# Patient Record
Sex: FEMALE | Race: WHITE | HISPANIC OR LATINO | Employment: UNEMPLOYED | URBAN - METROPOLITAN AREA
[De-identification: names, ages, dates, MRNs, and addresses within clinical notes are randomized per-mention and may not be internally consistent; named-entity substitution may affect disease eponyms.]

---

## 2022-07-05 ENCOUNTER — HOSPITAL ENCOUNTER (EMERGENCY)
Facility: HOSPITAL | Age: 52
Discharge: HOME | End: 2022-07-05
Attending: EMERGENCY MEDICINE

## 2022-07-05 VITALS
BODY MASS INDEX: 46.33 KG/M2 | HEART RATE: 80 BPM | TEMPERATURE: 99 F | HEIGHT: 60 IN | DIASTOLIC BLOOD PRESSURE: 85 MMHG | RESPIRATION RATE: 18 BRPM | SYSTOLIC BLOOD PRESSURE: 178 MMHG | OXYGEN SATURATION: 96 % | WEIGHT: 236 LBS

## 2022-07-05 DIAGNOSIS — N17.9 AKI (ACUTE KIDNEY INJURY): Primary | ICD-10-CM

## 2022-07-05 DIAGNOSIS — R10.9 ABDOMINAL PAIN: ICD-10-CM

## 2022-07-05 LAB
ALBUMIN SERPL BCP-MCNC: 3 G/DL (ref 3.5–5.2)
ALP SERPL-CCNC: 78 U/L (ref 55–135)
ALT SERPL W/O P-5'-P-CCNC: 17 U/L (ref 10–44)
ANION GAP SERPL CALC-SCNC: 10 MMOL/L (ref 8–16)
AST SERPL-CCNC: 18 U/L (ref 10–40)
BACTERIA #/AREA URNS HPF: ABNORMAL /HPF
BASOPHILS # BLD AUTO: 0.06 K/UL (ref 0–0.2)
BASOPHILS NFR BLD: 0.7 % (ref 0–1.9)
BILIRUB SERPL-MCNC: 0.2 MG/DL (ref 0.1–1)
BILIRUB UR QL STRIP: NEGATIVE
BUN SERPL-MCNC: 25 MG/DL (ref 6–20)
CALCIUM SERPL-MCNC: 8.4 MG/DL (ref 8.7–10.5)
CHLORIDE SERPL-SCNC: 109 MMOL/L (ref 95–110)
CLARITY UR: CLEAR
CO2 SERPL-SCNC: 23 MMOL/L (ref 23–29)
COLOR UR: YELLOW
CREAT SERPL-MCNC: 2.1 MG/DL (ref 0.5–1.4)
DIFFERENTIAL METHOD: ABNORMAL
EOSINOPHIL # BLD AUTO: 0.5 K/UL (ref 0–0.5)
EOSINOPHIL NFR BLD: 6 % (ref 0–8)
ERYTHROCYTE [DISTWIDTH] IN BLOOD BY AUTOMATED COUNT: 15.7 % (ref 11.5–14.5)
EST. GFR  (AFRICAN AMERICAN): 30 ML/MIN/1.73 M^2
EST. GFR  (NON AFRICAN AMERICAN): 26 ML/MIN/1.73 M^2
GLUCOSE SERPL-MCNC: 92 MG/DL (ref 70–110)
GLUCOSE UR QL STRIP: NEGATIVE
HCT VFR BLD AUTO: 44 % (ref 37–48.5)
HGB BLD-MCNC: 13.5 G/DL (ref 12–16)
HGB UR QL STRIP: ABNORMAL
HYALINE CASTS #/AREA URNS LPF: 0 /LPF
IMM GRANULOCYTES # BLD AUTO: 0.02 K/UL (ref 0–0.04)
IMM GRANULOCYTES NFR BLD AUTO: 0.2 % (ref 0–0.5)
KETONES UR QL STRIP: NEGATIVE
LEUKOCYTE ESTERASE UR QL STRIP: ABNORMAL
LIPASE SERPL-CCNC: 53 U/L (ref 4–60)
LYMPHOCYTES # BLD AUTO: 2.3 K/UL (ref 1–4.8)
LYMPHOCYTES NFR BLD: 28.4 % (ref 18–48)
MCH RBC QN AUTO: 26.6 PG (ref 27–31)
MCHC RBC AUTO-ENTMCNC: 30.7 G/DL (ref 32–36)
MCV RBC AUTO: 87 FL (ref 82–98)
MICROSCOPIC COMMENT: ABNORMAL
MONOCYTES # BLD AUTO: 0.6 K/UL (ref 0.3–1)
MONOCYTES NFR BLD: 7 % (ref 4–15)
NEUTROPHILS # BLD AUTO: 4.7 K/UL (ref 1.8–7.7)
NEUTROPHILS NFR BLD: 57.7 % (ref 38–73)
NITRITE UR QL STRIP: NEGATIVE
NRBC BLD-RTO: 0 /100 WBC
PH UR STRIP: 6 [PH] (ref 5–8)
PLATELET # BLD AUTO: 297 K/UL (ref 150–450)
PMV BLD AUTO: 11.7 FL (ref 9.2–12.9)
POTASSIUM SERPL-SCNC: 5 MMOL/L (ref 3.5–5.1)
PROT SERPL-MCNC: 7.5 G/DL (ref 6–8.4)
PROT UR QL STRIP: ABNORMAL
RBC # BLD AUTO: 5.08 M/UL (ref 4–5.4)
RBC #/AREA URNS HPF: 5 /HPF (ref 0–4)
SODIUM SERPL-SCNC: 142 MMOL/L (ref 136–145)
SP GR UR STRIP: 1.01 (ref 1–1.03)
TROPONIN I SERPL DL<=0.01 NG/ML-MCNC: <0.006 NG/ML (ref 0–0.03)
URN SPEC COLLECT METH UR: ABNORMAL
UROBILINOGEN UR STRIP-ACNC: NEGATIVE EU/DL
WBC # BLD AUTO: 8.12 K/UL (ref 3.9–12.7)
WBC #/AREA URNS HPF: 24 /HPF (ref 0–5)
WBC CLUMPS URNS QL MICRO: ABNORMAL

## 2022-07-05 PROCEDURE — 85025 COMPLETE CBC W/AUTO DIFF WBC: CPT

## 2022-07-05 PROCEDURE — 87086 URINE CULTURE/COLONY COUNT: CPT

## 2022-07-05 PROCEDURE — 87088 URINE BACTERIA CULTURE: CPT

## 2022-07-05 PROCEDURE — 25000003 PHARM REV CODE 250

## 2022-07-05 PROCEDURE — 96360 HYDRATION IV INFUSION INIT: CPT

## 2022-07-05 PROCEDURE — 93010 EKG 12-LEAD: ICD-10-PCS | Mod: ,,, | Performed by: INTERNAL MEDICINE

## 2022-07-05 PROCEDURE — 87077 CULTURE AEROBIC IDENTIFY: CPT

## 2022-07-05 PROCEDURE — 99285 EMERGENCY DEPT VISIT HI MDM: CPT | Mod: 25

## 2022-07-05 PROCEDURE — 93005 ELECTROCARDIOGRAM TRACING: CPT

## 2022-07-05 PROCEDURE — 83690 ASSAY OF LIPASE: CPT

## 2022-07-05 PROCEDURE — 87186 SC STD MICRODIL/AGAR DIL: CPT

## 2022-07-05 PROCEDURE — 84484 ASSAY OF TROPONIN QUANT: CPT

## 2022-07-05 PROCEDURE — 96361 HYDRATE IV INFUSION ADD-ON: CPT

## 2022-07-05 PROCEDURE — 93010 ELECTROCARDIOGRAM REPORT: CPT | Mod: ,,, | Performed by: INTERNAL MEDICINE

## 2022-07-05 PROCEDURE — 81000 URINALYSIS NONAUTO W/SCOPE: CPT

## 2022-07-05 PROCEDURE — 80053 COMPREHEN METABOLIC PANEL: CPT

## 2022-07-05 RX ORDER — CEPHALEXIN 500 MG/1
500 CAPSULE ORAL 4 TIMES DAILY
Qty: 20 CAPSULE | Refills: 0 | Status: SHIPPED | OUTPATIENT
Start: 2022-07-05 | End: 2022-07-12

## 2022-07-05 RX ADMIN — SODIUM CHLORIDE 1000 ML: 9 INJECTION, SOLUTION INTRAVENOUS at 05:07

## 2022-07-05 NOTE — ED PROVIDER NOTES
Encounter Date: 7/5/2022       History     Chief Complaint   Patient presents with    abnormal labs     Pt presents to ED after being told by her Dr that she has some abnormal lab results and needed further evaluation. Pt went to see Dr with right sided pain. Pt stated that her kidney numbers were abnormal. Pt denies pain currently.     52-year-old female with past medical history of hypertension, cholecystectomy, and x1 C section presents to the ED complaining of x2 month history of intermittent right upper quadrant abdominal pain and diaphoresis.  Patient reports a stabbing pain that is 6/10 in intensity.  She denies any pain currently.  She only reports pain intermittently whenever she bends or leans forward.  Patient has attempted to avoid fast foods with relief to her pain.  She presented to her primary care provider x4 days ago due to her symptoms where she was told she had some abnormal labs and was advised to present to the ER.  No attempted treatment reported.  Her last menstrual period was 1 and half years ago.  She denies any fever, chills, cough, chest pain, shortness of breath, dysuria, hematuria, nausea, vomiting, diarrhea, vaginal discharge, or vaginal bleeding.  No other symptoms reported.  Language line was used: [employer] #[id number].    The history is provided by the patient. The history is limited by a language barrier. A  was used.     Review of patient's allergies indicates:  No Known Allergies  History reviewed. No pertinent past medical history.  History reviewed. No pertinent surgical history.  History reviewed. No pertinent family history.  Social History     Tobacco Use    Smoking status: Never Smoker    Smokeless tobacco: Never Used   Substance Use Topics    Alcohol use: Never     Review of Systems   Constitutional: Positive for diaphoresis. Negative for chills and fever.   HENT: Negative for congestion, ear pain, rhinorrhea and sore throat.    Eyes: Negative for redness.    Respiratory: Negative for cough and shortness of breath.    Cardiovascular: Negative for chest pain.   Gastrointestinal: Positive for abdominal pain. Negative for diarrhea, nausea and vomiting.   Genitourinary: Negative for decreased urine volume, difficulty urinating, dysuria, frequency, hematuria and urgency.   Musculoskeletal: Negative for back pain and neck pain.   Skin: Negative for rash.   Neurological: Negative for headaches.   Psychiatric/Behavioral: Negative for confusion.       Physical Exam     Initial Vitals [07/05/22 1308]   BP Pulse Resp Temp SpO2   (!) 160/86 85 16 98.5 °F (36.9 °C) 95 %      MAP       --         Physical Exam    Nursing note and vitals reviewed.  Constitutional: She appears well-developed and well-nourished.  Non-toxic appearance. She does not appear ill.   HENT:   Head: Normocephalic and atraumatic.   Mouth/Throat: Mucous membranes are normal.   Eyes: EOM are normal.   Neck: Neck supple.   Cardiovascular: Normal rate and regular rhythm.   Pulses:       Radial pulses are 2+ on the right side and 2+ on the left side.   Pulmonary/Chest: Effort normal and breath sounds normal. No respiratory distress.   Abdominal: Abdomen is soft. Bowel sounds are normal. She exhibits no distension. A surgical scar is present. There is no abdominal tenderness.   No right CVA tenderness.  No left CVA tenderness. There is no rebound, no guarding, no tenderness at McBurney's point and negative Dobbs's sign. negative Rovsing's sign  Musculoskeletal:         General: Normal range of motion.      Cervical back: Neck supple.     Neurological: She is alert.   Skin: Skin is warm and dry.   Psychiatric: She has a normal mood and affect.         ED Course   Procedures  Labs Reviewed   CBC W/ AUTO DIFFERENTIAL - Abnormal; Notable for the following components:       Result Value    MCH 26.6 (*)     MCHC 30.7 (*)     RDW 15.7 (*)     All other components within normal limits   URINALYSIS, REFLEX TO URINE CULTURE -  Abnormal; Notable for the following components:    Protein, UA 3+ (*)     Occult Blood UA 1+ (*)     Leukocytes, UA 1+ (*)     All other components within normal limits    Narrative:     Specimen Source->Urine   URINALYSIS MICROSCOPIC - Abnormal; Notable for the following components:    RBC, UA 5 (*)     WBC, UA 24 (*)     All other components within normal limits    Narrative:     Specimen Source->Urine   COMPREHENSIVE METABOLIC PANEL - Abnormal; Notable for the following components:    BUN 25 (*)     Creatinine 2.1 (*)     Calcium 8.4 (*)     Albumin 3.0 (*)     eGFR if  30 (*)     eGFR if non  26 (*)     All other components within normal limits   CULTURE, URINE   TROPONIN I   LIPASE     EKG Readings: (Independently Interpreted)   Initial Reading: No STEMI. Rhythm: Normal Sinus Rhythm. Heart Rate: 84.       Imaging Results          US Abdomen Limited (Final result)  Result time 07/05/22 17:10:32    Final result by Carol Schultz MD (07/05/22 17:10:32)                 Impression:      Cholecystectomy.      Electronically signed by: Carol Schultz  Date:    07/05/2022  Time:    17:10             Narrative:    EXAMINATION:  ULTRASOUND ABDOMEN LIMITED (GALLBLADDER)    CLINICAL HISTORY:  RUQ abdominal pain;    TECHNIQUE:  Limited ultrasound of the right upper quadrant of the abdomen with attention to the gallbladder was performed.    COMPARISON:  None.    FINDINGS:  Gallbladder: Surgically absent.    Biliary system: The common duct is not dilated, measuring 6.3 mm.  No intrahepatic ductal dilatation.    Miscellaneous: No right hydronephrosis.                               CT Renal Stone Study ABD Pelvis WO (Final result)  Result time 07/05/22 16:44:15    Final result by Shaka Casas III, MD (07/05/22 16:44:15)                 Impression:      Normal appearance of the urinary tracts.    Large simple cyst right ovary.      Electronically signed by: Shaka Casas  MD  Date:    07/05/2022  Time:    16:44             Narrative:    EXAMINATION:  CT RENAL STONE STUDY ABD PELVIS WO    CLINICAL HISTORY:  Flank pain, kidney stone suspected;    FINDINGS:  Lung bases are clear.  Gallbladder removed.  Liver, spleen, stomach, pancreas, duodenum, and adrenal glands show nothing unusual.  No obstruction, ileus, or perforation seen.  There is a large right ovarian cyst measuring 7 cm.  There is diverticulosis.  Bowel loops show nothing unusual.  No adenopathy or mass is seen.  Kidneys in ureters show nothing unusual.  No bladder calculi are seen.  No ureter calculi are seen.  No ascites is seen.  No renal masses are seen.  Bones show nothing unusual.                               X-Ray Chest AP Portable (Final result)  Result time 07/05/22 14:17:39    Final result by Shaka Casas III, MD (07/05/22 14:17:39)                 Impression:      No acute process seen.      Electronically signed by: Shaka Casas MD  Date:    07/05/2022  Time:    14:17             Narrative:    EXAMINATION:  XR CHEST AP PORTABLE    CLINICAL HISTORY:  Unspecified abdominal pain    FINDINGS:  Chest one view: Heart size is upper normal.  Lungs are clear and the bones show nothing unusual.                                 Medications   sodium chloride 0.9% bolus 1,000 mL (has no administration in time range)     Medical Decision Making:   ED Management:  This is a 52-year-old female with past medical history of hypertension, cholecystectomy, and x1 C section presents to the ED complaining of x2 month history of intermittent right upper quadrant abdominal pain and diaphoresis.  Patient reports a stabbing pain that is 6/10 in intensity.  She denies any pain currently.  On physical exam, she is well appearing and in no acute distress.  Nontoxic appearing.  Abdomen is soft and nontender.  No rebound, guarding, or rigidity. There is a surgical scar present to right upper quadrant.  No CVA tenderness bilaterally.      Differentials including but not limited to:  Cholelithiasis, pancreatitis, gastritis, PUD, cystitis, nephrolithiasis, pyelonephritis, MI, pneumothorax, gastroenteritis    CBC unremarkable.  UA revealed 1+ leukocytes and 25 WBC.  CMP revealed BUN of 25, creatinine 2.1, and GFR of 26. Attempted to call daughter's of Lily to see previous lab work; however, I was unable to reach them.  Lipase and troponin within normal limits.  Chest x-ray revealed no evidence of acute cardiopulmonary processes.  CT renal revealed no evidence of nephrolithiasis. US RUQ revealed no evidence of biliary stone in the common bile duct.  Consulted with Dr. Rodriguez  who advised 1 L normal saline. He does not believe the patient warrants admission criteria at this time. Encouraged patient to drink a lot of fluids upon discharge. Will order keflex upon discharge for UTI. Urged prompt f/u with PCP for further evaluation.    Case discussed with and care coordinated in conjunction with my attending, Dr. Rodriguez.    Strict return precautions given. I discussed with the patient/family the diagnosis, treatment plan, indications for return to the emergency department, and for expected follow-up. The patient/family verbalized an understanding. The patient/family is asked if there are any questions or concerns. We discuss the case, until all issues are addressed to the patient/familys satisfaction. Patient/family understands and is agreeable to the plan. Patient is stable and ready for discharge.                       Clinical Impression:   Final diagnoses:  [R10.9] Abdominal pain                 Jose Louie PA-C  07/05/22 1810       Jose Louie PA-C  07/05/22 1820

## 2022-07-05 NOTE — DISCHARGE INSTRUCTIONS
Regrese al departamento de emergencias si presenta síntomas nuevos o que empeoran, incluidos: fiebre, dolor en el pecho, dificultad para respirar, pérdida del conocimiento, mareos, debilidad o cualquier otra inquietud.    Jacklyn un seguimiento con ely proveedor de atención primaria dentro de la semana. Si no tiene karely, puede comunicarse con el que figura en ely documentación de ed o también puede llamar al mostrador de citas de la [facility] al [phone] para programar cristal milady con karely.    [facility] todos los medicamentos según lo prescrito.

## 2022-07-07 LAB — BACTERIA UR CULT: ABNORMAL

## 2023-06-24 ENCOUNTER — HOSPITAL ENCOUNTER (INPATIENT)
Facility: HOSPITAL | Age: 53
LOS: 2 days | Discharge: HOME | DRG: 291 | End: 2023-06-26
Attending: EMERGENCY MEDICINE | Admitting: STUDENT IN AN ORGANIZED HEALTH CARE EDUCATION/TRAINING PROGRAM

## 2023-06-24 DIAGNOSIS — I34.0 MITRAL VALVE INSUFFICIENCY, UNSPECIFIED ETIOLOGY: ICD-10-CM

## 2023-06-24 DIAGNOSIS — I10 HYPERTENSION, UNSPECIFIED TYPE: ICD-10-CM

## 2023-06-24 DIAGNOSIS — I50.9 NEW ONSET OF CONGESTIVE HEART FAILURE: Primary | ICD-10-CM

## 2023-06-24 DIAGNOSIS — R06.02 SHORTNESS OF BREATH: ICD-10-CM

## 2023-06-24 DIAGNOSIS — R07.9 CHEST PAIN: ICD-10-CM

## 2023-06-24 DIAGNOSIS — I50.9 CHF (CONGESTIVE HEART FAILURE): ICD-10-CM

## 2023-06-24 DIAGNOSIS — N18.4 STAGE 4 CHRONIC KIDNEY DISEASE: ICD-10-CM

## 2023-06-24 PROBLEM — N83.209 OVARIAN CYST: Status: ACTIVE | Noted: 2023-06-24

## 2023-06-24 PROBLEM — D64.9 ANEMIA: Status: ACTIVE | Noted: 2023-06-24

## 2023-06-24 PROBLEM — E66.9 CLASS 2 OBESITY IN ADULT: Status: ACTIVE | Noted: 2023-06-24

## 2023-06-24 PROBLEM — E66.812 CLASS 2 OBESITY IN ADULT: Status: ACTIVE | Noted: 2023-06-24

## 2023-06-24 LAB
ALBUMIN SERPL BCP-MCNC: 3.2 G/DL (ref 3.5–5.2)
ALLENS TEST: ABNORMAL
ALP SERPL-CCNC: 87 U/L (ref 55–135)
ALT SERPL W/O P-5'-P-CCNC: 33 U/L (ref 10–44)
ANION GAP SERPL CALC-SCNC: 11 MMOL/L (ref 8–16)
AST SERPL-CCNC: 38 U/L (ref 10–40)
BASOPHILS # BLD AUTO: 0.05 K/UL (ref 0–0.2)
BASOPHILS NFR BLD: 0.6 % (ref 0–1.9)
BILIRUB SERPL-MCNC: 0.5 MG/DL (ref 0.1–1)
BNP SERPL-MCNC: 443 PG/ML (ref 0–99)
BUN SERPL-MCNC: 42 MG/DL (ref 6–20)
CALCIUM SERPL-MCNC: 8.6 MG/DL (ref 8.7–10.5)
CHLORIDE SERPL-SCNC: 108 MMOL/L (ref 95–110)
CO2 SERPL-SCNC: 19 MMOL/L (ref 23–29)
CREAT SERPL-MCNC: 3 MG/DL (ref 0.5–1.4)
DELSYS: ABNORMAL
DIFFERENTIAL METHOD: ABNORMAL
EOSINOPHIL # BLD AUTO: 0.4 K/UL (ref 0–0.5)
EOSINOPHIL NFR BLD: 5.3 % (ref 0–8)
ERYTHROCYTE [DISTWIDTH] IN BLOOD BY AUTOMATED COUNT: 15.9 % (ref 11.5–14.5)
EST. GFR  (NO RACE VARIABLE): 18 ML/MIN/1.73 M^2
FLOW: 2
GLUCOSE SERPL-MCNC: 89 MG/DL (ref 70–110)
HCO3 UR-SCNC: 23.1 MMOL/L (ref 24–28)
HCT VFR BLD AUTO: 36 % (ref 37–48.5)
HCV AB SERPL QL IA: NORMAL
HGB BLD-MCNC: 10.9 G/DL (ref 12–16)
HIV 1+2 AB+HIV1 P24 AG SERPL QL IA: NORMAL
IMM GRANULOCYTES # BLD AUTO: 0.03 K/UL (ref 0–0.04)
IMM GRANULOCYTES NFR BLD AUTO: 0.4 % (ref 0–0.5)
LIPASE SERPL-CCNC: 40 U/L (ref 4–60)
LYMPHOCYTES # BLD AUTO: 1.5 K/UL (ref 1–4.8)
LYMPHOCYTES NFR BLD: 18.4 % (ref 18–48)
MCH RBC QN AUTO: 25.8 PG (ref 27–31)
MCHC RBC AUTO-ENTMCNC: 30.3 G/DL (ref 32–36)
MCV RBC AUTO: 85 FL (ref 82–98)
MODE: ABNORMAL
MONOCYTES # BLD AUTO: 0.4 K/UL (ref 0.3–1)
MONOCYTES NFR BLD: 5.4 % (ref 4–15)
NEUTROPHILS # BLD AUTO: 5.6 K/UL (ref 1.8–7.7)
NEUTROPHILS NFR BLD: 69.9 % (ref 38–73)
NRBC BLD-RTO: 0 /100 WBC
PCO2 BLDA: 43.5 MMHG (ref 35–45)
PH SMN: 7.33 [PH] (ref 7.35–7.45)
PLATELET # BLD AUTO: 299 K/UL (ref 150–450)
PMV BLD AUTO: 11.1 FL (ref 9.2–12.9)
PO2 BLDA: 22 MMHG (ref 40–60)
POC BE: -3 MMOL/L
POC CARDIAC TROPONIN I: 0.04 NG/ML (ref 0–0.08)
POC SATURATED O2: 34 % (ref 95–100)
POC TCO2: 24 MMOL/L (ref 24–29)
POTASSIUM SERPL-SCNC: 5 MMOL/L (ref 3.5–5.1)
PROT SERPL-MCNC: 7.3 G/DL (ref 6–8.4)
RBC # BLD AUTO: 4.22 M/UL (ref 4–5.4)
SAMPLE: ABNORMAL
SAMPLE: NORMAL
SARS-COV-2 RDRP RESP QL NAA+PROBE: NEGATIVE
SITE: ABNORMAL
SODIUM SERPL-SCNC: 138 MMOL/L (ref 136–145)
TROPONIN I SERPL DL<=0.01 NG/ML-MCNC: 0.04 NG/ML (ref 0–0.03)
TROPONIN I SERPL DL<=0.01 NG/ML-MCNC: 0.09 NG/ML (ref 0–0.03)
TROPONIN I SERPL DL<=0.01 NG/ML-MCNC: 0.11 NG/ML (ref 0–0.03)
WBC # BLD AUTO: 7.94 K/UL (ref 3.9–12.7)

## 2023-06-24 PROCEDURE — 93010 EKG 12-LEAD: ICD-10-PCS | Mod: 76,,, | Performed by: INTERNAL MEDICINE

## 2023-06-24 PROCEDURE — U0002 COVID-19 LAB TEST NON-CDC: HCPCS

## 2023-06-24 PROCEDURE — 93010 ELECTROCARDIOGRAM REPORT: CPT | Mod: 76,,, | Performed by: INTERNAL MEDICINE

## 2023-06-24 PROCEDURE — 84484 ASSAY OF TROPONIN QUANT: CPT | Performed by: EMERGENCY MEDICINE

## 2023-06-24 PROCEDURE — 93010 ELECTROCARDIOGRAM REPORT: CPT | Mod: ,,, | Performed by: INTERNAL MEDICINE

## 2023-06-24 PROCEDURE — 94761 N-INVAS EAR/PLS OXIMETRY MLT: CPT

## 2023-06-24 PROCEDURE — 63600175 PHARM REV CODE 636 W HCPCS: Performed by: PHYSICIAN ASSISTANT

## 2023-06-24 PROCEDURE — 85025 COMPLETE CBC W/AUTO DIFF WBC: CPT | Performed by: PHYSICIAN ASSISTANT

## 2023-06-24 PROCEDURE — 99223 PR INITIAL HOSPITAL CARE,LEVL III: ICD-10-PCS | Mod: ,,, | Performed by: STUDENT IN AN ORGANIZED HEALTH CARE EDUCATION/TRAINING PROGRAM

## 2023-06-24 PROCEDURE — 27000221 HC OXYGEN, UP TO 24 HOURS

## 2023-06-24 PROCEDURE — 93005 ELECTROCARDIOGRAM TRACING: CPT

## 2023-06-24 PROCEDURE — 99285 EMERGENCY DEPT VISIT HI MDM: CPT | Mod: 25

## 2023-06-24 PROCEDURE — 99223 1ST HOSP IP/OBS HIGH 75: CPT | Mod: ,,, | Performed by: STUDENT IN AN ORGANIZED HEALTH CARE EDUCATION/TRAINING PROGRAM

## 2023-06-24 PROCEDURE — 96374 THER/PROPH/DIAG INJ IV PUSH: CPT

## 2023-06-24 PROCEDURE — 86803 HEPATITIS C AB TEST: CPT | Performed by: EMERGENCY MEDICINE

## 2023-06-24 PROCEDURE — 99900035 HC TECH TIME PER 15 MIN (STAT)

## 2023-06-24 PROCEDURE — 36415 COLL VENOUS BLD VENIPUNCTURE: CPT

## 2023-06-24 PROCEDURE — 84484 ASSAY OF TROPONIN QUANT: CPT | Mod: 91 | Performed by: PHYSICIAN ASSISTANT

## 2023-06-24 PROCEDURE — 63600175 PHARM REV CODE 636 W HCPCS: Performed by: STUDENT IN AN ORGANIZED HEALTH CARE EDUCATION/TRAINING PROGRAM

## 2023-06-24 PROCEDURE — 25000003 PHARM REV CODE 250: Performed by: STUDENT IN AN ORGANIZED HEALTH CARE EDUCATION/TRAINING PROGRAM

## 2023-06-24 PROCEDURE — 25000003 PHARM REV CODE 250: Performed by: PHYSICIAN ASSISTANT

## 2023-06-24 PROCEDURE — 83880 ASSAY OF NATRIURETIC PEPTIDE: CPT | Performed by: PHYSICIAN ASSISTANT

## 2023-06-24 PROCEDURE — 25000003 PHARM REV CODE 250

## 2023-06-24 PROCEDURE — 83690 ASSAY OF LIPASE: CPT | Performed by: PHYSICIAN ASSISTANT

## 2023-06-24 PROCEDURE — 82803 BLOOD GASES ANY COMBINATION: CPT

## 2023-06-24 PROCEDURE — 80053 COMPREHEN METABOLIC PANEL: CPT | Performed by: PHYSICIAN ASSISTANT

## 2023-06-24 PROCEDURE — 87389 HIV-1 AG W/HIV-1&-2 AB AG IA: CPT | Performed by: EMERGENCY MEDICINE

## 2023-06-24 PROCEDURE — 20600001 HC STEP DOWN PRIVATE ROOM

## 2023-06-24 PROCEDURE — 84484 ASSAY OF TROPONIN QUANT: CPT

## 2023-06-24 PROCEDURE — 84484 ASSAY OF TROPONIN QUANT: CPT | Mod: 91

## 2023-06-24 RX ORDER — FUROSEMIDE 10 MG/ML
40 INJECTION INTRAMUSCULAR; INTRAVENOUS
Status: DISCONTINUED | OUTPATIENT
Start: 2023-06-24 | End: 2023-06-25

## 2023-06-24 RX ORDER — LOSARTAN POTASSIUM 100 MG/1
100 TABLET ORAL DAILY
Status: ON HOLD | COMMUNITY
End: 2023-06-26 | Stop reason: SDUPTHER

## 2023-06-24 RX ORDER — SODIUM CHLORIDE 0.9 % (FLUSH) 0.9 %
10 SYRINGE (ML) INJECTION
Status: DISCONTINUED | OUTPATIENT
Start: 2023-06-24 | End: 2023-06-26 | Stop reason: HOSPADM

## 2023-06-24 RX ORDER — LISINOPRIL 40 MG/1
40 TABLET ORAL DAILY
COMMUNITY
End: 2023-06-24

## 2023-06-24 RX ORDER — ASPIRIN 325 MG
325 TABLET ORAL
Status: COMPLETED | OUTPATIENT
Start: 2023-06-24 | End: 2023-06-24

## 2023-06-24 RX ORDER — HEPARIN SODIUM 5000 [USP'U]/ML
5000 INJECTION, SOLUTION INTRAVENOUS; SUBCUTANEOUS EVERY 8 HOURS
Status: DISCONTINUED | OUTPATIENT
Start: 2023-06-24 | End: 2023-06-26 | Stop reason: HOSPADM

## 2023-06-24 RX ORDER — FUROSEMIDE 10 MG/ML
40 INJECTION INTRAMUSCULAR; INTRAVENOUS
Status: COMPLETED | OUTPATIENT
Start: 2023-06-24 | End: 2023-06-24

## 2023-06-24 RX ORDER — LORATADINE 10 MG/1
10 TABLET ORAL DAILY
Status: ON HOLD | COMMUNITY

## 2023-06-24 RX ORDER — SODIUM BICARBONATE 650 MG/1
650 TABLET ORAL 2 TIMES DAILY
Status: DISCONTINUED | OUTPATIENT
Start: 2023-06-24 | End: 2023-06-26 | Stop reason: HOSPADM

## 2023-06-24 RX ORDER — CETIRIZINE HYDROCHLORIDE 10 MG/1
10 TABLET ORAL DAILY
Status: DISCONTINUED | OUTPATIENT
Start: 2023-06-25 | End: 2023-06-25

## 2023-06-24 RX ORDER — FUROSEMIDE 10 MG/ML
40 INJECTION INTRAMUSCULAR; INTRAVENOUS DAILY
Status: DISCONTINUED | OUTPATIENT
Start: 2023-06-25 | End: 2023-06-24

## 2023-06-24 RX ORDER — HYDRALAZINE HYDROCHLORIDE 25 MG/1
25 TABLET, FILM COATED ORAL EVERY 8 HOURS PRN
Status: DISCONTINUED | OUTPATIENT
Start: 2023-06-24 | End: 2023-06-26 | Stop reason: HOSPADM

## 2023-06-24 RX ORDER — LIDOCAINE HYDROCHLORIDE 10 MG/ML
10 INJECTION INFILTRATION; PERINEURAL ONCE
Status: DISCONTINUED | OUTPATIENT
Start: 2023-06-24 | End: 2023-06-24

## 2023-06-24 RX ORDER — ACETAMINOPHEN 325 MG/1
650 TABLET ORAL EVERY 6 HOURS PRN
Status: DISCONTINUED | OUTPATIENT
Start: 2023-06-24 | End: 2023-06-26 | Stop reason: HOSPADM

## 2023-06-24 RX ORDER — ENOXAPARIN SODIUM 100 MG/ML
30 INJECTION SUBCUTANEOUS EVERY 24 HOURS
Status: DISCONTINUED | OUTPATIENT
Start: 2023-06-24 | End: 2023-06-24

## 2023-06-24 RX ORDER — POLYETHYLENE GLYCOL 3350 17 G/17G
17 POWDER, FOR SOLUTION ORAL 2 TIMES DAILY PRN
Status: DISCONTINUED | OUTPATIENT
Start: 2023-06-24 | End: 2023-06-26 | Stop reason: HOSPADM

## 2023-06-24 RX ADMIN — ASPIRIN 325 MG: 325 TABLET ORAL at 11:06

## 2023-06-24 RX ADMIN — FUROSEMIDE 40 MG: 10 INJECTION, SOLUTION INTRAMUSCULAR; INTRAVENOUS at 06:06

## 2023-06-24 RX ADMIN — HEPARIN SODIUM 5000 UNITS: 5000 INJECTION INTRAVENOUS; SUBCUTANEOUS at 09:06

## 2023-06-24 RX ADMIN — ACETAMINOPHEN 650 MG: 325 TABLET ORAL at 09:06

## 2023-06-24 RX ADMIN — SODIUM BICARBONATE 650 MG: 650 TABLET ORAL at 09:06

## 2023-06-24 RX ADMIN — FUROSEMIDE 40 MG: 10 INJECTION, SOLUTION INTRAMUSCULAR; INTRAVENOUS at 12:06

## 2023-06-24 NOTE — Clinical Note
Diagnosis: New onset of congestive heart failure [[id number]]   Admitting Provider:: DYLAN MUÑIZ [[id number]]   Future Attending Provider: DYLAN MUÑIZ [[id number]]   Reason for IP Medical Treatment  (Clinical interventions that can only be accomplished in the IP setting? ) :: new onset HF   I certify that Inpatient services for greater than or equal to 2 midnights are medically necessary:: Yes   Plans for Post-Acute care--if anticipated (pick the single best option):: A. No post acute care anticipated at this time   Special Needs:: No Special Needs [1]

## 2023-06-24 NOTE — ED NOTES
Patient identifiers verified and correct for Ms. Mccallum  Wallisian speaking,  utilized.     C/C: Shortness of breath, chest pain, abdominal pain on exertion worsening over 2 days. Daughters at bedside.       APPEARANCE: awake and alert in NAD.   PAIN  0/10  SKIN: warm, dry and intact. No breakdown or bruising.  MUSCULOSKELETAL: Patient moving all extremities spontaneously, no obvious swelling or deformities noted. Ambulates independently.  RESPIRATORY: Respirations unlabored. Shortness of breath worsened by exertion. Unable to lay flat.   CARDIAC: 2+ distal pulses; Intermittent chest pain, +2 edema to BLE  ABDOMEN: S/ND/NT, Denies nausea  : voids spontaneously, denies difficulty  Neurologic: AAO x 4; follows commands equal strength in all extremities; denies numbness/tingling. Denies dizziness

## 2023-06-24 NOTE — PHARMACY MED REC
"Admission Medication History     The home medication history was taken by Madhu Ordonez.    You may go to "Admission" then "Reconcile Home Medications" tabs to review and/or act upon these items.     The home medication list has been updated by the Pharmacy department.   Please read ALL comments highlighted in yellow.   Please address this information as you see fit.    Feel free to contact us if you have any questions or require assistance.      The medications listed below were removed from the home medication list. Please reorder if appropriate:  Patient reports no longer taking the following medication(s):  LISINOPRIL 40 MG TABLET      Current Outpatient Medications on File Prior to Encounter   Medication Sig    loratadine (CLARITIN) 10 mg tablet   Take 10 mg by mouth once daily.    losartan (COZAAR) 100 MG tablet   Take 100 mg by mouth once daily.       Madhu Ordonez  EXT [id number]                  .        "

## 2023-06-24 NOTE — PLAN OF CARE
Problem: Adult Inpatient Plan of Care  Goal: Patient-Specific Goal (Individualized)  Outcome: Ongoing, Progressing  Flowsheets (Taken 6/24/2023 1809)  Anxieties, Fears or Concerns: SOB  Individualized Care Needs: lasix     Problem: Fluid Imbalance (Heart Failure)  Goal: Fluid Balance  Outcome: Ongoing, Progressing  Intervention: Monitor and Manage Fluid Balance  Flowsheets (Taken 6/24/2023 1809)  Fluid/Electrolyte Management: fluids restricted       Plan of care discussed with patient. Patient is free of fall/trauma/injury. IVP lasix per order.  All questions addressed.

## 2023-06-24 NOTE — ASSESSMENT & PLAN NOTE
Body mass index is 36.13 kg/m². Morbid obesity complicates all aspects of disease management from diagnostic modalities to treatment. Weight loss encouraged and health benefits explained to patient.

## 2023-06-24 NOTE — ED TRIAGE NOTES
Gambian speaking,  used. Complaint of shortness of breath, mid upper abdominal pain, chest pain. States worse with walking. PA assessing at bedside.

## 2023-06-24 NOTE — ASSESSMENT & PLAN NOTE
Large simple 7cm right ovarian cyst noted on RP ultrasound in November 2022. Per radiology read at that time it appeared grossly stable from CT scan in July 2022. Recommended 3-6 month repeat imaging w/ transvaginal ultrasound. Will refer for outpatient imaging on discharge.

## 2023-06-24 NOTE — ASSESSMENT & PLAN NOTE
Takes losartan 100 mg daily at home. BP elevated to 184/104 on admission, improved with diuresis. Will hold home ARB in the setting of possible REGINALDO (though unclear Cr baseline). Will avoid beta-blockers given new-onset CHF with unknown EF. Will use PRN oral hydralazine in the interim for BP control.     -- Oral hydralazine 25 mg TID PRN for SBP > 170

## 2023-06-24 NOTE — ASSESSMENT & PLAN NOTE
Hgb 10.9 on admission, was normal in July 2022. Normocytic. Will check iron, folate, B-12. Possibly secondary to chronic kidney disease. No evidence of bleeding on exam. Iron, folate levels WNL, B-12 slightly elevated.    -- Trend CBC  -- Transfusion threshold < 7.0

## 2023-06-24 NOTE — ASSESSMENT & PLAN NOTE
# Elevated Troponin    52 yoF w/ history of HTN, CKD IV presenting w/ signs and symptoms consistent with new-onset heart failure exacerbation as evidenced by dyspnea on exertion, orthopnea, elevated BNP to 443, CXR with bilateral pulmonary edema, crackles in bilateral bases. Risk factors include hypertension. Possible triggers include URI w/ scratchy throat and recent sick contact vs recent dietary indiscretion w/ salty meal on day prior to admission.    Troponin mildly elevated to 0.04 in the setting of CKD. Patient reporting left-sided non-radiating chest pain that is not associated w/ activity and resolves spontaneously. ECG without evidence of ischemic change. Suspect troponin bump secondary to CHF exacerbation in the setting of CKD. Troponin peaked at 0.113, will stop trending.    Symptoms improving with diuresis, approaching euvolemia. TTE performed 6/25, read pending. O2 weaned off.    Plan:  -- Will check RFP this PM, if Cr stable or improving then will give 40 mg PO lasix, if Cr worsening will hold on further diuresis today  -- Follow-up TTE  -- Start metoprolol succinate 25 mg daily  -- Daily weights (standing if tolerated)  -- Strict I/Os  -- Fluid restriction at 2000mL  -- Cardiac diet  -- Outpatient cardiology follow-up for further up-titration of GDMT

## 2023-06-24 NOTE — H&P
[facility] - Emergency Dept  [facility] Medicine  History & Physical    Patient Name: Xena Vera  MRN: [id number]  Patient Class: IP- Inpatient  Admission Date: 6/24/2023  Attending Physician: Dafne Lindsay DO   Primary Care Provider: MINH Herrera       Patient information was obtained from patient, past medical records and ER records.     Subjective:     Principal Problem:New onset of congestive heart failure    Chief Complaint:   Chief Complaint   Patient presents with    Shortness of Breath        Medical  was used for encounter.    HPI: The patient is a 52 year-old female with a history of HTN, CKD who presents to the ED with shortness of breath x 1 day. She reports that she was awoken at 2:00 a.m. on the morning of admission w/ dyspnea while laying flat. The shortness of breath is worsened with exertion and laying flat. Additionally she reports chest pain that is left-sided and non-radiating. The pain lasts for a few seconds then resolves spontaneously. The chest pain is not associated with activity. She reports leg swelling for the past one day. In terms of triggers she states that she had close contact with a baby that had an upper respiratory tract infection earlier this week and now feels a scratchy/sore throat. Additionally she states that yesterday she at a particularly salty meal. She has never previously been diagnosed with heart failure.    ED evaluation significant for BP (184/104), satting 93% on room air, RR 28, T 98.5. WBC 7.9, Hgb 10.9, plts 299, Na 138, K 5.0, Bicarb 19, BUN 42, Cr 3.0 (unclear baseline, only available measurement was 2.1 in July 2022), , Troponin 0.04. CXR with bilateral pulmonary edema.      Past Medical History:   Diagnosis Date    CKD (chronic kidney disease)     HTN (hypertension)     Ovarian cyst        History reviewed. No pertinent surgical history.    Review of patient's allergies indicates:  No Known Allergies    No current  facility-administered medications on file prior to encounter.     Current Outpatient Medications on File Prior to Encounter   Medication Sig    loratadine (CLARITIN) 10 mg tablet Take 10 mg by mouth once daily.    losartan (COZAAR) 100 MG tablet Take 100 mg by mouth once daily.    [DISCONTINUED] lisinopriL (PRINIVIL,ZESTRIL) 40 MG tablet Take 40 mg by mouth once daily.    [DISCONTINUED] lisinopriL (PRINIVIL,ZESTRIL) 40 MG tablet Take 40 mg by mouth once daily.     Family History    None       Tobacco Use    Smoking status: Never    Smokeless tobacco: Never   Substance and Sexual Activity    Alcohol use: Never    Drug use: Never    Sexual activity: Not on file     Review of Systems   Constitutional:  Negative for chills, fatigue and fever.   HENT:  Positive for sore throat. Negative for congestion and rhinorrhea.    Eyes:  Negative for pain, redness and visual disturbance.   Respiratory:  Positive for shortness of breath. Negative for cough and wheezing.    Cardiovascular:  Positive for chest pain and leg swelling. Negative for palpitations.   Gastrointestinal:  Negative for abdominal distention, abdominal pain, constipation, diarrhea, nausea and vomiting.   Genitourinary:  Negative for dysuria and hematuria.   Musculoskeletal:  Negative for arthralgias and joint swelling.   Skin:  Negative for pallor and rash.   Neurological:  Negative for dizziness, weakness, numbness and headaches.   Psychiatric/Behavioral:  Negative for agitation. The patient is not nervous/anxious.    Objective:     Vital Signs (Most Recent):  Temp: 98.7 °F (37.1 °C) (06/24/23 1147)  Pulse: 81 (06/24/23 1417)  Resp: (!) 23 (06/24/23 1417)  BP: (!) 148/86 (06/24/23 1417)  SpO2: 99 % (06/24/23 1417) Vital Signs (24h Range):  Temp:  [98.5 °F (36.9 °C)-98.7 °F (37.1 °C)] 98.7 °F (37.1 °C)  Pulse:  [81-93] 81  Resp:  [18-28] 23  SpO2:  [93 %-99 %] 99 %  BP: (148-184)/() 148/86     Weight: 83.9 kg (185 lb)  Body mass index is 36.13 kg/m².      Physical Exam  Vitals and nursing note reviewed.   Constitutional:       General: She is not in acute distress.     Appearance: Normal appearance. She is obese.   HENT:      Head: Normocephalic and atraumatic.      Right Ear: External ear normal.      Left Ear: External ear normal.      Nose: No congestion or rhinorrhea.      Mouth/Throat:      Mouth: Mucous membranes are moist.      Pharynx: Oropharynx is clear.   Eyes:      General: No scleral icterus.     Extraocular Movements: Extraocular movements intact.      Conjunctiva/sclera: Conjunctivae normal.   Cardiovascular:      Rate and Rhythm: Normal rate and regular rhythm.      Pulses: Normal pulses.      Heart sounds: Murmur (systolic LUSB) heard.   Pulmonary:      Effort: Pulmonary effort is normal.      Breath sounds: Rales (bilateral bases) present. No wheezing.   Abdominal:      General: Abdomen is flat. Bowel sounds are normal. There is no distension.      Palpations: Abdomen is soft.      Tenderness: There is no abdominal tenderness.   Musculoskeletal:         General: No swelling.      Cervical back: Normal range of motion and neck supple.      Right lower leg: Edema present.      Left lower leg: Edema present.      Comments: 2+ bilateral lower extremity edema to mid-shin   Skin:     General: Skin is warm and dry.      Capillary Refill: Capillary refill takes less than 2 seconds.      Findings: No rash.   Neurological:      Mental Status: She is alert and oriented to person, place, and time. Mental status is at baseline.      Motor: No weakness.   Psychiatric:         Mood and Affect: Mood normal.         Behavior: Behavior normal.     Significant Labs: All pertinent labs within the past 24 hours have been reviewed.  CBC:   Recent Labs   Lab 06/24/23  1135   WBC 7.94   HGB 10.9*   HCT 36.0*        CMP:   Recent Labs   Lab 06/24/23  1136      K 5.0      CO2 19*   GLU 89   BUN 42*   CREATININE 3.0*   CALCIUM 8.6*   PROT 7.3   ALBUMIN  3.2*   BILITOT 0.5   ALKPHOS 87   AST 38   ALT 33   ANIONGAP 11       Significant Imaging: I have reviewed all pertinent imaging results/findings within the past 24 hours.    Assessment/Plan:     * New onset of congestive heart failure  # Elevated Troponin    52 yoF w/ history of HTN, CKD IV presenting w/ signs and symptoms consistent with new-onset heart failure exacerbation as evidenced by dyspnea on exertion, orthopnea, elevated BNP to 443, CXR with bilateral pulmonary edema, crackles in bilateral bases. Risk factors include hypertension. Possible triggers include URI w/ scratchy throat and recent sick contact vs recent dietary indiscretion w/ salty meal on day prior to admission.    Troponin mildly elevated to 0.04 in the setting of CKD. Patient reporting left-sided non-radiating chest pain that is not associated w/ activity and resolves spontaneously. ECG without evidence of ischemic change. Suspect troponin bump secondary to CHF exacerbation in the setting of CKD.    Plan:  -- IV lasix 40 mg BID; goal net negative 1-2L  -- Supplemental O2; wean as tolerated  -- Obtain STAT TTE  -- Trend troponin to peak  -- Daily weights (standing if tolerated)  -- Strict I/Os  -- Fluid restriction at 2000mL  -- Cardiac diet      Stage 4 chronic kidney disease  Creatinine 3.0 on admission. Unclear baseline, was 2.1 in July 2022. Patient reports history of proteinuria for which she was started on losartan. Possible REGINALDO vs baseline Cr of 3.0, will trend while inpatient. Will hold home ARB due to possible REGINALDO. Will obtain RP ultrasound to rule out obstruction. If creatinine is elevated from baseline would suspect CRS secondary to CHF exacerbation.    -- Trend Cr  -- Hold ARB for now  -- RP ultrasound  -- Urine studies to calculate FeUrea given diuretic use  -- Urine Pr:Cr  -- Renally dose medications  -- Avoid nephrotoxic medications    Anemia  Hgb 10.9 on admission, was normal in July 2022. Normocytic. Will check iron, folate,  B-12. Possibly secondary to chronic kidney disease. No evidence of bleeding on exam.    -- Follow-up Iron, folate, B-12 levels  -- Trend CBC  -- Transfusion threshold < 7.0    HTN (hypertension)  Takes losartan 100 mg daily at home. BP elevated to 184/104 on admission, improved with diuresis. Will hold home ARB in the setting of possible REGINALDO (though unclear Cr baseline). Will avoid beta-blockers given new-onset CHF with unknown EF. Will use PRN oral hydralazine in the interim for BP control.     -- Oral hydralazine 25 mg TID PRN for SBP > 170    Ovarian cyst  Large simple 7cm right ovarian cyst noted on RP ultrasound in November 2022. Per radiology read at that time it appeared grossly stable from CT scan in July 2022. Recommended 3-6 month repeat imaging w/ transvaginal ultrasound. Will refer for outpatient imaging on discharge.    Class 2 obesity in adult  Body mass index is 36.13 kg/m². Morbid obesity complicates all aspects of disease management from diagnostic modalities to treatment. Weight loss encouraged and health benefits explained to patient.       VTE Risk Mitigation (From admission, onward)           Ordered     enoxaparin injection 30 mg  Daily         06/24/23 1451     IP VTE HIGH RISK PATIENT  Once         06/24/23 1451     Place sequential compression device  Until discontinued         06/24/23 1451                    Javier Morris MD  Department of Hospital Medicine  [facility] - Emergency Dept

## 2023-06-24 NOTE — ASSESSMENT & PLAN NOTE
Creatinine 3.0 on admission. Unclear baseline, was 2.1 in July 2022. Patient reports history of proteinuria for which she was started on losartan. Possible REGINALDO vs baseline Cr of 3.0, will trend while inpatient. Will hold home ARB due to possible REGINALDO. If creatinine is elevated from baseline would suspect CRS secondary to CHF exacerbation.    -- Trend Cr  -- Hold ARB for now  -- Renally dose medications  -- Avoid nephrotoxic medications

## 2023-06-24 NOTE — ED NOTES
Ambulatory to bathroom independently. SPO2% 86%,  while walking on room air. 2L O2 via NC applied for remainder of ambulation.

## 2023-06-24 NOTE — ED NOTES
Patient states she was hospitalized with pneumonia 14 years ago and has had her gall bladder removed.

## 2023-06-24 NOTE — HPI
The patient is a 52 year-old female with a history of HTN, CKD who presents to the ED with shortness of breath x 1 day. She reports that she was awoken at 2:00 a.m. on the morning of admission w/ dyspnea while laying flat. The shortness of breath is worsened with exertion and laying flat. Additionally she reports chest pain that is left-sided and non-radiating. The pain lasts for a few seconds then resolves spontaneously. The chest pain is not associated with activity. She reports leg swelling for the past one day. In terms of triggers she states that she had close contact with a baby that had an upper respiratory tract infection earlier this week and now feels a scratchy/sore throat. Additionally she states that yesterday she at a particularly salty meal. She has never previously been diagnosed with heart failure.    ED evaluation significant for BP (184/104), satting 93% on room air, RR 28, T 98.5. WBC 7.9, Hgb 10.9, plts 299, Na 138, K 5.0, Bicarb 19, BUN 42, Cr 3.0 (unclear baseline, only available measurement was 2.1 in July 2022), , Troponin 0.04. CXR with bilateral pulmonary edema.

## 2023-06-24 NOTE — ASSESSMENT & PLAN NOTE
Hgb 10.9 on admission, was normal in July 2022. Normocytic. Will check iron, folate, B-12. Possibly secondary to chronic kidney disease. No evidence of bleeding on exam.    -- Follow-up Iron, folate, B-12 levels  -- Trend CBC  -- Transfusion threshold < 7.0

## 2023-06-24 NOTE — ASSESSMENT & PLAN NOTE
# Elevated Troponin    52 yoF w/ history of HTN, CKD IV presenting w/ signs and symptoms consistent with new-onset heart failure exacerbation as evidenced by dyspnea on exertion, orthopnea, elevated BNP to 443, CXR with bilateral pulmonary edema, crackles in bilateral bases. Risk factors include hypertension. Possible triggers include URI w/ scratchy throat and recent sick contact vs recent dietary indiscretion w/ salty meal on day prior to admission.    Troponin mildly elevated to 0.04 in the setting of CKD. Patient reporting left-sided non-radiating chest pain that is not associated w/ activity and resolves spontaneously. ECG without evidence of ischemic change. Suspect troponin bump secondary to CHF exacerbation in the setting of CKD.    Plan:  -- IV lasix 40 mg daily; goal net negative 1-2L  -- Supplemental O2; wean as tolerated  -- Obtain TTE  -- Trend troponin to peak  -- Daily weights (standing if tolerated)  -- Strict I/Os  -- Fluid restriction at 2000mL  -- Cardiac diet

## 2023-06-24 NOTE — ED PROVIDER NOTES
"Encounter Date: 6/24/2023       History     Chief Complaint   Patient presents with    Shortness of Breath     52-year-old female with past medical history of hypertension, CKD who presents to the ED with chief complaint of shortness of breath, chest pain, epigastric pain that began acutely at 2:00 a.m. this morning.  Her symptoms woke her up from sleep.  Dyspnea is worse with exertion.  She also endorses orthopnea.  Her chest pain is not exertional.  Located to the left side of her chest.  Does not radiate.  Lasts for a few seconds then resolves.  She has no chest pain currently.  She also complains of a "ball" in the epigastric region.  She endorses nausea but denies vomiting.  Denies diarrhea.  She also endorses a scratchy throat.  No cough or fever.  No sick contacts.  She denies symptoms of this nature in the past.  She denies other worsening or alleviating factors.    Review of patient's allergies indicates:  No Known Allergies  Past Medical History:   Diagnosis Date    CKD (chronic kidney disease)     HTN (hypertension)     Ovarian cyst      History reviewed. No pertinent surgical history.  History reviewed. No pertinent family history.  Social History     Tobacco Use    Smoking status: Never    Smokeless tobacco: Never   Substance Use Topics    Alcohol use: Never    Drug use: Never     Review of Systems   Respiratory:  Positive for shortness of breath.    Cardiovascular:  Positive for chest pain.   Gastrointestinal:  Positive for abdominal pain.     Physical Exam     Initial Vitals [06/24/23 1008]   BP Pulse Resp Temp SpO2   (!) 184/104 93 (S) (!) 28 98.5 °F (36.9 °C) (!) 93 %      MAP       --         Physical Exam    Vitals reviewed.  Constitutional: She appears well-developed and well-nourished. She is not diaphoretic. No distress.   HENT:   Head: Normocephalic and atraumatic.   Mouth/Throat: Oropharynx is clear and moist.   Eyes: Conjunctivae and EOM are normal. Pupils are equal, round, and reactive to " light.   Neck: Neck supple.   Normal range of motion.  Cardiovascular:  Normal rate, regular rhythm, normal heart sounds and intact distal pulses.     Exam reveals no gallop and no friction rub.       No murmur heard.  Pulmonary/Chest: She has no wheezes. She has no rhonchi. She has rales.   Exam limited by body habitus and patient effort   Decreased breath sounds bilaterally,   Crackles thomas bases    Abdominal: Abdomen is soft. Bowel sounds are normal. There is no abdominal tenderness.   Musculoskeletal:         General: Edema present. Normal range of motion.      Cervical back: Normal range of motion and neck supple.      Comments: Trace edema bilaterally      Neurological: She is alert and oriented to person, place, and time. She has normal strength. No cranial nerve deficit or sensory deficit. GCS score is 15. GCS eye subscore is 4. GCS verbal subscore is 5. GCS motor subscore is 6.   Skin: Skin is warm and dry. Capillary refill takes less than 2 seconds.   Psychiatric: She has a normal mood and affect. Her behavior is normal. Judgment and thought content normal.       ED Course   Procedures  Labs Reviewed   CBC W/ AUTO DIFFERENTIAL - Abnormal; Notable for the following components:       Result Value    Hemoglobin 10.9 (*)     Hematocrit 36.0 (*)     MCH 25.8 (*)     MCHC 30.3 (*)     RDW 15.9 (*)     All other components within normal limits   COMPREHENSIVE METABOLIC PANEL - Abnormal; Notable for the following components:    CO2 19 (*)     BUN 42 (*)     Creatinine 3.0 (*)     Calcium 8.6 (*)     Albumin 3.2 (*)     eGFR 18.0 (*)     All other components within normal limits   TROPONIN I - Abnormal; Notable for the following components:    Troponin I 0.040 (*)     All other components within normal limits   B-TYPE NATRIURETIC PEPTIDE - Abnormal; Notable for the following components:     (*)     All other components within normal limits   TROPONIN I - Abnormal; Notable for the following components:     Troponin I 0.088 (*)     All other components within normal limits    Narrative:     Draw at 1418  Scanned trop 2 for trop 1   ISTAT PROCEDURE - Abnormal; Notable for the following components:    POC PH 7.334 (*)     POC PO2 22 (*)     POC HCO3 23.1 (*)     POC SATURATED O2 34 (*)     All other components within normal limits   HIV 1 / 2 ANTIBODY    Narrative:     Release to patient->Immediate   HEPATITIS C ANTIBODY    Narrative:     Release to patient->Immediate   LIPASE   TROPONIN ISTAT   TROPONIN I   SARS-COV2 (COVID) WITH FLU/RSV BY PCR   POCT TROPONIN   POCT TROPONIN     EKG Readings: (Independently Interpreted)   Initial Reading: No STEMI. Rhythm: Normal Sinus Rhythm. Heart Rate: 92.   T-wave inversions in anterolateral leads, new from prior     Imaging Results              X-Ray Chest AP Portable (Final result)  Result time 06/24/23 12:21:17      Final result by Angelito Pepper MD (06/24/23 12:21:17)                   Impression:      1. Obscuration of the left costophrenic angle may reflect effusion noting overlying soft tissue limits evaluation.  Central hilar findings suggest superimposed edema.      Electronically signed by: Angelito Pepper MD  Date:    06/24/2023  Time:    12:21               Narrative:    EXAMINATION:  XR CHEST AP PORTABLE    CLINICAL HISTORY:  Chest Pain;    TECHNIQUE:  Single frontal view of the chest was performed.    COMPARISON:  07/05/2022    FINDINGS:  The cardiomediastinal silhouette is prominent, similar to the previous exam noting magnification by technique..  There is obscuration of the left costophrenic angle suggesting effusion noting obscuration by habitus..  The trachea is midline.  The lungs are symmetrically expanded bilaterally with coarse central hilar interstitial attenuation.  No large focal consolidation seen.  There is no pneumothorax.  The osseous structures are remarkable for degenerative change..                                    X-Rays:   Independently  Interpreted Readings:   Chest X-Ray: Cardiomegaly present.  Increased vascular markings consistent with CHF are present. No focal consolidation, pneumothorax.    Medications   cetirizine tablet 10 mg (has no administration in time range)   sodium chloride 0.9% flush 10 mL (has no administration in time range)   enoxaparin injection 30 mg (has no administration in time range)   polyethylene glycol packet 17 g (has no administration in time range)   furosemide injection 40 mg (has no administration in time range)   hydrALAZINE tablet 25 mg (has no administration in time range)   aspirin tablet 325 mg (325 mg Oral Given 6/24/23 1149)   furosemide injection 40 mg (40 mg Intravenous Given 6/24/23 1215)     Medical Decision Making:   History:   Old Medical Records: I decided to obtain old medical records.  Initial Assessment:   Emergent evaluation of a 52 y.o. female presenting to the emergency department complaining of left-sided chest pain, epigastric pain, shortness of breath that acutely began at 2:00 a.m. this morning. Patient is afebrile, hypoxic, and non toxic appearing.   Will order labs, imaging, EKG, and continue to monitor.  Differential Diagnosis:   Differential diagnosis includes but isn't limited to ACS, PE, pneumothorax, pneumonia.   Independently Interpreted Test(s):   I have ordered and independently interpreted X-rays - see prior notes.  I have ordered and independently interpreted EKG Reading(s) - see prior notes  Clinical Tests:   Lab Tests: Ordered and Reviewed  Radiological Study: Ordered and Reviewed  Medical Tests: Ordered and Reviewed  ED Management:  Patient presenting with acute onset shortness of breath, left-sided chest pain, epigastric pain that began at 2:00 a.m. this morning.  Dyspnea is worse with exertion.  She endorses orthopnea.  She is a nonsmoker.  No history of lung disease.  Does not use supplemental oxygen  H/H of 10.9/36.0.  She denies any source of bleeding.  Previous H&H from 1  year ago was normal.  BUN 42, creatinine 3.0.  This is higher than baseline, suggestive of mild REGINALDO.  Initial troponin 0.04.  Will trend.  .  Chest x-ray with cardiomegaly and pulmonary edema noted.  She was hypoxic to 90% on room air.  She is doing well on 2 L nasal cannula.  Suspect new onset heart failure as the source of her symptoms today.  Will diurese.  Will admit to Hospital Medicine for further workup and treatment.  The patient has never had echocardiogram, which I think she would benefit from in the inpatient setting.  I have discussed the plan with the patient who is agreeable.  All questions answered.  The patient's history, physical exam, and plan of care was discussed with and agreed upon with my supervising physician.            ED Course as of 06/24/23 1515   Sat Jun 24, 2023   1236 WBC: 7.94 [JM]   1236 Hemoglobin(!): 10.9 [JM]   1236 Hematocrit(!): 36.0 [JM]   1236 Platelets: 299 [JM]   1236 Lipase: 40 [JM]   1236 CO2(!): 19 [JM]   1236 Creatinine(!): 3.0 [JM]      ED Course User Index  [JM] Liz Daniels PA-C                 Clinical Impression:   Final diagnoses:  [R06.02] Shortness of breath  [R07.9] Chest pain  [I50.9] New onset of congestive heart failure (Primary)        ED Disposition Condition    Admit Stable                Liz Daniels PA-C  06/24/23 1518

## 2023-06-24 NOTE — ASSESSMENT & PLAN NOTE
Creatinine 3.0 on admission. Unclear baseline, was 2.1 in July 2022. Patient reports history of proteinuria for which she was started on losartan. Possible REGINALDO vs baseline Cr of 3.0, will trend while inpatient. Will hold home ARB due to possible REGINALDO. Will obtain RP ultrasound to rule out obstruction. If creatinine is elevated from baseline would suspect CRS secondary to CHF exacerbation. Cr 3.2 on 6/25, unclear if new baseline. Will check afternoon RFP. RP ultrasound negative for obstruction.    -- Check 3PM RFP  -- Hold ARB for now  -- Urine studies to calculate FeUrea given diuretic use  -- Urine Pr:Cr   -- Renally dose medications  -- Avoid nephrotoxic medications

## 2023-06-24 NOTE — SUBJECTIVE & OBJECTIVE
Past Medical History:   Diagnosis Date    CKD (chronic kidney disease)     HTN (hypertension)     Ovarian cyst        History reviewed. No pertinent surgical history.    Review of patient's allergies indicates:  No Known Allergies    No current facility-administered medications on file prior to encounter.     Current Outpatient Medications on File Prior to Encounter   Medication Sig    loratadine (CLARITIN) 10 mg tablet Take 10 mg by mouth once daily.    losartan (COZAAR) 100 MG tablet Take 100 mg by mouth once daily.    [DISCONTINUED] lisinopriL (PRINIVIL,ZESTRIL) 40 MG tablet Take 40 mg by mouth once daily.    [DISCONTINUED] lisinopriL (PRINIVIL,ZESTRIL) 40 MG tablet Take 40 mg by mouth once daily.     Family History    None       Tobacco Use    Smoking status: Never    Smokeless tobacco: Never   Substance and Sexual Activity    Alcohol use: Never    Drug use: Never    Sexual activity: Not on file     Review of Systems   Constitutional:  Negative for chills, fatigue and fever.   HENT:  Positive for sore throat. Negative for congestion and rhinorrhea.    Eyes:  Negative for pain, redness and visual disturbance.   Respiratory:  Positive for shortness of breath. Negative for cough and wheezing.    Cardiovascular:  Positive for chest pain and leg swelling. Negative for palpitations.   Gastrointestinal:  Negative for abdominal distention, abdominal pain, constipation, diarrhea, nausea and vomiting.   Genitourinary:  Negative for dysuria and hematuria.   Musculoskeletal:  Negative for arthralgias and joint swelling.   Skin:  Negative for pallor and rash.   Neurological:  Negative for dizziness, weakness, numbness and headaches.   Psychiatric/Behavioral:  Negative for agitation. The patient is not nervous/anxious.    Objective:     Vital Signs (Most Recent):  Temp: 98.7 °F (37.1 °C) (06/24/23 1147)  Pulse: 81 (06/24/23 1417)  Resp: (!) 23 (06/24/23 1417)  BP: (!) 148/86 (06/24/23 1417)  SpO2: 99 % (06/24/23 1417) Vital  Signs (24h Range):  Temp:  [98.5 °F (36.9 °C)-98.7 °F (37.1 °C)] 98.7 °F (37.1 °C)  Pulse:  [81-93] 81  Resp:  [18-28] 23  SpO2:  [93 %-99 %] 99 %  BP: (148-184)/() 148/86     Weight: 83.9 kg (185 lb)  Body mass index is 36.13 kg/m².     Physical Exam  Vitals and nursing note reviewed.   Constitutional:       General: She is not in acute distress.     Appearance: Normal appearance. She is obese.   HENT:      Head: Normocephalic and atraumatic.      Right Ear: External ear normal.      Left Ear: External ear normal.      Nose: No congestion or rhinorrhea.      Mouth/Throat:      Mouth: Mucous membranes are moist.      Pharynx: Oropharynx is clear.   Eyes:      General: No scleral icterus.     Extraocular Movements: Extraocular movements intact.      Conjunctiva/sclera: Conjunctivae normal.   Cardiovascular:      Rate and Rhythm: Normal rate and regular rhythm.      Pulses: Normal pulses.      Heart sounds: Murmur (systolic LUSB) heard.   Pulmonary:      Effort: Pulmonary effort is normal.      Breath sounds: Rales (bilateral bases) present. No wheezing.   Abdominal:      General: Abdomen is flat. Bowel sounds are normal. There is no distension.      Palpations: Abdomen is soft.      Tenderness: There is no abdominal tenderness.   Musculoskeletal:         General: No swelling.      Cervical back: Normal range of motion and neck supple.      Right lower leg: Edema present.      Left lower leg: Edema present.      Comments: 2+ bilateral lower extremity edema to mid-shin   Skin:     General: Skin is warm and dry.      Capillary Refill: Capillary refill takes less than 2 seconds.      Findings: No rash.   Neurological:      Mental Status: She is alert and oriented to person, place, and time. Mental status is at baseline.      Motor: No weakness.   Psychiatric:         Mood and Affect: Mood normal.         Behavior: Behavior normal.     Significant Labs: All pertinent labs within the past 24 hours have been  reviewed.  CBC:   Recent Labs   Lab 06/24/23  1135   WBC 7.94   HGB 10.9*   HCT 36.0*        CMP:   Recent Labs   Lab 06/24/23  1136      K 5.0      CO2 19*   GLU 89   BUN 42*   CREATININE 3.0*   CALCIUM 8.6*   PROT 7.3   ALBUMIN 3.2*   BILITOT 0.5   ALKPHOS 87   AST 38   ALT 33   ANIONGAP 11       Significant Imaging: I have reviewed all pertinent imaging results/findings within the past 24 hours.

## 2023-06-25 LAB
ALBUMIN SERPL BCP-MCNC: 3 G/DL (ref 3.5–5.2)
ALBUMIN SERPL BCP-MCNC: 3 G/DL (ref 3.5–5.2)
ALP SERPL-CCNC: 82 U/L (ref 55–135)
ALT SERPL W/O P-5'-P-CCNC: 29 U/L (ref 10–44)
ANION GAP SERPL CALC-SCNC: 11 MMOL/L (ref 8–16)
ANION GAP SERPL CALC-SCNC: 12 MMOL/L (ref 8–16)
ASCENDING AORTA: 3.13 CM
AST SERPL-CCNC: 32 U/L (ref 10–40)
AV INDEX (PROSTH): 0.85
AV MEAN GRADIENT: 3 MMHG
AV PEAK GRADIENT: 6 MMHG
AV VALVE AREA: 2.78 CM2
AV VELOCITY RATIO: 0.61
BASOPHILS # BLD AUTO: 0.07 K/UL (ref 0–0.2)
BASOPHILS NFR BLD: 0.8 % (ref 0–1.9)
BILIRUB SERPL-MCNC: 0.4 MG/DL (ref 0.1–1)
BSA FOR ECHO PROCEDURE: 1.98 M2
BUN SERPL-MCNC: 43 MG/DL (ref 6–20)
BUN SERPL-MCNC: 47 MG/DL (ref 6–20)
CALCIUM SERPL-MCNC: 8.5 MG/DL (ref 8.7–10.5)
CALCIUM SERPL-MCNC: 8.6 MG/DL (ref 8.7–10.5)
CHLORIDE SERPL-SCNC: 103 MMOL/L (ref 95–110)
CHLORIDE SERPL-SCNC: 108 MMOL/L (ref 95–110)
CHOLEST SERPL-MCNC: 224 MG/DL (ref 120–199)
CHOLEST/HDLC SERPL: 6.4 {RATIO} (ref 2–5)
CO2 SERPL-SCNC: 19 MMOL/L (ref 23–29)
CO2 SERPL-SCNC: 25 MMOL/L (ref 23–29)
CREAT SERPL-MCNC: 3.2 MG/DL (ref 0.5–1.4)
CREAT SERPL-MCNC: 3.4 MG/DL (ref 0.5–1.4)
CV ECHO LV RWT: 0.25 CM
DIFFERENTIAL METHOD: ABNORMAL
DOP CALC AO PEAK VEL: 1.19 M/S
DOP CALC AO VTI: 17.35 CM
DOP CALC LVOT AREA: 3.3 CM2
DOP CALC LVOT DIAMETER: 2.04 CM
DOP CALC LVOT PEAK VEL: 0.73 M/S
DOP CALC LVOT STROKE VOLUME: 48.19 CM3
DOP CALC MV VTI: 44.07 CM
DOP CALCLVOT PEAK VEL VTI: 14.75 CM
E WAVE DECELERATION TIME: 267.15 MSEC
E/A RATIO: 1.52
E/E' RATIO: 22.12 M/S
ECHO LV POSTERIOR WALL: 0.74 CM (ref 0.6–1.1)
EJECTION FRACTION: 63 %
EOSINOPHIL # BLD AUTO: 0.8 K/UL (ref 0–0.5)
EOSINOPHIL NFR BLD: 8.8 % (ref 0–8)
ERYTHROCYTE [DISTWIDTH] IN BLOOD BY AUTOMATED COUNT: 16 % (ref 11.5–14.5)
EST. GFR  (NO RACE VARIABLE): 15.5 ML/MIN/1.73 M^2
EST. GFR  (NO RACE VARIABLE): 16.7 ML/MIN/1.73 M^2
FERRITIN SERPL-MCNC: 60 NG/ML (ref 20–300)
FOLATE SERPL-MCNC: 13 NG/ML (ref 4–24)
FRACTIONAL SHORTENING: 34 % (ref 28–44)
GLUCOSE SERPL-MCNC: 79 MG/DL (ref 70–110)
GLUCOSE SERPL-MCNC: 91 MG/DL (ref 70–110)
HCT VFR BLD AUTO: 39.2 % (ref 37–48.5)
HDLC SERPL-MCNC: 35 MG/DL (ref 40–75)
HDLC SERPL: 15.6 % (ref 20–50)
HGB BLD-MCNC: 12.1 G/DL (ref 12–16)
IMM GRANULOCYTES # BLD AUTO: 0.03 K/UL (ref 0–0.04)
IMM GRANULOCYTES NFR BLD AUTO: 0.4 % (ref 0–0.5)
INTERVENTRICULAR SEPTUM: 0.73 CM (ref 0.6–1.1)
IRON SERPL-MCNC: 41 UG/DL (ref 30–160)
LA MAJOR: 7.2 CM
LA MINOR: 7.08 CM
LA WIDTH: 4.9 CM
LDLC SERPL CALC-MCNC: 145.8 MG/DL (ref 63–159)
LEFT ATRIUM SIZE: 4.62 CM
LEFT ATRIUM VOLUME INDEX: 72.7 ML/M2
LEFT ATRIUM VOLUME: 137.38 CM3
LEFT INTERNAL DIMENSION IN SYSTOLE: 3.95 CM (ref 2.1–4)
LEFT VENTRICLE DIASTOLIC VOLUME INDEX: 95.33 ML/M2
LEFT VENTRICLE DIASTOLIC VOLUME: 180.18 ML
LEFT VENTRICLE MASS INDEX: 89 G/M2
LEFT VENTRICLE SYSTOLIC VOLUME INDEX: 36 ML/M2
LEFT VENTRICLE SYSTOLIC VOLUME: 68.1 ML
LEFT VENTRICULAR INTERNAL DIMENSION IN DIASTOLE: 6 CM (ref 3.5–6)
LEFT VENTRICULAR MASS: 167.69 G
LV LATERAL E/E' RATIO: 23.5 M/S
LV SEPTAL E/E' RATIO: 20.89 M/S
LYMPHOCYTES # BLD AUTO: 2.6 K/UL (ref 1–4.8)
LYMPHOCYTES NFR BLD: 30.6 % (ref 18–48)
MAGNESIUM SERPL-MCNC: 2 MG/DL (ref 1.6–2.6)
MCH RBC QN AUTO: 26.4 PG (ref 27–31)
MCHC RBC AUTO-ENTMCNC: 30.9 G/DL (ref 32–36)
MCV RBC AUTO: 86 FL (ref 82–98)
MONOCYTES # BLD AUTO: 0.6 K/UL (ref 0.3–1)
MONOCYTES NFR BLD: 7.4 % (ref 4–15)
MV MEAN GRADIENT: 6 MMHG
MV PEAK A VEL: 1.24 M/S
MV PEAK E VEL: 1.88 M/S
MV PEAK GRADIENT: 14 MMHG
MV STENOSIS PRESSURE HALF TIME: 77.47 MS
MV VALVE AREA BY CONTINUITY EQUATION: 1.09 CM2
MV VALVE AREA P 1/2 METHOD: 2.84 CM2
NEUTROPHILS # BLD AUTO: 4.4 K/UL (ref 1.8–7.7)
NEUTROPHILS NFR BLD: 52 % (ref 38–73)
NONHDLC SERPL-MCNC: 189 MG/DL
NRBC BLD-RTO: 0 /100 WBC
PHOSPHATE SERPL-MCNC: 5.1 MG/DL (ref 2.7–4.5)
PHOSPHATE SERPL-MCNC: 6.2 MG/DL (ref 2.7–4.5)
PISA TR MAX VEL: 2.74 M/S
PLATELET # BLD AUTO: 245 K/UL (ref 150–450)
PMV BLD AUTO: 11.2 FL (ref 9.2–12.9)
POTASSIUM SERPL-SCNC: 4.5 MMOL/L (ref 3.5–5.1)
POTASSIUM SERPL-SCNC: 4.7 MMOL/L (ref 3.5–5.1)
PROT SERPL-MCNC: 7 G/DL (ref 6–8.4)
QEF: 57 %
RA MAJOR: 4.59 CM
RA PRESSURE: 8 MMHG
RA WIDTH: 2.91 CM
RBC # BLD AUTO: 4.58 M/UL (ref 4–5.4)
RIGHT VENTRICULAR END-DIASTOLIC DIMENSION: 2.89 CM
SATURATED IRON: 13 % (ref 20–50)
SINUS: 2.53 CM
SODIUM SERPL-SCNC: 139 MMOL/L (ref 136–145)
SODIUM SERPL-SCNC: 139 MMOL/L (ref 136–145)
STJ: 2.52 CM
TDI LATERAL: 0.08 M/S
TDI SEPTAL: 0.09 M/S
TDI: 0.09 M/S
TOTAL IRON BINDING CAPACITY: 324 UG/DL (ref 250–450)
TR MAX PG: 30 MMHG
TRANSFERRIN SERPL-MCNC: 219 MG/DL (ref 200–375)
TRICUSPID ANNULAR PLANE SYSTOLIC EXCURSION: 2.21 CM
TRIGL SERPL-MCNC: 216 MG/DL (ref 30–150)
TROPONIN I SERPL DL<=0.01 NG/ML-MCNC: 0.07 NG/ML (ref 0–0.03)
TROPONIN I SERPL DL<=0.01 NG/ML-MCNC: 0.09 NG/ML (ref 0–0.03)
TV REST PULMONARY ARTERY PRESSURE: 38 MMHG
VIT B12 SERPL-MCNC: 1900 PG/ML (ref 210–950)
WBC # BLD AUTO: 8.52 K/UL (ref 3.9–12.7)

## 2023-06-25 PROCEDURE — 94761 N-INVAS EAR/PLS OXIMETRY MLT: CPT

## 2023-06-25 PROCEDURE — 80061 LIPID PANEL: CPT

## 2023-06-25 PROCEDURE — 99233 SBSQ HOSP IP/OBS HIGH 50: CPT | Mod: GT,,, | Performed by: STUDENT IN AN ORGANIZED HEALTH CARE EDUCATION/TRAINING PROGRAM

## 2023-06-25 PROCEDURE — 25000003 PHARM REV CODE 250

## 2023-06-25 PROCEDURE — 84484 ASSAY OF TROPONIN QUANT: CPT

## 2023-06-25 PROCEDURE — 80053 COMPREHEN METABOLIC PANEL: CPT

## 2023-06-25 PROCEDURE — 82728 ASSAY OF FERRITIN: CPT

## 2023-06-25 PROCEDURE — 27000221 HC OXYGEN, UP TO 24 HOURS

## 2023-06-25 PROCEDURE — 83735 ASSAY OF MAGNESIUM: CPT

## 2023-06-25 PROCEDURE — 82607 VITAMIN B-12: CPT

## 2023-06-25 PROCEDURE — 82746 ASSAY OF FOLIC ACID SERUM: CPT

## 2023-06-25 PROCEDURE — 84100 ASSAY OF PHOSPHORUS: CPT

## 2023-06-25 PROCEDURE — 99233 PR SUBSEQUENT HOSPITAL CARE,LEVL III: ICD-10-PCS | Mod: GT,,, | Performed by: STUDENT IN AN ORGANIZED HEALTH CARE EDUCATION/TRAINING PROGRAM

## 2023-06-25 PROCEDURE — 80069 RENAL FUNCTION PANEL: CPT

## 2023-06-25 PROCEDURE — 84466 ASSAY OF TRANSFERRIN: CPT

## 2023-06-25 PROCEDURE — 36415 COLL VENOUS BLD VENIPUNCTURE: CPT

## 2023-06-25 PROCEDURE — 85025 COMPLETE CBC W/AUTO DIFF WBC: CPT

## 2023-06-25 PROCEDURE — 63600175 PHARM REV CODE 636 W HCPCS: Performed by: STUDENT IN AN ORGANIZED HEALTH CARE EDUCATION/TRAINING PROGRAM

## 2023-06-25 PROCEDURE — 20600001 HC STEP DOWN PRIVATE ROOM

## 2023-06-25 PROCEDURE — 25000003 PHARM REV CODE 250: Performed by: STUDENT IN AN ORGANIZED HEALTH CARE EDUCATION/TRAINING PROGRAM

## 2023-06-25 RX ORDER — CETIRIZINE HYDROCHLORIDE 5 MG/1
5 TABLET ORAL DAILY
Status: DISCONTINUED | OUTPATIENT
Start: 2023-06-26 | End: 2023-06-26 | Stop reason: HOSPADM

## 2023-06-25 RX ORDER — METOPROLOL SUCCINATE 25 MG/1
25 TABLET, EXTENDED RELEASE ORAL DAILY
Status: DISCONTINUED | OUTPATIENT
Start: 2023-06-25 | End: 2023-06-25

## 2023-06-25 RX ORDER — SEVELAMER CARBONATE 800 MG/1
800 TABLET, FILM COATED ORAL
Status: DISCONTINUED | OUTPATIENT
Start: 2023-06-25 | End: 2023-06-26 | Stop reason: HOSPADM

## 2023-06-25 RX ADMIN — ACETAMINOPHEN 650 MG: 325 TABLET ORAL at 04:06

## 2023-06-25 RX ADMIN — SEVELAMER CARBONATE 800 MG: 800 TABLET, FILM COATED ORAL at 01:06

## 2023-06-25 RX ADMIN — HYDRALAZINE HYDROCHLORIDE 25 MG: 25 TABLET, FILM COATED ORAL at 11:06

## 2023-06-25 RX ADMIN — SODIUM BICARBONATE 650 MG: 650 TABLET ORAL at 08:06

## 2023-06-25 RX ADMIN — HEPARIN SODIUM 5000 UNITS: 5000 INJECTION INTRAVENOUS; SUBCUTANEOUS at 05:06

## 2023-06-25 RX ADMIN — FUROSEMIDE 40 MG: 10 INJECTION, SOLUTION INTRAMUSCULAR; INTRAVENOUS at 05:06

## 2023-06-25 RX ADMIN — HEPARIN SODIUM 5000 UNITS: 5000 INJECTION INTRAVENOUS; SUBCUTANEOUS at 01:06

## 2023-06-25 RX ADMIN — METOPROLOL SUCCINATE 25 MG: 25 TABLET, EXTENDED RELEASE ORAL at 01:06

## 2023-06-25 RX ADMIN — SEVELAMER CARBONATE 800 MG: 800 TABLET, FILM COATED ORAL at 06:06

## 2023-06-25 RX ADMIN — CETIRIZINE HYDROCHLORIDE 10 MG: 10 TABLET, FILM COATED ORAL at 08:06

## 2023-06-25 NOTE — PLAN OF CARE
[facility] - Cardiology Stepdown  Initial Discharge Assessment       Primary Care Provider: MINH Herrera    Admission Diagnosis: Shortness of breath [R06.02]  CHF (congestive heart failure) [I50.9]  Chest pain [R07.9]  New onset of congestive heart failure [I50.9]    Admission Date: 6/24/2023  Expected Discharge Date: 6/26/2023    Transition of Care Barriers: (P) Unisured    Payor: /     Extended Emergency Contact Information  Primary Emergency Contact: Alexandrea Vera  Address: [address] [place], [place] [address] [address] [facility]  Home Phone: [phone]  Mobile Phone: [phone]  Relation: Daughter  Preferred language: English   needed? No    Discharge Plan A: (P) Home with family  Discharge Plan B: (P) Home    No Pharmacies Listed    Initial Assessment (most recent)       Adult Discharge Assessment - 06/25/23 1629          Discharge Assessment    Assessment Type Discharge Planning Assessment (P)      Confirmed/corrected address, phone number and insurance Yes (P)      Confirmed Demographics Correct on Facesheet (P)      Source of Information family (P)      If unable to respond/provide information was family/caregiver contacted? Yes (P)      Contact Name/Number Eun Macias (c) [phone] (Age 13). (P)      When was your last doctors appointment? 01/02/23 (P)      Communicated RILEY with patient/caregiver Date not available/Unable to determine (P)      Reason For Admission Shortness of Breath (P)      People in Home child(kingsley), dependent;child(kingsley), adult (P)      Do you expect to return to your current living situation? Yes (P)      Do you have help at home or someone to help you manage your care at home? Yes (P)      Who are your caregiver(s) and their phone number(s)? Alexandrea Rader & Eun (P)      Prior to hospitilization cognitive status: Alert/Oriented (P)      Current cognitive status: Alert/Oriented (P)      Home Layout Able to live on 1st floor (P)      Equipment  Currently Used at Home none (P)      Readmission within 30 days? No (P)      Patient currently being followed by outpatient case management? No (P)      Do you currently have service(s) that help you manage your care at home? No (P)      Do you take prescription medications? No (P)      Do you have prescription coverage? No (P)      Do you have any problems affording any of your prescribed medications? TBD (P)      Is the patient taking medications as prescribed? no (P)      If no, which medications is patient not taking? No meds. (P)      Who is going to help you get home at discharge? Daughters. (P)      How do you get to doctors appointments? family or friend will provide (P)      Are you on dialysis? No (P)      Do you take coumadin? No (P)      Discharge Plan A Home with family (P)      Discharge Plan B Home (P)      DME Needed Upon Discharge  none (P)      Discharge Plan discussed with: Adult children (P)      Transition of Care Barriers Unisured (P)         Physical Activity    On average, how many days per week do you engage in moderate to strenuous exercise (like a brisk walk)? 0 days (P)      On average, how many minutes do you engage in exercise at this level? 0 min (P)         Financial Resource Strain    How hard is it for you to pay for the very basics like food, housing, medical care, and heating? Not very hard (P)         Housing Stability    In the last 12 months, was there a time when you were not able to pay the mortgage or rent on time? No (P)      In the last 12 months, how many places have you lived? 1 (P)      In the last 12 months, was there a time when you did not have a steady place to sleep or slept in a shelter (including now)? No (P)         Transportation Needs    In the past 12 months, has lack of transportation kept you from medical appointments or from getting medications? No (P)      In the past 12 months, has lack of transportation kept you from meetings, work, or from getting  things needed for daily living? No (P)         Food Insecurity    Within the past 12 months, you worried that your food would run out before you got the money to buy more. Never true (P)      Within the past 12 months, the food you bought just didn't last and you didn't have money to get more. Never true (P)         Stress    Do you feel stress - tense, restless, nervous, or anxious, or unable to sleep at night because your mind is troubled all the time - these days? Only a little (P)         Social Connections    In a typical week, how many times do you talk on the phone with family, friends, or neighbors? More than three times a week (P)      How often do you get together with friends or relatives? Once a week (P)      How often do you attend Mormonism or Orthodox services? More than 4 times per year (P)      Do you belong to any clubs or organizations such as Mormonism groups, unions, fraternal or athletic groups, or school groups? No (P)      How often do you attend meetings of the clubs or organizations you belong to? Never (P)      Are you , , , , never , or living with a partner?  (P)         Alcohol Use    Q1: How often do you have a drink containing alcohol? Never (P)      Q2: How many drinks containing alcohol do you have on a typical day when you are drinking? Patient does not drink (P)      Q3: How often do you have six or more drinks on one occasion? Never (P)                       KAZ met with pt. and daughter, Eun Ohara (Age 13) (c) [phone] at bedside. Eun serving as  for patient's Initial Discharge Assessment. Pt. And her daughters, Eun and Alexandrea Vera live in their home in [place], [place]. The home is a single story home with 10-15 stairs inside and 2-3 steps to enter the home. No DME, Home Health, Dialysis or Coumadin. Pt. Drives sometimes, but daughter, Alexandrea will provide transportation home upon discharge. SW noting that pt. Is Uninsured.  SW to submit pt's information via email to the [facility] team as a resource.     Senthil Polanco LMSW

## 2023-06-25 NOTE — HOSPITAL COURSE
The patient was admitted to hospital medicine for further management of her new onset heart failure and REGINALDO. She was diuresed with IV lasix and a TTE was performed. Her ARB was held on admission given her REGINALDO. Her symptoms improved with diuresis. Echocardiogram revealed preserved ejection fraction, severe mitral regurgitation, and grade II diastolic dysfunction. She was diuresed to euvolemia on 6/26 and discharged with oral furosemide 40 mg daily. She was scheduled for cardiology and PCP follow-up. Ambulatory referral to nephrology was placed for further evaluation of her CKD IV. Her urine studies were pending at the time of discharge. Her home losartan was held on discharge until further discussing with nephrology. She was unable to be started on an SGLT-2 inhibitor on discharge due to her renal function. A BMP was ordered for 1 week post-discharge to check creatinine and potassium. She was given return precautions and discharged with the above follow-ups. Additionally the patient was noted to have a 7 cm ovarian cyst on RP ultrasound similar to prior. Repeat evaluation is recommended w/ transvaginal ultrasound, will defer further evaluation to PCP on discharge.

## 2023-06-25 NOTE — SUBJECTIVE & OBJECTIVE
Interval History: No acute overnight events. TTE performed but read pending. Symptoms improved w/ diuresis. Net negative 2L with 40 IV lasix BID. Cr 3.2 today, unclear if new baseline in the setting of CKD. Will recheck RFP this afternoon to guide additional diuretic therapy. Beta-blocker initiated today, will follow-up TTE. Troponin peaked, will stop trending. Oxygen weaned off.    Review of Systems   Constitutional:  Negative for chills, fatigue and fever.   HENT:  Positive for sore throat. Negative for congestion and rhinorrhea.    Eyes:  Negative for pain, redness and visual disturbance.   Respiratory:  Negative for cough, shortness of breath and wheezing.    Cardiovascular:  Positive for leg swelling. Negative for chest pain and palpitations.   Gastrointestinal:  Negative for abdominal distention, abdominal pain, constipation, diarrhea, nausea and vomiting.   Genitourinary:  Negative for dysuria and hematuria.   Musculoskeletal:  Negative for arthralgias and joint swelling.   Skin:  Negative for pallor and rash.   Neurological:  Negative for dizziness, weakness, numbness and headaches.   Psychiatric/Behavioral:  Negative for agitation. The patient is not nervous/anxious.    Objective:     Vital Signs (Most Recent):  Temp: 98.6 °F (37 °C) (06/25/23 1212)  Pulse: 84 (06/25/23 1212)  Resp: 17 (06/25/23 1212)  BP: 139/87 (06/25/23 1212)  SpO2: (!) 93 % (06/25/23 1212) Vital Signs (24h Range):  Temp:  [97.3 °F (36.3 °C)-98.6 °F (37 °C)] 98.6 °F (37 °C)  Pulse:  [73-91] 84  Resp:  [17-24] 17  SpO2:  [93 %-99 %] 93 %  BP: (120-162)/() 139/87     Weight: 91.2 kg (201 lb)  Body mass index is 37.98 kg/m².    Intake/Output Summary (Last 24 hours) at 6/25/2023 1233  Last data filed at 6/25/2023 0800  Gross per 24 hour   Intake 240 ml   Output 1950 ml   Net -1710 ml         Physical Exam  Vitals and nursing note reviewed.   Constitutional:       General: She is not in acute distress.     Appearance: Normal appearance.  She is obese.   HENT:      Head: Normocephalic and atraumatic.      Right Ear: External ear normal.      Left Ear: External ear normal.      Nose: No congestion or rhinorrhea.      Mouth/Throat:      Mouth: Mucous membranes are moist.      Pharynx: Oropharynx is clear.   Eyes:      General: No scleral icterus.     Extraocular Movements: Extraocular movements intact.      Conjunctiva/sclera: Conjunctivae normal.   Cardiovascular:      Rate and Rhythm: Normal rate and regular rhythm.      Pulses: Normal pulses.      Heart sounds: Murmur (systolic LUSB) heard.   Pulmonary:      Effort: Pulmonary effort is normal.      Breath sounds: Rales (faint bilateral bases, improving) present. No wheezing.   Abdominal:      General: Abdomen is flat. Bowel sounds are normal. There is no distension.      Palpations: Abdomen is soft.      Tenderness: There is no abdominal tenderness.   Musculoskeletal:         General: No swelling.      Cervical back: Normal range of motion and neck supple.      Right lower leg: Edema present.      Left lower leg: Edema present.      Comments: 1+ bilateral lower extremity edema to mid-shin   Skin:     General: Skin is warm and dry.      Capillary Refill: Capillary refill takes less than 2 seconds.      Findings: No rash.   Neurological:      Mental Status: She is alert and oriented to person, place, and time. Mental status is at baseline.      Motor: No weakness.   Psychiatric:         Mood and Affect: Mood normal.         Behavior: Behavior normal.       Significant Labs: All pertinent labs within the past 24 hours have been reviewed.  CBC:   Recent Labs   Lab 06/24/23  1135 06/25/23  0221   WBC 7.94 8.52   HGB 10.9* 12.1   HCT 36.0* 39.2    245     CMP:   Recent Labs   Lab 06/24/23  1136 06/25/23  0221    139   K 5.0 4.7    108   CO2 19* 19*   GLU 89 79   BUN 42* 43*   CREATININE 3.0* 3.2*   CALCIUM 8.6* 8.6*   PROT 7.3 7.0   ALBUMIN 3.2* 3.0*   BILITOT 0.5 0.4   ALKPHOS 87 82    AST 38 32   ALT 33 29   ANIONGAP 11 12       Significant Imaging: I have reviewed all pertinent imaging results/findings within the past 24 hours.

## 2023-06-25 NOTE — PLAN OF CARE
Problem: Adult Inpatient Plan of Care  Goal: Plan of Care Review  Outcome: Ongoing, Progressing     Problem: Cardiac Output Decreased (Heart Failure)  Goal: Optimal Cardiac Output  Outcome: Ongoing, Progressing  Intervention: Optimize Cardiac Output  Flowsheets (Taken 6/25/2023 1717)  Environmental Support:   calm environment promoted   rest periods encouraged   caregiver consistency promoted   distractions minimized     Problem: Respiratory Compromise (Heart Failure)  Goal: Effective Oxygenation and Ventilation  Outcome: Ongoing, Progressing  Intervention: Optimize Oxygenation and Ventilation  Flowsheets (Taken 6/25/2023 1717)  Airway/Ventilation Management: airway patency maintained    Plan of care discussed with patient.  Patient ambulating independently, fall precautions in place. Pt now on room air; O2 sat >92%. Patient has no complaints of pain. Discussed medications and care; BP meds adjusted. Patient has no questions at this time. Will continue to monitor.

## 2023-06-25 NOTE — PROGRESS NOTES
[facility] - Cardiology [facility] Medicine  Progress Note    Patient Name: Xena Vera  MRN: [id number]  Patient Class: IP- Inpatient   Admission Date: 6/24/2023  Length of Stay: 1 days  Attending Physician: Dafne Lindsay DO  Primary Care Provider: MINH Herrera      Subjective:     Principal Problem:New onset of congestive heart failure      HPI:  The patient is a 52 year-old female with a history of HTN, CKD who presents to the ED with shortness of breath x 1 day. She reports that she was awoken at 2:00 a.m. on the morning of admission w/ dyspnea while laying flat. The shortness of breath is worsened with exertion and laying flat. Additionally she reports chest pain that is left-sided and non-radiating. The pain lasts for a few seconds then resolves spontaneously. The chest pain is not associated with activity. She reports leg swelling for the past one day. In terms of triggers she states that she had close contact with a baby that had an upper respiratory tract infection earlier this week and now feels a scratchy/sore throat. Additionally she states that yesterday she at a particularly salty meal. She has never previously been diagnosed with heart failure.    ED evaluation significant for BP (184/104), satting 93% on room air, RR 28, T 98.5. WBC 7.9, Hgb 10.9, plts 299, Na 138, K 5.0, Bicarb 19, BUN 42, Cr 3.0 (unclear baseline, only available measurement was 2.1 in July 2022), , Troponin 0.04. CXR with bilateral pulmonary edema.      Overview/Hospital Course:  The patient was admitted to hospital medicine for further management of her new onset heart failure and REGINALDO. She was diuresed with IV lasix and a TTE was performed. Her ARB was held on admission given her REGINALDO. Her symptoms improved with diuresis and she was started on a low dose beta-blocker.      Interval History: No acute overnight events. TTE performed but read pending. Symptoms improved w/ diuresis. Net negative 2L with 40 IV  lasix BID. Cr 3.2 today, unclear if new baseline in the setting of CKD. Will recheck RFP this afternoon to guide additional diuretic therapy. Beta-blocker initiated today, will follow-up TTE. Troponin peaked, will stop trending. Oxygen weaned off.    Review of Systems   Constitutional:  Negative for chills, fatigue and fever.   HENT:  Positive for sore throat. Negative for congestion and rhinorrhea.    Eyes:  Negative for pain, redness and visual disturbance.   Respiratory:  Negative for cough, shortness of breath and wheezing.    Cardiovascular:  Positive for leg swelling. Negative for chest pain and palpitations.   Gastrointestinal:  Negative for abdominal distention, abdominal pain, constipation, diarrhea, nausea and vomiting.   Genitourinary:  Negative for dysuria and hematuria.   Musculoskeletal:  Negative for arthralgias and joint swelling.   Skin:  Negative for pallor and rash.   Neurological:  Negative for dizziness, weakness, numbness and headaches.   Psychiatric/Behavioral:  Negative for agitation. The patient is not nervous/anxious.    Objective:     Vital Signs (Most Recent):  Temp: 98.6 °F (37 °C) (06/25/23 1212)  Pulse: 84 (06/25/23 1212)  Resp: 17 (06/25/23 1212)  BP: 139/87 (06/25/23 1212)  SpO2: (!) 93 % (06/25/23 1212) Vital Signs (24h Range):  Temp:  [97.3 °F (36.3 °C)-98.6 °F (37 °C)] 98.6 °F (37 °C)  Pulse:  [73-91] 84  Resp:  [17-24] 17  SpO2:  [93 %-99 %] 93 %  BP: (120-162)/() 139/87     Weight: 91.2 kg (201 lb)  Body mass index is 37.98 kg/m².    Intake/Output Summary (Last 24 hours) at 6/25/2023 1233  Last data filed at 6/25/2023 0800  Gross per 24 hour   Intake 240 ml   Output 1950 ml   Net -1710 ml         Physical Exam  Vitals and nursing note reviewed.   Constitutional:       General: She is not in acute distress.     Appearance: Normal appearance. She is obese.   HENT:      Head: Normocephalic and atraumatic.      Right Ear: External ear normal.      Left Ear: External ear  normal.      Nose: No congestion or rhinorrhea.      Mouth/Throat:      Mouth: Mucous membranes are moist.      Pharynx: Oropharynx is clear.   Eyes:      General: No scleral icterus.     Extraocular Movements: Extraocular movements intact.      Conjunctiva/sclera: Conjunctivae normal.   Cardiovascular:      Rate and Rhythm: Normal rate and regular rhythm.      Pulses: Normal pulses.      Heart sounds: Murmur (systolic LUSB) heard.   Pulmonary:      Effort: Pulmonary effort is normal.      Breath sounds: Rales (faint bilateral bases, improving) present. No wheezing.   Abdominal:      General: Abdomen is flat. Bowel sounds are normal. There is no distension.      Palpations: Abdomen is soft.      Tenderness: There is no abdominal tenderness.   Musculoskeletal:         General: No swelling.      Cervical back: Normal range of motion and neck supple.      Right lower leg: Edema present.      Left lower leg: Edema present.      Comments: 1+ bilateral lower extremity edema to mid-shin   Skin:     General: Skin is warm and dry.      Capillary Refill: Capillary refill takes less than 2 seconds.      Findings: No rash.   Neurological:      Mental Status: She is alert and oriented to person, place, and time. Mental status is at baseline.      Motor: No weakness.   Psychiatric:         Mood and Affect: Mood normal.         Behavior: Behavior normal.       Significant Labs: All pertinent labs within the past 24 hours have been reviewed.  CBC:   Recent Labs   Lab 06/24/23  1135 06/25/23  0221   WBC 7.94 8.52   HGB 10.9* 12.1   HCT 36.0* 39.2    245     CMP:   Recent Labs   Lab 06/24/23  1136 06/25/23  0221    139   K 5.0 4.7    108   CO2 19* 19*   GLU 89 79   BUN 42* 43*   CREATININE 3.0* 3.2*   CALCIUM 8.6* 8.6*   PROT 7.3 7.0   ALBUMIN 3.2* 3.0*   BILITOT 0.5 0.4   ALKPHOS 87 82   AST 38 32   ALT 33 29   ANIONGAP 11 12       Significant Imaging: I have reviewed all pertinent imaging results/findings within  the past 24 hours.      Assessment/Plan:      * New onset of congestive heart failure  # Elevated Troponin    52 yoF w/ history of HTN, CKD IV presenting w/ signs and symptoms consistent with new-onset heart failure exacerbation as evidenced by dyspnea on exertion, orthopnea, elevated BNP to 443, CXR with bilateral pulmonary edema, crackles in bilateral bases. Risk factors include hypertension. Possible triggers include URI w/ scratchy throat and recent sick contact vs recent dietary indiscretion w/ salty meal on day prior to admission.    Troponin mildly elevated to 0.04 in the setting of CKD. Patient reporting left-sided non-radiating chest pain that is not associated w/ activity and resolves spontaneously. ECG without evidence of ischemic change. Suspect troponin bump secondary to CHF exacerbation in the setting of CKD. Troponin peaked at 0.113, will stop trending.    Symptoms improving with diuresis, approaching euvolemia. TTE performed 6/25, read pending. O2 weaned off.    Plan:  -- Will check RFP this PM, if Cr stable or improving then will give 40 mg PO lasix, if Cr worsening will hold on further diuresis today  -- Follow-up TTE  -- Start metoprolol succinate 25 mg daily  -- Daily weights (standing if tolerated)  -- Strict I/Os  -- Fluid restriction at 2000mL  -- Cardiac diet  -- Outpatient cardiology follow-up for further up-titration of GDMT    Stage 4 chronic kidney disease  Creatinine 3.0 on admission. Unclear baseline, was 2.1 in July 2022. Patient reports history of proteinuria for which she was started on losartan. Possible REGINALDO vs baseline Cr of 3.0, will trend while inpatient. Will hold home ARB due to possible REGINALDO. Will obtain RP ultrasound to rule out obstruction. If creatinine is elevated from baseline would suspect CRS secondary to CHF exacerbation. Cr 3.2 on 6/25, unclear if new baseline. Will check afternoon RFP. RP ultrasound negative for obstruction.    -- Check 3PM RFP  -- Hold ARB for  now  -- Urine studies to calculate FeUrea given diuretic use  -- Urine Pr:Cr   -- Renally dose medications  -- Avoid nephrotoxic medications    Anemia  Hgb 10.9 on admission, was normal in July 2022. Normocytic. Will check iron, folate, B-12. Possibly secondary to chronic kidney disease. No evidence of bleeding on exam. Iron, folate levels WNL, B-12 slightly elevated.    -- Trend CBC  -- Transfusion threshold < 7.0    HTN (hypertension)  Takes losartan 100 mg daily at home. BP elevated to 184/104 on admission, improved with diuresis. Will hold home ARB in the setting of possible REGINALDO (though unclear Cr baseline). Will start GDMT w/ beta-blocker. PRN hydralazine as needed.    -- Start metoprolol succinate 25 mg daily  -- PRN PO hydralazine for SBP > 170    Ovarian cyst  Large simple 7cm right ovarian cyst noted on RP ultrasound in November 2022. Per radiology read at that time it appeared grossly stable from CT scan in July 2022. Recommended 3-6 month repeat imaging w/ transvaginal ultrasound. Will refer for outpatient imaging on discharge.    Class 2 obesity in adult  Body mass index is 36.13 kg/m². Morbid obesity complicates all aspects of disease management from diagnostic modalities to treatment. Weight loss encouraged and health benefits explained to patient.       VTE Risk Mitigation (From admission, onward)         Ordered     heparin (porcine) injection 5,000 Units  Every 8 hours         06/24/23 1658     IP VTE HIGH RISK PATIENT  Once         06/24/23 1451     Place sequential compression device  Until discontinued         06/24/23 1451                Discharge Planning   RILEY: 6/27/2023     Code Status: Full Code   Is the patient medically ready for discharge?: No    Reason for patient still in hospital (select all that apply): Patient trending condition           Javier Morris MD  Department of Hospital Medicine   [facility] - Cardiology Stepdown

## 2023-06-26 VITALS
TEMPERATURE: 98 F | HEART RATE: 80 BPM | OXYGEN SATURATION: 96 % | WEIGHT: 177.69 LBS | DIASTOLIC BLOOD PRESSURE: 86 MMHG | HEIGHT: 61 IN | SYSTOLIC BLOOD PRESSURE: 161 MMHG | BODY MASS INDEX: 33.55 KG/M2 | RESPIRATION RATE: 16 BRPM

## 2023-06-26 PROBLEM — I34.0 MITRAL VALVE REGURGITATION: Status: ACTIVE | Noted: 2023-06-26

## 2023-06-26 LAB
ALBUMIN SERPL BCP-MCNC: 2.9 G/DL (ref 3.5–5.2)
ALP SERPL-CCNC: 76 U/L (ref 55–135)
ALT SERPL W/O P-5'-P-CCNC: 28 U/L (ref 10–44)
ANION GAP SERPL CALC-SCNC: 13 MMOL/L (ref 8–16)
AST SERPL-CCNC: 28 U/L (ref 10–40)
BASOPHILS # BLD AUTO: 0.06 K/UL (ref 0–0.2)
BASOPHILS NFR BLD: 0.8 % (ref 0–1.9)
BILIRUB SERPL-MCNC: 0.3 MG/DL (ref 0.1–1)
BUN SERPL-MCNC: 47 MG/DL (ref 6–20)
CALCIUM SERPL-MCNC: 8.3 MG/DL (ref 8.7–10.5)
CHLORIDE SERPL-SCNC: 105 MMOL/L (ref 95–110)
CO2 SERPL-SCNC: 21 MMOL/L (ref 23–29)
CREAT SERPL-MCNC: 3.3 MG/DL (ref 0.5–1.4)
DIFFERENTIAL METHOD: ABNORMAL
EOSINOPHIL # BLD AUTO: 0.8 K/UL (ref 0–0.5)
EOSINOPHIL NFR BLD: 9.7 % (ref 0–8)
ERYTHROCYTE [DISTWIDTH] IN BLOOD BY AUTOMATED COUNT: 15.9 % (ref 11.5–14.5)
EST. GFR  (NO RACE VARIABLE): 16.1 ML/MIN/1.73 M^2
GLUCOSE SERPL-MCNC: 79 MG/DL (ref 70–110)
HCT VFR BLD AUTO: 38.7 % (ref 37–48.5)
HGB BLD-MCNC: 11.5 G/DL (ref 12–16)
IMM GRANULOCYTES # BLD AUTO: 0.02 K/UL (ref 0–0.04)
IMM GRANULOCYTES NFR BLD AUTO: 0.3 % (ref 0–0.5)
LYMPHOCYTES # BLD AUTO: 2.4 K/UL (ref 1–4.8)
LYMPHOCYTES NFR BLD: 29.8 % (ref 18–48)
MAGNESIUM SERPL-MCNC: 1.9 MG/DL (ref 1.6–2.6)
MCH RBC QN AUTO: 26.2 PG (ref 27–31)
MCHC RBC AUTO-ENTMCNC: 29.7 G/DL (ref 32–36)
MCV RBC AUTO: 88 FL (ref 82–98)
MONOCYTES # BLD AUTO: 0.5 K/UL (ref 0.3–1)
MONOCYTES NFR BLD: 6.3 % (ref 4–15)
NEUTROPHILS # BLD AUTO: 4.2 K/UL (ref 1.8–7.7)
NEUTROPHILS NFR BLD: 53.1 % (ref 38–73)
NRBC BLD-RTO: 0 /100 WBC
PHOSPHATE SERPL-MCNC: 4.5 MG/DL (ref 2.7–4.5)
PLATELET # BLD AUTO: 290 K/UL (ref 150–450)
PMV BLD AUTO: 11.2 FL (ref 9.2–12.9)
POTASSIUM SERPL-SCNC: 4.4 MMOL/L (ref 3.5–5.1)
PROT SERPL-MCNC: 6.8 G/DL (ref 6–8.4)
RBC # BLD AUTO: 4.39 M/UL (ref 4–5.4)
SODIUM SERPL-SCNC: 139 MMOL/L (ref 136–145)
WBC # BLD AUTO: 7.91 K/UL (ref 3.9–12.7)

## 2023-06-26 PROCEDURE — 85025 COMPLETE CBC W/AUTO DIFF WBC: CPT

## 2023-06-26 PROCEDURE — 36415 COLL VENOUS BLD VENIPUNCTURE: CPT

## 2023-06-26 PROCEDURE — 63600175 PHARM REV CODE 636 W HCPCS: Performed by: STUDENT IN AN ORGANIZED HEALTH CARE EDUCATION/TRAINING PROGRAM

## 2023-06-26 PROCEDURE — 83735 ASSAY OF MAGNESIUM: CPT

## 2023-06-26 PROCEDURE — 25000003 PHARM REV CODE 250: Performed by: STUDENT IN AN ORGANIZED HEALTH CARE EDUCATION/TRAINING PROGRAM

## 2023-06-26 PROCEDURE — 99239 PR HOSPITAL DISCHARGE DAY,>30 MIN: ICD-10-PCS | Mod: ,,, | Performed by: STUDENT IN AN ORGANIZED HEALTH CARE EDUCATION/TRAINING PROGRAM

## 2023-06-26 PROCEDURE — 99239 HOSP IP/OBS DSCHRG MGMT >30: CPT | Mod: ,,, | Performed by: STUDENT IN AN ORGANIZED HEALTH CARE EDUCATION/TRAINING PROGRAM

## 2023-06-26 PROCEDURE — 25000003 PHARM REV CODE 250

## 2023-06-26 PROCEDURE — 94761 N-INVAS EAR/PLS OXIMETRY MLT: CPT

## 2023-06-26 PROCEDURE — 84100 ASSAY OF PHOSPHORUS: CPT

## 2023-06-26 PROCEDURE — 80053 COMPREHEN METABOLIC PANEL: CPT

## 2023-06-26 RX ORDER — LOSARTAN POTASSIUM 100 MG/1
100 TABLET ORAL DAILY
Status: ON HOLD
Start: 2023-06-26 | End: 2023-12-25 | Stop reason: HOSPADM

## 2023-06-26 RX ORDER — SODIUM BICARBONATE 650 MG/1
650 TABLET ORAL 2 TIMES DAILY
Qty: 60 TABLET | Refills: 11 | Status: ON HOLD | OUTPATIENT
Start: 2023-06-26 | End: 2024-06-25

## 2023-06-26 RX ORDER — FUROSEMIDE 20 MG/1
40 TABLET ORAL DAILY
Qty: 60 TABLET | Refills: 11 | Status: SHIPPED | OUTPATIENT
Start: 2023-06-26 | End: 2023-07-07

## 2023-06-26 RX ORDER — FUROSEMIDE 20 MG/1
20 TABLET ORAL DAILY
Status: DISCONTINUED | OUTPATIENT
Start: 2023-06-26 | End: 2023-06-26 | Stop reason: HOSPADM

## 2023-06-26 RX ORDER — SEVELAMER CARBONATE 800 MG/1
800 TABLET, FILM COATED ORAL
Qty: 90 TABLET | Refills: 11 | Status: ON HOLD | OUTPATIENT
Start: 2023-06-26 | End: 2024-06-25

## 2023-06-26 RX ADMIN — SEVELAMER CARBONATE 800 MG: 800 TABLET, FILM COATED ORAL at 11:06

## 2023-06-26 RX ADMIN — CETIRIZINE HYDROCHLORIDE 5 MG: 5 TABLET, FILM COATED ORAL at 08:06

## 2023-06-26 RX ADMIN — HEPARIN SODIUM 5000 UNITS: 5000 INJECTION INTRAVENOUS; SUBCUTANEOUS at 03:06

## 2023-06-26 RX ADMIN — SODIUM BICARBONATE 650 MG: 650 TABLET ORAL at 08:06

## 2023-06-26 RX ADMIN — FUROSEMIDE 20 MG: 20 TABLET ORAL at 11:06

## 2023-06-26 RX ADMIN — SEVELAMER CARBONATE 800 MG: 800 TABLET, FILM COATED ORAL at 08:06

## 2023-06-26 NOTE — PLAN OF CARE
Pt discharged home with no post-acute needs. Referred to [facility].  F/U appts referred by provider and to be scheduled by W.    Katie Gaffney [facility]  [place] Management Department  [email or web address]    [facility] - Cardiology Stepdown  Discharge Final Note    Primary Care Provider: MINH Herrera    Expected Discharge Date: 6/26/2023    Final Discharge Note (most recent)       Final Note - 06/26/23 1306          Final Note    Assessment Type Final Discharge Note     Anticipated Discharge Disposition Home or Self Care     What phone number can be called within the next 1-3 days to see how you are doing after discharge? [phone]     Hospital Resources/Appts/Education Provided Provided patient/caregiver with written discharge plan information;Community resources provided        Post-Acute Status    Post-Acute Authorization Other     Other Status Community Services     Discharge Delays None known at this time                     Important Message from Medicare

## 2023-06-26 NOTE — PLAN OF CARE
[facility] scheduled cards   Future Appointments   Date Time Provider Department Center   6/30/2023 10:30 AM Dori Roldan DNP [facility]

## 2023-06-26 NOTE — DISCHARGE SUMMARY
[facility] - Cardiology [facility] Medicine  Discharge Summary      Patient Name: Xena Vera  MRN: [id number]  CORNEL: [id number]  Patient Class: IP- Inpatient  Admission Date: 6/24/2023  Hospital Length of Stay: 2 days  Discharge Date and Time:  06/26/2023 2:42 PM  Attending Physician: Dafne Lindsay DO   Discharging Provider: Javier Morris MD  Primary Care Provider: MINH Herrera  [facility] Medicine Team: [facility] MED 1 Javier Morris MD  Primary Care Team: [facility] MED 1    HPI:   The patient is a 52 year-old female with a history of HTN, CKD who presents to the ED with shortness of breath x 1 day. She reports that she was awoken at 2:00 a.m. on the morning of admission w/ dyspnea while laying flat. The shortness of breath is worsened with exertion and laying flat. Additionally she reports chest pain that is left-sided and non-radiating. The pain lasts for a few seconds then resolves spontaneously. The chest pain is not associated with activity. She reports leg swelling for the past one day. In terms of triggers she states that she had close contact with a baby that had an upper respiratory tract infection earlier this week and now feels a scratchy/sore throat. Additionally she states that yesterday she at a particularly salty meal. She has never previously been diagnosed with heart failure.    ED evaluation significant for BP (184/104), satting 93% on room air, RR 28, T 98.5. WBC 7.9, Hgb 10.9, plts 299, Na 138, K 5.0, Bicarb 19, BUN 42, Cr 3.0 (unclear baseline, only available measurement was 2.1 in July 2022), , Troponin 0.04. CXR with bilateral pulmonary edema.      * No surgery found *      Hospital Course:   The patient was admitted to hospital medicine for further management of her new onset heart failure and REGINALDO. She was diuresed with IV lasix and a TTE was performed. Her ARB was held on admission given her REGINALDO. Her symptoms improved with diuresis. Echocardiogram revealed  preserved ejection fraction, severe mitral regurgitation, and grade II diastolic dysfunction. She was diuresed to euvolemia on 6/26 and discharged with oral furosemide 40 mg daily. She was scheduled for cardiology and PCP follow-up. Ambulatory referral to nephrology was placed for further evaluation of her CKD IV. Her urine studies were pending at the time of discharge. Her home losartan was held on discharge until further discussing with nephrology. She was unable to be started on an SGLT-2 inhibitor on discharge due to her renal function. A BMP was ordered for 1 week post-discharge to check creatinine and potassium. She was given return precautions and discharged with the above follow-ups. The patient required oxygen during ambulation on 6 minute walk test and was discharge with home O2. Additionally the patient was noted to have a 7 cm ovarian cyst on RP ultrasound similar to prior. Repeat evaluation is recommended w/ transvaginal ultrasound, will defer further evaluation to PCP on discharge.       Goals of Care Treatment Preferences:  Code Status: Full Code      Consults:     No new Assessment & Plan notes have been filed under this hospital service since the last note was generated.  Service: Hospital Medicine    Final Active Diagnoses:    Diagnosis Date Noted POA    PRINCIPAL PROBLEM:  New onset of congestive heart failure [I50.9] 06/24/2023 Yes    Stage 4 chronic kidney disease [N18.4] 06/24/2023 Yes    HTN (hypertension) [I10] 06/24/2023 Yes    Anemia [D64.9] 06/24/2023 Yes    Mitral valve regurgitation [I34.0] 06/26/2023 Yes    Class 2 obesity in adult [E66.9] 06/24/2023 Yes    Ovarian cyst [N83.209] 06/24/2023 Yes      Problems Resolved During this Admission:       Discharged Condition: fair    Disposition: Home or Self Care    Follow Up:   Follow-up Information       [facility] Follow up.    Why: [facility] es un servicio que brindará atención médica que no sea de emergencia en ely  juliet fowlero lo llame o de manera programada.  Contact information:  [address]  [place] [address]  [phone]             MINH Herrera. Go on 7/14/2023.    Specialty: Family Medicine  Why: PCP Hospital Follow Up appointment at 9:15am.  Contact information:  [address] [place] [address]  [phone]                           Patient Instructions:      BASIC METABOLIC PANEL   Standing Status: Future Standing Exp. Date: 08/24/24     Ambulatory referral/consult to Cardiology   Standing Status: Future   Referral Priority: Routine Referral Type: Consultation   Referral Reason: Specialty Services Required   Requested Specialty: Cardiology   Number of Visits Requested: 1     Ambulatory referral/consult to Nephrology   Standing Status: Future   Referral Priority: Routine Referral Type: Consultation   Referral Reason: Specialty Services Required   Requested Specialty: Nephrology   Number of Visits Requested: 1     Ambulatory referral/consult to Family Practice   Standing Status: Future   Referral Priority: Routine Referral Type: Consultation   Referral Reason: Specialty Services Required   Requested Specialty: Family Medicine   Number of Visits Requested: 1     Ambulatory Request for [facility]   Standing Status: Future Standing Exp. Date: 06/26/24     Order Specific Question Answer Comments   Program referred to: Other        Significant Diagnostic Studies: Labs:   CMP   Recent Labs   Lab 06/25/23  0221 06/25/23  1454 06/26/23  0259    139 139   K 4.7 4.5 4.4    103 105   CO2 19* 25 21*   GLU 79 91 79   BUN 43* 47* 47*   CREATININE 3.2* 3.4* 3.3*   CALCIUM 8.6* 8.5* 8.3*   PROT 7.0  --  6.8   ALBUMIN 3.0* 3.0* 2.9*   BILITOT 0.4  --  0.3   ALKPHOS 82  --  76   AST 32  --  28   ALT 29  --  28   ANIONGAP 12 11 13       Pending Diagnostic Studies:       Procedure Component Value Units Date/Time    Creatinine, urine, random [[id number]] Collected:  06/25/23 0646    Order Status: Sent Lab Status: In process Updated: 06/25/23 0646    Specimen: Urine, Clean Catch     Protein / creatinine ratio, urine [[id number]] Collected: 06/25/23 0646    Order Status: Sent Lab Status: In process Updated: 06/25/23 0646    Specimen: Urine, Clean Catch     Troponin I #2 [[id number]] Collected: 06/24/23 1135    Order Status: Sent Lab Status: In process Updated: 06/24/23 1136    Specimen: Blood     Urea nitrogen, urine [[id number]] Collected: 06/25/23 0646    Order Status: Sent Lab Status: In process Updated: 06/25/23 0646    Specimen: Urine, Clean Catch            Medications:  Reconciled Home Medications:      Medication List        START taking these medications      furosemide 20 MG tablet  Commonly known as: LASIX  Take 2 tablets (40 mg total) by mouth once daily.     sevelamer carbonate 800 mg Tab  Commonly known as: RENVELA  Take 1 tablet (800 mg total) by mouth 3 (three) times daily with meals.     sodium bicarbonate 650 MG tablet  Take 1 tablet (650 mg total) by mouth 2 (two) times daily.            CHANGE how you take these medications      losartan 100 MG tablet  Commonly known as: COZAAR  Take 1 tablet (100 mg total) by mouth once daily. HOLD UNTIL DISCUSSING WITH THE KIDNEY DOCTORS  What changed: additional instructions            CONTINUE taking these medications      loratadine 10 mg tablet  Commonly known as: CLARITIN  Take 10 mg by mouth once daily.              Indwelling Lines/Drains at time of discharge:   Lines/Drains/Airways       None                   Time spent on the discharge of patient: 30 minutes      Javier Morris MD  Department of Hospital Medicine  [facility] - Cardiology Stepdown

## 2023-06-26 NOTE — NURSING
Home Oxygen Evaluation    Date Performed: 2023    1) Patient's Home O2 Sat on room air, while at rest: 94%        If O2 sats on room air at rest are 88% or below, patient qualifies. No additional testing needed. Document N/A in steps 2 and 3. If 89% or above, complete steps 2.      2) Patient's O2 Sat on room air while exercisin%        If O2 sats on room air while exercising remain 89% or above patient does not qualify, no further testing needed Document N/A in step 3. If O2 sats on room air while exercising are 88% or below, continue to step 3.      3) Patient's O2 Sat while exercising on O2: N/A.      (Must show improvement from #2 for patients to qualify)    If O2 sats improve on oxygen, patient qualifies for portable oxygen. If not, the patient does not qualify.

## 2023-06-26 NOTE — NURSING
Patient is ready for discharge. Patient stable alert and oriented. Ivs and TELE removed. No complaints of pain. Discussed discharge plan. Reviewed medications and side effects, appointments, and answered questions with patient and family. AVS reviewed with patient and pt's daughter and given to pt's daughter at bedside.  also bedside to discuss Advanced Directives; pt provided with paperwork and education regarding advanced directives; pt wants to fill out paperwork with PCP; pt refusing  at this time.

## 2023-06-26 NOTE — NURSING
Pt removed off cardiac monitor by telemetry; writer spoke with resident; resident ok to d/c pt off tele.

## 2023-06-27 ENCOUNTER — TELEPHONE (OUTPATIENT)
Dept: CARDIOLOGY | Facility: CLINIC | Age: 53
End: 2023-06-27

## 2023-06-27 DIAGNOSIS — I50.9 NEW ONSET OF CONGESTIVE HEART FAILURE: Primary | ICD-10-CM

## 2023-06-27 DIAGNOSIS — I50.9 CONGESTIVE HEART FAILURE, UNSPECIFIED HF CHRONICITY, UNSPECIFIED HEART FAILURE TYPE: Primary | ICD-10-CM

## 2023-06-27 NOTE — TELEPHONE ENCOUNTER
[facility] Phone Enrollment Complete.    Phone enrollment completed due to :pt discharged from hospital ED    Called and spoke to pts daughter, Alexandrea via phone. Introduced self to pt as [facility] nurse navigator.      Patient education will be given: RN gave small overview of concepts below. Pt requested information be sen through the patient portal for review.      Reviewed the following key points of [facility] program with pt and family:              1.) Take your medications as directed.               2.) Weight yourself daily              3.) Follow low salt and limited fluid diet.               4.) Stop smoking and start exercising              5.) Go to your appointments and call your team.          Reviewed plan for follow up once discharged to include phone calls, in person and virtual visits to assist pt optimizing their heart failure medication regimen and encouraging healthy lifestyle modifications.  Reminded pt that program will assist them over the next 4-6 weeks and then patient will be transferred to long term care provider .  Reminded pt how to contact [facility] navigator via phone and or via "[employer]hart.      Pt given appointment today via phone. Pt daughter agreed to appt on July 7, 2023 at 1030 AM.       Pt also reminded RN will call 48-72 hours after discharge to check on them. RN insured that pt daugther had clinic contact information and HF exacerbation sxs were reviewed r/t her being a newly dx'ed CHF pt.      PT and family verbalize read back of information given.  Encouraged pt and family to read over information often and contact team with any questions or concerns.

## 2023-06-30 ENCOUNTER — OFFICE VISIT (OUTPATIENT)
Dept: CARDIOLOGY | Facility: CLINIC | Age: 53
End: 2023-06-30

## 2023-06-30 VITALS
WEIGHT: 176.13 LBS | BODY MASS INDEX: 33.25 KG/M2 | HEART RATE: 79 BPM | SYSTOLIC BLOOD PRESSURE: 141 MMHG | HEIGHT: 61 IN | DIASTOLIC BLOOD PRESSURE: 72 MMHG | OXYGEN SATURATION: 96 %

## 2023-06-30 DIAGNOSIS — I50.30 HEART FAILURE WITH PRESERVED EJECTION FRACTION, UNSPECIFIED HF CHRONICITY: ICD-10-CM

## 2023-06-30 DIAGNOSIS — I50.9 NEW ONSET OF CONGESTIVE HEART FAILURE: ICD-10-CM

## 2023-06-30 DIAGNOSIS — I34.0 MITRAL VALVE INSUFFICIENCY, UNSPECIFIED ETIOLOGY: Primary | ICD-10-CM

## 2023-06-30 PROCEDURE — 4010F ACE/ARB THERAPY RXD/TAKEN: CPT | Mod: ,,, | Performed by: NURSE PRACTITIONER

## 2023-06-30 PROCEDURE — 4010F PR ACE/ARB THEARPY RXD/TAKEN: ICD-10-PCS | Mod: ,,, | Performed by: NURSE PRACTITIONER

## 2023-06-30 PROCEDURE — 99215 OFFICE O/P EST HI 40 MIN: CPT | Mod: PBBFAC,PN | Performed by: NURSE PRACTITIONER

## 2023-06-30 PROCEDURE — 99203 PR OFFICE/OUTPT VISIT, NEW, LEVL III, 30-44 MIN: ICD-10-PCS | Mod: S$PBB,,, | Performed by: NURSE PRACTITIONER

## 2023-06-30 PROCEDURE — 99999 PR PBB SHADOW E&M-EST. PATIENT-LVL V: ICD-10-PCS | Mod: PBBFAC,,, | Performed by: NURSE PRACTITIONER

## 2023-06-30 PROCEDURE — 99999 PR PBB SHADOW E&M-EST. PATIENT-LVL V: CPT | Mod: PBBFAC,,, | Performed by: NURSE PRACTITIONER

## 2023-06-30 PROCEDURE — 99203 OFFICE O/P NEW LOW 30 MIN: CPT | Mod: S$PBB,,, | Performed by: NURSE PRACTITIONER

## 2023-06-30 NOTE — PROGRESS NOTES
Cardiology    7/13/2023  10:05 AM    Problem list  Patient Active Problem List   Diagnosis    Stage 4 chronic kidney disease    HTN (hypertension)    (HFpEF) heart failure with preserved ejection fraction    Class 2 obesity in adult    Ovarian cyst    Anemia    Mitral valve regurgitation       CC:  HFpEF    HPI:  New diagnosis of HFpEF during recent hospital stay. Will be following with AHF clinic.  Echo 6/2023 showed EF 63% and G2 DD with severe MR.  Dizzy when getting out of bed.  This has never happened.  Does not have a BP cuff to take her pressure at home.  Dizziness is only when she wakes up.  No chest pain.  No problems breathing.  No swelling in legs, Uses one large and one small pillow- this is normal for her. No cardiac disease personally that she was aware of.  No family cardiac history that she is aware of. No tobacco, no ETOH.  Had been drinking coffee but cut back. Has had kidney problems for about 20 years. Does not remember seeing a specialist in the past, will be seeing a kidney specialist soon.     Medications  Current Outpatient Medications   Medication Sig Dispense Refill    loratadine (CLARITIN) 10 mg tablet Take 10 mg by mouth once daily. PRN      sevelamer carbonate (RENVELA) 800 mg Tab Take 1 tablet (800 mg total) by mouth 3 (three) times daily with meals. 90 tablet 11    sodium bicarbonate 650 MG tablet Take 1 tablet (650 mg total) by mouth 2 (two) times daily. 60 tablet 11    carvediloL (COREG) 3.125 MG tablet Take 1 tablet (3.125 mg total) by mouth 2 (two) times daily with meals. 60 tablet 11    furosemide (LASIX) 20 MG tablet Take 2 tablets (40 mg total) by mouth once daily. Take an additional 40mg lasix in the afternoon as needed for worsening shortness of breath, swelling, or 3lb weight gain. 90 tablet 6    losartan (COZAAR) 100 MG tablet Take 1 tablet (100 mg total) by mouth once daily. HOLD UNTIL DISCUSSING WITH THE KIDNEY DOCTORS       No current facility-administered  medications for this visit.      Prior to Admission medications    Medication Sig Start Date End Date Taking? Authorizing Provider   furosemide (LASIX) 20 MG tablet Take 2 tablets (40 mg total) by mouth once daily. 23 Yes Javier Morris MD   loratadine (CLARITIN) 10 mg tablet Take 10 mg by mouth once daily. PRN   Yes Historical Provider   sevelamer carbonate (RENVELA) 800 mg Tab Take 1 tablet (800 mg total) by mouth 3 (three) times daily with meals. 23 Yes Javier Morris MD   sodium bicarbonate 650 MG tablet Take 1 tablet (650 mg total) by mouth 2 (two) times daily. 23 Yes Javier Morris MD   losartan (COZAAR) 100 MG tablet Take 1 tablet (100 mg total) by mouth once daily. HOLD UNTIL DISCUSSING WITH THE KIDNEY DOCTORS  Patient not taking: Reported on 2023   Javier Morris MD         History  Past Medical History:   Diagnosis Date    CKD (chronic kidney disease)     HTN (hypertension)     Ovarian cyst      History reviewed. No pertinent surgical history.  Social History     Socioeconomic History    Marital status: Single   Tobacco Use    Smoking status: Former     Types: Cigarettes     Start date:      Quit date:      Years since quittin.5    Smokeless tobacco: Never    Tobacco comments:     2-3 cigarettes a day   Substance and Sexual Activity    Alcohol use: Never    Drug use: Never     Social Determinants of Health     Financial Resource Strain: Low Risk     Difficulty of Paying Living Expenses: Not very hard   Food Insecurity: No Food Insecurity    Worried About Running Out of Food in the Last Year: Never true    Ran Out of Food in the Last Year: Never true   Transportation Needs: No Transportation Needs    Lack of Transportation (Medical): No    Lack of Transportation (Non-Medical): No   Physical Activity: Inactive    Days of Exercise per Week: 0 days    Minutes of Exercise per Session: 0 min   Stress: No Stress Concern Present    Feeling of  Stress : Only a little   Social Connections: Moderately Isolated    Frequency of Communication with Friends and Family: More than three times a week    Frequency of Social Gatherings with Friends and Family: Once a week    Attends Baptism Services: More than 4 times per year    Active Member of Clubs or Organizations: No    Attends Club or Organization Meetings: Never    Marital Status:    Housing Stability: Low Risk     Unable to Pay for Housing in the Last Year: No    Number of Places Lived in the Last Year: 1    Unstable Housing in the Last Year: No         Allergies  Review of patient's allergies indicates:  No Known Allergies      Review of Systems   Review of Systems   Constitutional: Negative for diaphoresis and malaise/fatigue.   HENT: Negative.     Cardiovascular:  Positive for leg swelling and orthopnea. Negative for chest pain, claudication, dyspnea on exertion, irregular heartbeat, near-syncope, palpitations, paroxysmal nocturnal dyspnea and syncope.   Respiratory:  Negative for shortness of breath.    Endocrine: Negative for polydipsia, polyphagia and polyuria.   Hematologic/Lymphatic: Does not bruise/bleed easily.   Gastrointestinal:  Negative for bloating, nausea and vomiting.   Genitourinary: Negative.    Neurological:  Negative for excessive daytime sleepiness, dizziness, light-headedness, loss of balance and weakness.   Psychiatric/Behavioral:  The patient is not nervous/anxious.    Allergic/Immunologic: Negative.        Physical Exam  Wt Readings from Last 1 Encounters:   07/07/23 80.6 kg (177 lb 12.8 oz)     BP Readings from Last 3 Encounters:   07/07/23 (!) 133/93   06/30/23 (!) 141/72   06/26/23 (!) 161/86     Pulse Readings from Last 1 Encounters:   07/07/23 83     Body mass index is 33.28 kg/m².    Physical Exam  Vitals and nursing note reviewed.   Constitutional:       Appearance: Normal appearance.   HENT:      Head: Normocephalic and atraumatic.      Mouth/Throat:      Mouth:  Mucous membranes are moist.   Eyes:      Pupils: Pupils are equal, round, and reactive to light.   Cardiovascular:      Rate and Rhythm: Normal rate and regular rhythm.      Pulses:           Radial pulses are 2+ on the right side and 2+ on the left side.        Dorsalis pedis pulses are 2+ on the right side and 2+ on the left side.        Posterior tibial pulses are 2+ on the right side and 2+ on the left side.      Heart sounds: No murmur heard.    Gallop present.   Pulmonary:      Effort: Pulmonary effort is normal. No respiratory distress.      Breath sounds: Normal breath sounds.   Abdominal:      General: There is no distension.      Tenderness: There is no abdominal tenderness.   Musculoskeletal:      Cervical back: Normal range of motion.      Right lower leg: No edema.      Left lower leg: No edema.   Skin:     General: Skin is warm and dry.      Findings: No erythema.   Neurological:      General: No focal deficit present.      Mental Status: She is alert.   Psychiatric:         Mood and Affect: Mood normal.         Behavior: Behavior normal.       Problem List Items Addressed This Visit          Cardiac/Vascular    (HFpEF) heart failure with preserved ejection fraction    Overview     EF 63% with G2 DD  Continue lasix, appears euvolemic today  Continue good BP management with losartan, add in Coreg 3.125 mg BID  Recommend Jardiance- patient had CKD 4 and this is a limiting factor- nephro eval pending         Current Assessment & Plan     As above         Mitral valve regurgitation - Primary    Overview     Refer to structural for evaluation  May benefit from mitral clip         Current Assessment & Plan     As above         Relevant Orders    Ambulatory referral/consult to Interventional Cardiology               Follow Up  2 weeks      @Dori Roldan DNP

## 2023-06-30 NOTE — PATIENT INSTRUCTIONS
Lo veremos de regreso en aproximadamente 2 semanas para controlar ely presión arterial.    Comenzamos con carvedilol 3,125 mg dos veces al día    Obtenga ely análisis de [place] el lunes; si ely función renal es mejor, comenzaremos con Jardience    Acude a tu milady con la [facility]    Recomiendo cristal dieta baja en sodio, ejercicio diario.    Lo estoy refiriendo a cardiología estructural para hablar sobre ely válvula mitral.

## 2023-07-03 NOTE — PROGRESS NOTES
[facility] Provider Note (Initial Clinic) Consult Note    Date of original referral: 6/27/23  Age: 52 y.o.  Gender: female  Ethnicity:  or /a   Number of admissions for CHF within the preceding year: 1   Duration of CHF: diagnosed recent admission  Type of Congestive Heart Failure: Diastolic   Etiology: Valvular   Social history: distant smoking history (1 pack/week for 1 year)    Enrolled in Infusion suite: no    Diagnostic Labs:   EKG - 06/25/2023  CXR - 06/24/2023  ECHO - 06/25/2023  Stress test -   Stress echo -   Pharmacologic stress -   Cardiac catheterization -    Cardiac MRI -     Lab Results   Component Value Date     06/26/2023     06/25/2023    K 4.4 06/26/2023    K 4.5 06/25/2023     06/26/2023     06/25/2023    CO2 21 (L) 06/26/2023    CO2 25 06/25/2023    GLU 79 06/26/2023    GLU 91 06/25/2023    BUN 47 (H) 06/26/2023    BUN 47 (H) 06/25/2023    CREATININE 3.3 (H) 06/26/2023    CREATININE 3.4 (H) 06/25/2023    CALCIUM 8.3 (L) 06/26/2023    CALCIUM 8.5 (L) 06/25/2023    PROT 6.8 06/26/2023    PROT 7.0 06/25/2023    ALBUMIN 2.9 (L) 06/26/2023    ALBUMIN 3.0 (L) 06/25/2023    BILITOT 0.3 06/26/2023    BILITOT 0.4 06/25/2023    ALKPHOS 76 06/26/2023    ALKPHOS 82 06/25/2023    AST 28 06/26/2023    AST 32 06/25/2023    ALT 28 06/26/2023    ALT 29 06/25/2023    ANIONGAP 13 06/26/2023    ANIONGAP 11 06/25/2023    ESTGFRAFRICA 30 (A) 07/05/2022    EGFRNONAA 26 (A) 07/05/2022       Lab Results   Component Value Date    WBC 7.91 06/26/2023    WBC 8.52 06/25/2023    RBC 4.39 06/26/2023    RBC 4.58 06/25/2023    HGB 11.5 (L) 06/26/2023    HGB 12.1 06/25/2023    HCT 38.7 06/26/2023    HCT 39.2 06/25/2023    MCV 88 06/26/2023    MCV 86 06/25/2023    MCH 26.2 (L) 06/26/2023    MCH 26.4 (L) 06/25/2023    MCHC 29.7 (L) 06/26/2023    MCHC 30.9 (L) 06/25/2023    RDW 15.9 (H) 06/26/2023    RDW 16.0 (H) 06/25/2023     06/26/2023     06/25/2023    MPV 11.2 06/26/2023    MPV  11.2 06/25/2023    IMMGR 0.3 06/26/2023    IMMGR 0.4 06/25/2023    IGABS 0.02 06/26/2023    IGABS 0.03 06/25/2023    LYMPH 2.4 06/26/2023    LYMPH 29.8 06/26/2023    MONO 0.5 06/26/2023    MONO 6.3 06/26/2023    EOS 0.8 (H) 06/26/2023    EOS 0.8 (H) 06/25/2023    BASO 0.06 06/26/2023    BASO 0.07 06/25/2023    NRBC 0 06/26/2023    NRBC 0 06/25/2023    GRAN 4.2 06/26/2023    GRAN 53.1 06/26/2023    EOSINOPHIL 9.7 (H) 06/26/2023    EOSINOPHIL 8.8 (H) 06/25/2023    BASOPHIL 0.8 06/26/2023    BASOPHIL 0.8 06/25/2023       Lab Results   Component Value Date     (H) 06/24/2023    MG 1.9 06/26/2023    MG 2.0 06/25/2023    PHOS 4.5 06/26/2023    PHOS 5.1 (H) 06/25/2023    TROPONINI 0.075 (H) 06/25/2023    TROPONINI 0.089 (H) 06/24/2023       Lab Results   Component Value Date    IRON 41 06/25/2023    TIBC 324 06/25/2023    FERRITIN 60 06/25/2023    CHOL 224 (H) 06/25/2023    TRIG 216 (H) 06/25/2023    HDL 35 (L) 06/25/2023    LDLCALC 145.8 06/25/2023    CHOLHDL 15.6 (L) 06/25/2023    TOTALCHOLEST 6.4 (H) 06/25/2023    NONHDLCHOL 189 06/25/2023    COLORU Yellow 07/05/2022    APPEARANCEUA Clear 07/05/2022    PHUR 6.0 07/05/2022    SPECGRAV 1.010 07/05/2022    PROTEINUA 3+ (A) 07/05/2022    GLUCUA Negative 07/05/2022    KETONESU Negative 07/05/2022    BILIRUBINUA Negative 07/05/2022    OCCULTUA 1+ (A) 07/05/2022    NITRITE Negative 07/05/2022    LEUKOCYTESUR 1+ (A) 07/05/2022       No implanted cardiac devices    Current Outpatient Medications on File Prior to Visit   Medication Sig Dispense Refill    furosemide (LASIX) 20 MG tablet Take 2 tablets (40 mg total) by mouth once daily. 60 tablet 11    loratadine (CLARITIN) 10 mg tablet Take 10 mg by mouth once daily. PRN      losartan (COZAAR) 100 MG tablet Take 1 tablet (100 mg total) by mouth once daily. HOLD UNTIL DISCUSSING WITH THE KIDNEY DOCTORS (Patient not taking: Reported on 6/30/2023)      sevelamer carbonate (RENVELA) 800 mg Tab Take 1 tablet (800 mg total) by  mouth 3 (three) times daily with meals. 90 tablet 11    sodium bicarbonate 650 MG tablet Take 1 tablet (650 mg total) by mouth 2 (two) times daily. 60 tablet 11     No current facility-administered medications on file prior to visit.         HPI:  Patient can walk aisles of [place] without appreciable SOB and pace normal. Endorses fatigue. Ambulating to clinic today    Patient sleeps on 2 number of pillows   Patient wakes up SOB, has to get out of bed, associated cough- intermittent PND symptoms   Palpitations - yes, described as heart racing. Denies current    Dizzy, light-headed, pre-syncope or syncope- denies recent   Since discharge frequency of performing weights, home weight and weight change- not performing daily weights. Endorses having scale   Other information felt pertinent to HPI: Ms. Xena Vera is a 53 yo female with a PMHx of HTN, obesity (BMI 35) and CKD IV who presents to first [facility] visit following recent admission for new HFrEF and severe MR. Elevated , CXR with cardiomegaly and bilateral pulmonary edema, exam c/w fluid overload, EKG with new notable possible anterior infarct from prior EKG in 2022 (age undetermined), no acute chest pain or history of known MI per the patient, formal echo with EF of 63% and grade II DD. She was adequately diuresed with IVP lasix 40mg daily. 6MWT qualifying for supplemental O2, which was arranged on discharge.     PHYSICAL:   Vitals:    07/07/23 1103   BP: (!) 133/93   Pulse: 83      Wt Readings from Last 3 Encounters:   07/07/23 80.6 kg (177 lb 12.8 oz)   06/30/23 79.9 kg (176 lb 2.4 oz)   06/26/23 80.6 kg (177 lb 11.1 oz)       JVD: no   Heart rhythm: regular  Cardiac murmur: Yes - 4/6 systolic murmur heard across precordium    S3: no  S4: no  Lungs: clear  Hepatojugular reflux: no  Edema: trace BLE edema     Echo 6/25/23:  The left ventricle is normal in size with normal systolic function.  The estimated ejection fraction is 63%.  The  quantitatively derived ejection fraction is 57%.  The left ventricular global longitudinal strain is -17.8%.  Grade II left ventricular diastolic dysfunction.  Normal right ventricular size with normal right ventricular systolic function.  Severe left atrial enlargement.  Severe mitral regurgitation.  Mild tricuspid regurgitation.  Intermediate central venous pressure (8 mmHg).  The estimated PA systolic pressure is 38 mmHg.  Trivial posterior pericardial effusion. Small outside the RA.    ASSESSMENT: Chronic diastolic HF    PLAN:      Patient Instructions:   Instruct the patient to notify this clinic if HH, a physician or an advanced care provider wants to change medication one of their HF medications   Activity and Diet restrictions:   Recommend 2-3 gram sodium restriction and 1500cc- 2000cc fluid restriction.  Encourage physical activity with graded exercise program.  Requested patient to weigh themselves daily, and to notify us if their weight increases by more than 3 lbs in 1 day or 5 lbs in 3 days.    Assigned dry weight on home scale: unsure  Medication changes (include current dose and changed dose): NYHA Class II symptoms. Well compensated on exam. BNP stable in setting of CKD IV. Continue to hold losartan 100mg daily with current renal function. In process of establishing care with Nephrology. Start coreg 3.125mg BID. Lasix 40mg daily with instructions for prn doses for worsening fluid symptoms.     Upcoming labs and date anticipated:  RTC in 2 weeks for repeat labs and follow up    Other diagnostic tests ordered: IC referral per [facility] for severe MR.    Gracia Hurt PA-C

## 2023-07-07 ENCOUNTER — TELEPHONE (OUTPATIENT)
Dept: NEPHROLOGY | Facility: CLINIC | Age: 53
End: 2023-07-07

## 2023-07-07 ENCOUNTER — OFFICE VISIT (OUTPATIENT)
Dept: CARDIOLOGY | Facility: CLINIC | Age: 53
End: 2023-07-07

## 2023-07-07 ENCOUNTER — LAB VISIT (OUTPATIENT)
Dept: LAB | Facility: HOSPITAL | Age: 53
End: 2023-07-07

## 2023-07-07 ENCOUNTER — CLINICAL SUPPORT (OUTPATIENT)
Dept: CARDIOLOGY | Facility: CLINIC | Age: 53
End: 2023-07-07

## 2023-07-07 VITALS
WEIGHT: 177.81 LBS | DIASTOLIC BLOOD PRESSURE: 93 MMHG | HEIGHT: 61 IN | BODY MASS INDEX: 33.57 KG/M2 | OXYGEN SATURATION: 94 % | HEART RATE: 83 BPM | SYSTOLIC BLOOD PRESSURE: 133 MMHG

## 2023-07-07 DIAGNOSIS — I10 HYPERTENSION, UNSPECIFIED TYPE: ICD-10-CM

## 2023-07-07 DIAGNOSIS — I34.0 MITRAL VALVE INSUFFICIENCY, UNSPECIFIED ETIOLOGY: ICD-10-CM

## 2023-07-07 DIAGNOSIS — N18.4 STAGE 4 CHRONIC KIDNEY DISEASE: ICD-10-CM

## 2023-07-07 DIAGNOSIS — I50.31 ACUTE DIASTOLIC HEART FAILURE: Primary | ICD-10-CM

## 2023-07-07 DIAGNOSIS — I50.30 HEART FAILURE WITH PRESERVED EJECTION FRACTION, UNSPECIFIED HF CHRONICITY: ICD-10-CM

## 2023-07-07 DIAGNOSIS — I50.9 NEW ONSET OF CONGESTIVE HEART FAILURE: Primary | ICD-10-CM

## 2023-07-07 DIAGNOSIS — I50.9 NEW ONSET OF CONGESTIVE HEART FAILURE: ICD-10-CM

## 2023-07-07 LAB
ALBUMIN SERPL BCP-MCNC: 3.2 G/DL (ref 3.5–5.2)
ALP SERPL-CCNC: 75 U/L (ref 55–135)
ALT SERPL W/O P-5'-P-CCNC: 20 U/L (ref 10–44)
ANION GAP SERPL CALC-SCNC: 11 MMOL/L (ref 8–16)
AST SERPL-CCNC: 17 U/L (ref 10–40)
BASOPHILS # BLD AUTO: 0.03 K/UL (ref 0–0.2)
BASOPHILS NFR BLD: 0.4 % (ref 0–1.9)
BILIRUB SERPL-MCNC: 0.4 MG/DL (ref 0.1–1)
BNP SERPL-MCNC: 460 PG/ML (ref 0–99)
BUN SERPL-MCNC: 38 MG/DL (ref 6–20)
CALCIUM SERPL-MCNC: 8.7 MG/DL (ref 8.7–10.5)
CHLORIDE SERPL-SCNC: 105 MMOL/L (ref 95–110)
CO2 SERPL-SCNC: 24 MMOL/L (ref 23–29)
CREAT SERPL-MCNC: 3.2 MG/DL (ref 0.5–1.4)
DIFFERENTIAL METHOD: ABNORMAL
EOSINOPHIL # BLD AUTO: 0.6 K/UL (ref 0–0.5)
EOSINOPHIL NFR BLD: 8.2 % (ref 0–8)
ERYTHROCYTE [DISTWIDTH] IN BLOOD BY AUTOMATED COUNT: 15.3 % (ref 11.5–14.5)
EST. GFR  (NO RACE VARIABLE): 16.6 ML/MIN/1.73 M^2
GLUCOSE SERPL-MCNC: 95 MG/DL (ref 70–110)
HCT VFR BLD AUTO: 37.7 % (ref 37–48.5)
HGB BLD-MCNC: 11.5 G/DL (ref 12–16)
IMM GRANULOCYTES # BLD AUTO: 0.01 K/UL (ref 0–0.04)
IMM GRANULOCYTES NFR BLD AUTO: 0.1 % (ref 0–0.5)
LYMPHOCYTES # BLD AUTO: 2.2 K/UL (ref 1–4.8)
LYMPHOCYTES NFR BLD: 29.6 % (ref 18–48)
MAGNESIUM SERPL-MCNC: 1.8 MG/DL (ref 1.6–2.6)
MCH RBC QN AUTO: 26.2 PG (ref 27–31)
MCHC RBC AUTO-ENTMCNC: 30.5 G/DL (ref 32–36)
MCV RBC AUTO: 86 FL (ref 82–98)
MONOCYTES # BLD AUTO: 0.5 K/UL (ref 0.3–1)
MONOCYTES NFR BLD: 6.1 % (ref 4–15)
NEUTROPHILS # BLD AUTO: 4.1 K/UL (ref 1.8–7.7)
NEUTROPHILS NFR BLD: 55.6 % (ref 38–73)
NRBC BLD-RTO: 0 /100 WBC
PHOSPHATE SERPL-MCNC: 4.4 MG/DL (ref 2.7–4.5)
PLATELET # BLD AUTO: 301 K/UL (ref 150–450)
PMV BLD AUTO: 11.2 FL (ref 9.2–12.9)
POTASSIUM SERPL-SCNC: 4.4 MMOL/L (ref 3.5–5.1)
PROT SERPL-MCNC: 7.2 G/DL (ref 6–8.4)
RBC # BLD AUTO: 4.39 M/UL (ref 4–5.4)
SODIUM SERPL-SCNC: 140 MMOL/L (ref 136–145)
WBC # BLD AUTO: 7.34 K/UL (ref 3.9–12.7)

## 2023-07-07 PROCEDURE — 99999 PR PBB SHADOW E&M-EST. PATIENT-LVL IV: CPT | Mod: PBBFAC,,,

## 2023-07-07 PROCEDURE — 99204 OFFICE O/P NEW MOD 45 MIN: CPT | Mod: S$PBB,,,

## 2023-07-07 PROCEDURE — 36415 COLL VENOUS BLD VENIPUNCTURE: CPT

## 2023-07-07 PROCEDURE — 83880 ASSAY OF NATRIURETIC PEPTIDE: CPT

## 2023-07-07 PROCEDURE — 99214 OFFICE O/P EST MOD 30 MIN: CPT | Mod: PBBFAC

## 2023-07-07 PROCEDURE — 83735 ASSAY OF MAGNESIUM: CPT

## 2023-07-07 PROCEDURE — 4010F PR ACE/ARB THEARPY RXD/TAKEN: ICD-10-PCS | Mod: ,,,

## 2023-07-07 PROCEDURE — 99999 PR PBB SHADOW E&M-EST. PATIENT-LVL IV: ICD-10-PCS | Mod: PBBFAC,,,

## 2023-07-07 PROCEDURE — 4010F ACE/ARB THERAPY RXD/TAKEN: CPT | Mod: ,,,

## 2023-07-07 PROCEDURE — 85025 COMPLETE CBC W/AUTO DIFF WBC: CPT

## 2023-07-07 PROCEDURE — 80053 COMPREHEN METABOLIC PANEL: CPT

## 2023-07-07 PROCEDURE — 99204 PR OFFICE/OUTPT VISIT, NEW, LEVL IV, 45-59 MIN: ICD-10-PCS | Mod: S$PBB,,,

## 2023-07-07 PROCEDURE — 84100 ASSAY OF PHOSPHORUS: CPT

## 2023-07-07 RX ORDER — FUROSEMIDE 20 MG/1
40 TABLET ORAL DAILY
Qty: 90 TABLET | Refills: 6 | Status: ON HOLD | OUTPATIENT
Start: 2023-07-07 | End: 2023-12-25

## 2023-07-07 RX ORDER — CARVEDILOL 3.12 MG/1
3.12 TABLET ORAL 2 TIMES DAILY WITH MEALS
Qty: 60 TABLET | Refills: 11 | Status: SHIPPED | OUTPATIENT
Start: 2023-07-07 | End: 2023-10-31

## 2023-07-07 NOTE — PATIENT INSTRUCTIONS
Continúe sosteniendo losartán.    Comience con carvedilol 3,125 mg dos veces al día.    Está neto florina 40 mg adicionales de Lasix por la tarde según sea necesario para empeorar la dificultad para respirar, la hinchazón o el aumento de peso de 3 libras.    Notifíquenos ([phone]) si ludwin un Lasix adicional dina un par de días seguidos.

## 2023-07-07 NOTE — PROGRESS NOTES
"Heart Failure [facility]itional [facility] Clinic([facility]) First Week Visit     Pt presents to clinic  A&Ox3, NVD, no BLE edema and accompanied by pt's daughter.     Most Recent Hospital Discharge Date:  June 26, 2023    Last admission Diagnosis/chief complaint:  SOB, BLE edema        Visit Vitals:     Wt Readings from Last 3 Encounters:   07/07/23 80.6 kg (177 lb 12.8 oz)   06/30/23 79.9 kg (176 lb 2.4 oz)   06/26/23 80.6 kg (177 lb 11.1 oz)     Temp Readings from Last 3 Encounters:   06/26/23 98.4 °F (36.9 °C) (Oral)   07/05/22 98.5 °F (36.9 °C) (Oral)     BP Readings from Last 3 Encounters:   07/07/23 (!) 133/93   06/30/23 (!) 141/72   06/26/23 (!) 161/86     Pulse Readings from Last 3 Encounters:   07/07/23 83   06/30/23 79   06/26/23 80            Pt reports the following:  []  Shortness of Breath with activity  []  Shortness of Breath at rest/ sleeping on 2-3 pillows "some days"  []  Fatigue  []  Edema   [] Chest pain or tightness  [] Weight Increase since discharge  [x] None of the above    Pt reports being in the GREEN Zone. If in yellow/red, reminded that they should be calling [facility] today or now.      Medications:   Medication reconciliation completed today per MA.  Pt reports having all medications available and understands how to take them appropriately. Reminded pt to call prior to making any changes to medications.      Education:    [x] Confirmed pt has  "Heart Failure Transitional Care Clinic Home Care Guide" .   Reviewed key points as listed below.      Recommend 2 gram sodium restriction and 1500cc fluid restriction.  Encourage physical activity with graded exercise program.  Requested patient to weigh themselves daily, and to notify us if their weight increases by more than 3 lbs in 1 day or 5 lbs in 3 days.      [x] Gave and Reviewed "Daily Weight and Symptom Tracker".  Reviewed with patient when and how to call  [facility] according to "Yellow Zone" and "Red Zone".                  [] Pt given list of low/high " "sodium food list                   Watch for these Signs and Symptoms: If any of these occur, contact [facility] immediately:   Increase in shortness of breath with movement   Increase in swelling in your legs and ankles   Weight gain of more than 3 pounds in a night or 5 pounds in 3 days.   Difficulty breathing when you are lying down   Worsening fatigue or tiredness   Stomach bloating, a full feeling or a loss of appetite   Increased coughing--especially when you are lying down     MyChart and Care Companion:              Patient active on myChart? Yes, patient uses regularly.    Contacting our Team:              Reviewed with pt how to contact [facility] RN via phone or Overwatch messaging.      HF [facility] Program Plan:  Pt educated on follow-up plan while in [facility] program.   [x]  PT given /reviewed upcoming appointment/check in dates. These will include weekly contact with RN or visits with providers over the next 4-6 weeks.                   [x]  Pt educated that they will transitioned to long term care provider team at week 4-6.  This team will be either Cardiology, PCP or Advanced Heart Failure depending on needs.          Pt was able to verbalize back to RN in their own words correct diet/fluid restrictions, necessity for exercise, warning signs and symptoms, when and how to contact their TCC team .       Plan:     Per Epic       [x]  Pt given AVS with follow up appointment within two week and medication detail list for ease of use.     [x]  Explained to pt they will have a phone "check in" by RN in/on July 14, 2023     [] Pt met with Francisco Bradley Dietician today after visit.  Refer to his note for details.     Will follow up with pt at next clinic visit and RN navigator available for pt questions, issues or concerns.     Please refer to provider visit for additional details and assessment.    "

## 2023-07-07 NOTE — TELEPHONE ENCOUNTER
Called pt daughter stated that they will call [facility] due to we will not have anything until the end of the year   ----- Message from Ashlee Dumont LPN sent at 7/7/2023  1:11 PM CDT -----  Regarding: FW: Appointment    ----- Message -----  From: Gracia Hurt PA-C  Sent: 7/7/2023   1:01 PM CDT  To: [facility] Nephrology Staff  Subject: Appointment                                      Hi team,    When is the soonest available appointment that you all can get this patient scheduled?    Thanks,  Gracia Hurt PA-C

## 2023-07-13 PROBLEM — I50.30 (HFPEF) HEART FAILURE WITH PRESERVED EJECTION FRACTION: Status: ACTIVE | Noted: 2023-06-24

## 2023-07-13 NOTE — PROGRESS NOTES
HF TCC Provider Note (Follow-up) Consult Note      HPI:     Patient can walk aisles of [place] without appreciable SOB and pace normal. Denies worsening SOB. Ambulating to clinic today               Patient sleeps on 2 number of pillows. Using 3 pillows for the past couple days 2/2 URI              Patient wakes up SOB, has to get out of bed, associated cough- denies PND symptoms              Palpitations - denies recent              Dizzy, light-headed, pre-syncope or syncope- endorses episode of dizziness this morning when first waking up. Denies pre-syncope/syncope               Since discharge frequency of performing weights, home weight and weight change- performing daily weights. Weight downtrending 5-6lbs              Other information felt pertinent to HPI: Ms. Xena Vera is a 51 yo female with a PMHx of HTN, obesity (BMI 35) and CKD IV who presents to second HFTCC visit following recent admission for new HFrEF and severe MR. Elevated , CXR with cardiomegaly and bilateral pulmonary edema, exam c/w fluid overload, EKG with new notable possible anterior infarct from prior EKG in 2022 (age undetermined), no acute chest pain or history of known MI per the patient, formal echo with EF of 63% and grade II DD. She was adequately diuresed with IVP lasix 40mg daily. 6MWT qualifying for supplemental O2, which was arranged on discharge.    7/21/23: Last visit we started coreg 3.125mg BID which she is tolerating well. Denies recent palpitations. Today she denies worsening SOB/BLE edema. Endorses URI symptoms the past couple days with nasal congestion and cough productive for phlegm. Denies F/C. Symptoms gradually improving.      PHYSICAL:   Vitals:    07/21/23 1051   BP: 126/71   Pulse: 73      Wt Readings from Last 3 Encounters:   07/21/23 77.1 kg (170 lb)   07/14/23 79.9 kg (176 lb 2.4 oz)   07/07/23 80.6 kg (177 lb 12.8 oz)       JVD: no   Heart rhythm: regular  Cardiac murmur: Yes - 1/6 systolic  murmur     S3: no  S4: no  Lungs: clear  Hepatojugular reflux: no  Edema: no    Lab Results   Component Value Date     07/21/2023    K 4.2 07/21/2023    MG 2.0 07/21/2023     07/21/2023    CO2 24 07/21/2023    BUN 44 (H) 07/21/2023    CREATININE 3.9 (H) 07/21/2023    GLU 84 07/21/2023    CALCIUM 8.6 (L) 07/21/2023    AST 25 07/21/2023    ALT 26 07/21/2023    ALBUMIN 3.5 07/21/2023    PROT 8.0 07/21/2023    BILITOT 0.5 07/21/2023     Lab Results   Component Value Date     (H) 07/21/2023     (H) 07/07/2023     (H) 06/24/2023       ASSESSMENT: Chronic diastolic HF    PLAN:      Patient Instructions:   Instruct the patient to notify this clinic if HH, a physician or an advanced care provider wants to change medication one of their HF medications   Activity and Diet restrictions:   Recommend 2-3 gram sodium restriction and 1500cc- 2000cc fluid restriction.  Encourage physical activity with graded exercise program.  Requested patient to weigh themselves daily, and to notify us if their weight increases by more than 3 lbs in 1 day or 5 lbs in 3 days.    Assigned dry weight on home scale: unsure, weight downtrending  Medication changes (include current dose and changed dose): NYHA Class I symptoms. No appreciable fluid volume on exam. Down 6lbs on clinic scales. Suspect mild overdiuresis precipitated by recent URI. Instructions to hold lasix dose tomorrow and resume Sunday. RD education provided during visit with education to increase fluid intake to 2L/day. Continue to hold losartan 100mg daily. In process of establishing care with Nephrology.     RD education provided during visit.     Upcoming labs and date anticipated: 1 week call with tentative phone discharge.     Other diagnostic tests ordered: IC referral per [facility] for severe MR. Fagan WILLY Hurt

## 2023-07-14 ENCOUNTER — TELEPHONE (OUTPATIENT)
Dept: CARDIOLOGY | Facility: CLINIC | Age: 53
End: 2023-07-14

## 2023-07-14 ENCOUNTER — OFFICE VISIT (OUTPATIENT)
Dept: CARDIOLOGY | Facility: CLINIC | Age: 53
End: 2023-07-14
Payer: MEDICAID

## 2023-07-14 VITALS
DIASTOLIC BLOOD PRESSURE: 84 MMHG | BODY MASS INDEX: 33.25 KG/M2 | OXYGEN SATURATION: 94 % | SYSTOLIC BLOOD PRESSURE: 124 MMHG | HEIGHT: 61 IN | WEIGHT: 176.13 LBS | HEART RATE: 83 BPM

## 2023-07-14 DIAGNOSIS — I10 HYPERTENSION, UNSPECIFIED TYPE: ICD-10-CM

## 2023-07-14 DIAGNOSIS — N18.4 STAGE 4 CHRONIC KIDNEY DISEASE: Primary | ICD-10-CM

## 2023-07-14 DIAGNOSIS — I34.0 MITRAL VALVE INSUFFICIENCY, UNSPECIFIED ETIOLOGY: ICD-10-CM

## 2023-07-14 DIAGNOSIS — I50.30 HEART FAILURE WITH PRESERVED EJECTION FRACTION, UNSPECIFIED HF CHRONICITY: ICD-10-CM

## 2023-07-14 DIAGNOSIS — E66.01 CLASS 2 SEVERE OBESITY DUE TO EXCESS CALORIES WITH SERIOUS COMORBIDITY IN ADULT, UNSPECIFIED BMI: ICD-10-CM

## 2023-07-14 PROCEDURE — 99212 OFFICE O/P EST SF 10 MIN: CPT | Mod: S$PBB,,, | Performed by: NURSE PRACTITIONER

## 2023-07-14 PROCEDURE — 99212 PR OFFICE/OUTPT VISIT, EST, LEVL II, 10-19 MIN: ICD-10-PCS | Mod: S$PBB,,, | Performed by: NURSE PRACTITIONER

## 2023-07-14 PROCEDURE — 99999 PR PBB SHADOW E&M-EST. PATIENT-LVL V: CPT | Mod: PBBFAC,,, | Performed by: NURSE PRACTITIONER

## 2023-07-14 PROCEDURE — 4010F ACE/ARB THERAPY RXD/TAKEN: CPT | Mod: ,,, | Performed by: NURSE PRACTITIONER

## 2023-07-14 PROCEDURE — 99215 OFFICE O/P EST HI 40 MIN: CPT | Mod: PBBFAC,PN | Performed by: NURSE PRACTITIONER

## 2023-07-14 PROCEDURE — 99999 PR PBB SHADOW E&M-EST. PATIENT-LVL V: ICD-10-PCS | Mod: PBBFAC,,, | Performed by: NURSE PRACTITIONER

## 2023-07-14 PROCEDURE — 4010F PR ACE/ARB THEARPY RXD/TAKEN: ICD-10-PCS | Mod: ,,, | Performed by: NURSE PRACTITIONER

## 2023-07-14 NOTE — PATIENT INSTRUCTIONS
Continúe con masha medicamentos actuales    Mantenga ely milady con Gracia Hurt y con el Dr. Stone. Lo refiero a Nefrología en [facility] para que se establezca. Nos vemos en 6 semanas.    Continúe con ely dieta baja en sodio, ely restricción de líquidos de 1500 mL a 2000 mL    Contáctenos con cualquier pregunta o inquietud

## 2023-07-14 NOTE — PROGRESS NOTES
Cardiology    7/14/2023  11:34 AM    Problem list  Patient Active Problem List   Diagnosis    Stage 4 chronic kidney disease    HTN (hypertension)    (HFpEF) heart failure with preserved ejection fraction    Class 2 obesity in adult    Ovarian cyst    Anemia    Mitral valve regurgitation       CC:  HFpEF    HPI:  Unable to get an appt with nephrology- the number they were given is not being answered. Saw AHF, no leg swelling.  With sleep has to sit upright at times, this AM woke up in the middle of the night feeling short of breath.  Having dyspnea on exertion. Taking lasix as prescribed, taking Coreg as well. Sticking to low Na diet- she isn't sure adhering to fluid restriction but does three bottles of water daily- does have one tea in the morning. Denies CP or other complaints.  A referral for eval of MR is pending    Medications  Current Outpatient Medications   Medication Sig Dispense Refill    carvediloL (COREG) 3.125 MG tablet Take 1 tablet (3.125 mg total) by mouth 2 (two) times daily with meals. 60 tablet 11    furosemide (LASIX) 20 MG tablet Take 2 tablets (40 mg total) by mouth once daily. Take an additional 40mg lasix in the afternoon as needed for worsening shortness of breath, swelling, or 3lb weight gain. 90 tablet 6    loratadine (CLARITIN) 10 mg tablet Take 10 mg by mouth once daily. PRN      losartan (COZAAR) 100 MG tablet Take 1 tablet (100 mg total) by mouth once daily. HOLD UNTIL DISCUSSING WITH THE KIDNEY DOCTORS      sevelamer carbonate (RENVELA) 800 mg Tab Take 1 tablet (800 mg total) by mouth 3 (three) times daily with meals. 90 tablet 11    sodium bicarbonate 650 MG tablet Take 1 tablet (650 mg total) by mouth 2 (two) times daily. 60 tablet 11     No current facility-administered medications for this visit.      Prior to Admission medications    Medication Sig Start Date End Date Taking? Authorizing Provider   carvediloL (COREG) 3.125 MG tablet Take 1 tablet (3.125 mg total) by  mouth 2 (two) times daily with meals. 23 Yes Gracia Hurt PA-C   furosemide (LASIX) 20 MG tablet Take 2 tablets (40 mg total) by mouth once daily. Take an additional 40mg lasix in the afternoon as needed for worsening shortness of breath, swelling, or 3lb weight gain. 23 Yes Gracia Hurt PA-C   loratadine (CLARITIN) 10 mg tablet Take 10 mg by mouth once daily. PRN   Yes Historical Provider   losartan (COZAAR) 100 MG tablet Take 1 tablet (100 mg total) by mouth once daily. HOLD UNTIL DISCUSSING WITH THE KIDNEY DOCTORS 23  Yes Javier Morris MD   sevelamer carbonate (RENVELA) 800 mg Tab Take 1 tablet (800 mg total) by mouth 3 (three) times daily with meals. 23 Yes Javier Morris MD   sodium bicarbonate 650 MG tablet Take 1 tablet (650 mg total) by mouth 2 (two) times daily. 23 Yes Javier Morris MD         History  Past Medical History:   Diagnosis Date    CKD (chronic kidney disease)     HTN (hypertension)     Ovarian cyst      No past surgical history on file.  Social History     Socioeconomic History    Marital status: Single   Tobacco Use    Smoking status: Former     Types: Cigarettes     Start date:      Quit date:      Years since quittin.5    Smokeless tobacco: Never    Tobacco comments:     2-3 cigarettes a day   Substance and Sexual Activity    Alcohol use: Never    Drug use: Never     Social Determinants of Health     Financial Resource Strain: Low Risk     Difficulty of Paying Living Expenses: Not very hard   Food Insecurity: No Food Insecurity    Worried About Running Out of Food in the Last Year: Never true    Ran Out of Food in the Last Year: Never true   Transportation Needs: No Transportation Needs    Lack of Transportation (Medical): No    Lack of Transportation (Non-Medical): No   Physical Activity: Inactive    Days of Exercise per Week: 0 days    Minutes of Exercise per Session: 0 min   Stress: No Stress Concern Present     Feeling of Stress : Only a little   Social Connections: Moderately Isolated    Frequency of Communication with Friends and Family: More than three times a week    Frequency of Social Gatherings with Friends and Family: Once a week    Attends [employer] Services: More than 4 times per year    Active Member of Clubs or Organizations: No    Attends Club or Organization Meetings: Never    Marital Status:    Housing Stability: Low Risk     Unable to Pay for Housing in the Last Year: No    Number of Places Lived in the Last Year: 1    Unstable Housing in the Last Year: No         Allergies  Review of patient's allergies indicates:  No Known Allergies      Review of Systems   Review of Systems   Constitutional: Positive for malaise/fatigue. Negative for diaphoresis.   HENT: Negative.     Cardiovascular:  Positive for dyspnea on exertion. Negative for chest pain, claudication, irregular heartbeat, leg swelling, near-syncope, orthopnea, palpitations, paroxysmal nocturnal dyspnea and syncope.   Respiratory:  Negative for shortness of breath.    Endocrine: Negative for polydipsia, polyphagia and polyuria.   Hematologic/Lymphatic: Does not bruise/bleed easily.   Gastrointestinal:  Negative for bloating, nausea and vomiting.   Genitourinary: Negative.    Neurological:  Negative for excessive daytime sleepiness, dizziness, light-headedness, loss of balance and weakness.   Psychiatric/Behavioral:  The patient is not nervous/anxious.    Allergic/Immunologic: Negative.        Physical Exam  Wt Readings from Last 1 Encounters:   07/14/23 79.9 kg (176 lb 2.4 oz)     BP Readings from Last 3 Encounters:   07/14/23 124/84   07/07/23 (!) 133/93   06/30/23 (!) 141/72     Pulse Readings from Last 1 Encounters:   07/14/23 83     Body mass index is 33.28 kg/m².    Physical Exam  Vitals and nursing note reviewed.   Constitutional:       Appearance: Normal appearance.      Comments: BMI 33   HENT:      Head: Normocephalic and atraumatic.       Mouth/Throat:      Mouth: Mucous membranes are moist.   Eyes:      Pupils: Pupils are equal, round, and reactive to light.   Cardiovascular:      Rate and Rhythm: Normal rate and regular rhythm.      Pulses:           Radial pulses are 2+ on the right side and 2+ on the left side.        Dorsalis pedis pulses are 2+ on the right side and 2+ on the left side.        Posterior tibial pulses are 2+ on the right side and 2+ on the left side.      Heart sounds: No murmur heard.  Pulmonary:      Effort: Pulmonary effort is normal. No respiratory distress.      Breath sounds: Normal breath sounds.   Abdominal:      General: There is no distension.      Tenderness: There is no abdominal tenderness.   Musculoskeletal:      Cervical back: Normal range of motion.      Right lower leg: No edema.      Left lower leg: No edema.   Skin:     General: Skin is warm and dry.      Findings: No erythema.   Neurological:      General: No focal deficit present.      Mental Status: She is alert.   Psychiatric:         Mood and Affect: Mood normal.         Behavior: Behavior normal.             Problem List Items Addressed This Visit          Cardiac/Vascular    HTN (hypertension)    Overview     Continue losartan, coreg  Well controlled in clinic today  Monitor         Current Assessment & Plan     As above         (HFpEF) heart failure with preserved ejection fraction    Overview     EF 63% with G2 DD  Continue lasix, appears euvolemic today  Continue good BP management with losartan, continue Coreg 3.125 mg BID  Fluid restriction, low sodium diet  Recommend Jardiance- patient had CKD 4 and this is a limiting factor- nephro eval pending         Current Assessment & Plan     As above         Mitral valve regurgitation    Overview     Refer to structural for evaluation  May benefit from mitral clip         Current Assessment & Plan     Has appt pending.             Renal/    Stage 4 chronic kidney disease - Primary    Overview      Refer to nephrology at [facility] as unable to get established with [facility] and the phone number for the outside nephrologist provided to patient during her recent hospitalization goes unanswered when called.   Cr remains elevated  Would like to start SGLT-2 and appreciate nephrology expertise          Current Assessment & Plan     As above         Relevant Orders    Ambulatory referral/consult to Nephrology       Endocrine    Class 2 obesity in adult    Overview     Continue weight loss and increase cardiovascular activity                Follow Up  6 weeks      @Dori Roldan DNP

## 2023-07-21 ENCOUNTER — LAB VISIT (OUTPATIENT)
Dept: LAB | Facility: HOSPITAL | Age: 53
End: 2023-07-21

## 2023-07-21 ENCOUNTER — NUTRITION (OUTPATIENT)
Dept: TRANSPLANT | Facility: CLINIC | Age: 53
End: 2023-07-21

## 2023-07-21 ENCOUNTER — OFFICE VISIT (OUTPATIENT)
Dept: CARDIOLOGY | Facility: CLINIC | Age: 53
End: 2023-07-21

## 2023-07-21 ENCOUNTER — TELEPHONE (OUTPATIENT)
Dept: CARDIOLOGY | Facility: CLINIC | Age: 53
End: 2023-07-21

## 2023-07-21 VITALS
DIASTOLIC BLOOD PRESSURE: 71 MMHG | OXYGEN SATURATION: 93 % | HEART RATE: 73 BPM | HEIGHT: 57 IN | WEIGHT: 170 LBS | BODY MASS INDEX: 36.68 KG/M2 | SYSTOLIC BLOOD PRESSURE: 126 MMHG

## 2023-07-21 DIAGNOSIS — I10 HYPERTENSION, UNSPECIFIED TYPE: ICD-10-CM

## 2023-07-21 DIAGNOSIS — I50.9 NEW ONSET OF CONGESTIVE HEART FAILURE: ICD-10-CM

## 2023-07-21 DIAGNOSIS — N18.4 STAGE 4 CHRONIC KIDNEY DISEASE: ICD-10-CM

## 2023-07-21 DIAGNOSIS — I50.30 HEART FAILURE WITH PRESERVED EJECTION FRACTION, UNSPECIFIED HF CHRONICITY: ICD-10-CM

## 2023-07-21 DIAGNOSIS — I50.9 NEW ONSET OF CONGESTIVE HEART FAILURE: Primary | ICD-10-CM

## 2023-07-21 DIAGNOSIS — I34.0 MITRAL VALVE INSUFFICIENCY, UNSPECIFIED ETIOLOGY: ICD-10-CM

## 2023-07-21 DIAGNOSIS — Z78.9 NO ADDED SALT DIET: Primary | ICD-10-CM

## 2023-07-21 LAB
ALBUMIN SERPL BCP-MCNC: 3.5 G/DL (ref 3.5–5.2)
ALP SERPL-CCNC: 76 U/L (ref 55–135)
ALT SERPL W/O P-5'-P-CCNC: 26 U/L (ref 10–44)
ANION GAP SERPL CALC-SCNC: 12 MMOL/L (ref 8–16)
AST SERPL-CCNC: 25 U/L (ref 10–40)
BILIRUB SERPL-MCNC: 0.5 MG/DL (ref 0.1–1)
BNP SERPL-MCNC: 308 PG/ML (ref 0–99)
BUN SERPL-MCNC: 44 MG/DL (ref 6–20)
CALCIUM SERPL-MCNC: 8.6 MG/DL (ref 8.7–10.5)
CHLORIDE SERPL-SCNC: 100 MMOL/L (ref 95–110)
CO2 SERPL-SCNC: 24 MMOL/L (ref 23–29)
CREAT SERPL-MCNC: 3.9 MG/DL (ref 0.5–1.4)
EST. GFR  (NO RACE VARIABLE): 13.1 ML/MIN/1.73 M^2
GLUCOSE SERPL-MCNC: 84 MG/DL (ref 70–110)
MAGNESIUM SERPL-MCNC: 2 MG/DL (ref 1.6–2.6)
PHOSPHATE SERPL-MCNC: 4.6 MG/DL (ref 2.7–4.5)
POTASSIUM SERPL-SCNC: 4.2 MMOL/L (ref 3.5–5.1)
PROT SERPL-MCNC: 8 G/DL (ref 6–8.4)
SODIUM SERPL-SCNC: 136 MMOL/L (ref 136–145)

## 2023-07-21 PROCEDURE — 99999 PR PBB SHADOW E&M-EST. PATIENT-LVL IV: CPT | Mod: PBBFAC,,,

## 2023-07-21 PROCEDURE — 99214 OFFICE O/P EST MOD 30 MIN: CPT | Mod: S$PBB,,,

## 2023-07-21 PROCEDURE — 99999 PR PBB SHADOW E&M-EST. PATIENT-LVL IV: ICD-10-PCS | Mod: PBBFAC,,,

## 2023-07-21 PROCEDURE — 99214 OFFICE O/P EST MOD 30 MIN: CPT | Mod: PBBFAC

## 2023-07-21 PROCEDURE — 83735 ASSAY OF MAGNESIUM: CPT

## 2023-07-21 PROCEDURE — 84100 ASSAY OF PHOSPHORUS: CPT

## 2023-07-21 PROCEDURE — 83880 ASSAY OF NATRIURETIC PEPTIDE: CPT

## 2023-07-21 PROCEDURE — 80053 COMPREHEN METABOLIC PANEL: CPT

## 2023-07-21 PROCEDURE — 97802 MEDICAL NUTRITION INDIV IN: CPT | Mod: PBBFAC

## 2023-07-21 PROCEDURE — 99214 PR OFFICE/OUTPT VISIT, EST, LEVL IV, 30-39 MIN: ICD-10-PCS | Mod: S$PBB,,,

## 2023-07-21 NOTE — PROGRESS NOTES
"TRANSPLANT NUTRITIONAL ASSESSMENT    Referring Provider: Gracia Hurt PA-C    Reason for Visit: cardiac diet and fluid maintenance      Age: 53 y.o.  Sex: female    Patient Active Problem List   Diagnosis    Stage 4 chronic kidney disease    HTN (hypertension)    (HFpEF) heart failure with preserved ejection fraction    Class 2 obesity in adult    Ovarian cyst    Anemia    Mitral valve regurgitation     Past Medical History:   Diagnosis Date    CKD (chronic kidney disease)     HTN (hypertension)     Ovarian cyst      Lab Results   Component Value Date    GLU 84 07/21/2023    K 4.2 07/21/2023    PHOS 4.6 (H) 07/21/2023    MG 2.0 07/21/2023    CHOL 224 (H) 06/25/2023    HDL 35 (L) 06/25/2023    TRIG 216 (H) 06/25/2023    ALBUMIN 3.5 07/21/2023    CALCIUM 8.6 (L) 07/21/2023     Other Pertinent Labs: BUN: 44 (H), creat: 3.8 (H), GFR: 13.1 (L)    Current Outpatient Medications   Medication Sig    carvediloL (COREG) 3.125 MG tablet Take 1 tablet (3.125 mg total) by mouth 2 (two) times daily with meals.    furosemide (LASIX) 20 MG tablet Take 2 tablets (40 mg total) by mouth once daily. Take an additional 40mg lasix in the afternoon as needed for worsening shortness of breath, swelling, or 3lb weight gain.    loratadine (CLARITIN) 10 mg tablet Take 10 mg by mouth once daily. PRN    losartan (COZAAR) 100 MG tablet Take 1 tablet (100 mg total) by mouth once daily. HOLD UNTIL DISCUSSING WITH THE KIDNEY DOCTORS    sevelamer carbonate (RENVELA) 800 mg Tab Take 1 tablet (800 mg total) by mouth 3 (three) times daily with meals.    sodium bicarbonate 650 MG tablet Take 1 tablet (650 mg total) by mouth 2 (two) times daily.     No current facility-administered medications for this visit.     Allergies: Patient has no known allergies.    Ht Readings from Last 1 Encounters:   07/21/23 4' 9" (1.448 m)     Wt Readings from Last 1 Encounters:   07/21/23 77.1 kg (170 lb)      BMI: 36.79 kg/m²    Usual Weight: 175 lbs (79.8 kg)  Weight " Change/Time: down 6 pounds in 2 weeks  Current Diet: micaela, 1500ml fluid res  Appetite/Current Intake: good   Exercise/Physical Activity: somewhat active  Nutritional/Herbal Supplements: none   Potential Food/Medication Interactions: n/a  Chewing/Swallowing Problems: none   Symptoms: constipation  Assessment of Lab Values: due to cardiac and kidney dysfunction   Support System: family     ABW: 114 lbs (51.7 kg)  Estimated Kcal Need: 1448 (28 kcals x kg ABW)  Estimated Protein Need: less than 61 g (<0.8 g/kg cbw)    Nutritional History: pt states breakfast is eggs with beans. Lunch is a lean fish or chicken with salad and lime + vinegar. Pt states she snacks on jello and fruit between these meals. Pt drinks 1500ml of water with 250ml of tea    Nutritional Diagnoses  Problem: inadequate fluid intake  Etiology: decreased kidney function    Symptoms: creat, GFR, and BUN and decreased wt    Educational Need? yes  Barriers: none identified  Discussed with: patient and daughter  Interventions: Patient taught nutrition information regarding cardiac diet and fluid maintenance  .  Pt was educated on how to read nutrition labels to understand food has natural sodium present. Recc'd of 600 - 700mg Na per meal. Pt recc to drink no more than 2000ml / 60oz. Pt given nutrition handout and contact info if needs arise. Will follow up in HFTCC as needed  Goals/Recommendations: diet adherence and limit fluid intake  Actions Taken: instruct/provide written information  Strategies Used: problem solving, goal setting, motivational interviewing  Patient and/or family comprehend instructions: yes , adherence expected  Outcome: Verbalizes understanding  Monitoring: Contact information provided, will f/u in clinic and communicate with the care team as needed.     Counseling Time: 15 minutes

## 2023-07-21 NOTE — PATIENT INSTRUCTIONS
"Recomiende Mucinex (caja tracey), Robitussin y Tylenol según sea necesario sin receta para fiebre/tos/congestión.    Evite los medicamentos de venta luci con "DM" o "PM" o SALO (Aleve, ibuprofeno).      "

## 2023-07-21 NOTE — TELEPHONE ENCOUNTER
Call to Pt to advise of POC changes  WILLY Hurt gives the following instructions   hold lasix dose tomorrow and resume Sunday, Francisco Bradley MS, PLDN instructed to get closer to 2L/day fluid as well (so an extra 1/2 bottle of water from what shes getting now) if we can reeducate as well. I think shes a little dry with her upper respiratory infection    Requested that PT call us back so that we can make sure that she measures her Fluid.

## 2023-07-28 ENCOUNTER — TELEPHONE (OUTPATIENT)
Dept: CARDIOLOGY | Facility: CLINIC | Age: 53
End: 2023-07-28

## 2023-07-28 NOTE — TELEPHONE ENCOUNTER
"[facility]    Attempted to call pt to complete 1 week "check in"  and discharge phone call. Unable to reach pt at listed phone numbers.  Was unable to leave message with family nor on voicemail. Voicemail not set up or full. .     Will continue to try to reach patient.    "

## 2023-08-02 ENCOUNTER — DOCUMENTATION ONLY (OUTPATIENT)
Dept: CARDIOLOGY | Facility: CLINIC | Age: 53
End: 2023-08-02

## 2023-08-02 NOTE — PROGRESS NOTES
RN unable to contact pt on given phone numbers, all messages say phone are out of service. RN will close and dis enroll pt from [facility].

## 2023-08-24 ENCOUNTER — TELEPHONE (OUTPATIENT)
Dept: CARDIOLOGY | Facility: CLINIC | Age: 53
End: 2023-08-24

## 2023-10-31 ENCOUNTER — OFFICE VISIT (OUTPATIENT)
Dept: CARDIOLOGY | Facility: CLINIC | Age: 53
End: 2023-10-31

## 2023-10-31 VITALS
BODY MASS INDEX: 35.59 KG/M2 | WEIGHT: 164.44 LBS | DIASTOLIC BLOOD PRESSURE: 76 MMHG | OXYGEN SATURATION: 96 % | HEART RATE: 73 BPM | SYSTOLIC BLOOD PRESSURE: 132 MMHG

## 2023-10-31 DIAGNOSIS — I10 HYPERTENSION, UNSPECIFIED TYPE: ICD-10-CM

## 2023-10-31 DIAGNOSIS — I50.30 HEART FAILURE WITH PRESERVED EJECTION FRACTION, UNSPECIFIED HF CHRONICITY: Primary | ICD-10-CM

## 2023-10-31 PROCEDURE — 93010 ELECTROCARDIOGRAM REPORT: CPT | Mod: S$PBB,,, | Performed by: INTERNAL MEDICINE

## 2023-10-31 PROCEDURE — 99999 PR PBB SHADOW E&M-EST. PATIENT-LVL IV: ICD-10-PCS | Mod: PBBFAC,,, | Performed by: NURSE PRACTITIONER

## 2023-10-31 PROCEDURE — 93010 EKG 12-LEAD: ICD-10-PCS | Mod: S$PBB,,, | Performed by: INTERNAL MEDICINE

## 2023-10-31 PROCEDURE — 99999 PR PBB SHADOW E&M-EST. PATIENT-LVL IV: CPT | Mod: PBBFAC,,, | Performed by: NURSE PRACTITIONER

## 2023-10-31 PROCEDURE — 99214 OFFICE O/P EST MOD 30 MIN: CPT | Mod: S$PBB,,, | Performed by: NURSE PRACTITIONER

## 2023-10-31 PROCEDURE — 93005 ELECTROCARDIOGRAM TRACING: CPT | Mod: PBBFAC,PN | Performed by: INTERNAL MEDICINE

## 2023-10-31 PROCEDURE — 99214 OFFICE O/P EST MOD 30 MIN: CPT | Mod: PBBFAC,PN | Performed by: NURSE PRACTITIONER

## 2023-10-31 PROCEDURE — 99214 PR OFFICE/OUTPT VISIT, EST, LEVL IV, 30-39 MIN: ICD-10-PCS | Mod: S$PBB,,, | Performed by: NURSE PRACTITIONER

## 2023-10-31 RX ORDER — CARVEDILOL 6.25 MG/1
6.25 TABLET ORAL 2 TIMES DAILY WITH MEALS
Qty: 60 TABLET | Refills: 11 | Status: ON HOLD | OUTPATIENT
Start: 2023-10-31 | End: 2023-12-25

## 2023-10-31 NOTE — PATIENT INSTRUCTIONS
Recomiendo Miramax para ayudar con el estreñimiento.    Le haremos cristal radiografía de tórax y un análisis de [place] y veremos si es necesario ajustar ely medicación.    Hyden ambas tabletas de Lasix juntas por la mañana.    Estoy aumentando ely dosis de carvedilol de 3,125 mg a 6,25 mg.    Pídale a ely médico de atención primaria que lo derive a nefrología; esto es muy importante.    Mantenga ely milady con el Dr. Stone

## 2023-10-31 NOTE — PROGRESS NOTES
Cardiology    10/31/2023  2:01 PM    Problem list  Patient Active Problem List   Diagnosis    Stage 4 chronic kidney disease    HTN (hypertension)    (HFpEF) heart failure with preserved ejection fraction    Class 2 obesity in adult    Ovarian cyst    Anemia    Mitral valve regurgitation       CC:  HFpEF    HPI:  Having RLQ while walking into clinic today, no associated   Having constipation.   Urinating all night long, with BID lasix dosing.      Medications  Current Outpatient Medications   Medication Sig Dispense Refill    carvediloL (COREG) 3.125 MG tablet Take 1 tablet (3.125 mg total) by mouth 2 (two) times daily with meals. 60 tablet 11    furosemide (LASIX) 20 MG tablet Take 2 tablets (40 mg total) by mouth once daily. Take an additional 40mg lasix in the afternoon as needed for worsening shortness of breath, swelling, or 3lb weight gain. 90 tablet 6    sevelamer carbonate (RENVELA) 800 mg Tab Take 1 tablet (800 mg total) by mouth 3 (three) times daily with meals. 90 tablet 11    sodium bicarbonate 650 MG tablet Take 1 tablet (650 mg total) by mouth 2 (two) times daily. 60 tablet 11    loratadine (CLARITIN) 10 mg tablet Take 10 mg by mouth once daily. PRN      losartan (COZAAR) 100 MG tablet Take 1 tablet (100 mg total) by mouth once daily. HOLD UNTIL DISCUSSING WITH THE KIDNEY DOCTORS       No current facility-administered medications for this visit.      Prior to Admission medications    Medication Sig Start Date End Date Taking? Authorizing Provider   carvediloL (COREG) 3.125 MG tablet Take 1 tablet (3.125 mg total) by mouth 2 (two) times daily with meals. 7/7/23 7/6/24 Yes Gracia Hurt PA-C   furosemide (LASIX) 20 MG tablet Take 2 tablets (40 mg total) by mouth once daily. Take an additional 40mg lasix in the afternoon as needed for worsening shortness of breath, swelling, or 3lb weight gain. 7/7/23 7/6/24 Yes Gracia Hurt PA-C   sevelamer carbonate (RENVELA) 800 mg Tab Take 1 tablet (800  mg total) by mouth 3 (three) times daily with meals. 23 Yes Javier Morris MD   sodium bicarbonate 650 MG tablet Take 1 tablet (650 mg total) by mouth 2 (two) times daily. 23 Yes Javier Morris MD   loratadine (CLARITIN) 10 mg tablet Take 10 mg by mouth once daily. PRN    Provider, Historical   losartan (COZAAR) 100 MG tablet Take 1 tablet (100 mg total) by mouth once daily. HOLD UNTIL DISCUSSING WITH THE KIDNEY DOCTORS 23   Javier Morris MD         History  Past Medical History:   Diagnosis Date    CKD (chronic kidney disease)     HTN (hypertension)     Ovarian cyst      No past surgical history on file.  Social History     Socioeconomic History    Marital status: Single   Tobacco Use    Smoking status: Former     Current packs/day: 0.00     Types: Cigarettes     Start date:      Quit date:      Years since quittin.8    Smokeless tobacco: Never    Tobacco comments:     2-3 cigarettes a day   Substance and Sexual Activity    Alcohol use: Never    Drug use: Never     Social Determinants of Health     Financial Resource Strain: Low Risk  (2023)    Overall Financial Resource Strain (CARDIA)     Difficulty of Paying Living Expenses: Not very hard   Food Insecurity: No Food Insecurity (2023)    Hunger Vital Sign     Worried About Running Out of Food in the Last Year: Never true     Ran Out of Food in the Last Year: Never true   Transportation Needs: No Transportation Needs (2023)    PRAPARE - Transportation     Lack of Transportation (Medical): No     Lack of Transportation (Non-Medical): No   Physical Activity: Inactive (2023)    Exercise Vital Sign     Days of Exercise per Week: 0 days     Minutes of Exercise per Session: 0 min   Stress: No Stress Concern Present (2023)    [employer] - Occupational Stress Questionnaire     Feeling of Stress : Only a little   Social Connections: Moderately Isolated (2023)     Social Connection and Isolation Panel [NHANES]     Frequency of Communication with Friends and Family: More than three times a week     Frequency of Social Gatherings with Friends and Family: Once a week     Attends Cheondo[employer] Services: More than 4 times per year     Active Member of Clubs or Organizations: No     Attends Club or Organization Meetings: Never     Marital Status:    Housing Stability: Low Risk  (6/25/2023)    Housing Stability Vital Sign     Unable to Pay for Housing in the Last Year: No     Number of Places Lived in the Last Year: 1     Unstable Housing in the Last Year: No         Allergies  Review of patient's allergies indicates:  No Known Allergies      Review of Systems   Review of Systems   Constitutional: Positive for malaise/fatigue. Negative for diaphoresis.   HENT: Negative.     Cardiovascular:  Positive for orthopnea. Negative for chest pain, claudication, dyspnea on exertion, irregular heartbeat, leg swelling, near-syncope, palpitations, paroxysmal nocturnal dyspnea and syncope.   Respiratory:  Negative for shortness of breath.    Endocrine: Negative for polydipsia, polyphagia and polyuria.   Hematologic/Lymphatic: Does not bruise/bleed easily.   Gastrointestinal:  Positive for abdominal pain. Negative for bloating, nausea and vomiting.   Genitourinary: Negative.    Neurological:  Negative for excessive daytime sleepiness, dizziness, light-headedness, loss of balance and weakness.   Psychiatric/Behavioral:  The patient is not nervous/anxious.    Allergic/Immunologic: Negative.          Physical Exam  Wt Readings from Last 1 Encounters:   10/31/23 74.6 kg (164 lb 7.4 oz)     BP Readings from Last 3 Encounters:   10/31/23 132/76   07/21/23 126/71   07/14/23 124/84     Pulse Readings from Last 1 Encounters:   10/31/23 73     Body mass index is 35.59 kg/m².    Physical Exam  Vitals and nursing note reviewed.   Constitutional:       Appearance: Normal appearance.      Comments: BMI 33    HENT:      Head: Normocephalic and atraumatic.      Mouth/Throat:      Mouth: Mucous membranes are moist.   Eyes:      Pupils: Pupils are equal, round, and reactive to light.   Cardiovascular:      Rate and Rhythm: Normal rate and regular rhythm.      Pulses:           Radial pulses are 2+ on the right side and 2+ on the left side.        Dorsalis pedis pulses are 2+ on the right side and 2+ on the left side.        Posterior tibial pulses are 2+ on the right side and 2+ on the left side.      Heart sounds: No murmur heard.  Pulmonary:      Effort: Pulmonary effort is normal. No respiratory distress.      Breath sounds: Normal breath sounds.   Abdominal:      General: There is no distension.      Tenderness: There is no abdominal tenderness.   Musculoskeletal:      Cervical back: Normal range of motion.      Right lower leg: No edema.      Left lower leg: No edema.   Skin:     General: Skin is warm and dry.      Findings: No erythema.   Neurological:      General: No focal deficit present.      Mental Status: She is alert.   Psychiatric:         Mood and Affect: Mood normal.         Behavior: Behavior normal.                 Problem List Items Addressed This Visit          Cardiac/Vascular    HTN (hypertension)    Overview     Continue losartan, coreg  Well controlled in clinic today  Monitor         Current Assessment & Plan     As above          (HFpEF) heart failure with preserved ejection fraction - Primary    Overview     EF 63% with G2 DD  Continue lasix, appears euvolemic today  Continue good BP management with losartan, increase Coreg 6.25 mg BID  Fluid restriction, low sodium diet  Recommend Jardiance- patient had CKD 4 and this is a limiting factor- nephro eval pending         Current Assessment & Plan     As above         Relevant Orders    IN OFFICE EKG 12-LEAD (to Muse) (Completed)    X-Ray Chest PA And Lateral    CBC W/ AUTO DIFFERENTIAL    COMPREHENSIVE METABOLIC PANEL    B-TYPE NATRIURETIC PEPTIDE          Follow Up    Lab to follow RF, await nephrology appt  Has appt with Dr. Stone and encouraged to keep this    @Dori Roldan DNP

## 2023-12-22 ENCOUNTER — HOSPITAL ENCOUNTER (INPATIENT)
Facility: HOSPITAL | Age: 53
LOS: 3 days | Discharge: HOME | DRG: 291 | End: 2023-12-25
Attending: EMERGENCY MEDICINE | Admitting: EMERGENCY MEDICINE

## 2023-12-22 DIAGNOSIS — I50.9 ACUTE ON CHRONIC CONGESTIVE HEART FAILURE, UNSPECIFIED HEART FAILURE TYPE: Primary | ICD-10-CM

## 2023-12-22 DIAGNOSIS — R60.1 ANASARCA: ICD-10-CM

## 2023-12-22 DIAGNOSIS — R10.9 ABDOMINAL PAIN: ICD-10-CM

## 2023-12-22 DIAGNOSIS — R07.9 CHEST PAIN: ICD-10-CM

## 2023-12-22 DIAGNOSIS — I21.4 NSTEMI (NON-ST ELEVATED MYOCARDIAL INFARCTION): ICD-10-CM

## 2023-12-22 DIAGNOSIS — R06.02 SHORTNESS OF BREATH: ICD-10-CM

## 2023-12-22 PROBLEM — R82.81 PYURIA: Status: ACTIVE | Noted: 2023-12-22

## 2023-12-22 PROBLEM — I50.33 ACUTE ON CHRONIC HEART FAILURE WITH PRESERVED EJECTION FRACTION (HFPEF): Status: ACTIVE | Noted: 2023-12-22

## 2023-12-22 PROBLEM — R94.31 PROLONGED QT INTERVAL: Status: ACTIVE | Noted: 2023-12-22

## 2023-12-22 PROBLEM — R79.89 ELEVATED TROPONIN I LEVEL: Status: ACTIVE | Noted: 2023-12-22

## 2023-12-22 LAB
ALBUMIN SERPL BCP-MCNC: 2.9 G/DL (ref 3.5–5.2)
ALP SERPL-CCNC: 60 U/L (ref 55–135)
ALT SERPL W/O P-5'-P-CCNC: 20 U/L (ref 10–44)
ANION GAP SERPL CALC-SCNC: 13 MMOL/L (ref 8–16)
AST SERPL-CCNC: 24 U/L (ref 10–40)
BACTERIA #/AREA URNS AUTO: ABNORMAL /HPF
BASOPHILS # BLD AUTO: 0.06 K/UL (ref 0–0.2)
BASOPHILS NFR BLD: 1 % (ref 0–1.9)
BILIRUB SERPL-MCNC: 0.6 MG/DL (ref 0.1–1)
BILIRUB UR QL STRIP: NEGATIVE
BNP SERPL-MCNC: 3561 PG/ML (ref 0–99)
BUN SERPL-MCNC: 46 MG/DL (ref 6–20)
CALCIUM SERPL-MCNC: 8.4 MG/DL (ref 8.7–10.5)
CHLORIDE SERPL-SCNC: 104 MMOL/L (ref 95–110)
CLARITY UR REFRACT.AUTO: CLEAR
CO2 SERPL-SCNC: 25 MMOL/L (ref 23–29)
COLOR UR AUTO: YELLOW
CREAT SERPL-MCNC: 2.9 MG/DL (ref 0.5–1.4)
CREAT UR-MCNC: 55 MG/DL (ref 15–325)
DIFFERENTIAL METHOD: ABNORMAL
EOSINOPHIL # BLD AUTO: 0.1 K/UL (ref 0–0.5)
EOSINOPHIL NFR BLD: 1.5 % (ref 0–8)
ERYTHROCYTE [DISTWIDTH] IN BLOOD BY AUTOMATED COUNT: 18.6 % (ref 11.5–14.5)
EST. GFR  (NO RACE VARIABLE): 18.7 ML/MIN/1.73 M^2
GLUCOSE SERPL-MCNC: 102 MG/DL (ref 70–110)
GLUCOSE UR QL STRIP: NEGATIVE
HCT VFR BLD AUTO: 39.1 % (ref 37–48.5)
HGB BLD-MCNC: 11.7 G/DL (ref 12–16)
HGB UR QL STRIP: ABNORMAL
HYALINE CASTS UR QL AUTO: 0 /LPF
IMM GRANULOCYTES # BLD AUTO: 0.02 K/UL (ref 0–0.04)
IMM GRANULOCYTES NFR BLD AUTO: 0.3 % (ref 0–0.5)
KETONES UR QL STRIP: NEGATIVE
LEUKOCYTE ESTERASE UR QL STRIP: ABNORMAL
LIPASE SERPL-CCNC: 56 U/L (ref 4–60)
LYMPHOCYTES # BLD AUTO: 1.8 K/UL (ref 1–4.8)
LYMPHOCYTES NFR BLD: 28.6 % (ref 18–48)
MCH RBC QN AUTO: 24.4 PG (ref 27–31)
MCHC RBC AUTO-ENTMCNC: 29.9 G/DL (ref 32–36)
MCV RBC AUTO: 82 FL (ref 82–98)
MICROSCOPIC COMMENT: ABNORMAL
MONOCYTES # BLD AUTO: 0.4 K/UL (ref 0.3–1)
MONOCYTES NFR BLD: 7 % (ref 4–15)
NEUTROPHILS # BLD AUTO: 3.8 K/UL (ref 1.8–7.7)
NEUTROPHILS NFR BLD: 61.6 % (ref 38–73)
NITRITE UR QL STRIP: NEGATIVE
NRBC BLD-RTO: 0 /100 WBC
PH UR STRIP: 6 [PH] (ref 5–8)
PLATELET # BLD AUTO: 273 K/UL (ref 150–450)
PMV BLD AUTO: 11.5 FL (ref 9.2–12.9)
POTASSIUM SERPL-SCNC: 4.1 MMOL/L (ref 3.5–5.1)
PROT SERPL-MCNC: 6.4 G/DL (ref 6–8.4)
PROT UR QL STRIP: ABNORMAL
PROT UR-MCNC: 320 MG/DL (ref 0–15)
PROT/CREAT UR: 5.82 MG/G{CREAT} (ref 0–0.2)
RBC # BLD AUTO: 4.8 M/UL (ref 4–5.4)
RBC #/AREA URNS AUTO: 3 /HPF (ref 0–4)
SODIUM SERPL-SCNC: 142 MMOL/L (ref 136–145)
SP GR UR STRIP: 1.01 (ref 1–1.03)
SQUAMOUS #/AREA URNS AUTO: 1 /HPF
TROPONIN I SERPL DL<=0.01 NG/ML-MCNC: 0.36 NG/ML (ref 0–0.03)
URN SPEC COLLECT METH UR: ABNORMAL
WBC # BLD AUTO: 6.15 K/UL (ref 3.9–12.7)
WBC #/AREA URNS AUTO: 23 /HPF (ref 0–5)

## 2023-12-22 PROCEDURE — 93010 EKG 12-LEAD: ICD-10-PCS | Mod: ,,, | Performed by: INTERNAL MEDICINE

## 2023-12-22 PROCEDURE — 84156 ASSAY OF PROTEIN URINE: CPT | Performed by: EMERGENCY MEDICINE

## 2023-12-22 PROCEDURE — 80053 COMPREHEN METABOLIC PANEL: CPT | Performed by: EMERGENCY MEDICINE

## 2023-12-22 PROCEDURE — 87088 URINE BACTERIA CULTURE: CPT | Performed by: EMERGENCY MEDICINE

## 2023-12-22 PROCEDURE — 81001 URINALYSIS AUTO W/SCOPE: CPT | Performed by: EMERGENCY MEDICINE

## 2023-12-22 PROCEDURE — 87086 URINE CULTURE/COLONY COUNT: CPT | Performed by: EMERGENCY MEDICINE

## 2023-12-22 PROCEDURE — 96374 THER/PROPH/DIAG INJ IV PUSH: CPT

## 2023-12-22 PROCEDURE — 83880 ASSAY OF NATRIURETIC PEPTIDE: CPT | Performed by: EMERGENCY MEDICINE

## 2023-12-22 PROCEDURE — 87077 CULTURE AEROBIC IDENTIFY: CPT | Performed by: EMERGENCY MEDICINE

## 2023-12-22 PROCEDURE — 85025 COMPLETE CBC W/AUTO DIFF WBC: CPT | Performed by: EMERGENCY MEDICINE

## 2023-12-22 PROCEDURE — 83690 ASSAY OF LIPASE: CPT | Performed by: EMERGENCY MEDICINE

## 2023-12-22 PROCEDURE — 84484 ASSAY OF TROPONIN QUANT: CPT | Performed by: EMERGENCY MEDICINE

## 2023-12-22 PROCEDURE — 93010 ELECTROCARDIOGRAM REPORT: CPT | Mod: ,,, | Performed by: INTERNAL MEDICINE

## 2023-12-22 PROCEDURE — 99285 EMERGENCY DEPT VISIT HI MDM: CPT | Mod: 25

## 2023-12-22 PROCEDURE — 87186 SC STD MICRODIL/AGAR DIL: CPT | Performed by: EMERGENCY MEDICINE

## 2023-12-22 PROCEDURE — 20600001 HC STEP DOWN PRIVATE ROOM

## 2023-12-22 PROCEDURE — 93005 ELECTROCARDIOGRAM TRACING: CPT

## 2023-12-22 PROCEDURE — 63600175 PHARM REV CODE 636 W HCPCS: Performed by: EMERGENCY MEDICINE

## 2023-12-22 RX ORDER — ASPIRIN 81 MG/1
81 TABLET ORAL DAILY
Status: DISCONTINUED | OUTPATIENT
Start: 2023-12-23 | End: 2023-12-25 | Stop reason: HOSPADM

## 2023-12-22 RX ORDER — FUROSEMIDE 10 MG/ML
40 INJECTION INTRAMUSCULAR; INTRAVENOUS
Status: COMPLETED | OUTPATIENT
Start: 2023-12-22 | End: 2023-12-22

## 2023-12-22 RX ORDER — SODIUM BICARBONATE 650 MG/1
650 TABLET ORAL 2 TIMES DAILY
Status: DISCONTINUED | OUTPATIENT
Start: 2023-12-23 | End: 2023-12-25 | Stop reason: HOSPADM

## 2023-12-22 RX ORDER — FUROSEMIDE 10 MG/ML
80 INJECTION INTRAMUSCULAR; INTRAVENOUS
Status: COMPLETED | OUTPATIENT
Start: 2023-12-22 | End: 2023-12-22

## 2023-12-22 RX ORDER — FUROSEMIDE 40 MG/1
1 TABLET ORAL DAILY
Status: ON HOLD | COMMUNITY
Start: 2023-11-26 | End: 2023-12-23 | Stop reason: CLARIF

## 2023-12-22 RX ORDER — PROCHLORPERAZINE EDISYLATE 5 MG/ML
5 INJECTION INTRAMUSCULAR; INTRAVENOUS EVERY 6 HOURS PRN
Status: DISCONTINUED | OUTPATIENT
Start: 2023-12-22 | End: 2023-12-25 | Stop reason: HOSPADM

## 2023-12-22 RX ORDER — ATORVASTATIN CALCIUM 40 MG/1
1 TABLET, FILM COATED ORAL DAILY
Status: ON HOLD | COMMUNITY
Start: 2023-09-14

## 2023-12-22 RX ORDER — HEPARIN SODIUM 5000 [USP'U]/ML
5000 INJECTION, SOLUTION INTRAVENOUS; SUBCUTANEOUS EVERY 8 HOURS
Status: DISCONTINUED | OUTPATIENT
Start: 2023-12-23 | End: 2023-12-25 | Stop reason: HOSPADM

## 2023-12-22 RX ORDER — ASPIRIN 81 MG/1
1 TABLET ORAL DAILY
Status: ON HOLD | COMMUNITY
Start: 2023-11-14

## 2023-12-22 RX ORDER — ATORVASTATIN CALCIUM 40 MG/1
40 TABLET, FILM COATED ORAL DAILY
Status: DISCONTINUED | OUTPATIENT
Start: 2023-12-23 | End: 2023-12-25 | Stop reason: HOSPADM

## 2023-12-22 RX ORDER — AMOXICILLIN 250 MG
1 CAPSULE ORAL DAILY PRN
Status: DISCONTINUED | OUTPATIENT
Start: 2023-12-22 | End: 2023-12-25 | Stop reason: HOSPADM

## 2023-12-22 RX ORDER — TALC
6 POWDER (GRAM) TOPICAL NIGHTLY PRN
Status: DISCONTINUED | OUTPATIENT
Start: 2023-12-22 | End: 2023-12-25 | Stop reason: HOSPADM

## 2023-12-22 RX ORDER — SODIUM CHLORIDE 0.9 % (FLUSH) 0.9 %
10 SYRINGE (ML) INJECTION EVERY 6 HOURS PRN
Status: DISCONTINUED | OUTPATIENT
Start: 2023-12-22 | End: 2023-12-25 | Stop reason: HOSPADM

## 2023-12-22 RX ORDER — NALOXONE HCL 0.4 MG/ML
0.02 VIAL (ML) INJECTION
Status: DISCONTINUED | OUTPATIENT
Start: 2023-12-22 | End: 2023-12-25 | Stop reason: HOSPADM

## 2023-12-22 RX ORDER — CARVEDILOL 6.25 MG/1
6.25 TABLET ORAL 2 TIMES DAILY WITH MEALS
Status: DISCONTINUED | OUTPATIENT
Start: 2023-12-23 | End: 2023-12-24

## 2023-12-22 RX ORDER — ACETAMINOPHEN 325 MG/1
650 TABLET ORAL EVERY 4 HOURS PRN
Status: DISCONTINUED | OUTPATIENT
Start: 2023-12-22 | End: 2023-12-25 | Stop reason: HOSPADM

## 2023-12-22 RX ORDER — CETIRIZINE HYDROCHLORIDE 10 MG/1
10 TABLET ORAL DAILY PRN
Status: DISCONTINUED | OUTPATIENT
Start: 2023-12-22 | End: 2023-12-25 | Stop reason: HOSPADM

## 2023-12-22 RX ORDER — SEVELAMER CARBONATE 800 MG/1
800 TABLET, FILM COATED ORAL
Status: DISCONTINUED | OUTPATIENT
Start: 2023-12-23 | End: 2023-12-25 | Stop reason: HOSPADM

## 2023-12-22 RX ORDER — POLYETHYLENE GLYCOL 3350 17 G/17G
17 POWDER, FOR SOLUTION ORAL 2 TIMES DAILY PRN
Status: DISCONTINUED | OUTPATIENT
Start: 2023-12-22 | End: 2023-12-25 | Stop reason: HOSPADM

## 2023-12-22 RX ADMIN — FUROSEMIDE 80 MG: 10 INJECTION, SOLUTION INTRAVENOUS at 07:12

## 2023-12-22 RX ADMIN — FUROSEMIDE 40 MG: 10 INJECTION, SOLUTION INTRAVENOUS at 06:12

## 2023-12-22 NOTE — ED NOTES
Bed: [facility]  Expected date: 12/20/23  Expected time: 8:55 PM  Means of arrival:   Comments:

## 2023-12-22 NOTE — ED PROVIDER NOTES
Encounter Date: 2023       History     Chief Complaint   Patient presents with    Shortness of Breath     HPI  53-year-old female, French-speaking so  used, who presents with multiple complaints.  Past medical history of stage IV CKD, hypertension, HFpEF, mitral valve regurgitation.  She reports that she has been short of breath since November.  She reports that she was short of breath with lying flat so can not sleep well at night.  She also reports bilateral lower extremity swelling since November.  She reports around Thanksgiving she missed a single dose of her diuretic.  He has not missed any other doses but has had swelling since then.  Has been urinating a normal amount with no change in her urination.  She denies any cough, hemoptysis, chest pain, pain with inspiration, recent travel or surgery, active cancer.  No fevers or chills.  She has been feeling globally weak as well.  She reports that today she was standing during dishes when she developed some epigastric pain.  This caused her to feel more weak.  No provoking or palliating factors.  Not worse with food.  No radiation to chest or back or flank.  No nausea or vomiting.  She has been having constipation but no black or bloody stools in his passing gas.  Has never had pain like this before.  Pain is improved but still mild.      Review of patient's allergies indicates:  No Known Allergies  Past Medical History:   Diagnosis Date    CKD (chronic kidney disease)     HTN (hypertension)     Ovarian cyst      History reviewed. No pertinent surgical history.  History reviewed. No pertinent family history.  Social History     Tobacco Use    Smoking status: Former     Current packs/day: 0.00     Types: Cigarettes     Start date:      Quit date:      Years since quittin.9    Smokeless tobacco: Never    Tobacco comments:     2-3 cigarettes a day   Substance Use Topics    Alcohol use: Never    Drug use: Never     Review of  Systems    Physical Exam     Initial Vitals [12/22/23 1514]   BP Pulse Resp Temp SpO2   (!) 137/91 72 16 98.4 °F (36.9 °C) 95 %      MAP       --         Physical Exam    Nursing note and vitals reviewed.  Constitutional: She appears well-developed and well-nourished. No distress.   HENT:   Head: Normocephalic and atraumatic.   Nose: Nose normal.   Eyes: Conjunctivae and EOM are normal. Pupils are equal, round, and reactive to light.   Neck:   Normal range of motion.  Cardiovascular:  Normal rate, regular rhythm, normal heart sounds and intact distal pulses.     Exam reveals no gallop and no friction rub.       No murmur heard.  Pulmonary/Chest: Breath sounds normal. No respiratory distress. She has no wheezes. She has no rhonchi. She has no rales.   Abdominal: Abdomen is soft. She exhibits no distension and no mass.   Mild epigastric and right upper quadrant tenderness   Musculoskeletal:         General: Edema present. No tenderness. Normal range of motion.      Cervical back: Normal range of motion.      Comments: 2+ pitting edema to bilateral shin     Neurological: She is alert and oriented to person, place, and time. She has normal strength. No sensory deficit.   Skin: Skin is warm and dry.   Psychiatric: She has a normal mood and affect.         ED Course   Procedures  Labs Reviewed   B-TYPE NATRIURETIC PEPTIDE - Abnormal; Notable for the following components:       Result Value    BNP 3,561 (*)     All other components within normal limits   CBC W/ AUTO DIFFERENTIAL - Abnormal; Notable for the following components:    Hemoglobin 11.7 (*)     MCH 24.4 (*)     MCHC 29.9 (*)     RDW 18.6 (*)     All other components within normal limits   COMPREHENSIVE METABOLIC PANEL - Abnormal; Notable for the following components:    BUN 46 (*)     Creatinine 2.9 (*)     Calcium 8.4 (*)     Albumin 2.9 (*)     eGFR 18.7 (*)     All other components within normal limits   TROPONIN I - Abnormal; Notable for the following  components:    Troponin I 0.361 (*)     All other components within normal limits   URINALYSIS, REFLEX TO URINE CULTURE - Abnormal; Notable for the following components:    Protein, UA 3+ (*)     Occult Blood UA 1+ (*)     Leukocytes, UA 2+ (*)     All other components within normal limits    Narrative:     Specimen Source->Urine   URINALYSIS MICROSCOPIC - Abnormal; Notable for the following components:    WBC, UA 23 (*)     All other components within normal limits    Narrative:     Specimen Source->Urine   CULTURE, URINE   LIPASE          Imaging Results              X-Ray Chest PA And Lateral (Final result)  Result time 12/22/23 18:21:28      Final result by Magan Byrd MD (12/22/23 18:21:28)                   Impression:      Findings suggesting pulmonary edema/CHF pattern to a lesser degree from prior, with suspected trace residual pleural effusions bilaterally and overall improved aeration on the left.  Left basilar atelectasis/infiltrate and interstitial type pneumonia not excluded.      Electronically signed by: Magan Byrd MD  Date:    12/22/2023  Time:    18:21               Narrative:    EXAMINATION:  XR CHEST PA AND LATERAL    CLINICAL HISTORY:  Shortness of breath    TECHNIQUE:  PA and lateral views of the chest were performed.    COMPARISON:  Chest radiograph 06/24/2023, CT renal stone study 07/05/2022    FINDINGS:  Patient is rotated.  Resolution is limited by body habitus with underpenetration.    Cardiomediastinal silhouette is midline and prominent similar to prior.  Pulmonary vasculature and hilar contours are unchanged.  Similar slight nonspecific elevation of the right hemidiaphragm.  Bilateral diffuse nonspecific interstitial coarsening with a perihilar and basilar distribution improved from prior but not yet resolved.  Suspected trace bilateral pleural effusions noting improved aeration on the left.  No pneumothorax.  Scattered bandlike opacities throughout the bilateral mid to lower  lung zones consistent with platelike scarring versus atelectasis.  No acute osseous process seen.  Right upper quadrant surgical clips again noted.                                       Medications   furosemide injection 40 mg (40 mg Intravenous Given 12/22/23 1808)     Medical Decision Making  53-year-old female who presents with multiple complaints including shortness of breath and bilateral lower extremity swelling and diffuse weakness for about a month.  Did miss was single dose of her furosemide.  Does look like she has a history of heart failure with preserved EF.  On exam, she is not in any respiratory distress in his not hypoxic.  Does have 2+ pitting edema to bilateral shins and reported orthopnea.  I suspect that this is all secondary to a CHF exacerbation.  EKG without STEMI.  We will get labs including CBC, CMP, troponin, BNP, chest x-ray and likely give an IV dose of Lasix here.  As far as her abdominal pain, sounds more epigastric and could be related to GERD versus peptic ulcer disease versus pancreatitis versus cholecystitis/cholelithiasis.  Low suspicion for dissection.  We will definitely need to rule out atypical ACS the lower suspicion.  Ultrasound, lipase, further labs ordered.  Disposition pending.      BNP and troponin elevated from baseline.  Chest x-ray with edema.  We will give IV Lasix here.  We will give 40 mg because disease.  She does not have a gallbladder and her tenderness is mild with normal LFTs and lipase so I do not think she needs any imaging of her abdomen.  She will be admitted for NSTEMI and CHF exacerbation.    Amount and/or Complexity of Data Reviewed  Labs: ordered.  Radiology: ordered.  ECG/medicine tests: ordered and independent interpretation performed.     Details: Sinus, regular, rate 70, nonspecific T-wave changes diffusely but no STEMI    Risk  Prescription drug management.  Decision regarding hospitalization.                                      Clinical  Impression:  Final diagnoses:  [R06.02] Shortness of breath  [R10.9] Abdominal pain  [I50.9] Acute on chronic congestive heart failure, unspecified heart failure type (Primary)  [I21.4] NSTEMI (non-ST elevated myocardial infarction)                 Jerica Lentz MD  12/22/23 7580    Critical care time spent on the evaluation and treatment of severe organ dysfunction, review of pertinent labs and imaging studies, discussions with consulting providers and discussions with patient/family: 35 minutes.         Jerica Lentz MD  01/18/24 [id number]

## 2023-12-22 NOTE — ED TRIAGE NOTES
Patient comes into the emergency department by POV with complaints of SOB. Patient states that she has been feeling weak since yesterday. Today she was washing dishes and started to have abd pain. Patient denies CP and fevers.

## 2023-12-23 LAB
ALBUMIN SERPL BCP-MCNC: 2.9 G/DL (ref 3.5–5.2)
ANION GAP SERPL CALC-SCNC: 14 MMOL/L (ref 8–16)
ANION GAP SERPL CALC-SCNC: 15 MMOL/L (ref 8–16)
BUN SERPL-MCNC: 46 MG/DL (ref 6–20)
BUN SERPL-MCNC: 54 MG/DL (ref 6–20)
CALCIUM SERPL-MCNC: 8.6 MG/DL (ref 8.7–10.5)
CALCIUM SERPL-MCNC: 8.8 MG/DL (ref 8.7–10.5)
CHLORIDE SERPL-SCNC: 101 MMOL/L (ref 95–110)
CHLORIDE SERPL-SCNC: 98 MMOL/L (ref 95–110)
CK SERPL-CCNC: 76 U/L (ref 20–180)
CO2 SERPL-SCNC: 27 MMOL/L (ref 23–29)
CO2 SERPL-SCNC: 28 MMOL/L (ref 23–29)
CREAT SERPL-MCNC: 2.8 MG/DL (ref 0.5–1.4)
CREAT SERPL-MCNC: 2.8 MG/DL (ref 0.5–1.4)
EST. GFR  (NO RACE VARIABLE): 19.5 ML/MIN/1.73 M^2
EST. GFR  (NO RACE VARIABLE): 19.5 ML/MIN/1.73 M^2
GLUCOSE SERPL-MCNC: 141 MG/DL (ref 70–110)
GLUCOSE SERPL-MCNC: 93 MG/DL (ref 70–110)
MAGNESIUM SERPL-MCNC: 1.9 MG/DL (ref 1.6–2.6)
PHOSPHATE SERPL-MCNC: 4.5 MG/DL (ref 2.7–4.5)
POTASSIUM SERPL-SCNC: 3.9 MMOL/L (ref 3.5–5.1)
POTASSIUM SERPL-SCNC: 4 MMOL/L (ref 3.5–5.1)
PTH-INTACT SERPL-MCNC: 828.1 PG/ML (ref 9–77)
SODIUM SERPL-SCNC: 140 MMOL/L (ref 136–145)
SODIUM SERPL-SCNC: 143 MMOL/L (ref 136–145)
TROPONIN I SERPL DL<=0.01 NG/ML-MCNC: 0.27 NG/ML (ref 0–0.03)

## 2023-12-23 PROCEDURE — 25000003 PHARM REV CODE 250: Performed by: HOSPITALIST

## 2023-12-23 PROCEDURE — 83735 ASSAY OF MAGNESIUM: CPT | Performed by: HOSPITALIST

## 2023-12-23 PROCEDURE — 63600150 PHARM REV CODE 636: Performed by: HOSPITALIST

## 2023-12-23 PROCEDURE — 84484 ASSAY OF TROPONIN QUANT: CPT | Performed by: HOSPITALIST

## 2023-12-23 PROCEDURE — 36415 COLL VENOUS BLD VENIPUNCTURE: CPT | Performed by: HOSPITALIST

## 2023-12-23 PROCEDURE — 36415 COLL VENOUS BLD VENIPUNCTURE: CPT | Performed by: STUDENT IN AN ORGANIZED HEALTH CARE EDUCATION/TRAINING PROGRAM

## 2023-12-23 PROCEDURE — 82550 ASSAY OF CK (CPK): CPT | Performed by: HOSPITALIST

## 2023-12-23 PROCEDURE — 25000003 PHARM REV CODE 250: Performed by: STUDENT IN AN ORGANIZED HEALTH CARE EDUCATION/TRAINING PROGRAM

## 2023-12-23 PROCEDURE — 83970 ASSAY OF PARATHORMONE: CPT | Performed by: HOSPITALIST

## 2023-12-23 PROCEDURE — 63600175 PHARM REV CODE 636 W HCPCS: Performed by: HOSPITALIST

## 2023-12-23 PROCEDURE — 63600175 PHARM REV CODE 636 W HCPCS: Performed by: STUDENT IN AN ORGANIZED HEALTH CARE EDUCATION/TRAINING PROGRAM

## 2023-12-23 PROCEDURE — 80048 BASIC METABOLIC PNL TOTAL CA: CPT | Performed by: STUDENT IN AN ORGANIZED HEALTH CARE EDUCATION/TRAINING PROGRAM

## 2023-12-23 PROCEDURE — 20600001 HC STEP DOWN PRIVATE ROOM

## 2023-12-23 PROCEDURE — 80069 RENAL FUNCTION PANEL: CPT | Performed by: HOSPITALIST

## 2023-12-23 RX ORDER — METOLAZONE 5 MG/1
5 TABLET ORAL ONCE
Status: COMPLETED | OUTPATIENT
Start: 2023-12-23 | End: 2023-12-23

## 2023-12-23 RX ORDER — MAGNESIUM SULFATE HEPTAHYDRATE 40 MG/ML
2 INJECTION, SOLUTION INTRAVENOUS ONCE
Status: COMPLETED | OUTPATIENT
Start: 2023-12-23 | End: 2023-12-23

## 2023-12-23 RX ORDER — FUROSEMIDE 10 MG/ML
100 INJECTION INTRAMUSCULAR; INTRAVENOUS 2 TIMES DAILY
Status: DISCONTINUED | OUTPATIENT
Start: 2023-12-23 | End: 2023-12-24

## 2023-12-23 RX ORDER — AMOXICILLIN 250 MG
1 CAPSULE ORAL 2 TIMES DAILY
Status: DISCONTINUED | OUTPATIENT
Start: 2023-12-23 | End: 2023-12-25 | Stop reason: HOSPADM

## 2023-12-23 RX ORDER — ASPIRIN 325 MG
325 TABLET, DELAYED RELEASE (ENTERIC COATED) ORAL DAILY PRN
Status: ON HOLD | COMMUNITY
End: 2023-12-25 | Stop reason: HOSPADM

## 2023-12-23 RX ORDER — POTASSIUM CHLORIDE 750 MG/1
30 CAPSULE, EXTENDED RELEASE ORAL ONCE
Status: COMPLETED | OUTPATIENT
Start: 2023-12-23 | End: 2023-12-23

## 2023-12-23 RX ADMIN — MAGNESIUM SULFATE HEPTAHYDRATE 2 G: 40 INJECTION, SOLUTION INTRAVENOUS at 09:12

## 2023-12-23 RX ADMIN — METOLAZONE 5 MG: 5 TABLET ORAL at 12:12

## 2023-12-23 RX ADMIN — SODIUM BICARBONATE 650 MG TABLET 650 MG: at 09:12

## 2023-12-23 RX ADMIN — FUROSEMIDE 100 MG: 10 INJECTION, SOLUTION INTRAMUSCULAR; INTRAVENOUS at 01:12

## 2023-12-23 RX ADMIN — SODIUM CHLORIDE 200 MG: 9 INJECTION, SOLUTION INTRAVENOUS at 12:12

## 2023-12-23 RX ADMIN — ATORVASTATIN CALCIUM 40 MG: 40 TABLET, FILM COATED ORAL at 09:12

## 2023-12-23 RX ADMIN — SENNOSIDES AND DOCUSATE SODIUM 1 TABLET: 8.6; 5 TABLET ORAL at 01:12

## 2023-12-23 RX ADMIN — SEVELAMER CARBONATE 800 MG: 800 TABLET, FILM COATED ORAL at 09:12

## 2023-12-23 RX ADMIN — HEPARIN SODIUM 5000 UNITS: 5000 INJECTION INTRAVENOUS; SUBCUTANEOUS at 05:12

## 2023-12-23 RX ADMIN — SEVELAMER CARBONATE 800 MG: 800 TABLET, FILM COATED ORAL at 04:12

## 2023-12-23 RX ADMIN — ASPIRIN 81 MG: 81 TABLET, COATED ORAL at 09:12

## 2023-12-23 RX ADMIN — POTASSIUM CHLORIDE 30 MEQ: 10 CAPSULE, COATED, EXTENDED RELEASE ORAL at 09:12

## 2023-12-23 RX ADMIN — FUROSEMIDE 100 MG: 10 INJECTION, SOLUTION INTRAMUSCULAR; INTRAVENOUS at 09:12

## 2023-12-23 RX ADMIN — HEPARIN SODIUM 5000 UNITS: 5000 INJECTION INTRAVENOUS; SUBCUTANEOUS at 01:12

## 2023-12-23 RX ADMIN — HEPARIN SODIUM 5000 UNITS: 5000 INJECTION INTRAVENOUS; SUBCUTANEOUS at 09:12

## 2023-12-23 RX ADMIN — CARVEDILOL 6.25 MG: 6.25 TABLET, FILM COATED ORAL at 04:12

## 2023-12-23 RX ADMIN — SEVELAMER CARBONATE 800 MG: 800 TABLET, FILM COATED ORAL at 01:12

## 2023-12-23 RX ADMIN — CARVEDILOL 6.25 MG: 6.25 TABLET, FILM COATED ORAL at 09:12

## 2023-12-23 NOTE — HOSPITAL COURSE
Patient admitted to \[facility]\"" medicine  for management of acute heart failure.  Initiated on IV diuresis.  Urine culture with E coli.  Initiated on Rocephin which was deescalated to ciprofloxacin based on sensitivities.  Shortness of breath and volume status with improvement on IV diuresis.  Recent echo on 06/25/2023 with EF 63%, grade 2 diastolic dysfunction, severe mitral regurgitation.  Creatinine up trending however still within her baseline range 3-3.4.  IV diuresis transition to p.o. upon discharge. Patient without further hospitalization needs.  She will need close follow-up with Nephrology to continue to monitor her renal function and for consideration of initiation of Ace or Arb.  She will also need close follow-up with Cardiology to address her mitral valve regurgitation.  Patient medically stable for discharge.  Return precautions advised; patient's questions answered.  Patient in agreement with discharge plan.   services were used for all encounters.    Vitals:    12/25/23 0730 12/25/23 0800 12/25/23 1101 12/25/23 1138   BP: (!) 124/91   124/80   BP Location: Right arm   Left arm   Patient Position: Sitting   Sitting   Pulse: 67  79 70   Resp: 18   20   Temp: 98.2 °F (36.8 °C)   98 °F (36.7 °C)   TempSrc: Oral Oral  Oral   SpO2: (!) 94%   (!) 93%   Weight:       Height:         Physical Exam  Vitals and nursing note reviewed.   Constitutional:       General: She is not in acute distress.     Appearance: She is obese. She is not toxic-appearing or diaphoretic.   HENT:      Head: Normocephalic and atraumatic.      Nose: Nose normal.      Mouth/Throat:      Pharynx: Oropharynx is clear.   Eyes:      General: No scleral icterus.        Right eye: No discharge.         Left eye: No discharge.   Cardiovascular:      Rate and Rhythm: Normal rate and regular rhythm.      Heart sounds: Murmur (3/6 - radiates to back) heard.      Comments: Edema improved  Pulmonary:      Effort: Pulmonary effort is  normal. No respiratory distress.      Breath sounds: No wheezing. CTAB.  Abdominal:      General: Abdomen is protuberant. There is no distension.      Palpations: Abdomen is soft.   Skin:     General: Skin is warm and dry.      Capillary Refill: Capillary refill takes less than 2 seconds.   Neurological:      Mental Status: She is alert and oriented to person, place, and time.   Psychiatric:         Behavior: Behavior normal.

## 2023-12-23 NOTE — ASSESSMENT & PLAN NOTE
Creatine stable for now. BMP reviewed- noted Estimated Creatinine Clearance: 22.3 mL/min (A) (based on SCr of 2.8 mg/dL (H)). according to latest data. Based on current GFR, CKD stage is stage 4 - GFR 15-29.  Monitor UOP and serial BMP and adjust therapy as needed. Renally dose meds. Avoid nephrotoxic medications and procedures.    -continue oral sevelamer and oral bicarbonate  -PC ratio 5.82  -monitor renal function closely in setting of diuresis

## 2023-12-23 NOTE — ASSESSMENT & PLAN NOTE
Patient is identified as having Diastolic (HFpEF) heart failure that is Acute on chronic. CHF is currently uncontrolled due to Continued edema of extremities, Rales/crackles on pulmonary exam, and Pulmonary edema/pleural effusion on CXR. . Continue Beta Blocker and Furosemide and monitor clinical status closely. Monitor on telemetry. Patient is off CHF pathway.  Monitor strict Is&Os and daily weights.  Place on fluid restriction of 1.5 L. Cardiology has not been consulted. Continue to stress to patient importance of self efficacy and  on diet for CHF. Last BNP reviewed- and noted below   Recent Labs   Lab 12/22/23  1704   BNP 3,561*       -Obtain Repeat ECHO to evaluate for worsening valvular dysfunction  -continue diuresis with IV Lasix 100 mg daily

## 2023-12-23 NOTE — PROGRESS NOTES
[facility] - Cardiology [facility] Medicine  Progress Note    Patient Name: Xena Vera  MRN: [id number]  Patient Class: IP- Inpatient   Admission Date: 12/22/2023  Length of Stay: 1 days  Attending Physician: Minda Molnia MD  Primary Care Provider: Tami Villeda FNP        Subjective:     Principal Problem:Acute on chronic heart failure with preserved ejection fraction (HFpEF)        HPI:  [facility]  USED - [facility] #[id number]    54 y/o  Woman, with CKD Stage 4, HFpEF, Mitral Valve Regurgitation presents to the ED with shortness of breath.  She reports a subacute history of dyspnea, since thanksgiving time approx 4 weeks ago.  She has new symptoms of orthopnea, increased peripheral edema.  She denies chest pain, pleuritic pain, no cough.   Reports adherence to home medications, missed one dose of oral furosemide around thanksgiving.   She has been trying to exercise, walking in a park, but has been limited due to developing dyspnea on exertion.     Symptoms worse today - she was in kitchen cutting meat, and this activity caused dyspnea.      Today while at home has felt more weak and had the onset of epigastric abdominal pain while washing dishes, pain persists described as mild.   She denies any dysuria/urinary frequency.  She noted some urinary urgency a few weeks ago but has been drinking cranberry juice and symptoms improved.  She denies any recent hospitalizations    Former smoker @ age 19 x 1year, no active etoh or drug use.  She is not employed, lives with her 2 daughters.   She receives outpatient care @ [facility]      Overview/Hospital Course:  Patient admitted to hospital medicine C for management of acute heart failure.  Initiated on IV diuresis.    Interval History:  Patient seen and examined at bedside.    No acute events overnight.  Patient has no acute complaints this morning.  Reports that her shortness of breath is improving; swelling a little bit  better.  Notes adherence with low-salt diet.  Also reports increasing her Lasix at home to b.i.d. with resolution of symptoms.  Denies any dysuria.  Follows with cardiology outpatient.  Family present at bedside      services were utilized during this encounter.      Review of Systems   Constitutional:  Positive for activity change. Negative for diaphoresis and fever.   HENT:  Negative for congestion, sore throat and trouble swallowing.    Eyes:  Negative for visual disturbance.   Respiratory:  Positive for shortness of breath. Negative for cough and wheezing.    Cardiovascular:  Positive for leg swelling. Negative for chest pain and palpitations.   Gastrointestinal:  Positive for constipation. Negative for abdominal pain, diarrhea, nausea and vomiting.   Genitourinary:  Negative for dysuria and urgency.   Musculoskeletal:  Negative for arthralgias and myalgias.   Skin:  Negative for rash.   Neurological:  Positive for weakness. Negative for dizziness, light-headedness and headaches.   Psychiatric/Behavioral:  Negative for confusion and decreased concentration.        Objective:    Temp: 98.7 °F (37.1 °C) (12/23/23 0804)  Pulse: 70 (12/23/23 0804)  Resp: 20 (12/23/23 0804)  BP: (!) 137/94 (12/23/23 0804)  SpO2: (!) 93 % (12/23/23 0804)    Weight: 75.3 kg (166 lb 0.1 oz) (12/22/23 2230)    Body mass index is 29.41 kg/m².      Intake/Output Summary (Last 24 hours) at 12/23/2023 1051  Last data filed at 12/23/2023 0513  Gross per 24 hour   Intake 60 ml   Output 1050 ml   Net -990 ml       Physical Exam  Vitals and nursing note reviewed.   Constitutional:       General: She is not in acute distress.     Appearance: She is obese. She is not toxic-appearing or diaphoretic.   HENT:      Head: Normocephalic and atraumatic.      Nose: Nose normal.      Mouth/Throat:      Pharynx: Oropharynx is clear.   Eyes:      General: No scleral icterus.        Right eye: No discharge.         Left eye: No discharge.    Cardiovascular:      Rate and Rhythm: Normal rate and regular rhythm.      Heart sounds: Murmur (3/6 - radiates to back) heard.      Comments: Edema to upper shin  Pulmonary:      Effort: Pulmonary effort is normal. No respiratory distress.      Breath sounds: No wheezing.      Comments: Decreased basilar breathsounds  Abdominal:      General: Abdomen is protuberant. There is no distension.      Palpations: Abdomen is soft.   Musculoskeletal:      Cervical back: Normal range of motion and neck supple.      Right lower leg: 3+ Pitting Edema present.      Left lower leg: 3+ Pitting Edema present.   Skin:     General: Skin is warm and dry.      Capillary Refill: Capillary refill takes less than 2 seconds.   Neurological:      Mental Status: She is alert and oriented to person, place, and time.   Psychiatric:         Behavior: Behavior normal.         Significant Labs: All pertinent labs within the past 24 hours have been reviewed.    Recent Results (from the past 24 hour(s))   Brain natriuretic peptide    Collection Time: 12/22/23  5:04 PM   Result Value Ref Range    BNP 3,561 (H) 0 - 99 pg/mL   CBC auto differential    Collection Time: 12/22/23  5:04 PM   Result Value Ref Range    WBC 6.15 3.90 - 12.70 K/uL    RBC 4.80 4.00 - 5.40 M/uL    Hemoglobin 11.7 (L) 12.0 - 16.0 g/dL    Hematocrit 39.1 37.0 - 48.5 %    MCV 82 82 - 98 fL    MCH 24.4 (L) 27.0 - 31.0 pg    MCHC 29.9 (L) 32.0 - 36.0 g/dL    RDW 18.6 (H) 11.5 - 14.5 %    Platelets 273 150 - 450 K/uL    MPV 11.5 9.2 - 12.9 fL    Immature Granulocytes 0.3 0.0 - 0.5 %    Gran # (ANC) 3.8 1.8 - 7.7 K/uL    Immature Grans (Abs) 0.02 0.00 - 0.04 K/uL    Lymph # 1.8 1.0 - 4.8 K/uL    Mono # 0.4 0.3 - 1.0 K/uL    Eos # 0.1 0.0 - 0.5 K/uL    Baso # 0.06 0.00 - 0.20 K/uL    nRBC 0 0 /100 WBC    Gran % 61.6 38.0 - 73.0 %    Lymph % 28.6 18.0 - 48.0 %    Mono % 7.0 4.0 - 15.0 %    Eosinophil % 1.5 0.0 - 8.0 %    Basophil % 1.0 0.0 - 1.9 %    Differential Method Automated     Comprehensive metabolic panel    Collection Time: 12/22/23  5:04 PM   Result Value Ref Range    Sodium 142 136 - 145 mmol/L    Potassium 4.1 3.5 - 5.1 mmol/L    Chloride 104 95 - 110 mmol/L    CO2 25 23 - 29 mmol/L    Glucose 102 70 - 110 mg/dL    BUN 46 (H) 6 - 20 mg/dL    Creatinine 2.9 (H) 0.5 - 1.4 mg/dL    Calcium 8.4 (L) 8.7 - 10.5 mg/dL    Total Protein 6.4 6.0 - 8.4 g/dL    Albumin 2.9 (L) 3.5 - 5.2 g/dL    Total Bilirubin 0.6 0.1 - 1.0 mg/dL    Alkaline Phosphatase 60 55 - 135 U/L    AST 24 10 - 40 U/L    ALT 20 10 - 44 U/L    eGFR 18.7 (A) >60 mL/min/1.73 m^2    Anion Gap 13 8 - 16 mmol/L   Troponin I    Collection Time: 12/22/23  5:04 PM   Result Value Ref Range    Troponin I 0.361 (H) 0.000 - 0.026 ng/mL   Lipase    Collection Time: 12/22/23  5:04 PM   Result Value Ref Range    Lipase 56 4 - 60 U/L   Urinalysis, Reflex to Urine Culture Urine, Clean Catch    Collection Time: 12/22/23  6:16 PM    Specimen: Urine   Result Value Ref Range    Specimen UA Urine, Clean Catch     Color, UA Yellow Yellow, Straw, Karla    Appearance, UA Clear Clear    pH, UA 6.0 5.0 - 8.0    Specific Gravity, UA 1.015 1.005 - 1.030    Protein, UA 3+ (A) Negative    Glucose, UA Negative Negative    Ketones, UA Negative Negative    Bilirubin (UA) Negative Negative    Occult Blood UA 1+ (A) Negative    Nitrite, UA Negative Negative    Leukocytes, UA 2+ (A) Negative   Urinalysis Microscopic    Collection Time: 12/22/23  6:16 PM   Result Value Ref Range    RBC, UA 3 0 - 4 /hpf    WBC, UA 23 (H) 0 - 5 /hpf    Bacteria Occasional None-Occ /hpf    Squam Epithel, UA 1 /hpf    Hyaline Casts, UA 0 0-1/lpf /lpf    Microscopic Comment SEE COMMENT    Protein/Creatinine Ratio, Urine    Collection Time: 12/22/23  6:16 PM   Result Value Ref Range    Protein, Urine Random 320 (H) 0 - 15 mg/dL    Creatinine, Urine 55.0 15.0 - 325.0 mg/dL    Prot/Creat Ratio, Urine 5.82 (H) 0.00 - 0.20   Magnesium    Collection Time: 12/23/23  6:09 AM   Result  Value Ref Range    Magnesium 1.9 1.6 - 2.6 mg/dL   CK    Collection Time: 12/23/23  6:09 AM   Result Value Ref Range    CPK 76 20 - 180 U/L   Troponin I    Collection Time: 12/23/23  6:09 AM   Result Value Ref Range    Troponin I 0.270 (H) 0.000 - 0.026 ng/mL   Renal Function Panel    Collection Time: 12/23/23  6:09 AM   Result Value Ref Range    Glucose 93 70 - 110 mg/dL    Sodium 143 136 - 145 mmol/L    Potassium 3.9 3.5 - 5.1 mmol/L    Chloride 101 95 - 110 mmol/L    CO2 27 23 - 29 mmol/L    BUN 46 (H) 6 - 20 mg/dL    Calcium 8.8 8.7 - 10.5 mg/dL    Creatinine 2.8 (H) 0.5 - 1.4 mg/dL    Albumin 2.9 (L) 3.5 - 5.2 g/dL    Phosphorus 4.5 2.7 - 4.5 mg/dL    eGFR 19.5 (A) >60 mL/min/1.73 m^2    Anion Gap 15 8 - 16 mmol/L   PTH, Intact    Collection Time: 12/23/23  6:09 AM   Result Value Ref Range    PTH, Intact 828.1 (H) 9.0 - 77.0 pg/mL       Significant Imaging: I have reviewed all pertinent imaging results/findings within the past 24 hours.    Imaging Results              X-Ray Chest PA And Lateral (Final result)  Result time 12/22/23 18:21:28      Final result by Magan Byrd MD (12/22/23 18:21:28)                   Impression:      Findings suggesting pulmonary edema/CHF pattern to a lesser degree from prior, with suspected trace residual pleural effusions bilaterally and overall improved aeration on the left.  Left basilar atelectasis/infiltrate and interstitial type pneumonia not excluded.      Electronically signed by: Magan Byrd MD  Date:    12/22/2023  Time:    18:21               Narrative:    EXAMINATION:  XR CHEST PA AND LATERAL    CLINICAL HISTORY:  Shortness of breath    TECHNIQUE:  PA and lateral views of the chest were performed.    COMPARISON:  Chest radiograph 06/24/2023, CT renal stone study 07/05/2022    FINDINGS:  Patient is rotated.  Resolution is limited by body habitus with underpenetration.    Cardiomediastinal silhouette is midline and prominent similar to prior.  Pulmonary vasculature  and hilar contours are unchanged.  Similar slight nonspecific elevation of the right hemidiaphragm.  Bilateral diffuse nonspecific interstitial coarsening with a perihilar and basilar distribution improved from prior but not yet resolved.  Suspected trace bilateral pleural effusions noting improved aeration on the left.  No pneumothorax.  Scattered bandlike opacities throughout the bilateral mid to lower lung zones consistent with platelike scarring versus atelectasis.  No acute osseous process seen.  Right upper quadrant surgical clips again noted.                                        Assessment/Plan:      * Acute on chronic heart failure with preserved ejection fraction (HFpEF)  Patient is identified as having Diastolic (HFpEF) heart failure that is Acute on chronic. CHF is currently uncontrolled due to Continued edema of extremities, Rales/crackles on pulmonary exam, and Pulmonary edema/pleural effusion on CXR. . Continue Beta Blocker and Furosemide and monitor clinical status closely. Monitor on telemetry. Patient is off CHF pathway.  Monitor strict Is&Os and daily weights.  Place on fluid restriction of 1.5 L. Cardiology has not been consulted. Continue to stress to patient importance of self efficacy and  on diet for CHF. Last BNP reviewed- and noted below   Recent Labs   Lab 12/22/23  1704   BNP 3,561*       -Obtain Repeat ECHO to evaluate for worsening valvular dysfunction  -continue diuresis with IV Lasix 100 mg daily      Pyuria  She denies any acute symptoms of cystitis - f/u pending Urine culture and gram stain - if positive or >100k CFU consider treatment      Prolonged QT interval  Avoid QT prolonging medications       Elevated troponin I level  -0.361-> 0.270  -no acute anginal symptoms  -likely elevated due to heart failure exacerbation  -some chronic non-specific t-wave flattening/inversion in precordial leads on EKG  -telemetry monitoring      Mitral valve regurgitation  Surveillance ECHO  ordered and pending  -consider cardiology consult pending outcome, may need to see interventional cardiology      Essential hypertension  Chronic, controlled. Latest blood pressure and vitals reviewed-     Temp:  [97.6 °F (36.4 °C)-98.7 °F (37.1 °C)]   Pulse:  [65-72]   Resp:  [16-20]   BP: (123-139)/(74-94)   SpO2:  [92 %-96 %] .   Home meds for hypertension were reviewed and noted below.   Hypertension Medications               carvediloL (COREG) 6.25 MG tablet Take 1 tablet (6.25 mg total) by mouth 2 (two) times daily with meals.    furosemide (LASIX) 20 MG tablet Take 2 tablets (40 mg total) by mouth once daily. Take an additional 40mg lasix in the afternoon as needed for worsening shortness of breath, swelling, or 3lb weight gain.    furosemide (LASIX) 40 MG tablet Take 1 tablet by mouth once daily.    losartan (COZAAR) 100 MG tablet Take 1 tablet (100 mg total) by mouth once daily. HOLD UNTIL DISCUSSING WITH THE KIDNEY DOCTORS            While in the hospital, will manage blood pressure as follows; Continue home antihypertensive regimen    -Patient taking only Carvedilol as outpatient, did not have losartan in her bag of medications  -continue home Coreg    Stage 4 chronic kidney disease  Creatine stable for now. BMP reviewed- noted Estimated Creatinine Clearance: 22.3 mL/min (A) (based on SCr of 2.8 mg/dL (H)). according to latest data. Based on current GFR, CKD stage is stage 4 - GFR 15-29.  Monitor UOP and serial BMP and adjust therapy as needed. Renally dose meds. Avoid nephrotoxic medications and procedures.    -continue oral sevelamer and oral bicarbonate  -PC ratio 5.82  -monitor renal function closely in setting of diuresis        VTE Risk Mitigation (From admission, onward)           Ordered     heparin (porcine) injection 5,000 Units  Every 8 hours         12/22/23 2253     IP VTE HIGH RISK PATIENT  Once         12/22/23 2253     Place sequential compression device  Until discontinued          12/22/23 2253                    Discharge Planning   RILEY: 12/25/2023     Code Status: Full Code   Is the patient medically ready for discharge?: No    Reason for patient still in hospital (select all that apply): Patient trending condition, Laboratory test, Treatment, and Pending disposition  Discharge Plan A: Home with family                  Minda Molina MD  Department of Hospital Medicine   [facility] - Cardiology Stepdown

## 2023-12-23 NOTE — ASSESSMENT & PLAN NOTE
Trend troponin in AM  -no acute anginal symptoms  -some chronic non-specific t-wave flattening/inversion in precordial leads on EKG  -telemetry monitoring

## 2023-12-23 NOTE — SUBJECTIVE & OBJECTIVE
Interval History:  Patient seen and examined at bedside.    No acute events overnight.  Patient has no acute complaints this morning.  Reports that her shortness of breath is improving; swelling a little bit better.  Notes adherence with low-salt diet.  Also reports increasing her Lasix at home to b.i.d. with resolution of symptoms.  Denies any dysuria.  Follows with cardiology outpatient.  Family present at bedside      services were utilized during this encounter.      Review of Systems   Constitutional:  Positive for activity change. Negative for diaphoresis and fever.   HENT:  Negative for congestion, sore throat and trouble swallowing.    Eyes:  Negative for visual disturbance.   Respiratory:  Positive for shortness of breath. Negative for cough and wheezing.    Cardiovascular:  Positive for leg swelling. Negative for chest pain and palpitations.   Gastrointestinal:  Positive for constipation. Negative for abdominal pain, diarrhea, nausea and vomiting.   Genitourinary:  Negative for dysuria and urgency.   Musculoskeletal:  Negative for arthralgias and myalgias.   Skin:  Negative for rash.   Neurological:  Positive for weakness. Negative for dizziness, light-headedness and headaches.   Psychiatric/Behavioral:  Negative for confusion and decreased concentration.        Objective:    Temp: 98.7 °F (37.1 °C) (12/23/23 0804)  Pulse: 70 (12/23/23 0804)  Resp: 20 (12/23/23 0804)  BP: (!) 137/94 (12/23/23 0804)  SpO2: (!) 93 % (12/23/23 0804)    Weight: 75.3 kg (166 lb 0.1 oz) (12/22/23 2230)    Body mass index is 29.41 kg/m².      Intake/Output Summary (Last 24 hours) at 12/23/2023 1051  Last data filed at 12/23/2023 0513  Gross per 24 hour   Intake 60 ml   Output 1050 ml   Net -990 ml       Physical Exam  Vitals and nursing note reviewed.   Constitutional:       General: She is not in acute distress.     Appearance: She is obese. She is not toxic-appearing or diaphoretic.   HENT:      Head: Normocephalic  and atraumatic.      Nose: Nose normal.      Mouth/Throat:      Pharynx: Oropharynx is clear.   Eyes:      General: No scleral icterus.        Right eye: No discharge.         Left eye: No discharge.   Cardiovascular:      Rate and Rhythm: Normal rate and regular rhythm.      Heart sounds: Murmur (3/6 - radiates to back) heard.      Comments: Edema to upper shin  Pulmonary:      Effort: Pulmonary effort is normal. No respiratory distress.      Breath sounds: No wheezing.      Comments: Decreased basilar breathsounds  Abdominal:      General: Abdomen is protuberant. There is no distension.      Palpations: Abdomen is soft.   Musculoskeletal:      Cervical back: Normal range of motion and neck supple.      Right lower leg: 3+ Pitting Edema present.      Left lower leg: 3+ Pitting Edema present.   Skin:     General: Skin is warm and dry.      Capillary Refill: Capillary refill takes less than 2 seconds.   Neurological:      Mental Status: She is alert and oriented to person, place, and time.   Psychiatric:         Behavior: Behavior normal.         Significant Labs: All pertinent labs within the past 24 hours have been reviewed.    Recent Results (from the past 24 hour(s))   Brain natriuretic peptide    Collection Time: 12/22/23  5:04 PM   Result Value Ref Range    BNP 3,561 (H) 0 - 99 pg/mL   CBC auto differential    Collection Time: 12/22/23  5:04 PM   Result Value Ref Range    WBC 6.15 3.90 - 12.70 K/uL    RBC 4.80 4.00 - 5.40 M/uL    Hemoglobin 11.7 (L) 12.0 - 16.0 g/dL    Hematocrit 39.1 37.0 - 48.5 %    MCV 82 82 - 98 fL    MCH 24.4 (L) 27.0 - 31.0 pg    MCHC 29.9 (L) 32.0 - 36.0 g/dL    RDW 18.6 (H) 11.5 - 14.5 %    Platelets 273 150 - 450 K/uL    MPV 11.5 9.2 - 12.9 fL    Immature Granulocytes 0.3 0.0 - 0.5 %    Gran # (ANC) 3.8 1.8 - 7.7 K/uL    Immature Grans (Abs) 0.02 0.00 - 0.04 K/uL    Lymph # 1.8 1.0 - 4.8 K/uL    Mono # 0.4 0.3 - 1.0 K/uL    Eos # 0.1 0.0 - 0.5 K/uL    Baso # 0.06 0.00 - 0.20 K/uL     nRBC 0 0 /100 WBC    Gran % 61.6 38.0 - 73.0 %    Lymph % 28.6 18.0 - 48.0 %    Mono % 7.0 4.0 - 15.0 %    Eosinophil % 1.5 0.0 - 8.0 %    Basophil % 1.0 0.0 - 1.9 %    Differential Method Automated    Comprehensive metabolic panel    Collection Time: 12/22/23  5:04 PM   Result Value Ref Range    Sodium 142 136 - 145 mmol/L    Potassium 4.1 3.5 - 5.1 mmol/L    Chloride 104 95 - 110 mmol/L    CO2 25 23 - 29 mmol/L    Glucose 102 70 - 110 mg/dL    BUN 46 (H) 6 - 20 mg/dL    Creatinine 2.9 (H) 0.5 - 1.4 mg/dL    Calcium 8.4 (L) 8.7 - 10.5 mg/dL    Total Protein 6.4 6.0 - 8.4 g/dL    Albumin 2.9 (L) 3.5 - 5.2 g/dL    Total Bilirubin 0.6 0.1 - 1.0 mg/dL    Alkaline Phosphatase 60 55 - 135 U/L    AST 24 10 - 40 U/L    ALT 20 10 - 44 U/L    eGFR 18.7 (A) >60 mL/min/1.73 m^2    Anion Gap 13 8 - 16 mmol/L   Troponin I    Collection Time: 12/22/23  5:04 PM   Result Value Ref Range    Troponin I 0.361 (H) 0.000 - 0.026 ng/mL   Lipase    Collection Time: 12/22/23  5:04 PM   Result Value Ref Range    Lipase 56 4 - 60 U/L   Urinalysis, Reflex to Urine Culture Urine, Clean Catch    Collection Time: 12/22/23  6:16 PM    Specimen: Urine   Result Value Ref Range    Specimen UA Urine, Clean Catch     Color, UA Yellow Yellow, Straw, Karla    Appearance, UA Clear Clear    pH, UA 6.0 5.0 - 8.0    Specific Gravity, UA 1.015 1.005 - 1.030    Protein, UA 3+ (A) Negative    Glucose, UA Negative Negative    Ketones, UA Negative Negative    Bilirubin (UA) Negative Negative    Occult Blood UA 1+ (A) Negative    Nitrite, UA Negative Negative    Leukocytes, UA 2+ (A) Negative   Urinalysis Microscopic    Collection Time: 12/22/23  6:16 PM   Result Value Ref Range    RBC, UA 3 0 - 4 /hpf    WBC, UA 23 (H) 0 - 5 /hpf    Bacteria Occasional None-Occ /hpf    Squam Epithel, UA 1 /hpf    Hyaline Casts, UA 0 0-1/lpf /lpf    Microscopic Comment SEE COMMENT    Protein/Creatinine Ratio, Urine    Collection Time: 12/22/23  6:16 PM   Result Value Ref Range     Protein, Urine Random 320 (H) 0 - 15 mg/dL    Creatinine, Urine 55.0 15.0 - 325.0 mg/dL    Prot/Creat Ratio, Urine 5.82 (H) 0.00 - 0.20   Magnesium    Collection Time: 12/23/23  6:09 AM   Result Value Ref Range    Magnesium 1.9 1.6 - 2.6 mg/dL   CK    Collection Time: 12/23/23  6:09 AM   Result Value Ref Range    CPK 76 20 - 180 U/L   Troponin I    Collection Time: 12/23/23  6:09 AM   Result Value Ref Range    Troponin I 0.270 (H) 0.000 - 0.026 ng/mL   Renal Function Panel    Collection Time: 12/23/23  6:09 AM   Result Value Ref Range    Glucose 93 70 - 110 mg/dL    Sodium 143 136 - 145 mmol/L    Potassium 3.9 3.5 - 5.1 mmol/L    Chloride 101 95 - 110 mmol/L    CO2 27 23 - 29 mmol/L    BUN 46 (H) 6 - 20 mg/dL    Calcium 8.8 8.7 - 10.5 mg/dL    Creatinine 2.8 (H) 0.5 - 1.4 mg/dL    Albumin 2.9 (L) 3.5 - 5.2 g/dL    Phosphorus 4.5 2.7 - 4.5 mg/dL    eGFR 19.5 (A) >60 mL/min/1.73 m^2    Anion Gap 15 8 - 16 mmol/L   PTH, Intact    Collection Time: 12/23/23  6:09 AM   Result Value Ref Range    PTH, Intact 828.1 (H) 9.0 - 77.0 pg/mL       Significant Imaging: I have reviewed all pertinent imaging results/findings within the past 24 hours.    Imaging Results              X-Ray Chest PA And Lateral (Final result)  Result time 12/22/23 18:21:28      Final result by Magan Byrd MD (12/22/23 18:21:28)                   Impression:      Findings suggesting pulmonary edema/CHF pattern to a lesser degree from prior, with suspected trace residual pleural effusions bilaterally and overall improved aeration on the left.  Left basilar atelectasis/infiltrate and interstitial type pneumonia not excluded.      Electronically signed by: Magan Byrd MD  Date:    12/22/2023  Time:    18:21               Narrative:    EXAMINATION:  XR CHEST PA AND LATERAL    CLINICAL HISTORY:  Shortness of breath    TECHNIQUE:  PA and lateral views of the chest were performed.    COMPARISON:  Chest radiograph 06/24/2023, CT renal stone study  07/05/2022    FINDINGS:  Patient is rotated.  Resolution is limited by body habitus with underpenetration.    Cardiomediastinal silhouette is midline and prominent similar to prior.  Pulmonary vasculature and hilar contours are unchanged.  Similar slight nonspecific elevation of the right hemidiaphragm.  Bilateral diffuse nonspecific interstitial coarsening with a perihilar and basilar distribution improved from prior but not yet resolved.  Suspected trace bilateral pleural effusions noting improved aeration on the left.  No pneumothorax.  Scattered bandlike opacities throughout the bilateral mid to lower lung zones consistent with platelike scarring versus atelectasis.  No acute osseous process seen.  Right upper quadrant surgical clips again noted.

## 2023-12-23 NOTE — ASSESSMENT & PLAN NOTE
Patient is identified as having Diastolic (HFpEF) heart failure that is Acute on chronic. CHF is currently uncontrolled due to Continued edema of extremities, Rales/crackles on pulmonary exam, and Pulmonary edema/pleural effusion on CXR. . Continue Beta Blocker and Furosemide and monitor clinical status closely. Monitor on telemetry. Patient is on CHF pathway.  Monitor strict Is&Os and daily weights.  Place on fluid restriction of 1.5 L. Cardiology has not been consulted. Continue to stress to patient importance of self efficacy and  on diet for CHF. Last BNP reviewed- and noted below   Recent Labs   Lab 12/22/23  1704   BNP 3,561*       Given total 120mg IV lasix in ED (40mg, then 80mg) reported on the floor only 250cc UOP  -giving Metolazone 5mg and Lasix 200mg IV - evaluate diuretic response in AM and decide on next diuretic dosages  -Obtain Repeat ECHO to evaluate for worsening valvular dysfunction  -Trend Troponin in AM - no acute anginal symptoms

## 2023-12-23 NOTE — ASSESSMENT & PLAN NOTE
Chronic, controlled. Latest blood pressure and vitals reviewed-     Temp:  [97.6 °F (36.4 °C)-98.5 °F (36.9 °C)]   Pulse:  [65-72]   Resp:  [16-18]   BP: (123-139)/(79-92)   SpO2:  [92 %-96 %] .   Home meds for hypertension were reviewed and noted below.   Hypertension Medications               carvediloL (COREG) 6.25 MG tablet Take 1 tablet (6.25 mg total) by mouth 2 (two) times daily with meals.    furosemide (LASIX) 20 MG tablet Take 2 tablets (40 mg total) by mouth once daily. Take an additional 40mg lasix in the afternoon as needed for worsening shortness of breath, swelling, or 3lb weight gain.    furosemide (LASIX) 40 MG tablet Take 1 tablet by mouth once daily.    losartan (COZAAR) 100 MG tablet Take 1 tablet (100 mg total) by mouth once daily. HOLD UNTIL DISCUSSING WITH THE KIDNEY DOCTORS            While in the hospital, will manage blood pressure as follows; Continue home antihypertensive regimen    -Patient taking only Carvedilol as outpatient, did not have losartan in her bag of medications

## 2023-12-23 NOTE — ASSESSMENT & PLAN NOTE
-0.361-> 0.270  -no acute anginal symptoms  -likely elevated due to heart failure exacerbation  -some chronic non-specific t-wave flattening/inversion in precordial leads on EKG  -telemetry monitoring

## 2023-12-23 NOTE — ED NOTES
Telemetry Verification   Patient placed on Telemetry Box  Verified with War Room  Box # [id number]   Monitor Tech War room   Rate 71   Rhythm NSR

## 2023-12-23 NOTE — NURSING
Nurses Note -- 4 Eyes      12/22/2023   11:48 PM      Skin assessed during: Admit      [x] No Altered Skin Integrity Present    [x]Prevention Measures Documented      [] Yes- Altered Skin Integrity Present or Discovered   [] LDA Added if Not in Epic (Describe Wound)   [] New Altered Skin Integrity was Present on Admit and Documented in LDA   [] Wound Image Taken    Wound Care Consulted? No    Attending Nurse:  Ray Arias RN/Staff Member:   JUMANA Briseno

## 2023-12-23 NOTE — SUBJECTIVE & OBJECTIVE
Past Medical History:   Diagnosis Date    (HFpEF) heart failure with preserved ejection fraction 06/24/2023    EF 63% with G2 DD  Continue lasix, appears euvolemic today  Continue good BP management with losartan, increase Coreg 6.25 mg BID  Fluid restriction, low sodium diet  Recommend Jardiance- patient had CKD 4 and this is a limiting factor- nephro eval pending    Anemia 06/24/2023    CKD (chronic kidney disease)     HTN (hypertension)     Mitral valve regurgitation 06/26/2023    Refer to structural for evaluation  May benefit from mitral clip    Ovarian cyst     Stage 4 chronic kidney disease 06/24/2023    Refer to nephrology at [facility] as unable to get established with [facility] and the phone number for the outside nephrologist provided to patient during her recent hospitalization goes unanswered when called.   Cr remains elevated  Would like to start SGLT-2 and appreciate nephrology expertise        History reviewed. No pertinent surgical history.    Review of patient's allergies indicates:  No Known Allergies    No current facility-administered medications on file prior to encounter.     Current Outpatient Medications on File Prior to Encounter   Medication Sig    aspirin (ECOTRIN) 81 MG EC tablet Take 1 tablet by mouth once daily.    atorvastatin (LIPITOR) 40 MG tablet Take 1 tablet by mouth once daily.    carvediloL (COREG) 6.25 MG tablet Take 1 tablet (6.25 mg total) by mouth 2 (two) times daily with meals.    furosemide (LASIX) 20 MG tablet Take 2 tablets (40 mg total) by mouth once daily. Take an additional 40mg lasix in the afternoon as needed for worsening shortness of breath, swelling, or 3lb weight gain.    furosemide (LASIX) 40 MG tablet Take 1 tablet by mouth once daily.    loratadine (CLARITIN) 10 mg tablet Take 10 mg by mouth once daily. PRN    losartan (COZAAR) 100 MG tablet Take 1 tablet (100 mg total) by mouth once daily. HOLD UNTIL DISCUSSING WITH THE KIDNEY DOCTORS    cindy  carbonate (RENVELA) 800 mg Tab Take 1 tablet (800 mg total) by mouth 3 (three) times daily with meals.    sodium bicarbonate 650 MG tablet Take 1 tablet (650 mg total) by mouth 2 (two) times daily.     Family History    None       Tobacco Use    Smoking status: Former     Current packs/day: 0.00     Types: Cigarettes     Start date:      Quit date:      Years since quittin.9    Smokeless tobacco: Never    Tobacco comments:     2-3 cigarettes a day   Substance and Sexual Activity    Alcohol use: Never    Drug use: Never    Sexual activity: Not on file     Review of Systems   Constitutional:  Positive for activity change.   Respiratory:  Negative for cough.    Cardiovascular:  Positive for leg swelling. Negative for chest pain.   Neurological:  Positive for weakness.     Objective:     Vital Signs (Most Recent):  Temp: 98.5 °F (36.9 °C) (23 1626)  Pulse: 70 (23 162)  Resp: 18 (23 162)  BP: (!) 135/92 (23 162)  SpO2: 95 % (23) Vital Signs (24h Range):  Temp:  [98.4 °F (36.9 °C)-98.5 °F (36.9 °C)] 98.5 °F (36.9 °C)  Pulse:  [70-72] 70  Resp:  [16-18] 18  SpO2:  [95 %] 95 %  BP: (135-137)/(91-92) 135/92     Weight: 68 kg (150 lb)  Body mass index is 26.57 kg/m².     Physical Exam  Vitals and nursing note reviewed.   Constitutional:       General: She is not in acute distress.     Appearance: She is obese.   Eyes:      General: No scleral icterus.  Cardiovascular:      Rate and Rhythm: Normal rate and regular rhythm.      Heart sounds: Murmur (3/6 - radiates to back) heard.      Comments: Edema to upper shin  Pulmonary:      Effort: Pulmonary effort is normal. No respiratory distress.      Breath sounds: No wheezing.      Comments: Decreased basilar breathsounds  Abdominal:      General: Abdomen is protuberant. There is no distension.      Palpations: Abdomen is soft.   Musculoskeletal:      Right lower leg: 3+ Pitting Edema present.      Left lower leg: 3+ Pitting Edema  "present.   Neurological:      Mental Status: She is alert.                Significant Labs: All pertinent labs within the past 24 hours have been reviewed.  CBC:   Recent Labs   Lab 12/22/23  1704   WBC 6.15   HGB 11.7*   HCT 39.1        CMP:   Recent Labs   Lab 12/22/23  1704      K 4.1      CO2 25      BUN 46*   CREATININE 2.9*   CALCIUM 8.4*   PROT 6.4   ALBUMIN 2.9*   BILITOT 0.6   ALKPHOS 60   AST 24   ALT 20   ANIONGAP 13     Cardiac Markers:   Recent Labs   Lab 12/22/23  1704   BNP 3,561*     Coagulation: No results for input(s): "PT", "INR", "APTT" in the last 48 hours.  Lactic Acid: No results for input(s): "LACTATE" in the last 48 hours.  Magnesium: No results for input(s): "MG" in the last 48 hours.  Troponin:   Recent Labs   Lab 12/22/23  1704   TROPONINI 0.361*     Urine Studies:   Recent Labs   Lab 12/22/23  1816   COLORU Yellow   APPEARANCEUA Clear   PHUR 6.0   SPECGRAV 1.015   PROTEINUA 3+*   GLUCUA Negative   KETONESU Negative   BILIRUBINUA Negative   OCCULTUA 1+*   NITRITE Negative   LEUKOCYTESUR 2+*   RBCUA 3   WBCUA 23*   BACTERIA Occasional   SQUAMEPITHEL 1   HYALINECASTS 0       Significant Imaging: I have reviewed all pertinent imaging results/findings within the past 24 hours.    XR CHEST PA AND LATERAL     CLINICAL HISTORY:  Shortness of breath     TECHNIQUE:  PA and lateral views of the chest were performed.     COMPARISON:  Chest radiograph 06/24/2023, CT renal stone study 07/05/2022     FINDINGS:  Patient is rotated.  Resolution is limited by body habitus with underpenetration.     Cardiomediastinal silhouette is midline and prominent similar to prior.  Pulmonary vasculature and hilar contours are unchanged.  Similar slight nonspecific elevation of the right hemidiaphragm.  Bilateral diffuse nonspecific interstitial coarsening with a perihilar and basilar distribution improved from prior but not yet resolved.  Suspected trace bilateral pleural effusions noting " improved aeration on the left.  No pneumothorax.  Scattered bandlike opacities throughout the bilateral mid to lower lung zones consistent with platelike scarring versus atelectasis.  No acute osseous process seen.  Right upper quadrant surgical clips again noted.     Impression:     Findings suggesting pulmonary edema/CHF pattern to a lesser degree from prior, with suspected trace residual pleural effusions bilaterally and overall improved aeration on the left.  Left basilar atelectasis/infiltrate and interstitial type pneumonia not excluded.        Electronically signed by: Magan Byrd MD  Date:                                            12/22/2023  Time:                                           18:21

## 2023-12-23 NOTE — ASSESSMENT & PLAN NOTE
Patient with CKD stage 4 - reports a referral to nephrology but has not seen one yet  -Check Urine Protein/Cr ratio  -continue oral sevelamer and oral bicarbonate  -may need nephrology consult inpatient prior to discharge pending trend of renal function    Creatine stable for now. BMP reviewed- noted Estimated Creatinine Clearance: 21.6 mL/min (A) (based on SCr of 2.9 mg/dL (H)). according to latest data. Based on current GFR, CKD stage is stage 4 - GFR 15-29.  Monitor UOP and serial BMP and adjust therapy as needed. Renally dose meds. Avoid nephrotoxic medications and procedures.

## 2023-12-23 NOTE — PLAN OF CARE
[facility] - Cardiology Stepdown  Initial Discharge Assessment       Primary Care Provider: Tami Villeda FNP    Admission Diagnosis: Shortness of breath [R06.02]  NSTEMI (non-ST elevated myocardial infarction) [I21.4]  Abdominal pain [R10.9]  Acute on chronic congestive heart failure, unspecified heart failure type [I50.9]    Admission Date: 12/22/2023  Expected Discharge Date: 12/25/2023    Assessment completed with pt and Daughter Alexandrea [phone]  at bedside. Pt lives with her. She confirmed home address and denied HH and DME. Confirmed PCP, insurance and pharmacy as [facility]. Pt stated that family will provide transport home. Pts daughter confirmed that pt does not have insurance. CM following for additional needs.     Transition of Care Barriers: Unisured    Payor: /     Extended Emergency Contact Information  Primary Emergency Contact: Alexandrea Vera  Address: [address]           [place] [address] [facility]  Home Phone: [phone]  Mobile Phone: [phone]  Relation: Daughter  Preferred language: English   needed? No    Discharge Plan A: Home with family  Discharge Plan B: Home      [facility] [place], [place] - [address]  [address]  [place] [address]  Phone: [phone] Fax: [phone]      Initial Assessment (most recent)       Adult Discharge Assessment - 12/23/23 0950          Discharge Assessment    Assessment Type Discharge Planning Assessment     Confirmed/corrected address, phone number and insurance Yes     Confirmed Demographics Correct on Facesheet     Source of Information patient;family     If unable to respond/provide information was family/caregiver contacted? Yes     Contact Name/Number Daughter Alexandrea [phone]     Communicated RILEY with patient/caregiver Yes     Reason For Admission SOB     People in Home child(kingsley), adult     Facility Arrived From: Home     Do you expect to return to your current living situation? Yes     Do  you have help at home or someone to help you manage your care at home? Yes     Who are your caregiver(s) and their phone number(s)? Daughter Alexandrea [phone]     Prior to hospitilization cognitive status: Alert/Oriented     Current cognitive status: Alert/Oriented     Walking or Climbing Stairs Difficulty no     Dressing/Bathing Difficulty no     Home Layout Able to live on 1st floor     Equipment Currently Used at Home none     Readmission within 30 days? No     Patient currently being followed by outpatient case management? No     Do you currently have service(s) that help you manage your care at home? No     Do you take prescription medications? Yes     Do you have prescription coverage? No     Do you have any problems affording any of your prescribed medications? No     Is the patient taking medications as prescribed? yes     Who is going to help you get home at discharge? Daughter Alexandrea [phone]     How do you get to doctors appointments? car, drives self;family or friend will provide     Are you on dialysis? No     Do you take coumadin? No     Discharge Plan A Home with family     Discharge Plan B Home     DME Needed Upon Discharge  none     Discharge Plan discussed with: Patient;Adult children     Transition of Care Barriers Unisured        Physical Activity    On average, how many days per week do you engage in moderate to strenuous exercise (like a brisk walk)? 3 days     On average, how many minutes do you engage in exercise at this level? 20 min        Financial Resource Strain    How hard is it for you to pay for the very basics like food, housing, medical care, and heating? Not very hard        Housing Stability    In the last 12 months, was there a time when you were not able to pay the mortgage or rent on time? No     In the last 12 months, was there a time when you did not have a steady place to sleep or slept in a shelter (including now)? No        Transportation Needs    In the past 12  months, has lack of transportation kept you from medical appointments or from getting medications? No     In the past 12 months, has lack of transportation kept you from meetings, work, or from getting things needed for daily living? No        Food Insecurity    Within the past 12 months, you worried that your food would run out before you got the money to buy more. Never true     Within the past 12 months, the food you bought just didn't last and you didn't have money to get more. Never true        Stress    Do you feel stress - tense, restless, nervous, or anxious, or unable to sleep at night because your mind is troubled all the time - these days? Only a little        Social Connections    In a typical week, how many times do you talk on the phone with family, friends, or neighbors? Twice a week     How often do you get together with friends or relatives? Twice a week     How often do you attend Scientology or Buddhism services? 1 to 4 times per year     Do you belong to any clubs or organizations such as [employer]ology groups, unions, fraternal or athletic groups, or school groups? No     How often do you attend meetings of the clubs or organizations you belong to? Never     Are you , , , , never , or living with a partner? Patient unable to answer        Alcohol Use    Q1: How often do you have a drink containing alcohol? Never     Q2: How many drinks containing alcohol do you have on a typical day when you are drinking? Patient does not drink     Q3: How often do you have six or more drinks on one occasion? Never        OTHER    Name(s) of People in Home Gilberto Lechuga [phone]

## 2023-12-23 NOTE — ASSESSMENT & PLAN NOTE
Surveillance ECHO ordered and pending  -consider cardiology consult pending outcome, may need to see interventional cardiology

## 2023-12-23 NOTE — ASSESSMENT & PLAN NOTE
She denies any acute symptoms of cystitis - f/u pending Urine culture and gram stain - if positive or >100k CFU consider treatment

## 2023-12-23 NOTE — H&P
Trung Mayorga - Cardiology [facility] Medicine  History & Physical    Patient Name: Xena Vera  MRN: [id number]  Patient Class: IP- Inpatient  Admission Date: 12/22/2023  Attending Physician: Minda Molina MD [facility]  Admitting Physician: Anival Martell MD  Primary Care Provider: Tami Villeda FNP    Patient information was obtained from patient, past medical records, and ER records.     Subjective:     Principal Problem:Acute on chronic heart failure with preserved ejection fraction (HFpEF)    Chief Complaint:   Chief Complaint   Patient presents with    Shortness of Breath        HPI: [facility]  USED - GLORY #[id number]    54 y/o  Woman, with CKD Stage 4, HFpEF, Mitral Valve Regurgitation presents to the ED with shortness of breath.  She reports a subacute history of dyspnea, since thanksgiving time approx 4 weeks ago.  She has new symptoms of orthopnea, increased peripheral edema.  She denies chest pain, pleuritic pain, no cough.   Reports adherence to home medications, missed one dose of oral furosemide around thanksgiving.   She has been trying to exercise, walking in a park, but has been limited due to developing dyspnea on exertion.     Symptoms worse today - she was in kitchen cutting meat, and this activity caused dyspnea.      Today while at home has felt more weak and had the onset of epigastric abdominal pain while washing dishes, pain persists described as mild.   She denies any dysuria/urinary frequency.  She noted some urinary urgency a few weeks ago but has been drinking cranberry juice and symptoms improved.  She denies any recent hospitalizations    Former smoker @ age 19 x 1year, no active etoh or drug use.  She is not employed, lives with her 2 daughters.   She receives outpatient [facility] @ [facility]      Past Medical History:   Diagnosis Date    (HFpEF) heart failure with preserved ejection fraction 06/24/2023    EF 63% with G2 DD  Continue  lasix, appears euvolemic today  Continue good BP management with losartan, increase Coreg 6.25 mg BID  Fluid restriction, low sodium diet  Recommend Jardiance- patient had CKD 4 and this is a limiting factor- nephro eval pending    Anemia 06/24/2023    CKD (chronic kidney disease)     HTN (hypertension)     Mitral valve regurgitation 06/26/2023    Refer to structural for evaluation  May benefit from mitral clip    Ovarian cyst     Stage 4 chronic kidney disease 06/24/2023    Refer to nephrology at [facility] as unable to get established with [facility] and the phone number for the outside nephrologist provided to patient during her recent hospitalization goes unanswered when called.   Cr remains elevated  Would like to start SGLT-2 and appreciate nephrology expertise        History reviewed. No pertinent surgical history.    Review of patient's allergies indicates:  No Known Allergies    No current facility-administered medications on file prior to encounter.     Current Outpatient Medications on File Prior to Encounter   Medication Sig    aspirin (ECOTRIN) 81 MG EC tablet Take 1 tablet by mouth once daily.    atorvastatin (LIPITOR) 40 MG tablet Take 1 tablet by mouth once daily.    carvediloL (COREG) 6.25 MG tablet Take 1 tablet (6.25 mg total) by mouth 2 (two) times daily with meals.    furosemide (LASIX) 20 MG tablet Take 2 tablets (40 mg total) by mouth once daily. Take an additional 40mg lasix in the afternoon as needed for worsening shortness of breath, swelling, or 3lb weight gain.    furosemide (LASIX) 40 MG tablet Take 1 tablet by mouth once daily.    loratadine (CLARITIN) 10 mg tablet Take 10 mg by mouth once daily. PRN    losartan (COZAAR) 100 MG tablet Take 1 tablet (100 mg total) by mouth once daily. HOLD UNTIL DISCUSSING WITH THE KIDNEY DOCTORS    sevelamer carbonate (RENVELA) 800 mg Tab Take 1 tablet (800 mg total) by mouth 3 (three) times daily with meals.    sodium bicarbonate 650 MG tablet Take 1  tablet (650 mg total) by mouth 2 (two) times daily.     Family History    None       Tobacco Use    Smoking status: Former     Current packs/day: 0.00     Types: Cigarettes     Start date:      Quit date:      Years since quittin.9    Smokeless tobacco: Never    Tobacco comments:     2-3 cigarettes a day   Substance and Sexual Activity    Alcohol use: Never    Drug use: Never    Sexual activity: Not on file     Review of Systems   Constitutional:  Positive for activity change.   Respiratory:  Negative for cough.    Cardiovascular:  Positive for leg swelling. Negative for chest pain.   Neurological:  Positive for weakness.     Objective:     Vital Signs (Most Recent):  Temp: 98.5 °F (36.9 °C) (23 162)  Pulse: 70 (23 162)  Resp: 18 (23 162)  BP: (!) 135/92 (23 162)  SpO2: 95 % (23) Vital Signs (24h Range):  Temp:  [98.4 °F (36.9 °C)-98.5 °F (36.9 °C)] 98.5 °F (36.9 °C)  Pulse:  [70-72] 70  Resp:  [16-18] 18  SpO2:  [95 %] 95 %  BP: (135-137)/(91-92) 135/92     Weight: 68 kg (150 lb)  Body mass index is 26.57 kg/m².     Physical Exam  Vitals and nursing note reviewed.   Constitutional:       General: She is not in acute distress.     Appearance: She is obese.   Eyes:      General: No scleral icterus.  Cardiovascular:      Rate and Rhythm: Normal rate and regular rhythm.      Heart sounds: Murmur (3/6 - radiates to back) heard.      Comments: Edema to upper shin  Pulmonary:      Effort: Pulmonary effort is normal. No respiratory distress.      Breath sounds: No wheezing.      Comments: Decreased basilar breathsounds  Abdominal:      General: Abdomen is protuberant. There is no distension.      Palpations: Abdomen is soft.   Musculoskeletal:      Right lower leg: 3+ Pitting Edema present.      Left lower leg: 3+ Pitting Edema present.   Neurological:      Mental Status: She is alert.                Significant Labs: All pertinent labs within the past 24 hours have been  "reviewed.  CBC:   Recent Labs   Lab 12/22/23  1704   WBC 6.15   HGB 11.7*   HCT 39.1        CMP:   Recent Labs   Lab 12/22/23  1704      K 4.1      CO2 25      BUN 46*   CREATININE 2.9*   CALCIUM 8.4*   PROT 6.4   ALBUMIN 2.9*   BILITOT 0.6   ALKPHOS 60   AST 24   ALT 20   ANIONGAP 13     Cardiac Markers:   Recent Labs   Lab 12/22/23  1704   BNP 3,561*     Coagulation: No results for input(s): "PT", "INR", "APTT" in the last 48 hours.  Lactic Acid: No results for input(s): "LACTATE" in the last 48 hours.  Magnesium: No results for input(s): "MG" in the last 48 hours.  Troponin:   Recent Labs   Lab 12/22/23  1704   TROPONINI 0.361*     Urine Studies:   Recent Labs   Lab 12/22/23  1816   COLORU Yellow   APPEARANCEUA Clear   PHUR 6.0   SPECGRAV 1.015   PROTEINUA 3+*   GLUCUA Negative   KETONESU Negative   BILIRUBINUA Negative   OCCULTUA 1+*   NITRITE Negative   LEUKOCYTESUR 2+*   RBCUA 3   WBCUA 23*   BACTERIA Occasional   SQUAMEPITHEL 1   HYALINECASTS 0       Significant Imaging: I have reviewed all pertinent imaging results/findings within the past 24 hours.    XR CHEST PA AND LATERAL     CLINICAL HISTORY:  Shortness of breath     TECHNIQUE:  PA and lateral views of the chest were performed.     COMPARISON:  Chest radiograph 06/24/2023, CT renal stone study 07/05/2022     FINDINGS:  Patient is rotated.  Resolution is limited by body habitus with underpenetration.     Cardiomediastinal silhouette is midline and prominent similar to prior.  Pulmonary vasculature and hilar contours are unchanged.  Similar slight nonspecific elevation of the right hemidiaphragm.  Bilateral diffuse nonspecific interstitial coarsening with a perihilar and basilar distribution improved from prior but not yet resolved.  Suspected trace bilateral pleural effusions noting improved aeration on the left.  No pneumothorax.  Scattered bandlike opacities throughout the bilateral mid to lower lung zones consistent with " platelike scarring versus atelectasis.  No acute osseous process seen.  Right upper quadrant surgical clips again noted.     Impression:     Findings suggesting pulmonary edema/CHF pattern to a lesser degree from prior, with suspected trace residual pleural effusions bilaterally and overall improved aeration on the left.  Left basilar atelectasis/infiltrate and interstitial type pneumonia not excluded.        Electronically signed by: Magan Byrd MD  Date:                                            12/22/2023  Time:                                           18:21       Assessment/Plan:     * Acute on chronic heart failure with preserved ejection fraction (HFpEF)  Patient is identified as having Diastolic (HFpEF) heart failure that is Acute on chronic. CHF is currently uncontrolled due to Continued edema of extremities, Rales/crackles on pulmonary exam, and Pulmonary edema/pleural effusion on CXR. . Continue Beta Blocker and Furosemide and monitor clinical status closely. Monitor on telemetry. Patient is off CHF pathway.  Monitor strict Is&Os and daily weights.  Place on fluid restriction of 1.5 L. Cardiology has not been consulted. Continue to stress to patient importance of self efficacy and  on diet for CHF. Last BNP reviewed- and noted below   Recent Labs   Lab 12/22/23  1704   BNP 3,561*         Given total 120mg IV lasix in ED (40mg, then 80mg) reported on the floor only 250cc UOP  -giving Metolazone 5mg and Lasix 200mg IV - evaluate diuretic response in AM and decide on next diuretic dosages  -Obtain Repeat ECHO to evaluate for worsening valvular dysfunction  -Trend Troponin in AM - no acute anginal symptoms    Elevated troponin I level  Trend troponin in AM  -no acute anginal symptoms  -some chronic non-specific t-wave flattening/inversion in precordial leads on EKG  -telemetry monitoring      Prolonged QT interval  Avoid QT prolonging medications       Pyuria  She denies any acute symptoms of  cystitis - f/u pending Urine culture and gram stain - if positive or >100k CFU consider treatment      Mitral valve regurgitation  Surveillance ECHO ordered and pending  -consider cardiology consult pending outcome, may need to see interventional cardiology      Essential hypertension  Chronic, controlled. Latest blood pressure and vitals reviewed-     Temp:  [97.6 °F (36.4 °C)-98.5 °F (36.9 °C)]   Pulse:  [65-72]   Resp:  [16-18]   BP: (123-139)/(79-92)   SpO2:  [92 %-96 %] .   Home meds for hypertension were reviewed and noted below.   Hypertension Medications               carvediloL (COREG) 6.25 MG tablet Take 1 tablet (6.25 mg total) by mouth 2 (two) times daily with meals.    furosemide (LASIX) 20 MG tablet Take 2 tablets (40 mg total) by mouth once daily. Take an additional 40mg lasix in the afternoon as needed for worsening shortness of breath, swelling, or 3lb weight gain.    furosemide (LASIX) 40 MG tablet Take 1 tablet by mouth once daily.    losartan (COZAAR) 100 MG tablet Take 1 tablet (100 mg total) by mouth once daily. HOLD UNTIL DISCUSSING WITH THE KIDNEY DOCTORS            While in the hospital, will manage blood pressure as follows; Continue home antihypertensive regimen    -Patient taking only Carvedilol as outpatient, did not have losartan in her bag of medications    Stage 4 chronic kidney disease  Patient with CKD stage 4 - reports a referral to nephrology but has not seen one yet  -Check Urine Protein/Cr ratio  -continue oral sevelamer and oral bicarbonate  -may need nephrology consult inpatient prior to discharge pending trend of renal function    Creatine stable for now. BMP reviewed- noted Estimated Creatinine Clearance: 21.6 mL/min (A) (based on SCr of 2.9 mg/dL (H)). according to latest data. Based on current GFR, CKD stage is stage 4 - GFR 15-29.  Monitor UOP and serial BMP and adjust therapy as needed. Renally dose meds. Avoid nephrotoxic medications and procedures.      VTE Risk  Mitigation (From admission, onward)           Ordered     heparin (porcine) injection 5,000 Units  Every 8 hours         12/22/23 2253     IP VTE HIGH RISK PATIENT  Once         12/22/23 2253     Place sequential compression device  Until discontinued         12/22/23 2253                                    Anival Martell MD  Department of Hospital Medicine  [facility] - Cardiology Stepdown

## 2023-12-23 NOTE — PLAN OF CARE
Problem: Adult Inpatient Plan of Care  Goal: Plan of Care Review  12/23/2023 1546 by Glendy Barragan LPN  Outcome: Ongoing, Progressing  12/23/2023 1546 by Glendy Barragan LPN  Outcome: Ongoing, Progressing  Goal: Patient-Specific Goal (Individualized)  12/23/2023 1546 by Glendy Barragan LPN  Outcome: Ongoing, Progressing  12/23/2023 1546 by Glendy Barragan LPN  Outcome: Ongoing, Progressing  Goal: Absence of Hospital-Acquired Illness or Injury  12/23/2023 1546 by Glendy Barragan LPN  Outcome: Ongoing, Progressing  12/23/2023 1546 by Glendy Barragan LPN  Outcome: Ongoing, Progressing  Goal: Optimal Comfort and Wellbeing  12/23/2023 1546 by Glendy Barragan LPN  Outcome: Ongoing, Progressing  12/23/2023 1546 by Glendy Barragan LPN  Outcome: Ongoing, Progressing  Goal: Readiness for Transition of Care  12/23/2023 1546 by Glendy Barragan LPN  Outcome: Ongoing, Progressing  12/23/2023 1546 by Glendy Barragan LPN  Outcome: Ongoing, Progressing     Problem: Infection  Goal: Absence of Infection Signs and Symptoms  12/23/2023 1546 by Glendy Barragan LPN  Outcome: Ongoing, Progressing  12/23/2023 1546 by Glendy Barragan LPN  Outcome: Ongoing, Progressing

## 2023-12-23 NOTE — HPI
HERBERT  USED - GLORY #[id number]    54 y/o  Woman, with CKD Stage 4, HFpEF, Mitral Valve Regurgitation presents to the ED with shortness of breath.  She reports a subacute history of dyspnea, since thanksgiving time approx 4 weeks ago.  She has new symptoms of orthopnea, increased peripheral edema.  She denies chest pain, pleuritic pain, no cough.   Reports adherence to home medications, missed one dose of oral furosemide around thanksgiving.   She has been trying to exercise, walking in a park, but has been limited due to developing dyspnea on exertion.     Symptoms worse today - she was in kitchen cutting meat, and this activity caused dyspnea.      Today while at home has felt more weak and had the onset of epigastric abdominal pain while washing dishes, pain persists described as mild.   She denies any dysuria/urinary frequency.  She noted some urinary urgency a few weeks ago but has been drinking cranberry juice and symptoms improved.  She denies any recent hospitalizations    Former smoker @ age 19 x 1year, no active etoh or drug use.  She is not employed, lives with her 2 daughters.   She receives outpatient care @ [facility]

## 2023-12-23 NOTE — ASSESSMENT & PLAN NOTE
Chronic, controlled. Latest blood pressure and vitals reviewed-     Temp:  [97.6 °F (36.4 °C)-98.7 °F (37.1 °C)]   Pulse:  [65-72]   Resp:  [16-20]   BP: (123-139)/(74-94)   SpO2:  [92 %-96 %] .   Home meds for hypertension were reviewed and noted below.   Hypertension Medications               carvediloL (COREG) 6.25 MG tablet Take 1 tablet (6.25 mg total) by mouth 2 (two) times daily with meals.    furosemide (LASIX) 20 MG tablet Take 2 tablets (40 mg total) by mouth once daily. Take an additional 40mg lasix in the afternoon as needed for worsening shortness of breath, swelling, or 3lb weight gain.    furosemide (LASIX) 40 MG tablet Take 1 tablet by mouth once daily.    losartan (COZAAR) 100 MG tablet Take 1 tablet (100 mg total) by mouth once daily. HOLD UNTIL DISCUSSING WITH THE KIDNEY DOCTORS            While in the hospital, will manage blood pressure as follows; Continue home antihypertensive regimen    -Patient taking only Carvedilol as outpatient, did not have losartan in her bag of medications  -continue home Coreg

## 2023-12-24 PROBLEM — K59.00 CONSTIPATION: Status: ACTIVE | Noted: 2023-12-24

## 2023-12-24 LAB
ALBUMIN SERPL BCP-MCNC: 2.8 G/DL (ref 3.5–5.2)
ANION GAP SERPL CALC-SCNC: 13 MMOL/L (ref 8–16)
BUN SERPL-MCNC: 55 MG/DL (ref 6–20)
BUN SERPL-MCNC: 55 MG/DL (ref 6–20)
BUN SERPL-MCNC: 58 MG/DL (ref 6–20)
CALCIUM SERPL-MCNC: 8.6 MG/DL (ref 8.7–10.5)
CHLORIDE SERPL-SCNC: 94 MMOL/L (ref 95–110)
CHLORIDE SERPL-SCNC: 96 MMOL/L (ref 95–110)
CHLORIDE SERPL-SCNC: 96 MMOL/L (ref 95–110)
CO2 SERPL-SCNC: 31 MMOL/L (ref 23–29)
CO2 SERPL-SCNC: 31 MMOL/L (ref 23–29)
CO2 SERPL-SCNC: 35 MMOL/L (ref 23–29)
CREAT SERPL-MCNC: 3 MG/DL (ref 0.5–1.4)
CREAT SERPL-MCNC: 3 MG/DL (ref 0.5–1.4)
CREAT SERPL-MCNC: 3.5 MG/DL (ref 0.5–1.4)
EST. GFR  (NO RACE VARIABLE): 14.9 ML/MIN/1.73 M^2
EST. GFR  (NO RACE VARIABLE): 17.9 ML/MIN/1.73 M^2
EST. GFR  (NO RACE VARIABLE): 17.9 ML/MIN/1.73 M^2
GLUCOSE SERPL-MCNC: 109 MG/DL (ref 70–110)
GLUCOSE SERPL-MCNC: 109 MG/DL (ref 70–110)
GLUCOSE SERPL-MCNC: 95 MG/DL (ref 70–110)
MAGNESIUM SERPL-MCNC: 2.4 MG/DL (ref 1.6–2.6)
MAGNESIUM SERPL-MCNC: 2.5 MG/DL (ref 1.6–2.6)
PHOSPHATE SERPL-MCNC: 3.8 MG/DL (ref 2.7–4.5)
POTASSIUM SERPL-SCNC: 3.7 MMOL/L (ref 3.5–5.1)
POTASSIUM SERPL-SCNC: 3.9 MMOL/L (ref 3.5–5.1)
POTASSIUM SERPL-SCNC: 3.9 MMOL/L (ref 3.5–5.1)
SODIUM SERPL-SCNC: 140 MMOL/L (ref 136–145)
SODIUM SERPL-SCNC: 140 MMOL/L (ref 136–145)
SODIUM SERPL-SCNC: 142 MMOL/L (ref 136–145)

## 2023-12-24 PROCEDURE — 80048 BASIC METABOLIC PNL TOTAL CA: CPT | Performed by: STUDENT IN AN ORGANIZED HEALTH CARE EDUCATION/TRAINING PROGRAM

## 2023-12-24 PROCEDURE — 36415 COLL VENOUS BLD VENIPUNCTURE: CPT | Performed by: STUDENT IN AN ORGANIZED HEALTH CARE EDUCATION/TRAINING PROGRAM

## 2023-12-24 PROCEDURE — 83735 ASSAY OF MAGNESIUM: CPT | Performed by: STUDENT IN AN ORGANIZED HEALTH CARE EDUCATION/TRAINING PROGRAM

## 2023-12-24 PROCEDURE — 25000003 PHARM REV CODE 250: Performed by: NURSE PRACTITIONER

## 2023-12-24 PROCEDURE — 63600175 PHARM REV CODE 636 W HCPCS: Performed by: HOSPITALIST

## 2023-12-24 PROCEDURE — 20600001 HC STEP DOWN PRIVATE ROOM

## 2023-12-24 PROCEDURE — 25000003 PHARM REV CODE 250: Performed by: HOSPITALIST

## 2023-12-24 PROCEDURE — 25000003 PHARM REV CODE 250: Performed by: STUDENT IN AN ORGANIZED HEALTH CARE EDUCATION/TRAINING PROGRAM

## 2023-12-24 PROCEDURE — 63600175 PHARM REV CODE 636 W HCPCS: Performed by: STUDENT IN AN ORGANIZED HEALTH CARE EDUCATION/TRAINING PROGRAM

## 2023-12-24 PROCEDURE — 80069 RENAL FUNCTION PANEL: CPT | Performed by: STUDENT IN AN ORGANIZED HEALTH CARE EDUCATION/TRAINING PROGRAM

## 2023-12-24 RX ORDER — CARVEDILOL 3.12 MG/1
3.12 TABLET ORAL 2 TIMES DAILY WITH MEALS
Status: DISCONTINUED | OUTPATIENT
Start: 2023-12-24 | End: 2023-12-25 | Stop reason: HOSPADM

## 2023-12-24 RX ORDER — METHOCARBAMOL 750 MG/1
750 TABLET, FILM COATED ORAL 4 TIMES DAILY PRN
Status: DISCONTINUED | OUTPATIENT
Start: 2023-12-24 | End: 2023-12-25 | Stop reason: HOSPADM

## 2023-12-24 RX ORDER — FUROSEMIDE 10 MG/ML
80 INJECTION INTRAMUSCULAR; INTRAVENOUS ONCE
Status: COMPLETED | OUTPATIENT
Start: 2023-12-24 | End: 2023-12-24

## 2023-12-24 RX ORDER — POTASSIUM CHLORIDE 750 MG/1
10 CAPSULE, EXTENDED RELEASE ORAL ONCE
Status: COMPLETED | OUTPATIENT
Start: 2023-12-24 | End: 2023-12-24

## 2023-12-24 RX ADMIN — FUROSEMIDE 80 MG: 10 INJECTION, SOLUTION INTRAVENOUS at 02:12

## 2023-12-24 RX ADMIN — HEPARIN SODIUM 5000 UNITS: 5000 INJECTION INTRAVENOUS; SUBCUTANEOUS at 09:12

## 2023-12-24 RX ADMIN — HEPARIN SODIUM 5000 UNITS: 5000 INJECTION INTRAVENOUS; SUBCUTANEOUS at 02:12

## 2023-12-24 RX ADMIN — ACETAMINOPHEN 650 MG: 325 TABLET ORAL at 08:12

## 2023-12-24 RX ADMIN — SODIUM BICARBONATE 650 MG TABLET 650 MG: at 08:12

## 2023-12-24 RX ADMIN — POTASSIUM CHLORIDE 10 MEQ: 10 CAPSULE, COATED, EXTENDED RELEASE ORAL at 09:12

## 2023-12-24 RX ADMIN — HEPARIN SODIUM 5000 UNITS: 5000 INJECTION INTRAVENOUS; SUBCUTANEOUS at 06:12

## 2023-12-24 RX ADMIN — CEFTRIAXONE 1 G: 1 INJECTION, POWDER, FOR SOLUTION INTRAMUSCULAR; INTRAVENOUS at 08:12

## 2023-12-24 RX ADMIN — SENNOSIDES AND DOCUSATE SODIUM 1 TABLET: 8.6; 5 TABLET ORAL at 09:12

## 2023-12-24 RX ADMIN — ASPIRIN 81 MG: 81 TABLET, COATED ORAL at 08:12

## 2023-12-24 RX ADMIN — SEVELAMER CARBONATE 800 MG: 800 TABLET, FILM COATED ORAL at 11:12

## 2023-12-24 RX ADMIN — SODIUM BICARBONATE 650 MG TABLET 650 MG: at 09:12

## 2023-12-24 RX ADMIN — CARVEDILOL 6.25 MG: 6.25 TABLET, FILM COATED ORAL at 08:12

## 2023-12-24 RX ADMIN — METHOCARBAMOL 750 MG: 750 TABLET ORAL at 09:12

## 2023-12-24 RX ADMIN — CARVEDILOL 3.12 MG: 3.12 TABLET, FILM COATED ORAL at 04:12

## 2023-12-24 RX ADMIN — ATORVASTATIN CALCIUM 40 MG: 40 TABLET, FILM COATED ORAL at 08:12

## 2023-12-24 RX ADMIN — SEVELAMER CARBONATE 800 MG: 800 TABLET, FILM COATED ORAL at 04:12

## 2023-12-24 RX ADMIN — SEVELAMER CARBONATE 800 MG: 800 TABLET, FILM COATED ORAL at 08:12

## 2023-12-24 RX ADMIN — POLYETHYLENE GLYCOL 3350 17 G: 17 POWDER, FOR SOLUTION ORAL at 08:12

## 2023-12-24 RX ADMIN — SENNOSIDES AND DOCUSATE SODIUM 1 TABLET: 8.6; 5 TABLET ORAL at 08:12

## 2023-12-24 NOTE — ASSESSMENT & PLAN NOTE
Patient is identified as having Diastolic (HFpEF) heart failure that is Acute on chronic. CHF is currently uncontrolled due to Continued edema of extremities, Rales/crackles on pulmonary exam, and Pulmonary edema/pleural effusion on CXR. . Continue Beta Blocker and Furosemide and monitor clinical status closely. Monitor on telemetry. Patient is off CHF pathway.  Monitor strict Is&Os and daily weights.  Place on fluid restriction of 1.5 L. Cardiology has not been consulted. Continue to stress to patient importance of self efficacy and  on diet for CHF. Last BNP reviewed- and noted below   Recent Labs   Lab 12/22/23  1704   BNP 3,561*       -Obtain Repeat ECHO to evaluate for worsening valvular dysfunction  -continue diuresis with IV Lasix 80 mg daily

## 2023-12-24 NOTE — ASSESSMENT & PLAN NOTE
Creatine stable for now. BMP reviewed- noted Estimated Creatinine Clearance: 20.4 mL/min (A) (based on SCr of 3 mg/dL (H)). according to latest data. Based on current GFR, CKD stage is stage 4 - GFR 15-29.  Monitor UOP and serial BMP and adjust therapy as needed. Renally dose meds. Avoid nephrotoxic medications and procedures.    -continue oral sevelamer and oral bicarbonate  -PC ratio 5.82  -monitor renal function closely in setting of diuresis  -suspect baseline 3-3.4    Lab Results   Component Value Date    CREATININE 3.0 (H) 12/24/2023    CREATININE 3.0 (H) 12/24/2023

## 2023-12-24 NOTE — PROGRESS NOTES
[facility] - Cardiology [facility] Medicine  Progress Note    Patient Name: Xena Vera  MRN: [id number]  Patient Class: IP- Inpatient   Admission Date: 12/22/2023  Length of Stay: 2 days  Attending Physician: Minda Molina MD  Primary Care Provider: Tami Villeda FNP        Subjective:     Principal Problem:Acute on chronic heart failure with preserved ejection fraction (HFpEF)        HPI:  [facility]  USED - [facility] #[id number]    54 y/o  Woman, with CKD Stage 4, HFpEF, Mitral Valve Regurgitation presents to the ED with shortness of breath.  She reports a subacute history of dyspnea, since thanksgiving time approx 4 weeks ago.  She has new symptoms of orthopnea, increased peripheral edema.  She denies chest pain, pleuritic pain, no cough.   Reports adherence to home medications, missed one dose of oral furosemide around thanksgiving.   She has been trying to exercise, walking in a park, but has been limited due to developing dyspnea on exertion.     Symptoms worse today - she was in kitchen cutting meat, and this activity caused dyspnea.      Today while at home has felt more weak and had the onset of epigastric abdominal pain while washing dishes, pain persists described as mild.   She denies any dysuria/urinary frequency.  She noted some urinary urgency a few weeks ago but has been drinking cranberry juice and symptoms improved.  She denies any recent hospitalizations    Former smoker @ age 19 x 1year, no active etoh or drug use.  She is not employed, lives with her 2 daughters.   She receives outpatient care @ [facility]      Overview/Hospital Course:  Patient admitted to hospital medicine C for management of acute heart failure.  Initiated on IV diuresis.  Urine culture with E coli.  Initiated on Rocephin.    Interval History:  Patient seen and examined at bedside.    No acute events overnight.  Reports that her shortness of breath is improving; swelling a little  bit better. Still endorses orthopnea.  Also complaining of lower abdominal pain.  No recent BM.  Family present at bedside      services were utilized during this encounter.      Review of Systems   Constitutional:  Positive for activity change. Negative for diaphoresis and fever.   HENT:  Negative for congestion, sore throat and trouble swallowing.    Eyes:  Negative for visual disturbance.   Respiratory:  Positive for shortness of breath. Negative for cough and wheezing.    Cardiovascular:  Positive for leg swelling. Negative for chest pain and palpitations.   Gastrointestinal:  Positive for constipation. Negative for abdominal pain, diarrhea, nausea and vomiting.   Genitourinary:  Negative for dysuria and urgency.   Musculoskeletal:  Negative for arthralgias and myalgias.   Skin:  Negative for rash.   Neurological:  Positive for weakness. Negative for dizziness, light-headedness and headaches.   Psychiatric/Behavioral:  Negative for confusion and decreased concentration.        Objective:    Temp: 98.1 °F (36.7 °C) (12/24/23 1127)  Pulse: 64 (12/24/23 1127)  Resp: 18 (12/24/23 1127)  BP: (!) 114/58 (12/24/23 1127)  SpO2: (!) 92 % (12/24/23 1127)    Weight: 72.4 kg (159 lb 9.8 oz) (12/24/23 0500)    Body mass index is 28.27 kg/m².      Intake/Output Summary (Last 24 hours) at 12/24/2023 1301  Last data filed at 12/24/2023 0822  Gross per 24 hour   Intake 480 ml   Output 1250 ml   Net -770 ml         Physical Exam  Vitals and nursing note reviewed.   Constitutional:       General: She is not in acute distress.     Appearance: She is obese. She is not toxic-appearing or diaphoretic.   HENT:      Head: Normocephalic and atraumatic.      Nose: Nose normal.      Mouth/Throat:      Pharynx: Oropharynx is clear.   Eyes:      General: No scleral icterus.        Right eye: No discharge.         Left eye: No discharge.   Cardiovascular:      Rate and Rhythm: Normal rate and regular rhythm.      Heart sounds:  Murmur (3/6 - radiates to back) heard.      Comments: Edema to upper shin - improving  Pulmonary:      Effort: Pulmonary effort is normal. No respiratory distress.      Breath sounds: No wheezing.      Comments: Decreased basilar breathsounds  Abdominal:      General: Abdomen is protuberant. There is no distension.      Palpations: Abdomen is soft.   Musculoskeletal:      Cervical back: Normal range of motion and neck supple.      Right lower leg: Pitting Edema present.      Left lower leg: Pitting Edema present.   Skin:     General: Skin is warm and dry.      Capillary Refill: Capillary refill takes less than 2 seconds.   Neurological:      Mental Status: She is alert and oriented to person, place, and time.   Psychiatric:         Behavior: Behavior normal.         Significant Labs: All pertinent labs within the past 24 hours have been reviewed.    Recent Results (from the past 24 hour(s))   Basic metabolic panel    Collection Time: 12/23/23  1:57 PM   Result Value Ref Range    Sodium 140 136 - 145 mmol/L    Potassium 4.0 3.5 - 5.1 mmol/L    Chloride 98 95 - 110 mmol/L    CO2 28 23 - 29 mmol/L    Glucose 141 (H) 70 - 110 mg/dL    BUN 54 (H) 6 - 20 mg/dL    Creatinine 2.8 (H) 0.5 - 1.4 mg/dL    Calcium 8.6 (L) 8.7 - 10.5 mg/dL    Anion Gap 14 8 - 16 mmol/L    eGFR 19.5 (A) >60 mL/min/1.73 m^2   Magnesium    Collection Time: 12/24/23  1:13 AM   Result Value Ref Range    Magnesium 2.5 1.6 - 2.6 mg/dL   Renal Function Panel    Collection Time: 12/24/23  1:13 AM   Result Value Ref Range    Glucose 109 70 - 110 mg/dL    Sodium 140 136 - 145 mmol/L    Potassium 3.9 3.5 - 5.1 mmol/L    Chloride 96 95 - 110 mmol/L    CO2 31 (H) 23 - 29 mmol/L    BUN 55 (H) 6 - 20 mg/dL    Calcium 8.6 (L) 8.7 - 10.5 mg/dL    Creatinine 3.0 (H) 0.5 - 1.4 mg/dL    Albumin 2.8 (L) 3.5 - 5.2 g/dL    Phosphorus 3.8 2.7 - 4.5 mg/dL    eGFR 17.9 (A) >60 mL/min/1.73 m^2    Anion Gap 13 8 - 16 mmol/L   Basic metabolic panel    Collection Time:  12/24/23  1:13 AM   Result Value Ref Range    Sodium 140 136 - 145 mmol/L    Potassium 3.9 3.5 - 5.1 mmol/L    Chloride 96 95 - 110 mmol/L    CO2 31 (H) 23 - 29 mmol/L    Glucose 109 70 - 110 mg/dL    BUN 55 (H) 6 - 20 mg/dL    Creatinine 3.0 (H) 0.5 - 1.4 mg/dL    Calcium 8.6 (L) 8.7 - 10.5 mg/dL    Anion Gap 13 8 - 16 mmol/L    eGFR 17.9 (A) >60 mL/min/1.73 m^2       Significant Imaging: I have reviewed all pertinent imaging results/findings within the past 24 hours.    Imaging Results              X-Ray Chest PA And Lateral (Final result)  Result time 12/22/23 18:21:28      Final result by Magan Byrd MD (12/22/23 18:21:28)                   Impression:      Findings suggesting pulmonary edema/CHF pattern to a lesser degree from prior, with suspected trace residual pleural effusions bilaterally and overall improved aeration on the left.  Left basilar atelectasis/infiltrate and interstitial type pneumonia not excluded.      Electronically signed by: Magan Byrd MD  Date:    12/22/2023  Time:    18:21               Narrative:    EXAMINATION:  XR CHEST PA AND LATERAL    CLINICAL HISTORY:  Shortness of breath    TECHNIQUE:  PA and lateral views of the chest were performed.    COMPARISON:  Chest radiograph 06/24/2023, CT renal stone study 07/05/2022    FINDINGS:  Patient is rotated.  Resolution is limited by body habitus with underpenetration.    Cardiomediastinal silhouette is midline and prominent similar to prior.  Pulmonary vasculature and hilar contours are unchanged.  Similar slight nonspecific elevation of the right hemidiaphragm.  Bilateral diffuse nonspecific interstitial coarsening with a perihilar and basilar distribution improved from prior but not yet resolved.  Suspected trace bilateral pleural effusions noting improved aeration on the left.  No pneumothorax.  Scattered bandlike opacities throughout the bilateral mid to lower lung zones consistent with platelike scarring versus atelectasis.  No  acute osseous process seen.  Right upper quadrant surgical clips again noted.                                        Assessment/Plan:      * Acute on chronic heart failure with preserved ejection fraction (HFpEF)  Patient is identified as having Diastolic (HFpEF) heart failure that is Acute on chronic. CHF is currently uncontrolled due to Continued edema of extremities, Rales/crackles on pulmonary exam, and Pulmonary edema/pleural effusion on CXR. . Continue Beta Blocker and Furosemide and monitor clinical status closely. Monitor on telemetry. Patient is off CHF pathway.  Monitor strict Is&Os and daily weights.  Place on fluid restriction of 1.5 L. Cardiology has not been consulted. Continue to stress to patient importance of self efficacy and  on diet for CHF. Last BNP reviewed- and noted below   Recent Labs   Lab 12/22/23  1704   BNP 3,561*       -Obtain Repeat ECHO to evaluate for worsening valvular dysfunction  -continue diuresis with IV Lasix 80 mg daily      Constipation  -MiraLax daily and senna doc b.i.d.      Pyuria  She denies any acute symptoms of cystitis on admission- f/u pending Urine culture and gram stain - if positive or >100k CFU consider treatment    Now with abdominal pain and urine culture remarkable for E coli   Initiate Rocephin      Prolonged QT interval  Avoid QT prolonging medications       Elevated troponin I level  -0.361-> 0.270  -no acute anginal symptoms  -likely elevated due to heart failure exacerbation  -some chronic non-specific t-wave flattening/inversion in precordial leads on EKG  -telemetry monitoring      Mitral valve regurgitation  Surveillance ECHO ordered and pending  -consider cardiology consult pending outcome, may need to see interventional cardiology      Essential hypertension  Chronic, controlled. Latest blood pressure and vitals reviewed-     Temp:  [97.5 °F (36.4 °C)-98.1 °F (36.7 °C)]   Pulse:  [61-68]   Resp:  [16-18]   BP: (112-134)/(58-86)   SpO2:  [92  %-100 %] .   Home meds for hypertension were reviewed and noted below.   Hypertension Medications               carvediloL (COREG) 6.25 MG tablet Take 1 tablet (6.25 mg total) by mouth 2 (two) times daily with meals.    furosemide (LASIX) 20 MG tablet Take 2 tablets (40 mg total) by mouth once daily. Take an additional 40mg lasix in the afternoon as needed for worsening shortness of breath, swelling, or 3lb weight gain.    furosemide (LASIX) 40 MG tablet Take 1 tablet by mouth once daily.    losartan (COZAAR) 100 MG tablet Take 1 tablet (100 mg total) by mouth once daily. HOLD UNTIL DISCUSSING WITH THE KIDNEY DOCTORS            While in the hospital, will manage blood pressure as follows; Continue home antihypertensive regimen    -Patient taking only Carvedilol as outpatient, did not have losartan in her bag of medications  -continue home Coreg  -Will continue to monitor blood pressure trend closely and adjust antihypertensive regimen as clinically indicated and tolerated.      Stage 4 chronic kidney disease  Creatine stable for now. BMP reviewed- noted Estimated Creatinine Clearance: 20.4 mL/min (A) (based on SCr of 3 mg/dL (H)). according to latest data. Based on current GFR, CKD stage is stage 4 - GFR 15-29.  Monitor UOP and serial BMP and adjust therapy as needed. Renally dose meds. Avoid nephrotoxic medications and procedures.    -continue oral sevelamer and oral bicarbonate  -PC ratio 5.82  -monitor renal function closely in setting of diuresis  -suspect baseline 3-3.4    Lab Results   Component Value Date    CREATININE 3.0 (H) 12/24/2023    CREATININE 3.0 (H) 12/24/2023           VTE Risk Mitigation (From admission, onward)           Ordered     heparin (porcine) injection 5,000 Units  Every 8 hours         12/22/23 2253     IP VTE HIGH RISK PATIENT  Once         12/22/23 2253     Place sequential compression device  Until discontinued         12/22/23 2253                    Discharge Planning   RILEY:  12/25/2023     Code Status: Full Code   Is the patient medically ready for discharge?: No    Reason for patient still in hospital (select all that apply): Patient trending condition, Treatment, and Pending disposition  Discharge Plan A: Home with family                  Minda Molina MD  Department of Hospital Medicine   [facility] - Cardiology Stepdown

## 2023-12-24 NOTE — ASSESSMENT & PLAN NOTE
Chronic, controlled. Latest blood pressure and vitals reviewed-     Temp:  [97.5 °F (36.4 °C)-98.1 °F (36.7 °C)]   Pulse:  [61-68]   Resp:  [16-18]   BP: (112-134)/(58-86)   SpO2:  [92 %-100 %] .   Home meds for hypertension were reviewed and noted below.   Hypertension Medications               carvediloL (COREG) 6.25 MG tablet Take 1 tablet (6.25 mg total) by mouth 2 (two) times daily with meals.    furosemide (LASIX) 20 MG tablet Take 2 tablets (40 mg total) by mouth once daily. Take an additional 40mg lasix in the afternoon as needed for worsening shortness of breath, swelling, or 3lb weight gain.    furosemide (LASIX) 40 MG tablet Take 1 tablet by mouth once daily.    losartan (COZAAR) 100 MG tablet Take 1 tablet (100 mg total) by mouth once daily. HOLD UNTIL DISCUSSING WITH THE KIDNEY DOCTORS            While in the hospital, will manage blood pressure as follows; Continue home antihypertensive regimen    -Patient taking only Carvedilol as outpatient, did not have losartan in her bag of medications  -continue home Coreg  -Will continue to monitor blood pressure trend closely and adjust antihypertensive regimen as clinically indicated and tolerated.

## 2023-12-24 NOTE — SUBJECTIVE & OBJECTIVE
Interval History:  Patient seen and examined at bedside.    No acute events overnight.  Reports that her shortness of breath is improving; swelling a little bit better. Still endorses orthopnea.  Also complaining of lower abdominal pain.  No recent BM.  Family present at bedside      services were utilized during this encounter.      Review of Systems   Constitutional:  Positive for activity change. Negative for diaphoresis and fever.   HENT:  Negative for congestion, sore throat and trouble swallowing.    Eyes:  Negative for visual disturbance.   Respiratory:  Positive for shortness of breath. Negative for cough and wheezing.    Cardiovascular:  Positive for leg swelling. Negative for chest pain and palpitations.   Gastrointestinal:  Positive for constipation. Negative for abdominal pain, diarrhea, nausea and vomiting.   Genitourinary:  Negative for dysuria and urgency.   Musculoskeletal:  Negative for arthralgias and myalgias.   Skin:  Negative for rash.   Neurological:  Positive for weakness. Negative for dizziness, light-headedness and headaches.   Psychiatric/Behavioral:  Negative for confusion and decreased concentration.        Objective:    Temp: 98.1 °F (36.7 °C) (12/24/23 1127)  Pulse: 64 (12/24/23 1127)  Resp: 18 (12/24/23 1127)  BP: (!) 114/58 (12/24/23 1127)  SpO2: (!) 92 % (12/24/23 1127)    Weight: 72.4 kg (159 lb 9.8 oz) (12/24/23 0500)    Body mass index is 28.27 kg/m².      Intake/Output Summary (Last 24 hours) at 12/24/2023 1301  Last data filed at 12/24/2023 0822  Gross per 24 hour   Intake 480 ml   Output 1250 ml   Net -770 ml         Physical Exam  Vitals and nursing note reviewed.   Constitutional:       General: She is not in acute distress.     Appearance: She is obese. She is not toxic-appearing or diaphoretic.   HENT:      Head: Normocephalic and atraumatic.      Nose: Nose normal.      Mouth/Throat:      Pharynx: Oropharynx is clear.   Eyes:      General: No scleral icterus.         Right eye: No discharge.         Left eye: No discharge.   Cardiovascular:      Rate and Rhythm: Normal rate and regular rhythm.      Heart sounds: Murmur (3/6 - radiates to back) heard.      Comments: Edema to upper shin - improving  Pulmonary:      Effort: Pulmonary effort is normal. No respiratory distress.      Breath sounds: No wheezing.      Comments: Decreased basilar breathsounds  Abdominal:      General: Abdomen is protuberant. There is no distension.      Palpations: Abdomen is soft.   Musculoskeletal:      Cervical back: Normal range of motion and neck supple.      Right lower leg: Pitting Edema present.      Left lower leg: Pitting Edema present.   Skin:     General: Skin is warm and dry.      Capillary Refill: Capillary refill takes less than 2 seconds.   Neurological:      Mental Status: She is alert and oriented to person, place, and time.   Psychiatric:         Behavior: Behavior normal.         Significant Labs: All pertinent labs within the past 24 hours have been reviewed.    Recent Results (from the past 24 hour(s))   Basic metabolic panel    Collection Time: 12/23/23  1:57 PM   Result Value Ref Range    Sodium 140 136 - 145 mmol/L    Potassium 4.0 3.5 - 5.1 mmol/L    Chloride 98 95 - 110 mmol/L    CO2 28 23 - 29 mmol/L    Glucose 141 (H) 70 - 110 mg/dL    BUN 54 (H) 6 - 20 mg/dL    Creatinine 2.8 (H) 0.5 - 1.4 mg/dL    Calcium 8.6 (L) 8.7 - 10.5 mg/dL    Anion Gap 14 8 - 16 mmol/L    eGFR 19.5 (A) >60 mL/min/1.73 m^2   Magnesium    Collection Time: 12/24/23  1:13 AM   Result Value Ref Range    Magnesium 2.5 1.6 - 2.6 mg/dL   Renal Function Panel    Collection Time: 12/24/23  1:13 AM   Result Value Ref Range    Glucose 109 70 - 110 mg/dL    Sodium 140 136 - 145 mmol/L    Potassium 3.9 3.5 - 5.1 mmol/L    Chloride 96 95 - 110 mmol/L    CO2 31 (H) 23 - 29 mmol/L    BUN 55 (H) 6 - 20 mg/dL    Calcium 8.6 (L) 8.7 - 10.5 mg/dL    Creatinine 3.0 (H) 0.5 - 1.4 mg/dL    Albumin 2.8 (L) 3.5 - 5.2  g/dL    Phosphorus 3.8 2.7 - 4.5 mg/dL    eGFR 17.9 (A) >60 mL/min/1.73 m^2    Anion Gap 13 8 - 16 mmol/L   Basic metabolic panel    Collection Time: 12/24/23  1:13 AM   Result Value Ref Range    Sodium 140 136 - 145 mmol/L    Potassium 3.9 3.5 - 5.1 mmol/L    Chloride 96 95 - 110 mmol/L    CO2 31 (H) 23 - 29 mmol/L    Glucose 109 70 - 110 mg/dL    BUN 55 (H) 6 - 20 mg/dL    Creatinine 3.0 (H) 0.5 - 1.4 mg/dL    Calcium 8.6 (L) 8.7 - 10.5 mg/dL    Anion Gap 13 8 - 16 mmol/L    eGFR 17.9 (A) >60 mL/min/1.73 m^2       Significant Imaging: I have reviewed all pertinent imaging results/findings within the past 24 hours.    Imaging Results              X-Ray Chest PA And Lateral (Final result)  Result time 12/22/23 18:21:28      Final result by Magan Byrd MD (12/22/23 18:21:28)                   Impression:      Findings suggesting pulmonary edema/CHF pattern to a lesser degree from prior, with suspected trace residual pleural effusions bilaterally and overall improved aeration on the left.  Left basilar atelectasis/infiltrate and interstitial type pneumonia not excluded.      Electronically signed by: Magan Byrd MD  Date:    12/22/2023  Time:    18:21               Narrative:    EXAMINATION:  XR CHEST PA AND LATERAL    CLINICAL HISTORY:  Shortness of breath    TECHNIQUE:  PA and lateral views of the chest were performed.    COMPARISON:  Chest radiograph 06/24/2023, CT renal stone study 07/05/2022    FINDINGS:  Patient is rotated.  Resolution is limited by body habitus with underpenetration.    Cardiomediastinal silhouette is midline and prominent similar to prior.  Pulmonary vasculature and hilar contours are unchanged.  Similar slight nonspecific elevation of the right hemidiaphragm.  Bilateral diffuse nonspecific interstitial coarsening with a perihilar and basilar distribution improved from prior but not yet resolved.  Suspected trace bilateral pleural effusions noting improved aeration on the left.  No  pneumothorax.  Scattered bandlike opacities throughout the bilateral mid to lower lung zones consistent with platelike scarring versus atelectasis.  No acute osseous process seen.  Right upper quadrant surgical clips again noted.

## 2023-12-24 NOTE — ASSESSMENT & PLAN NOTE
She denies any acute symptoms of cystitis on admission- f/u pending Urine culture and gram stain - if positive or >100k CFU consider treatment    Now with abdominal pain and urine culture remarkable for E coli   Initiate Rocephin

## 2023-12-25 VITALS
HEART RATE: 70 BPM | SYSTOLIC BLOOD PRESSURE: 124 MMHG | RESPIRATION RATE: 20 BRPM | TEMPERATURE: 98 F | BODY MASS INDEX: 28.29 KG/M2 | DIASTOLIC BLOOD PRESSURE: 80 MMHG | HEIGHT: 63 IN | WEIGHT: 159.63 LBS | OXYGEN SATURATION: 93 %

## 2023-12-25 LAB
ALBUMIN SERPL BCP-MCNC: 2.9 G/DL (ref 3.5–5.2)
ANION GAP SERPL CALC-SCNC: 13 MMOL/L (ref 8–16)
BACTERIA UR CULT: ABNORMAL
BUN SERPL-MCNC: 57 MG/DL (ref 6–20)
CALCIUM SERPL-MCNC: 8.6 MG/DL (ref 8.7–10.5)
CHLORIDE SERPL-SCNC: 93 MMOL/L (ref 95–110)
CO2 SERPL-SCNC: 32 MMOL/L (ref 23–29)
CREAT SERPL-MCNC: 3.3 MG/DL (ref 0.5–1.4)
EST. GFR  (NO RACE VARIABLE): 16 ML/MIN/1.73 M^2
GLUCOSE SERPL-MCNC: 100 MG/DL (ref 70–110)
MAGNESIUM SERPL-MCNC: 2.3 MG/DL (ref 1.6–2.6)
PHOSPHATE SERPL-MCNC: 4 MG/DL (ref 2.7–4.5)
POTASSIUM SERPL-SCNC: 3.8 MMOL/L (ref 3.5–5.1)
SODIUM SERPL-SCNC: 138 MMOL/L (ref 136–145)

## 2023-12-25 PROCEDURE — 63600175 PHARM REV CODE 636 W HCPCS: Performed by: STUDENT IN AN ORGANIZED HEALTH CARE EDUCATION/TRAINING PROGRAM

## 2023-12-25 PROCEDURE — 63600175 PHARM REV CODE 636 W HCPCS: Performed by: HOSPITALIST

## 2023-12-25 PROCEDURE — 25000003 PHARM REV CODE 250: Performed by: STUDENT IN AN ORGANIZED HEALTH CARE EDUCATION/TRAINING PROGRAM

## 2023-12-25 PROCEDURE — 36415 COLL VENOUS BLD VENIPUNCTURE: CPT | Performed by: STUDENT IN AN ORGANIZED HEALTH CARE EDUCATION/TRAINING PROGRAM

## 2023-12-25 PROCEDURE — 25000003 PHARM REV CODE 250: Performed by: HOSPITALIST

## 2023-12-25 PROCEDURE — 80069 RENAL FUNCTION PANEL: CPT | Performed by: STUDENT IN AN ORGANIZED HEALTH CARE EDUCATION/TRAINING PROGRAM

## 2023-12-25 PROCEDURE — 83735 ASSAY OF MAGNESIUM: CPT | Performed by: STUDENT IN AN ORGANIZED HEALTH CARE EDUCATION/TRAINING PROGRAM

## 2023-12-25 RX ORDER — FUROSEMIDE 40 MG/1
40 TABLET ORAL 2 TIMES DAILY
Qty: 60 TABLET | Refills: 1 | Status: ON HOLD | OUTPATIENT
Start: 2023-12-25

## 2023-12-25 RX ORDER — CARVEDILOL 3.12 MG/1
6.25 TABLET ORAL 2 TIMES DAILY WITH MEALS
Qty: 60 TABLET | Refills: 1 | Status: ON HOLD | OUTPATIENT
Start: 2023-12-25

## 2023-12-25 RX ORDER — POTASSIUM CHLORIDE 750 MG/1
30 CAPSULE, EXTENDED RELEASE ORAL ONCE
Status: COMPLETED | OUTPATIENT
Start: 2023-12-25 | End: 2023-12-25

## 2023-12-25 RX ORDER — CARVEDILOL 3.12 MG/1
6.25 TABLET ORAL 2 TIMES DAILY WITH MEALS
Qty: 60 TABLET | Refills: 1 | Status: SHIPPED | OUTPATIENT
Start: 2023-12-25 | End: 2023-12-25

## 2023-12-25 RX ORDER — FUROSEMIDE 40 MG/1
40 TABLET ORAL 2 TIMES DAILY
Qty: 60 TABLET | Refills: 1 | Status: SHIPPED | OUTPATIENT
Start: 2023-12-25 | End: 2023-12-25

## 2023-12-25 RX ORDER — CIPROFLOXACIN 500 MG/1
500 TABLET ORAL EVERY 12 HOURS
Qty: 10 TABLET | Refills: 0 | Status: SHIPPED | OUTPATIENT
Start: 2023-12-25 | End: 2023-12-25

## 2023-12-25 RX ORDER — CIPROFLOXACIN 500 MG/1
500 TABLET ORAL DAILY
Qty: 5 TABLET | Refills: 0 | Status: SHIPPED | OUTPATIENT
Start: 2023-12-26 | End: 2023-12-31

## 2023-12-25 RX ADMIN — SEVELAMER CARBONATE 800 MG: 800 TABLET, FILM COATED ORAL at 12:12

## 2023-12-25 RX ADMIN — HEPARIN SODIUM 5000 UNITS: 5000 INJECTION INTRAVENOUS; SUBCUTANEOUS at 06:12

## 2023-12-25 RX ADMIN — CEFTRIAXONE 1 G: 1 INJECTION, POWDER, FOR SOLUTION INTRAMUSCULAR; INTRAVENOUS at 07:12

## 2023-12-25 RX ADMIN — POTASSIUM CHLORIDE 30 MEQ: 10 CAPSULE, COATED, EXTENDED RELEASE ORAL at 08:12

## 2023-12-25 RX ADMIN — ACETAMINOPHEN 650 MG: 325 TABLET ORAL at 06:12

## 2023-12-25 RX ADMIN — SENNOSIDES AND DOCUSATE SODIUM 1 TABLET: 8.6; 5 TABLET ORAL at 08:12

## 2023-12-25 RX ADMIN — SEVELAMER CARBONATE 800 MG: 800 TABLET, FILM COATED ORAL at 07:12

## 2023-12-25 RX ADMIN — ATORVASTATIN CALCIUM 40 MG: 40 TABLET, FILM COATED ORAL at 08:12

## 2023-12-25 RX ADMIN — CARVEDILOL 3.12 MG: 3.12 TABLET, FILM COATED ORAL at 07:12

## 2023-12-25 RX ADMIN — SODIUM BICARBONATE 650 MG TABLET 650 MG: at 08:12

## 2023-12-25 RX ADMIN — ASPIRIN 81 MG: 81 TABLET, COATED ORAL at 08:12

## 2023-12-25 NOTE — NURSING
Patient is AAOX4, VSS, NAD. Discharge instructions reviewed and explained. Patient and daughters verbalized understanding. Tele monitor and PIV removed. Patient is to be escorted via wheel chair to vehicle by a transporter.

## 2023-12-25 NOTE — DISCHARGE SUMMARY
[facility] Medicine  Discharge Summary      Patient Name: Xena Vera  MRN: [id number]  CORNEL: [id number]  Patient Class: IP- Inpatient  Admission Date: 12/22/2023  Hospital Length of Stay: 3 days  Discharge Date and Time:  12/25/2023 12:07 PM  Attending Physician: Minda Molina MD   Discharging Provider: Minda Molina MD  Primary Care Provider: Tami Villeda FNP  [facility] Medicine Team: [facility] HOSP MED C Minda Molina MD  Primary Care Team: [facility]    HPI:   [facility]  USED - GLORY #[id number]    54 y/o  Woman, with CKD Stage 4, HFpEF, Mitral Valve Regurgitation presents to the ED with shortness of breath.  She reports a subacute history of dyspnea, since thanksgiving time approx 4 weeks ago.  She has new symptoms of orthopnea, increased peripheral edema.  She denies chest pain, pleuritic pain, no cough.   Reports adherence to home medications, missed one dose of oral furosemide around thanksgiving.   She has been trying to exercise, walking in a park, but has been limited due to developing dyspnea on exertion.     Symptoms worse today - she was in kitchen cutting meat, and this activity caused dyspnea.      Today while at home has felt more weak and had the onset of epigastric abdominal pain while washing dishes, pain persists described as mild.   She denies any dysuria/urinary frequency.  She noted some urinary urgency a few weeks ago but has been drinking cranberry juice and symptoms improved.  She denies any recent hospitalizations    Former smoker @ age 19 x 1year, no active etoh or drug use.  She is not employed, lives with her 2 daughters.   She receives outpatient care @ [facility]      * No surgery found *      Hospital Course:   Patient admitted to hospital medicine C for management of acute heart failure.  Initiated on IV diuresis.  Urine culture with E coli.  Initiated on Rocephin which was deescalated to ciprofloxacin based on  sensitivities.  Shortness of breath and volume status with improvement on IV diuresis.  Recent echo on 06/25/2023 with EF 63%, grade 2 diastolic dysfunction, severe mitral regurgitation.  Creatinine up trending however still within her baseline range 3-3.4.  IV diuresis transition to p.o. upon discharge. Patient without further hospitalization needs.  She will need close follow-up with Nephrology to continue to monitor her renal function and for consideration of initiation of Ace or Arb.  She will also need close follow-up with Cardiology to address her mitral valve regurgitation.  Patient medically stable for discharge.  Return precautions advised; patient's questions answered.  Patient in agreement with discharge plan.   services were used for all encounters.    Vitals:    12/25/23 0730 12/25/23 0800 12/25/23 1101 12/25/23 1138   BP: (!) 124/91   124/80   BP Location: Right arm   Left arm   Patient Position: Sitting   Sitting   Pulse: 67  79 70   Resp: 18   20   Temp: 98.2 °F (36.8 °C)   98 °F (36.7 °C)   TempSrc: Oral Oral  Oral   SpO2: (!) 94%   (!) 93%   Weight:       Height:         Physical Exam  Vitals and nursing note reviewed.   Constitutional:       General: She is not in acute distress.     Appearance: She is obese. She is not toxic-appearing or diaphoretic.   HENT:      Head: Normocephalic and atraumatic.      Nose: Nose normal.      Mouth/Throat:      Pharynx: Oropharynx is clear.   Eyes:      General: No scleral icterus.        Right eye: No discharge.         Left eye: No discharge.   Cardiovascular:      Rate and Rhythm: Normal rate and regular rhythm.      Heart sounds: Murmur (3/6 - radiates to back) heard.      Comments: Edema improved  Pulmonary:      Effort: Pulmonary effort is normal. No respiratory distress.      Breath sounds: No wheezing. CTAB.  Abdominal:      General: Abdomen is protuberant. There is no distension.      Palpations: Abdomen is soft.   Skin:     General: Skin is  warm and dry.      Capillary Refill: Capillary refill takes less than 2 seconds.   Neurological:      Mental Status: She is alert and oriented to person, place, and time.   Psychiatric:         Behavior: Behavior normal.        Goals of Care Treatment Preferences:  Code Status: Full Code      Consults:     No new Assessment & Plan notes have been filed under this hospital service since the last note was generated.  Service: Hospital Medicine    Final Active Diagnoses:    Diagnosis Date Noted POA    PRINCIPAL PROBLEM:  Acute on chronic heart failure with preserved ejection fraction (HFpEF) [I50.33] 12/22/2023 Yes    Constipation [K59.00] 12/24/2023 Yes    Elevated troponin I level [R79.89] 12/22/2023 Yes    Prolonged QT interval [R94.31] 12/22/2023 Yes    Pyuria [R82.81] 12/22/2023 Yes    Mitral valve regurgitation [I34.0] 06/26/2023 Yes    Stage 4 chronic kidney disease [N18.4] 06/24/2023 Yes    Essential hypertension [I10] 06/24/2023 Yes      Problems Resolved During this Admission:       Discharged Condition: stable    Disposition: Home or Self Care    Follow Up:    Patient Instructions:      Ambulatory referral/consult to Cardiology   Standing Status: Future   Referral Priority: Urgent Referral Type: Consultation   Referral Reason: Specialty Services Required   Requested Specialty: Cardiology   Number of Visits Requested: 1     Ambulatory referral/consult to Nephrology   Standing Status: Future   Referral Priority: Urgent Referral Type: Consultation   Referral Reason: Specialty Services Required   Requested Specialty: Nephrology   Number of Visits Requested: 1     Ambulatory referral/consult to Internal Medicine   Standing Status: Future   Referral Priority: Urgent Referral Type: Consultation   Referral Reason: Specialty Services Required   Requested Specialty: Internal Medicine   Number of Visits Requested: 1     Ambulatory referral/consult to Interventional Cardiology   Standing Status: Future   Referral  "Priority: Urgent Referral Type: Consultation   Referral Reason: Specialty Services Required   Requested Specialty: Interventional Cardiology   Number of Visits Requested: 1     Diet Cardiac     Notify your health care provider if you experience any of the following:  persistent nausea and vomiting or diarrhea     Notify your health care provider if you experience any of the following:  severe uncontrolled pain     Notify your health care provider if you experience any of the following:  redness, tenderness, or signs of infection (pain, swelling, redness, odor or green/yellow discharge around incision site)     Notify your health care provider if you experience any of the following:  difficulty breathing or increased cough     Notify your health care provider if you experience any of the following:  severe persistent headache     Notify your health care provider if you experience any of the following:  persistent dizziness, light-headedness, or visual disturbances     Notify your health care provider if you experience any of the following:  increased confusion or weakness     Activity as tolerated       Significant Diagnostic Studies: Labs: CMP   Recent Labs   Lab 12/24/23  0113 12/24/23  1736 12/25/23  0416     140 142 138   K 3.9  3.9 3.7 3.8   CL 96  96 94* 93*   CO2 31*  31* 35* 32*     109 95 100   BUN 55*  55* 58* 57*   CREATININE 3.0*  3.0* 3.5* 3.3*   CALCIUM 8.6*  8.6* 8.6* 8.6*   ALBUMIN 2.8*  --  2.9*   ANIONGAP 13  13 13 13    and CBC No results for input(s): "WBC", "HGB", "HCT", "PLT" in the last 48 hours.  Microbiology: Urine Culture    Lab Results   Component Value Date    LABURIN ESCHERICHIA COLI  >100,000 cfu/ml   (A) 12/22/2023       Medications:  Reconciled Home Medications:      Medication List        START taking these medications      ciprofloxacin HCl 500 MG tablet  Commonly known as: CIPRO  Take 1 tablet (500 mg total) by mouth Daily. for 5 days  Start taking on: December " 26, 2023            CHANGE how you take these medications      aspirin 81 MG EC tablet  Commonly known as: ECOTRIN  Take 1 tablet by mouth once daily.  What changed: Another medication with the same name was removed. Continue taking this medication, and follow the directions you see here.     carvediloL 3.125 MG tablet  Commonly known as: COREG  Take 2 tablets (6.25 mg total) by mouth 2 (two) times daily with meals.  What changed: medication strength     furosemide 40 MG tablet  Commonly known as: LASIX  Take 1 tablet (40 mg total) by mouth 2 (two) times a day.  What changed:   medication strength  when to take this  additional instructions            CONTINUE taking these medications      atorvastatin 40 MG tablet  Commonly known as: LIPITOR  Take 1 tablet by mouth once daily.     loratadine 10 mg tablet  Commonly known as: CLARITIN  Take 10 mg by mouth once daily. PRN     sevelamer carbonate 800 mg Tab  Commonly known as: RENVELA  Ganado 1 tableta (800 mg en total) por vía oral 3 (shimon) veces al día con las comidas.  (Take 1 tablet (800 mg total) by mouth 3 (three) times daily with meals.)     sodium bicarbonate 650 MG tablet  Ganado cristal tableta (650 mg en total) por vía oral 2 veces al día.  (Take 1 tablet (650 mg total) by mouth 2 (two) times daily.)            STOP taking these medications      losartan 100 MG tablet  Commonly known as: COZAAR              Indwelling Lines/Drains at time of discharge:   Lines/Drains/Airways       None                   Time spent on the discharge of patient: 45 minutes         Minda Molina MD  Department of Hospital Medicine  [facility] - Cardiology Stepdown

## 2023-12-25 NOTE — PLAN OF CARE
Pt educated on fall risk,pt remained free from falls/trauma/injury. Denies chest pain, SOB, palpitations, dizziness, pain, or discomfort. Plan of care reviewed with pt, all questions answered. Potassium replaced. Bed locked in lowest position, call bell within reach, no acute distress noted, will continue to monitor.

## 2023-12-25 NOTE — PLAN OF CARE
[facility] - Cardiology Stepdown  Discharge Final Note    Primary Care Provider: Tami Villeda FNP    Expected Discharge Date: 12/25/2023      Patient cleared for discharge from case management standpoint.    Final Discharge Note (most recent)       Final Note - 12/25/23 1003          Final Note    Assessment Type Final Discharge Note     Anticipated Discharge Disposition Home or Self Care     What phone number can be called within the next 1-3 days to see how you are doing after discharge? [phone]     Hospital Resources/Appts/Education Provided Provided patient/caregiver with written discharge plan information;Provided education on problems/symptoms using teachback        Post-Acute Status    Post-Acute Authorization Other     Other Status No Post-Acute Service Needs     Discharge Delays None known at this time                     Important Message from Medicare

## 2023-12-25 NOTE — DISCHARGE INSTRUCTIONS
Follow-up with your Internal Medicine, heart and kidney doctors.    You may need a procedure to address your mitral valve regurgitation.  In terms of your kidney disease, you may need to start a medication called an ACE-inhibitor or an ARB.  
room air

## 2024-01-30 ENCOUNTER — OFFICE VISIT (OUTPATIENT)
Dept: CARDIOLOGY | Facility: CLINIC | Age: 54
End: 2024-01-30

## 2024-01-30 VITALS
DIASTOLIC BLOOD PRESSURE: 87 MMHG | WEIGHT: 161.38 LBS | HEIGHT: 57 IN | HEART RATE: 71 BPM | OXYGEN SATURATION: 98 % | SYSTOLIC BLOOD PRESSURE: 129 MMHG | BODY MASS INDEX: 34.82 KG/M2

## 2024-01-30 DIAGNOSIS — I34.0 MITRAL VALVE INSUFFICIENCY, UNSPECIFIED ETIOLOGY: ICD-10-CM

## 2024-01-30 PROCEDURE — 99999 PR PBB SHADOW E&M-EST. PATIENT-LVL IV: CPT | Mod: PBBFAC,,, | Performed by: INTERNAL MEDICINE

## 2024-01-30 PROCEDURE — 99205 OFFICE O/P NEW HI 60 MIN: CPT | Mod: S$PBB,,, | Performed by: INTERNAL MEDICINE

## 2024-01-30 PROCEDURE — 99214 OFFICE O/P EST MOD 30 MIN: CPT | Mod: PBBFAC | Performed by: INTERNAL MEDICINE

## 2024-01-30 NOTE — PROGRESS NOTES
PCP - Tami Villeda FNP  Referring Physician: Dori Roldan DNP    Subjective:   Patient ID:  Xena Vera is a 53 y.o. y.o. female who presents for evaluation and treatment of severe MR.    54 y/o  Woman, with CKD Stage 4, HFpEF, severe Mitral Valve Regurgitation and multiple HF admission presents for evaluation for Mitraclip.     She has orthopnea, increased peripheral edema. She denies chest pain, pleuritic pain, no cough.  Reports adherence to home medications, missed one dose of oral furosemide around .  She has been trying to exercise, walking in a park, but has been limited due to developing dyspnea on exertion.      She is a former smoker @ age 19 x 1year, no active etoh or drug use.  She is not employed, lives with her 2 daughters.      Recently admitted for of acute heart failure treated with IV diuresis.      Today patient mentions no angina. She has orthopnea and LE edema. However, she does not follow a low salt diet. No claudication or palpitations. He denies syncope, or near syncope.      History:     Social History     Tobacco Use    Smoking status: Former     Current packs/day: 0.00     Types: Cigarettes     Start date:      Quit date:      Years since quittin.1    Smokeless tobacco: Never    Tobacco comments:     2-3 cigarettes a day   Substance Use Topics    Alcohol use: Never     History reviewed. No pertinent family history.    Meds:   Review of patient's allergies indicates:  No Known Allergies    Current Outpatient Medications:     atorvastatin (LIPITOR) 40 MG tablet, Take 1 tablet by mouth once daily., Disp: , Rfl:     carvediloL (COREG) 3.125 MG tablet, Take 2 tablets (6.25 mg total) by mouth 2 (two) times daily with meals., Disp: 60 tablet, Rfl: 1    furosemide (LASIX) 40 MG tablet, Take 1 tablet (40 mg total) by mouth 2 (two) times a day., Disp: 60 tablet, Rfl: 1    sevelamer carbonate (RENVELA) 800 mg Tab, Take 1 tablet (800 mg total) by mouth 3  "(three) times daily with meals., Disp: 90 tablet, Rfl: 11    sodium bicarbonate 650 MG tablet, Take 1 tablet (650 mg total) by mouth 2 (two) times daily., Disp: 60 tablet, Rfl: 11    aspirin (ECOTRIN) 81 MG EC tablet, Take 1 tablet by mouth once daily., Disp: , Rfl:     loratadine (CLARITIN) 10 mg tablet, Take 10 mg by mouth once daily. PRN, Disp: , Rfl:     Review of Systems   Constitutional:  Negative for chills, fever, malaise/fatigue and weight loss.   HENT:  Negative for ear pain, hearing loss and tinnitus.    Respiratory:  Negative for cough, hemoptysis, sputum production and shortness of breath.    Cardiovascular:  Negative for chest pain, palpitations, orthopnea, claudication, leg swelling and PND.   Gastrointestinal:  Negative for abdominal pain, diarrhea, heartburn, nausea and vomiting.   Genitourinary:  Negative for dysuria and urgency.   Musculoskeletal:  Negative for back pain, myalgias and neck pain.   Neurological:  Negative for dizziness and headaches.   Psychiatric/Behavioral:  Negative for depression.        Objective:     Vitals:    01/30/24 1536 01/30/24 1537   BP: 125/84 129/87   BP Location: Left arm Right arm   Patient Position: Sitting Sitting   BP Method: Large (Automatic) Large (Automatic)   Pulse: 74 71   SpO2: 98% 98%   Weight: 73.2 kg (161 lb 6 oz)    Height: 4' 9" (1.448 m)      Physical Exam  Constitutional:       Appearance: Normal appearance.   Cardiovascular:      Rate and Rhythm: Normal rate and regular rhythm.      Pulses: Normal pulses.           Carotid pulses are 2+ on the right side and 2+ on the left side.       Radial pulses are 2+ on the right side and 2+ on the left side.        Femoral pulses are 2+ on the right side and 2+ on the left side.       Popliteal pulses are 2+ on the right side and 2+ on the left side.        Dorsalis pedis pulses are 2+ on the right side and 2+ on the left side.        Posterior tibial pulses are 2+ on the right side and 2+ on the left side. " "     Heart sounds: Murmur heard.      Systolic murmur is present with a grade of 3/6.   Pulmonary:      Effort: Pulmonary effort is normal. No respiratory distress.      Breath sounds: No stridor. No wheezing.   Abdominal:      General: Abdomen is flat. Bowel sounds are normal. There is no distension.      Palpations: Abdomen is soft.   Musculoskeletal:         General: No swelling. Normal range of motion.      Right lower le+ Pitting Edema present.      Left lower le+ Pitting Edema present.   Skin:     General: Skin is warm and dry.      Capillary Refill: Capillary refill takes less than 2 seconds.   Neurological:      General: No focal deficit present.      Mental Status: She is alert and oriented to person, place, and time.       Labs:     Lab Results   Component Value Date     2023    K 3.8 2023    CL 93 (L) 2023    CO2 32 (H) 2023    BUN 57 (H) 2023    CREATININE 3.3 (H) 2023    ANIONGAP 13 2023     No results found for: "HGBA1C"  Lab Results   Component Value Date    BNP 3,561 (H) 2023     (H) 2023     (H) 2023       Lab Results   Component Value Date    WBC 6.15 2023    HGB 11.7 (L) 2023    HCT 39.1 2023     2023    GRAN 3.8 2023    GRAN 61.6 2023     Lab Results   Component Value Date    CHOL 224 (H) 2023    HDL 35 (L) 2023    LDLCALC 145.8 2023    TRIG 216 (H) 2023       Lab Results   Component Value Date     2023    K 3.8 2023    CL 93 (L) 2023    CO2 32 (H) 2023    BUN 57 (H) 2023    CREATININE 3.3 (H) 2023    ANIONGAP 13 2023     No results found for: "HGBA1C"  Lab Results   Component Value Date    BNP 3,561 (H) 2023     (H) 2023     (H) 2023    Lab Results   Component Value Date    WBC 6.15 2023    HGB 11.7 (L) 2023    HCT 39.1 2023     2023 "    GRAN 3.8 12/22/2023    GRAN 61.6 12/22/2023     Lab Results   Component Value Date    CHOL 224 (H) 06/25/2023    HDL 35 (L) 06/25/2023    LDLCALC 145.8 06/25/2023    TRIG 216 (H) 06/25/2023            Cardiovascular Imaging and Labs:     Echo:   EF   Date Value Ref Range Status   06/25/2023 63 % Final     Assessment & Plan:     1. Mitral valve insufficiency, unspecified etiology      Xena Vera is a 53 y.o. female referred by TAMANNA Roldan for evaluation of severe MR (NYHA Class III sx).    Mitral Valve Disease Etiology: Primary    The patient has undergone the following MitraClip work-up:   POOJA (Date NEEDS): Severe mitral insufficiency, MVA  cm2, MG  mmHg, EOA  cm2, EF= %.   C (Date NEEDS):    STS: 2%   Frailty: 1/4   CT Surgery risk assessment:  risk, per Dr  due to   Comorbidities: CKD4, Obesity, SevMR, HFpEF  Labs: Hgb 11.7, Cr 3.3, BNP 3561    She has an appointment with Nephrology on 2/5/2024. Will see what they think about using contrast. We will need to do a coronary angiogram prior to mitraclip - using low volume contrast.   Will also need POOJA.    - [facility] diagnostic  - Access: RFCA  - Catheters: JL/JR  - Creatinine/CrCl:   CrCl cannot be calculated (Patient's most recent lab result is older than the maximum 7 days allowed.).  - Allergies:   - No shellfish / Iodine allergy  - No Latex allergy   - No Aspirin allergy    - No history of HIT  - Pre-Hydration: NS 3cc/kg x 1 hour   - Pre-Op Med: Bendaryl 50mg pO     - All patient's questions were answered.  -The risks, benefits and alternatives of the procedure were explained to the patient.   -The risks of coronary angiography include but are not limited to: bleeding, infection, heart rhythm abnormalities, allergic reactions, kidney injury and potential need for dialysis, stroke and death.   - Should stenting be indicated, the patient has agreed to dual anti-platelet therapy for 1-consecutive year with a drug-eluting stent and a minimum of 1-month  with the use of a bare metal stent  - Additionally, pt is aware that non-compliance is likely to result in stent clotting with heart attack, heart failure, and/or death  -The risks of moderate sedation include hypotension, respiratory depression, arrhythmias, bronchospasm, and death.   - Informed consent was obtained and the  patient is agreeable to proceed with the procedure.      Signed:  Ruben Chan M.D., M.S.  Interventional Cardiology  [facility]       I have seen the patient, reviewed the Fellow's history and physical, assessment and plan. I have personally interviewed and examined the patient and agree with the findings. Plan LHC, medical therapy optimization via HTS, then POOJA to evaluate for MitraClip    MOSHE Stone MD

## 2024-02-23 ENCOUNTER — HOSPITAL ENCOUNTER (EMERGENCY)
Facility: HOSPITAL | Age: 54
Discharge: HOME | End: 2024-02-24
Attending: EMERGENCY MEDICINE

## 2024-02-23 DIAGNOSIS — M79.89 LEG SWELLING: Primary | ICD-10-CM

## 2024-02-23 DIAGNOSIS — R06.02 SHORTNESS OF BREATH: ICD-10-CM

## 2024-02-23 DIAGNOSIS — R10.9 ABDOMINAL PAIN, UNSPECIFIED ABDOMINAL LOCATION: ICD-10-CM

## 2024-02-23 LAB
ALBUMIN SERPL BCP-MCNC: 2.9 G/DL (ref 3.5–5.2)
ALP SERPL-CCNC: 71 U/L (ref 55–135)
ALT SERPL W/O P-5'-P-CCNC: 17 U/L (ref 10–44)
ANION GAP SERPL CALC-SCNC: 13 MMOL/L (ref 8–16)
AST SERPL-CCNC: 24 U/L (ref 10–40)
BASOPHILS # BLD AUTO: 0.05 K/UL (ref 0–0.2)
BASOPHILS NFR BLD: 0.9 % (ref 0–1.9)
BILIRUB SERPL-MCNC: 0.7 MG/DL (ref 0.1–1)
BNP SERPL-MCNC: 3773 PG/ML (ref 0–99)
BUN SERPL-MCNC: 76 MG/DL (ref 6–20)
CALCIUM SERPL-MCNC: 8.6 MG/DL (ref 8.7–10.5)
CHLORIDE SERPL-SCNC: 101 MMOL/L (ref 95–110)
CO2 SERPL-SCNC: 22 MMOL/L (ref 23–29)
CREAT SERPL-MCNC: 3.1 MG/DL (ref 0.5–1.4)
DIFFERENTIAL METHOD BLD: ABNORMAL
EOSINOPHIL # BLD AUTO: 0.1 K/UL (ref 0–0.5)
EOSINOPHIL NFR BLD: 1.2 % (ref 0–8)
ERYTHROCYTE [DISTWIDTH] IN BLOOD BY AUTOMATED COUNT: 21.7 % (ref 11.5–14.5)
EST. GFR  (NO RACE VARIABLE): 17.2 ML/MIN/1.73 M^2
GLUCOSE SERPL-MCNC: 114 MG/DL (ref 70–110)
HCT VFR BLD AUTO: 39 % (ref 37–48.5)
HGB BLD-MCNC: 11.4 G/DL (ref 12–16)
IMM GRANULOCYTES # BLD AUTO: 0.02 K/UL (ref 0–0.04)
IMM GRANULOCYTES NFR BLD AUTO: 0.3 % (ref 0–0.5)
LYMPHOCYTES # BLD AUTO: 1.6 K/UL (ref 1–4.8)
LYMPHOCYTES NFR BLD: 27.4 % (ref 18–48)
MCH RBC QN AUTO: 23.6 PG (ref 27–31)
MCHC RBC AUTO-ENTMCNC: 29.2 G/DL (ref 32–36)
MCV RBC AUTO: 81 FL (ref 82–98)
MONOCYTES # BLD AUTO: 0.5 K/UL (ref 0.3–1)
MONOCYTES NFR BLD: 9 % (ref 4–15)
NEUTROPHILS # BLD AUTO: 3.6 K/UL (ref 1.8–7.7)
NEUTROPHILS NFR BLD: 61.2 % (ref 38–73)
NRBC BLD-RTO: 0 /100 WBC
PLATELET # BLD AUTO: 296 K/UL (ref 150–450)
PMV BLD AUTO: 11 FL (ref 9.2–12.9)
POTASSIUM SERPL-SCNC: 4.1 MMOL/L (ref 3.5–5.1)
PROT SERPL-MCNC: 6.7 G/DL (ref 6–8.4)
RBC # BLD AUTO: 4.83 M/UL (ref 4–5.4)
SODIUM SERPL-SCNC: 136 MMOL/L (ref 136–145)
TROPONIN I SERPL DL<=0.01 NG/ML-MCNC: 0.02 NG/ML (ref 0–0.03)
WBC # BLD AUTO: 5.81 K/UL (ref 3.9–12.7)

## 2024-02-23 PROCEDURE — 93005 ELECTROCARDIOGRAM TRACING: CPT

## 2024-02-23 PROCEDURE — 84484 ASSAY OF TROPONIN QUANT: CPT | Performed by: EMERGENCY MEDICINE

## 2024-02-23 PROCEDURE — 99285 EMERGENCY DEPT VISIT HI MDM: CPT | Mod: 25

## 2024-02-23 PROCEDURE — 63600175 PHARM REV CODE 636 W HCPCS

## 2024-02-23 PROCEDURE — 83880 ASSAY OF NATRIURETIC PEPTIDE: CPT | Performed by: EMERGENCY MEDICINE

## 2024-02-23 PROCEDURE — 85025 COMPLETE CBC W/AUTO DIFF WBC: CPT | Performed by: EMERGENCY MEDICINE

## 2024-02-23 PROCEDURE — 96374 THER/PROPH/DIAG INJ IV PUSH: CPT

## 2024-02-23 PROCEDURE — 93010 ELECTROCARDIOGRAM REPORT: CPT | Mod: ,,, | Performed by: INTERNAL MEDICINE

## 2024-02-23 PROCEDURE — 80053 COMPREHEN METABOLIC PANEL: CPT | Performed by: EMERGENCY MEDICINE

## 2024-02-23 RX ORDER — FUROSEMIDE 10 MG/ML
120 INJECTION INTRAMUSCULAR; INTRAVENOUS
Status: COMPLETED | OUTPATIENT
Start: 2024-02-23 | End: 2024-02-23

## 2024-02-23 RX ORDER — FUROSEMIDE 10 MG/ML
80 INJECTION INTRAMUSCULAR; INTRAVENOUS
Status: DISCONTINUED | OUTPATIENT
Start: 2024-02-23 | End: 2024-02-23

## 2024-02-23 RX ADMIN — FUROSEMIDE 120 MG: 10 INJECTION, SOLUTION INTRAVENOUS at 09:02

## 2024-02-23 NOTE — FIRST PROVIDER EVALUATION
Medical screening examination initiated.  I have conducted a focused provider triage encounter, findings are as follows:    Brief history of present illness:  swelling in stomach and legs, feels like her CHF is getting worse    There were no vitals filed for this visit.    Pertinent physical exam:  ambulatory, no respiratory distress    Brief workup plan:  chf workup    Preliminary workup initiated; this workup will be continued and followed by the physician or advanced practice provider that is assigned to the patient when roomed.

## 2024-02-24 VITALS
OXYGEN SATURATION: 95 % | HEART RATE: 64 BPM | HEIGHT: 57 IN | BODY MASS INDEX: 34.52 KG/M2 | TEMPERATURE: 98 F | WEIGHT: 160 LBS | SYSTOLIC BLOOD PRESSURE: 146 MMHG | DIASTOLIC BLOOD PRESSURE: 95 MMHG | RESPIRATION RATE: 17 BRPM

## 2024-02-24 LAB
OHS QRS DURATION: 76 MS
OHS QTC CALCULATION: 484 MS

## 2024-02-24 NOTE — ED TRIAGE NOTES
Xena Vera, a 53 y.o. female presents to the ED w/ complaint of R-sided abdominal pain. Pt also endorsing leg swelling/ pain. Per son, pt is about to start dialysis, hx of liver failure. +shortness of breath.    Triage note:  Chief Complaint   Patient presents with    Abdominal Pain    Leg Swelling     Review of patient's allergies indicates:  No Known Allergies  Past Medical History:   Diagnosis Date    (HFpEF) heart failure with preserved ejection fraction 06/24/2023    EF 63% with G2 DD  Continue lasix, appears euvolemic today  Continue good BP management with losartan, increase Coreg 6.25 mg BID  Fluid restriction, low sodium diet  Recommend Jardiance- patient had CKD 4 and this is a limiting factor- nephro eval pending    Anemia 06/24/2023    CKD (chronic kidney disease)     HTN (hypertension)     Mitral valve regurgitation 06/26/2023    Refer to structural for evaluation  May benefit from mitral clip    Ovarian cyst     Stage 4 chronic kidney disease 06/24/2023    Refer to nephrology at [facility] as unable to get established with [facility] nephrology and the phone number for the outside nephrologist provided to patient during her recent hospitalization goes unanswered when called.   Cr remains elevated  Would like to start SGLT-2 and appreciate nephrology expertise

## 2024-02-24 NOTE — DISCHARGE INSTRUCTIONS
Diagnosis:  Fluid overload    Tests today showed:   Labs Reviewed   CBC W/ AUTO DIFFERENTIAL - Abnormal; Notable for the following components:       Result Value    Hemoglobin 11.4 (*)     MCV 81 (*)     MCH 23.6 (*)     MCHC 29.2 (*)     RDW 21.7 (*)     All other components within normal limits   COMPREHENSIVE METABOLIC PANEL - Abnormal; Notable for the following components:    CO2 22 (*)     Glucose 114 (*)     BUN 76 (*)     Creatinine 3.1 (*)     Calcium 8.6 (*)     Albumin 2.9 (*)     eGFR 17.2 (*)     All other components within normal limits   B-TYPE NATRIURETIC PEPTIDE - Abnormal; Notable for the following components:    BNP 3,773 (*)     All other components within normal limits   TROPONIN I     Imaging Results              CT Abdomen Pelvis  Without Contrast (Final result)  Result time 02/23/24 23:58:28      Final result by Mando Thompson DO (02/23/24 23:58:28)                   Impression:      1. Small pericardial effusion, small right pleural effusion, mild abdominopelvic ascites, and body wall anasarca.  Findings are compatible with fluid/volume overload.  2. Mild interlobular septal thickening and ground-glass opacities in the lung bases, concerning for pulmonary edema.  3. Stable cystic lesion in the pelvis, previously described as a simple right ovarian cyst.  4. Colonic diverticulosis.      Electronically signed by: Mando Thompson  Date:    02/23/2024  Time:    23:58               Narrative:    EXAMINATION:  CT ABDOMEN PELVIS WITHOUT CONTRAST    CLINICAL HISTORY:  Abdominal pain, acute, nonlocalized;    TECHNIQUE:  Multiplanar images were obtained of the abdomen and pelvis from the hemidiaphragms through the symphysis pubis without intravenous contrast.    COMPARISON:  CT of the abdomen and pelvis from 07/05/2022    FINDINGS:  Lung Bases: There is a small right pleural effusion.  There is interlobular septal thickening and ground-glass opacification in the lung bases, concerning for  edema.    Heart: There is cardiomegaly.  There is a small pericardial effusion.    Liver: The liver is enlarged.  There are no focal hepatic lesions.    Biliary tract: No intrahepatic or extrahepatic biliary ductal dilatation.    Gallbladder: Surgically absent.    Pancreas: Normal. No pancreatic ductal dilatation.    Spleen: Normal size without focal lesion.    Adrenals: Normal.    Kidneys and urinary collecting systems: There is bilateral renal atrophy.  There is no hydronephrosis or nephrolithiasis.    Lymph nodes: None enlarged.    Stomach and bowel: The stomach is normal.  Loops of small and large bowel are normal in caliber without evidence for inflammation or obstruction.  There is colonic diverticulosis without acute diverticulitis.  The appendix is normal.    Peritoneum and mesentery: There is mild abdominopelvic ascites.  There is no free air or focal fluid collection.    Vasculature: Mild atherosclerosis.  No aneurysm.    Urinary bladder: Normal.    Reproductive organs: Again seen is a cystic lesion in the pelvis measuring 6.2 x 6.7 cm, stable in size from prior.  There is new adjacent fluid/ascites.  The uterus and adnexae are otherwise unremarkable.    Body wall: There is diffuse body wall anasarca.    Musculoskeletal: No aggressive osseous lesion.                                       X-Ray Chest AP Portable (Final result)  Result time 02/23/24 20:54:46      Final result by Mando Thompson DO (02/23/24 20:54:46)                   Impression:      Continued perihilar opacities suspicious for pulmonary edema/CHF, similar in appearance to prior.      Electronically signed by: Mando Thompson  Date:    02/23/2024  Time:    20:54               Narrative:    EXAMINATION:  XR CHEST AP PORTABLE    CLINICAL HISTORY:  CHF;    TECHNIQUE:  Single frontal view of the chest was performed.    COMPARISON:  12/22/2023.    FINDINGS:  The lungs are hypoexpanded.  There are continued perihilar opacities bilaterally,  similar prior.  The pleural spaces are clear.  The cardiac silhouette is enlarged.  Osseous structures demonstrate degenerative changes.                                      Treatments you had today:   Medications   furosemide injection 120 mg (120 mg Intravenous Given 2/23/24 2151)       Follow-Up Plan   - weight herself daily, increased furosemide doses if you significant weight gain, leg edema.  Return to ED if develops shortness of breath or decreased urinary output  - Follow-up with Primary care physician within 7days  - Additional testing and/or evaluation as directed by your Primary care physician    Return to the Emergency Department for symptoms including but not limited to: worsening symptoms, shortness of breath or chest pain, vomiting with inability to hold down fluids, fevers greater than 100.4°F, passing out/fainting/unconsciousness, or other concerning symptoms.

## 2024-02-24 NOTE — ED PROVIDER NOTES
Encounter Date: 2024       History     Chief Complaint   Patient presents with    Abdominal Pain    Leg Swelling     HPI    Patient is a 52yo female with hx of HFpEF, CKD, HTN presenting with worsening peripheral edema x1 week, worsening abdominal pain x2 weeks, and ZACARIAS x3 weeks. Leg pain associated with edema. Abdominal pain has been present since July, but has become worse over the last 2 weeks. It is primarily RUQ and worsens with certain food intake - s/p cholecystectomy.   Endorsing headache, denying N/V, CP, fever, cough, diarrhea, urinary symptoms.  Last saw her cardiologist on Jose 15. Takes Lasix 40mg daily.     Romansh interpretor used, patient's son supplemented hx as  as well.    Review of patient's allergies indicates:  No Known Allergies  Past Medical History:   Diagnosis Date    (HFpEF) heart failure with preserved ejection fraction 2023    EF 63% with G2 DD  Continue lasix, appears euvolemic today  Continue good BP management with losartan, increase Coreg 6.25 mg BID  Fluid restriction, low sodium diet  Recommend Jardiance- patient had CKD 4 and this is a limiting factor- nephro eval pending    Anemia 2023    CKD (chronic kidney disease)     HTN (hypertension)     Mitral valve regurgitation 2023    Refer to structural for evaluation  May benefit from mitral clip    Ovarian cyst     Stage 4 chronic kidney disease 2023    Refer to nephrology at [facility] as unable to get established with [facility] and the phone number for the outside nephrologist provided to patient during her recent hospitalization goes unanswered when called.   Cr remains elevated  Would like to start SGLT-2 and appreciate nephrology expertise      No past surgical history on file.  No family history on file.  Social History     Tobacco Use    Smoking status: Former     Current packs/day: 0.00     Types: Cigarettes     Start date:      Quit date:      Years since quittin.1     Smokeless tobacco: Never    Tobacco comments:     2-3 cigarettes a day   Substance Use Topics    Alcohol use: Never    Drug use: Never     Physical Exam     Initial Vitals [02/23/24 1729]   BP Pulse Resp Temp SpO2   128/81 67 (!) 22 98.1 °F (36.7 °C) 95 %      MAP       --         Physical Exam    Constitutional: She appears well-developed and well-nourished. She is not diaphoretic. No distress.   HENT:   Head: Normocephalic and atraumatic.   Cardiovascular:  Normal rate and regular rhythm.           Pulmonary/Chest: No respiratory distress.   Crackles appreciated, most notable RLL   Abdominal: Abdomen is soft. She exhibits no distension. There is abdominal tenderness (generalized tenderness, worst at RUQ).   Musculoskeletal:         General: Edema (significant pitting edema BLE) present.     Neurological: She is alert.   Skin: Skin is warm and dry.   Psychiatric: She has a normal mood and affect. Thought content normal.         ED Course   Procedures  Labs Reviewed   CBC W/ AUTO DIFFERENTIAL - Abnormal; Notable for the following components:       Result Value    Hemoglobin 11.4 (*)     MCV 81 (*)     MCH 23.6 (*)     MCHC 29.2 (*)     RDW 21.7 (*)     All other components within normal limits   COMPREHENSIVE METABOLIC PANEL - Abnormal; Notable for the following components:    CO2 22 (*)     Glucose 114 (*)     BUN 76 (*)     Creatinine 3.1 (*)     Calcium 8.6 (*)     Albumin 2.9 (*)     eGFR 17.2 (*)     All other components within normal limits   B-TYPE NATRIURETIC PEPTIDE - Abnormal; Notable for the following components:    BNP 3,773 (*)     All other components within normal limits   TROPONIN I     EKG Readings: (Independently Interpreted)   Rhythm: Normal Sinus Rhythm. Heart Rate: 66. Ectopy: No Ectopy. T Waves Flipped: AVR and V1. Clinical Impression: Normal Sinus Rhythm       Imaging Results              CT Abdomen Pelvis  Without Contrast (Final result)  Result time 02/23/24 23:58:28      Final result by  Mando Thompson DO (02/23/24 23:58:28)                   Impression:      1. Small pericardial effusion, small right pleural effusion, mild abdominopelvic ascites, and body wall anasarca.  Findings are compatible with fluid/volume overload.  2. Mild interlobular septal thickening and ground-glass opacities in the lung bases, concerning for pulmonary edema.  3. Stable cystic lesion in the pelvis, previously described as a simple right ovarian cyst.  4. Colonic diverticulosis.      Electronically signed by: Mando Thompson  Date:    02/23/2024  Time:    23:58               Narrative:    EXAMINATION:  CT ABDOMEN PELVIS WITHOUT CONTRAST    CLINICAL HISTORY:  Abdominal pain, acute, nonlocalized;    TECHNIQUE:  Multiplanar images were obtained of the abdomen and pelvis from the hemidiaphragms through the symphysis pubis without intravenous contrast.    COMPARISON:  CT of the abdomen and pelvis from 07/05/2022    FINDINGS:  Lung Bases: There is a small right pleural effusion.  There is interlobular septal thickening and ground-glass opacification in the lung bases, concerning for edema.    Heart: There is cardiomegaly.  There is a small pericardial effusion.    Liver: The liver is enlarged.  There are no focal hepatic lesions.    Biliary tract: No intrahepatic or extrahepatic biliary ductal dilatation.    Gallbladder: Surgically absent.    Pancreas: Normal. No pancreatic ductal dilatation.    Spleen: Normal size without focal lesion.    Adrenals: Normal.    Kidneys and urinary collecting systems: There is bilateral renal atrophy.  There is no hydronephrosis or nephrolithiasis.    Lymph nodes: None enlarged.    Stomach and bowel: The stomach is normal.  Loops of small and large bowel are normal in caliber without evidence for inflammation or obstruction.  There is colonic diverticulosis without acute diverticulitis.  The appendix is normal.    Peritoneum and mesentery: There is mild abdominopelvic ascites.  There is no  free air or focal fluid collection.    Vasculature: Mild atherosclerosis.  No aneurysm.    Urinary bladder: Normal.    Reproductive organs: Again seen is a cystic lesion in the pelvis measuring 6.2 x 6.7 cm, stable in size from prior.  There is new adjacent fluid/ascites.  The uterus and adnexae are otherwise unremarkable.    Body wall: There is diffuse body wall anasarca.    Musculoskeletal: No aggressive osseous lesion.                                       X-Ray Chest AP Portable (Final result)  Result time 02/23/24 20:54:46      Final result by Mando Thompson DO (02/23/24 20:54:46)                   Impression:      Continued perihilar opacities suspicious for pulmonary edema/CHF, similar in appearance to prior.      Electronically signed by: Mando Thompson  Date:    02/23/2024  Time:    20:54               Narrative:    EXAMINATION:  XR CHEST AP PORTABLE    CLINICAL HISTORY:  CHF;    TECHNIQUE:  Single frontal view of the chest was performed.    COMPARISON:  12/22/2023.    FINDINGS:  The lungs are hypoexpanded.  There are continued perihilar opacities bilaterally, similar prior.  The pleural spaces are clear.  The cardiac silhouette is enlarged.  Osseous structures demonstrate degenerative changes.                                       Medications   furosemide injection 120 mg (120 mg Intravenous Given 2/23/24 2151)     Medical Decision Making  Amount and/or Complexity of Data Reviewed  Radiology: ordered.    Risk  Prescription drug management.    Patient is a 52yo female with hx of HFpEF, CKD, HTN presenting with worsening peripheral edema x1 week, worsening abdominal pain x2 weeks, and ZACARIAS x3 weeks. Patient's SOB and edema consistent with CHF exacerbation. BNP significantly elevated, over 3700, and chest xray with edema as well. To assess for intraabdominal pathology, CT abd pelvis ordered. Patient given 120mg Lasix IV for diuresis. She was signed out to oncoming team pending reassessment and CT abd/pelvis  read.                                   Clinical Impression:  Final diagnoses:  [R06.02] Shortness of breath                 Ines Quezada DO  Resident  02/24/24 0025

## 2024-02-26 ENCOUNTER — TELEPHONE (OUTPATIENT)
Dept: CARDIOLOGY | Facility: CLINIC | Age: 54
End: 2024-02-26

## 2024-02-26 DIAGNOSIS — I34.0 MITRAL VALVE INSUFFICIENCY, UNSPECIFIED ETIOLOGY: Primary | ICD-10-CM

## 2024-02-26 NOTE — TELEPHONE ENCOUNTER
Spoke with daughter, needs appt to access edema and cardiology F/U, NP Dori Roldan no longer working in cardiology.  I assisted with scheduling soonest cardiology appointment with Dr. Dougie King on 4/29/24 @ 3 PM and placed on wait list for a sooner appt.  Advised daughter to contact PCP for treatment of edema due to long wait for cardiology appt., daughter states she will.

## 2024-03-05 ENCOUNTER — TELEPHONE (OUTPATIENT)
Dept: CARDIOLOGY | Facility: CLINIC | Age: 54
End: 2024-03-05

## 2024-03-05 ENCOUNTER — TELEPHONE (OUTPATIENT)
Dept: PODIATRY | Facility: CLINIC | Age: 54
End: 2024-03-05

## 2024-03-05 NOTE — TELEPHONE ENCOUNTER
----- Message from Mary Hyde RN sent at 3/5/2024  8:33 AM CST -----  Regarding: PT called with c/o swollen feet and increased SOB  Good Morning,       Received a call from this Pt's daughter re swelling in Pt's feet and an increase in SOB when ambulating. Pt was D/C from [facility] 6 mos ago, did the necessary teaching re daily dry weights and dietary restrictions re salt and fluid. Can you please schedule this Pt asap, daughter says she was told Dori Roldan not longer practices here. Thank you for your kind assistance.    Respectfully,    Mary Hyde RN, BSN

## 2024-03-05 NOTE — TELEPHONE ENCOUNTER
I spoke with patient's daughter,Alexandrea, advised to bring mom to ED immediately for evaluation of SOB and swelling, daughter states OK.   Currently scheduled for soonest cardiology appointment in [facility] with Dr. Dougie King on 4/29/24 and on wait list for a sooner appointment.

## 2024-03-05 NOTE — TELEPHONE ENCOUNTER
Call from Alexandrea , PT's daughter, who says she was told to call us anytime her mother's feet got swollen, also states has SOB upon ambulating. PT has not been enrolled in the [facility] in 6 mos, please see notes. PT not doing Daily Dry weights, states is compliant with Furosemide and salt restriction, reminded of Fluid restriction of 1.5 to 2 quarts of fluid including anything that melts at room temp. Instructed to start daily dry weights today and adhere to salt/fluid restrictions and elevate feet higher than her heart. States that was told PT's cardiologist has left. PT has appt. With DANIELA Ceballos on 4-29. Pt has seen Dori Roldan in the past, will message her staff to get PT in asap. Notfied daughter of request sent to Dori Roldan's staff for an asap appt and to check patient portal.

## 2024-04-01 ENCOUNTER — HOSPITAL ENCOUNTER (INPATIENT)
Facility: HOSPITAL | Age: 54
LOS: 37 days | Discharge: HOME | DRG: 673 | End: 2024-05-08
Attending: EMERGENCY MEDICINE | Admitting: STUDENT IN AN ORGANIZED HEALTH CARE EDUCATION/TRAINING PROGRAM
Payer: MEDICAID

## 2024-04-01 DIAGNOSIS — I47.29 NON-SUSTAINED VENTRICULAR TACHYCARDIA: ICD-10-CM

## 2024-04-01 DIAGNOSIS — R42 LIGHTHEADEDNESS: ICD-10-CM

## 2024-04-01 DIAGNOSIS — L30.4 INTERTRIGO: ICD-10-CM

## 2024-04-01 DIAGNOSIS — E87.1 HYPONATREMIA: ICD-10-CM

## 2024-04-01 DIAGNOSIS — R07.9 CHEST PAIN: ICD-10-CM

## 2024-04-01 DIAGNOSIS — N18.9 ACUTE KIDNEY INJURY SUPERIMPOSED ON CHRONIC KIDNEY DISEASE: Primary | ICD-10-CM

## 2024-04-01 DIAGNOSIS — E83.39 HYPOPHOSPHATEMIA: ICD-10-CM

## 2024-04-01 DIAGNOSIS — R33.8 ACUTE URINARY RETENTION: ICD-10-CM

## 2024-04-01 DIAGNOSIS — I34.0 SEVERE MITRAL VALVE REGURGITATION: ICD-10-CM

## 2024-04-01 DIAGNOSIS — I10 ESSENTIAL HYPERTENSION: Chronic | ICD-10-CM

## 2024-04-01 DIAGNOSIS — R73.03 PREDIABETES: Chronic | ICD-10-CM

## 2024-04-01 DIAGNOSIS — E78.5 HYPERLIPIDEMIA, UNSPECIFIED HYPERLIPIDEMIA TYPE: Chronic | ICD-10-CM
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
## 2024-04-01 DIAGNOSIS — I34.0 MR (MITRAL REGURGITATION): ICD-10-CM

## 2024-04-01 DIAGNOSIS — I50.9 ACUTE ON CHRONIC CONGESTIVE HEART FAILURE, UNSPECIFIED HEART FAILURE TYPE: ICD-10-CM

## 2024-04-01 DIAGNOSIS — N17.9 ACUTE KIDNEY INJURY SUPERIMPOSED ON CHRONIC KIDNEY DISEASE: Primary | ICD-10-CM

## 2024-04-01 DIAGNOSIS — S81.802A WOUND OF LEFT LOWER EXTREMITY, INITIAL ENCOUNTER: ICD-10-CM

## 2024-04-01 DIAGNOSIS — N17.9 AKI (ACUTE KIDNEY INJURY): ICD-10-CM

## 2024-04-01 DIAGNOSIS — I42.2 HYPERTROPHIC CARDIOMYOPATHY: ICD-10-CM

## 2024-04-01 DIAGNOSIS — I50.33 ACUTE ON CHRONIC HEART FAILURE WITH PRESERVED EJECTION FRACTION (HFPEF): ICD-10-CM

## 2024-04-01 DIAGNOSIS — I34.0 MITRAL VALVE INSUFFICIENCY, UNSPECIFIED ETIOLOGY: ICD-10-CM

## 2024-04-01 DIAGNOSIS — I50.9 CHF (CONGESTIVE HEART FAILURE): ICD-10-CM

## 2024-04-01 DIAGNOSIS — R60.1 ANASARCA: ICD-10-CM

## 2024-04-01 LAB
ALBUMIN SERPL BCP-MCNC: 3.1 G/DL (ref 3.5–5.2)
ALP SERPL-CCNC: 103 U/L (ref 55–135)
ALT SERPL W/O P-5'-P-CCNC: 20 U/L (ref 10–44)
ANION GAP SERPL CALC-SCNC: 15 MMOL/L (ref 8–16)
ASCENDING AORTA: 3.02 CM
AST SERPL-CCNC: 30 U/L (ref 10–40)
AV INDEX (PROSTH): 0.47
AV MEAN GRADIENT: 2 MMHG
AV PEAK GRADIENT: 4 MMHG
AV VALVE AREA BY VELOCITY RATIO: 1.71 CM²
AV VALVE AREA: 1.35 CM²
AV VELOCITY RATIO: 0.6
BACTERIA #/AREA URNS AUTO: NORMAL /HPF
BASOPHILS # BLD AUTO: 0.06 K/UL (ref 0–0.2)
BASOPHILS NFR BLD: 1.4 % (ref 0–1.9)
BILIRUB SERPL-MCNC: 1.5 MG/DL (ref 0.1–1)
BILIRUB UR QL STRIP: NEGATIVE
BNP SERPL-MCNC: 4639 PG/ML (ref 0–99)
BSA FOR ECHO PROCEDURE: 1.74 M2
BUN SERPL-MCNC: 113 MG/DL (ref 6–20)
CALCIUM SERPL-MCNC: 8.5 MG/DL (ref 8.7–10.5)
CHLORIDE SERPL-SCNC: 98 MMOL/L (ref 95–110)
CLARITY UR REFRACT.AUTO: ABNORMAL
CO2 SERPL-SCNC: 20 MMOL/L (ref 23–29)
COLOR UR AUTO: YELLOW
CREAT SERPL-MCNC: 4.2 MG/DL (ref 0.5–1.4)
CV ECHO LV RWT: 0.41 CM
DIFFERENTIAL METHOD BLD: ABNORMAL
DOP CALC AO PEAK VEL: 1.02 M/S
DOP CALC AO VTI: 19.64 CM
DOP CALC LVOT AREA: 2.9 CM2
DOP CALC LVOT DIAMETER: 1.91 CM
DOP CALC LVOT PEAK VEL: 0.61 M/S
DOP CALC LVOT STROKE VOLUME: 26.46 CM3
DOP CALCLVOT PEAK VEL VTI: 9.24 CM
E WAVE DECELERATION TIME: 255.7 MSEC
E/A RATIO: 2.33
E/E' RATIO: 30.46 M/S
ECHO LV POSTERIOR WALL: 1.06 CM (ref 0.6–1.1)
EOSINOPHIL # BLD AUTO: 0.1 K/UL (ref 0–0.5)
EOSINOPHIL NFR BLD: 1.8 % (ref 0–8)
ERYTHROCYTE [DISTWIDTH] IN BLOOD BY AUTOMATED COUNT: 21.3 % (ref 11.5–14.5)
EST. GFR  (NO RACE VARIABLE): 12 ML/MIN/1.73 M^2
FRACTIONAL SHORTENING: 37 % (ref 28–44)
GLUCOSE SERPL-MCNC: 89 MG/DL (ref 70–110)
GLUCOSE UR QL STRIP: NEGATIVE
HCT VFR BLD AUTO: 38 % (ref 37–48.5)
HGB BLD-MCNC: 10.7 G/DL (ref 12–16)
HGB UR QL STRIP: NEGATIVE
HYALINE CASTS UR QL AUTO: 0 /LPF
IMM GRANULOCYTES # BLD AUTO: 0.02 K/UL (ref 0–0.04)
IMM GRANULOCYTES NFR BLD AUTO: 0.5 % (ref 0–0.5)
INTERVENTRICULAR SEPTUM: 0.64 CM (ref 0.6–1.1)
KETONES UR QL STRIP: NEGATIVE
LA MAJOR: 8.41 CM
LA MINOR: 7.66 CM
LA WIDTH: 5.26 CM
LEFT ATRIUM SIZE: 5.98 CM
LEFT ATRIUM VOLUME INDEX: 126.8 ML/M2
LEFT ATRIUM VOLUME: 214.36 CM3
LEFT INTERNAL DIMENSION IN SYSTOLE: 3.26 CM (ref 2.1–4)
LEFT VENTRICLE DIASTOLIC VOLUME INDEX: 75.02 ML/M2
LEFT VENTRICLE DIASTOLIC VOLUME: 126.79 ML
LEFT VENTRICLE MASS INDEX: 91 G/M2
LEFT VENTRICLE SYSTOLIC VOLUME INDEX: 25.3 ML/M2
LEFT VENTRICLE SYSTOLIC VOLUME: 42.78 ML
LEFT VENTRICULAR INTERNAL DIMENSION IN DIASTOLE: 5.15 CM (ref 3.5–6)
LEFT VENTRICULAR MASS: 154.38 G
LEUKOCYTE ESTERASE UR QL STRIP: NEGATIVE
LV LATERAL E/E' RATIO: 28.29 M/S
LV SEPTAL E/E' RATIO: 33 M/S
LYMPHOCYTES # BLD AUTO: 1.1 K/UL (ref 1–4.8)
LYMPHOCYTES NFR BLD: 23.8 % (ref 18–48)
MAGNESIUM SERPL-MCNC: 2.5 MG/DL (ref 1.6–2.6)
MCH RBC QN AUTO: 22.7 PG (ref 27–31)
MCHC RBC AUTO-ENTMCNC: 28.2 G/DL (ref 32–36)
MCV RBC AUTO: 81 FL (ref 82–98)
MICROSCOPIC COMMENT: NORMAL
MONOCYTES # BLD AUTO: 0.4 K/UL (ref 0.3–1)
MONOCYTES NFR BLD: 8.4 % (ref 4–15)
MV PEAK A VEL: 0.85 M/S
MV PEAK E VEL: 1.98 M/S
MV STENOSIS PRESSURE HALF TIME: 74.15 MS
MV VALVE AREA P 1/2 METHOD: 2.97 CM2
NEUTROPHILS # BLD AUTO: 2.8 K/UL (ref 1.8–7.7)
NEUTROPHILS NFR BLD: 64.1 % (ref 38–73)
NITRITE UR QL STRIP: NEGATIVE
NRBC BLD-RTO: 1 /100 WBC
OHS QRS DURATION: 70 MS
OHS QTC CALCULATION: 482 MS
PH UR STRIP: 5 [PH] (ref 5–8)
PISA TR MAX VEL: 4.38 M/S
PLATELET # BLD AUTO: 140 K/UL (ref 150–450)
PMV BLD AUTO: ABNORMAL FL (ref 9.2–12.9)
POTASSIUM SERPL-SCNC: 3.9 MMOL/L (ref 3.5–5.1)
PROT SERPL-MCNC: 6.7 G/DL (ref 6–8.4)
PROT UR QL STRIP: ABNORMAL
RA MAJOR: 5.04 CM
RA PRESSURE ESTIMATED: 8 MMHG
RA WIDTH: 3.24 CM
RBC # BLD AUTO: 4.72 M/UL (ref 4–5.4)
RBC #/AREA URNS AUTO: 0 /HPF (ref 0–4)
RIGHT VENTRICULAR END-DIASTOLIC DIMENSION: 3.66 CM
RV TB RVSP: 12 MMHG
SINUS: 2.36 CM
SODIUM SERPL-SCNC: 133 MMOL/L (ref 136–145)
SP GR UR STRIP: 1.01 (ref 1–1.03)
SQUAMOUS #/AREA URNS AUTO: 6 /HPF
STJ: 2.15 CM
TDI LATERAL: 0.07 M/S
TDI SEPTAL: 0.06 M/S
TDI: 0.07 M/S
TR MAX PG: 77 MMHG
TRICUSPID ANNULAR PLANE SYSTOLIC EXCURSION: 1.37 CM
TROPONIN I SERPL DL<=0.01 NG/ML-MCNC: 0.03 NG/ML (ref 0–0.03)
TROPONIN I SERPL DL<=0.01 NG/ML-MCNC: 0.06 NG/ML (ref 0–0.03)
TV REST PULMONARY ARTERY PRESSURE: 85 MMHG
URN SPEC COLLECT METH UR: ABNORMAL
WBC # BLD AUTO: 4.41 K/UL (ref 3.9–12.7)
WBC #/AREA URNS AUTO: 2 /HPF (ref 0–5)
Z-SCORE OF LEFT VENTRICULAR DIMENSION IN END DIASTOLE: 0.9
Z-SCORE OF LEFT VENTRICULAR DIMENSION IN END SYSTOLE: 0.88

## 2024-04-01 PROCEDURE — 96374 THER/PROPH/DIAG INJ IV PUSH: CPT

## 2024-04-01 PROCEDURE — 25000003 PHARM REV CODE 250: Performed by: PHYSICIAN ASSISTANT

## 2024-04-01 PROCEDURE — 84484 ASSAY OF TROPONIN QUANT: CPT | Mod: 91 | Performed by: PHYSICIAN ASSISTANT

## 2024-04-01 PROCEDURE — 63600175 PHARM REV CODE 636 W HCPCS: Performed by: EMERGENCY MEDICINE

## 2024-04-01 PROCEDURE — 63600175 PHARM REV CODE 636 W HCPCS: Performed by: PHYSICIAN ASSISTANT

## 2024-04-01 PROCEDURE — 83735 ASSAY OF MAGNESIUM: CPT | Performed by: EMERGENCY MEDICINE

## 2024-04-01 PROCEDURE — G0378 HOSPITAL OBSERVATION PER HR: HCPCS

## 2024-04-01 PROCEDURE — 96372 THER/PROPH/DIAG INJ SC/IM: CPT | Performed by: PHYSICIAN ASSISTANT

## 2024-04-01 PROCEDURE — 93005 ELECTROCARDIOGRAM TRACING: CPT

## 2024-04-01 PROCEDURE — 80053 COMPREHEN METABOLIC PANEL: CPT | Performed by: EMERGENCY MEDICINE

## 2024-04-01 PROCEDURE — 83880 ASSAY OF NATRIURETIC PEPTIDE: CPT | Performed by: EMERGENCY MEDICINE

## 2024-04-01 PROCEDURE — 93010 ELECTROCARDIOGRAM REPORT: CPT | Mod: ,,, | Performed by: INTERNAL MEDICINE

## 2024-04-01 PROCEDURE — 99285 EMERGENCY DEPT VISIT HI MDM: CPT | Mod: 25

## 2024-04-01 PROCEDURE — 25000003 PHARM REV CODE 250: Performed by: STUDENT IN AN ORGANIZED HEALTH CARE EDUCATION/TRAINING PROGRAM

## 2024-04-01 PROCEDURE — 85025 COMPLETE CBC W/AUTO DIFF WBC: CPT | Performed by: EMERGENCY MEDICINE

## 2024-04-01 PROCEDURE — 20600001 HC STEP DOWN PRIVATE ROOM

## 2024-04-01 PROCEDURE — 84484 ASSAY OF TROPONIN QUANT: CPT | Performed by: EMERGENCY MEDICINE

## 2024-04-01 PROCEDURE — 81001 URINALYSIS AUTO W/SCOPE: CPT | Performed by: EMERGENCY MEDICINE

## 2024-04-01 RX ORDER — FUROSEMIDE 10 MG/ML
40 INJECTION INTRAMUSCULAR; INTRAVENOUS
Status: COMPLETED | OUTPATIENT
Start: 2024-04-01 | End: 2024-04-01

## 2024-04-01 RX ORDER — ONDANSETRON HYDROCHLORIDE 2 MG/ML
4 INJECTION, SOLUTION INTRAVENOUS EVERY 8 HOURS PRN
Status: DISCONTINUED | OUTPATIENT
Start: 2024-04-01 | End: 2024-05-09 | Stop reason: HOSPADM

## 2024-04-01 RX ORDER — CARVEDILOL 6.25 MG/1
6.25 TABLET ORAL 2 TIMES DAILY WITH MEALS
Status: DISCONTINUED | OUTPATIENT
Start: 2024-04-01 | End: 2024-04-09

## 2024-04-01 RX ORDER — ZINC OXIDE 20 G/100G
OINTMENT TOPICAL
Status: DISCONTINUED | OUTPATIENT
Start: 2024-04-01 | End: 2024-05-09 | Stop reason: HOSPADM

## 2024-04-01 RX ORDER — POLYETHYLENE GLYCOL 3350 17 G/17G
17 POWDER, FOR SOLUTION ORAL DAILY
Status: DISCONTINUED | OUTPATIENT
Start: 2024-04-01 | End: 2024-04-13

## 2024-04-01 RX ORDER — ACETAMINOPHEN 500 MG
1000 TABLET ORAL EVERY 8 HOURS PRN
Status: DISCONTINUED | OUTPATIENT
Start: 2024-04-01 | End: 2024-04-13

## 2024-04-01 RX ORDER — TALC
6 POWDER (GRAM) TOPICAL NIGHTLY PRN
Status: DISCONTINUED | OUTPATIENT
Start: 2024-04-01 | End: 2024-05-09 | Stop reason: HOSPADM

## 2024-04-01 RX ORDER — SODIUM CHLORIDE 0.9 % (FLUSH) 0.9 %
10 SYRINGE (ML) INJECTION
Status: DISCONTINUED | OUTPATIENT
Start: 2024-04-01 | End: 2024-05-09 | Stop reason: HOSPADM

## 2024-04-01 RX ORDER — NALOXONE HCL 0.4 MG/ML
0.02 VIAL (ML) INJECTION
Status: DISCONTINUED | OUTPATIENT
Start: 2024-04-01 | End: 2024-05-09 | Stop reason: HOSPADM

## 2024-04-01 RX ORDER — NAPROXEN SODIUM 220 MG/1
81 TABLET, FILM COATED ORAL ONCE
Status: COMPLETED | OUTPATIENT
Start: 2024-04-01 | End: 2024-04-01

## 2024-04-01 RX ORDER — ONDANSETRON 4 MG/1
4 TABLET, ORALLY DISINTEGRATING ORAL EVERY 8 HOURS PRN
Status: DISCONTINUED | OUTPATIENT
Start: 2024-04-01 | End: 2024-05-09 | Stop reason: HOSPADM

## 2024-04-01 RX ORDER — IBUPROFEN 200 MG
16 TABLET ORAL
Status: DISCONTINUED | OUTPATIENT
Start: 2024-04-01 | End: 2024-05-09 | Stop reason: HOSPADM

## 2024-04-01 RX ORDER — HEPARIN SODIUM 5000 [USP'U]/ML
5000 INJECTION, SOLUTION INTRAVENOUS; SUBCUTANEOUS EVERY 8 HOURS
Status: DISCONTINUED | OUTPATIENT
Start: 2024-04-01 | End: 2024-04-21

## 2024-04-01 RX ORDER — SEVELAMER CARBONATE 800 MG/1
800 TABLET, FILM COATED ORAL
Status: DISCONTINUED | OUTPATIENT
Start: 2024-04-01 | End: 2024-04-25

## 2024-04-01 RX ORDER — GLUCAGON 1 MG
1 KIT INJECTION
Status: DISCONTINUED | OUTPATIENT
Start: 2024-04-01 | End: 2024-05-09 | Stop reason: HOSPADM

## 2024-04-01 RX ORDER — ASPIRIN 81 MG/1
81 TABLET ORAL DAILY
Status: DISCONTINUED | OUTPATIENT
Start: 2024-04-02 | End: 2024-05-09 | Stop reason: HOSPADM

## 2024-04-01 RX ORDER — FUROSEMIDE 10 MG/ML
40 INJECTION INTRAMUSCULAR; INTRAVENOUS 2 TIMES DAILY
Status: DISCONTINUED | OUTPATIENT
Start: 2024-04-01 | End: 2024-04-02

## 2024-04-01 RX ORDER — ATORVASTATIN CALCIUM 40 MG/1
40 TABLET, FILM COATED ORAL DAILY
Status: DISCONTINUED | OUTPATIENT
Start: 2024-04-02 | End: 2024-05-09 | Stop reason: HOSPADM

## 2024-04-01 RX ORDER — IBUPROFEN 200 MG
24 TABLET ORAL
Status: DISCONTINUED | OUTPATIENT
Start: 2024-04-01 | End: 2024-05-09 | Stop reason: HOSPADM

## 2024-04-01 RX ADMIN — FUROSEMIDE 40 MG: 10 INJECTION, SOLUTION INTRAVENOUS at 09:04

## 2024-04-01 RX ADMIN — FUROSEMIDE 40 MG: 10 INJECTION, SOLUTION INTRAVENOUS at 05:04

## 2024-04-01 RX ADMIN — ASPIRIN 81 MG CHEWABLE TABLET 81 MG: 81 TABLET CHEWABLE at 08:04

## 2024-04-01 RX ADMIN — SEVELAMER CARBONATE 800 MG: 800 TABLET, FILM COATED ORAL at 05:04

## 2024-04-01 RX ADMIN — HEPARIN SODIUM 5000 UNITS: 5000 INJECTION INTRAVENOUS; SUBCUTANEOUS at 09:04

## 2024-04-01 RX ADMIN — SEVELAMER CARBONATE 800 MG: 800 TABLET, FILM COATED ORAL at 12:04

## 2024-04-01 RX ADMIN — HEPARIN SODIUM 5000 UNITS: 5000 INJECTION INTRAVENOUS; SUBCUTANEOUS at 01:04

## 2024-04-01 NOTE — ED NOTES
Telemetry Verification   Patient placed on Telemetry Box  Verified with War Room  Box # [id number]   Monitor Tech Doris   Rate 54   Rhythm Sinus danielito

## 2024-04-01 NOTE — ASSESSMENT & PLAN NOTE
Chronic, uncontrolled. Latest blood pressure and vitals reviewed-     Temp:  [97.6 °F (36.4 °C)-97.8 °F (36.6 °C)]   Pulse:  [51-53]   Resp:  [18-24]   BP: ()/(52-76)   SpO2:  [95 %-100 %] .   Home meds for hypertension were reviewed and noted below.   Hypertension Medications               carvediloL (COREG) 6.25 MG tablet Take 1 tablet by mouth 2 times daily with food     While in the hospital, will manage blood pressure as follows; Continue home antihypertensive regimen    Will utilize p.r.n. blood pressure medication only if patient's blood pressure greater than 180/110 and she develops symptoms such as worsening chest pain or shortness of breath.

## 2024-04-01 NOTE — Clinical Note
The catheter was secured in place in the superior vena cava. CATHETER WAS TUNNELED UNDER THE SKIN IN THE UPPER RIGHT CHEST.

## 2024-04-01 NOTE — ASSESSMENT & PLAN NOTE
Patient's SOB and edema consistent with CHF exacerbation. BNP significantly elevated, and chest xray with edema as well.   Patient is identified as having Diastolic (HFpEF) heart failure that is Acute on chronic. CHF is currently uncontrolled due to Dyspnea not returned to baseline after 1 doses of IV diuretic, >3 pillow orthopnea, and Pulmonary edema/pleural effusion on CXR. Latest ECHO performed and demonstrates- Results for orders placed during the hospital encounter of 06/24/23    Echo    Interpretation Summary  · The left ventricle is normal in size with normal systolic function.  · The estimated ejection fraction is 63%.  · The quantitatively derived ejection fraction is 57%.  · The left ventricular global longitudinal strain is -17.8%.  · Grade II left ventricular diastolic dysfunction.  · Normal right ventricular size with normal right ventricular systolic function.  · Severe left atrial enlargement.  · Severe mitral regurgitation.  · Mild tricuspid regurgitation.  · Intermediate central venous pressure (8 mmHg).  · The estimated PA systolic pressure is 38 mmHg.  · Trivial posterior pericardial effusion. Small outside the RA.  . Continue Beta Blocker and Furosemide and monitor clinical status closely. Monitor on telemetry. Patient is on CHF pathway.  Monitor strict Is&Os and daily weights.  Place on fluid restriction of 1.5 L. Cardiology has not been consulted. Continue to stress to patient importance of self efficacy and  on diet for CHF. Last BNP reviewed- and noted below   Recent Labs   Lab 04/01/24  0720   BNP 4,639*     - continue lasix 40 mg IV bid, will likely need to titrate home dose  - trop 0.061, trending  - CXR w/ upper normal pulmonary vascularity. Chronic band of opacity in left middle zone could represent plate atelectasis or strand of fibrosis. Faint edema may be present in the perihilar areas. Lungs are otherwise clear without significant airspace consolidation, pleural effusion, or  pneumothorax.   -l repeat TTE ordered  - tele monitoring  - K>4, Mg>2

## 2024-04-01 NOTE — H&P
[facility] - Emergency Dept  Hospital Medicine  History & Physical    Patient Name: Xena Vera  MRN: [id number]  Patient Class: OP- Observation  Admission Date: 4/1/2024  Attending Physician: Joslyn Patterson MD   Primary Care Provider: Tami Villeda FNP         Patient information was obtained from patient, relative(s), past medical records, and ER records.     Subjective:     Principal Problem:Acute on chronic heart failure with preserved ejection fraction (HFpEF)    Chief Complaint:   Chief Complaint   Patient presents with    Fall     Pt S/P mechanical fall 20 min ago when foot slipped between grass and concrete. Pt denies striking head, denies LOC. -blood thinners. Pt has pain and abrasion to LLE. Denies any other c/o pain/ discomfort.         HPI: Ms. Xena Vera is a 53 y.o. female with CKD Stage 4, HFpEF, Mitral Valve Regurgitation who presents w/ complaint of mechanical fall and lower extremity swelling and SOB. Pt is Syrian speaking and declined  services instead allowing her son at bedside to translate. Per Pt and Son, earlier this morning, the Pt slipped and fell between grass and concrete, abrasion noted to LLE, 3/10 pain. Denies any LOC or head strike and Pt does not take any blood thinners. She was able to stand with assistance and ambulate as usual following. They also report worsening of chronic b/l lower extremity edema fluid build up now into abdomen. She has been taking her home lasix w/o any missed doses and also reports compliance with a low sodium diet. Endorses sleeping upright to feel like she can breathe and has several night time awakenings d/t SOB. Reports worsening ZACARIAS over the last several weeks. No associated chest pain, cough, lightheadedness, syncope. Pt has chronic constipation however BM have been unchanged, nonbloody and formed. Denies any recent fevers, chills, headaches, abdominal pain, n/v/d or dysuria.     In ED, Pt hypotensive 97/54 min and HR in  low 50s, other VSS on RA. No leukocytosis, stable anemia hgb 10.7 (baseline ~11.4). Na 133. T bili 1.5. BNP 4,639, trop 0.061. UA noninfectious. CXR w/ upper normal pulmonary vascularity. Chronic band of opacity in left middle zone could represent plate atelectasis or strand of fibrosis. Faint edema may be present in the perihilar areas. Lungs are otherwise clear without significant airspace consolidation, pleural effusion, or pneumothorax. Given lasix 40 mg IV x1. Admitted to  and started on the HF pathway.    Past Medical History:   Diagnosis Date    (HFpEF) heart failure with preserved ejection fraction 06/24/2023    EF 63% with G2 DD  Continue lasix, appears euvolemic today  Continue good BP management with losartan, increase Coreg 6.25 mg BID  Fluid restriction, low sodium diet  Recommend Jardiance- patient had CKD 4 and this is a limiting factor- nephro eval pending    Anemia 06/24/2023    CKD (chronic kidney disease)     HTN (hypertension)     Mitral valve regurgitation 06/26/2023    Refer to structural for evaluation  May benefit from mitral clip    Ovarian cyst     Stage 4 chronic kidney disease 06/24/2023    Refer to nephrology at [facility] as unable to get established with [facility] and the phone number for the outside nephrologist provided to patient during her recent hospitalization goes unanswered when called.   Cr remains elevated  Would like to start SGLT-2 and appreciate nephrology expertise        No past surgical history on file.    Review of patient's allergies indicates:  No Known Allergies    No current facility-administered medications on file prior to encounter.     Current Outpatient Medications on File Prior to Encounter   Medication Sig    aspirin (ECOTRIN) 81 MG EC tablet Take 1 tablet by mouth once daily.    atorvastatin (LIPITOR) 40 MG tablet Take 1 tablet by mouth once daily.    atorvastatin (LIPITOR) 40 MG tablet Take 1 tablet by mouth every day    carvediloL (COREG) 3.125 MG  tablet Take 2 tablets (6.25 mg total) by mouth 2 (two) times daily with meals.    carvediloL (COREG) 6.25 MG tablet Take 1 tablet by mouth 2 times daily with food    furosemide (LASIX) 40 MG tablet Take 1 tablet (40 mg total) by mouth 2 (two) times a day.    furosemide (LASIX) 40 MG tablet Take 1 tablet by mouth once daily for 90 days    loratadine (CLARITIN) 10 mg tablet Take 10 mg by mouth once daily. PRN    sevelamer carbonate (RENVELA) 800 mg Tab Take 1 tablet (800 mg total) by mouth 3 (three) times daily with meals.    sodium bicarbonate 650 MG tablet Take 1 tablet (650 mg total) by mouth 2 (two) times daily.     Family History    None       Tobacco Use    Smoking status: Former     Current packs/day: 0.00     Types: Cigarettes     Start date:      Quit date:      Years since quittin.2    Smokeless tobacco: Never    Tobacco comments:     2-3 cigarettes a day   Substance and Sexual Activity    Alcohol use: Never    Drug use: Never    Sexual activity: Not on file     Review of Systems   Constitutional:  Positive for activity change. Negative for chills, diaphoresis, fatigue and fever.   HENT:  Negative for congestion.    Respiratory:  Positive for shortness of breath. Negative for cough, chest tightness and wheezing.    Cardiovascular:  Positive for leg swelling. Negative for chest pain and palpitations.   Gastrointestinal:  Positive for abdominal distention and constipation. Negative for abdominal pain, blood in stool, diarrhea, nausea and vomiting.   Genitourinary:  Negative for difficulty urinating, dysuria and frequency.   Musculoskeletal:  Positive for arthralgias. Negative for back pain and neck stiffness.   Skin:  Positive for color change.   Neurological:  Negative for dizziness, tremors, seizures, syncope, weakness, light-headedness, numbness and headaches.   Psychiatric/Behavioral:  Negative for agitation, confusion and hallucinations.      Objective:     Vital Signs (Most Recent):  Temp:  97.8 °F (36.6 °C) (04/01/24 0924)  Pulse: (!) 53 (04/01/24 1033)  Resp: 18 (04/01/24 1033)  BP: 111/71 (04/01/24 1033)  SpO2: 98 % (04/01/24 1033) Vital Signs (24h Range):  Temp:  [97.6 °F (36.4 °C)-97.8 °F (36.6 °C)] 97.8 °F (36.6 °C)  Pulse:  [51-53] 53  Resp:  [18-24] 18  SpO2:  [95 %-100 %] 98 %  BP: ()/(52-76) 111/71     Weight: 72.6 kg (160 lb 0.9 oz)  Body mass index is 34.64 kg/m².     Physical Exam  Vitals and nursing note reviewed.   Constitutional:       General: She is not in acute distress.     Appearance: She is well-developed. She is obese. She is not diaphoretic.   HENT:      Head: Normocephalic and atraumatic.      Right Ear: External ear normal.      Left Ear: External ear normal.      Nose: Nose normal. No congestion.      Mouth/Throat:      Pharynx: Oropharynx is clear.   Eyes:      General: No scleral icterus.     Extraocular Movements: Extraocular movements intact.   Cardiovascular:      Rate and Rhythm: Normal rate and regular rhythm.      Pulses: Normal pulses.      Heart sounds: Normal heart sounds. No murmur heard.  Pulmonary:      Effort: Pulmonary effort is normal. No respiratory distress.      Breath sounds: Decreased breath sounds (throughout) present. No wheezing or rales.   Abdominal:      General: Bowel sounds are normal. There is distension.      Palpations: Abdomen is soft.      Tenderness: There is no abdominal tenderness. There is no guarding or rebound.   Musculoskeletal:      Cervical back: Normal range of motion.      Right lower leg: Edema (2+) present.      Left lower leg: Edema (2+) present.      Comments: Pitting edema of BLE up to mid thigh and extending into the abdomen   Skin:     General: Skin is warm and dry.      Capillary Refill: Capillary refill takes less than 2 seconds.      Comments: Superficial abraision to LLE   Neurological:      General: No focal deficit present.      Mental Status: She is alert and oriented to person, place, and time. Mental status is  at baseline.   Psychiatric:         Mood and Affect: Mood normal.         Behavior: Behavior normal.         Thought Content: Thought content normal.                Significant Labs: All pertinent labs within the past 24 hours have been reviewed.  CBC:   Recent Labs   Lab 04/01/24  0720   WBC 4.41   HGB 10.7*   HCT 38.0   *     CMP:   Recent Labs   Lab 04/01/24  0720   *   K 3.9   CL 98   CO2 20*   GLU 89   *   CREATININE 4.2*   CALCIUM 8.5*   PROT 6.7   ALBUMIN 3.1*   BILITOT 1.5*   ALKPHOS 103   AST 30   ALT 20   ANIONGAP 15     Cardiac Markers:   Recent Labs   Lab 04/01/24  0720   BNP 4,639*     Magnesium:   Recent Labs   Lab 04/01/24  0720   MG 2.5     Troponin:   Recent Labs   Lab 04/01/24  0720   TROPONINI 0.061*     Urine Studies:   Recent Labs   Lab 04/01/24  0811   COLORU Yellow   APPEARANCEUA Hazy*   PHUR 5.0   SPECGRAV 1.015   PROTEINUA 1+*   GLUCUA Negative   KETONESU Negative   BILIRUBINUA Negative   OCCULTUA Negative   NITRITE Negative   LEUKOCYTESUR Negative   RBCUA 0   WBCUA 2   BACTERIA Occasional   SQUAMEPITHEL 6   HYALINECASTS 0       Significant Imaging: I have reviewed all pertinent imaging results/findings within the past 24 hours.  Imaging Results              X-Ray Chest 1 View (Final result)  Result time 04/01/24 08:25:19      Final result by Jorge Butler MD (04/01/24 08:25:19)                   Impression:      Chronic cardiomegaly.  No acute change.      Electronically signed by: Jorge Butler MD  Date:    04/01/2024  Time:    08:25               Narrative:    EXAMINATION:  XR CHEST 1 VIEW    CLINICAL HISTORY:  Dizziness and giddiness    TECHNIQUE:  Single frontal view of the chest was performed.    COMPARISON:  02/23/2024    FINDINGS:  Heart outline remains moderately enlarged indicating cardiomegaly and/or pericardial fluid.  The lungs are expanded with upper normal pulmonary vascularity.  Chronic band of opacity in left middle zone could represent plate  atelectasis or strand of fibrosis.  Faint edema may be present in the perihilar areas.  Lungs are otherwise clear without significant airspace consolidation, pleural effusion, or pneumothorax.  Skeletal structures are intact.                                     Assessment/Plan:     * Acute on chronic heart failure with preserved ejection fraction (HFpEF)  Patient's SOB and edema consistent with CHF exacerbation. BNP significantly elevated, and chest xray with edema as well.   Patient is identified as having Diastolic (HFpEF) heart failure that is Acute on chronic. CHF is currently uncontrolled due to Dyspnea not returned to baseline after 1 doses of IV diuretic, >3 pillow orthopnea, and Pulmonary edema/pleural effusion on CXR. Latest ECHO performed and demonstrates- Results for orders placed during the hospital encounter of 06/24/23    Echo    Interpretation Summary  · The left ventricle is normal in size with normal systolic function.  · The estimated ejection fraction is 63%.  · The quantitatively derived ejection fraction is 57%.  · The left ventricular global longitudinal strain is -17.8%.  · Grade II left ventricular diastolic dysfunction.  · Normal right ventricular size with normal right ventricular systolic function.  · Severe left atrial enlargement.  · Severe mitral regurgitation.  · Mild tricuspid regurgitation.  · Intermediate central venous pressure (8 mmHg).  · The estimated PA systolic pressure is 38 mmHg.  · Trivial posterior pericardial effusion. Small outside the RA.  . Continue Beta Blocker and Furosemide and monitor clinical status closely. Monitor on telemetry. Patient is on CHF pathway.  Monitor strict Is&Os and daily weights.  Place on fluid restriction of 1.5 L. Cardiology has not been consulted. Continue to stress to patient importance of self efficacy and  on diet for CHF. Last BNP reviewed- and noted below   Recent Labs   Lab 04/01/24  0720   BNP 4,639*     - continue lasix 40 mg IV  bid, will likely need to titrate home dose  - trop 0.061, trending  - CXR w/ upper normal pulmonary vascularity. Chronic band of opacity in left middle zone could represent plate atelectasis or strand of fibrosis. Faint edema may be present in the perihilar areas. Lungs are otherwise clear without significant airspace consolidation, pleural effusion, or pneumothorax.   -l repeat TTE ordered  - tele monitoring  - K>4, Mg>2    Fall  S/p mechanical fall this am 4/1/24. No syncope or prodrome, no headstrike. Pain 3/10 w/ mild superficial abrasion to LLE.    - no imaging at this time  - Pt stable, added fall precautions  - tylenol prn  - triad cream for abrasion    Constipation  - miralax qd    Anemia  Patient's anemia is currently controlled. Has not received any PRBCs to date. Etiology likely d/t chronic disease due to Chronic Kidney Disease  Current CBC reviewed-   Lab Results   Component Value Date    HGB 10.7 (L) 04/01/2024    HCT 38.0 04/01/2024   Monitor serial CBC and transfuse if patient becomes hemodynamically unstable, symptomatic or H/H drops below 7/21.    Class 2 obesity in adult  Body mass index is 34.64 kg/m². Morbid obesity complicates all aspects of disease management from diagnostic modalities to treatment. Weight loss encouraged and health benefits explained to patient.     Essential hypertension  Chronic, uncontrolled. Latest blood pressure and vitals reviewed-     Temp:  [97.6 °F (36.4 °C)-97.8 °F (36.6 °C)]   Pulse:  [51-53]   Resp:  [18-24]   BP: ()/(52-76)   SpO2:  [95 %-100 %] .   Home meds for hypertension were reviewed and noted below.   Hypertension Medications               carvediloL (COREG) 6.25 MG tablet Take 1 tablet by mouth 2 times daily with food     While in the hospital, will manage blood pressure as follows; Continue home antihypertensive regimen    Will utilize p.r.n. blood pressure medication only if patient's blood pressure greater than 180/110 and she develops symptoms  such as worsening chest pain or shortness of breath.    Stage 4 chronic kidney disease  Creatine stable for now. BMP reviewed- noted Estimated Creatinine Clearance: 13.6 mL/min (A) (based on SCr of 4.2 mg/dL (H)). according to latest data. Based on current GFR, CKD stage is stage 4 - GFR 15-29.  Monitor UOP and serial BMP and adjust therapy as needed. Renally dose meds. Avoid nephrotoxic medications and procedures.    - continue oral sevelamer       VTE Risk Mitigation (From admission, onward)           Ordered     heparin (porcine) injection 5,000 Units  Every 8 hours         04/01/24 1030     IP VTE HIGH RISK PATIENT  Once         04/01/24 1030     Place sequential compression device  Until discontinued         04/01/24 1030                         On 04/01/2024, patient should be placed in hospital observation services under my care in collaboration with .           David Borjas PA-C  Department of Hospital Medicine  [facility] - Emergency Dept

## 2024-04-01 NOTE — ED TRIAGE NOTES
Xena Vera, a 53 y.o. female presents to the ED w/ complaint of mechanical fall. Pt slipped and fell between grass and concrete, abrasion noted to LLE, 3/10 pain. Pt also reports fluid build up in abdomen, hx of CHF. +2 edema bilateral legs. Denies head injury, -blood thinners.    Triage note:  Chief Complaint   Patient presents with    Fall     Pt S/P mechanical fall 20 min ago when foot slipped between grass and concrete. Pt denies striking head, denies LOC. -blood thinners. Pt has pain and abrasion to LLE. Denies any other c/o pain/ discomfort.      Review of patient's allergies indicates:  No Known Allergies  Past Medical History:   Diagnosis Date    (HFpEF) heart failure with preserved ejection fraction 06/24/2023    EF 63% with G2 DD  Continue lasix, appears euvolemic today  Continue good BP management with losartan, increase Coreg 6.25 mg BID  Fluid restriction, low sodium diet  Recommend Jardiance- patient had CKD 4 and this is a limiting factor- nephro eval pending    Anemia 06/24/2023    CKD (chronic kidney disease)     HTN (hypertension)     Mitral valve regurgitation 06/26/2023    Refer to structural for evaluation  May benefit from mitral clip    Ovarian cyst     Stage 4 chronic kidney disease 06/24/2023    Refer to nephrology at [facility] as unable to get established with [facility] and the phone number for the outside nephrologist provided to patient during her recent hospitalization goes unanswered when called.   Cr remains elevated  Would like to start SGLT-2 and appreciate nephrology expertise

## 2024-04-01 NOTE — LETTER
April 12, 2024         [address]  [facility] [address]  Phone: [phone]       Patient: Xena Vera     To Whom It May Concern:    Rl Vera  is currently hospitalized at [facility] in critical condition.  Please excuse her daughter, Eun, from missing school on April 3-5, as she was helping to attend to her mother's needs in the hospital. If you have any questions or concerns, or if I can be of further assistance, please do not hesitate to contact me.    Sincerely,        Minda Molina MD

## 2024-04-01 NOTE — ED PROVIDER NOTES
Encounter Date: 4/1/2024       History     Chief Complaint   Patient presents with    Fall     Pt S/P mechanical fall 20 min ago when foot slipped between grass and concrete. Pt denies striking head, denies LOC. -blood thinners. Pt has pain and abrasion to LLE. Denies any other c/o pain/ discomfort.      53 y.o. F with hx of HFpEF (EF 63%), anemia, CKD, HTN, mitral valve regurgitation, presents to the ED for a fall. Patient reports that she missed a step in front of her house, fell down to her left side, the fall was witnessed by her kids. Denies blood thinner use, head injury, no LOC, denies lightheadedness, or palpitation prior to the fall. Patient denies headache, vision change, neck pain, or injury to her body, patient was ambulatory without assistant after the fall. Patient notices that her lower extremities swelling is getting worse, goes up to her abdomen now, despite compliant with her diuresis and low sodium diet. Reports lightheadedness now in the ED. No shortness of breath or chest pain. No fever, chill, cough, n/v/d, or dysuria.     The history is provided by the patient and a relative. The history is limited by a language barrier. A  was used.     Review of patient's allergies indicates:  No Known Allergies  Past Medical History:   Diagnosis Date    (HFpEF) heart failure with preserved ejection fraction 06/24/2023    EF 63% with G2 DD  Continue lasix, appears euvolemic today  Continue good BP management with losartan, increase Coreg 6.25 mg BID  Fluid restriction, low sodium diet  Recommend Jardiance- patient had CKD 4 and this is a limiting factor- nephro eval pending    Anemia 06/24/2023    CKD (chronic kidney disease)     HTN (hypertension)     Mitral valve regurgitation 06/26/2023    Refer to structural for evaluation  May benefit from mitral clip    Ovarian cyst     Stage 4 chronic kidney disease 06/24/2023    Refer to nephrology at [facility] as unable to get established with [facility]  nephrology and the phone number for the outside nephrologist provided to patient during her recent hospitalization goes unanswered when called.   Cr remains elevated  Would like to start SGLT-2 and appreciate nephrology expertise      No past surgical history on file.  No family history on file.  Social History     Tobacco Use    Smoking status: Former     Current packs/day: 0.00     Types: Cigarettes     Start date:      Quit date:      Years since quittin.2    Smokeless tobacco: Never    Tobacco comments:     2-3 cigarettes a day   Substance Use Topics    Alcohol use: Never    Drug use: Never     Review of Systems    Physical Exam     Initial Vitals [24 0630]   BP Pulse Resp Temp SpO2   (!) 103/58 (!) 52 18 97.6 °F (36.4 °C) 99 %      MAP       --         Physical Exam    Nursing note and vitals reviewed.  Constitutional: She appears well-developed. No distress.   HENT:   Head: Normocephalic and atraumatic.   Mouth/Throat: Oropharynx is clear and moist.   Eyes: Conjunctivae and EOM are normal.   Neck: No JVD present.   Normal range of motion.  Cardiovascular:  Regular rhythm, normal heart sounds and intact distal pulses.           bradycardia   Pulmonary/Chest: Breath sounds normal. No respiratory distress.   Abdominal: Abdomen is soft. She exhibits no distension. There is no abdominal tenderness.   Musculoskeletal:         General: No tenderness or edema. Normal range of motion.      Cervical back: Normal range of motion.     Neurological: She is alert and oriented to person, place, and time. She has normal strength. No cranial nerve deficit.   Skin: Skin is warm and dry. Capillary refill takes less than 2 seconds.   Skin abrasion to her left lateral lower leg         ED Course   Procedures  Labs Reviewed   CBC W/ AUTO DIFFERENTIAL - Abnormal; Notable for the following components:       Result Value    Hemoglobin 10.7 (*)     MCV 81 (*)     MCH 22.7 (*)     MCHC 28.2 (*)     RDW 21.3 (*)      Platelets 140 (*)     nRBC 1 (*)     All other components within normal limits   COMPREHENSIVE METABOLIC PANEL - Abnormal; Notable for the following components:    Sodium 133 (*)     CO2 20 (*)      (*)     Creatinine 4.2 (*)     Calcium 8.5 (*)     Albumin 3.1 (*)     Total Bilirubin 1.5 (*)     eGFR 12.0 (*)     All other components within normal limits   B-TYPE NATRIURETIC PEPTIDE - Abnormal; Notable for the following components:    BNP 4,639 (*)     All other components within normal limits   TROPONIN I - Abnormal; Notable for the following components:    Troponin I 0.061 (*)     All other components within normal limits   URINALYSIS, REFLEX TO URINE CULTURE - Abnormal; Notable for the following components:    Appearance, UA Hazy (*)     Protein, UA 1+ (*)     All other components within normal limits    Narrative:     Specimen Source->Urine   MAGNESIUM   URINALYSIS MICROSCOPIC    Narrative:     Specimen Source->Urine     EKG Readings: (Independently Interpreted)   Initial Reading: No STEMI. Previous EKG: Compared with most recent EKG Rhythm: Sinus Bradycardia. Heart Rate: 51. Ectopy: No Ectopy. Conduction: Normal. ST Segments: Normal ST Segments. T Waves: Normal. Axis: Normal. Clinical Impression: Sinus Bradycardia     ECG Results              EKG 12-lead (Final result)        Collection Time Result Time QRS Duration OHS QTC Calculation    04/01/24 07:23:40 04/01/24 09:43:15 70 482                     Final result by Interface, Lab In [facility] (04/01/24 09:43:19)                   Narrative:    Test Reason : W19.XXXA,    Vent. Rate : 051 BPM     Atrial Rate : 051 BPM     P-R Int : 166 ms          QRS Dur : 070 ms      QT Int : 524 ms       P-R-T Axes : 068 062 035 degrees     QTc Int : 482 ms    Sinus bradycardia  Low voltage QRS  Low septal forces  Non-specific ST-T abn  Abnormal ECG  When compared with ECG of 23-FEB-2024 18:24,  The axis Shifted left    Confirmed by Jose ESQUIVEL MD ([id number]) on 4/1/2024  9:43:13 AM    Referred By: PARVIN   SELF           Confirmed By:Jose ESQUIVEL MD                                  Imaging Results              X-Ray Chest 1 View (Final result)  Result time 04/01/24 08:25:19      Final result by Jorge Butler MD (04/01/24 08:25:19)                   Impression:      Chronic cardiomegaly.  No acute change.      Electronically signed by: Jorge Butler MD  Date:    04/01/2024  Time:    08:25               Narrative:    EXAMINATION:  XR CHEST 1 VIEW    CLINICAL HISTORY:  Dizziness and giddiness    TECHNIQUE:  Single frontal view of the chest was performed.    COMPARISON:  02/23/2024    FINDINGS:  Heart outline remains moderately enlarged indicating cardiomegaly and/or pericardial fluid.  The lungs are expanded with upper normal pulmonary vascularity.  Chronic band of opacity in left middle zone could represent plate atelectasis or strand of fibrosis.  Faint edema may be present in the perihilar areas.  Lungs are otherwise clear without significant airspace consolidation, pleural effusion, or pneumothorax.  Skeletal structures are intact.                                    X-Rays:   Independently Interpreted Readings:   Chest X-Ray: No infiltrates.  No acute abnormalities. Cardiomegaly present.     Medications   sodium chloride 0.9% flush 10 mL (has no administration in time range)   melatonin tablet 6 mg (has no administration in time range)   aspirin EC tablet 81 mg (has no administration in time range)   atorvastatin tablet 40 mg (has no administration in time range)   carvediloL tablet 6.25 mg (6.25 mg Oral Not Given 4/1/24 1715)   sevelamer carbonate tablet 800 mg (800 mg Oral Given 4/1/24 1720)   sodium chloride 0.9% flush 10 mL (has no administration in time range)   heparin (porcine) injection 5,000 Units (5,000 Units Subcutaneous Given 4/2/24 0511)   furosemide injection 40 mg (40 mg Intravenous Given 4/1/24 1720)   ondansetron disintegrating tablet 4 mg (has no  administration in time range)   ondansetron injection 4 mg (has no administration in time range)   polyethylene glycol packet 17 g (17 g Oral Not Given 4/1/24 1130)   acetaminophen tablet 1,000 mg (has no administration in time range)   naloxone 0.4 mg/mL injection 0.02 mg (has no administration in time range)   glucose chewable tablet 16 g (has no administration in time range)   glucose chewable tablet 24 g (has no administration in time range)   glucagon (human recombinant) injection 1 mg (has no administration in time range)   dextrose 10% bolus 125 mL 125 mL (has no administration in time range)   dextrose 10% bolus 250 mL 250 mL (has no administration in time range)   zinc oxide 20 % ointment (has no administration in time range)   furosemide injection 40 mg (40 mg Intravenous Given 4/1/24 0914)   aspirin chewable tablet 81 mg (81 mg Oral Given 4/1/24 2049)     Medical Decision Making  53 y.o. F with hx of HFpEF (EF 63%), anemia, CKD, HTN, mitral valve regurgitation, presents to the ED for a fall.  Patient is well-appearing, afebrile, mild hypotension on arrival.  Atraumatic head, normocephalic, no hematotympanum, septal hematoma, all extremities full range of motion, no midline back tenderness to palpation, pelvic stable.  Alert and oriented, no focal neurological deficits.  Based on [place] rule, no CT head indicated, based on nexus criteria, no CT neck is indicated.  Differential including but not limited to syncope, arrhythmia, dehydration, CHF exacerbation with fluid overload, doubt intracranial injury, polytrauma    Amount and/or Complexity of Data Reviewed  External Data Reviewed: notes.  Labs: ordered. Decision-making details documented in ED Course.  Radiology: ordered and independent interpretation performed. Decision-making details documented in ED Course.  ECG/medicine tests: ordered and independent interpretation performed. Decision-making details documented in ED Course.    Risk  OTC  drugs.  Prescription drug management.              Attending Attestation:   Physician Attestation Statement for Resident:  As the supervising MD   Physician Attestation Statement: I have personally seen and examined this patient.   I agree with the above history.  -:   As the supervising MD I agree with the above PE.     As the supervising MD I agree with the above treatment, course, plan, and disposition.                    ED Course as of 04/02/24 0808 Mon Apr 01, 2024   1625 CBC auto differential(!)  No leukocytosis, or acute anemia [NC]   1625 Comprehensive metabolic panel(!)  No electrolyte derangement, new REGINALDO [NC]   1625 BNP(!): 4,639  Significantly elevated, concerning for fluid overload.  Failed home Lasix likely secondary to REGINALDO [NC]   1625 Urinalysis, Reflex to Urine Culture Urine, Clean Catch(!)  Unlikely UTI [NC]   1625 Troponin I(!): 0.033  Delta troponins negative, likely from fluid overload. [NC]   1626 Discussed with Hospital Medicine, will admit patient for IV diuretics, and close monitoring of her renal function. [NC]      ED Course User Index  [NC] Pietro Herring MD                           Clinical Impression:  Final diagnoses:  [W19.XXXA] Fall  [R42] Lightheadedness  [R60.1] Anasarca (Primary)  [I50.9] Acute on chronic congestive heart failure, unspecified heart failure type  [N17.9] REGINALDO (acute kidney injury)          ED Disposition Condition    Observation Stable                Pietro Herring MD  Resident  04/01/24 1626       Jaleel Terry MD  04/02/24 0808     20

## 2024-04-01 NOTE — PLAN OF CARE
[facility] - Emergency Dept  Initial Discharge Assessment       Primary Care Provider: Tami Villeda FNP    Admission Diagnosis: Anasarca [R60.1]    Admission Date: 4/1/2024  Expected Discharge Date:     Transition of Care Barriers: (P) Underinsured    Payor: /     Extended Emergency Contact Information  Primary Emergency Contact: Alexandrea Vera  Address: [address] [place] [address] [facility]  Home Phone: [phone]  Mobile Phone: [phone]  Relation: Daughter  Preferred language: English   needed? No    Discharge Plan A: (P) Home with family         [facility] - [place], [place] - [address]  [address]  [place] [address]  Phone: [phone] Fax: [phone]      Initial Assessment (most recent)       Adult Discharge Assessment - 04/01/24 1053          Discharge Assessment    Assessment Type Discharge Planning Assessment (P)      Confirmed/corrected address, phone number and insurance Yes (P)      Confirmed Demographics Correct on Facesheet (P)      Source of Information patient;family;health record; utilized (P)      Does patient/caregiver understand observation status Yes (P)      Communicated RILEY with patient/caregiver Yes (P)      People in Home child(kingsley), adult (P)      Do you expect to return to your current living situation? Yes (P)      Do you have help at home or someone to help you manage your care at home? Yes (P)      Prior to hospitilization cognitive status: Alert/Oriented (P)      Current cognitive status: Alert/Oriented (P)      Equipment Currently Used at Home none (P)      Readmission within 30 days? No (P)      Patient currently being followed by outpatient case management? No (P)      Do you currently have service(s) that help you manage your care at home? No (P)      Do you take prescription medications? Yes (P)      Do you have prescription coverage? Yes (P)      Do you have any problems affording any of your  prescribed medications? No (P)      Is the patient taking medications as prescribed? yes (P)      How do you get to doctors appointments? family or friend will provide;car, drives self (P)      Are you on dialysis? No (P)      Discharge Plan A Home with family (P)      DME Needed Upon Discharge  none (P)      Discharge Plan discussed with: Patient;Adult children (P)      Transition of Care Barriers Underinsured (P)         Physical Activity    On average, how many days per week do you engage in moderate to strenuous exercise (like a brisk walk)? 0 days (P)      On average, how many minutes do you engage in exercise at this level? 0 min (P)         Housing Stability    In the last 12 months, was there a time when you were not able to pay the mortgage or rent on time? No (P)      In the last 12 months, was there a time when you did not have a steady place to sleep or slept in a shelter (including now)? No (P)         Transportation Needs    In the past 12 months, has lack of transportation kept you from medical appointments or from getting medications? No (P)      In the past 12 months, has lack of transportation kept you from meetings, work, or from getting things needed for daily living? No (P)         Food Insecurity    Within the past 12 months, you worried that your food would run out before you got the money to buy more. Never true (P)         Stress    Do you feel stress - tense, restless, nervous, or anxious, or unable to sleep at night because your mind is troubled all the time - these days? Only a little (P)         Social Connections    In a typical week, how many times do you talk on the phone with family, friends, or neighbors? Three times a week (P)      How often do you get together with friends or relatives? Three times a week (P)      Do you belong to any clubs or organizations such as Mormon groups, unions, fraternal or athletic groups, or school groups? No (P)         Alcohol Use    Q1: How often do  you have a drink containing alcohol? Never (P)      Q2: How many drinks containing alcohol do you have on a typical day when you are drinking? Patient does not drink (P)

## 2024-04-01 NOTE — ASSESSMENT & PLAN NOTE
Body mass index is 34.64 kg/m². Morbid obesity complicates all aspects of disease management from diagnostic modalities to treatment. Weight loss encouraged and health benefits explained to patient.

## 2024-04-01 NOTE — ASSESSMENT & PLAN NOTE
Creatine stable for now. BMP reviewed- noted Estimated Creatinine Clearance: 13.6 mL/min (A) (based on SCr of 4.2 mg/dL (H)). according to latest data. Based on current GFR, CKD stage is stage 4 - GFR 15-29.  Monitor UOP and serial BMP and adjust therapy as needed. Renally dose meds. Avoid nephrotoxic medications and procedures.    - continue oral sevelamer

## 2024-04-01 NOTE — HPI
Per MICU: 52 yo F with PMH of HFpEF, severe MR, HTN, HLD, and CKD4 who initially presented to [facility] on 4/1/2024 after a mechanical fall at home with concurrent SOB and swelling in her legs and abdomen. She reported adherence to home Lasix and low sodium diet, but had felt increasingly SOB prior to admission as she was sleeping upright and had multiple nighttime awakenings due to SOB. In the ED, BNP was 4639 and she was admitted to Hospital Medicine for further management of ADHF. She initially had good response to IV Lasix but hospital course was later complicated by urinary retention, worsening kidney function, and decreased UOP. Nephrology and Cardiology were consulted to assist. She was started on Diuril and Lasix gtt for diuresis and Isordil and hydralazine for afterload reduction. Patient transferred to CCU for further management.

## 2024-04-01 NOTE — PLAN OF CARE
Discharge Planning Assessment:  Patient admitted on: 4-1-24  Chart reviewed, Care plan discussed with ER treatment team      Current Dispo: Obs admit  Self reports no case mgt needs upon d/c  Nereida  needed for communication: [facility], # [id number] was used while Ms Vera was in the e/r.  Transportation: has reliable  [employer] pending also  See PCP, nephrology clinic and cardiology clinic per team rec's  Case management to follow for plans and arrangements as needed

## 2024-04-01 NOTE — SUBJECTIVE & OBJECTIVE
Past Medical History:   Diagnosis Date    (HFpEF) heart failure with preserved ejection fraction 06/24/2023    EF 63% with G2 DD  Continue lasix, appears euvolemic today  Continue good BP management with losartan, increase Coreg 6.25 mg BID  Fluid restriction, low sodium diet  Recommend Jardiance- patient had CKD 4 and this is a limiting factor- nephro eval pending    Anemia 06/24/2023    CKD (chronic kidney disease)     HTN (hypertension)     Mitral valve regurgitation 06/26/2023    Refer to structural for evaluation  May benefit from mitral clip    Ovarian cyst     Stage 4 chronic kidney disease 06/24/2023    Refer to nephrology at [facility] as unable to get established with [facility] and the phone number for the outside nephrologist provided to patient during her recent hospitalization goes unanswered when called.   Cr remains elevated  Would like to start SGLT-2 and appreciate nephrology expertise        No past surgical history on file.    Review of patient's allergies indicates:  No Known Allergies    No current facility-administered medications on file prior to encounter.     Current Outpatient Medications on File Prior to Encounter   Medication Sig    aspirin (ECOTRIN) 81 MG EC tablet Take 1 tablet by mouth once daily.    atorvastatin (LIPITOR) 40 MG tablet Take 1 tablet by mouth once daily.    atorvastatin (LIPITOR) 40 MG tablet Take 1 tablet by mouth every day    carvediloL (COREG) 3.125 MG tablet Take 2 tablets (6.25 mg total) by mouth 2 (two) times daily with meals.    carvediloL (COREG) 6.25 MG tablet Take 1 tablet by mouth 2 times daily with food    furosemide (LASIX) 40 MG tablet Take 1 tablet (40 mg total) by mouth 2 (two) times a day.    furosemide (LASIX) 40 MG tablet Take 1 tablet by mouth once daily for 90 days    loratadine (CLARITIN) 10 mg tablet Take 10 mg by mouth once daily. PRN    sevelamer carbonate (RENVELA) 800 mg Tab Take 1 tablet (800 mg total) by mouth 3 (three) times daily with  meals.    sodium bicarbonate 650 MG tablet Take 1 tablet (650 mg total) by mouth 2 (two) times daily.     Family History    None       Tobacco Use    Smoking status: Former     Current packs/day: 0.00     Types: Cigarettes     Start date:      Quit date:      Years since quittin.2    Smokeless tobacco: Never    Tobacco comments:     2-3 cigarettes a day   Substance and Sexual Activity    Alcohol use: Never    Drug use: Never    Sexual activity: Not on file     Review of Systems   Constitutional:  Positive for activity change. Negative for chills, diaphoresis, fatigue and fever.   HENT:  Negative for congestion.    Respiratory:  Positive for shortness of breath. Negative for cough, chest tightness and wheezing.    Cardiovascular:  Positive for leg swelling. Negative for chest pain and palpitations.   Gastrointestinal:  Positive for abdominal distention and constipation. Negative for abdominal pain, blood in stool, diarrhea, nausea and vomiting.   Genitourinary:  Negative for difficulty urinating, dysuria and frequency.   Musculoskeletal:  Positive for arthralgias. Negative for back pain and neck stiffness.   Skin:  Positive for color change.   Neurological:  Negative for dizziness, tremors, seizures, syncope, weakness, light-headedness, numbness and headaches.   Psychiatric/Behavioral:  Negative for agitation, confusion and hallucinations.      Objective:     Vital Signs (Most Recent):  Temp: 97.8 °F (36.6 °C) (24 0924)  Pulse: (!) 53 (24 1033)  Resp: 18 (24 1033)  BP: 111/71 (24 1033)  SpO2: 98 % (24 1033) Vital Signs (24h Range):  Temp:  [97.6 °F (36.4 °C)-97.8 °F (36.6 °C)] 97.8 °F (36.6 °C)  Pulse:  [51-53] 53  Resp:  [18-24] 18  SpO2:  [95 %-100 %] 98 %  BP: ()/(52-76) 111/71     Weight: 72.6 kg (160 lb 0.9 oz)  Body mass index is 34.64 kg/m².     Physical Exam  Vitals and nursing note reviewed.   Constitutional:       General: She is not in acute distress.      Appearance: She is well-developed. She is obese. She is not diaphoretic.   HENT:      Head: Normocephalic and atraumatic.      Right Ear: External ear normal.      Left Ear: External ear normal.      Nose: Nose normal. No congestion.      Mouth/Throat:      Pharynx: Oropharynx is clear.   Eyes:      General: No scleral icterus.     Extraocular Movements: Extraocular movements intact.   Cardiovascular:      Rate and Rhythm: Normal rate and regular rhythm.      Pulses: Normal pulses.      Heart sounds: Normal heart sounds. No murmur heard.  Pulmonary:      Effort: Pulmonary effort is normal. No respiratory distress.      Breath sounds: Decreased breath sounds (throughout) present. No wheezing or rales.   Abdominal:      General: Bowel sounds are normal. There is distension.      Palpations: Abdomen is soft.      Tenderness: There is no abdominal tenderness. There is no guarding or rebound.   Musculoskeletal:      Cervical back: Normal range of motion.      Right lower leg: Edema (2+) present.      Left lower leg: Edema (2+) present.      Comments: Pitting edema of BLE up to mid thigh and extending into the abdomen   Skin:     General: Skin is warm and dry.      Capillary Refill: Capillary refill takes less than 2 seconds.      Comments: Superficial abraision to LLE   Neurological:      General: No focal deficit present.      Mental Status: She is alert and oriented to person, place, and time. Mental status is at baseline.   Psychiatric:         Mood and Affect: Mood normal.         Behavior: Behavior normal.         Thought Content: Thought content normal.                Significant Labs: All pertinent labs within the past 24 hours have been reviewed.  CBC:   Recent Labs   Lab 04/01/24  0720   WBC 4.41   HGB 10.7*   HCT 38.0   *     CMP:   Recent Labs   Lab 04/01/24  0720   *   K 3.9   CL 98   CO2 20*   GLU 89   *   CREATININE 4.2*   CALCIUM 8.5*   PROT 6.7   ALBUMIN 3.1*   BILITOT 1.5*   ALKPHOS  103   AST 30   ALT 20   ANIONGAP 15     Cardiac Markers:   Recent Labs   Lab 04/01/24  0720   BNP 4,639*     Magnesium:   Recent Labs   Lab 04/01/24  0720   MG 2.5     Troponin:   Recent Labs   Lab 04/01/24  0720   TROPONINI 0.061*     Urine Studies:   Recent Labs   Lab 04/01/24  0811   COLORU Yellow   APPEARANCEUA Hazy*   PHUR 5.0   SPECGRAV 1.015   PROTEINUA 1+*   GLUCUA Negative   KETONESU Negative   BILIRUBINUA Negative   OCCULTUA Negative   NITRITE Negative   LEUKOCYTESUR Negative   RBCUA 0   WBCUA 2   BACTERIA Occasional   SQUAMEPITHEL 6   HYALINECASTS 0       Significant Imaging: I have reviewed all pertinent imaging results/findings within the past 24 hours.  Imaging Results              X-Ray Chest 1 View (Final result)  Result time 04/01/24 08:25:19      Final result by Jorge Butler MD (04/01/24 08:25:19)                   Impression:      Chronic cardiomegaly.  No acute change.      Electronically signed by: Jorge Butler MD  Date:    04/01/2024  Time:    08:25               Narrative:    EXAMINATION:  XR CHEST 1 VIEW    CLINICAL HISTORY:  Dizziness and giddiness    TECHNIQUE:  Single frontal view of the chest was performed.    COMPARISON:  02/23/2024    FINDINGS:  Heart outline remains moderately enlarged indicating cardiomegaly and/or pericardial fluid.  The lungs are expanded with upper normal pulmonary vascularity.  Chronic band of opacity in left middle zone could represent plate atelectasis or strand of fibrosis.  Faint edema may be present in the perihilar areas.  Lungs are otherwise clear without significant airspace consolidation, pleural effusion, or pneumothorax.  Skeletal structures are intact.

## 2024-04-01 NOTE — ASSESSMENT & PLAN NOTE
Patient's anemia is currently controlled. Has not received any PRBCs to date. Etiology likely d/t chronic disease due to Chronic Kidney Disease  Current CBC reviewed-   Lab Results   Component Value Date    HGB 10.7 (L) 04/01/2024    HCT 38.0 04/01/2024   Monitor serial CBC and transfuse if patient becomes hemodynamically unstable, symptomatic or H/H drops below 7/21.

## 2024-04-01 NOTE — ASSESSMENT & PLAN NOTE
S/p mechanical fall this am 4/1/24. No syncope or prodrome, no headstrike. Pain 3/10 w/ mild superficial abrasion to LLE.    - no imaging at this time  - Pt stable, added fall precautions  - tylenol prn  - triad cream for abrasion

## 2024-04-01 NOTE — LETTER
April 12, 2024         [address]  [facility] [address]  Phone: [phone]       Patient: Xena Vera     To Whom It May Concern:    Rl Vera  is currently hospitalized at [facility] in critical condition.  Please excuse her daughter, Eun, for missing school from April 3-5 2024, as she was helping to attend to her mother's needs in the hospital during this time. If you have any questions or concerns, or if I can be of further assistance, please do not hesitate to contact me.    Sincerely,        Minda Molina MD

## 2024-04-02 PROBLEM — I10 ESSENTIAL HYPERTENSION: Chronic | Status: ACTIVE | Noted: 2023-06-24

## 2024-04-02 PROBLEM — I50.30 (HFPEF) HEART FAILURE WITH PRESERVED EJECTION FRACTION: Chronic | Status: ACTIVE | Noted: 2023-06-24

## 2024-04-02 PROBLEM — E66.9 CLASS 2 OBESITY IN ADULT: Chronic | Status: ACTIVE | Noted: 2023-06-24

## 2024-04-02 PROBLEM — N18.4 STAGE 4 CHRONIC KIDNEY DISEASE: Chronic | Status: ACTIVE | Noted: 2023-06-24

## 2024-04-02 PROBLEM — E66.812 CLASS 2 OBESITY IN ADULT: Chronic | Status: ACTIVE | Noted: 2023-06-24

## 2024-04-02 PROBLEM — D64.9 ANEMIA: Chronic | Status: ACTIVE | Noted: 2023-06-24

## 2024-04-02 PROBLEM — R82.81 PYURIA: Status: RESOLVED | Noted: 2023-12-22 | Resolved: 2024-04-02

## 2024-04-02 PROBLEM — I34.0 MITRAL VALVE REGURGITATION: Chronic | Status: ACTIVE | Noted: 2023-06-26

## 2024-04-02 LAB
ANION GAP SERPL CALC-SCNC: 14 MMOL/L (ref 8–16)
APTT PPP: 26.2 SEC (ref 21–32)
BASOPHILS # BLD AUTO: 0.05 K/UL (ref 0–0.2)
BASOPHILS NFR BLD: 1 % (ref 0–1.9)
BUN SERPL-MCNC: 114 MG/DL (ref 6–20)
CALCIUM SERPL-MCNC: 8.4 MG/DL (ref 8.7–10.5)
CHLORIDE SERPL-SCNC: 101 MMOL/L (ref 95–110)
CO2 SERPL-SCNC: 20 MMOL/L (ref 23–29)
CREAT SERPL-MCNC: 4 MG/DL (ref 0.5–1.4)
DIFFERENTIAL METHOD BLD: ABNORMAL
EOSINOPHIL # BLD AUTO: 0.1 K/UL (ref 0–0.5)
EOSINOPHIL NFR BLD: 2.2 % (ref 0–8)
ERYTHROCYTE [DISTWIDTH] IN BLOOD BY AUTOMATED COUNT: 21.2 % (ref 11.5–14.5)
EST. GFR  (NO RACE VARIABLE): 12.7 ML/MIN/1.73 M^2
ESTIMATED AVG GLUCOSE: 126 MG/DL (ref 68–131)
GLUCOSE SERPL-MCNC: 91 MG/DL (ref 70–110)
HBA1C MFR BLD: 6 % (ref 4–5.6)
HCT VFR BLD AUTO: 32.1 % (ref 37–48.5)
HGB BLD-MCNC: 9.3 G/DL (ref 12–16)
IMM GRANULOCYTES # BLD AUTO: 0 K/UL (ref 0–0.04)
IMM GRANULOCYTES NFR BLD AUTO: 0 % (ref 0–0.5)
INR PPP: 1.3 (ref 0.8–1.2)
LYMPHOCYTES # BLD AUTO: 1.2 K/UL (ref 1–4.8)
LYMPHOCYTES NFR BLD: 23.2 % (ref 18–48)
MAGNESIUM SERPL-MCNC: 2.4 MG/DL (ref 1.6–2.6)
MCH RBC QN AUTO: 23.3 PG (ref 27–31)
MCHC RBC AUTO-ENTMCNC: 29 G/DL (ref 32–36)
MCV RBC AUTO: 80 FL (ref 82–98)
MONOCYTES # BLD AUTO: 0.5 K/UL (ref 0.3–1)
MONOCYTES NFR BLD: 9.4 % (ref 4–15)
NEUTROPHILS # BLD AUTO: 3.2 K/UL (ref 1.8–7.7)
NEUTROPHILS NFR BLD: 64.2 % (ref 38–73)
NRBC BLD-RTO: 1 /100 WBC
PLATELET # BLD AUTO: 125 K/UL (ref 150–450)
PMV BLD AUTO: ABNORMAL FL (ref 9.2–12.9)
POTASSIUM SERPL-SCNC: 3.6 MMOL/L (ref 3.5–5.1)
PROTHROMBIN TIME: 13.5 SEC (ref 9–12.5)
RBC # BLD AUTO: 4 M/UL (ref 4–5.4)
SODIUM SERPL-SCNC: 135 MMOL/L (ref 136–145)
WBC # BLD AUTO: 4.99 K/UL (ref 3.9–12.7)

## 2024-04-02 PROCEDURE — 96372 THER/PROPH/DIAG INJ SC/IM: CPT | Performed by: PHYSICIAN ASSISTANT

## 2024-04-02 PROCEDURE — 63600175 PHARM REV CODE 636 W HCPCS: Performed by: PHYSICIAN ASSISTANT

## 2024-04-02 PROCEDURE — 94761 N-INVAS EAR/PLS OXIMETRY MLT: CPT

## 2024-04-02 PROCEDURE — 36415 COLL VENOUS BLD VENIPUNCTURE: CPT | Mod: XB | Performed by: STUDENT IN AN ORGANIZED HEALTH CARE EDUCATION/TRAINING PROGRAM

## 2024-04-02 PROCEDURE — 36415 COLL VENOUS BLD VENIPUNCTURE: CPT | Performed by: PHYSICIAN ASSISTANT

## 2024-04-02 PROCEDURE — 83735 ASSAY OF MAGNESIUM: CPT | Performed by: PHYSICIAN ASSISTANT

## 2024-04-02 PROCEDURE — G0378 HOSPITAL OBSERVATION PER HR: HCPCS

## 2024-04-02 PROCEDURE — 63600175 PHARM REV CODE 636 W HCPCS: Performed by: STUDENT IN AN ORGANIZED HEALTH CARE EDUCATION/TRAINING PROGRAM

## 2024-04-02 PROCEDURE — 25000003 PHARM REV CODE 250: Performed by: PHYSICIAN ASSISTANT

## 2024-04-02 PROCEDURE — 80048 BASIC METABOLIC PNL TOTAL CA: CPT | Performed by: PHYSICIAN ASSISTANT

## 2024-04-02 PROCEDURE — 85730 THROMBOPLASTIN TIME PARTIAL: CPT | Performed by: STUDENT IN AN ORGANIZED HEALTH CARE EDUCATION/TRAINING PROGRAM

## 2024-04-02 PROCEDURE — 83036 HEMOGLOBIN GLYCOSYLATED A1C: CPT | Performed by: PHYSICIAN ASSISTANT

## 2024-04-02 PROCEDURE — 85025 COMPLETE CBC W/AUTO DIFF WBC: CPT | Performed by: PHYSICIAN ASSISTANT

## 2024-04-02 PROCEDURE — 85610 PROTHROMBIN TIME: CPT | Performed by: STUDENT IN AN ORGANIZED HEALTH CARE EDUCATION/TRAINING PROGRAM

## 2024-04-02 PROCEDURE — 20600001 HC STEP DOWN PRIVATE ROOM

## 2024-04-02 RX ORDER — FUROSEMIDE 10 MG/ML
80 INJECTION INTRAMUSCULAR; INTRAVENOUS 2 TIMES DAILY
Status: DISCONTINUED | OUTPATIENT
Start: 2024-04-02 | End: 2024-04-07

## 2024-04-02 RX ADMIN — ATORVASTATIN CALCIUM 40 MG: 40 TABLET, FILM COATED ORAL at 08:04

## 2024-04-02 RX ADMIN — HEPARIN SODIUM 5000 UNITS: 5000 INJECTION INTRAVENOUS; SUBCUTANEOUS at 05:04

## 2024-04-02 RX ADMIN — FUROSEMIDE 80 MG: 10 INJECTION, SOLUTION INTRAVENOUS at 05:04

## 2024-04-02 RX ADMIN — ACETAMINOPHEN 1000 MG: 500 TABLET ORAL at 08:04

## 2024-04-02 RX ADMIN — POLYETHYLENE GLYCOL 3350 17 G: 17 POWDER, FOR SOLUTION ORAL at 08:04

## 2024-04-02 RX ADMIN — SEVELAMER CARBONATE 800 MG: 800 TABLET, FILM COATED ORAL at 08:04

## 2024-04-02 RX ADMIN — HEPARIN SODIUM 5000 UNITS: 5000 INJECTION INTRAVENOUS; SUBCUTANEOUS at 09:04

## 2024-04-02 RX ADMIN — SEVELAMER CARBONATE 800 MG: 800 TABLET, FILM COATED ORAL at 05:04

## 2024-04-02 RX ADMIN — SEVELAMER CARBONATE 800 MG: 800 TABLET, FILM COATED ORAL at 11:04

## 2024-04-02 NOTE — PLAN OF CARE
Pt had a restful night but remained bradycardic most of the night. No s/s of distress noted, and no pain reported. Safety measures in place. Family member remained at the bedside. Pt care ongoing.     Problem: Adult Inpatient Plan of Care  Goal: Absence of Hospital-Acquired Illness or Injury  Intervention: Identify and Manage Fall Risk  Flowsheets (Taken 4/2/2024 0429)  Safety Promotion/Fall Prevention:   assistive device/personal item within reach   bed alarm set   side rails raised x 2  Intervention: Prevent Skin Injury  Flowsheets (Taken 4/2/2024 0429)  Body Position: position changed independently  Skin Protection: adhesive use limited  Intervention: Prevent and Manage VTE (Venous Thromboembolism) Risk  Flowsheets (Taken 4/2/2024 0429)  Activity Management: Ambulated to bathroom - L4  VTE Prevention/Management:   bleeding precations maintained   bleeding risk assessed   ambulation promoted  Goal: Optimal Comfort and Wellbeing  Intervention: Monitor Pain and Promote Comfort  Flowsheets (Taken 4/2/2024 0429)  Pain Management Interventions: quiet environment facilitated  Intervention: Provide Person-Centered Care  Flowsheets (Taken 4/2/2024 0429)  Trust Relationship/Rapport:   care explained   choices provided   emotional support provided   empathic listening provided   questions answered   questions encouraged   thoughts/feelings acknowledged

## 2024-04-02 NOTE — ASSESSMENT & PLAN NOTE
Creatine stable for now. BMP reviewed- noted Estimated Creatinine Clearance: 14.2 mL/min (A) (based on SCr of 4 mg/dL (H)). according to latest data. Based on current GFR, CKD stage is stage 4 - GFR 15-29.  Monitor UOP and serial BMP and adjust therapy as needed. Renally dose meds. Avoid nephrotoxic medications and procedures.    - continue oral sevelamer

## 2024-04-02 NOTE — ASSESSMENT & PLAN NOTE
Body mass index is 30.86 kg/m². Morbid obesity complicates all aspects of disease management from diagnostic modalities to treatment. Weight loss encouraged and health benefits explained to patient.

## 2024-04-02 NOTE — ASSESSMENT & PLAN NOTE
Chronic, uncontrolled. Latest blood pressure and vitals reviewed-     Temp:  [97.6 °F (36.4 °C)-98.1 °F (36.7 °C)]   Pulse:  [51-55]   Resp:  [16-20]   BP: ()/(67-79)   SpO2:  [95 %-99 %] .   Home meds for hypertension were reviewed and noted below.   Hypertension Medications               carvediloL (COREG) 6.25 MG tablet Take 1 tablet by mouth 2 times daily with food     While in the hospital, will manage blood pressure as follows; Continue home antihypertensive regimen    Will utilize p.r.n. blood pressure medication only if patient's blood pressure greater than 180/110 and she develops symptoms such as worsening chest pain or shortness of breath.

## 2024-04-02 NOTE — HOSPITAL COURSE
MICU course  Patient initially had improved UOP with increasing Lasix, eventually maxed out on Lasix drip with addition of Diuril. Eventually started on CRRT/SLED for volume removal, initially needed intermittent levophed to tolerate. Has itchy rash that started during current hospitalization, Dermatology consulted for regimen. Graduated from CRRT to iHD, tolerating well. Stable to step back down to HM.     HM course  Patient slightly volume overloaded on exam. Reports making urine. Transition to lasix 40mg PO daily. More fluid removal via HD. Will need OP HD chair. SW consulted. Repeat TTE ordered for eval for MR. Discuss plans with IC once repeat TTE is obtained.  Will also need OP IC follow up. Nephro rec consulting interventional nephro for TDC placement. She underwent TDC placement 04/22. Repeat TTE w/the following findings:      Left Ventricle: The left ventricle is moderately dilated. Normal wall thickness. There is eccentric hypertrophy. There is normal systolic function with a visually estimated ejection fraction of 55 - 60%. Diastolic function cannot be reliably determined in the presence of mitral valve disease.    Right Ventricle: Mild right ventricular enlargement. Wall thickness is normal. Right ventricle wall motion  is normal. Systolic function is mildly reduced.    Biatrial enlargement (L > R)    Mitral Valve: There is severe functional regurgitation with a centrally directed jet.    Tricuspid Valve: There is mild to moderate regurgitation.    Pulmonary Artery: The estimated pulmonary artery systolic pressure is 96 mmHg.    IVC/SVC: Intermediate venous pressure at 8 mmHg.    There is a small pericardial effusion and a right pleural effusion.    Interventional cardiology consulted- recommended further medical management optimization and continued fluid removal via iHD. Not a candidate for mitraclip at this time. Intermittent asymptomatic hypotension for which diuretic regimen titrated w/ initiation  of midodrine. Still awaiting Medicaid enrollment- unfortunately- after weeks of patient in the hospital, no active medicaid at this time. Discussed w/hospital staff- plan for patient to discharge to home- discussed extensively with her the indications to return to local emergency department for HD needs- patient expressed understanding and was able to re-iterate when she would need to report to ED for HD. She had tunneled line in place- discussed line care. Medications were delivered to bedside. Made f/u apt with PCP. Amb referral made for interventional cardiology and nephrology.     Pt deemed appropriate for discharge. Plan discussed with pt, who was agreeable and amenable; medications were discussed and reviewed, outpatient follow-up scheduled, ER precautions were given, all questions were answered to the pt's satisfaction, and Mrs. Vera was subsequently discharged.    Physical Exam  Gen: in NAD, appears stated age, appears acutely ill   Neuro: AAOx3, motor, sensory, and strength grossly intact BL  HEENT: NTNC, EOMI, PERRL, MMM  CVS: RRR, no m/r/g; S1/S2 auscultated with no S3 or S4; capillary refill < 2 sec  Chest: TDC of the right chest wall  Resp: lungs CTAB, no w/r/r; no belabored breathing or accessory muscle use appreciated   Abd: BS+ in all 4 quadrants; NTND, soft to palpation; no organomegaly appreciated   Extrem: pulses full, equal, and regular over all 4 extremities; trace swelling of BL LE, no swelling of dorsum of feet  Skin: healing abrasion of the left calf    Translator Hughes #[id number]

## 2024-04-02 NOTE — PROGRESS NOTES
[facility] - Telemetry [facility] Medicine  Progress Note    Patient Name: Xena Vera  MRN: [id number]  Patient Class: OP- Observation   Admission Date: 4/1/2024  Length of Stay: 0 days  Attending Physician: Joslyn Patterson MD  Primary Care Provider: Tami Villeda FNP        Subjective:     Principal Problem:Acute on chronic heart failure with preserved ejection fraction (HFpEF)        HPI:  Ms. Xena Vera is a 53 y.o. female with CKD Stage 4, HFpEF, Mitral Valve Regurgitation who presents w/ complaint of mechanical fall and lower extremity swelling and SOB. Pt is Bengali speaking and declined  services instead allowing her son at bedside to translate. Per Pt and Son, earlier this morning, the Pt slipped and fell between grass and concrete, abrasion noted to LLE, 3/10 pain. Denies any LOC or head strike and Pt does not take any blood thinners. She was able to stand with assistance and ambulate as usual following. They also report worsening of chronic b/l lower extremity edema fluid build up now into abdomen. She has been taking her home lasix w/o any missed doses and also reports compliance with a low sodium diet. Endorses sleeping upright to feel like she can breathe and has several night time awakenings d/t SOB. Reports worsening ZACARIAS over the last several weeks. No associated chest pain, cough, lightheadedness, syncope. Pt has chronic constipation however BM have been unchanged, nonbloody and formed. Denies any recent fevers, chills, headaches, abdominal pain, n/v/d or dysuria.     In ED, Pt hypotensive 97/54 min and HR in low 50s, other VSS on RA. No leukocytosis, stable anemia hgb 10.7 (baseline ~11.4). Na 133. T bili 1.5. BNP 4,639, trop 0.061. UA noninfectious. CXR w/ upper normal pulmonary vascularity. Chronic band of opacity in left middle zone could represent plate atelectasis or strand of fibrosis. Faint edema may be present in the perihilar areas. Lungs are otherwise  clear without significant airspace consolidation, pleural effusion, or pneumothorax. Given lasix 40 mg IV x1. Admitted to  and started on the HF pathway.    Overview/Hospital Course:  Patient was admitted for congestive heart failure exacerbation.  Currently being diuresed with Lasix 40 mg IV b.i.d..  Echo showed an EF of 60-65%, grade 2 diastolic dysfunction.  IVC pressure of 8 mm of Hg.    Interval history:  Patient is not native English speaker.  Telugu speaker,  was offered.  Patient deferred, daughter was present at bedside who is helping with translation.  Is&Os were not accurately documented.  Daughter reports that patient had bleeding post heparin shot.  PT/APTT were checked, within normal limits.  Patient has not noticed any significant change in edema.  Endorses orthopnea.  Denies chest pain or shortness a breath    Review of Systems   Constitutional:  Positive for activity change. Negative for chills, diaphoresis, fatigue and fever.   HENT:  Negative for congestion.    Respiratory:  Positive for shortness of breath. Negative for cough, chest tightness and wheezing.    Cardiovascular:  Positive for leg swelling. Negative for chest pain and palpitations.   Gastrointestinal:  Positive for abdominal distention and constipation. Negative for abdominal pain, blood in stool, diarrhea, nausea and vomiting.   Genitourinary:  Negative for difficulty urinating, dysuria and frequency.   Musculoskeletal:  Positive for arthralgias. Negative for back pain and neck stiffness.   Skin:  Positive for color change.   Neurological:  Negative for dizziness, tremors, seizures, syncope, weakness, light-headedness, numbness and headaches.   Psychiatric/Behavioral:  Negative for agitation, confusion and hallucinations.      Objective:     Vital Signs (Most Recent):  Temp: 98.1 °F (36.7 °C) (04/02/24 1118)  Pulse: (!) 55 (04/02/24 1118)  Resp: 18 (04/02/24 1118)  BP: 125/79 (04/02/24 1118)  SpO2: 96 % (04/02/24 1118)  Vital Signs (24h Range):  Temp:  [97.6 °F (36.4 °C)-98.1 °F (36.7 °C)] 98.1 °F (36.7 °C)  Pulse:  [51-55] 55  Resp:  [16-20] 18  SpO2:  [95 %-99 %] 96 %  BP: ()/(67-79) 125/79     Weight: 71.7 kg (158 lb)  Body mass index is 30.86 kg/m².     Physical Exam  Vitals and nursing note reviewed.   Constitutional:       General: She is not in acute distress.     Appearance: She is well-developed. She is obese. She is not diaphoretic.   HENT:      Head: Normocephalic and atraumatic.      Right Ear: External ear normal.      Left Ear: External ear normal.      Nose: Nose normal. No congestion.      Mouth/Throat:      Pharynx: Oropharynx is clear.   Eyes:      General: No scleral icterus.     Extraocular Movements: Extraocular movements intact.   Cardiovascular:      Rate and Rhythm: Normal rate and regular rhythm.      Pulses: Normal pulses.      Heart sounds: Normal heart sounds. No murmur heard.  Pulmonary:      Effort: Pulmonary effort is normal. No respiratory distress.      Breath sounds: Decreased breath sounds (throughout) present. No wheezing or rales.   Abdominal:      General: Bowel sounds are normal. There is distension.      Palpations: Abdomen is soft.      Tenderness: There is no abdominal tenderness. There is no guarding or rebound.   Musculoskeletal:      Cervical back: Normal range of motion.      Right lower leg: Edema (2+) present.      Left lower leg: Edema (2+) present.      Comments: Pitting edema of BLE up to mid thigh and extending into the abdomen   Skin:     General: Skin is warm and dry.      Capillary Refill: Capillary refill takes less than 2 seconds.      Comments: Superficial abraision to LLE   Neurological:      General: No focal deficit present.      Mental Status: She is alert and oriented to person, place, and time. Mental status is at baseline.   Psychiatric:         Mood and Affect: Mood normal.         Behavior: Behavior normal.         Thought Content: Thought content normal.                 Significant Labs: All pertinent labs within the past 24 hours have been reviewed.  CBC:   Recent Labs   Lab 04/01/24  0720 04/02/24  0324   WBC 4.41 4.99   HGB 10.7* 9.3*   HCT 38.0 32.1*   * 125*       CMP:   Recent Labs   Lab 04/01/24  0720 04/02/24  0324   * 135*   K 3.9 3.6   CL 98 101   CO2 20* 20*   GLU 89 91   * 114*   CREATININE 4.2* 4.0*   CALCIUM 8.5* 8.4*   PROT 6.7  --    ALBUMIN 3.1*  --    BILITOT 1.5*  --    ALKPHOS 103  --    AST 30  --    ALT 20  --    ANIONGAP 15 14       Cardiac Markers:   Recent Labs   Lab 04/01/24  0720   BNP 4,639*       Magnesium:   Recent Labs   Lab 04/01/24  0720 04/02/24  0324   MG 2.5 2.4       Troponin:   Recent Labs   Lab 04/01/24  0720 04/01/24  1205   TROPONINI 0.061* 0.033*       Urine Studies:   Recent Labs   Lab 04/01/24  0811   COLORU Yellow   APPEARANCEUA Hazy*   PHUR 5.0   SPECGRAV 1.015   PROTEINUA 1+*   GLUCUA Negative   KETONESU Negative   BILIRUBINUA Negative   OCCULTUA Negative   NITRITE Negative   LEUKOCYTESUR Negative   RBCUA 0   WBCUA 2   BACTERIA Occasional   SQUAMEPITHEL 6   HYALINECASTS 0         Significant Imaging: I have reviewed all pertinent imaging results/findings within the past 24 hours.  Imaging Results              X-Ray Chest 1 View (Final result)  Result time 04/01/24 08:25:19      Final result by Jorge Butler MD (04/01/24 08:25:19)                   Impression:      Chronic cardiomegaly.  No acute change.      Electronically signed by: Jorge Butler MD  Date:    04/01/2024  Time:    08:25               Narrative:    EXAMINATION:  XR CHEST 1 VIEW    CLINICAL HISTORY:  Dizziness and giddiness    TECHNIQUE:  Single frontal view of the chest was performed.    COMPARISON:  02/23/2024    FINDINGS:  Heart outline remains moderately enlarged indicating cardiomegaly and/or pericardial fluid.  The lungs are expanded with upper normal pulmonary vascularity.  Chronic band of opacity in left middle zone  could represent plate atelectasis or strand of fibrosis.  Faint edema may be present in the perihilar areas.  Lungs are otherwise clear without significant airspace consolidation, pleural effusion, or pneumothorax.  Skeletal structures are intact.                                       Assessment/Plan:      * Acute on chronic heart failure with preserved ejection fraction (HFpEF)  Patient's SOB and edema consistent with CHF exacerbation. BNP significantly elevated, and chest xray with edema as well.   Patient is identified as having Diastolic (HFpEF) heart failure that is Acute on chronic. CHF is currently uncontrolled due to Dyspnea not returned to baseline after 1 doses of IV diuretic, >3 pillow orthopnea, and Pulmonary edema/pleural effusion on CXR. Latest ECHO performed and demonstrates- Results for orders placed during the hospital encounter of 06/24/23    Echo    Interpretation Summary  · The left ventricle is normal in size with normal systolic function.  · The estimated ejection fraction is 63%.  · The quantitatively derived ejection fraction is 57%.  · The left ventricular global longitudinal strain is -17.8%.  · Grade II left ventricular diastolic dysfunction.  · Normal right ventricular size with normal right ventricular systolic function.  · Severe left atrial enlargement.  · Severe mitral regurgitation.  · Mild tricuspid regurgitation.  · Intermediate central venous pressure (8 mmHg).  · The estimated PA systolic pressure is 38 mmHg.  · Trivial posterior pericardial effusion. Small outside the RA.  . Continue Beta Blocker and Furosemide and monitor clinical status closely. Monitor on telemetry. Patient is on CHF pathway.  Monitor strict Is&Os and daily weights.  Place on fluid restriction of 1.5 L. Cardiology has not been consulted. Continue to stress to patient importance of self efficacy and  on diet for CHF. Last BNP reviewed- and noted below   Recent Labs   Lab 04/01/24  0720   BNP 4,639*        - continue lasix 40 mg IV bid, will likely need to titrate home dose  - trop 0.061, trending  - CXR w/ upper normal pulmonary vascularity. Chronic band of opacity in left middle zone could represent plate atelectasis or strand of fibrosis. Faint edema may be present in the perihilar areas. Lungs are otherwise clear without significant airspace consolidation, pleural effusion, or pneumothorax.   -l repeat TTE, Echo showed an EF of 60-65%, grade 2 diastolic dysfunction.  IVC pressure of 8 mm of Hg.  - tele monitoring  - K>4, Mg>2    Fall  S/p mechanical fall this am 4/1/24. No syncope or prodrome, no headstrike. Pain 3/10 w/ mild superficial abrasion to LLE.    - no imaging at this time  - Pt stable, added fall precautions  - tylenol prn  - triad cream for abrasion    Anasarca        Constipation  - miralax qd    Anemia  Patient's anemia is currently controlled. Has not received any PRBCs to date. Etiology likely d/t chronic disease due to Chronic Kidney Disease  Current CBC reviewed-   Lab Results   Component Value Date    HGB 9.3 (L) 04/02/2024    HCT 32.1 (L) 04/02/2024   Monitor serial CBC and transfuse if patient becomes hemodynamically unstable, symptomatic or H/H drops below 7/21.    Class 2 obesity in adult  Body mass index is 30.86 kg/m². Morbid obesity complicates all aspects of disease management from diagnostic modalities to treatment. Weight loss encouraged and health benefits explained to patient.     Essential hypertension  Chronic, uncontrolled. Latest blood pressure and vitals reviewed-     Temp:  [97.6 °F (36.4 °C)-98.1 °F (36.7 °C)]   Pulse:  [51-55]   Resp:  [16-20]   BP: ()/(67-79)   SpO2:  [95 %-99 %] .   Home meds for hypertension were reviewed and noted below.   Hypertension Medications               carvediloL (COREG) 6.25 MG tablet Take 1 tablet by mouth 2 times daily with food     While in the hospital, will manage blood pressure as follows; Continue home antihypertensive  regimen    Will utilize p.r.n. blood pressure medication only if patient's blood pressure greater than 180/110 and she develops symptoms such as worsening chest pain or shortness of breath.    Stage 4 chronic kidney disease  Creatine stable for now. BMP reviewed- noted Estimated Creatinine Clearance: 14.2 mL/min (A) (based on SCr of 4 mg/dL (H)). according to latest data. Based on current GFR, CKD stage is stage 4 - GFR 15-29.  Monitor UOP and serial BMP and adjust therapy as needed. Renally dose meds. Avoid nephrotoxic medications and procedures.    - continue oral sevelamer       VTE Risk Mitigation (From admission, onward)           Ordered     heparin (porcine) injection 5,000 Units  Every 8 hours         04/01/24 1030     IP VTE HIGH RISK PATIENT  Once         04/01/24 1030     Place sequential compression device  Until discontinued         04/01/24 1030                    Discharge Planning   RILEY: 4/4/2024     Code Status: Full Code   Is the patient medically ready for discharge?:     Reason for patient still in hospital (select all that apply): Patient trending condition, Laboratory test, and Treatment  Discharge Plan A: Home with family                  Joslyn Patterson MD  Department of Hospital Medicine   [facility] - Telemetry Stepdown

## 2024-04-02 NOTE — CONSULTS
Food & Nutrition  Education    Diet Education: Low sodium  Time Spent: 5 minutes  Learners: Patient, 1 family member    Nutrition Education provided with handouts (in English & Moroccan). All questions and concerns answered. Dietitian's contact information provided.     Follow-Up: Yes    Pt w/ good appetite PTA/currently & UBW of 160# - no indicators of malnutrition noted.    Please re-consult as needed.    Thanks!  Raissa MS, RD, LDN

## 2024-04-02 NOTE — ASSESSMENT & PLAN NOTE
Patient's SOB and edema consistent with CHF exacerbation. BNP significantly elevated, and chest xray with edema as well.   Patient is identified as having Diastolic (HFpEF) heart failure that is Acute on chronic. CHF is currently uncontrolled due to Dyspnea not returned to baseline after 1 doses of IV diuretic, >3 pillow orthopnea, and Pulmonary edema/pleural effusion on CXR. Latest ECHO performed and demonstrates- Results for orders placed during the hospital encounter of 06/24/23    Echo    Interpretation Summary  · The left ventricle is normal in size with normal systolic function.  · The estimated ejection fraction is 63%.  · The quantitatively derived ejection fraction is 57%.  · The left ventricular global longitudinal strain is -17.8%.  · Grade II left ventricular diastolic dysfunction.  · Normal right ventricular size with normal right ventricular systolic function.  · Severe left atrial enlargement.  · Severe mitral regurgitation.  · Mild tricuspid regurgitation.  · Intermediate central venous pressure (8 mmHg).  · The estimated PA systolic pressure is 38 mmHg.  · Trivial posterior pericardial effusion. Small outside the RA.  . Continue Beta Blocker and Furosemide and monitor clinical status closely. Monitor on telemetry. Patient is on CHF pathway.  Monitor strict Is&Os and daily weights.  Place on fluid restriction of 1.5 L. Cardiology has not been consulted. Continue to stress to patient importance of self efficacy and  on diet for CHF. Last BNP reviewed- and noted below   Recent Labs   Lab 04/01/24  0720   BNP 4,639*       - continue lasix 40 mg IV bid, will likely need to titrate home dose  - trop 0.061, trending  - CXR w/ upper normal pulmonary vascularity. Chronic band of opacity in left middle zone could represent plate atelectasis or strand of fibrosis. Faint edema may be present in the perihilar areas. Lungs are otherwise clear without significant airspace consolidation, pleural effusion, or  pneumothorax.   -l repeat TTE, Echo showed an EF of 60-65%, grade 2 diastolic dysfunction.  IVC pressure of 8 mm of Hg.  - tele monitoring  - K>4, Mg>2

## 2024-04-02 NOTE — SUBJECTIVE & OBJECTIVE
Interval history:  Patient is not native English speaker.  Faroese speaker,  was offered.  Patient deferred, daughter was present at bedside who is helping with translation.  Is&Os were not accurately documented.  Daughter reports that patient had bleeding post heparin shot.  PT/APTT were checked, within normal limits.  Patient has not noticed any significant change in edema.  Endorses orthopnea.  Denies chest pain or shortness a breath    Review of Systems   Constitutional:  Positive for activity change. Negative for chills, diaphoresis, fatigue and fever.   HENT:  Negative for congestion.    Respiratory:  Positive for shortness of breath. Negative for cough, chest tightness and wheezing.    Cardiovascular:  Positive for leg swelling. Negative for chest pain and palpitations.   Gastrointestinal:  Positive for abdominal distention and constipation. Negative for abdominal pain, blood in stool, diarrhea, nausea and vomiting.   Genitourinary:  Negative for difficulty urinating, dysuria and frequency.   Musculoskeletal:  Positive for arthralgias. Negative for back pain and neck stiffness.   Skin:  Positive for color change.   Neurological:  Negative for dizziness, tremors, seizures, syncope, weakness, light-headedness, numbness and headaches.   Psychiatric/Behavioral:  Negative for agitation, confusion and hallucinations.      Objective:     Vital Signs (Most Recent):  Temp: 98.1 °F (36.7 °C) (04/02/24 1118)  Pulse: (!) 55 (04/02/24 1118)  Resp: 18 (04/02/24 1118)  BP: 125/79 (04/02/24 1118)  SpO2: 96 % (04/02/24 1118) Vital Signs (24h Range):  Temp:  [97.6 °F (36.4 °C)-98.1 °F (36.7 °C)] 98.1 °F (36.7 °C)  Pulse:  [51-55] 55  Resp:  [16-20] 18  SpO2:  [95 %-99 %] 96 %  BP: ()/(67-79) 125/79     Weight: 71.7 kg (158 lb)  Body mass index is 30.86 kg/m².     Physical Exam  Vitals and nursing note reviewed.   Constitutional:       General: She is not in acute distress.     Appearance: She is  well-developed. She is obese. She is not diaphoretic.   HENT:      Head: Normocephalic and atraumatic.      Right Ear: External ear normal.      Left Ear: External ear normal.      Nose: Nose normal. No congestion.      Mouth/Throat:      Pharynx: Oropharynx is clear.   Eyes:      General: No scleral icterus.     Extraocular Movements: Extraocular movements intact.   Cardiovascular:      Rate and Rhythm: Normal rate and regular rhythm.      Pulses: Normal pulses.      Heart sounds: Normal heart sounds. No murmur heard.  Pulmonary:      Effort: Pulmonary effort is normal. No respiratory distress.      Breath sounds: Decreased breath sounds (throughout) present. No wheezing or rales.   Abdominal:      General: Bowel sounds are normal. There is distension.      Palpations: Abdomen is soft.      Tenderness: There is no abdominal tenderness. There is no guarding or rebound.   Musculoskeletal:      Cervical back: Normal range of motion.      Right lower leg: Edema (2+) present.      Left lower leg: Edema (2+) present.      Comments: Pitting edema of BLE up to mid thigh and extending into the abdomen   Skin:     General: Skin is warm and dry.      Capillary Refill: Capillary refill takes less than 2 seconds.      Comments: Superficial abraision to LLE   Neurological:      General: No focal deficit present.      Mental Status: She is alert and oriented to person, place, and time. Mental status is at baseline.   Psychiatric:         Mood and Affect: Mood normal.         Behavior: Behavior normal.         Thought Content: Thought content normal.                Significant Labs: All pertinent labs within the past 24 hours have been reviewed.  CBC:   Recent Labs   Lab 04/01/24  0720 04/02/24  0324   WBC 4.41 4.99   HGB 10.7* 9.3*   HCT 38.0 32.1*   * 125*       CMP:   Recent Labs   Lab 04/01/24  0720 04/02/24  0324   * 135*   K 3.9 3.6   CL 98 101   CO2 20* 20*   GLU 89 91   * 114*   CREATININE 4.2* 4.0*    CALCIUM 8.5* 8.4*   PROT 6.7  --    ALBUMIN 3.1*  --    BILITOT 1.5*  --    ALKPHOS 103  --    AST 30  --    ALT 20  --    ANIONGAP 15 14       Cardiac Markers:   Recent Labs   Lab 04/01/24  0720   BNP 4,639*       Magnesium:   Recent Labs   Lab 04/01/24  0720 04/02/24  0324   MG 2.5 2.4       Troponin:   Recent Labs   Lab 04/01/24  0720 04/01/24  1205   TROPONINI 0.061* 0.033*       Urine Studies:   Recent Labs   Lab 04/01/24  0811   COLORU Yellow   APPEARANCEUA Hazy*   PHUR 5.0   SPECGRAV 1.015   PROTEINUA 1+*   GLUCUA Negative   KETONESU Negative   BILIRUBINUA Negative   OCCULTUA Negative   NITRITE Negative   LEUKOCYTESUR Negative   RBCUA 0   WBCUA 2   BACTERIA Occasional   SQUAMEPITHEL 6   HYALINECASTS 0         Significant Imaging: I have reviewed all pertinent imaging results/findings within the past 24 hours.  Imaging Results              X-Ray Chest 1 View (Final result)  Result time 04/01/24 08:25:19      Final result by Jorge Butler MD (04/01/24 08:25:19)                   Impression:      Chronic cardiomegaly.  No acute change.      Electronically signed by: Jorge Butler MD  Date:    04/01/2024  Time:    08:25               Narrative:    EXAMINATION:  XR CHEST 1 VIEW    CLINICAL HISTORY:  Dizziness and giddiness    TECHNIQUE:  Single frontal view of the chest was performed.    COMPARISON:  02/23/2024    FINDINGS:  Heart outline remains moderately enlarged indicating cardiomegaly and/or pericardial fluid.  The lungs are expanded with upper normal pulmonary vascularity.  Chronic band of opacity in left middle zone could represent plate atelectasis or strand of fibrosis.  Faint edema may be present in the perihilar areas.  Lungs are otherwise clear without significant airspace consolidation, pleural effusion, or pneumothorax.  Skeletal structures are intact.

## 2024-04-02 NOTE — PROGRESS NOTES
"[facility]) nurse navigator notified of HFTCC candidate in need of education and introduction to 4-6 week program.      PT in the BR with  Daughter Eun who is 14 years old and sitting on the couch. Introduced self to pt as HFTCC nurse navigator. PT exits the BR and we speak at the bedside.    Patient given "[facility] Home Care Guide" which includes "Daily weight and symptom tracker".  Encouraged pt and Eun to review information., Kazakh version offered to Eun but she states the English version is good as her Mother was unable to attend school when young and she reads English and will communicate the information to her. Eun acts as interpretor.     Reviewed the following key points of HFTCC program with pt and Eun:              1.) Take your medications as directed. Call Ms Licha if any health Care Professional changes your Heart/Fluid medications.               2.) Weight yourself daily. Daily Dry Weights. Upon waking ,empty your bladder, weigh with as little clothes as possible before eating/drinking. Record weight in Symptom Tracker and compare these weights for fluid gain. Weight gain overnight of 3-4 lbs is Fluid, also a gain of 5 lbs in 3 days is Fluid as well. Call US!              3.) Follow low salt and limited fluid diet. Salt/Sodium Restriction 2936-9831 mg, see page 4. Sodium makes you hold onto Fluid. High Sodium Foods;Deli Meat/Cheese, Sausages/Hot Dogs, Fast Food/Restaurant Food, Anything in a box, bottle or can. Cook with Fresh or Frozen ingredients and use seasonings that are labeled NO SALT. Check Portion Sizes, Salt is reported for 1 portion. A can may contain 2-3 portions. Fluid Restriction 1.5 -2 Liters/Day, see page 6, measure all of your Fluid, the milk in your cereal, broth in your soup and ice cream because anything that melts at room temp is a liquid.              4.) Stop smoking and start exercising. Brisk walking is " "good, don't walk like you are going to the [employer] and DON"T WALK IN THE HEAT! Start low and increase as tolerated. Remember if you don't use it you lose it.              5.) Go to your appointments and call your team. Have your weight and BP/P ready when we call to do the Phone Check ins and call us if you have fluid gain or questions       Pt reminded to follow Symptom tracker and to call at the onset of symptoms according to tracker.     Reviewed plan for follow up once discharged to include phone calls, in person and virtual visits to assist pt optimizing their heart failure medication regimen and encouraging healthy lifestyle modifications.  Reminded pt/Eun that program will assist them over the next 4-6 weeks and then patient will be transferred to long term care provider .  Reminded pt/Eun how to contact HFTCC navigator via phone and or via [facility].     Pt/Eun instructed appointment will be printed on hospital discharge paperwork.     Pt/Eun also reminded RN will call 48-72 hours after discharge to check on them.     PT and Eun verbalize read back of some of the  information given.  Encouraged pt and Eun to read over information often and contact team with any questions or concerns.     PT requests a morning appt no earlier than 0930 or 1000 please.  "

## 2024-04-02 NOTE — ASSESSMENT & PLAN NOTE
Patient's anemia is currently controlled. Has not received any PRBCs to date. Etiology likely d/t chronic disease due to Chronic Kidney Disease  Current CBC reviewed-   Lab Results   Component Value Date    HGB 9.3 (L) 04/02/2024    HCT 32.1 (L) 04/02/2024   Monitor serial CBC and transfuse if patient becomes hemodynamically unstable, symptomatic or H/H drops below 7/21.

## 2024-04-02 NOTE — PLAN OF CARE
SW met w/pt and pt daughter Sophia [phone] at bedside for a check up. Pt was calm and cooperative sitting in her chair. Due to language barrier pt daughter was able to answer questions. Pt plan to d/c is to return home w/ family and no needs. KAZ explained the CM dept will continue following along w/ treatment team for recommendations/needs upon d/c.       SILVESTRE Box   [facility]    Case Management Dept  [phone]

## 2024-04-03 LAB
ANION GAP SERPL CALC-SCNC: 16 MMOL/L (ref 8–16)
BASOPHILS # BLD AUTO: 0.05 K/UL (ref 0–0.2)
BASOPHILS NFR BLD: 1 % (ref 0–1.9)
BUN SERPL-MCNC: 106 MG/DL (ref 6–20)
CALCIUM SERPL-MCNC: 8.3 MG/DL (ref 8.7–10.5)
CHLORIDE SERPL-SCNC: 100 MMOL/L (ref 95–110)
CO2 SERPL-SCNC: 20 MMOL/L (ref 23–29)
CREAT SERPL-MCNC: 3.8 MG/DL (ref 0.5–1.4)
DIFFERENTIAL METHOD BLD: ABNORMAL
EOSINOPHIL # BLD AUTO: 0.1 K/UL (ref 0–0.5)
EOSINOPHIL NFR BLD: 1.5 % (ref 0–8)
ERYTHROCYTE [DISTWIDTH] IN BLOOD BY AUTOMATED COUNT: 21.3 % (ref 11.5–14.5)
EST. GFR  (NO RACE VARIABLE): 13.5 ML/MIN/1.73 M^2
GLUCOSE SERPL-MCNC: 79 MG/DL (ref 70–110)
HCT VFR BLD AUTO: 32.6 % (ref 37–48.5)
HGB BLD-MCNC: 9.6 G/DL (ref 12–16)
IMM GRANULOCYTES # BLD AUTO: 0.02 K/UL (ref 0–0.04)
IMM GRANULOCYTES NFR BLD AUTO: 0.4 % (ref 0–0.5)
LYMPHOCYTES # BLD AUTO: 1.2 K/UL (ref 1–4.8)
LYMPHOCYTES NFR BLD: 22.9 % (ref 18–48)
MAGNESIUM SERPL-MCNC: 2.4 MG/DL (ref 1.6–2.6)
MCH RBC QN AUTO: 23.8 PG (ref 27–31)
MCHC RBC AUTO-ENTMCNC: 29.4 G/DL (ref 32–36)
MCV RBC AUTO: 81 FL (ref 82–98)
MONOCYTES # BLD AUTO: 0.4 K/UL (ref 0.3–1)
MONOCYTES NFR BLD: 7.6 % (ref 4–15)
NEUTROPHILS # BLD AUTO: 3.5 K/UL (ref 1.8–7.7)
NEUTROPHILS NFR BLD: 66.6 % (ref 38–73)
NRBC BLD-RTO: 0 /100 WBC
PLATELET # BLD AUTO: 142 K/UL (ref 150–450)
PMV BLD AUTO: ABNORMAL FL (ref 9.2–12.9)
POTASSIUM SERPL-SCNC: 3.4 MMOL/L (ref 3.5–5.1)
RBC # BLD AUTO: 4.04 M/UL (ref 4–5.4)
SODIUM SERPL-SCNC: 136 MMOL/L (ref 136–145)
WBC # BLD AUTO: 5.25 K/UL (ref 3.9–12.7)

## 2024-04-03 PROCEDURE — 83735 ASSAY OF MAGNESIUM: CPT | Performed by: PHYSICIAN ASSISTANT

## 2024-04-03 PROCEDURE — 36415 COLL VENOUS BLD VENIPUNCTURE: CPT | Performed by: PHYSICIAN ASSISTANT

## 2024-04-03 PROCEDURE — 96372 THER/PROPH/DIAG INJ SC/IM: CPT | Performed by: PHYSICIAN ASSISTANT

## 2024-04-03 PROCEDURE — 20600001 HC STEP DOWN PRIVATE ROOM

## 2024-04-03 PROCEDURE — 94761 N-INVAS EAR/PLS OXIMETRY MLT: CPT

## 2024-04-03 PROCEDURE — 25000003 PHARM REV CODE 250: Performed by: STUDENT IN AN ORGANIZED HEALTH CARE EDUCATION/TRAINING PROGRAM

## 2024-04-03 PROCEDURE — 63600175 PHARM REV CODE 636 W HCPCS: Performed by: PHYSICIAN ASSISTANT

## 2024-04-03 PROCEDURE — 25000003 PHARM REV CODE 250: Performed by: PHYSICIAN ASSISTANT

## 2024-04-03 PROCEDURE — 63600175 PHARM REV CODE 636 W HCPCS: Performed by: STUDENT IN AN ORGANIZED HEALTH CARE EDUCATION/TRAINING PROGRAM

## 2024-04-03 PROCEDURE — 80048 BASIC METABOLIC PNL TOTAL CA: CPT | Performed by: PHYSICIAN ASSISTANT

## 2024-04-03 PROCEDURE — 85025 COMPLETE CBC W/AUTO DIFF WBC: CPT | Performed by: PHYSICIAN ASSISTANT

## 2024-04-03 RX ADMIN — SEVELAMER CARBONATE 800 MG: 800 TABLET, FILM COATED ORAL at 08:04

## 2024-04-03 RX ADMIN — FUROSEMIDE 80 MG: 10 INJECTION, SOLUTION INTRAVENOUS at 06:04

## 2024-04-03 RX ADMIN — SEVELAMER CARBONATE 800 MG: 800 TABLET, FILM COATED ORAL at 12:04

## 2024-04-03 RX ADMIN — ASPIRIN 81 MG: 81 TABLET, COATED ORAL at 08:04

## 2024-04-03 RX ADMIN — HEPARIN SODIUM 5000 UNITS: 5000 INJECTION INTRAVENOUS; SUBCUTANEOUS at 05:04

## 2024-04-03 RX ADMIN — FUROSEMIDE 80 MG: 10 INJECTION, SOLUTION INTRAVENOUS at 08:04

## 2024-04-03 RX ADMIN — POTASSIUM BICARBONATE 50 MEQ: 978 TABLET, EFFERVESCENT ORAL at 10:04

## 2024-04-03 RX ADMIN — ATORVASTATIN CALCIUM 40 MG: 40 TABLET, FILM COATED ORAL at 08:04

## 2024-04-03 RX ADMIN — POLYETHYLENE GLYCOL 3350 17 G: 17 POWDER, FOR SOLUTION ORAL at 08:04

## 2024-04-03 RX ADMIN — HEPARIN SODIUM 5000 UNITS: 5000 INJECTION INTRAVENOUS; SUBCUTANEOUS at 09:04

## 2024-04-03 RX ADMIN — SEVELAMER CARBONATE 800 MG: 800 TABLET, FILM COATED ORAL at 06:04

## 2024-04-03 RX ADMIN — HEPARIN SODIUM 5000 UNITS: 5000 INJECTION INTRAVENOUS; SUBCUTANEOUS at 02:04

## 2024-04-03 NOTE — ASSESSMENT & PLAN NOTE
She takes furosemide 40 mg twice daily. Giving IV furosemide 80 mg twice daily. Monitor intake/output. Treat hypokalemia as needed.

## 2024-04-03 NOTE — PLAN OF CARE
Pt had a restful night, no s/s of distress noted. Tylenol was administered earlier in the night to relief headache. She remained bradycardic most of the night. Safety measures in place, family member at the bedside. Pt care ongoing.     Problem: Adult Inpatient Plan of Care  Goal: Absence of Hospital-Acquired Illness or Injury  Intervention: Identify and Manage Fall Risk  Flowsheets (Taken 4/3/2024 0456)  Safety Promotion/Fall Prevention:   assistive device/personal item within reach   bed alarm refused   side rails raised x 2  Intervention: Prevent Skin Injury  Flowsheets (Taken 4/3/2024 0456)  Body Position: position changed independently  Skin Protection: adhesive use limited  Intervention: Prevent and Manage VTE (Venous Thromboembolism) Risk  Flowsheets (Taken 4/3/2024 0456)  VTE Prevention/Management:   bleeding precations maintained   ambulation promoted   bleeding risk assessed  Intervention: Prevent Infection  Flowsheets (Taken 4/3/2024 0456)  Infection Prevention:   environmental surveillance performed   rest/sleep promoted  Goal: Optimal Comfort and Wellbeing  Intervention: Provide Person-Centered Care  Flowsheets (Taken 4/3/2024 0456)  Trust Relationship/Rapport:   care explained   choices provided   emotional support provided   questions answered   thoughts/feelings acknowledged   reassurance provided   questions encouraged

## 2024-04-04 PROBLEM — S81.802A LEG WOUND, LEFT: Status: ACTIVE | Noted: 2024-04-01

## 2024-04-04 LAB
ANION GAP SERPL CALC-SCNC: 14 MMOL/L (ref 8–16)
BASOPHILS # BLD AUTO: 0.05 K/UL (ref 0–0.2)
BASOPHILS NFR BLD: 0.9 % (ref 0–1.9)
BUN SERPL-MCNC: 102 MG/DL (ref 6–20)
CALCIUM SERPL-MCNC: 8.3 MG/DL (ref 8.7–10.5)
CHLORIDE SERPL-SCNC: 101 MMOL/L (ref 95–110)
CO2 SERPL-SCNC: 19 MMOL/L (ref 23–29)
CREAT SERPL-MCNC: 3.7 MG/DL (ref 0.5–1.4)
DIFFERENTIAL METHOD BLD: ABNORMAL
EOSINOPHIL # BLD AUTO: 0.1 K/UL (ref 0–0.5)
EOSINOPHIL NFR BLD: 2.2 % (ref 0–8)
ERYTHROCYTE [DISTWIDTH] IN BLOOD BY AUTOMATED COUNT: 21.2 % (ref 11.5–14.5)
EST. GFR  (NO RACE VARIABLE): 13.9 ML/MIN/1.73 M^2
GLUCOSE SERPL-MCNC: 97 MG/DL (ref 70–110)
HCT VFR BLD AUTO: 31.6 % (ref 37–48.5)
HGB BLD-MCNC: 9.2 G/DL (ref 12–16)
IMM GRANULOCYTES # BLD AUTO: 0.02 K/UL (ref 0–0.04)
IMM GRANULOCYTES NFR BLD AUTO: 0.4 % (ref 0–0.5)
LYMPHOCYTES # BLD AUTO: 1.2 K/UL (ref 1–4.8)
LYMPHOCYTES NFR BLD: 21.7 % (ref 18–48)
MAGNESIUM SERPL-MCNC: 2.1 MG/DL (ref 1.6–2.6)
MAGNESIUM SERPL-MCNC: 2.2 MG/DL (ref 1.6–2.6)
MCH RBC QN AUTO: 23.1 PG (ref 27–31)
MCHC RBC AUTO-ENTMCNC: 29.1 G/DL (ref 32–36)
MCV RBC AUTO: 79 FL (ref 82–98)
MONOCYTES # BLD AUTO: 0.6 K/UL (ref 0.3–1)
MONOCYTES NFR BLD: 10.5 % (ref 4–15)
NEUTROPHILS # BLD AUTO: 3.6 K/UL (ref 1.8–7.7)
NEUTROPHILS NFR BLD: 64.3 % (ref 38–73)
NRBC BLD-RTO: 0 /100 WBC
OHS QRS DURATION: 92 MS
OHS QTC CALCULATION: 481 MS
PHOSPHATE SERPL-MCNC: 4.2 MG/DL (ref 2.7–4.5)
PLATELET # BLD AUTO: 135 K/UL (ref 150–450)
PMV BLD AUTO: ABNORMAL FL (ref 9.2–12.9)
POTASSIUM SERPL-SCNC: 3.7 MMOL/L (ref 3.5–5.1)
POTASSIUM SERPL-SCNC: 3.8 MMOL/L (ref 3.5–5.1)
RBC # BLD AUTO: 3.98 M/UL (ref 4–5.4)
SODIUM SERPL-SCNC: 134 MMOL/L (ref 136–145)
WBC # BLD AUTO: 5.54 K/UL (ref 3.9–12.7)

## 2024-04-04 PROCEDURE — 83735 ASSAY OF MAGNESIUM: CPT | Performed by: PHYSICIAN ASSISTANT

## 2024-04-04 PROCEDURE — 25000003 PHARM REV CODE 250: Performed by: STUDENT IN AN ORGANIZED HEALTH CARE EDUCATION/TRAINING PROGRAM

## 2024-04-04 PROCEDURE — 63600175 PHARM REV CODE 636 W HCPCS: Performed by: STUDENT IN AN ORGANIZED HEALTH CARE EDUCATION/TRAINING PROGRAM

## 2024-04-04 PROCEDURE — 93005 ELECTROCARDIOGRAM TRACING: CPT

## 2024-04-04 PROCEDURE — 84132 ASSAY OF SERUM POTASSIUM: CPT | Performed by: PHYSICIAN ASSISTANT

## 2024-04-04 PROCEDURE — 20600001 HC STEP DOWN PRIVATE ROOM

## 2024-04-04 PROCEDURE — 25000003 PHARM REV CODE 250: Performed by: PHYSICIAN ASSISTANT

## 2024-04-04 PROCEDURE — 36415 COLL VENOUS BLD VENIPUNCTURE: CPT | Performed by: PHYSICIAN ASSISTANT

## 2024-04-04 PROCEDURE — 93010 ELECTROCARDIOGRAM REPORT: CPT | Mod: ,,, | Performed by: INTERNAL MEDICINE

## 2024-04-04 PROCEDURE — 25000003 PHARM REV CODE 250: Performed by: HOSPITALIST

## 2024-04-04 PROCEDURE — 83735 ASSAY OF MAGNESIUM: CPT | Mod: 91 | Performed by: PHYSICIAN ASSISTANT

## 2024-04-04 PROCEDURE — 84100 ASSAY OF PHOSPHORUS: CPT | Performed by: PHYSICIAN ASSISTANT

## 2024-04-04 PROCEDURE — 63600175 PHARM REV CODE 636 W HCPCS: Performed by: PHYSICIAN ASSISTANT

## 2024-04-04 PROCEDURE — 25000003 PHARM REV CODE 250: Performed by: EMERGENCY MEDICINE

## 2024-04-04 PROCEDURE — 80048 BASIC METABOLIC PNL TOTAL CA: CPT | Performed by: PHYSICIAN ASSISTANT

## 2024-04-04 PROCEDURE — 85025 COMPLETE CBC W/AUTO DIFF WBC: CPT | Performed by: PHYSICIAN ASSISTANT

## 2024-04-04 RX ORDER — POTASSIUM CHLORIDE 750 MG/1
10 CAPSULE, EXTENDED RELEASE ORAL ONCE
Status: COMPLETED | OUTPATIENT
Start: 2024-04-04 | End: 2024-04-04

## 2024-04-04 RX ORDER — POTASSIUM CHLORIDE 20 MEQ/1
40 TABLET, EXTENDED RELEASE ORAL ONCE
Status: COMPLETED | OUTPATIENT
Start: 2024-04-04 | End: 2024-04-04

## 2024-04-04 RX ADMIN — ASPIRIN 81 MG: 81 TABLET, COATED ORAL at 09:04

## 2024-04-04 RX ADMIN — SEVELAMER CARBONATE 800 MG: 800 TABLET, FILM COATED ORAL at 09:04

## 2024-04-04 RX ADMIN — HEPARIN SODIUM 5000 UNITS: 5000 INJECTION INTRAVENOUS; SUBCUTANEOUS at 05:04

## 2024-04-04 RX ADMIN — CARVEDILOL 6.25 MG: 6.25 TABLET, FILM COATED ORAL at 09:04

## 2024-04-04 RX ADMIN — HEPARIN SODIUM 5000 UNITS: 5000 INJECTION INTRAVENOUS; SUBCUTANEOUS at 01:04

## 2024-04-04 RX ADMIN — POLYETHYLENE GLYCOL 3350 17 G: 17 POWDER, FOR SOLUTION ORAL at 09:04

## 2024-04-04 RX ADMIN — FUROSEMIDE 80 MG: 10 INJECTION, SOLUTION INTRAVENOUS at 09:04

## 2024-04-04 RX ADMIN — POTASSIUM CHLORIDE 10 MEQ: 10 CAPSULE, COATED, EXTENDED RELEASE ORAL at 05:04

## 2024-04-04 RX ADMIN — HEPARIN SODIUM 5000 UNITS: 5000 INJECTION INTRAVENOUS; SUBCUTANEOUS at 09:04

## 2024-04-04 RX ADMIN — Medication 6 MG: at 02:04

## 2024-04-04 RX ADMIN — ATORVASTATIN CALCIUM 40 MG: 40 TABLET, FILM COATED ORAL at 09:04

## 2024-04-04 RX ADMIN — SEVELAMER CARBONATE 800 MG: 800 TABLET, FILM COATED ORAL at 06:04

## 2024-04-04 RX ADMIN — FUROSEMIDE 80 MG: 10 INJECTION, SOLUTION INTRAVENOUS at 06:04

## 2024-04-04 RX ADMIN — ACETAMINOPHEN 1000 MG: 500 TABLET ORAL at 02:04

## 2024-04-04 RX ADMIN — POTASSIUM CHLORIDE 40 MEQ: 1500 TABLET, EXTENDED RELEASE ORAL at 09:04

## 2024-04-04 RX ADMIN — SEVELAMER CARBONATE 800 MG: 800 TABLET, FILM COATED ORAL at 01:04

## 2024-04-04 RX ADMIN — CARVEDILOL 6.25 MG: 6.25 TABLET, FILM COATED ORAL at 06:04

## 2024-04-04 NOTE — PLAN OF CARE
Patient AAOx4. VSS. Patient denies pain, nausea and vomiting. Safety ensured. Call light within reach. Care ongoing.  Problem: Adult Inpatient Plan of Care  Goal: Plan of Care Review  Outcome: Ongoing, Progressing  Goal: Patient-Specific Goal (Individualized)  Outcome: Ongoing, Progressing  Goal: Absence of Hospital-Acquired Illness or Injury  Outcome: Ongoing, Progressing  Goal: Optimal Comfort and Wellbeing  Outcome: Ongoing, Progressing  Goal: Readiness for Transition of Care  Outcome: Ongoing, Progressing     Problem: Infection  Goal: Absence of Infection Signs and Symptoms  Outcome: Ongoing, Progressing     Problem: Impaired Wound Healing  Goal: Optimal Wound Healing  Outcome: Ongoing, Progressing

## 2024-04-04 NOTE — SUBJECTIVE & OBJECTIVE
Interval History: Has serous drainage from wound.    Review of Systems   Constitutional:  Negative for chills and fever.   Respiratory:  Positive for shortness of breath. Negative for cough.    Cardiovascular:  Positive for leg swelling. Negative for chest pain.   Skin:  Positive for wound.   Neurological:  Negative for seizures and syncope.     Objective:     Vital Signs (Most Recent):  Temp: 97.8 °F (36.6 °C) (04/04/24 1127)  Pulse: (!) 57 (04/04/24 1127)  Resp: 18 (04/04/24 1127)  BP: 117/72 (04/04/24 1127)  SpO2: 97 % (04/04/24 1127) Vital Signs (24h Range):  Temp:  [97.8 °F (36.6 °C)-98.7 °F (37.1 °C)] 97.8 °F (36.6 °C)  Pulse:  [51-63] 57  Resp:  [16-18] 18  SpO2:  [92 %-100 %] 97 %  BP: (114-123)/(63-81) 117/72     Weight: 71.7 kg (158 lb)  Body mass index is 30.86 kg/m².    Intake/Output Summary (Last 24 hours) at 4/4/2024 1137  Last data filed at 4/4/2024 0612  Gross per 24 hour   Intake 200 ml   Output 500 ml   Net -300 ml         Physical Exam  Vitals and nursing note reviewed.   Constitutional:       General: She is not in acute distress.     Appearance: She is well-developed. She is obese. She is not diaphoretic.   Pulmonary:      Effort: Pulmonary effort is normal. No respiratory distress.   Musculoskeletal:         General: Signs of injury present. No deformity.      Right lower leg: Edema present.      Left lower leg: Edema present.   Skin:     General: Skin is warm and dry.      Coloration: Skin is not jaundiced or pale.      Comments: Left leg abrasions with serous drainage   Neurological:      Mental Status: She is alert and oriented to person, place, and time. Mental status is at baseline.      Motor: No seizure activity.   Psychiatric:         Attention and Perception: Attention normal.         Behavior: Behavior is not aggressive or combative. Behavior is cooperative.             Significant Labs: All pertinent labs within the past 24 hours have been reviewed.    Significant Imaging: I have  reviewed all pertinent imaging results/findings within the past 24 hours.  X-Ray Chest 1 View 4/01/24: FINDINGS:   Heart outline remains moderately enlarged indicating cardiomegaly and/or pericardial fluid.  The lungs are expanded with upper normal pulmonary vascularity.  Chronic band of opacity in left middle zone could represent plate atelectasis or strand of fibrosis.  Faint edema may be present in the perihilar areas.  Lungs are otherwise clear without significant airspace consolidation, pleural effusion, or pneumothorax.  Skeletal structures are intact.   Impression:  Chronic cardiomegaly.  No acute change.   Transthoracic echo complete 4/01/24:    Left Ventricle: The left ventricle is normal in size. Normal wall thickness. Normal wall motion. There is normal systolic function with a visually estimated ejection fraction of 60 - 65%. Grade II diastolic dysfunction.    Right Ventricle: Normal right ventricular cavity size. Wall thickness is normal. Right ventricle wall motion  is normal. Systolic function is reduced.    Left Atrium: Left atrium is very  severely dilated.    Right Atrium: Right atrium is mildly dilated.    Aortic Valve: There is mild aortic valve sclerosis. There is normal leaflet mobility. There is trace aortic regurgitation.    Mitral Valve: There is mild bileaflet sclerosis. There is posterior leaflet tethering and failure of complete coaptation. There is very severe regurgitation with a posterolateral eccentriccally directed jet.    Tricuspid Valve: The tricuspid valve is structurally normal. There is normal leaflet mobility. There is moderate to severe regurgitation.    IVC/SVC: IVC was not well visualized due to poor acoustic window. Intermediate venous pressure at 8 mmHg.    Pericardium: There is a trivial effusion posteriorly and small under the RA.

## 2024-04-04 NOTE — ASSESSMENT & PLAN NOTE
Stable.   Recent Labs   Lab 04/02/24  0324 04/03/24  0409 04/04/24  0300   WBC 4.99 5.25 5.54   HGB 9.3* 9.6* 9.2*   HCT 32.1* 32.6* 31.6*   * 142* 135*

## 2024-04-04 NOTE — PLAN OF CARE
PRN pain medication administered at night. Pt experienced an episode of Vtach during the night, and MD on duty notified. EKG STAT ordered. Safety measures in place. The call light is within reach. Pt care ongoing.    Problem: Adult Inpatient Plan of Care  Goal: Absence of Hospital-Acquired Illness or Injury  Intervention: Identify and Manage Fall Risk  Flowsheets (Taken 4/4/2024 0406)  Safety Promotion/Fall Prevention:   assistive device/personal item within reach   bed alarm refused   side rails raised x 2  Intervention: Prevent Skin Injury  Flowsheets (Taken 4/4/2024 0406)  Body Position: position changed independently  Skin Protection: adhesive use limited  Intervention: Prevent and Manage VTE (Venous Thromboembolism) Risk  Flowsheets (Taken 4/4/2024 0406)  Activity Management: Ambulated to bathroom - L4  VTE Prevention/Management:   bleeding precations maintained   ambulation promoted   bleeding risk assessed  Intervention: Prevent Infection  Flowsheets (Taken 4/4/2024 0406)  Infection Prevention:   environmental surveillance performed   rest/sleep promoted  Goal: Optimal Comfort and Wellbeing  Intervention: Monitor Pain and Promote Comfort  Flowsheets (Taken 4/4/2024 0406)  Pain Management Interventions: quiet environment facilitated  Intervention: Provide Person-Centered Care  Flowsheets (Taken 4/4/2024 0406)  Trust Relationship/Rapport:   choices provided   care explained   emotional support provided   empathic listening provided   questions answered   questions encouraged   reassurance provided   thoughts/feelings acknowledged

## 2024-04-04 NOTE — PROGRESS NOTES
[facility] - Telemetry [facility] Medicine  Progress Note    Patient Name: Xena Vera  MRN: [id number]  Patient Class: IP- Inpatient   Admission Date: 4/1/2024  Length of Stay: 1 days  Attending Physician: Johnny Cisneros MD  Primary Care Provider: Tami Villeda FNP        Subjective:     Principal Problem:Acute on chronic heart failure with preserved ejection fraction (HFpEF)        HPI:  Xena Vera is a 53 year old Hebrew-speaking white  woman with former cigarette smoking (1988 to 1989), hypertension, heart failure with preserved ejection fraction, mitral regurgitation, chronic kidney disease stage 4, anemia, ovarian cyst, obesity. She lives in [place], [place].               She presented to [facility] Emergency Department on 4/1/2024 with complaint of mechanical fall, lower extremity swelling, and shortness of breath. Her son translated for her at bedside. She slipped and fell between grass and concrete earlier in the morning and had an abrasion on her left lower extremity. She was able to stand with assistance and ambulate as usual afterward. She had worsening of chronic bilateral lower extremity edema now in her abdomen. She was taking her prescribed furosemide without missed doses and was compliant with a low sodium diet. She was sleeping upright to feel like she can breath and had several nighttime awakenings due to shortness of breath. She had worsening dyspnea on exertion over the last several weeks. She has chronic constipation, unchanged.              In the emergency department, blood pressure was 97/54, heart rate was in the low 50s. Hemoglobin was 10.7 g/dL (baseline around 11.4), sodium was 133 mmol/L, total bilirubin was 1.5 mg/dL, BNP was 4639 pg/mL, troponin was 0.061 ng/mL. Chest X-ray showed upper normal pulmonary vascularity, chronic band of opacity in the left middle lung zone, faint edema in perihilar areas. She was given 40 mg of  intravenous furosemide. She was admitted to Hospital Medicine Team R.    Overview/Hospital Course:  She was put on intravenous furosemide. It was increased to 80 mg twice daily. Urine output increased to 1.6 liters in 24 hours. She had bleeding and hematoma at the site of prophylactic heparin injections so heparin was stopped and she was given sequential compression devices instead.     Interval History: Has serous drainage from wound.    Review of Systems   Constitutional:  Negative for chills and fever.   Respiratory:  Positive for shortness of breath. Negative for cough.    Cardiovascular:  Positive for leg swelling. Negative for chest pain.   Skin:  Positive for wound.   Neurological:  Negative for seizures and syncope.     Objective:     Vital Signs (Most Recent):  Temp: 97.8 °F (36.6 °C) (04/04/24 1127)  Pulse: (!) 57 (04/04/24 1127)  Resp: 18 (04/04/24 1127)  BP: 117/72 (04/04/24 1127)  SpO2: 97 % (04/04/24 1127) Vital Signs (24h Range):  Temp:  [97.8 °F (36.6 °C)-98.7 °F (37.1 °C)] 97.8 °F (36.6 °C)  Pulse:  [51-63] 57  Resp:  [16-18] 18  SpO2:  [92 %-100 %] 97 %  BP: (114-123)/(63-81) 117/72     Weight: 71.7 kg (158 lb)  Body mass index is 30.86 kg/m².    Intake/Output Summary (Last 24 hours) at 4/4/2024 1137  Last data filed at 4/4/2024 0612  Gross per 24 hour   Intake 200 ml   Output 500 ml   Net -300 ml         Physical Exam  Vitals and nursing note reviewed.   Constitutional:       General: She is not in acute distress.     Appearance: She is well-developed. She is obese. She is not diaphoretic.   Pulmonary:      Effort: Pulmonary effort is normal. No respiratory distress.   Musculoskeletal:         General: Signs of injury present. No deformity.      Right lower leg: Edema present.      Left lower leg: Edema present.   Skin:     General: Skin is warm and dry.      Coloration: Skin is not jaundiced or pale.      Comments: Left leg abrasions with serous drainage   Neurological:      Mental Status: She is  alert and oriented to person, place, and time. Mental status is at baseline.      Motor: No seizure activity.   Psychiatric:         Attention and Perception: Attention normal.         Behavior: Behavior is not aggressive or combative. Behavior is cooperative.             Significant Labs: All pertinent labs within the past 24 hours have been reviewed.    Significant Imaging: I have reviewed all pertinent imaging results/findings within the past 24 hours.  X-Ray Chest 1 View 4/01/24: FINDINGS:   Heart outline remains moderately enlarged indicating cardiomegaly and/or pericardial fluid.  The lungs are expanded with upper normal pulmonary vascularity.  Chronic band of opacity in left middle zone could represent plate atelectasis or strand of fibrosis.  Faint edema may be present in the perihilar areas.  Lungs are otherwise clear without significant airspace consolidation, pleural effusion, or pneumothorax.  Skeletal structures are intact.   Impression:  Chronic cardiomegaly.  No acute change.   Transthoracic echo complete 4/01/24:    Left Ventricle: The left ventricle is normal in size. Normal wall thickness. Normal wall motion. There is normal systolic function with a visually estimated ejection fraction of 60 - 65%. Grade II diastolic dysfunction.    Right Ventricle: Normal right ventricular cavity size. Wall thickness is normal. Right ventricle wall motion  is normal. Systolic function is reduced.    Left Atrium: Left atrium is very  severely dilated.    Right Atrium: Right atrium is mildly dilated.    Aortic Valve: There is mild aortic valve sclerosis. There is normal leaflet mobility. There is trace aortic regurgitation.    Mitral Valve: There is mild bileaflet sclerosis. There is posterior leaflet tethering and failure of complete coaptation. There is very severe regurgitation with a posterolateral eccentriccally directed jet.    Tricuspid Valve: The tricuspid valve is structurally normal. There is normal  leaflet mobility. There is moderate to severe regurgitation.    IVC/SVC: IVC was not well visualized due to poor acoustic window. Intermediate venous pressure at 8 mmHg.    Pericardium: There is a trivial effusion posteriorly and small under the RA.    Assessment/Plan:      * Acute on chronic heart failure with preserved ejection fraction (HFpEF)  She takes furosemide 40 mg twice daily. Giving IV furosemide 80 mg twice daily. Monitor intake/output. Treat hypokalemia as needed.    Leg wound, left  Consult Wound Care.      Fall  Accidental.    Anasarca        Constipation  Giving polyethylene glycol.    Anemia  Stable.   Recent Labs   Lab 04/02/24  0324 04/03/24  0409 04/04/24  0300   WBC 4.99 5.25 5.54   HGB 9.3* 9.6* 9.2*   HCT 32.1* 32.6* 31.6*   * 142* 135*       Class 2 obesity in adult  Body mass index is 30.86 kg/m². Morbid obesity complicates all aspects of disease management from diagnostic modalities to treatment. Weight loss encouraged and health benefits explained to patient.     Essential hypertension  Continue home carvedilol. Monitor BP.    Stage 4 chronic kidney disease  Continue home sevelamer.      VTE Risk Mitigation (From admission, onward)           Ordered     Place sequential compression device  Until discontinued         04/03/24 1347     heparin (porcine) injection 5,000 Units  Every 8 hours         04/01/24 1030     IP VTE HIGH RISK PATIENT  Once         04/01/24 1030     Place sequential compression device  Until discontinued         04/01/24 1030                    Discharge Planning   RILEY: 4/4/2024     Code Status: Full Code   Is the patient medically ready for discharge?:     Reason for patient still in hospital (select all that apply): Patient unstable, Patient trending condition, and Treatment  Discharge Plan A: Home with family                  Johnny Cisneros MD  Department of Hospital Medicine   [facility] - Telemetry Stepdown

## 2024-04-05 LAB
ANION GAP SERPL CALC-SCNC: 9 MMOL/L (ref 8–16)
BUN SERPL-MCNC: 108 MG/DL (ref 6–20)
CALCIUM SERPL-MCNC: 8.4 MG/DL (ref 8.7–10.5)
CHLORIDE SERPL-SCNC: 101 MMOL/L (ref 95–110)
CO2 SERPL-SCNC: 26 MMOL/L (ref 23–29)
CREAT SERPL-MCNC: 4 MG/DL (ref 0.5–1.4)
EST. GFR  (NO RACE VARIABLE): 12.7 ML/MIN/1.73 M^2
GLUCOSE SERPL-MCNC: 103 MG/DL (ref 70–110)
POTASSIUM SERPL-SCNC: 3.6 MMOL/L (ref 3.5–5.1)
SODIUM SERPL-SCNC: 136 MMOL/L (ref 136–145)

## 2024-04-05 PROCEDURE — 80048 BASIC METABOLIC PNL TOTAL CA: CPT | Performed by: PHYSICIAN ASSISTANT

## 2024-04-05 PROCEDURE — 25000003 PHARM REV CODE 250: Performed by: HOSPITALIST

## 2024-04-05 PROCEDURE — 25000003 PHARM REV CODE 250: Performed by: STUDENT IN AN ORGANIZED HEALTH CARE EDUCATION/TRAINING PROGRAM

## 2024-04-05 PROCEDURE — 25000003 PHARM REV CODE 250: Performed by: PHYSICIAN ASSISTANT

## 2024-04-05 PROCEDURE — 36415 COLL VENOUS BLD VENIPUNCTURE: CPT | Performed by: PHYSICIAN ASSISTANT

## 2024-04-05 PROCEDURE — 63600175 PHARM REV CODE 636 W HCPCS: Performed by: PHYSICIAN ASSISTANT

## 2024-04-05 PROCEDURE — 94761 N-INVAS EAR/PLS OXIMETRY MLT: CPT

## 2024-04-05 PROCEDURE — 63600175 PHARM REV CODE 636 W HCPCS: Performed by: STUDENT IN AN ORGANIZED HEALTH CARE EDUCATION/TRAINING PROGRAM

## 2024-04-05 PROCEDURE — 20600001 HC STEP DOWN PRIVATE ROOM

## 2024-04-05 RX ORDER — POTASSIUM CHLORIDE 20 MEQ/1
40 TABLET, EXTENDED RELEASE ORAL ONCE
Status: COMPLETED | OUTPATIENT
Start: 2024-04-05 | End: 2024-04-05

## 2024-04-05 RX ADMIN — ATORVASTATIN CALCIUM 40 MG: 40 TABLET, FILM COATED ORAL at 09:04

## 2024-04-05 RX ADMIN — FUROSEMIDE 80 MG: 10 INJECTION, SOLUTION INTRAVENOUS at 06:04

## 2024-04-05 RX ADMIN — SEVELAMER CARBONATE 800 MG: 800 TABLET, FILM COATED ORAL at 12:04

## 2024-04-05 RX ADMIN — POTASSIUM CHLORIDE 40 MEQ: 1500 TABLET, EXTENDED RELEASE ORAL at 09:04

## 2024-04-05 RX ADMIN — POLYETHYLENE GLYCOL 3350 17 G: 17 POWDER, FOR SOLUTION ORAL at 09:04

## 2024-04-05 RX ADMIN — SEVELAMER CARBONATE 800 MG: 800 TABLET, FILM COATED ORAL at 07:04

## 2024-04-05 RX ADMIN — ASPIRIN 81 MG: 81 TABLET, COATED ORAL at 09:04

## 2024-04-05 RX ADMIN — ACETAMINOPHEN 1000 MG: 500 TABLET ORAL at 03:04

## 2024-04-05 RX ADMIN — HEPARIN SODIUM 5000 UNITS: 5000 INJECTION INTRAVENOUS; SUBCUTANEOUS at 05:04

## 2024-04-05 RX ADMIN — Medication: at 04:04

## 2024-04-05 RX ADMIN — FUROSEMIDE 80 MG: 10 INJECTION, SOLUTION INTRAVENOUS at 09:04

## 2024-04-05 RX ADMIN — Medication: at 08:04

## 2024-04-05 RX ADMIN — SEVELAMER CARBONATE 800 MG: 800 TABLET, FILM COATED ORAL at 06:04

## 2024-04-05 RX ADMIN — HEPARIN SODIUM 5000 UNITS: 5000 INJECTION INTRAVENOUS; SUBCUTANEOUS at 02:04

## 2024-04-05 RX ADMIN — HEPARIN SODIUM 5000 UNITS: 5000 INJECTION INTRAVENOUS; SUBCUTANEOUS at 08:04

## 2024-04-05 RX ADMIN — CARVEDILOL 6.25 MG: 6.25 TABLET, FILM COATED ORAL at 07:04

## 2024-04-05 NOTE — CONSULTS
[facility] - Telemetry Stepdown  Wound Care    Patient Name:  Xena Vera   MRN:  [id number]  Date: 2024  Diagnosis: Acute on chronic heart failure with preserved ejection fraction (HFpEF)    History:     Past Medical History:   Diagnosis Date    (HFpEF) heart failure with preserved ejection fraction 2023    EF 63% with G2 DD  Continue lasix, appears euvolemic today  Continue good BP management with losartan, increase Coreg 6.25 mg BID  Fluid restriction, low sodium diet  Recommend Jardiance- patient had CKD 4 and this is a limiting factor- nephro eval pending    Anemia 2023    CKD (chronic kidney disease)     HTN (hypertension)     Mitral valve regurgitation 2023    Refer to structural for evaluation  May benefit from mitral clip    Ovarian cyst     Stage 4 chronic kidney disease 2023    Refer to nephrology at [facility] as [facility] and the phone number for the outside nephrologist provided to patient during her recent hospitalization goes unanswered when called.   Cr remains elevated  Would like to start SGLT-2 and appreciate nephrology expertise        Social History     Socioeconomic History    Marital status: Single   Tobacco Use    Smoking status: Former     Current packs/day: 0.00     Types: Cigarettes     Start date:      Quit date:      Years since quittin.2    Smokeless tobacco: Never    Tobacco comments:     2-3 cigarettes a day   Substance and Sexual Activity    Alcohol use: Never    Drug use: Never     Social Determinants of Health     Financial Resource Strain: Patient Unable To Answer (2024)    Overall Financial Resource Strain (CARDIA)     Difficulty of Paying Living Expenses: Patient unable to answer   Food Insecurity: Patient Unable To Answer (2024)    Hunger Vital Sign     Worried About Running Out of Food in the Last Year: Patient unable to answer     Ran Out of Food in the Last Year: Patient unable to answer    Transportation Needs: Patient Unable To Answer (4/4/2024)    PRAPARE - Transportation     Lack of Transportation (Medical): Patient unable to answer     Lack of Transportation (Non-Medical): Patient unable to answer   Physical Activity: Inactive (4/1/2024)    Exercise Vital Sign     Days of Exercise per Week: 0 days     Minutes of Exercise per Session: 0 min   Stress: Patient Unable To Answer (4/4/2024)    [employer] - Occupational Stress Questionnaire     Feeling of Stress : Patient unable to answer   Recent Concern: Stress - Stress Concern Present (4/2/2024)    [employer] - Occupational Stress Questionnaire     Feeling of Stress : To some extent   Social Connections: Moderately Isolated (4/1/2024)    Social Connection and Isolation Panel [NHANES]     Frequency of Communication with Friends and Family: Three times a week     Frequency of Social Gatherings with Friends and Family: Three times a week     Attends Denominational Services: 1 to 4 times per year     Active Member of Clubs or Organizations: No     Attends Club or Organization Meetings: Never     Marital Status: Never    Housing Stability: Patient Unable To Answer (4/4/2024)    Housing Stability Vital Sign     Unable to Pay for Housing in the Last Year: Patient unable to answer     Number of Places Lived in the Last Year: 1     Unstable Housing in the Last Year: Patient unable to answer       Precautions:     Allergies as of 04/01/2024    (No Known Allergies)       [facility] Assessment Details/Treatment     Patient seen for wound care consultation.   Reviewed chart for this encounter.   See Flow Sheet for findings.      RECOMMENDATIONS: Patient is Aox4 and agreeable to inpatient wound care. MD consult for left leg. Left leg abrasion noted to be dry with no drainage noted. Cleanse with sterile normal saline and pat dry. Apply aquaphor BID and PRN. No other issues or concerns at this time. Will follow  up 4/12/2024 or sooner if needed.    Discussed POC with patient and primary nurse.   See EMR for orders & patient education.    Discussed nutrition and the role of protein in wound healing with the patient. Instructed patient to optimize protein for wound healing.    Bedside nursing to continue care & monitoring.  Bedside nursing to maintain pressure injury prevention interventions.            04/05/24 1110   WOCN Assessment   WOCN Total Time (mins) 30   Visit Date 04/05/24   Visit Time 1110   Consult Type New   WOCN Speciality Wound   Intervention assessed;changed;applied;chart review;coordination of care;orders   Teaching on-going        Altered Skin Integrity 04/01/24 0652 Left anterior;lower Leg #1 Abrasion(s)   Date First Assessed/Time First Assessed: 04/01/24 0652   Altered Skin Integrity Present on Admission - Did Patient arrive to the hospital with altered skin?: yes  Side: Left  Orientation: anterior;lower  Location: Leg  Wound Number: #1  Is this injury...   Wound Image    Dressing Appearance Open to air;Dry;Intact;Dried drainage   Drainage Amount None   Drainage Characteristics/Odor No odor   Appearance Pink   Care Cleansed with:;Antimicrobial agent   Dressing Applied       Orders placed.   Rudolph VICKERSN, RN  04/05/2024

## 2024-04-05 NOTE — ASSESSMENT & PLAN NOTE
She takes furosemide 40 mg daily. Giving IV furosemide 80 mg twice daily. Monitor intake/output. Treat hypokalemia as needed.

## 2024-04-05 NOTE — PLAN OF CARE
Problem: Adult Inpatient Plan of Care  Goal: Plan of Care Review  4/5/2024 1707 by Janine Hernandez RN  Outcome: Ongoing, Progressing  4/5/2024 1706 by Janine Hernandez RN  Outcome: Ongoing, Progressing  Goal: Patient-Specific Goal (Individualized)  4/5/2024 1707 by Janine Hernandez RN  Outcome: Ongoing, Progressing  4/5/2024 1706 by Janine Hernandez RN  Outcome: Ongoing, Progressing  Goal: Absence of Hospital-Acquired Illness or Injury  4/5/2024 1707 by Janine Hernandez RN  Outcome: Ongoing, Progressing  4/5/2024 1706 by Janine Hernandez RN  Outcome: Ongoing, Progressing  Goal: Optimal Comfort and Wellbeing  4/5/2024 1707 by Janine Hernandez RN  Outcome: Ongoing, Progressing  4/5/2024 1706 by Janine Hernandez RN  Outcome: Ongoing, Progressing  Goal: Readiness for Transition of Care  4/5/2024 1707 by Janine Hernandez RN  Outcome: Ongoing, Progressing  4/5/2024 1706 by Janine Hernandez RN  Outcome: Ongoing, Progressing     Problem: Infection  Goal: Absence of Infection Signs and Symptoms  4/5/2024 1707 by Janine Hernandez RN  Outcome: Ongoing, Progressing  4/5/2024 1706 by Janine Hernandez RN  Outcome: Ongoing, Progressing     Problem: Impaired Wound Healing  Goal: Optimal Wound Healing  4/5/2024 1707 by Janine Hernandez RN  Outcome: Ongoing, Progressing  4/5/2024 1706 by Janine Hernandez RN  Outcome: Ongoing, Progressing

## 2024-04-05 NOTE — PROGRESS NOTES
[facility] - Telemetry [facility] Medicine  Progress Note    Patient Name: Xena Vera  MRN: [id number]  Patient Class: IP- Inpatient   Admission Date: 4/1/2024  Length of Stay: 2 days  Attending Physician: Johnny Cisneros MD  Primary Care Provider: Tami Villeda FNP        Subjective:     Principal Problem:Acute on chronic heart failure with preserved ejection fraction (HFpEF)        HPI:  Xena Vera is a 53 year old Lithuanian-speaking white  woman with former cigarette smoking (1988 to 1989), hypertension, heart failure with preserved ejection fraction, mitral regurgitation, chronic kidney disease stage 4, anemia, ovarian cyst, obesity. She lives in [place], [place].               She presented to [facility] Emergency Department on 4/1/2024 with complaint of mechanical fall, lower extremity swelling, and shortness of breath. Her son translated for her at bedside. She slipped and fell between grass and concrete earlier in the morning and had an abrasion on her left lower extremity. She was able to stand with assistance and ambulate as usual afterward. She had worsening of chronic bilateral lower extremity edema now in her abdomen. She was taking her prescribed furosemide once a day without missed doses and was compliant with a low sodium diet. She was sleeping upright to feel like she can breath and had several nighttime awakenings due to shortness of breath. She had worsening dyspnea on exertion over the last several weeks. She has chronic constipation, unchanged.              In the emergency department, blood pressure was 97/54, heart rate was in the low 50s. Hemoglobin was 10.7 g/dL (baseline around 11.4), sodium was 133 mmol/L, total bilirubin was 1.5 mg/dL, BNP was 4639 pg/mL, troponin was 0.061 ng/mL. Chest X-ray showed upper normal pulmonary vascularity, chronic band of opacity in the left middle lung zone, faint edema in perihilar areas. She was given 40  mg of intravenous furosemide. She was admitted to Hospital Medicine Team R.    Overview/Hospital Course:  She was put on intravenous furosemide. It was increased to 80 mg twice daily. Urine output increased to 1.6 liters in 24 hours. She had bleeding and hematoma at the site of prophylactic heparin injections so heparin was stopped and she was given sequential compression devices instead. Wound Care was consulted for her left leg wound.     Interval History: Leg wound was dressed by her nurse.    Review of Systems   Constitutional:  Negative for chills and fever.   Respiratory:  Negative for cough.    Cardiovascular:  Positive for leg swelling. Negative for chest pain.   Skin:  Positive for wound.   Neurological:  Negative for seizures and syncope.     Objective:     Vital Signs (Most Recent):  Temp: 98.2 °F (36.8 °C) (04/05/24 0734)  Pulse: (!) 57 (04/05/24 1101)  Resp: 18 (04/05/24 0734)  BP: 138/83 (04/05/24 0749)  SpO2: 97 % (04/05/24 0734) Vital Signs (24h Range):  Temp:  [97.4 °F (36.3 °C)-98.2 °F (36.8 °C)] 98.2 °F (36.8 °C)  Pulse:  [53-59] 57  Resp:  [16-19] 18  SpO2:  [95 %-98 %] 97 %  BP: (103-138)/(72-83) 138/83     Weight: 72.1 kg (159 lb)  Body mass index is 31.05 kg/m².    Intake/Output Summary (Last 24 hours) at 4/5/2024 1120  Last data filed at 4/5/2024 0734  Gross per 24 hour   Intake 960 ml   Output 750 ml   Net 210 ml           Physical Exam  Vitals and nursing note reviewed.   Constitutional:       General: She is not in acute distress.     Appearance: She is well-developed. She is obese. She is not diaphoretic.   Pulmonary:      Effort: Pulmonary effort is normal. No respiratory distress.   Musculoskeletal:         General: Signs of injury present. No deformity.      Right lower leg: Edema present.      Left lower leg: Edema present.   Skin:     General: Skin is warm and dry.      Coloration: Skin is not jaundiced or pale.      Comments: Left leg abrasions covered   Neurological:      Mental  Status: She is alert and oriented to person, place, and time. Mental status is at baseline.      Motor: No seizure activity.   Psychiatric:         Attention and Perception: Attention normal.         Behavior: Behavior is not aggressive or combative. Behavior is cooperative.             Significant Labs: All pertinent labs within the past 24 hours have been reviewed.    Significant Imaging: I have reviewed all pertinent imaging results/findings within the past 24 hours.    Assessment/Plan:      * Acute on chronic heart failure with preserved ejection fraction (HFpEF)  She takes furosemide 40 mg daily. Giving IV furosemide 80 mg twice daily. Monitor intake/output. Treat hypokalemia as needed.    Leg wound, left  Consult Wound Care.      Fall  Accidental.    Anasarca        Constipation  Giving polyethylene glycol.    Anemia  Stable.   Recent Labs   Lab 04/02/24  0324 04/03/24  0409 04/04/24  0300   WBC 4.99 5.25 5.54   HGB 9.3* 9.6* 9.2*   HCT 32.1* 32.6* 31.6*   * 142* 135*       Class 2 obesity in adult  Body mass index is 30.86 kg/m². Morbid obesity complicates all aspects of disease management from diagnostic modalities to treatment. Weight loss encouraged and health benefits explained to patient.     Essential hypertension  Continue home carvedilol. Monitor BP.    Stage 4 chronic kidney disease  Continue home sevelamer.      VTE Risk Mitigation (From admission, onward)           Ordered     Place sequential compression device  Until discontinued         04/03/24 1347     heparin (porcine) injection 5,000 Units  Every 8 hours         04/01/24 1030     IP VTE HIGH RISK PATIENT  Once         04/01/24 1030     Place sequential compression device  Until discontinued         04/01/24 1030                    Discharge Planning   RILEY: 4/5/2024     Code Status: Full Code   Is the patient medically ready for discharge?:     Reason for patient still in hospital (select all that apply): Treatment  Discharge Plan A:  Home with family                  Johnny Cisneros MD  Department of Hospital Medicine   [facility] - Telemetry Stepdown

## 2024-04-05 NOTE — SUBJECTIVE & OBJECTIVE
Interval History: Leg wound was dressed by her nurse.    Review of Systems   Constitutional:  Negative for chills and fever.   Respiratory:  Negative for cough.    Cardiovascular:  Positive for leg swelling. Negative for chest pain.   Skin:  Positive for wound.   Neurological:  Negative for seizures and syncope.     Objective:     Vital Signs (Most Recent):  Temp: 98.2 °F (36.8 °C) (04/05/24 0734)  Pulse: (!) 57 (04/05/24 1101)  Resp: 18 (04/05/24 0734)  BP: 138/83 (04/05/24 0749)  SpO2: 97 % (04/05/24 0734) Vital Signs (24h Range):  Temp:  [97.4 °F (36.3 °C)-98.2 °F (36.8 °C)] 98.2 °F (36.8 °C)  Pulse:  [53-59] 57  Resp:  [16-19] 18  SpO2:  [95 %-98 %] 97 %  BP: (103-138)/(72-83) 138/83     Weight: 72.1 kg (159 lb)  Body mass index is 31.05 kg/m².    Intake/Output Summary (Last 24 hours) at 4/5/2024 1120  Last data filed at 4/5/2024 0734  Gross per 24 hour   Intake 960 ml   Output 750 ml   Net 210 ml           Physical Exam  Vitals and nursing note reviewed.   Constitutional:       General: She is not in acute distress.     Appearance: She is well-developed. She is obese. She is not diaphoretic.   Pulmonary:      Effort: Pulmonary effort is normal. No respiratory distress.   Musculoskeletal:         General: Signs of injury present. No deformity.      Right lower leg: Edema present.      Left lower leg: Edema present.   Skin:     General: Skin is warm and dry.      Coloration: Skin is not jaundiced or pale.      Comments: Left leg abrasions covered   Neurological:      Mental Status: She is alert and oriented to person, place, and time. Mental status is at baseline.      Motor: No seizure activity.   Psychiatric:         Attention and Perception: Attention normal.         Behavior: Behavior is not aggressive or combative. Behavior is cooperative.             Significant Labs: All pertinent labs within the past 24 hours have been reviewed.    Significant Imaging: I have reviewed all pertinent imaging  results/findings within the past 24 hours.

## 2024-04-06 LAB
ANION GAP SERPL CALC-SCNC: 9 MMOL/L (ref 8–16)
BUN SERPL-MCNC: 108 MG/DL (ref 6–20)
CALCIUM SERPL-MCNC: 8.1 MG/DL (ref 8.7–10.5)
CHLORIDE SERPL-SCNC: 103 MMOL/L (ref 95–110)
CO2 SERPL-SCNC: 26 MMOL/L (ref 23–29)
CREAT SERPL-MCNC: 3.8 MG/DL (ref 0.5–1.4)
EST. GFR  (NO RACE VARIABLE): 13.5 ML/MIN/1.73 M^2
GLUCOSE SERPL-MCNC: 85 MG/DL (ref 70–110)
POTASSIUM SERPL-SCNC: 4.1 MMOL/L (ref 3.5–5.1)
SODIUM SERPL-SCNC: 138 MMOL/L (ref 136–145)

## 2024-04-06 PROCEDURE — 63600175 PHARM REV CODE 636 W HCPCS: Performed by: PHYSICIAN ASSISTANT

## 2024-04-06 PROCEDURE — 25000003 PHARM REV CODE 250: Performed by: HOSPITALIST

## 2024-04-06 PROCEDURE — 80048 BASIC METABOLIC PNL TOTAL CA: CPT | Performed by: PHYSICIAN ASSISTANT

## 2024-04-06 PROCEDURE — 63600175 PHARM REV CODE 636 W HCPCS: Performed by: STUDENT IN AN ORGANIZED HEALTH CARE EDUCATION/TRAINING PROGRAM

## 2024-04-06 PROCEDURE — 25000003 PHARM REV CODE 250: Performed by: STUDENT IN AN ORGANIZED HEALTH CARE EDUCATION/TRAINING PROGRAM

## 2024-04-06 PROCEDURE — 36415 COLL VENOUS BLD VENIPUNCTURE: CPT | Performed by: PHYSICIAN ASSISTANT

## 2024-04-06 PROCEDURE — 20600001 HC STEP DOWN PRIVATE ROOM

## 2024-04-06 PROCEDURE — 25000003 PHARM REV CODE 250: Performed by: PHYSICIAN ASSISTANT

## 2024-04-06 RX ORDER — HYDROCODONE BITARTRATE AND ACETAMINOPHEN 5; 325 MG/1; MG/1
1 TABLET ORAL EVERY 6 HOURS PRN
Status: DISCONTINUED | OUTPATIENT
Start: 2024-04-06 | End: 2024-05-09 | Stop reason: HOSPADM

## 2024-04-06 RX ADMIN — HYDROCODONE BITARTRATE AND ACETAMINOPHEN 1 TABLET: 5; 325 TABLET ORAL at 06:04

## 2024-04-06 RX ADMIN — POLYETHYLENE GLYCOL 3350 17 G: 17 POWDER, FOR SOLUTION ORAL at 09:04

## 2024-04-06 RX ADMIN — ACETAMINOPHEN 1000 MG: 500 TABLET ORAL at 01:04

## 2024-04-06 RX ADMIN — HEPARIN SODIUM 5000 UNITS: 5000 INJECTION INTRAVENOUS; SUBCUTANEOUS at 05:04

## 2024-04-06 RX ADMIN — ATORVASTATIN CALCIUM 40 MG: 40 TABLET, FILM COATED ORAL at 09:04

## 2024-04-06 RX ADMIN — Medication: at 09:04

## 2024-04-06 RX ADMIN — SEVELAMER CARBONATE 800 MG: 800 TABLET, FILM COATED ORAL at 06:04

## 2024-04-06 RX ADMIN — ASPIRIN 81 MG: 81 TABLET, COATED ORAL at 09:04

## 2024-04-06 RX ADMIN — SEVELAMER CARBONATE 800 MG: 800 TABLET, FILM COATED ORAL at 12:04

## 2024-04-06 RX ADMIN — HEPARIN SODIUM 5000 UNITS: 5000 INJECTION INTRAVENOUS; SUBCUTANEOUS at 08:04

## 2024-04-06 RX ADMIN — SEVELAMER CARBONATE 800 MG: 800 TABLET, FILM COATED ORAL at 08:04

## 2024-04-06 RX ADMIN — HEPARIN SODIUM 5000 UNITS: 5000 INJECTION INTRAVENOUS; SUBCUTANEOUS at 02:04

## 2024-04-06 RX ADMIN — Medication: at 08:04

## 2024-04-06 RX ADMIN — FUROSEMIDE 80 MG: 10 INJECTION, SOLUTION INTRAVENOUS at 09:04

## 2024-04-06 RX ADMIN — CARVEDILOL 6.25 MG: 6.25 TABLET, FILM COATED ORAL at 08:04

## 2024-04-06 RX ADMIN — CARVEDILOL 6.25 MG: 6.25 TABLET, FILM COATED ORAL at 06:04

## 2024-04-06 NOTE — PLAN OF CARE
Problem: Adult Inpatient Plan of Care  Goal: Plan of Care Review  4/6/2024 0502 by Jono Holm RN  Outcome: Ongoing, Progressing  4/6/2024 0502 by Jono Holm RN  Outcome: Ongoing, Progressing  Goal: Patient-Specific Goal (Individualized)  4/6/2024 0502 by Jono Holm RN  Outcome: Ongoing, Progressing  4/6/2024 0502 by Jono Holm RN  Outcome: Ongoing, Progressing  Goal: Absence of Hospital-Acquired Illness or Injury  4/6/2024 0502 by Jono Holm RN  Outcome: Ongoing, Progressing  4/6/2024 0502 by Jono Holm RN  Outcome: Ongoing, Progressing  Goal: Optimal Comfort and Wellbeing  4/6/2024 0502 by Jono Holm RN  Outcome: Ongoing, Progressing  4/6/2024 0502 by Jono Holm RN  Outcome: Ongoing, Progressing  Goal: Readiness for Transition of Care  4/6/2024 0502 by Jono Holm RN  Outcome: Ongoing, Progressing  4/6/2024 0502 by Jono Holm RN  Outcome: Ongoing, Progressing     Problem: Infection  Goal: Absence of Infection Signs and Symptoms  4/6/2024 0502 by Jono Holm RN  Outcome: Ongoing, Progressing  4/6/2024 0502 by Jono Holm RN  Outcome: Ongoing, Progressing   no acute distress noted. POC on going

## 2024-04-06 NOTE — SUBJECTIVE & OBJECTIVE
Interval History: Leg wound hurts.    Review of Systems   Constitutional:  Negative for chills and fever.   Respiratory:  Negative for cough.    Cardiovascular:  Positive for leg swelling. Negative for chest pain.   Skin:  Positive for wound.   Neurological:  Negative for seizures and syncope.     Objective:     Vital Signs (Most Recent):  Temp: 97.7 °F (36.5 °C) (04/06/24 1151)  Pulse: 60 (04/06/24 1518)  Resp: 20 (04/06/24 1151)  BP: 110/65 (04/06/24 1151)  SpO2: 97 % (04/06/24 1151) Vital Signs (24h Range):  Temp:  [97.4 °F (36.3 °C)-98.3 °F (36.8 °C)] 97.7 °F (36.5 °C)  Pulse:  [51-65] 60  Resp:  [18-20] 20  SpO2:  [96 %-98 %] 97 %  BP: (100-110)/(64-74) 110/65     Weight: 72.1 kg (159 lb)  Body mass index is 31.05 kg/m².    Intake/Output Summary (Last 24 hours) at 4/6/2024 1533  Last data filed at 4/6/2024 0624  Gross per 24 hour   Intake 530 ml   Output 2190 ml   Net -1660 ml           Physical Exam  Vitals and nursing note reviewed.   Constitutional:       General: She is not in acute distress.     Appearance: She is well-developed. She is obese. She is not diaphoretic.   Pulmonary:      Effort: Pulmonary effort is normal. No respiratory distress.   Musculoskeletal:         General: Signs of injury present. No deformity.      Right lower leg: Edema present.      Left lower leg: Edema present.   Skin:     General: Skin is warm and dry.      Coloration: Skin is not jaundiced or pale.      Findings: Bruising present.      Comments: Left leg abrasions covered   Neurological:      Mental Status: She is alert and oriented to person, place, and time. Mental status is at baseline.      Motor: No seizure activity.   Psychiatric:         Attention and Perception: Attention normal.         Behavior: Behavior is not aggressive or combative. Behavior is cooperative.             Significant Labs: All pertinent labs within the past 24 hours have been reviewed.    Significant Imaging: I have reviewed all pertinent imaging  results/findings within the past 24 hours.   show

## 2024-04-06 NOTE — PROGRESS NOTES
[facility] - Telemetry [facility] Medicine  Progress Note    Patient Name: Xena Vera  MRN: [id number]  Patient Class: IP- Inpatient   Admission Date: 4/1/2024  Length of Stay: 3 days  Attending Physician: Johnny Cisneros MD  Primary Care Provider: Tami Villeda FNP        Subjective:     Principal Problem:Acute on chronic heart failure with preserved ejection fraction (HFpEF)        HPI:  Xena Vera is a 53 year old Yoruba-speaking white  woman with former cigarette smoking (1988 to 1989), hypertension, heart failure with preserved ejection fraction, mitral regurgitation, chronic kidney disease stage 4, anemia, ovarian cyst, obesity. She lives in [place], [place].               She presented to [facility] Emergency Department on 4/1/2024 with complaint of mechanical fall, lower extremity swelling, and shortness of breath. Her son translated for her at bedside. She slipped and fell between grass and concrete earlier in the morning and had an abrasion on her left lower extremity. She was able to stand with assistance and ambulate as usual afterward. She had worsening of chronic bilateral lower extremity edema now in her abdomen. She was taking her prescribed furosemide once a day without missed doses and was compliant with a low sodium diet. She was sleeping upright to feel like she can breath and had several nighttime awakenings due to shortness of breath. She had worsening dyspnea on exertion over the last several weeks. She has chronic constipation, unchanged.              In the emergency department, blood pressure was 97/54, heart rate was in the low 50s. Hemoglobin was 10.7 g/dL (baseline around 11.4), sodium was 133 mmol/L, total bilirubin was 1.5 mg/dL, BNP was 4639 pg/mL, troponin was 0.061 ng/mL. Chest X-ray showed upper normal pulmonary vascularity, chronic band of opacity in the left middle lung zone, faint edema in perihilar areas. She was given 40  mg of intravenous furosemide. She was admitted to Hospital Medicine Team R.    Overview/Hospital Course:  She was put on intravenous furosemide. It was increased to 80 mg twice daily. Urine output increased to 1.6 liters in 24 hours. She had bleeding and hematoma at the site of prophylactic heparin injections so heparin was stopped and she was given sequential compression devices instead. Wound Care was consulted for her left leg wound and recommended white petrolatum twice daily. She continued to diurese well but still had a lot of fluid. Her left leg hematoma hurt a lot.     Interval History: Leg wound hurts.    Review of Systems   Constitutional:  Negative for chills and fever.   Respiratory:  Negative for cough.    Cardiovascular:  Positive for leg swelling. Negative for chest pain.   Skin:  Positive for wound.   Neurological:  Negative for seizures and syncope.     Objective:     Vital Signs (Most Recent):  Temp: 97.7 °F (36.5 °C) (04/06/24 1151)  Pulse: 60 (04/06/24 1518)  Resp: 20 (04/06/24 1151)  BP: 110/65 (04/06/24 1151)  SpO2: 97 % (04/06/24 1151) Vital Signs (24h Range):  Temp:  [97.4 °F (36.3 °C)-98.3 °F (36.8 °C)] 97.7 °F (36.5 °C)  Pulse:  [51-65] 60  Resp:  [18-20] 20  SpO2:  [96 %-98 %] 97 %  BP: (100-110)/(64-74) 110/65     Weight: 72.1 kg (159 lb)  Body mass index is 31.05 kg/m².    Intake/Output Summary (Last 24 hours) at 4/6/2024 1533  Last data filed at 4/6/2024 0624  Gross per 24 hour   Intake 530 ml   Output 2190 ml   Net -1660 ml           Physical Exam  Vitals and nursing note reviewed.   Constitutional:       General: She is not in acute distress.     Appearance: She is well-developed. She is obese. She is not diaphoretic.   Pulmonary:      Effort: Pulmonary effort is normal. No respiratory distress.   Musculoskeletal:         General: Signs of injury present. No deformity.      Right lower leg: Edema present.      Left lower leg: Edema present.   Skin:     General: Skin is warm and dry.       Coloration: Skin is not jaundiced or pale.      Findings: Bruising present.      Comments: Left leg abrasions covered   Neurological:      Mental Status: She is alert and oriented to person, place, and time. Mental status is at baseline.      Motor: No seizure activity.   Psychiatric:         Attention and Perception: Attention normal.         Behavior: Behavior is not aggressive or combative. Behavior is cooperative.             Significant Labs: All pertinent labs within the past 24 hours have been reviewed.    Significant Imaging: I have reviewed all pertinent imaging results/findings within the past 24 hours.    Assessment/Plan:      * Acute on chronic heart failure with preserved ejection fraction (HFpEF)  She takes furosemide 40 mg daily. Giving IV furosemide 80 mg twice daily. Monitor intake/output. Treat hypokalemia as needed.    Leg wound, left  Appreciate Wound Care. Give hydrocodone-acetaminophen prn for pain due to hematoma.      Fall  Accidental.    Anasarca        Constipation  Giving polyethylene glycol.    Anemia  Stable.   Recent Labs   Lab 04/02/24  0324 04/03/24  0409 04/04/24  0300   WBC 4.99 5.25 5.54   HGB 9.3* 9.6* 9.2*   HCT 32.1* 32.6* 31.6*   * 142* 135*       Class 2 obesity in adult  Body mass index is 30.86 kg/m². Morbid obesity complicates all aspects of disease management from diagnostic modalities to treatment. Weight loss encouraged and health benefits explained to patient.     Essential hypertension  Continue home carvedilol. Monitor BP.    Stage 4 chronic kidney disease  Continue home sevelamer.      VTE Risk Mitigation (From admission, onward)           Ordered     Place sequential compression device  Until discontinued         04/03/24 1347     heparin (porcine) injection 5,000 Units  Every 8 hours         04/01/24 1030     IP VTE HIGH RISK PATIENT  Once         04/01/24 1030     Place sequential compression device  Until discontinued         04/01/24 1030                     Discharge Planning   RILEY: 4/8/2024     Code Status: Full Code   Is the patient medically ready for discharge?: No    Reason for patient still in hospital (select all that apply): Treatment  Discharge Plan A: Home with family                  Johnny Cisneros MD  Department of Hospital Medicine   [facility] - Telemetry Stepdown

## 2024-04-07 LAB
ANION GAP SERPL CALC-SCNC: 12 MMOL/L (ref 8–16)
BUN SERPL-MCNC: 102 MG/DL (ref 6–20)
CALCIUM SERPL-MCNC: 8.3 MG/DL (ref 8.7–10.5)
CHLORIDE SERPL-SCNC: 100 MMOL/L (ref 95–110)
CO2 SERPL-SCNC: 24 MMOL/L (ref 23–29)
CREAT SERPL-MCNC: 3.8 MG/DL (ref 0.5–1.4)
EST. GFR  (NO RACE VARIABLE): 13.5 ML/MIN/1.73 M^2
GLUCOSE SERPL-MCNC: 91 MG/DL (ref 70–110)
POTASSIUM SERPL-SCNC: 4 MMOL/L (ref 3.5–5.1)
SODIUM SERPL-SCNC: 136 MMOL/L (ref 136–145)

## 2024-04-07 PROCEDURE — 63600175 PHARM REV CODE 636 W HCPCS: Performed by: HOSPITALIST

## 2024-04-07 PROCEDURE — 25000003 PHARM REV CODE 250: Performed by: PHYSICIAN ASSISTANT

## 2024-04-07 PROCEDURE — 80048 BASIC METABOLIC PNL TOTAL CA: CPT | Performed by: PHYSICIAN ASSISTANT

## 2024-04-07 PROCEDURE — 63600175 PHARM REV CODE 636 W HCPCS: Performed by: STUDENT IN AN ORGANIZED HEALTH CARE EDUCATION/TRAINING PROGRAM

## 2024-04-07 PROCEDURE — 25000003 PHARM REV CODE 250: Performed by: STUDENT IN AN ORGANIZED HEALTH CARE EDUCATION/TRAINING PROGRAM

## 2024-04-07 PROCEDURE — 36415 COLL VENOUS BLD VENIPUNCTURE: CPT | Performed by: PHYSICIAN ASSISTANT

## 2024-04-07 PROCEDURE — 25000003 PHARM REV CODE 250: Performed by: HOSPITALIST

## 2024-04-07 PROCEDURE — 20600001 HC STEP DOWN PRIVATE ROOM

## 2024-04-07 PROCEDURE — 63600175 PHARM REV CODE 636 W HCPCS: Performed by: PHYSICIAN ASSISTANT

## 2024-04-07 RX ORDER — FUROSEMIDE 10 MG/ML
100 INJECTION INTRAMUSCULAR; INTRAVENOUS 2 TIMES DAILY
Status: DISCONTINUED | OUTPATIENT
Start: 2024-04-07 | End: 2024-04-09

## 2024-04-07 RX ADMIN — Medication: at 09:04

## 2024-04-07 RX ADMIN — SEVELAMER CARBONATE 800 MG: 800 TABLET, FILM COATED ORAL at 08:04

## 2024-04-07 RX ADMIN — HEPARIN SODIUM 5000 UNITS: 5000 INJECTION INTRAVENOUS; SUBCUTANEOUS at 09:04

## 2024-04-07 RX ADMIN — CARVEDILOL 6.25 MG: 6.25 TABLET, FILM COATED ORAL at 08:04

## 2024-04-07 RX ADMIN — HEPARIN SODIUM 5000 UNITS: 5000 INJECTION INTRAVENOUS; SUBCUTANEOUS at 02:04

## 2024-04-07 RX ADMIN — SEVELAMER CARBONATE 800 MG: 800 TABLET, FILM COATED ORAL at 06:04

## 2024-04-07 RX ADMIN — FUROSEMIDE 80 MG: 10 INJECTION, SOLUTION INTRAVENOUS at 09:04

## 2024-04-07 RX ADMIN — HYDROCODONE BITARTRATE AND ACETAMINOPHEN 1 TABLET: 5; 325 TABLET ORAL at 11:04

## 2024-04-07 RX ADMIN — FUROSEMIDE 100 MG: 10 INJECTION, SOLUTION INTRAMUSCULAR; INTRAVENOUS at 06:04

## 2024-04-07 RX ADMIN — ATORVASTATIN CALCIUM 40 MG: 40 TABLET, FILM COATED ORAL at 09:04

## 2024-04-07 RX ADMIN — CARVEDILOL 6.25 MG: 6.25 TABLET, FILM COATED ORAL at 06:04

## 2024-04-07 RX ADMIN — ASPIRIN 81 MG: 81 TABLET, COATED ORAL at 09:04

## 2024-04-07 RX ADMIN — POLYETHYLENE GLYCOL 3350 17 G: 17 POWDER, FOR SOLUTION ORAL at 09:04

## 2024-04-07 RX ADMIN — SEVELAMER CARBONATE 800 MG: 800 TABLET, FILM COATED ORAL at 01:04

## 2024-04-07 NOTE — ASSESSMENT & PLAN NOTE
She takes furosemide 40 mg daily. Giving IV furosemide 80 mg twice daily. Increase to 100 mg twice daily. Monitor intake/output. Monitor electrolytes. Potassium normal.

## 2024-04-07 NOTE — PROGRESS NOTES
[facility] - Telemetry [facility] Medicine  Progress Note    Patient Name: Xena Vera  MRN: [id number]  Patient Class: IP- Inpatient   Admission Date: 4/1/2024  Length of Stay: 4 days  Attending Physician: Johnny Cisneros MD  Primary Care Provider: Tami Villeda FNP        Subjective:     Principal Problem:Acute on chronic heart failure with preserved ejection fraction (HFpEF)        HPI:  Xena Vera is a 53 year old Croatian-speaking white  woman with former cigarette smoking (1988 to 1989), hypertension, heart failure with preserved ejection fraction, mitral regurgitation, chronic kidney disease stage 4, anemia, ovarian cyst, obesity. She lives in [place], [place].               She presented to [facility] Emergency Department on 4/1/2024 with complaint of mechanical fall, lower extremity swelling, and shortness of breath. Her son translated for her at bedside. She slipped and fell between grass and concrete earlier in the morning and had an abrasion on her left lower extremity. She was able to stand with assistance and ambulate as usual afterward. She had worsening of chronic bilateral lower extremity edema now in her abdomen. She was taking her prescribed furosemide once a day without missed doses and was compliant with a low sodium diet. She was sleeping upright to feel like she can breath and had several nighttime awakenings due to shortness of breath. She had worsening dyspnea on exertion over the last several weeks. She has chronic constipation, unchanged.              In the emergency department, blood pressure was 97/54, heart rate was in the low 50s. Hemoglobin was 10.7 g/dL (baseline around 11.4), sodium was 133 mmol/L, total bilirubin was 1.5 mg/dL, BNP was 4639 pg/mL, troponin was 0.061 ng/mL. Chest X-ray showed upper normal pulmonary vascularity, chronic band of opacity in the left middle lung zone, faint edema in perihilar areas. She was given 40  mg of intravenous furosemide. She was admitted to Hospital Medicine Team R.    Overview/Hospital Course:  She was put on intravenous furosemide. It was increased to 80 mg twice daily. Urine output increased to 1.6 liters in 24 hours. She had bleeding and hematoma at the site of prophylactic heparin injections so heparin was stopped and she was given sequential compression devices instead. Wound Care was consulted for her left leg wound and recommended white petrolatum twice daily. She continued to diurese well but still had a lot of fluid. Her left leg hematoma hurt a lot.     Interval History: Diuresing fairly. Can increase goal urine output to hasten treatment. Increase furosemide dose. Renal function stable.    Review of Systems   Constitutional:  Negative for chills and fever.   Respiratory:  Negative for cough.    Cardiovascular:  Positive for leg swelling. Negative for chest pain.   Skin:  Positive for wound.   Neurological:  Negative for seizures and syncope.     Objective:     Vital Signs (Most Recent):  Temp: 98.6 °F (37 °C) (04/07/24 1535)  Pulse: (!) 59 (04/07/24 1535)  Resp: 20 (04/07/24 1535)  BP: 115/78 (04/07/24 1535)  SpO2: 96 % (04/07/24 1535) Vital Signs (24h Range):  Temp:  [97.6 °F (36.4 °C)-98.6 °F (37 °C)] 98.6 °F (37 °C)  Pulse:  [50-59] 59  Resp:  [17-20] 20  SpO2:  [91 %-98 %] 96 %  BP: (106-131)/(69-81) 115/78     Weight: 72.1 kg (159 lb)  Body mass index is 31.05 kg/m².    Intake/Output Summary (Last 24 hours) at 4/7/2024 1540  Last data filed at 4/7/2024 0015  Gross per 24 hour   Intake 900 ml   Output 1550 ml   Net -650 ml           Physical Exam  Vitals and nursing note reviewed.   Constitutional:       General: She is not in acute distress.     Appearance: She is well-developed. She is obese. She is not diaphoretic.   Pulmonary:      Effort: Pulmonary effort is normal. No respiratory distress.   Musculoskeletal:         General: Signs of injury present. No deformity.      Right lower  leg: Edema present.      Left lower leg: Edema present.   Skin:     General: Skin is warm and dry.      Coloration: Skin is not jaundiced or pale.      Findings: Bruising present.   Neurological:      Mental Status: She is alert and oriented to person, place, and time. Mental status is at baseline.      Motor: No seizure activity.   Psychiatric:         Attention and Perception: Attention normal.         Behavior: Behavior is not aggressive or combative. Behavior is cooperative.             Significant Labs: All pertinent labs within the past 24 hours have been reviewed.    Significant Imaging: I have reviewed all pertinent imaging results/findings within the past 24 hours.    Assessment/Plan:      * Acute on chronic heart failure with preserved ejection fraction (HFpEF)  She takes furosemide 40 mg daily. Giving IV furosemide 80 mg twice daily. Increase to 100 mg twice daily. Monitor intake/output. Monitor electrolytes. Potassium normal.    Leg wound, left  Appreciate Wound Care. Give hydrocodone-acetaminophen prn for pain due to hematoma.      Fall  Accidental.    Anasarca        Constipation  Giving polyethylene glycol.    Anemia  Stable.   Recent Labs   Lab 04/02/24  0324 04/03/24  0409 04/04/24  0300   WBC 4.99 5.25 5.54   HGB 9.3* 9.6* 9.2*   HCT 32.1* 32.6* 31.6*   * 142* 135*       Class 2 obesity in adult  Body mass index is 30.86 kg/m². Morbid obesity complicates all aspects of disease management from diagnostic modalities to treatment. Weight loss encouraged and health benefits explained to patient.     Essential hypertension  Continue home carvedilol. Monitor BP.    Stage 4 chronic kidney disease  Continue home sevelamer.      VTE Risk Mitigation (From admission, onward)           Ordered     Place sequential compression device  Until discontinued         04/03/24 1347     heparin (porcine) injection 5,000 Units  Every 8 hours         04/01/24 1030     IP VTE HIGH RISK PATIENT  Once          04/01/24 1030     Place sequential compression device  Until discontinued         04/01/24 1030                    Discharge Planning   RILEY: 4/8/2024     Code Status: Full Code   Is the patient medically ready for discharge?: No    Reason for patient still in hospital (select all that apply): Treatment  Discharge Plan A: Home with family                  Johnny Cisneros MD  Department of Hospital Medicine   [facility] - Telemetry Stepdown

## 2024-04-07 NOTE — SUBJECTIVE & OBJECTIVE
Interval History: Diuresing fairly. Can increase goal urine output to hasten treatment. Increase furosemide dose. Renal function stable.    Review of Systems   Constitutional:  Negative for chills and fever.   Respiratory:  Negative for cough.    Cardiovascular:  Positive for leg swelling. Negative for chest pain.   Skin:  Positive for wound.   Neurological:  Negative for seizures and syncope.     Objective:     Vital Signs (Most Recent):  Temp: 98.6 °F (37 °C) (04/07/24 1535)  Pulse: (!) 59 (04/07/24 1535)  Resp: 20 (04/07/24 1535)  BP: 115/78 (04/07/24 1535)  SpO2: 96 % (04/07/24 1535) Vital Signs (24h Range):  Temp:  [97.6 °F (36.4 °C)-98.6 °F (37 °C)] 98.6 °F (37 °C)  Pulse:  [50-59] 59  Resp:  [17-20] 20  SpO2:  [91 %-98 %] 96 %  BP: (106-131)/(69-81) 115/78     Weight: 72.1 kg (159 lb)  Body mass index is 31.05 kg/m².    Intake/Output Summary (Last 24 hours) at 4/7/2024 1540  Last data filed at 4/7/2024 0015  Gross per 24 hour   Intake 900 ml   Output 1550 ml   Net -650 ml           Physical Exam  Vitals and nursing note reviewed.   Constitutional:       General: She is not in acute distress.     Appearance: She is well-developed. She is obese. She is not diaphoretic.   Pulmonary:      Effort: Pulmonary effort is normal. No respiratory distress.   Musculoskeletal:         General: Signs of injury present. No deformity.      Right lower leg: Edema present.      Left lower leg: Edema present.   Skin:     General: Skin is warm and dry.      Coloration: Skin is not jaundiced or pale.      Findings: Bruising present.   Neurological:      Mental Status: She is alert and oriented to person, place, and time. Mental status is at baseline.      Motor: No seizure activity.   Psychiatric:         Attention and Perception: Attention normal.         Behavior: Behavior is not aggressive or combative. Behavior is cooperative.             Significant Labs: All pertinent labs within the past 24 hours have been  reviewed.    Significant Imaging: I have reviewed all pertinent imaging results/findings within the past 24 hours.

## 2024-04-08 LAB
ANION GAP SERPL CALC-SCNC: 11 MMOL/L (ref 8–16)
BUN SERPL-MCNC: 102 MG/DL (ref 6–20)
CALCIUM SERPL-MCNC: 8.6 MG/DL (ref 8.7–10.5)
CHLORIDE SERPL-SCNC: 101 MMOL/L (ref 95–110)
CO2 SERPL-SCNC: 23 MMOL/L (ref 23–29)
CREAT SERPL-MCNC: 3.8 MG/DL (ref 0.5–1.4)
EST. GFR  (NO RACE VARIABLE): 13.5 ML/MIN/1.73 M^2
GLUCOSE SERPL-MCNC: 90 MG/DL (ref 70–110)
POTASSIUM SERPL-SCNC: 4.2 MMOL/L (ref 3.5–5.1)
SODIUM SERPL-SCNC: 135 MMOL/L (ref 136–145)

## 2024-04-08 PROCEDURE — 25000003 PHARM REV CODE 250: Performed by: HOSPITALIST

## 2024-04-08 PROCEDURE — 36415 COLL VENOUS BLD VENIPUNCTURE: CPT | Performed by: PHYSICIAN ASSISTANT

## 2024-04-08 PROCEDURE — 63600175 PHARM REV CODE 636 W HCPCS: Performed by: PHYSICIAN ASSISTANT

## 2024-04-08 PROCEDURE — 25000003 PHARM REV CODE 250: Performed by: STUDENT IN AN ORGANIZED HEALTH CARE EDUCATION/TRAINING PROGRAM

## 2024-04-08 PROCEDURE — 80048 BASIC METABOLIC PNL TOTAL CA: CPT | Performed by: PHYSICIAN ASSISTANT

## 2024-04-08 PROCEDURE — 25000003 PHARM REV CODE 250: Performed by: PHYSICIAN ASSISTANT

## 2024-04-08 PROCEDURE — 63600175 PHARM REV CODE 636 W HCPCS: Performed by: HOSPITALIST

## 2024-04-08 PROCEDURE — 20600001 HC STEP DOWN PRIVATE ROOM

## 2024-04-08 RX ADMIN — FUROSEMIDE 100 MG: 10 INJECTION, SOLUTION INTRAMUSCULAR; INTRAVENOUS at 09:04

## 2024-04-08 RX ADMIN — HEPARIN SODIUM 5000 UNITS: 5000 INJECTION INTRAVENOUS; SUBCUTANEOUS at 09:04

## 2024-04-08 RX ADMIN — CARVEDILOL 6.25 MG: 6.25 TABLET, FILM COATED ORAL at 09:04

## 2024-04-08 RX ADMIN — FUROSEMIDE 100 MG: 10 INJECTION, SOLUTION INTRAMUSCULAR; INTRAVENOUS at 05:04

## 2024-04-08 RX ADMIN — ATORVASTATIN CALCIUM 40 MG: 40 TABLET, FILM COATED ORAL at 09:04

## 2024-04-08 RX ADMIN — HEPARIN SODIUM 5000 UNITS: 5000 INJECTION INTRAVENOUS; SUBCUTANEOUS at 06:04

## 2024-04-08 RX ADMIN — Medication: at 11:04

## 2024-04-08 RX ADMIN — SEVELAMER CARBONATE 800 MG: 800 TABLET, FILM COATED ORAL at 12:04

## 2024-04-08 RX ADMIN — HEPARIN SODIUM 5000 UNITS: 5000 INJECTION INTRAVENOUS; SUBCUTANEOUS at 02:04

## 2024-04-08 RX ADMIN — SEVELAMER CARBONATE 800 MG: 800 TABLET, FILM COATED ORAL at 05:04

## 2024-04-08 RX ADMIN — SEVELAMER CARBONATE 800 MG: 800 TABLET, FILM COATED ORAL at 09:04

## 2024-04-08 RX ADMIN — ASPIRIN 81 MG: 81 TABLET, COATED ORAL at 09:04

## 2024-04-08 RX ADMIN — POLYETHYLENE GLYCOL 3350 17 G: 17 POWDER, FOR SOLUTION ORAL at 09:04

## 2024-04-08 NOTE — PLAN OF CARE
Problem: Adult Inpatient Plan of Care  Goal: Plan of Care Review  Outcome: Ongoing, Progressing  Goal: Patient-Specific Goal (Individualized)  Outcome: Ongoing, Progressing  Goal: Absence of Hospital-Acquired Illness or Injury  Outcome: Ongoing, Progressing  Goal: Optimal Comfort and Wellbeing  Outcome: Ongoing, Progressing  Goal: Readiness for Transition of Care  Outcome: Ongoing, Progressing     Problem: Infection  Goal: Absence of Infection Signs and Symptoms  Outcome: Ongoing, Progressing     Problem: Impaired Wound Healing  Goal: Optimal Wound Healing  Outcome: Ongoing, Progressing   Patient continuing to be monitored. Patient is alert and oriented during shift. Patient is getting IV lasix with urine output. No complaints of pain during shift.

## 2024-04-08 NOTE — PROGRESS NOTES
[facility] - Telemetry [facility] Medicine  Progress Note    Patient Name: Xena Vera  MRN: [id number]  Patient Class: IP- Inpatient   Admission Date: 4/1/2024  Length of Stay: 5 days  Attending Physician: Johnny Cisneros MD  Primary Care Provider: Tami Villeda FNP        Subjective:     Principal Problem:Acute on chronic heart failure with preserved ejection fraction (HFpEF)        HPI:  Xena Vera is a 53 year old Lithuanian-speaking white  woman with former cigarette smoking (1988 to 1989), hypertension, heart failure with preserved ejection fraction, mitral regurgitation, chronic kidney disease stage 4, anemia, ovarian cyst, obesity. She lives in [place], [place].               She presented to [facility] Emergency Department on 4/1/2024 with complaint of mechanical fall, lower extremity swelling, and shortness of breath. Her son translated for her at bedside. She slipped and fell between grass and concrete earlier in the morning and had an abrasion on her left lower extremity. She was able to stand with assistance and ambulate as usual afterward. She had worsening of chronic bilateral lower extremity edema now in her abdomen. She was taking her prescribed furosemide once a day without missed doses and was compliant with a low sodium diet. She was sleeping upright to feel like she can breath and had several nighttime awakenings due to shortness of breath. She had worsening dyspnea on exertion over the last several weeks. She has chronic constipation, unchanged.              In the emergency department, blood pressure was 97/54, heart rate was in the low 50s. Hemoglobin was 10.7 g/dL (baseline around 11.4), sodium was 133 mmol/L, total bilirubin was 1.5 mg/dL, BNP was 4639 pg/mL, troponin was 0.061 ng/mL. Chest X-ray showed upper normal pulmonary vascularity, chronic band of opacity in the left middle lung zone, faint edema in perihilar areas. She was given 40  mg of intravenous furosemide. She was admitted to Hospital Medicine Team R.    Overview/Hospital Course:  She was put on intravenous furosemide. It was increased to 80 mg twice daily. Urine output increased to 1.6 liters in 24 hours. She had bleeding and hematoma at the site of prophylactic heparin injections so heparin was stopped and she was given sequential compression devices instead. Wound Care was consulted for her left leg wound and recommended white petrolatum twice daily. She continued to diurese well but still had a lot of fluid. Her left leg hematoma hurt a lot. Furosemide was increased to three times daily.     Interval History: Diuresing. Renal function stable.    Review of Systems   Constitutional:  Negative for chills and fever.   Respiratory:  Negative for cough.    Cardiovascular:  Positive for leg swelling. Negative for chest pain.   Skin:  Positive for wound.   Neurological:  Negative for seizures and syncope.     Objective:     Vital Signs (Most Recent):  Temp: 98.5 °F (36.9 °C) (04/08/24 1124)  Pulse: (!) 54 (04/08/24 1124)  Resp: 18 (04/08/24 1124)  BP: 108/72 (04/08/24 1124)  SpO2: (!) 93 % (04/08/24 1124) Vital Signs (24h Range):  Temp:  [97.6 °F (36.4 °C)-98.7 °F (37.1 °C)] 98.5 °F (36.9 °C)  Pulse:  [46-59] 54  Resp:  [17-20] 18  SpO2:  [92 %-99 %] 93 %  BP: (106-118)/(69-81) 108/72     Weight: 72.1 kg (159 lb)  Body mass index is 31.05 kg/m².    Intake/Output Summary (Last 24 hours) at 4/8/2024 1157  Last data filed at 4/8/2024 0516  Gross per 24 hour   Intake 1620 ml   Output 1450 ml   Net 170 ml           Physical Exam  Vitals and nursing note reviewed.   Constitutional:       General: She is not in acute distress.     Appearance: She is well-developed. She is obese. She is not diaphoretic.   Pulmonary:      Effort: Pulmonary effort is normal. No respiratory distress.   Musculoskeletal:         General: Signs of injury present. No deformity.      Right lower leg: Edema present.      Left  lower leg: Edema present.   Skin:     General: Skin is warm and dry.      Coloration: Skin is not jaundiced or pale.      Findings: Bruising present.   Neurological:      Mental Status: She is alert and oriented to person, place, and time. Mental status is at baseline.      Motor: No seizure activity.   Psychiatric:         Attention and Perception: Attention normal.         Behavior: Behavior is not aggressive or combative. Behavior is cooperative.             Significant Labs: All pertinent labs within the past 24 hours have been reviewed.    Significant Imaging: I have reviewed all pertinent imaging results/findings within the past 24 hours.    Assessment/Plan:      * Acute on chronic heart failure with preserved ejection fraction (HFpEF)  She takes furosemide 40 mg daily. Giving IV furosemide 100 mg twice daily. Monitor intake/output. Monitor electrolytes. Potassium normal.    Leg wound, left  Appreciate Wound Care. Give hydrocodone-acetaminophen prn for pain due to hematoma.      Fall  Accidental.    Anasarca        Constipation  Giving polyethylene glycol.    Anemia  Stable.   Recent Labs   Lab 04/02/24  0324 04/03/24  0409 04/04/24  0300   WBC 4.99 5.25 5.54   HGB 9.3* 9.6* 9.2*   HCT 32.1* 32.6* 31.6*   * 142* 135*       Class 2 obesity in adult  Body mass index is 30.86 kg/m². Morbid obesity complicates all aspects of disease management from diagnostic modalities to treatment. Weight loss encouraged and health benefits explained to patient.     Essential hypertension  Continue home carvedilol. Monitor BP.    Stage 4 chronic kidney disease  Continue home sevelamer.      VTE Risk Mitigation (From admission, onward)           Ordered     Place sequential compression device  Until discontinued         04/03/24 1347     heparin (porcine) injection 5,000 Units  Every 8 hours         04/01/24 1030     IP VTE HIGH RISK PATIENT  Once         04/01/24 1030     Place sequential compression device  Until  discontinued         04/01/24 1030                    Discharge Planning   RILEY: 4/9/2024     Code Status: Full Code   Is the patient medically ready for discharge?: No    Reason for patient still in hospital (select all that apply): Treatment  Discharge Plan A: Home with family                  Johnny Cisneros MD  Department of Hospital Medicine   [facility] - Telemetry Stepdown

## 2024-04-08 NOTE — PLAN OF CARE
Problem: Adult Inpatient Plan of Care  Goal: Plan of Care Review  Outcome: Ongoing, Progressing  Goal: Patient-Specific Goal (Individualized)  Outcome: Ongoing, Progressing  Goal: Absence of Hospital-Acquired Illness or Injury  Outcome: Ongoing, Progressing  Goal: Optimal Comfort and Wellbeing  Outcome: Ongoing, Progressing  Goal: Readiness for Transition of Care  Outcome: Ongoing, Progressing     Problem: Infection  Goal: Absence of Infection Signs and Symptoms  Outcome: Ongoing, Progressing     Problem: Impaired Wound Healing  Goal: Optimal Wound Healing  Outcome: Ongoing, Progressing  Patient sitting in chair. Rested well during the night. No complaints voiced. NADN at this time. Safety measures in place. Call light in reach.

## 2024-04-08 NOTE — PLAN OF CARE
[facility] - Telemetry Stepdown  Discharge Reassessment    Per MD team, patient is not medically ready for d/c at this time. Patient has Medicaid pending insurance w/no current PAC benefits. Plan is to d/c to home w/family when ready. Will continue to follow for needs.    Primary Care Provider: Tami Villeda FNP    Expected Discharge Date: 4/9/2024    Reassessment (most recent)       Discharge Reassessment - 04/08/24 1633          Discharge Reassessment    Assessment Type Discharge Planning Reassessment     Did the patient's condition or plan change since previous assessment? No     Discharge Plan discussed with: Patient     Communicated RILEY with patient/caregiver Yes (P)      Discharge Plan A Home with family (P)      Discharge Plan B Home with family (P)      DME Needed Upon Discharge  other (see comments) (P)    TBD    Transition of Care Barriers Unisured (P)      Why the patient remains in the hospital Requires continued medical care (P)         Post-Acute Status    Post-Acute Authorization Other (P)      Other Status No Post-Acute Service Needs (P)      Discharge Delays None known at this time (P)                    Discharge Plan A and Plan B have been determined by review of patient's clinical status, future medical and therapeutic needs, and coverage/benefits for post-acute care in coordination with multidisciplinary team members.    Doris Ta, JUANCARLOS, LMSW    Case Management Department  [facility]

## 2024-04-08 NOTE — SUBJECTIVE & OBJECTIVE
Interval History: Diuresing. Renal function stable.    Review of Systems   Constitutional:  Negative for chills and fever.   Respiratory:  Negative for cough.    Cardiovascular:  Positive for leg swelling. Negative for chest pain.   Skin:  Positive for wound.   Neurological:  Negative for seizures and syncope.     Objective:     Vital Signs (Most Recent):  Temp: 98.5 °F (36.9 °C) (04/08/24 1124)  Pulse: (!) 54 (04/08/24 1124)  Resp: 18 (04/08/24 1124)  BP: 108/72 (04/08/24 1124)  SpO2: (!) 93 % (04/08/24 1124) Vital Signs (24h Range):  Temp:  [97.6 °F (36.4 °C)-98.7 °F (37.1 °C)] 98.5 °F (36.9 °C)  Pulse:  [46-59] 54  Resp:  [17-20] 18  SpO2:  [92 %-99 %] 93 %  BP: (106-118)/(69-81) 108/72     Weight: 72.1 kg (159 lb)  Body mass index is 31.05 kg/m².    Intake/Output Summary (Last 24 hours) at 4/8/2024 1157  Last data filed at 4/8/2024 0516  Gross per 24 hour   Intake 1620 ml   Output 1450 ml   Net 170 ml           Physical Exam  Vitals and nursing note reviewed.   Constitutional:       General: She is not in acute distress.     Appearance: She is well-developed. She is obese. She is not diaphoretic.   Pulmonary:      Effort: Pulmonary effort is normal. No respiratory distress.   Musculoskeletal:         General: Signs of injury present. No deformity.      Right lower leg: Edema present.      Left lower leg: Edema present.   Skin:     General: Skin is warm and dry.      Coloration: Skin is not jaundiced or pale.      Findings: Bruising present.   Neurological:      Mental Status: She is alert and oriented to person, place, and time. Mental status is at baseline.      Motor: No seizure activity.   Psychiatric:         Attention and Perception: Attention normal.         Behavior: Behavior is not aggressive or combative. Behavior is cooperative.             Significant Labs: All pertinent labs within the past 24 hours have been reviewed.    Significant Imaging: I have reviewed all pertinent imaging results/findings  within the past 24 hours.

## 2024-04-08 NOTE — ASSESSMENT & PLAN NOTE
She takes furosemide 40 mg daily. Giving IV furosemide 100 mg twice daily. Monitor intake/output. Monitor electrolytes. Potassium normal.

## 2024-04-09 PROBLEM — R33.8 ACUTE URINARY RETENTION: Status: ACTIVE | Noted: 2024-04-09

## 2024-04-09 LAB
ANION GAP SERPL CALC-SCNC: 16 MMOL/L (ref 8–16)
BUN SERPL-MCNC: 87 MG/DL (ref 6–20)
CALCIUM SERPL-MCNC: 8.6 MG/DL (ref 8.7–10.5)
CHLORIDE SERPL-SCNC: 97 MMOL/L (ref 95–110)
CO2 SERPL-SCNC: 24 MMOL/L (ref 23–29)
CREAT SERPL-MCNC: 3.9 MG/DL (ref 0.5–1.4)
EST. GFR  (NO RACE VARIABLE): 13.1 ML/MIN/1.73 M^2
GLUCOSE SERPL-MCNC: 71 MG/DL (ref 70–110)
POTASSIUM SERPL-SCNC: 4 MMOL/L (ref 3.5–5.1)
SODIUM SERPL-SCNC: 137 MMOL/L (ref 136–145)

## 2024-04-09 PROCEDURE — 25000003 PHARM REV CODE 250: Performed by: PHYSICIAN ASSISTANT

## 2024-04-09 PROCEDURE — 20600001 HC STEP DOWN PRIVATE ROOM

## 2024-04-09 PROCEDURE — 63600175 PHARM REV CODE 636 W HCPCS: Performed by: HOSPITALIST

## 2024-04-09 PROCEDURE — 63600175 PHARM REV CODE 636 W HCPCS: Performed by: PHYSICIAN ASSISTANT

## 2024-04-09 PROCEDURE — 25000003 PHARM REV CODE 250: Performed by: HOSPITALIST

## 2024-04-09 PROCEDURE — 36415 COLL VENOUS BLD VENIPUNCTURE: CPT | Performed by: PHYSICIAN ASSISTANT

## 2024-04-09 PROCEDURE — 80048 BASIC METABOLIC PNL TOTAL CA: CPT | Performed by: PHYSICIAN ASSISTANT

## 2024-04-09 RX ORDER — BISACODYL 5 MG
5 TABLET, DELAYED RELEASE (ENTERIC COATED) ORAL DAILY PRN
Status: DISCONTINUED | OUTPATIENT
Start: 2024-04-09 | End: 2024-05-09 | Stop reason: HOSPADM

## 2024-04-09 RX ORDER — METOLAZONE 5 MG/1
5 TABLET ORAL ONCE
Status: COMPLETED | OUTPATIENT
Start: 2024-04-09 | End: 2024-04-09

## 2024-04-09 RX ORDER — FUROSEMIDE 10 MG/ML
100 INJECTION INTRAMUSCULAR; INTRAVENOUS 3 TIMES DAILY
Status: DISCONTINUED | OUTPATIENT
Start: 2024-04-09 | End: 2024-04-11

## 2024-04-09 RX ADMIN — HYDROCODONE BITARTRATE AND ACETAMINOPHEN 1 TABLET: 5; 325 TABLET ORAL at 11:04

## 2024-04-09 RX ADMIN — HEPARIN SODIUM 5000 UNITS: 5000 INJECTION INTRAVENOUS; SUBCUTANEOUS at 03:04

## 2024-04-09 RX ADMIN — HEPARIN SODIUM 5000 UNITS: 5000 INJECTION INTRAVENOUS; SUBCUTANEOUS at 09:04

## 2024-04-09 RX ADMIN — METOLAZONE 5 MG: 5 TABLET ORAL at 07:04

## 2024-04-09 RX ADMIN — Medication: at 08:04

## 2024-04-09 RX ADMIN — FUROSEMIDE 100 MG: 10 INJECTION, SOLUTION INTRAMUSCULAR; INTRAVENOUS at 03:04

## 2024-04-09 RX ADMIN — ASPIRIN 81 MG: 81 TABLET, COATED ORAL at 08:04

## 2024-04-09 RX ADMIN — SEVELAMER CARBONATE 800 MG: 800 TABLET, FILM COATED ORAL at 08:04

## 2024-04-09 RX ADMIN — POLYETHYLENE GLYCOL 3350 17 G: 17 POWDER, FOR SOLUTION ORAL at 08:04

## 2024-04-09 RX ADMIN — HEPARIN SODIUM 5000 UNITS: 5000 INJECTION INTRAVENOUS; SUBCUTANEOUS at 06:04

## 2024-04-09 RX ADMIN — ATORVASTATIN CALCIUM 40 MG: 40 TABLET, FILM COATED ORAL at 08:04

## 2024-04-09 RX ADMIN — Medication: at 09:04

## 2024-04-09 RX ADMIN — FUROSEMIDE 100 MG: 10 INJECTION, SOLUTION INTRAMUSCULAR; INTRAVENOUS at 09:04

## 2024-04-09 RX ADMIN — HYDROCODONE BITARTRATE AND ACETAMINOPHEN 1 TABLET: 5; 325 TABLET ORAL at 03:04

## 2024-04-09 NOTE — SUBJECTIVE & OBJECTIVE
Interval History: Urine output has decreased in the past 24 hours. Will get bladder scan.    Review of Systems   Constitutional:  Negative for chills and fever.   Respiratory:  Negative for cough.    Cardiovascular:  Positive for leg swelling. Negative for chest pain.   Skin:  Positive for wound.   Neurological:  Negative for seizures and syncope.     Objective:     Vital Signs (Most Recent):  Temp: 97.8 °F (36.6 °C) (04/09/24 1129)  Pulse: (!) 53 (04/09/24 1129)  Resp: 18 (04/09/24 1129)  BP: 97/64 (04/09/24 1129)  SpO2: 96 % (04/09/24 1129) Vital Signs (24h Range):  Temp:  [97.7 °F (36.5 °C)-98.5 °F (36.9 °C)] 97.8 °F (36.6 °C)  Pulse:  [51-56] 53  Resp:  [16-20] 18  SpO2:  [94 %-98 %] 96 %  BP: ()/(62-84) 97/64     Weight: 72.1 kg (159 lb)  Body mass index is 31.05 kg/m².    Intake/Output Summary (Last 24 hours) at 4/9/2024 1201  Last data filed at 4/9/2024 1138  Gross per 24 hour   Intake 320 ml   Output 500 ml   Net -180 ml           Physical Exam  Vitals and nursing note reviewed.   Constitutional:       General: She is not in acute distress.     Appearance: She is well-developed. She is obese. She is not diaphoretic.   Pulmonary:      Effort: Pulmonary effort is normal. No respiratory distress.   Musculoskeletal:         General: Signs of injury present. No deformity.      Right lower leg: Edema present.      Left lower leg: Edema present.   Skin:     General: Skin is warm and dry.      Coloration: Skin is not jaundiced or pale.      Findings: Bruising present.   Neurological:      Mental Status: She is alert and oriented to person, place, and time. Mental status is at baseline.      Motor: No seizure activity.   Psychiatric:         Attention and Perception: Attention normal.         Behavior: Behavior is not aggressive or combative. Behavior is cooperative.             Significant Labs: All pertinent labs within the past 24 hours have been reviewed.    Significant Imaging: I have reviewed all  pertinent imaging results/findings within the past 24 hours.

## 2024-04-09 NOTE — ASSESSMENT & PLAN NOTE
She takes furosemide 40 mg daily. Giving IV furosemide 100 mg twice daily. Monitor intake/output. Monitor electrolytes. Urine output decreased. Check bladder scan to rule out urinary retention. If none, try giving metolazone or increasing furosemide.

## 2024-04-09 NOTE — PROGRESS NOTES
[facility] - Telemetry [facility]  Progress Note    Patient Name: Xena Vera  MRN: [id number]  Patient Class: IP- Inpatient   Admission Date: 4/1/2024  Length of Stay: 6 days  Attending Physician: Johnny Cisneros MD  Primary Care Provider: Tami Villeda FNP        Subjective:     Principal Problem:Acute on chronic heart failure with preserved ejection fraction (HFpEF)        HPI:  Xena Vera is a 53 year old Nepali-speaking white  woman with former cigarette smoking (1988 to 1989), hypertension, heart failure with preserved ejection fraction, mitral regurgitation, chronic kidney disease stage 4, anemia, ovarian cyst, obesity. She lives in [place], [place].               She presented to [facility] Emergency Department on 4/1/2024 with complaint of mechanical fall, lower extremity swelling, and shortness of breath. Her son translated for her at bedside. She slipped and fell between grass and concrete earlier in the morning and had an abrasion on her left lower extremity. She was able to stand with assistance and ambulate as usual afterward. She had worsening of chronic bilateral lower extremity edema now in her abdomen. She was taking her prescribed furosemide once a day without missed doses and was compliant with a low sodium diet. She was sleeping upright to feel like she can breath and had several nighttime awakenings due to shortness of breath. She had worsening dyspnea on exertion over the last several weeks. She has chronic constipation, unchanged.              In the emergency department, blood pressure was 97/54, heart rate was in the low 50s. Hemoglobin was 10.7 g/dL (baseline around 11.4), sodium was 133 mmol/L, total bilirubin was 1.5 mg/dL, BNP was 4639 pg/mL, troponin was 0.061 ng/mL. Chest X-ray showed upper normal pulmonary vascularity, chronic band of opacity in the left middle lung zone, faint edema in perihilar areas. She was given 40  mg of intravenous furosemide. She was admitted to Hospital Medicine Team R.    Overview/Hospital Course:  She was put on intravenous furosemide. It was increased to 80 mg twice daily. Urine output increased to 1.6 liters in 24 hours. She had bleeding and hematoma at the site of prophylactic heparin injections so heparin was stopped and she was given sequential compression devices instead. Wound Care was consulted for her left leg wound and recommended white petrolatum twice daily. She continued to diurese well but still had a lot of fluid. Her left leg hematoma hurt a lot. Furosemide was increased to 100 mg twice daily.     Interval History: Urine output has decreased in the past 24 hours. Will get bladder scan.    Review of Systems   Constitutional:  Negative for chills and fever.   Respiratory:  Negative for cough.    Cardiovascular:  Positive for leg swelling. Negative for chest pain.   Skin:  Positive for wound.   Neurological:  Negative for seizures and syncope.     Objective:     Vital Signs (Most Recent):  Temp: 97.8 °F (36.6 °C) (04/09/24 1129)  Pulse: (!) 53 (04/09/24 1129)  Resp: 18 (04/09/24 1129)  BP: 97/64 (04/09/24 1129)  SpO2: 96 % (04/09/24 1129) Vital Signs (24h Range):  Temp:  [97.7 °F (36.5 °C)-98.5 °F (36.9 °C)] 97.8 °F (36.6 °C)  Pulse:  [51-56] 53  Resp:  [16-20] 18  SpO2:  [94 %-98 %] 96 %  BP: ()/(62-84) 97/64     Weight: 72.1 kg (159 lb)  Body mass index is 31.05 kg/m².    Intake/Output Summary (Last 24 hours) at 4/9/2024 1201  Last data filed at 4/9/2024 1138  Gross per 24 hour   Intake 320 ml   Output 500 ml   Net -180 ml           Physical Exam  Vitals and nursing note reviewed.   Constitutional:       General: She is not in acute distress.     Appearance: She is well-developed. She is obese. She is not diaphoretic.   Pulmonary:      Effort: Pulmonary effort is normal. No respiratory distress.   Musculoskeletal:         General: Signs of injury present. No deformity.      Right  lower leg: Edema present.      Left lower leg: Edema present.   Skin:     General: Skin is warm and dry.      Coloration: Skin is not jaundiced or pale.      Findings: Bruising present.   Neurological:      Mental Status: She is alert and oriented to person, place, and time. Mental status is at baseline.      Motor: No seizure activity.   Psychiatric:         Attention and Perception: Attention normal.         Behavior: Behavior is not aggressive or combative. Behavior is cooperative.             Significant Labs: All pertinent labs within the past 24 hours have been reviewed.    Significant Imaging: I have reviewed all pertinent imaging results/findings within the past 24 hours.    Assessment/Plan:      * Acute on chronic heart failure with preserved ejection fraction (HFpEF)  She takes furosemide 40 mg daily. Giving IV furosemide 100 mg twice daily. Monitor intake/output. Monitor electrolytes. Urine output decreased. Check bladder scan to rule out urinary retention. If none, try giving metolazone or increasing furosemide.    Leg wound, left  Appreciate Wound Care. Give hydrocodone-acetaminophen prn for pain due to hematoma. Monitor for infection due to excessive weeping from leg edema.      Fall  Accidental.    Anasarca        Constipation  Giving polyethylene glycol.    Anemia  Stable.   Recent Labs   Lab 04/02/24  0324 04/03/24  0409 04/04/24  0300   WBC 4.99 5.25 5.54   HGB 9.3* 9.6* 9.2*   HCT 32.1* 32.6* 31.6*   * 142* 135*       Class 2 obesity in adult  Body mass index is 30.86 kg/m². Morbid obesity complicates all aspects of disease management from diagnostic modalities to treatment. Weight loss encouraged and health benefits explained to patient.     Essential hypertension  Continue home carvedilol. Monitor BP.    Stage 4 chronic kidney disease  Continue home sevelamer. BUN decreased today.      VTE Risk Mitigation (From admission, onward)           Ordered     Place sequential compression device   Until discontinued         04/03/24 1347     heparin (porcine) injection 5,000 Units  Every 8 hours         04/01/24 1030     IP VTE HIGH RISK PATIENT  Once         04/01/24 1030     Place sequential compression device  Until discontinued         04/01/24 1030                    Discharge Planning   RILEY: 4/11/2024     Code Status: Full Code   Is the patient medically ready for discharge?: No    Reason for patient still in hospital (select all that apply): Patient trending condition and Treatment  Discharge Plan A: Home with family   Discharge Delays: None known at this time              Johnny Cisneros MD  Department of Hospital Medicine   [facility] - Telemetry Stepdown

## 2024-04-09 NOTE — PLAN OF CARE
Problem: Adult Inpatient Plan of Care  Goal: Plan of Care Review  Outcome: Ongoing, Progressing  Goal: Patient-Specific Goal (Individualized)  Outcome: Ongoing, Progressing  Goal: Absence of Hospital-Acquired Illness or Injury  Outcome: Ongoing, Progressing  Goal: Optimal Comfort and Wellbeing  Outcome: Ongoing, Progressing  Goal: Readiness for Transition of Care  Outcome: Ongoing, Progressing     Problem: Impaired Wound Healing  Goal: Optimal Wound Healing  Outcome: Ongoing, Progressing  Intervention: Promote Wound Healing  Flowsheets (Taken 4/9/2024 2535)  Oral Nutrition Promotion: rest periods promoted  Activity Management:   Ambulated to bathroom - L4   Ambulated in room - L4   Sitting at edge of bed - L2   Up in chair - L3     POC reviewed. Address questions and concerns. Up in chair noted LL Leg abrasion. Edema L>R. Encourage elevate while in chair. Jin catheter due to urinary retention. Refused meals med team R aware. Prn pain meds for hematoma. Free from falls or injuries. Frequent safety checks daughter @bedside. Call light in reach. [facility]

## 2024-04-09 NOTE — ASSESSMENT & PLAN NOTE
Appreciate Wound Care. Give hydrocodone-acetaminophen prn for pain due to hematoma. Monitor for infection due to excessive weeping from leg edema.

## 2024-04-09 NOTE — PLAN OF CARE
Problem: Adult Inpatient Plan of Care  Goal: Plan of Care Review  Outcome: Ongoing, Progressing  Goal: Patient-Specific Goal (Individualized)  Outcome: Ongoing, Progressing  Goal: Absence of Hospital-Acquired Illness or Injury  Outcome: Ongoing, Progressing  Goal: Optimal Comfort and Wellbeing  Outcome: Ongoing, Progressing  Goal: Readiness for Transition of Care  Outcome: Ongoing, Progressing     Problem: Infection  Goal: Absence of Infection Signs and Symptoms  Outcome: Ongoing, Progressing     Problem: Impaired Wound Healing  Goal: Optimal Wound Healing  Outcome: Ongoing, Progressing     Pt in bed no acute distress noted at this time. No complaints voiced. Call light in reach and Safety measures in progress.

## 2024-04-10 LAB
ALBUMIN/CREAT UR: 561.1 UG/MG (ref 0–30)
ANION GAP SERPL CALC-SCNC: 16 MMOL/L (ref 8–16)
BACTERIA #/AREA URNS AUTO: ABNORMAL /HPF
BILIRUB UR QL STRIP: NEGATIVE
BUN SERPL-MCNC: 105 MG/DL (ref 6–20)
CALCIUM SERPL-MCNC: 8.6 MG/DL (ref 8.7–10.5)
CHLORIDE SERPL-SCNC: 97 MMOL/L (ref 95–110)
CHLORIDE UR-SCNC: 105 MMOL/L (ref 25–200)
CLARITY UR REFRACT.AUTO: CLEAR
CO2 SERPL-SCNC: 23 MMOL/L (ref 23–29)
COLOR UR AUTO: YELLOW
CREAT SERPL-MCNC: 3.7 MG/DL (ref 0.5–1.4)
CREAT UR-MCNC: 36 MG/DL (ref 15–325)
EST. GFR  (NO RACE VARIABLE): 13.9 ML/MIN/1.73 M^2
GLUCOSE SERPL-MCNC: 67 MG/DL (ref 70–110)
GLUCOSE UR QL STRIP: NEGATIVE
HGB UR QL STRIP: ABNORMAL
KETONES UR QL STRIP: NEGATIVE
LEUKOCYTE ESTERASE UR QL STRIP: ABNORMAL
MICROALBUMIN UR DL<=1MG/L-MCNC: 202 UG/ML
MICROSCOPIC COMMENT: ABNORMAL
NITRITE UR QL STRIP: NEGATIVE
OSMOLALITY UR: 314 MOSM/KG (ref 50–1200)
PH UR STRIP: 5 [PH] (ref 5–8)
POTASSIUM SERPL-SCNC: 4.1 MMOL/L (ref 3.5–5.1)
PROT UR QL STRIP: ABNORMAL
PROT UR-MCNC: 35 MG/DL (ref 0–15)
PROT/CREAT UR: 0.97 MG/G{CREAT} (ref 0–0.2)
RBC #/AREA URNS AUTO: >100 /HPF (ref 0–4)
SODIUM SERPL-SCNC: 136 MMOL/L (ref 136–145)
SODIUM UR-SCNC: 80 MMOL/L (ref 20–250)
SP GR UR STRIP: 1.01 (ref 1–1.03)
URN SPEC COLLECT METH UR: ABNORMAL
UUN UR-MCNC: 201 MG/DL (ref 140–1050)
WBC #/AREA URNS AUTO: 1 /HPF (ref 0–5)

## 2024-04-10 PROCEDURE — 20600001 HC STEP DOWN PRIVATE ROOM

## 2024-04-10 PROCEDURE — 25000003 PHARM REV CODE 250: Performed by: PHYSICIAN ASSISTANT

## 2024-04-10 PROCEDURE — 63600175 PHARM REV CODE 636 W HCPCS: Performed by: PHYSICIAN ASSISTANT

## 2024-04-10 PROCEDURE — 80048 BASIC METABOLIC PNL TOTAL CA: CPT | Performed by: PHYSICIAN ASSISTANT

## 2024-04-10 PROCEDURE — 82043 UR ALBUMIN QUANTITATIVE: CPT | Performed by: STUDENT IN AN ORGANIZED HEALTH CARE EDUCATION/TRAINING PROGRAM

## 2024-04-10 PROCEDURE — 25000003 PHARM REV CODE 250: Performed by: STUDENT IN AN ORGANIZED HEALTH CARE EDUCATION/TRAINING PROGRAM

## 2024-04-10 PROCEDURE — 94761 N-INVAS EAR/PLS OXIMETRY MLT: CPT

## 2024-04-10 PROCEDURE — 81001 URINALYSIS AUTO W/SCOPE: CPT | Performed by: STUDENT IN AN ORGANIZED HEALTH CARE EDUCATION/TRAINING PROGRAM

## 2024-04-10 PROCEDURE — 84300 ASSAY OF URINE SODIUM: CPT | Performed by: STUDENT IN AN ORGANIZED HEALTH CARE EDUCATION/TRAINING PROGRAM

## 2024-04-10 PROCEDURE — 36415 COLL VENOUS BLD VENIPUNCTURE: CPT | Performed by: PHYSICIAN ASSISTANT

## 2024-04-10 PROCEDURE — 99232 SBSQ HOSP IP/OBS MODERATE 35: CPT | Mod: ,,, | Performed by: INTERNAL MEDICINE

## 2024-04-10 PROCEDURE — 82436 ASSAY OF URINE CHLORIDE: CPT | Performed by: STUDENT IN AN ORGANIZED HEALTH CARE EDUCATION/TRAINING PROGRAM

## 2024-04-10 PROCEDURE — 63600175 PHARM REV CODE 636 W HCPCS: Performed by: STUDENT IN AN ORGANIZED HEALTH CARE EDUCATION/TRAINING PROGRAM

## 2024-04-10 PROCEDURE — 63600175 PHARM REV CODE 636 W HCPCS: Performed by: HOSPITALIST

## 2024-04-10 PROCEDURE — 84540 ASSAY OF URINE/UREA-N: CPT | Performed by: STUDENT IN AN ORGANIZED HEALTH CARE EDUCATION/TRAINING PROGRAM

## 2024-04-10 PROCEDURE — 83935 ASSAY OF URINE OSMOLALITY: CPT | Performed by: STUDENT IN AN ORGANIZED HEALTH CARE EDUCATION/TRAINING PROGRAM

## 2024-04-10 PROCEDURE — 25000003 PHARM REV CODE 250: Performed by: HOSPITALIST

## 2024-04-10 PROCEDURE — 84156 ASSAY OF PROTEIN URINE: CPT | Performed by: STUDENT IN AN ORGANIZED HEALTH CARE EDUCATION/TRAINING PROGRAM

## 2024-04-10 RX ADMIN — SEVELAMER CARBONATE 800 MG: 800 TABLET, FILM COATED ORAL at 01:04

## 2024-04-10 RX ADMIN — FUROSEMIDE 100 MG: 10 INJECTION, SOLUTION INTRAMUSCULAR; INTRAVENOUS at 03:04

## 2024-04-10 RX ADMIN — POLYETHYLENE GLYCOL 3350 17 G: 17 POWDER, FOR SOLUTION ORAL at 09:04

## 2024-04-10 RX ADMIN — HEPARIN SODIUM 5000 UNITS: 5000 INJECTION INTRAVENOUS; SUBCUTANEOUS at 03:04

## 2024-04-10 RX ADMIN — Medication: at 09:04

## 2024-04-10 RX ADMIN — SEVELAMER CARBONATE 800 MG: 800 TABLET, FILM COATED ORAL at 06:04

## 2024-04-10 RX ADMIN — ATORVASTATIN CALCIUM 40 MG: 40 TABLET, FILM COATED ORAL at 09:04

## 2024-04-10 RX ADMIN — HEPARIN SODIUM 5000 UNITS: 5000 INJECTION INTRAVENOUS; SUBCUTANEOUS at 06:04

## 2024-04-10 RX ADMIN — HEPARIN SODIUM 5000 UNITS: 5000 INJECTION INTRAVENOUS; SUBCUTANEOUS at 09:04

## 2024-04-10 RX ADMIN — HYDROCODONE BITARTRATE AND ACETAMINOPHEN 1 TABLET: 5; 325 TABLET ORAL at 06:04

## 2024-04-10 RX ADMIN — FUROSEMIDE 100 MG: 10 INJECTION, SOLUTION INTRAMUSCULAR; INTRAVENOUS at 09:04

## 2024-04-10 RX ADMIN — FUROSEMIDE 100 MG: 10 INJECTION, SOLUTION INTRAMUSCULAR; INTRAVENOUS at 11:04

## 2024-04-10 RX ADMIN — ASPIRIN 81 MG: 81 TABLET, COATED ORAL at 09:04

## 2024-04-10 RX ADMIN — CHLOROTHIAZIDE SODIUM 500 MG: 500 INJECTION, POWDER, LYOPHILIZED, FOR SOLUTION INTRAVENOUS at 04:04

## 2024-04-10 NOTE — ASSESSMENT & PLAN NOTE
Patient is identified as having Diastolic (HFpEF) heart failure that is Acute on chronic. CHF is currently uncontrolled due to Continued edema of extremities, Hepatic congestion/ascites, and JVD, >3 pillow orthopnea, Rales/crackles on pulmonary exam, and Pulmonary edema/pleural effusion on CXR. Latest ECHO performed and demonstrates- Results for orders placed during the hospital encounter of 04/01/24    Echo    Interpretation Summary    Left Ventricle: The left ventricle is normal in size. Normal wall thickness. Normal wall motion. There is normal systolic function with a visually estimated ejection fraction of 60 - 65%. Grade II diastolic dysfunction.    Right Ventricle: Normal right ventricular cavity size. Wall thickness is normal. Right ventricle wall motion  is normal. Systolic function is reduced.    Left Atrium: Left atrium is very  severely dilated.    Right Atrium: Right atrium is mildly dilated.    Aortic Valve: There is mild aortic valve sclerosis. There is normal leaflet mobility. There is trace aortic regurgitation.    Mitral Valve: There is mild bileaflet sclerosis. There is posterior leaflet tethering and failure of complete coaptation. There is very severe regurgitation with a posterolateral eccentriccally directed jet.    Tricuspid Valve: The tricuspid valve is structurally normal. There is normal leaflet mobility. There is moderate to severe regurgitation.    IVC/SVC: IVC was not well visualized due to poor acoustic window. Intermediate venous pressure at 8 mmHg.    Pericardium: There is a trivial effusion posteriorly and small under the RA.    - diuresis as detailed under REGINALDO

## 2024-04-10 NOTE — PLAN OF CARE
4:20pm: SW attempted to meet w/ pt for a checkin, pt nurse was at bedside, assisting with pt needs. SW will attempt at a later time.

## 2024-04-10 NOTE — H&P (VIEW-ONLY)
[facility] - Telemetry Stepdown  Nephrology  Consult Note    Patient Name: Xena Vera  MRN: [id number]  Admission Date: 4/1/2024  Hospital Length of Stay: 7 days  Attending Provider: Minda Molina MD   Primary Care Physician: Tami Villeda FNP  Principal Problem:Acute on chronic heart failure with preserved ejection fraction (HFpEF)    Inpatient consult to Nephrology  Consult performed by: Italo Segovia MD  Consult ordered by: Minda Molina MD        Subjective:     HPI: Ms Vera is an obese (BMI ~31) 53-year-old with CKD IV (baseline creatinine ~3.1-3.3), prediabetes with most recent hemoglobin A1c 6%, HFpEF (most recent TTE with EF 60-65% with G2DD), mitral valve regurgitation, anemia, hypertension, HLD as well as several over co-morbid conditions who was admitted on 4/1 with heart failure exacerbation and hypervolemia after presenting with to ED following a mechanical fall but also had complaints of progressive dyspnea/ZACARIAS, orthopnea, lower extremity edema and abdominal distension with constipation. On presentation patient was noted to be hypotensive with systolic BP readings as low as 90s mmHg and bradycardiac with HR as low as 50s bpm. There was concern for some edema on chest x-ray and BNP at that time was ~4.6K and metabolic panel was notable for serum sodium 133, bicarbonate 20, , creatinine 4.2, albumin 3.1 and total bilirubin 1.5. UA showed +1 protein. Her admission was complicated by failure to adequately diuresis with escalating IV Lasix and even oral metolazone for which Nephrology was consulted on 4/10 for assistance. She explicitly denies NSAID use as outpatient.    Past Medical History:   Diagnosis Date    (HFpEF) heart failure with preserved ejection fraction 06/24/2023    EF 63% with G2 DD  Continue lasix, appears euvolemic today  Continue good BP management with losartan, increase Coreg 6.25 mg BID  Fluid restriction, low sodium diet  Recommend Jardiance- patient had CKD 4 and  this is a limiting factor- nephro eval pending    Anemia 2023    CKD (chronic kidney disease)     HTN (hypertension)     Mitral valve regurgitation 2023    Refer to structural for evaluation  May benefit from mitral clip    Ovarian cyst     Stage 4 chronic kidney disease 2023    Refer to nephrology at [facility] as unable to get established with [facility] and the phone number for the outside nephrologist provided to patient during her recent hospitalization goes unanswered when called.   Cr remains elevated  Would like to start SGLT-2 and appreciate nephrology expertise        No past surgical history on file.    Review of patient's allergies indicates:  No Known Allergies  Current Facility-Administered Medications   Medication Frequency    acetaminophen tablet 1,000 mg Q8H PRN    aspirin EC tablet 81 mg Daily    atorvastatin tablet 40 mg Daily    bisacodyL EC tablet 5 mg Daily PRN    dextrose 10% bolus 125 mL 125 mL PRN    dextrose 10% bolus 250 mL 250 mL PRN    furosemide injection 100 mg TID    glucagon (human recombinant) injection 1 mg PRN    glucose chewable tablet 16 g PRN    glucose chewable tablet 24 g PRN    heparin (porcine) injection 5,000 Units Q8H    HYDROcodone-acetaminophen 5-325 mg per tablet 1 tablet Q6H PRN    melatonin tablet 6 mg Nightly PRN    naloxone 0.4 mg/mL injection 0.02 mg PRN    ondansetron disintegrating tablet 4 mg Q8H PRN    ondansetron injection 4 mg Q8H PRN    polyethylene glycol packet 17 g Daily    sevelamer carbonate tablet 800 mg TID WM    sodium chloride 0.9% flush 10 mL PRN    sodium chloride 0.9% flush 10 mL PRN    white petrolatum 41 % ointment BID    zinc oxide 20 % ointment PRN     Family History    None       Tobacco Use    Smoking status: Former     Current packs/day: 0.00     Types: Cigarettes     Start date:      Quit date:      Years since quittin.2    Smokeless tobacco: Never    Tobacco comments:     2-3 cigarettes a day   Substance  and Sexual Activity    Alcohol use: Never    Drug use: Never    Sexual activity: Not on file     Review of Systems   Constitutional:  Positive for activity change and fatigue. Negative for fever.   HENT:  Negative for sore throat and trouble swallowing.    Eyes:  Negative for pain, redness and visual disturbance.   Respiratory:  Positive for shortness of breath. Negative for cough and wheezing.    Cardiovascular:  Positive for leg swelling. Negative for chest pain and palpitations.   Gastrointestinal:  Positive for abdominal distention and constipation. Negative for abdominal pain, diarrhea, nausea and vomiting.   Genitourinary:  Positive for decreased urine volume. Negative for difficulty urinating, dysuria, frequency and hematuria.   Musculoskeletal:  Positive for arthralgias. Negative for back pain and neck stiffness.   Skin:  Positive for rash and wound.   Neurological:  Positive for weakness (non-focal). Negative for dizziness, tremors, syncope, light-headedness and headaches.   Psychiatric/Behavioral:  Negative for confusion. The patient is not nervous/anxious.      Objective:     Vital Signs (Most Recent):  Temp: 97.7 °F (36.5 °C) (04/10/24 0441)  Pulse: (!) 52 (04/10/24 0441)  Resp: 18 (04/10/24 0620)  BP: 110/74 (04/10/24 0441)  SpO2: 95 % (04/10/24 0441) Vital Signs (24h Range):  Temp:  [96.8 °F (36 °C)-98 °F (36.7 °C)] 97.7 °F (36.5 °C)  Pulse:  [50-56] 52  Resp:  [17-20] 18  SpO2:  [94 %-96 %] 95 %  BP: ()/(64-84) 110/74     Weight: 73 kg (161 lb) (04/10/24 0400)  Body mass index is 31.44 kg/m².  Body surface area is 1.76 meters squared.    I/O last 3 completed shifts:  In: 780 [P.O.:780]  Out: 800 [Urine:800]     Physical Exam  Vitals and nursing note reviewed.   Constitutional:       General: She is not in acute distress.     Appearance: She is well-developed. She is obese. She is ill-appearing. She is not diaphoretic.   HENT:      Head: Normocephalic and atraumatic.      Right Ear: External ear  normal.      Left Ear: External ear normal.      Nose: Nose normal.      Mouth/Throat:      Mouth: Mucous membranes are moist.      Pharynx: Oropharynx is clear. No oropharyngeal exudate or posterior oropharyngeal erythema.   Eyes:      General: No scleral icterus.        Right eye: No discharge.         Left eye: No discharge.      Extraocular Movements: Extraocular movements intact.      Conjunctiva/sclera: Conjunctivae normal.   Cardiovascular:      Rate and Rhythm: Normal rate.      Pulses: Normal pulses.      Heart sounds: Murmur heard.      No friction rub. No gallop.   Pulmonary:      Effort: Pulmonary effort is normal. No respiratory distress.      Breath sounds: Decreased breath sounds (throughout) and rales present. No wheezing or rhonchi.      Comments: Faint bibasilar crackles appreciated.  Abdominal:      General: Bowel sounds are normal. There is distension.      Palpations: Abdomen is soft.      Tenderness: There is no abdominal tenderness.   Genitourinary:     Comments: Jin catheter in place.  Musculoskeletal:      Cervical back: Neck supple.      Right lower le+ Pitting Edema present.      Left lower le+ Pitting Edema present.      Comments: Bilateral pitting lower extremity edema which extends proximally.    Skin:     General: Skin is warm and dry.      Coloration: Skin is not jaundiced.      Findings: Erythema, lesion and rash present.      Comments: Abrasion to left lower extremity with granulation tissue noted.    Neurological:      General: No focal deficit present.      Mental Status: She is alert. Mental status is at baseline.      Cranial Nerves: No cranial nerve deficit.      Motor: No weakness.   Psychiatric:         Mood and Affect: Mood normal.         Behavior: Behavior normal.          Significant Labs:  BMP:   Recent Labs   Lab 24  0300 24  0352 04/10/24  0439   GLU 97   < > 67*   *   < > 136   K 3.7  3.8   < > 4.1      < > 97   CO2 19*   < > 23    *   < > 105*   CREATININE 3.7*   < > 3.7*   CALCIUM 8.3*   < > 8.6*   MG 2.2  2.1  --   --     < > = values in this interval not displayed.     CBC:   Recent Labs   Lab 04/04/24  0300   WBC 5.54   RBC 3.98*   HGB 9.2*   HCT 31.6*   *   MCV 79*   MCH 23.1*   MCHC 29.1*     CMP:   Recent Labs   Lab 04/10/24  0439   GLU 67*   CALCIUM 8.6*      K 4.1   CO2 23   CL 97   *   CREATININE 3.7*     Microbiology Results (last 7 days)       ** No results found for the last 168 hours. **          Specimen (24h ago, onward)      None          Significant Imaging:  I have reviewed all imagining in the last 24 hours.  Assessment/Plan:     Cardiac/Vascular  * Acute on chronic heart failure with preserved ejection fraction (HFpEF)  Patient is identified as having Diastolic (HFpEF) heart failure that is Acute on chronic. CHF is currently uncontrolled due to Continued edema of extremities, Hepatic congestion/ascites, and JVD, >3 pillow orthopnea, Rales/crackles on pulmonary exam, and Pulmonary edema/pleural effusion on CXR. Latest ECHO performed and demonstrates- Results for orders placed during the hospital encounter of 04/01/24    Echo    Interpretation Summary    Left Ventricle: The left ventricle is normal in size. Normal wall thickness. Normal wall motion. There is normal systolic function with a visually estimated ejection fraction of 60 - 65%. Grade II diastolic dysfunction.    Right Ventricle: Normal right ventricular cavity size. Wall thickness is normal. Right ventricle wall motion  is normal. Systolic function is reduced.    Left Atrium: Left atrium is very  severely dilated.    Right Atrium: Right atrium is mildly dilated.    Aortic Valve: There is mild aortic valve sclerosis. There is normal leaflet mobility. There is trace aortic regurgitation.    Mitral Valve: There is mild bileaflet sclerosis. There is posterior leaflet tethering and failure of complete coaptation. There is very severe  regurgitation with a posterolateral eccentriccally directed jet.    Tricuspid Valve: The tricuspid valve is structurally normal. There is normal leaflet mobility. There is moderate to severe regurgitation.    IVC/SVC: IVC was not well visualized due to poor acoustic window. Intermediate venous pressure at 8 mmHg.    Pericardium: There is a trivial effusion posteriorly and small under the RA.    - diuresis as detailed under REGINALDO    Essential hypertension  - management per primary team    Renal/  Stage 4 chronic kidney disease  - CKD IV (baseline creatinine ~3.1-3.3) admitted with hypervolemia and REGINALDO with creatinine 4.2  - UA showed +1 protein with UPCR of ~500 mg  - urinary sediment with non dysmorphic RBCs and WBCs present although Jin catheter just place yesterday   - retroperitoneal US without evidence of hydronephrosis although changes consistent with chronic kidney disease  - can continue Lasix 100 mg IV TID and would add Diuril 500 mg IV Q12H for goal for net negative ~1 liter in the next 24 hours  - can continue Renvela 800 mg TIDWM  - serial BMPs with active diuresis   - daily RFPs and magnesium levels   - please avoid hypotension/major fluctuations in BP which may worsen REGINALDO (keep MAP > 65 mmHg)  - renal diet/tube feeds when not NPO with volume restriction per primary team  - strict I/O's and daily weights  - renally all dose medications to eGFR   - avoid nephrotoxic agents wean feasible (i.e. NSAIDs, intra-arterial contrast, supra-therapeutic vancomycin levels, etc.)  - no acute indications for RRT at this time however will continue to monitor closely       Thank you for your consult. I will follow-up with patient. Please contact us if you have any additional questions.    Italo Segovia MD  Nephrology  [facility] - Telemetry Stepdown

## 2024-04-10 NOTE — ASSESSMENT & PLAN NOTE
- CKD IV (baseline creatinine ~3.1-3.3) admitted with hypervolemia and REGINALDO with creatinine 4.2  - UA showed +1 protein with UPCR of ~500 mg  - urinary sediment with non dysmorphic RBCs and WBCs present although Jin catheter just place yesterday   - retroperitoneal US without evidence of hydronephrosis although changes consistent with chronic kidney disease  - can continue Lasix 100 mg IV TID and would add Diuril 500 mg IV Q12H for goal for net negative ~1 liter in the next 24 hours  - can continue Renvela 800 mg TIDWM  - serial BMPs with active diuresis   - daily RFPs and magnesium levels   - please avoid hypotension/major fluctuations in BP which may worsen REGINALDO (keep MAP > 65 mmHg)  - renal diet/tube feeds when not NPO with volume restriction per primary team  - strict I/O's and daily weights  - renally all dose medications to eGFR   - avoid nephrotoxic agents wean feasible (i.e. NSAIDs, intra-arterial contrast, supra-therapeutic vancomycin levels, etc.)  - no acute indications for RRT at this time however will continue to monitor closely

## 2024-04-10 NOTE — CONSULTS
[facility] - Telemetry Stepdown  Nephrology  Consult Note    Patient Name: Xena Vera  MRN: [id number]  Admission Date: 4/1/2024  Hospital Length of Stay: 7 days  Attending Provider: Minda Molina MD   Primary Care Physician: Tami Villeda FNP  Principal Problem:Acute on chronic heart failure with preserved ejection fraction (HFpEF)    Inpatient consult to Nephrology  Consult performed by: Italo Segovia MD  Consult ordered by: Minda Molina MD        Subjective:     HPI: Ms Vera is an obese (BMI ~31) 53-year-old with CKD IV (baseline creatinine ~3.1-3.3), prediabetes with most recent hemoglobin A1c 6%, HFpEF (most recent TTE with EF 60-65% with G2DD), mitral valve regurgitation, anemia, hypertension, HLD as well as several over co-morbid conditions who was admitted on 4/1 with heart failure exacerbation and hypervolemia after presenting with to ED following a mechanical fall but also had complaints of progressive dyspnea/ZACARIAS, orthopnea, lower extremity edema and abdominal distension with constipation. On presentation patient was noted to be hypotensive with systolic BP readings as low as 90s mmHg and bradycardiac with HR as low as 50s bpm. There was concern for some edema on chest x-ray and BNP at that time was ~4.6K and metabolic panel was notable for serum sodium 133, bicarbonate 20, , creatinine 4.2, albumin 3.1 and total bilirubin 1.5. UA showed +1 protein. Her admission was complicated by failure to adequately diuresis with escalating IV Lasix and even oral metolazone for which Nephrology was consulted on 4/10 for assistance. She explicitly denies NSAID use as outpatient.    Past Medical History:   Diagnosis Date    (HFpEF) heart failure with preserved ejection fraction 06/24/2023    EF 63% with G2 DD  Continue lasix, appears euvolemic today  Continue good BP management with losartan, increase Coreg 6.25 mg BID  Fluid restriction, low sodium diet  Recommend Jardiance- patient had CKD 4 and  this is a limiting factor- nephro eval pending    Anemia 2023    CKD (chronic kidney disease)     HTN (hypertension)     Mitral valve regurgitation 2023    Refer to structural for evaluation  May benefit from mitral clip    Ovarian cyst     Stage 4 chronic kidney disease 2023    Refer to nephrology at [facility] as unable to get established with [facility] and the phone number for the outside nephrologist provided to patient during her recent hospitalization goes unanswered when called.   Cr remains elevated  Would like to start SGLT-2 and appreciate nephrology expertise        No past surgical history on file.    Review of patient's allergies indicates:  No Known Allergies  Current Facility-Administered Medications   Medication Frequency    acetaminophen tablet 1,000 mg Q8H PRN    aspirin EC tablet 81 mg Daily    atorvastatin tablet 40 mg Daily    bisacodyL EC tablet 5 mg Daily PRN    dextrose 10% bolus 125 mL 125 mL PRN    dextrose 10% bolus 250 mL 250 mL PRN    furosemide injection 100 mg TID    glucagon (human recombinant) injection 1 mg PRN    glucose chewable tablet 16 g PRN    glucose chewable tablet 24 g PRN    heparin (porcine) injection 5,000 Units Q8H    HYDROcodone-acetaminophen 5-325 mg per tablet 1 tablet Q6H PRN    melatonin tablet 6 mg Nightly PRN    naloxone 0.4 mg/mL injection 0.02 mg PRN    ondansetron disintegrating tablet 4 mg Q8H PRN    ondansetron injection 4 mg Q8H PRN    polyethylene glycol packet 17 g Daily    sevelamer carbonate tablet 800 mg TID WM    sodium chloride 0.9% flush 10 mL PRN    sodium chloride 0.9% flush 10 mL PRN    white petrolatum 41 % ointment BID    zinc oxide 20 % ointment PRN     Family History    None       Tobacco Use    Smoking status: Former     Current packs/day: 0.00     Types: Cigarettes     Start date:      Quit date:      Years since quittin.2    Smokeless tobacco: Never    Tobacco comments:     2-3 cigarettes a day   Substance  and Sexual Activity    Alcohol use: Never    Drug use: Never    Sexual activity: Not on file     Review of Systems   Constitutional:  Positive for activity change and fatigue. Negative for fever.   HENT:  Negative for sore throat and trouble swallowing.    Eyes:  Negative for pain, redness and visual disturbance.   Respiratory:  Positive for shortness of breath. Negative for cough and wheezing.    Cardiovascular:  Positive for leg swelling. Negative for chest pain and palpitations.   Gastrointestinal:  Positive for abdominal distention and constipation. Negative for abdominal pain, diarrhea, nausea and vomiting.   Genitourinary:  Positive for decreased urine volume. Negative for difficulty urinating, dysuria, frequency and hematuria.   Musculoskeletal:  Positive for arthralgias. Negative for back pain and neck stiffness.   Skin:  Positive for rash and wound.   Neurological:  Positive for weakness (non-focal). Negative for dizziness, tremors, syncope, light-headedness and headaches.   Psychiatric/Behavioral:  Negative for confusion. The patient is not nervous/anxious.      Objective:     Vital Signs (Most Recent):  Temp: 97.7 °F (36.5 °C) (04/10/24 0441)  Pulse: (!) 52 (04/10/24 0441)  Resp: 18 (04/10/24 0620)  BP: 110/74 (04/10/24 0441)  SpO2: 95 % (04/10/24 0441) Vital Signs (24h Range):  Temp:  [96.8 °F (36 °C)-98 °F (36.7 °C)] 97.7 °F (36.5 °C)  Pulse:  [50-56] 52  Resp:  [17-20] 18  SpO2:  [94 %-96 %] 95 %  BP: ()/(64-84) 110/74     Weight: 73 kg (161 lb) (04/10/24 0400)  Body mass index is 31.44 kg/m².  Body surface area is 1.76 meters squared.    I/O last 3 completed shifts:  In: 780 [P.O.:780]  Out: 800 [Urine:800]     Physical Exam  Vitals and nursing note reviewed.   Constitutional:       General: She is not in acute distress.     Appearance: She is well-developed. She is obese. She is ill-appearing. She is not diaphoretic.   HENT:      Head: Normocephalic and atraumatic.      Right Ear: External ear  normal.      Left Ear: External ear normal.      Nose: Nose normal.      Mouth/Throat:      Mouth: Mucous membranes are moist.      Pharynx: Oropharynx is clear. No oropharyngeal exudate or posterior oropharyngeal erythema.   Eyes:      General: No scleral icterus.        Right eye: No discharge.         Left eye: No discharge.      Extraocular Movements: Extraocular movements intact.      Conjunctiva/sclera: Conjunctivae normal.   Cardiovascular:      Rate and Rhythm: Normal rate.      Pulses: Normal pulses.      Heart sounds: Murmur heard.      No friction rub. No gallop.   Pulmonary:      Effort: Pulmonary effort is normal. No respiratory distress.      Breath sounds: Decreased breath sounds (throughout) and rales present. No wheezing or rhonchi.      Comments: Faint bibasilar crackles appreciated.  Abdominal:      General: Bowel sounds are normal. There is distension.      Palpations: Abdomen is soft.      Tenderness: There is no abdominal tenderness.   Genitourinary:     Comments: Jin catheter in place.  Musculoskeletal:      Cervical back: Neck supple.      Right lower le+ Pitting Edema present.      Left lower le+ Pitting Edema present.      Comments: Bilateral pitting lower extremity edema which extends proximally.    Skin:     General: Skin is warm and dry.      Coloration: Skin is not jaundiced.      Findings: Erythema, lesion and rash present.      Comments: Abrasion to left lower extremity with granulation tissue noted.    Neurological:      General: No focal deficit present.      Mental Status: She is alert. Mental status is at baseline.      Cranial Nerves: No cranial nerve deficit.      Motor: No weakness.   Psychiatric:         Mood and Affect: Mood normal.         Behavior: Behavior normal.          Significant Labs:  BMP:   Recent Labs   Lab 24  0300 24  0352 04/10/24  0439   GLU 97   < > 67*   *   < > 136   K 3.7  3.8   < > 4.1      < > 97   CO2 19*   < > 23    *   < > 105*   CREATININE 3.7*   < > 3.7*   CALCIUM 8.3*   < > 8.6*   MG 2.2  2.1  --   --     < > = values in this interval not displayed.     CBC:   Recent Labs   Lab 04/04/24  0300   WBC 5.54   RBC 3.98*   HGB 9.2*   HCT 31.6*   *   MCV 79*   MCH 23.1*   MCHC 29.1*     CMP:   Recent Labs   Lab 04/10/24  0439   GLU 67*   CALCIUM 8.6*      K 4.1   CO2 23   CL 97   *   CREATININE 3.7*     Microbiology Results (last 7 days)       ** No results found for the last 168 hours. **          Specimen (24h ago, onward)      None          Significant Imaging:  I have reviewed all imagining in the last 24 hours.  Assessment/Plan:     Cardiac/Vascular  * Acute on chronic heart failure with preserved ejection fraction (HFpEF)  Patient is identified as having Diastolic (HFpEF) heart failure that is Acute on chronic. CHF is currently uncontrolled due to Continued edema of extremities, Hepatic congestion/ascites, and JVD, >3 pillow orthopnea, Rales/crackles on pulmonary exam, and Pulmonary edema/pleural effusion on CXR. Latest ECHO performed and demonstrates- Results for orders placed during the hospital encounter of 04/01/24    Echo    Interpretation Summary    Left Ventricle: The left ventricle is normal in size. Normal wall thickness. Normal wall motion. There is normal systolic function with a visually estimated ejection fraction of 60 - 65%. Grade II diastolic dysfunction.    Right Ventricle: Normal right ventricular cavity size. Wall thickness is normal. Right ventricle wall motion  is normal. Systolic function is reduced.    Left Atrium: Left atrium is very  severely dilated.    Right Atrium: Right atrium is mildly dilated.    Aortic Valve: There is mild aortic valve sclerosis. There is normal leaflet mobility. There is trace aortic regurgitation.    Mitral Valve: There is mild bileaflet sclerosis. There is posterior leaflet tethering and failure of complete coaptation. There is very severe  regurgitation with a posterolateral eccentriccally directed jet.    Tricuspid Valve: The tricuspid valve is structurally normal. There is normal leaflet mobility. There is moderate to severe regurgitation.    IVC/SVC: IVC was not well visualized due to poor acoustic window. Intermediate venous pressure at 8 mmHg.    Pericardium: There is a trivial effusion posteriorly and small under the RA.    - diuresis as detailed under REGINALDO    Essential hypertension  - management per primary team    Renal/  Stage 4 chronic kidney disease  - CKD IV (baseline creatinine ~3.1-3.3) admitted with hypervolemia and REGINALDO with creatinine 4.2  - UA showed +1 protein with UPCR of ~500 mg  - urinary sediment with non dysmorphic RBCs and WBCs present although Jin catheter just place yesterday   - retroperitoneal US without evidence of hydronephrosis although changes consistent with chronic kidney disease  - can continue Lasix 100 mg IV TID and would add Diuril 500 mg IV Q12H for goal for net negative ~1 liter in the next 24 hours  - can continue Renvela 800 mg TIDWM  - serial BMPs with active diuresis   - daily RFPs and magnesium levels   - please avoid hypotension/major fluctuations in BP which may worsen REGINALDO (keep MAP > 65 mmHg)  - renal diet/tube feeds when not NPO with volume restriction per primary team  - strict I/O's and daily weights  - renally all dose medications to eGFR   - avoid nephrotoxic agents wean feasible (i.e. NSAIDs, intra-arterial contrast, supra-therapeutic vancomycin levels, etc.)  - no acute indications for RRT at this time however will continue to monitor closely       Thank you for your consult. I will follow-up with patient. Please contact us if you have any additional questions.    Italo Segovia MD  Nephrology  [facility] - Telemetry Stepdown

## 2024-04-10 NOTE — HPI
Ms Vera is an obese (BMI ~31) 53-year-old with CKD IV (baseline creatinine ~3.1-3.3), prediabetes with most recent hemoglobin A1c 6%, HFpEF (most recent TTE with EF 60-65% with G2DD), mitral valve regurgitation, anemia, hypertension, HLD as well as several over co-morbid conditions who was admitted on 4/1 with heart failure exacerbation and hypervolemia after presenting with to ED following a mechanical fall but also had complaints of progressive dyspnea/ZACARIAS, orthopnea, lower extremity edema and abdominal distension with constipation. On presentation patient was noted to be hypotensive with systolic BP readings as low as 90s mmHg and bradycardiac with HR as low as 50s bpm. There was concern for some edema on chest x-ray and BNP at that time was ~4.6K and metabolic panel was notable for serum sodium 133, bicarbonate 20, , creatinine 4.2, albumin 3.1 and total bilirubin 1.5. UA showed +1 protein. Her admission was complicated by failure to adequately diuresis with escalating IV Lasix and even oral metolazone for which Nephrology was consulted on 4/10 for assistance. She explicitly denies NSAID use as outpatient.

## 2024-04-10 NOTE — SUBJECTIVE & OBJECTIVE
Past Medical History:   Diagnosis Date    (HFpEF) heart failure with preserved ejection fraction 06/24/2023    EF 63% with G2 DD  Continue lasix, appears euvolemic today  Continue good BP management with losartan, increase Coreg 6.25 mg BID  Fluid restriction, low sodium diet  Recommend Jardiance- patient had CKD 4 and this is a limiting factor- nephro eval pending    Anemia 06/24/2023    CKD (chronic kidney disease)     HTN (hypertension)     Mitral valve regurgitation 06/26/2023    Refer to structural for evaluation  May benefit from mitral clip    Ovarian cyst     Stage 4 chronic kidney disease 06/24/2023    Refer to nephrology at [facility] as unable to get established with [facility] and the phone number for the outside nephrologist provided to patient during her recent hospitalization goes unanswered when called.   Cr remains elevated  Would like to start SGLT-2 and appreciate nephrology expertise        No past surgical history on file.    Review of patient's allergies indicates:  No Known Allergies  Current Facility-Administered Medications   Medication Frequency    acetaminophen tablet 1,000 mg Q8H PRN    aspirin EC tablet 81 mg Daily    atorvastatin tablet 40 mg Daily    bisacodyL EC tablet 5 mg Daily PRN    dextrose 10% bolus 125 mL 125 mL PRN    dextrose 10% bolus 250 mL 250 mL PRN    furosemide injection 100 mg TID    glucagon (human recombinant) injection 1 mg PRN    glucose chewable tablet 16 g PRN    glucose chewable tablet 24 g PRN    heparin (porcine) injection 5,000 Units Q8H    HYDROcodone-acetaminophen 5-325 mg per tablet 1 tablet Q6H PRN    melatonin tablet 6 mg Nightly PRN    naloxone 0.4 mg/mL injection 0.02 mg PRN    ondansetron disintegrating tablet 4 mg Q8H PRN    ondansetron injection 4 mg Q8H PRN    polyethylene glycol packet 17 g Daily    sevelamer carbonate tablet 800 mg TID WM    sodium chloride 0.9% flush 10 mL PRN    sodium chloride 0.9% flush 10 mL PRN    white petrolatum 41 %  ointment BID    zinc oxide 20 % ointment PRN     Family History    None       Tobacco Use    Smoking status: Former     Current packs/day: 0.00     Types: Cigarettes     Start date:      Quit date:      Years since quittin.2    Smokeless tobacco: Never    Tobacco comments:     2-3 cigarettes a day   Substance and Sexual Activity    Alcohol use: Never    Drug use: Never    Sexual activity: Not on file     Review of Systems   Constitutional:  Positive for activity change and fatigue. Negative for fever.   HENT:  Negative for sore throat and trouble swallowing.    Eyes:  Negative for pain, redness and visual disturbance.   Respiratory:  Positive for shortness of breath. Negative for cough and wheezing.    Cardiovascular:  Positive for leg swelling. Negative for chest pain and palpitations.   Gastrointestinal:  Positive for abdominal distention and constipation. Negative for abdominal pain, diarrhea, nausea and vomiting.   Genitourinary:  Positive for decreased urine volume. Negative for difficulty urinating, dysuria, frequency and hematuria.   Musculoskeletal:  Positive for arthralgias. Negative for back pain and neck stiffness.   Skin:  Positive for rash and wound.   Neurological:  Positive for weakness (non-focal). Negative for dizziness, tremors, syncope, light-headedness and headaches.   Psychiatric/Behavioral:  Negative for confusion. The patient is not nervous/anxious.      Objective:     Vital Signs (Most Recent):  Temp: 97.7 °F (36.5 °C) (04/10/24 0441)  Pulse: (!) 52 (04/10/24 0441)  Resp: 18 (04/10/24 0620)  BP: 110/74 (04/10/24 0441)  SpO2: 95 % (04/10/24 0441) Vital Signs (24h Range):  Temp:  [96.8 °F (36 °C)-98 °F (36.7 °C)] 97.7 °F (36.5 °C)  Pulse:  [50-56] 52  Resp:  [17-20] 18  SpO2:  [94 %-96 %] 95 %  BP: ()/(64-84) 110/74     Weight: 73 kg (161 lb) (04/10/24 0400)  Body mass index is 31.44 kg/m².  Body surface area is 1.76 meters squared.    I/O last 3 completed shifts:  In: 780  [P.O.:780]  Out: 800 [Urine:800]     Physical Exam  Vitals and nursing note reviewed.   Constitutional:       General: She is not in acute distress.     Appearance: She is well-developed. She is obese. She is ill-appearing. She is not diaphoretic.   HENT:      Head: Normocephalic and atraumatic.      Right Ear: External ear normal.      Left Ear: External ear normal.      Nose: Nose normal.      Mouth/Throat:      Mouth: Mucous membranes are moist.      Pharynx: Oropharynx is clear. No oropharyngeal exudate or posterior oropharyngeal erythema.   Eyes:      General: No scleral icterus.        Right eye: No discharge.         Left eye: No discharge.      Extraocular Movements: Extraocular movements intact.      Conjunctiva/sclera: Conjunctivae normal.   Cardiovascular:      Rate and Rhythm: Normal rate.      Pulses: Normal pulses.      Heart sounds: Murmur heard.      No friction rub. No gallop.   Pulmonary:      Effort: Pulmonary effort is normal. No respiratory distress.      Breath sounds: Decreased breath sounds (throughout) and rales present. No wheezing or rhonchi.      Comments: Faint bibasilar crackles appreciated.  Abdominal:      General: Bowel sounds are normal. There is distension.      Palpations: Abdomen is soft.      Tenderness: There is no abdominal tenderness.   Genitourinary:     Comments: Jin catheter in place.  Musculoskeletal:      Cervical back: Neck supple.      Right lower le+ Pitting Edema present.      Left lower le+ Pitting Edema present.      Comments: Bilateral pitting lower extremity edema which extends proximally.    Skin:     General: Skin is warm and dry.      Coloration: Skin is not jaundiced.      Findings: Erythema, lesion and rash present.      Comments: Abrasion to left lower extremity with granulation tissue noted.    Neurological:      General: No focal deficit present.      Mental Status: She is alert. Mental status is at baseline.      Cranial Nerves: No cranial  nerve deficit.      Motor: No weakness.   Psychiatric:         Mood and Affect: Mood normal.         Behavior: Behavior normal.          Significant Labs:  BMP:   Recent Labs   Lab 04/04/24  0300 04/05/24  0352 04/10/24  0439   GLU 97   < > 67*   *   < > 136   K 3.7  3.8   < > 4.1      < > 97   CO2 19*   < > 23   *   < > 105*   CREATININE 3.7*   < > 3.7*   CALCIUM 8.3*   < > 8.6*   MG 2.2  2.1  --   --     < > = values in this interval not displayed.     CBC:   Recent Labs   Lab 04/04/24 0300   WBC 5.54   RBC 3.98*   HGB 9.2*   HCT 31.6*   *   MCV 79*   MCH 23.1*   MCHC 29.1*     CMP:   Recent Labs   Lab 04/10/24  0439   GLU 67*   CALCIUM 8.6*      K 4.1   CO2 23   CL 97   *   CREATININE 3.7*     Microbiology Results (last 7 days)       ** No results found for the last 168 hours. **          Specimen (24h ago, onward)      None          Significant Imaging:  I have reviewed all imagining in the last 24 hours.

## 2024-04-10 NOTE — PROGRESS NOTES
[facility] - Telemetry [facility] Medicine  Progress Note    Patient Name: Xena Vera  MRN: [id number]  Patient Class: IP- Inpatient   Admission Date: 4/1/2024  Length of Stay: 7 days  Attending Physician: Minda Molina MD  Primary Care Provider: Tami Villeda FNP        Subjective:     Principal Problem:Acute on chronic heart failure with preserved ejection fraction (HFpEF)        HPI:  Xena Vera is a 53 year old [place]-speaking  woman with former cigarette smoking (1988 to 1989), hypertension, heart failure with preserved ejection fraction, mitral regurgitation, chronic kidney disease stage 4, anemia, ovarian cyst, obesity. She lives in [place], [place].               She presented to [facility] Emergency Department on 4/1/2024 with complaint of mechanical fall, lower extremity swelling, and shortness of breath. Her son translated for her at bedside. She slipped and fell between grass and concrete earlier in the morning and had an abrasion on her left lower extremity. She was able to stand with assistance and ambulate as usual afterward. She had worsening of chronic bilateral lower extremity edema now in her abdomen. She was taking her prescribed furosemide once a day without missed doses and was compliant with a low sodium diet. She was sleeping upright to feel like she can breath and had several nighttime awakenings due to shortness of breath. She had worsening dyspnea on exertion over the last several weeks. She has chronic constipation, unchanged.              In the emergency department, blood pressure was 97/54, heart rate was in the low 50s. Hemoglobin was 10.7 g/dL (baseline around 11.4), sodium was 133 mmol/L, total bilirubin was 1.5 mg/dL, BNP was 4639 pg/mL, troponin was 0.061 ng/mL. Chest X-ray showed upper normal pulmonary vascularity, chronic band of opacity in the left middle lung zone, faint edema in perihilar areas. She was given 40 mg of  intravenous furosemide. She was admitted to Hospital Medicine Team R.    Overview/Hospital Course:  She was put on intravenous furosemide. It was increased to 80 mg twice daily. Urine output increased to 1.6 liters in 24 hours. She had bleeding and hematoma at the site of prophylactic heparin injections so heparin was stopped and she was given sequential compression devices instead. Wound Care was consulted for her left leg wound and recommended white petrolatum twice daily. She continued to diurese well but still had a lot of fluid. Her left leg hematoma hurt a lot. Furosemide was increased to 100 mg twice daily. Urine output decreased on 4/8/2024. Bladder scan on 4/9/2024 showed she was retaining almost 500 mL of urine. Jin placed. Nephrology consulted due to poor UOP and ongoing volume overload.     Interval History:   Patient seen and examined at bedside.    No acute events overnight. UOP remains poor.  Reports improved breathing, however remains with notable lower extremity swelling.  Patient updated regarding care plan.  Encouraged aggressive lower extremity elevation.   services utilized.      Review of Systems   Constitutional:  Positive for appetite change. Negative for chills and fever.   HENT:  Negative for trouble swallowing.    Eyes:  Negative for visual disturbance.   Respiratory:  Negative for cough.    Cardiovascular:  Positive for leg swelling. Negative for chest pain.   Gastrointestinal:  Negative for abdominal pain, diarrhea and nausea.   Genitourinary:  Positive for decreased urine volume.   Skin:  Positive for wound.   Neurological:  Negative for dizziness and light-headedness.   Psychiatric/Behavioral:  Negative for confusion and decreased concentration.      Objective:     Vital Signs (Most Recent):  Temp: 97.9 °F (36.6 °C) (04/10/24 1209)  Pulse: (!) 54 (04/10/24 1209)  Resp: 16 (04/10/24 1209)  BP: 131/76 (04/10/24 1209)  SpO2: 98 % (04/10/24 1209) Vital Signs (24h Range):  Temp:   [96.8 °F (36 °C)-97.9 °F (36.6 °C)] 97.9 °F (36.6 °C)  Pulse:  [50-56] 54  Resp:  [16-20] 16  SpO2:  [95 %-98 %] 98 %  BP: (108-140)/(70-81) 131/76     Weight: 73 kg (161 lb)  Body mass index is 31.44 kg/m².    Intake/Output Summary (Last 24 hours) at 4/10/2024 1444  Last data filed at 4/10/2024 1100  Gross per 24 hour   Intake 340 ml   Output 775 ml   Net -435 ml           Physical Exam  Vitals and nursing note reviewed.   Constitutional:       General: She is not in acute distress.     Appearance: She is well-developed. She is obese. She is ill-appearing (Chronically). She is not diaphoretic.   HENT:      Right Ear: External ear normal.      Left Ear: External ear normal.      Mouth/Throat:      Pharynx: Oropharynx is clear.   Eyes:      General: No scleral icterus.  Cardiovascular:      Comments: Jvd  Lower extremity edema pitting, tracking up to abdomen  Pulmonary:      Effort: Pulmonary effort is normal. No respiratory distress.   Musculoskeletal:         General: Signs of injury present. No deformity.      Cervical back: Normal range of motion and neck supple.      Right lower leg: Edema present.      Left lower leg: Edema present.   Skin:     General: Skin is warm and dry.      Coloration: Skin is not jaundiced or pale.      Findings: Abrasion and bruising present.   Neurological:      Mental Status: She is alert and oriented to person, place, and time. Mental status is at baseline.      Motor: No seizure activity.   Psychiatric:         Attention and Perception: Attention normal.         Behavior: Behavior normal. Behavior is not aggressive or combative. Behavior is cooperative.             Significant Labs: All pertinent labs within the past 24 hours have been reviewed.    Recent Results (from the past 24 hour(s))   Basic metabolic panel    Collection Time: 04/10/24  4:39 AM   Result Value Ref Range    Sodium 136 136 - 145 mmol/L    Potassium 4.1 3.5 - 5.1 mmol/L    Chloride 97 95 - 110 mmol/L    CO2 23 23  - 29 mmol/L    Glucose 67 (L) 70 - 110 mg/dL     (H) 6 - 20 mg/dL    Creatinine 3.7 (H) 0.5 - 1.4 mg/dL    Calcium 8.6 (L) 8.7 - 10.5 mg/dL    Anion Gap 16 8 - 16 mmol/L    eGFR 13.9 (A) >60 mL/min/1.73 m^2   Microalbumin/Creatinine Ratio, Urine    Collection Time: 04/10/24 10:49 AM   Result Value Ref Range    Microalbumin, Urine 202.0 ug/mL    Creatinine, Urine 36.0 15.0 - 325.0 mg/dL    Microalb/Creat Ratio 561.1 (H) 0.0 - 30.0 ug/mg   Chloride, Random Urine    Collection Time: 04/10/24 10:49 AM   Result Value Ref Range    Chloride, Urine 105 25 - 200 mmol/L   Urea Nitrogen, Random Urine    Collection Time: 04/10/24 10:49 AM   Result Value Ref Range    Urine Urea Nitrogen 201 140 - 1050 mg/dL   Sodium, Random Urine    Collection Time: 04/10/24 10:49 AM   Result Value Ref Range    Sodium, Urine 80 20 - 250 mmol/L   Creatinine, Random Urine    Collection Time: 04/10/24 10:49 AM   Result Value Ref Range    Creatinine, Urine 36.0 15.0 - 325.0 mg/dL   Protein/Creatinine Ratio, Urine    Collection Time: 04/10/24 10:49 AM   Result Value Ref Range    Protein, Urine Random 35 (H) 0 - 15 mg/dL    Creatinine, Urine 36.0 15.0 - 325.0 mg/dL    Prot/Creat Ratio, Urine 0.97 (H) 0.00 - 0.20   Urinalysis, Reflex to Urine Culture Urine, Clean Catch    Collection Time: 04/10/24 10:50 AM    Specimen: Urine   Result Value Ref Range    Specimen UA Urine, Clean Catch     Color, UA Yellow Yellow, Straw, Karla    Appearance, UA Clear Clear    pH, UA 5.0 5.0 - 8.0    Specific Gravity, UA 1.010 1.005 - 1.030    Protein, UA Trace (A) Negative    Glucose, UA Negative Negative    Ketones, UA Negative Negative    Bilirubin (UA) Negative Negative    Occult Blood UA 3+ (A) Negative    Nitrite, UA Negative Negative    Leukocytes, UA 1+ (A) Negative   Osmolality, Urine    Collection Time: 04/10/24 10:50 AM   Result Value Ref Range    Osmolality, Urine 314 50 - 1200 mOsm/kg   Urinalysis Microscopic    Collection Time: 04/10/24 10:50 AM    Result Value Ref Range    RBC, UA >100 (H) 0 - 4 /hpf    WBC, UA 1 0 - 5 /hpf    Bacteria Occasional None-Occ /hpf    Microscopic Comment SEE COMMENT          Significant Imaging: I have reviewed all pertinent imaging results/findings within the past 24 hours.    Assessment/Plan:      * Acute on chronic heart failure with preserved ejection fraction (HFpEF)  She takes furosemide 40 mg daily. Giving IV furosemide 100 mg twice daily. Monitor intake/output. Monitor electrolytes. Urine output decreased. Check bladder scan to rule out urinary retention. If none, try giving metolazone or increasing furosemide.  Nephrology consulted given poor urine output and gross volume overload.    Stage 4 chronic kidney disease  Cr baseline 3.1-3.5, during admission 3.7-3.9  Poor urine output   Jin in place due to urinary retention; renal ultrasound without hydronephrosis  Nephrology consulted, appreciate assistance  Renally dose all medications  Avoid nephrotoxins  Will continue to monitor on daily labs    Acute urinary retention  Jin catheter in place.  See CKD.      Leg wound, left  Appreciate Wound Care. Give hydrocodone-acetaminophen prn for pain due to hematoma. Monitor for infection due to excessive weeping from leg edema.      Fall  Accidental.  Volume overload possibly contributing.    Anasarca        Constipation  Giving polyethylene glycol.    Anemia  Stable.   Recent Labs   Lab 04/04/24  0300   WBC 5.54   HGB 9.2*   HCT 31.6*   *         Class 2 obesity in adult  Body mass index is 30.86 kg/m². Morbid obesity complicates all aspects of disease management from diagnostic modalities to treatment. Weight loss encouraged and health benefits explained to patient.     Essential hypertension  Continue home carvedilol. Monitor BP.      VTE Risk Mitigation (From admission, onward)           Ordered     Place sequential compression device  Until discontinued         04/03/24 1347     heparin (porcine) injection 5,000  Units  Every 8 hours         04/01/24 1030     IP VTE HIGH RISK PATIENT  Once         04/01/24 1030     Place sequential compression device  Until discontinued         04/01/24 1030                    Discharge Planning   RILEY: 4/15/2024     Code Status: Full Code   Is the patient medically ready for discharge?: No    Reason for patient still in hospital (select all that apply): Patient trending condition, Laboratory test, Treatment, Consult recommendations, PT / OT recommendations, and Pending disposition  Discharge Plan A: Home with family   Discharge Delays: None known at this time              Minda Molina MD  Department of Hospital Medicine   [facility] - Telemetry Stepdown

## 2024-04-10 NOTE — ASSESSMENT & PLAN NOTE
Cr baseline 3.1-3.5, during admission 3.7-3.9  Poor urine output   Jin in place due to urinary retention; renal ultrasound without hydronephrosis  Nephrology consulted, appreciate assistance  Renally dose all medications  Avoid nephrotoxins  Will continue to monitor on daily labs

## 2024-04-10 NOTE — PLAN OF CARE
Problem: Adult Inpatient Plan of Care  Goal: Plan of Care Review  Outcome: Ongoing, Progressing  Goal: Patient-Specific Goal (Individualized)  Outcome: Ongoing, Progressing  Goal: Absence of Hospital-Acquired Illness or Injury  Outcome: Ongoing, Progressing  Goal: Optimal Comfort and Wellbeing  Outcome: Ongoing, Progressing  Goal: Readiness for Transition of Care  Outcome: Ongoing, Progressing     Problem: Impaired Wound Healing  Goal: Optimal Wound Healing  Outcome: Ongoing, Progressing     POC reviewed. Pt encourage to elevate LLE on wedge. Continue to monitor UOP. Adhere fluid restrictions. Appetite improving. No complain to discomfort or pain. Free from falls or injuries. Frequent safety checks. Daughter @bedside. [facility]

## 2024-04-10 NOTE — ASSESSMENT & PLAN NOTE
She takes furosemide 40 mg daily. Giving IV furosemide 100 mg twice daily. Monitor intake/output. Monitor electrolytes. Urine output decreased. Check bladder scan to rule out urinary retention. If none, try giving metolazone or increasing furosemide.  Nephrology consulted given poor urine output and gross volume overload.

## 2024-04-10 NOTE — NURSING
To Ultrasound of Kidney via stretcher with browning and telemetry monitor. AAOX4. VVS. No complaint voiced

## 2024-04-10 NOTE — SUBJECTIVE & OBJECTIVE
Interval History:   Patient seen and examined at bedside.    No acute events overnight. UOP remains poor.  Reports improved breathing, however remains with notable lower extremity swelling.  Patient updated regarding care plan.  Encouraged aggressive lower extremity elevation.   services utilized.      Review of Systems   Constitutional:  Positive for appetite change. Negative for chills and fever.   HENT:  Negative for trouble swallowing.    Eyes:  Negative for visual disturbance.   Respiratory:  Negative for cough.    Cardiovascular:  Positive for leg swelling. Negative for chest pain.   Gastrointestinal:  Negative for abdominal pain, diarrhea and nausea.   Genitourinary:  Positive for decreased urine volume.   Skin:  Positive for wound.   Neurological:  Negative for dizziness and light-headedness.   Psychiatric/Behavioral:  Negative for confusion and decreased concentration.      Objective:     Vital Signs (Most Recent):  Temp: 97.9 °F (36.6 °C) (04/10/24 1209)  Pulse: (!) 54 (04/10/24 1209)  Resp: 16 (04/10/24 1209)  BP: 131/76 (04/10/24 1209)  SpO2: 98 % (04/10/24 1209) Vital Signs (24h Range):  Temp:  [96.8 °F (36 °C)-97.9 °F (36.6 °C)] 97.9 °F (36.6 °C)  Pulse:  [50-56] 54  Resp:  [16-20] 16  SpO2:  [95 %-98 %] 98 %  BP: (108-140)/(70-81) 131/76     Weight: 73 kg (161 lb)  Body mass index is 31.44 kg/m².    Intake/Output Summary (Last 24 hours) at 4/10/2024 1444  Last data filed at 4/10/2024 1100  Gross per 24 hour   Intake 340 ml   Output 775 ml   Net -435 ml           Physical Exam  Vitals and nursing note reviewed.   Constitutional:       General: She is not in acute distress.     Appearance: She is well-developed. She is obese. She is ill-appearing (Chronically). She is not diaphoretic.   HENT:      Right Ear: External ear normal.      Left Ear: External ear normal.      Mouth/Throat:      Pharynx: Oropharynx is clear.   Eyes:      General: No scleral icterus.  Cardiovascular:      Comments:  Jvd  Lower extremity edema pitting, tracking up to abdomen  Pulmonary:      Effort: Pulmonary effort is normal. No respiratory distress.   Musculoskeletal:         General: Signs of injury present. No deformity.      Cervical back: Normal range of motion and neck supple.      Right lower leg: Edema present.      Left lower leg: Edema present.   Skin:     General: Skin is warm and dry.      Coloration: Skin is not jaundiced or pale.      Findings: Abrasion and bruising present.   Neurological:      Mental Status: She is alert and oriented to person, place, and time. Mental status is at baseline.      Motor: No seizure activity.   Psychiatric:         Attention and Perception: Attention normal.         Behavior: Behavior normal. Behavior is not aggressive or combative. Behavior is cooperative.             Significant Labs: All pertinent labs within the past 24 hours have been reviewed.    Recent Results (from the past 24 hour(s))   Basic metabolic panel    Collection Time: 04/10/24  4:39 AM   Result Value Ref Range    Sodium 136 136 - 145 mmol/L    Potassium 4.1 3.5 - 5.1 mmol/L    Chloride 97 95 - 110 mmol/L    CO2 23 23 - 29 mmol/L    Glucose 67 (L) 70 - 110 mg/dL     (H) 6 - 20 mg/dL    Creatinine 3.7 (H) 0.5 - 1.4 mg/dL    Calcium 8.6 (L) 8.7 - 10.5 mg/dL    Anion Gap 16 8 - 16 mmol/L    eGFR 13.9 (A) >60 mL/min/1.73 m^2   Microalbumin/Creatinine Ratio, Urine    Collection Time: 04/10/24 10:49 AM   Result Value Ref Range    Microalbumin, Urine 202.0 ug/mL    Creatinine, Urine 36.0 15.0 - 325.0 mg/dL    Microalb/Creat Ratio 561.1 (H) 0.0 - 30.0 ug/mg   Chloride, Random Urine    Collection Time: 04/10/24 10:49 AM   Result Value Ref Range    Chloride, Urine 105 25 - 200 mmol/L   Urea Nitrogen, Random Urine    Collection Time: 04/10/24 10:49 AM   Result Value Ref Range    Urine Urea Nitrogen 201 140 - 1050 mg/dL   Sodium, Random Urine    Collection Time: 04/10/24 10:49 AM   Result Value Ref Range    Sodium,  Urine 80 20 - 250 mmol/L   Creatinine, Random Urine    Collection Time: 04/10/24 10:49 AM   Result Value Ref Range    Creatinine, Urine 36.0 15.0 - 325.0 mg/dL   Protein/Creatinine Ratio, Urine    Collection Time: 04/10/24 10:49 AM   Result Value Ref Range    Protein, Urine Random 35 (H) 0 - 15 mg/dL    Creatinine, Urine 36.0 15.0 - 325.0 mg/dL    Prot/Creat Ratio, Urine 0.97 (H) 0.00 - 0.20   Urinalysis, Reflex to Urine Culture Urine, Clean Catch    Collection Time: 04/10/24 10:50 AM    Specimen: Urine   Result Value Ref Range    Specimen UA Urine, Clean Catch     Color, UA Yellow Yellow, Straw, Karla    Appearance, UA Clear Clear    pH, UA 5.0 5.0 - 8.0    Specific Gravity, UA 1.010 1.005 - 1.030    Protein, UA Trace (A) Negative    Glucose, UA Negative Negative    Ketones, UA Negative Negative    Bilirubin (UA) Negative Negative    Occult Blood UA 3+ (A) Negative    Nitrite, UA Negative Negative    Leukocytes, UA 1+ (A) Negative   Osmolality, Urine    Collection Time: 04/10/24 10:50 AM   Result Value Ref Range    Osmolality, Urine 314 50 - 1200 mOsm/kg   Urinalysis Microscopic    Collection Time: 04/10/24 10:50 AM   Result Value Ref Range    RBC, UA >100 (H) 0 - 4 /hpf    WBC, UA 1 0 - 5 /hpf    Bacteria Occasional None-Occ /hpf    Microscopic Comment SEE COMMENT          Significant Imaging: I have reviewed all pertinent imaging results/findings within the past 24 hours.

## 2024-04-11 LAB
ALBUMIN SERPL BCP-MCNC: 3.1 G/DL (ref 3.5–5.2)
ALP SERPL-CCNC: 109 U/L (ref 55–135)
ALT SERPL W/O P-5'-P-CCNC: 24 U/L (ref 10–44)
ANION GAP SERPL CALC-SCNC: 16 MMOL/L (ref 8–16)
ANION GAP SERPL CALC-SCNC: 20 MMOL/L (ref 8–16)
AST SERPL-CCNC: 37 U/L (ref 10–40)
BASOPHILS # BLD AUTO: 0.03 K/UL (ref 0–0.2)
BASOPHILS NFR BLD: 0.6 % (ref 0–1.9)
BILIRUB SERPL-MCNC: 2 MG/DL (ref 0.1–1)
BUN SERPL-MCNC: 102 MG/DL (ref 6–20)
BUN SERPL-MCNC: 96 MG/DL (ref 6–20)
CALCIUM SERPL-MCNC: 8.9 MG/DL (ref 8.7–10.5)
CALCIUM SERPL-MCNC: 9 MG/DL (ref 8.7–10.5)
CHLORIDE SERPL-SCNC: 92 MMOL/L (ref 95–110)
CHLORIDE SERPL-SCNC: 93 MMOL/L (ref 95–110)
CO2 SERPL-SCNC: 20 MMOL/L (ref 23–29)
CO2 SERPL-SCNC: 25 MMOL/L (ref 23–29)
CREAT SERPL-MCNC: 4.3 MG/DL (ref 0.5–1.4)
CREAT SERPL-MCNC: 4.3 MG/DL (ref 0.5–1.4)
DIFFERENTIAL METHOD BLD: ABNORMAL
EOSINOPHIL # BLD AUTO: 0.1 K/UL (ref 0–0.5)
EOSINOPHIL NFR BLD: 1.4 % (ref 0–8)
ERYTHROCYTE [DISTWIDTH] IN BLOOD BY AUTOMATED COUNT: 21.8 % (ref 11.5–14.5)
EST. GFR  (NO RACE VARIABLE): 11.6 ML/MIN/1.73 M^2
EST. GFR  (NO RACE VARIABLE): 11.6 ML/MIN/1.73 M^2
GLUCOSE SERPL-MCNC: 72 MG/DL (ref 70–110)
GLUCOSE SERPL-MCNC: 90 MG/DL (ref 70–110)
HCT VFR BLD AUTO: 32.5 % (ref 37–48.5)
HGB BLD-MCNC: 9.2 G/DL (ref 12–16)
IMM GRANULOCYTES # BLD AUTO: 0.02 K/UL (ref 0–0.04)
IMM GRANULOCYTES NFR BLD AUTO: 0.4 % (ref 0–0.5)
LYMPHOCYTES # BLD AUTO: 0.9 K/UL (ref 1–4.8)
LYMPHOCYTES NFR BLD: 17.2 % (ref 18–48)
MAGNESIUM SERPL-MCNC: 2.3 MG/DL (ref 1.6–2.6)
MCH RBC QN AUTO: 22.8 PG (ref 27–31)
MCHC RBC AUTO-ENTMCNC: 28.3 G/DL (ref 32–36)
MCV RBC AUTO: 80 FL (ref 82–98)
MONOCYTES # BLD AUTO: 0.6 K/UL (ref 0.3–1)
MONOCYTES NFR BLD: 11.2 % (ref 4–15)
NEUTROPHILS # BLD AUTO: 3.5 K/UL (ref 1.8–7.7)
NEUTROPHILS NFR BLD: 69.2 % (ref 38–73)
NRBC BLD-RTO: 0 /100 WBC
PHOSPHATE SERPL-MCNC: 5.1 MG/DL (ref 2.7–4.5)
PHOSPHATE SERPL-MCNC: 5.1 MG/DL (ref 2.7–4.5)
PLATELET # BLD AUTO: 151 K/UL (ref 150–450)
PMV BLD AUTO: ABNORMAL FL (ref 9.2–12.9)
POTASSIUM SERPL-SCNC: 3.9 MMOL/L (ref 3.5–5.1)
POTASSIUM SERPL-SCNC: 4.2 MMOL/L (ref 3.5–5.1)
PROT SERPL-MCNC: 6.7 G/DL (ref 6–8.4)
RBC # BLD AUTO: 4.04 M/UL (ref 4–5.4)
SODIUM SERPL-SCNC: 133 MMOL/L (ref 136–145)
SODIUM SERPL-SCNC: 133 MMOL/L (ref 136–145)
WBC # BLD AUTO: 5.11 K/UL (ref 3.9–12.7)

## 2024-04-11 PROCEDURE — 85025 COMPLETE CBC W/AUTO DIFF WBC: CPT | Performed by: STUDENT IN AN ORGANIZED HEALTH CARE EDUCATION/TRAINING PROGRAM

## 2024-04-11 PROCEDURE — 97116 GAIT TRAINING THERAPY: CPT

## 2024-04-11 PROCEDURE — 97530 THERAPEUTIC ACTIVITIES: CPT

## 2024-04-11 PROCEDURE — 97162 PT EVAL MOD COMPLEX 30 MIN: CPT

## 2024-04-11 PROCEDURE — 25000003 PHARM REV CODE 250: Performed by: STUDENT IN AN ORGANIZED HEALTH CARE EDUCATION/TRAINING PROGRAM

## 2024-04-11 PROCEDURE — 80048 BASIC METABOLIC PNL TOTAL CA: CPT | Mod: XB | Performed by: STUDENT IN AN ORGANIZED HEALTH CARE EDUCATION/TRAINING PROGRAM

## 2024-04-11 PROCEDURE — 63600175 PHARM REV CODE 636 W HCPCS: Performed by: PHYSICIAN ASSISTANT

## 2024-04-11 PROCEDURE — 36415 COLL VENOUS BLD VENIPUNCTURE: CPT | Performed by: STUDENT IN AN ORGANIZED HEALTH CARE EDUCATION/TRAINING PROGRAM

## 2024-04-11 PROCEDURE — 25000003 PHARM REV CODE 250: Performed by: PHYSICIAN ASSISTANT

## 2024-04-11 PROCEDURE — 99233 SBSQ HOSP IP/OBS HIGH 50: CPT | Mod: ,,, | Performed by: INTERNAL MEDICINE

## 2024-04-11 PROCEDURE — 97535 SELF CARE MNGMENT TRAINING: CPT

## 2024-04-11 PROCEDURE — 94761 N-INVAS EAR/PLS OXIMETRY MLT: CPT

## 2024-04-11 PROCEDURE — 80053 COMPREHEN METABOLIC PANEL: CPT | Performed by: STUDENT IN AN ORGANIZED HEALTH CARE EDUCATION/TRAINING PROGRAM

## 2024-04-11 PROCEDURE — 63600175 PHARM REV CODE 636 W HCPCS: Performed by: STUDENT IN AN ORGANIZED HEALTH CARE EDUCATION/TRAINING PROGRAM

## 2024-04-11 PROCEDURE — 20600001 HC STEP DOWN PRIVATE ROOM

## 2024-04-11 PROCEDURE — 84100 ASSAY OF PHOSPHORUS: CPT | Mod: 91 | Performed by: STUDENT IN AN ORGANIZED HEALTH CARE EDUCATION/TRAINING PROGRAM

## 2024-04-11 PROCEDURE — 97165 OT EVAL LOW COMPLEX 30 MIN: CPT

## 2024-04-11 PROCEDURE — 97110 THERAPEUTIC EXERCISES: CPT

## 2024-04-11 PROCEDURE — 99223 1ST HOSP IP/OBS HIGH 75: CPT | Mod: ,,, | Performed by: INTERNAL MEDICINE

## 2024-04-11 PROCEDURE — 83735 ASSAY OF MAGNESIUM: CPT | Performed by: STUDENT IN AN ORGANIZED HEALTH CARE EDUCATION/TRAINING PROGRAM

## 2024-04-11 RX ORDER — FUROSEMIDE 10 MG/ML
120 INJECTION INTRAMUSCULAR; INTRAVENOUS ONCE
Status: COMPLETED | OUTPATIENT
Start: 2024-04-11 | End: 2024-04-11

## 2024-04-11 RX ORDER — ISOSORBIDE DINITRATE 10 MG/1
10 TABLET ORAL 3 TIMES DAILY
Status: DISCONTINUED | OUTPATIENT
Start: 2024-04-11 | End: 2024-04-12

## 2024-04-11 RX ADMIN — SEVELAMER CARBONATE 800 MG: 800 TABLET, FILM COATED ORAL at 01:04

## 2024-04-11 RX ADMIN — SEVELAMER CARBONATE 800 MG: 800 TABLET, FILM COATED ORAL at 05:04

## 2024-04-11 RX ADMIN — ISOSORBIDE DINITRATE 10 MG: 10 TABLET ORAL at 08:04

## 2024-04-11 RX ADMIN — HEPARIN SODIUM 5000 UNITS: 5000 INJECTION INTRAVENOUS; SUBCUTANEOUS at 03:04

## 2024-04-11 RX ADMIN — ACETAMINOPHEN 1000 MG: 500 TABLET ORAL at 03:04

## 2024-04-11 RX ADMIN — Medication: at 08:04

## 2024-04-11 RX ADMIN — FUROSEMIDE 120 MG: 10 INJECTION, SOLUTION INTRAMUSCULAR; INTRAVENOUS at 09:04

## 2024-04-11 RX ADMIN — HEPARIN SODIUM 5000 UNITS: 5000 INJECTION INTRAVENOUS; SUBCUTANEOUS at 06:04

## 2024-04-11 RX ADMIN — CHLOROTHIAZIDE SODIUM 500 MG: 500 INJECTION, POWDER, LYOPHILIZED, FOR SOLUTION INTRAVENOUS at 03:04

## 2024-04-11 RX ADMIN — ACETAMINOPHEN 1000 MG: 500 TABLET ORAL at 08:04

## 2024-04-11 RX ADMIN — ATORVASTATIN CALCIUM 40 MG: 40 TABLET, FILM COATED ORAL at 08:04

## 2024-04-11 RX ADMIN — POLYETHYLENE GLYCOL 3350 17 G: 17 POWDER, FOR SOLUTION ORAL at 08:04

## 2024-04-11 RX ADMIN — FUROSEMIDE 30 MG/HR: 10 INJECTION, SOLUTION INTRAMUSCULAR; INTRAVENOUS at 10:04

## 2024-04-11 RX ADMIN — CHLOROTHIAZIDE SODIUM 500 MG: 500 INJECTION, POWDER, LYOPHILIZED, FOR SOLUTION INTRAVENOUS at 04:04

## 2024-04-11 RX ADMIN — ISOSORBIDE DINITRATE 10 MG: 10 TABLET ORAL at 03:04

## 2024-04-11 RX ADMIN — SEVELAMER CARBONATE 800 MG: 800 TABLET, FILM COATED ORAL at 08:04

## 2024-04-11 RX ADMIN — ASPIRIN 81 MG: 81 TABLET, COATED ORAL at 08:04

## 2024-04-11 RX ADMIN — HEPARIN SODIUM 5000 UNITS: 5000 INJECTION INTRAVENOUS; SUBCUTANEOUS at 09:04

## 2024-04-11 NOTE — PT/OT/SLP EVAL
Physical Therapy   Co-Evaluation    Patient Name:  Xena Vera   MRN:  [id number]    Recommendations:     Discharge Recommendations: Moderate Intensity Therapy   Discharge Equipment Recommendations: walker, rolling   Barriers to discharge:  increased skilled assist required  Justification for Walker HME   The mobility limitation cannot be sufficiently resolved by the use of a cane.   Patient's functional mobility deficit can be sufficiently resolved with the use of a walker.  Patient's mobility limitation significantly impairs their ability to participate in one of more activities of daily living.  The use of a walker will significantly improve the patients ability to participate in MRADLS and the patient will use it on regular basis in the home.     Assessment:     Xena Vera is a 53 y.o. female admitted with a medical diagnosis of Acute on chronic heart failure with preserved ejection fraction (HFpEF).  She presents with the following impairments/functional limitations: weakness, impaired balance, impaired skin, pain, edema, impaired endurance, impaired functional mobility, gait instability, impaired cardiopulmonary response to activity, decreased lower extremity function, decreased ROM, impaired self care skills.  Didier #[id number] utilized for appropriate interpretation throughout session. Patient agreeable to therapy evaluation, sitting EOB with RLE long term off bed upon entry to room. Completed a transfer and ambulation within room to sit up OOB in chair with skilled assist. Patient functioning well below her baseline, and is a high fall risk at this time. Patient will continue to benefit from skilled PT during this admit to address BLE strength and endurance deficits, and maximize independence with functional mobility.    Rehab Prognosis: Good; patient would benefit from acute skilled PT services to address these deficits and reach maximum level of function.    Recent Surgery: * No surgery  found *      Plan:     During this hospitalization, patient to be seen 4 x/week to address the identified rehab impairments via gait training, therapeutic activities, therapeutic exercises, neuromuscular re-education and progress toward the following goals:    Plan of Care Expires:  05/11/24    Subjective     Chief Complaint: LLE pain  Patient/Family Comments/goals: gain strength, return home to PLOF  Pain/Comfort:  Pain Rating 1:  (no pain rating stated, grimaces with LLE movement)  Location - Side 1: Left  Location - Orientation 1: lower  Location 1: leg  Pain Addressed 1: Reposition, Distraction, Nurse notified  Pain Rating Post-Intervention 1:  (no pain rating stated, grimaces with LLE movement)    Patients cultural, spiritual, Pentec[employer]alism conflicts given the current situation: no    Living Environment:  Patient lives with her son in a 2SH with bed + bath located on the first floor. There are 4-5 steps to enter the home with BHR that can be reached simultaneously. The bathroom contains a tub-shower combo with grab bars with no seat inside.   Prior to admission, patients level of function was independent with ADLs and functional mobility.  Equipment used at home: grab bar.  DME owned (not currently used): none.  Upon discharge, patient will have assistance from adult children (working alternating shifts so she will have assist 24/7).    Objective:     Communicated with RN prior to session.  Patient found sitting edge of bed with RLE on floor, LLE elevated on wedge on bed with browning catheter, peripheral IV  upon PT entry to room.    General Precautions: Standard, fall  Orthopedic Precautions:N/A   Braces: N/A  Respiratory Status: Room air    Exams:  Cognitive Exam:  Patient is oriented to Person, Place, Time, and Situation  Gross Motor Coordination:  WFL  Postural Exam:  Patient presented with the following abnormalities:    -       Rounded shoulders  Skin Integrity/Edema:      -       Skin integrity: Wound L  lower leg  RLE ROM: WFL  RLE Strength: WFL  LLE ROM: WFL (guarding)  LLE Strength: WFL (guarding)    Functional Mobility:  Bed Mobility:     Scooting: contact guard assistance and anteriorly to plant LLE on floor  Transfers:     Sit to Stand:  minimum assistance with rolling walker and from EOB x1 trial  Stand to Sit: CGA with mildly uncontrolled descent, patient fatigue evident  Gait: 20 ft with RW and min A, significant assist with RW and line management requiring verbal/tactile cueing, antalgic gait noted, no LOB  Balance:   Sitting: good  Standing: CGA static and min A with dynamic and using RW      AM-PAC 6 CLICK MOBILITY  Total Score:17       Treatment & Education:  Patient educated on importance of OOB activity to promote overall endurance.  Patient educated on role of PT in acute care, PT POC, and PT goals.  Patient educated on calling for assistance for any needs to improve overall safety awareness, significant time spent to ensure patient adherence.  Ankle pumps implemented during session.  Positioning for LLE comfort with pillows, wedges    Patient left up in chair with all lines intact, call button in reach, and RN notified.    GOALS:   Multidisciplinary Problems       Physical Therapy Goals          Problem: Physical Therapy    Goal Priority Disciplines Outcome Goal Variances Interventions   Physical Therapy Goal     PT, PT/OT Ongoing, Progressing     Description: Goals to be met by: 24     Patient will increase functional independence with mobility by performin. Supine to sit with Modified [place]  2. Sit to supine with Modified [place]  3. Sit to stand transfer with Supervision  4. Bed to chair transfer with Supervision using Rolling Walker  5. Gait  x 100 feet with Supervision using Rolling Walker.   6. Ascend/descend 5 stair with bilateral Handrails Supervision using No Assistive Device.   7. Lower extremity exercise program x15 reps per handout, with assistance as needed                          History:     Past Medical History:   Diagnosis Date    (HFpEF) heart failure with preserved ejection fraction 06/24/2023    EF 63% with G2 DD  Continue lasix, appears euvolemic today  Continue good BP management with losartan, increase Coreg 6.25 mg BID  Fluid restriction, low sodium diet  Recommend Jardiance- patient had CKD 4 and this is a limiting factor- nephro eval pending    Anemia 06/24/2023    CKD (chronic kidney disease)     HTN (hypertension)     Mitral valve regurgitation 06/26/2023    Refer to structural for evaluation  May benefit from mitral clip    Ovarian cyst     Stage 4 chronic kidney disease 06/24/2023    Refer to nephrology at [facility] as unable to get established with [facility] and the phone number for the outside nephrologist provided to patient during her recent hospitalization goes unanswered when called.   Cr remains elevated  Would like to start SGLT-2 and appreciate nephrology expertise        No past surgical history on file.    Time Tracking:     PT Received On: 04/11/24  PT Start Time: 1014     PT Stop Time: 1045  PT Total Time (min): 31 min     Billable Minutes: Evaluation 8, Gait Training 15, and Therapeutic Activity 8      04/11/2024

## 2024-04-11 NOTE — PLAN OF CARE
Pt engaged well in evaluative session this date.     Problem: Occupational Therapy  Goal: Occupational Therapy Goal  Description: Goals to be met by: 5/1//24     Patient will increase functional independence with ADLs by performing:    LE Dressing with Contact Guard Assistance.  Grooming while standing at sink with Stand-by Assistance.  Toileting from toilet with Stand-by Assistance for hygiene and clothing management.   Supine to sit with Supervision.  Step transfer with Stand-by Assistance with AD as needed  Toilet transfer to toilet with Stand-by Assistance with AD as needed    Outcome: Ongoing, Progressing

## 2024-04-11 NOTE — PROGRESS NOTES
[facility] - Telemetry Stepdown  Nephrology  Progress Note    Patient Name: Xena Vera  MRN: [id number]  Admission Date: 4/1/2024  Hospital Length of Stay: 8 days  Attending Provider: Minda Molina MD   Primary Care Physician: Tami Villeda FNP  Principal Problem:Acute on chronic heart failure with preserved ejection fraction (HFpEF)    Subjective:     HPI: Ms Vera is an obese (BMI ~31) 53-year-old with CKD IV (baseline creatinine ~3.1-3.3), prediabetes with most recent hemoglobin A1c 6%, HFpEF (most recent TTE with EF 60-65% with G2DD), mitral valve regurgitation, anemia, hypertension, HLD as well as several over co-morbid conditions who was admitted on 4/1 with heart failure exacerbation and hypervolemia after presenting with to ED following a mechanical fall but also had complaints of progressive dyspnea/ZACARIAS, orthopnea, lower extremity edema and abdominal distension with constipation. On presentation patient was noted to be hypotensive with systolic BP readings as low as 90s mmHg and bradycardiac with HR as low as 50s bpm. There was concern for some edema on chest x-ray and BNP at that time was ~4.6K and metabolic panel was notable for serum sodium 133, bicarbonate 20, , creatinine 4.2, albumin 3.1 and total bilirubin 1.5. UA showed +1 protein. Her admission was complicated by failure to adequately diuresis with escalating IV Lasix and even oral metolazone for which Nephrology was consulted on 4/10 for assistance. She explicitly denies NSAID use as outpatient.    Interval History:   No acute events overnight, on room air, B/L lower extremity edema with crackling chest.  Would recommend to continue with diuresis with a net negative 500ml.    Review of patient's allergies indicates:  No Known Allergies  Current Facility-Administered Medications   Medication Frequency    acetaminophen tablet 1,000 mg Q8H PRN    aspirin EC tablet 81 mg Daily    atorvastatin tablet 40 mg Daily    bisacodyL EC tablet 5 mg  Daily PRN    chlorothiazide (DIURIL) 500 mg in dextrose 5 % (D5W) 50 mL IVPB Q12H    dextrose 10% bolus 125 mL 125 mL PRN    dextrose 10% bolus 250 mL 250 mL PRN    furosemide (Lasix) 500 mg in 50 mL infusion (conc: 10 mg/mL) Continuous    glucagon (human recombinant) injection 1 mg PRN    glucose chewable tablet 16 g PRN    glucose chewable tablet 24 g PRN    heparin (porcine) injection 5,000 Units Q8H    HYDROcodone-acetaminophen 5-325 mg per tablet 1 tablet Q6H PRN    isosorbide dinitrate tablet 10 mg TID    melatonin tablet 6 mg Nightly PRN    naloxone 0.4 mg/mL injection 0.02 mg PRN    ondansetron disintegrating tablet 4 mg Q8H PRN    ondansetron injection 4 mg Q8H PRN    polyethylene glycol packet 17 g Daily    sevelamer carbonate tablet 800 mg TID WM    sodium chloride 0.9% flush 10 mL PRN    sodium chloride 0.9% flush 10 mL PRN    white petrolatum 41 % ointment BID    zinc oxide 20 % ointment PRN       Objective:     Vital Signs (Most Recent):  Temp: 98.1 °F (36.7 °C) (04/11/24 1138)  Pulse: (!) 55 (04/11/24 1138)  Resp: 18 (04/11/24 1138)  BP: 123/74 (04/11/24 1138)  SpO2: (!) 93 % (04/11/24 1138) Vital Signs (24h Range):  Temp:  [97.7 °F (36.5 °C)-98.3 °F (36.8 °C)] 98.1 °F (36.7 °C)  Pulse:  [53-58] 55  Resp:  [16-18] 18  SpO2:  [93 %-98 %] 93 %  BP: (123-145)/(74-84) 123/74     Weight: 73 kg (160 lb 15 oz) (04/11/24 1040)  Body mass index is 31.43 kg/m².  Body surface area is 1.76 meters squared.    I/O last 3 completed shifts:  In: 1120 [P.O.:1120]  Out: 1525 [Urine:1525]     Physical Exam  Constitutional:       Appearance: She is obese.   Pulmonary:      Effort: Pulmonary effort is normal. No respiratory distress.      Breath sounds: Rhonchi and rales present.   Musculoskeletal:         General: Swelling present.      Right lower leg: Edema present.      Left lower leg: Edema present.   Skin:     General: Skin is warm.   Neurological:      General: No focal deficit present.      Mental Status: She is  alert and oriented to person, place, and time.          Significant Labs:  BMP:   Recent Labs   Lab 04/11/24  0414   GLU 72   *   K 4.2   CL 93*   CO2 20*   BUN 96*   CREATININE 4.3*   CALCIUM 8.9   MG 2.3     CBC:   Recent Labs   Lab 04/11/24  0415   WBC 5.11   RBC 4.04   HGB 9.2*   HCT 32.5*      MCV 80*   MCH 22.8*   MCHC 28.3*        Significant Imaging:  Labs: Reviewed  X-Ray: Reviewed  Echo: Reviewed  Assessment/Plan:     Cardiac/Vascular  * Acute on chronic heart failure with preserved ejection fraction (HFpEF)  Patient is identified as having Diastolic (HFpEF) heart failure that is Acute on chronic. CHF is currently uncontrolled due to Continued edema of extremities, Hepatic congestion/ascites, and JVD, >3 pillow orthopnea, Rales/crackles on pulmonary exam, and Pulmonary edema/pleural effusion on CXR.     - diuresis as detailed under REGINALDO    Essential hypertension  - management per primary team    Renal/  Stage 4 chronic kidney disease  - CKD IV (baseline creatinine ~3.1-3.3) admitted with hypervolemia and REGINALDO with creatinine 4.2  - UA showed +1 protein with UPCR of ~500 mg  - urinary sediment with non dysmorphic RBCs and WBCs present although Jin catheter just place yesterday   - retroperitoneal US without evidence of hydronephrosis although changes consistent with chronic kidney disease    Plan/Recommendations;  - Would recommend to continue diuresis with lasix 120mg BID +/- Diuril 500mg for net negative of atleast 0.5 liter in the next 24 hours  - can continue Renvela 800 mg TIDWM  - please avoid hypotension/major fluctuations in BP which may worsen REGINALDO (keep MAP > 65 mmHg)  - renal diet/tube feeds when not NPO with volume restriction per primary team  - strict I/O's and daily weights  - renally all dose medications to eGFR   - avoid nephrotoxic agents wean feasible (i.e. NSAIDs, intra-arterial contrast, supra-therapeutic vancomycin levels, etc.)          Thank you for your consult. I will  follow-up with patient. Please contact us if you have any additional questions.    Sylvia Nayak MD  Nephrology  [facility] - Telemetry Stepdown

## 2024-04-11 NOTE — PLAN OF CARE
[facility] - Telemetry Stepdown  Discharge Reassessment    Primary Care Provider: Tami Villeda FNP    Expected Discharge Date: 4/16/2024    Reassessment (most recent)       Discharge Reassessment - 04/11/24 3948          Discharge Reassessment    Assessment Type Discharge Planning Reassessment     Did the patient's condition or plan change since previous assessment? No     Discharge Plan discussed with: Adult children     Communicated RILEY with patient/caregiver Date not available/Unable to determine     Discharge Plan A Home;Home with family     Discharge Plan B Home;Home with family   TBD    DME Needed Upon Discharge  --   TBD    Why the patient remains in the hospital Requires continued medical care        Post-Acute Status    Discharge Delays None known at this time                 Pt not medically ready for d/c. Plan upon d/c is for pt to d/c home w/ family. CM dept will continue following along with treatment team for recommendations/needs.     Interval History:   Patient seen and examined at bedside.    No acute events overnight. UOP net 155 mL yesterday.  Reports no issues with shortness of breath, cough, lightheadedness, dizziness when ambulating, however does note palpitations after awhile of moving and ongoing edema. Patient updated regarding care plan.   services utilized.    Discharge Plan A and Plan B have been determined by review of patient's clinical status, future medical and therapeutic needs, and coverage/benefits for post-acute care in coordination with multidisciplinary team members.    SILVESTRE Box   [facility]    Case Management Dept  [phone]

## 2024-04-11 NOTE — SUBJECTIVE & OBJECTIVE
Interval History:   No acute events overnight, on room air, B/L lower extremity edema with crackling chest.  Would recommend to continue with diuresis with a net negative 500ml.    Review of patient's allergies indicates:  No Known Allergies  Current Facility-Administered Medications   Medication Frequency    acetaminophen tablet 1,000 mg Q8H PRN    aspirin EC tablet 81 mg Daily    atorvastatin tablet 40 mg Daily    bisacodyL EC tablet 5 mg Daily PRN    chlorothiazide (DIURIL) 500 mg in dextrose 5 % (D5W) 50 mL IVPB Q12H    dextrose 10% bolus 125 mL 125 mL PRN    dextrose 10% bolus 250 mL 250 mL PRN    furosemide (Lasix) 500 mg in 50 mL infusion (conc: 10 mg/mL) Continuous    glucagon (human recombinant) injection 1 mg PRN    glucose chewable tablet 16 g PRN    glucose chewable tablet 24 g PRN    heparin (porcine) injection 5,000 Units Q8H    HYDROcodone-acetaminophen 5-325 mg per tablet 1 tablet Q6H PRN    isosorbide dinitrate tablet 10 mg TID    melatonin tablet 6 mg Nightly PRN    naloxone 0.4 mg/mL injection 0.02 mg PRN    ondansetron disintegrating tablet 4 mg Q8H PRN    ondansetron injection 4 mg Q8H PRN    polyethylene glycol packet 17 g Daily    sevelamer carbonate tablet 800 mg TID WM    sodium chloride 0.9% flush 10 mL PRN    sodium chloride 0.9% flush 10 mL PRN    white petrolatum 41 % ointment BID    zinc oxide 20 % ointment PRN       Objective:     Vital Signs (Most Recent):  Temp: 98.1 °F (36.7 °C) (04/11/24 1138)  Pulse: (!) 55 (04/11/24 1138)  Resp: 18 (04/11/24 1138)  BP: 123/74 (04/11/24 1138)  SpO2: (!) 93 % (04/11/24 1138) Vital Signs (24h Range):  Temp:  [97.7 °F (36.5 °C)-98.3 °F (36.8 °C)] 98.1 °F (36.7 °C)  Pulse:  [53-58] 55  Resp:  [16-18] 18  SpO2:  [93 %-98 %] 93 %  BP: (123-145)/(74-84) 123/74     Weight: 73 kg (160 lb 15 oz) (04/11/24 1040)  Body mass index is 31.43 kg/m².  Body surface area is 1.76 meters squared.    I/O last 3 completed shifts:  In: 1120 [P.O.:1120]  Out: 8899  [Urine:1525]     Physical Exam  Constitutional:       Appearance: She is obese.   Pulmonary:      Effort: Pulmonary effort is normal. No respiratory distress.      Breath sounds: Rhonchi and rales present.   Musculoskeletal:         General: Swelling present.      Right lower leg: Edema present.      Left lower leg: Edema present.   Skin:     General: Skin is warm.   Neurological:      General: No focal deficit present.      Mental Status: She is alert and oriented to person, place, and time.          Significant Labs:  BMP:   Recent Labs   Lab 04/11/24 0414   GLU 72   *   K 4.2   CL 93*   CO2 20*   BUN 96*   CREATININE 4.3*   CALCIUM 8.9   MG 2.3     CBC:   Recent Labs   Lab 04/11/24 0415   WBC 5.11   RBC 4.04   HGB 9.2*   HCT 32.5*      MCV 80*   MCH 22.8*   MCHC 28.3*        Significant Imaging:  Labs: Reviewed  X-Ray: Reviewed  Echo: Reviewed   No

## 2024-04-11 NOTE — SUBJECTIVE & OBJECTIVE
Interval History:   Patient seen and examined at bedside.    No acute events overnight. UOP net 155 mL yesterday.  Reports no issues with shortness of breath, cough, lightheadedness, dizziness when ambulating, however does note palpitations after awhile of moving and ongoing edema. Patient updated regarding care plan.   services utilized.      Review of Systems   Constitutional:  Positive for appetite change. Negative for chills and fever.   HENT:  Negative for trouble swallowing.    Eyes:  Negative for visual disturbance.   Respiratory:  Negative for cough.    Cardiovascular:  Positive for leg swelling. Negative for chest pain.   Gastrointestinal:  Negative for abdominal pain, diarrhea and nausea.   Genitourinary:  Positive for decreased urine volume.   Skin:  Positive for wound.   Neurological:  Negative for dizziness and light-headedness.   Psychiatric/Behavioral:  Negative for confusion and decreased concentration.      Objective:     Vital Signs (Most Recent):  Temp: 98.1 °F (36.7 °C) (04/11/24 1138)  Pulse: (!) 55 (04/11/24 1138)  Resp: 18 (04/11/24 1138)  BP: 123/74 (04/11/24 1138)  SpO2: (!) 93 % (04/11/24 1138) Vital Signs (24h Range):  Temp:  [97.7 °F (36.5 °C)-98.3 °F (36.8 °C)] 98.1 °F (36.7 °C)  Pulse:  [53-58] 55  Resp:  [16-18] 18  SpO2:  [93 %-98 %] 93 %  BP: (123-145)/(74-84) 123/74     Weight: 73 kg (160 lb 15 oz)  Body mass index is 31.43 kg/m².    Intake/Output Summary (Last 24 hours) at 4/11/2024 1315  Last data filed at 4/11/2024 1310  Gross per 24 hour   Intake 1500 ml   Output 1050 ml   Net 450 ml           Physical Exam  Vitals and nursing note reviewed.   Constitutional:       General: She is not in acute distress.     Appearance: She is well-developed. She is obese. She is ill-appearing (Chronically). She is not diaphoretic.   HENT:      Right Ear: External ear normal.      Left Ear: External ear normal.      Mouth/Throat:      Pharynx: Oropharynx is clear.   Eyes:      General:  No scleral icterus.  Cardiovascular:      Comments: Jvd  Lower extremity edema pitting, tracking up to abdomen  Pulmonary:      Effort: Pulmonary effort is normal. No respiratory distress.   Musculoskeletal:         General: Signs of injury present. No deformity.      Cervical back: Normal range of motion and neck supple.      Right lower leg: Edema present.      Left lower leg: Edema present.   Skin:     General: Skin is warm and dry.      Coloration: Skin is not jaundiced or pale.      Findings: Abrasion and bruising present.   Neurological:      Mental Status: She is alert and oriented to person, place, and time. Mental status is at baseline.      Motor: No seizure activity.   Psychiatric:         Attention and Perception: Attention normal.         Behavior: Behavior normal. Behavior is not aggressive or combative. Behavior is cooperative.             Significant Labs: All pertinent labs within the past 24 hours have been reviewed.    Recent Results (from the past 24 hour(s))   Comprehensive Metabolic Panel    Collection Time: 04/11/24  4:14 AM   Result Value Ref Range    Sodium 133 (L) 136 - 145 mmol/L    Potassium 4.2 3.5 - 5.1 mmol/L    Chloride 93 (L) 95 - 110 mmol/L    CO2 20 (L) 23 - 29 mmol/L    Glucose 72 70 - 110 mg/dL    BUN 96 (H) 6 - 20 mg/dL    Creatinine 4.3 (H) 0.5 - 1.4 mg/dL    Calcium 8.9 8.7 - 10.5 mg/dL    Total Protein 6.7 6.0 - 8.4 g/dL    Albumin 3.1 (L) 3.5 - 5.2 g/dL    Total Bilirubin 2.0 (H) 0.1 - 1.0 mg/dL    Alkaline Phosphatase 109 55 - 135 U/L    AST 37 10 - 40 U/L    ALT 24 10 - 44 U/L    eGFR 11.6 (A) >60 mL/min/1.73 m^2    Anion Gap 20 (H) 8 - 16 mmol/L   Magnesium    Collection Time: 04/11/24  4:14 AM   Result Value Ref Range    Magnesium 2.3 1.6 - 2.6 mg/dL   Phosphorus    Collection Time: 04/11/24  4:14 AM   Result Value Ref Range    Phosphorus 5.1 (H) 2.7 - 4.5 mg/dL   CBC Auto Differential    Collection Time: 04/11/24  4:15 AM   Result Value Ref Range    WBC 5.11 3.90 -  12.70 K/uL    RBC 4.04 4.00 - 5.40 M/uL    Hemoglobin 9.2 (L) 12.0 - 16.0 g/dL    Hematocrit 32.5 (L) 37.0 - 48.5 %    MCV 80 (L) 82 - 98 fL    MCH 22.8 (L) 27.0 - 31.0 pg    MCHC 28.3 (L) 32.0 - 36.0 g/dL    RDW 21.8 (H) 11.5 - 14.5 %    Platelets 151 150 - 450 K/uL    MPV SEE COMMENT 9.2 - 12.9 fL    Immature Granulocytes 0.4 0.0 - 0.5 %    Gran # (ANC) 3.5 1.8 - 7.7 K/uL    Immature Grans (Abs) 0.02 0.00 - 0.04 K/uL    Lymph # 0.9 (L) 1.0 - 4.8 K/uL    Mono # 0.6 0.3 - 1.0 K/uL    Eos # 0.1 0.0 - 0.5 K/uL    Baso # 0.03 0.00 - 0.20 K/uL    nRBC 0 0 /100 WBC    Gran % 69.2 38.0 - 73.0 %    Lymph % 17.2 (L) 18.0 - 48.0 %    Mono % 11.2 4.0 - 15.0 %    Eosinophil % 1.4 0.0 - 8.0 %    Basophil % 0.6 0.0 - 1.9 %    Differential Method Automated          Significant Imaging: I have reviewed all pertinent imaging results/findings within the past 24 hours.

## 2024-04-11 NOTE — ASSESSMENT & PLAN NOTE
Severe MR. The MR appears to be secondary to posterior leaflet restriction and leaflet length of 1.1 cm.   Needs coronary angiogram to rule out obstructive CAD , then POOJA, then possible MitraClip

## 2024-04-11 NOTE — ASSESSMENT & PLAN NOTE
Mitral regurgitation   Anasarca    - Clinically volume overloaded on admission  - most recent echo below, grade 2 diastolic dysfunction  - CXR with chronic cardiomegaly  - BNP 4,639   - cardiology and nephrology following  -lasix 120mg BID + Diuril 500mg for goal net negative of atleast 0.5 liter in the next 24 hours   - Continue home cardioprudent regimen as clinically indicated and tolerated  - Strict I/Os  - 1.5L fluid restriction , cardiac diet  - Daily weights  - Will continue to monitor on tele    Results for orders placed during the hospital encounter of 04/01/24    Echo    Interpretation Summary    Left Ventricle: The left ventricle is normal in size. Normal wall thickness. Normal wall motion. There is normal systolic function with a visually estimated ejection fraction of 60 - 65%. Grade II diastolic dysfunction.    Right Ventricle: Normal right ventricular cavity size. Wall thickness is normal. Right ventricle wall motion  is normal. Systolic function is reduced.    Left Atrium: Left atrium is very  severely dilated.    Right Atrium: Right atrium is mildly dilated.    Aortic Valve: There is mild aortic valve sclerosis. There is normal leaflet mobility. There is trace aortic regurgitation.    Mitral Valve: There is mild bileaflet sclerosis. There is posterior leaflet tethering and failure of complete coaptation. There is very severe regurgitation with a posterolateral eccentriccally directed jet.    Tricuspid Valve: The tricuspid valve is structurally normal. There is normal leaflet mobility. There is moderate to severe regurgitation.    IVC/SVC: IVC was not well visualized due to poor acoustic window. Intermediate venous pressure at 8 mmHg.    Pericardium: There is a trivial effusion posteriorly and small under the RA.

## 2024-04-11 NOTE — ASSESSMENT & PLAN NOTE
Patient is identified as having Diastolic (HFpEF) heart failure that is Acute on chronic. CHF is currently uncontrolled due to Continued edema of extremities, Hepatic congestion/ascites, and JVD, >3 pillow orthopnea, Rales/crackles on pulmonary exam, and Pulmonary edema/pleural effusion on CXR.     - diuresis as detailed under REGINALDO

## 2024-04-11 NOTE — PLAN OF CARE
Recommendations     Continue Low Na diet w/ fluid restriction per MD.  - Add Renal diet restrictions if necessary.   RD to monitor & follow-up.     Goals: Meet % EEN, EPN by RD f/u date  Nutrition Goal Status: new  Communication of RD Recs: reviewed with RN

## 2024-04-11 NOTE — SUBJECTIVE & OBJECTIVE
Past Medical History:   Diagnosis Date    (HFpEF) heart failure with preserved ejection fraction 06/24/2023    EF 63% with G2 DD  Continue lasix, appears euvolemic today  Continue good BP management with losartan, increase Coreg 6.25 mg BID  Fluid restriction, low sodium diet  Recommend Jardiance- patient had CKD 4 and this is a limiting factor- nephro eval pending    Anemia 06/24/2023    CKD (chronic kidney disease)     HTN (hypertension)     Mitral valve regurgitation 06/26/2023    Refer to structural for evaluation  May benefit from mitral clip    Ovarian cyst     Stage 4 chronic kidney disease 06/24/2023    Refer to nephrology at [facility] as unable to get established with [facility] and the phone number for the outside nephrologist provided to patient during her recent hospitalization goes unanswered when called.   Cr remains elevated  Would like to start SGLT-2 and appreciate nephrology expertise        No past surgical history on file.    Review of patient's allergies indicates:  No Known Allergies    No current facility-administered medications on file prior to encounter.     Current Outpatient Medications on File Prior to Encounter   Medication Sig    aspirin (ECOTRIN) 81 MG EC tablet Take 1 tablet by mouth once daily.    atorvastatin (LIPITOR) 40 MG tablet Take 1 tablet by mouth once daily.    atorvastatin (LIPITOR) 40 MG tablet Take 1 tablet by mouth every day    carvediloL (COREG) 3.125 MG tablet Take 2 tablets (6.25 mg total) by mouth 2 (two) times daily with meals.    carvediloL (COREG) 6.25 MG tablet Take 1 tablet by mouth 2 times daily with food    furosemide (LASIX) 40 MG tablet Take 1 tablet (40 mg total) by mouth 2 (two) times a day.    furosemide (LASIX) 40 MG tablet Take 1 tablet by mouth once daily for 90 days    loratadine (CLARITIN) 10 mg tablet Take 10 mg by mouth once daily. PRN    sevelamer carbonate (RENVELA) 800 mg Tab Take 1 tablet (800 mg total) by mouth 3 (three) times daily with  meals.    sodium bicarbonate 650 MG tablet Take 1 tablet (650 mg total) by mouth 2 (two) times daily.     Family History    None       Tobacco Use    Smoking status: Former     Current packs/day: 0.00     Types: Cigarettes     Start date:      Quit date:      Years since quittin.2    Smokeless tobacco: Never    Tobacco comments:     2-3 cigarettes a day   Substance and Sexual Activity    Alcohol use: Never    Drug use: Never    Sexual activity: Not on file     Review of Systems   Constitutional: Negative for chills and fever.   Cardiovascular:  Positive for leg swelling. Negative for chest pain, orthopnea and palpitations.   Respiratory:  Positive for shortness of breath. Negative for cough.    Gastrointestinal:  Negative for abdominal pain.   Neurological:  Negative for dizziness, headaches and light-headedness.   Psychiatric/Behavioral:  Negative for altered mental status.      Objective:     Vital Signs (Most Recent):  Temp: 98.1 °F (36.7 °C) (24 1138)  Pulse: (!) 55 (24 1138)  Resp: 18 (24 1138)  BP: 123/74 (24 1138)  SpO2: (!) 93 % (24 1138) Vital Signs (24h Range):  Temp:  [97.7 °F (36.5 °C)-98.3 °F (36.8 °C)] 98.1 °F (36.7 °C)  Pulse:  [53-58] 55  Resp:  [16-18] 18  SpO2:  [93 %-98 %] 93 %  BP: (123-145)/(74-84) 123/74     Weight: 73 kg (160 lb 15 oz)  Body mass index is 31.43 kg/m².    SpO2: (!) 93 %         Intake/Output Summary (Last 24 hours) at 2024 1252  Last data filed at 2024 1006  Gross per 24 hour   Intake 1260 ml   Output 1050 ml   Net 210 ml       Lines/Drains/Airways       Drain  Duration                  Urethral Catheter 24 1330 16 Fr. 1 day              Peripheral Intravenous Line  Duration                  Peripheral IV - Single Lumen 04/10/24 2100 22 G Posterior;Right Hand <1 day                     Physical Exam  Vitals reviewed.   Constitutional:       General: She is not in acute distress.     Appearance: Normal appearance. She is  not toxic-appearing.   HENT:      Head: Normocephalic and atraumatic.      Mouth/Throat:      Mouth: Mucous membranes are moist.   Neck:      Comments: JVP to the mandible  Cardiovascular:      Rate and Rhythm: Normal rate and regular rhythm.      Heart sounds: Murmur (at 5th intercostal and MCL, holosystolic) heard.      No friction rub. No gallop.   Pulmonary:      Effort: Pulmonary effort is normal. No respiratory distress.      Breath sounds: Normal breath sounds.   Abdominal:      Palpations: Abdomen is soft.   Musculoskeletal:      Right lower leg: Edema (2+) present.      Left lower leg: Edema (2+ with left shin and lateral side wound, appears chronic) present.   Skin:     General: Skin is warm.   Neurological:      Mental Status: She is alert and oriented to person, place, and time. Mental status is at baseline.          Significant Labs: BMP:   Recent Labs   Lab 04/10/24  0439 04/11/24  0414   GLU 67* 72    133*   K 4.1 4.2   CL 97 93*   CO2 23 20*   * 96*   CREATININE 3.7* 4.3*   CALCIUM 8.6* 8.9   MG  --  2.3   , CMP   Recent Labs   Lab 04/10/24  0439 04/11/24  0414    133*   K 4.1 4.2   CL 97 93*   CO2 23 20*   GLU 67* 72   * 96*   CREATININE 3.7* 4.3*   CALCIUM 8.6* 8.9   PROT  --  6.7   ALBUMIN  --  3.1*   BILITOT  --  2.0*   ALKPHOS  --  109   AST  --  37   ALT  --  24   ANIONGAP 16 20*   , CBC   Recent Labs   Lab 04/11/24 0415   WBC 5.11   HGB 9.2*   HCT 32.5*      BNP 4300 on presentation    , and All pertinent lab results from the last 24 hours have been reviewed.    Significant Imaging: Echocardiogram: Transthoracic echo (TTE) complete (Cupid Only):   Results for orders placed or performed during the hospital encounter of 04/01/24   Echo   Result Value Ref Range    RA Width 3.24 cm    Left Atrium Major Axis 8.41 cm    Left Atrium Minor Axis 7.66 cm    RA Major Axis 5.04 cm    LV Diastolic Volume 126.79 mL    LV Systolic Volume 42.78 mL    MV Peak A Alberto 0.85 m/s     MV stenosis pressure 1/2 time 74.15 ms    TR Max Alberto 4.38 m/s    MV Peak E Alberto 1.98 m/s    Ao VTI 19.64 cm    Ao peak alberto 1.02 m/s    LVOT peak VTI 9.24 cm    LVOT peak alberto 0.61 m/s    LVOT diameter 1.91 cm    E wave deceleration time 255.70 msec    AV mean gradient 2 mmHg    TAPSE 1.37 cm    RVDD 3.66 cm    LA size 5.98 cm    Ascending aorta 3.02 cm    STJ 2.15 cm    Sinus 2.36 cm    LVIDs 3.26 2.1 - 4.0 cm    Posterior Wall 1.06 0.6 - 1.1 cm    IVS 0.64 0.6 - 1.1 cm    LVIDd 5.15 3.5 - 6.0 cm    TDI LATERAL 0.07 m/s    LA WIDTH 5.26 cm    TDI SEPTAL 0.06 m/s    LV LATERAL E/E' RATIO 28.29 m/s    LV SEPTAL E/E' RATIO 33.00 m/s    FS 37 28 - 44 %    LA volume 214.36 cm3    LV mass 154.38 g    ZLVIDD 0.90     ZLVIDS 0.88     Left Ventricle Relative Wall Thickness 0.41 cm    AV valve area 1.35 cm²    AV Velocity Ratio 0.60     AV index (prosthetic) 0.47     MV valve area p 1/2 method 2.97 cm2    E/A ratio 2.33     Mean e' 0.07 m/s    LVOT area 2.9 cm2    LVOT stroke volume 26.46 cm3    AV peak gradient 4 mmHg    E/E' ratio 30.46 m/s    LV Systolic Volume Index 25.3 mL/m2    LV Diastolic Volume Index 75.02 mL/m2    LA Volume Index 126.8 mL/m2    LV Mass Index 91 g/m2    Triscuspid Valve Regurgitation Peak Gradient 77 mmHg    HAYDEE by Velocity Ratio 1.71 cm²    BSA 1.74 m2    TV resting pulmonary artery pressure 85 mmHg    RV TB RVSP 12 mmHg    Est. RA pres 8 mmHg    Narrative      Left Ventricle: The left ventricle is normal in size. Normal wall   thickness. Normal wall motion. There is normal systolic function with a   visually estimated ejection fraction of 60 - 65%. Grade II diastolic   dysfunction.    Right Ventricle: Normal right ventricular cavity size. Wall thickness   is normal. Right ventricle wall motion  is normal. Systolic function is   reduced.    Left Atrium: Left atrium is very  severely dilated.    Right Atrium: Right atrium is mildly dilated.    Aortic Valve: There is mild aortic valve sclerosis. There  is normal   leaflet mobility. There is trace aortic regurgitation.    Mitral Valve: There is mild bileaflet sclerosis. There is posterior   leaflet tethering and failure of complete coaptation. There is very severe   regurgitation with a posterolateral eccentriccally directed jet.    Tricuspid Valve: The tricuspid valve is structurally normal. There is   normal leaflet mobility. There is moderate to severe regurgitation.    IVC/SVC: IVC was not well visualized due to poor acoustic window.   Intermediate venous pressure at 8 mmHg.    Pericardium: There is a trivial effusion posteriorly and small under   the RA.

## 2024-04-11 NOTE — ASSESSMENT & PLAN NOTE
Patient is a 53 year old female with HFpEF and severe MR as well as advanced kidney disease (CKD 4) that presents after mechanical fall with dyspnea and leg swelling and was admitted for heart failure exacerbation. BNP 4300 >> ~350 8 months ago. Patient has had severe MR since at least July 2023. The MR appears to be secondary to posterior leaflet restriction and leaflet length of 1.1 cm. Attempts of diuresis have been escalated to 100 mg IV TID + Diuril 500 mg IV BID.   For now, would recommend replacing Lasix 100 mg IV TID with Lasix 120 mg IV push x 1 followed by Lasix 30 mg/hr. Uptitrate to meet diuresis goals  Continue Diuril 500 mg IV BID  Recommend afterload reduction by optimizing BP control (target / 70 mm Hg) to augment forward flow, this may also help kidney dysfunction.   Target net negative 2 L daily  Monitor labs, [place] Cr which has been uptrending  Appreciate communication with Nephrology and primary, monitor for HD needs; unlikely to be a candidate for coronary angiogram unless Cr improves or started on HD (recommended by Interventional team to rule out obstructive CAD as part of [facility] workup)

## 2024-04-11 NOTE — ASSESSMENT & PLAN NOTE
- CKD IV (baseline creatinine ~3.1-3.3) admitted with hypervolemia and REGINALDO with creatinine 4.2  - UA showed +1 protein with UPCR of ~500 mg  - urinary sediment with non dysmorphic RBCs and WBCs present although Jin catheter just place yesterday   - retroperitoneal US without evidence of hydronephrosis although changes consistent with chronic kidney disease    Plan/Recommendations;  - Would recommend to continue diuresis with lasix +/- Diuril for net negative of atleast 0.5 liter in the next 24 hours  - can continue Renvela 800 mg TIDWM  - please avoid hypotension/major fluctuations in BP which may worsen REGINALDO (keep MAP > 65 mmHg)  - renal diet/tube feeds when not NPO with volume restriction per primary team  - strict I/O's and daily weights  - renally all dose medications to eGFR   - avoid nephrotoxic agents wean feasible (i.e. NSAIDs, intra-arterial contrast, supra-therapeutic vancomycin levels, etc.)

## 2024-04-11 NOTE — CONSULTS
[facility] - Telemetry Stepdown  Adult Nutrition  Consult Note    SUMMARY     Recommendations    Continue Low Na diet w/ fluid restriction per MD.  - Add Renal diet restrictions if necessary.   RD to monitor & follow-up.    Goals: Meet % EEN, EPN by RD f/u date  Nutrition Goal Status: new  Communication of RD Recs: reviewed with RN    Assessment and Plan    Nutrition Problem:  Altered nutrition related lab values    Related to (etiology):   CKD    Signs and Symptoms (as evidenced by):   Elevated Creat, decreased GFR    Interventions(treatment strategy):  Collaboration of nutrition care w/ other providers    Nutrition Diagnosis Status:   New    Reason for Assessment    Reason For Assessment: consult, RD follow-up  Diagnosis: other (see comments) (HF)  Relevant Medical History: CKD  Interdisciplinary Rounds: did not attend    General Information Comments: Pt tolerating diet w/ 50-75% PO intake. Low Na diet education complete 4/2. At initial RD assessment, pt reported good appetite PTA & UBW of 160#. Appears nourished w/ no indicators of malnutrition found. Noted nephrology following.   Nutrition Discharge Planning: Adequate PO intake    Nutrition/Diet History    Patient Reported Diet/Restrictions/Preferences: low salt  Factors Affecting Nutritional Intake: None identified at this time    Anthropometrics    Temp: 97.9 °F (36.6 °C)  Height Method: Stated  Height: 5' (152.4 cm)  Height (inches): 60 in  Weight Method: Bed Scale  Weight: 73 kg (160 lb 15 oz)  Weight (lb): 160.94 lb  Ideal Body Weight (IBW), Female: 100 lb  % Ideal Body Weight, Female (lb): 160.94 %  BMI (Calculated): 31.4  BMI Grade: 30 - 34.9- obesity - grade I    Lab/Procedures/Meds    Pertinent Labs Reviewed: reviewed  Pertinent Labs Comments: Creat 4.3, GFR 11.6, P 5.1, A1C 6  Pertinent Medications Reviewed: reviewed  Pertinent Medications Comments: Lasix    Estimated/Assessed Needs    Weight Used For Calorie Calculations: 73 kg (160 lb 15  oz)    Energy Calorie Requirements (kcal): 1502 kcal/d  Energy Need Method: [place]-St Jeor (1.2 PAL)    Protein Requirements: 73 g/d (1 g/kg)  Weight Used For Protein Calculations: 73 kg (160 lb 15 oz)    Estimated Fluid Requirement Method: other (see comments) (Per MD)  RDA Method (mL): 1502    Nutrition Prescription Ordered    Current Diet Order: Low Na  Nutrition Order Comments: 1500 mL FR    Evaluation of Received Nutrient/Fluid Intake    I/O: -3.9L since admit    Comments: LBM: 4/9    Tolerance: tolerating    Nutrition Risk    Level of Risk/Frequency of Follow-up:  (1x/week)     Monitor and Evaluation    Food and Nutrient Intake: food and beverage intake, energy intake  Food and Nutrient Adminstration: diet order  Physical Activity and Function: nutrition-related ADLs and IADLs  Anthropometric Measurements: weight, weight change  Biochemical Data, Medical Tests and Procedures: inflammatory profile, lipid profile, glucose/endocrine profile, gastrointestinal profile, electrolyte and renal panel  Nutrition-Focused Physical Findings: overall appearance     Nutrition Follow-Up    RD Follow-up?: Yes

## 2024-04-11 NOTE — PT/OT/SLP EVAL
Occupational Therapy  Co -  Evaluation with PT    Co-evaluation/treatment performed due to patient's multiple deficits requiring two skilled therapists to appropriately and safely assess patient's strength and endurance while facilitating functional tasks in addition to accommodating for patient's activity tolerance.     Other Staff Present:   Dr. Minda Molina (partial)    :   [place], #[id number]  [place], #[id number]    Name: Xena Vera  MRN: [id number]  Admitting Diagnosis: Acute on chronic heart failure with preserved ejection fraction (HFpEF)  Recent Surgery: * No surgery found *      Recommendations:     Discharge Recommendations: Moderate Intensity Therapy (may progress)  Discharge Equipment Recommendations:  walker, rolling  DME Justification: Pt needs RW to ambulate, as their mobility limitation significantly impairs their ability to participate in one or more activities of daily living. The use of a RW will significantly improve the patient's ability to participate in ADLs and the patient will use it on a regular basis in the home. A cane is not sufficient for the patient's safe functional mobility.     Barriers to discharge:   increased skilled A needed    Assessment:     Xena Vera is a 53 y.o. female with a medical diagnosis of Acute on chronic heart failure with preserved ejection fraction (HFpEF).  She presents with performance deficits affecting function: weakness, impaired endurance, impaired functional mobility, impaired self care skills, impaired balance, impaired cognition, decreased lower extremity function, decreased safety awareness, pain, impaired skin, impaired coordination, edema, impaired cardiopulmonary response to activity.      Pt engaged well in therapy, encountered sitting EOB with RLE bracing self on floor and LUE elevated on bed.  When educated on safe positioning, pt stated she felt safe. With use of  system, pt presented with some confusion and  "difficulty processing education re: safety. The patient has a history of falls and is a fall risk. Pt able to complete bed mobility with CGA, STS with SBA/RW, and functional mobility in room with min A/RW with maximum amount of verbal cues.  Pt able to sit in upright chair with BLE elevated with CGA/RW to complete some grooming ADLs. Patient currently demonstrates a need for moderate intensity therapy on a daily basis post acute secondary to a decline in functional status due to illness.      Rehab Prognosis: Good; patient would benefit from acute skilled OT services to address these deficits and reach maximum level of function.       Plan:     Patient to be seen 3 x/week to address the above listed problems via self-care/home management, therapeutic activities, therapeutic exercises  Plan of Care Expires: 05/11/24  Plan of Care Reviewed with: patient    Subjective     Chief Complaint: "My leg hurts"   Patient/Family Comments/goals: Get better, return home     Occupational Profile:  Living Environment: Pt lives with son in 2SH with 4-5 XIMENA, BHR at front door. She stays on 1st floor and son stays on 2nd floor. Pt uses tub/sh combo with 0 SC, +gb.   Previous level of function: Pt fell on 4/1/24. Prior to fall, pt was independent with functional mobility/ADLs  Roles and Routines: mother  Equipment Used at Home: grab bar  Assistance upon Discharge: son occasionally works     Pain/Comfort:  Pain Rating 1:  (not quantified but grimaced with movement)  Location - Side 1: Left  Location - Orientation 1: lower  Location 1: leg  Pain Addressed 1: Reposition, Distraction, Nurse notified, Cessation of Activity  Pain Rating Post-Intervention 1:  (not quantified)    Patients cultural, spiritual, Sikhism conflicts given the current situation: no    Objective:     Communicated with: JUMNAA Cabrera prior to session.  Patient found  sitting EOB with RLE on floor, LLE elevated on bed  with browning catheter, peripheral IV upon OT entry to " room.    General Precautions: Standard, fall  Orthopedic Precautions: N/A  Braces: N/A  Respiratory Status: Room air    Occupational Performance:    Bed Mobility:    Patient completed Rolling/Turning to Left with  contact guard assistance  Patient completed Rolling/Turning to Right with contact guard assistance  Patient completed Scooting/Bridging with contact guard assistance  Patient completed Supine to Sit with contact guard assistance  Pt demonstrated good EOB balance     Functional Mobility/Transfers:  Patient completed Sit <> Stand Transfer with stand by assistance  with  rolling walker  from EOB  Pt completed Stand <> Sit Transfer with CGA/RW  Functional Mobility: Pt engaging in functional mobility to simulate household/community distances within hospital room with min A/RW with maximum verbal cues RW maintenance  in order to maximize functional activity tolerance and standing balance required for engagement in occupations of choice.     Activities of Daily Living:  Grooming: set up assistance washing face with warm washcloth, hygiene kit provided on tabletop   Upper Body Dressing: minimum assistance affixing gown at neck and back to maximize coverage   Lower Body Dressing: minimum assistance donning bilateral nonslip socks   Toileting: total assistance managing catheter     Therapeutic Exercises:   - ankle pumps while BLE elevated in upright chair     Cognitive/Visual Perceptual:  Cognitive/Psychosocial Skills:     -       Oriented to: AOx4   -       Follows Commands/attention:Follows two-step commands  -       Communication: clear/fluent  -       Memory: Poor immediate recall  -       Safety awareness/insight to disability: impaired   -       Mood/Affect/Coping skills/emotional control: Pleasant  Visual/Perceptual:      -Intact      Physical Exam:  Balance:    -       sitting: Good. Standing: Fair  Postural examination/scapula alignment:    -       Rounded shoulders  Skin integrity: abrasion on LLE  Edema:   Moderate BLE  Sensation:    -       Intact  Motor Planning:    -       Intact  Dominant hand:    -       right  Upper Extremity Range of Motion:     -       Right Upper Extremity: WFL  -       Left Upper Extremity: WFL  Upper Extremity Strength:    -       Right Upper Extremity: WFL  -       Left Upper Extremity: WFL   Strength:    -       Right Upper Extremity: WFL  -       Left Upper Extremity: WFL  Fine Motor Coordination:    -       Intact  Neurological:    -       Intact    AMPAC 6 Click ADL:  AMPAC Total Score: 21    Treatment & Education:  Pt educated on role of OT, POC, and goals for therapy.    POC was dicussed with patient/caregiver, who was included in its development and is in agreement with the identified goals and treatment plan.   Patient and family aware of patient's deficits and therapy progression.   Time provided for therapeutic counseling and discussion of health disposition.   Educated on importance of EOB/OOB mobility, maintaining routine, sitting up in chair, and maximizing independence with ADLs during admission   Pt completed ADLs and functional mobility for treatment session as noted above   Pt/caregiver verbalized understanding and expressed no further concerns/questions.  Updated communication board with level of assist required       Patient left up in chair with all lines intact, call button in reach, and RN notified    GOALS:   Multidisciplinary Problems       Occupational Therapy Goals          Problem: Occupational Therapy    Goal Priority Disciplines Outcome Interventions   Occupational Therapy Goal     OT, PT/OT Ongoing, Progressing    Description: Goals to be met by: 5/1//24     Patient will increase functional independence with ADLs by performing:    LE Dressing with Contact Guard Assistance.  Grooming while standing at sink with Stand-by Assistance.  Toileting from toilet with Stand-by Assistance for hygiene and clothing management.   Supine to sit with Supervision.  Step  transfer with Stand-by Assistance with AD as needed  Toilet transfer to toilet with Stand-by Assistance with AD as needed                         History:     Past Medical History:   Diagnosis Date    (HFpEF) heart failure with preserved ejection fraction 06/24/2023    EF 63% with G2 DD  Continue lasix, appears euvolemic today  Continue good BP management with losartan, increase Coreg 6.25 mg BID  Fluid restriction, low sodium diet  Recommend Jardiance- patient had CKD 4 and this is a limiting factor- nephro eval pending    Anemia 06/24/2023    CKD (chronic kidney disease)     HTN (hypertension)     Mitral valve regurgitation 06/26/2023    Refer to structural for evaluation  May benefit from mitral clip    Ovarian cyst     Stage 4 chronic kidney disease 06/24/2023    Refer to nephrology at [facility] as unable to get established with [facility] and the phone number for the outside nephrologist provided to patient during her recent hospitalization goes unanswered when called.   Cr remains elevated  Would like to start SGLT-2 and appreciate nephrology expertise        No past surgical history on file.    Time Tracking:     OT Date of Treatment: 04/11/24  OT Start Time: 1014  OT Stop Time: 1045  OT Total Time (min): 31 min    Billable Minutes:Evaluation 8  Self Care/Home Management 15  Therapeutic Exercise 8    4/11/2024

## 2024-04-11 NOTE — PLAN OF CARE
Problem: Adult Inpatient Plan of Care  Goal: Plan of Care Review  Outcome: Ongoing, Progressing  Goal: Patient-Specific Goal (Individualized)  Outcome: Ongoing, Progressing  Goal: Absence of Hospital-Acquired Illness or Injury  Outcome: Ongoing, Progressing  Goal: Optimal Comfort and Wellbeing  Outcome: Ongoing, Progressing  Goal: Readiness for Transition of Care  Outcome: Ongoing, Progressing     Problem: Infection  Goal: Absence of Infection Signs and Symptoms  Outcome: Ongoing, Progressing     Problem: Impaired Wound Healing  Goal: Optimal Wound Healing  Outcome: Ongoing, Progressing     POC reviewed. AAOX4. VVS. Patient elevated LLE. Lasix drip infusing via pump. Free from falls injuries. Call light in reach. Bed in lowest position. Frequent safety checks. TM

## 2024-04-11 NOTE — PROGRESS NOTES
[facility] - Telemetry [facility] Medicine  Progress Note    Patient Name: Xena Vera  MRN: [id number]  Patient Class: IP- Inpatient   Admission Date: 4/1/2024  Length of Stay: 8 days  Attending Physician: Minda Molina MD  Primary Care Provider: Tami Villeda FNP        Subjective:     Principal Problem:Acute on chronic heart failure with preserved ejection fraction (HFpEF)        HPI:  Xena Vera is a 53 year old Nauruan-speaking  woman with former cigarette smoking (1988 to 1989), hypertension, heart failure with preserved ejection fraction, mitral regurgitation, chronic kidney disease stage 4, anemia, ovarian cyst, obesity. She lives in [place], [place].               She presented to [facility] Emergency Department on 4/1/2024 with complaint of mechanical fall, lower extremity swelling, and shortness of breath. Her son translated for her at bedside. She slipped and fell between grass and concrete earlier in the morning and had an abrasion on her left lower extremity. She was able to stand with assistance and ambulate as usual afterward. She had worsening of chronic bilateral lower extremity edema now in her abdomen. She was taking her prescribed furosemide once a day without missed doses and was compliant with a low sodium diet. She was sleeping upright to feel like she can breath and had several nighttime awakenings due to shortness of breath. She had worsening dyspnea on exertion over the last several weeks. She has chronic constipation, unchanged.              In the emergency department, blood pressure was 97/54, heart rate was in the low 50s. Hemoglobin was 10.7 g/dL (baseline around 11.4), sodium was 133 mmol/L, total bilirubin was 1.5 mg/dL, BNP was 4639 pg/mL, troponin was 0.061 ng/mL. Chest X-ray showed upper normal pulmonary vascularity, chronic band of opacity in the left middle lung zone, faint edema in perihilar areas. She was given 40 mg of  intravenous furosemide. She was admitted to Hospital Medicine Team R.    Overview/Hospital Course:  She was put on intravenous furosemide. It was increased to 80 mg twice daily. Urine output increased to 1.6 liters in 24 hours. She had bleeding and hematoma at the site of prophylactic heparin injections so heparin was stopped and she was given sequential compression devices instead. Wound Care was consulted for her left leg wound and recommended white petrolatum twice daily. She continued to diurese well but still had a lot of fluid. Her left leg hematoma hurt a lot. Furosemide was increased to 100 mg twice daily. Urine output decreased on 4/8/2024. Bladder scan on 4/9/2024 showed she was retaining almost 500 mL of urine. Jin placed. Nephrology consulted due to poor UOP and ongoing volume overload.  Initiated on Diuril.  Cardiology also consulted.  Initiated on Lasix drip.  Of note, notable mitral regurgitation.    Interval History:   Patient seen and examined at bedside.    No acute events overnight. UOP net 155 mL yesterday.  Reports no issues with shortness of breath, cough, lightheadedness, dizziness when ambulating, however does note palpitations after awhile of moving and ongoing edema. Patient updated regarding care plan.   services utilized.      Review of Systems   Constitutional:  Positive for appetite change. Negative for chills and fever.   HENT:  Negative for trouble swallowing.    Eyes:  Negative for visual disturbance.   Respiratory:  Negative for cough.    Cardiovascular:  Positive for leg swelling. Negative for chest pain.   Gastrointestinal:  Negative for abdominal pain, diarrhea and nausea.   Genitourinary:  Positive for decreased urine volume.   Skin:  Positive for wound.   Neurological:  Negative for dizziness and light-headedness.   Psychiatric/Behavioral:  Negative for confusion and decreased concentration.      Objective:     Vital Signs (Most Recent):  Temp: 98.1 °F (36.7 °C)  (04/11/24 1138)  Pulse: (!) 55 (04/11/24 1138)  Resp: 18 (04/11/24 1138)  BP: 123/74 (04/11/24 1138)  SpO2: (!) 93 % (04/11/24 1138) Vital Signs (24h Range):  Temp:  [97.7 °F (36.5 °C)-98.3 °F (36.8 °C)] 98.1 °F (36.7 °C)  Pulse:  [53-58] 55  Resp:  [16-18] 18  SpO2:  [93 %-98 %] 93 %  BP: (123-145)/(74-84) 123/74     Weight: 73 kg (160 lb 15 oz)  Body mass index is 31.43 kg/m².    Intake/Output Summary (Last 24 hours) at 4/11/2024 1315  Last data filed at 4/11/2024 1310  Gross per 24 hour   Intake 1500 ml   Output 1050 ml   Net 450 ml           Physical Exam  Vitals and nursing note reviewed.   Constitutional:       General: She is not in acute distress.     Appearance: She is well-developed. She is obese. She is ill-appearing (Chronically). She is not diaphoretic.   HENT:      Right Ear: External ear normal.      Left Ear: External ear normal.      Mouth/Throat:      Pharynx: Oropharynx is clear.   Eyes:      General: No scleral icterus.  Cardiovascular:      Comments: Jvd  Lower extremity edema pitting, tracking up to abdomen  Pulmonary:      Effort: Pulmonary effort is normal. No respiratory distress.   Musculoskeletal:         General: Signs of injury present. No deformity.      Cervical back: Normal range of motion and neck supple.      Right lower leg: Edema present.      Left lower leg: Edema present.   Skin:     General: Skin is warm and dry.      Coloration: Skin is not jaundiced or pale.      Findings: Abrasion and bruising present.   Neurological:      Mental Status: She is alert and oriented to person, place, and time. Mental status is at baseline.      Motor: No seizure activity.   Psychiatric:         Attention and Perception: Attention normal.         Behavior: Behavior normal. Behavior is not aggressive or combative. Behavior is cooperative.             Significant Labs: All pertinent labs within the past 24 hours have been reviewed.    Recent Results (from the past 24 hour(s))   Comprehensive  Metabolic Panel    Collection Time: 04/11/24  4:14 AM   Result Value Ref Range    Sodium 133 (L) 136 - 145 mmol/L    Potassium 4.2 3.5 - 5.1 mmol/L    Chloride 93 (L) 95 - 110 mmol/L    CO2 20 (L) 23 - 29 mmol/L    Glucose 72 70 - 110 mg/dL    BUN 96 (H) 6 - 20 mg/dL    Creatinine 4.3 (H) 0.5 - 1.4 mg/dL    Calcium 8.9 8.7 - 10.5 mg/dL    Total Protein 6.7 6.0 - 8.4 g/dL    Albumin 3.1 (L) 3.5 - 5.2 g/dL    Total Bilirubin 2.0 (H) 0.1 - 1.0 mg/dL    Alkaline Phosphatase 109 55 - 135 U/L    AST 37 10 - 40 U/L    ALT 24 10 - 44 U/L    eGFR 11.6 (A) >60 mL/min/1.73 m^2    Anion Gap 20 (H) 8 - 16 mmol/L   Magnesium    Collection Time: 04/11/24  4:14 AM   Result Value Ref Range    Magnesium 2.3 1.6 - 2.6 mg/dL   Phosphorus    Collection Time: 04/11/24  4:14 AM   Result Value Ref Range    Phosphorus 5.1 (H) 2.7 - 4.5 mg/dL   CBC Auto Differential    Collection Time: 04/11/24  4:15 AM   Result Value Ref Range    WBC 5.11 3.90 - 12.70 K/uL    RBC 4.04 4.00 - 5.40 M/uL    Hemoglobin 9.2 (L) 12.0 - 16.0 g/dL    Hematocrit 32.5 (L) 37.0 - 48.5 %    MCV 80 (L) 82 - 98 fL    MCH 22.8 (L) 27.0 - 31.0 pg    MCHC 28.3 (L) 32.0 - 36.0 g/dL    RDW 21.8 (H) 11.5 - 14.5 %    Platelets 151 150 - 450 K/uL    MPV SEE COMMENT 9.2 - 12.9 fL    Immature Granulocytes 0.4 0.0 - 0.5 %    Gran # (ANC) 3.5 1.8 - 7.7 K/uL    Immature Grans (Abs) 0.02 0.00 - 0.04 K/uL    Lymph # 0.9 (L) 1.0 - 4.8 K/uL    Mono # 0.6 0.3 - 1.0 K/uL    Eos # 0.1 0.0 - 0.5 K/uL    Baso # 0.03 0.00 - 0.20 K/uL    nRBC 0 0 /100 WBC    Gran % 69.2 38.0 - 73.0 %    Lymph % 17.2 (L) 18.0 - 48.0 %    Mono % 11.2 4.0 - 15.0 %    Eosinophil % 1.4 0.0 - 8.0 %    Basophil % 0.6 0.0 - 1.9 %    Differential Method Automated          Significant Imaging: I have reviewed all pertinent imaging results/findings within the past 24 hours.    Assessment/Plan:      * Acute on chronic heart failure with preserved ejection fraction (HFpEF)  Mitral regurgitation   Anasarca    - Clinically  volume overloaded on admission  - most recent echo below, grade 2 diastolic dysfunction  - CXR with chronic cardiomegaly  - BNP 4,639   - cardiology and nephrology following  -lasix 120mg BID + Diuril 500mg for goal net negative of atleast 0.5 liter in the next 24 hours   - Continue home cardioprudent regimen as clinically indicated and tolerated  - Strict I/Os  - 1.5L fluid restriction , cardiac diet  - Daily weights  - Will continue to monitor on tele    Results for orders placed during the hospital encounter of 04/01/24    Echo    Interpretation Summary    Left Ventricle: The left ventricle is normal in size. Normal wall thickness. Normal wall motion. There is normal systolic function with a visually estimated ejection fraction of 60 - 65%. Grade II diastolic dysfunction.    Right Ventricle: Normal right ventricular cavity size. Wall thickness is normal. Right ventricle wall motion  is normal. Systolic function is reduced.    Left Atrium: Left atrium is very  severely dilated.    Right Atrium: Right atrium is mildly dilated.    Aortic Valve: There is mild aortic valve sclerosis. There is normal leaflet mobility. There is trace aortic regurgitation.    Mitral Valve: There is mild bileaflet sclerosis. There is posterior leaflet tethering and failure of complete coaptation. There is very severe regurgitation with a posterolateral eccentriccally directed jet.    Tricuspid Valve: The tricuspid valve is structurally normal. There is normal leaflet mobility. There is moderate to severe regurgitation.    IVC/SVC: IVC was not well visualized due to poor acoustic window. Intermediate venous pressure at 8 mmHg.    Pericardium: There is a trivial effusion posteriorly and small under the RA.        Stage 4 chronic kidney disease  Hyperphosphatemia    Cr baseline 3.1-3.5, during admission 3.7-3.9  Lab Results   Component Value Date    CREATININE 4.3 (H) 04/11/2024   Poor urine output   Jin in place due to urinary retention;  renal ultrasound without hydronephrosis  Nephrology consulted, appreciate assistance  Cardiology consulted, appreciate assistance  Renally dose all medications  Avoid nephrotoxins  Will continue to monitor on daily labs  lasix 120mg BID + Diuril 500mg for goal net negative of atleast 0.5 liter in the next 24 hours   Renvela for hyperphosphatemia    Acute urinary retention  Jin catheter in place.  See CKD.      Leg wound, left  Appreciate Wound Care. Give hydrocodone-acetaminophen prn for pain due to hematoma. Monitor for infection due to excessive weeping from leg edema.      Fall  Accidental.  Volume overload possibly contributing.  PT/OT following.    Anasarca        Constipation  Giving polyethylene glycol.  As needed    Anemia  Stable.   Recent Labs   Lab 04/11/24  0415   WBC 5.11   HGB 9.2*   HCT 32.5*            Class 2 obesity in adult  Body mass index is 30.86 kg/m². Morbid obesity complicates all aspects of disease management from diagnostic modalities to treatment. Weight loss encouraged and health benefits explained to patient.     Essential hypertension  Holding carvedilol.  Isosorbide dinitrate to help reduce afterload in setting of MR.  Monitor BP.      VTE Risk Mitigation (From admission, onward)           Ordered     Place sequential compression device  Until discontinued         04/03/24 1347     heparin (porcine) injection 5,000 Units  Every 8 hours         04/01/24 1030     IP VTE HIGH RISK PATIENT  Once         04/01/24 1030     Place sequential compression device  Until discontinued         04/01/24 1030                    Discharge Planning   RILEY: 4/15/2024     Code Status: Full Code   Is the patient medically ready for discharge?: No    Reason for patient still in hospital (select all that apply): Patient unstable, Laboratory test, Treatment, Consult recommendations, PT / OT recommendations, and Pending disposition  Discharge Plan A: Home with family   Discharge Delays: None known at  this time              Minda Molina MD  Department of Hospital Medicine   [facility] - Telemetry Stepdown

## 2024-04-11 NOTE — ASSESSMENT & PLAN NOTE
Hyperphosphatemia    Cr baseline 3.1-3.5, during admission 3.7-3.9  Lab Results   Component Value Date    CREATININE 4.3 (H) 04/11/2024   Poor urine output   Jin in place due to urinary retention; renal ultrasound without hydronephrosis  Nephrology consulted, appreciate assistance  Cardiology consulted, appreciate assistance  Renally dose all medications  Avoid nephrotoxins  Will continue to monitor on daily labs  lasix 120mg BID + Diuril 500mg for goal net negative of atleast 0.5 liter in the next 24 hours   Renvela for hyperphosphatemia

## 2024-04-11 NOTE — CONSULTS
[facility] - Telemetry Stepdown  Cardiology  Consult Note    Patient Name: Xena Vera  MRN: [id number]  Admission Date: 4/1/2024  Hospital Length of Stay: 8 days  Code Status: Full Code   Attending Provider: Minda Molina MD   Consulting Provider: Tez Guerra MD  Primary Care Physician: Tami Villeda FNP  Principal Problem:Acute on chronic heart failure with preserved ejection fraction (HFpEF)    Patient information was obtained from patient, past medical records, and ER records.     Inpatient consult to Cardiology  Consult performed by: Tez Guerra MD  Consult ordered by: Minda Molina MD        Subjective:     Chief Complaint:  mechanical fall     HPI:   Patient is a 53 year old female with CKD Stage 4, HFpEF, severe Mitral Valve Regurgitation and multiple HF admission presents for evaluation for Mitraclip that presents s/p mechanical fall at home when she slipped and fell in the grass. The patient states she did not lose consciousness. She does report significant shortness of breath with minimal exertion and and profound leg swelling. She states that this has been going on for months but acutely worsened over the past few weeks. She has also noticed her abdomen swelling and weight gain (unsure how much exactly). Denies chest discomfort, dizziness, lightheadedness, palpitations, claudication.   BNP ~ 4300s as compared to 8 months ago (~350)      Past Medical History:   Diagnosis Date    (HFpEF) heart failure with preserved ejection fraction 06/24/2023    EF 63% with G2 DD  Continue lasix, appears euvolemic today  Continue good BP management with losartan, increase Coreg 6.25 mg BID  Fluid restriction, low sodium diet  Recommend Jardiance- patient had CKD 4 and this is a limiting factor- nephro eval pending    Anemia 06/24/2023    CKD (chronic kidney disease)     HTN (hypertension)     Mitral valve regurgitation 06/26/2023    Refer to structural for evaluation  May benefit from mitral clip    Ovarian  cyst     Stage 4 chronic kidney disease 2023    Refer to nephrology at [facility] as unable to get established with [facility] and the phone number for the outside nephrologist provided to patient during her recent hospitalization goes unanswered when called.   Cr remains elevated  Would like to start SGLT-2 and appreciate nephrology expertise        No past surgical history on file.    Review of patient's allergies indicates:  No Known Allergies    No current facility-administered medications on file prior to encounter.     Current Outpatient Medications on File Prior to Encounter   Medication Sig    aspirin (ECOTRIN) 81 MG EC tablet Take 1 tablet by mouth once daily.    atorvastatin (LIPITOR) 40 MG tablet Take 1 tablet by mouth once daily.    atorvastatin (LIPITOR) 40 MG tablet Take 1 tablet by mouth every day    carvediloL (COREG) 3.125 MG tablet Take 2 tablets (6.25 mg total) by mouth 2 (two) times daily with meals.    carvediloL (COREG) 6.25 MG tablet Take 1 tablet by mouth 2 times daily with food    furosemide (LASIX) 40 MG tablet Take 1 tablet (40 mg total) by mouth 2 (two) times a day.    furosemide (LASIX) 40 MG tablet Take 1 tablet by mouth once daily for 90 days    loratadine (CLARITIN) 10 mg tablet Take 10 mg by mouth once daily. PRN    sevelamer carbonate (RENVELA) 800 mg Tab Take 1 tablet (800 mg total) by mouth 3 (three) times daily with meals.    sodium bicarbonate 650 MG tablet Take 1 tablet (650 mg total) by mouth 2 (two) times daily.     Family History    None       Tobacco Use    Smoking status: Former     Current packs/day: 0.00     Types: Cigarettes     Start date:      Quit date:      Years since quittin.2    Smokeless tobacco: Never    Tobacco comments:     2-3 cigarettes a day   Substance and Sexual Activity    Alcohol use: Never    Drug use: Never    Sexual activity: Not on file     Review of Systems   Constitutional: Negative for chills and fever.   Cardiovascular:   Positive for leg swelling. Negative for chest pain, orthopnea and palpitations.   Respiratory:  Positive for shortness of breath. Negative for cough.    Gastrointestinal:  Negative for abdominal pain.   Neurological:  Negative for dizziness, headaches and light-headedness.   Psychiatric/Behavioral:  Negative for altered mental status.      Objective:     Vital Signs (Most Recent):  Temp: 98.1 °F (36.7 °C) (04/11/24 1138)  Pulse: (!) 55 (04/11/24 1138)  Resp: 18 (04/11/24 1138)  BP: 123/74 (04/11/24 1138)  SpO2: (!) 93 % (04/11/24 1138) Vital Signs (24h Range):  Temp:  [97.7 °F (36.5 °C)-98.3 °F (36.8 °C)] 98.1 °F (36.7 °C)  Pulse:  [53-58] 55  Resp:  [16-18] 18  SpO2:  [93 %-98 %] 93 %  BP: (123-145)/(74-84) 123/74     Weight: 73 kg (160 lb 15 oz)  Body mass index is 31.43 kg/m².    SpO2: (!) 93 %         Intake/Output Summary (Last 24 hours) at 4/11/2024 1252  Last data filed at 4/11/2024 1006  Gross per 24 hour   Intake 1260 ml   Output 1050 ml   Net 210 ml       Lines/Drains/Airways       Drain  Duration                  Urethral Catheter 04/09/24 1330 16 Fr. 1 day              Peripheral Intravenous Line  Duration                  Peripheral IV - Single Lumen 04/10/24 2100 22 G Posterior;Right Hand <1 day                     Physical Exam  Vitals reviewed.   Constitutional:       General: She is not in acute distress.     Appearance: Normal appearance. She is not toxic-appearing.   HENT:      Head: Normocephalic and atraumatic.      Mouth/Throat:      Mouth: Mucous membranes are moist.   Neck:      Comments: JVP to the mandible  Cardiovascular:      Rate and Rhythm: Normal rate and regular rhythm.      Heart sounds: Murmur (at 5th intercostal and MCL, holosystolic) heard.      No friction rub. No gallop.   Pulmonary:      Effort: Pulmonary effort is normal. No respiratory distress.      Breath sounds: Normal breath sounds.   Abdominal:      Palpations: Abdomen is soft.   Musculoskeletal:      Right lower leg:  Edema (2+) present.      Left lower leg: Edema (2+ with left shin and lateral side wound, appears chronic) present.   Skin:     General: Skin is warm.   Neurological:      Mental Status: She is alert and oriented to person, place, and time. Mental status is at baseline.          Significant Labs: BMP:   Recent Labs   Lab 04/10/24  0439 04/11/24  0414   GLU 67* 72    133*   K 4.1 4.2   CL 97 93*   CO2 23 20*   * 96*   CREATININE 3.7* 4.3*   CALCIUM 8.6* 8.9   MG  --  2.3   , CMP   Recent Labs   Lab 04/10/24  0439 04/11/24  0414    133*   K 4.1 4.2   CL 97 93*   CO2 23 20*   GLU 67* 72   * 96*   CREATININE 3.7* 4.3*   CALCIUM 8.6* 8.9   PROT  --  6.7   ALBUMIN  --  3.1*   BILITOT  --  2.0*   ALKPHOS  --  109   AST  --  37   ALT  --  24   ANIONGAP 16 20*   , CBC   Recent Labs   Lab 04/11/24 0415   WBC 5.11   HGB 9.2*   HCT 32.5*      BNP 4300 on presentation    , and All pertinent lab results from the last 24 hours have been reviewed.    Significant Imaging: Echocardiogram: Transthoracic echo (TTE) complete (Cupid Only):   Results for orders placed or performed during the hospital encounter of 04/01/24   Echo   Result Value Ref Range    RA Width 3.24 cm    Left Atrium Major Axis 8.41 cm    Left Atrium Minor Axis 7.66 cm    RA Major Axis 5.04 cm    LV Diastolic Volume 126.79 mL    LV Systolic Volume 42.78 mL    MV Peak A Alberto 0.85 m/s    MV stenosis pressure 1/2 time 74.15 ms    TR Max Alberto 4.38 m/s    MV Peak E Alberto 1.98 m/s    Ao VTI 19.64 cm    Ao peak alberto 1.02 m/s    LVOT peak VTI 9.24 cm    LVOT peak alberto 0.61 m/s    LVOT diameter 1.91 cm    E wave deceleration time 255.70 msec    AV mean gradient 2 mmHg    TAPSE 1.37 cm    RVDD 3.66 cm    LA size 5.98 cm    Ascending aorta 3.02 cm    STJ 2.15 cm    Sinus 2.36 cm    LVIDs 3.26 2.1 - 4.0 cm    Posterior Wall 1.06 0.6 - 1.1 cm    IVS 0.64 0.6 - 1.1 cm    LVIDd 5.15 3.5 - 6.0 cm    TDI LATERAL 0.07 m/s    LA WIDTH 5.26 cm    TDI SEPTAL  0.06 m/s    LV LATERAL E/E' RATIO 28.29 m/s    LV SEPTAL E/E' RATIO 33.00 m/s    FS 37 28 - 44 %    LA volume 214.36 cm3    LV mass 154.38 g    ZLVIDD 0.90     ZLVIDS 0.88     Left Ventricle Relative Wall Thickness 0.41 cm    AV valve area 1.35 cm²    AV Velocity Ratio 0.60     AV index (prosthetic) 0.47     MV valve area p 1/2 method 2.97 cm2    E/A ratio 2.33     Mean e' 0.07 m/s    LVOT area 2.9 cm2    LVOT stroke volume 26.46 cm3    AV peak gradient 4 mmHg    E/E' ratio 30.46 m/s    LV Systolic Volume Index 25.3 mL/m2    LV Diastolic Volume Index 75.02 mL/m2    LA Volume Index 126.8 mL/m2    LV Mass Index 91 g/m2    Triscuspid Valve Regurgitation Peak Gradient 77 mmHg    HAYDEE by Velocity Ratio 1.71 cm²    BSA 1.74 m2    TV resting pulmonary artery pressure 85 mmHg    RV TB RVSP 12 mmHg    Est. RA pres 8 mmHg    Narrative      Left Ventricle: The left ventricle is normal in size. Normal wall   thickness. Normal wall motion. There is normal systolic function with a   visually estimated ejection fraction of 60 - 65%. Grade II diastolic   dysfunction.    Right Ventricle: Normal right ventricular cavity size. Wall thickness   is normal. Right ventricle wall motion  is normal. Systolic function is   reduced.    Left Atrium: Left atrium is very  severely dilated.    Right Atrium: Right atrium is mildly dilated.    Aortic Valve: There is mild aortic valve sclerosis. There is normal   leaflet mobility. There is trace aortic regurgitation.    Mitral Valve: There is mild bileaflet sclerosis. There is posterior   leaflet tethering and failure of complete coaptation. There is very severe   regurgitation with a posterolateral eccentriccally directed jet.    Tricuspid Valve: The tricuspid valve is structurally normal. There is   normal leaflet mobility. There is moderate to severe regurgitation.    IVC/SVC: IVC was not well visualized due to poor acoustic window.   Intermediate venous pressure at 8 mmHg.    Pericardium: There  is a trivial effusion posteriorly and small under   the RA.       Assessment and Plan:     * Acute on chronic heart failure with preserved ejection fraction (HFpEF)  Patient is a 53 year old female with HFpEF and severe MR as well as advanced kidney disease (CKD 4) that presents after mechanical fall with dyspnea and leg swelling and was admitted for heart failure exacerbation. BNP 4300 >> ~350 8 months ago. Patient has had severe MR since at least July 2023. The MR appears to be secondary to posterior leaflet restriction and leaflet length of 1.1 cm. Attempts of diuresis have been escalated to 100 mg IV TID + Diuril 500 mg IV BID.   For now, would recommend replacing Lasix 100 mg IV TID with Lasix 120 mg IV push x 1 followed by Lasix 30 mg/hr. Uptitrate to meet diuresis goals  Continue Diuril 500 mg IV BID  Recommend afterload reduction by optimizing BP control (target / 70 mm Hg) to augment forward flow, this may also help kidney dysfunction.   Target net negative 2 L daily  Monitor labs, [place] Cr which has been uptrending  Appreciate communication with Nephrology and primary, monitor for HD needs; unlikely to be a candidate for coronary angiogram unless Cr improves or started on HD (recommended by Interventional team to rule out obstructive CAD as part of [place]innette Clip workup)    Mitral valve regurgitation  Severe MR. The MR appears to be secondary to posterior leaflet restriction and leaflet length of 1.1 cm.   Needs coronary angiogram to rule out obstructive CAD , then POOJA, then possible MitraClip        VTE Risk Mitigation (From admission, onward)           Ordered     Place sequential compression device  Until discontinued         04/03/24 1347     heparin (porcine) injection 5,000 Units  Every 8 hours         04/01/24 1030     IP VTE HIGH RISK PATIENT  Once         04/01/24 1030     Place sequential compression device  Until discontinued         04/01/24 1030                Discussed with staff.    Thank  you for your consult. We will follow-up with patient. Please contact us if you have any additional questions.    Tez Guerra MD  Cardiology PGY6  [facility] - Telemetry Stepdown

## 2024-04-11 NOTE — PLAN OF CARE
Problem: Physical Therapy  Goal: Physical Therapy Goal  Description: Goals to be met by: 24     Patient will increase functional independence with mobility by performin. Supine to sit with Modified [place]  2. Sit to supine with Modified [place]  3. Sit to stand transfer with Supervision  4. Bed to chair transfer with Supervision using Rolling Walker  5. Gait  x 100 feet with Supervision using Rolling Walker.   6. Ascend/descend 5 stair with bilateral Handrails Supervision using No Assistive Device.   7. Lower extremity exercise program x15 reps per handout, with assistance as needed    Patient educated on role of PT in acute care, PT POC, and PT goals.  PT Evaluation completed 2024   PT goals and POC established.     Outcome: Ongoing, Progressing

## 2024-04-12 PROBLEM — E87.6 HYPOKALEMIA: Status: ACTIVE | Noted: 2024-04-12

## 2024-04-12 PROBLEM — N17.9 AKI (ACUTE KIDNEY INJURY): Status: ACTIVE | Noted: 2024-04-12

## 2024-04-12 LAB
ALBUMIN SERPL BCP-MCNC: 2.9 G/DL (ref 3.5–5.2)
ALBUMIN SERPL BCP-MCNC: 3.1 G/DL (ref 3.5–5.2)
ALP SERPL-CCNC: 93 U/L (ref 55–135)
ALT SERPL W/O P-5'-P-CCNC: 28 U/L (ref 10–44)
ANION GAP SERPL CALC-SCNC: 12 MMOL/L (ref 8–16)
ANION GAP SERPL CALC-SCNC: 15 MMOL/L (ref 8–16)
AST SERPL-CCNC: 47 U/L (ref 10–40)
BASOPHILS # BLD AUTO: 0.02 K/UL (ref 0–0.2)
BASOPHILS NFR BLD: 0.4 % (ref 0–1.9)
BILIRUB SERPL-MCNC: 1.7 MG/DL (ref 0.1–1)
BUN SERPL-MCNC: 105 MG/DL (ref 6–20)
BUN SERPL-MCNC: 108 MG/DL (ref 6–20)
CALCIUM SERPL-MCNC: 8.5 MG/DL (ref 8.7–10.5)
CALCIUM SERPL-MCNC: 8.5 MG/DL (ref 8.7–10.5)
CHLORIDE SERPL-SCNC: 91 MMOL/L (ref 95–110)
CHLORIDE SERPL-SCNC: 92 MMOL/L (ref 95–110)
CO2 SERPL-SCNC: 25 MMOL/L (ref 23–29)
CO2 SERPL-SCNC: 28 MMOL/L (ref 23–29)
CREAT SERPL-MCNC: 4.3 MG/DL (ref 0.5–1.4)
CREAT SERPL-MCNC: 4.5 MG/DL (ref 0.5–1.4)
DIFFERENTIAL METHOD BLD: ABNORMAL
EOSINOPHIL # BLD AUTO: 0.1 K/UL (ref 0–0.5)
EOSINOPHIL NFR BLD: 1.6 % (ref 0–8)
ERYTHROCYTE [DISTWIDTH] IN BLOOD BY AUTOMATED COUNT: 21.6 % (ref 11.5–14.5)
EST. GFR  (NO RACE VARIABLE): 11 ML/MIN/1.73 M^2
EST. GFR  (NO RACE VARIABLE): 11.6 ML/MIN/1.73 M^2
GLUCOSE SERPL-MCNC: 172 MG/DL (ref 70–110)
GLUCOSE SERPL-MCNC: 90 MG/DL (ref 70–110)
HCT VFR BLD AUTO: 30.1 % (ref 37–48.5)
HGB BLD-MCNC: 8.8 G/DL (ref 12–16)
IMM GRANULOCYTES # BLD AUTO: 0.02 K/UL (ref 0–0.04)
IMM GRANULOCYTES NFR BLD AUTO: 0.4 % (ref 0–0.5)
LYMPHOCYTES # BLD AUTO: 0.8 K/UL (ref 1–4.8)
LYMPHOCYTES NFR BLD: 15.4 % (ref 18–48)
MAGNESIUM SERPL-MCNC: 2.2 MG/DL (ref 1.6–2.6)
MCH RBC QN AUTO: 23.5 PG (ref 27–31)
MCHC RBC AUTO-ENTMCNC: 29.2 G/DL (ref 32–36)
MCV RBC AUTO: 80 FL (ref 82–98)
MONOCYTES # BLD AUTO: 0.7 K/UL (ref 0.3–1)
MONOCYTES NFR BLD: 12.9 % (ref 4–15)
NEUTROPHILS # BLD AUTO: 3.5 K/UL (ref 1.8–7.7)
NEUTROPHILS NFR BLD: 69.3 % (ref 38–73)
NRBC BLD-RTO: 0 /100 WBC
PHOSPHATE SERPL-MCNC: 4.5 MG/DL (ref 2.7–4.5)
PHOSPHATE SERPL-MCNC: 4.6 MG/DL (ref 2.7–4.5)
PLATELET # BLD AUTO: 142 K/UL (ref 150–450)
PMV BLD AUTO: ABNORMAL FL (ref 9.2–12.9)
POTASSIUM SERPL-SCNC: 3.2 MMOL/L (ref 3.5–5.1)
POTASSIUM SERPL-SCNC: 3.9 MMOL/L (ref 3.5–5.1)
PROT SERPL-MCNC: 6.2 G/DL (ref 6–8.4)
RBC # BLD AUTO: 3.75 M/UL (ref 4–5.4)
SODIUM SERPL-SCNC: 131 MMOL/L (ref 136–145)
SODIUM SERPL-SCNC: 132 MMOL/L (ref 136–145)
WBC # BLD AUTO: 5.05 K/UL (ref 3.9–12.7)

## 2024-04-12 PROCEDURE — 83735 ASSAY OF MAGNESIUM: CPT | Performed by: STUDENT IN AN ORGANIZED HEALTH CARE EDUCATION/TRAINING PROGRAM

## 2024-04-12 PROCEDURE — 80069 RENAL FUNCTION PANEL: CPT | Performed by: STUDENT IN AN ORGANIZED HEALTH CARE EDUCATION/TRAINING PROGRAM

## 2024-04-12 PROCEDURE — 25000003 PHARM REV CODE 250: Performed by: EMERGENCY MEDICINE

## 2024-04-12 PROCEDURE — 85025 COMPLETE CBC W/AUTO DIFF WBC: CPT | Performed by: STUDENT IN AN ORGANIZED HEALTH CARE EDUCATION/TRAINING PROGRAM

## 2024-04-12 PROCEDURE — 36415 COLL VENOUS BLD VENIPUNCTURE: CPT | Performed by: STUDENT IN AN ORGANIZED HEALTH CARE EDUCATION/TRAINING PROGRAM

## 2024-04-12 PROCEDURE — 80053 COMPREHEN METABOLIC PANEL: CPT | Performed by: STUDENT IN AN ORGANIZED HEALTH CARE EDUCATION/TRAINING PROGRAM

## 2024-04-12 PROCEDURE — 97116 GAIT TRAINING THERAPY: CPT | Mod: CQ

## 2024-04-12 PROCEDURE — 63600175 PHARM REV CODE 636 W HCPCS: Performed by: PHYSICIAN ASSISTANT

## 2024-04-12 PROCEDURE — 20600001 HC STEP DOWN PRIVATE ROOM

## 2024-04-12 PROCEDURE — 99233 SBSQ HOSP IP/OBS HIGH 50: CPT | Mod: ,,, | Performed by: INTERNAL MEDICINE

## 2024-04-12 PROCEDURE — 63600175 PHARM REV CODE 636 W HCPCS: Performed by: STUDENT IN AN ORGANIZED HEALTH CARE EDUCATION/TRAINING PROGRAM

## 2024-04-12 PROCEDURE — 99232 SBSQ HOSP IP/OBS MODERATE 35: CPT | Mod: ,,, | Performed by: INTERNAL MEDICINE

## 2024-04-12 PROCEDURE — 97530 THERAPEUTIC ACTIVITIES: CPT | Mod: CQ

## 2024-04-12 PROCEDURE — 25000003 PHARM REV CODE 250: Performed by: INTERNAL MEDICINE

## 2024-04-12 PROCEDURE — 84100 ASSAY OF PHOSPHORUS: CPT | Performed by: STUDENT IN AN ORGANIZED HEALTH CARE EDUCATION/TRAINING PROGRAM

## 2024-04-12 PROCEDURE — 63600175 PHARM REV CODE 636 W HCPCS: Performed by: INTERNAL MEDICINE

## 2024-04-12 PROCEDURE — 25000003 PHARM REV CODE 250: Performed by: STUDENT IN AN ORGANIZED HEALTH CARE EDUCATION/TRAINING PROGRAM

## 2024-04-12 PROCEDURE — 25000003 PHARM REV CODE 250: Performed by: HOSPITALIST

## 2024-04-12 PROCEDURE — 25000003 PHARM REV CODE 250: Performed by: PHYSICIAN ASSISTANT

## 2024-04-12 RX ORDER — POTASSIUM CHLORIDE 750 MG/1
30 CAPSULE, EXTENDED RELEASE ORAL ONCE
Status: COMPLETED | OUTPATIENT
Start: 2024-04-12 | End: 2024-04-12

## 2024-04-12 RX ORDER — ISOSORBIDE DINITRATE 20 MG/1
20 TABLET ORAL 2 TIMES DAILY
Status: DISCONTINUED | OUTPATIENT
Start: 2024-04-12 | End: 2024-04-14

## 2024-04-12 RX ORDER — OXYBUTYNIN CHLORIDE 5 MG/1
5 TABLET ORAL 3 TIMES DAILY
Status: DISCONTINUED | OUTPATIENT
Start: 2024-04-12 | End: 2024-05-09 | Stop reason: HOSPADM

## 2024-04-12 RX ORDER — HYDRALAZINE HYDROCHLORIDE 25 MG/1
25 TABLET, FILM COATED ORAL EVERY 8 HOURS
Status: DISCONTINUED | OUTPATIENT
Start: 2024-04-12 | End: 2024-04-13

## 2024-04-12 RX ORDER — FUROSEMIDE 10 MG/ML
120 INJECTION INTRAMUSCULAR; INTRAVENOUS ONCE
Status: COMPLETED | OUTPATIENT
Start: 2024-04-12 | End: 2024-04-12

## 2024-04-12 RX ADMIN — ASPIRIN 81 MG: 81 TABLET, COATED ORAL at 09:04

## 2024-04-12 RX ADMIN — POTASSIUM CHLORIDE 30 MEQ: 10 CAPSULE, COATED, EXTENDED RELEASE ORAL at 09:04

## 2024-04-12 RX ADMIN — FUROSEMIDE 120 MG: 10 INJECTION, SOLUTION INTRAVENOUS at 06:04

## 2024-04-12 RX ADMIN — SEVELAMER CARBONATE 800 MG: 800 TABLET, FILM COATED ORAL at 06:04

## 2024-04-12 RX ADMIN — HEPARIN SODIUM 5000 UNITS: 5000 INJECTION INTRAVENOUS; SUBCUTANEOUS at 09:04

## 2024-04-12 RX ADMIN — SEVELAMER CARBONATE 800 MG: 800 TABLET, FILM COATED ORAL at 09:04

## 2024-04-12 RX ADMIN — HYDROCODONE BITARTRATE AND ACETAMINOPHEN 1 TABLET: 5; 325 TABLET ORAL at 12:04

## 2024-04-12 RX ADMIN — ATORVASTATIN CALCIUM 40 MG: 40 TABLET, FILM COATED ORAL at 09:04

## 2024-04-12 RX ADMIN — Medication 6 MG: at 03:04

## 2024-04-12 RX ADMIN — CHLOROTHIAZIDE SODIUM 500 MG: 500 INJECTION, POWDER, LYOPHILIZED, FOR SOLUTION INTRAVENOUS at 04:04

## 2024-04-12 RX ADMIN — SEVELAMER CARBONATE 800 MG: 800 TABLET, FILM COATED ORAL at 01:04

## 2024-04-12 RX ADMIN — FUROSEMIDE 30 MG/HR: 10 INJECTION, SOLUTION INTRAMUSCULAR; INTRAVENOUS at 12:04

## 2024-04-12 RX ADMIN — HEPARIN SODIUM 5000 UNITS: 5000 INJECTION INTRAVENOUS; SUBCUTANEOUS at 01:04

## 2024-04-12 RX ADMIN — HYDRALAZINE HYDROCHLORIDE 25 MG: 25 TABLET ORAL at 01:04

## 2024-04-12 RX ADMIN — OXYBUTYNIN CHLORIDE 5 MG: 5 TABLET ORAL at 09:04

## 2024-04-12 RX ADMIN — ISOSORBIDE DINITRATE 20 MG: 20 TABLET ORAL at 09:04

## 2024-04-12 RX ADMIN — HEPARIN SODIUM 5000 UNITS: 5000 INJECTION INTRAVENOUS; SUBCUTANEOUS at 06:04

## 2024-04-12 RX ADMIN — HYDRALAZINE HYDROCHLORIDE 25 MG: 25 TABLET ORAL at 09:04

## 2024-04-12 RX ADMIN — Medication: at 09:04

## 2024-04-12 RX ADMIN — POLYETHYLENE GLYCOL 3350 17 G: 17 POWDER, FOR SOLUTION ORAL at 09:04

## 2024-04-12 RX ADMIN — CHLOROTHIAZIDE SODIUM 500 MG: 500 INJECTION, POWDER, LYOPHILIZED, FOR SOLUTION INTRAVENOUS at 06:04

## 2024-04-12 RX ADMIN — FUROSEMIDE 40 MG/HR: 10 INJECTION, SOLUTION INTRAMUSCULAR; INTRAVENOUS at 06:04

## 2024-04-12 NOTE — PT/OT/SLP PROGRESS
Physical Therapy Treatment    Patient Name:  Xena Vera   MRN:  [id number]    Recommendations:     Discharge Recommendations: Moderate Intensity Therapy  Discharge Equipment Recommendations: walker, rolling  Barriers to discharge:  increased skilled assistance required    Assessment:     Xena Vera is a 53 y.o. female admitted with a medical diagnosis of Acute on chronic heart failure with preserved ejection fraction (HFpEF).  She presents with the following impairments/functional limitations: weakness, impaired balance, impaired skin, pain, edema, impaired endurance, impaired functional mobility, gait instability, impaired cardiopulmonary response to activity, decreased lower extremity function, decreased ROM, impaired self care skills.    Pt tolerated treatment well today. PT interventions focused on safety, functional transfers, standing balance, gait training, and seated therapeutic exercise. Pt able to follow simple commands both in English and Greek, not requiring outside  service. Pt will continue to benefit from skilled PT services in order to further promote functional mobility, endurance, and BLE strengthening. Pt remains appropriate for PT treatment at this time.      Rehab Prognosis: Good; patient would benefit from acute skilled PT services to address these deficits and reach maximum level of function.    Recent Surgery: * No surgery found *      Plan:     During this hospitalization, patient to be seen 4 x/week to address the identified rehab impairments via gait training, therapeutic activities, therapeutic exercises, neuromuscular re-education and progress toward the following goals:    Plan of Care Expires:  05/11/24    Subjective     Chief Complaint: left lower leg pain  Patient/Family Comments/goals: gain strength, return to PLOF  Pain/Comfort:  Pain Rating 1: 6/10  Location - Side 1: Left  Location - Orientation 1: lower  Location 1: leg  Pain Addressed 1: Reposition,  Distraction  Pain Rating Post-Intervention 1: 6/10      Objective:     Communicated with nursing, PCT prior to session.  Patient found up in chair with telemetry, peripheral IV, browning catheter upon PT entry to room.     General Precautions: Standard, fall  Orthopedic Precautions: N/A  Braces: N/A  Respiratory Status: Room air     Functional Mobility:  Transfers:     Sit to Stand:  stand by assistance with no AD and rolling walker (from bedside chair and EOB)  Bed <> Chair: stand by assistance with  no AD  using ~3 ft Step Transfer  Gait: 25 ft + 25 ft using RW with Min A, antalgic gait pattern; PTA with significant management of telemetry, IV pole, and browning catheter   Balance:  static standing balance at RW with SBA; pt able to don/doff hospital gown and replace telemetry lines within hospital gown insert with SBA ~1-2 min    PCT present in room for wound care management near pt's upper left quadrant of her lateral/posterior abdomen (site of prophylactic heparin injections).      AM-PAC 6 CLICK MOBILITY  Turning over in bed (including adjusting bedclothes, sheets and blankets)?: 3  Sitting down on and standing up from a chair with arms (e.g., wheelchair, bedside commode, etc.): 4  Moving from lying on back to sitting on the side of the bed?: 3  Moving to and from a bed to a chair (including a wheelchair)?: 3  Need to walk in hospital room?: 3  Climbing 3-5 steps with a railing?: 2  Basic Mobility Total Score: 18       Treatment & Education:  Pt able to perform seated exercises consisting of ankle DF/PF, glute sets, B knee extension, B hip flexion, B hip abd/adduction, and HS curls (1x12). PTA providing visual demo before start of each exercise. All exercises incorporated to promote BLE strengthening and functional mobility.    Patient educated on importance of OOB activity to promote overall endurance.  Patient educated on role of PT in acute care, PT POC, and PT goals.  Patient educated on calling for assistance  for any needs to improve overall safety awareness, significant time spent to ensure patient adherence.    Patient left up in chair with all lines intact, call button in reach, and nurse notified..    GOALS:   Multidisciplinary Problems       Physical Therapy Goals          Problem: Physical Therapy    Goal Priority Disciplines Outcome Goal Variances Interventions   Physical Therapy Goal     PT, PT/OT Ongoing, Progressing     Description: Goals to be met by: 24     Patient will increase functional independence with mobility by performin. Supine to sit with Modified [place]  2. Sit to supine with Modified [place]  3. Sit to stand transfer with Supervision  4. Bed to chair transfer with Supervision using Rolling Walker  5. Gait  x 100 feet with Supervision using Rolling Walker.   6. Ascend/descend 5 stair with bilateral Handrails Supervision using No Assistive Device.   7. Lower extremity exercise program x15 reps per handout, with assistance as needed                         Time Tracking:     PT Received On: 24  PT Start Time: 1110     PT Stop Time: 1134  PT Total Time (min): 24 min     Billable Minutes: Gait Training 10 and Therapeutic Activity 14    Treatment Type: Treatment  PT/PTA: PTA     Number of PTA visits since last PT visit: 2024

## 2024-04-12 NOTE — PLAN OF CARE
Problem: Adult Inpatient Plan of Care  Goal: Plan of Care Review  Outcome: Ongoing, Progressing  Goal: Patient-Specific Goal (Individualized)  Outcome: Ongoing, Progressing  Goal: Absence of Hospital-Acquired Illness or Injury  Outcome: Ongoing, Progressing  Goal: Optimal Comfort and Wellbeing  Outcome: Ongoing, Progressing  Goal: Readiness for Transition of Care  Outcome: Ongoing, Progressing     Problem: Impaired Wound Healing  Goal: Optimal Wound Healing  Outcome: Ongoing, Progressing     POC reviewed. Pt remain lasix gtt. LLE Abrasion and left arm wrap due SC site oozing MD aware. Appetite improving. Adhere to fluid restrictions of 1.2L.  Pain manage with prn for LLE. Free from falls or injuries. Daughter @bedside. Call light in reach.

## 2024-04-12 NOTE — SUBJECTIVE & OBJECTIVE
Interval History: Patient has been improving with the Lasix gtt , escalated dose this morning to 40 mg/hr with Diuril BID. Cr plateaued this morning, hoping for improvement with decongestion. Also working on optimizing HTN management with changes in isosorbide dinitrate.     Review of Systems   Constitutional: Negative for chills and fever.   Cardiovascular:  Positive for leg swelling. Negative for chest pain, orthopnea and palpitations.   Respiratory:  Negative for cough and shortness of breath.    Gastrointestinal:  Negative for abdominal pain.   Neurological:  Negative for dizziness, headaches and light-headedness.   Psychiatric/Behavioral:  Negative for altered mental status.      Objective:     Vital Signs (Most Recent):  Temp: 97.7 °F (36.5 °C) (04/12/24 0757)  Pulse: (!) 57 (04/12/24 0757)  Resp: 18 (04/12/24 0757)  BP: 120/78 (04/12/24 0757)  SpO2: 95 % (04/12/24 0757) Vital Signs (24h Range):  Temp:  [97 °F (36.1 °C)-98.1 °F (36.7 °C)] 97.7 °F (36.5 °C)  Pulse:  [54-67] 57  Resp:  [18] 18  SpO2:  [93 %-98 %] 95 %  BP: (120-136)/(74-95) 120/78     Weight: 73 kg (160 lb 15 oz)  Body mass index is 31.43 kg/m².     SpO2: 95 %         Intake/Output Summary (Last 24 hours) at 4/12/2024 0821  Last data filed at 4/12/2024 0800  Gross per 24 hour   Intake 840 ml   Output 1700 ml   Net -860 ml       Lines/Drains/Airways       Drain  Duration                  Urethral Catheter 04/09/24 1330 16 Fr. 2 days              Peripheral Intravenous Line  Duration                  Peripheral IV - Single Lumen 04/10/24 2100 22 G Posterior;Right Hand 1 day         Peripheral IV - Single Lumen 04/12/24 0454 20 G Left;Posterior Forearm <1 day                       Physical Exam  Vitals and nursing note reviewed.   Constitutional:       General: She is not in acute distress.     Appearance: Normal appearance. She is obese. She is ill-appearing. She is not toxic-appearing.   HENT:      Head: Normocephalic and atraumatic.       Mouth/Throat:      Mouth: Mucous membranes are moist.   Neck:      Comments: JVP to the upper mid neck at 30* angle  Cardiovascular:      Rate and Rhythm: Normal rate and regular rhythm.      Heart sounds: Murmur (5th intercostal, apical, radiation to axila slightly softer today) heard.      No friction rub. No gallop.   Pulmonary:      Effort: Pulmonary effort is normal. No respiratory distress.      Breath sounds: Normal breath sounds.   Abdominal:      Palpations: Abdomen is soft.   Musculoskeletal:      Right lower leg: Edema (1+) present.      Left lower leg: Edema (1+) present.   Skin:     General: Skin is warm.   Neurological:      Mental Status: She is alert and oriented to person, place, and time. Mental status is at baseline.            Significant Labs: BMP:   Recent Labs   Lab 04/11/24 0414 04/11/24  1414 04/12/24  0601   GLU 72 90 90   * 133* 132*   K 4.2 3.9 3.2*   CL 93* 92* 92*   CO2 20* 25 25   BUN 96* 102* 105*   CREATININE 4.3* 4.3* 4.3*   CALCIUM 8.9 9.0 8.5*   MG 2.3  --  2.2   , CMP   Recent Labs   Lab 04/11/24 0414 04/11/24  1414 04/12/24  0601   * 133* 132*   K 4.2 3.9 3.2*   CL 93* 92* 92*   CO2 20* 25 25   GLU 72 90 90   BUN 96* 102* 105*   CREATININE 4.3* 4.3* 4.3*   CALCIUM 8.9 9.0 8.5*   PROT 6.7  --  6.2   ALBUMIN 3.1*  --  2.9*   BILITOT 2.0*  --  1.7*   ALKPHOS 109  --  93   AST 37  --  47*   ALT 24  --  28   ANIONGAP 20* 16 15   , CBC   Recent Labs   Lab 04/11/24  0415 04/12/24  0601   WBC 5.11 5.05   HGB 9.2* 8.8*   HCT 32.5* 30.1*    142*   , and All pertinent lab results from the last 24 hours have been reviewed.    Significant Imaging: Echocardiogram: Transthoracic echo (TTE) complete ([facility] Only):   Results for orders placed or performed during the hospital encounter of 04/01/24   Echo   Result Value Ref Range    RA Width 3.24 cm    Left Atrium Major Axis 8.41 cm    Left Atrium Minor Axis 7.66 cm    RA Major Axis 5.04 cm    LV Diastolic Volume 126.79 mL     LV Systolic Volume 42.78 mL    MV Peak A Alberto 0.85 m/s    MV stenosis pressure 1/2 time 74.15 ms    TR Max Alberto 4.38 m/s    MV Peak E Alberto 1.98 m/s    Ao VTI 19.64 cm    Ao peak alberto 1.02 m/s    LVOT peak VTI 9.24 cm    LVOT peak alberto 0.61 m/s    LVOT diameter 1.91 cm    E wave deceleration time 255.70 msec    AV mean gradient 2 mmHg    TAPSE 1.37 cm    RVDD 3.66 cm    LA size 5.98 cm    Ascending aorta 3.02 cm    STJ 2.15 cm    Sinus 2.36 cm    LVIDs 3.26 2.1 - 4.0 cm    Posterior Wall 1.06 0.6 - 1.1 cm    IVS 0.64 0.6 - 1.1 cm    LVIDd 5.15 3.5 - 6.0 cm    TDI LATERAL 0.07 m/s    LA WIDTH 5.26 cm    TDI SEPTAL 0.06 m/s    LV LATERAL E/E' RATIO 28.29 m/s    LV SEPTAL E/E' RATIO 33.00 m/s    FS 37 28 - 44 %    LA volume 214.36 cm3    LV mass 154.38 g    ZLVIDD 0.90     ZLVIDS 0.88     Left Ventricle Relative Wall Thickness 0.41 cm    AV valve area 1.35 cm²    AV Velocity Ratio 0.60     AV index (prosthetic) 0.47     MV valve area p 1/2 method 2.97 cm2    E/A ratio 2.33     Mean e' 0.07 m/s    LVOT area 2.9 cm2    LVOT stroke volume 26.46 cm3    AV peak gradient 4 mmHg    E/E' ratio 30.46 m/s    LV Systolic Volume Index 25.3 mL/m2    LV Diastolic Volume Index 75.02 mL/m2    LA Volume Index 126.8 mL/m2    LV Mass Index 91 g/m2    Triscuspid Valve Regurgitation Peak Gradient 77 mmHg    HAYDEE by Velocity Ratio 1.71 cm²    BSA 1.74 m2    TV resting pulmonary artery pressure 85 mmHg    RV TB RVSP 12 mmHg    Est. RA pres 8 mmHg    Narrative      Left Ventricle: The left ventricle is normal in size. Normal wall   thickness. Normal wall motion. There is normal systolic function with a   visually estimated ejection fraction of 60 - 65%. Grade II diastolic   dysfunction.    Right Ventricle: Normal right ventricular cavity size. Wall thickness   is normal. Right ventricle wall motion  is normal. Systolic function is   reduced.    Left Atrium: Left atrium is very  severely dilated.    Right Atrium: Right atrium is mildly dilated.     Aortic Valve: There is mild aortic valve sclerosis. There is normal   leaflet mobility. There is trace aortic regurgitation.    Mitral Valve: There is mild bileaflet sclerosis. There is posterior   leaflet tethering and failure of complete coaptation. There is very severe   regurgitation with a posterolateral eccentriccally directed jet.    Tricuspid Valve: The tricuspid valve is structurally normal. There is   normal leaflet mobility. There is moderate to severe regurgitation.    IVC/SVC: IVC was not well visualized due to poor acoustic window.   Intermediate venous pressure at 8 mmHg.    Pericardium: There is a trivial effusion posteriorly and small under   the RA.

## 2024-04-12 NOTE — ASSESSMENT & PLAN NOTE
Severe MR. The MR appears to be secondary to posterior leaflet restriction and leaflet length of 1.1 cm.   Needs coronary angiogram to rule out obstructive CAD , then POOJA, then possible MitraClip.   Will need optimization of kidney function prior to angiogram consideration, appreciate Nephrology assistance

## 2024-04-12 NOTE — PROGRESS NOTES
[facility] - Telemetry Stepdown  Nephrology  Progress Note    Patient Name: Xena Vera  MRN: [id number]  Admission Date: 4/1/2024  Hospital Length of Stay: 9 days  Attending Provider: Minda Molina MD   Primary Care Physician: Tami Villeda FNP  Principal Problem:Acute on chronic heart failure with preserved ejection fraction (HFpEF)    Subjective:     HPI: Ms Vera is an obese (BMI ~31) 53-year-old with CKD IV (baseline creatinine ~3.1-3.3), prediabetes with most recent hemoglobin A1c 6%, HFpEF (most recent TTE with EF 60-65% with G2DD), mitral valve regurgitation, anemia, hypertension, HLD as well as several over co-morbid conditions who was admitted on 4/1 with heart failure exacerbation and hypervolemia after presenting with to ED following a mechanical fall but also had complaints of progressive dyspnea/ZACARIAS, orthopnea, lower extremity edema and abdominal distension with constipation. On presentation patient was noted to be hypotensive with systolic BP readings as low as 90s mmHg and bradycardiac with HR as low as 50s bpm. There was concern for some edema on chest x-ray and BNP at that time was ~4.6K and metabolic panel was notable for serum sodium 133, bicarbonate 20, , creatinine 4.2, albumin 3.1 and total bilirubin 1.5. UA showed +1 protein. Her admission was complicated by failure to adequately diuresis with escalating IV Lasix and even oral metolazone for which Nephrology was consulted on 4/10 for assistance. She explicitly denies NSAID use as outpatient.    Interval History:   Volume overloaded on exam, b/l lower extremity swelling, chest crackles, would keep her on lasix 120mg + diuril 500mg for diuresis.  At risk of heading towards ESRD, although currently no urgent RRT indicated.      Review of patient's allergies indicates:  No Known Allergies  Current Facility-Administered Medications   Medication Frequency    acetaminophen tablet 1,000 mg Q8H PRN    aspirin EC tablet 81 mg Daily     atorvastatin tablet 40 mg Daily    bisacodyL EC tablet 5 mg Daily PRN    [START ON 4/13/2024] chlorothiazide (DIURIL) 500 mg in dextrose 5 % (D5W) 50 mL IVPB Q24H    dextrose 10% bolus 125 mL 125 mL PRN    dextrose 10% bolus 250 mL 250 mL PRN    furosemide (Lasix) 500 mg in 50 mL infusion (conc: 10 mg/mL) Continuous    glucagon (human recombinant) injection 1 mg PRN    glucose chewable tablet 16 g PRN    glucose chewable tablet 24 g PRN    heparin (porcine) injection 5,000 Units Q8H    hydrALAZINE tablet 25 mg Q8H    HYDROcodone-acetaminophen 5-325 mg per tablet 1 tablet Q6H PRN    isosorbide dinitrate tablet 20 mg BID    melatonin tablet 6 mg Nightly PRN    naloxone 0.4 mg/mL injection 0.02 mg PRN    ondansetron disintegrating tablet 4 mg Q8H PRN    ondansetron injection 4 mg Q8H PRN    oxybutynin tablet 5 mg TID    polyethylene glycol packet 17 g Daily    sevelamer carbonate tablet 800 mg TID WM    sodium chloride 0.9% flush 10 mL PRN    sodium chloride 0.9% flush 10 mL PRN    white petrolatum 41 % ointment BID    zinc oxide 20 % ointment PRN       Objective:     Vital Signs (Most Recent):  Temp: 97.5 °F (36.4 °C) (04/12/24 1620)  Pulse: (!) 55 (04/12/24 1620)  Resp: 18 (04/12/24 1620)  BP: 123/76 (04/12/24 1620)  SpO2: (!) 94 % (04/12/24 1620) Vital Signs (24h Range):  Temp:  [97 °F (36.1 °C)-98 °F (36.7 °C)] 97.5 °F (36.4 °C)  Pulse:  [54-67] 55  Resp:  [18] 18  SpO2:  [94 %-100 %] 94 %  BP: (108-136)/(68-95) 123/76     Weight: 73 kg (160 lb 15 oz) (04/11/24 1040)  Body mass index is 31.43 kg/m².  Body surface area is 1.76 meters squared.    I/O last 3 completed shifts:  In: 1140 [P.O.:1140]  Out: 1800 [Urine:1800]     Physical Exam  Constitutional:       Appearance: Normal appearance.   Pulmonary:      Effort: Pulmonary effort is normal. No respiratory distress.      Breath sounds: Rales present.   Musculoskeletal:         General: Swelling present.      Right lower leg: Edema present.      Left lower leg:  Edema present.   Neurological:      General: No focal deficit present.      Mental Status: She is alert and oriented to person, place, and time.          Significant Labs:  BMP:   Recent Labs   Lab 04/12/24  0601   GLU 90   *   K 3.2*   CL 92*   CO2 25   *   CREATININE 4.3*   CALCIUM 8.5*   MG 2.2     CBC:   Recent Labs   Lab 04/12/24  0601   WBC 5.05   RBC 3.75*   HGB 8.8*   HCT 30.1*   *   MCV 80*   MCH 23.5*   MCHC 29.2*        Significant Imaging:  Labs: Reviewed  X-Ray: Reviewed  Echo: Reviewed  Assessment/Plan:     Cardiac/Vascular  * Acute on chronic heart failure with preserved ejection fraction (HFpEF)  Patient is identified as having Diastolic (HFpEF) heart failure that is Acute on chronic. CHF is currently uncontrolled due to Continued edema of extremities, Hepatic congestion/ascites, and JVD, >3 pillow orthopnea, Rales/crackles on pulmonary exam, and Pulmonary edema/pleural effusion on CXR.     - diuresis as detailed under REGINALDO    Essential hypertension  - management per primary team    Renal/  Stage 4 chronic kidney disease  - CKD IV (baseline creatinine ~3.1-3.3) admitted with hypervolemia and REGINALDO with creatinine 4.2  - UA showed +1 protein with UPCR of ~500 mg  - urinary sediment with non dysmorphic RBCs and WBCs present although Jin catheter just place yesterday   - retroperitoneal US without evidence of hydronephrosis although changes consistent with chronic kidney disease    Plan/Recommendations;  - Would recommend to continue diuresis with lasix + Diuril for net negative of atleast 1-1.5 liters in the next 24 hours  - can continue Renvela 800 mg TIDWM  - Avoid fluctuations in BP which may worsen REGINALDO (keep MAP > 65 mmHg)  - renal diet/tube feeds when not NPO with volume restriction per primary team  - strict I/O's and daily weights  - renally all dose medications to eGFR   - avoid nephrotoxic agents wean feasible (i.e. NSAIDs, intra-arterial contrast, supra-therapeutic  vancomycin levels, etc.)          Thank you for your consult. I will follow-up with patient. Please contact us if you have any additional questions.    Sylvia Nayak MD  Nephrology  [facility] - Telemetry Stepdown

## 2024-04-12 NOTE — PROGRESS NOTES
[facility] - Telemetry [facility] Medicine  Progress Note    Patient Name: Xena Vera  MRN: [id number]  Patient Class: IP- Inpatient   Admission Date: 4/1/2024  Length of Stay: 9 days  Attending Physician: Minda Molina MD  Primary Care Provider: Tami Villeda FNP        Subjective:     Principal Problem:Acute on chronic heart failure with preserved ejection fraction (HFpEF)        HPI:  Xena Vera is a 53 year old French-speaking  woman with former cigarette smoking (1988 to 1989), hypertension, heart failure with preserved ejection fraction, mitral regurgitation, chronic kidney disease stage 4, anemia, ovarian cyst, obesity. She lives in [place], [place].               She presented to [facility] Emergency Department on 4/1/2024 with complaint of mechanical fall, lower extremity swelling, and shortness of breath. Her son translated for her at bedside. She slipped and fell between grass and concrete earlier in the morning and had an abrasion on her left lower extremity. She was able to stand with assistance and ambulate as usual afterward. She had worsening of chronic bilateral lower extremity edema now in her abdomen. She was taking her prescribed furosemide once a day without missed doses and was compliant with a low sodium diet. She was sleeping upright to feel like she can breath and had several nighttime awakenings due to shortness of breath. She had worsening dyspnea on exertion over the last several weeks. She has chronic constipation, unchanged.              In the emergency department, blood pressure was 97/54, heart rate was in the low 50s. Hemoglobin was 10.7 g/dL (baseline around 11.4), sodium was 133 mmol/L, total bilirubin was 1.5 mg/dL, BNP was 4639 pg/mL, troponin was 0.061 ng/mL. Chest X-ray showed upper normal pulmonary vascularity, chronic band of opacity in the left middle lung zone, faint edema in perihilar areas. She was given 40 mg of  intravenous furosemide. She was admitted to Hospital Medicine Team R.    Overview/Hospital Course:  She was put on intravenous furosemide. It was increased to 80 mg twice daily. Urine output increased to 1.6 liters in 24 hours. She had bleeding and hematoma at the site of prophylactic heparin injections so heparin was stopped and she was given sequential compression devices instead. Wound Care was consulted for her left leg wound and recommended white petrolatum twice daily. She continued to diurese well but still had a lot of fluid. Her left leg hematoma hurt a lot. Furosemide was increased to 100 mg twice daily. Urine output decreased on 4/8/2024. Bladder scan on 4/9/2024 showed she was retaining almost 500 mL of urine. Jin placed. Nephrology consulted due to poor UOP and ongoing volume overload.  Initiated on Diuril.  Cardiology also consulted.  Initiated on Lasix drip.  Of note, notable mitral regurgitation. Afterload reduction attempted with ISDN-hydralazine initiation. Renal function labile. Monitoring for HD needs.     Interval History:   Patient seen and examined at bedside.    No acute events overnight. UOP net 60 mL yesterday.  Reports no issues with shortness of breath, cough, lightheadedness, dizziness when ambulating, however does note palpitations after a while of moving around and ongoing edema. Also notes, PND. Patient updated regarding care plan.   services utilized.      Review of Systems   Constitutional:  Positive for appetite change. Negative for chills and fever.   HENT:  Negative for trouble swallowing.    Eyes:  Negative for visual disturbance.   Respiratory:  Negative for cough.    Cardiovascular:  Positive for leg swelling. Negative for chest pain.   Gastrointestinal:  Negative for abdominal pain, diarrhea and nausea.   Genitourinary:  Positive for decreased urine volume.   Skin:  Positive for wound.   Neurological:  Negative for dizziness and light-headedness.    Psychiatric/Behavioral:  Negative for confusion and decreased concentration.      Objective:     Vital Signs (Most Recent):  Temp: 97.8 °F (36.6 °C) (04/12/24 1152)  Pulse: (!) 57 (04/12/24 1152)  Resp: 18 (04/12/24 1152)  BP: 133/80 (04/12/24 1152)  SpO2: 100 % (04/12/24 1152) Vital Signs (24h Range):  Temp:  [97 °F (36.1 °C)-98 °F (36.7 °C)] 97.8 °F (36.6 °C)  Pulse:  [54-67] 57  Resp:  [18] 18  SpO2:  [94 %-100 %] 100 %  BP: (108-136)/(68-95) 133/80     Weight: 73 kg (160 lb 15 oz)  Body mass index is 31.43 kg/m².    Intake/Output Summary (Last 24 hours) at 4/12/2024 1321  Last data filed at 4/12/2024 1318  Gross per 24 hour   Intake 3000 ml   Output 1500 ml   Net 1500 ml           Physical Exam  Vitals and nursing note reviewed.   Constitutional:       General: She is not in acute distress.     Appearance: She is well-developed. She is obese. She is ill-appearing (Chronically). She is not diaphoretic.   HENT:      Right Ear: External ear normal.      Left Ear: External ear normal.      Mouth/Throat:      Pharynx: Oropharynx is clear.   Eyes:      General: No scleral icterus.  Cardiovascular:      Comments: Jvd  Lower extremity edema pitting, tracking up to abdomen  Pulmonary:      Effort: Pulmonary effort is normal. No respiratory distress.   Musculoskeletal:         General: Signs of injury present. No deformity.      Cervical back: Normal range of motion and neck supple.      Right lower leg: Edema present.      Left lower leg: Edema present.   Skin:     General: Skin is warm and dry.      Coloration: Skin is not jaundiced or pale.      Findings: Abrasion and bruising present.   Neurological:      Mental Status: She is alert and oriented to person, place, and time. Mental status is at baseline.      Motor: No seizure activity.   Psychiatric:         Attention and Perception: Attention normal.         Behavior: Behavior normal. Behavior is not aggressive or combative. Behavior is cooperative.              Significant Labs: All pertinent labs within the past 24 hours have been reviewed.    Recent Results (from the past 24 hour(s))   Basic metabolic panel    Collection Time: 04/11/24  2:14 PM   Result Value Ref Range    Sodium 133 (L) 136 - 145 mmol/L    Potassium 3.9 3.5 - 5.1 mmol/L    Chloride 92 (L) 95 - 110 mmol/L    CO2 25 23 - 29 mmol/L    Glucose 90 70 - 110 mg/dL     (H) 6 - 20 mg/dL    Creatinine 4.3 (H) 0.5 - 1.4 mg/dL    Calcium 9.0 8.7 - 10.5 mg/dL    Anion Gap 16 8 - 16 mmol/L    eGFR 11.6 (A) >60 mL/min/1.73 m^2   Phosphorus    Collection Time: 04/11/24  2:14 PM   Result Value Ref Range    Phosphorus 5.1 (H) 2.7 - 4.5 mg/dL   CBC Auto Differential    Collection Time: 04/12/24  6:01 AM   Result Value Ref Range    WBC 5.05 3.90 - 12.70 K/uL    RBC 3.75 (L) 4.00 - 5.40 M/uL    Hemoglobin 8.8 (L) 12.0 - 16.0 g/dL    Hematocrit 30.1 (L) 37.0 - 48.5 %    MCV 80 (L) 82 - 98 fL    MCH 23.5 (L) 27.0 - 31.0 pg    MCHC 29.2 (L) 32.0 - 36.0 g/dL    RDW 21.6 (H) 11.5 - 14.5 %    Platelets 142 (L) 150 - 450 K/uL    MPV SEE COMMENT 9.2 - 12.9 fL    Immature Granulocytes 0.4 0.0 - 0.5 %    Gran # (ANC) 3.5 1.8 - 7.7 K/uL    Immature Grans (Abs) 0.02 0.00 - 0.04 K/uL    Lymph # 0.8 (L) 1.0 - 4.8 K/uL    Mono # 0.7 0.3 - 1.0 K/uL    Eos # 0.1 0.0 - 0.5 K/uL    Baso # 0.02 0.00 - 0.20 K/uL    nRBC 0 0 /100 WBC    Gran % 69.3 38.0 - 73.0 %    Lymph % 15.4 (L) 18.0 - 48.0 %    Mono % 12.9 4.0 - 15.0 %    Eosinophil % 1.6 0.0 - 8.0 %    Basophil % 0.4 0.0 - 1.9 %    Differential Method Automated    Comprehensive Metabolic Panel    Collection Time: 04/12/24  6:01 AM   Result Value Ref Range    Sodium 132 (L) 136 - 145 mmol/L    Potassium 3.2 (L) 3.5 - 5.1 mmol/L    Chloride 92 (L) 95 - 110 mmol/L    CO2 25 23 - 29 mmol/L    Glucose 90 70 - 110 mg/dL     (H) 6 - 20 mg/dL    Creatinine 4.3 (H) 0.5 - 1.4 mg/dL    Calcium 8.5 (L) 8.7 - 10.5 mg/dL    Total Protein 6.2 6.0 - 8.4 g/dL    Albumin 2.9 (L) 3.5 - 5.2  g/dL    Total Bilirubin 1.7 (H) 0.1 - 1.0 mg/dL    Alkaline Phosphatase 93 55 - 135 U/L    AST 47 (H) 10 - 40 U/L    ALT 28 10 - 44 U/L    eGFR 11.6 (A) >60 mL/min/1.73 m^2    Anion Gap 15 8 - 16 mmol/L   Magnesium    Collection Time: 04/12/24  6:01 AM   Result Value Ref Range    Magnesium 2.2 1.6 - 2.6 mg/dL   Phosphorus    Collection Time: 04/12/24  6:01 AM   Result Value Ref Range    Phosphorus 4.6 (H) 2.7 - 4.5 mg/dL         Significant Imaging: I have reviewed all pertinent imaging results/findings within the past 24 hours.    Assessment/Plan:      * Acute on chronic heart failure with preserved ejection fraction (HFpEF)  Mitral regurgitation   Anasarca    - Clinically volume overloaded on admission  - most recent echo below, grade 2 diastolic dysfunction  - CXR with chronic cardiomegaly  - BNP 4,639   - cardiology and nephrology following  -lasix gtt + Diuril 500mg for goal net negative 500ml-1000ml  -ISDN, hydralazine for afterload reduction in setting of MR  - Continue home cardioprudent regimen as clinically indicated and tolerated  - Strict I/Os  - 1.5L fluid restriction , cardiac diet  - Daily weights  - Will continue to monitor on tele    Results for orders placed during the hospital encounter of 04/01/24    Echo    Interpretation Summary    Left Ventricle: The left ventricle is normal in size. Normal wall thickness. Normal wall motion. There is normal systolic function with a visually estimated ejection fraction of 60 - 65%. Grade II diastolic dysfunction.    Right Ventricle: Normal right ventricular cavity size. Wall thickness is normal. Right ventricle wall motion  is normal. Systolic function is reduced.    Left Atrium: Left atrium is very  severely dilated.    Right Atrium: Right atrium is mildly dilated.    Aortic Valve: There is mild aortic valve sclerosis. There is normal leaflet mobility. There is trace aortic regurgitation.    Mitral Valve: There is mild bileaflet sclerosis. There is posterior  leaflet tethering and failure of complete coaptation. There is very severe regurgitation with a posterolateral eccentriccally directed jet.    Tricuspid Valve: The tricuspid valve is structurally normal. There is normal leaflet mobility. There is moderate to severe regurgitation.    IVC/SVC: IVC was not well visualized due to poor acoustic window. Intermediate venous pressure at 8 mmHg.    Pericardium: There is a trivial effusion posteriorly and small under the RA.        Stage 4 chronic kidney disease  Hyperphosphatemia    Cr baseline 3.1-3.5, during admission 3.7-3.9  Lab Results   Component Value Date    CREATININE 4.3 (H) 04/12/2024   Poor urine output   Jin in place due to urinary retention; renal ultrasound without hydronephrosis  Nephrology consulted, appreciate assistance  Cardiology consulted, appreciate assistance  Renally dose all medications  Avoid nephrotoxins  Will continue to monitor on daily labs  lasix gtt + Diuril 500mg for goal net negative 500ml-1000ml  Renvela for hyperphosphatemia    Hypokalemia  Patient has hypokalemia which is Acute and currently uncontrolled. Most recent potassium levels reviewed-   Lab Results   Component Value Date    K 3.2 (L) 04/12/2024   . Will continue potassium replacement per protocol and recheck repeat levels after replacement completed.   PM BMP.    Acute urinary retention  Jin catheter in place.  See CKD.      Leg wound, left  Appreciate Wound Care. Give hydrocodone-acetaminophen prn for pain due to hematoma. Monitor for infection due to excessive weeping from leg edema.      Fall  Accidental.  Volume overload possibly contributing.  PT/OT following.    Anasarca        Constipation  Giving polyethylene glycol.  As needed    Anemia  Stable.   Recent Labs   Lab 04/11/24  0415 04/12/24  0601   WBC 5.11 5.05   HGB 9.2* 8.8*   HCT 32.5* 30.1*    142*         Class 2 obesity in adult  Body mass index is 30.86 kg/m². Morbid obesity complicates all aspects of  disease management from diagnostic modalities to treatment. Weight loss encouraged and health benefits explained to patient.     Essential hypertension  Holding carvedilol.  Isosorbide dinitrate and hydralazine to help reduce afterload in setting of MR.  Monitor BP.      VTE Risk Mitigation (From admission, onward)           Ordered     Place sequential compression device  Until discontinued         04/03/24 1347     heparin (porcine) injection 5,000 Units  Every 8 hours         04/01/24 1030     IP VTE HIGH RISK PATIENT  Once         04/01/24 1030     Place sequential compression device  Until discontinued         04/01/24 1030                    Discharge Planning   RILEY: 4/16/2024     Code Status: Full Code   Is the patient medically ready for discharge?: No    Reason for patient still in hospital (select all that apply): Patient trending condition, Treatment, Consult recommendations, and Pending disposition  Discharge Plan A: Home, Home with family   Discharge Delays: None known at this time              Minda Molina MD  Department of Hospital Medicine   [facility] - Telemetry Stepdown

## 2024-04-12 NOTE — HOSPITAL COURSE
Patient initially had improved UOP with increasing Lasix, eventually maxed out on Lasix drip with addition of Diuril. Eventually started on CRRT/SLED for volume removal, initially needed intermittent levophed to tolerate. Has itchy rash that started during current hospitalization, Dermatology consulted for regimen. Graduated from CRRT to iHD, tolerating well. Stable to step back down to HM.

## 2024-04-12 NOTE — ASSESSMENT & PLAN NOTE
- CKD IV (baseline creatinine ~3.1-3.3) admitted with hypervolemia and REGINALDO with creatinine 4.2  - UA showed +1 protein with UPCR of ~500 mg  - urinary sediment with non dysmorphic RBCs and WBCs present although Jin catheter just place yesterday   - retroperitoneal US without evidence of hydronephrosis although changes consistent with chronic kidney disease    Plan/Recommendations;  - Would recommend to continue diuresis with lasix +/- Diuril for net negative of atleast 1-1.5 liters in the next 24 hours  - can continue Renvela 800 mg TIDWM  - Avoid fluctuations in BP which may worsen REGINALDO (keep MAP > 65 mmHg)  - renal diet/tube feeds when not NPO with volume restriction per primary team  - strict I/O's and daily weights  - renally all dose medications to eGFR   - avoid nephrotoxic agents wean feasible (i.e. NSAIDs, intra-arterial contrast, supra-therapeutic vancomycin levels, etc.)

## 2024-04-12 NOTE — ASSESSMENT & PLAN NOTE
Patient has hypokalemia which is Acute and currently uncontrolled. Most recent potassium levels reviewed-   Lab Results   Component Value Date    K 3.2 (L) 04/12/2024   . Will continue potassium replacement per protocol and recheck repeat levels after replacement completed.   PM HAWA.

## 2024-04-12 NOTE — SUBJECTIVE & OBJECTIVE
Interval History:   Volume overloaded on exam, b/l lower extremity swelling, chest crackles, would keep her on lasix 120mg + diuril 500mg for diuresis.  At risk of heading towards ESRD, although currently no urgent RRT indicated.      Review of patient's allergies indicates:  No Known Allergies  Current Facility-Administered Medications   Medication Frequency    acetaminophen tablet 1,000 mg Q8H PRN    aspirin EC tablet 81 mg Daily    atorvastatin tablet 40 mg Daily    bisacodyL EC tablet 5 mg Daily PRN    [START ON 4/13/2024] chlorothiazide (DIURIL) 500 mg in dextrose 5 % (D5W) 50 mL IVPB Q24H    dextrose 10% bolus 125 mL 125 mL PRN    dextrose 10% bolus 250 mL 250 mL PRN    furosemide (Lasix) 500 mg in 50 mL infusion (conc: 10 mg/mL) Continuous    glucagon (human recombinant) injection 1 mg PRN    glucose chewable tablet 16 g PRN    glucose chewable tablet 24 g PRN    heparin (porcine) injection 5,000 Units Q8H    hydrALAZINE tablet 25 mg Q8H    HYDROcodone-acetaminophen 5-325 mg per tablet 1 tablet Q6H PRN    isosorbide dinitrate tablet 20 mg BID    melatonin tablet 6 mg Nightly PRN    naloxone 0.4 mg/mL injection 0.02 mg PRN    ondansetron disintegrating tablet 4 mg Q8H PRN    ondansetron injection 4 mg Q8H PRN    oxybutynin tablet 5 mg TID    polyethylene glycol packet 17 g Daily    sevelamer carbonate tablet 800 mg TID WM    sodium chloride 0.9% flush 10 mL PRN    sodium chloride 0.9% flush 10 mL PRN    white petrolatum 41 % ointment BID    zinc oxide 20 % ointment PRN       Objective:     Vital Signs (Most Recent):  Temp: 97.5 °F (36.4 °C) (04/12/24 1620)  Pulse: (!) 55 (04/12/24 1620)  Resp: 18 (04/12/24 1620)  BP: 123/76 (04/12/24 1620)  SpO2: (!) 94 % (04/12/24 1620) Vital Signs (24h Range):  Temp:  [97 °F (36.1 °C)-98 °F (36.7 °C)] 97.5 °F (36.4 °C)  Pulse:  [54-67] 55  Resp:  [18] 18  SpO2:  [94 %-100 %] 94 %  BP: (108-136)/(68-95) 123/76     Weight: 73 kg (160 lb 15 oz) (04/11/24 1040)  Body mass index  is 31.43 kg/m².  Body surface area is 1.76 meters squared.    I/O last 3 completed shifts:  In: 1140 [P.O.:1140]  Out: 1800 [Urine:1800]     Physical Exam  Constitutional:       Appearance: Normal appearance.   Pulmonary:      Effort: Pulmonary effort is normal. No respiratory distress.      Breath sounds: Rales present.   Musculoskeletal:         General: Swelling present.      Right lower leg: Edema present.      Left lower leg: Edema present.   Neurological:      General: No focal deficit present.      Mental Status: She is alert and oriented to person, place, and time.          Significant Labs:  BMP:   Recent Labs   Lab 04/12/24  0601   GLU 90   *   K 3.2*   CL 92*   CO2 25   *   CREATININE 4.3*   CALCIUM 8.5*   MG 2.2     CBC:   Recent Labs   Lab 04/12/24  0601   WBC 5.05   RBC 3.75*   HGB 8.8*   HCT 30.1*   *   MCV 80*   MCH 23.5*   MCHC 29.2*        Significant Imaging:  Labs: Reviewed  X-Ray: Reviewed  Echo: Reviewed

## 2024-04-12 NOTE — ASSESSMENT & PLAN NOTE
Stable.   Recent Labs   Lab 04/11/24  0415 04/12/24  0601   WBC 5.11 5.05   HGB 9.2* 8.8*   HCT 32.5* 30.1*    142*

## 2024-04-12 NOTE — SUBJECTIVE & OBJECTIVE
Interval History:   Patient seen and examined at bedside.    No acute events overnight. UOP net 60 mL yesterday.  Reports no issues with shortness of breath, cough, lightheadedness, dizziness when ambulating, however does note palpitations after a while of moving around and ongoing edema. Also notes, PND. Patient updated regarding care plan.   services utilized.      Review of Systems   Constitutional:  Positive for appetite change. Negative for chills and fever.   HENT:  Negative for trouble swallowing.    Eyes:  Negative for visual disturbance.   Respiratory:  Negative for cough.    Cardiovascular:  Positive for leg swelling. Negative for chest pain.   Gastrointestinal:  Negative for abdominal pain, diarrhea and nausea.   Genitourinary:  Positive for decreased urine volume.   Skin:  Positive for wound.   Neurological:  Negative for dizziness and light-headedness.   Psychiatric/Behavioral:  Negative for confusion and decreased concentration.      Objective:     Vital Signs (Most Recent):  Temp: 97.8 °F (36.6 °C) (04/12/24 1152)  Pulse: (!) 57 (04/12/24 1152)  Resp: 18 (04/12/24 1152)  BP: 133/80 (04/12/24 1152)  SpO2: 100 % (04/12/24 1152) Vital Signs (24h Range):  Temp:  [97 °F (36.1 °C)-98 °F (36.7 °C)] 97.8 °F (36.6 °C)  Pulse:  [54-67] 57  Resp:  [18] 18  SpO2:  [94 %-100 %] 100 %  BP: (108-136)/(68-95) 133/80     Weight: 73 kg (160 lb 15 oz)  Body mass index is 31.43 kg/m².    Intake/Output Summary (Last 24 hours) at 4/12/2024 1321  Last data filed at 4/12/2024 1318  Gross per 24 hour   Intake 3000 ml   Output 1500 ml   Net 1500 ml           Physical Exam  Vitals and nursing note reviewed.   Constitutional:       General: She is not in acute distress.     Appearance: She is well-developed. She is obese. She is ill-appearing (Chronically). She is not diaphoretic.   HENT:      Right Ear: External ear normal.      Left Ear: External ear normal.      Mouth/Throat:      Pharynx: Oropharynx is clear.    Eyes:      General: No scleral icterus.  Cardiovascular:      Comments: Jvd  Lower extremity edema pitting, tracking up to abdomen  Pulmonary:      Effort: Pulmonary effort is normal. No respiratory distress.   Musculoskeletal:         General: Signs of injury present. No deformity.      Cervical back: Normal range of motion and neck supple.      Right lower leg: Edema present.      Left lower leg: Edema present.   Skin:     General: Skin is warm and dry.      Coloration: Skin is not jaundiced or pale.      Findings: Abrasion and bruising present.   Neurological:      Mental Status: She is alert and oriented to person, place, and time. Mental status is at baseline.      Motor: No seizure activity.   Psychiatric:         Attention and Perception: Attention normal.         Behavior: Behavior normal. Behavior is not aggressive or combative. Behavior is cooperative.             Significant Labs: All pertinent labs within the past 24 hours have been reviewed.    Recent Results (from the past 24 hour(s))   Basic metabolic panel    Collection Time: 04/11/24  2:14 PM   Result Value Ref Range    Sodium 133 (L) 136 - 145 mmol/L    Potassium 3.9 3.5 - 5.1 mmol/L    Chloride 92 (L) 95 - 110 mmol/L    CO2 25 23 - 29 mmol/L    Glucose 90 70 - 110 mg/dL     (H) 6 - 20 mg/dL    Creatinine 4.3 (H) 0.5 - 1.4 mg/dL    Calcium 9.0 8.7 - 10.5 mg/dL    Anion Gap 16 8 - 16 mmol/L    eGFR 11.6 (A) >60 mL/min/1.73 m^2   Phosphorus    Collection Time: 04/11/24  2:14 PM   Result Value Ref Range    Phosphorus 5.1 (H) 2.7 - 4.5 mg/dL   CBC Auto Differential    Collection Time: 04/12/24  6:01 AM   Result Value Ref Range    WBC 5.05 3.90 - 12.70 K/uL    RBC 3.75 (L) 4.00 - 5.40 M/uL    Hemoglobin 8.8 (L) 12.0 - 16.0 g/dL    Hematocrit 30.1 (L) 37.0 - 48.5 %    MCV 80 (L) 82 - 98 fL    MCH 23.5 (L) 27.0 - 31.0 pg    MCHC 29.2 (L) 32.0 - 36.0 g/dL    RDW 21.6 (H) 11.5 - 14.5 %    Platelets 142 (L) 150 - 450 K/uL    MPV SEE COMMENT 9.2 -  12.9 fL    Immature Granulocytes 0.4 0.0 - 0.5 %    Gran # (ANC) 3.5 1.8 - 7.7 K/uL    Immature Grans (Abs) 0.02 0.00 - 0.04 K/uL    Lymph # 0.8 (L) 1.0 - 4.8 K/uL    Mono # 0.7 0.3 - 1.0 K/uL    Eos # 0.1 0.0 - 0.5 K/uL    Baso # 0.02 0.00 - 0.20 K/uL    nRBC 0 0 /100 WBC    Gran % 69.3 38.0 - 73.0 %    Lymph % 15.4 (L) 18.0 - 48.0 %    Mono % 12.9 4.0 - 15.0 %    Eosinophil % 1.6 0.0 - 8.0 %    Basophil % 0.4 0.0 - 1.9 %    Differential Method Automated    Comprehensive Metabolic Panel    Collection Time: 04/12/24  6:01 AM   Result Value Ref Range    Sodium 132 (L) 136 - 145 mmol/L    Potassium 3.2 (L) 3.5 - 5.1 mmol/L    Chloride 92 (L) 95 - 110 mmol/L    CO2 25 23 - 29 mmol/L    Glucose 90 70 - 110 mg/dL     (H) 6 - 20 mg/dL    Creatinine 4.3 (H) 0.5 - 1.4 mg/dL    Calcium 8.5 (L) 8.7 - 10.5 mg/dL    Total Protein 6.2 6.0 - 8.4 g/dL    Albumin 2.9 (L) 3.5 - 5.2 g/dL    Total Bilirubin 1.7 (H) 0.1 - 1.0 mg/dL    Alkaline Phosphatase 93 55 - 135 U/L    AST 47 (H) 10 - 40 U/L    ALT 28 10 - 44 U/L    eGFR 11.6 (A) >60 mL/min/1.73 m^2    Anion Gap 15 8 - 16 mmol/L   Magnesium    Collection Time: 04/12/24  6:01 AM   Result Value Ref Range    Magnesium 2.2 1.6 - 2.6 mg/dL   Phosphorus    Collection Time: 04/12/24  6:01 AM   Result Value Ref Range    Phosphorus 4.6 (H) 2.7 - 4.5 mg/dL         Significant Imaging: I have reviewed all pertinent imaging results/findings within the past 24 hours.

## 2024-04-12 NOTE — ASSESSMENT & PLAN NOTE
Patient is a 53 year old female with HFpEF and severe MR as well as advanced kidney disease (CKD 4) that presents after mechanical fall with dyspnea and leg swelling and was admitted for heart failure exacerbation. BNP 4300 >> ~350 8 months ago. Patient has had severe MR since at least July 2023. The MR appears to be secondary to posterior leaflet restriction and leaflet length of 1.1 cm. Attempts of diuresis have been escalated to 100 mg IV TID + Diuril 500 mg IV BID.   For now, would recommend increasing Lasix gtt frmo 30 mg/hr to bolus Lasix 120 mg IV push x 1 followed by Lasix 40 mg/hr. Uptitrate to meet diuresis goals of net negative 2L daily to achieve euvolemia  Continue Diuril 500 mg IV BID  Recommend afterload reduction by optimizing BP control (target / 70 mm Hg) to augment forward flow, this may also help kidney dysfunction. (Increased isosorbide dinitrate 10 mg PO TID --> 20 mg PO BID)  Monitor labs, [place] Na, Cr, and K  Appreciate communication with Nephrology and primary, monitor for HD needs; unlikely to be a candidate for coronary angiogram unless Cr improves or started on HD (recommended by Interventional team to rule out obstructive CAD as part of [facility] workup)

## 2024-04-12 NOTE — ASSESSMENT & PLAN NOTE
Mitral regurgitation   Anasarca    - Clinically volume overloaded on admission  - most recent echo below, grade 2 diastolic dysfunction  - CXR with chronic cardiomegaly  - BNP 4,639   - cardiology and nephrology following  -lasix gtt + Diuril 500mg for goal net negative 500ml-1000ml  -ISDN, hydralazine for afterload reduction in setting of MR  - Continue home cardioprudent regimen as clinically indicated and tolerated  - Strict I/Os  - 1.5L fluid restriction , cardiac diet  - Daily weights  - Will continue to monitor on tele    Results for orders placed during the hospital encounter of 04/01/24    Echo    Interpretation Summary    Left Ventricle: The left ventricle is normal in size. Normal wall thickness. Normal wall motion. There is normal systolic function with a visually estimated ejection fraction of 60 - 65%. Grade II diastolic dysfunction.    Right Ventricle: Normal right ventricular cavity size. Wall thickness is normal. Right ventricle wall motion  is normal. Systolic function is reduced.    Left Atrium: Left atrium is very  severely dilated.    Right Atrium: Right atrium is mildly dilated.    Aortic Valve: There is mild aortic valve sclerosis. There is normal leaflet mobility. There is trace aortic regurgitation.    Mitral Valve: There is mild bileaflet sclerosis. There is posterior leaflet tethering and failure of complete coaptation. There is very severe regurgitation with a posterolateral eccentriccally directed jet.    Tricuspid Valve: The tricuspid valve is structurally normal. There is normal leaflet mobility. There is moderate to severe regurgitation.    IVC/SVC: IVC was not well visualized due to poor acoustic window. Intermediate venous pressure at 8 mmHg.    Pericardium: There is a trivial effusion posteriorly and small under the RA.

## 2024-04-12 NOTE — PLAN OF CARE
Problem: Adult Inpatient Plan of Care  Goal: Plan of Care Review  Outcome: Ongoing, Progressing  Goal: Patient-Specific Goal (Individualized)  Outcome: Ongoing, Progressing  Goal: Absence of Hospital-Acquired Illness or Injury  Outcome: Ongoing, Progressing  Goal: Optimal Comfort and Wellbeing  Outcome: Ongoing, Progressing  Goal: Readiness for Transition of Care  Outcome: Ongoing, Progressing     Problem: Infection  Goal: Absence of Infection Signs and Symptoms  Outcome: Ongoing, Progressing     Pt lying in bed resting. Resp even and unlabored. VSS. No complaints of pain or discomfort at this time. Call light in reach and bed in lowest position. Care is ongoing.

## 2024-04-12 NOTE — ASSESSMENT & PLAN NOTE
Hyperphosphatemia    Cr baseline 3.1-3.5, during admission 3.7-3.9  Lab Results   Component Value Date    CREATININE 4.3 (H) 04/12/2024   Poor urine output   Jin in place due to urinary retention; renal ultrasound without hydronephrosis  Nephrology consulted, appreciate assistance  Cardiology consulted, appreciate assistance  Renally dose all medications  Avoid nephrotoxins  Will continue to monitor on daily labs  lasix gtt + Diuril 500mg for goal net negative 500ml-1000ml  Renvela for hyperphosphatemia

## 2024-04-12 NOTE — PROGRESS NOTES
[facility] - Telemetry Stepdown  Cardiology  Progress Note    Patient Name: Xena Vera  MRN: [id number]  Admission Date: 4/1/2024  Hospital Length of Stay: 9 days  Code Status: Full Code   Attending Physician: Minda Molina MD   Primary Care Physician: Tami Villeda FNP  Expected Discharge Date: 4/16/2024  Principal Problem:Acute on chronic heart failure with preserved ejection fraction (HFpEF)    Subjective:     Hospital Course:   Patient has been improving diuresis with Lasix increase from 100 mg IV TID --> Lasix gtt 30 mg/hr to Lasix 40 mg/hr. Continuing Diuril to optimize volume status. Continue to optimizer Bp medications.     Interval History: Patient has been improving with the Lasix gtt , escalated dose this morning to 40 mg/hr with Diuril BID. Cr plateaued this morning, hoping for improvement with decongestion. Also working on optimizing HTN management with changes in isosorbide dinitrate.     Review of Systems   Constitutional: Negative for chills and fever.   Cardiovascular:  Positive for leg swelling. Negative for chest pain, orthopnea and palpitations.   Respiratory:  Negative for cough and shortness of breath.    Gastrointestinal:  Negative for abdominal pain.   Neurological:  Negative for dizziness, headaches and light-headedness.   Psychiatric/Behavioral:  Negative for altered mental status.      Objective:     Vital Signs (Most Recent):  Temp: 97.7 °F (36.5 °C) (04/12/24 0757)  Pulse: (!) 57 (04/12/24 0757)  Resp: 18 (04/12/24 0757)  BP: 120/78 (04/12/24 0757)  SpO2: 95 % (04/12/24 0757) Vital Signs (24h Range):  Temp:  [97 °F (36.1 °C)-98.1 °F (36.7 °C)] 97.7 °F (36.5 °C)  Pulse:  [54-67] 57  Resp:  [18] 18  SpO2:  [93 %-98 %] 95 %  BP: (120-136)/(74-95) 120/78     Weight: 73 kg (160 lb 15 oz)  Body mass index is 31.43 kg/m².     SpO2: 95 %         Intake/Output Summary (Last 24 hours) at 4/12/2024 0821  Last data filed at 4/12/2024 0800  Gross per 24 hour   Intake 840 ml   Output 1700 ml    Net -860 ml       Lines/Drains/Airways       Drain  Duration                  Urethral Catheter 04/09/24 1330 16 Fr. 2 days              Peripheral Intravenous Line  Duration                  Peripheral IV - Single Lumen 04/10/24 2100 22 G Posterior;Right Hand 1 day         Peripheral IV - Single Lumen 04/12/24 0454 20 G Left;Posterior Forearm <1 day                       Physical Exam  Vitals and nursing note reviewed.   Constitutional:       General: She is not in acute distress.     Appearance: Normal appearance. She is obese. She is ill-appearing. She is not toxic-appearing.   HENT:      Head: Normocephalic and atraumatic.      Mouth/Throat:      Mouth: Mucous membranes are moist.   Neck:      Comments: JVP to the upper mid neck at 30* angle  Cardiovascular:      Rate and Rhythm: Normal rate and regular rhythm.      Heart sounds: Murmur (5th intercostal, apical, radiation to axila slightly softer today) heard.      No friction rub. No gallop.   Pulmonary:      Effort: Pulmonary effort is normal. No respiratory distress.      Breath sounds: Normal breath sounds.   Abdominal:      Palpations: Abdomen is soft.   Musculoskeletal:      Right lower leg: Edema (1+) present.      Left lower leg: Edema (1+) present.   Skin:     General: Skin is warm.   Neurological:      Mental Status: She is alert and oriented to person, place, and time. Mental status is at baseline.            Significant Labs: BMP:   Recent Labs   Lab 04/11/24  0414 04/11/24  1414 04/12/24  0601   GLU 72 90 90   * 133* 132*   K 4.2 3.9 3.2*   CL 93* 92* 92*   CO2 20* 25 25   BUN 96* 102* 105*   CREATININE 4.3* 4.3* 4.3*   CALCIUM 8.9 9.0 8.5*   MG 2.3  --  2.2   , CMP   Recent Labs   Lab 04/11/24  0414 04/11/24  1414 04/12/24  0601   * 133* 132*   K 4.2 3.9 3.2*   CL 93* 92* 92*   CO2 20* 25 25   GLU 72 90 90   BUN 96* 102* 105*   CREATININE 4.3* 4.3* 4.3*   CALCIUM 8.9 9.0 8.5*   PROT 6.7  --  6.2   ALBUMIN 3.1*  --  2.9*   BILITOT 2.0*   --  1.7*   ALKPHOS 109  --  93   AST 37  --  47*   ALT 24  --  28   ANIONGAP 20* 16 15   , CBC   Recent Labs   Lab 04/11/24  0415 04/12/24  0601   WBC 5.11 5.05   HGB 9.2* 8.8*   HCT 32.5* 30.1*    142*   , and All pertinent lab results from the last 24 hours have been reviewed.    Significant Imaging: Echocardiogram: Transthoracic echo (TTE) complete (Cupid Only):   Results for orders placed or performed during the hospital encounter of 04/01/24   Echo   Result Value Ref Range    RA Width 3.24 cm    Left Atrium Major Axis 8.41 cm    Left Atrium Minor Axis 7.66 cm    RA Major Axis 5.04 cm    LV Diastolic Volume 126.79 mL    LV Systolic Volume 42.78 mL    MV Peak A Alberto 0.85 m/s    MV stenosis pressure 1/2 time 74.15 ms    TR Max Alberto 4.38 m/s    MV Peak E Alberto 1.98 m/s    Ao VTI 19.64 cm    Ao peak alberto 1.02 m/s    LVOT peak VTI 9.24 cm    LVOT peak alberto 0.61 m/s    LVOT diameter 1.91 cm    E wave deceleration time 255.70 msec    AV mean gradient 2 mmHg    TAPSE 1.37 cm    RVDD 3.66 cm    LA size 5.98 cm    Ascending aorta 3.02 cm    STJ 2.15 cm    Sinus 2.36 cm    LVIDs 3.26 2.1 - 4.0 cm    Posterior Wall 1.06 0.6 - 1.1 cm    IVS 0.64 0.6 - 1.1 cm    LVIDd 5.15 3.5 - 6.0 cm    TDI LATERAL 0.07 m/s    LA WIDTH 5.26 cm    TDI SEPTAL 0.06 m/s    LV LATERAL E/E' RATIO 28.29 m/s    LV SEPTAL E/E' RATIO 33.00 m/s    FS 37 28 - 44 %    LA volume 214.36 cm3    LV mass 154.38 g    ZLVIDD 0.90     ZLVIDS 0.88     Left Ventricle Relative Wall Thickness 0.41 cm    AV valve area 1.35 cm²    AV Velocity Ratio 0.60     AV index (prosthetic) 0.47     MV valve area p 1/2 method 2.97 cm2    E/A ratio 2.33     Mean e' 0.07 m/s    LVOT area 2.9 cm2    LVOT stroke volume 26.46 cm3    AV peak gradient 4 mmHg    E/E' ratio 30.46 m/s    LV Systolic Volume Index 25.3 mL/m2    LV Diastolic Volume Index 75.02 mL/m2    LA Volume Index 126.8 mL/m2    LV Mass Index 91 g/m2    Triscuspid Valve Regurgitation Peak Gradient 77 mmHg    HAYDEE by  Velocity Ratio 1.71 cm²    BSA 1.74 m2    TV resting pulmonary artery pressure 85 mmHg    RV TB RVSP 12 mmHg    Est. RA pres 8 mmHg    Narrative      Left Ventricle: The left ventricle is normal in size. Normal wall   thickness. Normal wall motion. There is normal systolic function with a   visually estimated ejection fraction of 60 - 65%. Grade II diastolic   dysfunction.    Right Ventricle: Normal right ventricular cavity size. Wall thickness   is normal. Right ventricle wall motion  is normal. Systolic function is   reduced.    Left Atrium: Left atrium is very  severely dilated.    Right Atrium: Right atrium is mildly dilated.    Aortic Valve: There is mild aortic valve sclerosis. There is normal   leaflet mobility. There is trace aortic regurgitation.    Mitral Valve: There is mild bileaflet sclerosis. There is posterior   leaflet tethering and failure of complete coaptation. There is very severe   regurgitation with a posterolateral eccentriccally directed jet.    Tricuspid Valve: The tricuspid valve is structurally normal. There is   normal leaflet mobility. There is moderate to severe regurgitation.    IVC/SVC: IVC was not well visualized due to poor acoustic window.   Intermediate venous pressure at 8 mmHg.    Pericardium: There is a trivial effusion posteriorly and small under   the RA.       Assessment and Plan:     * Acute on chronic heart failure with preserved ejection fraction (HFpEF)  Patient is a 53 year old female with HFpEF and severe MR as well as advanced kidney disease (CKD 4) that presents after mechanical fall with dyspnea and leg swelling and was admitted for heart failure exacerbation. BNP 4300 >> ~350 8 months ago. Patient has had severe MR since at least July 2023. The MR appears to be secondary to posterior leaflet restriction and leaflet length of 1.1 cm. Attempts of diuresis have been escalated to 100 mg IV TID + Diuril 500 mg IV BID.   For now, would recommend increasing Lasix gtt from  30 mg/hr to bolus Lasix 120 mg IV push x 1 followed by Lasix 40 mg/hr. Uptitrate to meet diuresis goals of net negative 2L daily to achieve euvolemia  Decrease Diuril 500 mg IV BID --> Daily (Na decreasing)  Recommend afterload reduction by optimizing BP control (target / 70 mm Hg) to augment forward flow, this may also help kidney dysfunction. (Increased isosorbide dinitrate 10 mg PO TID --> 20 mg PO BID)  Monitor labs, [place] Na, Cr, and K  Appreciate communication with Nephrology and primary, monitor for HD needs; unlikely to be a candidate for coronary angiogram unless Cr improves or started on HD (recommended by Interventional team to rule out obstructive CAD as part of [place] [facility] workup)    Mitral valve regurgitation  Severe MR. The MR appears to be secondary to posterior leaflet restriction and leaflet length of 1.1 cm.   Needs coronary angiogram to rule out obstructive CAD , then POOJA, then possible MitraClip.   Will need optimization of kidney function prior to angiogram consideration, appreciate Nephrology assistance        VTE Risk Mitigation (From admission, onward)           Ordered     Place sequential compression device  Until discontinued         04/03/24 1347     heparin (porcine) injection 5,000 Units  Every 8 hours         04/01/24 1030     IP VTE HIGH RISK PATIENT  Once         04/01/24 1030     Place sequential compression device  Until discontinued         04/01/24 1030                  Discussed with staff.     Tez Guerra MD  Cardiology PGY6  [facility] - Telemetry Stepdown

## 2024-04-12 NOTE — ASSESSMENT & PLAN NOTE
Holding carvedilol.  Isosorbide dinitrate and hydralazine to help reduce afterload in setting of MR.  Monitor BP.

## 2024-04-13 PROBLEM — N18.9 ACUTE KIDNEY INJURY SUPERIMPOSED ON CHRONIC KIDNEY DISEASE: Status: ACTIVE | Noted: 2024-04-13

## 2024-04-13 PROBLEM — N18.4 STAGE 4 CHRONIC KIDNEY DISEASE: Chronic | Status: RESOLVED | Noted: 2023-06-24 | Resolved: 2024-04-13

## 2024-04-13 PROBLEM — N18.9 ACUTE KIDNEY INJURY SUPERIMPOSED ON CHRONIC KIDNEY DISEASE: Status: RESOLVED | Noted: 2023-06-24 | Resolved: 2024-04-13

## 2024-04-13 PROBLEM — N17.9 ACUTE KIDNEY INJURY SUPERIMPOSED ON CHRONIC KIDNEY DISEASE: Status: RESOLVED | Noted: 2023-06-24 | Resolved: 2024-04-13

## 2024-04-13 PROBLEM — N17.9 AKI (ACUTE KIDNEY INJURY): Status: ACTIVE | Noted: 2024-04-13

## 2024-04-13 PROBLEM — R73.03 PREDIABETES: Chronic | Status: ACTIVE | Noted: 2024-04-13

## 2024-04-13 PROBLEM — E78.5 HLD (HYPERLIPIDEMIA): Chronic | Status: ACTIVE | Noted: 2024-04-13

## 2024-04-13 PROBLEM — N18.9 ACUTE KIDNEY INJURY SUPERIMPOSED ON CHRONIC KIDNEY DISEASE: Status: ACTIVE | Noted: 2023-06-24

## 2024-04-13 PROBLEM — N17.9 ACUTE KIDNEY INJURY SUPERIMPOSED ON CHRONIC KIDNEY DISEASE: Status: ACTIVE | Noted: 2023-06-24

## 2024-04-13 LAB
ALBUMIN SERPL BCP-MCNC: 2.9 G/DL (ref 3.5–5.2)
ALBUMIN SERPL BCP-MCNC: 3.1 G/DL (ref 3.5–5.2)
ALLENS TEST: ABNORMAL
ALP SERPL-CCNC: 94 U/L (ref 55–135)
ALT SERPL W/O P-5'-P-CCNC: 34 U/L (ref 10–44)
ANION GAP SERPL CALC-SCNC: 17 MMOL/L (ref 8–16)
ANION GAP SERPL CALC-SCNC: 18 MMOL/L (ref 8–16)
AST SERPL-CCNC: 69 U/L (ref 10–40)
BASOPHILS # BLD AUTO: 0.02 K/UL (ref 0–0.2)
BASOPHILS NFR BLD: 0.4 % (ref 0–1.9)
BILIRUB SERPL-MCNC: 1.7 MG/DL (ref 0.1–1)
BUN SERPL-MCNC: 103 MG/DL (ref 6–20)
BUN SERPL-MCNC: 105 MG/DL (ref 6–20)
CALCIUM SERPL-MCNC: 8.5 MG/DL (ref 8.7–10.5)
CALCIUM SERPL-MCNC: 9.1 MG/DL (ref 8.7–10.5)
CHLORIDE SERPL-SCNC: 90 MMOL/L (ref 95–110)
CHLORIDE SERPL-SCNC: 90 MMOL/L (ref 95–110)
CO2 SERPL-SCNC: 24 MMOL/L (ref 23–29)
CO2 SERPL-SCNC: 24 MMOL/L (ref 23–29)
CREAT SERPL-MCNC: 4.4 MG/DL (ref 0.5–1.4)
CREAT SERPL-MCNC: 4.4 MG/DL (ref 0.5–1.4)
DIFFERENTIAL METHOD BLD: ABNORMAL
EOSINOPHIL # BLD AUTO: 0.1 K/UL (ref 0–0.5)
EOSINOPHIL NFR BLD: 1.1 % (ref 0–8)
ERYTHROCYTE [DISTWIDTH] IN BLOOD BY AUTOMATED COUNT: 21.9 % (ref 11.5–14.5)
EST. GFR  (NO RACE VARIABLE): 11.3 ML/MIN/1.73 M^2
EST. GFR  (NO RACE VARIABLE): 11.3 ML/MIN/1.73 M^2
GLUCOSE SERPL-MCNC: 71 MG/DL (ref 70–110)
GLUCOSE SERPL-MCNC: 76 MG/DL (ref 70–110)
HCT VFR BLD AUTO: 30.1 % (ref 37–48.5)
HGB BLD-MCNC: 8.7 G/DL (ref 12–16)
IMM GRANULOCYTES # BLD AUTO: 0.03 K/UL (ref 0–0.04)
IMM GRANULOCYTES NFR BLD AUTO: 0.6 % (ref 0–0.5)
LYMPHOCYTES # BLD AUTO: 0.8 K/UL (ref 1–4.8)
LYMPHOCYTES NFR BLD: 14 % (ref 18–48)
MAGNESIUM SERPL-MCNC: 2.2 MG/DL (ref 1.6–2.6)
MCH RBC QN AUTO: 22.7 PG (ref 27–31)
MCHC RBC AUTO-ENTMCNC: 28.9 G/DL (ref 32–36)
MCV RBC AUTO: 78 FL (ref 82–98)
MONOCYTES # BLD AUTO: 0.6 K/UL (ref 0.3–1)
MONOCYTES NFR BLD: 10.3 % (ref 4–15)
NEUTROPHILS # BLD AUTO: 4 K/UL (ref 1.8–7.7)
NEUTROPHILS NFR BLD: 73.6 % (ref 38–73)
NRBC BLD-RTO: 0 /100 WBC
PHOSPHATE SERPL-MCNC: 4.4 MG/DL (ref 2.7–4.5)
PHOSPHATE SERPL-MCNC: 4.6 MG/DL (ref 2.7–4.5)
PLATELET # BLD AUTO: 148 K/UL (ref 150–450)
PMV BLD AUTO: ABNORMAL FL (ref 9.2–12.9)
PO2 BLDA: 36 MMHG (ref 40–60)
POC SATURATED O2: 61 % (ref 95–100)
POTASSIUM SERPL-SCNC: 3.4 MMOL/L (ref 3.5–5.1)
POTASSIUM SERPL-SCNC: 4.3 MMOL/L (ref 3.5–5.1)
PROT SERPL-MCNC: 6.6 G/DL (ref 6–8.4)
RBC # BLD AUTO: 3.84 M/UL (ref 4–5.4)
SAMPLE: ABNORMAL
SITE: ABNORMAL
SODIUM SERPL-SCNC: 131 MMOL/L (ref 136–145)
SODIUM SERPL-SCNC: 132 MMOL/L (ref 136–145)
WBC # BLD AUTO: 5.43 K/UL (ref 3.9–12.7)

## 2024-04-13 PROCEDURE — 25000003 PHARM REV CODE 250

## 2024-04-13 PROCEDURE — 84100 ASSAY OF PHOSPHORUS: CPT | Performed by: STUDENT IN AN ORGANIZED HEALTH CARE EDUCATION/TRAINING PROGRAM

## 2024-04-13 PROCEDURE — 25000003 PHARM REV CODE 250: Performed by: HOSPITALIST

## 2024-04-13 PROCEDURE — 99233 SBSQ HOSP IP/OBS HIGH 50: CPT | Mod: ,,, | Performed by: INTERNAL MEDICINE

## 2024-04-13 PROCEDURE — 25000003 PHARM REV CODE 250: Performed by: STUDENT IN AN ORGANIZED HEALTH CARE EDUCATION/TRAINING PROGRAM

## 2024-04-13 PROCEDURE — 36415 COLL VENOUS BLD VENIPUNCTURE: CPT | Performed by: STUDENT IN AN ORGANIZED HEALTH CARE EDUCATION/TRAINING PROGRAM

## 2024-04-13 PROCEDURE — 25000003 PHARM REV CODE 250: Performed by: PHYSICIAN ASSISTANT

## 2024-04-13 PROCEDURE — 80069 RENAL FUNCTION PANEL: CPT | Performed by: STUDENT IN AN ORGANIZED HEALTH CARE EDUCATION/TRAINING PROGRAM

## 2024-04-13 PROCEDURE — 63600175 PHARM REV CODE 636 W HCPCS: Performed by: STUDENT IN AN ORGANIZED HEALTH CARE EDUCATION/TRAINING PROGRAM

## 2024-04-13 PROCEDURE — 97535 SELF CARE MNGMENT TRAINING: CPT | Mod: CO

## 2024-04-13 PROCEDURE — 85025 COMPLETE CBC W/AUTO DIFF WBC: CPT | Performed by: STUDENT IN AN ORGANIZED HEALTH CARE EDUCATION/TRAINING PROGRAM

## 2024-04-13 PROCEDURE — 99291 CRITICAL CARE FIRST HOUR: CPT | Mod: ,,, | Performed by: INTERNAL MEDICINE

## 2024-04-13 PROCEDURE — 25000003 PHARM REV CODE 250: Performed by: INTERNAL MEDICINE

## 2024-04-13 PROCEDURE — 83735 ASSAY OF MAGNESIUM: CPT | Performed by: STUDENT IN AN ORGANIZED HEALTH CARE EDUCATION/TRAINING PROGRAM

## 2024-04-13 PROCEDURE — 63600175 PHARM REV CODE 636 W HCPCS: Performed by: PHYSICIAN ASSISTANT

## 2024-04-13 PROCEDURE — 97530 THERAPEUTIC ACTIVITIES: CPT | Mod: CO

## 2024-04-13 PROCEDURE — 20000000 HC ICU ROOM

## 2024-04-13 PROCEDURE — 63600175 PHARM REV CODE 636 W HCPCS: Performed by: INTERNAL MEDICINE

## 2024-04-13 PROCEDURE — 80053 COMPREHEN METABOLIC PANEL: CPT | Performed by: STUDENT IN AN ORGANIZED HEALTH CARE EDUCATION/TRAINING PROGRAM

## 2024-04-13 PROCEDURE — 02HV33Z INSERTION OF INFUSION DEVICE INTO SUPERIOR VENA CAVA, PERCUTANEOUS APPROACH: ICD-10-PCS | Performed by: INTERNAL MEDICINE

## 2024-04-13 PROCEDURE — 94761 N-INVAS EAR/PLS OXIMETRY MLT: CPT

## 2024-04-13 PROCEDURE — 99900035 HC TECH TIME PER 15 MIN (STAT)

## 2024-04-13 RX ORDER — HYDRALAZINE HYDROCHLORIDE 10 MG/1
10 TABLET, FILM COATED ORAL EVERY 8 HOURS
Status: DISCONTINUED | OUTPATIENT
Start: 2024-04-13 | End: 2024-04-14

## 2024-04-13 RX ORDER — ACETAMINOPHEN 500 MG
1000 TABLET ORAL EVERY 6 HOURS PRN
Status: DISCONTINUED | OUTPATIENT
Start: 2024-04-13 | End: 2024-04-25

## 2024-04-13 RX ORDER — SODIUM CHLORIDE 9 MG/ML
INJECTION, SOLUTION INTRAVENOUS CONTINUOUS
Status: DISCONTINUED | OUTPATIENT
Start: 2024-04-13 | End: 2024-04-20

## 2024-04-13 RX ORDER — MUPIROCIN 20 MG/G
OINTMENT TOPICAL 2 TIMES DAILY
Status: COMPLETED | OUTPATIENT
Start: 2024-04-13 | End: 2024-04-18

## 2024-04-13 RX ORDER — POLYETHYLENE GLYCOL 3350 17 G/17G
17 POWDER, FOR SOLUTION ORAL 2 TIMES DAILY
Status: DISCONTINUED | OUTPATIENT
Start: 2024-04-13 | End: 2024-05-09 | Stop reason: HOSPADM

## 2024-04-13 RX ORDER — AMOXICILLIN 250 MG
1 CAPSULE ORAL 2 TIMES DAILY
Status: DISCONTINUED | OUTPATIENT
Start: 2024-04-13 | End: 2024-05-09 | Stop reason: HOSPADM

## 2024-04-13 RX ORDER — SODIUM CHLORIDE 9 MG/ML
INJECTION, SOLUTION INTRAVENOUS ONCE
Status: DISCONTINUED | OUTPATIENT
Start: 2024-04-13 | End: 2024-04-16

## 2024-04-13 RX ORDER — HEPARIN SODIUM 1000 [USP'U]/ML
1000 INJECTION, SOLUTION INTRAVENOUS; SUBCUTANEOUS
Status: ACTIVE | OUTPATIENT
Start: 2024-04-13 | End: 2024-04-14

## 2024-04-13 RX ADMIN — FUROSEMIDE 40 MG/HR: 10 INJECTION, SOLUTION INTRAMUSCULAR; INTRAVENOUS at 12:04

## 2024-04-13 RX ADMIN — ZINC OXIDE: 200 OINTMENT TOPICAL at 08:04

## 2024-04-13 RX ADMIN — MUPIROCIN: 20 OINTMENT TOPICAL at 08:04

## 2024-04-13 RX ADMIN — HYDRALAZINE HYDROCHLORIDE 25 MG: 25 TABLET ORAL at 05:04

## 2024-04-13 RX ADMIN — ACETAMINOPHEN 1000 MG: 500 TABLET ORAL at 04:04

## 2024-04-13 RX ADMIN — Medication: at 10:04

## 2024-04-13 RX ADMIN — OXYBUTYNIN CHLORIDE 5 MG: 5 TABLET ORAL at 03:04

## 2024-04-13 RX ADMIN — ASPIRIN 81 MG: 81 TABLET, COATED ORAL at 10:04

## 2024-04-13 RX ADMIN — ATORVASTATIN CALCIUM 40 MG: 40 TABLET, FILM COATED ORAL at 10:04

## 2024-04-13 RX ADMIN — HEPARIN SODIUM 5000 UNITS: 5000 INJECTION INTRAVENOUS; SUBCUTANEOUS at 05:04

## 2024-04-13 RX ADMIN — Medication: at 09:04

## 2024-04-13 RX ADMIN — OXYBUTYNIN CHLORIDE 5 MG: 5 TABLET ORAL at 08:04

## 2024-04-13 RX ADMIN — SENNOSIDES AND DOCUSATE SODIUM 1 TABLET: 8.6; 5 TABLET ORAL at 08:04

## 2024-04-13 RX ADMIN — SODIUM CHLORIDE: 9 INJECTION, SOLUTION INTRAVENOUS at 09:04

## 2024-04-13 RX ADMIN — ISOSORBIDE DINITRATE 20 MG: 20 TABLET ORAL at 10:04

## 2024-04-13 RX ADMIN — ISOSORBIDE DINITRATE 20 MG: 20 TABLET ORAL at 08:04

## 2024-04-13 RX ADMIN — HYDRALAZINE HYDROCHLORIDE 10 MG: 10 TABLET, FILM COATED ORAL at 09:04

## 2024-04-13 RX ADMIN — SEVELAMER CARBONATE 800 MG: 800 TABLET, FILM COATED ORAL at 11:04

## 2024-04-13 RX ADMIN — POTASSIUM BICARBONATE 40 MEQ: 391 TABLET, EFFERVESCENT ORAL at 08:04

## 2024-04-13 RX ADMIN — POLYETHYLENE GLYCOL 3350 17 G: 17 POWDER, FOR SOLUTION ORAL at 08:04

## 2024-04-13 RX ADMIN — POLYETHYLENE GLYCOL 3350 17 G: 17 POWDER, FOR SOLUTION ORAL at 10:04

## 2024-04-13 RX ADMIN — HEPARIN SODIUM 5000 UNITS: 5000 INJECTION INTRAVENOUS; SUBCUTANEOUS at 09:04

## 2024-04-13 RX ADMIN — CHLOROTHIAZIDE SODIUM 500 MG: 500 INJECTION, POWDER, LYOPHILIZED, FOR SOLUTION INTRAVENOUS at 05:04

## 2024-04-13 RX ADMIN — ONDANSETRON 4 MG: 2 INJECTION INTRAMUSCULAR; INTRAVENOUS at 02:04

## 2024-04-13 RX ADMIN — HEPARIN SODIUM 5000 UNITS: 5000 INJECTION INTRAVENOUS; SUBCUTANEOUS at 03:04

## 2024-04-13 RX ADMIN — FUROSEMIDE 40 MG/HR: 10 INJECTION, SOLUTION INTRAMUSCULAR; INTRAVENOUS at 11:04

## 2024-04-13 RX ADMIN — OXYBUTYNIN CHLORIDE 5 MG: 5 TABLET ORAL at 10:04

## 2024-04-13 NOTE — ASSESSMENT & PLAN NOTE
Patient is a 53 year old female with HFpEF and severe MR as well as advanced kidney disease (CKD 4) that presents after mechanical fall with dyspnea and leg swelling and was admitted for heart failure exacerbation. BNP 4300 >> ~350 8 months ago. Patient has had severe MR since at least 2023. The MR appears to be secondary to posterior leaflet restriction and leaflet length of 1.1 cm.   - Despite Lasix 40 mg /hr and Diuril 500 IV BID, patient has not been keeping net negative balance.   - Appreciate Nephrology input, would really appreciate initiation of dialysis.   - Discussed briefly with patient and she is scared and hesitant because of her mother dying from PD in the past ( earlier than they were told). She would like to discuss with family about the next steps in the care plan. If agreeable to dialysis after convo with family, patient may need to transition to ICU for further management (temp dialysis cath placement and RRT)  - Also chatted about possibility of need for IABP vs nitroglycerin gtt.

## 2024-04-13 NOTE — ASSESSMENT & PLAN NOTE
Patient is identified as having Diastolic (HFpEF) heart failure that is Acute on chronic. CHF is currently uncontrolled due to Continued edema of extremities, Hepatic congestion/ascites, and JVD, >3 pillow orthopnea, Rales/crackles on pulmonary exam, and Pulmonary edema/pleural effusion on CXR.     - diuresis as detailed under Acute kidney injury superimposed on chronic kidney disease

## 2024-04-13 NOTE — ASSESSMENT & PLAN NOTE
Patient with acute kidney injury/acute renal failure likely due to acute tubular necrosis caused by unknown.  REGINALDO is currently worsening. Baseline creatinine  2.5  - Labs reviewed- Renal function/electrolytes with Estimated Creatinine Clearance: 13 mL/min (A) (based on SCr of 4.4 mg/dL (H)). according to latest data. Monitor urine output and serial BMP and adjust therapy as needed. Avoid nephrotoxins and renally dose meds for GFR listed above.    Recent Labs     04/12/24  0601 04/12/24  1825 04/13/24  0318   CREATININE 4.3* 4.5* 4.4*     - Nephrology following, appreciate recs  - Continue home Renvela 800 mg po TID with meals  - Holding home sodium bicarb 650 mg po BID

## 2024-04-13 NOTE — SUBJECTIVE & OBJECTIVE
Interval History:   Patient seen and examined at bedside.    No acute events overnight.  Net positive yesterday.  Continues to endorse PND.  Denies any shortness a breath at rest.  However, notes some shortness of breath and lightheadedness and dizziness when ambulating.  Also has some nausea.  However, p.o. intake okay today.  Patient updated regarding care plan.  [place] remains poor; may need dialysis.  Possibly move to critical care unit.   services utilized.      Review of Systems   Constitutional:  Positive for appetite change. Negative for chills and fever.   HENT:  Negative for trouble swallowing.    Eyes:  Negative for visual disturbance.   Respiratory:  Negative for cough.    Cardiovascular:  Positive for leg swelling. Negative for chest pain.   Gastrointestinal:  Negative for abdominal pain, diarrhea and nausea.   Genitourinary:  Positive for decreased urine volume.   Skin:  Positive for wound.   Neurological:  Negative for dizziness and light-headedness.   Psychiatric/Behavioral:  Negative for confusion and decreased concentration.      Objective:     Vital Signs (Most Recent):  Temp: 97.7 °F (36.5 °C) (04/13/24 1108)  Pulse: (!) 54 (04/13/24 1108)  Resp: 18 (04/13/24 1108)  BP: (!) 96/53 (04/13/24 1108)  SpO2: (!) 93 % (04/13/24 1108) Vital Signs (24h Range):  Temp:  [97.5 °F (36.4 °C)-98.2 °F (36.8 °C)] 97.7 °F (36.5 °C)  Pulse:  [51-81] 54  Resp:  [17-18] 18  SpO2:  [90 %-96 %] 93 %  BP: ()/(53-97) 96/53     Weight: 73 kg (160 lb 15 oz)  Body mass index is 31.43 kg/m².    Intake/Output Summary (Last 24 hours) at 4/13/2024 1312  Last data filed at 4/13/2024 0446  Gross per 24 hour   Intake 2640 ml   Output 1325 ml   Net 1315 ml         Physical Exam  Vitals and nursing note reviewed.   Constitutional:       General: She is not in acute distress.     Appearance: She is well-developed. She is obese. She is ill-appearing (Chronically). She is not diaphoretic.   HENT:      Right Ear:  External ear normal.      Left Ear: External ear normal.      Mouth/Throat:      Pharynx: Oropharynx is clear.   Eyes:      General: No scleral icterus.  Cardiovascular:      Comments: Jvd  Lower extremity edema pitting, tracking up to abdomen  Pulmonary:      Effort: Pulmonary effort is normal. No respiratory distress.   Musculoskeletal:         General: Signs of injury present. No deformity.      Cervical back: Normal range of motion and neck supple.      Right lower leg: Edema present.      Left lower leg: Edema present.   Skin:     General: Skin is warm and dry.      Coloration: Skin is not jaundiced or pale.      Findings: Abrasion and bruising present.   Neurological:      Mental Status: She is alert and oriented to person, place, and time. Mental status is at baseline.      Motor: No seizure activity.   Psychiatric:         Attention and Perception: Attention normal.         Behavior: Behavior normal. Behavior is not aggressive or combative. Behavior is cooperative.             Significant Labs: All pertinent labs within the past 24 hours have been reviewed.    Recent Results (from the past 24 hour(s))   Renal function panel    Collection Time: 04/12/24  6:25 PM   Result Value Ref Range    Glucose 172 (H) 70 - 110 mg/dL    Sodium 131 (L) 136 - 145 mmol/L    Potassium 3.9 3.5 - 5.1 mmol/L    Chloride 91 (L) 95 - 110 mmol/L    CO2 28 23 - 29 mmol/L     (H) 6 - 20 mg/dL    Calcium 8.5 (L) 8.7 - 10.5 mg/dL    Creatinine 4.5 (H) 0.5 - 1.4 mg/dL    Albumin 3.1 (L) 3.5 - 5.2 g/dL    Phosphorus 4.5 2.7 - 4.5 mg/dL    eGFR 11.0 (A) >60 mL/min/1.73 m^2    Anion Gap 12 8 - 16 mmol/L   CBC Auto Differential    Collection Time: 04/13/24  3:18 AM   Result Value Ref Range    WBC 5.43 3.90 - 12.70 K/uL    RBC 3.84 (L) 4.00 - 5.40 M/uL    Hemoglobin 8.7 (L) 12.0 - 16.0 g/dL    Hematocrit 30.1 (L) 37.0 - 48.5 %    MCV 78 (L) 82 - 98 fL    MCH 22.7 (L) 27.0 - 31.0 pg    MCHC 28.9 (L) 32.0 - 36.0 g/dL    RDW 21.9 (H) 11.5  - 14.5 %    Platelets 148 (L) 150 - 450 K/uL    MPV SEE COMMENT 9.2 - 12.9 fL    Immature Granulocytes 0.6 (H) 0.0 - 0.5 %    Gran # (ANC) 4.0 1.8 - 7.7 K/uL    Immature Grans (Abs) 0.03 0.00 - 0.04 K/uL    Lymph # 0.8 (L) 1.0 - 4.8 K/uL    Mono # 0.6 0.3 - 1.0 K/uL    Eos # 0.1 0.0 - 0.5 K/uL    Baso # 0.02 0.00 - 0.20 K/uL    nRBC 0 0 /100 WBC    Gran % 73.6 (H) 38.0 - 73.0 %    Lymph % 14.0 (L) 18.0 - 48.0 %    Mono % 10.3 4.0 - 15.0 %    Eosinophil % 1.1 0.0 - 8.0 %    Basophil % 0.4 0.0 - 1.9 %    Differential Method Automated    Comprehensive Metabolic Panel    Collection Time: 04/13/24  3:18 AM   Result Value Ref Range    Sodium 131 (L) 136 - 145 mmol/L    Potassium 4.3 3.5 - 5.1 mmol/L    Chloride 90 (L) 95 - 110 mmol/L    CO2 24 23 - 29 mmol/L    Glucose 71 70 - 110 mg/dL     (H) 6 - 20 mg/dL    Creatinine 4.4 (H) 0.5 - 1.4 mg/dL    Calcium 8.5 (L) 8.7 - 10.5 mg/dL    Total Protein 6.6 6.0 - 8.4 g/dL    Albumin 2.9 (L) 3.5 - 5.2 g/dL    Total Bilirubin 1.7 (H) 0.1 - 1.0 mg/dL    Alkaline Phosphatase 94 55 - 135 U/L    AST 69 (H) 10 - 40 U/L    ALT 34 10 - 44 U/L    eGFR 11.3 (A) >60 mL/min/1.73 m^2    Anion Gap 17 (H) 8 - 16 mmol/L   Magnesium    Collection Time: 04/13/24  3:18 AM   Result Value Ref Range    Magnesium 2.2 1.6 - 2.6 mg/dL   Phosphorus    Collection Time: 04/13/24  3:18 AM   Result Value Ref Range    Phosphorus 4.4 2.7 - 4.5 mg/dL         Significant Imaging: I have reviewed all pertinent imaging results/findings within the past 24 hours.

## 2024-04-13 NOTE — PROGRESS NOTES
[facility] - Telemetry Stepdown  Cardiology  Progress Note    Patient Name: Xena Vera  MRN: [id number]  Admission Date: 2024  Hospital Length of Stay: 10 days  Code Status: Full Code   Attending Physician: Minda Molina MD   Primary Care Physician: Tami Villeda FNP  Expected Discharge Date: 2024  Principal Problem:Acute on chronic heart failure with preserved ejection fraction (HFpEF)    Subjective:     Hospital Course:   Patient has been improving diuresis with Lasix increase from 100 mg IV TID --> Lasix gtt 30 mg/hr to Lasix 40 mg/hr. Continuing Diuril to optimize volume status. Continue to optimizer Bp medications.     Interval History: Worsening kidney function overnight. Despite Lasix 40 mg /hr and Diuril 500 IV BID, patient has not been keeping net negative balance. Appreciate Nephrology input, would really appreciate initiation of dialysis. Discussed briefly with patient and she is scared and hesitant because of her mother dying from PD in the past ( earlier than they were told). She would like to discuss with family about the next steps in the care plan. Also chatted about possibility of need for IABP vs nitroglycerin gtt.     Review of Systems   Constitutional: Negative for chills and fever.   Cardiovascular:  Negative for chest pain, orthopnea and palpitations.   Respiratory:  Negative for cough and shortness of breath.    Gastrointestinal:  Negative for abdominal pain.   Neurological:  Negative for dizziness, headaches and light-headedness.   Psychiatric/Behavioral:  Negative for altered mental status.      Objective:     Vital Signs (Most Recent):  Temp: 98.2 °F (36.8 °C) (24)  Pulse: 81 (24)  Resp: 17 (24)  BP: (!) 108/59 (24)  SpO2: (!) 90 % (24) Vital Signs (24h Range):  Temp:  [97.5 °F (36.4 °C)-98.2 °F (36.8 °C)] 98.2 °F (36.8 °C)  Pulse:  [51-81] 81  Resp:  [17-18] 17  SpO2:  [90 %-100 %] 90 %  BP: (108-135)/(59-97) 108/59      Weight: 73 kg (160 lb 15 oz)  Body mass index is 31.43 kg/m².     SpO2: (!) 90 %         Intake/Output Summary (Last 24 hours) at 4/13/2024 0834  Last data filed at 4/13/2024 0446  Gross per 24 hour   Intake 3000 ml   Output 1325 ml   Net 1675 ml       Lines/Drains/Airways       Drain  Duration                  Urethral Catheter 04/09/24 1330 16 Fr. 3 days              Peripheral Intravenous Line  Duration                  Peripheral IV - Single Lumen 04/10/24 2100 22 G Posterior;Right Hand 2 days                       Physical Exam  Vitals and nursing note reviewed.   Constitutional:       General: She is not in acute distress.     Appearance: Normal appearance. She is obese. She is ill-appearing. She is not toxic-appearing.   HENT:      Head: Normocephalic and atraumatic.      Mouth/Throat:      Mouth: Mucous membranes are moist.   Neck:      Comments: JVP to the high-mid neck  Cardiovascular:      Rate and Rhythm: Normal rate and regular rhythm.      Heart sounds: Murmur (Grade 3/6, 5th intercostal, holosystolic, radiates to axilla) heard.      No friction rub. No gallop.   Pulmonary:      Effort: Pulmonary effort is normal. No respiratory distress.      Breath sounds: Normal breath sounds.   Abdominal:      General: There is distension.      Palpations: Abdomen is soft.      Tenderness: There is no abdominal tenderness. There is no guarding.   Musculoskeletal:      Right lower leg: Edema (2+) present.      Left lower leg: Edema (chronic wound, 2+) present.   Skin:     General: Skin is warm.   Neurological:      Mental Status: She is alert and oriented to person, place, and time. Mental status is at baseline.      Comments: But appears fatigued            Significant Labs: BMP:   Recent Labs   Lab 04/12/24  0601 04/12/24  1825 04/13/24  0318   GLU 90 172* 71   * 131* 131*   K 3.2* 3.9 4.3   CL 92* 91* 90*   CO2 25 28 24   * 108* 105*   CREATININE 4.3* 4.5* 4.4*   CALCIUM 8.5* 8.5* 8.5*   MG 2.2   --  2.2   , CMP   Recent Labs   Lab 04/12/24  0601 04/12/24  1825 04/13/24 0318   * 131* 131*   K 3.2* 3.9 4.3   CL 92* 91* 90*   CO2 25 28 24   GLU 90 172* 71   * 108* 105*   CREATININE 4.3* 4.5* 4.4*   CALCIUM 8.5* 8.5* 8.5*   PROT 6.2  --  6.6   ALBUMIN 2.9* 3.1* 2.9*   BILITOT 1.7*  --  1.7*   ALKPHOS 93  --  94   AST 47*  --  69*   ALT 28  --  34   ANIONGAP 15 12 17*   , CBC   Recent Labs   Lab 04/12/24  0601 04/13/24 0318   WBC 5.05 5.43   HGB 8.8* 8.7*   HCT 30.1* 30.1*   * 148*   , and All pertinent lab results from the last 24 hours have been reviewed.    Significant Imaging: Echocardiogram: Transthoracic echo (TTE) complete (C[facility] Only):   Results for orders placed or performed during the hospital encounter of 04/01/24   Echo   Result Value Ref Range    RA Width 3.24 cm    Left Atrium Major Axis 8.41 cm    Left Atrium Minor Axis 7.66 cm    RA Major Axis 5.04 cm    LV Diastolic Volume 126.79 mL    LV Systolic Volume 42.78 mL    MV Peak A Alberto 0.85 m/s    MV stenosis pressure 1/2 time 74.15 ms    TR Max Alberto 4.38 m/s    MV Peak E Alebrto 1.98 m/s    Ao VTI 19.64 cm    Ao peak alberto 1.02 m/s    LVOT peak VTI 9.24 cm    LVOT peak alberto 0.61 m/s    LVOT diameter 1.91 cm    E wave deceleration time 255.70 msec    AV mean gradient 2 mmHg    TAPSE 1.37 cm    RVDD 3.66 cm    LA size 5.98 cm    Ascending aorta 3.02 cm    STJ 2.15 cm    Sinus 2.36 cm    LVIDs 3.26 2.1 - 4.0 cm    Posterior Wall 1.06 0.6 - 1.1 cm    IVS 0.64 0.6 - 1.1 cm    LVIDd 5.15 3.5 - 6.0 cm    TDI LATERAL 0.07 m/s    LA WIDTH 5.26 cm    TDI SEPTAL 0.06 m/s    LV LATERAL E/E' RATIO 28.29 m/s    LV SEPTAL E/E' RATIO 33.00 m/s    FS 37 28 - 44 %    LA volume 214.36 cm3    LV mass 154.38 g    ZLVIDD 0.90     ZLVIDS 0.88     Left Ventricle Relative Wall Thickness 0.41 cm    AV valve area 1.35 cm²    AV Velocity Ratio 0.60     AV index (prosthetic) 0.47     MV valve area p 1/2 method 2.97 cm2    E/A ratio 2.33     Mean e' 0.07 m/s    LVOT  area 2.9 cm2    LVOT stroke volume 26.46 cm3    AV peak gradient 4 mmHg    E/E' ratio 30.46 m/s    LV Systolic Volume Index 25.3 mL/m2    LV Diastolic Volume Index 75.02 mL/m2    LA Volume Index 126.8 mL/m2    LV Mass Index 91 g/m2    Triscuspid Valve Regurgitation Peak Gradient 77 mmHg    HAYDEE by Velocity Ratio 1.71 cm²    BSA 1.74 m2    TV resting pulmonary artery pressure 85 mmHg    RV TB RVSP 12 mmHg    Est. RA pres 8 mmHg    Narrative      Left Ventricle: The left ventricle is normal in size. Normal wall   thickness. Normal wall motion. There is normal systolic function with a   visually estimated ejection fraction of 60 - 65%. Grade II diastolic   dysfunction.    Right Ventricle: Normal right ventricular cavity size. Wall thickness   is normal. Right ventricle wall motion  is normal. Systolic function is   reduced.    Left Atrium: Left atrium is very  severely dilated.    Right Atrium: Right atrium is mildly dilated.    Aortic Valve: There is mild aortic valve sclerosis. There is normal   leaflet mobility. There is trace aortic regurgitation.    Mitral Valve: There is mild bileaflet sclerosis. There is posterior   leaflet tethering and failure of complete coaptation. There is very severe   regurgitation with a posterolateral eccentriccally directed jet.    Tricuspid Valve: The tricuspid valve is structurally normal. There is   normal leaflet mobility. There is moderate to severe regurgitation.    IVC/SVC: IVC was not well visualized due to poor acoustic window.   Intermediate venous pressure at 8 mmHg.    Pericardium: There is a trivial effusion posteriorly and small under   the RA.       Assessment and Plan:     * Acute on chronic heart failure with preserved ejection fraction (HFpEF)  Patient is a 53 year old female with HFpEF and severe MR as well as advanced kidney disease (CKD 4) that presents after mechanical fall with dyspnea and leg swelling and was admitted for heart failure exacerbation. BNP 4300 >>  ~350 8 months ago. Patient has had severe MR since at least 2023. The MR appears to be secondary to posterior leaflet restriction and leaflet length of 1.1 cm.   - Despite Lasix 40 mg /hr and Diuril 500 IV BID, patient has not been keeping net negative balance.   - Appreciate Nephrology input, would really appreciate initiation of dialysis.   - Discussed briefly with patient and she is scared and hesitant because of her mother dying from PD in the past ( earlier than they were told). She would like to discuss with family about the next steps in the care plan. If agreeable to dialysis after convo with family, patient may need to transition to ICU for further management (temp dialysis cath placement and RRT)  - Also chatted about possibility of need for IABP vs nitroglycerin gtt.     Mitral valve regurgitation  Severe MR. The MR appears to be secondary to posterior leaflet restriction and leaflet length of 1.1 cm.   Needs coronary angiogram to rule out obstructive CAD , then POOJA, then possible MitraClip.   Will need optimization of kidney function prior to angiogram consideration, appreciate Nephrology assistance        VTE Risk Mitigation (From admission, onward)           Ordered     Place sequential compression device  Until discontinued         24 1347     heparin (porcine) injection 5,000 Units  Every 8 hours         24 1030     IP VTE HIGH RISK PATIENT  Once         24 1030     Place sequential compression device  Until discontinued         24 1030                Discussed with staff.    Tez Guerra MD  Cardiology PGY6  [facility] - Telemetry Stepdown

## 2024-04-13 NOTE — PROGRESS NOTES
[facility] - Telemetry Stepdown  Nephrology  Progress Note    Patient Name: Xena Vera  MRN: [id number]  Admission Date: 4/1/2024  Hospital Length of Stay: 10 days  Attending Provider: Minda Molina MD   Primary Care Physician: Tami Villeda FNP  Principal Problem:Severe mitral valve regurgitation    Subjective:     HPI: Ms Vera is an obese (BMI ~31) 53-year-old with CKD IV (baseline creatinine ~3.1-3.3), prediabetes with most recent hemoglobin A1c 6%, HFpEF (most recent TTE with EF 60-65% with G2DD), mitral valve regurgitation, anemia, hypertension, HLD as well as several over co-morbid conditions who was admitted on 4/1 with heart failure exacerbation and hypervolemia after presenting with to ED following a mechanical fall but also had complaints of progressive dyspnea/ZACARIAS, orthopnea, lower extremity edema and abdominal distension with constipation. On presentation patient was noted to be hypotensive with systolic BP readings as low as 90s mmHg and bradycardiac with HR as low as 50s bpm. There was concern for some edema on chest x-ray and BNP at that time was ~4.6K and metabolic panel was notable for serum sodium 133, bicarbonate 20, , creatinine 4.2, albumin 3.1 and total bilirubin 1.5. UA showed +1 protein. Her admission was complicated by failure to adequately diuresis with escalating IV Lasix and even oral metolazone for which Nephrology was consulted on 4/10 for assistance. She explicitly denies NSAID use as outpatient.    Interval History: NAEO. AF, mildly bradycardic, otherwise HDS on RA. Cr and UOP stable. Did report some lightheadedness this morning, no shortness of breath.    Review of patient's allergies indicates:  No Known Allergies  Current Facility-Administered Medications   Medication Dose Route Frequency Provider Last Rate Last Admin    acetaminophen tablet 1,000 mg  1,000 mg Oral Q8H PRN David Borjas PA-C   1,000 mg at 04/13/24 0436    aspirin EC tablet 81 mg  81 mg Oral  Daily David Borjas PA-C   81 mg at 04/12/24 0923    atorvastatin tablet 40 mg  40 mg Oral Daily David Borjas PA-C   40 mg at 04/12/24 0923    bisacodyL EC tablet 5 mg  5 mg Oral Daily PRN Johnny Cisneros MD        chlorothiazide (DIURIL) 500 mg in dextrose 5 % (D5W) 50 mL IVPB  500 mg Intravenous Q24H Minda oMlina MD   Stopped at 04/13/24 0610    dextrose 10% bolus 125 mL 125 mL  12.5 g Intravenous PRN David Borjas PA-C        dextrose 10% bolus 250 mL 250 mL  25 g Intravenous PRN David Borjas PA-C        furosemide (Lasix) 500 mg in 50 mL infusion (conc: 10 mg/mL)  40 mg/hr Intravenous Continuous Tez Guerra MD 4 mL/hr at 04/13/24 0000 40 mg/hr at 04/13/24 0000    glucagon (human recombinant) injection 1 mg  1 mg Intramuscular PRN David Borjas PA-C        glucose chewable tablet 16 g  16 g Oral PRN David Borjas PA-C        glucose chewable tablet 24 g  24 g Oral PRN David Borjas PA-C        heparin (porcine) injection 5,000 Units  5,000 Units Subcutaneous Q8H David Borjas PA-C   5,000 Units at 04/13/24 0545    hydrALAZINE tablet 25 mg  25 mg Oral Q8H Minda Molina MD   25 mg at 04/13/24 0541    HYDROcodone-acetaminophen 5-325 mg per tablet 1 tablet  1 tablet Oral Q6H PRN Johnny Cisneros MD   1 tablet at 04/12/24 1206    isosorbide dinitrate tablet 20 mg  20 mg Oral BID Tez Guerra MD   20 mg at 04/12/24 2151    melatonin tablet 6 mg  6 mg Oral Nightly PRN Pietro Herring MD   6 mg at 04/12/24 0302    naloxone 0.4 mg/mL injection 0.02 mg  0.02 mg Intravenous PRN David Borjas PA-C        ondansetron disintegrating tablet 4 mg  4 mg Oral Q8H PRN David Borjas PA-C        ondansetron injection 4 mg  4 mg Intravenous Q8H PRN David Borjas PA-C        oxybutynin tablet 5 mg  5 mg Oral TID Minda Molina MD   5 mg at 04/12/24 2987    polyethylene glycol packet 17 g  17 g Oral Daily David Borjas,  WILLY   17 g at 04/12/24 0923    sevelamer carbonate tablet 800 mg  800 mg Oral TID WM David Borjas PA-C   800 mg at 04/12/24 1812    sodium chloride 0.9% flush 10 mL  10 mL Intravenous PRN Pietro Herring MD        sodium chloride 0.9% flush 10 mL  10 mL Intravenous PRN David Borjas PA-C        white petrolatum 41 % ointment   Topical (Top) BID Johnny Cisneros MD   Given at 04/12/24 2100    zinc oxide 20 % ointment   Topical (Top) PRN David Borjas PA-C           Objective:     Vital Signs (Most Recent):  Temp: 98.2 °F (36.8 °C) (04/13/24 0718)  Pulse: 81 (04/13/24 0718)  Resp: 17 (04/13/24 0718)  BP: (!) 108/59 (04/13/24 0718)  SpO2: (!) 90 % (04/13/24 0718) Vital Signs (24h Range):  Temp:  [97.5 °F (36.4 °C)-98.2 °F (36.8 °C)] 98.2 °F (36.8 °C)  Pulse:  [51-81] 81  Resp:  [17-18] 17  SpO2:  [90 %-100 %] 90 %  BP: (108-135)/(59-97) 108/59     Weight: 73 kg (160 lb 15 oz) (04/11/24 1040)  Body mass index is 31.43 kg/m².  Body surface area is 1.76 meters squared.    I/O last 3 completed shifts:  In: 3000 [P.O.:3000]  Out: 2375 [Urine:2375]     Physical Exam  Vitals and nursing note reviewed.   Constitutional:       General: She is not in acute distress.     Appearance: Normal appearance.   HENT:      Head: Normocephalic.   Eyes:      Extraocular Movements: Extraocular movements intact.   Cardiovascular:      Rate and Rhythm: Normal rate and regular rhythm.   Pulmonary:      Effort: Pulmonary effort is normal. No respiratory distress.      Breath sounds: Rales present.   Musculoskeletal:      Right lower leg: Edema present.      Left lower leg: Edema present.   Skin:     General: Skin is warm and dry.   Neurological:      General: No focal deficit present.      Mental Status: She is alert and oriented to person, place, and time.          Significant Labs:  All labs within the past 24 hours have been reviewed.     Significant Imaging:  None  Assessment/Plan:     Cardiac/Vascular  Acute on  chronic heart failure with preserved ejection fraction (HFpEF)  Patient is identified as having Diastolic (HFpEF) heart failure that is Acute on chronic. CHF is currently uncontrolled due to Continued edema of extremities, Hepatic congestion/ascites, and JVD, >3 pillow orthopnea, Rales/crackles on pulmonary exam, and Pulmonary edema/pleural effusion on CXR.     - diuresis as detailed under Acute kidney injury superimposed on chronic kidney disease    Essential hypertension  - management per primary team    Renal/  Acute kidney injury superimposed on chronic kidney disease  - CKD IV (baseline creatinine ~3.1-3.3) admitted with hypervolemia and REGINALDO with creatinine 4.2  - UA showed +1 protein with UPCR of ~500 mg  - urinary sediment with non dysmorphic RBCs and WBCs present although Jin catheter just place yesterday   - retroperitoneal US without evidence of hydronephrosis although changes consistent with chronic kidney disease     Plan/Recommendations:  - Despite lasix gtt and Diuril 500mg IV BID, pt has not achieve negative fluid balance, will plan for HD  - Will try to transfer to CCU for placement of temporary dialysis line given plan for LHC and possible MitraClip  - Continue diuresis with lasix gtt @ 40mg/hr + Diuril 500mg for net negative of atleast 0.5 liter in the next 24 hours  - Can continue Renvela 800 mg TIDWM  - Please avoid hypotension/major fluctuations in BP which may worsen REGINALDO (keep MAP > 65 mmHg)  - Renal diet/tube feeds when not NPO with volume restriction per primary team  - Strict I/O's and daily weights  - Renally all dose medications to eGFR   - Avoid nephrotoxic agents wean feasible (i.e. NSAIDs, intra-arterial contrast, supra-therapeutic vancomycin levels, etc.)        Thank you for your consult. I will follow-up with patient. Please contact us if you have any additional questions.    Ingrid Cabello MD  Nephrology  [facility] - Telemetry Stepdown

## 2024-04-13 NOTE — SUBJECTIVE & OBJECTIVE
Interval History: Worsening kidney function overnight. Despite Lasix 40 mg /hr and Diuril 500 IV BID, patient has not been keeping net negative balance. Appreciate Nephrology input, would really appreciate initiation of dialysis. Discussed briefly with patient and she is scared and hesitant because of her mother dying from PD in the past ( earlier than they were told). She would like to discuss with family about the next steps in the care plan. Also chatted about possibility of need for IABP vs nitroglycerin gtt.     Review of Systems   Constitutional: Negative for chills and fever.   Cardiovascular:  Negative for chest pain, orthopnea and palpitations.   Respiratory:  Negative for cough and shortness of breath.    Gastrointestinal:  Negative for abdominal pain.   Neurological:  Negative for dizziness, headaches and light-headedness.   Psychiatric/Behavioral:  Negative for altered mental status.      Objective:     Vital Signs (Most Recent):  Temp: 98.2 °F (36.8 °C) (24)  Pulse: 81 (24)  Resp: 17 (24)  BP: (!) 108/59 (24)  SpO2: (!) 90 % (24) Vital Signs (24h Range):  Temp:  [97.5 °F (36.4 °C)-98.2 °F (36.8 °C)] 98.2 °F (36.8 °C)  Pulse:  [51-81] 81  Resp:  [17-18] 17  SpO2:  [90 %-100 %] 90 %  BP: (108-135)/(59-97) 108/59     Weight: 73 kg (160 lb 15 oz)  Body mass index is 31.43 kg/m².     SpO2: (!) 90 %         Intake/Output Summary (Last 24 hours) at 2024 0834  Last data filed at 2024 0446  Gross per 24 hour   Intake 3000 ml   Output 1325 ml   Net 1675 ml       Lines/Drains/Airways       Drain  Duration                  Urethral Catheter 24 1330 16 Fr. 3 days              Peripheral Intravenous Line  Duration                  Peripheral IV - Single Lumen 04/10/24 2100 22 G Posterior;Right Hand 2 days                       Physical Exam  Vitals and nursing note reviewed.   Constitutional:       General: She is not in acute distress.      Appearance: Normal appearance. She is obese. She is ill-appearing. She is not toxic-appearing.   HENT:      Head: Normocephalic and atraumatic.      Mouth/Throat:      Mouth: Mucous membranes are moist.   Neck:      Comments: JVP to the high-mid neck  Cardiovascular:      Rate and Rhythm: Normal rate and regular rhythm.      Heart sounds: Murmur (Grade 3/6, 5th intercostal, holosystolic, radiates to axilla) heard.      No friction rub. No gallop.   Pulmonary:      Effort: Pulmonary effort is normal. No respiratory distress.      Breath sounds: Normal breath sounds.   Abdominal:      General: There is distension.      Palpations: Abdomen is soft.      Tenderness: There is no abdominal tenderness. There is no guarding.   Musculoskeletal:      Right lower leg: Edema (2+) present.      Left lower leg: Edema (chronic wound, 2+) present.   Skin:     General: Skin is warm.   Neurological:      Mental Status: She is alert and oriented to person, place, and time. Mental status is at baseline.      Comments: But appears fatigued            Significant Labs: BMP:   Recent Labs   Lab 04/12/24  0601 04/12/24 1825 04/13/24 0318   GLU 90 172* 71   * 131* 131*   K 3.2* 3.9 4.3   CL 92* 91* 90*   CO2 25 28 24   * 108* 105*   CREATININE 4.3* 4.5* 4.4*   CALCIUM 8.5* 8.5* 8.5*   MG 2.2  --  2.2   , CMP   Recent Labs   Lab 04/12/24  0601 04/12/24  1825 04/13/24 0318   * 131* 131*   K 3.2* 3.9 4.3   CL 92* 91* 90*   CO2 25 28 24   GLU 90 172* 71   * 108* 105*   CREATININE 4.3* 4.5* 4.4*   CALCIUM 8.5* 8.5* 8.5*   PROT 6.2  --  6.6   ALBUMIN 2.9* 3.1* 2.9*   BILITOT 1.7*  --  1.7*   ALKPHOS 93  --  94   AST 47*  --  69*   ALT 28  --  34   ANIONGAP 15 12 17*   , CBC   Recent Labs   Lab 04/12/24  0601 04/13/24 0318   WBC 5.05 5.43   HGB 8.8* 8.7*   HCT 30.1* 30.1*   * 148*   , and All pertinent lab results from the last 24 hours have been reviewed.    Significant Imaging: Echocardiogram: Transthoracic  echo (TTE) complete ([facility] Only):   Results for orders placed or performed during the hospital encounter of 04/01/24   Echo   Result Value Ref Range    RA Width 3.24 cm    Left Atrium Major Axis 8.41 cm    Left Atrium Minor Axis 7.66 cm    RA Major Axis 5.04 cm    LV Diastolic Volume 126.79 mL    LV Systolic Volume 42.78 mL    MV Peak A Alberto 0.85 m/s    MV stenosis pressure 1/2 time 74.15 ms    TR Max Alberto 4.38 m/s    MV Peak E Alberto 1.98 m/s    Ao VTI 19.64 cm    Ao peak alberto 1.02 m/s    LVOT peak VTI 9.24 cm    LVOT peak alberto 0.61 m/s    LVOT diameter 1.91 cm    E wave deceleration time 255.70 msec    AV mean gradient 2 mmHg    TAPSE 1.37 cm    RVDD 3.66 cm    LA size 5.98 cm    Ascending aorta 3.02 cm    STJ 2.15 cm    Sinus 2.36 cm    LVIDs 3.26 2.1 - 4.0 cm    Posterior Wall 1.06 0.6 - 1.1 cm    IVS 0.64 0.6 - 1.1 cm    LVIDd 5.15 3.5 - 6.0 cm    TDI LATERAL 0.07 m/s    LA WIDTH 5.26 cm    TDI SEPTAL 0.06 m/s    LV LATERAL E/E' RATIO 28.29 m/s    LV SEPTAL E/E' RATIO 33.00 m/s    FS 37 28 - 44 %    LA volume 214.36 cm3    LV mass 154.38 g    ZLVIDD 0.90     ZLVIDS 0.88     Left Ventricle Relative Wall Thickness 0.41 cm    AV valve area 1.35 cm²    AV Velocity Ratio 0.60     AV index (prosthetic) 0.47     MV valve area p 1/2 method 2.97 cm2    E/A ratio 2.33     Mean e' 0.07 m/s    LVOT area 2.9 cm2    LVOT stroke volume 26.46 cm3    AV peak gradient 4 mmHg    E/E' ratio 30.46 m/s    LV Systolic Volume Index 25.3 mL/m2    LV Diastolic Volume Index 75.02 mL/m2    LA Volume Index 126.8 mL/m2    LV Mass Index 91 g/m2    Triscuspid Valve Regurgitation Peak Gradient 77 mmHg    HAYDEE by Velocity Ratio 1.71 cm²    BSA 1.74 m2    TV resting pulmonary artery pressure 85 mmHg    RV TB RVSP 12 mmHg    Est. RA pres 8 mmHg    Narrative      Left Ventricle: The left ventricle is normal in size. Normal wall   thickness. Normal wall motion. There is normal systolic function with a   visually estimated ejection fraction of 60 - 65%.  Grade II diastolic   dysfunction.    Right Ventricle: Normal right ventricular cavity size. Wall thickness   is normal. Right ventricle wall motion  is normal. Systolic function is   reduced.    Left Atrium: Left atrium is very  severely dilated.    Right Atrium: Right atrium is mildly dilated.    Aortic Valve: There is mild aortic valve sclerosis. There is normal   leaflet mobility. There is trace aortic regurgitation.    Mitral Valve: There is mild bileaflet sclerosis. There is posterior   leaflet tethering and failure of complete coaptation. There is very severe   regurgitation with a posterolateral eccentriccally directed jet.    Tricuspid Valve: The tricuspid valve is structurally normal. There is   normal leaflet mobility. There is moderate to severe regurgitation.    IVC/SVC: IVC was not well visualized due to poor acoustic window.   Intermediate venous pressure at 8 mmHg.    Pericardium: There is a trivial effusion posteriorly and small under   the RA.

## 2024-04-13 NOTE — ASSESSMENT & PLAN NOTE
Acute on chronic heart failure with preserved ejection fraction (HFpEF)   Anasarca     54 yo F admitted for ADHF 2/2 severe MR. The MR appears to be secondary to posterior leaflet restriction and leaflet length of 1.1 cm. BNP on admission was 4300 compared to 350 eight months prior. Initially diagnosed with severe MR in June of 2023. Saw Dr. Stone in January of 2024 and was undergoing work-up for possible MitraClip; [facility] scheduled for 3/15 but not completed due to unknown reasons, also needs POOJA. Will need optimization of kidney function prior to angiogram consideration, appreciate Nephrology assistance    4/1/2024 TTE    Left Ventricle: The left ventricle is normal in size. Normal wall thickness. Normal wall motion. There is normal systolic function with a visually estimated ejection fraction of 60 - 65%. Grade II diastolic dysfunction.    Right Ventricle: Normal right ventricular cavity size. Wall thickness is normal. Right ventricle wall motion  is normal. Systolic function is reduced.    Left Atrium: Left atrium is very  severely dilated.    Right Atrium: Right atrium is mildly dilated.    Aortic Valve: There is mild aortic valve sclerosis. There is normal leaflet mobility. There is trace aortic regurgitation.    Mitral Valve: There is mild bileaflet sclerosis. There is posterior leaflet tethering and failure of complete coaptation. There is very severe regurgitation with a posterolateral eccentriccally directed jet.    Tricuspid Valve: The tricuspid valve is structurally normal. There is normal leaflet mobility. There is moderate to severe regurgitation.    IVC/SVC: IVC was not well visualized due to poor acoustic window. Intermediate venous pressure at 8 mmHg.    Pericardium: There is a trivial effusion posteriorly and small under the RA.     - Transfer to CCU  - Interventional Cardiology consulted, appreciate recs  - Nephrology following, planning to initiate CRRT  - POOJA ordered  - Continue Diuril 500 mg IV  q24h & Lasix gtt @ 40 mg/hr for diuresis  - Continue hydralazine 25 mg po q8h & Isordil 20 mg po BID for afterload reduction  - Continue home ASA 81 mg po daily and Lipitor 40 mg po daily  - Strict I/O, Jin in place  - Telemetry monitoring

## 2024-04-13 NOTE — PT/OT/SLP PROGRESS
Occupational Therapy   Treatment    Name: Xena Vera  MRN: [id number]  Admitting Diagnosis:  Severe mitral valve regurgitation       Recommendations:     Discharge Recommendations: Moderate Intensity Therapy  Discharge Equipment Recommendations:  walker, rolling  Barriers to discharge:  Other (Comment), Decreased caregiver support (increased level of assistance required)    Assessment:     Xena Vera is a 53 y.o. female with a medical diagnosis of Severe mitral valve regurgitation.  She presents with the fallowing performance deficits affecting function are weakness, decreased ROM, impaired cardiopulmonary response to activity, decreased lower extremity function, impaired balance, decreased upper extremity function, gait instability, impaired endurance, decreased safety awareness, pain, impaired self care skills, impaired functional mobility, decreased coordination, edema. Patient agreeable to tx session, despite patient reported of having pain and dizziness. Patient's Bp was monitor during tx session 115/56 56HR sitting up at the EOB and 111/53 56 BP during standing. Patient is demonstrating progress with functional transfers. However; patient exhibits limited functional endurance due to pain and weakness 2/2 recent hospitalization. Patient would benefit from Moderate intensity therapy intervention post acute setting to address over all functional decline with mobility task, self-care, and endurance in order to return to PLOF.    Rehab Prognosis:  Good; patient would benefit from acute skilled OT services to address these deficits and reach maximum level of function.       Plan:     Patient to be seen 3 x/week to address the above listed problems via self-care/home management, therapeutic activities, therapeutic exercises  Plan of Care Expires: 05/11/24  Plan of Care Reviewed with: patient, family    Subjective     Chief Complaint: dizziness and pain   Patient/Family Comments/goals: to get better and  return to PLOF  Pain/Comfort:  Pain Rating 1: 9/10  Location - Side 1: Left  Location - Orientation 1: lower  Location 1: leg  Pain Addressed 1: Reposition, Distraction  Pain Rating Post-Intervention 1: 0/10    Objective:     Communicated with: Nurse prior to session.  Patient found sitting edge of bed with the assistance from son with telemetry, peripheral IV, browning catheter upon OT entry to room.  A client care conference was completed by the OTR and the TAMAYO prior to treatment by the TAMAYO to discuss the patient's POC and current status.   General Precautions: Standard, fall    Orthopedic Precautions:N/A  Braces: N/A  Respiratory Status: Room air     Occupational Performance:      Functional Mobility/Transfers:  Patient completed Sit <> Stand Transfer from EOB x2 trials with stand by assistance and contact guard assistance  with  rolling walker and verbal cues for proper sequencing of task.  Functional Mobility: Patient engaged in functional mobility task through hospital room bed<>bedside chair to simulate HH distance in order to maximize functional endurance and standing balance required for home/community mobility and occupational engagement with CGA with RW and verbal cues to manage walker properly.    Activities of Daily Living:  Upper Body Dressing: moderate assistance to don back gown for line mgmt     [facility] 6 Click ADL: 20    Treatment & Education:  Educated patient on the importance to continue to perform OOB activity with the assistance from nursing staff to reduce debility from progressing.  Addressed patient's questions and concerns within TAMAYO scope of practice.    Patient left up in chair with all lines intact, call button in reach, and family present    GOALS:   Multidisciplinary Problems       Occupational Therapy Goals          Problem: Occupational Therapy    Goal Priority Disciplines Outcome Interventions   Occupational Therapy Goal     OT, PT/OT Ongoing, Progressing    Description: Goals to be  met by: 5/1//24     Patient will increase functional independence with ADLs by performing:    LE Dressing with Contact Guard Assistance.  Grooming while standing at sink with Stand-by Assistance.  Toileting from toilet with Stand-by Assistance for hygiene and clothing management.   Supine to sit with Supervision.  Step transfer with Stand-by Assistance with AD as needed  Toilet transfer to toilet with Stand-by Assistance with AD as needed                         Time Tracking:     OT Date of Treatment: 04/13/24  OT Start Time: 1238  OT Stop Time: 1316  OT Total Time (min): 38 min    Billable Minutes:Self Care/Home Management 23  Therapeutic Activity 15    OT/RA: RA     Number of RA visits since last OT visit: 1    4/13/2024

## 2024-04-13 NOTE — H&P
[facility] - Telemetry Stepdown  Cardiology  History and Physical     Patient Name: Xena Vera  MRN: [id number]  Admission Date: 4/1/2024  Code Status: Full Code   Attending Provider:  Renato Elmore MD  Primary Care Physician: Tami Villeda FNP  Principal Problem:Severe mitral valve regurgitation    Patient information was obtained from patient, relative(s), past medical records, and ER records.     Subjective:     Chief Complaint:  Shortness of breath    HPI:  Xena Vera is a 52 yo F with PMH of HFpEF, severe MR, HTN, HLD, and CKD4 who initially presented to [facility] on 4/1/2024 after a mechanical fall at home with concurrent SOB and swelling in her legs and abdomen. She reported adherence to home Lasix and low sodium diet, but had felt increasingly SOB prior to admission as she was sleeping upright and had multiple nighttime awakenings due to SOB. In the ED, BNP was 4639 and she was admitted to Hospital Medicine for further management of ADHF. She initially had good response to IV Lasix but hospital course was later complicated by urinary retention, worsening kidney function, and decreased UOP. Nephrology and Cardiology were consulted to assist. She was started on Diuril and Lasix gtt for diuresis and Isordil and hydralazine for afterload reduction. At this time, she will be transferred to CCU for further management.     Past Medical History:   Diagnosis Date    (HFpEF) heart failure with preserved ejection fraction 06/24/2023    EF 63% with G2 DD  Continue lasix, appears euvolemic today  Continue good BP management with losartan, increase Coreg 6.25 mg BID  Fluid restriction, low sodium diet  Recommend Jardiance- patient had CKD 4 and this is a limiting factor- nephro eval pending    Anemia 06/24/2023    CKD (chronic kidney disease)     HTN (hypertension)     Mitral valve regurgitation 06/26/2023    Refer to structural for evaluation  May benefit from mitral clip    Ovarian cyst     Stage 4 chronic  kidney disease 06/24/2023    Refer to nephrology at [facility] as unable to get established with [facility] and the phone number for the outside nephrologist provided to patient during her recent hospitalization goes unanswered when called.   Cr remains elevated  Would like to start SGLT-2 and appreciate nephrology expertise        No past surgical history on file.    Review of patient's allergies indicates:  No Known Allergies    Current Facility-Administered Medications   Medication Dose Route Frequency Provider Last Rate Last Admin    acetaminophen tablet 1,000 mg  1,000 mg Oral Q6H PRN Domonique Walden MD        aspirin EC tablet 81 mg  81 mg Oral Daily David Borjas, PA-C   81 mg at 04/13/24 1031    atorvastatin tablet 40 mg  40 mg Oral Daily David Borjas, PA-C   40 mg at 04/13/24 1032    bisacodyL EC tablet 5 mg  5 mg Oral Daily PRN Johnny Cisneros MD        chlorothiazide (DIURIL) 500 mg in dextrose 5 % (D5W) 50 mL IVPB  500 mg Intravenous Q24H Minda Molina MD   Stopped at 04/13/24 0610    dextrose 10% bolus 125 mL 125 mL  12.5 g Intravenous PRN David Borjas, PA-C        dextrose 10% bolus 250 mL 250 mL  25 g Intravenous PRN David Borjas, PA-DIMITRY        furosemide (Lasix) 500 mg in 50 mL infusion (conc: 10 mg/mL)  40 mg/hr Intravenous Continuous Tez Guerra MD 4 mL/hr at 04/13/24 1131 40 mg/hr at 04/13/24 1131    glucagon (human recombinant) injection 1 mg  1 mg Intramuscular PRN David Borjas, PA-DIMITRY        glucose chewable tablet 16 g  16 g Oral PRN David Borjas, PA-DIMITRY        glucose chewable tablet 24 g  24 g Oral PRN David Borjas, PA-DIMITRY        heparin (porcine) injection 5,000 Units  5,000 Units Subcutaneous Q8H David Borjas, PA-C   5,000 Units at 04/13/24 0545    hydrALAZINE tablet 25 mg  25 mg Oral Q8H Minda Molina MD   25 mg at 04/13/24 0541    HYDROcodone-acetaminophen 5-325 mg per tablet 1 tablet  1 tablet Oral Q6H PRN Abadco,  Johnny ENCARNACION MD   1 tablet at 24 1206    isosorbide dinitrate tablet 20 mg  20 mg Oral BID Tez Guerra MD   20 mg at 24 1032    melatonin tablet 6 mg  6 mg Oral Nightly PRN Pietro Herring MD   6 mg at 24 0302    naloxone 0.4 mg/mL injection 0.02 mg  0.02 mg Intravenous PRN David Borjas PA-C        ondansetron disintegrating tablet 4 mg  4 mg Oral Q8H PRN David Borjas PA-C        ondansetron injection 4 mg  4 mg Intravenous Q8H PRN David Borjsa PA-C        oxybutynin tablet 5 mg  5 mg Oral TID Minda Molina MD   5 mg at 24 1030    polyethylene glycol packet 17 g  17 g Oral BID Domonique Walden MD        senna-docusate 8.6-50 mg per tablet 1 tablet  1 tablet Oral BID Domonique Walden MD        sevelamer carbonate tablet 800 mg  800 mg Oral TID WM David Borjas PA-C   800 mg at 24 1131    sodium chloride 0.9% flush 10 mL  10 mL Intravenous PRN Pietro Herring MD        sodium chloride 0.9% flush 10 mL  10 mL Intravenous PRN David Borjas PA-C        white petrolatum 41 % ointment   Topical (Top) BID Johnny Cisneros MD   Given at 24 1032    zinc oxide 20 % ointment   Topical (Top) PRN David Borjas PA-C         Family History    None       Tobacco Use    Smoking status: Former     Current packs/day: 0.00     Types: Cigarettes     Start date:      Quit date:      Years since quittin.3    Smokeless tobacco: Never    Tobacco comments:     2-3 cigarettes a day   Substance and Sexual Activity    Alcohol use: Never    Drug use: Never    Sexual activity: Not on file     Review of Systems   Cardiovascular:  Positive for dyspnea on exertion, leg swelling, orthopnea and palpitations.   Respiratory:  Positive for shortness of breath.    Gastrointestinal:  Positive for bloating.   Neurological:  Positive for light-headedness.   All other systems reviewed and are negative.    Objective:     Vital Signs (Most Recent):  Temp: 97.7 °F (36.5  °C) (04/13/24 1108)  Pulse: (!) 54 (04/13/24 1108)  Resp: 18 (04/13/24 1108)  BP: (!) 96/53 (04/13/24 1108)  SpO2: (!) 93 % (04/13/24 1108) Vital Signs (24h Range):  Temp:  [97.5 °F (36.4 °C)-98.2 °F (36.8 °C)] 97.7 °F (36.5 °C)  Pulse:  [51-81] 54  Resp:  [17-18] 18  SpO2:  [90 %-96 %] 93 %  BP: ()/(53-97) 96/53     Weight: 73 kg (160 lb 15 oz)  Body mass index is 31.43 kg/m².    SpO2: (!) 93 %         Intake/Output Summary (Last 24 hours) at 4/13/2024 1341  Last data filed at 4/13/2024 0446  Gross per 24 hour   Intake 240 ml   Output 925 ml   Net -685 ml       Lines/Drains/Airways       Drain  Duration                  Urethral Catheter 04/09/24 1330 16 Fr. 4 days              Peripheral Intravenous Line  Duration                  Peripheral IV - Single Lumen 04/10/24 2100 22 G Posterior;Right Hand 2 days                     Physical Exam  Vitals and nursing note reviewed.   Constitutional:       General: She is not in acute distress.     Appearance: She is obese. She is ill-appearing.   HENT:      Head: Normocephalic and atraumatic.   Cardiovascular:      Rate and Rhythm: Normal rate and regular rhythm.      Heart sounds: Murmur heard.   Pulmonary:      Effort: Pulmonary effort is normal. No respiratory distress.   Abdominal:      General: There is distension.      Palpations: Abdomen is soft.      Tenderness: There is no abdominal tenderness.   Musculoskeletal:      Right lower leg: Edema present.      Left lower leg: Edema present.   Skin:     General: Skin is warm and dry.      Comments: + Left lateral leg foul-smelling wound with clear drainage   Neurological:      General: No focal deficit present.      Mental Status: She is alert and oriented to person, place, and time. Mental status is at baseline.          Significant Labs: All pertinent lab results from the last 24 hours have been reviewed.    Significant Imaging:  All pertinent imaging results from the last 24 hours have been  reviewed.    Assessment and Plan:     * Severe mitral valve regurgitation  Acute on chronic heart failure with preserved ejection fraction (HFpEF)   Eric     52 yo F admitted for ADHF 2/2 severe MR. The MR appears to be secondary to posterior leaflet restriction and leaflet length of 1.1 cm. BNP on admission was 4300 compared to 350 eight months prior. Initially diagnosed with severe MR in June of 2023. Saw Dr. Stone in January of 2024 and was undergoing work-up for possible MitraClip; [facility] scheduled for 3/15 but not completed due to financial constraints, also needs POOJA. Will need optimization of kidney function prior to angiogram consideration, appreciate Nephrology assistance    4/1/2024 TTE    Left Ventricle: The left ventricle is normal in size. Normal wall thickness. Normal wall motion. There is normal systolic function with a visually estimated ejection fraction of 60 - 65%. Grade II diastolic dysfunction.    Right Ventricle: Normal right ventricular cavity size. Wall thickness is normal. Right ventricle wall motion  is normal. Systolic function is reduced.    Left Atrium: Left atrium is very  severely dilated.    Right Atrium: Right atrium is mildly dilated.    Aortic Valve: There is mild aortic valve sclerosis. There is normal leaflet mobility. There is trace aortic regurgitation.    Mitral Valve: There is mild bileaflet sclerosis. There is posterior leaflet tethering and failure of complete coaptation. There is very severe regurgitation with a posterolateral eccentriccally directed jet.    Tricuspid Valve: The tricuspid valve is structurally normal. There is normal leaflet mobility. There is moderate to severe regurgitation.    IVC/SVC: IVC was not well visualized due to poor acoustic window. Intermediate venous pressure at 8 mmHg.    Pericardium: There is a trivial effusion posteriorly and small under the RA.     - Transfer to CCU  - Interventional Cardiology consulted, appreciate recs  - Nephrology  following, planning to initiate CRRT  - POOJA ordered  - Continue Diuril 500 mg IV q24h & Lasix gtt @ 40 mg/hr for diuresis  - Continue hydralazine 25 mg po q8h & Isordil 20 mg po BID for afterload reduction  - Continue home ASA 81 mg po daily and Lipitor 40 mg po daily  - Strict I/O, Jin in place  - Telemetry monitoring    Acute kidney injury superimposed on chronic kidney disease  Patient with acute kidney injury/acute renal failure likely due to acute tubular necrosis caused by unknown.  REGINALDO is currently worsening. Baseline creatinine  2.5  - Labs reviewed- Renal function/electrolytes with Estimated Creatinine Clearance: 13 mL/min (A) (based on SCr of 4.4 mg/dL (H)). according to latest data. Monitor urine output and serial BMP and adjust therapy as needed. Avoid nephrotoxins and renally dose meds for GFR listed above.    Recent Labs     04/12/24  0601 04/12/24  1825 04/13/24  0318   CREATININE 4.3* 4.5* 4.4*     - Nephrology following, appreciate recs  - Continue home Renvela 800 mg po TID with meals  - Holding home sodium bicarb 650 mg po BID    Essential hypertension  Chronic, controlled. Latest blood pressure and vitals reviewed-     Temp:  [97.5 °F (36.4 °C)-98.2 °F (36.8 °C)]   Pulse:  [51-81]   Resp:  [17-18]   BP: ()/(53-97)   SpO2:  [90 %-96 %] .   Home meds for hypertension were reviewed and noted below.   Hypertension Medications               carvediloL (COREG) 3.125 MG tablet Take 2 tablets (6.25 mg total) by mouth 2 (two) times daily with meals.    carvediloL (COREG) 6.25 MG tablet Take 1 tablet by mouth 2 times daily with food    furosemide (LASIX) 40 MG tablet Take 1 tablet (40 mg total) by mouth 2 (two) times a day.    furosemide (LASIX) 40 MG tablet Take 1 tablet by mouth once daily for 90 days            While in the hospital, will manage blood pressure as follows; Adjust home antihypertensive regimen as follows- Lasix gtt instead of oral, holding Coreg, added hydralazine 25 mg po q8h and  "Isordil 20 mg po BID for afterload reduction    Will utilize p.r.n. blood pressure medication only if patient's blood pressure greater than 180/110 and she develops symptoms such as worsening chest pain or shortness of breath.    HLD (hyperlipidemia)  Continue home Lipitor 40 mg po daily  F/u lipid panel    Acute urinary retention  Jin in place since 4/9/2024  Continue oxybutynin 5 mg po TID    Prolonged QT interval  4/4 QTc 481 ms. Avoid QT prolonging medications.     Prediabetes  Hemoglobin A1C   Date Value Ref Range Status   04/02/2024 6.0 (H) 4.0 - 5.6 % Final     Comment:     ADA Screening Guidelines:  5.7-6.4%  Consistent with prediabetes  >or=6.5%  Consistent with diabetes    High levels of fetal hemoglobin interfere with the HbA1C  assay. Heterozygous hemoglobin variants (HbS, HgC, etc)do  not significantly interfere with this assay.   However, presence of multiple variants may affect accuracy.       - F/u PCP    Anemia  Patient's anemia is currently controlled. Has not received any PRBCs to date. Etiology likely d/t chronic disease due to Chronic Kidney Disease  Current CBC reviewed-   Lab Results   Component Value Date    HGB 8.7 (L) 04/13/2024    HCT 30.1 (L) 04/13/2024     Monitor serial CBC and transfuse if patient becomes hemodynamically unstable, symptomatic or H/H drops below 7/21.    Leg wound, left  From mechanical fall, has been painful thoughout admission    - Wound Care assessed, orders for care placed  "Cleanse left leg with sterile normal saline and pat dry. Apply aquaphor BID and PRN"  - Tylenol 1000 mg po q6h PRN for moderate pain and Norco 5-325 mg po q6h PRN for severe pain    Class 2 obesity in adult  Body mass index is 31.43 kg/m². Morbid obesity complicates all aspects of disease management from diagnostic modalities to treatment. Weight loss encouraged and health benefits explained to patient.     Eric  See "Severe mitral valve regurgitation"    Acute on chronic heart failure " "with preserved ejection fraction (HFpEF)  See "Severe mitral valve regurgitation"        VTE Risk Mitigation (From admission, onward)           Ordered     Place sequential compression device  Until discontinued         04/03/24 1347     heparin (porcine) injection 5,000 Units  Every 8 hours         04/01/24 1030     IP VTE HIGH RISK PATIENT  Once         04/01/24 1030     Place sequential compression device  Until discontinued         04/01/24 1030                    Domonique Walden MD  Cardiology   [facility] - Telemetry Stepdown      "

## 2024-04-13 NOTE — ASSESSMENT & PLAN NOTE
Patient's anemia is currently controlled. Has not received any PRBCs to date. Etiology likely d/t chronic disease due to Chronic Kidney Disease  Current CBC reviewed-   Lab Results   Component Value Date    HGB 8.7 (L) 04/13/2024    HCT 30.1 (L) 04/13/2024     Monitor serial CBC and transfuse if patient becomes hemodynamically unstable, symptomatic or H/H drops below 7/21.

## 2024-04-13 NOTE — SUBJECTIVE & OBJECTIVE
Interval History: NAEO. AF, mildly bradycardic, otherwise HDS on RA. Cr and UOP stable. Did report some lightheadedness this morning, no shortness of breath.    Review of patient's allergies indicates:  No Known Allergies  Current Facility-Administered Medications   Medication Dose Route Frequency Provider Last Rate Last Admin    acetaminophen tablet 1,000 mg  1,000 mg Oral Q8H PRN David Borjas, PA-C   1,000 mg at 04/13/24 0436    aspirin EC tablet 81 mg  81 mg Oral Daily David Borjas, PA-C   81 mg at 04/12/24 0923    atorvastatin tablet 40 mg  40 mg Oral Daily David Borjas, PA-C   40 mg at 04/12/24 0923    bisacodyL EC tablet 5 mg  5 mg Oral Daily PRN Johnny Cisneros MD        chlorothiazide (DIURIL) 500 mg in dextrose 5 % (D5W) 50 mL IVPB  500 mg Intravenous Q24H Minda Molina MD   Stopped at 04/13/24 0610    dextrose 10% bolus 125 mL 125 mL  12.5 g Intravenous PRN David Borjas PA-C        dextrose 10% bolus 250 mL 250 mL  25 g Intravenous PRN David Borjas PA-C        furosemide (Lasix) 500 mg in 50 mL infusion (conc: 10 mg/mL)  40 mg/hr Intravenous Continuous Tez Guerra MD 4 mL/hr at 04/13/24 0000 40 mg/hr at 04/13/24 0000    glucagon (human recombinant) injection 1 mg  1 mg Intramuscular PRN David Borjas PA-DIMITRY        glucose chewable tablet 16 g  16 g Oral PRN David Borjas, PA-C        glucose chewable tablet 24 g  24 g Oral PRN David Borjas PA-DIMITRY        heparin (porcine) injection 5,000 Units  5,000 Units Subcutaneous Q8H David Borjas PA-C   5,000 Units at 04/13/24 0545    hydrALAZINE tablet 25 mg  25 mg Oral Q8H Minda Molina MD   25 mg at 04/13/24 0541    HYDROcodone-acetaminophen 5-325 mg per tablet 1 tablet  1 tablet Oral Q6H PRN Johnny Cisneros MD   1 tablet at 04/12/24 1206    isosorbide dinitrate tablet 20 mg  20 mg Oral BID Tez Guerra MD   20 mg at 04/12/24 7761    melatonin tablet 6 mg  6 mg Oral Nightly  PRN Pietro Herring MD   6 mg at 04/12/24 0302    naloxone 0.4 mg/mL injection 0.02 mg  0.02 mg Intravenous PRN David Borjas PA-C        ondansetron disintegrating tablet 4 mg  4 mg Oral Q8H PRN David Borjas PA-C        ondansetron injection 4 mg  4 mg Intravenous Q8H PRN David Borjas PA-C        oxybutynin tablet 5 mg  5 mg Oral TID Minda Molina MD   5 mg at 04/12/24 2151    polyethylene glycol packet 17 g  17 g Oral Daily David Borjas PA-C   17 g at 04/12/24 0923    sevelamer carbonate tablet 800 mg  800 mg Oral TID  aDvid Borjas PA-C   800 mg at 04/12/24 1812    sodium chloride 0.9% flush 10 mL  10 mL Intravenous PRN Pietro Herring MD        sodium chloride 0.9% flush 10 mL  10 mL Intravenous PRN David Borjas PA-C        white petrolatum 41 % ointment   Topical (Top) BID Johnny Cisneros MD   Given at 04/12/24 2100    zinc oxide 20 % ointment   Topical (Top) PRN David Borjas PA-C           Objective:     Vital Signs (Most Recent):  Temp: 98.2 °F (36.8 °C) (04/13/24 0718)  Pulse: 81 (04/13/24 0718)  Resp: 17 (04/13/24 0718)  BP: (!) 108/59 (04/13/24 0718)  SpO2: (!) 90 % (04/13/24 0718) Vital Signs (24h Range):  Temp:  [97.5 °F (36.4 °C)-98.2 °F (36.8 °C)] 98.2 °F (36.8 °C)  Pulse:  [51-81] 81  Resp:  [17-18] 17  SpO2:  [90 %-100 %] 90 %  BP: (108-135)/(59-97) 108/59     Weight: 73 kg (160 lb 15 oz) (04/11/24 1040)  Body mass index is 31.43 kg/m².  Body surface area is 1.76 meters squared.    I/O last 3 completed shifts:  In: 3000 [P.O.:3000]  Out: 2375 [Urine:2375]     Physical Exam  Vitals and nursing note reviewed.   Constitutional:       General: She is not in acute distress.     Appearance: Normal appearance.   HENT:      Head: Normocephalic.   Eyes:      Extraocular Movements: Extraocular movements intact.   Cardiovascular:      Rate and Rhythm: Normal rate and regular rhythm.   Pulmonary:      Effort: Pulmonary effort is normal. No  respiratory distress.      Breath sounds: Rales present.   Musculoskeletal:      Right lower leg: Edema present.      Left lower leg: Edema present.   Skin:     General: Skin is warm and dry.   Neurological:      General: No focal deficit present.      Mental Status: She is alert and oriented to person, place, and time.          Significant Labs:  All labs within the past 24 hours have been reviewed.     Significant Imaging:  None

## 2024-04-13 NOTE — ASSESSMENT & PLAN NOTE
Hemoglobin A1C   Date Value Ref Range Status   04/02/2024 6.0 (H) 4.0 - 5.6 % Final     Comment:     ADA Screening Guidelines:  5.7-6.4%  Consistent with prediabetes  >or=6.5%  Consistent with diabetes    High levels of fetal hemoglobin interfere with the HbA1C  assay. Heterozygous hemoglobin variants (HbS, HgC, etc)do  not significantly interfere with this assay.   However, presence of multiple variants may affect accuracy.       - F/u PCP

## 2024-04-13 NOTE — ASSESSMENT & PLAN NOTE
"From mechanical fall, has been painful thoughout admission    - Wound Care assessed, orders for care placed  "Cleanse left leg with sterile normal saline and pat dry. Apply aquaphor BID and PRN"  - Tylenol 1000 mg po q6h PRN for moderate pain and Norco 5-325 mg po q6h PRN for severe pain  "

## 2024-04-13 NOTE — ASSESSMENT & PLAN NOTE
Chronic, controlled. Latest blood pressure and vitals reviewed-     Temp:  [97.5 °F (36.4 °C)-98.2 °F (36.8 °C)]   Pulse:  [51-81]   Resp:  [17-18]   BP: ()/(53-97)   SpO2:  [90 %-96 %] .   Home meds for hypertension were reviewed and noted below.   Hypertension Medications               carvediloL (COREG) 3.125 MG tablet Take 2 tablets (6.25 mg total) by mouth 2 (two) times daily with meals.    carvediloL (COREG) 6.25 MG tablet Take 1 tablet by mouth 2 times daily with food    furosemide (LASIX) 40 MG tablet Take 1 tablet (40 mg total) by mouth 2 (two) times a day.    furosemide (LASIX) 40 MG tablet Take 1 tablet by mouth once daily for 90 days            While in the hospital, will manage blood pressure as follows; Adjust home antihypertensive regimen as follows- Lasix gtt instead of oral, holding Coreg, added hydralazine 25 mg po q8h and Isordil 20 mg po BID for afterload reduction    Will utilize p.r.n. blood pressure medication only if patient's blood pressure greater than 180/110 and she develops symptoms such as worsening chest pain or shortness of breath.

## 2024-04-13 NOTE — ASSESSMENT & PLAN NOTE
Stable.   Recent Labs   Lab 04/11/24  0415 04/12/24  0601 04/13/24  0318   WBC 5.11 5.05 5.43   HGB 9.2* 8.8* 8.7*   HCT 32.5* 30.1* 30.1*    142* 148*

## 2024-04-13 NOTE — ASSESSMENT & PLAN NOTE
- CKD IV (baseline creatinine ~3.1-3.3) admitted with hypervolemia and REGINALDO with creatinine 4.2  - UA showed +1 protein with UPCR of ~500 mg  - urinary sediment with non dysmorphic RBCs and WBCs present although Jin catheter just place yesterday   - retroperitoneal US without evidence of hydronephrosis although changes consistent with chronic kidney disease     Plan/Recommendations:  - Despite lasix gtt and Diuril 500mg IV BID, pt has not achieve negative fluid balance, will plan for HD  - Will try to transfer to CCU for placement of temporary dialysis line given plan for LHC and possible MitraClip  - Continue diuresis with lasix gtt @ 40mg/hr + Diuril 500mg for net negative of atleast 0.5 liter in the next 24 hours  - Can continue Renvela 800 mg TIDWM  - Please avoid hypotension/major fluctuations in BP which may worsen REGINALDO (keep MAP > 65 mmHg)  - Renal diet/tube feeds when not NPO with volume restriction per primary team  - Strict I/O's and daily weights  - Renally all dose medications to eGFR   - Avoid nephrotoxic agents wean feasible (i.e. NSAIDs, intra-arterial contrast, supra-therapeutic vancomycin levels, etc.)

## 2024-04-13 NOTE — PROGRESS NOTES
[facility]  Progress Note    Patient Name: Xena Vera  MRN: [id number]  Patient Class: IP- Inpatient   Admission Date: 4/1/2024  Length of Stay: 10 days  Attending Physician: Minda Molina MD  Primary Care Provider: Tami Villead FNP        Subjective:     Principal Problem:Severe mitral valve regurgitation        HPI:  Xena Vera is a 53 year old Iranian-speaking  woman with former cigarette smoking (1988 to 1989), hypertension, heart failure with preserved ejection fraction, mitral regurgitation, chronic kidney disease stage 4, anemia, ovarian cyst, obesity. She lives in [place], [place].               She presented to [facility] Emergency Department on 4/1/2024 with complaint of mechanical fall, lower extremity swelling, and shortness of breath. Her son translated for her at bedside. She slipped and fell between grass and concrete earlier in the morning and had an abrasion on her left lower extremity. She was able to stand with assistance and ambulate as usual afterward. She had worsening of chronic bilateral lower extremity edema now in her abdomen. She was taking her prescribed furosemide once a day without missed doses and was compliant with a low sodium diet. She was sleeping upright to feel like she can breath and had several nighttime awakenings due to shortness of breath. She had worsening dyspnea on exertion over the last several weeks. She has chronic constipation, unchanged.              In the emergency department, blood pressure was 97/54, heart rate was in the low 50s. Hemoglobin was 10.7 g/dL (baseline around 11.4), sodium was 133 mmol/L, total bilirubin was 1.5 mg/dL, BNP was 4639 pg/mL, troponin was 0.061 ng/mL. Chest X-ray showed upper normal pulmonary vascularity, chronic band of opacity in the left middle lung zone, faint edema in perihilar areas. She was given 40 mg of intravenous furosemide. She was admitted  to Hospital Medicine Team [facility]    Overview/Hospital Course:  She was put on intravenous furosemide. It was increased to 80 mg twice daily. Urine output increased to 1.6 liters in 24 hours. She had bleeding and hematoma at the site of prophylactic heparin injections so heparin was stopped and she was given sequential compression devices instead. Wound Care was consulted for her left leg wound and recommended white petrolatum twice daily. She continued to diurese well but still had a lot of fluid. Her left leg hematoma hurt a lot. Furosemide was increased to 100 mg twice daily. Urine output decreased on 4/8/2024. Bladder scan on 4/9/2024 showed she was retaining almost 500 mL of urine. Jin placed. Nephrology consulted due to poor UOP and ongoing volume overload.  Initiated on Diuril.  Cardiology also consulted.  Initiated on Lasix drip.  Of note, notable mitral regurgitation. Afterload reduction attempted with ISDN-hydralazine initiation. Renal function labile. Monitoring for HD needs.  Potential plans to move to critical care unit in order to facilitate line placement and CRRT.    Interval History:   Patient seen and examined at bedside.    No acute events overnight.  Net positive yesterday.  Continues to endorse PND.  Denies any shortness a breath at rest.  However, notes some shortness of breath and lightheadedness and dizziness when ambulating.  Also has some nausea.  However, p.o. intake okay today.  Patient updated regarding care plan.  Pioche remains poor; may need dialysis.  Possibly move to critical care unit.   services utilized.      Review of Systems   Constitutional:  Positive for appetite change. Negative for chills and fever.   HENT:  Negative for trouble swallowing.    Eyes:  Negative for visual disturbance.   Respiratory:  Negative for cough.    Cardiovascular:  Positive for leg swelling. Negative for chest pain.   Gastrointestinal:  Negative for abdominal pain, diarrhea and nausea.    Genitourinary:  Positive for decreased urine volume.   Skin:  Positive for wound.   Neurological:  Negative for dizziness and light-headedness.   Psychiatric/Behavioral:  Negative for confusion and decreased concentration.      Objective:     Vital Signs (Most Recent):  Temp: 97.7 °F (36.5 °C) (04/13/24 1108)  Pulse: (!) 54 (04/13/24 1108)  Resp: 18 (04/13/24 1108)  BP: (!) 96/53 (04/13/24 1108)  SpO2: (!) 93 % (04/13/24 1108) Vital Signs (24h Range):  Temp:  [97.5 °F (36.4 °C)-98.2 °F (36.8 °C)] 97.7 °F (36.5 °C)  Pulse:  [51-81] 54  Resp:  [17-18] 18  SpO2:  [90 %-96 %] 93 %  BP: ()/(53-97) 96/53     Weight: 73 kg (160 lb 15 oz)  Body mass index is 31.43 kg/m².    Intake/Output Summary (Last 24 hours) at 4/13/2024 1312  Last data filed at 4/13/2024 0446  Gross per 24 hour   Intake 2640 ml   Output 1325 ml   Net 1315 ml         Physical Exam  Vitals and nursing note reviewed.   Constitutional:       General: She is not in acute distress.     Appearance: She is well-developed. She is obese. She is ill-appearing (Chronically). She is not diaphoretic.   HENT:      Right Ear: External ear normal.      Left Ear: External ear normal.      Mouth/Throat:      Pharynx: Oropharynx is clear.   Eyes:      General: No scleral icterus.  Cardiovascular:      Comments: Jvd  Lower extremity edema pitting, tracking up to abdomen  Pulmonary:      Effort: Pulmonary effort is normal. No respiratory distress.   Musculoskeletal:         General: Signs of injury present. No deformity.      Cervical back: Normal range of motion and neck supple.      Right lower leg: Edema present.      Left lower leg: Edema present.   Skin:     General: Skin is warm and dry.      Coloration: Skin is not jaundiced or pale.      Findings: Abrasion and bruising present.   Neurological:      Mental Status: She is alert and oriented to person, place, and time. Mental status is at baseline.      Motor: No seizure activity.   Psychiatric:          Attention and Perception: Attention normal.         Behavior: Behavior normal. Behavior is not aggressive or combative. Behavior is cooperative.             Significant Labs: All pertinent labs within the past 24 hours have been reviewed.    Recent Results (from the past 24 hour(s))   Renal function panel    Collection Time: 04/12/24  6:25 PM   Result Value Ref Range    Glucose 172 (H) 70 - 110 mg/dL    Sodium 131 (L) 136 - 145 mmol/L    Potassium 3.9 3.5 - 5.1 mmol/L    Chloride 91 (L) 95 - 110 mmol/L    CO2 28 23 - 29 mmol/L     (H) 6 - 20 mg/dL    Calcium 8.5 (L) 8.7 - 10.5 mg/dL    Creatinine 4.5 (H) 0.5 - 1.4 mg/dL    Albumin 3.1 (L) 3.5 - 5.2 g/dL    Phosphorus 4.5 2.7 - 4.5 mg/dL    eGFR 11.0 (A) >60 mL/min/1.73 m^2    Anion Gap 12 8 - 16 mmol/L   CBC Auto Differential    Collection Time: 04/13/24  3:18 AM   Result Value Ref Range    WBC 5.43 3.90 - 12.70 K/uL    RBC 3.84 (L) 4.00 - 5.40 M/uL    Hemoglobin 8.7 (L) 12.0 - 16.0 g/dL    Hematocrit 30.1 (L) 37.0 - 48.5 %    MCV 78 (L) 82 - 98 fL    MCH 22.7 (L) 27.0 - 31.0 pg    MCHC 28.9 (L) 32.0 - 36.0 g/dL    RDW 21.9 (H) 11.5 - 14.5 %    Platelets 148 (L) 150 - 450 K/uL    MPV SEE COMMENT 9.2 - 12.9 fL    Immature Granulocytes 0.6 (H) 0.0 - 0.5 %    Gran # (ANC) 4.0 1.8 - 7.7 K/uL    Immature Grans (Abs) 0.03 0.00 - 0.04 K/uL    Lymph # 0.8 (L) 1.0 - 4.8 K/uL    Mono # 0.6 0.3 - 1.0 K/uL    Eos # 0.1 0.0 - 0.5 K/uL    Baso # 0.02 0.00 - 0.20 K/uL    nRBC 0 0 /100 WBC    Gran % 73.6 (H) 38.0 - 73.0 %    Lymph % 14.0 (L) 18.0 - 48.0 %    Mono % 10.3 4.0 - 15.0 %    Eosinophil % 1.1 0.0 - 8.0 %    Basophil % 0.4 0.0 - 1.9 %    Differential Method Automated    Comprehensive Metabolic Panel    Collection Time: 04/13/24  3:18 AM   Result Value Ref Range    Sodium 131 (L) 136 - 145 mmol/L    Potassium 4.3 3.5 - 5.1 mmol/L    Chloride 90 (L) 95 - 110 mmol/L    CO2 24 23 - 29 mmol/L    Glucose 71 70 - 110 mg/dL     (H) 6 - 20 mg/dL    Creatinine 4.4 (H)  0.5 - 1.4 mg/dL    Calcium 8.5 (L) 8.7 - 10.5 mg/dL    Total Protein 6.6 6.0 - 8.4 g/dL    Albumin 2.9 (L) 3.5 - 5.2 g/dL    Total Bilirubin 1.7 (H) 0.1 - 1.0 mg/dL    Alkaline Phosphatase 94 55 - 135 U/L    AST 69 (H) 10 - 40 U/L    ALT 34 10 - 44 U/L    eGFR 11.3 (A) >60 mL/min/1.73 m^2    Anion Gap 17 (H) 8 - 16 mmol/L   Magnesium    Collection Time: 04/13/24  3:18 AM   Result Value Ref Range    Magnesium 2.2 1.6 - 2.6 mg/dL   Phosphorus    Collection Time: 04/13/24  3:18 AM   Result Value Ref Range    Phosphorus 4.4 2.7 - 4.5 mg/dL         Significant Imaging: I have reviewed all pertinent imaging results/findings within the past 24 hours.    Assessment/Plan:      Acute on chronic heart failure with preserved ejection fraction (HFpEF)  Mitral regurgitation   Anasarca    - Clinically volume overloaded on admission  - most recent echo below, grade 2 diastolic dysfunction  - CXR with chronic cardiomegaly  - BNP 4,639   - cardiology and nephrology following  -lasix gtt + Diuril 500mg for goal net negative 500ml-1000ml  -ISDN, hydralazine for afterload reduction in setting of MR  - Continue home cardioprudent regimen as clinically indicated and tolerated  - Strict I/Os  - 1.5L fluid restriction , cardiac diet  - Daily weights  - Will continue to monitor on tele    Results for orders placed during the hospital encounter of 04/01/24    Echo    Interpretation Summary    Left Ventricle: The left ventricle is normal in size. Normal wall thickness. Normal wall motion. There is normal systolic function with a visually estimated ejection fraction of 60 - 65%. Grade II diastolic dysfunction.    Right Ventricle: Normal right ventricular cavity size. Wall thickness is normal. Right ventricle wall motion  is normal. Systolic function is reduced.    Left Atrium: Left atrium is very  severely dilated.    Right Atrium: Right atrium is mildly dilated.    Aortic Valve: There is mild aortic valve sclerosis. There is normal leaflet  mobility. There is trace aortic regurgitation.    Mitral Valve: There is mild bileaflet sclerosis. There is posterior leaflet tethering and failure of complete coaptation. There is very severe regurgitation with a posterolateral eccentriccally directed jet.    Tricuspid Valve: The tricuspid valve is structurally normal. There is normal leaflet mobility. There is moderate to severe regurgitation.    IVC/SVC: IVC was not well visualized due to poor acoustic window. Intermediate venous pressure at 8 mmHg.    Pericardium: There is a trivial effusion posteriorly and small under the RA.        Acute urinary retention  Jin catheter in place.  See CKD.      Leg wound, left  Appreciate Wound Care. Give hydrocodone-acetaminophen prn for pain due to hematoma. Monitor for infection due to excessive weeping from leg edema.      Anasarca        Anemia  Stable.   Recent Labs   Lab 04/11/24  0415 04/12/24  0601 04/13/24  0318   WBC 5.11 5.05 5.43   HGB 9.2* 8.8* 8.7*   HCT 32.5* 30.1* 30.1*    142* 148*         Class 2 obesity in adult  Body mass index is 30.86 kg/m². Morbid obesity complicates all aspects of disease management from diagnostic modalities to treatment. Weight loss encouraged and health benefits explained to patient.     Essential hypertension  Holding carvedilol.  Isosorbide dinitrate and hydralazine to help reduce afterload in setting of MR.  Monitor BP.      VTE Risk Mitigation (From admission, onward)           Ordered     Place sequential compression device  Until discontinued         04/03/24 1347     heparin (porcine) injection 5,000 Units  Every 8 hours         04/01/24 1030     IP VTE HIGH RISK PATIENT  Once         04/01/24 1030     Place sequential compression device  Until discontinued         04/01/24 1030                    Discharge Planning   RILEY: 4/18/2024     Code Status: Full Code   Is the patient medically ready for discharge?: No    Reason for patient still in hospital (select all that  apply): Patient unstable, Laboratory test, Treatment, Imaging, Consult recommendations, PT / OT recommendations, and Pending disposition  Discharge Plan A: Home, Home with family   Discharge Delays: None known at this time              Minda Molina MD  Department of Hospital Medicine   [facility] - Telemetry Stepdown

## 2024-04-13 NOTE — ASSESSMENT & PLAN NOTE
Acute on chronic heart failure with preserved ejection fraction (HFpEF)   Anasarca     54 yo F admitted for ADHF 2/2 severe MR. The MR appears to be secondary to posterior leaflet restriction and leaflet length of 1.1 cm. BNP on admission was 4300 compared to 350 eight months prior. Initially diagnosed with severe MR in June of 2023. Saw Dr. Stone in January of 2024 and was undergoing work-up for possible MitraClip; [facility] scheduled for 3/15 but not completed due to financial constraints, also needs POOJA. Will need optimization of kidney function prior to angiogram consideration, appreciate Nephrology assistance    4/1/2024 TTE    Left Ventricle: The left ventricle is normal in size. Normal wall thickness. Normal wall motion. There is normal systolic function with a visually estimated ejection fraction of 60 - 65%. Grade II diastolic dysfunction.    Right Ventricle: Normal right ventricular cavity size. Wall thickness is normal. Right ventricle wall motion  is normal. Systolic function is reduced.    Left Atrium: Left atrium is very  severely dilated.    Right Atrium: Right atrium is mildly dilated.    Aortic Valve: There is mild aortic valve sclerosis. There is normal leaflet mobility. There is trace aortic regurgitation.    Mitral Valve: There is mild bileaflet sclerosis. There is posterior leaflet tethering and failure of complete coaptation. There is very severe regurgitation with a posterolateral eccentriccally directed jet.    Tricuspid Valve: The tricuspid valve is structurally normal. There is normal leaflet mobility. There is moderate to severe regurgitation.    IVC/SVC: IVC was not well visualized due to poor acoustic window. Intermediate venous pressure at 8 mmHg.    Pericardium: There is a trivial effusion posteriorly and small under the RA.     - Transfer to CCU  - Interventional Cardiology consulted, appreciate recs  - Nephrology following, planning to initiate CRRT  - POOJA ordered  - Continue Diuril 500  mg IV q24h & Lasix gtt @ 40 mg/hr for diuresis; Pend SLED  - Continue hydralazine 25 mg po q8h & Isordil 20 mg po BID for afterload reduction  - Continue home ASA 81 mg po daily and Lipitor 40 mg po daily  - Strict I/O, Jin in place  - Telemetry monitoring

## 2024-04-13 NOTE — ASSESSMENT & PLAN NOTE
Creatine stable for now. BMP reviewed- noted Estimated Creatinine Clearance: 13 mL/min (A) (based on SCr of 4.4 mg/dL (H)). according to latest data. Based on current GFR, CKD stage is stage 4 - GFR 15-29.  Monitor UOP and serial BMP and adjust therapy as needed. Renally dose meds. Avoid nephrotoxic medications and procedures.

## 2024-04-13 NOTE — SUBJECTIVE & OBJECTIVE
Past Medical History:   Diagnosis Date    (HFpEF) heart failure with preserved ejection fraction 06/24/2023    EF 63% with G2 DD  Continue lasix, appears euvolemic today  Continue good BP management with losartan, increase Coreg 6.25 mg BID  Fluid restriction, low sodium diet  Recommend Jardiance- patient had CKD 4 and this is a limiting factor- nephro eval pending    Anemia 06/24/2023    CKD (chronic kidney disease)     HTN (hypertension)     Mitral valve regurgitation 06/26/2023    Refer to structural for evaluation  May benefit from mitral clip    Ovarian cyst     Stage 4 chronic kidney disease 06/24/2023    Refer to nephrology at [facility] as unable to get established with [facility] and the phone number for the outside nephrologist provided to patient during her recent hospitalization goes unanswered when called.   Cr remains elevated  Would like to start SGLT-2 and appreciate nephrology expertise        No past surgical history on file.    Review of patient's allergies indicates:  No Known Allergies    Current Facility-Administered Medications   Medication Dose Route Frequency Provider Last Rate Last Admin    acetaminophen tablet 1,000 mg  1,000 mg Oral Q6H PRN Domonique Walden MD        aspirin EC tablet 81 mg  81 mg Oral Daily David Borjas PA-C   81 mg at 04/13/24 1031    atorvastatin tablet 40 mg  40 mg Oral Daily David Borjas PA-DIMITRY   40 mg at 04/13/24 1032    bisacodyL EC tablet 5 mg  5 mg Oral Daily PRN Johnny Cisneros MD        chlorothiazide (DIURIL) 500 mg in dextrose 5 % (D5W) 50 mL IVPB  500 mg Intravenous Q24H Minda Molina MD   Stopped at 04/13/24 0610    dextrose 10% bolus 125 mL 125 mL  12.5 g Intravenous PRN Davdi Borjas PA-C        dextrose 10% bolus 250 mL 250 mL  25 g Intravenous PRN David Borjas PA-C        furosemide (Lasix) 500 mg in 50 mL infusion (conc: 10 mg/mL)  40 mg/hr Intravenous Continuous Tez Guerra MD 4 mL/hr at 04/13/24 1131 40 mg/hr  at 04/13/24 1131    glucagon (human recombinant) injection 1 mg  1 mg Intramuscular PRN David Borjas PA-C        glucose chewable tablet 16 g  16 g Oral PRN David Borjas PA-C        glucose chewable tablet 24 g  24 g Oral PRN David Borjas PA-C        heparin (porcine) injection 5,000 Units  5,000 Units Subcutaneous Q8H David Borjas PA-C   5,000 Units at 04/13/24 0545    hydrALAZINE tablet 25 mg  25 mg Oral Q8H Minda Molina MD   25 mg at 04/13/24 0541    HYDROcodone-acetaminophen 5-325 mg per tablet 1 tablet  1 tablet Oral Q6H PRN Johnny Cisneros MD   1 tablet at 04/12/24 1206    isosorbide dinitrate tablet 20 mg  20 mg Oral BID Tez Guerra MD   20 mg at 04/13/24 1032    melatonin tablet 6 mg  6 mg Oral Nightly PRN Pietro Herring MD   6 mg at 04/12/24 0302    naloxone 0.4 mg/mL injection 0.02 mg  0.02 mg Intravenous PRN David Borjas PA-DIMITRY        ondansetron disintegrating tablet 4 mg  4 mg Oral Q8H PRN David Borjas PA-C        ondansetron injection 4 mg  4 mg Intravenous Q8H PRN David Borjas PA-C        oxybutynin tablet 5 mg  5 mg Oral TID Minda Molina MD   5 mg at 04/13/24 1030    polyethylene glycol packet 17 g  17 g Oral BID Domonique Walden MD        senna-docusate 8.6-50 mg per tablet 1 tablet  1 tablet Oral BID Domonique Walden MD        sevelamer carbonate tablet 800 mg  800 mg Oral TID WM David Borjas PA-C   800 mg at 04/13/24 1131    sodium chloride 0.9% flush 10 mL  10 mL Intravenous PRN Pietro Herring MD        sodium chloride 0.9% flush 10 mL  10 mL Intravenous PRN David Borjas PA-C        white petrolatum 41 % ointment   Topical (Top) BID Johnny Cisneros MD   Given at 04/13/24 1032    zinc oxide 20 % ointment   Topical (Top) PRN David Borjas PA-C         Family History    None       Tobacco Use    Smoking status: Former     Current packs/day: 0.00     Types: Cigarettes     Start date: 1988     Quit date:  1989     Years since quittin.3    Smokeless tobacco: Never    Tobacco comments:     2-3 cigarettes a day   Substance and Sexual Activity    Alcohol use: Never    Drug use: Never    Sexual activity: Not on file     Review of Systems   Cardiovascular:  Positive for dyspnea on exertion, leg swelling, orthopnea and palpitations.   Respiratory:  Positive for shortness of breath.    Gastrointestinal:  Positive for bloating.   Neurological:  Positive for light-headedness.   All other systems reviewed and are negative.    Objective:     Vital Signs (Most Recent):  Temp: 97.7 °F (36.5 °C) (24 1108)  Pulse: (!) 54 (24 1108)  Resp: 18 (24 1108)  BP: (!) 96/53 (24 1108)  SpO2: (!) 93 % (24 1108) Vital Signs (24h Range):  Temp:  [97.5 °F (36.4 °C)-98.2 °F (36.8 °C)] 97.7 °F (36.5 °C)  Pulse:  [51-81] 54  Resp:  [17-18] 18  SpO2:  [90 %-96 %] 93 %  BP: ()/(53-97) 96/53     Weight: 73 kg (160 lb 15 oz)  Body mass index is 31.43 kg/m².    SpO2: (!) 93 %         Intake/Output Summary (Last 24 hours) at 2024 1341  Last data filed at 2024 0446  Gross per 24 hour   Intake 240 ml   Output 925 ml   Net -685 ml       Lines/Drains/Airways       Drain  Duration                  Urethral Catheter 24 1330 16 Fr. 4 days              Peripheral Intravenous Line  Duration                  Peripheral IV - Single Lumen 04/10/24 2100 22 G Posterior;Right Hand 2 days                     Physical Exam  Vitals and nursing note reviewed.   Constitutional:       General: She is not in acute distress.     Appearance: She is obese. She is ill-appearing.   HENT:      Head: Normocephalic and atraumatic.   Cardiovascular:      Rate and Rhythm: Normal rate and regular rhythm.      Heart sounds: Murmur heard.   Pulmonary:      Effort: Pulmonary effort is normal. No respiratory distress.   Abdominal:      General: There is distension.      Palpations: Abdomen is soft.      Tenderness: There is no  abdominal tenderness.   Musculoskeletal:      Right lower leg: Edema present.      Left lower leg: Edema present.   Skin:     General: Skin is warm and dry.      Comments: + Left lateral leg foul-smelling wound with clear drainage   Neurological:      General: No focal deficit present.      Mental Status: She is alert and oriented to person, place, and time. Mental status is at baseline.          Significant Labs: All pertinent lab results from the last 24 hours have been reviewed.    Significant Imaging:  All pertinent imaging results from the last 24 hours have been reviewed.

## 2024-04-13 NOTE — ASSESSMENT & PLAN NOTE
Body mass index is 31.43 kg/m². Morbid obesity complicates all aspects of disease management from diagnostic modalities to treatment. Weight loss encouraged and health benefits explained to patient.

## 2024-04-14 LAB
ALBUMIN SERPL BCP-MCNC: 2.9 G/DL (ref 3.5–5.2)
ALBUMIN SERPL BCP-MCNC: 3.1 G/DL (ref 3.5–5.2)
ALLENS TEST: ABNORMAL
ALLENS TEST: ABNORMAL
ALLENS TEST: NORMAL
ALP SERPL-CCNC: 90 U/L (ref 55–135)
ALT SERPL W/O P-5'-P-CCNC: 38 U/L (ref 10–44)
ANION GAP SERPL CALC-SCNC: 10 MMOL/L (ref 8–16)
ANION GAP SERPL CALC-SCNC: 12 MMOL/L (ref 8–16)
ANION GAP SERPL CALC-SCNC: 13 MMOL/L (ref 8–16)
ANION GAP SERPL CALC-SCNC: 13 MMOL/L (ref 8–16)
AST SERPL-CCNC: 62 U/L (ref 10–40)
BASOPHILS # BLD AUTO: 0.02 K/UL (ref 0–0.2)
BASOPHILS NFR BLD: 0.3 % (ref 0–1.9)
BILIRUB SERPL-MCNC: 1.6 MG/DL (ref 0.1–1)
BUN SERPL-MCNC: 100 MG/DL (ref 6–20)
BUN SERPL-MCNC: 110 MG/DL (ref 6–20)
BUN SERPL-MCNC: 36 MG/DL (ref 6–20)
BUN SERPL-MCNC: 78 MG/DL (ref 6–20)
CALCIUM SERPL-MCNC: 8 MG/DL (ref 8.7–10.5)
CALCIUM SERPL-MCNC: 8.4 MG/DL (ref 8.7–10.5)
CALCIUM SERPL-MCNC: 8.5 MG/DL (ref 8.7–10.5)
CALCIUM SERPL-MCNC: 8.8 MG/DL (ref 8.7–10.5)
CHLORIDE SERPL-SCNC: 100 MMOL/L (ref 95–110)
CHLORIDE SERPL-SCNC: 88 MMOL/L (ref 95–110)
CHLORIDE SERPL-SCNC: 89 MMOL/L (ref 95–110)
CHLORIDE SERPL-SCNC: 94 MMOL/L (ref 95–110)
CHOLEST SERPL-MCNC: 77 MG/DL (ref 120–199)
CHOLEST/HDLC SERPL: 3.5 {RATIO} (ref 2–5)
CO2 SERPL-SCNC: 21 MMOL/L (ref 23–29)
CO2 SERPL-SCNC: 26 MMOL/L (ref 23–29)
CO2 SERPL-SCNC: 28 MMOL/L (ref 23–29)
CO2 SERPL-SCNC: 28 MMOL/L (ref 23–29)
CREAT SERPL-MCNC: 2.1 MG/DL (ref 0.5–1.4)
CREAT SERPL-MCNC: 3.6 MG/DL (ref 0.5–1.4)
CREAT SERPL-MCNC: 4.4 MG/DL (ref 0.5–1.4)
CREAT SERPL-MCNC: 4.5 MG/DL (ref 0.5–1.4)
DELSYS: ABNORMAL
DELSYS: NORMAL
DIFFERENTIAL METHOD BLD: ABNORMAL
EOSINOPHIL # BLD AUTO: 0.1 K/UL (ref 0–0.5)
EOSINOPHIL NFR BLD: 1 % (ref 0–8)
EP: 8
ERYTHROCYTE [DISTWIDTH] IN BLOOD BY AUTOMATED COUNT: 21.7 % (ref 11.5–14.5)
ERYTHROCYTE [SEDIMENTATION RATE] IN BLOOD BY WESTERGREN METHOD: 18 MM/H
EST. GFR  (NO RACE VARIABLE): 11 ML/MIN/1.73 M^2
EST. GFR  (NO RACE VARIABLE): 11.3 ML/MIN/1.73 M^2
EST. GFR  (NO RACE VARIABLE): 14.4 ML/MIN/1.73 M^2
EST. GFR  (NO RACE VARIABLE): 27.5 ML/MIN/1.73 M^2
FIO2: 50
GLUCOSE SERPL-MCNC: 77 MG/DL (ref 70–110)
GLUCOSE SERPL-MCNC: 86 MG/DL (ref 70–110)
GLUCOSE SERPL-MCNC: 86 MG/DL (ref 70–110)
GLUCOSE SERPL-MCNC: 88 MG/DL (ref 70–110)
HCO3 UR-SCNC: 28.1 MMOL/L (ref 24–28)
HCO3 UR-SCNC: 28.8 MMOL/L (ref 24–28)
HCT VFR BLD AUTO: 30.1 % (ref 37–48.5)
HDLC SERPL-MCNC: 22 MG/DL (ref 40–75)
HDLC SERPL: 28.6 % (ref 20–50)
HGB BLD-MCNC: 8.6 G/DL (ref 12–16)
IMM GRANULOCYTES # BLD AUTO: 0.03 K/UL (ref 0–0.04)
IMM GRANULOCYTES NFR BLD AUTO: 0.4 % (ref 0–0.5)
IP: 14
LDLC SERPL CALC-MCNC: 39 MG/DL (ref 63–159)
LYMPHOCYTES # BLD AUTO: 0.8 K/UL (ref 1–4.8)
LYMPHOCYTES NFR BLD: 11.7 % (ref 18–48)
MAGNESIUM SERPL-MCNC: 2 MG/DL (ref 1.6–2.6)
MAGNESIUM SERPL-MCNC: 2.1 MG/DL (ref 1.6–2.6)
MAGNESIUM SERPL-MCNC: 2.2 MG/DL (ref 1.6–2.6)
MAGNESIUM SERPL-MCNC: 2.3 MG/DL (ref 1.6–2.6)
MCH RBC QN AUTO: 22.6 PG (ref 27–31)
MCHC RBC AUTO-ENTMCNC: 28.6 G/DL (ref 32–36)
MCV RBC AUTO: 79 FL (ref 82–98)
MIN VOL: 5.5
MODE: NORMAL
MONOCYTES # BLD AUTO: 0.6 K/UL (ref 0.3–1)
MONOCYTES NFR BLD: 9.4 % (ref 4–15)
NEUTROPHILS # BLD AUTO: 5.2 K/UL (ref 1.8–7.7)
NEUTROPHILS NFR BLD: 77.2 % (ref 38–73)
NONHDLC SERPL-MCNC: 55 MG/DL
NRBC BLD-RTO: 0 /100 WBC
PCO2 BLDA: 64.9 MMHG (ref 35–45)
PCO2 BLDA: 72.5 MMHG (ref 35–45)
PH SMN: 7.21 [PH] (ref 7.35–7.45)
PH SMN: 7.25 [PH] (ref 7.35–7.45)
PHOSPHATE SERPL-MCNC: 2.9 MG/DL (ref 2.7–4.5)
PHOSPHATE SERPL-MCNC: 5.9 MG/DL (ref 2.7–4.5)
PLATELET # BLD AUTO: 184 K/UL (ref 150–450)
PMV BLD AUTO: 12.1 FL (ref 9.2–12.9)
PO2 BLDA: 149 MMHG (ref 80–100)
PO2 BLDA: 44 MMHG (ref 40–60)
PO2 BLDA: 46 MMHG (ref 40–60)
POC BE: 1 MMOL/L
POC BE: 1 MMOL/L
POC SATURATED O2: 67 % (ref 95–100)
POC SATURATED O2: 70 % (ref 95–100)
POC SATURATED O2: 99 % (ref 95–100)
POC TCO2: 30 MMOL/L (ref 23–27)
POC TCO2: 31 MMOL/L (ref 24–29)
POTASSIUM SERPL-SCNC: 4.1 MMOL/L (ref 3.5–5.1)
POTASSIUM SERPL-SCNC: 4.2 MMOL/L (ref 3.5–5.1)
POTASSIUM SERPL-SCNC: 4.3 MMOL/L (ref 3.5–5.1)
POTASSIUM SERPL-SCNC: 4.4 MMOL/L (ref 3.5–5.1)
PROT SERPL-MCNC: 6.4 G/DL (ref 6–8.4)
RBC # BLD AUTO: 3.8 M/UL (ref 4–5.4)
SAMPLE: ABNORMAL
SAMPLE: ABNORMAL
SAMPLE: NORMAL
SITE: ABNORMAL
SITE: ABNORMAL
SITE: NORMAL
SODIUM SERPL-SCNC: 129 MMOL/L (ref 136–145)
SODIUM SERPL-SCNC: 130 MMOL/L (ref 136–145)
SODIUM SERPL-SCNC: 131 MMOL/L (ref 136–145)
SODIUM SERPL-SCNC: 132 MMOL/L (ref 136–145)
SP02: 100
SPONT RATE: 21
T4 FREE SERPL-MCNC: 0.76 NG/DL (ref 0.71–1.51)
TRIGL SERPL-MCNC: 80 MG/DL (ref 30–150)
TSH SERPL DL<=0.005 MIU/L-ACNC: 1.43 UIU/ML (ref 0.4–4)
WBC # BLD AUTO: 6.69 K/UL (ref 3.9–12.7)

## 2024-04-14 PROCEDURE — 80100014 HC HEMODIALYSIS 1:1

## 2024-04-14 PROCEDURE — 94761 N-INVAS EAR/PLS OXIMETRY MLT: CPT

## 2024-04-14 PROCEDURE — 80061 LIPID PANEL: CPT

## 2024-04-14 PROCEDURE — 36415 COLL VENOUS BLD VENIPUNCTURE: CPT

## 2024-04-14 PROCEDURE — 90945 DIALYSIS ONE EVALUATION: CPT

## 2024-04-14 PROCEDURE — 85520 HEPARIN ASSAY: CPT

## 2024-04-14 PROCEDURE — 25000003 PHARM REV CODE 250: Performed by: STUDENT IN AN ORGANIZED HEALTH CARE EDUCATION/TRAINING PROGRAM

## 2024-04-14 PROCEDURE — 83735 ASSAY OF MAGNESIUM: CPT | Mod: 91 | Performed by: INTERNAL MEDICINE

## 2024-04-14 PROCEDURE — 94660 CPAP INITIATION&MGMT: CPT

## 2024-04-14 PROCEDURE — 99233 SBSQ HOSP IP/OBS HIGH 50: CPT | Mod: ,,, | Performed by: INTERNAL MEDICINE

## 2024-04-14 PROCEDURE — 27000221 HC OXYGEN, UP TO 24 HOURS

## 2024-04-14 PROCEDURE — 63600175 PHARM REV CODE 636 W HCPCS: Performed by: INTERNAL MEDICINE

## 2024-04-14 PROCEDURE — 63600175 PHARM REV CODE 636 W HCPCS: Performed by: STUDENT IN AN ORGANIZED HEALTH CARE EDUCATION/TRAINING PROGRAM

## 2024-04-14 PROCEDURE — 83735 ASSAY OF MAGNESIUM: CPT | Performed by: INTERNAL MEDICINE

## 2024-04-14 PROCEDURE — 85670 THROMBIN TIME PLASMA: CPT

## 2024-04-14 PROCEDURE — 37799 UNLISTED PX VASCULAR SURGERY: CPT

## 2024-04-14 PROCEDURE — 99900035 HC TECH TIME PER 15 MIN (STAT)

## 2024-04-14 PROCEDURE — 87040 BLOOD CULTURE FOR BACTERIA: CPT

## 2024-04-14 PROCEDURE — 20000000 HC ICU ROOM

## 2024-04-14 PROCEDURE — 5A09357 ASSISTANCE WITH RESPIRATORY VENTILATION, LESS THAN 24 CONSECUTIVE HOURS, CONTINUOUS POSITIVE AIRWAY PRESSURE: ICD-10-PCS | Performed by: STUDENT IN AN ORGANIZED HEALTH CARE EDUCATION/TRAINING PROGRAM

## 2024-04-14 PROCEDURE — 63600175 PHARM REV CODE 636 W HCPCS: Performed by: PHYSICIAN ASSISTANT

## 2024-04-14 PROCEDURE — 85613 RUSSELL VIPER VENOM DILUTED: CPT

## 2024-04-14 PROCEDURE — 25000003 PHARM REV CODE 250

## 2024-04-14 PROCEDURE — 86147 CARDIOLIPIN ANTIBODY EA IG: CPT

## 2024-04-14 PROCEDURE — 84100 ASSAY OF PHOSPHORUS: CPT

## 2024-04-14 PROCEDURE — 25000003 PHARM REV CODE 250: Performed by: PHYSICIAN ASSISTANT

## 2024-04-14 PROCEDURE — 03HY32Z INSERTION OF MONITORING DEVICE INTO UPPER ARTERY, PERCUTANEOUS APPROACH: ICD-10-PCS | Performed by: INTERNAL MEDICINE

## 2024-04-14 PROCEDURE — 84439 ASSAY OF FREE THYROXINE: CPT

## 2024-04-14 PROCEDURE — 83735 ASSAY OF MAGNESIUM: CPT | Mod: 91 | Performed by: STUDENT IN AN ORGANIZED HEALTH CARE EDUCATION/TRAINING PROGRAM

## 2024-04-14 PROCEDURE — 80053 COMPREHEN METABOLIC PANEL: CPT

## 2024-04-14 PROCEDURE — 80048 BASIC METABOLIC PNL TOTAL CA: CPT | Mod: 91,XB | Performed by: STUDENT IN AN ORGANIZED HEALTH CARE EDUCATION/TRAINING PROGRAM

## 2024-04-14 PROCEDURE — 86060 ANTISTREPTOLYSIN O TITER: CPT

## 2024-04-14 PROCEDURE — 85598 HEXAGNAL PHOSPH PLTLT NEUTRL: CPT

## 2024-04-14 PROCEDURE — 80048 BASIC METABOLIC PNL TOTAL CA: CPT | Mod: XB | Performed by: INTERNAL MEDICINE

## 2024-04-14 PROCEDURE — 82803 BLOOD GASES ANY COMBINATION: CPT

## 2024-04-14 PROCEDURE — 84443 ASSAY THYROID STIM HORMONE: CPT

## 2024-04-14 PROCEDURE — 86038 ANTINUCLEAR ANTIBODIES: CPT

## 2024-04-14 PROCEDURE — 80069 RENAL FUNCTION PANEL: CPT | Performed by: STUDENT IN AN ORGANIZED HEALTH CARE EDUCATION/TRAINING PROGRAM

## 2024-04-14 PROCEDURE — 85025 COMPLETE CBC W/AUTO DIFF WBC: CPT

## 2024-04-14 PROCEDURE — 25000003 PHARM REV CODE 250: Performed by: HOSPITALIST

## 2024-04-14 PROCEDURE — 27000190 HC CPAP FULL FACE MASK W/VALVE

## 2024-04-14 PROCEDURE — 85730 THROMBOPLASTIN TIME PARTIAL: CPT | Mod: 91

## 2024-04-14 PROCEDURE — 83735 ASSAY OF MAGNESIUM: CPT | Mod: 91

## 2024-04-14 PROCEDURE — 87449 NOS EACH ORGANISM AG IA: CPT

## 2024-04-14 PROCEDURE — 86146 BETA-2 GLYCOPROTEIN ANTIBODY: CPT

## 2024-04-14 PROCEDURE — 27100171 HC OXYGEN HIGH FLOW UP TO 24 HOURS

## 2024-04-14 RX ORDER — MICONAZOLE NITRATE 2 %
POWDER (GRAM) TOPICAL 2 TIMES DAILY
Status: DISCONTINUED | OUTPATIENT
Start: 2024-04-14 | End: 2024-04-17

## 2024-04-14 RX ORDER — LIDOCAINE HYDROCHLORIDE 10 MG/ML
5 INJECTION INFILTRATION; PERINEURAL ONCE
Status: COMPLETED | OUTPATIENT
Start: 2024-04-14 | End: 2024-04-14

## 2024-04-14 RX ORDER — NOREPINEPHRINE BITARTRATE/D5W 4MG/250ML
PLASTIC BAG, INJECTION (ML) INTRAVENOUS
Status: COMPLETED
Start: 2024-04-14 | End: 2024-04-14

## 2024-04-14 RX ORDER — LIDOCAINE HYDROCHLORIDE 10 MG/ML
INJECTION INFILTRATION; PERINEURAL
Status: COMPLETED
Start: 2024-04-14 | End: 2024-04-14

## 2024-04-14 RX ORDER — MAGNESIUM SULFATE HEPTAHYDRATE 40 MG/ML
2 INJECTION, SOLUTION INTRAVENOUS
Status: ACTIVE | OUTPATIENT
Start: 2024-04-14 | End: 2024-04-15

## 2024-04-14 RX ORDER — ISOSORBIDE DINITRATE 20 MG/1
20 TABLET ORAL 3 TIMES DAILY
Status: DISCONTINUED | OUTPATIENT
Start: 2024-04-14 | End: 2024-04-14

## 2024-04-14 RX ORDER — HYDROCODONE BITARTRATE AND ACETAMINOPHEN 500; 5 MG/1; MG/1
TABLET ORAL CONTINUOUS
Status: ACTIVE | OUTPATIENT
Start: 2024-04-14 | End: 2024-04-15

## 2024-04-14 RX ORDER — NOREPINEPHRINE BITARTRATE/D5W 4MG/250ML
0-3 PLASTIC BAG, INJECTION (ML) INTRAVENOUS CONTINUOUS
Status: DISCONTINUED | OUTPATIENT
Start: 2024-04-14 | End: 2024-04-17

## 2024-04-14 RX ADMIN — POLYETHYLENE GLYCOL 3350 17 G: 17 POWDER, FOR SOLUTION ORAL at 09:04

## 2024-04-14 RX ADMIN — OXYBUTYNIN CHLORIDE 5 MG: 5 TABLET ORAL at 03:04

## 2024-04-14 RX ADMIN — OXYBUTYNIN CHLORIDE 5 MG: 5 TABLET ORAL at 09:04

## 2024-04-14 RX ADMIN — Medication: at 09:04

## 2024-04-14 RX ADMIN — FUROSEMIDE 40 MG/HR: 10 INJECTION, SOLUTION INTRAMUSCULAR; INTRAVENOUS at 10:04

## 2024-04-14 RX ADMIN — MICONAZOLE NITRATE 2 % TOPICAL POWDER: at 09:04

## 2024-04-14 RX ADMIN — HEPARIN SODIUM 5000 UNITS: 5000 INJECTION INTRAVENOUS; SUBCUTANEOUS at 05:04

## 2024-04-14 RX ADMIN — SEVELAMER CARBONATE 800 MG: 800 TABLET, FILM COATED ORAL at 12:04

## 2024-04-14 RX ADMIN — SEVELAMER CARBONATE 800 MG: 800 TABLET, FILM COATED ORAL at 09:04

## 2024-04-14 RX ADMIN — MUPIROCIN: 20 OINTMENT TOPICAL at 09:04

## 2024-04-14 RX ADMIN — HYDRALAZINE HYDROCHLORIDE 10 MG: 10 TABLET, FILM COATED ORAL at 05:04

## 2024-04-14 RX ADMIN — ASPIRIN 81 MG: 81 TABLET, COATED ORAL at 09:04

## 2024-04-14 RX ADMIN — NOREPINEPHRINE BITARTRATE 0.02 MCG/KG/MIN: 4 INJECTION, SOLUTION INTRAVENOUS at 09:04

## 2024-04-14 RX ADMIN — SENNOSIDES AND DOCUSATE SODIUM 1 TABLET: 8.6; 5 TABLET ORAL at 09:04

## 2024-04-14 RX ADMIN — ZINC OXIDE: 200 OINTMENT TOPICAL at 09:04

## 2024-04-14 RX ADMIN — SODIUM CHLORIDE: 9 INJECTION, SOLUTION INTRAVENOUS at 05:04

## 2024-04-14 RX ADMIN — ACETAMINOPHEN 1000 MG: 500 TABLET ORAL at 12:04

## 2024-04-14 RX ADMIN — LIDOCAINE HYDROCHLORIDE 5 ML: 10 INJECTION, SOLUTION INFILTRATION; PERINEURAL at 04:04

## 2024-04-14 RX ADMIN — ATORVASTATIN CALCIUM 40 MG: 40 TABLET, FILM COATED ORAL at 09:04

## 2024-04-14 RX ADMIN — CHLOROTHIAZIDE SODIUM 500 MG: 500 INJECTION, POWDER, LYOPHILIZED, FOR SOLUTION INTRAVENOUS at 07:04

## 2024-04-14 RX ADMIN — HYDROCODONE BITARTRATE AND ACETAMINOPHEN 1 TABLET: 5; 325 TABLET ORAL at 03:04

## 2024-04-14 RX ADMIN — FUROSEMIDE 40 MG/HR: 10 INJECTION, SOLUTION INTRAMUSCULAR; INTRAVENOUS at 12:04

## 2024-04-14 RX ADMIN — HEPARIN SODIUM 5000 UNITS: 5000 INJECTION INTRAVENOUS; SUBCUTANEOUS at 02:04

## 2024-04-14 RX ADMIN — HEPARIN SODIUM 5000 UNITS: 5000 INJECTION INTRAVENOUS; SUBCUTANEOUS at 09:04

## 2024-04-14 RX ADMIN — SEVELAMER CARBONATE 800 MG: 800 TABLET, FILM COATED ORAL at 04:04

## 2024-04-14 RX ADMIN — LIDOCAINE HYDROCHLORIDE 5 ML: 10 INJECTION INFILTRATION; PERINEURAL at 04:04

## 2024-04-14 NOTE — ASSESSMENT & PLAN NOTE
Body mass index is 32.29 kg/m². Morbid obesity complicates all aspects of disease management from diagnostic modalities to treatment. Weight loss encouraged and health benefits explained to patient.

## 2024-04-14 NOTE — PROGRESS NOTES
04/14/24 1710   Treatment   Treatment Type SLED   Treatment Status New start   Dialysis Machine Number [id number]   Dialyzer Time (hours) 0   BVP (Liters) 0 L   Solutions Labeled and Current  Yes   Access Temporary Cath;Right;IJ   Catheter Dressing Intact  Yes   Alarms Engaged Yes   CRRT Comments sled initiated as ordered   Prescription   Time (Hours) 8   Dialysate K + (mEq/L) 4   Dialysate CA + (mEq/L) 2.25   Dialysate HCO3 - (Bicarb) (mEq/L) Other (Comment)  (28)   Dialysate Na + (mEq/L) 138   Cartridge Type Other  (r300)   Dialysate Flow Rate (mL/min) 200   UF Goal Rate 500 mL/hr   CRRT Hourly Documentation   Blood Flow (mL/min) 150   UF Rate 200 cc/hr   Arterial Pressure (mmHg) -20 mmHg   Venous Pressure (mmHg) 60 mmHg   Effluent Pressure (EP) (mmHg) 0 mmHg   Total UF (Hourly Cleared) (mL) 0     SLED initiated as ordered. RIJ CVC aspirated, flushed, and accessed using aseptic technique. Lines connected and secured.

## 2024-04-14 NOTE — PROGRESS NOTES
[facility] - Cardiac Intensive Care  Nephrology  Progress Note    Patient Name: Xena Vera  MRN: [id number]  Admission Date: 4/1/2024  Hospital Length of Stay: 11 days  Attending Provider: Renato Elmore MD   Primary Care Physician: Tami Villeda FNP  Principal Problem:Severe mitral valve regurgitation    Subjective:     HPI: Ms Vera is an obese (BMI ~31) 53-year-old with CKD IV (baseline creatinine ~3.1-3.3), prediabetes with most recent hemoglobin A1c 6%, HFpEF (most recent TTE with EF 60-65% with G2DD), mitral valve regurgitation, anemia, hypertension, HLD as well as several over co-morbid conditions who was admitted on 4/1 with heart failure exacerbation and hypervolemia after presenting with to ED following a mechanical fall but also had complaints of progressive dyspnea/ZACARIAS, orthopnea, lower extremity edema and abdominal distension with constipation. On presentation patient was noted to be hypotensive with systolic BP readings as low as 90s mmHg and bradycardiac with HR as low as 50s bpm. There was concern for some edema on chest x-ray and BNP at that time was ~4.6K and metabolic panel was notable for serum sodium 133, bicarbonate 20, , creatinine 4.2, albumin 3.1 and total bilirubin 1.5. UA showed +1 protein. Her admission was complicated by failure to adequately diuresis with escalating IV Lasix and even oral metolazone for which Nephrology was consulted on 4/10 for assistance. She explicitly denies NSAID use as outpatient.    Interval History: Patient seen and examined this AM. Overnight underwent first session of iHD via new trialysis catheter although UF limited to 500 mL secondary to hypotension. Imdur and hydralazine stopped and patient did require Levophed. She is now off vasopressor support. Afebrile with pulse in the 50s bpm. Systolic blood pressures ranging from 110-80s mmHg. She is saturating +95% on 2 liters via nasal cannula with documented UOP ~1.5 liters in the last 24 hours  although diminishing today. She is net negative ~1.5 liters in the last 24 hours.     Review of patient's allergies indicates:  No Known Allergies  Current Facility-Administered Medications   Medication Dose Route Frequency Provider Last Rate Last Admin    0.9%  NaCl infusion   Intravenous Once Italo Segovia MD        0.9%  NaCl infusion   Intravenous Continuous Gillies, Connor M, DO 5 mL/hr at 04/14/24 0801 Rate Verify at 04/14/24 0801    acetaminophen tablet 1,000 mg  1,000 mg Oral Q6H PRN Domonique Walden MD        aspirin EC tablet 81 mg  81 mg Oral Daily David Borjas, PA-C   81 mg at 04/13/24 1031    atorvastatin tablet 40 mg  40 mg Oral Daily David Borjas, PA-C   40 mg at 04/13/24 1032    bisacodyL EC tablet 5 mg  5 mg Oral Daily PRN Johnny Cisneros MD        chlorothiazide (DIURIL) 500 mg in dextrose 5 % (D5W) 50 mL IVPB  500 mg Intravenous Q24H Minda Molina MD   Stopped at 04/14/24 0745    dextrose 10% bolus 125 mL 125 mL  12.5 g Intravenous PRN David Borjas, PA-C        dextrose 10% bolus 250 mL 250 mL  25 g Intravenous PRN David Borjsa, PA-DIMITRY        furosemide (Lasix) 500 mg in 50 mL infusion (conc: 10 mg/mL)  40 mg/hr Intravenous Continuous Tez Guerra MD 4 mL/hr at 04/14/24 0801 40 mg/hr at 04/14/24 0801    glucagon (human recombinant) injection 1 mg  1 mg Intramuscular PRN David Borjas, PA-DIMITRY        glucose chewable tablet 16 g  16 g Oral PRN David Borjas, PA-C        glucose chewable tablet 24 g  24 g Oral PRN David Borjas, PA-DIMITRY        heparin (porcine) injection 1,000 Units  1,000 Units Intra-Catheter PRN Italo Segovia MD        heparin (porcine) injection 5,000 Units  5,000 Units Subcutaneous Q8H David Borjas, PA-C   5,000 Units at 04/14/24 0555    hydrALAZINE tablet 10 mg  10 mg Oral Q8H Quang Hernandez MD   10 mg at 04/14/24 0554    HYDROcodone-acetaminophen 5-325 mg per tablet 1 tablet  1 tablet Oral Q6H PRN  Johnny Cisneros MD   1 tablet at 04/14/24 0330    isosorbide dinitrate tablet 20 mg  20 mg Oral BID Tez Guerra MD   20 mg at 04/13/24 2022    melatonin tablet 6 mg  6 mg Oral Nightly PRN Pietro Herring MD   6 mg at 04/12/24 0302    mupirocin 2 % ointment   Nasal BID Italo Segovia MD   Given at 04/13/24 2023    naloxone 0.4 mg/mL injection 0.02 mg  0.02 mg Intravenous PRN David Borjas PA-C        ondansetron disintegrating tablet 4 mg  4 mg Oral Q8H PRN David Borjas PA-C        ondansetron injection 4 mg  4 mg Intravenous Q8H PRN David Borjas PA-C   4 mg at 04/13/24 1429    oxybutynin tablet 5 mg  5 mg Oral TID Minda Molina MD   5 mg at 04/13/24 2022    polyethylene glycol packet 17 g  17 g Oral BID Domonique Walden MD   17 g at 04/13/24 2023    senna-docusate 8.6-50 mg per tablet 1 tablet  1 tablet Oral BID Domonique Walden MD   1 tablet at 04/13/24 2022    sevelamer carbonate tablet 800 mg  800 mg Oral TID WM David Borjas PA-C   800 mg at 04/13/24 1131    sodium chloride 0.9% bolus 250 mL 250 mL  250 mL Intravenous PRN Italo Segovia MD        sodium chloride 0.9% flush 10 mL  10 mL Intravenous PRN Pietro Herring MD        sodium chloride 0.9% flush 10 mL  10 mL Intravenous PRN David Borjas PA-C        white petrolatum 41 % ointment   Topical (Top) BID Johnny Cisneros MD   Given at 04/13/24 2110    zinc oxide 20 % ointment   Topical (Top) PRN David Borjas PA-C   Given at 04/13/24 2028       Objective:     Vital Signs (Most Recent):  Temp: 98 °F (36.7 °C) (04/14/24 0701)  Pulse: (!) 52 (04/14/24 0801)  Resp: 12 (04/14/24 0801)  BP: (!) 101/50 (04/14/24 0801)  SpO2: 100 % (04/14/24 0801) Vital Signs (24h Range):  Temp:  [97.7 °F (36.5 °C)-98 °F (36.7 °C)] 98 °F (36.7 °C)  Pulse:  [51-58] 52  Resp:  [12-58] 12  SpO2:  [90 %-100 %] 100 %  BP: ()/(46-70) 101/50     Weight: 75 kg (165 lb 5.5 oz) (04/14/24 0400)  Body mass index is 32.29  kg/m².  Body surface area is 1.78 meters squared.    I/O last 3 completed shifts:  In: 1023.2 [P.O.:240; I.V.:233.2; Other:500; IV Piggyback:50]  Out: 3277 [Urine:2277; Other:1000]     Physical Exam  Vitals and nursing note reviewed.   Constitutional:       General: She is not in acute distress.     Appearance: Normal appearance.   HENT:      Head: Normocephalic.   Eyes:      Extraocular Movements: Extraocular movements intact.   Cardiovascular:      Rate and Rhythm: Normal rate and regular rhythm.   Pulmonary:      Effort: Pulmonary effort is normal. No respiratory distress.      Breath sounds: Rales present.   Musculoskeletal:      Right lower leg: Edema present.      Left lower leg: Edema present.   Skin:     General: Skin is warm and dry.   Neurological:      General: No focal deficit present.      Mental Status: She is alert and oriented to person, place, and time.          Significant Labs:  ABGs:   Recent Labs   Lab 04/13/24  1739   POCSATURATED 61     BMP:   Recent Labs   Lab 04/14/24  0335   GLU 88   *   K 4.4   CL 89*   CO2 28   *   CREATININE 4.4*   CALCIUM 8.8   MG 2.2     CBC:   Recent Labs   Lab 04/14/24  0335   WBC 6.69   RBC 3.80*   HGB 8.6*   HCT 30.1*      MCV 79*   MCH 22.6*   MCHC 28.6*     CMP:   Recent Labs   Lab 04/14/24  0335   GLU 88   CALCIUM 8.8   ALBUMIN 2.9*   PROT 6.4   *   K 4.4   CO2 28   CL 89*   *   CREATININE 4.4*   ALKPHOS 90   ALT 38   AST 62*   BILITOT 1.6*     LFTs:   Recent Labs   Lab 04/14/24  0335   ALT 38   AST 62*   ALKPHOS 90   BILITOT 1.6*   PROT 6.4   ALBUMIN 2.9*     Microbiology Results (last 7 days)       ** No results found for the last 168 hours. **          Specimen (24h ago, onward)      None          TSH:   Recent Labs   Lab 04/14/24  0335   TSH 1.427     Recent Labs   Lab 04/10/24  1050   COLORU Yellow   SPECGRAV 1.010   PHUR 5.0   PROTEINUA Trace*   BACTERIA Occasional   NITRITE Negative   LEUKOCYTESUR 1+*        Significant  Imaging:  None  Assessment/Plan:     Cardiac/Vascular  Acute on chronic heart failure with preserved ejection fraction (HFpEF)  Patient is identified as having Diastolic (HFpEF) heart failure that is Acute on chronic. CHF is currently uncontrolled due to Continued edema of extremities, Hepatic congestion/ascites, and JVD, >3 pillow orthopnea, Rales/crackles on pulmonary exam, and Pulmonary edema/pleural effusion on CXR.     - diuresis as detailed under Acute kidney injury superimposed on chronic kidney disease    Essential hypertension  - management per primary team    Renal/  REGINALDO (acute kidney injury)  - CKD IV (baseline creatinine ~3.1-3.3) admitted with hypervolemia and REGINALDO with creatinine 4.2  - UA showed +1 protein with UPCR of ~500 mg  - urinary sediment with non dysmorphic RBCs and WBCs present although Jin catheter just place yesterday   - retroperitoneal US without evidence of hydronephrosis although changes consistent with chronic kidney disease     Plan/Recommendations:  - Would continue diuresis with lasix gtt @ 40mg/hr + Diuril 500mg IV BID  - If above diuretic regimen ineffective please notify nephrology on call and will plan for 8 hour session of SLED for metabolic clearance and volume management   - Can continue Renvela 800 mg TIDWM  - Please avoid hypotension/major fluctuations in BP which may worsen REGINALDO (keep MAP > 65 mmHg)  - Renal diet/tube feeds when not NPO with volume restriction per primary team  - Strict I/O's and daily weights  - Renally all dose medications to eGFR   - Avoid nephrotoxic agents wean feasible (i.e. NSAIDs, intra-arterial contrast, supra-therapeutic vancomycin levels, etc.)      Thank you for your consult. I will follow-up with patient. Please contact us if you have any additional questions.    Italo Segovia MD  Nephrology  [facility] - Cardiac Intensive [facility]

## 2024-04-14 NOTE — PROCEDURES
Xena Vera is a 53 y.o. female patient.    Temp: 98.2 °F (36.8 °C) (04/14/24 1501)  Pulse: (!) 58 (04/14/24 1701)  Resp: 19 (04/14/24 1701)  BP: (!) 95/53 (04/14/24 1601)  SpO2: 99 % (04/14/24 1701)  Weight: 75 kg (165 lb 5.5 oz) (04/14/24 0400)  Height: 5' (152.4 cm) (04/11/24 1040)       Arterial Line    Date/Time: 4/14/2024 6:12 PM  Location procedure was performed: [facility] CARDIOLOGY    Performed by: Quang Hernandez MD  Authorized by: Quang Hernandez MD  Indications: multiple ABGs and hemodynamic monitoring  Location: left radial    Anesthesia:  Local Anesthetic: lidocaine 1% without epinephrine    Patient sedated: no  Number of attempts: 1  Complications: No  Specimens: No  Implants: No  Post-procedure: line sutured          4/14/2024

## 2024-04-14 NOTE — ASSESSMENT & PLAN NOTE
Patient with acute kidney injury/acute renal failure likely due to acute tubular necrosis caused by unknown.  REGINALDO is currently worsening. Baseline creatinine  2.5  - Labs reviewed- Renal function/electrolytes with Estimated Creatinine Clearance: 13.2 mL/min (A) (based on SCr of 4.4 mg/dL (H)). according to latest data. Monitor urine output and serial BMP and adjust therapy as needed. Avoid nephrotoxins and renally dose meds for GFR listed above.    Recent Labs     04/13/24  1301 04/14/24  0006 04/14/24  0335   CREATININE 4.4* 4.5* 4.4*       - Nephrology following, appreciate recs; Started SLED   - Workup with genetic testing ( [facility]) and antiphospholipid syndrome.   - Continue home Renvela 800 mg po TID with meals  - Holding home sodium bicarb 650 mg po BID

## 2024-04-14 NOTE — ASSESSMENT & PLAN NOTE
Chronic, controlled. Latest blood pressure and vitals reviewed-     Temp:  [97.9 °F (36.6 °C)-98 °F (36.7 °C)]   Pulse:  [51-58]   Resp:  [12-87]   BP: ()/(41-70)   SpO2:  [90 %-100 %] .   Home meds for hypertension were reviewed and noted below.   Hypertension Medications               carvediloL (COREG) 3.125 MG tablet Take 2 tablets (6.25 mg total) by mouth 2 (two) times daily with meals.    carvediloL (COREG) 6.25 MG tablet Take 1 tablet by mouth 2 times daily with food    furosemide (LASIX) 40 MG tablet Take 1 tablet (40 mg total) by mouth 2 (two) times a day.    furosemide (LASIX) 40 MG tablet Take 1 tablet by mouth once daily for 90 days            While in the hospital, will manage blood pressure as follows; Adjust home antihypertensive regimen as follows- Lasix gtt instead of oral, holding Coreg, added hydralazine 25 mg po q8h and Isordil 20 mg po BID for afterload reduction    Will utilize p.r.n. blood pressure medication only if patient's blood pressure greater than 180/110 and she develops symptoms such as worsening chest pain or shortness of breath.

## 2024-04-14 NOTE — ASSESSMENT & PLAN NOTE
Patient's anemia is currently controlled. Has not received any PRBCs to date. Etiology likely d/t chronic disease due to Chronic Kidney Disease  Current CBC reviewed-   Lab Results   Component Value Date    HGB 8.6 (L) 04/14/2024    HCT 30.1 (L) 04/14/2024     Monitor serial CBC and transfuse if patient becomes hemodynamically unstable, symptomatic or H/H drops below 7/21.

## 2024-04-14 NOTE — PROCEDURES
Xena Vera is a 53 y.o. female patient.    Temp: 98 °F (36.7 °C) (04/13/24 1901)  Pulse: (!) 51 (04/13/24 1800)  Resp: 18 (04/13/24 1108)  BP: (!) 93/53 (04/13/24 1800)  SpO2: 100 % (04/13/24 1800)  Weight: 73 kg (160 lb 15 oz) (04/11/24 1040)  Height: 5' (152.4 cm) (04/11/24 1040)       Central Line    Date/Time: 4/13/2024 7:33 PM    Performed by: Quang Hernandez MD  Authorized by: Quang Hernandez MD    Location procedure was performed:  [facility] CARDIOLOGY  Consent Done ?:  Yes  Time out complete?: Verified correct patient, procedure, equipment, staff, and site/side    Indications:  Hemodynamic monitoring and hemodialysis  Preparation:  Skin prepped with ChloraPrep  Location:  Right internal jugular  Catheter size:  13 Fr  Ultrasound guidance: Yes    Manometry: Yes    Number of attempts:  1  Securement:  Line sutured  XRay:  Placement verified by x-ray  Adverse Events:  NoneTermination Site: superior vena cava      4/13/2024

## 2024-04-14 NOTE — NURSING
Nurses Note -- 4 Eyes      4/14/2024   12:14 AM      Skin assessed during: Q Shift Change      [] No Altered Skin Integrity Present    []Prevention Measures Documented      [x] Yes- Altered Skin Integrity Present or Discovered   [x] LDA Added if Not in Epic (Describe Wound)      Bruising to R posterior Upper Arm     Bruising to L posterior upper arm     Non blanchable redness to sacral spine     Blister to R lateral heel     Blister to L medial heel     Non blanchable redness to R groin      [] New Altered Skin Integrity was Present on Admit and Documented in LDA   [x] Wound Image Taken    Wound Care specialty bed ordered     Wound Care Consulted? Yes    Attending Nurse:  Delores Arias RN/Staff Member:  JUMANA Lee

## 2024-04-14 NOTE — PLAN OF CARE
Cardiac ICU Care Plan    POC reviewed with Xena Vera and family. Questions and concerns addressed. See below and flowsheets for full assessment and VS info.     NAEON   CVP 19  Pt re-educated on fluid restriction   HD started ~ 0400   utilized throughout shift     Pt transferred from [facility] on day shift, required 4EYES documentation not found in chart for this hospitalization/transfer. Six (6) undocumented LDAs found upon skin assessment with nightly bath. Unclear if present upon admission or hospital acquired. Cardiology fellow, MD Gillies, made aware. Pictures taken, LDAs created, wound care consulted, wound care specialty bed ordered.   See media and flowsheet for:   - Sacral Spine, nonblanchable redness   - R. Posterior Upper Arm, bruising   - L. Posterior Upper Arm, bruising   - R. Groin, nonblanchable redness + moisture   - R. Lateral Heel, blister   - R. Medial Heel, blister         Neuro:  Calvin Coma Scale  Best Eye Response: 4-->(E4) spontaneous  Best Motor Response: 6-->(M6) obeys commands  Best Verbal Response: 5-->(V5) oriented  Cleo Coma Scale Score: 15  Assessment Qualifiers: patient not sedated/intubated  Pupil PERRLA: yes    24 hr Temp:  [97.7 °F (36.5 °C)-98.2 °F (36.8 °C)]      CV:  Rhythm: sinus bradycardia   DVT prophylaxis: VTE Required Core Measure: Pharmacological prophylaxis initiated/maintained    CVP (mean): 308 mmHg (04/13/24 2301)                       Pulses  Right Radial Pulse: 1+ (weak)  Left Radial Pulse: 1+ (weak)  Right Dorsalis Pedis Pulse: 1+ (weak)  Left Dorsalis Pedis Pulse: 1+ (weak)  Right Posterior Tibial Pulse: 1+ (weak)  Left Posterior Tibial Pulse: 1+ (weak)    Resp:  Flow (L/min): 2       GI/:  GI prophylaxis: no  Diet/Nutrition Received: 2 gram sodium, restrict fluids (1200 cc fr)  Last Bowel Movement: 04/11/24  Voiding Characteristics: urethral catheter (bladder)       Urethral Catheter 04/09/24 1330 16 Fr.-Reason for Continuing Urinary  "Catheterization: Urinary retention, Critically ill in ICU and requiring hourly monitoring of intake/output   Intake/Output Summary (Last 24 hours) at 4/14/2024 0422  Last data filed at 4/14/2024 0401  Gross per 24 hour   Intake 505.26 ml   Output 2260 ml   Net -1754.74 ml        Nutritional Supplement Intake: Quantity 0, Type:  none ordered     Labs/Accuchecks:  Recent Labs   Lab 04/12/24  0601 04/13/24  0318 04/14/24  0335   WBC 5.05 5.43 6.69   RBC 3.75* 3.84* 3.80*   HGB 8.8* 8.7* 8.6*   HCT 30.1* 30.1* 30.1*   * 148* 184    No results for input(s): "PT", "INR", "APTT" in the last 168 hours.   Recent Labs     04/14/24  0335   *   K 4.4   CO2 28   CL 89*   *   CREATININE 4.4*   ALKPHOS 90   ALT 38   AST 62*   BILITOT 1.6*     No results for input(s): "CPK", "CPKMB", "MB", "TROPONINI" in the last 168 hours.   Recent Labs     04/13/24  1739   PO2 36*   POCSATURATED 61       Electrolytes: Electrolytes replaced  Accuchecks: none    Gtts/LDAs:  Current Facility-Administered Medications   Medication Dose Route Frequency Provider Last Rate Last Admin    0.9%  NaCl infusion   Intravenous Once Italo Segovia MD        0.9%  NaCl infusion   Intravenous Continuous Gillies, Connor M, DO 5 mL/hr at 04/14/24 0401 Rate Verify at 04/14/24 0401    acetaminophen tablet 1,000 mg  1,000 mg Oral Q6H PRN Domonique Walden MD        aspirin EC tablet 81 mg  81 mg Oral Daily David Borjas PA-C   81 mg at 04/13/24 1031    atorvastatin tablet 40 mg  40 mg Oral Daily David Borjas PA-C   40 mg at 04/13/24 1032    bisacodyL EC tablet 5 mg  5 mg Oral Daily PRN Johnny Cisneros MD        chlorothiazide (DIURIL) 500 mg in dextrose 5 % (D5W) 50 mL IVPB  500 mg Intravenous Q24H Minda Molina MD   Stopped at 04/13/24 0610    dextrose 10% bolus 125 mL 125 mL  12.5 g Intravenous PRN David Borjas PA-C        dextrose 10% bolus 250 mL 250 mL  25 g Intravenous PRN David Borjas PA-C        " furosemide (Lasix) 500 mg in 50 mL infusion (conc: 10 mg/mL)  40 mg/hr Intravenous Continuous Tez Guerra MD 4 mL/hr at 04/14/24 0401 40 mg/hr at 04/14/24 0401    glucagon (human recombinant) injection 1 mg  1 mg Intramuscular PRN David Borjas PA-C        glucose chewable tablet 16 g  16 g Oral PRN David Borjas PA-C        glucose chewable tablet 24 g  24 g Oral PRN David Borjas PA-C        heparin (porcine) injection 1,000 Units  1,000 Units Intra-Catheter PRN Italo Segovia MD        heparin (porcine) injection 5,000 Units  5,000 Units Subcutaneous Q8H David Borjas PA-C   5,000 Units at 04/13/24 2124    hydrALAZINE tablet 10 mg  10 mg Oral Q8H Quang Hernandez MD   10 mg at 04/13/24 2124    HYDROcodone-acetaminophen 5-325 mg per tablet 1 tablet  1 tablet Oral Q6H PRN Johnny Cisneros MD   1 tablet at 04/14/24 0330    isosorbide dinitrate tablet 20 mg  20 mg Oral BID Tez Guerra MD   20 mg at 04/13/24 2022    melatonin tablet 6 mg  6 mg Oral Nightly PRN Pietro Herring MD   6 mg at 04/12/24 0302    mupirocin 2 % ointment   Nasal BID Italo Segovia MD   Given at 04/13/24 2023    naloxone 0.4 mg/mL injection 0.02 mg  0.02 mg Intravenous PRN David Borjas PA-C        ondansetron disintegrating tablet 4 mg  4 mg Oral Q8H PRN David Borjas PA-C        ondansetron injection 4 mg  4 mg Intravenous Q8H PRN David Borjas PA-C   4 mg at 04/13/24 1429    oxybutynin tablet 5 mg  5 mg Oral TID Minda Molina MD   5 mg at 04/13/24 2022    polyethylene glycol packet 17 g  17 g Oral BID Domonique Walden MD   17 g at 04/13/24 2023    senna-docusate 8.6-50 mg per tablet 1 tablet  1 tablet Oral BID Domonique Waledn MD   1 tablet at 04/13/24 2022    sevelamer carbonate tablet 800 mg  800 mg Oral TID  David Borjas PA-C   800 mg at 04/13/24 1131    sodium chloride 0.9% bolus 250 mL 250 mL  250 mL Intravenous PRN Italo Segoiva MD        sodium  chloride 0.9% flush 10 mL  10 mL Intravenous PRN Pietro Herring MD        sodium chloride 0.9% flush 10 mL  10 mL Intravenous PRN David Borjas PA-C        white petrolatum 41 % ointment   Topical (Top) BID Johnny Cisneros MD   Given at 04/13/24 2110    zinc oxide 20 % ointment   Topical (Top) PRN David Borjas PA-C   Given at 04/13/24 2028       Lines/Drains/Airways       Central Venous Catheter Line  Duration             Trialysis (Dialysis) Catheter 04/13/24 1720 right internal jugular <1 day              Drain  Duration                  Urethral Catheter 04/09/24 1330 16 Fr. 4 days              Peripheral Intravenous Line  Duration                  Peripheral IV - Single Lumen 04/10/24 2100 22 G Posterior;Right Hand 3 days                    Skin/Wounds  Bathing/Skin Care: bath, complete;dressed/undressed;linen changed (04/13/24 1901)  Wounds: Yes  Wound care consulted: Yes     Problem: Adult Inpatient Plan of Care  Goal: Plan of Care Review  Outcome: Ongoing, Progressing

## 2024-04-14 NOTE — SUBJECTIVE & OBJECTIVE
Interval History: Patient seen and examined this AM. Overnight underwent first session of iHD via new trialysis catheter although UF limited to 500 mL secondary to hypotension. Imdur and hydralazine stopped and patient did require Levophed. She is now off vasopressor support. Afebrile with pulse in the 50s bpm. Systolic blood pressures ranging from 110-80s mmHg. She is saturating +95% on 2 liters via nasal cannula with documented UOP ~1.5 liters in the last 24 hours although diminishing today. She is net negative ~1.5 liters in the last 24 hours.     Review of patient's allergies indicates:  No Known Allergies  Current Facility-Administered Medications   Medication Dose Route Frequency Provider Last Rate Last Admin    0.9%  NaCl infusion   Intravenous Once Italo Segoiva MD        0.9%  NaCl infusion   Intravenous Continuous Gillies, Connor M, DO 5 mL/hr at 04/14/24 0801 Rate Verify at 04/14/24 0801    acetaminophen tablet 1,000 mg  1,000 mg Oral Q6H PRN Domonique Walden MD        aspirin EC tablet 81 mg  81 mg Oral Daily David Borjas PA-C   81 mg at 04/13/24 1031    atorvastatin tablet 40 mg  40 mg Oral Daily David Borjas PA-C   40 mg at 04/13/24 1032    bisacodyL EC tablet 5 mg  5 mg Oral Daily PRN Johnny Cisneros MD        chlorothiazide (DIURIL) 500 mg in dextrose 5 % (D5W) 50 mL IVPB  500 mg Intravenous Q24H Minda Molina MD   Stopped at 04/14/24 0745    dextrose 10% bolus 125 mL 125 mL  12.5 g Intravenous PRN David Borjas PA-DIMITRY        dextrose 10% bolus 250 mL 250 mL  25 g Intravenous PRN David Borjas PA-C        furosemide (Lasix) 500 mg in 50 mL infusion (conc: 10 mg/mL)  40 mg/hr Intravenous Continuous Tez Guerra MD 4 mL/hr at 04/14/24 0801 40 mg/hr at 04/14/24 0801    glucagon (human recombinant) injection 1 mg  1 mg Intramuscular PRN David Borjas PA-C        glucose chewable tablet 16 g  16 g Oral PRN [place], WILLY Funes  chewable tablet 24 g  24 g Oral PRN David Borjas PA-C        heparin (porcine) injection 1,000 Units  1,000 Units Intra-Catheter PRN Italo Segovia MD        heparin (porcine) injection 5,000 Units  5,000 Units Subcutaneous Q8H David Borjas PA-C   5,000 Units at 04/14/24 0555    hydrALAZINE tablet 10 mg  10 mg Oral Q8H Quang Hernandez MD   10 mg at 04/14/24 0554    HYDROcodone-acetaminophen 5-325 mg per tablet 1 tablet  1 tablet Oral Q6H PRN Johnny Cisneros MD   1 tablet at 04/14/24 0330    isosorbide dinitrate tablet 20 mg  20 mg Oral BID Tez Guerra MD   20 mg at 04/13/24 2022    melatonin tablet 6 mg  6 mg Oral Nightly PRN Pietro Herring MD   6 mg at 04/12/24 0302    mupirocin 2 % ointment   Nasal BID Italo Segovia MD   Given at 04/13/24 2023    naloxone 0.4 mg/mL injection 0.02 mg  0.02 mg Intravenous PRN David Borjas PA-C        ondansetron disintegrating tablet 4 mg  4 mg Oral Q8H PRN David Borjas PA-C        ondansetron injection 4 mg  4 mg Intravenous Q8H PRN David Borjas PA-C   4 mg at 04/13/24 1429    oxybutynin tablet 5 mg  5 mg Oral TID Minda Molina MD   5 mg at 04/13/24 2022    polyethylene glycol packet 17 g  17 g Oral BID Dmoonique Walden MD   17 g at 04/13/24 2023    senna-docusate 8.6-50 mg per tablet 1 tablet  1 tablet Oral BID Domonique Walden MD   1 tablet at 04/13/24 2022    sevelamer carbonate tablet 800 mg  800 mg Oral TID  David Borjas PA-C   800 mg at 04/13/24 1131    sodium chloride 0.9% bolus 250 mL 250 mL  250 mL Intravenous PRN Italo Segovia MD        sodium chloride 0.9% flush 10 mL  10 mL Intravenous PRN Pietro Herring MD        sodium chloride 0.9% flush 10 mL  10 mL Intravenous PRN David Borjas PA-C        white petrolatum 41 % ointment   Topical (Top) BID Johnny Cisneros MD   Given at 04/13/24 2110    zinc oxide 20 % ointment   Topical (Top) PRN David Borjas PA-C   Given at 04/13/24  2028       Objective:     Vital Signs (Most Recent):  Temp: 98 °F (36.7 °C) (04/14/24 0701)  Pulse: (!) 52 (04/14/24 0801)  Resp: 12 (04/14/24 0801)  BP: (!) 101/50 (04/14/24 0801)  SpO2: 100 % (04/14/24 0801) Vital Signs (24h Range):  Temp:  [97.7 °F (36.5 °C)-98 °F (36.7 °C)] 98 °F (36.7 °C)  Pulse:  [51-58] 52  Resp:  [12-58] 12  SpO2:  [90 %-100 %] 100 %  BP: ()/(46-70) 101/50     Weight: 75 kg (165 lb 5.5 oz) (04/14/24 0400)  Body mass index is 32.29 kg/m².  Body surface area is 1.78 meters squared.    I/O last 3 completed shifts:  In: 1023.2 [P.O.:240; I.V.:233.2; Other:500; IV Piggyback:50]  Out: 3277 [Urine:2277; Other:1000]     Physical Exam  Vitals and nursing note reviewed.   Constitutional:       General: She is not in acute distress.     Appearance: Normal appearance.   HENT:      Head: Normocephalic.   Eyes:      Extraocular Movements: Extraocular movements intact.   Cardiovascular:      Rate and Rhythm: Normal rate and regular rhythm.   Pulmonary:      Effort: Pulmonary effort is normal. No respiratory distress.      Breath sounds: Rales present.   Musculoskeletal:      Right lower leg: Edema present.      Left lower leg: Edema present.   Skin:     General: Skin is warm and dry.   Neurological:      General: No focal deficit present.      Mental Status: She is alert and oriented to person, place, and time.          Significant Labs:  ABGs:   Recent Labs   Lab 04/13/24  1739   POCSATURATED 61     BMP:   Recent Labs   Lab 04/14/24  0335   GLU 88   *   K 4.4   CL 89*   CO2 28   *   CREATININE 4.4*   CALCIUM 8.8   MG 2.2     CBC:   Recent Labs   Lab 04/14/24 0335   WBC 6.69   RBC 3.80*   HGB 8.6*   HCT 30.1*      MCV 79*   MCH 22.6*   MCHC 28.6*     CMP:   Recent Labs   Lab 04/14/24  0335   GLU 88   CALCIUM 8.8   ALBUMIN 2.9*   PROT 6.4   *   K 4.4   CO2 28   CL 89*   *   CREATININE 4.4*   ALKPHOS 90   ALT 38   AST 62*   BILITOT 1.6*     LFTs:   Recent Labs   Lab  04/14/24  0335   ALT 38   AST 62*   ALKPHOS 90   BILITOT 1.6*   PROT 6.4   ALBUMIN 2.9*     Microbiology Results (last 7 days)       ** No results found for the last 168 hours. **          Specimen (24h ago, onward)      None          TSH:   Recent Labs   Lab 04/14/24  0335   TSH 1.427     Recent Labs   Lab 04/10/24  1050   COLORU Yellow   SPECGRAV 1.010   PHUR 5.0   PROTEINUA Trace*   BACTERIA Occasional   NITRITE Negative   LEUKOCYTESUR 1+*        Significant Imaging:  None

## 2024-04-14 NOTE — PROGRESS NOTES
Bedside hemodialysis treatment 1:1 started per MD orders via R temporary IJ CVC. VS stable to start tx. Net UF goal set at 500 mLs since BP is in the softer side. Primary nurse aware of initiation of HD.

## 2024-04-14 NOTE — PROGRESS NOTES
[facility] - Cardiac Intensive Care  Cardiology  Progress Note    Patient Name: Xena Vera  MRN: [id number]  Admission Date: 4/1/2024  Hospital Length of Stay: 11 days  Code Status: Full Code   Attending Physician: Renato Elmore MD   Primary Care Physician: Tami Villeda FNP  Expected Discharge Date: 4/18/2024  Principal Problem:Severe mitral valve regurgitation    Subjective:     Hospital Course:   Patient has been improving diuresis with Lasix increase from 100 mg IV TID --> Lasix gtt 30 mg/hr to Lasix 40 mg/hr. Continuing Diuril to optimize volume status. Low BP this AM needing Levophed.   Not responding to Lasix, discussing SLED with nephrology     Interval History:   Worsening renal function with decreased UOP and hypotension overnight.   SLED this AM.   Family updated.       Review of Systems   Constitutional: Positive for malaise/fatigue.   Cardiovascular:  Positive for dyspnea on exertion, leg swelling, orthopnea and palpitations.   Respiratory:  Positive for shortness of breath.    Gastrointestinal:  Positive for bloating.   Neurological:  Positive for light-headedness.   All other systems reviewed and are negative.    Objective:     Vital Signs (Most Recent):  Temp: 98 °F (36.7 °C) (04/14/24 1101)  Pulse: (!) 53 (04/14/24 1401)  Resp: (!) 22 (04/14/24 1401)  BP: (!) 90/52 (04/14/24 1401)  SpO2: 100 % (04/14/24 1401) Vital Signs (24h Range):  Temp:  [97.9 °F (36.6 °C)-98 °F (36.7 °C)] 98 °F (36.7 °C)  Pulse:  [51-58] 53  Resp:  [12-87] 22  SpO2:  [90 %-100 %] 100 %  BP: ()/(41-70) 90/52     Weight: 75 kg (165 lb 5.5 oz)  Body mass index is 32.29 kg/m².     SpO2: 100 %         Intake/Output Summary (Last 24 hours) at 4/14/2024 1514  Last data filed at 4/14/2024 1401  Gross per 24 hour   Intake 1365.48 ml   Output 1632 ml   Net -266.52 ml       Lines/Drains/Airways       Central Venous Catheter Line  Duration             Trialysis (Dialysis) Catheter 04/13/24 1720 right internal jugular <1  "day              Drain  Duration                  Urethral Catheter 04/09/24 1330 16 Fr. 5 days              Peripheral Intravenous Line  Duration                  Peripheral IV - Single Lumen 04/10/24 2100 22 G Posterior;Right Hand 3 days                       Physical Exam  Vitals and nursing note reviewed.   Constitutional:       General: She is not in acute distress.     Appearance: She is obese. She is ill-appearing.   HENT:      Head: Normocephalic and atraumatic.   Cardiovascular:      Rate and Rhythm: Normal rate and regular rhythm.      Heart sounds: Murmur heard.   Pulmonary:      Effort: Pulmonary effort is normal. No respiratory distress.   Abdominal:      General: There is distension.      Palpations: Abdomen is soft.      Tenderness: There is no abdominal tenderness.   Musculoskeletal:      Right lower leg: Edema present.      Left lower leg: Edema present.   Skin:     General: Skin is warm and dry.      Comments: + Left lateral leg foul-smelling wound with clear drainage   Neurological:      General: No focal deficit present.      Mental Status: She is alert and oriented to person, place, and time. Mental status is at baseline.            Significant Labs: ABG:   Recent Labs   Lab 04/13/24  1739 04/14/24  0917   PH  --  7.208*   PCO2  --  72.5*   HCO3  --  28.8*   POCSATURATED 61 70   BE  --  1   , Blood Culture: No results for input(s): "LABBLOO" in the last 48 hours., BMP:   Recent Labs   Lab 04/13/24  0318 04/13/24  1301 04/14/24  0006 04/14/24  0335   GLU 71 76 86 88   * 132* 129* 130*   K 4.3 3.4* 4.2 4.4   CL 90* 90* 88* 89*   CO2 24 24 28 28   * 103* 100* 110*   CREATININE 4.4* 4.4* 4.5* 4.4*   CALCIUM 8.5* 9.1 8.4* 8.8   MG 2.2  --  2.3 2.2   , CMP   Recent Labs   Lab 04/13/24  0318 04/13/24  1301 04/14/24  0006 04/14/24  0335   * 132* 129* 130*   K 4.3 3.4* 4.2 4.4   CL 90* 90* 88* 89*   CO2 24 24 28 28   GLU 71 76 86 88   * 103* 100* 110*   CREATININE 4.4* 4.4* 4.5* " "4.4*   CALCIUM 8.5* 9.1 8.4* 8.8   PROT 6.6  --   --  6.4   ALBUMIN 2.9* 3.1*  --  2.9*   BILITOT 1.7*  --   --  1.6*   ALKPHOS 94  --   --  90   AST 69*  --   --  62*   ALT 34  --   --  38   ANIONGAP 17* 18* 13 13   , CBC   Recent Labs   Lab 04/13/24 0318 04/14/24  0335   WBC 5.43 6.69   HGB 8.7* 8.6*   HCT 30.1* 30.1*   * 184   , INR No results for input(s): "INR", "PROTIME" in the last 48 hours., Lipid Panel   Recent Labs   Lab 04/14/24  0335   CHOL 77*   HDL 22*   LDLCALC 39.0*   TRIG 80   CHOLHDL 28.6   ,   Pathology Results  (Last 10 years)      None        , Troponin No results for input(s): "TROPONINI" in the last 48 hours., and All pertinent lab results from the last 24 hours have been reviewed.     Assessment and Plan:   * Severe mitral valve regurgitation  Acute on chronic heart failure with preserved ejection fraction (HFpEF)   Anasarca     52 yo F admitted for ADHF 2/2 severe MR. The MR appears to be secondary to posterior leaflet restriction and leaflet length of 1.1 cm. BNP on admission was 4300 compared to 350 eight months prior. Initially diagnosed with severe MR in June of 2023. Saw Dr. Stone in January of 2024 and was undergoing work-up for possible MitraClip; [facility] scheduled for 3/15 but not completed due to financial constraints, also needs POOJA. Will need optimization of kidney function prior to angiogram consideration, appreciate Nephrology assistance    4/1/2024 TTE    Left Ventricle: The left ventricle is normal in size. Normal wall thickness. Normal wall motion. There is normal systolic function with a visually estimated ejection fraction of 60 - 65%. Grade II diastolic dysfunction.    Right Ventricle: Normal right ventricular cavity size. Wall thickness is normal. Right ventricle wall motion  is normal. Systolic function is reduced.    Left Atrium: Left atrium is very  severely dilated.    Right Atrium: Right atrium is mildly dilated.    Aortic Valve: There is mild aortic valve " sclerosis. There is normal leaflet mobility. There is trace aortic regurgitation.    Mitral Valve: There is mild bileaflet sclerosis. There is posterior leaflet tethering and failure of complete coaptation. There is very severe regurgitation with a posterolateral eccentriccally directed jet.    Tricuspid Valve: The tricuspid valve is structurally normal. There is normal leaflet mobility. There is moderate to severe regurgitation.    IVC/SVC: IVC was not well visualized due to poor acoustic window. Intermediate venous pressure at 8 mmHg.    Pericardium: There is a trivial effusion posteriorly and small under the RA.     - Transfer to CCU  - Interventional Cardiology consulted, appreciate recs  - Nephrology following, planning to initiate CRRT  - POOJA ordered  - Continue Diuril 500 mg IV q24h & Lasix gtt @ 40 mg/hr for diuresis; Pend SLED  - Continue hydralazine 25 mg po q8h & Isordil 20 mg po BID for afterload reduction  - Continue home ASA 81 mg po daily and Lipitor 40 mg po daily  - Strict I/O, Jin in place  - Telemetry monitoring    Prediabetes  Hemoglobin A1C   Date Value Ref Range Status   04/02/2024 6.0 (H) 4.0 - 5.6 % Final     Comment:     ADA Screening Guidelines:  5.7-6.4%  Consistent with prediabetes  >or=6.5%  Consistent with diabetes    High levels of fetal hemoglobin interfere with the HbA1C  assay. Heterozygous hemoglobin variants (HbS, HgC, etc)do  not significantly interfere with this assay.   However, presence of multiple variants may affect accuracy.       - F/u PCP    HLD (hyperlipidemia)  Continue home Lipitor 40 mg po daily  F/u lipid panel    REGINALDO (acute kidney injury)  Patient with acute kidney injury/acute renal failure likely due to acute tubular necrosis caused by unknown.  REGINALDO is currently worsening. Baseline creatinine  2.5  - Labs reviewed- Renal function/electrolytes with Estimated Creatinine Clearance: 13.2 mL/min (A) (based on SCr of 4.4 mg/dL (H)). according to latest data. Monitor  "urine output and serial BMP and adjust therapy as needed. Avoid nephrotoxins and renally dose meds for GFR listed above.    Recent Labs     04/13/24  1301 04/14/24  0006 04/14/24  0335   CREATININE 4.4* 4.5* 4.4*       - Nephrology following, appreciate recs; Started SLED   - Workup with genetic testing ( [facility]) and antiphospholipid syndrome.   - Continue home Renvela 800 mg po TID with meals  - Holding home sodium bicarb 650 mg po BID    Acute urinary retention  Jin in place since 4/9/2024  Continue oxybutynin 5 mg po TID    Leg wound, left  From mechanical fall, has been painful thoughout admission    - Wound Care assessed, orders for care placed  "Cleanse left leg with sterile normal saline and pat dry. Apply aquaphor BID and PRN"  - Tylenol 1000 mg po q6h PRN for moderate pain and Norco 5-325 mg po q6h PRN for severe pain    Anasarca  See "Severe mitral valve regurgitation"    Prolonged QT interval  4/4 QTc 481 ms. Avoid QT prolonging medications.     Acute on chronic heart failure with preserved ejection fraction (HFpEF)  See "Severe mitral valve regurgitation"    Anemia  Patient's anemia is currently controlled. Has not received any PRBCs to date. Etiology likely d/t chronic disease due to Chronic Kidney Disease  Current CBC reviewed-   Lab Results   Component Value Date    HGB 8.6 (L) 04/14/2024    HCT 30.1 (L) 04/14/2024     Monitor serial CBC and transfuse if patient becomes hemodynamically unstable, symptomatic or H/H drops below 7/21.    Class 2 obesity in adult  Body mass index is 32.29 kg/m². Morbid obesity complicates all aspects of disease management from diagnostic modalities to treatment. Weight loss encouraged and health benefits explained to patient.      Essential hypertension  Chronic, controlled. Latest blood pressure and vitals reviewed-     Temp:  [97.9 °F (36.6 °C)-98 °F (36.7 °C)]   Pulse:  [51-58]   Resp:  [12-87]   BP: ()/(41-70)   SpO2:  [90 %-100 %] .   Home meds for " hypertension were reviewed and noted below.   Hypertension Medications               carvediloL (COREG) 3.125 MG tablet Take 2 tablets (6.25 mg total) by mouth 2 (two) times daily with meals.    carvediloL (COREG) 6.25 MG tablet Take 1 tablet by mouth 2 times daily with food    furosemide (LASIX) 40 MG tablet Take 1 tablet (40 mg total) by mouth 2 (two) times a day.    furosemide (LASIX) 40 MG tablet Take 1 tablet by mouth once daily for 90 days            While in the hospital, will manage blood pressure as follows; Adjust home antihypertensive regimen as follows- Lasix gtt instead of oral, holding Coreg, added hydralazine 25 mg po q8h and Isordil 20 mg po BID for afterload reduction    Will utilize p.r.n. blood pressure medication only if patient's blood pressure greater than 180/110 and she develops symptoms such as worsening chest pain or shortness of breath.        VTE Risk Mitigation (From admission, onward)           Ordered     heparin (porcine) injection 1,000 Units  As needed (PRN)         04/13/24 1428     Place sequential compression device  Until discontinued         04/03/24 1347     heparin (porcine) injection 5,000 Units  Every 8 hours         04/01/24 1030     IP VTE HIGH RISK PATIENT  Once         04/01/24 1030     Place sequential compression device  Until discontinued         04/01/24 1030                    Quang Hernandez MD  Cardiology  [facility] - Cardiac Intensive Care

## 2024-04-14 NOTE — SUBJECTIVE & OBJECTIVE
Interval History:   Worsening renal function with decreased UOP and hypotension overnight.   SLED this AM.   Family updated.       Review of Systems   Constitutional: Positive for malaise/fatigue.   Cardiovascular:  Positive for dyspnea on exertion, leg swelling, orthopnea and palpitations.   Respiratory:  Positive for shortness of breath.    Gastrointestinal:  Positive for bloating.   Neurological:  Positive for light-headedness.   All other systems reviewed and are negative.    Objective:     Vital Signs (Most Recent):  Temp: 98 °F (36.7 °C) (04/14/24 1101)  Pulse: (!) 53 (04/14/24 1401)  Resp: (!) 22 (04/14/24 1401)  BP: (!) 90/52 (04/14/24 1401)  SpO2: 100 % (04/14/24 1401) Vital Signs (24h Range):  Temp:  [97.9 °F (36.6 °C)-98 °F (36.7 °C)] 98 °F (36.7 °C)  Pulse:  [51-58] 53  Resp:  [12-87] 22  SpO2:  [90 %-100 %] 100 %  BP: ()/(41-70) 90/52     Weight: 75 kg (165 lb 5.5 oz)  Body mass index is 32.29 kg/m².     SpO2: 100 %         Intake/Output Summary (Last 24 hours) at 4/14/2024 1514  Last data filed at 4/14/2024 1401  Gross per 24 hour   Intake 1365.48 ml   Output 1632 ml   Net -266.52 ml       Lines/Drains/Airways       Central Venous Catheter Line  Duration             Trialysis (Dialysis) Catheter 04/13/24 1720 right internal jugular <1 day              Drain  Duration                  Urethral Catheter 04/09/24 1330 16 Fr. 5 days              Peripheral Intravenous Line  Duration                  Peripheral IV - Single Lumen 04/10/24 2100 22 G Posterior;Right Hand 3 days                       Physical Exam  Vitals and nursing note reviewed.   Constitutional:       General: She is not in acute distress.     Appearance: She is obese. She is ill-appearing.   HENT:      Head: Normocephalic and atraumatic.   Cardiovascular:      Rate and Rhythm: Normal rate and regular rhythm.      Heart sounds: Murmur heard.   Pulmonary:      Effort: Pulmonary effort is normal. No respiratory distress.   Abdominal:     "  General: There is distension.      Palpations: Abdomen is soft.      Tenderness: There is no abdominal tenderness.   Musculoskeletal:      Right lower leg: Edema present.      Left lower leg: Edema present.   Skin:     General: Skin is warm and dry.      Comments: + Left lateral leg foul-smelling wound with clear drainage   Neurological:      General: No focal deficit present.      Mental Status: She is alert and oriented to person, place, and time. Mental status is at baseline.            Significant Labs: ABG:   Recent Labs   Lab 04/13/24  1739 04/14/24  0917   PH  --  7.208*   PCO2  --  72.5*   HCO3  --  28.8*   POCSATURATED 61 70   BE  --  1   , Blood Culture: No results for input(s): "LABBLOO" in the last 48 hours., BMP:   Recent Labs   Lab 04/13/24  0318 04/13/24  1301 04/14/24  0006 04/14/24  0335   GLU 71 76 86 88   * 132* 129* 130*   K 4.3 3.4* 4.2 4.4   CL 90* 90* 88* 89*   CO2 24 24 28 28   * 103* 100* 110*   CREATININE 4.4* 4.4* 4.5* 4.4*   CALCIUM 8.5* 9.1 8.4* 8.8   MG 2.2  --  2.3 2.2   , CMP   Recent Labs   Lab 04/13/24 0318 04/13/24  1301 04/14/24  0006 04/14/24  0335   * 132* 129* 130*   K 4.3 3.4* 4.2 4.4   CL 90* 90* 88* 89*   CO2 24 24 28 28   GLU 71 76 86 88   * 103* 100* 110*   CREATININE 4.4* 4.4* 4.5* 4.4*   CALCIUM 8.5* 9.1 8.4* 8.8   PROT 6.6  --   --  6.4   ALBUMIN 2.9* 3.1*  --  2.9*   BILITOT 1.7*  --   --  1.6*   ALKPHOS 94  --   --  90   AST 69*  --   --  62*   ALT 34  --   --  38   ANIONGAP 17* 18* 13 13   , CBC   Recent Labs   Lab 04/13/24 0318 04/14/24  0335   WBC 5.43 6.69   HGB 8.7* 8.6*   HCT 30.1* 30.1*   * 184   , INR No results for input(s): "INR", "PROTIME" in the last 48 hours., Lipid Panel   Recent Labs   Lab 04/14/24  0335   CHOL 77*   HDL 22*   LDLCALC 39.0*   TRIG 80   CHOLHDL 28.6   ,   Pathology Results  (Last 10 years)      None        , Troponin No results for input(s): "TROPONINI" in the last 48 hours., and All pertinent lab " results from the last 24 hours have been reviewed.

## 2024-04-14 NOTE — PROGRESS NOTES
Hemodialysis treatment completed. Blood returned. Dialyzed patient for 2 hours with net fluid removal of 0.5 L. Patient tolerated treatment well.    R IJ temporary CVC flushed and locked with saline. Lumens capped. Report given to primary nurse

## 2024-04-14 NOTE — ASSESSMENT & PLAN NOTE
- CKD IV (baseline creatinine ~3.1-3.3) admitted with hypervolemia and REGINALDO with creatinine 4.2  - UA showed +1 protein with UPCR of ~500 mg  - urinary sediment with non dysmorphic RBCs and WBCs present although Jin catheter just place yesterday   - retroperitoneal US without evidence of hydronephrosis although changes consistent with chronic kidney disease     Plan/Recommendations:  - Would continue diuresis with lasix gtt @ 40mg/hr + Diuril 500mg IV BID  - If above diuretic regimen ineffective please notify nephrology on call and will plan for 8 hour session of SLED for metabolic clearance and volume management   - Can continue Renvela 800 mg TIDWM  - Please avoid hypotension/major fluctuations in BP which may worsen REGINALDO (keep MAP > 65 mmHg)  - Renal diet/tube feeds when not NPO with volume restriction per primary team  - Strict I/O's and daily weights  - Renally all dose medications to eGFR   - Avoid nephrotoxic agents wean feasible (i.e. NSAIDs, intra-arterial contrast, supra-therapeutic vancomycin levels, etc.)

## 2024-04-14 NOTE — PLAN OF CARE
"  Care Plan    POC reviewed with Xena Vera and family. Questions and concerns addressed. No acute events today. Cvp 27 this am, svo2 72%, renal following, no urine today on lasix gtt and iv diuril, decision made to run pt on CRRT x 8 hours, CO2 noted to be high on VBG, kayla placed for BP monitoring, bipap ordered Qhs, CCRT started to remove fluid, Pt progressing toward goals. Will continue to monitor. See below and flowsheets for full assessment and VS info.       Neuro:  Cleo Coma Scale  Best Eye Response: 4-->(E4) spontaneous  Best Motor Response: 6-->(M6) obeys commands  Best Verbal Response: 5-->(V5) oriented  Cleo Coma Scale Score: 15  Assessment Qualifiers: patient not sedated/intubated  Pupil PERRLA: yes  24 hr Temp:  [97.9 °F (36.6 °C)-98.2 °F (36.8 °C)]      CV:  Rhythm: sinus bradycardia  DVT prophylaxis: VTE Required Core Measure: Pharmacological prophylaxis initiated/maintained    Resp:     Oxygen Concentration (%): 50    GI/:  GI prophylaxis: no  Diet/Nutrition Received: low saturated fat/low cholesterol, 2 gram sodium  Last Bowel Movement: 04/11/24  Voiding Characteristics: urethral catheter (bladder)       Urethral Catheter 04/09/24 1330 16 Fr.-Reason for Continuing Urinary Catheterization: Critically ill in ICU and requiring hourly monitoring of intake/output   Intake/Output Summary (Last 24 hours) at 4/14/2024 1748  Last data filed at 4/14/2024 1710  Gross per 24 hour   Intake 1617.48 ml   Output 1672 ml   Net -54.52 ml       Labs/Accuchecks:  Recent Labs   Lab 04/14/24  0335   WBC 6.69   RBC 3.80*   HGB 8.6*   HCT 30.1*         Recent Labs   Lab 04/14/24  0335 04/14/24  1456   * 132*   K 4.4 4.1   CO2 28 26   CL 89* 94*   * 78*   CREATININE 4.4* 3.6*   ALKPHOS 90  --    ALT 38  --    AST 62*  --    BILITOT 1.6*  --     No results for input(s): "PROTIME", "INR", "APTT", "HEPANTIXA" in the last 168 hours. No results for input(s): "CPK", "CPKMB", "TROPONINI", "MB" " in the last 168 hours.    Electrolytes: Electrolytes replaced  Accuchecks: none    Gtts/LDAs:  Current Facility-Administered Medications   Medication Dose Route Frequency Provider Last Rate Last Admin    0.9%  NaCl infusion (CRRT USE ONLY)   Intravenous Continuous Italo Segovia  mL/hr at 04/14/24 1710 New Bag at 04/14/24 1710    0.9%  NaCl infusion   Intravenous Once Italo Segovia MD        0.9%  NaCl infusion   Intravenous Continuous Gillies, Connor M, DO   Stopped at 04/14/24 0917    acetaminophen tablet 1,000 mg  1,000 mg Oral Q6H PRN Domonique Walden MD   1,000 mg at 04/14/24 1238    aspirin EC tablet 81 mg  81 mg Oral Daily David Borjas, PA-C   81 mg at 04/14/24 0921    atorvastatin tablet 40 mg  40 mg Oral Daily David Borjas, PA-C   40 mg at 04/14/24 0921    bisacodyL EC tablet 5 mg  5 mg Oral Daily PRN Johnny Cisneros MD        chlorothiazide (DIURIL) 500 mg in dextrose 5 % (D5W) 50 mL IVPB  500 mg Intravenous Q24H Minda Molina MD   Stopped at 04/14/24 0745    dextrose 10% bolus 125 mL 125 mL  12.5 g Intravenous PRN David Borjas, PA-C        dextrose 10% bolus 250 mL 250 mL  25 g Intravenous PRN David Borjas, PA-C        furosemide (Lasix) 500 mg in 50 mL infusion (conc: 10 mg/mL)  40 mg/hr Intravenous Continuous Tez Guerra MD 4 mL/hr at 04/14/24 1701 40 mg/hr at 04/14/24 1701    glucagon (human recombinant) injection 1 mg  1 mg Intramuscular PRN David Borjas, PA-C        glucose chewable tablet 16 g  16 g Oral PRN David Borjas, PA-C        glucose chewable tablet 24 g  24 g Oral PRN David Borjas, PA-C        heparin (porcine) injection 5,000 Units  5,000 Units Subcutaneous Q8H David Borjas, PA-C   5,000 Units at 04/14/24 1456    HYDROcodone-acetaminophen 5-325 mg per tablet 1 tablet  1 tablet Oral Q6H PRN Johnny Cisneros MD   1 tablet at 04/14/24 0330    magnesium sulfate 2g in water 50mL IVPB (premix)  2 g  Intravenous PRN Italo Segovia MD        melatonin tablet 6 mg  6 mg Oral Nightly PRN Pietro Herring MD   6 mg at 04/12/24 0302    miconazole NITRATE 2 % top powder   Topical (Top) BID Quang Hernandez MD        mupirocin 2 % ointment   Nasal BID Italo Segovia MD   Given at 04/14/24 0925    naloxone 0.4 mg/mL injection 0.02 mg  0.02 mg Intravenous PRN David Borjas PA-C        NORepinephrine 4 mg in dextrose 5% 250 mL infusion (premix)  0-3 mcg/kg/min Intravenous Continuous Quang Hernandez MD   Stopped at 04/14/24 1031    ondansetron disintegrating tablet 4 mg  4 mg Oral Q8H PRN David Borjas PA-C        ondansetron injection 4 mg  4 mg Intravenous Q8H PRN David Borjas PA-C   4 mg at 04/13/24 1429    oxybutynin tablet 5 mg  5 mg Oral TID Minda Molina MD   5 mg at 04/14/24 1538    polyethylene glycol packet 17 g  17 g Oral BID Domonique Walden MD   17 g at 04/14/24 0922    senna-docusate 8.6-50 mg per tablet 1 tablet  1 tablet Oral BID Domonique Walden MD   1 tablet at 04/14/24 0922    sevelamer carbonate tablet 800 mg  800 mg Oral TID  Davdi Borjas PA-C   800 mg at 04/14/24 1639    sodium chloride 0.9% bolus 250 mL 250 mL  250 mL Intravenous PRN Italo Segovia MD        sodium chloride 0.9% flush 10 mL  10 mL Intravenous PRN Pietro Herring MD        sodium chloride 0.9% flush 10 mL  10 mL Intravenous PRN David Borjas PA-C        sodium phosphate 20.01 mmol in dextrose 5 % (D5W) 250 mL IVPB  20.01 mmol Intravenous PRN Italo Segovia MD        sodium phosphate 30 mmol in dextrose 5 % (D5W) 250 mL IVPB  30 mmol Intravenous PRN Italo Segovia MD        sodium phosphate 39.99 mmol in dextrose 5 % (D5W) 250 mL IVPB  39.99 mmol Intravenous PRN Italo Segovia MD        white petrolatum 41 % ointment   Topical (Top) BID Johnny Cisneros MD   Given at 04/14/24 0925    zinc oxide 20 % ointment   Topical (Top) PRN David Borjas PA-C   Given at  04/14/24 0925       Lines/Drains/Airways       Central Venous Catheter Line  Duration             Trialysis (Dialysis) Catheter 04/13/24 1720 right internal jugular 1 day              Drain  Duration                  Urethral Catheter 04/09/24 1330 16 Fr. 5 days              Arterial Line  Duration             Arterial Line 04/14/24 1645 Left Radial <1 day              Peripheral Intravenous Line  Duration                  Peripheral IV - Single Lumen 04/10/24 2100 22 G Posterior;Right Hand 3 days                    Skin/Wounds  Bathing/Skin Care: bath, complete;dressed/undressed;linen changed (04/13/24 1901)  Wounds: Yes  Wound care consulted: Yes    Consults  Consults (From admission, onward)          Status Ordering Provider     Inpatient consult to Social Work/Case Management  Once        Provider:  (Not yet assigned)    Acknowledged JOHN MCCLELLAN     Inpatient consult to Social Work  Once        Provider:  (Not yet assigned)    Acknowledged BEN CONROY     Inpatient consult to Interventional Cardiology  Once        Provider:  (Not yet assigned)    Acknowledged BEN CONROY     Inpatient consult to Cardiology  Once        Provider:  (Not yet assigned)    Completed JOSE E GONSALES     Inpatient consult to Registered Dietitian/Nutritionist  Once        Provider:  (Not yet assigned)    Completed JOSE E GONSALES     Inpatient consult to Nephrology  Once        Provider:  (Not yet assigned)    Completed JOSE E GONSALES     Inpatient consult to Social Work/Case Management  Once        Provider:  (Not yet assigned)    Completed WERNER SANTANA     Inpatient consult to Registered Dietitian/Nutritionist  Once        Provider:  (Not yet assigned)    Completed WERNER SANTANA

## 2024-04-15 PROBLEM — R80.9 PROTEINURIA: Status: ACTIVE | Noted: 2024-04-15

## 2024-04-15 PROBLEM — D23.9 ANGIOKERATOMA: Status: ACTIVE | Noted: 2024-04-15

## 2024-04-15 PROBLEM — I42.2 HYPERTROPHIC CARDIOMYOPATHY: Status: ACTIVE | Noted: 2024-04-15

## 2024-04-15 LAB
ALBUMIN SERPL BCP-MCNC: 2.9 G/DL (ref 3.5–5.2)
ALBUMIN SERPL BCP-MCNC: 3 G/DL (ref 3.5–5.2)
ALLENS TEST: NORMAL
ALLENS TEST: NORMAL
ALP SERPL-CCNC: 106 U/L (ref 55–135)
ALT SERPL W/O P-5'-P-CCNC: 41 U/L (ref 10–44)
ANA SER QL IF: NORMAL
ANION GAP SERPL CALC-SCNC: 12 MMOL/L (ref 8–16)
ANION GAP SERPL CALC-SCNC: 12 MMOL/L (ref 8–16)
ANION GAP SERPL CALC-SCNC: 5 MMOL/L (ref 8–16)
ANION GAP SERPL CALC-SCNC: 9 MMOL/L (ref 8–16)
AST SERPL-CCNC: 62 U/L (ref 10–40)
BASOPHILS # BLD AUTO: 0.03 K/UL (ref 0–0.2)
BASOPHILS NFR BLD: 0.4 % (ref 0–1.9)
BILIRUB SERPL-MCNC: 1.7 MG/DL (ref 0.1–1)
BUN SERPL-MCNC: 11 MG/DL (ref 6–20)
BUN SERPL-MCNC: 34 MG/DL (ref 6–20)
BUN SERPL-MCNC: 34 MG/DL (ref 6–20)
BUN SERPL-MCNC: 41 MG/DL (ref 6–20)
CALCIUM SERPL-MCNC: 7.7 MG/DL (ref 8.7–10.5)
CALCIUM SERPL-MCNC: 8.3 MG/DL (ref 8.7–10.5)
CALCIUM SERPL-MCNC: 8.3 MG/DL (ref 8.7–10.5)
CALCIUM SERPL-MCNC: 8.4 MG/DL (ref 8.7–10.5)
CHLORIDE SERPL-SCNC: 102 MMOL/L (ref 95–110)
CHLORIDE SERPL-SCNC: 102 MMOL/L (ref 95–110)
CHLORIDE SERPL-SCNC: 103 MMOL/L (ref 95–110)
CHLORIDE SERPL-SCNC: 98 MMOL/L (ref 95–110)
CO2 SERPL-SCNC: 20 MMOL/L (ref 23–29)
CO2 SERPL-SCNC: 20 MMOL/L (ref 23–29)
CO2 SERPL-SCNC: 24 MMOL/L (ref 23–29)
CO2 SERPL-SCNC: 24 MMOL/L (ref 23–29)
CREAT SERPL-MCNC: 0.9 MG/DL (ref 0.5–1.4)
CREAT SERPL-MCNC: 2.3 MG/DL (ref 0.5–1.4)
CREAT SERPL-MCNC: 2.3 MG/DL (ref 0.5–1.4)
CREAT SERPL-MCNC: 2.7 MG/DL (ref 0.5–1.4)
DELSYS: NORMAL
DELSYS: NORMAL
DIFFERENTIAL METHOD BLD: ABNORMAL
EOSINOPHIL # BLD AUTO: 0 K/UL (ref 0–0.5)
EOSINOPHIL NFR BLD: 0.4 % (ref 0–8)
ERYTHROCYTE [DISTWIDTH] IN BLOOD BY AUTOMATED COUNT: 21.8 % (ref 11.5–14.5)
ERYTHROCYTE [SEDIMENTATION RATE] IN BLOOD BY WESTERGREN METHOD: 20 MM/H
EST. GFR  (NO RACE VARIABLE): 20.3 ML/MIN/1.73 M^2
EST. GFR  (NO RACE VARIABLE): 24.6 ML/MIN/1.73 M^2
EST. GFR  (NO RACE VARIABLE): 24.6 ML/MIN/1.73 M^2
EST. GFR  (NO RACE VARIABLE): >60 ML/MIN/1.73 M^2
FIO2: 32
FLOW: 2
FLOW: 3
GLUCOSE SERPL-MCNC: 69 MG/DL (ref 70–110)
GLUCOSE SERPL-MCNC: 69 MG/DL (ref 70–110)
GLUCOSE SERPL-MCNC: 73 MG/DL (ref 70–110)
GLUCOSE SERPL-MCNC: 79 MG/DL (ref 70–110)
HCT VFR BLD AUTO: 33.1 % (ref 37–48.5)
HGB BLD-MCNC: 9.2 G/DL (ref 12–16)
IMM GRANULOCYTES # BLD AUTO: 0.04 K/UL (ref 0–0.04)
IMM GRANULOCYTES NFR BLD AUTO: 0.6 % (ref 0–0.5)
LYMPHOCYTES # BLD AUTO: 0.5 K/UL (ref 1–4.8)
LYMPHOCYTES NFR BLD: 7.4 % (ref 18–48)
MAGNESIUM SERPL-MCNC: 1.8 MG/DL (ref 1.6–2.6)
MAGNESIUM SERPL-MCNC: 2 MG/DL (ref 1.6–2.6)
MAGNESIUM SERPL-MCNC: 2 MG/DL (ref 1.6–2.6)
MAGNESIUM SERPL-MCNC: 2.1 MG/DL (ref 1.6–2.6)
MCH RBC QN AUTO: 23.1 PG (ref 27–31)
MCHC RBC AUTO-ENTMCNC: 27.8 G/DL (ref 32–36)
MCV RBC AUTO: 83 FL (ref 82–98)
MODE: NORMAL
MODE: NORMAL
MONOCYTES # BLD AUTO: 0.7 K/UL (ref 0.3–1)
MONOCYTES NFR BLD: 9.6 % (ref 4–15)
NEUTROPHILS # BLD AUTO: 5.6 K/UL (ref 1.8–7.7)
NEUTROPHILS NFR BLD: 81.6 % (ref 38–73)
NRBC BLD-RTO: 1 /100 WBC
PHOSPHATE SERPL-MCNC: 1.3 MG/DL (ref 2.7–4.5)
PHOSPHATE SERPL-MCNC: 3.4 MG/DL (ref 2.7–4.5)
PHOSPHATE SERPL-MCNC: 3.4 MG/DL (ref 2.7–4.5)
PHOSPHATE SERPL-MCNC: 4.1 MG/DL (ref 2.7–4.5)
PLATELET # BLD AUTO: 148 K/UL (ref 150–450)
PMV BLD AUTO: ABNORMAL FL (ref 9.2–12.9)
PO2 BLDA: 47 MMHG (ref 40–60)
PO2 BLDA: 49 MMHG (ref 40–60)
POC SATURATED O2: 69 % (ref 95–100)
POC SATURATED O2: 71 % (ref 95–100)
POTASSIUM SERPL-SCNC: 4.2 MMOL/L (ref 3.5–5.1)
POTASSIUM SERPL-SCNC: 4.3 MMOL/L (ref 3.5–5.1)
POTASSIUM SERPL-SCNC: 4.3 MMOL/L (ref 3.5–5.1)
POTASSIUM SERPL-SCNC: 4.4 MMOL/L (ref 3.5–5.1)
PROT SERPL-MCNC: 6.9 G/DL (ref 6–8.4)
RBC # BLD AUTO: 3.98 M/UL (ref 4–5.4)
SAMPLE: NORMAL
SAMPLE: NORMAL
SITE: NORMAL
SITE: NORMAL
SODIUM SERPL-SCNC: 131 MMOL/L (ref 136–145)
SODIUM SERPL-SCNC: 132 MMOL/L (ref 136–145)
SODIUM SERPL-SCNC: 134 MMOL/L (ref 136–145)
SODIUM SERPL-SCNC: 134 MMOL/L (ref 136–145)
SP02: 100
SP02: 100
WBC # BLD AUTO: 6.8 K/UL (ref 3.9–12.7)

## 2024-04-15 PROCEDURE — 25000003 PHARM REV CODE 250: Performed by: STUDENT IN AN ORGANIZED HEALTH CARE EDUCATION/TRAINING PROGRAM

## 2024-04-15 PROCEDURE — 85025 COMPLETE CBC W/AUTO DIFF WBC: CPT

## 2024-04-15 PROCEDURE — 63600175 PHARM REV CODE 636 W HCPCS: Performed by: PHYSICIAN ASSISTANT

## 2024-04-15 PROCEDURE — 83735 ASSAY OF MAGNESIUM: CPT | Mod: 91 | Performed by: STUDENT IN AN ORGANIZED HEALTH CARE EDUCATION/TRAINING PROGRAM

## 2024-04-15 PROCEDURE — 80069 RENAL FUNCTION PANEL: CPT | Mod: 91 | Performed by: STUDENT IN AN ORGANIZED HEALTH CARE EDUCATION/TRAINING PROGRAM

## 2024-04-15 PROCEDURE — 82803 BLOOD GASES ANY COMBINATION: CPT

## 2024-04-15 PROCEDURE — 25000003 PHARM REV CODE 250: Performed by: INTERNAL MEDICINE

## 2024-04-15 PROCEDURE — 80053 COMPREHEN METABOLIC PANEL: CPT

## 2024-04-15 PROCEDURE — 99900035 HC TECH TIME PER 15 MIN (STAT)

## 2024-04-15 PROCEDURE — 25000003 PHARM REV CODE 250

## 2024-04-15 PROCEDURE — 84100 ASSAY OF PHOSPHORUS: CPT

## 2024-04-15 PROCEDURE — 80100008 HC CRRT DAILY MAINTENANCE

## 2024-04-15 PROCEDURE — 63600175 PHARM REV CODE 636 W HCPCS: Performed by: INTERNAL MEDICINE

## 2024-04-15 PROCEDURE — 25000003 PHARM REV CODE 250: Performed by: HOSPITALIST

## 2024-04-15 PROCEDURE — 25000003 PHARM REV CODE 250: Performed by: PHYSICIAN ASSISTANT

## 2024-04-15 PROCEDURE — 99231 SBSQ HOSP IP/OBS SF/LOW 25: CPT | Mod: ,,, | Performed by: INTERNAL MEDICINE

## 2024-04-15 PROCEDURE — 20000000 HC ICU ROOM

## 2024-04-15 PROCEDURE — 94761 N-INVAS EAR/PLS OXIMETRY MLT: CPT | Mod: XB

## 2024-04-15 PROCEDURE — 99233 SBSQ HOSP IP/OBS HIGH 50: CPT | Mod: ,,, | Performed by: INTERNAL MEDICINE

## 2024-04-15 PROCEDURE — 27000221 HC OXYGEN, UP TO 24 HOURS

## 2024-04-15 PROCEDURE — 90945 DIALYSIS ONE EVALUATION: CPT

## 2024-04-15 PROCEDURE — 81405 MOPATH PROCEDURE LEVEL 6: CPT

## 2024-04-15 PROCEDURE — 30000890 MAYO MISCELLANEOUS TEST (REFLEX)

## 2024-04-15 PROCEDURE — 83735 ASSAY OF MAGNESIUM: CPT

## 2024-04-15 PROCEDURE — 63600175 PHARM REV CODE 636 W HCPCS: Performed by: STUDENT IN AN ORGANIZED HEALTH CARE EDUCATION/TRAINING PROGRAM

## 2024-04-15 RX ORDER — HYDROCODONE BITARTRATE AND ACETAMINOPHEN 500; 5 MG/1; MG/1
TABLET ORAL CONTINUOUS
Status: DISCONTINUED | OUTPATIENT
Start: 2024-04-15 | End: 2024-04-16

## 2024-04-15 RX ORDER — LANOLIN ALCOHOL/MO/W.PET/CERES
400 CREAM (GRAM) TOPICAL 2 TIMES DAILY
Status: DISCONTINUED | OUTPATIENT
Start: 2024-04-15 | End: 2024-04-18

## 2024-04-15 RX ORDER — LANOLIN ALCOHOL/MO/W.PET/CERES
400 CREAM (GRAM) TOPICAL ONCE
Status: COMPLETED | OUTPATIENT
Start: 2024-04-16 | End: 2024-04-16

## 2024-04-15 RX ADMIN — MUPIROCIN: 20 OINTMENT TOPICAL at 09:04

## 2024-04-15 RX ADMIN — HEPARIN SODIUM 5000 UNITS: 5000 INJECTION INTRAVENOUS; SUBCUTANEOUS at 09:04

## 2024-04-15 RX ADMIN — MICONAZOLE NITRATE 2 % TOPICAL POWDER: at 08:04

## 2024-04-15 RX ADMIN — FUROSEMIDE 40 MG/HR: 10 INJECTION, SOLUTION INTRAMUSCULAR; INTRAVENOUS at 12:04

## 2024-04-15 RX ADMIN — SODIUM CHLORIDE 200 ML/HR: 9 INJECTION, SOLUTION INTRAVENOUS at 04:04

## 2024-04-15 RX ADMIN — SEVELAMER CARBONATE 800 MG: 800 TABLET, FILM COATED ORAL at 09:04

## 2024-04-15 RX ADMIN — SEVELAMER CARBONATE 800 MG: 800 TABLET, FILM COATED ORAL at 12:04

## 2024-04-15 RX ADMIN — NOREPINEPHRINE BITARTRATE 0.02 MCG/KG/MIN: 4 INJECTION, SOLUTION INTRAVENOUS at 05:04

## 2024-04-15 RX ADMIN — CHLOROTHIAZIDE SODIUM 500 MG: 500 INJECTION, POWDER, LYOPHILIZED, FOR SOLUTION INTRAVENOUS at 05:04

## 2024-04-15 RX ADMIN — SODIUM CHLORIDE: 9 INJECTION, SOLUTION INTRAVENOUS at 05:04

## 2024-04-15 RX ADMIN — OXYBUTYNIN CHLORIDE 5 MG: 5 TABLET ORAL at 09:04

## 2024-04-15 RX ADMIN — OXYBUTYNIN CHLORIDE 5 MG: 5 TABLET ORAL at 08:04

## 2024-04-15 RX ADMIN — SEVELAMER CARBONATE 800 MG: 800 TABLET, FILM COATED ORAL at 05:04

## 2024-04-15 RX ADMIN — HEPARIN SODIUM 5000 UNITS: 5000 INJECTION INTRAVENOUS; SUBCUTANEOUS at 05:04

## 2024-04-15 RX ADMIN — Medication: at 09:04

## 2024-04-15 RX ADMIN — SODIUM CHLORIDE 200 ML/HR: 9 INJECTION, SOLUTION INTRAVENOUS at 06:04

## 2024-04-15 RX ADMIN — SENNOSIDES AND DOCUSATE SODIUM 1 TABLET: 8.6; 5 TABLET ORAL at 09:04

## 2024-04-15 RX ADMIN — SODIUM CHLORIDE 200 ML/HR: 9 INJECTION, SOLUTION INTRAVENOUS at 05:04

## 2024-04-15 RX ADMIN — SENNOSIDES AND DOCUSATE SODIUM 1 TABLET: 8.6; 5 TABLET ORAL at 08:04

## 2024-04-15 RX ADMIN — OXYBUTYNIN CHLORIDE 5 MG: 5 TABLET ORAL at 05:04

## 2024-04-15 RX ADMIN — POLYETHYLENE GLYCOL 3350 17 G: 17 POWDER, FOR SOLUTION ORAL at 08:04

## 2024-04-15 RX ADMIN — POLYETHYLENE GLYCOL 3350 17 G: 17 POWDER, FOR SOLUTION ORAL at 09:04

## 2024-04-15 RX ADMIN — ASPIRIN 81 MG: 81 TABLET, COATED ORAL at 09:04

## 2024-04-15 RX ADMIN — MICONAZOLE NITRATE 2 % TOPICAL POWDER: at 09:04

## 2024-04-15 RX ADMIN — HEPARIN SODIUM 5000 UNITS: 5000 INJECTION INTRAVENOUS; SUBCUTANEOUS at 03:04

## 2024-04-15 RX ADMIN — MUPIROCIN: 20 OINTMENT TOPICAL at 08:04

## 2024-04-15 RX ADMIN — ATORVASTATIN CALCIUM 40 MG: 40 TABLET, FILM COATED ORAL at 09:04

## 2024-04-15 RX ADMIN — Medication: at 08:04

## 2024-04-15 RX ADMIN — HYDROCODONE BITARTRATE AND ACETAMINOPHEN 1 TABLET: 5; 325 TABLET ORAL at 04:04

## 2024-04-15 RX ADMIN — Medication 400 MG: at 11:04

## 2024-04-15 RX ADMIN — SODIUM CHLORIDE: 9 INJECTION, SOLUTION INTRAVENOUS at 08:04

## 2024-04-15 NOTE — ASSESSMENT & PLAN NOTE
CKD IV (baseline creatinine ~3.1-3.3) admitted with hypervolemia and REGINALDO with creatinine 4.2  UA showed +1 protein with UPCR of ~500 mg  Urinary sediment with non dysmorphic RBCs and WBCs present although Jin catheter just place yesterday   Retroperitoneal US without evidence of hydronephrosis although changes consistent with chronic kidney disease  Diuresis with lasix gtt @ 40mg/hr + Diuril 500mg IV BID attempted, and while Crt improved she remains unable to tolerate lying flat.  Tolerated 8h SLED w goal UF rate 200-500/hr successfully overnight 4/14-15    Plan/Recommendations:  Will plan for 8 hour session of SLED for metabolic clearance and volume management   Continue Renvela 800 mg TIDWM  Please avoid hypotension/major fluctuations in BP which may worsen REGINALDO (keep MAP > 65 mmHg)  Renal diet/tube feeds when not NPO with volume restriction per primary team  Strict I/O's and daily weights  Renally all dose medications to eGFR   Avoid nephrotoxic agents wean feasible (i.e. NSAIDs, intra-arterial contrast, supra-therapeutic vancomycin levels, etc.)

## 2024-04-15 NOTE — PLAN OF CARE
[facility]END[facility] at this time as pt is claiming a disability which can take 90+ days for [facility] to determine disability and process pt's application.      Sophia Nunez LMSW  [facility]  [id number]

## 2024-04-15 NOTE — ASSESSMENT & PLAN NOTE
Acute on chronic heart failure with preserved ejection fraction (HFpEF)   Anasarca     52 yo F admitted for ADHF 2/2 severe MR. The MR appears to be secondary to posterior leaflet restriction and leaflet length of 1.1 cm. BNP on admission was 4300 compared to 350 eight months prior. Initially diagnosed with severe MR in June of 2023. Saw Dr. Stone in January of 2024 and was undergoing work-up for possible MitraClip; C scheduled for 3/15 but not completed due to financial constraints, also needs POOJA. Will need optimization of kidney function prior to angiogram consideration, appreciate Nephrology assistance    DDX: rheumatic heart disease vs Fabry vs antiphospholipid    4/1/2024 TTE    Left Ventricle: The left ventricle is normal in size. Normal wall thickness. Normal wall motion. There is normal systolic function with a visually estimated ejection fraction of 60 - 65%. Grade II diastolic dysfunction.    Right Ventricle: Normal right ventricular cavity size. Wall thickness is normal. Right ventricle wall motion  is normal. Systolic function is reduced.    Left Atrium: Left atrium is very  severely dilated.    Right Atrium: Right atrium is mildly dilated.    Aortic Valve: There is mild aortic valve sclerosis. There is normal leaflet mobility. There is trace aortic regurgitation.    Mitral Valve: There is mild bileaflet sclerosis. There is posterior leaflet tethering and failure of complete coaptation. There is very severe regurgitation with a posterolateral eccentriccally directed jet.    Tricuspid Valve: The tricuspid valve is structurally normal. There is normal leaflet mobility. There is moderate to severe regurgitation.    IVC/SVC: IVC was not well visualized due to poor acoustic window. Intermediate venous pressure at 8 mmHg.    Pericardium: There is a trivial effusion posteriorly and small under the RA.     - Interventional Cardiology consulted - no intervention until patient's volume status is controlled  -  "Nephrology following, undergoing SLED/CRRT  - POOJA ordered - "Anesthesia evaluated the patient and feel that she needs better optimization prior to POOJA. They stated once she is euvolemic that we can move forward. Will need a new POOJA order at that time."  - Continue Diuril 500 mg IV q24h & Lasix gtt @ 40 mg/hr for diuresis  - Discontinued hydralazine 25 mg po q8h & Isordil 20 mg po BID for afterload reduction  - Continue home ASA 81 mg po daily and Lipitor 40 mg po daily  - Genetics consulted, see "Hypertrophic cardiomyopathy"  - F/u antistreptolysin-O  - F/u antiphospholipid work-up: B2-glycoprotein, anti-cardiolipin, DRVVT, YUMIKO  - Strict I/O, Jin in place  - Telemetry monitoring  "

## 2024-04-15 NOTE — ASSESSMENT & PLAN NOTE
"Patient with acute kidney injury/acute renal failure likely due to acute tubular necrosis caused by unknown.  REGINALDO is currently worsening. Baseline creatinine  2.5  - Labs reviewed- Renal function/electrolytes with Estimated Creatinine Clearance: 25.8 mL/min (A) (based on SCr of 2.3 mg/dL (H)). according to latest data. Monitor urine output and serial BMP and adjust therapy as needed. Avoid nephrotoxins and renally dose meds for GFR listed above.    Recent Labs     04/14/24  1456 04/14/24  2254 04/15/24  0217   CREATININE 3.6* 2.1* 2.3*  2.3*     - Nephrology following, appreciate recs - started SLED on 4/14  - Genetics consulted, see "Hypertrophic cardiomyopathy"  - Continue home Renvela 800 mg po TID with meals  - Holding home sodium bicarb 650 mg po BID  "

## 2024-04-15 NOTE — PROGRESS NOTES
CRRT restarted, /, uf rate initially started at 400. B/P 113/56, pulse 59, o2 sat 100% on nasal cannula 2 liters. Patient alert and to verbal stimuli.

## 2024-04-15 NOTE — PROGRESS NOTES
[facility] - Cardiac Intensive Care  Nephrology  Progress Note    Patient Name: Xena Vera  MRN: [id number]  Admission Date: 4/1/2024  Hospital Length of Stay: 12 days  Attending Provider: Renato Elmore MD   Primary Care Physician: Tami Villeda FNP  Principal Problem:Severe mitral valve regurgitation    Subjective:     HPI: Ms Vera is an obese (BMI ~31) 53-year-old with CKD IV (baseline creatinine ~3.1-3.3), prediabetes with most recent hemoglobin A1c 6%, HFpEF (most recent TTE with EF 60-65% with G2DD), mitral valve regurgitation, anemia, hypertension, HLD as well as several over co-morbid conditions who was admitted on 4/1 with heart failure exacerbation and hypervolemia after presenting with to ED following a mechanical fall but also had complaints of progressive dyspnea/ZACARIAS, orthopnea, lower extremity edema and abdominal distension with constipation. On presentation patient was noted to be hypotensive with systolic BP readings as low as 90s mmHg and bradycardiac with HR as low as 50s bpm. There was concern for some edema on chest x-ray and BNP at that time was ~4.6K and metabolic panel was notable for serum sodium 133, bicarbonate 20, , creatinine 4.2, albumin 3.1 and total bilirubin 1.5. UA showed +1 protein. Her admission was complicated by failure to adequately diuresis with escalating IV Lasix and even oral metolazone for which Nephrology was consulted on 4/10 for assistance. She explicitly denies NSAID use as outpatient.    Interval History: Underwent 8 hrs of SLED and able to achieve UF goal rate 200-500/hr.  Remains unable to lie flat (and therefore tolerate a cardiac procedure), but is producing some urine.      Objective:     Vital Signs (Most Recent):  Temp: 98.2 °F (36.8 °C) (04/15/24 1101)  Pulse: 62 (04/15/24 1301)  Resp: 19 (04/15/24 1301)  BP: (!) 100/58 (04/14/24 2315)  SpO2: 100 % (04/15/24 1301) Vital Signs (24h Range):  Temp:  [97 °F (36.1 °C)-98.2 °F (36.8 °C)] 98.2 °F  (36.8 °C)  Pulse:  [52-63] 62  Resp:  [9-36] 19  SpO2:  [92 %-100 %] 100 %  BP: ()/(52-58) 100/58  Arterial Line BP: (103-138)/(46-70) 123/62     Weight: 78 kg (172 lb) (04/15/24 0400)  Body mass index is 33.59 kg/m².  Body surface area is 1.82 meters squared.    I/O last 3 completed shifts:  In: 3291.7 [P.O.:720; I.V.:1944.2; Other:500; IV Piggyback:127.6]  Out: 5272 [Urine:759; Other:4513]     Physical Exam  Vitals and nursing note reviewed.   Constitutional:       General: She is not in acute distress.     Appearance: Normal appearance. She is ill-appearing.   HENT:      Head: Normocephalic.   Eyes:      Extraocular Movements: Extraocular movements intact.   Cardiovascular:      Rate and Rhythm: Normal rate and regular rhythm.   Pulmonary:      Effort: Pulmonary effort is normal. No respiratory distress.      Breath sounds: Rales present.   Musculoskeletal:      Right lower leg: Edema present.      Left lower leg: Edema present.   Skin:     General: Skin is warm and dry.   Neurological:      General: No focal deficit present.      Mental Status: She is alert and oriented to person, place, and time.          Significant Labs:  CMP:   Recent Labs   Lab 04/15/24  0217 04/15/24  1208   GLU 69*  69* 73   CALCIUM 8.3*  8.3* 8.4*   ALBUMIN 3.0*  3.0* 2.9*   PROT 6.9  --    *  134* 131*   K 4.3  4.3 4.4   CO2 20*  20* 24     102 98   BUN 34*  34* 41*   CREATININE 2.3*  2.3* 2.7*   ALKPHOS 106  --    ALT 41  --    AST 62*  --    BILITOT 1.7*  --      All labs within the past 24 hours have been reviewed.     Significant Imaging:  None  Assessment/Plan:     Renal/  Acute kidney injury superimposed on chronic kidney disease  CKD IV (baseline creatinine ~3.1-3.3) admitted with hypervolemia and REGINALDO with creatinine 4.2  UA showed +1 protein with UPCR of ~500 mg  Urinary sediment with non dysmorphic RBCs and WBCs present although Jin catheter just place yesterday   Retroperitoneal US without  evidence of hydronephrosis although changes consistent with chronic kidney disease  Diuresis with lasix gtt @ 40mg/hr + Diuril 500mg IV BID attempted, and while Crt improved she remains unable to tolerate lying flat.  Tolerated 8h SLED w goal UF rate 200-500/hr successfully overnight 4/14-15    Plan/Recommendations:  Will plan for 8 hour session of SLED for metabolic clearance and volume management   Continue Renvela 800 mg TIDWM  Please avoid hypotension/major fluctuations in BP which may worsen REGINALDO (keep MAP > 65 mmHg)  Renal diet/tube feeds when not NPO with volume restriction per primary team  Strict I/O's and daily weights  Renally all dose medications to eGFR   Avoid nephrotoxic agents wean feasible (i.e. NSAIDs, intra-arterial contrast, supra-therapeutic vancomycin levels, etc.)          Thank you for your consult. I will follow-up with patient. Please contact us if you have any additional questions.    Perry Meier MD  Nephrology  [facility] - Cardiac Intensive Care

## 2024-04-15 NOTE — ASSESSMENT & PLAN NOTE
Ms. Vera is a 54 yo F [place]ish speaking pt who was admitted for ADHF 2/2 severe MR. The MR appears to be secondary to posterior leaflet restriction and leaflet length of 1.1 cm. Saw Dr. Stone in January of 2024 and was undergoing work-up for possible MitraClip; LHC scheduled for 3/15 but not completed due to financial constraints, also needs POOJA.     - Will need further optimization of kidney function and volume removal prior to angiogram for Mitraclip outpatient work up.   - Please contact us if any questions/concerns arise.

## 2024-04-15 NOTE — PLAN OF CARE
Cardiac ICU Care Plan    POC reviewed with Xena Vera and family. Questions and concerns addressed. See below and flowsheets for full assessment and VS info.     NAEON   CRRT completed at ~1AM  CVP 20, 18, 19  SvO2 67%  Urine Output ~ 87 mL for the shift.  Lasix continues at 40, Diuril remains Q24h.   MD Ritchie notified, no new orders, deferred to day shift.     Levo started at 0.02 for borderline maps.  A-Line pressures 90s/low 50s, MD Ritchie aware.       Neuro:  Schlater Coma Scale  Best Eye Response: 4-->(E4) spontaneous  Best Motor Response: 6-->(M6) obeys commands  Best Verbal Response: 5-->(V5) oriented  Schlater Coma Scale Score: 15  Assessment Qualifiers: patient not sedated/intubated  Pupil PERRLA: yes    24 hr Temp:  [97.1 °F (36.2 °C)-98.2 °F (36.8 °C)]      CV:  Rhythm: sinus bradycardia   DVT prophylaxis: VTE Required Core Measure: Pharmacological prophylaxis initiated/maintained    CVP (mean): 19 mmHg (04/15/24 0301)       SVO2 (%): 67 % (04/14/24 1901)               Pulses  Right Radial Pulse: 1+ (weak)  Left Radial Pulse: 1+ (weak)  Right Dorsalis Pedis Pulse: 1+ (weak)  Left Dorsalis Pedis Pulse: 1+ (weak)  Right Posterior Tibial Pulse: 1+ (weak)  Left Posterior Tibial Pulse: 1+ (weak)    Resp:  Flow (L/min): 3  Oxygen Concentration (%): 50    GI/:  GI prophylaxis: no  Diet/Nutrition Received: 2 gram sodium, restrict fluids  Last Bowel Movement: 04/11/24  Voiding Characteristics: urethral catheter (bladder)       Urethral Catheter 04/09/24 1330 16 Fr.-Reason for Continuing Urinary Catheterization: Critically ill in ICU and requiring hourly monitoring of intake/output   Intake/Output Summary (Last 24 hours) at 4/15/2024 0447  Last data filed at 4/15/2024 0401  Gross per 24 hour   Intake 2748.44 ml   Output 4645 ml   Net -1896.56 ml     Treatment Type: Slow low efficiency dialysis  UF Rate: 500 cc/hr  Nutritional Supplement Intake: Quantity 0, Type:  none ordered     Labs/Accuchecks:  Recent  "Labs   Lab 04/13/24  0318 04/14/24  0335 04/15/24  0217   WBC 5.43 6.69 6.80   RBC 3.84* 3.80* 3.98*   HGB 8.7* 8.6* 9.2*   HCT 30.1* 30.1* 33.1*   * 184 148*    No results for input(s): "PT", "INR", "APTT" in the last 168 hours.   Recent Labs     04/15/24  0217   *  134*   K 4.3  4.3   CO2 20*  20*     102   BUN 34*  34*   CREATININE 2.3*  2.3*   ALKPHOS 106   ALT 41   AST 62*   BILITOT 1.7*     No results for input(s): "CPK", "CPKMB", "MB", "TROPONINI" in the last 168 hours.   Recent Labs     04/14/24  0917 04/14/24  1641 04/14/24  1935   PH 7.208* 7.245*  --    PCO2 72.5* 64.9*  --    PO2 46 149* 44   HCO3 28.8* 28.1*  --    POCSATURATED 70 99 67   BE 1 1  --        Electrolytes: No replacement orders  Accuchecks: none    Gtts/LDAs:  Current Facility-Administered Medications   Medication Dose Route Frequency Provider Last Rate Last Admin    0.9%  NaCl infusion (CRRT USE ONLY)   Intravenous Continuous Italo Segovia MD   Stopped at 04/15/24 0111    0.9%  NaCl infusion   Intravenous Once Italo Segovia MD        0.9%  NaCl infusion   Intravenous Continuous Gillies, Connor M, DO   Stopped at 04/14/24 0917    acetaminophen tablet 1,000 mg  1,000 mg Oral Q6H PRN Domonique Walden MD   1,000 mg at 04/14/24 1238    aspirin EC tablet 81 mg  81 mg Oral Daily David Borjas PA-C   81 mg at 04/14/24 0921    atorvastatin tablet 40 mg  40 mg Oral Daily David Borjas PA-C   40 mg at 04/14/24 0921    bisacodyL EC tablet 5 mg  5 mg Oral Daily PRN Johnny Cisneros MD        chlorothiazide (DIURIL) 500 mg in dextrose 5 % (D5W) 50 mL IVPB  500 mg Intravenous Q24H Minda Molina MD   Stopped at 04/14/24 0745    dextrose 10% bolus 125 mL 125 mL  12.5 g Intravenous PRN David Borjas PA-C        dextrose 10% bolus 250 mL 250 mL  25 g Intravenous PRN David Borjas PA-C        furosemide (Lasix) 500 mg in 50 mL infusion (conc: 10 mg/mL)  40 mg/hr Intravenous Continuous " Tez Guerra MD 4 mL/hr at 04/15/24 0401 40 mg/hr at 04/15/24 0401    glucagon (human recombinant) injection 1 mg  1 mg Intramuscular PRN David Borjas PA-C        glucose chewable tablet 16 g  16 g Oral PRN David Borjas PA-C        glucose chewable tablet 24 g  24 g Oral PRN David Borjas PA-C        heparin (porcine) injection 5,000 Units  5,000 Units Subcutaneous Q8H David Borjas PA-C   5,000 Units at 04/14/24 2118    HYDROcodone-acetaminophen 5-325 mg per tablet 1 tablet  1 tablet Oral Q6H PRN Johnny Cisneros MD   1 tablet at 04/15/24 0413    magnesium sulfate 2g in water 50mL IVPB (premix)  2 g Intravenous PRN Italo Segovia MD        melatonin tablet 6 mg  6 mg Oral Nightly PRN Pietro Herring MD   6 mg at 04/12/24 0302    miconazole NITRATE 2 % top powder   Topical (Top) BID Quang Hernandez MD   Given at 04/14/24 2124    mupirocin 2 % ointment   Nasal BID Italo Segovia MD   Given at 04/14/24 2123    naloxone 0.4 mg/mL injection 0.02 mg  0.02 mg Intravenous PRN David Borjas PA-C        NORepinephrine 4 mg in dextrose 5% 250 mL infusion (premix)  0-3 mcg/kg/min Intravenous Continuous Quang Hernandez MD   Stopped at 04/14/24 1031    ondansetron disintegrating tablet 4 mg  4 mg Oral Q8H PRN David Borjas PA-C        ondansetron injection 4 mg  4 mg Intravenous Q8H PRN David Borjas PA-C   4 mg at 04/13/24 1429    oxybutynin tablet 5 mg  5 mg Oral TID Minda Molina MD   5 mg at 04/14/24 2118    polyethylene glycol packet 17 g  17 g Oral BID Domonique Walden MD   17 g at 04/14/24 2118    senna-docusate 8.6-50 mg per tablet 1 tablet  1 tablet Oral BID Domonique Walden MD   1 tablet at 04/14/24 2118    sevelamer carbonate tablet 800 mg  800 mg Oral TID  David Borjas PA-C   800 mg at 04/14/24 1639    sodium chloride 0.9% bolus 250 mL 250 mL  250 mL Intravenous PRN Italo Segovia MD        sodium chloride 0.9% flush 10 mL  10  mL Intravenous PRN Pietro Herring MD        sodium chloride 0.9% flush 10 mL  10 mL Intravenous PRN David Borjas PA-C        sodium phosphate 20.01 mmol in dextrose 5 % (D5W) 250 mL IVPB  20.01 mmol Intravenous PRN Italo Segovia MD        sodium phosphate 30 mmol in dextrose 5 % (D5W) 250 mL IVPB  30 mmol Intravenous PRN Italo Segovia MD        sodium phosphate 39.99 mmol in dextrose 5 % (D5W) 250 mL IVPB  39.99 mmol Intravenous PRN Italo Segovia MD        white petrolatum 41 % ointment   Topical (Top) BID Johnny Cisneros MD   Given at 04/14/24 2123    zinc oxide 20 % ointment   Topical (Top) PRN David Borjas PA-C   Given at 04/14/24 0925       Lines/Drains/Airways       Central Venous Catheter Line  Duration             Trialysis (Dialysis) Catheter 04/13/24 1720 right internal jugular 1 day              Drain  Duration                  Urethral Catheter 04/09/24 1330 16 Fr. 5 days              Arterial Line  Duration             Arterial Line 04/14/24 1645 Left Radial <1 day              Peripheral Intravenous Line  Duration                  Peripheral IV - Single Lumen 04/14/24 1600 18 G;1 3/4 in Anterior;Right Upper Arm <1 day                    Skin/Wounds  Bathing/Skin Care: bath, complete;dressed/undressed;linen changed;electrode patches/site rotation (04/15/24 0301)  Wounds: Yes  Wound care consulted: No     Problem: Adult Inpatient Plan of Care  Goal: Plan of Care Review  Outcome: Ongoing, Progressing

## 2024-04-15 NOTE — PT/OT/SLP DISCHARGE
Occupational Therapy Discharge Summary    Xena Vera  MRN: [id number]   Principal Problem: Severe mitral valve regurgitation      Patient Discharged from acute Occupational Therapy on 4/15.      Assessment:      Patient transferred to higher level of care secondary to need for RRT    Objective:     GOALS:   Multidisciplinary Problems       Occupational Therapy Goals          Problem: Occupational Therapy    Goal Priority Disciplines Outcome Interventions   Occupational Therapy Goal     OT, PT/OT Ongoing, Progressing    Description: Goals to be met by: 5/1//24     Patient will increase functional independence with ADLs by performing:    LE Dressing with Contact Guard Assistance.  Grooming while standing at sink with Stand-by Assistance.  Toileting from toilet with Stand-by Assistance for hygiene and clothing management.   Supine to sit with Supervision.  Step transfer with Stand-by Assistance with AD as needed  Toilet transfer to toilet with Stand-by Assistance with AD as needed                         Reasons for Discontinuation of Therapy Services  Transfer to alternate level of care.      Plan:     Patient Discharged to:  ICU    4/15/2024

## 2024-04-15 NOTE — SUBJECTIVE & OBJECTIVE
Past Medical History:   Diagnosis Date    (HFpEF) heart failure with preserved ejection fraction 06/24/2023    EF 63% with G2 DD  Continue lasix, appears euvolemic today  Continue good BP management with losartan, increase Coreg 6.25 mg BID  Fluid restriction, low sodium diet  Recommend Jardiance- patient had CKD 4 and this is a limiting factor- nephro eval pending    Anemia 06/24/2023    CKD (chronic kidney disease)     HTN (hypertension)     Mitral valve regurgitation 06/26/2023    Refer to structural for evaluation  May benefit from mitral clip    Ovarian cyst     Stage 4 chronic kidney disease 06/24/2023    Refer to nephrology at [facility] as unable to get established with [facility] and the phone number for the outside nephrologist provided to patient during her recent hospitalization goes unanswered when called.   Cr remains elevated  Would like to start SGLT-2 and appreciate nephrology expertise        No past surgical history on file.    Review of patient's allergies indicates:  No Known Allergies    Current Facility-Administered Medications   Medication Dose Route Frequency Provider Last Rate Last Admin    0.9%  NaCl infusion (CRRT USE ONLY)   Intravenous Continuous Italo Segovia  mL/hr at 04/15/24 0001 Rate Verify at 04/15/24 0001    0.9%  NaCl infusion   Intravenous Once Italo Segovia MD        0.9%  NaCl infusion   Intravenous Continuous Gillies, Connor M, DO   Stopped at 04/14/24 0917    acetaminophen tablet 1,000 mg  1,000 mg Oral Q6H PRN Domonique Walden MD   1,000 mg at 04/14/24 1238    aspirin EC tablet 81 mg  81 mg Oral Daily David Borjas, PA-C   81 mg at 04/14/24 0921    atorvastatin tablet 40 mg  40 mg Oral Daily David Borjas, PA-C   40 mg at 04/14/24 0921    bisacodyL EC tablet 5 mg  5 mg Oral Daily PRN Johnny Cisneros MD        chlorothiazide (DIURIL) 500 mg in dextrose 5 % (D5W) 50 mL IVPB  500 mg Intravenous Q24H Minda Molina MD   Stopped at 04/14/24  0745    dextrose 10% bolus 125 mL 125 mL  12.5 g Intravenous PRN David Borjas PA-DIMITRY        dextrose 10% bolus 250 mL 250 mL  25 g Intravenous PRN David Borjas PA-C        furosemide (Lasix) 500 mg in 50 mL infusion (conc: 10 mg/mL)  40 mg/hr Intravenous Continuous Tez Guerra MD 4 mL/hr at 04/15/24 0001 40 mg/hr at 04/15/24 0001    glucagon (human recombinant) injection 1 mg  1 mg Intramuscular PRN David Borjas PA-C        glucose chewable tablet 16 g  16 g Oral PRN David Borjas, PA-DIMITRY        glucose chewable tablet 24 g  24 g Oral PRN David Borjas PA-C        heparin (porcine) injection 5,000 Units  5,000 Units Subcutaneous Q8H David Borjas PA-C   5,000 Units at 04/14/24 2118    HYDROcodone-acetaminophen 5-325 mg per tablet 1 tablet  1 tablet Oral Q6H PRN Johnny Cisneros MD   1 tablet at 04/14/24 0330    magnesium sulfate 2g in water 50mL IVPB (premix)  2 g Intravenous PRN Italo Segovia MD        melatonin tablet 6 mg  6 mg Oral Nightly PRN Pietro Herring MD   6 mg at 04/12/24 0302    miconazole NITRATE 2 % top powder   Topical (Top) BID Quang Hernandez MD   Given at 04/14/24 2124    mupirocin 2 % ointment   Nasal BID Italo Segovia MD   Given at 04/14/24 2123    naloxone 0.4 mg/mL injection 0.02 mg  0.02 mg Intravenous PRN David Borjas PA-C        NORepinephrine 4 mg in dextrose 5% 250 mL infusion (premix)  0-3 mcg/kg/min Intravenous Continuous Quang Hernandez MD   Stopped at 04/14/24 1031    ondansetron disintegrating tablet 4 mg  4 mg Oral Q8H PRN David Borjas PA-C        ondansetron injection 4 mg  4 mg Intravenous Q8H PRN David Borjas PA-C   4 mg at 04/13/24 1429    oxybutynin tablet 5 mg  5 mg Oral TID Minda Molina MD   5 mg at 04/14/24 2118    polyethylene glycol packet 17 g  17 g Oral BID Domonique Walden MD   17 g at 04/14/24 2118    senna-docusate 8.6-50 mg per tablet 1 tablet  1 tablet Oral BID Iram,  MD Domonique   1 tablet at 24    sevelamer carbonate tablet 800 mg  800 mg Oral TID WM David Borjas PA-C   800 mg at 24 1639    sodium chloride 0.9% bolus 250 mL 250 mL  250 mL Intravenous PRN Italo Segovia MD        sodium chloride 0.9% flush 10 mL  10 mL Intravenous PRN Pietro Herring MD        sodium chloride 0.9% flush 10 mL  10 mL Intravenous PRN David Borjas PA-C        sodium phosphate 20.01 mmol in dextrose 5 % (D5W) 250 mL IVPB  20.01 mmol Intravenous PRN Italo Segovia MD        sodium phosphate 30 mmol in dextrose 5 % (D5W) 250 mL IVPB  30 mmol Intravenous PRN Italo Segovia MD        sodium phosphate 39.99 mmol in dextrose 5 % (D5W) 250 mL IVPB  39.99 mmol Intravenous PRN Italo Segovia MD        white petrolatum 41 % ointment   Topical (Top) BID Johnny Cisneros MD   Given at 24 2123    zinc oxide 20 % ointment   Topical (Top) PRN David Borjas PA-C   Given at 24 0925     Family History    None       Tobacco Use    Smoking status: Former     Current packs/day: 0.00     Types: Cigarettes     Start date:      Quit date:      Years since quittin.3    Smokeless tobacco: Never    Tobacco comments:     2-3 cigarettes a day   Substance and Sexual Activity    Alcohol use: Never    Drug use: Never    Sexual activity: Not on file       Objective:     Vital Signs (Most Recent):  Temp: 97.1 °F (36.2 °C) (24 2301)  Pulse: (!) 57 (04/15/24 0001)  Resp: (!) 21 (04/15/24 0001)  BP: (!) 100/58 (24 2315)  SpO2: 100 % (04/15/24 0001) Vital Signs (24h Range):  Temp:  [97.1 °F (36.2 °C)-98.2 °F (36.8 °C)] 97.1 °F (36.2 °C)  Pulse:  [51-59] 57  Resp:  [9-87] 21  SpO2:  [95 %-100 %] 100 %  BP: ()/(41-68) 100/58  Arterial Line BP: (114-138)/(52-70) 114/60     Weight: 75 kg (165 lb 5.5 oz)  Body mass index is 32.29 kg/m².    SpO2: 100 %         Intake/Output Summary (Last 24 hours) at 4/15/2024 0104  Last data filed at  4/15/2024 0001  Gross per 24 hour   Intake 2524.17 ml   Output 4226 ml   Net -1701.83 ml       Lines/Drains/Airways       Central Venous Catheter Line  Duration             Trialysis (Dialysis) Catheter 04/13/24 1720 right internal jugular 1 day              Drain  Duration                  Urethral Catheter 04/09/24 1330 16 Fr. 5 days              Arterial Line  Duration             Arterial Line 04/14/24 1645 Left Radial <1 day              Peripheral Intravenous Line  Duration                  Peripheral IV - Single Lumen 04/10/24 2100 22 G Posterior;Right Hand 4 days         Peripheral IV - Single Lumen 04/14/24 1600 18 G;1 3/4 in Anterior;Right Upper Arm <1 day                     Physical Exam  Vitals and nursing note reviewed.   Constitutional:       Appearance: Normal appearance.   HENT:      Head: Atraumatic.   Eyes:      General: No scleral icterus.     Conjunctiva/sclera: Conjunctivae normal.   Cardiovascular:      Rate and Rhythm: Normal rate and regular rhythm.      Heart sounds: No murmur heard.  Pulmonary:      Effort: Pulmonary effort is normal.      Breath sounds: Normal breath sounds. No wheezing.   Abdominal:      General: There is no distension.      Palpations: Abdomen is soft.      Tenderness: There is no abdominal tenderness.   Musculoskeletal:      Right lower leg: No edema.      Left lower leg: No edema.   Skin:     General: Skin is warm.   Neurological:      General: No focal deficit present.            Significant Labs: All pertinent lab results from the last 24 hours have been reviewed.

## 2024-04-15 NOTE — PROGRESS NOTES
04/15/24 0110   Treatment   Treatment Type SLED   Treatment Status Blood returned;Discontinued treatment   Dialysis Machine Number [id number]   Dialyzer Time (hours) 8   BVP (Liters) 71.2 L   Solutions Labeled and Current  Yes   Access Temporary Cath;IJ;Right   Catheter Dressing Intact  Yes   Alarms Engaged Yes   CRRT Comments SLED completed   CRRT Hourly Documentation   Total UF (Hourly Cleared) (mL) 35     SLED for 8 hours completed. Blood returned. Lines disconnected aseptically. R temporary IJ CVC flushed with saline, Lumens capped. Machine disinfected.

## 2024-04-15 NOTE — SUBJECTIVE & OBJECTIVE
Interval History: Underwent 8 hrs of SLED and able to achieve UF goal rate 200-500/hr.  Remains unable to lie flat (and therefore tolerate a cardiac procedure), but is producing some urine.      Objective:     Vital Signs (Most Recent):  Temp: 98.2 °F (36.8 °C) (04/15/24 1101)  Pulse: 62 (04/15/24 1301)  Resp: 19 (04/15/24 1301)  BP: (!) 100/58 (04/14/24 2315)  SpO2: 100 % (04/15/24 1301) Vital Signs (24h Range):  Temp:  [97 °F (36.1 °C)-98.2 °F (36.8 °C)] 98.2 °F (36.8 °C)  Pulse:  [52-63] 62  Resp:  [9-36] 19  SpO2:  [92 %-100 %] 100 %  BP: ()/(52-58) 100/58  Arterial Line BP: (103-138)/(46-70) 123/62     Weight: 78 kg (172 lb) (04/15/24 0400)  Body mass index is 33.59 kg/m².  Body surface area is 1.82 meters squared.    I/O last 3 completed shifts:  In: 3291.7 [P.O.:720; I.V.:1944.2; Other:500; IV Piggyback:127.6]  Out: 5272 [Urine:759; Other:4513]     Physical Exam  Vitals and nursing note reviewed.   Constitutional:       General: She is not in acute distress.     Appearance: Normal appearance. She is ill-appearing.   HENT:      Head: Normocephalic.   Eyes:      Extraocular Movements: Extraocular movements intact.   Cardiovascular:      Rate and Rhythm: Normal rate and regular rhythm.   Pulmonary:      Effort: Pulmonary effort is normal. No respiratory distress.      Breath sounds: Rales present.   Musculoskeletal:      Right lower leg: Edema present.      Left lower leg: Edema present.   Skin:     General: Skin is warm and dry.   Neurological:      General: No focal deficit present.      Mental Status: She is alert and oriented to person, place, and time.          Significant Labs:  CMP:   Recent Labs   Lab 04/15/24  0217 04/15/24  1208   GLU 69*  69* 73   CALCIUM 8.3*  8.3* 8.4*   ALBUMIN 3.0*  3.0* 2.9*   PROT 6.9  --    *  134* 131*   K 4.3  4.3 4.4   CO2 20*  20* 24     102 98   BUN 34*  34* 41*   CREATININE 2.3*  2.3* 2.7*   ALKPHOS 106  --    ALT 41  --    AST 62*  --     BILITOT 1.7*  --      All labs within the past 24 hours have been reviewed.     Significant Imaging:  None

## 2024-04-15 NOTE — PLAN OF CARE
[facility] - Cardiac Intensive Care  Discharge Reassessment    Primary Care Provider: Tami Villeda FNP    Expected Discharge Date: 4/22/2024    Reassessment (most recent)       Discharge Reassessment - 04/15/24 1437          Discharge Reassessment    Assessment Type Discharge Planning Reassessment     Discharge Plan discussed with: Patient;Sibling     Communicated RILEY with patient/caregiver Date not available/Unable to determine     Discharge Plan A Home with family     Discharge Plan B Home     DME Needed Upon Discharge  none     Transition of Care Barriers Unisured     Why the patient remains in the hospital Requires continued medical care                 SW met with pt and brother at bedside to discuss discharge planning ( Ike #[id number]).  Discharge Plan A and Plan B have been determined by review of patient's clinical status, future medical and therapeutic needs, and coverage/benefits for post-acute care in coordination with multidisciplinary team members although final discharge plan is pending medical clearance.  KAZ sent email to [facility] inquiring about status of Medicaid application.  KAZ name and ext on whiteboard.  Will continue to follow.      Sophia Nunez LMSW  [facility]  [phone]

## 2024-04-15 NOTE — PT/OT/SLP DISCHARGE
Physical Therapy Discharge Summary    Name: Xena Vera  MRN: [id number]   Principal Problem: Severe mitral valve regurgitation     Patient Discharged from acute Physical Therapy on 4/15 due to transfer to CICU  for CRRT. .  Please refer to prior PT noted date on  for functional status.     Assessment:     Patient has not met goals.    Objective:     GOALS:   Multidisciplinary Problems       Physical Therapy Goals          Problem: Physical Therapy    Goal Priority Disciplines Outcome Goal Variances Interventions   Physical Therapy Goal     PT, PT/OT Ongoing, Progressing     Description: Goals to be met by: 24     Patient will increase functional independence with mobility by performin. Supine to sit with Modified [place]  2. Sit to supine with Modified [place]  3. Sit to stand transfer with Supervision  4. Bed to chair transfer with Supervision using Rolling Walker  5. Gait  x 100 feet with Supervision using Rolling Walker.   6. Ascend/descend 5 stair with bilateral Handrails Supervision using No Assistive Device.   7. Lower extremity exercise program x15 reps per handout, with assistance as needed                         Reasons for Discontinuation of Therapy Services  Transfer to alternate level of care.      Plan:     Patient Discharged to:  transfer to CICU, new orders needed when medically stable Therapist of record not available to write discharge summary.  .      4/15/2024     Opt out

## 2024-04-15 NOTE — ASSESSMENT & PLAN NOTE
Chronic, controlled. Latest blood pressure and vitals reviewed-     Temp:  [97 °F (36.1 °C)-98.2 °F (36.8 °C)]   Pulse:  [52-63]   Resp:  [9-87]   BP: ()/(51-58)   SpO2:  [92 %-100 %]   Arterial Line BP: (103-138)/(46-70) .   Home meds for hypertension were reviewed and noted below.   Hypertension Medications               carvediloL (COREG) 3.125 MG tablet Take 2 tablets (6.25 mg total) by mouth 2 (two) times daily with meals.    carvediloL (COREG) 6.25 MG tablet Take 1 tablet by mouth 2 times daily with food    furosemide (LASIX) 40 MG tablet Take 1 tablet (40 mg total) by mouth 2 (two) times a day.    furosemide (LASIX) 40 MG tablet Take 1 tablet by mouth once daily for 90 days            While in the hospital, will manage blood pressure as follows; Adjust home antihypertensive regimen as follows- Lasix gtt instead of oral, holding Coreg    Will utilize p.r.n. blood pressure medication only if patient's blood pressure greater than 180/110 and she develops symptoms such as worsening chest pain or shortness of breath.

## 2024-04-15 NOTE — PLAN OF CARE
"  Care Plan    POC reviewed with Xena Vera and family. Questions and concerns addressed. No acute events today. Cvp 22 this am, levo gtt weaned off once pt alert, pt up to chair with assistance, had BM, small amount of urine, lasix gtt discontinued, Int Car/Anesthesia/Nephrology all seen pt today, placed back on CRRT this evening to remove more fluid, Pt progressing toward goals. Will continue to monitor. See below and flowsheets for full assessment and VS info.       Neuro:  [place] Coma Scale  Best Eye Response: 4-->(E4) spontaneous  Best Motor Response: 6-->(M6) obeys commands  Best Verbal Response: 5-->(V5) oriented  [place] Coma Scale Score: 15  Assessment Qualifiers: patient not sedated/intubated  Pupil PERRLA: yes  24 hr Temp:  [97 °F (36.1 °C)-98.2 °F (36.8 °C)]      CV:  Rhythm: sinus bradycardia  DVT prophylaxis: VTE Required Core Measure: Pharmacological prophylaxis initiated/maintained    Resp:     Oxygen Concentration (%): 28    GI/:  GI prophylaxis: no  Diet/Nutrition Received: 2 gram sodium, low saturated fat/low cholesterol  Last Bowel Movement: 04/15/24  Voiding Characteristics: urethral catheter (bladder)       Urethral Catheter 04/09/24 1330 16 Fr.-Reason for Continuing Urinary Catheterization: Critically ill in ICU and requiring hourly monitoring of intake/output   Intake/Output Summary (Last 24 hours) at 4/15/2024 1754  Last data filed at 4/15/2024 1701  Gross per 24 hour   Intake 2030.61 ml   Output 4148 ml   Net -2117.39 ml       Labs/Accuchecks:  Recent Labs   Lab 04/15/24  0217   WBC 6.80   RBC 3.98*   HGB 9.2*   HCT 33.1*   *      Recent Labs   Lab 04/15/24  0217 04/15/24  1208   *  134* 131*   K 4.3  4.3 4.4   CO2 20*  20* 24     102 98   BUN 34*  34* 41*   CREATININE 2.3*  2.3* 2.7*   ALKPHOS 106  --    ALT 41  --    AST 62*  --    BILITOT 1.7*  --     No results for input(s): "PROTIME", "INR", "APTT", "HEPANTIXA" in the last 168 hours. No results for " "input(s): "CPK", "CPKMB", "TROPONINI", "MB" in the last 168 hours.    Electrolytes: N/A - electrolytes WDL  Accuchecks: none    Gtts/LDAs:  Current Facility-Administered Medications   Medication Dose Route Frequency Provider Last Rate Last Admin    0.9%  NaCl infusion (CRRT USE ONLY)   Intravenous Continuous Maeve Allen  mL/hr at 04/15/24 1701 200 mL/hr at 04/15/24 1701    0.9%  NaCl infusion   Intravenous Once Italo Segovia MD        0.9%  NaCl infusion   Intravenous Continuous Gillies, Connor M, DO   Stopped at 04/15/24 1536    acetaminophen tablet 1,000 mg  1,000 mg Oral Q6H PRN Domonique Walden MD   1,000 mg at 04/14/24 1238    aspirin EC tablet 81 mg  81 mg Oral Daily David Borjas, PA-C   81 mg at 04/15/24 0942    atorvastatin tablet 40 mg  40 mg Oral Daily David Borjas, PA-C   40 mg at 04/15/24 0941    bisacodyL EC tablet 5 mg  5 mg Oral Daily PRN Johnny Cisneros MD        dextrose 10% bolus 125 mL 125 mL  12.5 g Intravenous PRN David Borjas, PA-C        dextrose 10% bolus 250 mL 250 mL  25 g Intravenous PRN David Borjas, PA-C        glucagon (human recombinant) injection 1 mg  1 mg Intramuscular PRN David Borjas, PA-C        glucose chewable tablet 16 g  16 g Oral PRN David Borjas, PA-C        glucose chewable tablet 24 g  24 g Oral PRN David Borjas PA-C        heparin (porcine) injection 5,000 Units  5,000 Units Subcutaneous Q8H David Borjas, PA-C   5,000 Units at 04/15/24 1552    HYDROcodone-acetaminophen 5-325 mg per tablet 1 tablet  1 tablet Oral Q6H PRN Johnny Cisneros MD   1 tablet at 04/15/24 0413    melatonin tablet 6 mg  6 mg Oral Nightly PRN Pietro Herring MD   6 mg at 04/12/24 0302    miconazole NITRATE 2 % top powder   Topical (Top) BID Quang Hernandez MD   Given at 04/15/24 0942    mupirocin 2 % ointment   Nasal BID Italo Segovia MD   Given at 04/15/24 0942    naloxone 0.4 mg/mL injection 0.02 mg  " 0.02 mg Intravenous PRN David Borjas PA-C        NORepinephrine 4 mg in dextrose 5% 250 mL infusion (premix)  0-3 mcg/kg/min Intravenous Continuous Quang Hernandez MD   Stopped at 04/15/24 1141    ondansetron disintegrating tablet 4 mg  4 mg Oral Q8H PRN David Borjas PA-C        ondansetron injection 4 mg  4 mg Intravenous Q8H PRN David Borjas PA-C   4 mg at 04/13/24 1429    oxybutynin tablet 5 mg  5 mg Oral TID Minda Molina MD   5 mg at 04/15/24 1727    polyethylene glycol packet 17 g  17 g Oral BID Domonique Walden MD   17 g at 04/15/24 0941    senna-docusate 8.6-50 mg per tablet 1 tablet  1 tablet Oral BID Domonique Walden MD   1 tablet at 04/15/24 0941    sevelamer carbonate tablet 800 mg  800 mg Oral TID WM David Borjas PA-C   800 mg at 04/15/24 1727    sodium chloride 0.9% bolus 250 mL 250 mL  250 mL Intravenous PRN Italo Segovia MD        sodium chloride 0.9% flush 10 mL  10 mL Intravenous PRN Pietro Herring MD        sodium chloride 0.9% flush 10 mL  10 mL Intravenous PRN David Borjas PA-C        white petrolatum 41 % ointment   Topical (Top) BID Johnny Cisneros MD   Given at 04/15/24 0942    zinc oxide 20 % ointment   Topical (Top) PRN David Borjas PA-C   Given at 04/14/24 0925       Lines/Drains/Airways       Central Venous Catheter Line  Duration             Trialysis (Dialysis) Catheter 04/13/24 1720 right internal jugular 2 days              Drain  Duration                  Urethral Catheter 04/09/24 1330 16 Fr. 6 days              Arterial Line  Duration             Arterial Line 04/14/24 1645 Left Radial 1 day              Peripheral Intravenous Line  Duration                  Peripheral IV - Single Lumen 04/14/24 1600 18 G;1 3/4 in Anterior;Right Upper Arm 1 day                    Skin/Wounds  Bathing/Skin Care: bath, complete;dressed/undressed;linen changed;electrode patches/site rotation (04/15/24 3781)  Wounds: Yes  Wound care  consulted: Yes    Consults  Consults (From admission, onward)          Status Ordering Provider     Inpatient consult to Medical Genetics  Once        Provider:  (Not yet assigned)    Acknowledged CONROY, BEN     Inpatient consult to Social Work/Case Management  Once        Provider:  (Not yet assigned)    Acknowledged JOHN MCCLELLAN     Inpatient consult to Social Work  Once        Provider:  (Not yet assigned)    Acknowledged CONROY, BEN     Inpatient consult to Interventional Cardiology  Once        Provider:  (Not yet assigned)    Completed CONROY, BEN     Inpatient consult to Cardiology  Once        Provider:  (Not yet assigned)    Completed BAHD, JOSE E     Inpatient consult to Registered Dietitian/Nutritionist  Once        Provider:  (Not yet assigned)    Completed BAHD, JOSE E     Inpatient consult to Nephrology  Once        Provider:  (Not yet assigned)    Completed BAHD, JOSE E     Inpatient consult to Social Work/Case Management  Once        Provider:  (Not yet assigned)    Completed WERNER SANTANA     Inpatient consult to Registered Dietitian/Nutritionist  Once        Provider:  (Not yet assigned)    Completed WERNER SANTANA

## 2024-04-15 NOTE — HPI
Xena Vera is a 52 yo F with PMH of HFpEF, severe MR, HTN, HLD, and CKD4 who initially presented to [facility] on 4/1/2024 after a mechanical fall at home with concurrent SOB and swelling in her legs and abdomen. She reported adherence to home Lasix and low sodium diet, but had felt increasingly SOB prior to admission as she was sleeping upright and had multiple nighttime awakenings due to SOB. In the ED, BNP was 4639 and she was admitted to Hospital Medicine for further management of ADHF. She initially had good response to IV Lasix but hospital course was later complicated by urinary retention, worsening kidney function, and decreased UOP. She eventually ended up requiring CRRT/SLED for further volume removal. She underwent HD catheter placement. She underwent repeat TTE which continued to show severe functional regurgitation with centrally directed jet. Interventional Cardiology consulted for evaluation for Severe MR and possible Mitraclip    Of note, patient was evaluated back in 1/2024 for Mitraclip. At the time patient had under gone the below work up. Plan for diagnostic LHC.       The patient has undergone the following MitraClip work-up:   POOJA (Date NEEDS): Severe mitral insufficiency, MVA  cm2, MG  mmHg, EOA  cm2, EF= %.   LHC (Date NEEDS):    STS: 2%   Frailty: 1/4   CT Surgery risk assessment:  risk, per Dr  due to   Comorbidities: CKD4, Obesity, SevMR, HFpEF

## 2024-04-15 NOTE — SUBJECTIVE & OBJECTIVE
Interval History: NAEON. Back on levo gtt.     Review of Systems   Unable to perform ROS: Mental status change     Objective:     Vital Signs (Most Recent):  Temp: 98.2 °F (36.8 °C) (04/15/24 1101)  Pulse: (!) 59 (04/15/24 1401)  Resp: (!) 29 (04/15/24 1401)  BP: (!) 100/58 (04/14/24 2315)  SpO2: 100 % (04/15/24 1401) Vital Signs (24h Range):  Temp:  [97 °F (36.1 °C)-98.2 °F (36.8 °C)] 98.2 °F (36.8 °C)  Pulse:  [54-63] 59  Resp:  [9-36] 29  SpO2:  [92 %-100 %] 100 %  BP: ()/(53-58) 100/58  Arterial Line BP: (103-138)/(46-70) 104/47     Weight: 78 kg (172 lb)  Body mass index is 33.59 kg/m².     SpO2: 100 %         Intake/Output Summary (Last 24 hours) at 4/15/2024 1508  Last data filed at 4/15/2024 1401  Gross per 24 hour   Intake 2270.63 ml   Output 3685 ml   Net -1414.37 ml       Lines/Drains/Airways       Central Venous Catheter Line  Duration             Trialysis (Dialysis) Catheter 04/13/24 1720 right internal jugular 1 day              Drain  Duration                  Urethral Catheter 04/09/24 1330 16 Fr. 6 days              Arterial Line  Duration             Arterial Line 04/14/24 1645 Left Radial <1 day              Peripheral Intravenous Line  Duration                  Peripheral IV - Single Lumen 04/14/24 1600 18 G;1 3/4 in Anterior;Right Upper Arm <1 day                       Physical Exam  Vitals and nursing note reviewed.   Constitutional:       Appearance: She is obese. She is ill-appearing.      Comments: Sleepy but arousable   HENT:      Head: Normocephalic and atraumatic.   Cardiovascular:      Rate and Rhythm: Normal rate and regular rhythm.      Heart sounds: Murmur heard.   Pulmonary:      Effort: Pulmonary effort is normal. No respiratory distress.      Comments: On 2 L NC   Abdominal:      General: Abdomen is flat. There is distension.      Tenderness: There is no abdominal tenderness.      Comments: + edema   Musculoskeletal:      Right lower leg: Edema present.      Left lower leg:  Edema present.   Skin:     General: Skin is warm and dry.            Significant Labs: All pertinent lab results from the last 24 hours have been reviewed.    Significant Imaging:  All pertinent imaging results from the last 24 hours have been reviewed.

## 2024-04-15 NOTE — ASSESSMENT & PLAN NOTE
Patient's anemia is currently controlled. Has not received any PRBCs to date. Etiology likely d/t chronic disease due to Chronic Kidney Disease  Current CBC reviewed-   Lab Results   Component Value Date    HGB 9.2 (L) 04/15/2024    HCT 33.1 (L) 04/15/2024     Monitor serial CBC and transfuse if patient becomes hemodynamically unstable, symptomatic or H/H drops below 7/21.

## 2024-04-15 NOTE — CONSULTS
[facility] - Card[facility]  Interventional Cardiology  Consult Note    Patient Name: Xena Vera  MRN: [id number]  Admission Date: 4/1/2024  Hospital Length of Stay: 12 days  Code Status: Full Code   Attending Provider: Renato Elmore MD  Consulting Provider: Radha Kaur MD  Primary Care Physician: Tami Villeda FNP  Principal Problem:Severe mitral valve regurgitation    Patient information was obtained from patient and ER records.     Inpatient consult to Interventional Cardiology  Consult performed by: Lorenza Ritchie MD  Consult ordered by: Domonique Walden MD        Subjective     Chief Complaint:  GLF    HPI:  Xena Vera is a 52 yo F with PMH of HFpEF, severe MR, HTN, HLD, and CKD4 who [place] [facility] [place] a mechanical fall at home with concurrent SOB and swelling in her legs and abdomen. She reported adherence to home Lasix and low sodium diet, but had felt increasingly SOB prior to admission as she was sleeping upright and had multiple nighttime awakenings due to SOB. In the ED, BNP was 4639 and she was admitted to Hospital Medicine for further management of ADHF. She initially had good response to IV Lasix but hospital course was later complicated by urinary retention, worsening kidney function, and decreased UOP. Nephrology and Cardiology were consulted to assist. She was started on Diuril and Lasix gtt for diuresis and Isordil and hydralazine for afterload reduction.     Patient was transferred to CCU for further management.     Interventional Cardiology consulted for evaluation for severe MR     Past Medical History:   Diagnosis Date    (HFpEF) heart failure with preserved ejection fraction 06/24/2023    EF 63% with G2 DD  Continue lasix, appears euvolemic today  Continue good BP management with losartan, increase Coreg 6.25 mg BID  Fluid restriction, low sodium diet  Recommend Jardiance- patient had CKD 4 and this is a limiting factor- nephro  eval pending    Anemia 06/24/2023    CKD (chronic kidney disease)     HTN (hypertension)     Mitral valve regurgitation 06/26/2023    Refer to structural for evaluation  May benefit from mitral clip    Ovarian cyst     Stage 4 chronic kidney disease 06/24/2023    Refer to nephrology at [facility] as unable to get established with [facility] and the phone number for the outside nephrologist provided to patient during her recent hospitalization goes unanswered when called.   Cr remains elevated  Would like to start SGLT-2 and appreciate nephrology expertise        No past surgical history on file.    Review of patient's allergies indicates:  No Known Allergies    Current Facility-Administered Medications   Medication Dose Route Frequency Provider Last Rate Last Admin    0.9%  NaCl infusion (CRRT USE ONLY)   Intravenous Continuous Italo Segovia  mL/hr at 04/15/24 0001 Rate Verify at 04/15/24 0001    0.9%  NaCl infusion   Intravenous Once Italo Segovia MD        0.9%  NaCl infusion   Intravenous Continuous Gillies, Connor M, DO   Stopped at 04/14/24 0917    acetaminophen tablet 1,000 mg  1,000 mg Oral Q6H PRN Domonique Walden MD   1,000 mg at 04/14/24 1238    aspirin EC tablet 81 mg  81 mg Oral Daily David Borjas, PA-C   81 mg at 04/14/24 0921    atorvastatin tablet 40 mg  40 mg Oral Daily David Borjas, PA-C   40 mg at 04/14/24 0921    bisacodyL EC tablet 5 mg  5 mg Oral Daily PRN Johnny Cisneros MD        chlorothiazide (DIURIL) 500 mg in dextrose 5 % (D5W) 50 mL IVPB  500 mg Intravenous Q24H Minda Molina MD   Stopped at 04/14/24 0745    dextrose 10% bolus 125 mL 125 mL  12.5 g Intravenous PRN David Borjas PA-C        dextrose 10% bolus 250 mL 250 mL  25 g Intravenous PRN David Borjas PA-C        furosemide (Lasix) 500 mg in 50 mL infusion (conc: 10 mg/mL)  40 mg/hr Intravenous Continuous Tez Guerra MD 4 mL/hr at 04/15/24 0001 40 mg/hr at 04/15/24 0001     glucagon (human recombinant) injection 1 mg  1 mg Intramuscular PRN David Borjas PA-C        glucose chewable tablet 16 g  16 g Oral PRN David Borjas PA-C        glucose chewable tablet 24 g  24 g Oral PRN David Borjas PA-C        heparin (porcine) injection 5,000 Units  5,000 Units Subcutaneous Q8H David Borjas PA-C   5,000 Units at 04/14/24 2118    HYDROcodone-acetaminophen 5-325 mg per tablet 1 tablet  1 tablet Oral Q6H PRN Johnny Cisneros MD   1 tablet at 04/14/24 0330    magnesium sulfate 2g in water 50mL IVPB (premix)  2 g Intravenous PRN Italo Segovia MD        melatonin tablet 6 mg  6 mg Oral Nightly PRN Pietro Herring MD   6 mg at 04/12/24 0302    miconazole NITRATE 2 % top powder   Topical (Top) BID Quang Hernandez MD   Given at 04/14/24 2124    mupirocin 2 % ointment   Nasal BID Italo Segovia MD   Given at 04/14/24 2123    naloxone 0.4 mg/mL injection 0.02 mg  0.02 mg Intravenous PRN David Borjas PA-C        NORepinephrine 4 mg in dextrose 5% 250 mL infusion (premix)  0-3 mcg/kg/min Intravenous Continuous Quang Hernandez MD   Stopped at 04/14/24 1031    ondansetron disintegrating tablet 4 mg  4 mg Oral Q8H PRN David Borjas PA-C        ondansetron injection 4 mg  4 mg Intravenous Q8H PRN David Borjas PA-C   4 mg at 04/13/24 1429    oxybutynin tablet 5 mg  5 mg Oral TID Minda Molina MD   5 mg at 04/14/24 2118    polyethylene glycol packet 17 g  17 g Oral BID Domonique Walden MD   17 g at 04/14/24 2118    senna-docusate 8.6-50 mg per tablet 1 tablet  1 tablet Oral BID Domonique Walden MD   1 tablet at 04/14/24 2118    sevelamer carbonate tablet 800 mg  800 mg Oral TID  David Borjas, WILLY   800 mg at 04/14/24 1639    sodium chloride 0.9% bolus 250 mL 250 mL  250 mL Intravenous PRN Italo Segovia MD        sodium chloride 0.9% flush 10 mL  10 mL Intravenous PRN Pietro Herring MD        sodium chloride 0.9% flush 10  mL  10 mL Intravenous PRN David Borjas PA-C        sodium phosphate 20.01 mmol in dextrose 5 % (D5W) 250 mL IVPB  20.01 mmol Intravenous PRN Italo Segovia MD        sodium phosphate 30 mmol in dextrose 5 % (D5W) 250 mL IVPB  30 mmol Intravenous PRN Italo Segovia MD        sodium phosphate 39.99 mmol in dextrose 5 % (D5W) 250 mL IVPB  39.99 mmol Intravenous PRN Italo Segovia MD        white petrolatum 41 % ointment   Topical (Top) BID Johnny Cisneros MD   Given at 24    zinc oxide 20 % ointment   Topical (Top) PRN David Borjas PA-C   Given at 24 0925     Family History    None       Tobacco Use    Smoking status: Former     Current packs/day: 0.00     Types: Cigarettes     Start date:      Quit date:      Years since quittin.3    Smokeless tobacco: Never    Tobacco comments:     2-3 cigarettes a day   Substance and Sexual Activity    Alcohol use: Never    Drug use: Never    Sexual activity: Not on file       Objective:     Vital Signs (Most Recent):  Temp: 97.1 °F (36.2 °C) (24 2301)  Pulse: (!) 57 (04/15/24 0001)  Resp: (!) 21 (04/15/24 0001)  BP: (!) 100/58 (24 2315)  SpO2: 100 % (04/15/24 0001) Vital Signs (24h Range):  Temp:  [97.1 °F (36.2 °C)-98.2 °F (36.8 °C)] 97.1 °F (36.2 °C)  Pulse:  [51-59] 57  Resp:  [9-87] 21  SpO2:  [95 %-100 %] 100 %  BP: ()/(41-68) 100/58  Arterial Line BP: (114-138)/(52-70) 114/60     Weight: 75 kg (165 lb 5.5 oz)  Body mass index is 32.29 kg/m².    SpO2: 100 %         Intake/Output Summary (Last 24 hours) at 4/15/2024 0104  Last data filed at 4/15/2024 0001  Gross per 24 hour   Intake 2524.17 ml   Output 4226 ml   Net -1701.83 ml       Lines/Drains/Airways       Central Venous Catheter Line  Duration             Trialysis (Dialysis) Catheter 24 1720 right internal jugular 1 day              Drain  Duration                  Urethral Catheter 24 1330 16 Fr. 5 days              Arterial  Line  Duration             Arterial Line 04/14/24 1645 Left Radial <1 day              Peripheral Intravenous Line  Duration                  Peripheral IV - Single Lumen 04/10/24 2100 22 G Posterior;Right Hand 4 days         Peripheral IV - Single Lumen 04/14/24 1600 18 G;1 3/4 in Anterior;Right Upper Arm <1 day                     Physical Exam  Vitals and nursing note reviewed.   Constitutional:       Appearance: Normal appearance.   HENT:      Head: Atraumatic.   Eyes:      General: No scleral icterus.     Conjunctiva/sclera: Conjunctivae normal.   Cardiovascular:      Rate and Rhythm: Normal rate and regular rhythm.      Heart sounds: No murmur heard.  Pulmonary:      Effort: Pulmonary effort is normal.      Breath sounds: Normal breath sounds. No wheezing.   Abdominal:      General: There is no distension.      Palpations: Abdomen is soft.      Tenderness: There is no abdominal tenderness.   Musculoskeletal:      Right lower leg: No edema.      Left lower leg: No edema.   Skin:     General: Skin is warm.   Neurological:      General: No focal deficit present.            Significant Labs: All pertinent lab results from the last 24 hours have been reviewed.    Assessment and Plan:     Cardiac/Vascular  * Severe mitral valve regurgitation  Ms. Vera is a 52 yo F Czech speaking pt who was admitted for ADHF 2/2 severe MR. The MR appears to be secondary to posterior leaflet restriction and leaflet length of 1.1 cm. Saw Dr. Stone in January of 2024 and was undergoing work-up for possible MitraClip; [facility] scheduled for 3/15 but not completed due to financial constraints, also needs POOJA.     - Will need further optimization of kidney function and volume removal prior to angiogram for outpatient Mitraclip work up.   - Please contact us if any questions/concerns arise.             VTE Risk Mitigation (From admission, onward)           Ordered     heparin (porcine) injection 1,000 Units  As needed (PRN)         04/13/24  1428     Place sequential compression device  Until discontinued         04/03/24 1347     heparin (porcine) injection 5,000 Units  Every 8 hours         04/01/24 1030     IP VTE HIGH RISK PATIENT  Once         04/01/24 1030     Place sequential compression device  Until discontinued         04/01/24 1030                    Thank you for your consult.     Radha Kaur MD  Interventional Cardiology   [facility] - Cardiac Intensive Care

## 2024-04-15 NOTE — ASSESSMENT & PLAN NOTE
Angiokeratoma   Proteinuria     - Genetics consulted, appreciate recs  - GLA genetic testing collected and sent to [facility]  - Consider cardiac MRI

## 2024-04-15 NOTE — PROGRESS NOTES
[facility] - Cardiac Intensive Care  Cardiology  Progress Note    Patient Name: Xena Vera  MRN: [id number]  Admission Date: 4/1/2024  Hospital Length of Stay: 12 days  Code Status: Full Code   Attending Physician: Renato Elmore MD   Primary Care Physician: Tami Villeda FNP  Expected Discharge Date: 4/22/2024  Principal Problem:Severe mitral valve regurgitation    Subjective:     Hospital Course:   Patient has been improving diuresis with Lasix increase from 100 mg IV TID --> Lasix gtt 30 mg/hr to Lasix 40 mg/hr. Continuing Diuril to optimize volume status. Low BP this AM needing Levophed. Started SLED/CRRT.    Interval History: NAEON. Back on levo gtt.     Review of Systems   Unable to perform ROS: Mental status change     Objective:     Vital Signs (Most Recent):  Temp: 98.2 °F (36.8 °C) (04/15/24 1101)  Pulse: (!) 59 (04/15/24 1401)  Resp: (!) 29 (04/15/24 1401)  BP: (!) 100/58 (04/14/24 2315)  SpO2: 100 % (04/15/24 1401) Vital Signs (24h Range):  Temp:  [97 °F (36.1 °C)-98.2 °F (36.8 °C)] 98.2 °F (36.8 °C)  Pulse:  [54-63] 59  Resp:  [9-36] 29  SpO2:  [92 %-100 %] 100 %  BP: ()/(53-58) 100/58  Arterial Line BP: (103-138)/(46-70) 104/47     Weight: 78 kg (172 lb)  Body mass index is 33.59 kg/m².     SpO2: 100 %         Intake/Output Summary (Last 24 hours) at 4/15/2024 1508  Last data filed at 4/15/2024 1401  Gross per 24 hour   Intake 2270.63 ml   Output 3685 ml   Net -1414.37 ml       Lines/Drains/Airways       Central Venous Catheter Line  Duration             Trialysis (Dialysis) Catheter 04/13/24 1720 right internal jugular 1 day              Drain  Duration                  Urethral Catheter 04/09/24 1330 16 Fr. 6 days              Arterial Line  Duration             Arterial Line 04/14/24 1645 Left Radial <1 day              Peripheral Intravenous Line  Duration                  Peripheral IV - Single Lumen 04/14/24 1600 18 G;1 3/4 in Anterior;Right Upper Arm <1 day                        Physical Exam  Vitals and nursing note reviewed.   Constitutional:       Appearance: She is obese. She is ill-appearing.      Comments: Sleepy but arousable   HENT:      Head: Normocephalic and atraumatic.   Cardiovascular:      Rate and Rhythm: Normal rate and regular rhythm.      Heart sounds: Murmur heard.   Pulmonary:      Effort: Pulmonary effort is normal. No respiratory distress.      Comments: On 2 L NC   Abdominal:      General: Abdomen is flat. There is distension.      Tenderness: There is no abdominal tenderness.      Comments: + edema   Musculoskeletal:      Right lower leg: Edema present.      Left lower leg: Edema present.   Skin:     General: Skin is warm and dry.            Significant Labs: All pertinent lab results from the last 24 hours have been reviewed.    Significant Imaging:  All pertinent imaging results from the last 24 hours have been reviewed.    Assessment and Plan:     * Severe mitral valve regurgitation  Acute on chronic heart failure with preserved ejection fraction (HFpEF)   Anasarca     54 yo F admitted for ADHF 2/2 severe MR. The MR appears to be secondary to posterior leaflet restriction and leaflet length of 1.1 cm. BNP on admission was 4300 compared to 350 eight months prior. Initially diagnosed with severe MR in June of 2023. Saw Dr. Stone in January of 2024 and was undergoing work-up for possible MitraClip; [facility] scheduled for 3/15 but not completed due to financial constraints, also needs POOJA. Will need optimization of kidney function prior to angiogram consideration, appreciate Nephrology assistance    DDX: rheumatic heart disease vs Fabry vs antiphospholipid    4/1/2024 TTE    Left Ventricle: The left ventricle is normal in size. Normal wall thickness. Normal wall motion. There is normal systolic function with a visually estimated ejection fraction of 60 - 65%. Grade II diastolic dysfunction.    Right Ventricle: Normal right ventricular cavity size. Wall thickness is  "normal. Right ventricle wall motion  is normal. Systolic function is reduced.    Left Atrium: Left atrium is very  severely dilated.    Right Atrium: Right atrium is mildly dilated.    Aortic Valve: There is mild aortic valve sclerosis. There is normal leaflet mobility. There is trace aortic regurgitation.    Mitral Valve: There is mild bileaflet sclerosis. There is posterior leaflet tethering and failure of complete coaptation. There is very severe regurgitation with a posterolateral eccentriccally directed jet.    Tricuspid Valve: The tricuspid valve is structurally normal. There is normal leaflet mobility. There is moderate to severe regurgitation.    IVC/SVC: IVC was not well visualized due to poor acoustic window. Intermediate venous pressure at 8 mmHg.    Pericardium: There is a trivial effusion posteriorly and small under the RA.     - Interventional Cardiology consulted - no intervention until patient's volume status is controlled  - Nephrology following, undergoing SLED/CRRT  - POOJA ordered - "Anesthesia evaluated the patient and feel that she needs better optimization prior to POOJA. They stated once she is euvolemic that we can move forward. Will need a new POOJA order at that time."  - Continue Diuril 500 mg IV q24h & Lasix gtt @ 40 mg/hr for diuresis  - Discontinued hydralazine 25 mg po q8h & Isordil 20 mg po BID for afterload reduction  - Continue home ASA 81 mg po daily and Lipitor 40 mg po daily  - Genetics consulted, see "Hypertrophic cardiomyopathy"  - F/u antistreptolysin-O  - F/u antiphospholipid work-up: B2-glycoprotein, anti-cardiolipin, DRVVT, YUMIKO  - Strict I/O, Jin in place  - Telemetry monitoring    Hypertrophic cardiomyopathy  Angiokeratoma   Proteinuria     - Genetics consulted, appreciate recs  - GLA genetic testing collected and sent to [facility]  - Consider cardiac MRI    Acute kidney injury superimposed on chronic kidney disease  Patient with acute kidney injury/acute renal failure " "likely due to acute tubular necrosis caused by unknown.  REGINALDO is currently worsening. Baseline creatinine  2.5  - Labs reviewed- Renal function/electrolytes with Estimated Creatinine Clearance: 25.8 mL/min (A) (based on SCr of 2.3 mg/dL (H)). according to latest data. Monitor urine output and serial BMP and adjust therapy as needed. Avoid nephrotoxins and renally dose meds for GFR listed above.    Recent Labs     04/14/24  1456 04/14/24  2254 04/15/24  0217   CREATININE 3.6* 2.1* 2.3*  2.3*     - Nephrology following, appreciate josie - started SLED on 4/14  - Genetics consulted, see "Hypertrophic cardiomyopathy"  - Continue home Renvela 800 mg po TID with meals  - Holding home sodium bicarb 650 mg po BID    Essential hypertension  Chronic, controlled. Latest blood pressure and vitals reviewed-     Temp:  [97 °F (36.1 °C)-98.2 °F (36.8 °C)]   Pulse:  [52-63]   Resp:  [9-87]   BP: ()/(51-58)   SpO2:  [92 %-100 %]   Arterial Line BP: (103-138)/(46-70) .   Home meds for hypertension were reviewed and noted below.   Hypertension Medications               carvediloL (COREG) 3.125 MG tablet Take 2 tablets (6.25 mg total) by mouth 2 (two) times daily with meals.    carvediloL (COREG) 6.25 MG tablet Take 1 tablet by mouth 2 times daily with food    furosemide (LASIX) 40 MG tablet Take 1 tablet (40 mg total) by mouth 2 (two) times a day.    furosemide (LASIX) 40 MG tablet Take 1 tablet by mouth once daily for 90 days            While in the hospital, will manage blood pressure as follows; Adjust home antihypertensive regimen as follows- Lasix gtt instead of oral, holding Coreg    Will utilize p.r.n. blood pressure medication only if patient's blood pressure greater than 180/110 and she develops symptoms such as worsening chest pain or shortness of breath.    HLD (hyperlipidemia)  Continue home Lipitor 40 mg po daily  4/14 LDL 39    Acute urinary retention  Jin in place since 4/9/2024  Continue oxybutynin 5 mg po " "TID    Prolonged QT interval  4/4 QTc 481 ms. Avoid QT prolonging medications.     Prediabetes  Hemoglobin A1C   Date Value Ref Range Status   04/02/2024 6.0 (H) 4.0 - 5.6 % Final     Comment:     ADA Screening Guidelines:  5.7-6.4%  Consistent with prediabetes  >or=6.5%  Consistent with diabetes    High levels of fetal hemoglobin interfere with the HbA1C  assay. Heterozygous hemoglobin variants (HbS, HgC, etc)do  not significantly interfere with this assay.   However, presence of multiple variants may affect accuracy.       - F/u PCP    Anemia  Patient's anemia is currently controlled. Has not received any PRBCs to date. Etiology likely d/t chronic disease due to Chronic Kidney Disease  Current CBC reviewed-   Lab Results   Component Value Date    HGB 9.2 (L) 04/15/2024    HCT 33.1 (L) 04/15/2024     Monitor serial CBC and transfuse if patient becomes hemodynamically unstable, symptomatic or H/H drops below 7/21.    Leg wound, left  From mechanical fall, has been painful thoughout admission    - Wound Care assessed, orders for care placed  "Cleanse left leg with sterile normal saline and pat dry. Apply aquaphor BID and PRN"  - Tylenol 1000 mg po q6h PRN for moderate pain and Norco 5-325 mg po q6h PRN for severe pain    Class 2 obesity in adult  Body mass index is 32.29 kg/m². Morbid obesity complicates all aspects of disease management from diagnostic modalities to treatment. Weight loss encouraged and health benefits explained to patient.      Proteinuria  See "Hypertrophic cardiomyopathy"    Angiokeratoma  See "Hypertrophic cardiomyopathy"    Anasarca  See "Severe mitral valve regurgitation"    Acute on chronic heart failure with preserved ejection fraction (HFpEF)  See "Severe mitral valve regurgitation"        VTE Risk Mitigation (From admission, onward)           Ordered     heparin (porcine) injection 1,000 Units  As needed (PRN)         04/13/24 1428     Place sequential compression device  Until " discontinued         04/03/24 1347     heparin (porcine) injection 5,000 Units  Every 8 hours         04/01/24 1030     IP VTE HIGH RISK PATIENT  Once         04/01/24 1030     Place sequential compression device  Until discontinued         04/01/24 1030                    Domonique Walden MD  Cardiology  [facility] - Cardiac Int[facility] Care

## 2024-04-16 PROBLEM — R21 RASH: Status: ACTIVE | Noted: 2024-04-16

## 2024-04-16 PROBLEM — L30.4 INTERTRIGO: Status: ACTIVE | Noted: 2024-04-16

## 2024-04-16 LAB
1,3 BETA GLUCAN SER-MCNC: <31 PG/ML
ALBUMIN SERPL BCP-MCNC: 2.6 G/DL (ref 3.5–5.2)
ALBUMIN SERPL BCP-MCNC: 2.8 G/DL (ref 3.5–5.2)
ALBUMIN SERPL BCP-MCNC: 2.9 G/DL (ref 3.5–5.2)
ALBUMIN SERPL BCP-MCNC: 2.9 G/DL (ref 3.5–5.2)
ALLENS TEST: ABNORMAL
ALLENS TEST: ABNORMAL
ALP SERPL-CCNC: 106 U/L (ref 55–135)
ALT SERPL W/O P-5'-P-CCNC: 40 U/L (ref 10–44)
ANION GAP SERPL CALC-SCNC: 5 MMOL/L (ref 8–16)
ANION GAP SERPL CALC-SCNC: 7 MMOL/L (ref 8–16)
AST SERPL-CCNC: 58 U/L (ref 10–40)
BASOPHILS # BLD AUTO: 0.02 K/UL (ref 0–0.2)
BASOPHILS NFR BLD: 0.4 % (ref 0–1.9)
BILIRUB SERPL-MCNC: 1.4 MG/DL (ref 0.1–1)
BUN SERPL-MCNC: 12 MG/DL (ref 6–20)
BUN SERPL-MCNC: 6 MG/DL (ref 6–20)
BUN SERPL-MCNC: 8 MG/DL (ref 6–20)
BUN SERPL-MCNC: 8 MG/DL (ref 6–20)
CALCIUM SERPL-MCNC: 7.6 MG/DL (ref 8.7–10.5)
CALCIUM SERPL-MCNC: 7.9 MG/DL (ref 8.7–10.5)
CALCIUM SERPL-MCNC: 7.9 MG/DL (ref 8.7–10.5)
CALCIUM SERPL-MCNC: 8.1 MG/DL (ref 8.7–10.5)
CHLORIDE SERPL-SCNC: 104 MMOL/L (ref 95–110)
CHLORIDE SERPL-SCNC: 105 MMOL/L (ref 95–110)
CO2 SERPL-SCNC: 21 MMOL/L (ref 23–29)
CO2 SERPL-SCNC: 22 MMOL/L (ref 23–29)
CO2 SERPL-SCNC: 22 MMOL/L (ref 23–29)
CO2 SERPL-SCNC: 24 MMOL/L (ref 23–29)
CREAT SERPL-MCNC: 0.9 MG/DL (ref 0.5–1.4)
CREAT SERPL-MCNC: 1.6 MG/DL (ref 0.5–1.4)
DELSYS: ABNORMAL
DIFFERENTIAL METHOD BLD: ABNORMAL
EOSINOPHIL # BLD AUTO: 0.1 K/UL (ref 0–0.5)
EOSINOPHIL NFR BLD: 0.9 % (ref 0–8)
ERYTHROCYTE [DISTWIDTH] IN BLOOD BY AUTOMATED COUNT: 22.4 % (ref 11.5–14.5)
EST. GFR  (NO RACE VARIABLE): 38.1 ML/MIN/1.73 M^2
EST. GFR  (NO RACE VARIABLE): >60 ML/MIN/1.73 M^2
FLOW: 2
FUNGITELL COMMENTS: NEGATIVE
GLUCOSE SERPL-MCNC: 73 MG/DL (ref 70–110)
GLUCOSE SERPL-MCNC: 73 MG/DL (ref 70–110)
GLUCOSE SERPL-MCNC: 84 MG/DL (ref 70–110)
GLUCOSE SERPL-MCNC: 96 MG/DL (ref 70–110)
HCO3 UR-SCNC: 23.3 MMOL/L (ref 24–28)
HCT VFR BLD AUTO: 33.1 % (ref 37–48.5)
HGB BLD-MCNC: 8.9 G/DL (ref 12–16)
IMM GRANULOCYTES # BLD AUTO: 0.04 K/UL (ref 0–0.04)
IMM GRANULOCYTES NFR BLD AUTO: 0.7 % (ref 0–0.5)
LYMPHOCYTES # BLD AUTO: 0.5 K/UL (ref 1–4.8)
LYMPHOCYTES NFR BLD: 9.1 % (ref 18–48)
MAGNESIUM SERPL-MCNC: 1.8 MG/DL (ref 1.6–2.6)
MAGNESIUM SERPL-MCNC: 1.8 MG/DL (ref 1.6–2.6)
MAGNESIUM SERPL-MCNC: 1.9 MG/DL (ref 1.6–2.6)
MAGNESIUM SERPL-MCNC: 1.9 MG/DL (ref 1.6–2.6)
MCH RBC QN AUTO: 22.6 PG (ref 27–31)
MCHC RBC AUTO-ENTMCNC: 26.9 G/DL (ref 32–36)
MCV RBC AUTO: 84 FL (ref 82–98)
MODE: ABNORMAL
MONOCYTES # BLD AUTO: 0.6 K/UL (ref 0.3–1)
MONOCYTES NFR BLD: 10.4 % (ref 4–15)
NEUTROPHILS # BLD AUTO: 4.3 K/UL (ref 1.8–7.7)
NEUTROPHILS NFR BLD: 78.5 % (ref 38–73)
NRBC BLD-RTO: 1 /100 WBC
PCO2 BLDA: 55 MMHG (ref 35–45)
PH SMN: 7.23 [PH] (ref 7.35–7.45)
PHOSPHATE SERPL-MCNC: 1.1 MG/DL (ref 2.7–4.5)
PHOSPHATE SERPL-MCNC: 1.4 MG/DL (ref 2.7–4.5)
PHOSPHATE SERPL-MCNC: 1.4 MG/DL (ref 2.7–4.5)
PHOSPHATE SERPL-MCNC: 2.1 MG/DL (ref 2.7–4.5)
PLATELET # BLD AUTO: 123 K/UL (ref 150–450)
PMV BLD AUTO: ABNORMAL FL (ref 9.2–12.9)
PO2 BLDA: 31 MMHG (ref 40–60)
PO2 BLDA: 38 MMHG (ref 40–60)
POC BE: -4 MMOL/L
POC SATURATED O2: 51 % (ref 95–100)
POC SATURATED O2: 61 % (ref 95–100)
POC TCO2: 25 MMOL/L (ref 24–29)
POTASSIUM SERPL-SCNC: 4.3 MMOL/L (ref 3.5–5.1)
POTASSIUM SERPL-SCNC: 4.3 MMOL/L (ref 3.5–5.1)
POTASSIUM SERPL-SCNC: 4.4 MMOL/L (ref 3.5–5.1)
POTASSIUM SERPL-SCNC: 4.5 MMOL/L (ref 3.5–5.1)
PROT SERPL-MCNC: 6.8 G/DL (ref 6–8.4)
RBC # BLD AUTO: 3.94 M/UL (ref 4–5.4)
SAMPLE: ABNORMAL
SAMPLE: ABNORMAL
SITE: ABNORMAL
SITE: ABNORMAL
SODIUM SERPL-SCNC: 133 MMOL/L (ref 136–145)
SODIUM SERPL-SCNC: 133 MMOL/L (ref 136–145)
SODIUM SERPL-SCNC: 134 MMOL/L (ref 136–145)
SODIUM SERPL-SCNC: 134 MMOL/L (ref 136–145)
SP02: 98
WBC # BLD AUTO: 5.48 K/UL (ref 3.9–12.7)

## 2024-04-16 PROCEDURE — 36410 VNPNXR 3YR/> PHY/QHP DX/THER: CPT

## 2024-04-16 PROCEDURE — 84100 ASSAY OF PHOSPHORUS: CPT

## 2024-04-16 PROCEDURE — 25000003 PHARM REV CODE 250: Performed by: PHYSICIAN ASSISTANT

## 2024-04-16 PROCEDURE — 80069 RENAL FUNCTION PANEL: CPT | Performed by: STUDENT IN AN ORGANIZED HEALTH CARE EDUCATION/TRAINING PROGRAM

## 2024-04-16 PROCEDURE — 80100008 HC CRRT DAILY MAINTENANCE

## 2024-04-16 PROCEDURE — 83735 ASSAY OF MAGNESIUM: CPT

## 2024-04-16 PROCEDURE — 20000000 HC ICU ROOM

## 2024-04-16 PROCEDURE — 94761 N-INVAS EAR/PLS OXIMETRY MLT: CPT | Mod: XB

## 2024-04-16 PROCEDURE — 27000221 HC OXYGEN, UP TO 24 HOURS

## 2024-04-16 PROCEDURE — 83735 ASSAY OF MAGNESIUM: CPT | Mod: 91

## 2024-04-16 PROCEDURE — 80053 COMPREHEN METABOLIC PANEL: CPT

## 2024-04-16 PROCEDURE — 85025 COMPLETE CBC W/AUTO DIFF WBC: CPT

## 2024-04-16 PROCEDURE — 90945 DIALYSIS ONE EVALUATION: CPT

## 2024-04-16 PROCEDURE — 25000003 PHARM REV CODE 250

## 2024-04-16 PROCEDURE — 99900035 HC TECH TIME PER 15 MIN (STAT)

## 2024-04-16 PROCEDURE — 25000003 PHARM REV CODE 250: Performed by: STUDENT IN AN ORGANIZED HEALTH CARE EDUCATION/TRAINING PROGRAM

## 2024-04-16 PROCEDURE — 25000003 PHARM REV CODE 250: Performed by: INTERNAL MEDICINE

## 2024-04-16 PROCEDURE — 82803 BLOOD GASES ANY COMBINATION: CPT

## 2024-04-16 PROCEDURE — 76937 US GUIDE VASCULAR ACCESS: CPT

## 2024-04-16 PROCEDURE — 80069 RENAL FUNCTION PANEL: CPT | Mod: 91

## 2024-04-16 PROCEDURE — 63600175 PHARM REV CODE 636 W HCPCS: Performed by: PHYSICIAN ASSISTANT

## 2024-04-16 PROCEDURE — 83735 ASSAY OF MAGNESIUM: CPT | Mod: 91 | Performed by: STUDENT IN AN ORGANIZED HEALTH CARE EDUCATION/TRAINING PROGRAM

## 2024-04-16 PROCEDURE — 99231 SBSQ HOSP IP/OBS SF/LOW 25: CPT | Mod: ,,, | Performed by: INTERNAL MEDICINE

## 2024-04-16 PROCEDURE — C1751 CATH, INF, PER/CENT/MIDLINE: HCPCS

## 2024-04-16 PROCEDURE — 99233 SBSQ HOSP IP/OBS HIGH 50: CPT | Mod: ,,, | Performed by: INTERNAL MEDICINE

## 2024-04-16 RX ORDER — MAGNESIUM SULFATE HEPTAHYDRATE 40 MG/ML
2 INJECTION, SOLUTION INTRAVENOUS
Status: DISPENSED | OUTPATIENT
Start: 2024-04-16 | End: 2024-04-17

## 2024-04-16 RX ORDER — SODIUM,POTASSIUM PHOSPHATES 280-250MG
1 POWDER IN PACKET (EA) ORAL ONCE
Status: COMPLETED | OUTPATIENT
Start: 2024-04-16 | End: 2024-04-16

## 2024-04-16 RX ORDER — HYDROCODONE BITARTRATE AND ACETAMINOPHEN 500; 5 MG/1; MG/1
TABLET ORAL CONTINUOUS
Status: ACTIVE | OUTPATIENT
Start: 2024-04-16 | End: 2024-04-17

## 2024-04-16 RX ORDER — LANOLIN ALCOHOL/MO/W.PET/CERES
800 CREAM (GRAM) TOPICAL ONCE
Status: COMPLETED | OUTPATIENT
Start: 2024-04-17 | End: 2024-04-16

## 2024-04-16 RX ORDER — HYDROXYZINE HYDROCHLORIDE 10 MG/1
10 TABLET, FILM COATED ORAL 3 TIMES DAILY PRN
Status: DISCONTINUED | OUTPATIENT
Start: 2024-04-16 | End: 2024-05-09 | Stop reason: HOSPADM

## 2024-04-16 RX ADMIN — MICONAZOLE NITRATE 2 % TOPICAL POWDER: at 09:04

## 2024-04-16 RX ADMIN — Medication: at 09:04

## 2024-04-16 RX ADMIN — SODIUM CHLORIDE: 9 INJECTION, SOLUTION INTRAVENOUS at 06:04

## 2024-04-16 RX ADMIN — SODIUM CHLORIDE: 9 INJECTION, SOLUTION INTRAVENOUS at 12:04

## 2024-04-16 RX ADMIN — SEVELAMER CARBONATE 800 MG: 800 TABLET, FILM COATED ORAL at 09:04

## 2024-04-16 RX ADMIN — POTASSIUM & SODIUM PHOSPHATES POWDER PACK 280-160-250 MG 1 PACKET: 280-160-250 PACK at 04:04

## 2024-04-16 RX ADMIN — SENNOSIDES AND DOCUSATE SODIUM 1 TABLET: 8.6; 5 TABLET ORAL at 09:04

## 2024-04-16 RX ADMIN — ACETAMINOPHEN 1000 MG: 500 TABLET ORAL at 12:04

## 2024-04-16 RX ADMIN — SEVELAMER CARBONATE 800 MG: 800 TABLET, FILM COATED ORAL at 12:04

## 2024-04-16 RX ADMIN — POLYETHYLENE GLYCOL 3350 17 G: 17 POWDER, FOR SOLUTION ORAL at 09:04

## 2024-04-16 RX ADMIN — ATORVASTATIN CALCIUM 40 MG: 40 TABLET, FILM COATED ORAL at 09:04

## 2024-04-16 RX ADMIN — DIPHENHYDRAMINE HYDROCHLORIDE, ZINC ACETATE: 2; .1 CREAM TOPICAL at 09:04

## 2024-04-16 RX ADMIN — SODIUM CHLORIDE: 9 INJECTION, SOLUTION INTRAVENOUS at 11:04

## 2024-04-16 RX ADMIN — HEPARIN SODIUM 5000 UNITS: 5000 INJECTION INTRAVENOUS; SUBCUTANEOUS at 05:04

## 2024-04-16 RX ADMIN — SEVELAMER CARBONATE 800 MG: 800 TABLET, FILM COATED ORAL at 04:04

## 2024-04-16 RX ADMIN — Medication 400 MG: at 05:04

## 2024-04-16 RX ADMIN — OXYBUTYNIN CHLORIDE 5 MG: 5 TABLET ORAL at 04:04

## 2024-04-16 RX ADMIN — Medication 400 MG: at 09:04

## 2024-04-16 RX ADMIN — ACETAMINOPHEN 1000 MG: 500 TABLET ORAL at 11:04

## 2024-04-16 RX ADMIN — Medication 800 MG: at 11:04

## 2024-04-16 RX ADMIN — HEPARIN SODIUM 5000 UNITS: 5000 INJECTION INTRAVENOUS; SUBCUTANEOUS at 09:04

## 2024-04-16 RX ADMIN — HYDROXYZINE HYDROCHLORIDE 10 MG: 10 TABLET ORAL at 12:04

## 2024-04-16 RX ADMIN — OXYBUTYNIN CHLORIDE 5 MG: 5 TABLET ORAL at 09:04

## 2024-04-16 RX ADMIN — HEPARIN SODIUM 5000 UNITS: 5000 INJECTION INTRAVENOUS; SUBCUTANEOUS at 02:04

## 2024-04-16 RX ADMIN — MUPIROCIN: 20 OINTMENT TOPICAL at 09:04

## 2024-04-16 RX ADMIN — ASPIRIN 81 MG: 81 TABLET, COATED ORAL at 09:04

## 2024-04-16 NOTE — ASSESSMENT & PLAN NOTE
Erythematous macerated plaque on right inguinal crease and in right inferior abdominal crease with spread to mons pubis. Likely represents intertrigo.   Plan:  - start ketoconazole 2% cream BID to affected area  - continue zinc barrier cream  - consider stopping miconazole powder as this can become clumpy and irritating to patient

## 2024-04-16 NOTE — ASSESSMENT & PLAN NOTE
"Patient with acute kidney injury/acute renal failure likely due to acute tubular necrosis caused by unknown.  REGINALDO is currently  improving on CRRT/SLED . Baseline creatinine  2.5  - Labs reviewed- Renal function/electrolytes with Estimated Creatinine Clearance: 65.8 mL/min (based on SCr of 0.9 mg/dL). according to latest data. Monitor urine output and serial BMP and adjust therapy as needed. Avoid nephrotoxins and renally dose meds for GFR listed above.    Recent Labs     04/15/24  1208 04/15/24  2139 04/16/24  0153   CREATININE 2.7* 0.9 0.9  0.9     - Nephrology following, appreciate recs - started SLED on 4/14  - Genetics consulted, see "Hypertrophic cardiomyopathy"  - Continue home Renvela 800 mg po TID with meals  - Holding home sodium bicarb 650 mg po BID  "

## 2024-04-16 NOTE — PLAN OF CARE
"  Care Plan    POC reviewed with Xena Vera and family. Questions and concerns addressed. No acute events today. Cvp 22-23, svo2 61%, some back pain and itching noted today relieved with meds, nephrology following, will run on CRRT x12 hours tonight, up in chair today, 2 bms, Pt progressing toward goals. Will continue to monitor. See below and flowsheets for full assessment and VS info.       Neuro:  Vaughan Coma Scale  Best Eye Response: 4-->(E4) spontaneous  Best Motor Response: 6-->(M6) obeys commands  Best Verbal Response: 5-->(V5) oriented  Vaughan Coma Scale Score: 15  Assessment Qualifiers: patient not sedated/intubated  Pupil PERRLA: yes  24 hr Temp:  [98 °F (36.7 °C)-98.6 °F (37 °C)]      CV:  Rhythm: sinus bradycardia  DVT prophylaxis: VTE Required Core Measure: Pharmacological prophylaxis initiated/maintained    Resp:     Oxygen Concentration (%): 28    GI/:  GI prophylaxis: no  Diet/Nutrition Received: 2 gram sodium, low saturated fat/low cholesterol  Last Bowel Movement: 04/15/24  Voiding Characteristics: urethral catheter (bladder)       Urethral Catheter 04/09/24 1330 16 Fr.-Reason for Continuing Urinary Catheterization: Critically ill in ICU and requiring hourly monitoring of intake/output   Intake/Output Summary (Last 24 hours) at 4/16/2024 1726  Last data filed at 4/16/2024 1724  Gross per 24 hour   Intake 3417.26 ml   Output 4332 ml   Net -914.74 ml       Labs/Accuchecks:  Recent Labs   Lab 04/16/24  0153   WBC 5.48   RBC 3.94*   HGB 8.9*   HCT 33.1*   *      Recent Labs   Lab 04/16/24  0153 04/16/24  1418   *  134* 133*   K 4.3  4.3 4.5   CO2 22*  22* 21*     105 105   BUN 8  8 12   CREATININE 0.9  0.9 1.6*   ALKPHOS 106  --    ALT 40  --    AST 58*  --    BILITOT 1.4*  --     No results for input(s): "PROTIME", "INR", "APTT", "HEPANTIXA" in the last 168 hours. No results for input(s): "CPK", "CPKMB", "TROPONINI", "MB" in the last 168 hours.    Electrolytes: " Electrolytes replaced  Accuchecks: none    Gtts/LDAs:  Current Facility-Administered Medications   Medication Dose Route Frequency Provider Last Rate Last Admin    0.9%  NaCl infusion (CRRT USE ONLY)   Intravenous Continuous Sylvia Nayak MD        0.9%  NaCl infusion   Intravenous Continuous Gillies, Connor M, DO   Stopped at 04/15/24 1536    acetaminophen tablet 1,000 mg  1,000 mg Oral Q6H PRN Domonique Walden MD   1,000 mg at 04/16/24 1225    aspirin EC tablet 81 mg  81 mg Oral Daily David Borjas, PA-C   81 mg at 04/16/24 0900    atorvastatin tablet 40 mg  40 mg Oral Daily David Borjas, PA-C   40 mg at 04/16/24 0945    bisacodyL EC tablet 5 mg  5 mg Oral Daily PRN Johnny Cisneros MD        dextrose 10% bolus 125 mL 125 mL  12.5 g Intravenous PRN David Borjas, PA-C        dextrose 10% bolus 250 mL 250 mL  25 g Intravenous PRN David Borjas, PA-C        diphenhydrAMINE-zinc acetate 2-0.1% cream   Topical (Top) TID PRN Domonique Walden MD        glucagon (human recombinant) injection 1 mg  1 mg Intramuscular PRN David Borjas PA-DIMITRY        glucose chewable tablet 16 g  16 g Oral PRN David Borjas, PA-C        glucose chewable tablet 24 g  24 g Oral PRN David Borjas PA-DIMITRY        heparin (porcine) injection 5,000 Units  5,000 Units Subcutaneous Q8H David Borjas, PA-C   5,000 Units at 04/16/24 1419    HYDROcodone-acetaminophen 5-325 mg per tablet 1 tablet  1 tablet Oral Q6H PRN Johnny Cisneros MD   1 tablet at 04/15/24 0413    hydrOXYzine HCL tablet 10 mg  10 mg Oral TID PRN Domonique Walden MD   10 mg at 04/16/24 1236    magnesium oxide tablet 400 mg  400 mg Oral BID Bal Rosario MD   400 mg at 04/15/24 2353    magnesium sulfate 2g in water 50mL IVPB (premix)  2 g Intravenous PRN Sylvia Nayak MD        melatonin tablet 6 mg  6 mg Oral Nightly PRN Pietro Herring MD   6 mg at 04/12/24 0302    miconazole NITRATE 2 % top powder   Topical (Top)  BID Quang Hernandez MD   Given at 04/16/24 0952    mupirocin 2 % ointment   Nasal BID Italo Segovia MD   Given at 04/16/24 0952    naloxone 0.4 mg/mL injection 0.02 mg  0.02 mg Intravenous PRN David Borjas PA-C        NORepinephrine 4 mg in dextrose 5% 250 mL infusion (premix)  0-3 mcg/kg/min Intravenous Continuous Quang Hernandez MD   Stopped at 04/15/24 1141    ondansetron disintegrating tablet 4 mg  4 mg Oral Q8H PRN David Borjas PA-C        ondansetron injection 4 mg  4 mg Intravenous Q8H PRN David Borjas PA-C   4 mg at 04/13/24 1429    oxybutynin tablet 5 mg  5 mg Oral TID Minda Molina MD   5 mg at 04/16/24 1629    polyethylene glycol packet 17 g  17 g Oral BID Domonique Walden MD   17 g at 04/16/24 0946    senna-docusate 8.6-50 mg per tablet 1 tablet  1 tablet Oral BID Domonique Walden MD   1 tablet at 04/16/24 0945    sevelamer carbonate tablet 800 mg  800 mg Oral TID  David Borjas PA-C   800 mg at 04/16/24 1629    sodium chloride 0.9% bolus 250 mL 250 mL  250 mL Intravenous PRN Italo Segovia MD        sodium chloride 0.9% flush 10 mL  10 mL Intravenous PRN Pietro Herring MD        sodium chloride 0.9% flush 10 mL  10 mL Intravenous PRN David Borjas PA-C        sodium phosphate 20.01 mmol in dextrose 5 % (D5W) 250 mL IVPB  20.01 mmol Intravenous PRN Sylvia Nayak MD        sodium phosphate 30 mmol in dextrose 5 % (D5W) 250 mL IVPB  30 mmol Intravenous PRN Sylvia Nayak MD        sodium phosphate 39.99 mmol in dextrose 5 % (D5W) 250 mL IVPB  39.99 mmol Intravenous PRN Sylvia Nayak MD        white petrolatum 41 % ointment   Topical (Top) BID Johnny Cisneros MD   Given at 04/16/24 0952    zinc oxide 20 % ointment   Topical (Top) PRN David Borjas PA-C   Given at 04/14/24 0925       Lines/Drains/Airways       Central Venous Catheter Line  Duration             Trialysis (Dialysis) Catheter 04/13/24 1720 right internal jugular 3 days               Drain  Duration                  Urethral Catheter 04/09/24 1330 16 Fr. 7 days              Arterial Line  Duration             Arterial Line 04/14/24 1645 Left Radial 2 days              Peripheral Intravenous Line  Duration                  Peripheral IV - Single Lumen 04/14/24 1600 18 G;1 3/4 in Anterior;Right Upper Arm 2 days                    Skin/Wounds  Bathing/Skin Care: bath, complete (04/16/24 0701)  Wounds: Yes  Wound care consulted: Yes    Consults  Consults (From admission, onward)          Status Ordering Provider     Inpatient consult to Dermatology  Once        Provider:  (Not yet assigned)    Completed CONROY, BEN     Inpatient consult to Medical Genetics  Once        Provider:  (Not yet assigned)    Acknowledged CONROY, BEN     Inpatient consult to Social Work/Case Management  Once        Provider:  (Not yet assigned)    Acknowledged JOHN MCCLELLAN     Inpatient consult to Social Work  Once        Provider:  (Not yet assigned)    Acknowledged CONROY, BEN     Inpatient consult to Interventional Cardiology  Once        Provider:  (Not yet assigned)    Completed CONROY, BEN     Inpatient consult to Cardiology  Once        Provider:  (Not yet assigned)    Completed BAHEV, JOSE E     Inpatient consult to Registered Dietitian/Nutritionist  Once        Provider:  (Not yet assigned)    Completed BAHD, JOSE E     Inpatient consult to Nephrology  Once        Provider:  (Not yet assigned)    Completed BAHD, JOSE E     Inpatient consult to Social Work/Case Management  Once        Provider:  (Not yet assigned)    Completed WERNER SANTANA     Inpatient consult to Registered Dietitian/Nutritionist  Once        Provider:  (Not yet assigned)    Completed WERNER SANTANA

## 2024-04-16 NOTE — ASSESSMENT & PLAN NOTE
Chronic, controlled. Latest blood pressure and vitals reviewed-     Temp:  [98 °F (36.7 °C)-98.5 °F (36.9 °C)]   Pulse:  [55-62]   Resp:  [9-40]   BP: (94)/(53)   SpO2:  [94 %-100 %]   Arterial Line BP: ()/(44-66) .   Home meds for hypertension were reviewed and noted below.   Hypertension Medications               carvediloL (COREG) 3.125 MG tablet Take 2 tablets (6.25 mg total) by mouth 2 (two) times daily with meals.    carvediloL (COREG) 6.25 MG tablet Take 1 tablet by mouth 2 times daily with food    furosemide (LASIX) 40 MG tablet Take 1 tablet (40 mg total) by mouth 2 (two) times a day.    furosemide (LASIX) 40 MG tablet Take 1 tablet by mouth once daily for 90 days            While in the hospital, will manage blood pressure as follows; Adjust home antihypertensive regimen as follows- holding Lasix and Coreg    Will utilize p.r.n. blood pressure medication only if patient's blood pressure greater than 180/110 and she develops symptoms such as worsening chest pain or shortness of breath.

## 2024-04-16 NOTE — PROGRESS NOTES
[facility] - Cardiac Intensive Care  Nephrology  Progress Note    Patient Name: Xena Vera  MRN: [id number]  Admission Date: 4/1/2024  Hospital Length of Stay: 13 days  Attending Provider: Renato Elmore MD   Primary Care Physician: Tami Villeda FNP  Principal Problem:Severe mitral valve regurgitation    Subjective:     HPI: Ms Vera is an obese (BMI ~31) 53-year-old with CKD IV (baseline creatinine ~3.1-3.3), prediabetes with most recent hemoglobin A1c 6%, HFpEF (most recent TTE with EF 60-65% with G2DD), mitral valve regurgitation, anemia, hypertension, HLD as well as several over co-morbid conditions who was admitted on 4/1 with heart failure exacerbation and hypervolemia after presenting with to ED following a mechanical fall but also had complaints of progressive dyspnea/ZACARIAS, orthopnea, lower extremity edema and abdominal distension with constipation. On presentation patient was noted to be hypotensive with systolic BP readings as low as 90s mmHg and bradycardiac with HR as low as 50s bpm. There was concern for some edema on chest x-ray and BNP at that time was ~4.6K and metabolic panel was notable for serum sodium 133, bicarbonate 20, , creatinine 4.2, albumin 3.1 and total bilirubin 1.5. UA showed +1 protein. Her admission was complicated by failure to adequately diuresis with escalating IV Lasix and even oral metolazone for which Nephrology was consulted on 4/10 for assistance. She explicitly denies NSAID use as outpatient.    Interval History: Underwent 12h of SLED overnight but still remains overloaded and unable to tolerate laying flat.      Objective:     Vital Signs (Most Recent):  Temp: 98.4 °F (36.9 °C) (04/16/24 0701)  Pulse: (!) 59 (04/16/24 0701)  Resp: (!) 39 (04/16/24 0701)  BP: (!) 94/53 (04/16/24 0600)  SpO2: 98 % (04/16/24 0833) Vital Signs (24h Range):  Temp:  [98 °F (36.7 °C)-98.5 °F (36.9 °C)] 98.4 °F (36.9 °C)  Pulse:  [55-62] 59  Resp:  [9-40] 39  SpO2:  [94 %-100 %]  98 %  BP: (94)/(53) 94/53  Arterial Line BP: ()/(44-66) 124/66     Weight: 77.6 kg (171 lb) (04/16/24 0309)  Body mass index is 33.4 kg/m².  Body surface area is 1.81 meters squared.    I/O last 3 completed shifts:  In: 4794.5 [P.O.:720; I.V.:4031.7; IV Piggyback:42.7]  Out: 8187 [Urine:222; Other:7965]     Physical Exam  Vitals and nursing note reviewed.   Constitutional:       General: She is not in acute distress.     Appearance: Normal appearance. She is ill-appearing.   HENT:      Head: Normocephalic.   Eyes:      Extraocular Movements: Extraocular movements intact.   Cardiovascular:      Rate and Rhythm: Normal rate and regular rhythm.   Pulmonary:      Effort: Pulmonary effort is normal. No respiratory distress.      Breath sounds: Rales present.   Musculoskeletal:      Right lower leg: Edema present.      Left lower leg: Edema present.   Skin:     General: Skin is warm and dry.   Neurological:      General: No focal deficit present.      Mental Status: She is alert and oriented to person, place, and time.      Significant Labs:  CBC:   Recent Labs   Lab 04/16/24  0153   WBC 5.48   RBC 3.94*   HGB 8.9*   HCT 33.1*   *   MCV 84   MCH 22.6*   MCHC 26.9*     CMP:   Recent Labs   Lab 04/16/24  0153   GLU 73  73   CALCIUM 7.9*  7.9*   ALBUMIN 2.9*  2.9*   PROT 6.8   *  134*   K 4.3  4.3   CO2 22*  22*     105   BUN 8  8   CREATININE 0.9  0.9   ALKPHOS 106   ALT 40   AST 58*   BILITOT 1.4*     All labs within the past 24 hours have been reviewed.     Significant Imaging:  None  Assessment/Plan:     Renal/  Acute kidney injury superimposed on chronic kidney disease  CKD IV (baseline creatinine ~3.1-3.3) admitted with hypervolemia and REGINALDO with creatinine 4.2  UA showed +1 protein with UPCR of ~500 mg  Urinary sediment with non dysmorphic RBCs and WBCs present although Jin catheter just place yesterday   Retroperitoneal US without evidence of hydronephrosis although changes  consistent with chronic kidney disease  Diuresis with lasix gtt @ 40mg/hr + Diuril 500mg IV BID attempted, and while Crt improved she remains unable to tolerate lying flat.  Tolerated 8h SLED w goal UF rate 200-500/hr successfully overnight 4/14-15, then 12h SLED on 4/15-16.    Plan/Recommendations:  Will plan for another session of SLED for metabolic clearance and volume management   Continue Renvela 800 mg TIDWM  Please avoid hypotension/major fluctuations in BP which may worsen REGINALDO (keep MAP > 65 mmHg)  Renal diet/tube feeds when not NPO with volume restriction per primary team  Strict I/O's and daily weights  Renally all dose medications to eGFR   Avoid nephrotoxic agents wean feasible (i.e. NSAIDs, intra-arterial contrast, supra-therapeutic vancomycin levels, etc.)          Thank you for your consult. I will follow-up with patient. Please contact us if you have any additional questions.    Perry Meier MD  Nephrology  [facility] - Cardiac Intensive Care

## 2024-04-16 NOTE — PROGRESS NOTES
04/16/24 1807   Treatment   Treatment Type SLED   Treatment Status Restart   Dialysis Machine Number [id number]   Dialyzer Time (hours) 0   BVP (Liters) 0 L   Solutions Labeled and Current  Yes   Access Temporary Cath;Right;IJ   Catheter Dressing Intact  Yes   Alarms Engaged Yes   CRRT Comments sled restarted as ordered   Prescription   Time (Hours) 12  (6 sled/6 scuf)   Dialysate K + (mEq/L) 4   Dialysate CA + (mEq/L) 2.5   Dialysate HCO3 - (Bicarb) (mEq/L) 30   Dialysate Na + (mEq/L) 135   Cartridge Type Other  (r300)   Dialysate Flow Rate (mL/min) 200   UF Goal Rate 400 mL/hr   CRRT Hourly Documentation   Blood Flow (mL/min) 150   UF Rate 200 cc/hr   Arterial Pressure (mmHg) -20 mmHg   Venous Pressure (mmHg) 30 mmHg   Effluent Pressure (EP) (mmHg) 10 mmHg   Total UF (Hourly Cleared) (mL) 0     SLED restarted as ordered. RIJ CVC aspirated, flushed, and accessed using aseptic technique. Lines connected and secured.

## 2024-04-16 NOTE — PROCEDURES
RAPID RESPONSE VASCULAR ACCESS NOTE       Single lumen 20G, 10CM midline placed in the left cephalic vein. Needle advanced into the vessel under real time ultrasound guidance.    Max dwell date: 5/15/2024   Lot number: [id number]

## 2024-04-16 NOTE — ASSESSMENT & PLAN NOTE
- Dermatology consulted, appreciate recs  - PRN Atarax 10 mg po TID and diphenhydramine-zinc acetate cream

## 2024-04-16 NOTE — SUBJECTIVE & OBJECTIVE
Interval History: NAEON.    Hemodynamics today: CVP 23, SvO2 61. CO 6.9, CI, 3.8,     Review of Systems   Constitutional: Positive for malaise/fatigue.   Respiratory:  Positive for shortness of breath.    All other systems reviewed and are negative.    Objective:     Vital Signs (Most Recent):  Temp: 98.4 °F (36.9 °C) (04/16/24 0701)  Pulse: (!) 59 (04/16/24 0701)  Resp: (!) 39 (04/16/24 0701)  BP: (!) 94/53 (04/16/24 0600)  SpO2: 100 % (04/16/24 0701) Vital Signs (24h Range):  Temp:  [98 °F (36.7 °C)-98.5 °F (36.9 °C)] 98.4 °F (36.9 °C)  Pulse:  [55-62] 59  Resp:  [9-40] 39  SpO2:  [94 %-100 %] 100 %  BP: (94)/(53) 94/53  Arterial Line BP: ()/(44-66) 124/66     Weight: 77.6 kg (171 lb)  Body mass index is 33.4 kg/m².     SpO2: 100 %         Intake/Output Summary (Last 24 hours) at 4/16/2024 0904  Last data filed at 4/16/2024 0600  Gross per 24 hour   Intake 3235.84 ml   Output 4925 ml   Net -1689.16 ml       Lines/Drains/Airways       Central Venous Catheter Line  Duration             Trialysis (Dialysis) Catheter 04/13/24 1720 right internal jugular 2 days              Drain  Duration                  Urethral Catheter 04/09/24 1330 16 Fr. 6 days              Arterial Line  Duration             Arterial Line 04/14/24 1645 Left Radial 1 day              Peripheral Intravenous Line  Duration                  Peripheral IV - Single Lumen 04/14/24 1600 18 G;1 3/4 in Anterior;Right Upper Arm 1 day                     Physical Exam  Vitals and nursing note reviewed.   Constitutional:       Appearance: She is ill-appearing.   HENT:      Head: Normocephalic and atraumatic.   Cardiovascular:      Rate and Rhythm: Normal rate and regular rhythm.      Heart sounds: Murmur heard.   Pulmonary:      Effort: Pulmonary effort is normal. No respiratory distress.   Abdominal:      General: Abdomen is flat. There is distension (improved).      Palpations: Abdomen is soft.      Tenderness: There is no abdominal  tenderness.   Musculoskeletal:      Right lower leg: Edema present.      Left lower leg: Edema present.   Skin:     General: Skin is warm and dry.   Neurological:      General: No focal deficit present.      Mental Status: She is alert and oriented to person, place, and time. Mental status is at baseline.        Significant Labs: All pertinent lab results from the last 24 hours have been reviewed.    Significant Imaging:  All pertinent imaging results from the last 24 hours have been reviewed.

## 2024-04-16 NOTE — ASSESSMENT & PLAN NOTE
CKD IV (baseline creatinine ~3.1-3.3) admitted with hypervolemia and REGINALDO with creatinine 4.2  UA showed +1 protein with UPCR of ~500 mg  Urinary sediment with non dysmorphic RBCs and WBCs present although Jin catheter just place yesterday   Retroperitoneal US without evidence of hydronephrosis although changes consistent with chronic kidney disease  Diuresis with lasix gtt @ 40mg/hr + Diuril 500mg IV BID attempted, and while Crt improved she remains unable to tolerate lying flat.  Tolerated 8h SLED w goal UF rate 200-500/hr successfully overnight 4/14-15, then 12h SLED on 4/15-16.    Plan/Recommendations:  Will plan for another session of SLED for metabolic clearance and volume management   Continue Renvela 800 mg TIDWM  Please avoid hypotension/major fluctuations in BP which may worsen REGINALDO (keep MAP > 65 mmHg)  Renal diet/tube feeds when not NPO with volume restriction per primary team  Strict I/O's and daily weights  Renally all dose medications to eGFR   Avoid nephrotoxic agents wean feasible (i.e. NSAIDs, intra-arterial contrast, supra-therapeutic vancomycin levels, etc.)

## 2024-04-16 NOTE — HPI
"53 y.o. F w/ PMH HTN, HFrEF, MR, CKD stage 4, anemia, cigarette smoking who presented to [facility] ED on 4/1/2024 after a mechanical fall from standing with assocaited SOB and LE swelling. Patient found to have acute on chronic diastolic heart failure with severe mitral regurgitation and AKD on CKD> Patient in CICU for diuresis and medical treatment. Patient noted to have abrasion on left lower extremity and bruising after her fall. Dermatology consulted for "new itchy lesions started this hospitalization".     Per the patient and daughter at bedside, they note a red itchy rash in the patient's right groin. They are unsure how long it has been there, but they think since she has been admitted. Nursing staff has been applying benadryl + zinc cream to it daily + miconazole powder for the past 2 days with mild improvement. Additionally, the patient's daughter notes red itchy spots on the patient's back which the patient thinks started one she got here. They think it has been stable - has not been spreading and not significantly changing. Unsure if she has had this in the past.  "

## 2024-04-16 NOTE — PROGRESS NOTES
04/16/24 0117   Treatment   Treatment Type SLED   Treatment Status Daily equipment check   Dialysis Machine Number [id number]   Dialyzer Time (hours) 8.54   BVP (Liters) 78.8 L   Solutions Labeled and Current  Yes   Access Temporary Cath;Right;IJ   Catheter Dressing Intact  Yes   Alarms Engaged Yes   CRRT Comments Daily check done.

## 2024-04-16 NOTE — SUBJECTIVE & OBJECTIVE
Past Medical History:   Diagnosis Date    (HFpEF) heart failure with preserved ejection fraction 2023    EF 63% with G2 DD  Continue lasix, appears euvolemic today  Continue good BP management with losartan, increase Coreg 6.25 mg BID  Fluid restriction, low sodium diet  Recommend Jardiance- patient had CKD 4 and this is a limiting factor- nephro eval pending    Anemia 2023    CKD (chronic kidney disease)     HTN (hypertension)     Mitral valve regurgitation 2023    Refer to structural for evaluation  May benefit from mitral clip    Ovarian cyst     Stage 4 chronic kidney disease 2023    Refer to nephrology at [facility] as unable to get established with [facility] and the phone number for the outside nephrologist provided to patient during her recent hospitalization goes unanswered when called.   Cr remains elevated  Would like to start SGLT-2 and appreciate nephrology expertise        No past surgical history on file.  Family History    None       Tobacco Use    Smoking status: Former     Current packs/day: 0.00     Types: Cigarettes     Start date:      Quit date:      Years since quittin.3    Smokeless tobacco: Never    Tobacco comments:     2-3 cigarettes a day   Substance and Sexual Activity    Alcohol use: Never    Drug use: Never    Sexual activity: Not on file       Review of patient's allergies indicates:  No Known Allergies    Medications:  Continuous Infusions:  Current Facility-Administered Medications   Medication Dose Route Frequency Provider Last Rate Last Admin    0.9%  NaCl infusion (CRRT USE ONLY)   Intravenous Continuous Sylvia Nayak MD        0.9%  NaCl infusion   Intravenous Continuous Gillies, Connor M, DO   Stopped at 04/15/24 1536    acetaminophen tablet 1,000 mg  1,000 mg Oral Q6H PRN Domonique Walden MD   1,000 mg at 24 1225    aspirin EC tablet 81 mg  81 mg Oral Daily David Borjas PA-C   81 mg at 24 0900    atorvastatin tablet 40  mg  40 mg Oral Daily David Borjas, PA-C   40 mg at 04/16/24 0945    bisacodyL EC tablet 5 mg  5 mg Oral Daily PRN Johnny Cisneros MD        dextrose 10% bolus 125 mL 125 mL  12.5 g Intravenous PRN David Borjas, PA-C        dextrose 10% bolus 250 mL 250 mL  25 g Intravenous PRN David Borjas, PA-C        diphenhydrAMINE-zinc acetate 2-0.1% cream   Topical (Top) TID PRN Domonique Walden MD        glucagon (human recombinant) injection 1 mg  1 mg Intramuscular PRN David Borjas, PA-DIMITRY        glucose chewable tablet 16 g  16 g Oral PRN David Borjas PA-DIMITRY        glucose chewable tablet 24 g  24 g Oral PRN David Borjas PA-DIMITRY        heparin (porcine) injection 5,000 Units  5,000 Units Subcutaneous Q8H David Borjas, PA-C   5,000 Units at 04/16/24 1419    HYDROcodone-acetaminophen 5-325 mg per tablet 1 tablet  1 tablet Oral Q6H PRN Johnny Cisneros MD   1 tablet at 04/15/24 0413    hydrOXYzine HCL tablet 10 mg  10 mg Oral TID PRN Domonique Walden MD   10 mg at 04/16/24 1236    magnesium oxide tablet 400 mg  400 mg Oral BID Bal Rosario MD   400 mg at 04/15/24 2353    magnesium sulfate 2g in water 50mL IVPB (premix)  2 g Intravenous PRN Sylvia Nayak MD        melatonin tablet 6 mg  6 mg Oral Nightly PRN Peitro Herring MD   6 mg at 04/12/24 0302    miconazole NITRATE 2 % top powder   Topical (Top) BID Quang Hernandez MD   Given at 04/16/24 0952    mupirocin 2 % ointment   Nasal BID Italo Segovia MD   Given at 04/16/24 0952    naloxone 0.4 mg/mL injection 0.02 mg  0.02 mg Intravenous PRN David Borjas PA-C        NORepinephrine 4 mg in dextrose 5% 250 mL infusion (premix)  0-3 mcg/kg/min Intravenous Continuous Quang Hernandez MD   Stopped at 04/15/24 1141    ondansetron disintegrating tablet 4 mg  4 mg Oral Q8H PRN David Borjas PA-C        ondansetron injection 4 mg  4 mg Intravenous Q8H PRN David Borjas PA-C   4 mg  at 04/13/24 1429    oxybutynin tablet 5 mg  5 mg Oral TID Minda Molina MD   5 mg at 04/16/24 1629    polyethylene glycol packet 17 g  17 g Oral BID Domonique Walden MD   17 g at 04/16/24 0946    senna-docusate 8.6-50 mg per tablet 1 tablet  1 tablet Oral BID Domonique Walden MD   1 tablet at 04/16/24 0945    sevelamer carbonate tablet 800 mg  800 mg Oral TID  David Borjas PA-C   800 mg at 04/16/24 1629    sodium chloride 0.9% bolus 250 mL 250 mL  250 mL Intravenous PRN Italo Segovia MD        sodium chloride 0.9% flush 10 mL  10 mL Intravenous PRN Pietro Herring MD        sodium chloride 0.9% flush 10 mL  10 mL Intravenous PRN David Borjas PA-C        sodium phosphate 20.01 mmol in dextrose 5 % (D5W) 250 mL IVPB  20.01 mmol Intravenous PRN Sylvia Nayak MD        sodium phosphate 30 mmol in dextrose 5 % (D5W) 250 mL IVPB  30 mmol Intravenous PRN Sylvia Nayak MD        sodium phosphate 39.99 mmol in dextrose 5 % (D5W) 250 mL IVPB  39.99 mmol Intravenous PRN Sylvia Nayak MD        white petrolatum 41 % ointment   Topical (Top) BID Johnny Cisneros MD   Given at 04/16/24 0952    zinc oxide 20 % ointment   Topical (Top) PRN David Borjas PA-C   Given at 04/14/24 0925     Scheduled Meds:  Current Facility-Administered Medications   Medication Dose Route Frequency Provider Last Rate Last Admin    0.9%  NaCl infusion (CRRT USE ONLY)   Intravenous Continuous Sylvia Nayak MD        0.9%  NaCl infusion   Intravenous Continuous Gillies, Connor M, DO   Stopped at 04/15/24 1536    acetaminophen tablet 1,000 mg  1,000 mg Oral Q6H PRN Domonique Walden MD   1,000 mg at 04/16/24 1225    aspirin EC tablet 81 mg  81 mg Oral Daily David Borjas PA-C   81 mg at 04/16/24 0900    atorvastatin tablet 40 mg  40 mg Oral Daily David Borjas PA-C   40 mg at 04/16/24 0945    bisacodyL EC tablet 5 mg  5 mg Oral Daily PRN Johnny Cisneros MD        dextrose 10% bolus 125 mL 125 mL  12.5 g  Intravenous PRN David Borjas PA-C        dextrose 10% bolus 250 mL 250 mL  25 g Intravenous PRN Dvaid Borjas PA-DIMITRY        diphenhydrAMINE-zinc acetate 2-0.1% cream   Topical (Top) TID PRN Domonique Walden MD        glucagon (human recombinant) injection 1 mg  1 mg Intramuscular PRN David Borjas PA-C        glucose chewable tablet 16 g  16 g Oral PRN David Borjas PA-DIMITRY        glucose chewable tablet 24 g  24 g Oral PRN David Borjas PA-C        heparin (porcine) injection 5,000 Units  5,000 Units Subcutaneous Q8H David Borjas PA-C   5,000 Units at 04/16/24 1419    HYDROcodone-acetaminophen 5-325 mg per tablet 1 tablet  1 tablet Oral Q6H PRN Johnny Cisneros MD   1 tablet at 04/15/24 0413    hydrOXYzine HCL tablet 10 mg  10 mg Oral TID PRN Domonique Walden MD   10 mg at 04/16/24 1236    magnesium oxide tablet 400 mg  400 mg Oral BID Bal Rosario MD   400 mg at 04/15/24 2353    magnesium sulfate 2g in water 50mL IVPB (premix)  2 g Intravenous PRN Sylvia Nayak MD        melatonin tablet 6 mg  6 mg Oral Nightly PRN Pietro Herring MD   6 mg at 04/12/24 0302    miconazole NITRATE 2 % top powder   Topical (Top) BID Quang Hernandez MD   Given at 04/16/24 0952    mupirocin 2 % ointment   Nasal BID Italo Segovia MD   Given at 04/16/24 0952    naloxone 0.4 mg/mL injection 0.02 mg  0.02 mg Intravenous PRN David Borjas PA-C        NORepinephrine 4 mg in dextrose 5% 250 mL infusion (premix)  0-3 mcg/kg/min Intravenous Continuous Quang Hernandez MD   Stopped at 04/15/24 1141    ondansetron disintegrating tablet 4 mg  4 mg Oral Q8H PRN David Borjas PA-C        ondansetron injection 4 mg  4 mg Intravenous Q8H PRN David Borjas PA-C   4 mg at 04/13/24 1429    oxybutynin tablet 5 mg  5 mg Oral TID Minda Molina MD   5 mg at 04/16/24 1623    polyethylene glycol packet 17 g  17 g Oral BID Domonique Walden MD   17 g at 04/16/24 2806     senna-docusate 8.6-50 mg per tablet 1 tablet  1 tablet Oral BID Domonique Walden MD   1 tablet at 04/16/24 0945    sevelamer carbonate tablet 800 mg  800 mg Oral TID  David Borjas PA-C   800 mg at 04/16/24 1629    sodium chloride 0.9% bolus 250 mL 250 mL  250 mL Intravenous PRN Italo Segovia MD        sodium chloride 0.9% flush 10 mL  10 mL Intravenous PRN Pietro Herring MD        sodium chloride 0.9% flush 10 mL  10 mL Intravenous PRN David Borjas PA-C        sodium phosphate 20.01 mmol in dextrose 5 % (D5W) 250 mL IVPB  20.01 mmol Intravenous PRN Sylvia Nayak MD        sodium phosphate 30 mmol in dextrose 5 % (D5W) 250 mL IVPB  30 mmol Intravenous PRN Sylvia Nayak MD        sodium phosphate 39.99 mmol in dextrose 5 % (D5W) 250 mL IVPB  39.99 mmol Intravenous PRN Sylvia Nayak MD        white petrolatum 41 % ointment   Topical (Top) BID Johnny Cisneros MD   Given at 04/16/24 0952    zinc oxide 20 % ointment   Topical (Top) PRN David Borjas PA-C   Given at 04/14/24 0925     PRN Meds:  Current Facility-Administered Medications   Medication Dose Route Frequency Provider Last Rate Last Admin    0.9%  NaCl infusion (CRRT USE ONLY)   Intravenous Continuous Sylvia Nayak MD        0.9%  NaCl infusion   Intravenous Continuous Gillies, Connor M, DO   Stopped at 04/15/24 1536    acetaminophen tablet 1,000 mg  1,000 mg Oral Q6H PRN Domonique Walden MD   1,000 mg at 04/16/24 1225    aspirin EC tablet 81 mg  81 mg Oral Daily David Borjas PA-C   81 mg at 04/16/24 0900    atorvastatin tablet 40 mg  40 mg Oral Daily David Borjas PA-C   40 mg at 04/16/24 0945    bisacodyL EC tablet 5 mg  5 mg Oral Daily PRN Johnny Cisneros MD        dextrose 10% bolus 125 mL 125 mL  12.5 g Intravenous PRN David Borjas PA-C        dextrose 10% bolus 250 mL 250 mL  25 g Intravenous PRN David Borjas PA-C        diphenhydrAMINE-zinc acetate 2-0.1% cream   Topical (Top)  TID PRN Domonique Walden MD        glucagon (human recombinant) injection 1 mg  1 mg Intramuscular PRN David Borjas PA-C        glucose chewable tablet 16 g  16 g Oral PRN David Borjas PA-C        glucose chewable tablet 24 g  24 g Oral PRN David Borjas PA-C        heparin (porcine) injection 5,000 Units  5,000 Units Subcutaneous Q8H David Borjas PA-C   5,000 Units at 04/16/24 1419    HYDROcodone-acetaminophen 5-325 mg per tablet 1 tablet  1 tablet Oral Q6H PRN Johnny Cisneros MD   1 tablet at 04/15/24 0413    hydrOXYzine HCL tablet 10 mg  10 mg Oral TID PRN Domonique Walden MD   10 mg at 04/16/24 1236    magnesium oxide tablet 400 mg  400 mg Oral BID Bal Rosario MD   400 mg at 04/15/24 2353    magnesium sulfate 2g in water 50mL IVPB (premix)  2 g Intravenous PRN Sylvia Nayak MD        melatonin tablet 6 mg  6 mg Oral Nightly PRN Pietro Herring MD   6 mg at 04/12/24 0302    miconazole NITRATE 2 % top powder   Topical (Top) BID Quang Hernandez MD   Given at 04/16/24 0952    mupirocin 2 % ointment   Nasal BID Italo Segovia MD   Given at 04/16/24 0952    naloxone 0.4 mg/mL injection 0.02 mg  0.02 mg Intravenous PRN David Borjas PA-C        NORepinephrine 4 mg in dextrose 5% 250 mL infusion (premix)  0-3 mcg/kg/min Intravenous Continuous Quang Hernandez MD   Stopped at 04/15/24 1141    ondansetron disintegrating tablet 4 mg  4 mg Oral Q8H PRN David Borjas PA-C        ondansetron injection 4 mg  4 mg Intravenous Q8H PRN David Borjas PA-C   4 mg at 04/13/24 1429    oxybutynin tablet 5 mg  5 mg Oral TID Minda Molina MD   5 mg at 04/16/24 1629    polyethylene glycol packet 17 g  17 g Oral BID Domonique Walden MD   17 g at 04/16/24 0946    senna-docusate 8.6-50 mg per tablet 1 tablet  1 tablet Oral BID Domonique Walden MD   1 tablet at 04/16/24 0945    sevelamer carbonate tablet 800 mg  800 mg Oral TID  David Borjas, PA-C    800 mg at 04/16/24 1629    sodium chloride 0.9% bolus 250 mL 250 mL  250 mL Intravenous PRN Italo Segovia MD        sodium chloride 0.9% flush 10 mL  10 mL Intravenous PRN Pietro Herring MD        sodium chloride 0.9% flush 10 mL  10 mL Intravenous PRN David Borjas PA-C        sodium phosphate 20.01 mmol in dextrose 5 % (D5W) 250 mL IVPB  20.01 mmol Intravenous PRN Sylvia Nayak MD        sodium phosphate 30 mmol in dextrose 5 % (D5W) 250 mL IVPB  30 mmol Intravenous PRN Sylvia Nayak MD        sodium phosphate 39.99 mmol in dextrose 5 % (D5W) 250 mL IVPB  39.99 mmol Intravenous PRN Sylvia Nayak MD        white petrolatum 41 % ointment   Topical (Top) BID Johnny Cisneros MD   Given at 04/16/24 0952    zinc oxide 20 % ointment   Topical (Top) PRN David Borjas PA-C   Given at 04/14/24 0925       Review of Systems   Constitutional:  Positive for fatigue. Negative for fever and chills.   Skin:  Positive for itching and rash.     Objective:     Vital Signs (Most Recent):  Temp: 98.6 °F (37 °C) (04/16/24 1501)  Pulse: 63 (04/16/24 1601)  Resp: (!) 38 (04/16/24 1601)  BP: (!) 94/53 (04/16/24 0600)  SpO2: 100 % (04/16/24 1601) Vital Signs (24h Range):  Temp:  [98 °F (36.7 °C)-98.6 °F (37 °C)] 98.6 °F (37 °C)  Pulse:  [55-63] 63  Resp:  [9-39] 38  SpO2:  [94 %-100 %] 100 %  BP: (94)/(53) 94/53  Arterial Line BP: ()/(44-66) 119/59     Weight: 77.6 kg (171 lb)  Body mass index is 33.4 kg/m².     Physical Exam   Constitutional: She appears well-developed and well-nourished. No distress.   Neurological: She is alert and oriented to person, place, and time. She is not disoriented.   Psychiatric: She has a normal mood and affect.   Skin:   Areas Examined (abnormalities noted in diagram):   Head / Face Inspection Performed  Chest / Axilla Inspection Performed  Abdomen Inspection Performed  Back Inspection Performed  RUE Inspected  LUE Inspection Performed  RLE Inspected  LLE Inspection  Performed                            Significant Labs: All pertinent labs within the past 24 hours have been reviewed.    Significant Imaging: I have reviewed all pertinent imaging results/findings within the past 24 hours.

## 2024-04-16 NOTE — ASSESSMENT & PLAN NOTE
Acute on chronic heart failure with preserved ejection fraction (HFpEF)   Anasarca     52 yo F admitted for ADHF 2/2 severe MR. The MR appears to be secondary to posterior leaflet restriction and leaflet length of 1.1 cm. BNP on admission was 4300 compared to 350 eight months prior. Initially diagnosed with severe MR in June of 2023. Saw Dr. Stone in January of 2024 and was undergoing work-up for possible MitraClip; C scheduled for 3/15 but not completed due to financial constraints, also needs POOJA. Will need optimization of kidney function prior to angiogram consideration, appreciate Nephrology assistance    DDX: rheumatic heart disease vs Fabry vs antiphospholipid    4/1/2024 TTE    Left Ventricle: The left ventricle is normal in size. Normal wall thickness. Normal wall motion. There is normal systolic function with a visually estimated ejection fraction of 60 - 65%. Grade II diastolic dysfunction.    Right Ventricle: Normal right ventricular cavity size. Wall thickness is normal. Right ventricle wall motion  is normal. Systolic function is reduced.    Left Atrium: Left atrium is very  severely dilated.    Right Atrium: Right atrium is mildly dilated.    Aortic Valve: There is mild aortic valve sclerosis. There is normal leaflet mobility. There is trace aortic regurgitation.    Mitral Valve: There is mild bileaflet sclerosis. There is posterior leaflet tethering and failure of complete coaptation. There is very severe regurgitation with a posterolateral eccentriccally directed jet.    Tricuspid Valve: The tricuspid valve is structurally normal. There is normal leaflet mobility. There is moderate to severe regurgitation.    IVC/SVC: IVC was not well visualized due to poor acoustic window. Intermediate venous pressure at 8 mmHg.    Pericardium: There is a trivial effusion posteriorly and small under the RA.     - Interventional Cardiology consulted - no intervention until patient's volume status is controlled  -  "Nephrology following, undergoing SLED/CRRT  - POOJA ordered - "Anesthesia evaluated the patient and feel that she needs better optimization prior to POOJA. They stated once she is euvolemic that we can move forward. Will need a new POOJA order at that time."  - D/C Diuril 500 mg IV q24h & Lasix gtt @ 40 mg/hr  - Discontinued hydralazine 25 mg po q8h & Isordil 20 mg po BID for afterload reduction due to pressor requirements  - Continue home ASA 81 mg po daily and Lipitor 40 mg po daily  - Genetics consulted, see "Hypertrophic cardiomyopathy"  - F/u antistreptolysin-O  - F/u antiphospholipid work-up: B2-glycoprotein, anti-cardiolipin, DRVVT  Negative/WNL: YUMIKO  - Strict I/O, Jin in place  - Telemetry monitoring  "

## 2024-04-16 NOTE — SUBJECTIVE & OBJECTIVE
Interval History: Underwent 12h of SLED overnight but still remains overloaded and unable to tolerate laying flat.      Objective:     Vital Signs (Most Recent):  Temp: 98.4 °F (36.9 °C) (04/16/24 0701)  Pulse: (!) 59 (04/16/24 0701)  Resp: (!) 39 (04/16/24 0701)  BP: (!) 94/53 (04/16/24 0600)  SpO2: 98 % (04/16/24 0833) Vital Signs (24h Range):  Temp:  [98 °F (36.7 °C)-98.5 °F (36.9 °C)] 98.4 °F (36.9 °C)  Pulse:  [55-62] 59  Resp:  [9-40] 39  SpO2:  [94 %-100 %] 98 %  BP: (94)/(53) 94/53  Arterial Line BP: ()/(44-66) 124/66     Weight: 77.6 kg (171 lb) (04/16/24 0309)  Body mass index is 33.4 kg/m².  Body surface area is 1.81 meters squared.    I/O last 3 completed shifts:  In: 4794.5 [P.O.:720; I.V.:4031.7; IV Piggyback:42.7]  Out: 8187 [Urine:222; Other:7965]     Physical Exam  Vitals and nursing note reviewed.   Constitutional:       General: She is not in acute distress.     Appearance: Normal appearance. She is ill-appearing.   HENT:      Head: Normocephalic.   Eyes:      Extraocular Movements: Extraocular movements intact.   Cardiovascular:      Rate and Rhythm: Normal rate and regular rhythm.   Pulmonary:      Effort: Pulmonary effort is normal. No respiratory distress.      Breath sounds: Rales present.   Musculoskeletal:      Right lower leg: Edema present.      Left lower leg: Edema present.   Skin:     General: Skin is warm and dry.   Neurological:      General: No focal deficit present.      Mental Status: She is alert and oriented to person, place, and time.      Significant Labs:  CBC:   Recent Labs   Lab 04/16/24  0153   WBC 5.48   RBC 3.94*   HGB 8.9*   HCT 33.1*   *   MCV 84   MCH 22.6*   MCHC 26.9*     CMP:   Recent Labs   Lab 04/16/24  0153   GLU 73  73   CALCIUM 7.9*  7.9*   ALBUMIN 2.9*  2.9*   PROT 6.8   *  134*   K 4.3  4.3   CO2 22*  22*     105   BUN 8  8   CREATININE 0.9  0.9   ALKPHOS 106   ALT 40   AST 58*   BILITOT 1.4*     All labs within the past 24  hours have been reviewed.     Significant Imaging:  None

## 2024-04-16 NOTE — ASSESSMENT & PLAN NOTE
Several scattered erythematous scaly papules on patient's back - not spreading and not found anywhere else on body. Patient reports lesions starting this admission and are pruritic. Lesions seem to be in healing stages. Do not appear to be angiokeratomas. Lesions likely represent resolving folliculitis. Patient undergoing genetic testing for Fabry's disease. Unlikely angiokeratomas would be new/sudden eruption.   Plan:  - start benzoyl peroxide wash TIW to back; leave on for 5-10 minutes prior to rinsing  - can consider addition of clindamycin gel to lesions on back  - dermatology will sign off at this time; if lesions fail to improve or are spreading, can consider re-evaluation

## 2024-04-16 NOTE — CONSULTS
"[facility] - Cardiac Intensive Care  Dermatology  Consult Note    Patient Name: Xena Vera  MRN: [id number]  Admission Date: 4/1/2024  Hospital Length of Stay: 13 days  Attending Physician: Renato Elmore MD  Primary Care Provider: Tami Villeda FNP     Inpatient consult to Dermatology  Consult performed by: Beatris Brandt MD  Consult ordered by: Domonique Walden MD        Subjective:     Principal Problem:Severe mitral valve regurgitation    HPI:  53 y.o. F w/ PMH HTN, HFrEF, MR, CKD stage 4, anemia, cigarette smoking who presented to [facility] ED on 4/1/2024 after a mechanical fall from standing with assocaited SOB and LE swelling. Patient found to have acute on chronic diastolic heart failure with severe mitral regurgitation and AKD on CKD> Patient in CICU for diuresis and medical treatment. Patient noted to have abrasion on left lower extremity and bruising after her fall. Dermatology consulted for "new itchy lesions started this hospitalization".     Per the patient and daughter at bedside, they note a red itchy rash in the patient's right groin. They are unsure how long it has been there, but they think since she has been admitted. Nursing staff has been applying benadryl + zinc cream to it daily + miconazole powder for the past 2 days with mild improvement. Additionally, the patient's daughter notes red itchy spots on the patient's back which the patient thinks started one she got here. They think it has been stable - has not been spreading and not significantly changing. Unsure if she has had this in the past.    Past Medical History:   Diagnosis Date    (HFpEF) heart failure with preserved ejection fraction 06/24/2023    EF 63% with G2 DD  Continue lasix, appears euvolemic today  Continue good BP management with losartan, increase Coreg 6.25 mg BID  Fluid restriction, low sodium diet  Recommend Jardiance- patient had CKD 4 and this is a limiting factor- nephro eval pending "    Anemia 2023    CKD (chronic kidney disease)     HTN (hypertension)     Mitral valve regurgitation 2023    Refer to structural for evaluation  May benefit from mitral clip    Ovarian cyst     Stage 4 chronic kidney disease 2023    Refer to nephrology at [facility] as unable to get established with [facility] and the phone number for the outside nephrologist provided to patient during her recent hospitalization goes unanswered when called.   Cr remains elevated  Would like to start SGLT-2 and appreciate nephrology expertise        No past surgical history on file.  Family History    None       Tobacco Use    Smoking status: Former     Current packs/day: 0.00     Types: Cigarettes     Start date:      Quit date:      Years since quittin.3    Smokeless tobacco: Never    Tobacco comments:     2-3 cigarettes a day   Substance and Sexual Activity    Alcohol use: Never    Drug use: Never    Sexual activity: Not on file       Review of patient's allergies indicates:  No Known Allergies    Medications:  Continuous Infusions:  Current Facility-Administered Medications   Medication Dose Route Frequency Provider Last Rate Last Admin    0.9%  NaCl infusion (CRRT USE ONLY)   Intravenous Continuous Sylvia Nayak MD        0.9%  NaCl infusion   Intravenous Continuous Gillies, Connor M, DO   Stopped at 04/15/24 1536    acetaminophen tablet 1,000 mg  1,000 mg Oral Q6H PRN Domonique Walden MD   1,000 mg at 24 1225    aspirin EC tablet 81 mg  81 mg Oral Daily David Borjas, PA-C   81 mg at 24 0900    atorvastatin tablet 40 mg  40 mg Oral Daily David Borjas, PA-C   40 mg at 24 0945    bisacodyL EC tablet 5 mg  5 mg Oral Daily PRN Johnny Cisneros MD        dextrose 10% bolus 125 mL 125 mL  12.5 g Intravenous PRN David Borjas PA-DIMITRY        dextrose 10% bolus 250 mL 250 mL  25 g Intravenous PRN David Borjas PA-C        diphenhydrAMINE-zinc acetate  2-0.1% cream   Topical (Top) TID PRN Domonique Walden MD        glucagon (human recombinant) injection 1 mg  1 mg Intramuscular PRN David Borjas PA-C        glucose chewable tablet 16 g  16 g Oral PRN David Borjas PA-DIMITRY        glucose chewable tablet 24 g  24 g Oral PRN David Borjas PA-DIMITRY        heparin (porcine) injection 5,000 Units  5,000 Units Subcutaneous Q8H David Borjas PA-DIMITRY   5,000 Units at 04/16/24 1419    HYDROcodone-acetaminophen 5-325 mg per tablet 1 tablet  1 tablet Oral Q6H PRN Jhonny Cisneros MD   1 tablet at 04/15/24 0413    hydrOXYzine HCL tablet 10 mg  10 mg Oral TID PRN Domonique Walden MD   10 mg at 04/16/24 1236    magnesium oxide tablet 400 mg  400 mg Oral BID Bal Rosario MD   400 mg at 04/15/24 2353    magnesium sulfate 2g in water 50mL IVPB (premix)  2 g Intravenous PRN Sylvia Nayak MD        melatonin tablet 6 mg  6 mg Oral Nightly PRN Pietro Herring MD   6 mg at 04/12/24 0302    miconazole NITRATE 2 % top powder   Topical (Top) BID Quang Hernandez MD   Given at 04/16/24 0952    mupirocin 2 % ointment   Nasal BID Italo Segovia MD   Given at 04/16/24 0952    naloxone 0.4 mg/mL injection 0.02 mg  0.02 mg Intravenous PRN David Borjas PA-C        NORepinephrine 4 mg in dextrose 5% 250 mL infusion (premix)  0-3 mcg/kg/min Intravenous Continuous Quang Hernandez MD   Stopped at 04/15/24 1141    ondansetron disintegrating tablet 4 mg  4 mg Oral Q8H PRN David Borjas PA-DIMITRY        ondansetron injection 4 mg  4 mg Intravenous Q8H PRN David Borjas PA-DIMIRTY   4 mg at 04/13/24 1429    oxybutynin tablet 5 mg  5 mg Oral TID Minda Molina MD   5 mg at 04/16/24 1629    polyethylene glycol packet 17 g  17 g Oral BID Domonique Walden MD   17 g at 04/16/24 0946    senna-docusate 8.6-50 mg per tablet 1 tablet  1 tablet Oral BID Domonique Walden MD   1 tablet at 04/16/24 0945    sevelamer carbonate tablet 800 mg  800 mg Oral TID WM  David Borjas PA-C   800 mg at 04/16/24 1629    sodium chloride 0.9% bolus 250 mL 250 mL  250 mL Intravenous PRN Italo Segovia MD        sodium chloride 0.9% flush 10 mL  10 mL Intravenous PRN Pietro Herring MD        sodium chloride 0.9% flush 10 mL  10 mL Intravenous PRN David Borjas PA-DIMITRY        sodium phosphate 20.01 mmol in dextrose 5 % (D5W) 250 mL IVPB  20.01 mmol Intravenous PRN Sylvia Nayak MD        sodium phosphate 30 mmol in dextrose 5 % (D5W) 250 mL IVPB  30 mmol Intravenous PRN Sylvia Nayak MD        sodium phosphate 39.99 mmol in dextrose 5 % (D5W) 250 mL IVPB  39.99 mmol Intravenous PRN Sylvia Nayak MD        white petrolatum 41 % ointment   Topical (Top) BID Johnny Cisneros MD   Given at 04/16/24 0952    zinc oxide 20 % ointment   Topical (Top) PRN David Borjas PA-C   Given at 04/14/24 0925     Scheduled Meds:  Current Facility-Administered Medications   Medication Dose Route Frequency Provider Last Rate Last Admin    0.9%  NaCl infusion (CRRT USE ONLY)   Intravenous Continuous Sylvia Nayak MD        0.9%  NaCl infusion   Intravenous Continuous Gillies, Connor M, DO   Stopped at 04/15/24 1536    acetaminophen tablet 1,000 mg  1,000 mg Oral Q6H PRN Domonique Walden MD   1,000 mg at 04/16/24 1225    aspirin EC tablet 81 mg  81 mg Oral Daily David Borjas PA-C   81 mg at 04/16/24 0900    atorvastatin tablet 40 mg  40 mg Oral Daily David Borjas PA-C   40 mg at 04/16/24 0945    bisacodyL EC tablet 5 mg  5 mg Oral Daily PRN Johnny Cisneros MD        dextrose 10% bolus 125 mL 125 mL  12.5 g Intravenous PRN David Borjas, PA-DIMITRY        dextrose 10% bolus 250 mL 250 mL  25 g Intravenous PRN David Borjas PA-DIMITRY        diphenhydrAMINE-zinc acetate 2-0.1% cream   Topical (Top) TID PRN Domonique Walden MD        glucagon (human recombinant) injection 1 mg  1 mg Intramuscular PRN David Borjas PA-C        glucose chewable tablet 16  g  16 g Oral PRN David Borjas PA-C        glucose chewable tablet 24 g  24 g Oral PRN David Borjas PA-C        heparin (porcine) injection 5,000 Units  5,000 Units Subcutaneous Q8H David Borjas PA-C   5,000 Units at 04/16/24 1419    HYDROcodone-acetaminophen 5-325 mg per tablet 1 tablet  1 tablet Oral Q6H PRN Johnny Cisneros MD   1 tablet at 04/15/24 0413    hydrOXYzine HCL tablet 10 mg  10 mg Oral TID PRN Domonique Walden MD   10 mg at 04/16/24 1236    magnesium oxide tablet 400 mg  400 mg Oral BID Bal Rosario MD   400 mg at 04/15/24 2353    magnesium sulfate 2g in water 50mL IVPB (premix)  2 g Intravenous PRN Sylvia Nayak MD        melatonin tablet 6 mg  6 mg Oral Nightly PRN Pietro Herring MD   6 mg at 04/12/24 0302    miconazole NITRATE 2 % top powder   Topical (Top) BID Quang Hernandez MD   Given at 04/16/24 0952    mupirocin 2 % ointment   Nasal BID Italo Segovia MD   Given at 04/16/24 0952    naloxone 0.4 mg/mL injection 0.02 mg  0.02 mg Intravenous PRN David Borjas PA-C        NORepinephrine 4 mg in dextrose 5% 250 mL infusion (premix)  0-3 mcg/kg/min Intravenous Continuous Quang Hernandez MD   Stopped at 04/15/24 1141    ondansetron disintegrating tablet 4 mg  4 mg Oral Q8H PRN David Borjas PA-C        ondansetron injection 4 mg  4 mg Intravenous Q8H PRN David Borjas PA-C   4 mg at 04/13/24 1429    oxybutynin tablet 5 mg  5 mg Oral TID Minda Molina MD   5 mg at 04/16/24 1629    polyethylene glycol packet 17 g  17 g Oral BID Domonique Walden MD   17 g at 04/16/24 0946    senna-docusate 8.6-50 mg per tablet 1 tablet  1 tablet Oral BID Domonique Walden MD   1 tablet at 04/16/24 0945    sevelamer carbonate tablet 800 mg  800 mg Oral TID  David Borjas PA-C   800 mg at 04/16/24 1629    sodium chloride 0.9% bolus 250 mL 250 mL  250 mL Intravenous PRN Italo Segovia MD        sodium chloride 0.9% flush 10 mL  10 mL  Intravenous PRN Pietro Herring MD        sodium chloride 0.9% flush 10 mL  10 mL Intravenous PRN David Borjas PA-C        sodium phosphate 20.01 mmol in dextrose 5 % (D5W) 250 mL IVPB  20.01 mmol Intravenous PRN Sylvia Nayak MD        sodium phosphate 30 mmol in dextrose 5 % (D5W) 250 mL IVPB  30 mmol Intravenous PRN Sylvia Nayak MD        sodium phosphate 39.99 mmol in dextrose 5 % (D5W) 250 mL IVPB  39.99 mmol Intravenous PRN Sylvia Nayak MD        white petrolatum 41 % ointment   Topical (Top) BID Johnny Cisneros MD   Given at 04/16/24 0952    zinc oxide 20 % ointment   Topical (Top) PRN David Borjas PA-C   Given at 04/14/24 0925     PRN Meds:  Current Facility-Administered Medications   Medication Dose Route Frequency Provider Last Rate Last Admin    0.9%  NaCl infusion (CRRT USE ONLY)   Intravenous Continuous Sylvia Nayak MD        0.9%  NaCl infusion   Intravenous Continuous Gillies, Connor M,    Stopped at 04/15/24 1536    acetaminophen tablet 1,000 mg  1,000 mg Oral Q6H PRN Domonique Walden MD   1,000 mg at 04/16/24 1225    aspirin EC tablet 81 mg  81 mg Oral Daily David Borjas PA-DIMITRY   81 mg at 04/16/24 0900    atorvastatin tablet 40 mg  40 mg Oral Daily David Borjas PA-C   40 mg at 04/16/24 0945    bisacodyL EC tablet 5 mg  5 mg Oral Daily PRN Johnny Cisneros MD        dextrose 10% bolus 125 mL 125 mL  12.5 g Intravenous PRN David Borjas PA-DIMITRY        dextrose 10% bolus 250 mL 250 mL  25 g Intravenous PRN David Borjas PA-C        diphenhydrAMINE-zinc acetate 2-0.1% cream   Topical (Top) TID PRN Domonique Walden MD        glucagon (human recombinant) injection 1 mg  1 mg Intramuscular PRN David Borjas PA-C        glucose chewable tablet 16 g  16 g Oral PRN David Borjas PA-C        glucose chewable tablet 24 g  24 g Oral PRN David Borjas PA-C        heparin (porcine) injection 5,000 Units  5,000 Units Subcutaneous  Q8H David Borjas PA-C   5,000 Units at 04/16/24 1419    HYDROcodone-acetaminophen 5-325 mg per tablet 1 tablet  1 tablet Oral Q6H PRN Johnny Cisneros MD   1 tablet at 04/15/24 0413    hydrOXYzine HCL tablet 10 mg  10 mg Oral TID PRN Domonique Walden MD   10 mg at 04/16/24 1236    magnesium oxide tablet 400 mg  400 mg Oral BID Bal Rosario MD   400 mg at 04/15/24 2353    magnesium sulfate 2g in water 50mL IVPB (premix)  2 g Intravenous PRN Sylvia Nayak MD        melatonin tablet 6 mg  6 mg Oral Nightly PRN Pietro Herring MD   6 mg at 04/12/24 0302    miconazole NITRATE 2 % top powder   Topical (Top) BID Quang Hernandez MD   Given at 04/16/24 0952    mupirocin 2 % ointment   Nasal BID Italo Segovia MD   Given at 04/16/24 0952    naloxone 0.4 mg/mL injection 0.02 mg  0.02 mg Intravenous PRN David Borjas PA-C        NORepinephrine 4 mg in dextrose 5% 250 mL infusion (premix)  0-3 mcg/kg/min Intravenous Continuous Quang Hernandez MD   Stopped at 04/15/24 1141    ondansetron disintegrating tablet 4 mg  4 mg Oral Q8H PRN David Borjas PA-C        ondansetron injection 4 mg  4 mg Intravenous Q8H PRN David Borjas PA-C   4 mg at 04/13/24 1429    oxybutynin tablet 5 mg  5 mg Oral TID Minda Molina MD   5 mg at 04/16/24 1629    polyethylene glycol packet 17 g  17 g Oral BID Domonique Walden MD   17 g at 04/16/24 0946    senna-docusate 8.6-50 mg per tablet 1 tablet  1 tablet Oral BID Domonique Walden MD   1 tablet at 04/16/24 0945    sevelamer carbonate tablet 800 mg  800 mg Oral TID  aDvid Borjas PA-C   800 mg at 04/16/24 1629    sodium chloride 0.9% bolus 250 mL 250 mL  250 mL Intravenous PRN Italo Segovia MD        sodium chloride 0.9% flush 10 mL  10 mL Intravenous PRN Pietro Herring MD        sodium chloride 0.9% flush 10 mL  10 mL Intravenous PRN David Borjas PA-C        sodium phosphate 20.01 mmol in dextrose 5 % (D5W) 250 mL IVPB  20.01  mmol Intravenous PRN Sylvia Nayak MD        sodium phosphate 30 mmol in dextrose 5 % (D5W) 250 mL IVPB  30 mmol Intravenous PRN Sylvia Nayak MD        sodium phosphate 39.99 mmol in dextrose 5 % (D5W) 250 mL IVPB  39.99 mmol Intravenous PRN Sylvia Nayak MD        white petrolatum 41 % ointment   Topical (Top) BID Johnny Cisneros MD   Given at 04/16/24 0952    zinc oxide 20 % ointment   Topical (Top) PRN David Borjas PA-C   Given at 04/14/24 0925       Review of Systems   Constitutional:  Positive for fatigue. Negative for fever and chills.   Skin:  Positive for itching and rash.     Objective:     Vital Signs (Most Recent):  Temp: 98.6 °F (37 °C) (04/16/24 1501)  Pulse: 63 (04/16/24 1601)  Resp: (!) 38 (04/16/24 1601)  BP: (!) 94/53 (04/16/24 0600)  SpO2: 100 % (04/16/24 1601) Vital Signs (24h Range):  Temp:  [98 °F (36.7 °C)-98.6 °F (37 °C)] 98.6 °F (37 °C)  Pulse:  [55-63] 63  Resp:  [9-39] 38  SpO2:  [94 %-100 %] 100 %  BP: (94)/(53) 94/53  Arterial Line BP: ()/(44-66) 119/59     Weight: 77.6 kg (171 lb)  Body mass index is 33.4 kg/m².     Physical Exam   Constitutional: She appears well-developed and well-nourished. No distress.   Neurological: She is alert and oriented to person, place, and time. She is not disoriented.   Psychiatric: She has a normal mood and affect.   Skin:   Areas Examined (abnormalities noted in diagram):   Head / Face Inspection Performed  Chest / Axilla Inspection Performed  Abdomen Inspection Performed  Back Inspection Performed  RUE Inspected  LUE Inspection Performed  RLE Inspected  LLE Inspection Performed                            Significant Labs: All pertinent labs within the past 24 hours have been reviewed.    Significant Imaging: I have reviewed all pertinent imaging results/findings within the past 24 hours.      Assessment/Plan:     Derm  Intertrigo  Erythematous macerated plaque on right inguinal crease and in right inferior abdominal crease with  spread to mons pubis. Likely represents intertrigo.   Plan:  - start ketoconazole 2% cream BID to affected area  - continue zinc barrier cream  - consider stopping miconazole powder as this can become clumpy and irritating to patient    Rash  Several scattered erythematous scaly papules on patient's back - not spreading and not found anywhere else on body. Patient reports lesions starting this admission and are pruritic. Lesions seem to be in healing stages. Do not appear to be angiokeratomas. Lesions likely represent resolving folliculitis. Patient undergoing genetic testing for Fabry's disease. Unlikely angiokeratomas would be new/sudden eruption.   Plan:  - start benzoyl peroxide wash TIW to back; leave on for 5-10 minutes prior to rinsing  - can consider addition of clindamycin gel to lesions on back  - dermatology will sign off at this time; if lesions fail to improve or are spreading, can consider re-evaluation          Thank you for your consult. I will sign off. Please contact us if you have any additional questions.    Beatris Brandt MD  Dermatology  [facility] - [facility]

## 2024-04-16 NOTE — PROGRESS NOTES
[facility] - Cardiac [facility]  Cardiology  Progress Note    Patient Name: Xena Vera  MRN: [id number]  Admission Date: 4/1/2024  Hospital Length of Stay: 13 days  Code Status: Full Code   Attending Physician: Renato Elmore MD   Primary Care Physician: Tami Villeda FNP  Expected Discharge Date: 4/22/2024  Principal Problem:Severe mitral valve regurgitation    Subjective:     Hospital Course:   Patient initially had improved UOP with increasing Lasix, eventually maxed out on Lasix drip with addition of Diuril. Eventually started on CRRT/SLED for volume removal, needs intermittent levophed to tolerate. Has itchy rash that started during current hospitalization.     Interval History: NAEON.    Hemodynamics today: CVP 23, SvO2 61. CO 6.9, CI, 3.8,     Review of Systems   Constitutional: Positive for malaise/fatigue.   Respiratory:  Positive for shortness of breath.    All other systems reviewed and are negative.    Objective:     Vital Signs (Most Recent):  Temp: 98.4 °F (36.9 °C) (04/16/24 0701)  Pulse: (!) 59 (04/16/24 0701)  Resp: (!) 39 (04/16/24 0701)  BP: (!) 94/53 (04/16/24 0600)  SpO2: 100 % (04/16/24 0701) Vital Signs (24h Range):  Temp:  [98 °F (36.7 °C)-98.5 °F (36.9 °C)] 98.4 °F (36.9 °C)  Pulse:  [55-62] 59  Resp:  [9-40] 39  SpO2:  [94 %-100 %] 100 %  BP: (94)/(53) 94/53  Arterial Line BP: ()/(44-66) 124/66     Weight: 77.6 kg (171 lb)  Body mass index is 33.4 kg/m².     SpO2: 100 %         Intake/Output Summary (Last 24 hours) at 4/16/2024 0904  Last data filed at 4/16/2024 0600  Gross per 24 hour   Intake 3235.84 ml   Output 4925 ml   Net -1689.16 ml       Lines/Drains/Airways       Central Venous Catheter Line  Duration             Trialysis (Dialysis) Catheter 04/13/24 1720 right internal jugular 2 days              Drain  Duration                  Urethral Catheter 04/09/24 1330 16 Fr. 6 days              Arterial Line  Duration             Arterial Line 04/14/24 1649  Left Radial 1 day              Peripheral Intravenous Line  Duration                  Peripheral IV - Single Lumen 04/14/24 1600 18 G;1 3/4 in Anterior;Right Upper Arm 1 day                     Physical Exam  Vitals and nursing note reviewed.   Constitutional:       Appearance: She is ill-appearing.   HENT:      Head: Normocephalic and atraumatic.   Cardiovascular:      Rate and Rhythm: Normal rate and regular rhythm.      Heart sounds: Murmur heard.   Pulmonary:      Effort: Pulmonary effort is normal. No respiratory distress.   Abdominal:      General: Abdomen is flat. There is distension (improved).      Palpations: Abdomen is soft.      Tenderness: There is no abdominal tenderness.   Musculoskeletal:      Right lower leg: Edema present.      Left lower leg: Edema present.   Skin:     General: Skin is warm and dry.   Neurological:      General: No focal deficit present.      Mental Status: She is alert and oriented to person, place, and time. Mental status is at baseline.        Significant Labs: All pertinent lab results from the last 24 hours have been reviewed.    Significant Imaging:  All pertinent imaging results from the last 24 hours have been reviewed.    Assessment and Plan:     * Severe mitral valve regurgitation  Acute on chronic heart failure with preserved ejection fraction (HFpEF)   Anasarca     54 yo F admitted for ADHF 2/2 severe MR. The MR appears to be secondary to posterior leaflet restriction and leaflet length of 1.1 cm. BNP on admission was 4300 compared to 350 eight months prior. Initially diagnosed with severe MR in June of 2023. Saw Dr. Stone in January of 2024 and was undergoing work-up for possible MitraClip; LHC scheduled for 3/15 but not completed due to financial constraints, also needs POOJA. Will need optimization of kidney function prior to angiogram consideration, appreciate Nephrology assistance    DDX: rheumatic heart disease vs Fabry vs antiphospholipid    4/1/2024 TTE    Left  "Ventricle: The left ventricle is normal in size. Normal wall thickness. Normal wall motion. There is normal systolic function with a visually estimated ejection fraction of 60 - 65%. Grade II diastolic dysfunction.    Right Ventricle: Normal right ventricular cavity size. Wall thickness is normal. Right ventricle wall motion  is normal. Systolic function is reduced.    Left Atrium: Left atrium is very  severely dilated.    Right Atrium: Right atrium is mildly dilated.    Aortic Valve: There is mild aortic valve sclerosis. There is normal leaflet mobility. There is trace aortic regurgitation.    Mitral Valve: There is mild bileaflet sclerosis. There is posterior leaflet tethering and failure of complete coaptation. There is very severe regurgitation with a posterolateral eccentriccally directed jet.    Tricuspid Valve: The tricuspid valve is structurally normal. There is normal leaflet mobility. There is moderate to severe regurgitation.    IVC/SVC: IVC was not well visualized due to poor acoustic window. Intermediate venous pressure at 8 mmHg.    Pericardium: There is a trivial effusion posteriorly and small under the RA.     - Interventional Cardiology consulted - no intervention until patient's volume status is controlled  - Nephrology following, undergoing SLED/CRRT  - POOJA ordered - "Anesthesia evaluated the patient and feel that she needs better optimization prior to POOJA. They stated once she is euvolemic that we can move forward. Will need a new POOJA order at that time."  - D/C Diuril 500 mg IV q24h & Lasix gtt @ 40 mg/hr  - Discontinued hydralazine 25 mg po q8h & Isordil 20 mg po BID for afterload reduction due to pressor requirements  - Continue home ASA 81 mg po daily and Lipitor 40 mg po daily  - Genetics consulted, see "Hypertrophic cardiomyopathy"  - F/u antistreptolysin-O  - F/u antiphospholipid work-up: B2-glycoprotein, anti-cardiolipin, DRVVT  Negative/WNL: YUMIKO  - Strict I/O, Jin in place  - Telemetry " "monitoring    Rash  - Dermatology consulted, appreciate josie  - PRN Atarax 10 mg po TID and diphenhydramine-zinc acetate cream    Hypertrophic cardiomyopathy  Angiokeratoma   Proteinuria     - Genetics consulted, appreciate josie  - GLA genetic testing collected and sent to [facility]  - Consider cardiac MRI    Acute kidney injury superimposed on chronic kidney disease  Patient with acute kidney injury/acute renal failure likely due to acute tubular necrosis caused by unknown.  REGINALDO is currently  improving on CRRT/SLED . Baseline creatinine  2.5  - Labs reviewed- Renal function/electrolytes with Estimated Creatinine Clearance: 65.8 mL/min (based on SCr of 0.9 mg/dL). according to latest data. Monitor urine output and serial BMP and adjust therapy as needed. Avoid nephrotoxins and renally dose meds for GFR listed above.    Recent Labs     04/15/24  1208 04/15/24  2139 04/16/24  0153   CREATININE 2.7* 0.9 0.9  0.9     - Nephrology following, brendon galindo - started SLED on 4/14  - Genetics consulted, see "Hypertrophic cardiomyopathy"  - Continue home Renvela 800 mg po TID with meals  - Holding home sodium bicarb 650 mg po BID    Essential hypertension  Chronic, controlled. Latest blood pressure and vitals reviewed-     Temp:  [98 °F (36.7 °C)-98.5 °F (36.9 °C)]   Pulse:  [55-62]   Resp:  [9-40]   BP: (94)/(53)   SpO2:  [94 %-100 %]   Arterial Line BP: ()/(44-66) .   Home meds for hypertension were reviewed and noted below.   Hypertension Medications               carvediloL (COREG) 3.125 MG tablet Take 2 tablets (6.25 mg total) by mouth 2 (two) times daily with meals.    carvediloL (COREG) 6.25 MG tablet Take 1 tablet by mouth 2 times daily with food    furosemide (LASIX) 40 MG tablet Take 1 tablet (40 mg total) by mouth 2 (two) times a day.    furosemide (LASIX) 40 MG tablet Take 1 tablet by mouth once daily for 90 days            While in the hospital, will manage blood pressure as follows; Adjust home " "antihypertensive regimen as follows- holding Lasix and Coreg    Will utilize p.r.n. blood pressure medication only if patient's blood pressure greater than 180/110 and she develops symptoms such as worsening chest pain or shortness of breath.    HLD (hyperlipidemia)  Continue home Lipitor 40 mg po daily  4/14 LDL 39    Acute urinary retention  Jin in place since 4/9/2024  Continue oxybutynin 5 mg po TID    Prolonged QT interval  4/4 QTc 481 ms. Avoid QT prolonging medications.     Prediabetes  Hemoglobin A1C   Date Value Ref Range Status   04/02/2024 6.0 (H) 4.0 - 5.6 % Final     Comment:     ADA Screening Guidelines:  5.7-6.4%  Consistent with prediabetes  >or=6.5%  Consistent with diabetes    High levels of fetal hemoglobin interfere with the HbA1C  assay. Heterozygous hemoglobin variants (HbS, HgC, etc)do  not significantly interfere with this assay.   However, presence of multiple variants may affect accuracy.       - F/u PCP    Anemia  Patient's anemia is currently controlled. Has not received any PRBCs to date. Etiology likely d/t chronic disease due to Chronic Kidney Disease  Current CBC reviewed-   Lab Results   Component Value Date    HGB 8.9 (L) 04/16/2024    HCT 33.1 (L) 04/16/2024     Monitor serial CBC and transfuse if patient becomes hemodynamically unstable, symptomatic or H/H drops below 7/21.    Leg wound, left  From mechanical fall, has been painful thoughout admission    - Wound Care assessed, orders for care placed  "Cleanse left leg with sterile normal saline and pat dry. Apply aquaphor BID and PRN"  - Tylenol 1000 mg po q6h PRN for moderate pain and Norco 5-325 mg po q6h PRN for severe pain    Class 2 obesity in adult  Body mass index is 32.29 kg/m². Morbid obesity complicates all aspects of disease management from diagnostic modalities to treatment. Weight loss encouraged and health benefits explained to patient.      Proteinuria  See "Hypertrophic cardiomyopathy"    Angiokeratoma  See " ""Hypertrophic cardiomyopathy"    Anasarca  See "Severe mitral valve regurgitation"    Acute on chronic heart failure with preserved ejection fraction (HFpEF)  See "Severe mitral valve regurgitation"        VTE Risk Mitigation (From admission, onward)           Ordered     heparin (porcine) injection 1,000 Units  As needed (PRN)         04/13/24 1428     Place sequential compression device  Until discontinued         04/03/24 1347     heparin (porcine) injection 5,000 Units  Every 8 hours         04/01/24 1030     IP VTE HIGH RISK PATIENT  Once         04/01/24 1030     Place sequential compression device  Until discontinued         04/01/24 1030                    Domonique Walden MD  Cardiology  [facility] - Cardiac Intensive Care    "

## 2024-04-16 NOTE — PROGRESS NOTES
04/16/24 0420   Treatment   Treatment Type SLED   Treatment Status Discontinued treatment;Blood returned   Dialysis Machine Number [id number]   Dialyzer Time (hours) 12.02   BVP (Liters) 106.4 L   Solutions Labeled and Current  Yes   Access Temporary Cath   Catheter Dressing Intact  Yes   CRRT Comments 12HR tx completed.

## 2024-04-16 NOTE — ASSESSMENT & PLAN NOTE
Patient's anemia is currently controlled. Has not received any PRBCs to date. Etiology likely d/t chronic disease due to Chronic Kidney Disease  Current CBC reviewed-   Lab Results   Component Value Date    HGB 8.9 (L) 04/16/2024    HCT 33.1 (L) 04/16/2024     Monitor serial CBC and transfuse if patient becomes hemodynamically unstable, symptomatic or H/H drops below 7/21.

## 2024-04-17 LAB
ALBUMIN SERPL BCP-MCNC: 2.6 G/DL (ref 3.5–5.2)
ALBUMIN SERPL BCP-MCNC: 2.6 G/DL (ref 3.5–5.2)
ALBUMIN SERPL BCP-MCNC: 2.7 G/DL (ref 3.5–5.2)
ALLENS TEST: ABNORMAL
ALLENS TEST: NORMAL
ALP SERPL-CCNC: 118 U/L (ref 55–135)
ALT SERPL W/O P-5'-P-CCNC: 38 U/L (ref 10–44)
ANION GAP SERPL CALC-SCNC: 10 MMOL/L (ref 8–16)
ANION GAP SERPL CALC-SCNC: 10 MMOL/L (ref 8–16)
ANION GAP SERPL CALC-SCNC: 6 MMOL/L (ref 8–16)
ANION GAP SERPL CALC-SCNC: 6 MMOL/L (ref 8–16)
ANION GAP SERPL CALC-SCNC: 8 MMOL/L (ref 8–16)
ANION GAP SERPL CALC-SCNC: 8 MMOL/L (ref 8–16)
ASO AB SERPL-ACNC: 22 IU/ML
AST SERPL-CCNC: 55 U/L (ref 10–40)
B2 GLYCOPROT1 IGA SER QL: 1.8 U/ML
B2 GLYCOPROT1 IGG SER QL: 2.2 U/ML
B2 GLYCOPROT1 IGM SER QL: <2.4 U/ML
BASOPHILS # BLD AUTO: 0.03 K/UL (ref 0–0.2)
BASOPHILS NFR BLD: 0.5 % (ref 0–1.9)
BILIRUB SERPL-MCNC: 1.4 MG/DL (ref 0.1–1)
BUN SERPL-MCNC: 10 MG/DL (ref 6–20)
BUN SERPL-MCNC: 10 MG/DL (ref 6–20)
BUN SERPL-MCNC: 4 MG/DL (ref 6–20)
BUN SERPL-MCNC: 4 MG/DL (ref 6–20)
BUN SERPL-MCNC: 7 MG/DL (ref 6–20)
BUN SERPL-MCNC: 7 MG/DL (ref 6–20)
CALCIUM SERPL-MCNC: 7.4 MG/DL (ref 8.7–10.5)
CALCIUM SERPL-MCNC: 7.4 MG/DL (ref 8.7–10.5)
CALCIUM SERPL-MCNC: 7.5 MG/DL (ref 8.7–10.5)
CALCIUM SERPL-MCNC: 7.5 MG/DL (ref 8.7–10.5)
CALCIUM SERPL-MCNC: 7.9 MG/DL (ref 8.7–10.5)
CALCIUM SERPL-MCNC: 7.9 MG/DL (ref 8.7–10.5)
CARDIOLIPIN IGG SER IA-ACNC: <9.4 GPL (ref 0–14.99)
CARDIOLIPIN IGM SER IA-ACNC: <9.4 MPL (ref 0–12.49)
CHLORIDE SERPL-SCNC: 103 MMOL/L (ref 95–110)
CHLORIDE SERPL-SCNC: 107 MMOL/L (ref 95–110)
CHLORIDE SERPL-SCNC: 107 MMOL/L (ref 95–110)
CO2 SERPL-SCNC: 21 MMOL/L (ref 23–29)
CO2 SERPL-SCNC: 23 MMOL/L (ref 23–29)
CO2 SERPL-SCNC: 23 MMOL/L (ref 23–29)
CONFIRM DRVVT STA-STACLOT: 3.5 S
CREAT SERPL-MCNC: 0.8 MG/DL (ref 0.5–1.4)
CREAT SERPL-MCNC: 0.8 MG/DL (ref 0.5–1.4)
CREAT SERPL-MCNC: 1.3 MG/DL (ref 0.5–1.4)
CREAT SERPL-MCNC: 1.3 MG/DL (ref 0.5–1.4)
CREAT SERPL-MCNC: 1.8 MG/DL (ref 0.5–1.4)
CREAT SERPL-MCNC: 1.8 MG/DL (ref 0.5–1.4)
DELSYS: NORMAL
DIFFERENTIAL METHOD BLD: ABNORMAL
DRVVT SCREEN TO CONFIRM RATIO: ABNORMAL {RATIO}
EOSINOPHIL # BLD AUTO: 0.1 K/UL (ref 0–0.5)
EOSINOPHIL NFR BLD: 2.2 % (ref 0–8)
ERYTHROCYTE [DISTWIDTH] IN BLOOD BY AUTOMATED COUNT: 22.6 % (ref 11.5–14.5)
EST. GFR  (NO RACE VARIABLE): 33.1 ML/MIN/1.73 M^2
EST. GFR  (NO RACE VARIABLE): 33.1 ML/MIN/1.73 M^2
EST. GFR  (NO RACE VARIABLE): 48.9 ML/MIN/1.73 M^2
EST. GFR  (NO RACE VARIABLE): 48.9 ML/MIN/1.73 M^2
EST. GFR  (NO RACE VARIABLE): >60 ML/MIN/1.73 M^2
EST. GFR  (NO RACE VARIABLE): >60 ML/MIN/1.73 M^2
GLUCOSE SERPL-MCNC: 108 MG/DL (ref 70–110)
GLUCOSE SERPL-MCNC: 108 MG/DL (ref 70–110)
GLUCOSE SERPL-MCNC: 84 MG/DL (ref 70–110)
GLUCOSE SERPL-MCNC: 84 MG/DL (ref 70–110)
GLUCOSE SERPL-MCNC: 97 MG/DL (ref 70–110)
GLUCOSE SERPL-MCNC: 97 MG/DL (ref 70–110)
HCO3 UR-SCNC: 22.2 MMOL/L (ref 24–28)
HCT VFR BLD AUTO: 31.7 % (ref 37–48.5)
HEPARIN NT PPP QL: ABNORMAL
HGB BLD-MCNC: 8.7 G/DL (ref 12–16)
IMM GRANULOCYTES # BLD AUTO: 0.03 K/UL (ref 0–0.04)
IMM GRANULOCYTES NFR BLD AUTO: 0.5 % (ref 0–0.5)
LA 3 SCREEN W REFLEX-IMP: ABNORMAL
LMW HEPARIN IND PLT AB SER QL: ABNORMAL
LYMPHOCYTES # BLD AUTO: 0.9 K/UL (ref 1–4.8)
LYMPHOCYTES NFR BLD: 13.3 % (ref 18–48)
MAGNESIUM SERPL-MCNC: 1.7 MG/DL (ref 1.6–2.6)
MAGNESIUM SERPL-MCNC: 1.7 MG/DL (ref 1.6–2.6)
MAGNESIUM SERPL-MCNC: 1.8 MG/DL (ref 1.6–2.6)
MAGNESIUM SERPL-MCNC: 1.8 MG/DL (ref 1.6–2.6)
MAGNESIUM SERPL-MCNC: 1.9 MG/DL (ref 1.6–2.6)
MAGNESIUM SERPL-MCNC: 1.9 MG/DL (ref 1.6–2.6)
MCH RBC QN AUTO: 22.8 PG (ref 27–31)
MCHC RBC AUTO-ENTMCNC: 27.4 G/DL (ref 32–36)
MCV RBC AUTO: 83 FL (ref 82–98)
MIXING DRVVT/NORMAL: ABNORMAL %
MONOCYTES # BLD AUTO: 0.8 K/UL (ref 0.3–1)
MONOCYTES NFR BLD: 12.1 % (ref 4–15)
NEUTRALIZED DRVVT SCREEN RATIO: ABNORMAL
NEUTROPHILS # BLD AUTO: 4.6 K/UL (ref 1.8–7.7)
NEUTROPHILS NFR BLD: 71.4 % (ref 38–73)
NRBC BLD-RTO: 0 /100 WBC
PCO2 BLDA: 48.5 MMHG (ref 35–45)
PH SMN: 7.27 [PH] (ref 7.35–7.45)
PHOSPHATE SERPL-MCNC: 1.3 MG/DL (ref 2.7–4.5)
PHOSPHATE SERPL-MCNC: 1.3 MG/DL (ref 2.7–4.5)
PHOSPHATE SERPL-MCNC: 3.9 MG/DL (ref 2.7–4.5)
PHOSPHATE SERPL-MCNC: 3.9 MG/DL (ref 2.7–4.5)
PHOSPHATE SERPL-MCNC: 4.8 MG/DL (ref 2.7–4.5)
PHOSPHATE SERPL-MCNC: 4.8 MG/DL (ref 2.7–4.5)
PLATELET # BLD AUTO: 144 K/UL (ref 150–450)
PMV BLD AUTO: 11.7 FL (ref 9.2–12.9)
PO2 BLDA: 48 MMHG (ref 40–60)
PO2 BLDA: 49 MMHG (ref 40–60)
POC BE: -5 MMOL/L
POC SATURATED O2: 77 % (ref 95–100)
POC SATURATED O2: 77 % (ref 95–100)
POC TCO2: 24 MMOL/L (ref 24–29)
POTASSIUM SERPL-SCNC: 4.2 MMOL/L (ref 3.5–5.1)
PROT SERPL-MCNC: 6.4 G/DL (ref 6–8.4)
PROTHROMBIN TIME: 15.8 S (ref 12–15.5)
RBC # BLD AUTO: 3.82 M/UL (ref 4–5.4)
SAMPLE: ABNORMAL
SAMPLE: NORMAL
SCREEN APTT/NORMAL: 1.3
SCREEN APTT/NORMAL: 1.3
SCREEN DRVVT/NORMAL: 1.12 %
SITE: ABNORMAL
SITE: NORMAL
SODIUM SERPL-SCNC: 132 MMOL/L (ref 136–145)
SODIUM SERPL-SCNC: 132 MMOL/L (ref 136–145)
SODIUM SERPL-SCNC: 134 MMOL/L (ref 136–145)
SODIUM SERPL-SCNC: 134 MMOL/L (ref 136–145)
SODIUM SERPL-SCNC: 136 MMOL/L (ref 136–145)
SODIUM SERPL-SCNC: 136 MMOL/L (ref 136–145)
THROMBIN TIME: 20.2 S
WBC # BLD AUTO: 6.46 K/UL (ref 3.9–12.7)

## 2024-04-17 PROCEDURE — 63600175 PHARM REV CODE 636 W HCPCS: Performed by: PHYSICIAN ASSISTANT

## 2024-04-17 PROCEDURE — 85025 COMPLETE CBC W/AUTO DIFF WBC: CPT

## 2024-04-17 PROCEDURE — 94660 CPAP INITIATION&MGMT: CPT

## 2024-04-17 PROCEDURE — 99231 SBSQ HOSP IP/OBS SF/LOW 25: CPT | Mod: ,,, | Performed by: INTERNAL MEDICINE

## 2024-04-17 PROCEDURE — 25000003 PHARM REV CODE 250

## 2024-04-17 PROCEDURE — 80053 COMPREHEN METABOLIC PANEL: CPT

## 2024-04-17 PROCEDURE — 80069 RENAL FUNCTION PANEL: CPT

## 2024-04-17 PROCEDURE — 99900035 HC TECH TIME PER 15 MIN (STAT)

## 2024-04-17 PROCEDURE — 99233 SBSQ HOSP IP/OBS HIGH 50: CPT | Mod: ,,, | Performed by: INTERNAL MEDICINE

## 2024-04-17 PROCEDURE — 25000003 PHARM REV CODE 250: Performed by: STUDENT IN AN ORGANIZED HEALTH CARE EDUCATION/TRAINING PROGRAM

## 2024-04-17 PROCEDURE — 27000221 HC OXYGEN, UP TO 24 HOURS

## 2024-04-17 PROCEDURE — 27100171 HC OXYGEN HIGH FLOW UP TO 24 HOURS

## 2024-04-17 PROCEDURE — 20000000 HC ICU ROOM

## 2024-04-17 PROCEDURE — 25000003 PHARM REV CODE 250: Performed by: PHYSICIAN ASSISTANT

## 2024-04-17 PROCEDURE — 25000003 PHARM REV CODE 250: Performed by: HOSPITALIST

## 2024-04-17 PROCEDURE — 25000003 PHARM REV CODE 250: Performed by: INTERNAL MEDICINE

## 2024-04-17 PROCEDURE — 94799 UNLISTED PULMONARY SVC/PX: CPT

## 2024-04-17 PROCEDURE — 83735 ASSAY OF MAGNESIUM: CPT | Mod: 91

## 2024-04-17 PROCEDURE — 63600175 PHARM REV CODE 636 W HCPCS: Performed by: INTERNAL MEDICINE

## 2024-04-17 PROCEDURE — 82803 BLOOD GASES ANY COMBINATION: CPT

## 2024-04-17 PROCEDURE — 83735 ASSAY OF MAGNESIUM: CPT

## 2024-04-17 PROCEDURE — 90945 DIALYSIS ONE EVALUATION: CPT

## 2024-04-17 PROCEDURE — 80069 RENAL FUNCTION PANEL: CPT | Mod: 91

## 2024-04-17 PROCEDURE — 84100 ASSAY OF PHOSPHORUS: CPT

## 2024-04-17 PROCEDURE — 80100008 HC CRRT DAILY MAINTENANCE

## 2024-04-17 PROCEDURE — 94761 N-INVAS EAR/PLS OXIMETRY MLT: CPT | Mod: XB

## 2024-04-17 RX ORDER — BENZOYL PEROXIDE 10 G/100G
GEL TOPICAL
Status: DISCONTINUED | OUTPATIENT
Start: 2024-04-17 | End: 2024-04-17

## 2024-04-17 RX ORDER — MAGNESIUM SULFATE HEPTAHYDRATE 40 MG/ML
2 INJECTION, SOLUTION INTRAVENOUS
Status: DISPENSED | OUTPATIENT
Start: 2024-04-17 | End: 2024-04-18

## 2024-04-17 RX ORDER — CLINDAMYCIN PHOSPHATE 10 MG/G
GEL TOPICAL 2 TIMES DAILY
Status: DISCONTINUED | OUTPATIENT
Start: 2024-04-17 | End: 2024-05-09 | Stop reason: HOSPADM

## 2024-04-17 RX ORDER — DOXYLAMINE SUCCINATE 25 MG
TABLET ORAL 2 TIMES DAILY
Status: DISCONTINUED | OUTPATIENT
Start: 2024-04-17 | End: 2024-05-09 | Stop reason: HOSPADM

## 2024-04-17 RX ORDER — HYDROCODONE BITARTRATE AND ACETAMINOPHEN 500; 5 MG/1; MG/1
TABLET ORAL CONTINUOUS
Status: ACTIVE | OUTPATIENT
Start: 2024-04-17 | End: 2024-04-18

## 2024-04-17 RX ADMIN — SEVELAMER CARBONATE 800 MG: 800 TABLET, FILM COATED ORAL at 06:04

## 2024-04-17 RX ADMIN — MAGNESIUM SULFATE HEPTAHYDRATE 2 G: 40 INJECTION, SOLUTION INTRAVENOUS at 11:04

## 2024-04-17 RX ADMIN — SENNOSIDES AND DOCUSATE SODIUM 1 TABLET: 8.6; 5 TABLET ORAL at 08:04

## 2024-04-17 RX ADMIN — Medication 400 MG: at 08:04

## 2024-04-17 RX ADMIN — MAGNESIUM SULFATE HEPTAHYDRATE 2 G: 40 INJECTION, SOLUTION INTRAVENOUS at 06:04

## 2024-04-17 RX ADMIN — ATORVASTATIN CALCIUM 40 MG: 40 TABLET, FILM COATED ORAL at 12:04

## 2024-04-17 RX ADMIN — SODIUM CHLORIDE: 9 INJECTION, SOLUTION INTRAVENOUS at 04:04

## 2024-04-17 RX ADMIN — HEPARIN SODIUM 5000 UNITS: 5000 INJECTION INTRAVENOUS; SUBCUTANEOUS at 06:04

## 2024-04-17 RX ADMIN — OXYBUTYNIN CHLORIDE 5 MG: 5 TABLET ORAL at 12:04

## 2024-04-17 RX ADMIN — HYDROCODONE BITARTRATE AND ACETAMINOPHEN 1 TABLET: 5; 325 TABLET ORAL at 11:04

## 2024-04-17 RX ADMIN — Medication: at 08:04

## 2024-04-17 RX ADMIN — SODIUM PHOSPHATE, MONOBASIC, MONOHYDRATE AND SODIUM PHOSPHATE, DIBASIC, ANHYDROUS 39.99 MMOL: 142; 276 INJECTION, SOLUTION INTRAVENOUS at 01:04

## 2024-04-17 RX ADMIN — SODIUM CHLORIDE: 9 INJECTION, SOLUTION INTRAVENOUS at 05:04

## 2024-04-17 RX ADMIN — SODIUM CHLORIDE: 9 INJECTION, SOLUTION INTRAVENOUS at 10:04

## 2024-04-17 RX ADMIN — OXYBUTYNIN CHLORIDE 5 MG: 5 TABLET ORAL at 08:04

## 2024-04-17 RX ADMIN — SEVELAMER CARBONATE 800 MG: 800 TABLET, FILM COATED ORAL at 12:04

## 2024-04-17 RX ADMIN — MUPIROCIN: 20 OINTMENT TOPICAL at 08:04

## 2024-04-17 RX ADMIN — ASPIRIN 81 MG: 81 TABLET, COATED ORAL at 12:04

## 2024-04-17 RX ADMIN — Medication: at 12:04

## 2024-04-17 RX ADMIN — Medication 400 MG: at 12:04

## 2024-04-17 RX ADMIN — MUPIROCIN: 20 OINTMENT TOPICAL at 12:04

## 2024-04-17 RX ADMIN — POLYETHYLENE GLYCOL 3350 17 G: 17 POWDER, FOR SOLUTION ORAL at 08:04

## 2024-04-17 RX ADMIN — CLINDAMYCIN PHOSPHATE: 10 GEL TOPICAL at 08:04

## 2024-04-17 RX ADMIN — HEPARIN SODIUM 5000 UNITS: 5000 INJECTION INTRAVENOUS; SUBCUTANEOUS at 10:04

## 2024-04-17 RX ADMIN — MICONAZOLE NITRATE: 20 CREAM TOPICAL at 08:04

## 2024-04-17 RX ADMIN — HEPARIN SODIUM 5000 UNITS: 5000 INJECTION INTRAVENOUS; SUBCUTANEOUS at 02:04

## 2024-04-17 NOTE — ASSESSMENT & PLAN NOTE
Patient's anemia is currently controlled. Has not received any PRBCs to date. Etiology likely d/t chronic disease due to Chronic Kidney Disease  Current CBC reviewed-   Lab Results   Component Value Date    HGB 8.7 (L) 04/17/2024    HCT 31.7 (L) 04/17/2024     Monitor serial CBC and transfuse if patient becomes hemodynamically unstable, symptomatic or H/H drops below 7/21.

## 2024-04-17 NOTE — PT/OT/SLP PROGRESS
Occupational Therapy      Patient Name:  Xena Vera   MRN:  [id number]    Patient not seen today secondary to Nurse/ HERSON hold. Per nsg, pt hallucinating and agitated this AM, not appropriate for skilled OT intervention. Will follow-up as scheduled.    4/17/2024

## 2024-04-17 NOTE — PROGRESS NOTES
[facility] - Cardiac Int[facility]  Nephrology  Progress Note    Patient Name: Xena Vera  MRN: [id number]  Admission Date: 4/1/2024  Hospital Length of Stay: 14 days  Attending Provider: Renato Elmore MD   Primary Care Physician: Tami Villeda FNP  Principal Problem:Severe mitral valve regurgitation    Subjective:     HPI: Ms Vera is an obese (BMI ~31) 53-year-old with CKD IV (baseline creatinine ~3.1-3.3), prediabetes with most recent hemoglobin A1c 6%, HFpEF (most recent TTE with EF 60-65% with G2DD), mitral valve regurgitation, anemia, hypertension, HLD as well as several over co-morbid conditions who was admitted on 4/1 with heart failure exacerbation and hypervolemia after presenting with to ED following a mechanical fall but also had complaints of progressive dyspnea/ZACARIAS, orthopnea, lower extremity edema and abdominal distension with constipation. On presentation patient was noted to be hypotensive with systolic BP readings as low as 90s mmHg and bradycardiac with HR as low as 50s bpm. There was concern for some edema on chest x-ray and BNP at that time was ~4.6K and metabolic panel was notable for serum sodium 133, bicarbonate 20, , creatinine 4.2, albumin 3.1 and total bilirubin 1.5. UA showed +1 protein. Her admission was complicated by failure to adequately diuresis with escalating IV Lasix and even oral metolazone for which Nephrology was consulted on 4/10 for assistance. She explicitly denies NSAID use as outpatient.    Interval History: Pt underwent another 12h of SLED/SCUF overnight, but remains overloaded.  Started hallucinating this morning and required BiPAP.      Objective:     Vital Signs (Most Recent):  Temp: 97.4 °F (36.3 °C) (04/17/24 0701)  Pulse: (!) 57 (04/17/24 1001)  Resp: 14 (04/17/24 1001)  BP: 111/68 (04/17/24 1001)  SpO2: 100 % (04/17/24 1001) Vital Signs (24h Range):  Temp:  [97.4 °F (36.3 °C)-98.6 °F (37 °C)] 97.4 °F (36.3 °C)  Pulse:  [57-64] 57  Resp:   [13-38] 14  SpO2:  [96 %-100 %] 100 %  BP: (111-112)/(55-68) 111/68  Arterial Line BP: ()/(50-97) 132/71     Weight: 78.5 kg (173 lb) (04/17/24 0400)  Body mass index is 33.79 kg/m².  Body surface area is 1.82 meters squared.    I/O last 3 completed shifts:  In: 5631.9 [P.O.:1200; I.V.:4192; IV Piggyback:239.9]  Out: 7550 [Urine:75; Other:7475]     Physical Exam     Significant Labs:  ABGs:   Recent Labs   Lab 04/17/24  0902   PH 7.268*   PCO2 48.5*   HCO3 22.2*   POCSATURATED 77   BE -5*     CBC:   Recent Labs   Lab 04/17/24  0418   WBC 6.46   RBC 3.82*   HGB 8.7*   HCT 31.7*   *   MCV 83   MCH 22.8*   MCHC 27.4*     CMP:   Recent Labs   Lab 04/17/24  0418   GLU 84  84   CALCIUM 7.5*  7.5*   ALBUMIN 2.7*  2.7*   PROT 6.4   *  134*   K 4.2  4.2   CO2 21*  21*     103   BUN 7  7   CREATININE 1.3  1.3   ALKPHOS 118   ALT 38   AST 55*   BILITOT 1.4*     All labs within the past 24 hours have been reviewed.     Significant Imaging:  None  Assessment/Plan:     Renal/  Acute kidney injury superimposed on chronic kidney disease  CKD IV (baseline creatinine ~3.1-3.3) admitted with hypervolemia and REGINALDO with creatinine 4.2  UA showed +1 protein with UPCR of ~500 mg  Urinary sediment with non dysmorphic RBCs and WBCs present although Jin catheter just place yesterday   Retroperitoneal US without evidence of hydronephrosis although changes consistent with chronic kidney disease  Diuresis with lasix gtt @ 40mg/hr + Diuril 500mg IV BID attempted, and while Crt improved she remains unable to tolerate lying flat.  Tolerated 8h SLED w goal UF rate 200-500/hr successfully overnight 4/14-15, then 12h SLED on 4/15-16.  4/16-17: had another 12h SLED/SCUF, seeking more info from overnight CRRT team about how she handled it in order to safely and accurately gauge her response.    Plan/Recommendations:  Will plan for another session of SLED for metabolic clearance and volume management today, 4/17.   Also discussing safety of iHD trial, or possibly adjusting CRRT parameters to help clear off as much volume as she can safely tolerate.  Continue Renvela 800 mg TIDWM  Please avoid hypotension/major fluctuations in BP which may worsen REGINALDO (keep MAP > 65 mmHg)  Renal diet/tube feeds when not NPO with volume restriction per primary team  Strict I/O's and daily weights  Renally all dose medications to eGFR   Avoid nephrotoxic agents wean feasible (i.e. NSAIDs, intra-arterial contrast, supra-therapeutic vancomycin levels, etc.)          Thank you for your consult. I will follow-up with patient. Please contact us if you have any additional questions.    Perry Meier MD  Nephrology  [facility] - Cardiac Int[facility]

## 2024-04-17 NOTE — ASSESSMENT & PLAN NOTE
CKD IV (baseline creatinine ~3.1-3.3) admitted with hypervolemia and REGINALDO with creatinine 4.2  UA showed +1 protein with UPCR of ~500 mg  Urinary sediment with non dysmorphic RBCs and WBCs present although Jin catheter just place yesterday   Retroperitoneal US without evidence of hydronephrosis although changes consistent with chronic kidney disease  Diuresis with lasix gtt @ 40mg/hr + Diuril 500mg IV BID attempted, and while Crt improved she remains unable to tolerate lying flat.  Tolerated 8h SLED w goal UF rate 200-500/hr successfully overnight 4/14-15, then 12h SLED on 4/15-16.  4/16-17: had another 12h SLED/SCUF, seeking more info from overnight CRRT team about how she handled it in order to safely and accurately gauge her response.    Plan/Recommendations:  Will plan for another session of SLED for metabolic clearance and volume management today, 4/17.  Also discussing safety of iHD trial, or possibly adjusting CRRT parameters to help clear off as much volume as she can safely tolerate.  Continue Renvela 800 mg TIDWM  Please avoid hypotension/major fluctuations in BP which may worsen REGINALDO (keep MAP > 65 mmHg)  Renal diet/tube feeds when not NPO with volume restriction per primary team  Strict I/O's and daily weights  Renally all dose medications to eGFR   Avoid nephrotoxic agents wean feasible (i.e. NSAIDs, intra-arterial contrast, supra-therapeutic vancomycin levels, etc.)

## 2024-04-17 NOTE — SUBJECTIVE & OBJECTIVE
Interval History: NAEON. Tolerated CRRT well. This AM confused and hallucinating, did not sleep with BiPAP on so placed on BiPAP with improvement.    Hemodynamics today: CVP 24, SVO2 77, CO 10.7, CI 5.9, .     Review of Systems   Unable to perform ROS: Mental status change     Objective:     Vital Signs (Most Recent):  Temp: 97.4 °F (36.3 °C) (04/17/24 0701)  Pulse: (!) 58 (04/17/24 1101)  Resp: 19 (04/17/24 1101)  BP: 115/63 (04/17/24 1101)  SpO2: 100 % (04/17/24 1101) Vital Signs (24h Range):  Temp:  [97.4 °F (36.3 °C)-98.6 °F (37 °C)] 97.4 °F (36.3 °C)  Pulse:  [57-64] 58  Resp:  [13-38] 19  SpO2:  [96 %-100 %] 100 %  BP: (111-115)/(55-68) 115/63  Arterial Line BP: ()/(54-97) 132/71     Weight: 78.5 kg (173 lb)  Body mass index is 33.79 kg/m².     SpO2: 100 %         Intake/Output Summary (Last 24 hours) at 4/17/2024 1205  Last data filed at 4/17/2024 0602  Gross per 24 hour   Intake 2901.22 ml   Output 3542 ml   Net -640.78 ml       Lines/Drains/Airways       Central Venous Catheter Line  Duration             Trialysis (Dialysis) Catheter 04/13/24 1720 right internal jugular 3 days              Drain  Duration                  Urethral Catheter 04/09/24 1330 16 Fr. 7 days              Arterial Line  Duration             Arterial Line 04/14/24 1645 Left Radial 2 days              Peripheral Intravenous Line  Duration                  Peripheral IV - Single Lumen 04/14/24 1600 18 G;1 3/4 in Anterior;Right Upper Arm 2 days         Midline Catheter - Single Lumen 04/16/24 1744 Left cephalic vein (lateral side of arm) 20g x 10cm <1 day                       Physical Exam  Vitals and nursing note reviewed.   Constitutional:       General: She is not in acute distress.     Appearance: She is obese. She is ill-appearing.      Comments: Sleepy but arousable   HENT:      Head: Normocephalic and atraumatic.   Cardiovascular:      Rate and Rhythm: Normal rate and regular rhythm.      Heart sounds: Murmur heard.    Pulmonary:      Effort: Pulmonary effort is normal. No respiratory distress.   Abdominal:      General: Abdomen is flat. There is distension.      Palpations: Abdomen is soft.      Tenderness: There is no abdominal tenderness.   Musculoskeletal:      Right lower leg: Edema present.      Left lower leg: Edema present.   Skin:     General: Skin is warm and dry.            Significant Labs: All pertinent lab results from the last 24 hours have been reviewed.    Significant Imaging:  All pertinent imaging results from the last 24 hours have been reviewed.

## 2024-04-17 NOTE — SUBJECTIVE & OBJECTIVE
Interval History: Pt underwent another 12h of SLED/SCUF overnight, but remains overloaded.  Started hallucinating this morning and required BiPAP.      Objective:     Vital Signs (Most Recent):  Temp: 97.4 °F (36.3 °C) (04/17/24 0701)  Pulse: (!) 57 (04/17/24 1001)  Resp: 14 (04/17/24 1001)  BP: 111/68 (04/17/24 1001)  SpO2: 100 % (04/17/24 1001) Vital Signs (24h Range):  Temp:  [97.4 °F (36.3 °C)-98.6 °F (37 °C)] 97.4 °F (36.3 °C)  Pulse:  [57-64] 57  Resp:  [13-38] 14  SpO2:  [96 %-100 %] 100 %  BP: (111-112)/(55-68) 111/68  Arterial Line BP: ()/(50-97) 132/71     Weight: 78.5 kg (173 lb) (04/17/24 0400)  Body mass index is 33.79 kg/m².  Body surface area is 1.82 meters squared.    I/O last 3 completed shifts:  In: 5631.9 [P.O.:1200; I.V.:4192; IV Piggyback:239.9]  Out: 7550 [Urine:75; Other:7475]     Physical Exam     Significant Labs:  ABGs:   Recent Labs   Lab 04/17/24  0902   PH 7.268*   PCO2 48.5*   HCO3 22.2*   POCSATURATED 77   BE -5*     CBC:   Recent Labs   Lab 04/17/24 0418   WBC 6.46   RBC 3.82*   HGB 8.7*   HCT 31.7*   *   MCV 83   MCH 22.8*   MCHC 27.4*     CMP:   Recent Labs   Lab 04/17/24 0418   GLU 84  84   CALCIUM 7.5*  7.5*   ALBUMIN 2.7*  2.7*   PROT 6.4   *  134*   K 4.2  4.2   CO2 21*  21*     103   BUN 7  7   CREATININE 1.3  1.3   ALKPHOS 118   ALT 38   AST 55*   BILITOT 1.4*     All labs within the past 24 hours have been reviewed.     Significant Imaging:  None

## 2024-04-17 NOTE — PROGRESS NOTES
04/17/24 0609   Treatment   Treatment Type SLED   Treatment Status Blood returned   Dialyzer Time (hours) 12   BVP (Liters) 53 L   CRRT Comments Tx completed; blood returned

## 2024-04-17 NOTE — PLAN OF CARE
Care Plan    POC reviewed with Xena Vera and family. Questions and concerns addressed. This am pt cvp 24, pt confused/restless, having hallucinations per daughter, pt inadvertently pulled out kayla getting out of bed standing next to bed, pt placed back in bed and placed on bipap, vss, after a few hours of bipap pt woke up feeling better, no other issues today, will run on CRRT x 12 hrs again tonight, please make sure pt goes on bipap at night, Pt progressing toward goals. Will continue to monitor. See below and flowsheets for full assessment and VS info.       Neuro:  Cleo Coma Scale  Best Eye Response: 4-->(E4) spontaneous  Best Motor Response: 6-->(M6) obeys commands  Best Verbal Response: 4-->(V4) confused  Cleo Coma Scale Score: 14  Assessment Qualifiers: patient not sedated/intubated  Pupil PERRLA: yes  24 hr Temp:  [97.4 °F (36.3 °C)-98.3 °F (36.8 °C)]      CV:  Rhythm: normal sinus rhythm  DVT prophylaxis: VTE Required Core Measure: Pharmacological prophylaxis initiated/maintained    Resp:     Oxygen Concentration (%): 50    GI/:  GI prophylaxis: no  Diet/Nutrition Received: 2 gram sodium, low saturated fat/low cholesterol  Last Bowel Movement: 04/17/24  Voiding Characteristics: urethral catheter (bladder), oliguria       Urethral Catheter 04/09/24 1330 16 Fr.-Reason for Continuing Urinary Catheterization: Critically ill in ICU and requiring hourly monitoring of intake/output   Intake/Output Summary (Last 24 hours) at 4/17/2024 1626  Last data filed at 4/17/2024 1101  Gross per 24 hour   Intake 3141.22 ml   Output 3542 ml   Net -400.78 ml       Labs/Accuchecks:  Recent Labs   Lab 04/17/24 0418   WBC 6.46   RBC 3.82*   HGB 8.7*   HCT 31.7*   *      Recent Labs   Lab 04/17/24  0418 04/17/24  1428   *  134* 132*  132*   K 4.2  4.2 4.2  4.2   CO2 21*  21* 21*  21*     103 103  103   BUN 7  7 10  10   CREATININE 1.3  1.3 1.8*  1.8*   ALKPHOS 118  --    ALT 38  --   "  AST 55*  --    BILITOT 1.4*  --     No results for input(s): "PROTIME", "INR", "APTT", "HEPANTIXA" in the last 168 hours. No results for input(s): "CPK", "CPKMB", "TROPONINI", "MB" in the last 168 hours.    Electrolytes: Electrolytes replaced  Accuchecks: none    Gtts/LDAs:  Current Facility-Administered Medications   Medication Dose Route Frequency Last Rate Last Admin    sodium chloride 0.9%   Intravenous Continuous   Stopped at 04/17/24 0609    sodium chloride 0.9%   Intravenous Continuous        sodium chloride 0.9%   Intravenous Continuous   Stopped at 04/15/24 1536       Lines/Drains/Airways       Central Venous Catheter Line  Duration             Trialysis (Dialysis) Catheter 04/13/24 1720 right internal jugular 3 days              Drain  Duration                  Urethral Catheter 04/09/24 1330 16 Fr. 8 days              Arterial Line  Duration             Arterial Line 04/14/24 1645 Left Radial 2 days              Peripheral Intravenous Line  Duration                  Peripheral IV - Single Lumen 04/14/24 1600 18 G;1 3/4 in Anterior;Right Upper Arm 3 days         Midline Catheter - Single Lumen 04/16/24 1744 Left cephalic vein (lateral side of arm) 20g x 10cm <1 day                    Skin/Wounds  Bathing/Skin Care: back care;bath, complete;dressed/undressed;linen changed (04/17/24 1501)  Wounds: Yes  Wound care consulted: Yes    Consults  Consults (From admission, onward)          Status Ordering Provider     Inpatient consult to Dermatology  Once        Provider:  (Not yet assigned)    Completed BEN CONROY     Inpatient consult to Medical Genetics  Once        Provider:  (Not yet assigned)    Acknowledged BEN CONROY     Inpatient consult to Social Work/Case Management  Once        Provider:  (Not yet assigned)    Acknowledged JOHN MCCLELLAN     Inpatient consult to Social Work  Once        Provider:  (Not yet assigned)    Acknowledged BEN CONROY     Inpatient consult to Interventional " Cardiology  Once        Provider:  (Not yet assigned)    Completed BEN CONROY     Inpatient consult to Cardiology  Once        Provider:  (Not yet assigned)    Completed BAHD, JOSE E     Inpatient consult to Registered Dietitian/Nutritionist  Once        Provider:  (Not yet assigned)    Completed BAHD, JOSE E     Inpatient consult to Nephrology  Once        Provider:  (Not yet assigned)    Completed BAHD, JOSE E     Inpatient consult to Social Work/Case Management  Once        Provider:  (Not yet assigned)    Completed WERNER SANTANA     Inpatient consult to Registered Dietitian/Nutritionist  Once        Provider:  (Not yet assigned)    Completed WERNER SANTANA

## 2024-04-17 NOTE — CONSULTS
[facility] - Cardiac Intensive Care  Wound Care    Patient Name:  Xena Vera   MRN:  [id number]  Date: 2024  Diagnosis: Severe mitral valve regurgitation    History:     Past Medical History:   Diagnosis Date    (HFpEF) heart failure with preserved ejection fraction 2023    EF 63% with G2 DD  Continue lasix, appears euvolemic today  Continue good BP management with losartan, increase Coreg 6.25 mg BID  Fluid restriction, low sodium diet  Recommend Jardiance- patient had CKD 4 and this is a limiting factor- nephro eval pending    Anemia 2023    CKD (chronic kidney disease)     HTN (hypertension)     Mitral valve regurgitation 2023    Refer to structural for evaluation  May benefit from mitral clip    Ovarian cyst     Stage 4 chronic kidney disease 2023    Refer to nephrology at [facility] as unable to get established with [facility] and the phone number for the outside nephrologist provided to patient during her recent hospitalization goes unanswered when called.   Cr remains elevated  Would like to start SGLT-2 and appreciate nephrology expertise        Social History     Socioeconomic History    Marital status: Single   Tobacco Use    Smoking status: Former     Current packs/day: 0.00     Types: Cigarettes     Start date:      Quit date:      Years since quittin.3    Smokeless tobacco: Never    Tobacco comments:     2-3 cigarettes a day   Substance and Sexual Activity    Alcohol use: Never    Drug use: Never     Social Determinants of Health     Financial Resource Strain: Patient Unable To Answer (2024)    Overall Financial Resource Strain (CARDIA)     Difficulty of Paying Living Expenses: Patient unable to answer   Food Insecurity: Patient Unable To Answer (2024)    Hunger Vital Sign     Worried About Running Out of Food in the Last Year: Patient unable to answer     Ran Out of Food in the Last Year: Patient unable to answer   Transportation Needs: Patient  Unable To Answer (4/4/2024)    PRAPARE - Transportation     Lack of Transportation (Medical): Patient unable to answer     Lack of Transportation (Non-Medical): Patient unable to answer   Physical Activity: Inactive (4/1/2024)    Exercise Vital Sign     Days of Exercise per Week: 0 days     Minutes of Exercise per Session: 0 min   Stress: Patient Unable To Answer (4/4/2024)    [place] [employer] - Occupational Stress Questionnaire     Feeling of Stress : Patient unable to answer   Recent Concern: Stress - Stress Concern Present (4/2/2024)    [place] [employer] - Occupational Stress Questionnaire     Feeling of Stress : To some extent   Social Connections: Moderately Isolated (4/1/2024)    Social Connection and Isolation Panel [NHANES]     Frequency of Communication with Friends and Family: Three times a week     Frequency of Social Gatherings with Friends and Family: Three times a week     Attends Islam Services: 1 to 4 times per year     Active Member of Clubs or Organizations: No     Attends Club or Organization Meetings: Never     Marital Status: Never    Housing Stability: Patient Unable To Answer (4/4/2024)    Housing Stability Vital Sign     Unable to Pay for Housing in the Last Year: Patient unable to answer     Number of Places Lived in the Last Year: 1     Unstable Housing in the Last Year: Patient unable to answer       Precautions:     Allergies as of 04/01/2024    (No Known Allergies)       [facility] Assessment Details/Treatment     Patient seen for wound care consultation for R foot, sacrum, BL UE/LE, R groin.   Reviewed chart for this encounter.   See Flow Sheet for findings.    Per chart review, pt seen by derm for R groin intertrigo w/ orders in place. Derm also evaluated generalized rash across extremities/trunk. Order recs found w/in derm note. Please defer to derm recs for care.  Sacrum RA and intact w/o altered skin integrity.   Dry area of intact  plaque to R foot RA. No needs.  Wound care will sign off.      Discussed POC with patient and primary nurse.   See EMR for orders & patient education.    Bedside nursing to continue care & monitoring.  Bedside nursing to maintain pressure injury prevention interventions.       04/17/24 1154   WOCN Assessment   WOCN Total Time (mins) 15   Visit Date 04/17/24   Visit Time 1154   Consult Type New   WOCN Speciality Wound   Intervention assessed;chart review;coordination of care   Teaching on-going

## 2024-04-17 NOTE — PT/OT/SLP PROGRESS
Physical Therapy      Patient Name:  Xena Vera   MRN:  [id number]    PT orders seen and acknowledged. Patient not seen today secondary to 0938 attempt RN hold for day 2/2 pt hallucinating and agitated this morning. RN reports pt needs sleep so will follow up tomorrow for re-evaluation.

## 2024-04-17 NOTE — ASSESSMENT & PLAN NOTE
"Patient with acute kidney injury/acute renal failure likely due to acute tubular necrosis caused by unknown.  REGINALDO is currently  improving on CRRT/SLED . Baseline creatinine  2.5  - Labs reviewed- Renal function/electrolytes with Estimated Creatinine Clearance: 45.8 mL/min (based on SCr of 1.3 mg/dL). according to latest data. Monitor urine output and serial BMP and adjust therapy as needed. Avoid nephrotoxins and renally dose meds for GFR listed above.    Recent Labs     04/16/24  1418 04/16/24  2202 04/17/24  0418   CREATININE 1.6* 0.9 1.3  1.3     - Nephrology following, appreciate recs - started SLED on 4/14  - Genetics consulted, see "Hypertrophic cardiomyopathy"  - Continue home Renvela 800 mg po TID with meals  - Holding home sodium bicarb 650 mg po BID  "

## 2024-04-17 NOTE — ASSESSMENT & PLAN NOTE
Intertrigo     - Dermatology consulted, appreciate recs  - PRN Atarax 10 mg po TID and diphenhydramine-zinc acetate cream  - Miconazole cream BID - to right sided intertrigo  - Benzoyl peroxide wash three times weekly - to back

## 2024-04-17 NOTE — ASSESSMENT & PLAN NOTE
Acute on chronic heart failure with preserved ejection fraction (HFpEF)   Anasarca     52 yo F admitted for ADHF 2/2 severe MR. The MR appears to be secondary to posterior leaflet restriction and leaflet length of 1.1 cm. BNP on admission was 4300 compared to 350 eight months prior. Initially diagnosed with severe MR in June of 2023. Saw Dr. Stone in January of 2024 and was undergoing work-up for possible MitraClip; C scheduled for 3/15 but not completed due to financial constraints, also needs POOJA. Will need optimization of kidney function prior to angiogram consideration, appreciate Nephrology assistance    DDX: rheumatic heart disease vs Fabry vs antiphospholipid    4/1/2024 TTE    Left Ventricle: The left ventricle is normal in size. Normal wall thickness. Normal wall motion. There is normal systolic function with a visually estimated ejection fraction of 60 - 65%. Grade II diastolic dysfunction.    Right Ventricle: Normal right ventricular cavity size. Wall thickness is normal. Right ventricle wall motion  is normal. Systolic function is reduced.    Left Atrium: Left atrium is very  severely dilated.    Right Atrium: Right atrium is mildly dilated.    Aortic Valve: There is mild aortic valve sclerosis. There is normal leaflet mobility. There is trace aortic regurgitation.    Mitral Valve: There is mild bileaflet sclerosis. There is posterior leaflet tethering and failure of complete coaptation. There is very severe regurgitation with a posterolateral eccentriccally directed jet.    Tricuspid Valve: The tricuspid valve is structurally normal. There is normal leaflet mobility. There is moderate to severe regurgitation.    IVC/SVC: IVC was not well visualized due to poor acoustic window. Intermediate venous pressure at 8 mmHg.    Pericardium: There is a trivial effusion posteriorly and small under the RA.     - Interventional Cardiology consulted - no intervention until patient's volume status is controlled  -  "Nephrology following, undergoing SLED/CRRT  - POOJA ordered - "Anesthesia evaluated the patient and feel that she needs better optimization prior to POOJA. They stated once she is euvolemic that we can move forward. Will need a new POOJA order at that time."  - Discontinued Diuril 500 mg IV q24h & Lasix gtt @ 40 mg/hr  - Discontinued hydralazine 25 mg po q8h & Isordil 20 mg po BID for afterload reduction due to pressor requirements  - Continue home ASA 81 mg po daily and Lipitor 40 mg po daily  - Genetics consulted, see "Hypertrophic cardiomyopathy"  - F/u antiphospholipid work-up: B2-glycoprotein, anti-cardiolipin, DRVVT  Negative/WNL: YUMIKO, anti-streptolysin  - Strict I/O, Jin in place  - Telemetry monitoring  "

## 2024-04-17 NOTE — PROGRESS NOTES
[facility] - Cardiac Intensive Care  Cardiology  Progress Note    Patient Name: Xena Vera  MRN: [id number]  Admission Date: 4/1/2024  Hospital Length of Stay: 14 days  Code Status: Full Code   Attending Physician: Renato Elmore MD   Primary Care Physician: Tami Villeda FNP  Expected Discharge Date: 4/22/2024  Principal Problem:Severe mitral valve regurgitation    Subjective:     Hospital Course:   Patient initially had improved UOP with increasing Lasix, eventually maxed out on Lasix drip with addition of Diuril. Eventually started on CRRT/SLED for volume removal, initially needed intermittent levophed to tolerate. Has itchy rash that started during current hospitalization, Dermatology consulted for regimen. Plan to graduate to iHD at some point.     Interval History: NAEON. Tolerated CRRT well. This AM confused and hallucinating, did not sleep with BiPAP on so placed on BiPAP with improvement.    Hemodynamics today: CVP 24, SVO2 77, CO 10.7, CI 5.9, .     Review of Systems   Unable to perform ROS: Mental status change     Objective:     Vital Signs (Most Recent):  Temp: 97.4 °F (36.3 °C) (04/17/24 0701)  Pulse: (!) 58 (04/17/24 1101)  Resp: 19 (04/17/24 1101)  BP: 115/63 (04/17/24 1101)  SpO2: 100 % (04/17/24 1101) Vital Signs (24h Range):  Temp:  [97.4 °F (36.3 °C)-98.6 °F (37 °C)] 97.4 °F (36.3 °C)  Pulse:  [57-64] 58  Resp:  [13-38] 19  SpO2:  [96 %-100 %] 100 %  BP: (111-115)/(55-68) 115/63  Arterial Line BP: ()/(54-97) 132/71     Weight: 78.5 kg (173 lb)  Body mass index is 33.79 kg/m².     SpO2: 100 %         Intake/Output Summary (Last 24 hours) at 4/17/2024 1205  Last data filed at 4/17/2024 0602  Gross per 24 hour   Intake 2901.22 ml   Output 3542 ml   Net -640.78 ml       Lines/Drains/Airways       Central Venous Catheter Line  Duration             Trialysis (Dialysis) Catheter 04/13/24 1720 right internal jugular 3 days              Drain  Duration                  Urethral  Catheter 04/09/24 1330 16 Fr. 7 days              Arterial Line  Duration             Arterial Line 04/14/24 1645 Left Radial 2 days              Peripheral Intravenous Line  Duration                  Peripheral IV - Single Lumen 04/14/24 1600 18 G;1 3/4 in Anterior;Right Upper Arm 2 days         Midline Catheter - Single Lumen 04/16/24 1744 Left cephalic vein (lateral side of arm) 20g x 10cm <1 day                       Physical Exam  Vitals and nursing note reviewed.   Constitutional:       General: She is not in acute distress.     Appearance: She is obese. She is ill-appearing.      Comments: Sleepy but arousable   HENT:      Head: Normocephalic and atraumatic.   Cardiovascular:      Rate and Rhythm: Normal rate and regular rhythm.      Heart sounds: Murmur heard.   Pulmonary:      Effort: Pulmonary effort is normal. No respiratory distress.   Abdominal:      General: Abdomen is flat. There is distension.      Palpations: Abdomen is soft.      Tenderness: There is no abdominal tenderness.   Musculoskeletal:      Right lower leg: Edema present.      Left lower leg: Edema present.   Skin:     General: Skin is warm and dry.            Significant Labs: All pertinent lab results from the last 24 hours have been reviewed.    Significant Imaging:  All pertinent imaging results from the last 24 hours have been reviewed.    Assessment and Plan:     * Severe mitral valve regurgitation  Acute on chronic heart failure with preserved ejection fraction (HFpEF)   Anasarca     54 yo F admitted for ADHF 2/2 severe MR. The MR appears to be secondary to posterior leaflet restriction and leaflet length of 1.1 cm. BNP on admission was 4300 compared to 350 eight months prior. Initially diagnosed with severe MR in June of 2023. Saw Dr. Stone in January of 2024 and was undergoing work-up for possible MitraClip; [facility] scheduled for 3/15 but not completed due to financial constraints, also needs POOJA. Will need optimization of kidney  "function prior to angiogram consideration, appreciate Nephrology assistance    DDX: rheumatic heart disease vs Fabry vs antiphospholipid    4/1/2024 TTE    Left Ventricle: The left ventricle is normal in size. Normal wall thickness. Normal wall motion. There is normal systolic function with a visually estimated ejection fraction of 60 - 65%. Grade II diastolic dysfunction.    Right Ventricle: Normal right ventricular cavity size. Wall thickness is normal. Right ventricle wall motion  is normal. Systolic function is reduced.    Left Atrium: Left atrium is very  severely dilated.    Right Atrium: Right atrium is mildly dilated.    Aortic Valve: There is mild aortic valve sclerosis. There is normal leaflet mobility. There is trace aortic regurgitation.    Mitral Valve: There is mild bileaflet sclerosis. There is posterior leaflet tethering and failure of complete coaptation. There is very severe regurgitation with a posterolateral eccentriccally directed jet.    Tricuspid Valve: The tricuspid valve is structurally normal. There is normal leaflet mobility. There is moderate to severe regurgitation.    IVC/SVC: IVC was not well visualized due to poor acoustic window. Intermediate venous pressure at 8 mmHg.    Pericardium: There is a trivial effusion posteriorly and small under the RA.     - Interventional Cardiology consulted - no intervention until patient's volume status is controlled  - Nephrology following, undergoing SLED/CRRT  - POOJA ordered - "Anesthesia evaluated the patient and feel that she needs better optimization prior to POOJA. They stated once she is euvolemic that we can move forward. Will need a new POOJA order at that time."  - Discontinued Diuril 500 mg IV q24h & Lasix gtt @ 40 mg/hr  - Discontinued hydralazine 25 mg po q8h & Isordil 20 mg po BID for afterload reduction due to pressor requirements  - Continue home ASA 81 mg po daily and Lipitor 40 mg po daily  - Genetics consulted, see "Hypertrophic " "cardiomyopathy"  - F/u antiphospholipid work-up: B2-glycoprotein, anti-cardiolipin, DRVVT  Negative/WNL: YUMIKO, anti-streptolysin  - Strict I/O, Jin in place  - Telemetry monitoring    Rash  Intertrigo     - Dermatology consulted, appreciate josie  - PRN Atarax 10 mg po TID and diphenhydramine-zinc acetate cream  - Miconazole cream BID - to right sided intertrigo  - Benzoyl peroxide wash three times weekly - to back    Hypertrophic cardiomyopathy  Angiokeratoma   Proteinuria     - Genetics consulted, appreciate josie  - GLA genetic testing collected and sent to [facility]  - Consider cardiac MRI    Acute kidney injury superimposed on chronic kidney disease  Patient with acute kidney injury/acute renal failure likely due to acute tubular necrosis caused by unknown.  REGINALDO is currently  improving on CRRT/SLED . Baseline creatinine  2.5  - Labs reviewed- Renal function/electrolytes with Estimated Creatinine Clearance: 45.8 mL/min (based on SCr of 1.3 mg/dL). according to latest data. Monitor urine output and serial BMP and adjust therapy as needed. Avoid nephrotoxins and renally dose meds for GFR listed above.    Recent Labs     04/16/24  1418 04/16/24  2202 04/17/24  0418   CREATININE 1.6* 0.9 1.3  1.3     - Nephrology following, brendon galindo - started SLED on 4/14  - Genetics consulted, see "Hypertrophic cardiomyopathy"  - Continue home Renvela 800 mg po TID with meals  - Holding home sodium bicarb 650 mg po BID    Essential hypertension  Chronic, controlled. Latest blood pressure and vitals reviewed-     Temp:  [98 °F (36.7 °C)-98.5 °F (36.9 °C)]   Pulse:  [55-62]   Resp:  [9-40]   BP: (94)/(53)   SpO2:  [94 %-100 %]   Arterial Line BP: ()/(44-66) .   Home meds for hypertension were reviewed and noted below.   Hypertension Medications               carvediloL (COREG) 3.125 MG tablet Take 2 tablets (6.25 mg total) by mouth 2 (two) times daily with meals.    carvediloL (COREG) 6.25 MG tablet Take 1 tablet by " "mouth 2 times daily with food    furosemide (LASIX) 40 MG tablet Take 1 tablet (40 mg total) by mouth 2 (two) times a day.    furosemide (LASIX) 40 MG tablet Take 1 tablet by mouth once daily for 90 days            While in the hospital, will manage blood pressure as follows; Adjust home antihypertensive regimen as follows- holding Lasix and Coreg    Will utilize p.r.n. blood pressure medication only if patient's blood pressure greater than 180/110 and she develops symptoms such as worsening chest pain or shortness of breath.    HLD (hyperlipidemia)  Continue home Lipitor 40 mg po daily  4/14 LDL 39    Acute urinary retention  Jin in place since 4/9/2024  Continue oxybutynin 5 mg po TID    Prolonged QT interval  4/4 QTc 481 ms. Avoid QT prolonging medications.     Prediabetes  Hemoglobin A1C   Date Value Ref Range Status   04/02/2024 6.0 (H) 4.0 - 5.6 % Final     Comment:     ADA Screening Guidelines:  5.7-6.4%  Consistent with prediabetes  >or=6.5%  Consistent with diabetes    High levels of fetal hemoglobin interfere with the HbA1C  assay. Heterozygous hemoglobin variants (HbS, HgC, etc)do  not significantly interfere with this assay.   However, presence of multiple variants may affect accuracy.       - F/u PCP    Anemia  Patient's anemia is currently controlled. Has not received any PRBCs to date. Etiology likely d/t chronic disease due to Chronic Kidney Disease  Current CBC reviewed-   Lab Results   Component Value Date    HGB 8.7 (L) 04/17/2024    HCT 31.7 (L) 04/17/2024     Monitor serial CBC and transfuse if patient becomes hemodynamically unstable, symptomatic or H/H drops below 7/21.    Leg wound, left  From mechanical fall, has been painful thoughout admission    - Wound Care assessed, orders for care placed  "Cleanse left leg with sterile normal saline and pat dry. Apply aquaphor BID and PRN"  - Tylenol 1000 mg po q6h PRN for moderate pain and Norco 5-325 mg po q6h PRN for severe pain    Class 2 obesity " "in adult  Body mass index is 32.29 kg/m². Morbid obesity complicates all aspects of disease management from diagnostic modalities to treatment. Weight loss encouraged and health benefits explained to patient.      Intertrigo  See "Rash"    Proteinuria  See "Hypertrophic cardiomyopathy"    Angiokeratoma  See "Hypertrophic cardiomyopathy"    Anasarca  See "Severe mitral valve regurgitation"    Acute on chronic heart failure with preserved ejection fraction (HFpEF)  See "Severe mitral valve regurgitation"        VTE Risk Mitigation (From admission, onward)           Ordered     heparin (porcine) injection 1,000 Units  As needed (PRN)         04/13/24 1428     Place sequential compression device  Until discontinued         04/03/24 1347     heparin (porcine) injection 5,000 Units  Every 8 hours         04/01/24 1030     IP VTE HIGH RISK PATIENT  Once         04/01/24 1030     Place sequential compression device  Until discontinued         04/01/24 1030                    Domonique Walden MD  Cardiology  [facility] - Cardiac Intensive Care    "

## 2024-04-17 NOTE — PROGRESS NOTES
04/17/24 1707   Treatment   Treatment Type SLED   Treatment Status Restart   Dialysis Machine Number [id number]   Dialyzer Time (hours) 0   BVP (Liters) 0 L   Solutions Labeled and Current  Yes   Access Temporary Cath;Right;IJ   Catheter Dressing Intact  Yes   Alarms Engaged Yes   CRRT Comments sled/scuf restarted as ordered   Prescription   Time (Hours) 12  (6 sled/6 scuf)   Dialysate K + (mEq/L) 4   Dialysate CA + (mEq/L) 2.25   Dialysate HCO3 - (Bicarb) (mEq/L) 30   Dialysate Na + (mEq/L) 140   Cartridge Type Other  (r300)   Dialysate Flow Rate (mL/min) 200   UF Goal Rate 500 mL/hr   CRRT Hourly Documentation   Blood Flow (mL/min) 150   UF Rate 350 cc/hr   Arterial Pressure (mmHg) -30 mmHg   Venous Pressure (mmHg) 10 mmHg   Effluent Pressure (EP) (mmHg) 10 mmHg   Total UF (Hourly Cleared) (mL) 0     SLED/SCUF restarted as ordered. RIJ CVC aspirated, flushed, and accessed using aseptic technique. Lines connected and secured.

## 2024-04-18 LAB
ALBUMIN SERPL BCP-MCNC: 2.7 G/DL (ref 3.5–5.2)
ALLENS TEST: ABNORMAL
ALLENS TEST: ABNORMAL
ALLENS TEST: NORMAL
ALP SERPL-CCNC: 122 U/L (ref 55–135)
ALT SERPL W/O P-5'-P-CCNC: 35 U/L (ref 10–44)
ANION GAP SERPL CALC-SCNC: 5 MMOL/L (ref 8–16)
ANION GAP SERPL CALC-SCNC: 5 MMOL/L (ref 8–16)
ANION GAP SERPL CALC-SCNC: 8 MMOL/L (ref 8–16)
AST SERPL-CCNC: 45 U/L (ref 10–40)
BASOPHILS # BLD AUTO: 0.03 K/UL (ref 0–0.2)
BASOPHILS NFR BLD: 0.5 % (ref 0–1.9)
BILIRUB SERPL-MCNC: 1.2 MG/DL (ref 0.1–1)
BUN SERPL-MCNC: 4 MG/DL (ref 6–20)
BUN SERPL-MCNC: 4 MG/DL (ref 6–20)
BUN SERPL-MCNC: <3 MG/DL (ref 6–20)
CALCIUM SERPL-MCNC: 7.4 MG/DL (ref 8.7–10.5)
CALCIUM SERPL-MCNC: 7.5 MG/DL (ref 8.7–10.5)
CALCIUM SERPL-MCNC: 7.5 MG/DL (ref 8.7–10.5)
CHLORIDE SERPL-SCNC: 105 MMOL/L (ref 95–110)
CHLORIDE SERPL-SCNC: 107 MMOL/L (ref 95–110)
CHLORIDE SERPL-SCNC: 107 MMOL/L (ref 95–110)
CO2 SERPL-SCNC: 23 MMOL/L (ref 23–29)
CO2 SERPL-SCNC: 24 MMOL/L (ref 23–29)
CO2 SERPL-SCNC: 24 MMOL/L (ref 23–29)
CREAT SERPL-MCNC: 0.7 MG/DL (ref 0.5–1.4)
CREAT SERPL-MCNC: 1.2 MG/DL (ref 0.5–1.4)
CREAT SERPL-MCNC: 1.2 MG/DL (ref 0.5–1.4)
DELSYS: ABNORMAL
DELSYS: ABNORMAL
DELSYS: NORMAL
DIFFERENTIAL METHOD BLD: ABNORMAL
EOSINOPHIL # BLD AUTO: 0.1 K/UL (ref 0–0.5)
EOSINOPHIL NFR BLD: 2.2 % (ref 0–8)
ERYTHROCYTE [DISTWIDTH] IN BLOOD BY AUTOMATED COUNT: 22.7 % (ref 11.5–14.5)
EST. GFR  (NO RACE VARIABLE): 53.8 ML/MIN/1.73 M^2
EST. GFR  (NO RACE VARIABLE): 53.8 ML/MIN/1.73 M^2
EST. GFR  (NO RACE VARIABLE): >60 ML/MIN/1.73 M^2
FLOW: 1
GLUCOSE SERPL-MCNC: 104 MG/DL (ref 70–110)
GLUCOSE SERPL-MCNC: 91 MG/DL (ref 70–110)
GLUCOSE SERPL-MCNC: 91 MG/DL (ref 70–110)
HCO3 UR-SCNC: 23.8 MMOL/L (ref 24–28)
HCO3 UR-SCNC: 26.5 MMOL/L (ref 24–28)
HCT VFR BLD AUTO: 31 % (ref 37–48.5)
HGB BLD-MCNC: 8.9 G/DL (ref 12–16)
IMM GRANULOCYTES # BLD AUTO: 0.03 K/UL (ref 0–0.04)
IMM GRANULOCYTES NFR BLD AUTO: 0.5 % (ref 0–0.5)
LYMPHOCYTES # BLD AUTO: 0.8 K/UL (ref 1–4.8)
LYMPHOCYTES NFR BLD: 13.6 % (ref 18–48)
MAGNESIUM SERPL-MCNC: 2.1 MG/DL (ref 1.6–2.6)
MCH RBC QN AUTO: 23.9 PG (ref 27–31)
MCHC RBC AUTO-ENTMCNC: 28.7 G/DL (ref 32–36)
MCV RBC AUTO: 83 FL (ref 82–98)
MODE: ABNORMAL
MODE: NORMAL
MONOCYTES # BLD AUTO: 0.8 K/UL (ref 0.3–1)
MONOCYTES NFR BLD: 12.8 % (ref 4–15)
NEUTROPHILS # BLD AUTO: 4.1 K/UL (ref 1.8–7.7)
NEUTROPHILS NFR BLD: 70.4 % (ref 38–73)
NRBC BLD-RTO: 0 /100 WBC
OHS QRS DURATION: 72 MS
OHS QRS DURATION: 72 MS
OHS QTC CALCULATION: 443 MS
OHS QTC CALCULATION: 447 MS
PCO2 BLDA: 41.4 MMHG (ref 35–45)
PCO2 BLDA: 61.5 MMHG (ref 35–45)
PH SMN: 7.24 [PH] (ref 7.35–7.45)
PH SMN: 7.37 [PH] (ref 7.35–7.45)
PHOSPHATE SERPL-MCNC: 2.1 MG/DL (ref 2.7–4.5)
PHOSPHATE SERPL-MCNC: 3.3 MG/DL (ref 2.7–4.5)
PHOSPHATE SERPL-MCNC: 3.3 MG/DL (ref 2.7–4.5)
PLATELET # BLD AUTO: 143 K/UL (ref 150–450)
PMV BLD AUTO: 11.4 FL (ref 9.2–12.9)
PO2 BLDA: 115 MMHG (ref 80–100)
PO2 BLDA: 40 MMHG (ref 40–60)
PO2 BLDA: 42 MMHG (ref 40–60)
POC BE: -1 MMOL/L
POC BE: -2 MMOL/L
POC SATURATED O2: 66 % (ref 95–100)
POC SATURATED O2: 67 % (ref 95–100)
POC SATURATED O2: 98 % (ref 95–100)
POC TCO2: 25 MMOL/L (ref 23–27)
POC TCO2: 28 MMOL/L (ref 24–29)
POTASSIUM SERPL-SCNC: 4.1 MMOL/L (ref 3.5–5.1)
POTASSIUM SERPL-SCNC: 4.2 MMOL/L (ref 3.5–5.1)
POTASSIUM SERPL-SCNC: 4.2 MMOL/L (ref 3.5–5.1)
PROT SERPL-MCNC: 6.5 G/DL (ref 6–8.4)
RBC # BLD AUTO: 3.73 M/UL (ref 4–5.4)
SAMPLE: ABNORMAL
SAMPLE: ABNORMAL
SAMPLE: NORMAL
SITE: ABNORMAL
SITE: ABNORMAL
SITE: NORMAL
SODIUM SERPL-SCNC: 136 MMOL/L (ref 136–145)
SP02: 100
WBC # BLD AUTO: 5.87 K/UL (ref 3.9–12.7)

## 2024-04-18 PROCEDURE — 97530 THERAPEUTIC ACTIVITIES: CPT

## 2024-04-18 PROCEDURE — 25000003 PHARM REV CODE 250: Performed by: HOSPITALIST

## 2024-04-18 PROCEDURE — 20000000 HC ICU ROOM

## 2024-04-18 PROCEDURE — 80069 RENAL FUNCTION PANEL: CPT

## 2024-04-18 PROCEDURE — 99900035 HC TECH TIME PER 15 MIN (STAT)

## 2024-04-18 PROCEDURE — 80053 COMPREHEN METABOLIC PANEL: CPT

## 2024-04-18 PROCEDURE — 99231 SBSQ HOSP IP/OBS SF/LOW 25: CPT | Mod: ,,, | Performed by: INTERNAL MEDICINE

## 2024-04-18 PROCEDURE — 84100 ASSAY OF PHOSPHORUS: CPT

## 2024-04-18 PROCEDURE — 27000221 HC OXYGEN, UP TO 24 HOURS

## 2024-04-18 PROCEDURE — 25000003 PHARM REV CODE 250

## 2024-04-18 PROCEDURE — 94660 CPAP INITIATION&MGMT: CPT

## 2024-04-18 PROCEDURE — 25000003 PHARM REV CODE 250: Performed by: INTERNAL MEDICINE

## 2024-04-18 PROCEDURE — 83735 ASSAY OF MAGNESIUM: CPT | Mod: 91

## 2024-04-18 PROCEDURE — 99233 SBSQ HOSP IP/OBS HIGH 50: CPT | Mod: ,,, | Performed by: INTERNAL MEDICINE

## 2024-04-18 PROCEDURE — 82803 BLOOD GASES ANY COMBINATION: CPT

## 2024-04-18 PROCEDURE — 94761 N-INVAS EAR/PLS OXIMETRY MLT: CPT | Mod: XB

## 2024-04-18 PROCEDURE — 25000003 PHARM REV CODE 250: Performed by: STUDENT IN AN ORGANIZED HEALTH CARE EDUCATION/TRAINING PROGRAM

## 2024-04-18 PROCEDURE — 36600 WITHDRAWAL OF ARTERIAL BLOOD: CPT

## 2024-04-18 PROCEDURE — 85025 COMPLETE CBC W/AUTO DIFF WBC: CPT

## 2024-04-18 PROCEDURE — 97164 PT RE-EVAL EST PLAN CARE: CPT

## 2024-04-18 PROCEDURE — 97168 OT RE-EVAL EST PLAN CARE: CPT

## 2024-04-18 PROCEDURE — 97535 SELF CARE MNGMENT TRAINING: CPT

## 2024-04-18 PROCEDURE — 25000003 PHARM REV CODE 250: Performed by: EMERGENCY MEDICINE

## 2024-04-18 PROCEDURE — 25000003 PHARM REV CODE 250: Performed by: PHYSICIAN ASSISTANT

## 2024-04-18 PROCEDURE — 83735 ASSAY OF MAGNESIUM: CPT

## 2024-04-18 PROCEDURE — 63600175 PHARM REV CODE 636 W HCPCS: Performed by: PHYSICIAN ASSISTANT

## 2024-04-18 RX ORDER — SODIUM CHLORIDE 9 MG/ML
INJECTION, SOLUTION INTRAVENOUS
Status: CANCELLED | OUTPATIENT
Start: 2024-04-18

## 2024-04-18 RX ORDER — SODIUM CHLORIDE 9 MG/ML
INJECTION, SOLUTION INTRAVENOUS ONCE
Status: CANCELLED | OUTPATIENT
Start: 2024-04-18 | End: 2024-04-18

## 2024-04-18 RX ADMIN — MICONAZOLE NITRATE: 20 CREAM TOPICAL at 08:04

## 2024-04-18 RX ADMIN — CLINDAMYCIN PHOSPHATE: 10 GEL TOPICAL at 08:04

## 2024-04-18 RX ADMIN — SEVELAMER CARBONATE 800 MG: 800 TABLET, FILM COATED ORAL at 04:04

## 2024-04-18 RX ADMIN — SENNOSIDES AND DOCUSATE SODIUM 1 TABLET: 8.6; 5 TABLET ORAL at 08:04

## 2024-04-18 RX ADMIN — ASPIRIN 81 MG: 81 TABLET, COATED ORAL at 08:04

## 2024-04-18 RX ADMIN — POLYETHYLENE GLYCOL 3350 17 G: 17 POWDER, FOR SOLUTION ORAL at 08:04

## 2024-04-18 RX ADMIN — HEPARIN SODIUM 5000 UNITS: 5000 INJECTION INTRAVENOUS; SUBCUTANEOUS at 02:04

## 2024-04-18 RX ADMIN — SEVELAMER CARBONATE 800 MG: 800 TABLET, FILM COATED ORAL at 08:04

## 2024-04-18 RX ADMIN — HEPARIN SODIUM 5000 UNITS: 5000 INJECTION INTRAVENOUS; SUBCUTANEOUS at 06:04

## 2024-04-18 RX ADMIN — OXYBUTYNIN CHLORIDE 5 MG: 5 TABLET ORAL at 08:04

## 2024-04-18 RX ADMIN — Medication 6 MG: at 08:04

## 2024-04-18 RX ADMIN — DIPHENHYDRAMINE HYDROCHLORIDE, ZINC ACETATE: 2; .1 CREAM TOPICAL at 09:04

## 2024-04-18 RX ADMIN — CLINDAMYCIN PHOSPHATE: 10 GEL TOPICAL at 09:04

## 2024-04-18 RX ADMIN — SEVELAMER CARBONATE 800 MG: 800 TABLET, FILM COATED ORAL at 11:04

## 2024-04-18 RX ADMIN — HEPARIN SODIUM 5000 UNITS: 5000 INJECTION INTRAVENOUS; SUBCUTANEOUS at 10:04

## 2024-04-18 RX ADMIN — SODIUM PHOSPHATE, MONOBASIC, MONOHYDRATE AND SODIUM PHOSPHATE, DIBASIC, ANHYDROUS 39.99 MMOL: 142; 276 INJECTION, SOLUTION INTRAVENOUS at 02:04

## 2024-04-18 RX ADMIN — DIPHENHYDRAMINE HYDROCHLORIDE, ZINC ACETATE: 2; .1 CREAM TOPICAL at 08:04

## 2024-04-18 RX ADMIN — Medication: at 08:04

## 2024-04-18 RX ADMIN — MUPIROCIN: 20 OINTMENT TOPICAL at 09:04

## 2024-04-18 RX ADMIN — OXYBUTYNIN CHLORIDE 5 MG: 5 TABLET ORAL at 02:04

## 2024-04-18 RX ADMIN — HYDROCODONE BITARTRATE AND ACETAMINOPHEN 1 TABLET: 5; 325 TABLET ORAL at 10:04

## 2024-04-18 RX ADMIN — MICONAZOLE NITRATE: 20 CREAM TOPICAL at 09:04

## 2024-04-18 RX ADMIN — ATORVASTATIN CALCIUM 40 MG: 40 TABLET, FILM COATED ORAL at 08:04

## 2024-04-18 RX ADMIN — SODIUM CHLORIDE: 9 INJECTION, SOLUTION INTRAVENOUS at 03:04

## 2024-04-18 RX ADMIN — Medication: at 09:04

## 2024-04-18 NOTE — ASSESSMENT & PLAN NOTE
Patient's anemia is currently controlled. Has not received any PRBCs to date. Etiology likely d/t chronic disease due to Chronic Kidney Disease  Current CBC reviewed-   Lab Results   Component Value Date    HGB 8.9 (L) 04/18/2024    HCT 31.0 (L) 04/18/2024     Monitor serial CBC and transfuse if patient becomes hemodynamically unstable, symptomatic or H/H drops below 7/21.

## 2024-04-18 NOTE — PLAN OF CARE
POC reviewed with Xena Vera and family. Questions and concerns addressed. CVP: 20,25,30 Svo2: 66%. CVPs elevated MD is aware, pt is waiting for HD , called HD early in the shift and they did say they would get to her later tonight. Pt as had little to no urine output, awaiting HD tonight.   was used multiple times to assist with language barrier. VSS.   See below and flowsheets for full assessment and VS info.       Neuro:  [place] Coma Scale  Best Eye Response: 4-->(E4) spontaneous  Best Motor Response: 6-->(M6) obeys commands  Best Verbal Response: 5-->(V5) oriented  [place] Coma Scale Score: 15  Assessment Qualifiers: patient not sedated/intubated, no eye obstruction present  Pupil PERRLA: yes    24 hr Temp:  [97.7 °F (36.5 °C)-98 °F (36.7 °C)]     CV:  Rhythm: normal sinus rhythm   DVT prophylaxis: VTE Required Core Measure: Pharmacological prophylaxis initiated/maintained    CVP (mean): 30 mmHg (04/18/24 1705)       SVO2 (%): 66 % (04/18/24 0705)               Pulses  Right Radial Pulse: 1+ (weak)  Left Radial Pulse: 1+ (weak)  Right Dorsalis Pedis Pulse: 1+ (weak)  Left Dorsalis Pedis Pulse: 1+ (weak)  Right Posterior Tibial Pulse: 1+ (weak)  Left Posterior Tibial Pulse: 1+ (weak)    Resp:  Flow (L/min): 2  Oxygen Concentration (%): 50    GI/:  GI prophylaxis: no  Diet/Nutrition Received: 2 gram sodium, low saturated fat/low cholesterol  Last Bowel Movement: 04/17/24  Voiding Characteristics: urethral catheter (bladder)       Urethral Catheter 04/09/24 1330 16 Fr.-Reason for Continuing Urinary Catheterization: Critically ill in ICU and requiring hourly monitoring of intake/output   Intake/Output Summary (Last 24 hours) at 4/18/2024 1729  Last data filed at 4/18/2024 1714  Gross per 24 hour   Intake 3387.46 ml   Output 6636 ml   Net -3248.54 ml     Treatment Type: Slow low efficiency dialysis  UF Rate: 550 cc/hr  N    Labs/Accuchecks:  Recent Labs   Lab 04/16/24  0153 04/17/24  7861  "04/18/24  0413   WBC 5.48 6.46 5.87   RBC 3.94* 3.82* 3.73*   HGB 8.9* 8.7* 8.9*   HCT 33.1* 31.7* 31.0*   * 144* 143*    No results for input(s): "PT", "INR", "APTT" in the last 168 hours.   Recent Labs     04/18/24  0413 04/18/24  1404     136  136 136  136   K 4.1  4.1  4.1 4.2  4.2   CO2 23 23 23 24 24     105  105 107  107   BUN <3*  <3*  <3* 4*  4*   CREATININE 0.7  0.7  0.7 1.2  1.2   ALKPHOS 122  --    ALT 35  --    AST 45*  --    BILITOT 1.2*  --      No results for input(s): "CPK", "CPKMB", "MB", "TROPONINI" in the last 168 hours.   Recent Labs     04/16/24  0833 04/16/24 2050 04/17/24  0902 04/17/24 2120 04/18/24  0014 04/18/24  0751   PH 7.234*  --  7.268*  --  7.366  --    PCO2 55.0*  --  48.5*  --  41.4  --    PO2 38*   < > 48 49 115* 40   HCO3 23.3*  --  22.2*  --  23.8*  --    POCSATURATED 61   < > 77 77 98 66   BE -4*  --  -5*  --  -2  --     < > = values in this interval not displayed.       Electrolytes: No replacement orders  Accuchecks: none    Gtts/LDAs:  Current Facility-Administered Medications   Medication Dose Route Frequency Last Rate Last Admin    sodium chloride 0.9%   Intravenous Continuous   Stopped at 04/15/24 1536       Lines/Drains/Airways       Central Venous Catheter Line  Duration             Trialysis (Dialysis) Catheter 04/13/24 1720 right internal jugular 5 days              Drain  Duration                  Urethral Catheter 04/09/24 1330 16 Fr. 9 days              Peripheral Intravenous Line  Duration                  Peripheral IV - Single Lumen 04/14/24 1600 18 G;1 3/4 in Anterior;Right Upper Arm 4 days         Midline Catheter - Single Lumen 04/16/24 1744 Left cephalic vein (lateral side of arm) 20g x 10cm 1 day                    Skin/Wounds  Bathing/Skin Care: bath, complete;foot care;linen changed;dressed/undressed (04/18/24 0705)  Wounds: Yes  Wound care consulted: Yes  "

## 2024-04-18 NOTE — ASSESSMENT & PLAN NOTE
"Patient with acute kidney injury/acute renal failure likely due to acute tubular necrosis caused by unknown.  REGINALDO is currently  improving on CRRT/SLED . Baseline creatinine  2.5  - Labs reviewed- Renal function/electrolytes with Estimated Creatinine Clearance: 80.9 mL/min (based on SCr of 0.7 mg/dL). according to latest data. Monitor urine output and serial BMP and adjust therapy as needed. Avoid nephrotoxins and renally dose meds for GFR listed above.    Recent Labs     04/17/24  1428 04/17/24  2203 04/18/24  0413   CREATININE 1.8*  1.8* 0.8  0.8 0.7  0.7  0.7       - Nephrology following, appreciate recs - started SLED on 4/14 and tolerating well with UF @ 550; plan for iHD trial today  - Genetics consulted, see "Hypertrophic cardiomyopathy"  - Continue home Renvela 800 mg po TID with meals  - Holding home sodium bicarb 650 mg po BID  "

## 2024-04-18 NOTE — PT/OT/SLP RE-EVAL
Occupational Therapy   Re-evaluation and Co-Treatment     Name: Xena Vera  MRN: [id number]  Admitting Diagnosis:  Severe mitral valve regurgitation  Recent Surgery: Procedure(s) (LRB):  Transesophageal echo (POOJA) intra-procedure log documentation (N/A)      Recommendations:     Discharge Recommendations: Low Intensity Therapy  Discharge Equipment Recommendations: walker, rolling, bedside commode  Barriers to discharge:  None    Assessment:     Xena Vera is a 53 y.o. female with a medical diagnosis of Severe mitral valve regurgitation.  She presents with the following performance deficits affecting function are weakness, impaired endurance, impaired self care skills, impaired functional mobility, gait instability, impaired balance, pain, decreased safety awareness, decreased coordination, impaired coordination, impaired cardiopulmonary response to activity.  Pt agreeable to therapy and tolerated well.  utilized throughout session. Pt is currently limited in ADLs, functional mobility, and functional transfers and is not performing tasks at PLOF. Pt would continue to benefit from skilled OT services to maximize functional independence with ADLs and functional mobility, reduce caregiver burden, and facilitate safe discharge in the least restrictive environment.  Patient continues to demonstrate the need for low intensity therapy on a scheduled basis exhibited by decreased independence with self-care and functional mobility    Patient has a mobility limitation that significantly impairs their ability to participate in one or more mobility related activities of daily living, including toileting. This deficit can be resolved by using a bedside commode. Patient demonstrates mobility limitations that will cause them to be confined to one room at home without bathroom access for up to 30 days. Using a bedside commode will greatly improve the patient's ability to participate in MRADLs.      Patient demonstrates a mobility limitation that significantly impairs their ability to participate in one or more mobility related activities of daily living. Patient's mobility limitation cannot be sufficiently resolved with the use of a cane, but can be sufficiently resolved with the use of a rolling walker.The use of a rolling walker will considerably improve their ability to participate in MRADLs. Patient will use the walker on a regular basis at home.       Rehab Prognosis:  Good; patient would benefit from acute skilled OT services to address these deficits and reach maximum level of function.       Plan:     Patient to be seen 3 x/week to address the above listed problems via self-care/home management, therapeutic activities, therapeutic exercises, neuromuscular re-education  Plan of Care Expires: 05/18/24  Plan of Care Reviewed with: patient, family    Subjective     Chief Complaint: back pain  Patient/Family stated goals: to return to PLOF  Communicated with: RN prior to session.  Pain/Comfort:  Pain Rating 1: 10/10  Location - Orientation 1: generalized  Location 1: back  Pain Addressed 1: Pre-medicate for activity, Reposition, Distraction  Pain Rating Post-Intervention 1: other (see comments) (unrated)    Objective:     Communicated with: RN prior to session.  Patient found up in chair with: blood pressure cuff, pulse ox (continuous), telemetry, arterial line, browning catheter upon OT entry to room.    General Precautions: Standard, fall  Orthopedic Precautions: N/A  Braces: N/A  Respiratory Status: Nasal cannula, flow 2 L/min    Occupational Performance:    Bed Mobility:    Not assessed- pt sitting in chair/returned to chair end of session    Functional Mobility/Transfers:  Patient completed Sit <> Stand Transfer with contact guard assistance  with  rolling walker   Functional Mobility: Pt engaged in functional mobility to simulate household/community distances 10 ft x2 trials with CGA and RW in order  to maximize functional endurance and standing balance required for engagement in occupations of choice     Activities of Daily Living:  Grooming: stand by assistance as pt washed face and brushed teeth standing at sink with RW  Upper Body Dressing: moderate assistance required to don hospital gown over back    Cognitive/Visual Perceptual:  Cognitive/Psychosocial Skills:     -       Oriented to: Person, Place, Time, and Situation   -       Follows Commands/attention:Follows two-step commands  -       Safety awareness/insight to disability: impaired   -       Mood/Affect/Coping skills/emotional control: Cooperative    Physical Exam:  Upper Extremity Range of Motion:     -       Right Upper Extremity: WFL  -       Left Upper Extremity: WFL  Upper Extremity Strength:    -       Right Upper Extremity: WFL  -       Left Upper Extremity: WFL    AMPAC 6 Click:  AMPA Total Score: 16    Treatment & Education:  -Education on energy conservation and task modification to maximize safety and (I) during ADLs and mobility  -Education on importance of OOB activity to improve overall activity tolerance and promote recovery  -Pt educated to call for assistance and to transfer with hospital staff only  -Provided education regarding role of OT, POC, & discharge recommendations with pt and family verbalizing understanding.  Pt had no further questions & when asked whether there were any concerns pt reported none.     Patient left up in chair with all lines intact, call button in reach, RN notified, and family present    GOALS:   Multidisciplinary Problems       Occupational Therapy Goals          Problem: Occupational Therapy    Goal Priority Disciplines Outcome Interventions   Occupational Therapy Goal     OT, PT/OT Ongoing, Progressing    Description: Goals to be met by: 5/18/24     Patient will increase functional independence with ADLs by performing:    UE Dressing with Modified [place].  LE Dressing with Modified  [place].  Grooming while standing at sink with Modified [place].  Toileting from toilet/bedside commode with Modified [place] for hygiene and clothing management.   Toilet transfer to toilet/bedside commode with Modified [place].                           History:     Past Medical History:   Diagnosis Date    (HFpEF) heart failure with preserved ejection fraction 06/24/2023    EF 63% with G2 DD  Continue lasix, appears euvolemic today  Continue good BP management with losartan, increase Coreg 6.25 mg BID  Fluid restriction, low sodium diet  Recommend Jardiance- patient had CKD 4 and this is a limiting factor- nephro eval pending    Anemia 06/24/2023    CKD (chronic kidney disease)     HTN (hypertension)     Mitral valve regurgitation 06/26/2023    Refer to structural for evaluation  May benefit from mitral clip    Ovarian cyst     Stage 4 chronic kidney disease 06/24/2023    Refer to nephrology at [facility] as unable to get established with [facility] and the phone number for the outside nephrologist provided to patient during her recent hospitalization goes unanswered when called.   Cr remains elevated  Would like to start SGLT-2 and appreciate nephrology expertise        No past surgical history on file.    Time Tracking:     OT Date of Treatment: 04/18/24  OT Start Time: 1003  OT Stop Time: 1022  OT Total Time (min): 19 min    Billable Minutes:Re-eval 10  Self Care/Home Management 9    4/18/2024

## 2024-04-18 NOTE — SUBJECTIVE & OBJECTIVE
Interval History: NAEON. Tolerated UF @ 550 last night without pressors.     Review of Systems   Respiratory:  Positive for shortness of breath (improved).    Gastrointestinal:  Positive for bloating (improved).   All other systems reviewed and are negative.    Objective:     Vital Signs (Most Recent):  Temp: 97.7 °F (36.5 °C) (04/18/24 0705)  Pulse: 66 (04/18/24 0905)  Resp: (!) 22 (04/18/24 1010)  BP: (!) 107/56 (04/18/24 0905)  SpO2: 100 % (04/18/24 0905) Vital Signs (24h Range):  Temp:  [97.7 °F (36.5 °C)-98 °F (36.7 °C)] 97.7 °F (36.5 °C)  Pulse:  [58-73] 66  Resp:  [12-44] 22  SpO2:  [98 %-100 %] 100 %  BP: ()/(50-78) 107/56     Weight: 71.2 kg (157 lb)  Body mass index is 30.66 kg/m².     SpO2: 100 %         Intake/Output Summary (Last 24 hours) at 4/18/2024 1026  Last data filed at 4/18/2024 0905  Gross per 24 hour   Intake 3355.46 ml   Output 6636 ml   Net -3280.54 ml       Lines/Drains/Airways       Central Venous Catheter Line  Duration             Trialysis (Dialysis) Catheter 04/13/24 1720 right internal jugular 4 days              Drain  Duration                  Urethral Catheter 04/09/24 1330 16 Fr. 8 days              Peripheral Intravenous Line  Duration                  Peripheral IV - Single Lumen 04/14/24 1600 18 G;1 3/4 in Anterior;Right Upper Arm 3 days         Midline Catheter - Single Lumen 04/16/24 1744 Left cephalic vein (lateral side of arm) 20g x 10cm 1 day                       Physical Exam  Vitals and nursing note reviewed.   Constitutional:       Appearance: She is obese. She is ill-appearing.   HENT:      Head: Normocephalic and atraumatic.   Cardiovascular:      Rate and Rhythm: Normal rate and regular rhythm.      Heart sounds: Murmur heard.   Pulmonary:      Effort: Pulmonary effort is normal. No respiratory distress.   Abdominal:      General: Abdomen is flat. There is distension.      Palpations: Abdomen is soft.      Tenderness: There is no abdominal tenderness.    Musculoskeletal:      Right lower leg: Edema present.      Left lower leg: Edema present.   Skin:     General: Skin is warm and dry.   Neurological:      General: No focal deficit present.      Mental Status: She is alert and oriented to person, place, and time. Mental status is at baseline.        Significant Labs: All pertinent lab results from the last 24 hours have been reviewed.    Significant Imaging:  All pertinent imaging results from the last 24 hours have been reviewed.

## 2024-04-18 NOTE — PROGRESS NOTES
[facility] - Cardiac Intensive Care  Cardiology  Progress Note    Patient Name: Xena Vera  MRN: [id number]  Admission Date: 4/1/2024  Hospital Length of Stay: 15 days  Code Status: Full Code   Attending Physician: Renato Elmore MD   Primary Care Physician: Tami Villeda FNP  Expected Discharge Date: 4/22/2024  Principal Problem:Severe mitral valve regurgitation    Subjective:     Hospital Course:   Patient initially had improved UOP with increasing Lasix, eventually maxed out on Lasix drip with addition of Diuril. Eventually started on CRRT/SLED for volume removal, initially needed intermittent levophed to tolerate. Has itchy rash that started during current hospitalization, Dermatology consulted for regimen. Plan to graduate to iHD at some point.     Interval History: NAEON. Tolerated UF @ 550 last night without pressors.     Review of Systems   Respiratory:  Positive for shortness of breath (improved).    Gastrointestinal:  Positive for bloating (improved).   All other systems reviewed and are negative.    Objective:     Vital Signs (Most Recent):  Temp: 97.7 °F (36.5 °C) (04/18/24 0705)  Pulse: 66 (04/18/24 0905)  Resp: (!) 22 (04/18/24 1010)  BP: (!) 107/56 (04/18/24 0905)  SpO2: 100 % (04/18/24 0905) Vital Signs (24h Range):  Temp:  [97.7 °F (36.5 °C)-98 °F (36.7 °C)] 97.7 °F (36.5 °C)  Pulse:  [58-73] 66  Resp:  [12-44] 22  SpO2:  [98 %-100 %] 100 %  BP: ()/(50-78) 107/56     Weight: 71.2 kg (157 lb)  Body mass index is 30.66 kg/m².     SpO2: 100 %         Intake/Output Summary (Last 24 hours) at 4/18/2024 1026  Last data filed at 4/18/2024 0905  Gross per 24 hour   Intake 3355.46 ml   Output 6636 ml   Net -3280.54 ml       Lines/Drains/Airways       Central Venous Catheter Line  Duration             Trialysis (Dialysis) Catheter 04/13/24 1720 right internal jugular 4 days              Drain  Duration                  Urethral Catheter 04/09/24 1330 16 Fr. 8 days              Peripheral  Intravenous Line  Duration                  Peripheral IV - Single Lumen 04/14/24 1600 18 G;1 3/4 in Anterior;Right Upper Arm 3 days         Midline Catheter - Single Lumen 04/16/24 1744 Left cephalic vein (lateral side of arm) 20g x 10cm 1 day                       Physical Exam  Vitals and nursing note reviewed.   Constitutional:       Appearance: She is obese. She is ill-appearing.   HENT:      Head: Normocephalic and atraumatic.   Cardiovascular:      Rate and Rhythm: Normal rate and regular rhythm.      Heart sounds: Murmur heard.   Pulmonary:      Effort: Pulmonary effort is normal. No respiratory distress.   Abdominal:      General: Abdomen is flat. There is distension.      Palpations: Abdomen is soft.      Tenderness: There is no abdominal tenderness.   Musculoskeletal:      Right lower leg: Edema present.      Left lower leg: Edema present.   Skin:     General: Skin is warm and dry.   Neurological:      General: No focal deficit present.      Mental Status: She is alert and oriented to person, place, and time. Mental status is at baseline.        Significant Labs: All pertinent lab results from the last 24 hours have been reviewed.    Significant Imaging:  All pertinent imaging results from the last 24 hours have been reviewed.    Assessment and Plan:     * Severe mitral valve regurgitation  Acute on chronic heart failure with preserved ejection fraction (HFpEF)   Anasarca     54 yo F admitted for ADHF 2/2 severe MR. The MR appears to be secondary to posterior leaflet restriction and leaflet length of 1.1 cm. BNP on admission was 4300 compared to 350 eight months prior. Initially diagnosed with severe MR in June of 2023. Saw Dr. Stone in January of 2024 and was undergoing work-up for possible MitraClip; [facility] scheduled for 3/15 but not completed due to financial constraints, also needs POOJA. Will need optimization of kidney function prior to angiogram consideration, appreciate Nephrology assistance. APS  "serologies negative.     DDX: rheumatic heart disease vs Fabry vs antiphospholipid (serologies negative)    4/1/2024 TTE    Left Ventricle: The left ventricle is normal in size. Normal wall thickness. Normal wall motion. There is normal systolic function with a visually estimated ejection fraction of 60 - 65%. Grade II diastolic dysfunction.    Right Ventricle: Normal right ventricular cavity size. Wall thickness is normal. Right ventricle wall motion  is normal. Systolic function is reduced.    Left Atrium: Left atrium is very  severely dilated.    Right Atrium: Right atrium is mildly dilated.    Aortic Valve: There is mild aortic valve sclerosis. There is normal leaflet mobility. There is trace aortic regurgitation.    Mitral Valve: There is mild bileaflet sclerosis. There is posterior leaflet tethering and failure of complete coaptation. There is very severe regurgitation with a posterolateral eccentriccally directed jet.    Tricuspid Valve: The tricuspid valve is structurally normal. There is normal leaflet mobility. There is moderate to severe regurgitation.    IVC/SVC: IVC was not well visualized due to poor acoustic window. Intermediate venous pressure at 8 mmHg.    Pericardium: There is a trivial effusion posteriorly and small under the RA.     - Interventional Cardiology consulted - no intervention until patient's volume status is controlled  - Nephrology following, undergoing SLED/CRRT  - POOJA ordered - "Anesthesia evaluated the patient and feel that she needs better optimization prior to POOJA. They stated once she is euvolemic that we can move forward. Will need a new POOJA order at that time."  - Discontinued Diuril 500 mg IV q24h & Lasix gtt @ 40 mg/hr  - Discontinued hydralazine 25 mg po q8h & Isordil 20 mg po BID for afterload reduction due to pressor requirements  - Continue home ASA 81 mg po daily and Lipitor 40 mg po daily  - Genetics consulted, see "Hypertrophic cardiomyopathy"  - Strict I/OAnnabel in " "place  - Telemetry monitoring    Rash  Intertrigo     - Dermatology consulted, appreciate recs  - PRN Atarax 10 mg po TID and diphenhydramine-zinc acetate cream  - Miconazole cream BID - apply to right inguinal crease and in right inferior abdominal crease   - Clindamycin gel BID - apply to back    Hypertrophic cardiomyopathy  Angiokeratoma   Proteinuria     - Genetics consulted, appreciate recs  - GLA genetic testing collected and sent to [facility]  - Consider cardiac MRI    Acute kidney injury superimposed on chronic kidney disease  Patient with acute kidney injury/acute renal failure likely due to acute tubular necrosis caused by unknown.  REGINALDO is currently  improving on CRRT/SLED . Baseline creatinine  2.5  - Labs reviewed- Renal function/electrolytes with Estimated Creatinine Clearance: 80.9 mL/min (based on SCr of 0.7 mg/dL). according to latest data. Monitor urine output and serial BMP and adjust therapy as needed. Avoid nephrotoxins and renally dose meds for GFR listed above.    Recent Labs     04/17/24  1428 04/17/24  2203 04/18/24  0413   CREATININE 1.8*  1.8* 0.8  0.8 0.7  0.7  0.7       - Nephrology following, appreciate recs - started SLED on 4/14 and tolerating well with UF @ 550; plan for iHD trial today  - Genetics consulted, see "Hypertrophic cardiomyopathy"  - Continue home Renvela 800 mg po TID with meals  - Holding home sodium bicarb 650 mg po BID    Essential hypertension  Chronic, controlled. Latest blood pressure and vitals reviewed-     Temp:  [98 °F (36.7 °C)-98.5 °F (36.9 °C)]   Pulse:  [55-62]   Resp:  [9-40]   BP: (94)/(53)   SpO2:  [94 %-100 %]   Arterial Line BP: ()/(44-66) .   Home meds for hypertension were reviewed and noted below.   Hypertension Medications               carvediloL (COREG) 3.125 MG tablet Take 2 tablets (6.25 mg total) by mouth 2 (two) times daily with meals.    carvediloL (COREG) 6.25 MG tablet Take 1 tablet by mouth 2 times daily with food    furosemide " "(LASIX) 40 MG tablet Take 1 tablet (40 mg total) by mouth 2 (two) times a day.    furosemide (LASIX) 40 MG tablet Take 1 tablet by mouth once daily for 90 days            While in the hospital, will manage blood pressure as follows; Adjust home antihypertensive regimen as follows- holding Lasix and Coreg    Will utilize p.r.n. blood pressure medication only if patient's blood pressure greater than 180/110 and she develops symptoms such as worsening chest pain or shortness of breath.    HLD (hyperlipidemia)  Continue home Lipitor 40 mg po daily  4/14 LDL 39    Acute urinary retention  Jin in place since 4/9/2024  Continue oxybutynin 5 mg po TID    Prolonged QT interval  4/4 QTc 481 ms. Avoid QT prolonging medications.     Prediabetes  Hemoglobin A1C   Date Value Ref Range Status   04/02/2024 6.0 (H) 4.0 - 5.6 % Final     Comment:     ADA Screening Guidelines:  5.7-6.4%  Consistent with prediabetes  >or=6.5%  Consistent with diabetes    High levels of fetal hemoglobin interfere with the HbA1C  assay. Heterozygous hemoglobin variants (HbS, HgC, etc)do  not significantly interfere with this assay.   However, presence of multiple variants may affect accuracy.       - F/u PCP    Anemia  Patient's anemia is currently controlled. Has not received any PRBCs to date. Etiology likely d/t chronic disease due to Chronic Kidney Disease  Current CBC reviewed-   Lab Results   Component Value Date    HGB 8.9 (L) 04/18/2024    HCT 31.0 (L) 04/18/2024     Monitor serial CBC and transfuse if patient becomes hemodynamically unstable, symptomatic or H/H drops below 7/21.    Leg wound, left  From mechanical fall, has been painful thoughout admission    - Wound Care assessed, orders for care placed  "Cleanse left leg with sterile normal saline and pat dry. Apply aquaphor BID and PRN"  - Tylenol 1000 mg po q6h PRN for moderate pain and Norco 5-325 mg po q6h PRN for severe pain    Class 2 obesity in adult  Body mass index is 32.29 kg/m². " "Morbid obesity complicates all aspects of disease management from diagnostic modalities to treatment. Weight loss encouraged and health benefits explained to patient.      Intertrigo  See "Rash"    Proteinuria  See "Hypertrophic cardiomyopathy"    Angiokeratoma  See "Hypertrophic cardiomyopathy"    Anasarca  See "Severe mitral valve regurgitation"    Acute on chronic heart failure with preserved ejection fraction (HFpEF)  See "Severe mitral valve regurgitation"        VTE Risk Mitigation (From admission, onward)           Ordered     heparin (porcine) injection 1,000 Units  As needed (PRN)         04/13/24 1428     Place sequential compression device  Until discontinued         04/03/24 1347     heparin (porcine) injection 5,000 Units  Every 8 hours         04/01/24 1030     IP VTE HIGH RISK PATIENT  Once         04/01/24 1030     Place sequential compression device  Until discontinued         04/01/24 1030                    Domonique Walden MD  Cardiology  [facility] - Cardiac Intensive Care    "

## 2024-04-18 NOTE — SUBJECTIVE & OBJECTIVE
Interval History: Pt appears to have clinically improved today.  Tolerated 12h SLED well; was increased to 550 in the middle of treatment without issue.  CVP after treatment completion around 10-15 but back up to >20 as of morning rounds.  MAPs all > 70.      Objective:     Vital Signs (Most Recent):  Temp: 97.7 °F (36.5 °C) (04/18/24 0705)  Pulse: 66 (04/18/24 0905)  Resp: (!) 22 (04/18/24 1010)  BP: (!) 107/56 (04/18/24 0905)  SpO2: 100 % (04/18/24 0905) Vital Signs (24h Range):  Temp:  [97.7 °F (36.5 °C)-98 °F (36.7 °C)] 97.7 °F (36.5 °C)  Pulse:  [58-73] 66  Resp:  [12-44] 22  SpO2:  [98 %-100 %] 100 %  BP: ()/(50-78) 107/56     Weight: 71.2 kg (157 lb) (04/18/24 0400)  Body mass index is 30.66 kg/m².  Body surface area is 1.74 meters squared.    I/O last 3 completed shifts:  In: 5985.1 [P.O.:480; I.V.:5024.9; IV Piggyback:480.3]  Out: 49089 [Urine:91; Other:17849]     Physical Exam  Vitals and nursing note reviewed.   Constitutional:       General: She is not in acute distress.     Appearance: Normal appearance. She is ill-appearing (but improved).      Comments: Sitting in recliner at bedside   HENT:      Head: Normocephalic.   Eyes:      Extraocular Movements: Extraocular movements intact.   Cardiovascular:      Rate and Rhythm: Normal rate and regular rhythm.   Pulmonary:      Effort: Pulmonary effort is normal. No respiratory distress.      Breath sounds: Rales present.   Musculoskeletal:      Right lower leg: Edema present.      Left lower leg: Edema present.   Skin:     General: Skin is warm and dry.   Neurological:      General: No focal deficit present.      Mental Status: She is alert and oriented to person, place, and time.          Significant Labs:  CBC:   Recent Labs   Lab 04/18/24  0413   WBC 5.87   RBC 3.73*   HGB 8.9*   HCT 31.0*   *   MCV 83   MCH 23.9*   MCHC 28.7*     CMP:   Recent Labs   Lab 04/18/24  0413     104  104   CALCIUM 7.4*  7.4*  7.4*   ALBUMIN 2.7*  2.7*   2.7*   PROT 6.5     136  136   K 4.1  4.1  4.1   CO2 23  23  23     105  105   BUN <3*  <3*  <3*   CREATININE 0.7  0.7  0.7   ALKPHOS 122   ALT 35   AST 45*   BILITOT 1.2*     All labs within the past 24 hours have been reviewed.     Significant Imaging:  None

## 2024-04-18 NOTE — PT/OT/SLP RE-EVAL
Physical Therapy Co-Reevaluation and Co-Treatment  Patient Name:  Xena Vera   MRN:  [id number]  Admit Date: 4/1/2024  Admitting Diagnosis:  Severe mitral valve regurgitation   Length of Stay: 15 days  Recent Surgery: Procedure(s) (LRB):  Transesophageal echo (POOJA) intra-procedure log documentation (N/A)      Please refer to PT initial evaluation for PLOF and social history.  Recommendations:     Discharge Recommendations:  Low Intensity Therapy  Discharge Equipment Recommendations: walker, rolling   Barriers to discharge: None    Appropriate transfer level with nursing staff: Ambulatory in room with RW x1 person assist    Plan:     During this hospitalization, patient to be seen 3 x/week to address the identified rehab impairments via gait training, therapeutic activities, therapeutic exercises, neuromuscular re-education and progress towards the established goals.  Plan of Care Expires:  05/18/24  Plan of Care Reviewed with: patient, sibling    Assessment     Xena Vera is a 53 y.o. female admitted with a medical diagnosis of Severe mitral valve regurgitation. Pt found upright in chair agreeable to therapy evaluation. Pt requiring light assist with all functional mobility at this time. Session focused on gait training and standing balance. Pt reported dizziness at end of gait trial- BP WFL. Patient is not functioning at her baseline PLOF and is a fall risk. Patient would benefit from skilled therapy services to maximize safety and independence, increase activity tolerance, decrease fall risk, decrease caregiver burden, improve QOL, improve patient's functional mobility, and decrease risk of contractures and pressure sores. Patient currently demonstrates a need for low intensity therapy on a scheduled basis secondary to a decline in functional status due to illness    Patient demonstrates a mobility limitation that significantly impairs their ability to participate in one or more mobility related  activities of daily living. Patient's mobility limitation cannot be sufficiently resolved with the use of a cane, but can be sufficiently resolved with the use of a rolling walker.The use of a rolling walker will considerably improve their ability to participate in MRADLs. Patient will use the walker on a regular basis at home.       Problem List: weakness, impaired endurance, impaired functional mobility, impaired self care skills, gait instability, impaired balance, decreased upper extremity function, decreased lower extremity function, decreased safety awareness, impaired cardiopulmonary response to activity.  Rehab Prognosis: Good; patient would benefit from acute skilled PT services to address these deficits and reach maximum level of function.      Goals:   Multidisciplinary Problems       Physical Therapy Goals          Problem: Physical Therapy    Goal Priority Disciplines Outcome Goal Variances Interventions   Physical Therapy Goal     PT, PT/OT Ongoing, Progressing     Description: Goals to be met by: 24    Patient will increase functional independence with mobility by performin. Supine to sit with Modified [place]  2. Sit to supine with Modified [place]  3. Sit to stand transfer with Supervision  4. Bed to chair transfer with Supervision using Rolling Walker  5. Gait  x 100 feet with Supervision using Rolling Walker.   6. Ascend/descend 5 stair with bilateral Handrails Supervision using No Assistive Device.   7. Lower extremity exercise program x15 reps per handout, with assistance as needed                         Subjective     RN notified prior to session. No one present upon PT entrance into room. Patient agreeable to PT re-evaluation    Chief Complaint: dizziness after mobilization  Patient/Family Comments/goals: none stated  Pain/Comfort:  Pain Rating 1: 10/10  Location - Orientation 1: generalized  Location 1: back  Pain Addressed 1: Reposition, Distraction  Pain Rating  Post-Intervention 1: other (see comments) (not rated, pt reports chronic back pain)    Objective:     Additional staff present: OT; OT for co-evaluation due to suspected patient need for two skilled therapists to appropriately assess patient's functional deficits as well as ensure patient safety, accommodate for limited activity tolerance, and provide appropriate, skilled assistance to maximize functional potential during evaluation.    Virtual  used during session- Tan #[id number]    Patient found up in chair with: blood pressure cuff, pulse ox (continuous), telemetry, browning catheter, Trialysis     General Precautions: Standard, Cardiac fall   Orthopedic Precautions:N/A   Braces: N/A   Respiratory Status: Nasal Cannula 2L  Vitals: /70   Pulse 65   Temp 97.9 °F (36.6 °C) (Oral)   Resp 15   Ht 5' (1.524 m)   Wt 71.2 kg (157 lb)   SpO2 100%   Breastfeeding No   BMI 30.66 kg/m²      Exam:  Cognition:   Oriented X 4, Alert, and Cooperative  Command following: Follows multistep verbal commands  Fluency: clear/fluent  Skin Integrity: Visible skin intact  Posture: slouched posture  Physical Exam:   Edema: None  Coordination: No deficits noted during functional mobility tasks  ROM:   RLE: WFL  LLE: WFL  Strength:  RLE: WFL  LLE: WFL    Outcome Measures:  AM-PAC 6 CLICK MOBILITY  Turning over in bed (including adjusting bedclothes, sheets and blankets)?: 3  Sitting down on and standing up from a chair with arms (e.g., wheelchair, bedside commode, etc.): 3  Moving from lying on back to sitting on the side of the bed?: 3  Moving to and from a bed to a chair (including a wheelchair)?: 3  Need to walk in hospital room?: 3  Climbing 3-5 steps with a railing?: 3  Basic Mobility Total Score: 18     Functional Mobility:    Bed Mobility:   Pt found/returned to bedside chair    Transfers:   Sit <> Stand Transfer: contact guard assistance with rolling walker    Standing Balance:  Static Standing Balance: SBA  Pt  stood at sink ~4 minutes to complete ADLs with OT with no LOB noted.   Dynamic Standing Balance: CGA      Gait:   Patient ambulated: 10' x2 trials   Patient required: contact guard assistance  Patient used:  rolling walker   Gait Deviation(s): occasional unsteady gait, decreased step length, flexed posture, and decreased abigail  all lines remained intact throughout ambulation trial  Comments: Patient reporting dizziness after second gait trial. BP WFL.     Education:  Time provided for education, counseling and discussion of health disposition in regards to patient's current status  All questions answered within PT scope of practice and to patient's satisfaction  PT role in POC to address current functional deficits  Call nursing/pct to transfer to chair/use bathroom. Pt stated understanding.    Patient left up in chair with all lines intact, call button in reach, RN notified, and brother present.    Time Tracking:     PT Received On: 04/18/24  PT Start Time: 1003     PT Stop Time: 1022  PT Total Time (min): 19 min     Billable Minutes: Re-eval 10 minutes and Therapeutic Activity 9 minutes    4/18/2024

## 2024-04-18 NOTE — ASSESSMENT & PLAN NOTE
Acute on chronic heart failure with preserved ejection fraction (HFpEF)   Anasarca     54 yo F admitted for ADHF 2/2 severe MR. The MR appears to be secondary to posterior leaflet restriction and leaflet length of 1.1 cm. BNP on admission was 4300 compared to 350 eight months prior. Initially diagnosed with severe MR in June of 2023. Saw Dr. Stone in January of 2024 and was undergoing work-up for possible MitraClip; C scheduled for 3/15 but not completed due to financial constraints, also needs POOJA. Will need optimization of kidney function prior to angiogram consideration, appreciate Nephrology assistance. APS serologies negative.     DDX: rheumatic heart disease vs Fabry vs antiphospholipid (serologies negative)    4/1/2024 TTE    Left Ventricle: The left ventricle is normal in size. Normal wall thickness. Normal wall motion. There is normal systolic function with a visually estimated ejection fraction of 60 - 65%. Grade II diastolic dysfunction.    Right Ventricle: Normal right ventricular cavity size. Wall thickness is normal. Right ventricle wall motion  is normal. Systolic function is reduced.    Left Atrium: Left atrium is very  severely dilated.    Right Atrium: Right atrium is mildly dilated.    Aortic Valve: There is mild aortic valve sclerosis. There is normal leaflet mobility. There is trace aortic regurgitation.    Mitral Valve: There is mild bileaflet sclerosis. There is posterior leaflet tethering and failure of complete coaptation. There is very severe regurgitation with a posterolateral eccentriccally directed jet.    Tricuspid Valve: The tricuspid valve is structurally normal. There is normal leaflet mobility. There is moderate to severe regurgitation.    IVC/SVC: IVC was not well visualized due to poor acoustic window. Intermediate venous pressure at 8 mmHg.    Pericardium: There is a trivial effusion posteriorly and small under the RA.     - Interventional Cardiology consulted - no  "intervention until patient's volume status is controlled  - Nephrology following, undergoing SLED/CRRT  - POOJA ordered - "Anesthesia evaluated the patient and feel that she needs better optimization prior to POOJA. They stated once she is euvolemic that we can move forward. Will need a new POOJA order at that time."  - Discontinued Diuril 500 mg IV q24h & Lasix gtt @ 40 mg/hr  - Discontinued hydralazine 25 mg po q8h & Isordil 20 mg po BID for afterload reduction due to pressor requirements  - Continue home ASA 81 mg po daily and Lipitor 40 mg po daily  - Genetics consulted, see "Hypertrophic cardiomyopathy"  - Strict I/O, Annabel in place  - Telemetry monitoring  "

## 2024-04-18 NOTE — PLAN OF CARE
PT re-evaluation completed. Goals updated as appropriate and new POC established.   Problem: Physical Therapy  Goal: Physical Therapy Goal  Description: Goals to be met by: 24    Patient will increase functional independence with mobility by performin. Supine to sit with Modified [place]  2. Sit to supine with Modified [place]  3. Sit to stand transfer with Supervision  4. Bed to chair transfer with Supervision using Rolling Walker  5. Gait  x 100 feet with Supervision using Rolling Walker.   6. Ascend/descend 5 stair with bilateral Handrails Supervision using No Assistive Device.   7. Lower extremity exercise program x15 reps per handout, with assistance as needed    Outcome: Ongoing, Progressing     Updated PT recommendation: Low Intensity Therapy

## 2024-04-18 NOTE — PLAN OF CARE
Recommendations    1. Continue Low Na diet, fluid per MD.   2. Recommend ONS Boost Plus (flavor per patient) BID for additional calories/PRO.   3. Monitor I/O's, weight and labs.   4. Consider an appetite stimulant, if PO intake does not improve.   5. RD following.    Goals: Meet % EEN, EPN by RD f/u date  Nutrition Goal Status: new  Communication of RD Recs: other comment

## 2024-04-18 NOTE — NURSING
Pt intermittently confused during night, wore bipap for about 3 hours then removed and began to refuse to wear and started pulling at other lines. Called  to try to explain importance of wearing the bipap over night and to try to come up with a solution to why she did not want to wear it, pt stated her head hurt so PRN medication was given, which she reported relief, but still did not want to wear bipap. Also discussed with pt's daughter and son over the phone and they also explained the importance of wearing the bipap while she is sleeping over night, but she was adamant on not wearing it and remained on 2L NC. Discussed with Dr. Rosario, stat abg ordered to check CO2, which was normal. Pt eventually settled and rested intermittently. CRRT ran over night with no complications and was rinsed back at 0600. UF increased to 550 per Nephrology due to increased cvp, 21. CVP down to 10-15. All assessments, vitals, meds, and I/Os charted on flowsheets.    POC discussed with pt, daughter, and son, all questions answered. Safety and comfort maintained.

## 2024-04-18 NOTE — PROGRESS NOTES
[facility] - Cardiac Intensive Care  Nephrology  Progress Note    Patient Name: Xena Vera  MRN: [id number]  Admission Date: 4/1/2024  Hospital Length of Stay: 15 days  Attending Provider: Renato Elmore MD   Primary Care Physician: Tami Villeda FNP  Principal Problem:Severe mitral valve regurgitation    Subjective:     HPI: Ms Vera is an obese (BMI ~31) 53-year-old with CKD IV (baseline creatinine ~3.1-3.3), prediabetes with most recent hemoglobin A1c 6%, HFpEF (most recent TTE with EF 60-65% with G2DD), mitral valve regurgitation, anemia, hypertension, HLD as well as several over co-morbid conditions who was admitted on 4/1 with heart failure exacerbation and hypervolemia after presenting with to ED following a mechanical fall but also had complaints of progressive dyspnea/ZACARIAS, orthopnea, lower extremity edema and abdominal distension with constipation. On presentation patient was noted to be hypotensive with systolic BP readings as low as 90s mmHg and bradycardiac with HR as low as 50s bpm. There was concern for some edema on chest x-ray and BNP at that time was ~4.6K and metabolic panel was notable for serum sodium 133, bicarbonate 20, , creatinine 4.2, albumin 3.1 and total bilirubin 1.5. UA showed +1 protein. Her admission was complicated by failure to adequately diuresis with escalating IV Lasix and even oral metolazone for which Nephrology was consulted on 4/10 for assistance. She explicitly denies NSAID use as outpatient.    Interval History: Pt appears to have clinically improved today.  Tolerated 12h SLED well; was increased to 550 in the middle of treatment without issue.  CVP after treatment completion around 10-15 but back up to >20 as of morning rounds.  MAPs all > 70.      Objective:     Vital Signs (Most Recent):  Temp: 97.7 °F (36.5 °C) (04/18/24 0705)  Pulse: 66 (04/18/24 0905)  Resp: (!) 22 (04/18/24 1010)  BP: (!) 107/56 (04/18/24 0905)  SpO2: 100 % (04/18/24 0905) Vital  Signs (24h Range):  Temp:  [97.7 °F (36.5 °C)-98 °F (36.7 °C)] 97.7 °F (36.5 °C)  Pulse:  [58-73] 66  Resp:  [12-44] 22  SpO2:  [98 %-100 %] 100 %  BP: ()/(50-78) 107/56     Weight: 71.2 kg (157 lb) (04/18/24 0400)  Body mass index is 30.66 kg/m².  Body surface area is 1.74 meters squared.    I/O last 3 completed shifts:  In: 5985.1 [P.O.:480; I.V.:5024.9; IV Piggyback:480.3]  Out: 42371 [Urine:91; Other:69326]     Physical Exam  Vitals and nursing note reviewed.   Constitutional:       General: She is not in acute distress.     Appearance: Normal appearance. She is ill-appearing (but improved).      Comments: Sitting in recliner at bedside   HENT:      Head: Normocephalic.   Eyes:      Extraocular Movements: Extraocular movements intact.   Cardiovascular:      Rate and Rhythm: Normal rate and regular rhythm.   Pulmonary:      Effort: Pulmonary effort is normal. No respiratory distress.      Breath sounds: Rales present.   Musculoskeletal:      Right lower leg: Edema present.      Left lower leg: Edema present.   Skin:     General: Skin is warm and dry.   Neurological:      General: No focal deficit present.      Mental Status: She is alert and oriented to person, place, and time.          Significant Labs:  CBC:   Recent Labs   Lab 04/18/24  0413   WBC 5.87   RBC 3.73*   HGB 8.9*   HCT 31.0*   *   MCV 83   MCH 23.9*   MCHC 28.7*     CMP:   Recent Labs   Lab 04/18/24  0413     104  104   CALCIUM 7.4*  7.4*  7.4*   ALBUMIN 2.7*  2.7*  2.7*   PROT 6.5     136  136   K 4.1  4.1  4.1   CO2 23  23  23     105  105   BUN <3*  <3*  <3*   CREATININE 0.7  0.7  0.7   ALKPHOS 122   ALT 35   AST 45*   BILITOT 1.2*     All labs within the past 24 hours have been reviewed.     Significant Imaging:  None  Assessment/Plan:     Renal/  Acute kidney injury superimposed on chronic kidney disease  CKD IV (baseline creatinine ~3.1-3.3) admitted with hypervolemia and REGINALDO with creatinine  4.2  UA showed +1 protein with UPCR of ~500 mg  Urinary sediment with non dysmorphic RBCs and WBCs present although Jin catheter just place yesterday   Retroperitoneal US without evidence of hydronephrosis although changes consistent with chronic kidney disease  Diuresis with lasix gtt @ 40mg/hr + Diuril 500mg IV BID attempted, and while Crt improved she remains unable to tolerate lying flat.  Tolerated 8h SLED w goal UF rate 200-500/hr successfully overnight 4/14-15, then 12h SLED on 4/15, 16, and 17.  4/17-18: had another 12h SLED/SCUF with no issue, even at increased rate of 550.    Plan/Recommendations:  Will transition from SLED/SCUF to iHD for metabolic clearance and volume management, starting hopefully today based on availability.  Will start with short session and UF goal 1L and try to taper up in subsequent sessions based on response.  Continue Renvela 800 mg TIDWM  Please avoid hypotension/major fluctuations in BP which may worsen REGINALDO (keep MAP > 65 mmHg)  Renal diet/tube feeds when not NPO with volume restriction per primary team  Strict I/O's and daily weights  Renally all dose medications to eGFR   Avoid nephrotoxic agents wean feasible (i.e. NSAIDs, intra-arterial contrast, supra-therapeutic vancomycin levels, etc.)          Thank you for your consult. I will follow-up with patient. Please contact us if you have any additional questions.    Perry Meier MD  Nephrology  [facility] - Cardiac Intensive Care

## 2024-04-18 NOTE — PLAN OF CARE
OT re-eval complete. OT POC and goals updated.  Problem: Occupational Therapy  Goal: Occupational Therapy Goal  Description: Goals to be met by: 5/18/24     Patient will increase functional independence with ADLs by performing:    UE Dressing with Modified [place].  LE Dressing with Modified [place].  Grooming while standing at sink with Modified [place].  Toileting from toilet/bedside commode with Modified [place] for hygiene and clothing management.   Toilet transfer to toilet/bedside commode with Modified [place].      Outcome: Ongoing, Progressing

## 2024-04-18 NOTE — ASSESSMENT & PLAN NOTE
CKD IV (baseline creatinine ~3.1-3.3) admitted with hypervolemia and REGINALDO with creatinine 4.2  UA showed +1 protein with UPCR of ~500 mg  Urinary sediment with non dysmorphic RBCs and WBCs present although Jin catheter just place yesterday   Retroperitoneal US without evidence of hydronephrosis although changes consistent with chronic kidney disease  Diuresis with lasix gtt @ 40mg/hr + Diuril 500mg IV BID attempted, and while Crt improved she remains unable to tolerate lying flat.  Tolerated 8h SLED w goal UF rate 200-500/hr successfully overnight 4/14-15, then 12h SLED on 4/15, 16, and 17.  4/17-18: had another 12h SLED/SCUF with no issue, even at increased rate of 550.    Plan/Recommendations:  Will transition from SLED/SCUF to iHD for metabolic clearance and volume management, starting hopefully today based on availability.  Will start with short session and UF goal 1L and try to taper up in subsequent sessions based on response.  Continue Renvela 800 mg TIDWM  Please avoid hypotension/major fluctuations in BP which may worsen REGINALDO (keep MAP > 65 mmHg)  Renal diet/tube feeds when not NPO with volume restriction per primary team  Strict I/O's and daily weights  Renally all dose medications to eGFR   Avoid nephrotoxic agents wean feasible (i.e. NSAIDs, intra-arterial contrast, supra-therapeutic vancomycin levels, etc.)

## 2024-04-18 NOTE — ASSESSMENT & PLAN NOTE
Intertrigo     - Dermatology consulted, appreciate recs  - PRN Atarax 10 mg po TID and diphenhydramine-zinc acetate cream  - Miconazole cream BID - apply to right inguinal crease and in right inferior abdominal crease   - Clindamycin gel BID - apply to back

## 2024-04-18 NOTE — CARE UPDATE
CVP 21  Doing well, on no infusions  Talked to nephro, will up UF rate to 550 from 500 (been on continuous CRRT for days and CVP still up with severe MR)

## 2024-04-18 NOTE — PROGRESS NOTES
[facility] - Cardiac Int[facility] Care  Adult Nutrition  Progress Note    SUMMARY       Recommendations    1. Continue Low Na diet, fluid per MD.   2. Recommend ONS Boost Plus (flavor per patient) BID for additional calories/PRO.   3. Monitor I/O's, weight and labs.   4. Consider an appetite stimulant, if PO intake does not improve.   5. RD following.    Goals: Meet % EEN, EPN by RD f/u date  Nutrition Goal Status: new  Communication of RD Recs: other comment    Assessment and Plan      Nutrition Problem:  Altered nutrition related lab values    Related to (etiology):   CKD    Signs and Symptoms (as evidenced by):   Elevated Creat, decreased GFR    Interventions(treatment strategy):  Collaboration of nutrition care w/ other providers    Nutrition Diagnosis Status:   Continues           Reason for Assessment    Reason For Assessment: RD follow-up  Diagnosis: other (see comments) (HF)  Relevant Medical History: CKD  Interdisciplinary Rounds: did not attend  General Information Comments: RD follow-up. Unable to speak with patient, sleeping comfortably and did not want to disturb. Pt tolerating diet but PO intake has decreased. Pt is at risk for malnutrition.  Nutrition Discharge Planning: Adequate PO intake    Nutrition Risk Screen    Nutrition Risk Screen: no indicators present    Nutrition/Diet History    Patient Reported Diet/Restrictions/Preferences: low salt  Spiritual, Cultural Beliefs, Adventist Practices, Values that Affect Care: no  Factors Affecting Nutritional Intake: decreased appetite    Anthropometrics    Temp: 97.9 °F (36.6 °C)  Height Method: Stated  Height: 5' (152.4 cm)  Height (inches): 60 in  Weight Method: Bed Scale  Weight: 71.2 kg (157 lb)  Weight (lb): 157 lb  Ideal Body Weight (IBW), Female: 100 lb  % Ideal Body Weight, Female (lb): 160.94 %  BMI (Calculated): 30.7  BMI Grade: 30 - 34.9- obesity - grade I       Lab/Procedures/Meds    Pertinent Labs Reviewed: reviewed  Pertinent Labs Comments:  H/H: 8.9/31.0, MCH 23.9, MCHC 28.7, Ca 7.4, P 2.1, TBili 1.2, AST 45  Pertinent Medications Reviewed: reviewed  Pertinent Medications Comments: Lasix      Estimated/Assessed Needs    Weight Used For Calorie Calculations: 73 kg (160 lb 15 oz)  Energy Calorie Requirements (kcal): 1502 kcal/d  Energy Need Method: [place]-St Jeor (1.2 PAL)  Protein Requirements: 73 g/d (1 g/kg)  Weight Used For Protein Calculations: 73 kg (160 lb 15 oz)     Estimated Fluid Requirement Method: other (see comments) (Per MD)  RDA Method (mL): 1502         Nutrition Prescription Ordered    Current Diet Order: Low Na  Nutrition Order Comments: 1500 mL FR    Evaluation of Received Nutrient/Fluid Intake    I/O: -3.31 L  Comments: LBM: 4/17  Tolerance: tolerating  % Intake of Estimated Energy Needs: 75 - 100 %  % Meal Intake: 0 - 25 %    Nutrition Risk    Level of Risk/Frequency of Follow-up:  (1x/week)     Monitor and Evaluation    Food and Nutrient Intake: food and beverage intake, energy intake  Food and Nutrient Adminstration: diet order  Physical Activity and Function: nutrition-related ADLs and IADLs  Anthropometric Measurements: weight, weight change  Biochemical Data, Medical Tests and Procedures: inflammatory profile, lipid profile, glucose/endocrine profile, gastrointestinal profile, electrolyte and renal panel  Nutrition-Focused Physical Findings: overall appearance     Nutrition Follow-Up    RD Follow-up?: Yes    Dayan Cowan Registration Eligible, Provisional LDN

## 2024-04-19 LAB
ALBUMIN SERPL BCP-MCNC: 2.7 G/DL (ref 3.5–5.2)
ALLENS TEST: ABNORMAL
ALP SERPL-CCNC: 111 U/L (ref 55–135)
ALT SERPL W/O P-5'-P-CCNC: 32 U/L (ref 10–44)
ANION GAP SERPL CALC-SCNC: 9 MMOL/L (ref 8–16)
AST SERPL-CCNC: 37 U/L (ref 10–40)
BACTERIA BLD CULT: NORMAL
BACTERIA BLD CULT: NORMAL
BASOPHILS # BLD AUTO: 0.05 K/UL (ref 0–0.2)
BASOPHILS NFR BLD: 0.9 % (ref 0–1.9)
BILIRUB SERPL-MCNC: 1 MG/DL (ref 0.1–1)
BUN SERPL-MCNC: 8 MG/DL (ref 6–20)
CALCIUM SERPL-MCNC: 8 MG/DL (ref 8.7–10.5)
CHLORIDE SERPL-SCNC: 107 MMOL/L (ref 95–110)
CO2 SERPL-SCNC: 22 MMOL/L (ref 23–29)
CREAT SERPL-MCNC: 2.1 MG/DL (ref 0.5–1.4)
DELSYS: ABNORMAL
DIFFERENTIAL METHOD BLD: ABNORMAL
EOSINOPHIL # BLD AUTO: 0.2 K/UL (ref 0–0.5)
EOSINOPHIL NFR BLD: 3.1 % (ref 0–8)
ERYTHROCYTE [DISTWIDTH] IN BLOOD BY AUTOMATED COUNT: 23.2 % (ref 11.5–14.5)
EST. GFR  (NO RACE VARIABLE): 27.5 ML/MIN/1.73 M^2
GLUCOSE SERPL-MCNC: 83 MG/DL (ref 70–110)
HCO3 UR-SCNC: 19.1 MMOL/L (ref 24–28)
HCT VFR BLD AUTO: 33.5 % (ref 37–48.5)
HGB BLD-MCNC: 9.1 G/DL (ref 12–16)
IMM GRANULOCYTES # BLD AUTO: 0.02 K/UL (ref 0–0.04)
IMM GRANULOCYTES NFR BLD AUTO: 0.3 % (ref 0–0.5)
LYMPHOCYTES # BLD AUTO: 1 K/UL (ref 1–4.8)
LYMPHOCYTES NFR BLD: 17.1 % (ref 18–48)
MAGNESIUM SERPL-MCNC: 2.3 MG/DL (ref 1.6–2.6)
MCH RBC QN AUTO: 23.2 PG (ref 27–31)
MCHC RBC AUTO-ENTMCNC: 27.2 G/DL (ref 32–36)
MCV RBC AUTO: 86 FL (ref 82–98)
MODE: ABNORMAL
MONOCYTES # BLD AUTO: 0.8 K/UL (ref 0.3–1)
MONOCYTES NFR BLD: 13.6 % (ref 4–15)
NEUTROPHILS # BLD AUTO: 3.7 K/UL (ref 1.8–7.7)
NEUTROPHILS NFR BLD: 65 % (ref 38–73)
NRBC BLD-RTO: 0 /100 WBC
PCO2 BLDA: 47 MMHG (ref 35–45)
PH SMN: 7.22 [PH] (ref 7.35–7.45)
PHOSPHATE SERPL-MCNC: 3.9 MG/DL (ref 2.7–4.5)
PLATELET # BLD AUTO: 165 K/UL (ref 150–450)
PMV BLD AUTO: 11.5 FL (ref 9.2–12.9)
PO2 BLDA: 49 MMHG (ref 40–60)
POC BE: -9 MMOL/L
POC SATURATED O2: 75 % (ref 95–100)
POC TCO2: 20 MMOL/L (ref 24–29)
POTASSIUM SERPL-SCNC: 4.6 MMOL/L (ref 3.5–5.1)
PROT SERPL-MCNC: 6.4 G/DL (ref 6–8.4)
RBC # BLD AUTO: 3.92 M/UL (ref 4–5.4)
SAMPLE: ABNORMAL
SITE: ABNORMAL
SODIUM SERPL-SCNC: 138 MMOL/L (ref 136–145)
WBC # BLD AUTO: 5.73 K/UL (ref 3.9–12.7)

## 2024-04-19 PROCEDURE — 99900035 HC TECH TIME PER 15 MIN (STAT)

## 2024-04-19 PROCEDURE — 27000221 HC OXYGEN, UP TO 24 HOURS

## 2024-04-19 PROCEDURE — 25000003 PHARM REV CODE 250: Performed by: PHYSICIAN ASSISTANT

## 2024-04-19 PROCEDURE — 25000003 PHARM REV CODE 250: Performed by: STUDENT IN AN ORGANIZED HEALTH CARE EDUCATION/TRAINING PROGRAM

## 2024-04-19 PROCEDURE — 80053 COMPREHEN METABOLIC PANEL: CPT

## 2024-04-19 PROCEDURE — 83735 ASSAY OF MAGNESIUM: CPT

## 2024-04-19 PROCEDURE — 99291 CRITICAL CARE FIRST HOUR: CPT | Mod: ,,, | Performed by: INTERNAL MEDICINE

## 2024-04-19 PROCEDURE — 94799 UNLISTED PULMONARY SVC/PX: CPT

## 2024-04-19 PROCEDURE — 63600175 PHARM REV CODE 636 W HCPCS: Performed by: PHYSICIAN ASSISTANT

## 2024-04-19 PROCEDURE — 85025 COMPLETE CBC W/AUTO DIFF WBC: CPT

## 2024-04-19 PROCEDURE — 99233 SBSQ HOSP IP/OBS HIGH 50: CPT | Mod: ,,, | Performed by: INTERNAL MEDICINE

## 2024-04-19 PROCEDURE — 94660 CPAP INITIATION&MGMT: CPT

## 2024-04-19 PROCEDURE — 94761 N-INVAS EAR/PLS OXIMETRY MLT: CPT

## 2024-04-19 PROCEDURE — 84100 ASSAY OF PHOSPHORUS: CPT

## 2024-04-19 PROCEDURE — 20600001 HC STEP DOWN PRIVATE ROOM

## 2024-04-19 PROCEDURE — 80100014 HC HEMODIALYSIS 1:1

## 2024-04-19 RX ADMIN — SEVELAMER CARBONATE 800 MG: 800 TABLET, FILM COATED ORAL at 05:04

## 2024-04-19 RX ADMIN — OXYBUTYNIN CHLORIDE 5 MG: 5 TABLET ORAL at 08:04

## 2024-04-19 RX ADMIN — CLINDAMYCIN PHOSPHATE: 10 GEL TOPICAL at 09:04

## 2024-04-19 RX ADMIN — Medication: at 09:04

## 2024-04-19 RX ADMIN — ATORVASTATIN CALCIUM 40 MG: 40 TABLET, FILM COATED ORAL at 09:04

## 2024-04-19 RX ADMIN — OXYBUTYNIN CHLORIDE 5 MG: 5 TABLET ORAL at 09:04

## 2024-04-19 RX ADMIN — HEPARIN SODIUM 5000 UNITS: 5000 INJECTION INTRAVENOUS; SUBCUTANEOUS at 03:04

## 2024-04-19 RX ADMIN — HEPARIN SODIUM 5000 UNITS: 5000 INJECTION INTRAVENOUS; SUBCUTANEOUS at 05:04

## 2024-04-19 RX ADMIN — SEVELAMER CARBONATE 800 MG: 800 TABLET, FILM COATED ORAL at 07:04

## 2024-04-19 RX ADMIN — MICONAZOLE NITRATE: 20 CREAM TOPICAL at 08:04

## 2024-04-19 RX ADMIN — Medication: at 08:04

## 2024-04-19 RX ADMIN — HEPARIN SODIUM 5000 UNITS: 5000 INJECTION INTRAVENOUS; SUBCUTANEOUS at 08:04

## 2024-04-19 RX ADMIN — SEVELAMER CARBONATE 800 MG: 800 TABLET, FILM COATED ORAL at 12:04

## 2024-04-19 RX ADMIN — MICONAZOLE NITRATE: 20 CREAM TOPICAL at 09:04

## 2024-04-19 RX ADMIN — ASPIRIN 81 MG: 81 TABLET, COATED ORAL at 09:04

## 2024-04-19 RX ADMIN — OXYBUTYNIN CHLORIDE 5 MG: 5 TABLET ORAL at 03:04

## 2024-04-19 RX ADMIN — CLINDAMYCIN PHOSPHATE: 10 GEL TOPICAL at 08:04

## 2024-04-19 NOTE — PLAN OF CARE
[facility] Card[facility]  Discharge Reassessment    Primary Care Provider: Tami Villeda FNP    Expected Discharge Date: 4/22/2024    Reassessment (most recent)       Discharge Reassessment - 04/19/24 1306          Discharge Reassessment    Assessment Type Discharge Planning Reassessment     Did the patient's condition or plan change since previous assessment? No     Discharge Plan discussed with: Patient;Sibling     Communicated RILEY with patient/caregiver Date not available/Unable to determine     Discharge Plan A Home with family     Discharge Plan B Home     DME Needed Upon Discharge  none     Transition of Care Barriers Unisured     Why the patient remains in the hospital Requires continued medical care                 Pending recs from nephrology regarding dialysis.  Discharge Plan A and Plan B have been determined by review of patient's clinical status, future medical and therapeutic needs, and coverage/benefits for post-acute care in coordination with multidisciplinary team members.  Will continue to follow.      Sophia Nunez LMSW  [facility]  [id number]

## 2024-04-19 NOTE — NURSING
Orders for HD placed earlier in the morning, told in report dialysis nurse stated they would be coming later in the evening to set up/run her on HD. Called dialysis nurse at 2200 to confirm they would still be coming to run her on HD, but was told that she would get it tomorrow 4/19 because she is stable and they are too busy. Discussed with nephrology fellow Dr. Nayak that they are aware she will not be getting dialysis today, team is aware and is ok to get HD trial tomorrow. CRRT labs discontinued and just getting AM labs.

## 2024-04-19 NOTE — SUBJECTIVE & OBJECTIVE
Interval History: NAEON. Tolerated iHD well this morning.    Hemodynamics today: CVP 22, SVO2 75, CO 7.8, CI 4.6, .     Review of Systems   Cardiovascular:  Positive for leg swelling.   Gastrointestinal:  Positive for bloating.   All other systems reviewed and are negative.    Objective:     Vital Signs (Most Recent):  Temp: 98.2 °F (36.8 °C) (04/19/24 0744)  Pulse: 71 (04/19/24 1101)  Resp: (!) 24 (04/19/24 1101)  BP: (!) 117/59 (04/19/24 1101)  SpO2: 100 % (04/19/24 1101) Vital Signs (24h Range):  Temp:  [97.8 °F (36.6 °C)-98.3 °F (36.8 °C)] 98.2 °F (36.8 °C)  Pulse:  [62-75] 71  Resp:  [11-50] 24  SpO2:  [96 %-100 %] 100 %  BP: ()/(50-79) 117/59     Weight: 68 kg (149 lb 14.6 oz)  Body mass index is 29.28 kg/m².     SpO2: 100 %         Intake/Output Summary (Last 24 hours) at 4/19/2024 1114  Last data filed at 4/19/2024 1046  Gross per 24 hour   Intake 690 ml   Output 0 ml   Net 690 ml       Lines/Drains/Airways       Central Venous Catheter Line  Duration             Trialysis (Dialysis) Catheter 04/13/24 1720 right internal jugular 5 days              Peripheral Intravenous Line  Duration                  Peripheral IV - Single Lumen 04/14/24 1600 18 G;1 3/4 in Anterior;Right Upper Arm 4 days         Midline Catheter - Single Lumen 04/16/24 1744 Left cephalic vein (lateral side of arm) 20g x 10cm 2 days                       Physical Exam  Vitals and nursing note reviewed.   Constitutional:       Appearance: She is ill-appearing.   HENT:      Head: Normocephalic and atraumatic.   Cardiovascular:      Rate and Rhythm: Normal rate and regular rhythm.      Heart sounds: Murmur heard.   Pulmonary:      Effort: Pulmonary effort is normal. No respiratory distress.   Abdominal:      General: Abdomen is flat. There is distension.      Palpations: Abdomen is soft.      Tenderness: There is no abdominal tenderness.   Musculoskeletal:      Right lower leg: Edema present.      Left lower leg: Edema present.    Skin:     General: Skin is warm and dry.   Neurological:      General: No focal deficit present.      Mental Status: She is alert and oriented to person, place, and time. Mental status is at baseline.            Significant Labs: All pertinent lab results from the last 24 hours have been reviewed.    Significant Imaging:  All pertinent imaging results from the last 24 hours have been reviewed.

## 2024-04-19 NOTE — ASSESSMENT & PLAN NOTE
CKD IV (baseline creatinine ~3.1-3.3) admitted with hypervolemia and REGINALDO with creatinine 4.2  UA showed +1 protein with UPCR of ~500 mg  Urinary sediment with non dysmorphic RBCs and WBCs present although Jin catheter just place yesterday   Retroperitoneal US without evidence of hydronephrosis although changes consistent with chronic kidney disease  Diuresis with lasix gtt @ 40mg/hr + Diuril 500mg IV BID attempted, and while Crt improved she remains unable to tolerate lying flat.  Tolerated 8h SLED w goal UF rate 200-500/hr successfully overnight 4/14-15, then 12h SLED on 4/15, 16, and 17.  4/19: tolerated 3.5h of iHD with 2L UF removed; no issues.    Plan/Recommendations:  Continue iHD for metabolic clearance and volume management.  Suspect she may now be ESRD and require iHD moving forward.  Continue Renvela 800 mg TIDWM  Please avoid hypotension/major fluctuations in BP which may worsen REGINALDO (keep MAP > 65 mmHg)  Renal diet/tube feeds when not NPO with volume restriction per primary team  Strict I/O's and daily weights  Renally all dose medications to eGFR   Avoid nephrotoxic agents wean feasible (i.e. NSAIDs, intra-arterial contrast, supra-therapeutic vancomycin levels, etc.)

## 2024-04-19 NOTE — PROGRESS NOTES
Patient arrived to  at 1547 with RN present. VSS. Tele in place. Oriented patient to room and callbell is within reach. Safety measures maintained.

## 2024-04-19 NOTE — PROGRESS NOTES
[facility] - [facility]  Nephrology  Progress Note    Patient Name: Xena Vera  MRN: [id number]  Admission Date: 4/1/2024  Hospital Length of Stay: 16 days  Attending Provider: Murray Stone MD   Primary Care Physician: Tami Villeda FNP  Principal Problem:Severe mitral valve regurgitation    Subjective:     HPI: Ms Vera is an obese (BMI ~31) 53-year-old with CKD IV (baseline creatinine ~3.1-3.3), prediabetes with most recent hemoglobin A1c 6%, HFpEF (most recent TTE with EF 60-65% with G2DD), mitral valve regurgitation, anemia, hypertension, HLD as well as several over co-morbid conditions who was admitted on 4/1 with heart failure exacerbation and hypervolemia after presenting with to ED following a mechanical fall but also had complaints of progressive dyspnea/ZACARIAS, orthopnea, lower extremity edema and abdominal distension with constipation. On presentation patient was noted to be hypotensive with systolic BP readings as low as 90s mmHg and bradycardiac with HR as low as 50s bpm. There was concern for some edema on chest x-ray and BNP at that time was ~4.6K and metabolic panel was notable for serum sodium 133, bicarbonate 20, , creatinine 4.2, albumin 3.1 and total bilirubin 1.5. UA showed +1 protein. Her admission was complicated by failure to adequately diuresis with escalating IV Lasix and even oral metolazone for which Nephrology was consulted on 4/10 for assistance. She explicitly denies NSAID use as outpatient.    Interval History: Continues to look and feel better. iHD initiation was delayed overnight due to dialysis staffing issues, but she ultimately tolerated 3.5 hrs w 2L removed.  Cardiology seeking stepdown from CICU.      Objective:     Vital Signs (Most Recent):  Temp: 97.9 °F (36.6 °C) (04/19/24 1137)  Pulse: 65 (04/19/24 1137)  Resp: 16 (04/19/24 1137)  BP: 113/62 (04/19/24 1137)  SpO2: 100 % (04/19/24 1137) Vital Signs (24h Range):  Temp:  [97.8 °F (36.6 °C)-98.3 °F  (36.8 °C)] 97.9 °F (36.6 °C)  Pulse:  [62-75] 65  Resp:  [11-50] 16  SpO2:  [96 %-100 %] 100 %  BP: ()/(50-79) 113/62     Weight: 68 kg (149 lb 14.6 oz) (04/19/24 0400)  Body mass index is 29.28 kg/m².  Body surface area is 1.7 meters squared.    I/O last 3 completed shifts:  In: 3096.6 [P.O.:392; I.V.:2454.9; IV Piggyback:249.7]  Out: 5893 [Urine:26; Other:5867]     Physical Exam  Vitals and nursing note reviewed.   Constitutional:       General: She is not in acute distress.     Appearance: Normal appearance. She is ill-appearing (but improved).      Comments: Sitting in recliner at bedside   HENT:      Head: Normocephalic.   Eyes:      Extraocular Movements: Extraocular movements intact.   Cardiovascular:      Rate and Rhythm: Normal rate and regular rhythm.   Pulmonary:      Effort: Pulmonary effort is normal. No respiratory distress.      Breath sounds: Rales present.   Musculoskeletal:      Right lower leg: Edema present.      Left lower leg: Edema present.   Skin:     General: Skin is warm and dry.   Neurological:      General: No focal deficit present.      Mental Status: She is alert and oriented to person, place, and time.          Significant Labs:  ABGs:   Recent Labs   Lab 04/19/24  0838   PH 7.216*   PCO2 47.0*   HCO3 19.1*   POCSATURATED 75   BE -9*     CBC:   Recent Labs   Lab 04/19/24  0342   WBC 5.73   RBC 3.92*   HGB 9.1*   HCT 33.5*      MCV 86   MCH 23.2*   MCHC 27.2*     CMP:   Recent Labs   Lab 04/19/24  0342   GLU 83   CALCIUM 8.0*   ALBUMIN 2.7*   PROT 6.4      K 4.6   CO2 22*      BUN 8   CREATININE 2.1*   ALKPHOS 111   ALT 32   AST 37   BILITOT 1.0     All labs within the past 24 hours have been reviewed.     Significant Imaging:  None  Assessment/Plan:     Renal/  Acute kidney injury superimposed on chronic kidney disease  CKD IV (baseline creatinine ~3.1-3.3) admitted with hypervolemia and REGINALDO with creatinine 4.2  UA showed +1 protein with UPCR of ~500  mg  Urinary sediment with non dysmorphic RBCs and WBCs present although Jin catheter just place yesterday   Retroperitoneal US without evidence of hydronephrosis although changes consistent with chronic kidney disease  Diuresis with lasix gtt @ 40mg/hr + Diuril 500mg IV BID attempted, and while Crt improved she remains unable to tolerate lying flat.  Tolerated 8h SLED w goal UF rate 200-500/hr successfully overnight 4/14-15, then 12h SLED on 4/15, 16, and 17.  4/19: tolerated 3.5h of iHD with 2L UF removed; no issues.    Plan/Recommendations:  Continue iHD for metabolic clearance and volume management.  Suspect she may now be ESRD and require iHD moving forward.  Continue Renvela 800 mg TIDWM  Please avoid hypotension/major fluctuations in BP which may worsen REGINALDO (keep MAP > 65 mmHg)  Renal diet/tube feeds when not NPO with volume restriction per primary team  Strict I/O's and daily weights  Renally all dose medications to eGFR   Avoid nephrotoxic agents wean feasible (i.e. NSAIDs, intra-arterial contrast, supra-therapeutic vancomycin levels, etc.)          Thank you for your consult. I will follow-up with patient. Please contact us if you have any additional questions.    Perry Meier MD  Nephrology  [facility]

## 2024-04-19 NOTE — PT/OT/SLP PROGRESS
Physical Therapy      Patient Name:  Xena Vera   MRN:  [id number]    Patient not seen today secondary to Dialysis. Will follow-up as appropriate.  Jessy High, PT

## 2024-04-19 NOTE — SUBJECTIVE & OBJECTIVE
Interval History: Continues to look and feel better. iHD initiation was delayed overnight due to dialysis staffing issues, but she ultimately tolerated 3.5 hrs w 2L removed.  Cardiology seeking stepdown from CICU.      Objective:     Vital Signs (Most Recent):  Temp: 97.9 °F (36.6 °C) (04/19/24 1137)  Pulse: 65 (04/19/24 1137)  Resp: 16 (04/19/24 1137)  BP: 113/62 (04/19/24 1137)  SpO2: 100 % (04/19/24 1137) Vital Signs (24h Range):  Temp:  [97.8 °F (36.6 °C)-98.3 °F (36.8 °C)] 97.9 °F (36.6 °C)  Pulse:  [62-75] 65  Resp:  [11-50] 16  SpO2:  [96 %-100 %] 100 %  BP: ()/(50-79) 113/62     Weight: 68 kg (149 lb 14.6 oz) (04/19/24 0400)  Body mass index is 29.28 kg/m².  Body surface area is 1.7 meters squared.    I/O last 3 completed shifts:  In: 3096.6 [P.O.:392; I.V.:2454.9; IV Piggyback:249.7]  Out: 5893 [Urine:26; Other:5867]     Physical Exam  Vitals and nursing note reviewed.   Constitutional:       General: She is not in acute distress.     Appearance: Normal appearance. She is ill-appearing (but improved).      Comments: Sitting in recliner at bedside   HENT:      Head: Normocephalic.   Eyes:      Extraocular Movements: Extraocular movements intact.   Cardiovascular:      Rate and Rhythm: Normal rate and regular rhythm.   Pulmonary:      Effort: Pulmonary effort is normal. No respiratory distress.      Breath sounds: Rales present.   Musculoskeletal:      Right lower leg: Edema present.      Left lower leg: Edema present.   Skin:     General: Skin is warm and dry.   Neurological:      General: No focal deficit present.      Mental Status: She is alert and oriented to person, place, and time.          Significant Labs:  ABGs:   Recent Labs   Lab 04/19/24  0838   PH 7.216*   PCO2 47.0*   HCO3 19.1*   POCSATURATED 75   BE -9*     CBC:   Recent Labs   Lab 04/19/24  0342   WBC 5.73   RBC 3.92*   HGB 9.1*   HCT 33.5*      MCV 86   MCH 23.2*   MCHC 27.2*     CMP:   Recent Labs   Lab 04/19/24  0342   GLU 83    CALCIUM 8.0*   ALBUMIN 2.7*   PROT 6.4      K 4.6   CO2 22*      BUN 8   CREATININE 2.1*   ALKPHOS 111   ALT 32   AST 37   BILITOT 1.0     All labs within the past 24 hours have been reviewed.     Significant Imaging:  None

## 2024-04-19 NOTE — NURSING
Xena had a much better night. She rested comfortably with bipap on for majority of the night. Cvp 24, SVO2 67%. No UOP during shift, no HD trial overnight per nephrology, will try again today. All assessments, vitals, meds, and I/Os charted on flowsheets.     POC discussed with Xena, all questions answered. Safety and comfort maintained.

## 2024-04-19 NOTE — PROGRESS NOTES
[facility] - Cardiac Intensive Care  Cardiology  Progress Note    Patient Name: Xena Vera  MRN: [id number]  Admission Date: 4/1/2024  Hospital Length of Stay: 16 days  Code Status: Full Code   Attending Physician: Murray Stone MD   Primary Care Physician: Tami Villeda FNP  Expected Discharge Date: 4/22/2024  Principal Problem:Severe mitral valve regurgitation    Subjective:     Hospital Course:   Patient initially had improved UOP with increasing Lasix, eventually maxed out on Lasix drip with addition of Diuril. Eventually started on CRRT/SLED for volume removal, initially needed intermittent levophed to tolerate. Has itchy rash that started during current hospitalization, Dermatology consulted for regimen. Graduated from CRRT to iHD, tolerating well. Stable to step back down to HM.     Interval History: NAEON. Tolerated iHD well this morning.    Hemodynamics today: CVP 22, SVO2 75, CO 7.8, CI 4.6, .     Review of Systems   Cardiovascular:  Positive for leg swelling.   Gastrointestinal:  Positive for bloating.   All other systems reviewed and are negative.    Objective:     Vital Signs (Most Recent):  Temp: 98.2 °F (36.8 °C) (04/19/24 0744)  Pulse: 71 (04/19/24 1101)  Resp: (!) 24 (04/19/24 1101)  BP: (!) 117/59 (04/19/24 1101)  SpO2: 100 % (04/19/24 1101) Vital Signs (24h Range):  Temp:  [97.8 °F (36.6 °C)-98.3 °F (36.8 °C)] 98.2 °F (36.8 °C)  Pulse:  [62-75] 71  Resp:  [11-50] 24  SpO2:  [96 %-100 %] 100 %  BP: ()/(50-79) 117/59     Weight: 68 kg (149 lb 14.6 oz)  Body mass index is 29.28 kg/m².     SpO2: 100 %         Intake/Output Summary (Last 24 hours) at 4/19/2024 1114  Last data filed at 4/19/2024 1046  Gross per 24 hour   Intake 690 ml   Output 0 ml   Net 690 ml       Lines/Drains/Airways       Central Venous Catheter Line  Duration             Trialysis (Dialysis) Catheter 04/13/24 1720 right internal jugular 5 days              Peripheral Intravenous Line  Duration                   Peripheral IV - Single Lumen 04/14/24 1600 18 G;1 3/4 in Anterior;Right Upper Arm 4 days         Midline Catheter - Single Lumen 04/16/24 1744 Left cephalic vein (lateral side of arm) 20g x 10cm 2 days                       Physical Exam  Vitals and nursing note reviewed.   Constitutional:       Appearance: She is ill-appearing.   HENT:      Head: Normocephalic and atraumatic.   Cardiovascular:      Rate and Rhythm: Normal rate and regular rhythm.      Heart sounds: Murmur heard.   Pulmonary:      Effort: Pulmonary effort is normal. No respiratory distress.   Abdominal:      General: Abdomen is flat. There is distension.      Palpations: Abdomen is soft.      Tenderness: There is no abdominal tenderness.   Musculoskeletal:      Right lower leg: Edema present.      Left lower leg: Edema present.   Skin:     General: Skin is warm and dry.   Neurological:      General: No focal deficit present.      Mental Status: She is alert and oriented to person, place, and time. Mental status is at baseline.            Significant Labs: All pertinent lab results from the last 24 hours have been reviewed.    Significant Imaging:  All pertinent imaging results from the last 24 hours have been reviewed.    Assessment and Plan:     * Severe mitral valve regurgitation  Acute on chronic heart failure with preserved ejection fraction (HFpEF)   Anasarca     52 yo F admitted for ADHF 2/2 severe MR. The MR appears to be secondary to posterior leaflet restriction and leaflet length of 1.1 cm. BNP on admission was 4300 compared to 350 eight months prior. Initially diagnosed with severe MR in June of 2023. Saw Dr. Stone in January of 2024 and was undergoing work-up for possible MitraClip; [facility] scheduled for 3/15 but not completed due to financial constraints, also needs POOJA. Will need optimization of kidney function prior to angiogram consideration, appreciate Nephrology assistance. APS serologies negative.     DDX: rheumatic heart disease  "vs Fabry vs antiphospholipid (serologies negative)    4/1/2024 TTE    Left Ventricle: The left ventricle is normal in size. Normal wall thickness. Normal wall motion. There is normal systolic function with a visually estimated ejection fraction of 60 - 65%. Grade II diastolic dysfunction.    Right Ventricle: Normal right ventricular cavity size. Wall thickness is normal. Right ventricle wall motion  is normal. Systolic function is reduced.    Left Atrium: Left atrium is very  severely dilated.    Right Atrium: Right atrium is mildly dilated.    Aortic Valve: There is mild aortic valve sclerosis. There is normal leaflet mobility. There is trace aortic regurgitation.    Mitral Valve: There is mild bileaflet sclerosis. There is posterior leaflet tethering and failure of complete coaptation. There is very severe regurgitation with a posterolateral eccentriccally directed jet.    Tricuspid Valve: The tricuspid valve is structurally normal. There is normal leaflet mobility. There is moderate to severe regurgitation.    IVC/SVC: IVC was not well visualized due to poor acoustic window. Intermediate venous pressure at 8 mmHg.    Pericardium: There is a trivial effusion posteriorly and small under the RA.     - Interventional Cardiology consulted - no intervention until patient's volume status is controlled  - Nephrology following, undergoing SLED/CRRT  - POOJA ordered - "Anesthesia evaluated the patient and feel that she needs better optimization prior to POOJA. They stated once she is euvolemic that we can move forward. Will need a new POOJA order at that time."  - Discontinued Diuril 500 mg IV q24h & Lasix gtt @ 40 mg/hr  - Discontinued hydralazine 25 mg po q8h & Isordil 20 mg po BID for afterload reduction due to pressor requirements  - Continue home ASA 81 mg po daily and Lipitor 40 mg po daily  - Genetics consulted, see "Hypertrophic cardiomyopathy"  - Strict I/OAnnabel in place  - Telemetry monitoring    Rash  Intertrigo " "    - Dermatology consulted, appreciate josie  - PRN Atarax 10 mg po TID and diphenhydramine-zinc acetate cream  - Miconazole cream BID - apply to right inguinal crease and in right inferior abdominal crease   - Clindamycin gel BID - apply to back    Hypertrophic cardiomyopathy  Angiokeratoma   Proteinuria     - Genetics consulted, appreciate josie  - GLA genetic testing collected and [facility] to [facility]  - Consider cardiac MRI    Acute kidney injury superimposed on chronic kidney disease  Patient with acute kidney injury/acute renal failure likely due to acute tubular necrosis caused by unknown.  REGINALDO is currently  improving on CRRT/SLED . Baseline creatinine  2.5  - Labs reviewed- Renal function/electrolytes with Estimated Creatinine Clearance: 80.9 mL/min (based on SCr of 0.7 mg/dL). according to latest data. Monitor urine output and serial BMP and adjust therapy as needed. Avoid nephrotoxins and renally dose meds for GFR listed above.    Recent Labs     04/17/24  1428 04/17/24  2203 04/18/24  0413   CREATININE 1.8*  1.8* 0.8  0.8 0.7  0.7  0.7       - Nephrology following, appreciate josie - started SLED on 4/14 and tolerating well with UF @ 550; plan for iHD trial today  - Genetics consulted, see "Hypertrophic cardiomyopathy"  - Continue home Renvela 800 mg po TID with meals  - Holding home sodium bicarb 650 mg po BID    Essential hypertension  Chronic, controlled. Latest blood pressure and vitals reviewed-     Temp:  [98 °F (36.7 °C)-98.5 °F (36.9 °C)]   Pulse:  [55-62]   Resp:  [9-40]   BP: (94)/(53)   SpO2:  [94 %-100 %]   Arterial Line BP: ()/(44-66) .   Home meds for hypertension were reviewed and noted below.   Hypertension Medications               carvediloL (COREG) 3.125 MG tablet Take 2 tablets (6.25 mg total) by mouth 2 (two) times daily with meals.    carvediloL (COREG) 6.25 MG tablet Take 1 tablet by mouth 2 times daily with food    furosemide (LASIX) 40 MG tablet Take 1 tablet (40 mg total) " "by mouth 2 (two) times a day.    furosemide (LASIX) 40 MG tablet Take 1 tablet by mouth once daily for 90 days            While in the hospital, will manage blood pressure as follows; Adjust home antihypertensive regimen as follows- holding Lasix and Coreg    Will utilize p.r.n. blood pressure medication only if patient's blood pressure greater than 180/110 and she develops symptoms such as worsening chest pain or shortness of breath.    HLD (hyperlipidemia)  Continue home Lipitor 40 mg po daily  4/14 LDL 39    Acute urinary retention  Jin in place since 4/9/2024  Continue oxybutynin 5 mg po TID    Prolonged QT interval  4/4 QTc 481 ms. Avoid QT prolonging medications.     Prediabetes  Hemoglobin A1C   Date Value Ref Range Status   04/02/2024 6.0 (H) 4.0 - 5.6 % Final     Comment:     ADA Screening Guidelines:  5.7-6.4%  Consistent with prediabetes  >or=6.5%  Consistent with diabetes    High levels of fetal hemoglobin interfere with the HbA1C  assay. Heterozygous hemoglobin variants (HbS, HgC, etc)do  not significantly interfere with this assay.   However, presence of multiple variants may affect accuracy.       - F/u PCP    Anemia  Patient's anemia is currently controlled. Has not received any PRBCs to date. Etiology likely d/t chronic disease due to Chronic Kidney Disease  Current CBC reviewed-   Lab Results   Component Value Date    HGB 8.9 (L) 04/18/2024    HCT 31.0 (L) 04/18/2024     Monitor serial CBC and transfuse if patient becomes hemodynamically unstable, symptomatic or H/H drops below 7/21.    Leg wound, left  From mechanical fall, has been painful thoughout admission    - Wound Care assessed, orders for care placed  "Cleanse left leg with sterile normal saline and pat dry. Apply aquaphor BID and PRN"  - Tylenol 1000 mg po q6h PRN for moderate pain and Norco 5-325 mg po q6h PRN for severe pain    Class 2 obesity in adult  Body mass index is 32.29 kg/m². Morbid obesity complicates all aspects of disease " "management from diagnostic modalities to treatment. Weight loss encouraged and health benefits explained to patient.      Intertrigo  See "Rash"    Proteinuria  See "Hypertrophic cardiomyopathy"    Angiokeratoma  See "Hypertrophic cardiomyopathy"    Anasarca  See "Severe mitral valve regurgitation"    Acute on chronic heart failure with preserved ejection fraction (HFpEF)  See "Severe mitral valve regurgitation"        VTE Risk Mitigation (From admission, onward)           Ordered     heparin (porcine) injection 1,000 Units  As needed (PRN)         04/13/24 1428     Place sequential compression device  Until discontinued         04/03/24 1347     heparin (porcine) injection 5,000 Units  Every 8 hours         04/01/24 1030     IP VTE HIGH RISK PATIENT  Once         04/01/24 1030     Place sequential compression device  Until discontinued         04/01/24 1030                    Domonique Walden MD  Cardiology  [facility] - Cardiac Intensive Care  Staff Attestation:  I have seen the patient, reviewed the Resident's assessment and plan, personally interviewed and examined the patient at bedside and agree with the findings.Total critical care time: Approximately 45 minutes. Due to a high probability of clinically significant, life threatening deterioration, I personally spent this critical care time directly and personally managing the patient. This critical care time included obtaining a history; examining the patient; ordering and review of studies; arranging urgent treatment with development of a management plan; evaluation of patient's response to treatment; frequent reassessment; and, discussions with other providers.   This critical care time was performed to assess and manage the high probability of imminent, life-threatening deterioration that could result in multi-organ failure. It was medically reasonable and necessary. It was exclusive of separately billable procedures and treating other patients and teaching " time.    Murray Stone MD  [facility] - Cardiology

## 2024-04-19 NOTE — PLAN OF CARE
Problem: Adult Inpatient Plan of Care  Goal: Plan of Care Review  Outcome: Ongoing, Progressing  Goal: Patient-Specific Goal (Individualized)  Outcome: Ongoing, Progressing  Goal: Absence of Hospital-Acquired Illness or Injury  Outcome: Ongoing, Progressing  Goal: Optimal Comfort and Wellbeing  Outcome: Ongoing, Progressing  Goal: Readiness for Transition of Care  Outcome: Ongoing, Progressing     Problem: Impaired Wound Healing  Goal: Optimal Wound Healing  Outcome: Ongoing, Progressing     Problem: Fluid and Electrolyte Imbalance (Acute Kidney Injury/Impairment)  Goal: Fluid and Electrolyte Balance  Outcome: Ongoing, Progressing     Problem: Oral Intake Inadequate (Acute Kidney Injury/Impairment)  Goal: Optimal Nutrition Intake  Outcome: Ongoing, Progressing     Problem: Fall Injury Risk  Goal: Absence of Fall and Fall-Related Injury  Outcome: Ongoing, Progressing

## 2024-04-19 NOTE — PLAN OF CARE
Problem: Device-Related Complication Risk (Hemodialysis)  Goal: Safe, Effective Therapy Delivery  Outcome: Ongoing, Progressing     Problem: Hemodynamic Instability (Hemodialysis)  Goal: Effective Tissue Perfusion  Outcome: Ongoing, Progressing     Problem: Infection (Hemodialysis)  Goal: Absence of Infection Signs and Symptoms  Outcome: Ongoing, Progressing       Bedside dialysis tx ended to the right IJ trialysis cath, saline locked, capped.    Net fluid removed: 2L    Report given to nurse Paola

## 2024-04-19 NOTE — NURSING
Bedside dialysis tx started to the right IJ trialysis cath per orders placed by Dr. Allen:    Order Questions    Question Answer Comment   Antibiotics on HD? No    Duration of Treatment 2 hours    Dialyzer F160    Dialysate Temperature (C) 36.5     mL/min     mL/min    K+ Potassium per Protocol    Ca++ Calcium per Protocol    Na+ Sodium per Protocol    Bicarb Bicarbonate per Protocol    Access Other (please specify) CVC   Fluid Removal (L) 1L    Fluid Removal Instructions maintain SBP > 90 mmHG

## 2024-04-19 NOTE — RESIDENT HANDOFF
Handoff     Primary Team: [facility] Room Number: [id number]     Patient Name: Xena Vera MRN: [id number]     Date of Birth: [id number] Allergies: Patient has no known allergies.     Age: 53 y.o. Admit Date: 4/1/2024     Sex: female  BMI: Body mass index is 29.28 kg/m².     Code Status: Full Code        Illness Level (current clinical status): Watcher - No    Reason for Admission: Severe mitral valve regurgitation    Brief HPI (pertinent PMH and diagnosis or differential diagnosis):  54 yo F with PMH of HFpEF, severe MR, HTN, HLD, and CKD4 who initially presented [place] [facility] [place]24 after a mechanical fall at home with concurrent SOB and swelling in her legs and abdomen. She reported adherence to home Lasix and low sodium diet, but had felt increasingly SOB prior to admission as she was sleeping upright and had multiple nighttime awakenings due to SOB. In the ED, BNP was 4639 and she was admitted to Hospital Medicine for further management of ADHF. She initially had good response to IV Lasix but hospital course was later complicated by urinary retention, worsening kidney function, and decreased UOP. Nephrology and Cardiology were consulted to assist. She was started on Diuril and Lasix gtt for diuresis and Isordil and hydralazine for afterload reduction. At this time, she will be transferred to CCU for further management.     Procedure Date: N/A    Hospital Course (updated, brief assessment by system or problem, significant events): Started CRRT then graduated to iHD for volume removal. MR not to be worked up inpatient.     Tasks (specific, using if-then statements):  - Continue HD, need set-up for outpatient HD most likely but is uninsured.   - Repeat TTE after she is euvolemic, expect MR to improve    Contingency Plan (special circumstances anticipated and plan): Consult Gen Cards if MR still present after euvolemic    Estimated Discharge Date: 4/26/2024    Discharge Disposition: Home-Health Care  Svc    Mentored By: N/A

## 2024-04-20 LAB
ALBUMIN SERPL BCP-MCNC: 2.6 G/DL (ref 3.5–5.2)
ALLENS TEST: ABNORMAL
ALP SERPL-CCNC: 100 U/L (ref 55–135)
ALT SERPL W/O P-5'-P-CCNC: 27 U/L (ref 10–44)
ANION GAP SERPL CALC-SCNC: 6 MMOL/L (ref 8–16)
AST SERPL-CCNC: 30 U/L (ref 10–40)
BASOPHILS # BLD AUTO: 0.02 K/UL (ref 0–0.2)
BASOPHILS NFR BLD: 0.4 % (ref 0–1.9)
BILIRUB SERPL-MCNC: 1 MG/DL (ref 0.1–1)
BUN SERPL-MCNC: 10 MG/DL (ref 6–20)
CALCIUM SERPL-MCNC: 7.8 MG/DL (ref 8.7–10.5)
CHLORIDE SERPL-SCNC: 104 MMOL/L (ref 95–110)
CO2 SERPL-SCNC: 24 MMOL/L (ref 23–29)
CREAT SERPL-MCNC: 2.3 MG/DL (ref 0.5–1.4)
DELSYS: ABNORMAL
DIFFERENTIAL METHOD BLD: ABNORMAL
EOSINOPHIL # BLD AUTO: 0.2 K/UL (ref 0–0.5)
EOSINOPHIL NFR BLD: 3.9 % (ref 0–8)
ERYTHROCYTE [DISTWIDTH] IN BLOOD BY AUTOMATED COUNT: 23.1 % (ref 11.5–14.5)
EST. GFR  (NO RACE VARIABLE): 24.6 ML/MIN/1.73 M^2
FIO2: 24
FLOW: 1
GLUCOSE SERPL-MCNC: 84 MG/DL (ref 70–110)
HCO3 UR-SCNC: 24.9 MMOL/L (ref 24–28)
HCT VFR BLD AUTO: 30.2 % (ref 37–48.5)
HGB BLD-MCNC: 8.6 G/DL (ref 12–16)
IMM GRANULOCYTES # BLD AUTO: 0.02 K/UL (ref 0–0.04)
IMM GRANULOCYTES NFR BLD AUTO: 0.4 % (ref 0–0.5)
LYMPHOCYTES # BLD AUTO: 0.7 K/UL (ref 1–4.8)
LYMPHOCYTES NFR BLD: 13.1 % (ref 18–48)
MAGNESIUM SERPL-MCNC: 2 MG/DL (ref 1.6–2.6)
MCH RBC QN AUTO: 23.9 PG (ref 27–31)
MCHC RBC AUTO-ENTMCNC: 28.5 G/DL (ref 32–36)
MCV RBC AUTO: 84 FL (ref 82–98)
MODE: ABNORMAL
MONOCYTES # BLD AUTO: 0.7 K/UL (ref 0.3–1)
MONOCYTES NFR BLD: 11.7 % (ref 4–15)
NEUTROPHILS # BLD AUTO: 4 K/UL (ref 1.8–7.7)
NEUTROPHILS NFR BLD: 70.5 % (ref 38–73)
NRBC BLD-RTO: 0 /100 WBC
PCO2 BLDA: 50.9 MMHG (ref 35–45)
PH SMN: 7.3 [PH] (ref 7.35–7.45)
PHOSPHATE SERPL-MCNC: 1.8 MG/DL (ref 2.7–4.5)
PLATELET # BLD AUTO: 163 K/UL (ref 150–450)
PMV BLD AUTO: 12.1 FL (ref 9.2–12.9)
PO2 BLDA: 39 MMHG (ref 40–60)
POC BE: -2 MMOL/L
POC SATURATED O2: 68 % (ref 95–100)
POC TCO2: 26 MMOL/L (ref 24–29)
POTASSIUM SERPL-SCNC: 3.6 MMOL/L (ref 3.5–5.1)
PROT SERPL-MCNC: 6.2 G/DL (ref 6–8.4)
RBC # BLD AUTO: 3.6 M/UL (ref 4–5.4)
SAMPLE: ABNORMAL
SITE: ABNORMAL
SODIUM SERPL-SCNC: 134 MMOL/L (ref 136–145)
WBC # BLD AUTO: 5.63 K/UL (ref 3.9–12.7)

## 2024-04-20 PROCEDURE — 84100 ASSAY OF PHOSPHORUS: CPT

## 2024-04-20 PROCEDURE — 25000003 PHARM REV CODE 250: Performed by: STUDENT IN AN ORGANIZED HEALTH CARE EDUCATION/TRAINING PROGRAM

## 2024-04-20 PROCEDURE — 94660 CPAP INITIATION&MGMT: CPT

## 2024-04-20 PROCEDURE — 99900035 HC TECH TIME PER 15 MIN (STAT)

## 2024-04-20 PROCEDURE — 82800 BLOOD PH: CPT

## 2024-04-20 PROCEDURE — 20600001 HC STEP DOWN PRIVATE ROOM

## 2024-04-20 PROCEDURE — 27000221 HC OXYGEN, UP TO 24 HOURS

## 2024-04-20 PROCEDURE — 94761 N-INVAS EAR/PLS OXIMETRY MLT: CPT

## 2024-04-20 PROCEDURE — 25000003 PHARM REV CODE 250

## 2024-04-20 PROCEDURE — 85025 COMPLETE CBC W/AUTO DIFF WBC: CPT

## 2024-04-20 PROCEDURE — 63600175 PHARM REV CODE 636 W HCPCS: Performed by: PHYSICIAN ASSISTANT

## 2024-04-20 PROCEDURE — 25000003 PHARM REV CODE 250: Performed by: PHYSICIAN ASSISTANT

## 2024-04-20 PROCEDURE — 27100171 HC OXYGEN HIGH FLOW UP TO 24 HOURS

## 2024-04-20 PROCEDURE — 27100108

## 2024-04-20 PROCEDURE — 83735 ASSAY OF MAGNESIUM: CPT

## 2024-04-20 PROCEDURE — 82803 BLOOD GASES ANY COMBINATION: CPT

## 2024-04-20 PROCEDURE — 80053 COMPREHEN METABOLIC PANEL: CPT

## 2024-04-20 RX ORDER — FUROSEMIDE 40 MG/1
40 TABLET ORAL DAILY
Status: DISCONTINUED | OUTPATIENT
Start: 2024-04-20 | End: 2024-04-25

## 2024-04-20 RX ADMIN — Medication: at 09:04

## 2024-04-20 RX ADMIN — SENNOSIDES AND DOCUSATE SODIUM 1 TABLET: 8.6; 5 TABLET ORAL at 10:04

## 2024-04-20 RX ADMIN — OXYBUTYNIN CHLORIDE 5 MG: 5 TABLET ORAL at 10:04

## 2024-04-20 RX ADMIN — POLYETHYLENE GLYCOL 3350 17 G: 17 POWDER, FOR SOLUTION ORAL at 10:04

## 2024-04-20 RX ADMIN — Medication: at 10:04

## 2024-04-20 RX ADMIN — ASPIRIN 81 MG: 81 TABLET, COATED ORAL at 09:04

## 2024-04-20 RX ADMIN — ACETAMINOPHEN 1000 MG: 500 TABLET ORAL at 02:04

## 2024-04-20 RX ADMIN — OXYBUTYNIN CHLORIDE 5 MG: 5 TABLET ORAL at 09:04

## 2024-04-20 RX ADMIN — ATORVASTATIN CALCIUM 40 MG: 40 TABLET, FILM COATED ORAL at 09:04

## 2024-04-20 RX ADMIN — HEPARIN SODIUM 5000 UNITS: 5000 INJECTION INTRAVENOUS; SUBCUTANEOUS at 05:04

## 2024-04-20 RX ADMIN — MICONAZOLE NITRATE: 20 CREAM TOPICAL at 10:04

## 2024-04-20 RX ADMIN — CLINDAMYCIN PHOSPHATE: 10 GEL TOPICAL at 09:04

## 2024-04-20 RX ADMIN — POLYETHYLENE GLYCOL 3350 17 G: 17 POWDER, FOR SOLUTION ORAL at 09:04

## 2024-04-20 RX ADMIN — OXYBUTYNIN CHLORIDE 5 MG: 5 TABLET ORAL at 04:04

## 2024-04-20 RX ADMIN — MICONAZOLE NITRATE: 20 CREAM TOPICAL at 09:04

## 2024-04-20 RX ADMIN — SEVELAMER CARBONATE 800 MG: 800 TABLET, FILM COATED ORAL at 01:04

## 2024-04-20 RX ADMIN — SENNOSIDES AND DOCUSATE SODIUM 1 TABLET: 8.6; 5 TABLET ORAL at 09:04

## 2024-04-20 RX ADMIN — HEPARIN SODIUM 5000 UNITS: 5000 INJECTION INTRAVENOUS; SUBCUTANEOUS at 01:04

## 2024-04-20 RX ADMIN — DIPHENHYDRAMINE HYDROCHLORIDE, ZINC ACETATE: 2; .1 CREAM TOPICAL at 10:04

## 2024-04-20 RX ADMIN — FUROSEMIDE 40 MG: 40 TABLET ORAL at 04:04

## 2024-04-20 RX ADMIN — CLINDAMYCIN PHOSPHATE: 10 GEL TOPICAL at 10:04

## 2024-04-20 RX ADMIN — SEVELAMER CARBONATE 800 MG: 800 TABLET, FILM COATED ORAL at 04:04

## 2024-04-20 RX ADMIN — ACETAMINOPHEN 1000 MG: 500 TABLET ORAL at 11:04

## 2024-04-20 NOTE — ASSESSMENT & PLAN NOTE
Acute on chronic heart failure with preserved ejection fraction (HFpEF)   Anasarca      52 yo F admitted for ADHF 2/2 severe MR. The MR appears to be secondary to posterior leaflet restriction and leaflet length of 1.1 cm. BNP on admission was 4300 compared to 350 eight months prior. Initially diagnosed with severe MR in June of 2023. Saw Dr. Stone in January of 2024 and was undergoing work-up for possible MitraClip; C scheduled for 3/15 but not completed due to financial constraints, also needs POOJA. Will need optimization of kidney function prior to angiogram consideration, appreciate Nephrology assistance. APS serologies negative.      DDX: rheumatic heart disease vs Fabry vs antiphospholipid (serologies negative)     4/1/2024 TTE    Left Ventricle: The left ventricle is normal in size. Normal wall thickness. Normal wall motion. There is normal systolic function with a visually estimated ejection fraction of 60 - 65%. Grade II diastolic dysfunction.    Right Ventricle: Normal right ventricular cavity size. Wall thickness is normal. Right ventricle wall motion  is normal. Systolic function is reduced.    Left Atrium: Left atrium is very  severely dilated.    Right Atrium: Right atrium is mildly dilated.    Aortic Valve: There is mild aortic valve sclerosis. There is normal leaflet mobility. There is trace aortic regurgitation.    Mitral Valve: There is mild bileaflet sclerosis. There is posterior leaflet tethering and failure of complete coaptation. There is very severe regurgitation with a posterolateral eccentriccally directed jet.    Tricuspid Valve: The tricuspid valve is structurally normal. There is normal leaflet mobility. There is moderate to severe regurgitation.    IVC/SVC: IVC was not well visualized due to poor acoustic window. Intermediate venous pressure at 8 mmHg.    Pericardium: There is a trivial effusion posteriorly and small under the RA.     - Interventional Cardiology consulted - no  "intervention until patient's volume status is controlled  - Nephrology following, undergoing SLED/CRRT  - POOJA ordered - "Anesthesia evaluated the patient and feel that she needs better optimization prior to POOJA. They stated once she is euvolemic that we can move forward. Will need a new POOJA order at that time."  - Discontinued Diuril 500 mg IV q24h & Lasix gtt @ 40 mg/hr  - Discontinued hydralazine 25 mg po q8h & Isordil 20 mg po BID for afterload reduction due to pressor requirements  - Continue home ASA 81 mg po daily and Lipitor 40 mg po daily  - Genetics consulted, see "Hypertrophic cardiomyopathy"  - Strict I/O, Jin in place  - Transitioned to lasix 40mg PO daily. Reassess daily for diuretic use.   - Repeat TTE ordered.   - OP IC follow up.  - Telemetry monitoring  "

## 2024-04-20 NOTE — NURSING
Pt ambulated to the restroom x1 assist for one unmeasured urine occurrence.  Hat placed in pt's toilet for measuring future output.  KAZ RN    Addendum 0600:  Pt remained on 1LNC O2 throughout the shift per her request solely for comfort.  KAZ DENNEY

## 2024-04-20 NOTE — ASSESSMENT & PLAN NOTE
Temp:  [98 °F (36.7 °C)-98.5 °F (36.9 °C)]   Pulse:  [55-62]   Resp:  [9-40]   BP: (94)/(53)   SpO2:  [94 %-100 %]   Arterial Line BP: ()/(44-66) .     While in the hospital, will manage blood pressure as follows; Adjust home antihypertensive regimen as follows- holding Lasix and Coreg     Will utilize p.r.n. blood pressure medication only if patient's blood pressure greater than 180/110 and she develops symptoms such as worsening chest pain or shortness of breath.      Tino Delgado is a 59 year old male presenting to the walk-in clinic today with c/o cough, chest congestion, fever Max 101.8 temporal today, body aches and fatigue.   The symptoms started 3-4 days ago.  Current meds Tylenol, decongestant and [place] plus.    Denies known Latex allergy or symptoms of Latex sensitivity.  Medications reviewed and updated.  Pharmacy updated.    Patient would like communication of their results via:        Cell Phone:   Telephone Information:   Mobile [phone]     Okay to leave a message containing results? Yes

## 2024-04-20 NOTE — ASSESSMENT & PLAN NOTE
Angiokeratoma   Proteinuria      - Genetics consulted, appreciate recs  - GLA genetic testing collected and sent to [facility]  - Consider cardiac MRI

## 2024-04-20 NOTE — ASSESSMENT & PLAN NOTE
"  From mechanical fall, has been painful thoughout admission     - Wound Care assessed, orders for care placed  "Cleanse left leg with sterile normal saline and pat dry. Apply aquaphor BID and PRN"  - Tylenol 1000 mg po q6h PRN for moderate pain and Norco 5-325 mg po q6h PRN for severe pain  "

## 2024-04-20 NOTE — ASSESSMENT & PLAN NOTE
"Patient with acute kidney injury/acute renal failure likely due to acute tubular necrosis caused by unknown.  REGINALDO is currently  improving on CRRT/SLED . Baseline creatinine  2.5  - Labs reviewed- Renal function/electrolytes with Estimated Creatinine Clearance: 80.9 mL/min (based on SCr of 0.7 mg/dL). according to latest data. Monitor urine output and serial BMP and adjust therapy as needed. Avoid nephrotoxins and renally dose meds for GFR listed above.           Recent Labs     04/17/24  1428 04/17/24  2203 04/18/24  0413   CREATININE 1.8*  1.8* 0.8  0.8 0.7  0.7  0.7         - Nephrology following, appreciate recs - started SLED on 4/14   - Genetics consulted, see "Hypertrophic cardiomyopathy"  - Continue home Renvela 800 mg po TID with meals  - Holding home sodium bicarb 650 mg po BID  - HD per nephrology. Will need OP HD chair setup.      "

## 2024-04-20 NOTE — PROGRESS NOTES
Trung Mayorga - Cardiology [facility] Medicine  Progress Note    Patient Name: Xena Vera  MRN: [id number]  Patient Class: IP- Inpatient   Admission Date: 4/1/2024  Length of Stay: 17 days  Attending Physician: Margaret Olsen MD  Primary Care Provider: Tami Villeda FNP        Subjective:     Principal Problem:Severe mitral valve regurgitation        HPI:  Per MICU: 54 yo F with PMH of HFpEF, severe MR, HTN, HLD, and CKD4 who initially presented to [facility] on 4/1/2024 after a mechanical fall at home with concurrent SOB and swelling in her legs and abdomen. She reported adherence to home Lasix and low sodium diet, but had felt increasingly SOB prior to admission as she was sleeping upright and had multiple nighttime awakenings due to SOB. In the ED, BNP was 4639 and she was admitted to Hospital Medicine for further management of ADHF. She initially had good response to IV Lasix but hospital course was later complicated by urinary retention, worsening kidney function, and decreased UOP. Nephrology and Cardiology were consulted to assist. She was started on Diuril and Lasix gtt for diuresis and Isordil and hydralazine for afterload reduction. Patient transferred to CCU for further management.        Overview/Hospital Course:  MICU course  Patient initially had improved UOP with increasing Lasix, eventually maxed out on Lasix drip with addition of Diuril. Eventually started on CRRT/SLED for volume removal, initially needed intermittent levophed to tolerate. Has itchy rash that started during current hospitalization, Dermatology consulted for regimen. Graduated from CRRT to iHD, tolerating well. Stable to step back down to .      course  Patient slightly volume overloaded on exam. Reports making urine. Transition to lasix 40mg PO daily. More fluid removal via HD. Will need OP HD chair. SW consulted.Repeat TTE ordered for eval for MR. OP IC follow up.     Interval History:   Stepped down from CICU. [place]  speaking patient. Used  to communicate.   Patient slightly volume overloaded on exam. Reports making urine. Transition to lasix 40mg PO daily. More fluid removal via HD per nephrology. Will need OP HD chair. SW consulted. Repeat TTE ordered for eval for MR. OP IC follow up.       Objective:     Vital Signs (Most Recent):  Temp: 97 °F (36.1 °C) (04/20/24 1138)  Pulse: 73 (04/20/24 1138)  Resp: 18 (04/20/24 1138)  BP: 110/64 (04/20/24 1138)  SpO2: 95 % (04/20/24 1138) Vital Signs (24h Range):  Temp:  [97 °F (36.1 °C)-98.8 °F (37.1 °C)] 97 °F (36.1 °C)  Pulse:  [68-90] 73  Resp:  [18-19] 18  SpO2:  [92 %-99 %] 95 %  BP: (101-127)/(56-74) 110/64     Weight: 64.8 kg (142 lb 13.7 oz)  Body mass index is 27.9 kg/m².    Intake/Output Summary (Last 24 hours) at 4/20/2024 1459  Last data filed at 4/20/2024 1408  Gross per 24 hour   Intake 1010 ml   Output 0 ml   Net 1010 ml         Physical Exam  Vitals and nursing note reviewed.   Constitutional:       General: She is not in acute distress.     Appearance: Normal appearance. She is ill-appearing (but improved).   HENT:      Head: Normocephalic.   Eyes:      Extraocular Movements: Extraocular movements intact.   Cardiovascular:      Rate and Rhythm: Normal rate and regular rhythm.   Pulmonary:      Effort: Pulmonary effort is normal. No respiratory distress.   Abdominal:      General: There is distension.      Tenderness: There is no abdominal tenderness.   Musculoskeletal:      Right lower leg: Edema (minimal) present.      Left lower leg: Edema (minimal) present.   Skin:     General: Skin is warm and dry.   Neurological:      General: No focal deficit present.      Mental Status: She is alert and oriented to person, place, and time.   Psychiatric:         Mood and Affect: Mood normal.         Behavior: Behavior normal.             Significant Labs: All pertinent labs within the past 24 hours have been reviewed.  CBC:   Recent Labs   Lab 04/19/24  1212  04/20/24  0518   WBC 5.73 5.63   HGB 9.1* 8.6*   HCT 33.5* 30.2*    163     CMP:   Recent Labs   Lab 04/19/24  0342 04/20/24  0518    134*   K 4.6 3.6    104   CO2 22* 24   GLU 83 84   BUN 8 10   CREATININE 2.1* 2.3*   CALCIUM 8.0* 7.8*   PROT 6.4 6.2   ALBUMIN 2.7* 2.6*   BILITOT 1.0 1.0   ALKPHOS 111 100   AST 37 30   ALT 32 27   ANIONGAP 9 6*     Significant Imaging: I have reviewed all pertinent imaging results/findings within the past 24 hours.    Assessment/Plan:      * Severe mitral valve regurgitation    Acute on chronic heart failure with preserved ejection fraction (HFpEF)   Anasarca      52 yo F admitted for ADHF 2/2 severe MR. The MR appears to be secondary to posterior leaflet restriction and leaflet length of 1.1 cm. BNP on admission was 4300 compared to 350 eight months prior. Initially diagnosed with severe MR in June of 2023. Saw Dr. Stone in January of 2024 and was undergoing work-up for possible MitraClip; LHC scheduled for 3/15 but not completed due to financial constraints, also needs POOJA. Will need optimization of kidney function prior to angiogram consideration, appreciate Nephrology assistance. APS serologies negative.      DDX: rheumatic heart disease vs Fabry vs antiphospholipid (serologies negative)     4/1/2024 TTE    Left Ventricle: The left ventricle is normal in size. Normal wall thickness. Normal wall motion. There is normal systolic function with a visually estimated ejection fraction of 60 - 65%. Grade II diastolic dysfunction.    Right Ventricle: Normal right ventricular cavity size. Wall thickness is normal. Right ventricle wall motion  is normal. Systolic function is reduced.    Left Atrium: Left atrium is very  severely dilated.    Right Atrium: Right atrium is mildly dilated.    Aortic Valve: There is mild aortic valve sclerosis. There is normal leaflet mobility. There is trace aortic regurgitation.    Mitral Valve: There is mild bileaflet sclerosis. There  "is posterior leaflet tethering and failure of complete coaptation. There is very severe regurgitation with a posterolateral eccentriccally directed jet.    Tricuspid Valve: The tricuspid valve is structurally normal. There is normal leaflet mobility. There is moderate to severe regurgitation.    IVC/SVC: IVC was not well visualized due to poor acoustic window. Intermediate venous pressure at 8 mmHg.    Pericardium: There is a trivial effusion posteriorly and small under the RA.     - Interventional Cardiology consulted - no intervention until patient's volume status is controlled  - Nephrology following, undergoing SLED/CRRT  - POOJA ordered - "Anesthesia evaluated the patient and feel that she needs better optimization prior to POOJA. They stated once she is euvolemic that we can move forward. Will need a new POOJA order at that time."  - Discontinued Diuril 500 mg IV q24h & Lasix gtt @ 40 mg/hr  - Discontinued hydralazine 25 mg po q8h & Isordil 20 mg po BID for afterload reduction due to pressor requirements  - Continue home ASA 81 mg po daily and Lipitor 40 mg po daily  - Genetics consulted, see "Hypertrophic cardiomyopathy"  - Strict I/O, Jin in place  - Transitioned to lasix 40mg PO daily. Reassess daily for diuretic use.   - Repeat TTE ordered.   - OP IC follow up.  - Telemetry monitoring    Rash  Intertrigo   - Dermatology consulted, appreciate recs  - PRN Atarax 10 mg po TID and diphenhydramine-zinc acetate cream  - Miconazole cream BID - apply to right inguinal crease and in right inferior abdominal crease   - Clindamycin gel BID - apply to back       Hypertrophic cardiomyopathy  Angiokeratoma   Proteinuria      - Genetics consulted, appreciate recs  - GLA genetic testing collected and sent to [facility]  - Consider cardiac MRI      HLD (hyperlipidemia)    Continue home Lipitor 40 mg po daily  4/14 LDL 39    Acute kidney injury superimposed on chronic kidney disease  Patient with acute kidney injury/acute " "renal failure likely due to acute tubular necrosis caused by unknown.  REGINALDO is currently  improving on CRRT/SLED . Baseline creatinine  2.5  - Labs reviewed- Renal function/electrolytes with Estimated Creatinine Clearance: 80.9 mL/min (based on SCr of 0.7 mg/dL). according to latest data. Monitor urine output and serial BMP and adjust therapy as needed. Avoid nephrotoxins and renally dose meds for GFR listed above.           Recent Labs     04/17/24  1428 04/17/24  2203 04/18/24  0413   CREATININE 1.8*  1.8* 0.8  0.8 0.7  0.7  0.7         - Nephrology following, appreciate recs - started SLED on 4/14   - Genetics consulted, see "Hypertrophic cardiomyopathy"  - Continue home Renvela 800 mg po TID with meals  - Holding home sodium bicarb 650 mg po BID  - HD per nephrology. Will need OP HD chair setup.        Acute urinary retention  Jin catheter in place.        Leg wound, left    From mechanical fall, has been painful thoughout admission     - Wound Care assessed, orders for care placed  "Cleanse left leg with sterile normal saline and pat dry. Apply aquaphor BID and PRN"  - Tylenol 1000 mg po q6h PRN for moderate pain and Norco 5-325 mg po q6h PRN for severe pain    Acute on chronic heart failure with preserved ejection fraction (HFpEF)  Mitral regurgitation   Anasarca    - Clinically volume overloaded on admission  - most recent echo below, grade 2 diastolic dysfunction  - CXR with chronic cardiomegaly  - BNP 4,639   - cardiology and nephrology following  -lasix gtt + Diuril 500mg for goal net negative 500ml-1000ml  -ISDN, hydralazine for afterload reduction in setting of MR  - Continue home cardioprudent regimen as clinically indicated and tolerated  - Strict I/Os  - 1.5L fluid restriction , cardiac diet  - Daily weights  - Will continue to monitor on tele    Results for orders placed during the hospital encounter of 04/01/24    Echo    Interpretation Summary    Left Ventricle: The left ventricle is normal in " size. Normal wall thickness. Normal wall motion. There is normal systolic function with a visually estimated ejection fraction of 60 - 65%. Grade II diastolic dysfunction.    Right Ventricle: Normal right ventricular cavity size. Wall thickness is normal. Right ventricle wall motion  is normal. Systolic function is reduced.    Left Atrium: Left atrium is very  severely dilated.    Right Atrium: Right atrium is mildly dilated.    Aortic Valve: There is mild aortic valve sclerosis. There is normal leaflet mobility. There is trace aortic regurgitation.    Mitral Valve: There is mild bileaflet sclerosis. There is posterior leaflet tethering and failure of complete coaptation. There is very severe regurgitation with a posterolateral eccentriccally directed jet.    Tricuspid Valve: The tricuspid valve is structurally normal. There is normal leaflet mobility. There is moderate to severe regurgitation.    IVC/SVC: IVC was not well visualized due to poor acoustic window. Intermediate venous pressure at 8 mmHg.    Pericardium: There is a trivial effusion posteriorly and small under the RA.        Anemia  Stable.   Recent Labs   Lab 04/11/24  0415 04/12/24  0601 04/13/24  0318   WBC 5.11 5.05 5.43   HGB 9.2* 8.8* 8.7*   HCT 32.5* 30.1* 30.1*    142* 148*         Class 2 obesity in adult  Body mass index is 30.86 kg/m². Morbid obesity complicates all aspects of disease management from diagnostic modalities to treatment. Weight loss encouraged and health benefits explained to patient.     Essential hypertension     Temp:  [98 °F (36.7 °C)-98.5 °F (36.9 °C)]   Pulse:  [55-62]   Resp:  [9-40]   BP: (94)/(53)   SpO2:  [94 %-100 %]   Arterial Line BP: ()/(44-66) .     While in the hospital, will manage blood pressure as follows; Adjust home antihypertensive regimen as follows- holding Lasix and Coreg     Will utilize p.r.n. blood pressure medication only if patient's blood pressure greater than 180/110 and she develops  symptoms such as worsening chest pain or shortness of breath.         VTE Risk Mitigation (From admission, onward)           Ordered     heparin (porcine) injection 1,000 Units  As needed (PRN)         04/13/24 1428     Place sequential compression device  Until discontinued         04/03/24 1347     heparin (porcine) injection 5,000 Units  Every 8 hours         04/01/24 1030     IP VTE HIGH RISK PATIENT  Once         04/01/24 1030     Place sequential compression device  Until discontinued         04/01/24 1030                    Discharge Planning   RILEY: 4/22/2024     Code Status: Full Code   Is the patient medically ready for discharge?: No    Reason for patient still in hospital (select all that apply): Patient trending condition  Discharge Plan A: Home with family   Discharge Delays: None known at this time              Margaret Olsen MD  Department of Hospital Medicine   [facility] - Cardiology Stepdown

## 2024-04-20 NOTE — SUBJECTIVE & OBJECTIVE
Interval History:   Stepped down from CICU. [place] speaking patient. Used  to communicate.   Patient slightly volume overloaded on exam. Reports making urine. Transition to lasix 40mg PO daily. More fluid removal via HD per nephrology. Will need OP HD chair. SW consulted. Repeat TTE ordered for eval for MR. OP IC follow up.       Objective:     Vital Signs (Most Recent):  Temp: 97 °F (36.1 °C) (04/20/24 1138)  Pulse: 73 (04/20/24 1138)  Resp: 18 (04/20/24 1138)  BP: 110/64 (04/20/24 1138)  SpO2: 95 % (04/20/24 1138) Vital Signs (24h Range):  Temp:  [97 °F (36.1 °C)-98.8 °F (37.1 °C)] 97 °F (36.1 °C)  Pulse:  [68-90] 73  Resp:  [18-19] 18  SpO2:  [92 %-99 %] 95 %  BP: (101-127)/(56-74) 110/64     Weight: 64.8 kg (142 lb 13.7 oz)  Body mass index is 27.9 kg/m².    Intake/Output Summary (Last 24 hours) at 4/20/2024 1459  Last data filed at 4/20/2024 1408  Gross per 24 hour   Intake 1010 ml   Output 0 ml   Net 1010 ml         Physical Exam  Vitals and nursing note reviewed.   Constitutional:       General: She is not in acute distress.     Appearance: Normal appearance. She is ill-appearing (but improved).   HENT:      Head: Normocephalic.   Eyes:      Extraocular Movements: Extraocular movements intact.   Cardiovascular:      Rate and Rhythm: Normal rate and regular rhythm.   Pulmonary:      Effort: Pulmonary effort is normal. No respiratory distress.   Abdominal:      General: There is distension.      Tenderness: There is no abdominal tenderness.   Musculoskeletal:      Right lower leg: Edema (minimal) present.      Left lower leg: Edema (minimal) present.   Skin:     General: Skin is warm and dry.   Neurological:      General: No focal deficit present.      Mental Status: She is alert and oriented to person, place, and time.   Psychiatric:         Mood and Affect: Mood normal.         Behavior: Behavior normal.             Significant Labs: All pertinent labs within the past 24 hours have been  reviewed.  CBC:   Recent Labs   Lab 04/19/24  0342 04/20/24  0518   WBC 5.73 5.63   HGB 9.1* 8.6*   HCT 33.5* 30.2*    163     CMP:   Recent Labs   Lab 04/19/24  0342 04/20/24  0518    134*   K 4.6 3.6    104   CO2 22* 24   GLU 83 84   BUN 8 10   CREATININE 2.1* 2.3*   CALCIUM 8.0* 7.8*   PROT 6.4 6.2   ALBUMIN 2.7* 2.6*   BILITOT 1.0 1.0   ALKPHOS 111 100   AST 37 30   ALT 32 27   ANIONGAP 9 6*     Significant Imaging: I have reviewed all pertinent imaging results/findings within the past 24 hours.

## 2024-04-20 NOTE — PLAN OF CARE
Problem: Adult Inpatient Plan of Care  Goal: Plan of Care Review  Outcome: Ongoing, Progressing  Goal: Patient-Specific Goal (Individualized)  Outcome: Ongoing, Progressing  Goal: Absence of Hospital-Acquired Illness or Injury  Outcome: Ongoing, Progressing  Goal: Optimal Comfort and Wellbeing  Outcome: Ongoing, Progressing  Goal: Readiness for Transition of Care  Outcome: Ongoing, Progressing     Problem: Infection  Goal: Absence of Infection Signs and Symptoms  Outcome: Ongoing, Progressing     Problem: Impaired Wound Healing  Goal: Optimal Wound Healing  Outcome: Ongoing, Progressing     Problem: Fluid and Electrolyte Imbalance (Acute Kidney Injury/Impairment)  Goal: Fluid and Electrolyte Balance  Outcome: Ongoing, Progressing     Problem: Oral Intake Inadequate (Acute Kidney Injury/Impairment)  Goal: Optimal Nutrition Intake  Outcome: Ongoing, Progressing     Problem: Renal Function Impairment (Acute Kidney Injury/Impairment)  Goal: Effective Renal Function  Outcome: Ongoing, Progressing     Problem: Device-Related Complication Risk (Hemodialysis)  Goal: Safe, Effective Therapy Delivery  Outcome: Ongoing, Progressing     Problem: Hemodynamic Instability (Hemodialysis)  Goal: Effective Tissue Perfusion  Outcome: Ongoing, Progressing     Problem: Infection (Hemodialysis)  Goal: Absence of Infection Signs and Symptoms  Outcome: Ongoing, Progressing     Problem: Skin Injury Risk Increased  Goal: Skin Health and Integrity  Outcome: Ongoing, Progressing     Problem: Device-Related Complication Risk (CRRT (Continuous Renal Replacement Therapy))  Goal: Safe, Effective Therapy Delivery  Outcome: Ongoing, Progressing     Problem: Hypothermia (CRRT (Continuous Renal Replacement Therapy))  Goal: Body Temperature Maintained in Desired Range  Outcome: Ongoing, Progressing     Problem: Infection (CRRT (Continuous Renal Replacement Therapy))  Goal: Absence of Infection Signs and Symptoms  Outcome: Ongoing, Progressing      Problem: Fall Injury Risk  Goal: Absence of Fall and Fall-Related Injury  Outcome: Ongoing, Progressing

## 2024-04-21 LAB
ALBUMIN SERPL BCP-MCNC: 2.7 G/DL (ref 3.5–5.2)
ALLENS TEST: ABNORMAL
ALP SERPL-CCNC: 100 U/L (ref 55–135)
ALT SERPL W/O P-5'-P-CCNC: 25 U/L (ref 10–44)
ANION GAP SERPL CALC-SCNC: 9 MMOL/L (ref 8–16)
APTT PPP: 30.9 SEC (ref 21–32)
AST SERPL-CCNC: 26 U/L (ref 10–40)
BASOPHILS # BLD AUTO: 0.03 K/UL (ref 0–0.2)
BASOPHILS NFR BLD: 0.5 % (ref 0–1.9)
BILIRUB SERPL-MCNC: 1.1 MG/DL (ref 0.1–1)
BUN SERPL-MCNC: 17 MG/DL (ref 6–20)
CALCIUM SERPL-MCNC: 7.8 MG/DL (ref 8.7–10.5)
CHLORIDE SERPL-SCNC: 101 MMOL/L (ref 95–110)
CO2 SERPL-SCNC: 22 MMOL/L (ref 23–29)
CREAT SERPL-MCNC: 3.3 MG/DL (ref 0.5–1.4)
DELSYS: ABNORMAL
DIFFERENTIAL METHOD BLD: ABNORMAL
EOSINOPHIL # BLD AUTO: 0.2 K/UL (ref 0–0.5)
EOSINOPHIL NFR BLD: 3.8 % (ref 0–8)
ERYTHROCYTE [DISTWIDTH] IN BLOOD BY AUTOMATED COUNT: 23.2 % (ref 11.5–14.5)
EST. GFR  (NO RACE VARIABLE): 16 ML/MIN/1.73 M^2
GLUCOSE SERPL-MCNC: 69 MG/DL (ref 70–110)
HCO3 UR-SCNC: 23 MMOL/L (ref 24–28)
HCT VFR BLD AUTO: 30.5 % (ref 37–48.5)
HGB BLD-MCNC: 8.9 G/DL (ref 12–16)
IMM GRANULOCYTES # BLD AUTO: 0.02 K/UL (ref 0–0.04)
IMM GRANULOCYTES NFR BLD AUTO: 0.4 % (ref 0–0.5)
INR PPP: 1.1 (ref 0.8–1.2)
LYMPHOCYTES # BLD AUTO: 0.9 K/UL (ref 1–4.8)
LYMPHOCYTES NFR BLD: 16.1 % (ref 18–48)
MAGNESIUM SERPL-MCNC: 2.1 MG/DL (ref 1.6–2.6)
MCH RBC QN AUTO: 24.1 PG (ref 27–31)
MCHC RBC AUTO-ENTMCNC: 29.2 G/DL (ref 32–36)
MCV RBC AUTO: 82 FL (ref 82–98)
MODE: ABNORMAL
MONOCYTES # BLD AUTO: 0.6 K/UL (ref 0.3–1)
MONOCYTES NFR BLD: 11.3 % (ref 4–15)
NEUTROPHILS # BLD AUTO: 3.8 K/UL (ref 1.8–7.7)
NEUTROPHILS NFR BLD: 67.9 % (ref 38–73)
NRBC BLD-RTO: 0 /100 WBC
PCO2 BLDA: 41.1 MMHG (ref 35–45)
PH SMN: 7.36 [PH] (ref 7.35–7.45)
PHOSPHATE SERPL-MCNC: 1.9 MG/DL (ref 2.7–4.5)
PLATELET # BLD AUTO: 187 K/UL (ref 150–450)
PMV BLD AUTO: 11.7 FL (ref 9.2–12.9)
PO2 BLDA: 29 MMHG (ref 40–60)
POC BE: -2 MMOL/L
POC SATURATED O2: 52 % (ref 95–100)
POC TCO2: 24 MMOL/L (ref 24–29)
POTASSIUM SERPL-SCNC: 3.9 MMOL/L (ref 3.5–5.1)
PROT SERPL-MCNC: 6.2 G/DL (ref 6–8.4)
PROTHROMBIN TIME: 11.7 SEC (ref 9–12.5)
RBC # BLD AUTO: 3.7 M/UL (ref 4–5.4)
SAMPLE: ABNORMAL
SITE: ABNORMAL
SODIUM SERPL-SCNC: 132 MMOL/L (ref 136–145)
SP02: 98
WBC # BLD AUTO: 5.59 K/UL (ref 3.9–12.7)

## 2024-04-21 PROCEDURE — 85610 PROTHROMBIN TIME: CPT | Performed by: STUDENT IN AN ORGANIZED HEALTH CARE EDUCATION/TRAINING PROGRAM

## 2024-04-21 PROCEDURE — 84100 ASSAY OF PHOSPHORUS: CPT

## 2024-04-21 PROCEDURE — 25000003 PHARM REV CODE 250: Performed by: STUDENT IN AN ORGANIZED HEALTH CARE EDUCATION/TRAINING PROGRAM

## 2024-04-21 PROCEDURE — 63600175 PHARM REV CODE 636 W HCPCS: Performed by: PHYSICIAN ASSISTANT

## 2024-04-21 PROCEDURE — 80053 COMPREHEN METABOLIC PANEL: CPT

## 2024-04-21 PROCEDURE — 85025 COMPLETE CBC W/AUTO DIFF WBC: CPT

## 2024-04-21 PROCEDURE — 99900035 HC TECH TIME PER 15 MIN (STAT)

## 2024-04-21 PROCEDURE — 36415 COLL VENOUS BLD VENIPUNCTURE: CPT | Performed by: STUDENT IN AN ORGANIZED HEALTH CARE EDUCATION/TRAINING PROGRAM

## 2024-04-21 PROCEDURE — 83735 ASSAY OF MAGNESIUM: CPT

## 2024-04-21 PROCEDURE — 20600001 HC STEP DOWN PRIVATE ROOM

## 2024-04-21 PROCEDURE — 82803 BLOOD GASES ANY COMBINATION: CPT

## 2024-04-21 PROCEDURE — 25000003 PHARM REV CODE 250: Performed by: PHYSICIAN ASSISTANT

## 2024-04-21 PROCEDURE — 85730 THROMBOPLASTIN TIME PARTIAL: CPT | Performed by: STUDENT IN AN ORGANIZED HEALTH CARE EDUCATION/TRAINING PROGRAM

## 2024-04-21 PROCEDURE — 94761 N-INVAS EAR/PLS OXIMETRY MLT: CPT | Mod: XB

## 2024-04-21 PROCEDURE — 25000003 PHARM REV CODE 250

## 2024-04-21 RX ORDER — SODIUM CHLORIDE 9 MG/ML
INJECTION, SOLUTION INTRAVENOUS
Status: CANCELLED | OUTPATIENT
Start: 2024-04-21

## 2024-04-21 RX ORDER — SODIUM CHLORIDE 9 MG/ML
INJECTION, SOLUTION INTRAVENOUS ONCE
Status: CANCELLED | OUTPATIENT
Start: 2024-04-21 | End: 2024-04-21

## 2024-04-21 RX ADMIN — Medication: at 09:04

## 2024-04-21 RX ADMIN — DIPHENHYDRAMINE HYDROCHLORIDE, ZINC ACETATE: 2; .1 CREAM TOPICAL at 10:04

## 2024-04-21 RX ADMIN — SEVELAMER CARBONATE 800 MG: 800 TABLET, FILM COATED ORAL at 12:04

## 2024-04-21 RX ADMIN — HEPARIN SODIUM 5000 UNITS: 5000 INJECTION INTRAVENOUS; SUBCUTANEOUS at 06:04

## 2024-04-21 RX ADMIN — SENNOSIDES AND DOCUSATE SODIUM 1 TABLET: 8.6; 5 TABLET ORAL at 10:04

## 2024-04-21 RX ADMIN — POLYETHYLENE GLYCOL 3350 17 G: 17 POWDER, FOR SOLUTION ORAL at 10:04

## 2024-04-21 RX ADMIN — OXYBUTYNIN CHLORIDE 5 MG: 5 TABLET ORAL at 02:04

## 2024-04-21 RX ADMIN — OXYBUTYNIN CHLORIDE 5 MG: 5 TABLET ORAL at 10:04

## 2024-04-21 RX ADMIN — OXYBUTYNIN CHLORIDE 5 MG: 5 TABLET ORAL at 09:04

## 2024-04-21 RX ADMIN — SEVELAMER CARBONATE 800 MG: 800 TABLET, FILM COATED ORAL at 10:04

## 2024-04-21 RX ADMIN — CLINDAMYCIN PHOSPHATE: 10 GEL TOPICAL at 10:04

## 2024-04-21 RX ADMIN — ASPIRIN 81 MG: 81 TABLET, COATED ORAL at 10:04

## 2024-04-21 RX ADMIN — FUROSEMIDE 40 MG: 40 TABLET ORAL at 10:04

## 2024-04-21 RX ADMIN — ATORVASTATIN CALCIUM 40 MG: 40 TABLET, FILM COATED ORAL at 10:04

## 2024-04-21 RX ADMIN — POLYETHYLENE GLYCOL 3350 17 G: 17 POWDER, FOR SOLUTION ORAL at 09:04

## 2024-04-21 RX ADMIN — MICONAZOLE NITRATE: 20 CREAM TOPICAL at 10:04

## 2024-04-21 RX ADMIN — SEVELAMER CARBONATE 800 MG: 800 TABLET, FILM COATED ORAL at 04:04

## 2024-04-21 RX ADMIN — SENNOSIDES AND DOCUSATE SODIUM 1 TABLET: 8.6; 5 TABLET ORAL at 09:04

## 2024-04-21 RX ADMIN — MICONAZOLE NITRATE: 20 CREAM TOPICAL at 09:04

## 2024-04-21 RX ADMIN — CLINDAMYCIN PHOSPHATE: 10 GEL TOPICAL at 09:04

## 2024-04-21 RX ADMIN — HYDROXYZINE HYDROCHLORIDE 10 MG: 10 TABLET ORAL at 06:04

## 2024-04-21 NOTE — PROGRESS NOTES
[facility] - Cardiology [facility] Medicine  Progress Note    Patient Name: Xena Vera  MRN: [id number]  Patient Class: IP- Inpatient   Admission Date: 4/1/2024  Length of Stay: 18 days  Attending Physician: Margaret Olsen MD  Primary Care Provider: Tami Villeda FNP        Subjective:     Principal Problem:Severe mitral valve regurgitation        HPI:  Per MICU: 52 yo F with PMH of HFpEF, severe MR, HTN, HLD, and CKD4 who initially presented to [facility] on 4/1/2024 after a mechanical fall at home with concurrent SOB and swelling in her legs and abdomen. She reported adherence to home Lasix and low sodium diet, but had felt increasingly SOB prior to admission as she was sleeping upright and had multiple nighttime awakenings due to SOB. In the ED, BNP was 4639 and she was admitted to Hospital Medicine for further management of ADHF. She initially had good response to IV Lasix but hospital course was later complicated by urinary retention, worsening kidney function, and decreased UOP. Nephrology and Cardiology were consulted to assist. She was started on Diuril and Lasix gtt for diuresis and Isordil and hydralazine for afterload reduction. Patient transferred to CCU for further management.        Overview/Hospital Course:  MICU course  Patient initially had improved UOP with increasing Lasix, eventually maxed out on Lasix drip with addition of Diuril. Eventually started on CRRT/SLED for volume removal, initially needed intermittent levophed to tolerate. Has itchy rash that started during current hospitalization, Dermatology consulted for regimen. Graduated from CRRT to iHD, tolerating well. Stable to step back down to HM.      course  Patient slightly volume overloaded on exam. Reports making urine. Transition to lasix 40mg PO daily. More fluid removal via HD. Will need OP HD chair. SW consulted. Repeat TTE ordered for eval for MR. Discuss plans with IC once repeat TTE is obtained.  Will also need OP IC  follow up. Nephro rec consulting interventional nephro for TDC placement. NPOMN.      Interval History:   Stepped down from CICU. Grenadian speaking patient. Used  to communicate.   Repeat TTE ordered for eval for MR. Discuss plans with IC once repeat TTE is obtained. Nephro rec consulting interventional nephro for TDC placement. NPOMN.      Objective:     Vital Signs (Most Recent):  Temp: 98 °F (36.7 °C) (04/21/24 1212)  Pulse: 69 (04/21/24 1212)  Resp: 17 (04/21/24 1212)  BP: 111/61 (04/21/24 1212)  SpO2: 95 % (04/21/24 1212) Vital Signs (24h Range):  Temp:  [97 °F (36.1 °C)-98.5 °F (36.9 °C)] 98 °F (36.7 °C)  Pulse:  [68-79] 69  Resp:  [15-19] 17  SpO2:  [95 %-99 %] 95 %  BP: (111-133)/(61-80) 111/61     Weight: 66.7 kg (147 lb 0.8 oz)  Body mass index is 28.72 kg/m².    Intake/Output Summary (Last 24 hours) at 4/21/2024 1342  Last data filed at 4/21/2024 1334  Gross per 24 hour   Intake 1184 ml   Output 50 ml   Net 1134 ml         Physical Exam  Vitals and nursing note reviewed.   Constitutional:       General: She is not in acute distress.     Appearance: Normal appearance. She is ill-appearing (but improved).   HENT:      Head: Normocephalic.   Eyes:      Extraocular Movements: Extraocular movements intact.   Cardiovascular:      Rate and Rhythm: Normal rate and regular rhythm.   Pulmonary:      Effort: Pulmonary effort is normal. No respiratory distress.   Abdominal:      General: There is distension.      Tenderness: There is no abdominal tenderness.   Musculoskeletal:      Right lower leg: Edema (minimal) present.      Left lower leg: Edema (minimal) present.   Skin:     General: Skin is warm and dry.   Neurological:      General: No focal deficit present.      Mental Status: She is alert and oriented to person, place, and time.   Psychiatric:         Mood and Affect: Mood normal.         Behavior: Behavior normal.             Significant Labs: All pertinent labs within the past 24 hours have been  reviewed.  CBC:   Recent Labs   Lab 04/20/24  0518 04/21/24  0544   WBC 5.63 5.59   HGB 8.6* 8.9*   HCT 30.2* 30.5*    187     CMP:   Recent Labs   Lab 04/20/24  0518 04/21/24  0544   * 132*   K 3.6 3.9    101   CO2 24 22*   GLU 84 69*   BUN 10 17   CREATININE 2.3* 3.3*   CALCIUM 7.8* 7.8*   PROT 6.2 6.2   ALBUMIN 2.6* 2.7*   BILITOT 1.0 1.1*   ALKPHOS 100 100   AST 30 26   ALT 27 25   ANIONGAP 6* 9     Significant Imaging: I have reviewed all pertinent imaging results/findings within the past 24 hours.      Assessment/Plan:      * Severe mitral valve regurgitation    Acute on chronic heart failure with preserved ejection fraction (HFpEF)   Anasarca      54 yo F admitted for ADHF 2/2 severe MR. The MR appears to be secondary to posterior leaflet restriction and leaflet length of 1.1 cm. BNP on admission was 4300 compared to 350 eight months prior. Initially diagnosed with severe MR in June of 2023. Saw Dr. Stone in January of 2024 and was undergoing work-up for possible MitraClip; C scheduled for 3/15 but not completed due to financial constraints, also needs POOJA. Will need optimization of kidney function prior to angiogram consideration, appreciate Nephrology assistance. APS serologies negative.      DDX: rheumatic heart disease vs Fabry vs antiphospholipid (serologies negative)     4/1/2024 TTE    Left Ventricle: The left ventricle is normal in size. Normal wall thickness. Normal wall motion. There is normal systolic function with a visually estimated ejection fraction of 60 - 65%. Grade II diastolic dysfunction.    Right Ventricle: Normal right ventricular cavity size. Wall thickness is normal. Right ventricle wall motion  is normal. Systolic function is reduced.    Left Atrium: Left atrium is very  severely dilated.    Right Atrium: Right atrium is mildly dilated.    Aortic Valve: There is mild aortic valve sclerosis. There is normal leaflet mobility. There is trace aortic regurgitation.    " Mitral Valve: There is mild bileaflet sclerosis. There is posterior leaflet tethering and failure of complete coaptation. There is very severe regurgitation with a posterolateral eccentriccally directed jet.    Tricuspid Valve: The tricuspid valve is structurally normal. There is normal leaflet mobility. There is moderate to severe regurgitation.    IVC/SVC: IVC was not well visualized due to poor acoustic window. Intermediate venous pressure at 8 mmHg.    Pericardium: There is a trivial effusion posteriorly and small under the RA.     - Interventional Cardiology consulted - no intervention until patient's volume status is controlled  - Nephrology following, undergoing SLED/CRRT  - POOJA ordered - "Anesthesia evaluated the patient and feel that she needs better optimization prior to POOJA. They stated once she is euvolemic that we can move forward. Will need a new POOJA order at that time."  - Discontinued Diuril 500 mg IV q24h & Lasix gtt @ 40 mg/hr  - Discontinued hydralazine 25 mg po q8h & Isordil 20 mg po BID for afterload reduction due to pressor requirements  - Continue home ASA 81 mg po daily and Lipitor 40 mg po daily  - Genetics consulted, see "Hypertrophic cardiomyopathy"  - Strict I/O, Jin in place  - Transitioned to lasix 40mg PO daily. Reassess daily for diuretic use.   - Repeat TTE ordered for eval for MR. Discuss plans with IC once repeat TTE is obtained.    - Will also need OP IC follow up.   - Telemetry monitoring    Rash  Intertrigo   - Dermatology consulted, appreciate recs  - PRN Atarax 10 mg po TID and diphenhydramine-zinc acetate cream  - Miconazole cream BID - apply to right inguinal crease and in right inferior abdominal crease   - Clindamycin gel BID - apply to back       Hypertrophic cardiomyopathy  Angiokeratoma   Proteinuria      - Genetics consulted, appreciate recs  - GLA genetic testing collected and sent to [facility]  - Consider cardiac MRI      HLD (hyperlipidemia)    Continue home " "Lipitor 40 mg po daily  4/14 LDL 39    Acute kidney injury superimposed on chronic kidney disease  Patient with acute kidney injury/acute renal failure likely due to acute tubular necrosis caused by unknown.  REGINALDO is currently  improving on CRRT/SLED . Baseline creatinine  2.5  - Labs reviewed- Renal function/electrolytes with Estimated Creatinine Clearance: 80.9 mL/min (based on SCr of 0.7 mg/dL). according to latest data. Monitor urine output and serial BMP and adjust therapy as needed. Avoid nephrotoxins and renally dose meds for GFR listed above.           Recent Labs     04/17/24  1428 04/17/24  2203 04/18/24  0413   CREATININE 1.8*  1.8* 0.8  0.8 0.7  0.7  0.7         - Nephrology following, appreciate recs - started SLED on 4/14   - Genetics consulted, see "Hypertrophic cardiomyopathy"  - Continue home Renvela 800 mg po TID with meals  - Holding home sodium bicarb 650 mg po BID  - HD per nephrology. Will need OP HD chair setup.   - Nephro rec consulting interventional nephro for TDC placement. NPOMN.         Acute urinary retention  Jin catheter in place.  See CKD.      Leg wound, left    From mechanical fall, has been painful thoughout admission     - Wound Care assessed, orders for care placed  "Cleanse left leg with sterile normal saline and pat dry. Apply aquaphor BID and PRN"  - Tylenol 1000 mg po q6h PRN for moderate pain and Norco 5-325 mg po q6h PRN for severe pain    Anasarca        Acute on chronic heart failure with preserved ejection fraction (HFpEF)  Mitral regurgitation   Anasarca    - Clinically volume overloaded on admission  - most recent echo below, grade 2 diastolic dysfunction  - CXR with chronic cardiomegaly  - BNP 4,639   - cardiology and nephrology following  -lasix gtt + Diuril 500mg for goal net negative 500ml-1000ml  -ISDN, hydralazine for afterload reduction in setting of MR  - Continue home cardioprudent regimen as clinically indicated and tolerated  - Strict I/Os  - 1.5L " fluid restriction , cardiac diet  - Daily weights  - Will continue to monitor on tele    Results for orders placed during the hospital encounter of 04/01/24    Echo    Interpretation Summary    Left Ventricle: The left ventricle is normal in size. Normal wall thickness. Normal wall motion. There is normal systolic function with a visually estimated ejection fraction of 60 - 65%. Grade II diastolic dysfunction.    Right Ventricle: Normal right ventricular cavity size. Wall thickness is normal. Right ventricle wall motion  is normal. Systolic function is reduced.    Left Atrium: Left atrium is very  severely dilated.    Right Atrium: Right atrium is mildly dilated.    Aortic Valve: There is mild aortic valve sclerosis. There is normal leaflet mobility. There is trace aortic regurgitation.    Mitral Valve: There is mild bileaflet sclerosis. There is posterior leaflet tethering and failure of complete coaptation. There is very severe regurgitation with a posterolateral eccentriccally directed jet.    Tricuspid Valve: The tricuspid valve is structurally normal. There is normal leaflet mobility. There is moderate to severe regurgitation.    IVC/SVC: IVC was not well visualized due to poor acoustic window. Intermediate venous pressure at 8 mmHg.    Pericardium: There is a trivial effusion posteriorly and small under the RA.        Anemia  Stable.   Recent Labs   Lab 04/11/24  0415 04/12/24  0601 04/13/24  0318   WBC 5.11 5.05 5.43   HGB 9.2* 8.8* 8.7*   HCT 32.5* 30.1* 30.1*    142* 148*         Class 2 obesity in adult  Body mass index is 30.86 kg/m². Morbid obesity complicates all aspects of disease management from diagnostic modalities to treatment. Weight loss encouraged and health benefits explained to patient.     Essential hypertension     Temp:  [98 °F (36.7 °C)-98.5 °F (36.9 °C)]   Pulse:  [55-62]   Resp:  [9-40]   BP: (94)/(53)   SpO2:  [94 %-100 %]   Arterial Line BP: ()/(44-66) .     While in the  hospital, will manage blood pressure as follows; Adjust home antihypertensive regimen as follows- holding Lasix and Coreg     Will utilize p.r.n. blood pressure medication only if patient's blood pressure greater than 180/110 and she develops symptoms such as worsening chest pain or shortness of breath.         VTE Risk Mitigation (From admission, onward)           Ordered     heparin (porcine) injection 1,000 Units  As needed (PRN)         04/13/24 1428     Place sequential compression device  Until discontinued         04/03/24 1347     heparin (porcine) injection 5,000 Units  Every 8 hours         04/01/24 1030     IP VTE HIGH RISK PATIENT  Once         04/01/24 1030     Place sequential compression device  Until discontinued         04/01/24 1030                    Discharge Planning   RILEY: 4/22/2024     Code Status: Full Code   Is the patient medically ready for discharge?: No    Reason for patient still in hospital (select all that apply): Patient trending condition  Discharge Plan A: Home with family   Discharge Delays: None known at this time              Margaret Olsen MD  Department of Hospital Medicine   [facility] - Cardiology Stepdown

## 2024-04-21 NOTE — ASSESSMENT & PLAN NOTE
Acute on chronic heart failure with preserved ejection fraction (HFpEF)   Anasarca      52 yo F admitted for ADHF 2/2 severe MR. The MR appears to be secondary to posterior leaflet restriction and leaflet length of 1.1 cm. BNP on admission was 4300 compared to 350 eight months prior. Initially diagnosed with severe MR in June of 2023. Saw Dr. Stone in January of 2024 and was undergoing work-up for possible MitraClip; C scheduled for 3/15 but not completed due to financial constraints, also needs POOJA. Will need optimization of kidney function prior to angiogram consideration, appreciate Nephrology assistance. APS serologies negative.      DDX: rheumatic heart disease vs Fabry vs antiphospholipid (serologies negative)     4/1/2024 TTE    Left Ventricle: The left ventricle is normal in size. Normal wall thickness. Normal wall motion. There is normal systolic function with a visually estimated ejection fraction of 60 - 65%. Grade II diastolic dysfunction.    Right Ventricle: Normal right ventricular cavity size. Wall thickness is normal. Right ventricle wall motion  is normal. Systolic function is reduced.    Left Atrium: Left atrium is very  severely dilated.    Right Atrium: Right atrium is mildly dilated.    Aortic Valve: There is mild aortic valve sclerosis. There is normal leaflet mobility. There is trace aortic regurgitation.    Mitral Valve: There is mild bileaflet sclerosis. There is posterior leaflet tethering and failure of complete coaptation. There is very severe regurgitation with a posterolateral eccentriccally directed jet.    Tricuspid Valve: The tricuspid valve is structurally normal. There is normal leaflet mobility. There is moderate to severe regurgitation.    IVC/SVC: IVC was not well visualized due to poor acoustic window. Intermediate venous pressure at 8 mmHg.    Pericardium: There is a trivial effusion posteriorly and small under the RA.     - Interventional Cardiology consulted - no  "intervention until patient's volume status is controlled  - Nephrology following, undergoing SLED/CRRT  - POOJA ordered - "Anesthesia evaluated the patient and feel that she needs better optimization prior to POOJA. They stated once she is euvolemic that we can move forward. Will need a new POOJA order at that time."  - Discontinued Diuril 500 mg IV q24h & Lasix gtt @ 40 mg/hr  - Discontinued hydralazine 25 mg po q8h & Isordil 20 mg po BID for afterload reduction due to pressor requirements  - Continue home ASA 81 mg po daily and Lipitor 40 mg po daily  - Genetics consulted, see "Hypertrophic cardiomyopathy"  - Strict I/O, Jin in place  - Transitioned to lasix 40mg PO daily. Reassess daily for diuretic use.   - Repeat TTE ordered for eval for MR. Discuss plans with IC once repeat TTE is obtained.    - Will also need OP IC follow up.   - Telemetry monitoring  "

## 2024-04-21 NOTE — SUBJECTIVE & OBJECTIVE
Interval History:   [facility] [place] [facility]. Used  to communicate.   Repeat TTE ordered for eval for MR. Discuss plans with IC once repeat TTE is obtained. Nephro rec consulting interventional nephro for TDC placement. NPOMN.      Objective:     Vital Signs (Most Recent):  Temp: 98 °F (36.7 °C) (04/21/24 1212)  Pulse: 69 (04/21/24 1212)  Resp: 17 (04/21/24 1212)  BP: 111/61 (04/21/24 1212)  SpO2: 95 % (04/21/24 1212) Vital Signs (24h Range):  Temp:  [97 °F (36.1 °C)-98.5 °F (36.9 °C)] 98 °F (36.7 °C)  Pulse:  [68-79] 69  Resp:  [15-19] 17  SpO2:  [95 %-99 %] 95 %  BP: (111-133)/(61-80) 111/61     Weight: 66.7 kg (147 lb 0.8 oz)  Body mass index is 28.72 kg/m².    Intake/Output Summary (Last 24 hours) at 4/21/2024 1342  Last data filed at 4/21/2024 1334  Gross per 24 hour   Intake 1184 ml   Output 50 ml   Net 1134 ml         Physical Exam  Vitals and nursing note reviewed.   Constitutional:       General: She is not in acute distress.     Appearance: Normal appearance. She is ill-appearing (but improved).   HENT:      Head: Normocephalic.   Eyes:      Extraocular Movements: Extraocular movements intact.   Cardiovascular:      Rate and Rhythm: Normal rate and regular rhythm.   Pulmonary:      Effort: Pulmonary effort is normal. No respiratory distress.   Abdominal:      General: There is distension.      Tenderness: There is no abdominal tenderness.   Musculoskeletal:      Right lower leg: Edema (minimal) present.      Left lower leg: Edema (minimal) present.   Skin:     General: Skin is warm and dry.   Neurological:      General: No focal deficit present.      Mental Status: She is alert and oriented to person, place, and time.   Psychiatric:         Mood and Affect: Mood normal.         Behavior: Behavior normal.             Significant Labs: All pertinent labs within the past 24 hours have been reviewed.  CBC:   Recent Labs   Lab 04/20/24  0518 04/21/24  0544   WBC 5.63 5.59    HGB 8.6* 8.9*   HCT 30.2* 30.5*    187     CMP:   Recent Labs   Lab 04/20/24  0518 04/21/24  0544   * 132*   K 3.6 3.9    101   CO2 24 22*   GLU 84 69*   BUN 10 17   CREATININE 2.3* 3.3*   CALCIUM 7.8* 7.8*   PROT 6.2 6.2   ALBUMIN 2.6* 2.7*   BILITOT 1.0 1.1*   ALKPHOS 100 100   AST 30 26   ALT 27 25   ANIONGAP 6* 9     Significant Imaging: I have reviewed all pertinent imaging results/findings within the past 24 hours.

## 2024-04-21 NOTE — ASSESSMENT & PLAN NOTE
"Patient with acute kidney injury/acute renal failure likely due to acute tubular necrosis caused by unknown.  REGINALDO is currently  improving on CRRT/SLED . Baseline creatinine  2.5  - Labs reviewed- Renal function/electrolytes with Estimated Creatinine Clearance: 80.9 mL/min (based on SCr of 0.7 mg/dL). according to latest data. Monitor urine output and serial BMP and adjust therapy as needed. Avoid nephrotoxins and renally dose meds for GFR listed above.           Recent Labs     04/17/24  1428 04/17/24  2203 04/18/24  0413   CREATININE 1.8*  1.8* 0.8  0.8 0.7  0.7  0.7         - Nephrology following, appreciate recs - started SLED on 4/14   - Genetics consulted, see "Hypertrophic cardiomyopathy"  - Continue home Renvela 800 mg po TID with meals  - Holding home sodium bicarb 650 mg po BID  - HD per nephrology. Will need OP HD chair setup.   - Nephro rec consulting interventional nephro for TDC placement. NPOMN.       "

## 2024-04-22 ENCOUNTER — OFFICE VISIT (OUTPATIENT)
Dept: DIALYSIS | Facility: HOSPITAL | Age: 54
DRG: 673 | End: 2024-04-22
Attending: EMERGENCY MEDICINE
Payer: MEDICAID

## 2024-04-22 PROBLEM — E87.1 HYPONATREMIA: Status: ACTIVE | Noted: 2024-04-22

## 2024-04-22 LAB
ALBUMIN SERPL BCP-MCNC: 2.7 G/DL (ref 3.5–5.2)
ALP SERPL-CCNC: 102 U/L (ref 55–135)
ALT SERPL W/O P-5'-P-CCNC: 25 U/L (ref 10–44)
ANION GAP SERPL CALC-SCNC: 11 MMOL/L (ref 8–16)
ANISOCYTOSIS BLD QL SMEAR: SLIGHT
AST SERPL-CCNC: 26 U/L (ref 10–40)
BASOPHILS # BLD AUTO: 0.04 K/UL (ref 0–0.2)
BASOPHILS NFR BLD: 0.8 % (ref 0–1.9)
BILIRUB SERPL-MCNC: 1 MG/DL (ref 0.1–1)
BUN SERPL-MCNC: 26 MG/DL (ref 6–20)
CALCIUM SERPL-MCNC: 7.8 MG/DL (ref 8.7–10.5)
CHLORIDE SERPL-SCNC: 100 MMOL/L (ref 95–110)
CO2 SERPL-SCNC: 17 MMOL/L (ref 23–29)
CREAT SERPL-MCNC: 4.4 MG/DL (ref 0.5–1.4)
DIFFERENTIAL METHOD BLD: ABNORMAL
EOSINOPHIL # BLD AUTO: 0.2 K/UL (ref 0–0.5)
EOSINOPHIL NFR BLD: 3.6 % (ref 0–8)
ERYTHROCYTE [DISTWIDTH] IN BLOOD BY AUTOMATED COUNT: 23.7 % (ref 11.5–14.5)
EST. GFR  (NO RACE VARIABLE): 11.3 ML/MIN/1.73 M^2
GLUCOSE SERPL-MCNC: 79 MG/DL (ref 70–110)
HBV CORE AB SERPL QL IA: NORMAL
HBV SURFACE AB SER-ACNC: <3 MIU/ML
HBV SURFACE AB SER-ACNC: NORMAL M[IU]/ML
HCT VFR BLD AUTO: 31.8 % (ref 37–48.5)
HGB BLD-MCNC: 9 G/DL (ref 12–16)
HYPOCHROMIA BLD QL SMEAR: ABNORMAL
IMM GRANULOCYTES # BLD AUTO: 0.01 K/UL (ref 0–0.04)
IMM GRANULOCYTES NFR BLD AUTO: 0.2 % (ref 0–0.5)
LYMPHOCYTES # BLD AUTO: 0.9 K/UL (ref 1–4.8)
LYMPHOCYTES NFR BLD: 17 % (ref 18–48)
MAGNESIUM SERPL-MCNC: 2 MG/DL (ref 1.6–2.6)
MCH RBC QN AUTO: 23.6 PG (ref 27–31)
MCHC RBC AUTO-ENTMCNC: 28.3 G/DL (ref 32–36)
MCV RBC AUTO: 84 FL (ref 82–98)
MONOCYTES # BLD AUTO: 0.6 K/UL (ref 0.3–1)
MONOCYTES NFR BLD: 11.8 % (ref 4–15)
NEUTROPHILS # BLD AUTO: 3.5 K/UL (ref 1.8–7.7)
NEUTROPHILS NFR BLD: 66.6 % (ref 38–73)
NRBC BLD-RTO: 0 /100 WBC
PHOSPHATE SERPL-MCNC: 2.2 MG/DL (ref 2.7–4.5)
PLATELET # BLD AUTO: 186 K/UL (ref 150–450)
PLATELET BLD QL SMEAR: ABNORMAL
PMV BLD AUTO: 10.7 FL (ref 9.2–12.9)
POIKILOCYTOSIS BLD QL SMEAR: SLIGHT
POTASSIUM SERPL-SCNC: 4.2 MMOL/L (ref 3.5–5.1)
PROT SERPL-MCNC: 6.2 G/DL (ref 6–8.4)
RBC # BLD AUTO: 3.81 M/UL (ref 4–5.4)
SCHISTOCYTES BLD QL SMEAR: ABNORMAL
SCHISTOCYTES BLD QL SMEAR: PRESENT
SODIUM SERPL-SCNC: 128 MMOL/L (ref 136–145)
WBC # BLD AUTO: 5.24 K/UL (ref 3.9–12.7)

## 2024-04-22 PROCEDURE — 3044F HG A1C LEVEL LT 7.0%: CPT | Mod: CPTII,,, | Performed by: INTERNAL MEDICINE

## 2024-04-22 PROCEDURE — 86704 HEP B CORE ANTIBODY TOTAL: CPT | Performed by: NURSE PRACTITIONER

## 2024-04-22 PROCEDURE — 63600175 PHARM REV CODE 636 W HCPCS: Performed by: INTERNAL MEDICINE

## 2024-04-22 PROCEDURE — 99233 SBSQ HOSP IP/OBS HIGH 50: CPT | Mod: ,,, | Performed by: INTERNAL MEDICINE

## 2024-04-22 PROCEDURE — 99153 MOD SED SAME PHYS/QHP EA: CPT | Performed by: INTERNAL MEDICINE

## 2024-04-22 PROCEDURE — 25000003 PHARM REV CODE 250

## 2024-04-22 PROCEDURE — 3066F NEPHROPATHY DOC TX: CPT | Mod: CPTII,,, | Performed by: INTERNAL MEDICINE

## 2024-04-22 PROCEDURE — 02H633Z INSERTION OF INFUSION DEVICE INTO RIGHT ATRIUM, PERCUTANEOUS APPROACH: ICD-10-PCS | Performed by: INTERNAL MEDICINE

## 2024-04-22 PROCEDURE — 3062F POS MACROALBUMINURIA REV: CPT | Mod: CPTII,,, | Performed by: INTERNAL MEDICINE

## 2024-04-22 PROCEDURE — 25000003 PHARM REV CODE 250: Performed by: PHYSICIAN ASSISTANT

## 2024-04-22 PROCEDURE — 36558 INSERT TUNNELED CV CATH: CPT | Mod: RT | Performed by: INTERNAL MEDICINE

## 2024-04-22 PROCEDURE — 36415 COLL VENOUS BLD VENIPUNCTURE: CPT | Performed by: NURSE PRACTITIONER

## 2024-04-22 PROCEDURE — 25000003 PHARM REV CODE 250: Performed by: STUDENT IN AN ORGANIZED HEALTH CARE EDUCATION/TRAINING PROGRAM

## 2024-04-22 PROCEDURE — C1750 CATH, HEMODIALYSIS,LONG-TERM: HCPCS | Performed by: INTERNAL MEDICINE

## 2024-04-22 PROCEDURE — 90935 HEMODIALYSIS ONE EVALUATION: CPT

## 2024-04-22 PROCEDURE — 83735 ASSAY OF MAGNESIUM: CPT

## 2024-04-22 PROCEDURE — 84100 ASSAY OF PHOSPHORUS: CPT

## 2024-04-22 PROCEDURE — 99900035 HC TECH TIME PER 15 MIN (STAT)

## 2024-04-22 PROCEDURE — 36415 COLL VENOUS BLD VENIPUNCTURE: CPT

## 2024-04-22 PROCEDURE — 94761 N-INVAS EAR/PLS OXIMETRY MLT: CPT

## 2024-04-22 PROCEDURE — 0JH63XZ INSERTION OF TUNNELED VASCULAR ACCESS DEVICE INTO CHEST SUBCUTANEOUS TISSUE AND FASCIA, PERCUTANEOUS APPROACH: ICD-10-PCS | Performed by: INTERNAL MEDICINE

## 2024-04-22 PROCEDURE — 20600001 HC STEP DOWN PRIVATE ROOM

## 2024-04-22 PROCEDURE — 85025 COMPLETE CBC W/AUTO DIFF WBC: CPT

## 2024-04-22 PROCEDURE — C1769 GUIDE WIRE: HCPCS | Performed by: INTERNAL MEDICINE

## 2024-04-22 PROCEDURE — 80053 COMPREHEN METABOLIC PANEL: CPT

## 2024-04-22 PROCEDURE — 94660 CPAP INITIATION&MGMT: CPT

## 2024-04-22 PROCEDURE — 99152 MOD SED SAME PHYS/QHP 5/>YRS: CPT | Performed by: INTERNAL MEDICINE

## 2024-04-22 PROCEDURE — 36558 INSERT TUNNELED CV CATH: CPT | Mod: RT,,, | Performed by: INTERNAL MEDICINE

## 2024-04-22 PROCEDURE — 25000003 PHARM REV CODE 250: Performed by: INTERNAL MEDICINE

## 2024-04-22 PROCEDURE — 86706 HEP B SURFACE ANTIBODY: CPT | Performed by: NURSE PRACTITIONER

## 2024-04-22 DEVICE — IMPLANTABLE DEVICE: Type: IMPLANTABLE DEVICE | Site: NECK | Status: FUNCTIONAL

## 2024-04-22 RX ORDER — NITROGLYCERIN 400 UG/1
SPRAY ORAL
Status: DISCONTINUED | OUTPATIENT
Start: 2024-04-22 | End: 2024-04-23

## 2024-04-22 RX ORDER — MIDAZOLAM HYDROCHLORIDE 1 MG/ML
INJECTION, SOLUTION INTRAMUSCULAR; INTRAVENOUS
Status: DISCONTINUED | OUTPATIENT
Start: 2024-04-22 | End: 2024-04-23

## 2024-04-22 RX ORDER — ACETAMINOPHEN 10 MG/ML
INJECTION, SOLUTION INTRAVENOUS
Status: DISCONTINUED | OUTPATIENT
Start: 2024-04-22 | End: 2024-04-23

## 2024-04-22 RX ORDER — HEPARIN SODIUM 1000 [USP'U]/ML
INJECTION, SOLUTION INTRAVENOUS; SUBCUTANEOUS
Status: DISCONTINUED | OUTPATIENT
Start: 2024-04-22 | End: 2024-04-23

## 2024-04-22 RX ORDER — SODIUM CHLORIDE 9 MG/ML
INJECTION, SOLUTION INTRAVENOUS CONTINUOUS
Status: ACTIVE | OUTPATIENT
Start: 2024-04-22 | End: 2024-04-22

## 2024-04-22 RX ORDER — TRIAMCINOLONE ACETONIDE 1 MG/G
CREAM TOPICAL 2 TIMES DAILY
Status: DISCONTINUED | OUTPATIENT
Start: 2024-04-22 | End: 2024-05-09 | Stop reason: HOSPADM

## 2024-04-22 RX ORDER — SODIUM CHLORIDE 0.9 % (FLUSH) 0.9 %
10 SYRINGE (ML) INJECTION
Status: DISCONTINUED | OUTPATIENT
Start: 2024-04-22 | End: 2024-05-09 | Stop reason: HOSPADM

## 2024-04-22 RX ADMIN — CLINDAMYCIN PHOSPHATE: 10 GEL TOPICAL at 03:04

## 2024-04-22 RX ADMIN — POLYETHYLENE GLYCOL 3350 17 G: 17 POWDER, FOR SOLUTION ORAL at 10:04

## 2024-04-22 RX ADMIN — ACETAMINOPHEN 1000 MG: 500 TABLET ORAL at 10:04

## 2024-04-22 RX ADMIN — OXYBUTYNIN CHLORIDE 5 MG: 5 TABLET ORAL at 03:04

## 2024-04-22 RX ADMIN — SEVELAMER CARBONATE 800 MG: 800 TABLET, FILM COATED ORAL at 03:04

## 2024-04-22 RX ADMIN — ASPIRIN 81 MG: 81 TABLET, COATED ORAL at 03:04

## 2024-04-22 RX ADMIN — SENNOSIDES AND DOCUSATE SODIUM 1 TABLET: 8.6; 5 TABLET ORAL at 03:04

## 2024-04-22 RX ADMIN — DIPHENHYDRAMINE HYDROCHLORIDE, ZINC ACETATE: 2; .1 CREAM TOPICAL at 03:04

## 2024-04-22 RX ADMIN — MICONAZOLE NITRATE: 20 CREAM TOPICAL at 10:04

## 2024-04-22 RX ADMIN — FUROSEMIDE 40 MG: 40 TABLET ORAL at 03:04

## 2024-04-22 RX ADMIN — SENNOSIDES AND DOCUSATE SODIUM 1 TABLET: 8.6; 5 TABLET ORAL at 10:04

## 2024-04-22 RX ADMIN — POLYETHYLENE GLYCOL 3350 17 G: 17 POWDER, FOR SOLUTION ORAL at 03:04

## 2024-04-22 RX ADMIN — OXYBUTYNIN CHLORIDE 5 MG: 5 TABLET ORAL at 10:04

## 2024-04-22 RX ADMIN — ATORVASTATIN CALCIUM 40 MG: 40 TABLET, FILM COATED ORAL at 03:04

## 2024-04-22 RX ADMIN — MICONAZOLE NITRATE: 20 CREAM TOPICAL at 03:04

## 2024-04-22 RX ADMIN — Medication: at 10:04

## 2024-04-22 RX ADMIN — HYDROXYZINE HYDROCHLORIDE 10 MG: 10 TABLET ORAL at 10:04

## 2024-04-22 RX ADMIN — TRIAMCINOLONE ACETONIDE: 1 CREAM TOPICAL at 10:04

## 2024-04-22 RX ADMIN — CLINDAMYCIN PHOSPHATE: 10 GEL TOPICAL at 10:04

## 2024-04-22 NOTE — PLAN OF CARE
Problem: Renal Function Impairment (Acute Kidney Injury/Impairment)  Goal: Effective Renal Function  4/22/2024 1024 by Bhavani Monroe, RN  Outcome: Ongoing, Progressing  4/22/2024 1023 by Bhavani Monroe, RN  Outcome: Ongoing, Progressing     Problem: Fluid and Electrolyte Imbalance (Acute Kidney Injury/Impairment)  Goal: Fluid and Electrolyte Balance  Outcome: Ongoing, Progressing

## 2024-04-22 NOTE — SUBJECTIVE & OBJECTIVE
Interval History: No acute overnight events. Patient to have TDC placed today. Tolerating HD well this morning.    Review of patient's allergies indicates:  No Known Allergies  Current Facility-Administered Medications   Medication Dose Route Frequency Provider Last Rate Last Admin    acetaminophen 1,000 mg/100 mL (10 mg/mL) injection    Continuous PRN Carole Rolon MD   Stopped at 04/22/24 1316    acetaminophen tablet 1,000 mg  1,000 mg Oral Q6H PRN Domonique Walden MD   1,000 mg at 04/20/24 1123    aspirin EC tablet 81 mg  81 mg Oral Daily David Borjas PA-C   81 mg at 04/21/24 1027    atorvastatin tablet 40 mg  40 mg Oral Daily David Borjas PA-DIMITRY   40 mg at 04/21/24 1027    bisacodyL EC tablet 5 mg  5 mg Oral Daily PRN Johnny Cisneros MD        clindamycin phosphate 1% gel   Topical (Top) BID Domonique Walden MD   Given at 04/21/24 2155    dextrose 10% bolus 125 mL 125 mL  12.5 g Intravenous PRN David Borjas PA-DIMITRY        dextrose 10% bolus 250 mL 250 mL  25 g Intravenous PRN David Borjas PA-DIMITRY        diphenhydrAMINE-zinc acetate 2-0.1% cream   Topical (Top) TID PRN Domonique Walden MD   Given at 04/21/24 1031    furosemide tablet 40 mg  40 mg Oral Daily Margaret Olsen MD   40 mg at 04/21/24 1027    glucagon (human recombinant) injection 1 mg  1 mg Intramuscular PRN David Borjas PA-C        glucose chewable tablet 16 g  16 g Oral PRN David Borjas PA-C        glucose chewable tablet 24 g  24 g Oral PRN David Borjas PA-C        heparin (porcine) injection    PRN Carole Rolon MD   5,000 Units at 04/22/24 1320    HYDROcodone-acetaminophen 5-325 mg per tablet 1 tablet  1 tablet Oral Q6H PRN Johnny Cisneros MD   1 tablet at 04/18/24 1010    hydrOXYzine HCL tablet 10 mg  10 mg Oral TID PRN Domonique Walden MD   10 mg at 04/21/24 0610    melatonin tablet 6 mg  6 mg Oral Nightly PRN Pietro Herring MD   6 mg at 04/18/24 2047    miconazole 2 % cream   Topical (Top)  BID Domonique Walden MD   Given at 04/21/24 2155    midazolam injection    PRN Carole Rolon MD   0.5 mg at 04/22/24 1310    naloxone 0.4 mg/mL injection 0.02 mg  0.02 mg Intravenous PRN David Borjas PA-C        nitroGLYCERIN 0.4 MG/DOSE TL SPRY 400 mcg/spray spray    PRN Carole Rolon MD   1 spray at 04/22/24 1310    ondansetron disintegrating tablet 4 mg  4 mg Oral Q8H PRN David Borjas PA-C        ondansetron injection 4 mg  4 mg Intravenous Q8H PRN David Borjas PA-C   4 mg at 04/13/24 1429    oxybutynin tablet 5 mg  5 mg Oral TID Minda Molina MD   5 mg at 04/21/24 2154    polyethylene glycol packet 17 g  17 g Oral BID Domonique Walden MD   17 g at 04/21/24 2155    senna-docusate 8.6-50 mg per tablet 1 tablet  1 tablet Oral BID Domonique Walden MD   1 tablet at 04/21/24 2154    sevelamer carbonate tablet 800 mg  800 mg Oral TID WM David Borjas PA-C   800 mg at 04/21/24 1631    sodium chloride 0.9% bolus 250 mL 250 mL  250 mL Intravenous PRN Italo Segovia MD        sodium chloride 0.9% flush 10 mL  10 mL Intravenous PRN Pietro Herring MD        sodium chloride 0.9% flush 10 mL  10 mL Intravenous PRN David Borjas PA-C        sodium chloride 0.9% flush 10 mL  10 mL Intravenous PRN Carole Rolon MD        white petrolatum 41 % ointment   Topical (Top) BID Johnny Cisneros MD   Given at 04/21/24 2157    zinc oxide 20 % ointment   Topical (Top) PRN David Borjsa PA-C   Given at 04/14/24 0925       Objective:     Vital Signs (Most Recent):  Temp: 98.4 °F (36.9 °C) (04/22/24 1145)  Pulse: 73 (04/22/24 1145)  Resp: 15 (04/22/24 1145)  BP: (!) 142/93 (04/22/24 1145)  SpO2: 100 % (04/22/24 1041) Vital Signs (24h Range):  Temp:  [97.1 °F (36.2 °C)-98.8 °F (37.1 °C)] 98.4 °F (36.9 °C)  Pulse:  [67-79] 73  Resp:  [15-18] 15  SpO2:  [94 %-100 %] 100 %  BP: (100-146)/(8-100) 142/93     Weight: 66.7 kg (147 lb 0.8 oz) (04/21/24 0500)  Body mass index is 28.72 kg/m².  Body  surface area is 1.68 meters squared.    I/O last 3 completed shifts:  In: 1452 [P.O.:1452]  Out: 150 [Urine:150]     Physical Exam  Vitals and nursing note reviewed.   Constitutional:       General: She is not in acute distress.     Appearance: Normal appearance. She is not ill-appearing.   HENT:      Head: Normocephalic.   Eyes:      Extraocular Movements: Extraocular movements intact.   Cardiovascular:      Rate and Rhythm: Normal rate and regular rhythm.   Pulmonary:      Effort: Pulmonary effort is normal. No respiratory distress.      Breath sounds: No rales.   Musculoskeletal:      Right lower leg: Edema present.      Left lower leg: Edema present.   Skin:     General: Skin is warm and dry.   Neurological:      General: No focal deficit present.      Mental Status: She is alert and oriented to person, place, and time.       Significant Labs:  All labs within the past 24 hours have been reviewed.

## 2024-04-22 NOTE — NURSING
Pt taken to dialysis Pt AAOx4. Pt stable. No complaints of pain or signs of distress. Left per wheelchair with transport.

## 2024-04-22 NOTE — PATIENT CARE CONFERENCE
HEMODIALYSIS CATHETER CARE  Patients' instruction list      Hemodialysis catheter is one of the major vascular access to provide hemodialysis. Infection is one of its common complication. To maintain a safe and long-term use of your catheter without the requirement of replacement and exchange please follow the care instruction for your catheter:    Avoid getting water on the catheter. It is highly recommended to keep the catheter dry. During bathing use only sponging with towel around the catheter area. Lower part of the body can be washed but avoid splashing water over the catheter.     Avoid scratching or touching the skin close to the catheter.     It is recommended to change the dressing during dialysis sessions only. If you find your catheter dressing is wet or not clean, please call your dialysis unit to arrange exchange as soon as possible.     If you notice pain, redness or discharge from the exit site of the catheter please call your dialysis unit or your Nephrologist immediately to arrange examining the catheter and excluding any early signs of infection.     Your dialysis catheter should only be used for dialysis. Only your dialysis nurse can use the catheter. Do not accept any other health care personnel to use your hemodialysis catheter for any reason. This catheter is not intended to be used for medications, IV fluid or taking blood for labs.     The more strict you are in following the instructions the less will be the risk of infection and complications with your dialysis catheter.    We would like to wish you a great health and we will be glad to answer any question you have.      please communicate with [facility] or use [facility] to send us your questions.       DAISY POLK.Maribell. MD. RUT. BRENT.  , [employer] [facility] [employer]  Nephrology Consultant. [facility].   [address],  [facility]. [address]th  [place] [address].    email: [email or web address].  Tel: Office: [phone]

## 2024-04-22 NOTE — PT/OT/SLP PROGRESS
Physical Therapy      Patient Name:  Xena Vera   MRN:  [id number]    Patient not seen today secondary to 1119 attempt pt BETH for dialysis; 1252 attempt pt BETH at cath lab. Will follow up at next scheduled visit.

## 2024-04-22 NOTE — PROGRESS NOTES
Pt to [facility] via wheelchair for dialysis(no schedule yet). Pt oriented to room and tx started via RIJ CVC without issues. VSS.

## 2024-04-22 NOTE — PROGRESS NOTES
[facility] - Cath Lab  Nephrology  Progress Note    Patient Name: Xena Vera  MRN: [id number]  Admission Date: 4/1/2024  Hospital Length of Stay: 19 days  Attending Provider: Rosario Luciano MD   Primary Care Physician: Tami Villeda FNP  Principal Problem:Severe mitral valve regurgitation    Subjective:     HPI: Ms Vera is an obese (BMI ~31) 53-year-old with CKD IV (baseline creatinine ~3.1-3.3), prediabetes with most recent hemoglobin A1c 6%, HFpEF (most recent TTE with EF 60-65% with G2DD), mitral valve regurgitation, anemia, hypertension, HLD as well as several over co-morbid conditions who was admitted on 4/1 with heart failure exacerbation and hypervolemia after presenting with to ED following a mechanical fall but also had complaints of progressive dyspnea/ZACARIAS, orthopnea, lower extremity edema and abdominal distension with constipation. On presentation patient was noted to be hypotensive with systolic BP readings as low as 90s mmHg and bradycardiac with HR as low as 50s bpm. There was concern for some edema on chest x-ray and BNP at that time was ~4.6K and metabolic panel was notable for serum sodium 133, bicarbonate 20, , creatinine 4.2, albumin 3.1 and total bilirubin 1.5. UA showed +1 protein. Her admission was complicated by failure to adequately diuresis with escalating IV Lasix and even oral metolazone for which Nephrology was consulted on 4/10 for assistance. She explicitly denies NSAID use as outpatient.    Interval History: No acute overnight events. Patient to have TDC placed today. Tolerating HD well this morning.    Review of patient's allergies indicates:  No Known Allergies  Current Facility-Administered Medications   Medication Dose Route Frequency Provider Last Rate Last Admin    acetaminophen 1,000 mg/100 mL (10 mg/mL) injection    Continuous PRN Carole Rolon MD   Stopped at 04/22/24 1316    acetaminophen tablet 1,000 mg  1,000 mg Oral Q6H PRN Domonique Walden MD   1,000 mg at  04/20/24 1123    aspirin EC tablet 81 mg  81 mg Oral Daily David Borjas PA-DIMITRY   81 mg at 04/21/24 1027    atorvastatin tablet 40 mg  40 mg Oral Daily David Borjas PA-C   40 mg at 04/21/24 1027    bisacodyL EC tablet 5 mg  5 mg Oral Daily PRN Johnny Cisneros MD        clindamycin phosphate 1% gel   Topical (Top) BID Domonique Walden MD   Given at 04/21/24 2155    dextrose 10% bolus 125 mL 125 mL  12.5 g Intravenous PRN David Borjas PA-DIMITRY        dextrose 10% bolus 250 mL 250 mL  25 g Intravenous PRN David Borjas PA-C        diphenhydrAMINE-zinc acetate 2-0.1% cream   Topical (Top) TID PRN Domonique Walden MD   Given at 04/21/24 1031    furosemide tablet 40 mg  40 mg Oral Daily Margaret Olsen MD   40 mg at 04/21/24 1027    glucagon (human recombinant) injection 1 mg  1 mg Intramuscular PRN David Borjas PA-C        glucose chewable tablet 16 g  16 g Oral PRN David Borjas PA-C        glucose chewable tablet 24 g  24 g Oral PRN David Borjas PA-C        heparin (porcine) injection    PRN Carole Rolon MD   5,000 Units at 04/22/24 1320    HYDROcodone-acetaminophen 5-325 mg per tablet 1 tablet  1 tablet Oral Q6H PRN Johnny Cisneros MD   1 tablet at 04/18/24 1010    hydrOXYzine HCL tablet 10 mg  10 mg Oral TID PRN Domonique Walden MD   10 mg at 04/21/24 0610    melatonin tablet 6 mg  6 mg Oral Nightly PRN Pietro Herring MD   6 mg at 04/18/24 2047    miconazole 2 % cream   Topical (Top) BID Domonique Walden MD   Given at 04/21/24 2155    midazolam injection    PRN Carole Rolon MD   0.5 mg at 04/22/24 1310    naloxone 0.4 mg/mL injection 0.02 mg  0.02 mg Intravenous PRN [place], Jamarius E., PA-C        nitroGLYCERIN 0.4 MG/DOSE TL SPRY 400 mcg/spray spray    PRN Carole Rolon MD   1 spray at 04/22/24 1310    ondansetron disintegrating tablet 4 mg  4 mg Oral Q8H PRN David Borjas PA-DIMITRY        ondansetron injection 4 mg  4 mg Intravenous Q8H PRN [place],  David JANE PA-C   4 mg at 04/13/24 1429    oxybutynin tablet 5 mg  5 mg Oral TID Minda Molina MD   5 mg at 04/21/24 2154    polyethylene glycol packet 17 g  17 g Oral BID Domonique Walden MD   17 g at 04/21/24 2155    senna-docusate 8.6-50 mg per tablet 1 tablet  1 tablet Oral BID Domonique Walden MD   1 tablet at 04/21/24 2154    sevelamer carbonate tablet 800 mg  800 mg Oral TID WM David Borjas PA-C   800 mg at 04/21/24 1631    sodium chloride 0.9% bolus 250 mL 250 mL  250 mL Intravenous PRN Italo Segovia MD        sodium chloride 0.9% flush 10 mL  10 mL Intravenous PRN Pietro Herring MD        sodium chloride 0.9% flush 10 mL  10 mL Intravenous PRN David Borjas PA-C        sodium chloride 0.9% flush 10 mL  10 mL Intravenous PRN Carole oRlon MD        white petrolatum 41 % ointment   Topical (Top) BID Johnny Cisneros MD   Given at 04/21/24 2157    zinc oxide 20 % ointment   Topical (Top) PRN David Borjas PA-C   Given at 04/14/24 0925       Objective:     Vital Signs (Most Recent):  Temp: 98.4 °F (36.9 °C) (04/22/24 1145)  Pulse: 73 (04/22/24 1145)  Resp: 15 (04/22/24 1145)  BP: (!) 142/93 (04/22/24 1145)  SpO2: 100 % (04/22/24 1041) Vital Signs (24h Range):  Temp:  [97.1 °F (36.2 °C)-98.8 °F (37.1 °C)] 98.4 °F (36.9 °C)  Pulse:  [67-79] 73  Resp:  [15-18] 15  SpO2:  [94 %-100 %] 100 %  BP: (100-146)/(8-100) 142/93     Weight: 66.7 kg (147 lb 0.8 oz) (04/21/24 0500)  Body mass index is 28.72 kg/m².  Body surface area is 1.68 meters squared.    I/O last 3 completed shifts:  In: 1452 [P.O.:1452]  Out: 150 [Urine:150]     Physical Exam  Vitals and nursing note reviewed.   Constitutional:       General: She is not in acute distress.     Appearance: Normal appearance. She is not ill-appearing.   HENT:      Head: Normocephalic.   Eyes:      Extraocular Movements: Extraocular movements intact.   Cardiovascular:      Rate and Rhythm: Normal rate and regular rhythm.   Pulmonary:       Effort: Pulmonary effort is normal. No respiratory distress.      Breath sounds: No rales.   Musculoskeletal:      Right lower leg: Edema present.      Left lower leg: Edema present.   Skin:     General: Skin is warm and dry.   Neurological:      General: No focal deficit present.      Mental Status: She is alert and oriented to person, place, and time.       Significant Labs:  All labs within the past 24 hours have been reviewed.     Assessment/Plan:     Renal/  Acute kidney injury superimposed on chronic kidney disease  CKD IV (baseline creatinine ~3.1-3.3) admitted with hypervolemia and REGINALDO with creatinine 4.2  UA showed +1 protein with UPCR of ~500 mg  Urinary sediment with non dysmorphic RBCs and WBCs present although Jin catheter just place yesterday   Retroperitoneal US without evidence of hydronephrosis although changes consistent with chronic kidney disease  Diuresis with lasix gtt @ 40mg/hr + Diuril 500mg IV BID attempted, and while Crt improved she remains unable to tolerate lying flat.  Tolerated 8h SLED w goal UF rate 200-500/hr successfully overnight 4/14-15, then 12h SLED on 4/15, 16, and 17.  4/19: tolerated 3.5h of iHD with 2L UF removed; no issues.    Plan/Recommendations:  Continue iHD for metabolic clearance and volume management. TDC to be placed 4/22. Suspect she may now be ESRD and require iHD moving forward.  Hold Renvela given hypophosphatemia  Okay for discharge from nephrology standpoint once outpatient HD chair arranged; will need nephrology follow-up in clinic  Please avoid hypotension/major fluctuations in BP which may worsen REGINALDO (keep MAP > 65 mmHg)  Renal diet/tube feeds when not NPO with volume restriction per primary team  Strict I/O's and daily weights  Renally all dose medications to eGFR   Avoid nephrotoxic agents wean feasible (i.e. NSAIDs, intra-arterial contrast, supra-therapeutic vancomycin levels, etc.)        Thank you for your consult. I will follow-up with patient.  Please contact us if you have any additional questions.    Javier Morris MD  Nephrology  [facility]

## 2024-04-22 NOTE — ASSESSMENT & PLAN NOTE
CKD IV (baseline creatinine ~3.1-3.3) admitted with hypervolemia and REGINALDO with creatinine 4.2  UA showed +1 protein with UPCR of ~500 mg  Urinary sediment with non dysmorphic RBCs and WBCs present although Jin catheter just place yesterday   Retroperitoneal US without evidence of hydronephrosis although changes consistent with chronic kidney disease  Diuresis with lasix gtt @ 40mg/hr + Diuril 500mg IV BID attempted, and while Crt improved she remains unable to tolerate lying flat.  Tolerated 8h SLED w goal UF rate 200-500/hr successfully overnight 4/14-15, then 12h SLED on 4/15, 16, and 17.  4/19: tolerated 3.5h of iHD with 2L UF removed; no issues.    Plan/Recommendations:  Continue iHD for metabolic clearance and volume management. TDC to be placed 4/22. Suspect she may now be ESRD and require iHD moving forward.  Hold Renvela given hypophosphatemia  Okay for discharge from nephrology standpoint once outpatient HD chair arranged; will need nephrology follow-up in clinic  Please avoid hypotension/major fluctuations in BP which may worsen REGINALDO (keep MAP > 65 mmHg)  Renal diet/tube feeds when not NPO with volume restriction per primary team  Strict I/O's and daily weights  Renally all dose medications to eGFR   Avoid nephrotoxic agents wean feasible (i.e. NSAIDs, intra-arterial contrast, supra-therapeutic vancomycin levels, etc.)

## 2024-04-22 NOTE — PLAN OF CARE
[facility] - Cardiology Stepdown  Discharge Reassessment    Primary Care Provider: Tami Villeda FNP    Expected Discharge Date: 4/24/2024    Reassessment (most recent)       Discharge Reassessment - 04/22/24 1146          Discharge Reassessment    Assessment Type Discharge Planning Reassessment     Did the patient's condition or plan change since previous assessment? Yes     Discharge Plan discussed with: Sibling     Communicated RILEY with patient/caregiver Date not available/Unable to determine     Discharge Plan A Home with family;Other   New HD chair    Discharge Plan B Home     DME Needed Upon Discharge  none     Transition of Care Barriers Unisured     Why the patient remains in the hospital Requires continued medical care                 KAZ informed by provider team that pt will need a new outpatient dialysis chair.  KAZ relayed this to [facility] inquiring about update in status of Medicaid application.  Dialysis social worker Allie also notified of the above.  KAZ met with pt's brother at bedside via  [employer] #[id number] (pt was BETH at dialysis) and explained that getting pt her outpatient chair will be complication by the fact that her Medicaid is still pending and that there is possibility that pt will need to remain in the hospital until her Medicaid is approved, although SW will notify family of any updates.  Pt's brother voiced understanding.  Discharge Plan A and Plan B have been determined by review of patient's clinical status, future medical and therapeutic needs, and coverage/benefits for post-acute care in coordination with multidisciplinary team members.  SW name and ext on whiteboard.  Will continue to follow.      UPDATE 2:26 PM  Per Sandi with [facility] they submitted an EMS (Emergency Medical Services) application for this patient. [facility] requires the discharge summary to make a determination. Her application will not be processed by [facility] until the discharge summary is completed. These  applications could take 90+ days for a determination.       Sophia Nunez LMSW  [facility]  [phone]

## 2024-04-22 NOTE — PLAN OF CARE
Problem: Adult Inpatient Plan of Care  Goal: Patient-Specific Goal (Individualized)  Outcome: Ongoing, Progressing  Flowsheets (Taken 4/22/2024 1707)  Anxieties, Fears or Concerns: Patient in pain from RIj placement  Individualized Care Needs: Provided PRN pain medication     Problem: Fluid and Electrolyte Imbalance (Acute Kidney Injury/Impairment)  Goal: Fluid and Electrolyte Balance  Outcome: Ongoing, Progressing  Intervention: Monitor and Manage Fluid and Electrolyte Balance  Flowsheets (Taken 4/22/2024 1707)  Fluid/Electrolyte Management: fluids restricted     Problem: Renal Function Impairment (Acute Kidney Injury/Impairment)  Goal: Effective Renal Function  Outcome: Ongoing, Progressing  Intervention: Monitor and Support Renal Function  Flowsheets (Taken 4/22/2024 1707)  Medication Review/Management: medications reviewed     Problem: Fall Injury Risk  Goal: Absence of Fall and Fall-Related Injury  Outcome: Ongoing, Progressing  Intervention: Identify and Manage Contributors  Flowsheets (Taken 4/22/2024 1707)  Self-Care Promotion:   independence encouraged   BADL personal objects within reach   BADL personal routines maintained   meal set-up provided  Medication Review/Management: medications reviewed  Intervention: Promote Injury-Free Environment  Flowsheets (Taken 4/22/2024 1707)  Safety Promotion/Fall Prevention:   assistive device/personal item within reach   Fall Risk reviewed with patient/family   Fall Risk signage in place   medications reviewed   nonskid shoes/socks when out of bed   side rails raised x 2   room near unit station   instructed to call staff for mobility    [facility] utilized to communicate with patient. AAOX4,VSS Plan of care discussed with patient. Patient has no complaints of chest pain/SOB/palpitations. Pt ambulating independently, fall precautions in place,no falls/injuries through the shift.Discussed medications and care.Patient has no questions at this time.Pt  resting comfortably with no acute distress.Call light within reach,bed at lowest position.

## 2024-04-22 NOTE — PROGRESS NOTES
Pt complete HD tx without issue. 2L removed. RIJ CVC saline locked. Pt getting tunnel cath placed right after HD. Report given to primary RN and cath lab RN, awaiting transport to cath lab.

## 2024-04-22 NOTE — PT/OT/SLP PROGRESS
Occupational Therapy      Patient Name:  Xena Vera   MRN:  [id number]    Patient not seen today secondary to Off the floor at 0914 for dialysis. PM attempt at 1200, BETH for cath lab. Will follow-up per POC.    4/22/2024

## 2024-04-22 NOTE — NURSING
Received report from JUMANA Plaza. Patient s/p hemodialysis catheter, AAOx4. VSS, no c/o pain or discomfort at this time, resp even and unlabored. Pressure dressing/gauze to RIJ  is CDI. No active bleeding. No hematoma noted. Post procedure protocol reviewed with patient. Understanding verbalized. Nurse call bell within reach.

## 2024-04-22 NOTE — PROCEDURES
[facility] - Cath Lab  Operative Note    Date of Procedure: 4/22/2024     Procedure: Procedure(s) (LRB):  Insertion, Catheter, Central Venous, Hemodialysis (Right)     Xena Vera  1970  [id number]    Pre-op Diagnosis: REGINALDO (acute kidney injury) [N17.9]   Post-op Diagnosis: REGINALDO (acute kidney injury) [N17.9]     Removal of Rt. IJ Trialysis.   The skin was cleaned with Chlorhexidine.  The sutures were removed.   Pressure was applied on the site after pulling the catheter.     Rt. IJ cuffed tunneled HD cath.  tunneled catheter under local anesthesia with sedation. With real time ultrasound and fluoroscopy.     The patient was placed supine.    The Rt. IJ was localized with US.    The skin was sterilized with Chlorhexidine.    2% Xylocaine with Epinephrine was given to anesthetize the skin and subcutaneous tissues.    The Rt. INJ was punctured under real time ultrasound.  A guide wire was inserted and left.     The tunneled tract was anesthetized with 2% xylocaine with Epinephrine.    The catheter was tunneled under the skin.   The sheath was introduced in the SVC under fluoroscopy after  multiple sequential dilatations performed over the guide wire.   The catheter is inserted inside the sheath after removing the guidewire and the dilater.   The catheter position is confirmed with the fluoroscopy with the tip in the Rt. Atrium. The curve is checked and no angulation in the tract was noticed.    Flushing and blood suction were checked from both catheter ports.    1000 IU/ml Heparin was inserted according to the catheter dead space.   The wound was sutured.    No bleeding was noticed after observation.   Biopatch was placed over the wound and covered with Tegaderm.    Pressure dressing with rolled up 4 X4 gauze placed over the tunnel.    The procedure was smooth with no complications.        Complications: No  Condition: Good  FOLLOWUP: In poatient      The procedure is done under real time fluoroscopy.     You can  use the catheter for dialysis after the observation period.      Carole Galeano MD      overweight

## 2024-04-22 NOTE — NURSING
Right neck clear tegaderm CDI. No bleeding or hematoma noted. Ольга LOZA at bedside to place tenoplast and gauze dressing. Gauze/pressure dressing to remain in place until tomorrow.

## 2024-04-22 NOTE — CARE UPDATE
I have reviewed the chart of Xena Vera who is hospitalized for the following:    Active Hospital Problems    Diagnosis    *Severe mitral valve regurgitation    Hyponatremia     Post diuresis  In the setting of acute kidney injury  Daily labs      Rash    Intertrigo    Angiokeratoma    Hypertrophic cardiomyopathy    Proteinuria    Acute kidney injury superimposed on chronic kidney disease    HLD (hyperlipidemia)    Prediabetes    Acute urinary retention    Anasarca    Leg wound, left    Acute on chronic heart failure with preserved ejection fraction (HFpEF)    Prolonged QT interval    Anemia    Class 2 obesity in adult    Essential hypertension        Pina Benavides NP  [facility]

## 2024-04-22 NOTE — PLAN OF CARE
Problem: Adult Inpatient Plan of Care  Goal: Plan of Care Review  Outcome: Ongoing, Progressing  Goal: Patient-Specific Goal (Individualized)  Outcome: Ongoing, Progressing  Goal: Absence of Hospital-Acquired Illness or Injury  Outcome: Ongoing, Progressing  Goal: Optimal Comfort and Wellbeing  Outcome: Ongoing, Progressing  Goal: Readiness for Transition of Care  Outcome: Ongoing, Progressing     Problem: Impaired Wound Healing  Goal: Optimal Wound Healing  Outcome: Ongoing, Progressing     Problem: Renal Function Impairment (Acute Kidney Injury/Impairment)  Goal: Effective Renal Function  Outcome: Ongoing, Progressing

## 2024-04-22 NOTE — INTERVAL H&P NOTE
The patient has been examined and the H&P has been reviewed:    I concur with the findings and no changes have occurred since H&P was written.    Procedure risks, benefits and alternative options discussed and understood by patient/family.          Active Hospital Problems    Diagnosis  POA    *Severe mitral valve regurgitation [I34.0]  Yes    Hyponatremia [E87.1]  No     Post diuresis  In the setting of acute kidney injury  Daily labs      Rash [R21]  No    Intertrigo [L30.4]  Yes    Angiokeratoma [D23.9]  Yes    Hypertrophic cardiomyopathy [I42.2]  Yes    Proteinuria [R80.9]  Yes    Acute kidney injury superimposed on chronic kidney disease [N17.9, N18.9]  No    HLD (hyperlipidemia) [E78.5]  Yes     Chronic    Prediabetes [R73.03]  Yes     Chronic    Acute urinary retention [R33.8]  No    Anasarca [R60.1]  Yes    Leg wound, left [S81.802A]  Yes    Acute on chronic heart failure with preserved ejection fraction (HFpEF) [I50.33]  Yes    Prolonged QT interval [R94.31]  Yes    Anemia [D64.9]  Yes     Chronic    Class 2 obesity in adult [E66.9]  Yes     Chronic    Essential hypertension [I10]  Yes     Chronic      Resolved Hospital Problems   No resolved problems to display.

## 2024-04-23 LAB
ALBUMIN SERPL BCP-MCNC: 2.6 G/DL (ref 3.5–5.2)
ALP SERPL-CCNC: 104 U/L (ref 55–135)
ALT SERPL W/O P-5'-P-CCNC: 20 U/L (ref 10–44)
ANION GAP SERPL CALC-SCNC: 9 MMOL/L (ref 8–16)
ASCENDING AORTA: 2.89 CM
AST SERPL-CCNC: 26 U/L (ref 10–40)
AV INDEX (PROSTH): 0.73
AV MEAN GRADIENT: 4 MMHG
AV PEAK GRADIENT: 6 MMHG
AV VALVE AREA BY VELOCITY RATIO: 2.88 CM²
AV VALVE AREA: 2.53 CM²
AV VELOCITY RATIO: 0.83
BASOPHILS # BLD AUTO: 0.04 K/UL (ref 0–0.2)
BASOPHILS NFR BLD: 0.7 % (ref 0–1.9)
BILIRUB SERPL-MCNC: 1.1 MG/DL (ref 0.1–1)
BSA FOR ECHO PROCEDURE: 1.66 M2
BUN SERPL-MCNC: 16 MG/DL (ref 6–20)
CALCIUM SERPL-MCNC: 7.9 MG/DL (ref 8.7–10.5)
CHLORIDE SERPL-SCNC: 96 MMOL/L (ref 95–110)
CO2 SERPL-SCNC: 23 MMOL/L (ref 23–29)
CREAT SERPL-MCNC: 2.8 MG/DL (ref 0.5–1.4)
CV ECHO LV RWT: 0.27 CM
DIFFERENTIAL METHOD BLD: ABNORMAL
DOP CALC AO PEAK VEL: 1.25 M/S
DOP CALC AO VTI: 25.87 CM
DOP CALC LVOT AREA: 3.5 CM2
DOP CALC LVOT DIAMETER: 2.1 CM
DOP CALC LVOT PEAK VEL: 1.04 M/S
DOP CALC LVOT STROKE VOLUME: 65.43 CM3
DOP CALC MV VTI: 60.49 CM
DOP CALCLVOT PEAK VEL VTI: 18.9 CM
E WAVE DECELERATION TIME: 423.19 MSEC
E/A RATIO: 1.72
E/E' RATIO: 30.67 M/S
ECHO LV POSTERIOR WALL: 0.81 CM (ref 0.6–1.1)
EOSINOPHIL # BLD AUTO: 0.2 K/UL (ref 0–0.5)
EOSINOPHIL NFR BLD: 3.5 % (ref 0–8)
ERYTHROCYTE [DISTWIDTH] IN BLOOD BY AUTOMATED COUNT: 23.3 % (ref 11.5–14.5)
EST. GFR  (NO RACE VARIABLE): 19.5 ML/MIN/1.73 M^2
FRACTIONAL SHORTENING: 32 % (ref 28–44)
GLUCOSE SERPL-MCNC: 59 MG/DL (ref 70–110)
HBV SURFACE AG SERPL QL IA: NORMAL
HCT VFR BLD AUTO: 31.8 % (ref 37–48.5)
HGB BLD-MCNC: 9.4 G/DL (ref 12–16)
IMM GRANULOCYTES # BLD AUTO: 0.02 K/UL (ref 0–0.04)
IMM GRANULOCYTES NFR BLD AUTO: 0.4 % (ref 0–0.5)
INTERVENTRICULAR SEPTUM: 0.73 CM (ref 0.6–1.1)
IVRT: 125.59 MSEC
LA MAJOR: 7.87 CM
LA MINOR: 7.48 CM
LA WIDTH: 5.73 CM
LEFT ATRIUM SIZE: 5.78 CM
LEFT ATRIUM VOLUME INDEX MOD: 95.6 ML/M2
LEFT ATRIUM VOLUME INDEX: 133.3 ML/M2
LEFT ATRIUM VOLUME MOD: 154.83 CM3
LEFT ATRIUM VOLUME: 215.92 CM3
LEFT INTERNAL DIMENSION IN SYSTOLE: 4.1 CM (ref 2.1–4)
LEFT VENTRICLE DIASTOLIC VOLUME INDEX: 112.83 ML/M2
LEFT VENTRICLE DIASTOLIC VOLUME: 182.79 ML
LEFT VENTRICLE MASS INDEX: 111 G/M2
LEFT VENTRICLE SYSTOLIC VOLUME INDEX: 45.7 ML/M2
LEFT VENTRICLE SYSTOLIC VOLUME: 74.03 ML
LEFT VENTRICULAR INTERNAL DIMENSION IN DIASTOLE: 6.04 CM (ref 3.5–6)
LEFT VENTRICULAR MASS: 179.62 G
LV LATERAL E/E' RATIO: 30.67 M/S
LV SEPTAL E/E' RATIO: 30.67 M/S
LYMPHOCYTES # BLD AUTO: 0.9 K/UL (ref 1–4.8)
LYMPHOCYTES NFR BLD: 16.6 % (ref 18–48)
MAGNESIUM SERPL-MCNC: 1.7 MG/DL (ref 1.6–2.6)
MAYO MISCELLANEOUS RESULT (REF): NORMAL
MCH RBC QN AUTO: 24 PG (ref 27–31)
MCHC RBC AUTO-ENTMCNC: 29.6 G/DL (ref 32–36)
MCV RBC AUTO: 81 FL (ref 82–98)
MONOCYTES # BLD AUTO: 0.6 K/UL (ref 0.3–1)
MONOCYTES NFR BLD: 11.1 % (ref 4–15)
MV MEAN GRADIENT: 8 MMHG
MV PEAK A VEL: 1.07 M/S
MV PEAK E VEL: 1.84 M/S
MV PEAK GRADIENT: 23 MMHG
MV VALVE AREA BY CONTINUITY EQUATION: 1.08 CM2
NEUTROPHILS # BLD AUTO: 3.7 K/UL (ref 1.8–7.7)
NEUTROPHILS NFR BLD: 67.7 % (ref 38–73)
NRBC BLD-RTO: 0 /100 WBC
OHS CV RV/LV RATIO: 0.76 CM
PHOSPHATE SERPL-MCNC: 1.8 MG/DL (ref 2.7–4.5)
PISA MRMAX VEL: 0.05 M/S
PISA TR MAX VEL: 4.7 M/S
PLATELET # BLD AUTO: 199 K/UL (ref 150–450)
PMV BLD AUTO: 11 FL (ref 9.2–12.9)
POTASSIUM SERPL-SCNC: 4 MMOL/L (ref 3.5–5.1)
PROT SERPL-MCNC: 6.1 G/DL (ref 6–8.4)
PULM VEIN S/D RATIO: 1.41
PV PEAK D VEL: 0.54 M/S
PV PEAK S VEL: 0.76 M/S
RA MAJOR: 5.5 CM
RA PRESSURE ESTIMATED: 8 MMHG
RA WIDTH: 4.4 CM
RBC # BLD AUTO: 3.92 M/UL (ref 4–5.4)
RIGHT VENTRICULAR END-DIASTOLIC DIMENSION: 4.57 CM
RV TB RVSP: 13 MMHG
SINUS: 2.53 CM
SODIUM SERPL-SCNC: 128 MMOL/L (ref 136–145)
STJ: 2.17 CM
TDI LATERAL: 0.06 M/S
TDI SEPTAL: 0.06 M/S
TDI: 0.06 M/S
TR MAX PG: 88 MMHG
TRICUSPID ANNULAR PLANE SYSTOLIC EXCURSION: 1.69 CM
TV REST PULMONARY ARTERY PRESSURE: 96 MMHG
WBC # BLD AUTO: 5.41 K/UL (ref 3.9–12.7)
Z-SCORE OF LEFT VENTRICULAR DIMENSION IN END DIASTOLE: 2.76
Z-SCORE OF LEFT VENTRICULAR DIMENSION IN END SYSTOLE: 2.9

## 2024-04-23 PROCEDURE — 36415 COLL VENOUS BLD VENIPUNCTURE: CPT

## 2024-04-23 PROCEDURE — 97530 THERAPEUTIC ACTIVITIES: CPT

## 2024-04-23 PROCEDURE — 97116 GAIT TRAINING THERAPY: CPT

## 2024-04-23 PROCEDURE — 99900035 HC TECH TIME PER 15 MIN (STAT)

## 2024-04-23 PROCEDURE — 25000003 PHARM REV CODE 250: Performed by: PHYSICIAN ASSISTANT

## 2024-04-23 PROCEDURE — 94761 N-INVAS EAR/PLS OXIMETRY MLT: CPT

## 2024-04-23 PROCEDURE — 25000003 PHARM REV CODE 250: Performed by: STUDENT IN AN ORGANIZED HEALTH CARE EDUCATION/TRAINING PROGRAM

## 2024-04-23 PROCEDURE — 84100 ASSAY OF PHOSPHORUS: CPT

## 2024-04-23 PROCEDURE — 87340 HEPATITIS B SURFACE AG IA: CPT | Performed by: NURSE PRACTITIONER

## 2024-04-23 PROCEDURE — 36415 COLL VENOUS BLD VENIPUNCTURE: CPT | Performed by: NURSE PRACTITIONER

## 2024-04-23 PROCEDURE — 83735 ASSAY OF MAGNESIUM: CPT

## 2024-04-23 PROCEDURE — 25000003 PHARM REV CODE 250

## 2024-04-23 PROCEDURE — 99233 SBSQ HOSP IP/OBS HIGH 50: CPT | Mod: ,,, | Performed by: INTERNAL MEDICINE

## 2024-04-23 PROCEDURE — 94660 CPAP INITIATION&MGMT: CPT

## 2024-04-23 PROCEDURE — 85025 COMPLETE CBC W/AUTO DIFF WBC: CPT

## 2024-04-23 PROCEDURE — 80053 COMPREHEN METABOLIC PANEL: CPT

## 2024-04-23 PROCEDURE — 20600001 HC STEP DOWN PRIVATE ROOM

## 2024-04-23 RX ORDER — SODIUM CHLORIDE 9 MG/ML
INJECTION, SOLUTION INTRAVENOUS
Status: CANCELLED | OUTPATIENT
Start: 2024-04-23

## 2024-04-23 RX ORDER — SODIUM CHLORIDE 9 MG/ML
INJECTION, SOLUTION INTRAVENOUS ONCE
Status: CANCELLED | OUTPATIENT
Start: 2024-04-23 | End: 2024-04-23

## 2024-04-23 RX ADMIN — OXYBUTYNIN CHLORIDE 5 MG: 5 TABLET ORAL at 08:04

## 2024-04-23 RX ADMIN — CLINDAMYCIN PHOSPHATE: 10 GEL TOPICAL at 08:04

## 2024-04-23 RX ADMIN — MICONAZOLE NITRATE: 20 CREAM TOPICAL at 08:04

## 2024-04-23 RX ADMIN — SENNOSIDES AND DOCUSATE SODIUM 1 TABLET: 8.6; 5 TABLET ORAL at 09:04

## 2024-04-23 RX ADMIN — ATORVASTATIN CALCIUM 40 MG: 40 TABLET, FILM COATED ORAL at 08:04

## 2024-04-23 RX ADMIN — FUROSEMIDE 40 MG: 40 TABLET ORAL at 08:04

## 2024-04-23 RX ADMIN — Medication: at 09:04

## 2024-04-23 RX ADMIN — OXYBUTYNIN CHLORIDE 5 MG: 5 TABLET ORAL at 09:04

## 2024-04-23 RX ADMIN — OXYBUTYNIN CHLORIDE 5 MG: 5 TABLET ORAL at 04:04

## 2024-04-23 RX ADMIN — ASPIRIN 81 MG: 81 TABLET, COATED ORAL at 08:04

## 2024-04-23 RX ADMIN — TRIAMCINOLONE ACETONIDE: 1 CREAM TOPICAL at 08:04

## 2024-04-23 RX ADMIN — MICONAZOLE NITRATE: 20 CREAM TOPICAL at 09:04

## 2024-04-23 RX ADMIN — CLINDAMYCIN PHOSPHATE: 10 GEL TOPICAL at 09:04

## 2024-04-23 RX ADMIN — SENNOSIDES AND DOCUSATE SODIUM 1 TABLET: 8.6; 5 TABLET ORAL at 08:04

## 2024-04-23 RX ADMIN — SEVELAMER CARBONATE 800 MG: 800 TABLET, FILM COATED ORAL at 08:04

## 2024-04-23 RX ADMIN — TRIAMCINOLONE ACETONIDE: 1 CREAM TOPICAL at 09:04

## 2024-04-23 RX ADMIN — SEVELAMER CARBONATE 800 MG: 800 TABLET, FILM COATED ORAL at 04:04

## 2024-04-23 RX ADMIN — SEVELAMER CARBONATE 800 MG: 800 TABLET, FILM COATED ORAL at 11:04

## 2024-04-23 RX ADMIN — POLYETHYLENE GLYCOL 3350 17 G: 17 POWDER, FOR SOLUTION ORAL at 09:04

## 2024-04-23 RX ADMIN — POLYETHYLENE GLYCOL 3350 17 G: 17 POWDER, FOR SOLUTION ORAL at 08:04

## 2024-04-23 NOTE — ASSESSMENT & PLAN NOTE
53 year old female Korean speaking pt who was admitted for ADHF 2/2 severe MR. The MR appears to be secondary to posterior leaflet restriction and leaflet length of 1.1 cm. Saw Dr. Stone in January of 2024 and was undergoing work-up for possible MitraClip. Lynne has been admitted with ADHF and renal failure now on HD. TTE with severe MR. The mean pressure gradient across the mitral valve is 8 mmHg   - Patient is currently not a candidate for MitraClip  - Continue with volume management per Nephrology  - Optimize GDMT  - Recommend outpatient follow up with General Cardiology

## 2024-04-23 NOTE — ASSESSMENT & PLAN NOTE
Angiokeratoma   Proteinuria      - Genetics consulted, appreciate recs  - GLA genetic testing collected and sent to [facility]  - Consider cardiac MRI

## 2024-04-23 NOTE — PLAN OF CARE
Problem: Adult Inpatient Plan of Care  Goal: Patient-Specific Goal (Individualized)  4/23/2024 1819 by Barbra Briseno, RN  Outcome: Progressing  Flowsheets (Taken 4/23/2024 1819)  Anxieties, Fears or Concerns: none  Individualized Care Needs: none  4/23/2024 1618 by Barbra Briseno, RN  Flowsheets (Taken 4/23/2024 1557)  Anxieties, Fears or Concerns: none  Individualized Care Needs: none     Problem: Renal Function Impairment (Acute Kidney Injury/Impairment)  Goal: Effective Renal Function  Outcome: Progressing  Intervention: Monitor and Support Renal Function  Flowsheets (Taken 4/23/2024 1819)  Medication Review/Management: medications reviewed    Problem: Fall Injury Risk  Goal: Absence of Fall and Fall-Related Injury  Outcome: Progressing  Intervention: Identify and Manage Contributors  Flowsheets (Taken 4/23/2024 1819)  Self-Care Promotion:   independence encouraged   BADL personal objects within reach   meal set-up provided   BADL personal routines maintained  Medication Review/Management: medications reviewed  Intervention: Promote Injury-Free Environment  Flowsheets (Taken 4/23/2024 1819)  Safety Promotion/Fall Prevention:   assistive device/personal item within reach   Fall Risk signage in place   nonskid shoes/socks when out of bed   medications reviewed   side rails raised x 2   room near unit station   instructed to call staff for mobility   Fall Risk reviewed with patient/family    MORE,VSS,. Plan of care discussed with patient. Patient has no complaints of chest pain/SOB/palpitations. Pt ambulating independently fall precautions in place,no falls/injuries through the shift.Discussed medications and care.Patient has no questions at this time.Pt resting comfortably with no acute distress.Call light within reach,bed at lowest position.  used today to facilitate continuity of care.

## 2024-04-23 NOTE — ASSESSMENT & PLAN NOTE
Acute on chronic heart failure with preserved ejection fraction (HFpEF)   Anasarca      52 yo F admitted for ADHF 2/2 severe MR. The MR appears to be secondary to posterior leaflet restriction and leaflet length of 1.1 cm. BNP on admission was 4300 compared to 350 eight months prior. Initially diagnosed with severe MR in June of 2023. Saw Dr. Stone in January of 2024 and was undergoing work-up for possible MitraClip; C scheduled for 3/15 but not completed due to financial constraints, also needs POOJA. Will need optimization of kidney function prior to angiogram consideration, appreciate Nephrology assistance. APS serologies negative.      DDX: rheumatic heart disease vs Fabry vs antiphospholipid (serologies negative)     4/1/2024 TTE    Left Ventricle: The left ventricle is normal in size. Normal wall thickness. Normal wall motion. There is normal systolic function with a visually estimated ejection fraction of 60 - 65%. Grade II diastolic dysfunction.    Right Ventricle: Normal right ventricular cavity size. Wall thickness is normal. Right ventricle wall motion  is normal. Systolic function is reduced.    Left Atrium: Left atrium is very  severely dilated.    Right Atrium: Right atrium is mildly dilated.    Aortic Valve: There is mild aortic valve sclerosis. There is normal leaflet mobility. There is trace aortic regurgitation.    Mitral Valve: There is mild bileaflet sclerosis. There is posterior leaflet tethering and failure of complete coaptation. There is very severe regurgitation with a posterolateral eccentriccally directed jet.    Tricuspid Valve: The tricuspid valve is structurally normal. There is normal leaflet mobility. There is moderate to severe regurgitation.    IVC/SVC: IVC was not well visualized due to poor acoustic window. Intermediate venous pressure at 8 mmHg.    Pericardium: There is a trivial effusion posteriorly and small under the RA.     - Interventional Cardiology consulted - no  "intervention until patient's volume status is controlled  - Nephrology following, undergoing SLED/CRRT  - POOJA ordered - "Anesthesia evaluated the patient and feel that she needs better optimization prior to POOJA. They stated once she is euvolemic that we can move forward. Will need a new POOJA order at that time."  - Discontinued Diuril 500 mg IV q24h & Lasix gtt @ 40 mg/hr  - Discontinued hydralazine 25 mg po q8h & Isordil 20 mg po BID for afterload reduction due to pressor requirements  - Continue home ASA 81 mg po daily and Lipitor 40 mg po daily  - Genetics consulted, see "Hypertrophic cardiomyopathy"  - Strict I/O, Jin in place  - Transitioned to lasix 40mg PO daily. Reassess daily for diuretic use.   - Repeat TTE reviewed- IC consulted- not a candidate for mitraclip- recommended outpatient f/u- continued fluid removal/fluid status optimization via HD- up-titration of GDMT as tolerated  - Will also need OP IC follow up.   - Telemetry monitoring  "

## 2024-04-23 NOTE — PROGRESS NOTES
"Referral successfully faxed to [facility] for outpt dialysis coordination.    Pt currently pending Medicaid.    SW will continue to follow with updates.    Allie Jha LMSW  [facility]  X [phone]      Your fax has been successfully sent to [phone] at [phone].  ------------------------------------------------------------  From: [id number]  ------------------------------------------------------------  4/23/2024 1:10:39 PM Transmission Record          Sent to [phone] with remote ID "[facility] Fax "          Result: (0/339;0/0) Success          Page record: 1 - 37          Elapsed time: 13:20 on channel 42   "

## 2024-04-23 NOTE — PT/OT/SLP PROGRESS
Physical Therapy Treatment    Patient Name:  Xena Vera   MRN:  [id number]  Admitting Diagnosis:  Severe mitral valve regurgitation   Recent Surgery: Procedure(s) (LRB):  Insertion, Catheter, Central Venous, Hemodialysis (Right) 1 Day Post-Op  Admit Date: 4/1/2024  Length of Stay: 20 days    Recommendations:     Discharge Recommendations:  Low Intensity Therapy     Discharge Equipment Recommendations: walker, rolling   Barriers to discharge: None    Appropriate transfer level with nursing staff: Ambulatory with SBA    Plan:     During this hospitalization, patient to be seen 3 x/week to address the identified rehab impairments via gait training, therapeutic exercises, therapeutic activities, neuromuscular re-education and progress towards the established goals.  Plan of Care Expires:  05/18/24  Plan of Care Reviewed with: patient    Assessment:     Xena Vera is a 53 y.o. female admitted with a medical diagnosis of Severe mitral valve regurgitation. Pt found supine agreeable to therapy session. Pt demo'd improvements as she increased distance gait trained this session with no LOB. Pt reported SOB after trial- SpO2 96%. Patient would benefit from continued gait and stair training in future sessions. Patient would benefit from skilled therapy services to maximize safety and independence, increase activity tolerance, decrease fall risk, decrease caregiver burden, improve QOL, improve patient's functional mobility, and decrease risk of contractures and pressure sores.  Patient continues to demonstrate the need for low intensity therapy on a scheduled basis exhibited by decreased independence with functional mobility     Problem List: weakness, impaired endurance, impaired self care skills, impaired functional mobility, gait instability, decreased safety awareness, impaired balance, decreased upper extremity function, decreased lower extremity function, impaired cardiopulmonary response to activity.  Rehab  "Prognosis: Good; patient would benefit from acute skilled PT services to address these deficits and reach maximum level of function.      Goals:   Multidisciplinary Problems       Physical Therapy Goals          Problem: Physical Therapy    Goal Priority Disciplines Outcome Goal Variances Interventions   Physical Therapy Goal     PT, PT/OT Progressing     Description: Goals to be met by: 24    Patient will increase functional independence with mobility by performin. Supine to sit with Modified [place]  2. Sit to supine with Modified [place]  3. Sit to stand transfer with Supervision  4. Bed to chair transfer with Supervision using Rolling Walker  5. Gait  x 100 feet with Supervision using Rolling Walker.   6. Ascend/descend 5 stair with bilateral Handrails Supervision using No Assistive Device.   7. Lower extremity exercise program x15 reps per handout, with assistance as needed                         Subjective     RN Barbra notified prior to session. No one present upon PT entrance into room. Patient agreeable to PT treatment session.   used via [employer] #[id number]    Chief Complaint: SOB after trial  Patient/Family Comments/goals: none stated  Pain/Comfort:  Pain Rating 1: 0/10  Pain Rating Post-Intervention 1: 0/10      Objective:       Patient found supine with: telemetry   Cognition:   Alert and Cooperative  Patient is oriented to Person, Place, Time, Situation  General Precautions: Standard, Cardiac fall   Orthopedic Precautions:N/A   Braces: N/A   Body mass index is 28.13 kg/m².  Oxygen Device: Room Air  Vitals: /74 (BP Location: Left arm, Patient Position: Sitting)   Pulse 74   Temp 96.9 °F (36.1 °C) (Oral)   Resp 16   Ht 4' 11.84" (1.52 m)   Wt 65 kg (143 lb 4.8 oz)   SpO2 97%   Breastfeeding No   BMI 28.13 kg/m²     Outcome Measures:  AM-PAC 6 CLICK MOBILITY  Turning over in bed (including adjusting bedclothes, sheets and blankets)?: 3  Sitting down on and " standing up from a chair with arms (e.g., wheelchair, bedside commode, etc.): 3  Moving from lying on back to sitting on the side of the bed?: 3  Moving to and from a bed to a chair (including a wheelchair)?: 3  Need to walk in hospital room?: 3  Climbing 3-5 steps with a railing?: 3  Basic Mobility Total Score: 18     Functional Mobility:    Bed Mobility:   Scooting with independence  Supine > Sit with independence    Transfers:   Sit <> Stand Transfer: supervision with no assistive device   Bed <> Chair Transfer: Stand Pivot technique with supervision with no assistive device    Balance:  Sitting Balance  Static: independence  Dynamic: independence  Standing:  Static: supervision  Dynamic: stand by assistance      Gait:  Patient ambulated: 150'   Patient required: standy by assistance  Patient used:  no assistive device   Gait Deviation(s): occasional unsteady gait, decreased step length, narrow base of support, flexed posture, and decreased abigail  all lines remained intact throughout ambulation trial  Comments: Patient slow with decreased step length but no LOB. Pt reports minor SOB after trial- SpO2 96%.    Education:  Time provided for education, counseling and discussion of health disposition in regards to patient's current status  All questions answered within PT scope of practice and to patient's satisfaction  PT role in POC to address current functional deficits  Call nursing/pct to transfer to chair/use bathroom. Pt stated understanding.    Patient left up in chair with all lines intact and call button in reach.    Time Tracking:     PT Received On: 04/23/24  PT Start Time: 1418     PT Stop Time: 1433  PT Total Time (min): 15 min     Billable Minutes:   Gait Training 10 minutes    Treatment Type: Treatment  PT/PTA: PT       4/23/2024

## 2024-04-23 NOTE — ASSESSMENT & PLAN NOTE
Body mass index is 28.13 kg/m². Morbid obesity complicates all aspects of disease management from diagnostic modalities to treatment. Weight loss encouraged and health benefits explained to patient.

## 2024-04-23 NOTE — PROGRESS NOTES
[facility] - Cardiology [facility] Medicine  Progress Note    Patient Name: Xena Vera  MRN: [id number]  Patient Class: IP- Inpatient   Admission Date: 4/1/2024  Length of Stay: 19 days  Attending Physician: Rosario Luciano MD  Primary Care Provider: Tami Villeda FNP        Subjective:     Principal Problem:Severe mitral valve regurgitation        HPI:  Per MICU: 52 yo F with PMH of HFpEF, severe MR, HTN, HLD, and CKD4 who initially presented to [facility] [place] on 4/1/2024 after a mechanical fall at home with concurrent SOB and swelling in her legs and abdomen. She reported adherence to home Lasix and low sodium diet, but had felt increasingly SOB prior to admission as she was sleeping upright and had multiple nighttime awakenings due to SOB. In the ED, BNP was 4639 and she was admitted to Hospital Medicine for further management of ADHF. She initially had good response to IV Lasix but hospital course was later complicated by urinary retention, worsening kidney function, and decreased UOP. Nephrology and Cardiology were consulted to assist. She was started on Diuril and Lasix gtt for diuresis and Isordil and hydralazine for afterload reduction. Patient transferred to CCU for further management.        Overview/Hospital Course:  MICU course  Patient initially had improved UOP with increasing Lasix, eventually maxed out on Lasix drip with addition of Diuril. Eventually started on CRRT/SLED for volume removal, initially needed intermittent levophed to tolerate. Has itchy rash that started during current hospitalization, Dermatology consulted for regimen. Graduated from CRRT to iHD, tolerating well. Stable to step back down to HM.      course  Patient slightly volume overloaded on exam. Reports making urine. Transition to lasix 40mg PO daily. More fluid removal via HD. Will need OP HD chair. SW consulted. Repeat TTE ordered for eval for MR. Discuss plans with IC once repeat TTE is obtained.  Will also need OP IC  follow up. Nephro rec consulting interventional nephro for TDC placement. She underwent TDC placement 04/22.     Interval History: Still w/itching of back- no nausea, vomiting, chest pain, shortness of breath. Still w/LE edema. Minimal UOP. No new rash.     Daughter, Alexandrea, at bedside- I offered - patient declined- desiring daughter to translate.     Objective:     Vital Signs (Most Recent):  Temp: 98.3 °F (36.8 °C) (04/22/24 1934)  Pulse: 76 (04/22/24 1934)  Resp: 18 (04/22/24 1934)  BP: 123/72 (04/22/24 1934)  SpO2: (!) 94 % (04/22/24 1934) Vital Signs (24h Range):  Temp:  [98.2 °F (36.8 °C)-98.8 °F (37.1 °C)] 98.3 °F (36.8 °C)  Pulse:  [67-79] 76  Resp:  [15-18] 18  SpO2:  [94 %-100 %] 94 %  BP: (120-169)/() 123/72     Weight: 66.7 kg (147 lb 0.8 oz)  Body mass index is 28.72 kg/m².    Intake/Output Summary (Last 24 hours) at 4/22/2024 2112  Last data filed at 4/22/2024 1810  Gross per 24 hour   Intake 840 ml   Output 2751 ml   Net -1911 ml      Physical Exam  Gen: in NAD, appears stated age, appears acutely ill   Neuro: AAOx3, motor, sensory, and strength grossly intact BL  HEENT: NTNC, EOMI, PERRL, MMM  CVS: RRR, no m/r/g; S1/S2 auscultated with no S3 or S4; capillary refill < 2 sec  Chest: TDC of the right chest wall  Resp: lungs CTAB, no w/r/r; no belabored breathing or accessory muscle use appreciated   Abd: BS+ in all 4 quadrants; NTND, soft to palpation; no organomegaly appreciated   Extrem: pulses full, equal, and regular over all 4 extremities; swelling of BL LE and dorsum of BL feet  Skin: bruising of the dorsum of left foot and lateral portion of left foot- healing abrasion of the left lateral LE, scattered papules w/hyperpigmentation of the back- scratch marks appreciated       Significant Labs: All pertinent labs within the past 24 hours have been reviewed.  CBC:   Recent Labs   Lab 04/21/24  0544 04/22/24  0513   WBC 5.59 5.24   HGB 8.9* 9.0*   HCT 30.5* 31.8*    186     CMP:    Recent Labs   Lab 04/21/24  0544 04/22/24  0513   * 128*   K 3.9 4.2    100   CO2 22* 17*   GLU 69* 79   BUN 17 26*   CREATININE 3.3* 4.4*   CALCIUM 7.8* 7.8*   PROT 6.2 6.2   ALBUMIN 2.7* 2.7*   BILITOT 1.1* 1.0   ALKPHOS 100 102   AST 26 26   ALT 25 25   ANIONGAP 9 11       Significant Imaging: I have reviewed all pertinent imaging results/findings within the past 24 hours.    Assessment/Plan:      * Severe mitral valve regurgitation    Acute on chronic heart failure with preserved ejection fraction (HFpEF)   Anasarca      54 yo F admitted for ADHF 2/2 severe MR. The MR appears to be secondary to posterior leaflet restriction and leaflet length of 1.1 cm. BNP on admission was 4300 compared to 350 eight months prior. Initially diagnosed with severe MR in June of 2023. Saw Dr. Stone in January of 2024 and was undergoing work-up for possible MitraClip; LHC scheduled for 3/15 but not completed due to financial constraints, also needs POOJA. Will need optimization of kidney function prior to angiogram consideration, appreciate Nephrology assistance. APS serologies negative.      DDX: rheumatic heart disease vs Fabry vs antiphospholipid (serologies negative)     4/1/2024 TTE    Left Ventricle: The left ventricle is normal in size. Normal wall thickness. Normal wall motion. There is normal systolic function with a visually estimated ejection fraction of 60 - 65%. Grade II diastolic dysfunction.    Right Ventricle: Normal right ventricular cavity size. Wall thickness is normal. Right ventricle wall motion  is normal. Systolic function is reduced.    Left Atrium: Left atrium is very  severely dilated.    Right Atrium: Right atrium is mildly dilated.    Aortic Valve: There is mild aortic valve sclerosis. There is normal leaflet mobility. There is trace aortic regurgitation.    Mitral Valve: There is mild bileaflet sclerosis. There is posterior leaflet tethering and failure of complete coaptation. There is very  "severe regurgitation with a posterolateral eccentriccally directed jet.    Tricuspid Valve: The tricuspid valve is structurally normal. There is normal leaflet mobility. There is moderate to severe regurgitation.    IVC/SVC: IVC was not well visualized due to poor acoustic window. Intermediate venous pressure at 8 mmHg.    Pericardium: There is a trivial effusion posteriorly and small under the RA.     - Interventional Cardiology consulted - no intervention until patient's volume status is controlled  - Nephrology following, undergoing SLED/CRRT  - POOJA ordered - "Anesthesia evaluated the patient and feel that she needs better optimization prior to POOJA. They stated once she is euvolemic that we can move forward. Will need a new POOJA order at that time."  - Discontinued Diuril 500 mg IV q24h & Lasix gtt @ 40 mg/hr  - Discontinued hydralazine 25 mg po q8h & Isordil 20 mg po BID for afterload reduction due to pressor requirements  - Continue home ASA 81 mg po daily and Lipitor 40 mg po daily  - Genetics consulted, see "Hypertrophic cardiomyopathy"  - Strict I/O, Jin in place  - Transitioned to lasix 40mg PO daily. Reassess daily for diuretic use.   - Repeat TTE ordered for eval for MR. Discuss plans with IC once repeat TTE is obtained.    - Will also need OP IC follow up.   - Telemetry monitoring    Hyponatremia  Patient has hyponatremia which is uncontrolled,We will aim to correct the sodium by 4-6mEq in 24 hours. We will monitor sodium Daily. The hyponatremia is due to renal insufficiency. We will obtain the following studies: no new labs at this moment. We will treat the hyponatremia with Fluid restriction of:  1.5 liter per day and Hemodialysis. The patient's sodium results have been reviewed and are listed below.  Recent Labs   Lab 04/22/24  0513   *       Intertrigo  - improvement       Rash  - derm initially consulted- concern for resolving folliculitis- no significant improvement  - triamcinolone cream " "today-monitor for improvement- if no improvement, then will re-consult derm    Proteinuria  - h/o      Hypertrophic cardiomyopathy  Angiokeratoma   Proteinuria      - Genetics consulted, appreciate josie  - GLA genetic testing collected and sent to [facility]  - Consider cardiac MRI      Angiokeratoma        Prediabetes  - h/o    HLD (hyperlipidemia)  - continue statin    Acute kidney injury superimposed on chronic kidney disease  Patient with acute kidney injury/acute renal failure likely due to acute tubular necrosis caused by unknown.  REGINALDO is currently  improving on CRRT/SLED . Baseline creatinine  2.5  - Labs reviewed- Renal function/electrolytes with Estimated Creatinine Clearance: 80.9 mL/min (based on SCr of 0.7 mg/dL). according to latest data. Monitor urine output and serial BMP and adjust therapy as needed. Avoid nephrotoxins and renally dose meds for GFR listed above.           Recent Labs     04/17/24  1428 04/17/24  2203 04/18/24  0413   CREATININE 1.8*  1.8* 0.8  0.8 0.7  0.7  0.7         - Nephrology following, appreciate josie - started SLED on 4/14   - Genetics consulted, see "Hypertrophic cardiomyopathy"  - Continue home Renvela 800 mg po TID with meals  - Holding home sodium bicarb 650 mg po BID  - HD per nephrology. Will need OP HD chair setup.   - Nephro following- consulted interventional nephrology- TDC today - awaiting medicaid enrollment to set-up outpatient HD chair        Acute urinary retention  - now w/acute renal failure requiring iHD  - no catheter in place       Leg wound, left  - wound care following    Anasarca  - 2/2 acute renal failure in setting of CHF w/severe MR       Prolonged QT interval  - resolved    Acute on chronic heart failure with preserved ejection fraction (HFpEF)  - Interval history and physical exam findings as described above  - Most recent TTE results:  Results for orders placed during the hospital encounter of 04/01/24    Echo    Interpretation Summary    " Left Ventricle: The left ventricle is normal in size. Normal wall thickness. Normal wall motion. There is normal systolic function with a visually estimated ejection fraction of 60 - 65%. Grade II diastolic dysfunction.    Right Ventricle: Normal right ventricular cavity size. Wall thickness is normal. Right ventricle wall motion  is normal. Systolic function is reduced.    Left Atrium: Left atrium is very  severely dilated.    Right Atrium: Right atrium is mildly dilated.    Aortic Valve: There is mild aortic valve sclerosis. There is normal leaflet mobility. There is trace aortic regurgitation.    Mitral Valve: There is mild bileaflet sclerosis. There is posterior leaflet tethering and failure of complete coaptation. There is very severe regurgitation with a posterolateral eccentriccally directed jet.    Tricuspid Valve: The tricuspid valve is structurally normal. There is normal leaflet mobility. There is moderate to severe regurgitation.    IVC/SVC: IVC was not well visualized due to poor acoustic window. Intermediate venous pressure at 8 mmHg.    Pericardium: There is a trivial effusion posteriorly and small under the RA.    - Clinically volume overloaded on admission  - now w/iHD needs  - Will continue diuresis with lasix 40mg PO qday  - Continue home cardioprudent regimen  - Repeat echo ordered, pending   - Strict I/Os  - 1.5L fluid restriction   - Daily weights  - Will continue to monitor on tele      Anemia  Stable.   Recent Labs   Lab 04/20/24  0518 04/21/24  0544 04/22/24  0513   WBC 5.63 5.59 5.24   HGB 8.6* 8.9* 9.0*   HCT 30.2* 30.5* 31.8*    187 186         Class 2 obesity in adult  Body mass index is 28.72 kg/m². Morbid obesity complicates all aspects of disease management from diagnostic modalities to treatment. Weight loss encouraged and health benefits explained to patient.     Essential hypertension     Temp:  [98 °F (36.7 °C)-98.5 °F (36.9 °C)]   Pulse:  [55-62]   Resp:  [9-40]   BP:  (94)/(53)   SpO2:  [94 %-100 %]   Arterial Line BP: ()/(44-66) .     While in the hospital, will manage blood pressure as follows; Adjust home antihypertensive regimen as follows- holding Lasix and Coreg     Will utilize p.r.n. blood pressure medication only if patient's blood pressure greater than 180/110 and she develops symptoms such as worsening chest pain or shortness of breath.         VTE Risk Mitigation (From admission, onward)           Ordered     heparin (porcine) injection  As needed (PRN)         04/22/24 1321     heparin (porcine) injection 1,000 Units  As needed (PRN)         04/13/24 1428     Place sequential compression device  Until discontinued         04/03/24 1347     IP VTE HIGH RISK PATIENT  Once         04/01/24 1030     Place sequential compression device  Until discontinued         04/01/24 1030                    Discharge Planning   RILEY: 4/23/2024     Code Status: Full Code   Is the patient medically ready for discharge?: No    Reason for patient still in hospital (select all that apply): Patient trending condition  Discharge Plan A: Home with family, Other (New HD chair)   Discharge Delays: None known at this time                Rosario Luciano MD  Department of Hospital Medicine   [facility] - Cardiology Stepdown

## 2024-04-23 NOTE — SUBJECTIVE & OBJECTIVE
Interval History: Improvement in itching of rash. No sob, chest pain, nausea, vomiting, fevers, chills, night sweats.  utilized.     Objective:     Vital Signs (Most Recent):  Temp: 98.3 °F (36.8 °C) (04/23/24 1600)  Pulse: 75 (04/23/24 1600)  Resp: 16 (04/23/24 1600)  BP: 137/83 (04/23/24 1600)  SpO2: 96 % (04/23/24 1600) Vital Signs (24h Range):  Temp:  [96.9 °F (36.1 °C)-99.2 °F (37.3 °C)] 98.3 °F (36.8 °C)  Pulse:  [68-79] 75  Resp:  [16-18] 16  SpO2:  [93 %-97 %] 96 %  BP: (112-137)/(62-83) 137/83     Weight: 65 kg (143 lb 4.8 oz)  Body mass index is 28.13 kg/m².    Intake/Output Summary (Last 24 hours) at 4/23/2024 1711  Last data filed at 4/23/2024 0413  Gross per 24 hour   Intake 480 ml   Output 125 ml   Net 355 ml      Physical Exam  Gen: in NAD, appears stated age, appears acutely ill   Neuro: AAOx3, motor, sensory, and strength grossly intact BL  HEENT: NTNC, EOMI, PERRL, MMM  CVS: RRR, no m/r/g; S1/S2 auscultated with no S3 or S4; capillary refill < 2 sec  Chest: TDC of the right chest wall  Resp: lungs CTAB, no w/r/r; no belabored breathing or accessory muscle use appreciated   Abd: BS+ in all 4 quadrants; NTND, soft to palpation; no organomegaly appreciated   Extrem: pulses full, equal, and regular over all 4 extremities; swelling of BL LE and dorsum of BL feet  Skin: bruising of the dorsum of left foot and lateral portion of left foot- healing abrasion of the left lateral LE, scattered papules w/hyperpigmentation of the back- scratch marks appreciated     Significant Labs: All pertinent labs within the past 24 hours have been reviewed.  CBC:   Recent Labs   Lab 04/22/24  0513 04/23/24 0416   WBC 5.24 5.41   HGB 9.0* 9.4*   HCT 31.8* 31.8*    199     CMP:   Recent Labs   Lab 04/22/24  0513 04/23/24  0416   * 128*   K 4.2 4.0    96   CO2 17* 23   GLU 79 59*   BUN 26* 16   CREATININE 4.4* 2.8*   CALCIUM 7.8* 7.9*   PROT 6.2 6.1   ALBUMIN 2.7* 2.6*   BILITOT 1.0 1.1*    ALKPHOS 102 104   AST 26 26   ALT 25 20   ANIONGAP 11 9       Significant Imaging: I have reviewed all pertinent imaging results/findings within the past 24 hours.

## 2024-04-23 NOTE — ASSESSMENT & PLAN NOTE
Acute on chronic heart failure with preserved ejection fraction (HFpEF)   Anasarca      54 yo F admitted for ADHF 2/2 severe MR. The MR appears to be secondary to posterior leaflet restriction and leaflet length of 1.1 cm. BNP on admission was 4300 compared to 350 eight months prior. Initially diagnosed with severe MR in June of 2023. Saw Dr. Stone in January of 2024 and was undergoing work-up for possible MitraClip; C scheduled for 3/15 but not completed due to financial constraints, also needs POOJA. Will need optimization of kidney function prior to angiogram consideration, appreciate Nephrology assistance. APS serologies negative.      DDX: rheumatic heart disease vs Fabry vs antiphospholipid (serologies negative)     4/1/2024 TTE    Left Ventricle: The left ventricle is normal in size. Normal wall thickness. Normal wall motion. There is normal systolic function with a visually estimated ejection fraction of 60 - 65%. Grade II diastolic dysfunction.    Right Ventricle: Normal right ventricular cavity size. Wall thickness is normal. Right ventricle wall motion  is normal. Systolic function is reduced.    Left Atrium: Left atrium is very  severely dilated.    Right Atrium: Right atrium is mildly dilated.    Aortic Valve: There is mild aortic valve sclerosis. There is normal leaflet mobility. There is trace aortic regurgitation.    Mitral Valve: There is mild bileaflet sclerosis. There is posterior leaflet tethering and failure of complete coaptation. There is very severe regurgitation with a posterolateral eccentriccally directed jet.    Tricuspid Valve: The tricuspid valve is structurally normal. There is normal leaflet mobility. There is moderate to severe regurgitation.    IVC/SVC: IVC was not well visualized due to poor acoustic window. Intermediate venous pressure at 8 mmHg.    Pericardium: There is a trivial effusion posteriorly and small under the RA.     - Interventional Cardiology consulted - no  "intervention until patient's volume status is controlled  - Nephrology following, undergoing SLED/CRRT  - POOJA ordered - "Anesthesia evaluated the patient and feel that she needs better optimization prior to POOJA. They stated once she is euvolemic that we can move forward. Will need a new POOJA order at that time."  - Discontinued Diuril 500 mg IV q24h & Lasix gtt @ 40 mg/hr  - Discontinued hydralazine 25 mg po q8h & Isordil 20 mg po BID for afterload reduction due to pressor requirements  - Continue home ASA 81 mg po daily and Lipitor 40 mg po daily  - Genetics consulted, see "Hypertrophic cardiomyopathy"  - Strict I/O, Jin in place  - Transitioned to lasix 40mg PO daily. Reassess daily for diuretic use.   - Repeat TTE ordered for eval for MR. Discuss plans with IC once repeat TTE is obtained.    - Will also need OP IC follow up.   - Telemetry monitoring  "

## 2024-04-23 NOTE — SUBJECTIVE & OBJECTIVE
Past Medical History:   Diagnosis Date    (HFpEF) heart failure with preserved ejection fraction 06/24/2023    EF 63% with G2 DD  Continue lasix, appears euvolemic today  Continue good BP management with losartan, increase Coreg 6.25 mg BID  Fluid restriction, low sodium diet  Recommend Jardiance- patient had CKD 4 and this is a limiting factor- nephro eval pending    Anemia 06/24/2023    CKD (chronic kidney disease)     HTN (hypertension)     Mitral valve regurgitation 06/26/2023    Refer to structural for evaluation  May benefit from mitral clip    Ovarian cyst     Stage 4 chronic kidney disease 06/24/2023    Refer to nephrology at [facility] as unable to get established with [facility] and the phone number for the outside nephrologist provided to patient during her recent hospitalization goes unanswered when called.   Cr remains elevated  Would like to start SGLT-2 and appreciate nephrology expertise        Past Surgical History:   Procedure Laterality Date    INSERTION OF TUNNELED CENTRAL VENOUS HEMODIALYSIS CATHETER Right 4/22/2024    Procedure: Insertion, Catheter, Central Venous, Hemodialysis;  Surgeon: Carole Rolon MD;  Location: [facility] CATH LAB;  Service: Interventional Nephrology;  Laterality: Right;       Review of patient's allergies indicates:  No Known Allergies    Current Facility-Administered Medications   Medication Dose Route Frequency Provider Last Rate Last Admin    acetaminophen tablet 1,000 mg  1,000 mg Oral Q6H PRN Domonique Walden MD   1,000 mg at 04/22/24 2211    aspirin EC tablet 81 mg  81 mg Oral Daily David Borjas, PA-C   81 mg at 04/23/24 0804    atorvastatin tablet 40 mg  40 mg Oral Daily David Borjas PA-C   40 mg at 04/23/24 0804    bisacodyL EC tablet 5 mg  5 mg Oral Daily PRN Johnny Cisneros MD        clindamycin phosphate 1% gel   Topical (Top) BID Domonique Walden MD   Given at 04/23/24 0807    dextrose 10% bolus 125 mL 125 mL  12.5 g Intravenous PRN [place]  David JANE PA-C        dextrose 10% bolus 250 mL 250 mL  25 g Intravenous PRN David Borjas PA-C        diphenhydrAMINE-zinc acetate 2-0.1% cream   Topical (Top) TID PRN Domonique Walden MD   Given at 04/22/24 1556    furosemide tablet 40 mg  40 mg Oral Daily Margaret Olsen MD   40 mg at 04/23/24 0804    glucagon (human recombinant) injection 1 mg  1 mg Intramuscular PRN David Borjas PA-C        glucose chewable tablet 16 g  16 g Oral PRN David Borjas PA-C        glucose chewable tablet 24 g  24 g Oral PRN David Borjas PA-C        HYDROcodone-acetaminophen 5-325 mg per tablet 1 tablet  1 tablet Oral Q6H PRN Johnny Cisneros MD   1 tablet at 04/18/24 1010    hydrOXYzine HCL tablet 10 mg  10 mg Oral TID PRN Domonique Walden MD   10 mg at 04/22/24 2211    melatonin tablet 6 mg  6 mg Oral Nightly PRN Pietro Herring MD   6 mg at 04/18/24 2047    miconazole 2 % cream   Topical (Top) BID Domonique Walden MD   Given at 04/23/24 0808    naloxone 0.4 mg/mL injection 0.02 mg  0.02 mg Intravenous PRN David Borjas PA-C        ondansetron disintegrating tablet 4 mg  4 mg Oral Q8H PRN David Borjas PA-C        ondansetron injection 4 mg  4 mg Intravenous Q8H PRN David Borjas PA-C   4 mg at 04/13/24 1429    oxybutynin tablet 5 mg  5 mg Oral TID Minda Molina MD   5 mg at 04/23/24 0804    polyethylene glycol packet 17 g  17 g Oral BID Domonique Walden MD   17 g at 04/23/24 0804    senna-docusate 8.6-50 mg per tablet 1 tablet  1 tablet Oral BID Domonique Walden MD   1 tablet at 04/23/24 0804    sevelamer carbonate tablet 800 mg  800 mg Oral TID  David Borjas PA-C   800 mg at 04/23/24 1114    sodium chloride 0.9% bolus 250 mL 250 mL  250 mL Intravenous PRN Italo Segovia MD        sodium chloride 0.9% flush 10 mL  10 mL Intravenous PRN Pietro Herring MD        sodium chloride 0.9% flush 10 mL  10 mL Intravenous PRN David Borjas PA-C        sodium chloride  0.9% flush 10 mL  10 mL Intravenous PRN Carole Rolon MD        triamcinolone acetonide 0.1% cream   Topical (Top) BID Rosario Luciano MD   Given at 24 0807    white petrolatum 41 % ointment   Topical (Top) BID Johnny Cisneros MD   Given at 24 0900    zinc oxide 20 % ointment   Topical (Top) PRN David Borjas PA-C   Given at 24 0925     Family History    None       Tobacco Use    Smoking status: Former     Current packs/day: 0.00     Types: Cigarettes     Start date:      Quit date:      Years since quittin.3    Smokeless tobacco: Never    Tobacco comments:     2-3 cigarettes a day   Substance and Sexual Activity    Alcohol use: Never    Drug use: Never    Sexual activity: Not on file       Objective:     Vital Signs (Most Recent):  Temp: 96.9 °F (36.1 °C) (24 1154)  Pulse: 74 (24 1154)  Resp: 16 (24 1154)  BP: 130/74 (24 1154)  SpO2: 97 % (24 1154) Vital Signs (24h Range):  Temp:  [96.9 °F (36.1 °C)-99.2 °F (37.3 °C)] 96.9 °F (36.1 °C)  Pulse:  [68-79] 74  Resp:  [16-18] 16  SpO2:  [93 %-98 %] 97 %  BP: (112-169)/(62-89) 130/74     Weight: 65 kg (143 lb 4.8 oz)  Body mass index is 28.13 kg/m².    SpO2: 97 %         Intake/Output Summary (Last 24 hours) at 2024 1504  Last data filed at 2024 0413  Gross per 24 hour   Intake 480 ml   Output 125 ml   Net 355 ml       Lines/Drains/Airways       Central Venous Catheter Line  Duration             Tunneled Central Line - Double Lumen  24 1317 Internal Jugular Right 1 day              Peripheral Intravenous Line  Duration                  Peripheral IV - Single Lumen 24 1600 18 G;1 3/4 in Anterior;Right Upper Arm 8 days         Midline Catheter - Single Lumen 24 1744 Left cephalic vein (lateral side of arm) 20g x 10cm 6 days                     Physical Exam  Vitals and nursing note reviewed.   Constitutional:       Appearance: Normal appearance. She is obese.   HENT:       Head: Atraumatic.   Eyes:      Conjunctiva/sclera: Conjunctivae normal.   Cardiovascular:      Rate and Rhythm: Normal rate and regular rhythm.      Heart sounds: Murmur heard.   Pulmonary:      Effort: Pulmonary effort is normal.      Breath sounds: Normal breath sounds. No wheezing.   Abdominal:      General: There is no distension.      Palpations: Abdomen is soft.      Tenderness: There is no abdominal tenderness.   Musculoskeletal:      Right lower leg: Edema present.      Left lower leg: Edema present.   Skin:     General: Skin is warm.   Neurological:      General: No focal deficit present.   Psychiatric:         Mood and Affect: Mood normal.            Significant Labs: All pertinent lab results from the last 24 hours have been reviewed.    Significant Imaging:     Echocardiogram (4/23/2024)    Left Ventricle: The left ventricle is moderately dilated. Normal wall thickness. There is eccentric hypertrophy. There is normal systolic function with a visually estimated ejection fraction of 55 - 60%. Diastolic function cannot be reliably determined in the presence of mitral valve disease.    Right Ventricle: Mild right ventricular enlargement. Wall thickness is normal. Right ventricle wall motion  is normal. Systolic function is mildly reduced.    Biatrial enlargement (L > R)    Mitral Valve: There is severe functional regurgitation with a centrally directed jet.    Tricuspid Valve: There is mild to moderate regurgitation.    Pulmonary Artery: The estimated pulmonary artery systolic pressure is 96 mmHg.    IVC/SVC: Intermediate venous pressure at 8 mmHg.    There is a small pericardial effusion and a right pleural effusion.

## 2024-04-23 NOTE — ASSESSMENT & PLAN NOTE
Stable.   Recent Labs   Lab 04/21/24  0544 04/22/24  0513 04/23/24  0416   WBC 5.59 5.24 5.41   HGB 8.9* 9.0* 9.4*   HCT 30.5* 31.8* 31.8*    186 199

## 2024-04-23 NOTE — ASSESSMENT & PLAN NOTE
- Interval history and physical exam findings as described above  - Most recent TTE results:  Results for orders placed during the hospital encounter of 04/01/24    Echo    Interpretation Summary    Left Ventricle: The left ventricle is normal in size. Normal wall thickness. Normal wall motion. There is normal systolic function with a visually estimated ejection fraction of 60 - 65%. Grade II diastolic dysfunction.    Right Ventricle: Normal right ventricular cavity size. Wall thickness is normal. Right ventricle wall motion  is normal. Systolic function is reduced.    Left Atrium: Left atrium is very  severely dilated.    Right Atrium: Right atrium is mildly dilated.    Aortic Valve: There is mild aortic valve sclerosis. There is normal leaflet mobility. There is trace aortic regurgitation.    Mitral Valve: There is mild bileaflet sclerosis. There is posterior leaflet tethering and failure of complete coaptation. There is very severe regurgitation with a posterolateral eccentriccally directed jet.    Tricuspid Valve: The tricuspid valve is structurally normal. There is normal leaflet mobility. There is moderate to severe regurgitation.    IVC/SVC: IVC was not well visualized due to poor acoustic window. Intermediate venous pressure at 8 mmHg.    Pericardium: There is a trivial effusion posteriorly and small under the RA.    - Clinically volume overloaded on admission  - now w/iHD needs  - Will continue diuresis with lasix 40mg PO qday  - Continue home cardioprudent regimen  - Repeat echo ordered, pending   - Strict I/Os  - 1.5L fluid restriction   - Daily weights  - Will continue to monitor on tele

## 2024-04-23 NOTE — ASSESSMENT & PLAN NOTE
Angiokeratoma   Proteinuria      - Genetics consulted, appreciate recs  - GLA genetic testing collected and sent to [facility]  - Consider cardiac MRI

## 2024-04-23 NOTE — PROGRESS NOTES
Urinary Retention    Patient is oliguric and voids about 50-100mL at a time. RN bladder scanned patient multiple times. The highest volume bladder scan was 287mL. Patient then voids their usual amount of 50-100mL and isn't empyting their bladder. Order for straight cath is for a volume of 300mL or more. Will continue to monitor as ordered.

## 2024-04-23 NOTE — SUBJECTIVE & OBJECTIVE
Interval History: Still w/itching of back- no nausea, vomiting, chest pain, shortness of breath. Still w/LE edema. Minimal UOP. No new rash.     Daughter, Alexandrea, at bedside- I offered - patient declined- desiring daughter to translate.     Objective:     Vital Signs (Most Recent):  Temp: 98.3 °F (36.8 °C) (04/22/24 1934)  Pulse: 76 (04/22/24 1934)  Resp: 18 (04/22/24 1934)  BP: 123/72 (04/22/24 1934)  SpO2: (!) 94 % (04/22/24 1934) Vital Signs (24h Range):  Temp:  [98.2 °F (36.8 °C)-98.8 °F (37.1 °C)] 98.3 °F (36.8 °C)  Pulse:  [67-79] 76  Resp:  [15-18] 18  SpO2:  [94 %-100 %] 94 %  BP: (120-169)/() 123/72     Weight: 66.7 kg (147 lb 0.8 oz)  Body mass index is 28.72 kg/m².    Intake/Output Summary (Last 24 hours) at 4/22/2024 2112  Last data filed at 4/22/2024 1810  Gross per 24 hour   Intake 840 ml   Output 2751 ml   Net -1911 ml      Physical Exam  Gen: in NAD, appears stated age, appears acutely ill   Neuro: AAOx3, motor, sensory, and strength grossly intact BL  HEENT: NTNC, EOMI, PERRL, MMM  CVS: RRR, no m/r/g; S1/S2 auscultated with no S3 or S4; capillary refill < 2 sec  Chest: TDC of the right chest wall  Resp: lungs CTAB, no w/r/r; no belabored breathing or accessory muscle use appreciated   Abd: BS+ in all 4 quadrants; NTND, soft to palpation; no organomegaly appreciated   Extrem: pulses full, equal, and regular over all 4 extremities; swelling of BL LE and dorsum of BL feet  Skin: bruising of the dorsum of left foot and lateral portion of left foot- healing abrasion of the left lateral LE, scattered papules w/hyperpigmentation of the back- scratch marks appreciated       Significant Labs: All pertinent labs within the past 24 hours have been reviewed.  CBC:   Recent Labs   Lab 04/21/24  0544 04/22/24  0513   WBC 5.59 5.24   HGB 8.9* 9.0*   HCT 30.5* 31.8*    186     CMP:   Recent Labs   Lab 04/21/24  0544 04/22/24  0513   * 128*   K 3.9 4.2    100   CO2 22* 17*   GLU 69*  79   BUN 17 26*   CREATININE 3.3* 4.4*   CALCIUM 7.8* 7.8*   PROT 6.2 6.2   ALBUMIN 2.7* 2.7*   BILITOT 1.1* 1.0   ALKPHOS 100 102   AST 26 26   ALT 25 25   ANIONGAP 9 11       Significant Imaging: I have reviewed all pertinent imaging results/findings within the past 24 hours.

## 2024-04-23 NOTE — ASSESSMENT & PLAN NOTE
"Patient with acute kidney injury/acute renal failure likely due to acute tubular necrosis caused by unknown.  REGINALDO is currently  improving on CRRT/SLED . Baseline creatinine  2.5  - Labs reviewed- Renal function/electrolytes with Estimated Creatinine Clearance: 80.9 mL/min (based on SCr of 0.7 mg/dL). according to latest data. Monitor urine output and serial BMP and adjust therapy as needed. Avoid nephrotoxins and renally dose meds for GFR listed above.           Recent Labs     04/17/24  1428 04/17/24  2203 04/18/24  0413   CREATININE 1.8*  1.8* 0.8  0.8 0.7  0.7  0.7         - Nephrology following, appreciate recs - started SLED on 4/14   - Genetics consulted, see "Hypertrophic cardiomyopathy"  - Continue home Renvela 800 mg po TID with meals  - Holding home sodium bicarb 650 mg po BID  - HD per nephrology. Will need OP HD chair setup.   - Nephro following- consulted interventional nephrology- TD 04/22 - awaiting medicaid enrollment to set-up outpatient HD chair      "

## 2024-04-23 NOTE — ASSESSMENT & PLAN NOTE
- Interval history and physical exam findings as described above  - Most recent TTE results:  Results for orders placed during the hospital encounter of 04/01/24    Echo    Interpretation Summary    Left Ventricle: The left ventricle is normal in size. Normal wall thickness. Normal wall motion. There is normal systolic function with a visually estimated ejection fraction of 60 - 65%. Grade II diastolic dysfunction.    Right Ventricle: Normal right ventricular cavity size. Wall thickness is normal. Right ventricle wall motion  is normal. Systolic function is reduced.    Left Atrium: Left atrium is very  severely dilated.    Right Atrium: Right atrium is mildly dilated.    Aortic Valve: There is mild aortic valve sclerosis. There is normal leaflet mobility. There is trace aortic regurgitation.    Mitral Valve: There is mild bileaflet sclerosis. There is posterior leaflet tethering and failure of complete coaptation. There is very severe regurgitation with a posterolateral eccentriccally directed jet.    Tricuspid Valve: The tricuspid valve is structurally normal. There is normal leaflet mobility. There is moderate to severe regurgitation.    IVC/SVC: IVC was not well visualized due to poor acoustic window. Intermediate venous pressure at 8 mmHg.    Pericardium: There is a trivial effusion posteriorly and small under the RA.    - Clinically volume overloaded on admission  - now w/iHD needs  - Will continue diuresis with lasix 40mg PO qday  - Continue home cardioprudent regimen  - Repeat echo ordered, pending   - Strict I/Os  - 1.5L fluid restriction   - Daily weights  - Will continue to monitor on tele

## 2024-04-23 NOTE — ASSESSMENT & PLAN NOTE
- derm initially consulted- concern for resolving folliculitis- no significant improvement  - triamcinolone cream today-monitor for improvement- if no improvement, then will re-consult derm

## 2024-04-23 NOTE — NURSING
Pt taken to echo. Pt AAOx4. Pt stable. No complaints of pain or signs of distress. Left per wheelchair with transport.

## 2024-04-23 NOTE — PT/OT/SLP PROGRESS
"Occupational Therapy   Treatment    Name: Xena Vera  MRN: [id number]  Admitting Diagnosis:  Severe mitral valve regurgitation  1 Day Post-Op    Recommendations:     Discharge Recommendations: Low Intensity Therapy  Discharge Equipment Recommendations:  walker, rolling  Barriers to discharge:  None    Assessment:     Xena Vera is a 53 y.o. female with a medical diagnosis of Severe mitral valve regurgitation.  Performance deficits affecting function are weakness, impaired endurance, impaired functional mobility, impaired self care skills, gait instability, decreased upper extremity function, decreased lower extremity function, decreased safety awareness, impaired cardiopulmonary response to activity. Pt agreeable to therapy and tolerated well. Pt seen today to  perform therapeutic activities to improve (I)  and safety in ADL, IADL, and functional mobility performance. Pt reporting performing toilet t/f and toileting Mod (I) prior to OT arrival.  Pt remains limited in ADLs, functional mobility, and functional transfers.  Patient continues to demonstrate the need for low intensity therapy on a scheduled basis exhibited by decreased independence with self-care        Rehab Prognosis:  Good; patient would benefit from acute skilled OT services to address these deficits and reach maximum level of function.       Plan:     Patient to be seen 3 x/week to address the above listed problems via self-care/home management, therapeutic activities, therapeutic exercises, neuromuscular re-education  Plan of Care Expires: 05/18/24  Plan of Care Reviewed with: patient    Subjective     Chief Complaint: No complaints  Patient/Family Comments/goals: "Thank you kindly."  Jack #[id number] and Deborah #[id number] utilized 2/2 pt [place]ian speaking  Pain/Comfort:  Pain Rating 1: 2/10  Location - Side 1: Right  Location 1: shoulder  Pain Addressed 1: Reposition, Distraction  Pain Rating Post-Intervention 1:  (not " rated)    Objective:     Communicated with: RN prior to session.  Patient found  long sitting in bed  with blood pressure cuff, telemetry, central line upon OT entry to room.    General Precautions: Standard, fall    Orthopedic Precautions:N/A  Braces: N/A  Respiratory Status: Room air     Occupational Performance:     Bed Mobility:    Patient completed Supine to Sit with modified independence     Functional Mobility/Transfers:  Patient completed Sit <> Stand Transfer with contact guard assistance  with  no assistive device   Patient completed Bed <> Chair Transfer using Step Transfer technique with contact guard assistance with no assistive device  Functional Mobility: Pt engaged in functional mobility throughout hospital room ~15 ft x2 with CGA and no AD to maximize functional endurance and standing balance required for home/community mobility and occupational engagement. Pt with good balance and required cues to not hold onto walls for balance    [facility] 6 Click ADL: 22    Treatment & Education:  -Pt performed 1x10 sit<>stands sitting EOB to improve strength, endurance, and balance required for performing home management, IADLs, standing ADLs, and functional mobility safely and with (I) at discharge. Pt required SBA and noted with good activity tolerance at this time.     -Education on energy conservation and task modification to maximize safety and (I) during ADLs and mobility  -Provided education regarding role of OT, POC, & discharge recommendations with pt verbalizing understanding.  Pt had no further questions & when asked whether there were any concerns pt reported none.      Patient left up in chair with all lines intact, call button in reach, and RN notified    GOALS:   Multidisciplinary Problems       Occupational Therapy Goals          Problem: Occupational Therapy    Goal Priority Disciplines Outcome Interventions   Occupational Therapy Goal     OT, PT/OT Progressing    Description: Goals to be met by:  5/18/24     Patient will increase functional independence with ADLs by performing:    UE Dressing with Modified [place].  LE Dressing with Modified [place].  Grooming while standing at sink with Modified [place].  Toileting from toilet/bedside commode with Modified [place] for hygiene and clothing management.   Toilet transfer to toilet/bedside commode with Modified [place].                           Time Tracking:     OT Date of Treatment: 04/23/24  OT Start Time: 0955  OT Stop Time: 1010  OT Total Time (min): 15 min    Billable Minutes:Therapeutic Activity 15 min    OT/RA: OT     Number of RA visits since last OT visit: 1 4/23/2024

## 2024-04-23 NOTE — ASSESSMENT & PLAN NOTE
Body mass index is 28.72 kg/m². Morbid obesity complicates all aspects of disease management from diagnostic modalities to treatment. Weight loss encouraged and health benefits explained to patient.

## 2024-04-23 NOTE — ASSESSMENT & PLAN NOTE
"Patient with acute kidney injury/acute renal failure likely due to acute tubular necrosis caused by unknown.  REGINALDO is currently  improving on CRRT/SLED . Baseline creatinine  2.5  - Labs reviewed- Renal function/electrolytes with Estimated Creatinine Clearance: 80.9 mL/min (based on SCr of 0.7 mg/dL). according to latest data. Monitor urine output and serial BMP and adjust therapy as needed. Avoid nephrotoxins and renally dose meds for GFR listed above.           Recent Labs     04/17/24  1428 04/17/24  2203 04/18/24  0413   CREATININE 1.8*  1.8* 0.8  0.8 0.7  0.7  0.7         - Nephrology following, appreciate recs - started SLED on 4/14   - Genetics consulted, see "Hypertrophic cardiomyopathy"  - Continue home Renvela 800 mg po TID with meals  - Holding home sodium bicarb 650 mg po BID  - HD per nephrology. Will need OP HD chair setup.   - Nephro following- consulted interventional nephrology- TDC today - awaiting medicaid enrollment to set-up outpatient HD chair      "

## 2024-04-23 NOTE — PROGRESS NOTES
[facility]orga - Cardiology [facility] Medicine  Progress Note    Patient Name: Xena Vera  MRN: [id number]  Patient Class: IP- Inpatient   Admission Date: 4/1/2024  Length of Stay: 20 days  Attending Physician: Rosario Luciano MD  Primary Care Provider: Tami Villeda FNP        Subjective:     Principal Problem:Severe mitral valve regurgitation        HPI:  Per MICU: 54 yo F with PMH of HFpEF, severe MR, HTN, HLD, and CKD4 who initially presented to [facility] on 4/1/2024 after a mechanical fall at home with concurrent SOB and swelling in her legs and abdomen. She reported adherence to home Lasix and low sodium diet, but had felt increasingly SOB prior to admission as she was sleeping upright and had multiple nighttime awakenings due to SOB. In the ED, BNP was 4639 and she was admitted to Hospital Medicine for further management of ADHF. She initially had good response to IV Lasix but hospital course was later complicated by urinary retention, worsening kidney function, and decreased UOP. Nephrology and Cardiology were consulted to assist. She was started on Diuril and Lasix gtt for diuresis and Isordil and hydralazine for afterload reduction. Patient transferred to CCU for further management.        Overview/Hospital Course:  MICU course  Patient initially had improved UOP with increasing Lasix, eventually maxed out on Lasix drip with addition of Diuril. Eventually started on CRRT/SLED for volume removal, initially needed intermittent levophed to tolerate. Has itchy rash that started during current hospitalization, Dermatology consulted for regimen. Graduated from CRRT to iHD, tolerating well. Stable to step back down to HM.      course  Patient slightly volume overloaded on exam. Reports making urine. Transition to lasix 40mg PO daily. More fluid removal via HD. Will need OP HD chair. SW consulted. Repeat TTE ordered for eval for MR. Discuss plans with IC once repeat TTE is obtained.  Will also need OP IC  follow up. Nephro rec consulting interventional nephro for TDC placement. She underwent TDC placement 04/22. Repeat TTE w/the following findings:      Left Ventricle: The left ventricle is moderately dilated. Normal wall thickness. There is eccentric hypertrophy. There is normal systolic function with a visually estimated ejection fraction of 55 - 60%. Diastolic function cannot be reliably determined in the presence of mitral valve disease.    Right Ventricle: Mild right ventricular enlargement. Wall thickness is normal. Right ventricle wall motion  is normal. Systolic function is mildly reduced.    Biatrial enlargement (L > R)    Mitral Valve: There is severe functional regurgitation with a centrally directed jet.    Tricuspid Valve: There is mild to moderate regurgitation.    Pulmonary Artery: The estimated pulmonary artery systolic pressure is 96 mmHg.    IVC/SVC: Intermediate venous pressure at 8 mmHg.    There is a small pericardial effusion and a right pleural effusion.    Interventional cardiology consulted- recommended further medical management optimization and continued fluid removal via iHD. Not a candidate for mitraclip at this time.       Interval History: Improvement in itching of rash. No sob, chest pain, nausea, vomiting, fevers, chills, night sweats.  utilized.     Objective:     Vital Signs (Most Recent):  Temp: 98.3 °F (36.8 °C) (04/23/24 1600)  Pulse: 75 (04/23/24 1600)  Resp: 16 (04/23/24 1600)  BP: 137/83 (04/23/24 1600)  SpO2: 96 % (04/23/24 1600) Vital Signs (24h Range):  Temp:  [96.9 °F (36.1 °C)-99.2 °F (37.3 °C)] 98.3 °F (36.8 °C)  Pulse:  [68-79] 75  Resp:  [16-18] 16  SpO2:  [93 %-97 %] 96 %  BP: (112-137)/(62-83) 137/83     Weight: 65 kg (143 lb 4.8 oz)  Body mass index is 28.13 kg/m².    Intake/Output Summary (Last 24 hours) at 4/23/2024 1711  Last data filed at 4/23/2024 0413  Gross per 24 hour   Intake 480 ml   Output 125 ml   Net 355 ml      Physical Exam  Gen: in NAD,  appears stated age, appears acutely ill   Neuro: AAOx3, motor, sensory, and strength grossly intact BL  HEENT: NTNC, EOMI, PERRL, MMM  CVS: RRR, no m/r/g; S1/S2 auscultated with no S3 or S4; capillary refill < 2 sec  Chest: TDC of the right chest wall  Resp: lungs CTAB, no w/r/r; no belabored breathing or accessory muscle use appreciated   Abd: BS+ in all 4 quadrants; NTND, soft to palpation; no organomegaly appreciated   Extrem: pulses full, equal, and regular over all 4 extremities; swelling of BL LE and dorsum of BL feet  Skin: bruising of the dorsum of left foot and lateral portion of left foot- healing abrasion of the left lateral LE, scattered papules w/hyperpigmentation of the back- scratch marks appreciated     Significant Labs: All pertinent labs within the past 24 hours have been reviewed.  CBC:   Recent Labs   Lab 04/22/24  0513 04/23/24  0416   WBC 5.24 5.41   HGB 9.0* 9.4*   HCT 31.8* 31.8*    199     CMP:   Recent Labs   Lab 04/22/24  0513 04/23/24  0416   * 128*   K 4.2 4.0    96   CO2 17* 23   GLU 79 59*   BUN 26* 16   CREATININE 4.4* 2.8*   CALCIUM 7.8* 7.9*   PROT 6.2 6.1   ALBUMIN 2.7* 2.6*   BILITOT 1.0 1.1*   ALKPHOS 102 104   AST 26 26   ALT 25 20   ANIONGAP 11 9       Significant Imaging: I have reviewed all pertinent imaging results/findings within the past 24 hours.    Assessment/Plan:      * Severe mitral valve regurgitation    Acute on chronic heart failure with preserved ejection fraction (HFpEF)   Anasarca      52 yo F admitted for ADHF 2/2 severe MR. The MR appears to be secondary to posterior leaflet restriction and leaflet length of 1.1 cm. BNP on admission was 4300 compared to 350 eight months prior. Initially diagnosed with severe MR in June of 2023. Saw Dr. Stone in January of 2024 and was undergoing work-up for possible MitraClip; LHC scheduled for 3/15 but not completed due to financial constraints, also needs POOJA. Will need optimization of kidney function  "prior to angiogram consideration, appreciate Nephrology assistance. APS serologies negative.      DDX: rheumatic heart disease vs Fabry vs antiphospholipid (serologies negative)     4/1/2024 TTE    Left Ventricle: The left ventricle is normal in size. Normal wall thickness. Normal wall motion. There is normal systolic function with a visually estimated ejection fraction of 60 - 65%. Grade II diastolic dysfunction.    Right Ventricle: Normal right ventricular cavity size. Wall thickness is normal. Right ventricle wall motion  is normal. Systolic function is reduced.    Left Atrium: Left atrium is very  severely dilated.    Right Atrium: Right atrium is mildly dilated.    Aortic Valve: There is mild aortic valve sclerosis. There is normal leaflet mobility. There is trace aortic regurgitation.    Mitral Valve: There is mild bileaflet sclerosis. There is posterior leaflet tethering and failure of complete coaptation. There is very severe regurgitation with a posterolateral eccentriccally directed jet.    Tricuspid Valve: The tricuspid valve is structurally normal. There is normal leaflet mobility. There is moderate to severe regurgitation.    IVC/SVC: IVC was not well visualized due to poor acoustic window. Intermediate venous pressure at 8 mmHg.    Pericardium: There is a trivial effusion posteriorly and small under the RA.     - Interventional Cardiology consulted - no intervention until patient's volume status is controlled  - Nephrology following, undergoing SLED/CRRT  - POOJA ordered - "Anesthesia evaluated the patient and feel that she needs better optimization prior to POOJA. They stated once she is euvolemic that we can move forward. Will need a new POOJA order at that time."  - Discontinued Diuril 500 mg IV q24h & Lasix gtt @ 40 mg/hr  - Discontinued hydralazine 25 mg po q8h & Isordil 20 mg po BID for afterload reduction due to pressor requirements  - Continue home ASA 81 mg po daily and Lipitor 40 mg po daily  - " "Genetics consulted, see "Hypertrophic cardiomyopathy"  - Strict I/O, Jin in place  - Transitioned to lasix 40mg PO daily. Reassess daily for diuretic use.   - Repeat TTE reviewed- IC consulted- not a candidate for mitraclip- recommended outpatient f/u- continued fluid removal/fluid status optimization via HD- up-titration of GDMT as tolerated  - Will also need OP IC follow up.   - Telemetry monitoring    Hyponatremia  Patient has hyponatremia which is uncontrolled,We will aim to correct the sodium by 4-6mEq in 24 hours. We will monitor sodium Daily. The hyponatremia is due to renal insufficiency. We will obtain the following studies: no new labs at this moment. We will treat the hyponatremia with Fluid restriction of:  1.5 liter per day and Hemodialysis. The patient's sodium results have been reviewed and are listed below.  Recent Labs   Lab 04/23/24  0416   *         Intertrigo  - improvement       Rash  - derm initially consulted- concern for resolving folliculitis- no significant improvement  - triamcinolone cream today-monitor for improvement- if no improvement, then will re-consult derm    Proteinuria  - h/o      Hypertrophic cardiomyopathy  Angiokeratoma   Proteinuria      - Genetics consulted, appreciate recs  - GLA genetic testing collected and sent to [facility]  - Consider cardiac MRI      Angiokeratoma        Prediabetes  - h/o    HLD (hyperlipidemia)  - continue statin    Acute kidney injury superimposed on chronic kidney disease  Patient with acute kidney injury/acute renal failure likely due to acute tubular necrosis caused by unknown.  REGINALDO is currently  improving on CRRT/SLED . Baseline creatinine  2.5  - Labs reviewed- Renal function/electrolytes with Estimated Creatinine Clearance: 80.9 mL/min (based on SCr of 0.7 mg/dL). according to latest data. Monitor urine output and serial BMP and adjust therapy as needed. Avoid nephrotoxins and renally dose meds for GFR listed above.           Recent " "Labs     04/17/24  1428 04/17/24  2203 04/18/24  0413   CREATININE 1.8*  1.8* 0.8  0.8 0.7  0.7  0.7         - Nephrology following, appreciate recs - started SLED on 4/14   - Genetics consulted, see "Hypertrophic cardiomyopathy"  - Continue home Renvela 800 mg po TID with meals  - Holding home sodium bicarb 650 mg po BID  - HD per nephrology. Will need OP HD chair setup.   - Nephro following- consulted interventional nephrology- TDC 04/22 - awaiting medicaid enrollment to set-up outpatient HD chair        Acute urinary retention  - now w/acute renal failure requiring iHD  - no catheter in place       Leg wound, left  - wound care following    Anasarca  - 2/2 acute renal failure in setting of CHF w/severe MR       Prolonged QT interval  - resolved    Acute on chronic heart failure with preserved ejection fraction (HFpEF)  - Interval history and physical exam findings as described above  - Most recent TTE results:  Results for orders placed during the hospital encounter of 04/01/24    Echo    Interpretation Summary    Left Ventricle: The left ventricle is normal in size. Normal wall thickness. Normal wall motion. There is normal systolic function with a visually estimated ejection fraction of 60 - 65%. Grade II diastolic dysfunction.    Right Ventricle: Normal right ventricular cavity size. Wall thickness is normal. Right ventricle wall motion  is normal. Systolic function is reduced.    Left Atrium: Left atrium is very  severely dilated.    Right Atrium: Right atrium is mildly dilated.    Aortic Valve: There is mild aortic valve sclerosis. There is normal leaflet mobility. There is trace aortic regurgitation.    Mitral Valve: There is mild bileaflet sclerosis. There is posterior leaflet tethering and failure of complete coaptation. There is very severe regurgitation with a posterolateral eccentriccally directed jet.    Tricuspid Valve: The tricuspid valve is structurally normal. There is normal leaflet mobility. " There is moderate to severe regurgitation.    IVC/SVC: IVC was not well visualized due to poor acoustic window. Intermediate venous pressure at 8 mmHg.    Pericardium: There is a trivial effusion posteriorly and small under the RA.    - Clinically volume overloaded on admission  - now w/iHD needs  - Will continue diuresis with lasix 40mg PO qday  - Continue home cardioprudent regimen  - Repeat echo ordered, pending   - Strict I/Os  - 1.5L fluid restriction   - Daily weights  - Will continue to monitor on tele      Anemia  Stable.   Recent Labs   Lab 04/21/24  0544 04/22/24  0513 04/23/24  0416   WBC 5.59 5.24 5.41   HGB 8.9* 9.0* 9.4*   HCT 30.5* 31.8* 31.8*    186 199         Class 2 obesity in adult  Body mass index is 28.13 kg/m². Morbid obesity complicates all aspects of disease management from diagnostic modalities to treatment. Weight loss encouraged and health benefits explained to patient.     Essential hypertension     Temp:  [98 °F (36.7 °C)-98.5 °F (36.9 °C)]   Pulse:  [55-62]   Resp:  [9-40]   BP: (94)/(53)   SpO2:  [94 %-100 %]   Arterial Line BP: ()/(44-66) .     While in the hospital, will manage blood pressure as follows; Adjust home antihypertensive regimen as follows- holding Lasix and Coreg     Will utilize p.r.n. blood pressure medication only if patient's blood pressure greater than 180/110 and she develops symptoms such as worsening chest pain or shortness of breath.         VTE Risk Mitigation (From admission, onward)           Ordered     heparin (porcine) injection 1,000 Units  As needed (PRN)         04/13/24 1428     Place sequential compression device  Until discontinued         04/03/24 1347     IP VTE HIGH RISK PATIENT  Once         04/01/24 1030     Place sequential compression device  Until discontinued         04/01/24 1030                    Discharge Planning   RILEY: 4/26/2024     Code Status: Full Code   Is the patient medically ready for discharge?: No    Reason for  patient still in hospital (select all that apply): Patient trending condition  Discharge Plan A: Home with family, Other (New HD chair)   Discharge Delays: None known at this time          Rosario Luciano MD  Department of Hospital Medicine   [facility] - Cardiology Stepdown

## 2024-04-23 NOTE — ASSESSMENT & PLAN NOTE
Patient has hyponatremia which is uncontrolled,We will aim to correct the sodium by 4-6mEq in 24 hours. We will monitor sodium Daily. The hyponatremia is due to renal insufficiency. We will obtain the following studies: no new labs at this moment. We will treat the hyponatremia with Fluid restriction of:  1.5 liter per day and Hemodialysis. The patient's sodium results have been reviewed and are listed below.  Recent Labs   Lab 04/23/24  0416   *

## 2024-04-23 NOTE — ASSESSMENT & PLAN NOTE
Temp:  [98 °F (36.7 °C)-98.5 °F (36.9 °C)]   Pulse:  [55-62]   Resp:  [9-40]   BP: (94)/(53)   SpO2:  [94 %-100 %]   Arterial Line BP: ()/(44-66) .     While in the hospital, will manage blood pressure as follows; Adjust home antihypertensive regimen as follows- holding Lasix and Coreg     Will utilize p.r.n. blood pressure medication only if patient's blood pressure greater than 180/110 and she develops symptoms such as worsening chest pain or shortness of breath.

## 2024-04-23 NOTE — ASSESSMENT & PLAN NOTE
Patient has hyponatremia which is uncontrolled,We will aim to correct the sodium by 4-6mEq in 24 hours. We will monitor sodium Daily. The hyponatremia is due to renal insufficiency. We will obtain the following studies: no new labs at this moment. We will treat the hyponatremia with Fluid restriction of:  1.5 liter per day and Hemodialysis. The patient's sodium results have been reviewed and are listed below.  Recent Labs   Lab 04/22/24  0513   *

## 2024-04-23 NOTE — CONSULTS
[facility] - Cardiology Stepdown  Interventional Cardiology  Consult Note    Patient Name: Xena Vera  MRN: [id number]  Admission Date: 4/1/2024  Hospital Length of Stay: 20 days  Code Status: Full Code   Attending Provider: Rosario Luciano MD  Consulting Provider: Lorenza Ritchie MD  Primary Care Physician: Tami Villeda FNP  Principal Problem:Severe mitral valve regurgitation    Patient information was obtained from patient, ER records, and primary team.     Inpatient consult to Interventional Cardiology  Consult performed by: Lorenza Ritchie MD  Consult ordered by: Rosario Luciano MD        Subjective:   HPI:    Xena Vera is a 52 yo F with PMH of HFpEF, severe MR, HTN, HLD, and CKD4 who [place] [facility] [place] a mechanical fall at home with concurrent SOB and swelling in her legs and abdomen. She reported adherence to home Lasix and low sodium diet, but had felt increasingly SOB prior to admission as she was sleeping upright and had multiple nighttime awakenings due to SOB. In the ED, BNP was 4639 and she was admitted to Hospital Medicine for further management of ADHF. She initially had good response to IV Lasix but hospital course was later complicated by urinary retention, worsening kidney function, and decreased UOP. She eventually ended up requiring CRRT/SLED for further volume removal. She underwent HD catheter placement. She underwent repeat TTE which continued to show severe functional regurgitation with centrally directed jet. Interventional Cardiology consulted for evaluation for Severe MR and possible Mitraclip    Of note, patient was evaluated back in 1/2024 for Mitraclip. At the time patient had under gone the below work up. Plan for diagnostic LHC.       The patient has undergone the following MitraClip work-up:   POOJA (Date NEEDS): Severe mitral insufficiency, MVA  cm2, MG  mmHg, EOA  cm2, EF= %.   LHC (Date NEEDS):    STS: 2%   Frailty: 1/4   CT Surgery risk  assessment:  risk, per Dr  due to   Comorbidities: CKD4, Obesity, SevMR, HFpEF    Past Medical History:   Diagnosis Date    (HFpEF) heart failure with preserved ejection fraction 06/24/2023    EF 63% with G2 DD  Continue lasix, appears euvolemic today  Continue good BP management with losartan, increase Coreg 6.25 mg BID  Fluid restriction, low sodium diet  Recommend Jardiance- patient had CKD 4 and this is a limiting factor- nephro eval pending    Anemia 06/24/2023    CKD (chronic kidney disease)     HTN (hypertension)     Mitral valve regurgitation 06/26/2023    Refer to structural for evaluation  May benefit from mitral clip    Ovarian cyst     Stage 4 chronic kidney disease 06/24/2023    Refer to nephrology at [facility] as unable to get established with [facility] and the phone number for the outside nephrologist provided to patient during her recent hospitalization goes unanswered when called.   Cr remains elevated  Would like to start SGLT-2 and appreciate nephrology expertise        Past Surgical History:   Procedure Laterality Date    INSERTION OF TUNNELED CENTRAL VENOUS HEMODIALYSIS CATHETER Right 4/22/2024    Procedure: Insertion, Catheter, Central Venous, Hemodialysis;  Surgeon: Carole Rolon MD;  Location: [facility] CATH LAB;  Service: Interventional Nephrology;  Laterality: Right;       Review of patient's allergies indicates:  No Known Allergies    Current Facility-Administered Medications   Medication Dose Route Frequency Provider Last Rate Last Admin    acetaminophen tablet 1,000 mg  1,000 mg Oral Q6H PRN Domonique Walden MD   1,000 mg at 04/22/24 2211    aspirin EC tablet 81 mg  81 mg Oral Daily David Borjas, PA-C   81 mg at 04/23/24 0804    atorvastatin tablet 40 mg  40 mg Oral Daily David Borjas PA-C   40 mg at 04/23/24 0804    bisacodyL EC tablet 5 mg  5 mg Oral Daily PRN Johnny Cisneros MD        clindamycin phosphate 1% gel   Topical (Top) BID Domonique Walden MD   Given at  04/23/24 0807    dextrose 10% bolus 125 mL 125 mL  12.5 g Intravenous PRN David Borjas PA-DIMITRY        dextrose 10% bolus 250 mL 250 mL  25 g Intravenous PRN David Borjas PA-DIMITRY        diphenhydrAMINE-zinc acetate 2-0.1% cream   Topical (Top) TID PRN Domonique Walden MD   Given at 04/22/24 1556    furosemide tablet 40 mg  40 mg Oral Daily Margaret Olsen MD   40 mg at 04/23/24 0804    glucagon (human recombinant) injection 1 mg  1 mg Intramuscular PRN David Borjas PA-C        glucose chewable tablet 16 g  16 g Oral PRN David Borjas PA-C        glucose chewable tablet 24 g  24 g Oral PRN David Borjas PA-C        HYDROcodone-acetaminophen 5-325 mg per tablet 1 tablet  1 tablet Oral Q6H PRN Johnny Cisneros MD   1 tablet at 04/18/24 1010    hydrOXYzine HCL tablet 10 mg  10 mg Oral TID PRN Domonique Walden MD   10 mg at 04/22/24 2211    melatonin tablet 6 mg  6 mg Oral Nightly PRN Pietro Herring MD   6 mg at 04/18/24 2047    miconazole 2 % cream   Topical (Top) BID Domonique Walden MD   Given at 04/23/24 0808    naloxone 0.4 mg/mL injection 0.02 mg  0.02 mg Intravenous PRN David Borjas PA-C        ondansetron disintegrating tablet 4 mg  4 mg Oral Q8H PRN David Borjas PA-C        ondansetron injection 4 mg  4 mg Intravenous Q8H PRN David Borjas PA-C   4 mg at 04/13/24 1429    oxybutynin tablet 5 mg  5 mg Oral TID Minda Molina MD   5 mg at 04/23/24 0804    polyethylene glycol packet 17 g  17 g Oral BID Domonique Walden MD   17 g at 04/23/24 0804    senna-docusate 8.6-50 mg per tablet 1 tablet  1 tablet Oral BID Domonique Walden MD   1 tablet at 04/23/24 0804    sevelamer carbonate tablet 800 mg  800 mg Oral TID  David Borjas PA-C   800 mg at 04/23/24 1114    sodium chloride 0.9% bolus 250 mL 250 mL  250 mL Intravenous PRN Italo Segovia MD        sodium chloride 0.9% flush 10 mL  10 mL Intravenous PRN Pietro Herring MD        sodium chloride 0.9%  flush 10 mL  10 mL Intravenous PRN David Borjas PA-C        sodium chloride 0.9% flush 10 mL  10 mL Intravenous PRN Carole Rolon MD        triamcinolone acetonide 0.1% cream   Topical (Top) BID Rosario Luciano MD   Given at 24 0807    white petrolatum 41 % ointment   Topical (Top) BID Johnny Cisneros MD   Given at 24 0900    zinc oxide 20 % ointment   Topical (Top) PRN David Borjas PA-C   Given at 24 0925     Family History    None       Tobacco Use    Smoking status: Former     Current packs/day: 0.00     Types: Cigarettes     Start date:      Quit date:      Years since quittin.3    Smokeless tobacco: Never    Tobacco comments:     2-3 cigarettes a day   Substance and Sexual Activity    Alcohol use: Never    Drug use: Never    Sexual activity: Not on file       Objective:     Vital Signs (Most Recent):  Temp: 96.9 °F (36.1 °C) (24 1154)  Pulse: 74 (24 1154)  Resp: 16 (24 1154)  BP: 130/74 (24 1154)  SpO2: 97 % (24 1154) Vital Signs (24h Range):  Temp:  [96.9 °F (36.1 °C)-99.2 °F (37.3 °C)] 96.9 °F (36.1 °C)  Pulse:  [68-79] 74  Resp:  [16-18] 16  SpO2:  [93 %-98 %] 97 %  BP: (112-169)/(62-89) 130/74     Weight: 65 kg (143 lb 4.8 oz)  Body mass index is 28.13 kg/m².    SpO2: 97 %         Intake/Output Summary (Last 24 hours) at 2024 1504  Last data filed at 2024 0413  Gross per 24 hour   Intake 480 ml   Output 125 ml   Net 355 ml       Lines/Drains/Airways       Central Venous Catheter Line  Duration             Tunneled Central Line - Double Lumen  24 1317 Internal Jugular Right 1 day              Peripheral Intravenous Line  Duration                  Peripheral IV - Single Lumen 24 1600 18 G;1 3/4 in Anterior;Right Upper Arm 8 days         Midline Catheter - Single Lumen 24 1744 Left cephalic vein (lateral side of arm) 20g x 10cm 6 days                     Physical Exam  Vitals and nursing note  reviewed.   Constitutional:       Appearance: Normal appearance. She is obese.   HENT:      Head: Atraumatic.   Eyes:      Conjunctiva/sclera: Conjunctivae normal.   Cardiovascular:      Rate and Rhythm: Normal rate and regular rhythm.      Heart sounds: Murmur heard.   Pulmonary:      Effort: Pulmonary effort is normal.      Breath sounds: Normal breath sounds. No wheezing.   Abdominal:      General: There is no distension.      Palpations: Abdomen is soft.      Tenderness: There is no abdominal tenderness.   Musculoskeletal:      Right lower leg: Edema present.      Left lower leg: Edema present.   Skin:     General: Skin is warm.   Neurological:      General: No focal deficit present.   Psychiatric:         Mood and Affect: Mood normal.            Significant Labs: All pertinent lab results from the last 24 hours have been reviewed.    Significant Imaging:     Echocardiogram (4/23/2024)    Left Ventricle: The left ventricle is moderately dilated. Normal wall thickness. There is eccentric hypertrophy. There is normal systolic function with a visually estimated ejection fraction of 55 - 60%. Diastolic function cannot be reliably determined in the presence of mitral valve disease.    Right Ventricle: Mild right ventricular enlargement. Wall thickness is normal. Right ventricle wall motion  is normal. Systolic function is mildly reduced.    Biatrial enlargement (L > R)    Mitral Valve: There is severe functional regurgitation with a centrally directed jet.    Tricuspid Valve: There is mild to moderate regurgitation.    Pulmonary Artery: The estimated pulmonary artery systolic pressure is 96 mmHg.    IVC/SVC: Intermediate venous pressure at 8 mmHg.    There is a small pericardial effusion and a right pleural effusion.       Assessment and Plan:     Cardiac/Vascular  * Severe mitral valve regurgitation  53 year old female Belarusian speaking pt who was admitted for ADHF 2/2 severe MR. The MR appears to be secondary to  posterior leaflet restriction and leaflet length of 1.1 cm. Saw Dr. Stone in January of 2024 and was undergoing work-up for possible MitraClip. Lynne has been admitted with ADHF and renal failure now on HD. TTE with severe MR. The mean pressure gradient across the mitral valve is 8 mmHg   - Patient is currently not a candidate for MitraClip  - Continue with volume management per Nephrology  - Optimize GDMT  - Recommend outpatient follow up with General Cardiology        Thank you for this consult.Please follow up Attending Attestation for further recommendations. Please call if any questions.       Lorenza Ritchie MD  Interventional Cardiology   [facility] - Cardiology Stepdown      I have seen the patient, reviewed the Fellow's history and physical, assessment and plan. I have personally interviewed and examined the patient and agree with the findings. Mitral Valve gradient = 8 with severe MR. Not amendable to MitraClip. Recommend medical therapy.    MOSHE Stone MD

## 2024-04-23 NOTE — ASSESSMENT & PLAN NOTE
Stable.   Recent Labs   Lab 04/20/24  0518 04/21/24  0544 04/22/24  0513   WBC 5.63 5.59 5.24   HGB 8.6* 8.9* 9.0*   HCT 30.2* 30.5* 31.8*    187 186

## 2024-04-23 NOTE — PLAN OF CARE
SW met w/ pt at bedside. SW used  [facility] #[id number] to verify address for possible HD, possible [place]. Nano provided pt update that CM dept is working on setting up HD while medicaid is still pending. Pt confirmed address on file is correct [address] [place] [address] and pt also provided her daughter Alexandrea #[phone] which pt confirm her daughter will be her transportation home upon discharge.     Discharge Plan A and Plan B have been determined by review of patient's clinical status, future medical and therapeutic needs, and coverage/benefits for post-acute care in coordination with multidisciplinary team members.    Domonique Almanza, SILVESTRE   [facility]    Case Management Dept  [phone]

## 2024-04-24 LAB
ALBUMIN SERPL BCP-MCNC: 2.8 G/DL (ref 3.5–5.2)
ALP SERPL-CCNC: 102 U/L (ref 55–135)
ALT SERPL W/O P-5'-P-CCNC: 21 U/L (ref 10–44)
ANION GAP SERPL CALC-SCNC: 12 MMOL/L (ref 8–16)
AST SERPL-CCNC: 32 U/L (ref 10–40)
BASOPHILS # BLD AUTO: 0.05 K/UL (ref 0–0.2)
BASOPHILS NFR BLD: 0.9 % (ref 0–1.9)
BILIRUB SERPL-MCNC: 1.1 MG/DL (ref 0.1–1)
BUN SERPL-MCNC: 24 MG/DL (ref 6–20)
CALCIUM SERPL-MCNC: 8.1 MG/DL (ref 8.7–10.5)
CHLORIDE SERPL-SCNC: 94 MMOL/L (ref 95–110)
CO2 SERPL-SCNC: 22 MMOL/L (ref 23–29)
CREAT SERPL-MCNC: 3.5 MG/DL (ref 0.5–1.4)
DIFFERENTIAL METHOD BLD: ABNORMAL
EOSINOPHIL # BLD AUTO: 0.2 K/UL (ref 0–0.5)
EOSINOPHIL NFR BLD: 3 % (ref 0–8)
ERYTHROCYTE [DISTWIDTH] IN BLOOD BY AUTOMATED COUNT: 24.1 % (ref 11.5–14.5)
EST. GFR  (NO RACE VARIABLE): 14.9 ML/MIN/1.73 M^2
GLUCOSE SERPL-MCNC: 62 MG/DL (ref 70–110)
HCT VFR BLD AUTO: 34.1 % (ref 37–48.5)
HGB BLD-MCNC: 9.7 G/DL (ref 12–16)
IMM GRANULOCYTES # BLD AUTO: 0.01 K/UL (ref 0–0.04)
IMM GRANULOCYTES NFR BLD AUTO: 0.2 % (ref 0–0.5)
LYMPHOCYTES # BLD AUTO: 0.8 K/UL (ref 1–4.8)
LYMPHOCYTES NFR BLD: 14.1 % (ref 18–48)
MAGNESIUM SERPL-MCNC: 1.7 MG/DL (ref 1.6–2.6)
MCH RBC QN AUTO: 23.5 PG (ref 27–31)
MCHC RBC AUTO-ENTMCNC: 28.4 G/DL (ref 32–36)
MCV RBC AUTO: 83 FL (ref 82–98)
MONOCYTES # BLD AUTO: 0.7 K/UL (ref 0.3–1)
MONOCYTES NFR BLD: 11.3 % (ref 4–15)
NEUTROPHILS # BLD AUTO: 4.1 K/UL (ref 1.8–7.7)
NEUTROPHILS NFR BLD: 70.5 % (ref 38–73)
NRBC BLD-RTO: 0 /100 WBC
PHOSPHATE SERPL-MCNC: 2.1 MG/DL (ref 2.7–4.5)
PLATELET # BLD AUTO: 227 K/UL (ref 150–450)
PMV BLD AUTO: 10.9 FL (ref 9.2–12.9)
POTASSIUM SERPL-SCNC: 4.2 MMOL/L (ref 3.5–5.1)
PROT SERPL-MCNC: 6.5 G/DL (ref 6–8.4)
RBC # BLD AUTO: 4.12 M/UL (ref 4–5.4)
SODIUM SERPL-SCNC: 128 MMOL/L (ref 136–145)
WBC # BLD AUTO: 5.74 K/UL (ref 3.9–12.7)

## 2024-04-24 PROCEDURE — 36415 COLL VENOUS BLD VENIPUNCTURE: CPT

## 2024-04-24 PROCEDURE — 25000003 PHARM REV CODE 250: Performed by: STUDENT IN AN ORGANIZED HEALTH CARE EDUCATION/TRAINING PROGRAM

## 2024-04-24 PROCEDURE — 25000003 PHARM REV CODE 250: Performed by: PHYSICIAN ASSISTANT

## 2024-04-24 PROCEDURE — 94660 CPAP INITIATION&MGMT: CPT

## 2024-04-24 PROCEDURE — 20600001 HC STEP DOWN PRIVATE ROOM

## 2024-04-24 PROCEDURE — 25000003 PHARM REV CODE 250

## 2024-04-24 PROCEDURE — 63600175 PHARM REV CODE 636 W HCPCS: Performed by: STUDENT IN AN ORGANIZED HEALTH CARE EDUCATION/TRAINING PROGRAM

## 2024-04-24 PROCEDURE — 99900035 HC TECH TIME PER 15 MIN (STAT)

## 2024-04-24 PROCEDURE — 80053 COMPREHEN METABOLIC PANEL: CPT

## 2024-04-24 PROCEDURE — 84100 ASSAY OF PHOSPHORUS: CPT

## 2024-04-24 PROCEDURE — 99233 SBSQ HOSP IP/OBS HIGH 50: CPT | Mod: ,,, | Performed by: HOSPITALIST

## 2024-04-24 PROCEDURE — 90935 HEMODIALYSIS ONE EVALUATION: CPT

## 2024-04-24 PROCEDURE — 85025 COMPLETE CBC W/AUTO DIFF WBC: CPT

## 2024-04-24 PROCEDURE — 83735 ASSAY OF MAGNESIUM: CPT

## 2024-04-24 PROCEDURE — 94761 N-INVAS EAR/PLS OXIMETRY MLT: CPT

## 2024-04-24 RX ORDER — CARVEDILOL 6.25 MG/1
6.25 TABLET ORAL 2 TIMES DAILY
Status: DISCONTINUED | OUTPATIENT
Start: 2024-04-24 | End: 2024-05-09 | Stop reason: HOSPADM

## 2024-04-24 RX ORDER — HEPARIN SODIUM 5000 [USP'U]/ML
5000 INJECTION, SOLUTION INTRAVENOUS; SUBCUTANEOUS EVERY 8 HOURS
Status: DISCONTINUED | OUTPATIENT
Start: 2024-04-24 | End: 2024-05-09 | Stop reason: HOSPADM

## 2024-04-24 RX ORDER — HEPARIN SODIUM 1000 [USP'U]/ML
1000 INJECTION, SOLUTION INTRAVENOUS; SUBCUTANEOUS
Status: DISCONTINUED | OUTPATIENT
Start: 2024-04-24 | End: 2024-05-09 | Stop reason: HOSPADM

## 2024-04-24 RX ORDER — HEPARIN 100 UNIT/ML
10 SYRINGE INTRAVENOUS
Status: DISCONTINUED | OUTPATIENT
Start: 2024-04-24 | End: 2024-04-24

## 2024-04-24 RX ORDER — HEPARIN SODIUM 1000 [USP'U]/ML
1000 INJECTION, SOLUTION INTRAVENOUS; SUBCUTANEOUS
Status: DISCONTINUED | OUTPATIENT
Start: 2024-04-24 | End: 2024-04-24

## 2024-04-24 RX ADMIN — CARVEDILOL 6.25 MG: 6.25 TABLET, FILM COATED ORAL at 09:04

## 2024-04-24 RX ADMIN — POLYETHYLENE GLYCOL 3350 17 G: 17 POWDER, FOR SOLUTION ORAL at 09:04

## 2024-04-24 RX ADMIN — MICONAZOLE NITRATE: 20 CREAM TOPICAL at 10:04

## 2024-04-24 RX ADMIN — SENNOSIDES AND DOCUSATE SODIUM 1 TABLET: 8.6; 5 TABLET ORAL at 09:04

## 2024-04-24 RX ADMIN — Medication: at 10:04

## 2024-04-24 RX ADMIN — TRIAMCINOLONE ACETONIDE: 1 CREAM TOPICAL at 12:04

## 2024-04-24 RX ADMIN — TRIAMCINOLONE ACETONIDE: 1 CREAM TOPICAL at 10:04

## 2024-04-24 RX ADMIN — CLINDAMYCIN PHOSPHATE: 10 GEL TOPICAL at 10:04

## 2024-04-24 RX ADMIN — SEVELAMER CARBONATE 800 MG: 800 TABLET, FILM COATED ORAL at 01:04

## 2024-04-24 RX ADMIN — HEPARIN SODIUM 5000 UNITS: 5000 INJECTION INTRAVENOUS; SUBCUTANEOUS at 09:04

## 2024-04-24 RX ADMIN — FUROSEMIDE 40 MG: 40 TABLET ORAL at 09:04

## 2024-04-24 RX ADMIN — OXYBUTYNIN CHLORIDE 5 MG: 5 TABLET ORAL at 09:04

## 2024-04-24 RX ADMIN — SEVELAMER CARBONATE 800 MG: 800 TABLET, FILM COATED ORAL at 05:04

## 2024-04-24 RX ADMIN — CLINDAMYCIN PHOSPHATE: 10 GEL TOPICAL at 12:04

## 2024-04-24 RX ADMIN — OXYBUTYNIN CHLORIDE 5 MG: 5 TABLET ORAL at 01:04

## 2024-04-24 RX ADMIN — HEPARIN SODIUM 1000 UNITS: 1000 INJECTION, SOLUTION INTRAVENOUS; SUBCUTANEOUS at 10:04

## 2024-04-24 RX ADMIN — ASPIRIN 81 MG: 81 TABLET, COATED ORAL at 09:04

## 2024-04-24 RX ADMIN — ATORVASTATIN CALCIUM 40 MG: 40 TABLET, FILM COATED ORAL at 09:04

## 2024-04-24 NOTE — NURSING
Patient arrived in a stretcher to dialysis unit. AAOx4  Report received from DANIELA Figueroa RN.      Hemodialysis initiated using the following:  Dialysis Access: Right IJ Tunneled Catheter.      UF goal 1.5 L as tolerated

## 2024-04-24 NOTE — NURSING
3.5 hours HD tx completed. 1.5 L of fluid removed.    Patient tolerated well.    Blood returned. Lines flushed with NS. Catheter locked with Heparin. Clamped, capped and wrapped with sterile gauze.    HD catheter dressing changed.    Report given to DANIELA Figueroa RN.

## 2024-04-24 NOTE — SUBJECTIVE & OBJECTIVE
Interval History: No acute overnight events. Patient tolerating dialysis well.    Review of patient's allergies indicates:  No Known Allergies  Current Facility-Administered Medications   Medication Dose Route Frequency Provider Last Rate Last Admin    acetaminophen tablet 1,000 mg  1,000 mg Oral Q6H PRN Domonique Walden MD   1,000 mg at 04/22/24 2211    aspirin EC tablet 81 mg  81 mg Oral Daily David Borjas PA-C   81 mg at 04/23/24 0804    atorvastatin tablet 40 mg  40 mg Oral Daily David Borjas PA-DIMITRY   40 mg at 04/23/24 0804    bisacodyL EC tablet 5 mg  5 mg Oral Daily PRN Johnny Cisneros MD        clindamycin phosphate 1% gel   Topical (Top) BID Domonique Walden MD   Given at 04/23/24 2131    dextrose 10% bolus 125 mL 125 mL  12.5 g Intravenous PRN David Borjas PA-DIMITRY        dextrose 10% bolus 250 mL 250 mL  25 g Intravenous PRN David Borjas PA-DIMITRY        diphenhydrAMINE-zinc acetate 2-0.1% cream   Topical (Top) TID PRN Domonique Walden MD   Given at 04/22/24 1556    furosemide tablet 40 mg  40 mg Oral Daily Margaret Olsen MD   40 mg at 04/23/24 0804    glucagon (human recombinant) injection 1 mg  1 mg Intramuscular PRN David Borjas PA-C        glucose chewable tablet 16 g  16 g Oral PRN David Borjas PA-C        glucose chewable tablet 24 g  24 g Oral PRN David Borjas PA-C        HYDROcodone-acetaminophen 5-325 mg per tablet 1 tablet  1 tablet Oral Q6H PRN Johnny Cisneros MD   1 tablet at 04/18/24 1010    hydrOXYzine HCL tablet 10 mg  10 mg Oral TID PRN Domonique Walden MD   10 mg at 04/22/24 2211    melatonin tablet 6 mg  6 mg Oral Nightly PRN Pietro Herring MD   6 mg at 04/18/24 2047    miconazole 2 % cream   Topical (Top) BID Domonique Walden MD   Given at 04/23/24 2132    naloxone 0.4 mg/mL injection 0.02 mg  0.02 mg Intravenous PRN David Borjas PA-C        ondansetron disintegrating tablet 4 mg  4 mg Oral Q8H PRN David Borjas PA-C         ondansetron injection 4 mg  4 mg Intravenous Q8H PRN David Borjas PA-C   4 mg at 04/13/24 1429    oxybutynin tablet 5 mg  5 mg Oral TID Minda Molina MD   5 mg at 04/23/24 2127    polyethylene glycol packet 17 g  17 g Oral BID Domonique Walden MD   17 g at 04/23/24 2128    senna-docusate 8.6-50 mg per tablet 1 tablet  1 tablet Oral BID Domonique Walden MD   1 tablet at 04/23/24 2127    sevelamer carbonate tablet 800 mg  800 mg Oral TID WM David Borjas PA-C   800 mg at 04/23/24 1636    sodium chloride 0.9% bolus 250 mL 250 mL  250 mL Intravenous PRN Italo Segovia MD        sodium chloride 0.9% flush 10 mL  10 mL Intravenous PRN Pietro Herring MD        sodium chloride 0.9% flush 10 mL  10 mL Intravenous PRN David Borjas PA-C        sodium chloride 0.9% flush 10 mL  10 mL Intravenous PRN Carole Rolon MD        triamcinolone acetonide 0.1% cream   Topical (Top) BID Rosario Luciano MD   Given at 04/23/24 2131    white petrolatum 41 % ointment   Topical (Top) BID Johnny Cisneros MD   Given at 04/23/24 2100    zinc oxide 20 % ointment   Topical (Top) PRN David Borjas PA-C   Given at 04/14/24 0925       Objective:     Vital Signs (Most Recent):  Temp: 98.5 °F (36.9 °C) (04/24/24 0400)  Pulse: 76 (04/24/24 0845)  Resp: 17 (04/24/24 0400)  BP: 139/86 (04/24/24 0845)  SpO2: 98 % (04/24/24 0400) Vital Signs (24h Range):  Temp:  [96.9 °F (36.1 °C)-98.9 °F (37.2 °C)] 98.5 °F (36.9 °C)  Pulse:  [73-85] 76  Resp:  [16-18] 17  SpO2:  [96 %-98 %] 98 %  BP: (120-141)/(70-96) 139/86     Weight: 65.4 kg (144 lb 2.9 oz) (04/24/24 0500)  Body mass index is 28.31 kg/m².  Body surface area is 1.66 meters squared.    I/O last 3 completed shifts:  In: 840 [P.O.:840]  Out: 625 [Urine:625]     Physical Exam  Vitals and nursing note reviewed.   Constitutional:       General: She is not in acute distress.     Appearance: Normal appearance. She is not ill-appearing.   HENT:      Head: Normocephalic.    Eyes:      Extraocular Movements: Extraocular movements intact.   Cardiovascular:      Rate and Rhythm: Normal rate and regular rhythm.   Pulmonary:      Effort: Pulmonary effort is normal. No respiratory distress.      Breath sounds: No rales.   Musculoskeletal:      Right lower leg: Edema present.      Left lower leg: Edema present.   Skin:     General: Skin is warm and dry.   Neurological:      General: No focal deficit present.      Mental Status: She is alert and oriented to person, place, and time.       Significant Labs:  CMP:   Recent Labs   Lab 04/24/24  0349   GLU 62*   CALCIUM 8.1*   ALBUMIN 2.8*   PROT 6.5   *   K 4.2   CO2 22*   CL 94*   BUN 24*   CREATININE 3.5*   ALKPHOS 102   ALT 21   AST 32   BILITOT 1.1*     All labs within the past 24 hours have been reviewed.

## 2024-04-24 NOTE — NURSING
Pt bladder scanned multiple times. Highest volume bladder scan was 52mL. Order to straight cath for volume >300mL. Will continue plan of care.

## 2024-04-24 NOTE — PLAN OF CARE
Problem: Chronic Kidney Disease  Goal: Electrolyte Balance  Outcome: Progressing     Problem: Chronic Kidney Disease  Goal: Fluid Balance  Outcome: Progressing

## 2024-04-24 NOTE — PLAN OF CARE
Patient had dialysis today - removed 1.5 liters - vitals stable, independent in the room, scabs on LLE from fall at home, awaiting dialysis chair for discharge.

## 2024-04-24 NOTE — SUBJECTIVE & OBJECTIVE
Interval History: No issues overnight. No sob, chest pain, nausea, vomiting, fevers, chills, night sweats.  utilized.     Objective:     Vital Signs (Most Recent):  Temp: 98.3 °F (36.8 °C) (04/24/24 1132)  Pulse: 86 (04/24/24 1132)  Resp: 17 (04/24/24 0400)  BP: 139/88 (04/24/24 1132)  SpO2: 98 % (04/24/24 1132) Vital Signs (24h Range):  Temp:  [98.3 °F (36.8 °C)-98.9 °F (37.2 °C)] 98.3 °F (36.8 °C)  Pulse:  [73-86] 86  Resp:  [16-18] 17  SpO2:  [96 %-98 %] 98 %  BP: (120-152)/(70-98) 139/88     Weight: 65.4 kg (144 lb 2.9 oz)  Body mass index is 28.31 kg/m².    Intake/Output Summary (Last 24 hours) at 4/24/2024 1317  Last data filed at 4/24/2024 1115  Gross per 24 hour   Intake 600 ml   Output 2650 ml   Net -2050 ml      Physical Exam  Gen: in NAD, appears stated age, appears acutely ill   Neuro: AAOx3, motor, sensory, and strength grossly intact BL  HEENT: NTNC, EOMI, PERRL, MMM  CVS: RRR, no m/r/g; S1/S2 auscultated with no S3 or S4; capillary refill < 2 sec  Chest: TDC of the right chest wall  Resp: lungs CTAB, no w/r/r; no belabored breathing or accessory muscle use appreciated   Abd: BS+ in all 4 quadrants; NTND, soft to palpation; no organomegaly appreciated   Extrem: pulses full, equal, and regular over all 4 extremities; swelling of BL LE and dorsum of BL feet  Skin: bruising of the dorsum of left foot and lateral portion of left foot- healing abrasion of the left lateral LE, scattered papules w/hyperpigmentation of the back- scratch marks appreciated     Significant Labs: All pertinent labs within the past 24 hours have been reviewed.  CBC:   Recent Labs   Lab 04/23/24  0416 04/24/24  0349   WBC 5.41 5.74   HGB 9.4* 9.7*   HCT 31.8* 34.1*    227     CMP:   Recent Labs   Lab 04/23/24  0416 04/24/24 0349   * 128*   K 4.0 4.2   CL 96 94*   CO2 23 22*   GLU 59* 62*   BUN 16 24*   CREATININE 2.8* 3.5*   CALCIUM 7.9* 8.1*   PROT 6.1 6.5   ALBUMIN 2.6* 2.8*   BILITOT 1.1* 1.1*   ALKPHOS  104 102   AST 26 32   ALT 20 21   ANIONGAP 9 12       Significant Imaging: I have reviewed all pertinent imaging results/findings within the past 24 hours.

## 2024-04-24 NOTE — ASSESSMENT & PLAN NOTE
Stable.   Recent Labs   Lab 04/22/24  0513 04/23/24  0416 04/24/24  0349   WBC 5.24 5.41 5.74   HGB 9.0* 9.4* 9.7*   HCT 31.8* 31.8* 34.1*    199 227

## 2024-04-24 NOTE — ASSESSMENT & PLAN NOTE
Body mass index is 28.31 kg/m². Morbid obesity complicates all aspects of disease management from diagnostic modalities to treatment. Weight loss encouraged and health benefits explained to patient.

## 2024-04-24 NOTE — PROGRESS NOTES
[facility] - Cardiology [facility] Medicine  Progress Note    Patient Name: Xena Vera  MRN: [id number]  Patient Class: IP- Inpatient   Admission Date: 4/1/2024  Length of Stay: 21 days  Attending Physician: Rosario Luciano MD  Primary Care Provider: Tami Villeda FNP        Subjective:     Principal Problem:Severe mitral valve regurgitation        HPI:  Per MICU: 52 yo F with PMH of HFpEF, severe MR, HTN, HLD, and CKD4 who initially presented to [facility] on 4/1/2024 after a mechanical fall at home with concurrent SOB and swelling in her legs and abdomen. She reported adherence to home Lasix and low sodium diet, but had felt increasingly SOB prior to admission as she was sleeping upright and had multiple nighttime awakenings due to SOB. In the ED, BNP was 4639 and she was admitted to Hospital Medicine for further management of ADHF. She initially had good response to IV Lasix but hospital course was later complicated by urinary retention, worsening kidney function, and decreased UOP. Nephrology and Cardiology were consulted to assist. She was started on Diuril and Lasix gtt for diuresis and Isordil and hydralazine for afterload reduction. Patient transferred to CCU for further management.        Overview/Hospital Course:  MICU course  Patient initially had improved UOP with increasing Lasix, eventually maxed out on Lasix drip with addition of Diuril. Eventually started on CRRT/SLED for volume removal, initially needed intermittent levophed to tolerate. Has itchy rash that started during current hospitalization, Dermatology consulted for regimen. Graduated from CRRT to iHD, tolerating well. Stable to step back down to HM.      course  Patient slightly volume overloaded on exam. Reports making urine. Transition to lasix 40mg PO daily. More fluid removal via HD. Will need OP HD chair. SW consulted. Repeat TTE ordered for eval for MR. Discuss plans with IC once repeat TTE is obtained.  Will also need OP IC  follow up. Nephro rec consulting interventional nephro for TDC placement. She underwent TDC placement 04/22. Repeat TTE w/the following findings:      Left Ventricle: The left ventricle is moderately dilated. Normal wall thickness. There is eccentric hypertrophy. There is normal systolic function with a visually estimated ejection fraction of 55 - 60%. Diastolic function cannot be reliably determined in the presence of mitral valve disease.    Right Ventricle: Mild right ventricular enlargement. Wall thickness is normal. Right ventricle wall motion  is normal. Systolic function is mildly reduced.    Biatrial enlargement (L > R)    Mitral Valve: There is severe functional regurgitation with a centrally directed jet.    Tricuspid Valve: There is mild to moderate regurgitation.    Pulmonary Artery: The estimated pulmonary artery systolic pressure is 96 mmHg.    IVC/SVC: Intermediate venous pressure at 8 mmHg.    There is a small pericardial effusion and a right pleural effusion.    Interventional cardiology consulted- recommended further medical management optimization and continued fluid removal via iHD. Not a candidate for mitraclip at this time.       Interval History: No issues overnight. No sob, chest pain, nausea, vomiting, fevers, chills, night sweats.  utilized.     Objective:     Vital Signs (Most Recent):  Temp: 98.3 °F (36.8 °C) (04/24/24 1132)  Pulse: 86 (04/24/24 1132)  Resp: 17 (04/24/24 0400)  BP: 139/88 (04/24/24 1132)  SpO2: 98 % (04/24/24 1132) Vital Signs (24h Range):  Temp:  [98.3 °F (36.8 °C)-98.9 °F (37.2 °C)] 98.3 °F (36.8 °C)  Pulse:  [73-86] 86  Resp:  [16-18] 17  SpO2:  [96 %-98 %] 98 %  BP: (120-152)/(70-98) 139/88     Weight: 65.4 kg (144 lb 2.9 oz)  Body mass index is 28.31 kg/m².    Intake/Output Summary (Last 24 hours) at 4/24/2024 1317  Last data filed at 4/24/2024 1115  Gross per 24 hour   Intake 600 ml   Output 2650 ml   Net -2050 ml      Physical Exam  Gen: in NAD, appears  stated age, appears acutely ill   Neuro: AAOx3, motor, sensory, and strength grossly intact BL  HEENT: NTNC, EOMI, PERRL, MMM  CVS: RRR, no m/r/g; S1/S2 auscultated with no S3 or S4; capillary refill < 2 sec  Chest: TDC of the right chest wall  Resp: lungs CTAB, no w/r/r; no belabored breathing or accessory muscle use appreciated   Abd: BS+ in all 4 quadrants; NTND, soft to palpation; no organomegaly appreciated   Extrem: pulses full, equal, and regular over all 4 extremities; swelling of BL LE and dorsum of BL feet  Skin: bruising of the dorsum of left foot and lateral portion of left foot- healing abrasion of the left lateral LE, scattered papules w/hyperpigmentation of the back- scratch marks appreciated     Significant Labs: All pertinent labs within the past 24 hours have been reviewed.  CBC:   Recent Labs   Lab 04/23/24  0416 04/24/24  0349   WBC 5.41 5.74   HGB 9.4* 9.7*   HCT 31.8* 34.1*    227     CMP:   Recent Labs   Lab 04/23/24  0416 04/24/24  0349   * 128*   K 4.0 4.2   CL 96 94*   CO2 23 22*   GLU 59* 62*   BUN 16 24*   CREATININE 2.8* 3.5*   CALCIUM 7.9* 8.1*   PROT 6.1 6.5   ALBUMIN 2.6* 2.8*   BILITOT 1.1* 1.1*   ALKPHOS 104 102   AST 26 32   ALT 20 21   ANIONGAP 9 12       Significant Imaging: I have reviewed all pertinent imaging results/findings within the past 24 hours.    Assessment/Plan:      * Severe mitral valve regurgitation    Acute on chronic heart failure with preserved ejection fraction (HFpEF)   Anasarca      52 yo F admitted for ADHF 2/2 severe MR. The MR appears to be secondary to posterior leaflet restriction and leaflet length of 1.1 cm. BNP on admission was 4300 compared to 350 eight months prior. Initially diagnosed with severe MR in June of 2023. Saw Dr. Stone in January of 2024 and was undergoing work-up for possible MitraClip; LHC scheduled for 3/15 but not completed due to financial constraints, also needs POOJA. Will need optimization of kidney function prior to  "angiogram consideration, appreciate Nephrology assistance. APS serologies negative, Fabry workup negative     DDX: rheumatic heart disease vs Fabry vs antiphospholipid (serologies negative)     4/1/2024 TTE    Left Ventricle: The left ventricle is normal in size. Normal wall thickness. Normal wall motion. There is normal systolic function with a visually estimated ejection fraction of 60 - 65%. Grade II diastolic dysfunction.    Right Ventricle: Normal right ventricular cavity size. Wall thickness is normal. Right ventricle wall motion  is normal. Systolic function is reduced.    Left Atrium: Left atrium is very  severely dilated.    Right Atrium: Right atrium is mildly dilated.    Aortic Valve: There is mild aortic valve sclerosis. There is normal leaflet mobility. There is trace aortic regurgitation.    Mitral Valve: There is mild bileaflet sclerosis. There is posterior leaflet tethering and failure of complete coaptation. There is very severe regurgitation with a posterolateral eccentriccally directed jet.    Tricuspid Valve: The tricuspid valve is structurally normal. There is normal leaflet mobility. There is moderate to severe regurgitation.    IVC/SVC: IVC was not well visualized due to poor acoustic window. Intermediate venous pressure at 8 mmHg.    Pericardium: There is a trivial effusion posteriorly and small under the RA.     - Interventional Cardiology consulted - no intervention until patient's volume status is controlled  - Nephrology following, undergoing SLED/CRRT  - POOJA ordered - "Anesthesia evaluated the patient and feel that she needs better optimization prior to POOJA. They stated once she is euvolemic that we can move forward. Will need a new POOJA order at that time."  - Discontinued Diuril 500 mg IV q24h & Lasix gtt @ 40 mg/hr  - Discontinued hydralazine 25 mg po q8h & Isordil 20 mg po BID for afterload reduction due to pressor requirements  - Continue home ASA 81 mg po daily and Lipitor 40 mg po " "daily  - Genetics consulted, see "Hypertrophic cardiomyopathy"  - Strict I/O, Jin in place  - Transitioned to lasix 40mg PO daily. Reassess daily for diuretic use.   - Repeat TTE reviewed- IC consulted- not a candidate for mitraclip- recommended outpatient f/u- continued fluid removal/fluid status optimization via HD- up-titration of GDMT as tolerated  - Will also need OP IC follow up.   - Telemetry monitoring    Hyponatremia  Patient has hyponatremia which is uncontrolled,We will aim to correct the sodium by 4-6mEq in 24 hours. We will monitor sodium Daily. The hyponatremia is due to renal insufficiency. We will obtain the following studies: no new labs at this moment. We will treat the hyponatremia with Fluid restriction of:  1.5 liter per day and Hemodialysis. The patient's sodium results have been reviewed and are listed below.  Recent Labs   Lab 04/24/24  0349   *         Intertrigo  - improvement       Rash  - derm initially consulted- concern for resolving folliculitis- no significant improvement  - triamcinolone cream today-monitor for improvement- if no improvement, then will re-consult derm    Proteinuria  - h/o      Hypertrophic cardiomyopathy  Angiokeratoma   Proteinuria      - Genetics consulted, appreciate recs  - GLA genetic testing collected and sent to [facility]- neg for fabry's disease  - Consider cardiac MRI      Angiokeratoma        Prediabetes  - h/o    HLD (hyperlipidemia)  - continue statin    Acute kidney injury superimposed on chronic kidney disease  Patient with acute kidney injury/acute renal failure likely due to acute tubular necrosis caused by unknown.  REGINALDO is currently  improving on CRRT/SLED . Baseline creatinine  2.5  - Labs reviewed- Renal function/electrolytes with Estimated Creatinine Clearance: 80.9 mL/min (based on SCr of 0.7 mg/dL). according to latest data. Monitor urine output and serial BMP and adjust therapy as needed. Avoid nephrotoxins and renally dose meds for " "GFR listed above.           Recent Labs     04/17/24  1428 04/17/24  2203 04/18/24  0413   CREATININE 1.8*  1.8* 0.8  0.8 0.7  0.7  0.7         - Nephrology following, brendon galindo - started SLED on 4/14   - Genetics consulted, see "Hypertrophic cardiomyopathy"  - Continue home Renvela 800 mg po TID with meals  - Holding home sodium bicarb 650 mg po BID  - HD per nephrology. Will need OP HD chair setup.   - Nephro following- consulted interventional nephrology- TDC 04/22 - awaiting medicaid enrollment to set-up outpatient HD chair        Acute urinary retention  - now w/acute renal failure requiring iHD  - no catheter in place       Leg wound, left  - wound care following    Anasarca  - 2/2 acute renal failure in setting of CHF w/severe MR       Prolonged QT interval  - resolved    Acute on chronic heart failure with preserved ejection fraction (HFpEF)  - Interval history and physical exam findings as described above  - Most recent TTE results:  Results for orders placed during the hospital encounter of 04/01/24    Echo    Interpretation Summary    Left Ventricle: The left ventricle is normal in size. Normal wall thickness. Normal wall motion. There is normal systolic function with a visually estimated ejection fraction of 60 - 65%. Grade II diastolic dysfunction.    Right Ventricle: Normal right ventricular cavity size. Wall thickness is normal. Right ventricle wall motion  is normal. Systolic function is reduced.    Left Atrium: Left atrium is very  severely dilated.    Right Atrium: Right atrium is mildly dilated.    Aortic Valve: There is mild aortic valve sclerosis. There is normal leaflet mobility. There is trace aortic regurgitation.    Mitral Valve: There is mild bileaflet sclerosis. There is posterior leaflet tethering and failure of complete coaptation. There is very severe regurgitation with a posterolateral eccentriccally directed jet.    Tricuspid Valve: The tricuspid valve is structurally " normal. There is normal leaflet mobility. There is moderate to severe regurgitation.    IVC/SVC: IVC was not well visualized due to poor acoustic window. Intermediate venous pressure at 8 mmHg.    Pericardium: There is a trivial effusion posteriorly and small under the RA.    - Clinically volume overloaded on admission  - now w/iHD needs  - Will continue diuresis with lasix 40mg PO qday  - Continue home cardioprudent regimen  - Repeat echo ordered, pending   - Strict I/Os  - 1.5L fluid restriction   - Daily weights  - Will continue to monitor on tele      Anemia  Stable.   Recent Labs   Lab 04/22/24  0513 04/23/24  0416 04/24/24  0349   WBC 5.24 5.41 5.74   HGB 9.0* 9.4* 9.7*   HCT 31.8* 31.8* 34.1*    199 227         Class 2 obesity in adult  Body mass index is 28.31 kg/m². Morbid obesity complicates all aspects of disease management from diagnostic modalities to treatment. Weight loss encouraged and health benefits explained to patient.     Essential hypertension     Temp:  [98 °F (36.7 °C)-98.5 °F (36.9 °C)]   Pulse:  [55-62]   Resp:  [9-40]   BP: (94)/(53)   SpO2:  [94 %-100 %]   Arterial Line BP: ()/(44-66) .     While in the hospital, will manage blood pressure as follows; Adjust home antihypertensive regimen as follows- holding Lasix and Coreg     Will utilize p.r.n. blood pressure medication only if patient's blood pressure greater than 180/110 and she develops symptoms such as worsening chest pain or shortness of breath.         VTE Risk Mitigation (From admission, onward)           Ordered     heparin (porcine) injection 1,000 Units  As needed (PRN)         04/24/24 1038     heparin (porcine) injection 1,000 Units  As needed (PRN)         04/13/24 1428     Place sequential compression device  Until discontinued         04/03/24 1347     IP VTE HIGH RISK PATIENT  Once         04/01/24 1030     Place sequential compression device  Until discontinued         04/01/24 1030                     Discharge Planning   RILEY: 4/26/2024     Code Status: Full Code   Is the patient medically ready for discharge?: No    Reason for patient still in hospital (select all that apply): Patient trending condition  Discharge Plan A: Home with family, Other (New HD chair)   Discharge Delays: None known at this time                Rosario Luciano MD  Department of Hospital Medicine   [facility] - Cardiology Stepdown

## 2024-04-24 NOTE — PROGRESS NOTES
[facility] - Cardiology Stepdown  Nephrology  Progress Note    Patient Name: Xena Vera  MRN: [id number]  Admission Date: 4/1/2024  Hospital Length of Stay: 21 days  Attending Provider: Rosario Luciano MD   Primary Care Physician: Tami Villeda FNP  Principal Problem:Severe mitral valve regurgitation    Subjective:     HPI: Ms Vera is an obese (BMI ~31) 53-year-old with CKD IV (baseline creatinine ~3.1-3.3), prediabetes with most recent hemoglobin A1c 6%, HFpEF (most recent TTE with EF 60-65% with G2DD), mitral valve regurgitation, anemia, hypertension, HLD as well as several over co-morbid conditions who was admitted on 4/1 with heart failure exacerbation and hypervolemia after presenting with to ED following a mechanical fall but also had complaints of progressive dyspnea/ZACARIAS, orthopnea, lower extremity edema and abdominal distension with constipation. On presentation patient was noted to be hypotensive with systolic BP readings as low as 90s mmHg and bradycardiac with HR as low as 50s bpm. There was concern for some edema on chest x-ray and BNP at that time was ~4.6K and metabolic panel was notable for serum sodium 133, bicarbonate 20, , creatinine 4.2, albumin 3.1 and total bilirubin 1.5. UA showed +1 protein. Her admission was complicated by failure to adequately diuresis with escalating IV Lasix and even oral metolazone for which Nephrology was consulted on 4/10 for assistance. She explicitly denies NSAID use as outpatient.    Interval History: No acute overnight events. Patient tolerating dialysis well.    Review of patient's allergies indicates:  No Known Allergies  Current Facility-Administered Medications   Medication Dose Route Frequency Provider Last Rate Last Admin    acetaminophen tablet 1,000 mg  1,000 mg Oral Q6H PRN Domonique Walden MD   1,000 mg at 04/22/24 2211    aspirin EC tablet 81 mg  81 mg Oral Daily David Borjas PA-C   81 mg at 04/23/24 0804    atorvastatin tablet 40  mg  40 mg Oral Daily David Borjas PA-C   40 mg at 04/23/24 0804    bisacodyL EC tablet 5 mg  5 mg Oral Daily PRN Johnny Cisneros MD        clindamycin phosphate 1% gel   Topical (Top) BID Domonique Walden MD   Given at 04/23/24 2131    dextrose 10% bolus 125 mL 125 mL  12.5 g Intravenous PRN David Borjas PA-C        dextrose 10% bolus 250 mL 250 mL  25 g Intravenous PRN David Borjas PA-C        diphenhydrAMINE-zinc acetate 2-0.1% cream   Topical (Top) TID PRN Domonique Walden MD   Given at 04/22/24 1556    furosemide tablet 40 mg  40 mg Oral Daily Margaret Olsen MD   40 mg at 04/23/24 0804    glucagon (human recombinant) injection 1 mg  1 mg Intramuscular PRN David Borjas PA-C        glucose chewable tablet 16 g  16 g Oral PRN David Borjas PA-C        glucose chewable tablet 24 g  24 g Oral PRN David Borjas PA-C        HYDROcodone-acetaminophen 5-325 mg per tablet 1 tablet  1 tablet Oral Q6H PRN Johnny Cisneros MD   1 tablet at 04/18/24 1010    hydrOXYzine HCL tablet 10 mg  10 mg Oral TID PRN Domonique Walden MD   10 mg at 04/22/24 2211    melatonin tablet 6 mg  6 mg Oral Nightly PRN Pietro Herring MD   6 mg at 04/18/24 2047    miconazole 2 % cream   Topical (Top) BID Domonique Walden MD   Given at 04/23/24 2132    naloxone 0.4 mg/mL injection 0.02 mg  0.02 mg Intravenous PRN David Borjas PA-C        ondansetron disintegrating tablet 4 mg  4 mg Oral Q8H PRN David Borjas PA-C        ondansetron injection 4 mg  4 mg Intravenous Q8H PRN David Borjas PA-C   4 mg at 04/13/24 1429    oxybutynin tablet 5 mg  5 mg Oral TID Minda Molina MD   5 mg at 04/23/24 2127    polyethylene glycol packet 17 g  17 g Oral BID Domonique Walden MD   17 g at 04/23/24 2128    senna-docusate 8.6-50 mg per tablet 1 tablet  1 tablet Oral BID Domonique Walden MD   1 tablet at 04/23/24 2127    sevelamer carbonate tablet 800 mg  800 mg Oral TID  David Borjas,  PA-C   800 mg at 04/23/24 1636    sodium chloride 0.9% bolus 250 mL 250 mL  250 mL Intravenous PRN Italo Segovia MD        sodium chloride 0.9% flush 10 mL  10 mL Intravenous PRN Pietro Herring MD        sodium chloride 0.9% flush 10 mL  10 mL Intravenous PRN David Borjas, PA-C        sodium chloride 0.9% flush 10 mL  10 mL Intravenous PRN Carole Rolon MD        triamcinolone acetonide 0.1% cream   Topical (Top) BID Rosario Luciano MD   Given at 04/23/24 2131    white petrolatum 41 % ointment   Topical (Top) BID Johnny Cisneros MD   Given at 04/23/24 2100    zinc oxide 20 % ointment   Topical (Top) PRN David Borjas PA-C   Given at 04/14/24 0925       Objective:     Vital Signs (Most Recent):  Temp: 98.5 °F (36.9 °C) (04/24/24 0400)  Pulse: 76 (04/24/24 0845)  Resp: 17 (04/24/24 0400)  BP: 139/86 (04/24/24 0845)  SpO2: 98 % (04/24/24 0400) Vital Signs (24h Range):  Temp:  [96.9 °F (36.1 °C)-98.9 °F (37.2 °C)] 98.5 °F (36.9 °C)  Pulse:  [73-85] 76  Resp:  [16-18] 17  SpO2:  [96 %-98 %] 98 %  BP: (120-141)/(70-96) 139/86     Weight: 65.4 kg (144 lb 2.9 oz) (04/24/24 0500)  Body mass index is 28.31 kg/m².  Body surface area is 1.66 meters squared.    I/O last 3 completed shifts:  In: 840 [P.O.:840]  Out: 625 [Urine:625]     Physical Exam  Vitals and nursing note reviewed.   Constitutional:       General: She is not in acute distress.     Appearance: Normal appearance. She is not ill-appearing.   HENT:      Head: Normocephalic.   Eyes:      Extraocular Movements: Extraocular movements intact.   Cardiovascular:      Rate and Rhythm: Normal rate and regular rhythm.   Pulmonary:      Effort: Pulmonary effort is normal. No respiratory distress.      Breath sounds: No rales.   Musculoskeletal:      Right lower leg: Edema present.      Left lower leg: Edema present.   Skin:     General: Skin is warm and dry.   Neurological:      General: No focal deficit present.      Mental Status: She is alert  and oriented to person, place, and time.       Significant Labs:  CMP:   Recent Labs   Lab 04/24/24  0349   GLU 62*   CALCIUM 8.1*   ALBUMIN 2.8*   PROT 6.5   *   K 4.2   CO2 22*   CL 94*   BUN 24*   CREATININE 3.5*   ALKPHOS 102   ALT 21   AST 32   BILITOT 1.1*     All labs within the past 24 hours have been reviewed.   Assessment/Plan:     Renal/  Acute kidney injury superimposed on chronic kidney disease  CKD IV (baseline creatinine ~3.1-3.3) admitted with hypervolemia and REGINALDO with creatinine 4.2  UA showed +1 protein with UPCR of ~500 mg  Urinary sediment with non dysmorphic RBCs and WBCs present although Jin catheter placed the day prior to UA  Retroperitoneal US without evidence of hydronephrosis although changes consistent with chronic kidney disease  Diuresis with lasix gtt @ 40mg/hr + Diuril 500mg IV BID attempted, and while Crt improved she remains unable to tolerate lying flat.  Tolerated 8h SLED w goal UF rate 200-500/hr successfully overnight 4/14-15, then 12h SLED on 4/15, 16, and 17.  4/19 - 4/24: tolerated iHD; no issues  4/22: TDC placed    Plan/Recommendations:  Continue iHD for metabolic clearance and volume management. TDC placed 4/22. Suspect she may now be ESRD and require iHD moving forward.  Hold Renvela given hypophosphatemia  Okay for discharge from nephrology standpoint once outpatient HD chair arranged; will need nephrology follow-up in clinic  Recommend increasing lasix to 80 mg IV BID  Free water restriction of 1.5L daily  Please avoid hypotension/major fluctuations in BP which may worsen REGINALDO (keep MAP > 65 mmHg)  Strict I/O's and daily weights  Renally all dose medications to eGFR   Avoid nephrotoxic agents wean feasible (i.e. NSAIDs, intra-arterial contrast, supra-therapeutic vancomycin levels, etc.)        Thank you for your consult. I will follow-up with patient. Please contact us if you have any additional questions.    Javier Morris MD  Nephrology  [facility]  Cardiology Stepdown

## 2024-04-24 NOTE — ASSESSMENT & PLAN NOTE
CKD IV (baseline creatinine ~3.1-3.3) admitted with hypervolemia and REGINALDO with creatinine 4.2  UA showed +1 protein with UPCR of ~500 mg  Urinary sediment with non dysmorphic RBCs and WBCs present although Jin catheter placed the day prior to UA  Retroperitoneal US without evidence of hydronephrosis although changes consistent with chronic kidney disease  Diuresis with lasix gtt @ 40mg/hr + Diuril 500mg IV BID attempted, and while Crt improved she remains unable to tolerate lying flat.  Tolerated 8h SLED w goal UF rate 200-500/hr successfully overnight 4/14-15, then 12h SLED on 4/15, 16, and 17.  4/19 - 4/24: tolerated iHD; no issues  4/22: TDC placed    Plan/Recommendations:  Continue iHD for metabolic clearance and volume management. TDC placed 4/22. Suspect she may now be ESRD and require iHD moving forward.  Hold Renvela given hypophosphatemia  Okay for discharge from nephrology standpoint once outpatient HD chair arranged; will need nephrology follow-up in clinic  Recommend increasing lasix to 80 mg IV BID  Free water restriction of 1.5L daily  Please avoid hypotension/major fluctuations in BP which may worsen REGINALDO (keep MAP > 65 mmHg)  Strict I/O's and daily weights  Renally all dose medications to eGFR   Avoid nephrotoxic agents wean feasible (i.e. NSAIDs, intra-arterial contrast, supra-therapeutic vancomycin levels, etc.)

## 2024-04-24 NOTE — ASSESSMENT & PLAN NOTE
Angiokeratoma   Proteinuria      - Genetics consulted, appreciate recs  - GLA genetic testing collected and sent to [facility]- neg for fabry's disease  - Consider cardiac MRI

## 2024-04-24 NOTE — PT/OT/SLP PROGRESS
Physical Therapy      Patient Name:  Xena Vera   MRN:  [id number]    Patient not seen today secondary to Dialysis. Will follow-up as appropriate.  Jessy High, PT

## 2024-04-24 NOTE — ASSESSMENT & PLAN NOTE
Patient has hyponatremia which is uncontrolled,We will aim to correct the sodium by 4-6mEq in 24 hours. We will monitor sodium Daily. The hyponatremia is due to renal insufficiency. We will obtain the following studies: no new labs at this moment. We will treat the hyponatremia with Fluid restriction of:  1.5 liter per day and Hemodialysis. The patient's sodium results have been reviewed and are listed below.  Recent Labs   Lab 04/24/24  0349   *

## 2024-04-24 NOTE — ASSESSMENT & PLAN NOTE
Acute on chronic heart failure with preserved ejection fraction (HFpEF)   Anasarca      54 yo F admitted for ADHF 2/2 severe MR. The MR appears to be secondary to posterior leaflet restriction and leaflet length of 1.1 cm. BNP on admission was 4300 compared to 350 eight months prior. Initially diagnosed with severe MR in June of 2023. Saw Dr. Stone in January of 2024 and was undergoing work-up for possible MitraClip; [facility] scheduled for 3/15 but not completed due to financial constraints, also needs POOJA. Will need optimization of kidney function prior to angiogram consideration, appreciate Nephrology assistance. APS serologies negative, Fabry workup negative     DDX: rheumatic heart disease vs Fabry vs antiphospholipid (serologies negative)     4/1/2024 TTE    Left Ventricle: The left ventricle is normal in size. Normal wall thickness. Normal wall motion. There is normal systolic function with a visually estimated ejection fraction of 60 - 65%. Grade II diastolic dysfunction.    Right Ventricle: Normal right ventricular cavity size. Wall thickness is normal. Right ventricle wall motion  is normal. Systolic function is reduced.    Left Atrium: Left atrium is very  severely dilated.    Right Atrium: Right atrium is mildly dilated.    Aortic Valve: There is mild aortic valve sclerosis. There is normal leaflet mobility. There is trace aortic regurgitation.    Mitral Valve: There is mild bileaflet sclerosis. There is posterior leaflet tethering and failure of complete coaptation. There is very severe regurgitation with a posterolateral eccentriccally directed jet.    Tricuspid Valve: The tricuspid valve is structurally normal. There is normal leaflet mobility. There is moderate to severe regurgitation.    IVC/SVC: IVC was not well visualized due to poor acoustic window. Intermediate venous pressure at 8 mmHg.    Pericardium: There is a trivial effusion posteriorly and small under the RA.     - Interventional Cardiology  "consulted - no intervention until patient's volume status is controlled  - Nephrology following, undergoing SLED/CRRT  - POOJA ordered - "Anesthesia evaluated the patient and feel that she needs better optimization prior to POOJA. They stated once she is euvolemic that we can move forward. Will need a new POOJA order at that time."  - Discontinued Diuril 500 mg IV q24h & Lasix gtt @ 40 mg/hr  - Discontinued hydralazine 25 mg po q8h & Isordil 20 mg po BID for afterload reduction due to pressor requirements  - Continue home ASA 81 mg po daily and Lipitor 40 mg po daily  - Genetics consulted, see "Hypertrophic cardiomyopathy"  - Strict I/O, Jin in place  - Transitioned to lasix 40mg PO daily. Reassess daily for diuretic use.   - Repeat TTE reviewed- IC consulted- not a candidate for mitraclip- recommended outpatient f/u- continued fluid removal/fluid status optimization via HD- up-titration of GDMT as tolerated  - Will also need OP IC follow up.   - Telemetry monitoring  "

## 2024-04-25 LAB
ALBUMIN SERPL BCP-MCNC: 2.6 G/DL (ref 3.5–5.2)
ALP SERPL-CCNC: 106 U/L (ref 55–135)
ALT SERPL W/O P-5'-P-CCNC: 19 U/L (ref 10–44)
ANION GAP SERPL CALC-SCNC: 10 MMOL/L (ref 8–16)
AST SERPL-CCNC: 28 U/L (ref 10–40)
BASOPHILS # BLD AUTO: 0.05 K/UL (ref 0–0.2)
BASOPHILS NFR BLD: 1.1 % (ref 0–1.9)
BILIRUB SERPL-MCNC: 1 MG/DL (ref 0.1–1)
BUN SERPL-MCNC: 13 MG/DL (ref 6–20)
CALCIUM SERPL-MCNC: 7.8 MG/DL (ref 8.7–10.5)
CHLORIDE SERPL-SCNC: 98 MMOL/L (ref 95–110)
CO2 SERPL-SCNC: 22 MMOL/L (ref 23–29)
CREAT SERPL-MCNC: 2.3 MG/DL (ref 0.5–1.4)
DIFFERENTIAL METHOD BLD: ABNORMAL
EOSINOPHIL # BLD AUTO: 0.2 K/UL (ref 0–0.5)
EOSINOPHIL NFR BLD: 4 % (ref 0–8)
ERYTHROCYTE [DISTWIDTH] IN BLOOD BY AUTOMATED COUNT: 24 % (ref 11.5–14.5)
EST. GFR  (NO RACE VARIABLE): 24.6 ML/MIN/1.73 M^2
GLUCOSE SERPL-MCNC: 83 MG/DL (ref 70–110)
HCT VFR BLD AUTO: 33 % (ref 37–48.5)
HGB BLD-MCNC: 9.3 G/DL (ref 12–16)
IMM GRANULOCYTES # BLD AUTO: 0.02 K/UL (ref 0–0.04)
IMM GRANULOCYTES NFR BLD AUTO: 0.4 % (ref 0–0.5)
LYMPHOCYTES # BLD AUTO: 0.9 K/UL (ref 1–4.8)
LYMPHOCYTES NFR BLD: 18.8 % (ref 18–48)
MAGNESIUM SERPL-MCNC: 1.7 MG/DL (ref 1.6–2.6)
MCH RBC QN AUTO: 23.3 PG (ref 27–31)
MCHC RBC AUTO-ENTMCNC: 28.2 G/DL (ref 32–36)
MCV RBC AUTO: 83 FL (ref 82–98)
MONOCYTES # BLD AUTO: 0.6 K/UL (ref 0.3–1)
MONOCYTES NFR BLD: 11.6 % (ref 4–15)
NEUTROPHILS # BLD AUTO: 3 K/UL (ref 1.8–7.7)
NEUTROPHILS NFR BLD: 64.1 % (ref 38–73)
NRBC BLD-RTO: 0 /100 WBC
PHOSPHATE SERPL-MCNC: 1.7 MG/DL (ref 2.7–4.5)
PLATELET # BLD AUTO: 215 K/UL (ref 150–450)
PMV BLD AUTO: 10.9 FL (ref 9.2–12.9)
POTASSIUM SERPL-SCNC: 3.7 MMOL/L (ref 3.5–5.1)
PROT SERPL-MCNC: 6.1 G/DL (ref 6–8.4)
RBC # BLD AUTO: 3.99 M/UL (ref 4–5.4)
SODIUM SERPL-SCNC: 130 MMOL/L (ref 136–145)
WBC # BLD AUTO: 4.73 K/UL (ref 3.9–12.7)

## 2024-04-25 PROCEDURE — 99232 SBSQ HOSP IP/OBS MODERATE 35: CPT | Mod: ,,, | Performed by: HOSPITALIST

## 2024-04-25 PROCEDURE — 97530 THERAPEUTIC ACTIVITIES: CPT

## 2024-04-25 PROCEDURE — 94761 N-INVAS EAR/PLS OXIMETRY MLT: CPT

## 2024-04-25 PROCEDURE — 85025 COMPLETE CBC W/AUTO DIFF WBC: CPT

## 2024-04-25 PROCEDURE — 51798 US URINE CAPACITY MEASURE: CPT

## 2024-04-25 PROCEDURE — 25000003 PHARM REV CODE 250: Performed by: STUDENT IN AN ORGANIZED HEALTH CARE EDUCATION/TRAINING PROGRAM

## 2024-04-25 PROCEDURE — 83735 ASSAY OF MAGNESIUM: CPT

## 2024-04-25 PROCEDURE — 20600001 HC STEP DOWN PRIVATE ROOM

## 2024-04-25 PROCEDURE — 25000003 PHARM REV CODE 250: Performed by: HOSPITALIST

## 2024-04-25 PROCEDURE — 80053 COMPREHEN METABOLIC PANEL: CPT

## 2024-04-25 PROCEDURE — 63600175 PHARM REV CODE 636 W HCPCS: Performed by: STUDENT IN AN ORGANIZED HEALTH CARE EDUCATION/TRAINING PROGRAM

## 2024-04-25 PROCEDURE — 99900035 HC TECH TIME PER 15 MIN (STAT)

## 2024-04-25 PROCEDURE — 84100 ASSAY OF PHOSPHORUS: CPT

## 2024-04-25 PROCEDURE — 94660 CPAP INITIATION&MGMT: CPT

## 2024-04-25 PROCEDURE — 97530 THERAPEUTIC ACTIVITIES: CPT | Mod: CQ

## 2024-04-25 PROCEDURE — 25000003 PHARM REV CODE 250: Performed by: PHYSICIAN ASSISTANT

## 2024-04-25 PROCEDURE — 36415 COLL VENOUS BLD VENIPUNCTURE: CPT

## 2024-04-25 PROCEDURE — 25000003 PHARM REV CODE 250

## 2024-04-25 RX ORDER — ACETAMINOPHEN 325 MG/1
650 TABLET ORAL EVERY 6 HOURS PRN
Status: DISCONTINUED | OUTPATIENT
Start: 2024-04-25 | End: 2024-05-09 | Stop reason: HOSPADM

## 2024-04-25 RX ORDER — FUROSEMIDE 80 MG/1
80 TABLET ORAL 2 TIMES DAILY
Status: DISCONTINUED | OUTPATIENT
Start: 2024-04-25 | End: 2024-04-29

## 2024-04-25 RX ORDER — SODIUM,POTASSIUM PHOSPHATES 280-250MG
2 POWDER IN PACKET (EA) ORAL
Status: COMPLETED | OUTPATIENT
Start: 2024-04-25 | End: 2024-04-25

## 2024-04-25 RX ORDER — MAGNESIUM SULFATE HEPTAHYDRATE 40 MG/ML
2 INJECTION, SOLUTION INTRAVENOUS ONCE
Status: COMPLETED | OUTPATIENT
Start: 2024-04-25 | End: 2024-04-25

## 2024-04-25 RX ADMIN — MAGNESIUM SULFATE HEPTAHYDRATE 2 G: 40 INJECTION, SOLUTION INTRAVENOUS at 11:04

## 2024-04-25 RX ADMIN — SEVELAMER CARBONATE 800 MG: 800 TABLET, FILM COATED ORAL at 09:04

## 2024-04-25 RX ADMIN — POTASSIUM & SODIUM PHOSPHATES POWDER PACK 280-160-250 MG 2 PACKET: 280-160-250 PACK at 04:04

## 2024-04-25 RX ADMIN — ASPIRIN 81 MG: 81 TABLET, COATED ORAL at 09:04

## 2024-04-25 RX ADMIN — HEPARIN SODIUM 5000 UNITS: 5000 INJECTION INTRAVENOUS; SUBCUTANEOUS at 05:04

## 2024-04-25 RX ADMIN — MICONAZOLE NITRATE: 20 CREAM TOPICAL at 10:04

## 2024-04-25 RX ADMIN — HEPARIN SODIUM 5000 UNITS: 5000 INJECTION INTRAVENOUS; SUBCUTANEOUS at 10:04

## 2024-04-25 RX ADMIN — ATORVASTATIN CALCIUM 40 MG: 40 TABLET, FILM COATED ORAL at 09:04

## 2024-04-25 RX ADMIN — MICONAZOLE NITRATE: 20 CREAM TOPICAL at 09:04

## 2024-04-25 RX ADMIN — SENNOSIDES AND DOCUSATE SODIUM 1 TABLET: 8.6; 5 TABLET ORAL at 09:04

## 2024-04-25 RX ADMIN — OXYBUTYNIN CHLORIDE 5 MG: 5 TABLET ORAL at 02:04

## 2024-04-25 RX ADMIN — Medication: at 09:04

## 2024-04-25 RX ADMIN — CARVEDILOL 6.25 MG: 6.25 TABLET, FILM COATED ORAL at 09:04

## 2024-04-25 RX ADMIN — OXYBUTYNIN CHLORIDE 5 MG: 5 TABLET ORAL at 10:04

## 2024-04-25 RX ADMIN — POTASSIUM & SODIUM PHOSPHATES POWDER PACK 280-160-250 MG 2 PACKET: 280-160-250 PACK at 10:04

## 2024-04-25 RX ADMIN — POLYETHYLENE GLYCOL 3350 17 G: 17 POWDER, FOR SOLUTION ORAL at 09:04

## 2024-04-25 RX ADMIN — OXYBUTYNIN CHLORIDE 5 MG: 5 TABLET ORAL at 09:04

## 2024-04-25 RX ADMIN — TRIAMCINOLONE ACETONIDE: 1 CREAM TOPICAL at 10:04

## 2024-04-25 RX ADMIN — CLINDAMYCIN PHOSPHATE: 10 GEL TOPICAL at 09:04

## 2024-04-25 RX ADMIN — FUROSEMIDE 40 MG: 40 TABLET ORAL at 09:04

## 2024-04-25 RX ADMIN — ZINC OXIDE: 200 OINTMENT TOPICAL at 10:04

## 2024-04-25 RX ADMIN — POTASSIUM & SODIUM PHOSPHATES POWDER PACK 280-160-250 MG 2 PACKET: 280-160-250 PACK at 11:04

## 2024-04-25 RX ADMIN — Medication: at 10:04

## 2024-04-25 RX ADMIN — FUROSEMIDE 80 MG: 80 TABLET ORAL at 05:04

## 2024-04-25 RX ADMIN — SENNOSIDES AND DOCUSATE SODIUM 1 TABLET: 8.6; 5 TABLET ORAL at 10:04

## 2024-04-25 RX ADMIN — CLINDAMYCIN PHOSPHATE: 10 GEL TOPICAL at 10:04

## 2024-04-25 RX ADMIN — CARVEDILOL 6.25 MG: 6.25 TABLET, FILM COATED ORAL at 10:04

## 2024-04-25 RX ADMIN — HEPARIN SODIUM 5000 UNITS: 5000 INJECTION INTRAVENOUS; SUBCUTANEOUS at 02:04

## 2024-04-25 RX ADMIN — TRIAMCINOLONE ACETONIDE: 1 CREAM TOPICAL at 09:04

## 2024-04-25 RX ADMIN — POLYETHYLENE GLYCOL 3350 17 G: 17 POWDER, FOR SOLUTION ORAL at 10:04

## 2024-04-25 NOTE — PT/OT/SLP PROGRESS
"Occupational Therapy   Treatment    Name: Xena Vera  MRN: [id number]  Admitting Diagnosis:  Severe mitral valve regurgitation  3 Days Post-Op    Recommendations:     Discharge Recommendations: Low Intensity Therapy  Discharge Equipment Recommendations:  walker, rolling  Barriers to discharge:  None    Assessment:     Xena Vera is a 53 y.o. female with a medical diagnosis of Severe mitral valve regurgitation.  She presents with the following performance deficits affecting function are weakness, impaired endurance, impaired functional mobility, impaired self care skills, gait instability, impaired balance, decreased lower extremity function, decreased ROM, impaired joint extensibility, impaired cardiopulmonary response to activity.     Rehab Prognosis:  Good; patient would benefit from acute skilled OT services to address these deficits and reach maximum level of function.       Plan:     Patient to be seen 3 x/week to address the above listed problems via self-care/home management, therapeutic activities, therapeutic exercises, neuromuscular re-education  Plan of Care Expires: 05/18/24  Plan of Care Reviewed with: patient    Subjective     Chief Complaint: "They only start to hurt after sitting like this for so long."  Patient/Family Comments/goals: Return home  Pain/Comfort:  Pain Rating 1: 0/10  Pain Rating Post-Intervention 1: 0/10    Objective:     Communicated with: Nursing prior to session.  Patient found  seated on bedside couch  with telemetry upon OT entry to room.    General Precautions: Standard, fall    Orthopedic Precautions:N/A  Braces: N/A  Respiratory Status: Room air     Occupational Performance:     Bed Mobility:    Patient completed Rolling/Turning to Left with  supervision  Patient completed Scooting/Bridging with supervision  Patient completed Sit to Supine with supervision     Functional Mobility/Transfers:  Patient completed Sit <> Stand Transfer with supervision  with  no assistive " device   Functional Mobility: Supervision with no AD >HH distance x 3 (~220 ft) within room and into hallway, no LOB or SOB noted     Activities of Daily Living:  Grooming: supervision in standing   Upper Body Dressing: supervision seated on bedside couch   Lower Body Dressing: supervision seated on bedside couch    [facility] 6 Click ADL: 22  Treatment & Education:  -Patient and family educated on roles/goals of OT and POC.  -White board updated.  -Therapist provided time for questions and answered within scope of practice.  -Patient educated on importance of EOB/OOB activity to maximize recovery.    Patient left HOB elevated with all lines intact and call button in reach    GOALS:   Multidisciplinary Problems       Occupational Therapy Goals          Problem: Occupational Therapy    Goal Priority Disciplines Outcome Interventions   Occupational Therapy Goal     OT, PT/OT Progressing    Description: Goals to be met by: 5/18/24     Patient will increase functional independence with ADLs by performing:    UE Dressing with Modified [place].  LE Dressing with Modified [place].  Grooming while standing at sink with Modified [place].  Toileting from toilet/bedside commode with Modified [place] for hygiene and clothing management.   Toilet transfer to toilet/bedside commode with Modified [place].                           Time Tracking:     OT Date of Treatment: 04/25/24  OT Start Time: 1421  OT Stop Time: 1435  OT Total Time (min): 14 min    Billable Minutes:Therapeutic Activity 14          Number of RA visits since last OT visit: 1    4/25/2024

## 2024-04-25 NOTE — PROGRESS NOTES
[facility] - Cardiology Stepdown  Nephrology  Progress Note    Patient Name: Xena Vera  MRN: [id number]  Admission Date: 4/1/2024  Hospital Length of Stay: 22 days  Attending Provider: Ness Regan MD   Primary Care Physician: Tami Villeda FNP  Principal Problem:Severe mitral valve regurgitation    Subjective:     HPI: Ms Vera is an obese (BMI ~31) 53-year-old with CKD IV (baseline creatinine ~3.1-3.3), prediabetes with most recent hemoglobin A1c 6%, HFpEF (most recent TTE with EF 60-65% with G2DD), mitral valve regurgitation, anemia, hypertension, HLD as well as several over co-morbid conditions who was admitted on 4/1 with heart failure exacerbation and hypervolemia after presenting with to ED following a mechanical fall but also had complaints of progressive dyspnea/ZACARIAS, orthopnea, lower extremity edema and abdominal distension with constipation. On presentation patient was noted to be hypotensive with systolic BP readings as low as 90s mmHg and bradycardiac with HR as low as 50s bpm. There was concern for some edema on chest x-ray and BNP at that time was ~4.6K and metabolic panel was notable for serum sodium 133, bicarbonate 20, , creatinine 4.2, albumin 3.1 and total bilirubin 1.5. UA showed +1 protein. Her admission was complicated by failure to adequately diuresis with escalating IV Lasix and even oral metolazone for which Nephrology was consulted on 4/10 for assistance. She explicitly denies NSAID use as outpatient.    Interval History: No acute overnight events. Electrolytes overall improving but phos remains low. Recommend stopping phos-binder. Additionally patient may benefit from increased lasix dose given improving urine output.    Review of patient's allergies indicates:  No Known Allergies  Current Facility-Administered Medications   Medication Dose Route Frequency Provider Last Rate Last Admin    acetaminophen tablet 1,000 mg  1,000 mg Oral Q6H PRN Domonique Walden MD   1,000 mg at  04/22/24 2211    aspirin EC tablet 81 mg  81 mg Oral Daily David Borjas, PA-C   81 mg at 04/25/24 0920    atorvastatin tablet 40 mg  40 mg Oral Daily David Borjas, PA-C   40 mg at 04/25/24 0921    bisacodyL EC tablet 5 mg  5 mg Oral Daily PRN Johnny Cisneros MD        carvediloL tablet 6.25 mg  6.25 mg Oral BID Rosario Luciano MD   6.25 mg at 04/25/24 0921    clindamycin phosphate 1% gel   Topical (Top) BID Domoniqeu Walden MD   Given at 04/25/24 0922    dextrose 10% bolus 125 mL 125 mL  12.5 g Intravenous PRN David Borjas, PA-DIMITRY        dextrose 10% bolus 250 mL 250 mL  25 g Intravenous PRN aDvid Borjas, PA-C        diphenhydrAMINE-zinc acetate 2-0.1% cream   Topical (Top) TID PRN Domonique Walden MD   Given at 04/22/24 1556    furosemide tablet 80 mg  80 mg Oral BID Keshia German MD        glucagon (human recombinant) injection 1 mg  1 mg Intramuscular PRN David Borjas, PA-DIMITRY        glucose chewable tablet 16 g  16 g Oral PRN David Borjas, PA-C        glucose chewable tablet 24 g  24 g Oral PRN David Borjas, PA-DIMITRY        heparin (porcine) injection 1,000 Units  1,000 Units Intra-Catheter PRN Rosario Luciano MD   1,000 Units at 04/24/24 1041    heparin (porcine) injection 5,000 Units  5,000 Units Subcutaneous Q8H Rosario Luciano MD   5,000 Units at 04/25/24 0547    HYDROcodone-acetaminophen 5-325 mg per tablet 1 tablet  1 tablet Oral Q6H PRN Johnny Cisneros MD   1 tablet at 04/18/24 1010    hydrOXYzine HCL tablet 10 mg  10 mg Oral TID PRN Domonique Walden MD   10 mg at 04/22/24 2211    melatonin tablet 6 mg  6 mg Oral Nightly PRN Pietro Herring MD   6 mg at 04/18/24 2047    miconazole 2 % cream   Topical (Top) BID Domonique Walden MD   Given at 04/25/24 0923    naloxone 0.4 mg/mL injection 0.02 mg  0.02 mg Intravenous PRN David Borjas PA-C        ondansetron disintegrating tablet 4 mg  4 mg Oral Q8H PRN David Borjas PA-C         ondansetron injection 4 mg  4 mg Intravenous Q8H PRN David Borjas PA-C   4 mg at 04/13/24 1429    oxybutynin tablet 5 mg  5 mg Oral TID Minad Molina MD   5 mg at 04/25/24 0921    polyethylene glycol packet 17 g  17 g Oral BID Domonique Walden MD   17 g at 04/25/24 0920    potassium, sodium phosphates 280-160-250 mg packet 2 packet  2 packet Oral QID (AC & HS) Ness Regan MD   2 packet at 04/25/24 1113    senna-docusate 8.6-50 mg per tablet 1 tablet  1 tablet Oral BID Domonique Walden MD   1 tablet at 04/25/24 0920    sodium chloride 0.9% bolus 250 mL 250 mL  250 mL Intravenous PRN Italo Segovia MD        sodium chloride 0.9% flush 10 mL  10 mL Intravenous PRN Pietro Herring MD        sodium chloride 0.9% flush 10 mL  10 mL Intravenous PRN David Borjas PA-C        sodium chloride 0.9% flush 10 mL  10 mL Intravenous PRN Carole Rolon MD        triamcinolone acetonide 0.1% cream   Topical (Top) BID Rosario Luciano MD   Given at 04/25/24 0923    white petrolatum 41 % ointment   Topical (Top) BID Johnny Cisneros MD   Given at 04/25/24 0923    zinc oxide 20 % ointment   Topical (Top) PRN David Borjas PA-C   Given at 04/14/24 0925       Objective:     Vital Signs (Most Recent):  Temp: 98.1 °F (36.7 °C) (04/25/24 1118)  Pulse: 63 (04/25/24 1118)  Resp: 18 (04/25/24 1118)  BP: 96/60 (04/25/24 1118)  SpO2: 97 % (04/25/24 1118) Vital Signs (24h Range):  Temp:  [98.1 °F (36.7 °C)-98.9 °F (37.2 °C)] 98.1 °F (36.7 °C)  Pulse:  [63-86] 63  Resp:  [18-19] 18  SpO2:  [96 %-97 %] 97 %  BP: ()/(60-76) 96/60     Weight: 64.8 kg (142 lb 13.7 oz) (04/25/24 0400)  Body mass index is 28.05 kg/m².  Body surface area is 1.65 meters squared.    I/O last 3 completed shifts:  In: 500 [P.O.:500]  Out: 2550 [Urine:400; Other:2150]     Physical Exam  Vitals and nursing note reviewed.   Constitutional:       General: She is not in acute distress.     Appearance: Normal appearance. She is not  ill-appearing.   HENT:      Head: Normocephalic.   Eyes:      Extraocular Movements: Extraocular movements intact.   Cardiovascular:      Rate and Rhythm: Normal rate and regular rhythm.   Pulmonary:      Effort: Pulmonary effort is normal. No respiratory distress.      Breath sounds: No rales.   Musculoskeletal:      Right lower leg: Edema present.      Left lower leg: Edema present.   Skin:     General: Skin is warm and dry.   Neurological:      General: No focal deficit present.      Mental Status: She is alert and oriented to person, place, and time.       Significant Labs:  CMP:   Recent Labs   Lab 04/25/24  0419   GLU 83   CALCIUM 7.8*   ALBUMIN 2.6*   PROT 6.1   *   K 3.7   CO2 22*   CL 98   BUN 13   CREATININE 2.3*   ALKPHOS 106   ALT 19   AST 28   BILITOT 1.0     All labs within the past 24 hours have been reviewed.     Assessment/Plan:     Renal/  Acute kidney injury superimposed on chronic kidney disease  CKD IV (baseline creatinine ~3.1-3.3) admitted with hypervolemia and REGINALDO with creatinine 4.2  UA showed +1 protein with UPCR of ~500 mg  Urinary sediment with non dysmorphic RBCs and WBCs present although Jin catheter placed the day prior to UA  Retroperitoneal US without evidence of hydronephrosis although changes consistent with chronic kidney disease  Diuresis with lasix gtt @ 40mg/hr + Diuril 500mg IV BID attempted, and while Crt improved she remains unable to tolerate lying flat.  Tolerated 8h SLED w goal UF rate 200-500/hr successfully overnight 4/14-15, then 12h SLED on 4/15, 16, and 17.  4/19 - 4/24: tolerated iHD; no issues  4/22: TDC placed    Plan/Recommendations:  Continue iHD for metabolic clearance and volume management. TDC placed 4/22. Suspect she may now be ESRD and require iHD moving forward but not yet declared ESRD  Hold Renvela given hypophosphatemia  Okay for discharge from nephrology standpoint once outpatient HD chair arranged; will need nephrology follow-up in  clinic  Recommend increasing lasix to 80 mg IV BID  Free water restriction of 1.5L daily  Please avoid hypotension/major fluctuations in BP which may worsen REGINALDO (keep MAP > 65 mmHg)  Strict I/O's and daily weights  Renally all dose medications to eGFR   Avoid nephrotoxic agents wean feasible (i.e. NSAIDs, intra-arterial contrast, supra-therapeutic vancomycin levels, etc.)      Thank you for your consult. I will follow-up with patient. Please contact us if you have any additional questions.    Javier Morris MD  Nephrology  [facility] - Cardiology Stepdown

## 2024-04-25 NOTE — PROGRESS NOTES
Please see previous notes from this SW for continuity.    __________________________________________________      SW sent a secure chat to [facility] Patten and KAZ Yanez requesting an update on pt's outpt dialysis referral.    SW awaiting response.    UPDATE 9:39AM: SW called [facility] N[facility]. No answer. SW will attempt to call again.    UPDATE 9:58AM: SW contacted [facility] and spoke with KAZ Yanez. Per Renata, will check on pt's referral and contact this SW back.    UPDATE 12:08: SW received secure chat from [facility] Noy Patten, pt's referral was sent to intake and Dr. Kaplan for review. Bernabe Patten, awaiting a call back from [facility] JIM LING regarding pt's insurance    Allie Jha LMSW  [facility]  [phone]

## 2024-04-25 NOTE — PT/OT/SLP PROGRESS
Physical Therapy Treatment    Patient Name:  Xena Vera   MRN:  [id number]    Recommendations:     Discharge Recommendations: Low Intensity Therapy  Discharge Equipment Recommendations: walker, rolling  Barriers to discharge: None    Assessment:     Xena Vera is a 53 y.o. female admitted with a medical diagnosis of Severe mitral valve regurgitation.  She presents with the following impairments/functional limitations: weakness, impaired endurance, impaired cardiopulmonary response to activity, decreased safety awareness, impaired functional mobility.  Pt was agreeable and tolerated session well. Pt completed stair training and gait training with minimal rest breaks. Pt reported mild SOB following activities, but easily recovers with rest breaks. Pt required Jamaal to safety ascend stairs, but able to complete 10 steps with hand rail. Pt is progressing well and  continues to demonstrate the need for low intensity therapy on a scheduled basis exhibited by decreased independence with functional mobility.    Rehab Prognosis: Good; patient would benefit from acute skilled PT services to address these deficits and reach maximum level of function.    Recent Surgery: Procedure(s) (LRB):  Insertion, Catheter, Central Venous, Hemodialysis (Right) 3 Days Post-Op    Plan:     During this hospitalization, patient to be seen 3 x/week to address the identified rehab impairments via gait training, therapeutic activities, therapeutic exercises, neuromuscular re-education and progress toward the following goals:    Plan of Care Expires:  05/18/24    Subjective     Chief Complaint: none stated  Patient/Family Comments/goals: agreeable to ambulate in the hallway  Pain/Comfort:  Pain Rating 1: 0/10  Pain Rating Post-Intervention 1: 0/10      Objective:     Communicated with nurse prior to session.  Patient found HOB elevated with telemetry upon PT entry to room.    utilized Demetri #[id number]    General Precautions:  Standard, fall  Orthopedic Precautions: N/A  Braces: N/A  Respiratory Status: Room air     Functional Mobility:  Additional staff present: N/A  Bed Mobility:   Supine to Sit: independence; HOB elevated  Transfers:   Sit <> Stand Transfer: supervision with no assistive device   Gait:  Pt ambulated ~200 ft with stand by assistance and no assistive device.  1 standing rest break and no overt LOB  Included ambulating to stairwell.   All lines remained intact throughout ambulation trial, gait belt utilized.  Gait Deviation(s): steady, slow reciprocal gait with decrease step length and narrow GRACIE   Verbal/tactile cues for pacing as needed. SpO2 95% following gait.   Stairs:  Pt ascended/descended  10 stair(s) with right handrail with  Jamaal-CGA .   Jamaal to ascend and CGA to descend   Step-to gait to ascend/descend   BUE on 1 HR.     AM-PAC 6 CLICK MOBILITY  Turning over in bed (including adjusting bedclothes, sheets and blankets)?: 4  Sitting down on and standing up from a chair with arms (e.g., wheelchair, bedside commode, etc.): 4  Moving from lying on back to sitting on the side of the bed?: 3  Moving to and from a bed to a chair (including a wheelchair)?: 3  Need to walk in hospital room?: 3  Climbing 3-5 steps with a railing?: 2  Basic Mobility Total Score: 19     Treatment & Education:  Seated BLE therex x20 reps: ankle pumps, long arc quads, and marches  Patient educated on role of therapy, goals of session, and benefits of out of bed mobility.   Instructed on use of call button and importance of calling nursing staff for assistance with mobility   Questions/concerns addressed within PTA scope of practice  Pt verbalized understanding.    Patient left up in chair with all lines intact, call button in reach, and nurse notified.    GOALS:   Multidisciplinary Problems       Physical Therapy Goals          Problem: Physical Therapy    Goal Priority Disciplines Outcome Goal Variances Interventions   Physical Therapy Goal      PT, PT/OT Progressing     Description: Goals to be met by: 24    Patient will increase functional independence with mobility by performin. Supine to sit with Modified [place]  2. Sit to supine with Modified [place]  3. Sit to stand transfer with Supervision  4. Bed to chair transfer with Supervision using Rolling Walker  5. Gait  x 100 feet with Supervision using Rolling Walker.   6. Ascend/descend 5 stair with bilateral Handrails Supervision using No Assistive Device.   7. Lower extremity exercise program x15 reps per handout, with assistance as needed                         Time Tracking:     PT Received On: 24  PT Start Time: 1040     PT Stop Time: 1057  PT Total Time (min): 17 min     Billable Minutes: Therapeutic Activity 17    Treatment Type: Treatment  PT/PTA: PTA     Number of PTA visits since last PT visit: 2024

## 2024-04-25 NOTE — SUBJECTIVE & OBJECTIVE
Interval History: No acute overnight events. Electrolytes overall improving but phos remains low. Recommend stopping phos-binder. Additionally patient may benefit from increased lasix dose given improving urine output.    Review of patient's allergies indicates:  No Known Allergies  Current Facility-Administered Medications   Medication Dose Route Frequency Provider Last Rate Last Admin    acetaminophen tablet 1,000 mg  1,000 mg Oral Q6H PRN Domonique Walden MD   1,000 mg at 04/22/24 2211    aspirin EC tablet 81 mg  81 mg Oral Daily David Borjas, PA-C   81 mg at 04/25/24 0920    atorvastatin tablet 40 mg  40 mg Oral Daily David Borjas, PA-C   40 mg at 04/25/24 0921    bisacodyL EC tablet 5 mg  5 mg Oral Daily PRN Johnny Cisneros MD        carvediloL tablet 6.25 mg  6.25 mg Oral BID Rosario Luciano MD   6.25 mg at 04/25/24 0921    clindamycin phosphate 1% gel   Topical (Top) BID Domonique Walden MD   Given at 04/25/24 0922    dextrose 10% bolus 125 mL 125 mL  12.5 g Intravenous PRN David Borjas PA-DIMITRY        dextrose 10% bolus 250 mL 250 mL  25 g Intravenous PRN David Borjas PA-DIMITRY        diphenhydrAMINE-zinc acetate 2-0.1% cream   Topical (Top) TID PRN Domonique Walden MD   Given at 04/22/24 1556    furosemide tablet 80 mg  80 mg Oral BID Keshia German MD        glucagon (human recombinant) injection 1 mg  1 mg Intramuscular PRN David Borjas PA-DIMITRY        glucose chewable tablet 16 g  16 g Oral PRN David Borjas, PA-DIMITRY        glucose chewable tablet 24 g  24 g Oral PRN David Borjas PA-DIMITRY        heparin (porcine) injection 1,000 Units  1,000 Units Intra-Catheter PRN Rosario Luciano MD   1,000 Units at 04/24/24 1041    heparin (porcine) injection 5,000 Units  5,000 Units Subcutaneous Q8H Rosario Luciano MD   5,000 Units at 04/25/24 0547    HYDROcodone-acetaminophen 5-325 mg per tablet 1 tablet  1 tablet Oral Q6H PRN Johnny Cisneros MD   1 tablet  at 04/18/24 1010    hydrOXYzine HCL tablet 10 mg  10 mg Oral TID PRN Domonique Walden MD   10 mg at 04/22/24 2211    melatonin tablet 6 mg  6 mg Oral Nightly PRN Pietro Herring MD   6 mg at 04/18/24 2047    miconazole 2 % cream   Topical (Top) BID Domonique Walden MD   Given at 04/25/24 0923    naloxone 0.4 mg/mL injection 0.02 mg  0.02 mg Intravenous PRN David Borjas PA-C        ondansetron disintegrating tablet 4 mg  4 mg Oral Q8H PRN David Borjas PA-DIMITRY        ondansetron injection 4 mg  4 mg Intravenous Q8H PRN David Borjas PA-C   4 mg at 04/13/24 1429    oxybutynin tablet 5 mg  5 mg Oral TID Minda Molina MD   5 mg at 04/25/24 0921    polyethylene glycol packet 17 g  17 g Oral BID Domonique Walden MD   17 g at 04/25/24 0920    potassium, sodium phosphates 280-160-250 mg packet 2 packet  2 packet Oral QID (AC & HS) Ness Regan MD   2 packet at 04/25/24 1113    senna-docusate 8.6-50 mg per tablet 1 tablet  1 tablet Oral BID Domonique Walden MD   1 tablet at 04/25/24 0920    sodium chloride 0.9% bolus 250 mL 250 mL  250 mL Intravenous PRN Italo Segovia MD        sodium chloride 0.9% flush 10 mL  10 mL Intravenous PRN Pietro Herring MD        sodium chloride 0.9% flush 10 mL  10 mL Intravenous PRN David Borjas PA-C        sodium chloride 0.9% flush 10 mL  10 mL Intravenous PRN Carole Rolon MD        triamcinolone acetonide 0.1% cream   Topical (Top) BID Rosario Luciano MD   Given at 04/25/24 0923    white petrolatum 41 % ointment   Topical (Top) BID Johnny Cisneros MD   Given at 04/25/24 0923    zinc oxide 20 % ointment   Topical (Top) PRN David Borjas PA-C   Given at 04/14/24 0925       Objective:     Vital Signs (Most Recent):  Temp: 98.1 °F (36.7 °C) (04/25/24 1118)  Pulse: 63 (04/25/24 1118)  Resp: 18 (04/25/24 1118)  BP: 96/60 (04/25/24 1118)  SpO2: 97 % (04/25/24 1118) Vital Signs (24h Range):  Temp:  [98.1 °F (36.7 °C)-98.9 °F (37.2 °C)] 98.1 °F (36.7  °C)  Pulse:  [63-86] 63  Resp:  [18-19] 18  SpO2:  [96 %-97 %] 97 %  BP: ()/(60-76) 96/60     Weight: 64.8 kg (142 lb 13.7 oz) (04/25/24 0400)  Body mass index is 28.05 kg/m².  Body surface area is 1.65 meters squared.    I/O last 3 completed shifts:  In: 500 [P.O.:500]  Out: 2550 [Urine:400; Other:2150]     Physical Exam  Vitals and nursing note reviewed.   Constitutional:       General: She is not in acute distress.     Appearance: Normal appearance. She is not ill-appearing.   HENT:      Head: Normocephalic.   Eyes:      Extraocular Movements: Extraocular movements intact.   Cardiovascular:      Rate and Rhythm: Normal rate and regular rhythm.   Pulmonary:      Effort: Pulmonary effort is normal. No respiratory distress.      Breath sounds: No rales.   Musculoskeletal:      Right lower leg: Edema present.      Left lower leg: Edema present.   Skin:     General: Skin is warm and dry.   Neurological:      General: No focal deficit present.      Mental Status: She is alert and oriented to person, place, and time.       Significant Labs:  CMP:   Recent Labs   Lab 04/25/24  0419   GLU 83   CALCIUM 7.8*   ALBUMIN 2.6*   PROT 6.1   *   K 3.7   CO2 22*   CL 98   BUN 13   CREATININE 2.3*   ALKPHOS 106   ALT 19   AST 28   BILITOT 1.0     All labs within the past 24 hours have been reviewed.

## 2024-04-25 NOTE — PLAN OF CARE
Recommendations    1. Continue Renal diet, fluid per MD. If PO intake declines <50% at meal recommend liberalizing to Regular diet.   2. Recommend ONS Boost Plus (flavor per patient) BID for additional calories/PRO.   3. Monitor I/O's, weight and labs.   4. Consider an appetite stimulant, if PO intake does not improve.   5. RD following.    Goals: Meet % EEN, EPN by RD f/u date  Nutrition Goal Status: progressing towards goal  Communication of RD Recs: other (comment)

## 2024-04-25 NOTE — PLAN OF CARE
Pt free of falls and injury. Pt AAOx4 with VSS. Fall precautions remain in place. Pt remains on room air. Sats 96-97%. NSR on telemetry. Plan of care reviewed with pt.  utilized to explain plan of care. Pt resting comfortably with no complaints of pain. Pt denies chest pain, headache, and SOB. No acute distress noted,  plan of care continues.      Problem: Adult Inpatient Plan of Care  Goal: Plan of Care Review  Outcome: Progressing  Goal: Patient-Specific Goal (Individualized)  Outcome: Progressing  Goal: Absence of Hospital-Acquired Illness or Injury  Outcome: Progressing  Goal: Optimal Comfort and Wellbeing  Outcome: Progressing  Goal: Readiness for Transition of Care  Outcome: Progressing     Problem: Infection  Goal: Absence of Infection Signs and Symptoms  Outcome: Progressing     Problem: Impaired Wound Healing  Goal: Optimal Wound Healing  Outcome: Progressing     Problem: Fluid and Electrolyte Imbalance (Acute Kidney Injury/Impairment)  Goal: Fluid and Electrolyte Balance  Outcome: Progressing     Problem: Oral Intake Inadequate (Acute Kidney Injury/Impairment)  Goal: Optimal Nutrition Intake  Outcome: Progressing     Problem: Renal Function Impairment (Acute Kidney Injury/Impairment)  Goal: Effective Renal Function  Outcome: Progressing     Problem: Device-Related Complication Risk (Hemodialysis)  Goal: Safe, Effective Therapy Delivery  Outcome: Progressing     Problem: Hemodynamic Instability (Hemodialysis)  Goal: Effective Tissue Perfusion  Outcome: Progressing     Problem: Infection (Hemodialysis)  Goal: Absence of Infection Signs and Symptoms  Outcome: Progressing     Problem: Skin Injury Risk Increased  Goal: Skin Health and Integrity  Outcome: Progressing     Problem: Device-Related Complication Risk (CRRT (Continuous Renal Replacement Therapy))  Goal: Safe, Effective Therapy Delivery  Outcome: Progressing     Problem: Hypothermia (CRRT (Continuous Renal Replacement Therapy))  Goal: Body  Temperature Maintained in Desired Range  Outcome: Progressing     Problem: Infection (CRRT (Continuous Renal Replacement Therapy))  Goal: Absence of Infection Signs and Symptoms  Outcome: Progressing     Problem: Fall Injury Risk  Goal: Absence of Fall and Fall-Related Injury  Outcome: Progressing     Problem: Chronic Kidney Disease  Goal: Electrolyte Balance  Outcome: Progressing  Goal: Fluid Balance  Outcome: Progressing

## 2024-04-25 NOTE — PROGRESS NOTES
"[facility] - Cardiology Stepdown  Adult Nutrition  Progress Note    SUMMARY       Recommendations    1. Continue Renal diet, fluid per MD. If PO intake declines <50% at meal recommend liberalizing to Regular diet.   2. Recommend ONS Boost Plus (flavor per patient) BID for additional calories/PRO.   3. Monitor I/O's, weight and labs.   4. Consider an appetite stimulant, if PO intake does not improve.   5. RD following.    Goals: Meet % EEN, EPN by RD f/u date  Nutrition Goal Status: progressing towards goal  Communication of RD Recs: other (comment)    Assessment and Plan    Nutrition Problem  Inadequate oral intake    Related to (etiology):   Decreased appetite    Signs and Symptoms (as evidenced by):   Pt consuming 50-75% at meals     Interventions/Recommendations (treatment strategy):  Collaboration of nutrition care with other providers  [facility]    Nutrition Diagnosis Status:   New      Reason for Assessment    Reason For Assessment: RD follow-up  Diagnosis: other (see comments) (HF)  Relevant Medical History: CKD  Interdisciplinary Rounds: did not attend  General Information Comments: RD follow-up. Pt tolerating diet w/ 50-75% PO intake.  Nutrition Discharge Planning: Adequate PO intake    Nutrition Risk Screen    Nutrition Risk Screen: no indicators present    Nutrition/Diet History    Patient Reported Diet/Restrictions/Preferences: low salt  Spiritual, Cultural Beliefs, Advent Practices, Values that Affect Care: no  Factors Affecting Nutritional Intake: decreased appetite    Anthropometrics    Temp: 98.4 °F (36.9 °C)  Height Method: Stated  Height: 4' 11.84" (152 cm)  Height (inches): 59.84 in  Weight Method: Standard Scale  Weight: 64.8 kg (142 lb 13.7 oz)  Weight (lb): 142.86 lb  Ideal Body Weight (IBW), Female: 99.2 lb  % Ideal Body Weight, Female (lb): 144.46 %  BMI (Calculated): 28  BMI Grade: 30 - 34.9- obesity - grade I       Lab/Procedures/Meds    Pertinent Labs Reviewed: reviewed  Pertinent Labs " Comments: H/H: 9.3/33.0, MCH 23.3, MCHC 28.2, Na 130, CO2 22, Cr 2.3, GFR 24.6, Ca 7.8/P 1.7  Pertinent Medications Reviewed: reviewed  Pertinent Medications Comments: carvedilol, lasix, heparin    Estimated/Assessed Needs    Weight Used For Calorie Calculations: 73 kg (160 lb 15 oz)  Energy Calorie Requirements (kcal): 1502 kcal/d  Energy Need Method: [place]-St Jeor (1.2 PAL)  Protein Requirements: 73 g/d (1 g/kg)  Weight Used For Protein Calculations: 73 kg (160 lb 15 oz)     Estimated Fluid Requirement Method: other (see comments) (Per MD)  RDA Method (mL): 1502         Nutrition Prescription Ordered    Current Diet Order: Renal  Nutrition Order Comments: 1500 mL FR    Evaluation of Received Nutrient/Fluid Intake    I/O: -1.85 L  Comments: LBM: 4/23  Tolerance: tolerating  % Intake of Estimated Energy Needs: 75 - 100 %  % Meal Intake: 50 - 75 %    Nutrition Risk    Level of Risk/Frequency of Follow-up:  (1x/week)     Monitor and Evaluation    Food and Nutrient Intake: food and beverage intake, energy intake  Food and Nutrient Adminstration: diet order  Physical Activity and Function: nutrition-related ADLs and IADLs  Anthropometric Measurements: weight, weight change  Biochemical Data, Medical Tests and Procedures: inflammatory profile, lipid profile, glucose/endocrine profile, gastrointestinal profile, electrolyte and renal panel  Nutrition-Focused Physical Findings: overall appearance     Nutrition Follow-Up    RD Follow-up?: Yes    Dayan Cowan MS, RD, LDN

## 2024-04-25 NOTE — ASSESSMENT & PLAN NOTE
CKD IV (baseline creatinine ~3.1-3.3) admitted with hypervolemia and REGINALDO with creatinine 4.2  UA showed +1 protein with UPCR of ~500 mg  Urinary sediment with non dysmorphic RBCs and WBCs present although Jin catheter placed the day prior to UA  Retroperitoneal US without evidence of hydronephrosis although changes consistent with chronic kidney disease  Diuresis with lasix gtt @ 40mg/hr + Diuril 500mg IV BID attempted, and while Crt improved she remains unable to tolerate lying flat.  Tolerated 8h SLED w goal UF rate 200-500/hr successfully overnight 4/14-15, then 12h SLED on 4/15, 16, and 17.  4/19 - 4/24: tolerated iHD; no issues  4/22: TDC placed    Plan/Recommendations:  Continue iHD for metabolic clearance and volume management. TDC placed 4/22. Suspect she may now be ESRD and require iHD moving forward but not yet declared ESRD  Hold Renvela given hypophosphatemia  Okay for discharge from nephrology standpoint once outpatient HD chair arranged; will need nephrology follow-up in clinic  Recommend increasing lasix to 80 mg IV BID  Free water restriction of 1.5L daily  Please avoid hypotension/major fluctuations in BP which may worsen REGINALDO (keep MAP > 65 mmHg)  Strict I/O's and daily weights  Renally all dose medications to eGFR   Avoid nephrotoxic agents wean feasible (i.e. NSAIDs, intra-arterial contrast, supra-therapeutic vancomycin levels, etc.)

## 2024-04-26 LAB
ALBUMIN SERPL BCP-MCNC: 2.6 G/DL (ref 3.5–5.2)
ANION GAP SERPL CALC-SCNC: 10 MMOL/L (ref 8–16)
BUN SERPL-MCNC: 24 MG/DL (ref 6–20)
CALCIUM SERPL-MCNC: 7.8 MG/DL (ref 8.7–10.5)
CHLORIDE SERPL-SCNC: 95 MMOL/L (ref 95–110)
CO2 SERPL-SCNC: 21 MMOL/L (ref 23–29)
CREAT SERPL-MCNC: 3.2 MG/DL (ref 0.5–1.4)
ERYTHROCYTE [DISTWIDTH] IN BLOOD BY AUTOMATED COUNT: 23.8 % (ref 11.5–14.5)
EST. GFR  (NO RACE VARIABLE): 16.6 ML/MIN/1.73 M^2
GLUCOSE SERPL-MCNC: 82 MG/DL (ref 70–110)
HCT VFR BLD AUTO: 29 % (ref 37–48.5)
HGB BLD-MCNC: 8.8 G/DL (ref 12–16)
MAGNESIUM SERPL-MCNC: 2.2 MG/DL (ref 1.6–2.6)
MCH RBC QN AUTO: 24.4 PG (ref 27–31)
MCHC RBC AUTO-ENTMCNC: 30.3 G/DL (ref 32–36)
MCV RBC AUTO: 81 FL (ref 82–98)
PHOSPHATE SERPL-MCNC: 3.6 MG/DL (ref 2.7–4.5)
PLATELET # BLD AUTO: 182 K/UL (ref 150–450)
PMV BLD AUTO: 10.6 FL (ref 9.2–12.9)
POTASSIUM SERPL-SCNC: 3.9 MMOL/L (ref 3.5–5.1)
RBC # BLD AUTO: 3.6 M/UL (ref 4–5.4)
SODIUM SERPL-SCNC: 126 MMOL/L (ref 136–145)
WBC # BLD AUTO: 4.18 K/UL (ref 3.9–12.7)

## 2024-04-26 PROCEDURE — 25000003 PHARM REV CODE 250: Performed by: PHYSICIAN ASSISTANT

## 2024-04-26 PROCEDURE — 25000003 PHARM REV CODE 250: Performed by: STUDENT IN AN ORGANIZED HEALTH CARE EDUCATION/TRAINING PROGRAM

## 2024-04-26 PROCEDURE — 83735 ASSAY OF MAGNESIUM: CPT

## 2024-04-26 PROCEDURE — 94660 CPAP INITIATION&MGMT: CPT

## 2024-04-26 PROCEDURE — 85027 COMPLETE CBC AUTOMATED: CPT | Performed by: STUDENT IN AN ORGANIZED HEALTH CARE EDUCATION/TRAINING PROGRAM

## 2024-04-26 PROCEDURE — 63600175 PHARM REV CODE 636 W HCPCS: Performed by: STUDENT IN AN ORGANIZED HEALTH CARE EDUCATION/TRAINING PROGRAM

## 2024-04-26 PROCEDURE — 25000003 PHARM REV CODE 250

## 2024-04-26 PROCEDURE — 99233 SBSQ HOSP IP/OBS HIGH 50: CPT | Mod: ,,, | Performed by: HOSPITALIST

## 2024-04-26 PROCEDURE — 90935 HEMODIALYSIS ONE EVALUATION: CPT

## 2024-04-26 PROCEDURE — 20600001 HC STEP DOWN PRIVATE ROOM

## 2024-04-26 PROCEDURE — 99900035 HC TECH TIME PER 15 MIN (STAT)

## 2024-04-26 PROCEDURE — 80069 RENAL FUNCTION PANEL: CPT | Performed by: STUDENT IN AN ORGANIZED HEALTH CARE EDUCATION/TRAINING PROGRAM

## 2024-04-26 PROCEDURE — 94761 N-INVAS EAR/PLS OXIMETRY MLT: CPT

## 2024-04-26 PROCEDURE — 36415 COLL VENOUS BLD VENIPUNCTURE: CPT

## 2024-04-26 PROCEDURE — 25000003 PHARM REV CODE 250: Performed by: HOSPITALIST

## 2024-04-26 RX ORDER — SODIUM CHLORIDE 9 MG/ML
INJECTION, SOLUTION INTRAVENOUS ONCE
Status: CANCELLED | OUTPATIENT
Start: 2024-04-26 | End: 2024-04-26

## 2024-04-26 RX ADMIN — HEPARIN SODIUM 5000 UNITS: 5000 INJECTION INTRAVENOUS; SUBCUTANEOUS at 04:04

## 2024-04-26 RX ADMIN — ACETAMINOPHEN 650 MG: 325 TABLET ORAL at 10:04

## 2024-04-26 RX ADMIN — Medication: at 08:04

## 2024-04-26 RX ADMIN — HEPARIN SODIUM 5000 UNITS: 5000 INJECTION INTRAVENOUS; SUBCUTANEOUS at 05:04

## 2024-04-26 RX ADMIN — POLYETHYLENE GLYCOL 3350 17 G: 17 POWDER, FOR SOLUTION ORAL at 09:04

## 2024-04-26 RX ADMIN — FUROSEMIDE 80 MG: 80 TABLET ORAL at 08:04

## 2024-04-26 RX ADMIN — POLYETHYLENE GLYCOL 3350 17 G: 17 POWDER, FOR SOLUTION ORAL at 08:04

## 2024-04-26 RX ADMIN — OXYBUTYNIN CHLORIDE 5 MG: 5 TABLET ORAL at 09:04

## 2024-04-26 RX ADMIN — MICONAZOLE NITRATE: 20 CREAM TOPICAL at 08:04

## 2024-04-26 RX ADMIN — CARVEDILOL 6.25 MG: 6.25 TABLET, FILM COATED ORAL at 09:04

## 2024-04-26 RX ADMIN — FUROSEMIDE 80 MG: 80 TABLET ORAL at 05:04

## 2024-04-26 RX ADMIN — OXYBUTYNIN CHLORIDE 5 MG: 5 TABLET ORAL at 08:04

## 2024-04-26 RX ADMIN — TRIAMCINOLONE ACETONIDE: 1 CREAM TOPICAL at 09:04

## 2024-04-26 RX ADMIN — HEPARIN SODIUM 1000 UNITS: 1000 INJECTION, SOLUTION INTRAVENOUS; SUBCUTANEOUS at 03:04

## 2024-04-26 RX ADMIN — CLINDAMYCIN PHOSPHATE: 10 GEL TOPICAL at 09:04

## 2024-04-26 RX ADMIN — SENNOSIDES AND DOCUSATE SODIUM 1 TABLET: 8.6; 5 TABLET ORAL at 08:04

## 2024-04-26 RX ADMIN — TRIAMCINOLONE ACETONIDE: 1 CREAM TOPICAL at 08:04

## 2024-04-26 RX ADMIN — CARVEDILOL 6.25 MG: 6.25 TABLET, FILM COATED ORAL at 08:04

## 2024-04-26 RX ADMIN — HEPARIN SODIUM 5000 UNITS: 5000 INJECTION INTRAVENOUS; SUBCUTANEOUS at 09:04

## 2024-04-26 RX ADMIN — OXYBUTYNIN CHLORIDE 5 MG: 5 TABLET ORAL at 04:04

## 2024-04-26 RX ADMIN — ATORVASTATIN CALCIUM 40 MG: 40 TABLET, FILM COATED ORAL at 08:04

## 2024-04-26 RX ADMIN — CLINDAMYCIN PHOSPHATE: 10 GEL TOPICAL at 08:04

## 2024-04-26 RX ADMIN — MICONAZOLE NITRATE: 20 CREAM TOPICAL at 09:04

## 2024-04-26 RX ADMIN — Medication: at 09:04

## 2024-04-26 RX ADMIN — ASPIRIN 81 MG: 81 TABLET, COATED ORAL at 08:04

## 2024-04-26 RX ADMIN — SENNOSIDES AND DOCUSATE SODIUM 1 TABLET: 8.6; 5 TABLET ORAL at 09:04

## 2024-04-26 NOTE — PLAN OF CARE
Pt free of falls and injury. Pt AAOx4 with VSS. Fall precautions remain in place. Sternal precautions maintained. Pt remains on room air. Sats 95-96%. NSR on telemetry. Plan of care reviewed with pt.  utilized. Output was monitored. Pt resting comfortably with no complaints of pain. Pt denies chest pain, headache, and SOB. No acute distress noted,  plan of care continues.      Problem: Adult Inpatient Plan of Care  Goal: Plan of Care Review  Outcome: Progressing  Goal: Patient-Specific Goal (Individualized)  Outcome: Progressing  Goal: Absence of Hospital-Acquired Illness or Injury  Outcome: Progressing  Goal: Optimal Comfort and Wellbeing  Outcome: Progressing  Goal: Readiness for Transition of Care  Outcome: Progressing     Problem: Infection  Goal: Absence of Infection Signs and Symptoms  Outcome: Progressing     Problem: Impaired Wound Healing  Goal: Optimal Wound Healing  Outcome: Progressing     Problem: Fluid and Electrolyte Imbalance (Acute Kidney Injury/Impairment)  Goal: Fluid and Electrolyte Balance  Outcome: Progressing     Problem: Oral Intake Inadequate (Acute Kidney Injury/Impairment)  Goal: Optimal Nutrition Intake  Outcome: Progressing     Problem: Renal Function Impairment (Acute Kidney Injury/Impairment)  Goal: Effective Renal Function  Outcome: Progressing     Problem: Device-Related Complication Risk (Hemodialysis)  Goal: Safe, Effective Therapy Delivery  Outcome: Progressing     Problem: Hemodynamic Instability (Hemodialysis)  Goal: Effective Tissue Perfusion  Outcome: Progressing     Problem: Infection (Hemodialysis)  Goal: Absence of Infection Signs and Symptoms  Outcome: Progressing     Problem: Skin Injury Risk Increased  Goal: Skin Health and Integrity  Outcome: Progressing     Problem: Device-Related Complication Risk (CRRT (Continuous Renal Replacement Therapy))  Goal: Safe, Effective Therapy Delivery  Outcome: Progressing     Problem: Hypothermia (CRRT (Continuous Renal  Replacement Therapy))  Goal: Body Temperature Maintained in Desired Range  Outcome: Progressing     Problem: Infection (CRRT (Continuous Renal Replacement Therapy))  Goal: Absence of Infection Signs and Symptoms  Outcome: Progressing     Problem: Fall Injury Risk  Goal: Absence of Fall and Fall-Related Injury  Outcome: Progressing     Problem: Chronic Kidney Disease  Goal: Electrolyte Balance  Outcome: Progressing  Goal: Fluid Balance  Outcome: Progressing

## 2024-04-26 NOTE — SUBJECTIVE & OBJECTIVE
Interval History: NAEON. Remains HDS. Worked well with therapies yesterday but reports that she was unable to go up the stairs 2/2 LE/calf weakness. No pain, numbness/tingling, SOB, abdominal pain, N/V, chest pain, dizziness/lightheadedness, etc. Discussed tx plan with patient with assistance of video .     Review of Systems   Constitutional:  Negative for appetite change, chills, fatigue and fever.   HENT:  Negative for congestion and sore throat.    Respiratory:  Negative for cough and shortness of breath.    Cardiovascular:  Negative for chest pain and palpitations.   Gastrointestinal:  Negative for abdominal pain, diarrhea, nausea and vomiting.   Genitourinary:  Negative for dysuria and hematuria.   Musculoskeletal:  Negative for arthralgias, joint swelling and neck pain.   Skin:  Positive for wound (leg(s)). Negative for pallor.   Neurological:  Positive for weakness (BLE). Negative for dizziness, light-headedness, numbness and headaches.   Psychiatric/Behavioral:  Negative for confusion and sleep disturbance.      Objective:     Vital Signs (Most Recent):  Temp: 98.7 °F (37.1 °C) (04/25/24 2023)  Pulse: 70 (04/25/24 2023)  Resp: 18 (04/25/24 2023)  BP: 104/63 (04/25/24 2023)  SpO2: 95 % (04/25/24 2023) Vital Signs (24h Range):  Temp:  [98.1 °F (36.7 °C)-99.2 °F (37.3 °C)] 98.7 °F (37.1 °C)  Pulse:  [63-86] 70  Resp:  [18-19] 18  SpO2:  [95 %-98 %] 95 %  BP: ()/(60-76) 104/63     Weight: 64.8 kg (142 lb 13.7 oz)  Body mass index is 28.05 kg/m².    Intake/Output Summary (Last 24 hours) at 4/25/2024 2112  Last data filed at 4/25/2024 1815  Gross per 24 hour   Intake 510 ml   Output 600 ml   Net -90 ml         Physical Exam  Constitutional:       General: She is not in acute distress.     Appearance: She is not ill-appearing or toxic-appearing.   HENT:      Head: Normocephalic and atraumatic.   Eyes:      Conjunctiva/sclera: Conjunctivae normal.   Cardiovascular:      Rate and Rhythm: Normal rate  and regular rhythm.      Heart sounds: Normal heart sounds.   Pulmonary:      Effort: Pulmonary effort is normal. No respiratory distress.      Breath sounds: Normal breath sounds. No rales.   Abdominal:      General: Bowel sounds are normal.      Palpations: Abdomen is soft.      Tenderness: There is no abdominal tenderness.   Musculoskeletal:      Right lower leg: No edema.      Left lower leg: No edema.   Skin:     General: Skin is warm and dry.      Findings: Lesion (L lateral calf) present.   Neurological:      General: No focal deficit present.      Mental Status: She is alert and oriented to person, place, and time. Mental status is at baseline.   Psychiatric:         Mood and Affect: Mood normal.         Behavior: Behavior normal.           Significant Labs: All pertinent labs within the past 24 hours have been reviewed.    Significant Imaging: I have reviewed all pertinent imaging results/findings within the past 24 hours.

## 2024-04-26 NOTE — ASSESSMENT & PLAN NOTE
CKD IV (baseline creatinine ~3.1-3.3) admitted with hypervolemia and REGINALDO with creatinine 4.2  UA showed +1 protein with UPCR of ~500 mg  Urinary sediment with non dysmorphic RBCs and WBCs present although Jin catheter placed the day prior to UA  Retroperitoneal US without evidence of hydronephrosis although changes consistent with chronic kidney disease  Diuresis with lasix gtt @ 40mg/hr + Diuril 500mg IV BID attempted, and while Crt improved she remains unable to tolerate lying flat.  Tolerated 8h SLED w goal UF rate 200-500/hr successfully overnight 4/14-15, then 12h SLED on 4/15, 16, and 17.  4/19 - 4/24: tolerated iHD; no issues  4/22: TDC placed  4/26: Tolerating iHD, last session 4/24; pending outpatient HD chair    Plan/Recommendations:  Continue iHD for metabolic clearance and volume management. TDC placed 4/22. Suspect she may now be ESRD and require iHD moving forward but not yet declared ESRD  Will continue MWF iHD while inpatient  Hold Renvela given hypophosphatemia  Okay for discharge from nephrology standpoint once outpatient HD chair arranged; will need nephrology follow-up in clinic  Recommend continuing lasix 80 mg PO BID  Free water restriction of 1.5L daily  Strict I/O's and daily weights  Renally all dose medications to eGFR   Avoid nephrotoxic agents wean feasible (i.e. NSAIDs, intra-arterial contrast, supra-therapeutic vancomycin levels, etc.)

## 2024-04-26 NOTE — PT/OT/SLP PROGRESS
Occupational Therapy      Patient Name:  Xena Vera   MRN:  [id number]    Patient not seen today secondary to Dialysis. Will follow-up as scheduled per OT POC.    4/26/2024

## 2024-04-26 NOTE — PROGRESS NOTES
04/26/24 1600   Post-Hemodialysis Assessment   Blood Volume Processed (Liters) 67.1 L   Dialyzer Clearance Lightly streaked   Duration of Treatment 180 minutes   Additional Fluid Intake (mL) 500 mL   Total UF (mL) 1500 mL   Net Fluid Removal 1000   Patient Response to Treatment tolerated uf well   Post-Treatment Weight 64.4 kg (141 lb 15.6 oz)     HD tx completed, blood returned and PC heparin locked without issue. Pt in NAD/VSS. Report called to primary RN. Pt transport back to unit via .

## 2024-04-26 NOTE — PROGRESS NOTES
[facility] Step[facility]  Nephrology  Progress Note    Patient Name: Xena Vera  MRN: [id number]  Admission Date: 4/1/2024  Hospital Length of Stay: 23 days  Attending Provider: Ness Regan MD   Primary Care Physician: Tami Villeda FNP  Principal Problem:Severe mitral valve regurgitation    Subjective:     HPI: Ms Vera is an obese (BMI ~31) 53-year-old with CKD IV (baseline creatinine ~3.1-3.3), prediabetes with most recent hemoglobin A1c 6%, HFpEF (most recent TTE with EF 60-65% with G2DD), mitral valve regurgitation, anemia, hypertension, HLD as well as several over co-morbid conditions who was admitted on 4/1 with heart failure exacerbation and hypervolemia after presenting with to ED following a mechanical fall but also had complaints of progressive dyspnea/ZACARIAS, orthopnea, lower extremity edema and abdominal distension with constipation. On presentation patient was noted to be hypotensive with systolic BP readings as low as 90s mmHg and bradycardiac with HR as low as 50s bpm. There was concern for some edema on chest x-ray and BNP at that time was ~4.6K and metabolic panel was notable for serum sodium 133, bicarbonate 20, , creatinine 4.2, albumin 3.1 and total bilirubin 1.5. UA showed +1 protein. Her admission was complicated by failure to adequately diuresis with escalating IV Lasix and even oral metolazone for which Nephrology was consulted on 4/10 for assistance. She explicitly denies NSAID use as outpatient.    Interval History: No acute overnight events. Continued inpatient dialysis while awaiting outpatient HD chair.    Review of patient's allergies indicates:  No Known Allergies  Current Facility-Administered Medications   Medication Dose Route Frequency Provider Last Rate Last Admin    acetaminophen tablet 650 mg  650 mg Oral Q6H PRN Ness Regan MD        aspirin EC tablet 81 mg  81 mg Oral Daily David Borjas PA-C   81 mg at 04/26/24 0844    atorvastatin  tablet 40 mg  40 mg Oral Daily David Borjas PA-C   40 mg at 04/26/24 0844    bisacodyL EC tablet 5 mg  5 mg Oral Daily PRN Johnny Cisneros MD        carvediloL tablet 6.25 mg  6.25 mg Oral BID Ness Regan MD   6.25 mg at 04/26/24 0844    clindamycin phosphate 1% gel   Topical (Top) BID Domonique Walden MD   Given at 04/26/24 0846    dextrose 10% bolus 125 mL 125 mL  12.5 g Intravenous PRN David Borjas PA-DIMITRY        dextrose 10% bolus 250 mL 250 mL  25 g Intravenous PRN David Borjas PA-DIMITRY        diphenhydrAMINE-zinc acetate 2-0.1% cream   Topical (Top) TID PRN Domonique Walden MD   Given at 04/22/24 1556    furosemide tablet 80 mg  80 mg Oral BID Keshia German MD   80 mg at 04/26/24 0844    glucagon (human recombinant) injection 1 mg  1 mg Intramuscular PRN David Borjas PA-C        glucose chewable tablet 16 g  16 g Oral PRN David Borjas PA-C        glucose chewable tablet 24 g  24 g Oral PRN David Borjas PA-C        heparin (porcine) injection 1,000 Units  1,000 Units Intra-Catheter PRN Rosario Luciano MD   1,000 Units at 04/24/24 1041    heparin (porcine) injection 5,000 Units  5,000 Units Subcutaneous Q8H Rosario Luciano MD   5,000 Units at 04/26/24 0553    HYDROcodone-acetaminophen 5-325 mg per tablet 1 tablet  1 tablet Oral Q6H PRN Johnny Cisneros MD   1 tablet at 04/18/24 1010    hydrOXYzine HCL tablet 10 mg  10 mg Oral TID PRN Domonique Walden MD   10 mg at 04/22/24 2211    melatonin tablet 6 mg  6 mg Oral Nightly PRN Pietro Herring MD   6 mg at 04/18/24 2047    miconazole 2 % cream   Topical (Top) BID Domonique Walden MD   Given at 04/26/24 0847    naloxone 0.4 mg/mL injection 0.02 mg  0.02 mg Intravenous PRN David Borjas PA-C        ondansetron disintegrating tablet 4 mg  4 mg Oral Q8H PRN David Borjas PA-C        ondansetron injection 4 mg  4 mg Intravenous Q8H PRN David Borjas PA-C   4 mg at 04/13/24 1429     oxybutynin tablet 5 mg  5 mg Oral TID Minda Molina MD   5 mg at 04/26/24 0844    polyethylene glycol packet 17 g  17 g Oral BID Domonique Walden MD   17 g at 04/26/24 0844    senna-docusate 8.6-50 mg per tablet 1 tablet  1 tablet Oral BID Domonique Walden MD   1 tablet at 04/26/24 0844    sodium chloride 0.9% bolus 250 mL 250 mL  250 mL Intravenous PRN Italo Segovia MD        sodium chloride 0.9% flush 10 mL  10 mL Intravenous PRN Pietro Herring MD        sodium chloride 0.9% flush 10 mL  10 mL Intravenous PRN David Borjas PA-C        sodium chloride 0.9% flush 10 mL  10 mL Intravenous PRN Carole Rolon MD        triamcinolone acetonide 0.1% cream   Topical (Top) BID Rosario Luciano MD   Given at 04/26/24 0848    white petrolatum 41 % ointment   Topical (Top) BID Johnny Cisneros MD   Given at 04/26/24 0849    zinc oxide 20 % ointment   Topical (Top) PRN David Borjas PA-C   Given at 04/25/24 2209       Objective:     Vital Signs (Most Recent):  Temp: 97.4 °F (36.3 °C) (04/26/24 0725)  Pulse: 64 (04/26/24 0725)  Resp: 18 (04/26/24 0725)  BP: 119/83 (04/26/24 0725)  SpO2: 96 % (04/26/24 0725) Vital Signs (24h Range):  Temp:  [97.4 °F (36.3 °C)-99.2 °F (37.3 °C)] 97.4 °F (36.3 °C)  Pulse:  [61-74] 64  Resp:  [18-19] 18  SpO2:  [95 %-98 %] 96 %  BP: ()/(60-83) 119/83     Weight: 65.8 kg (145 lb 1 oz) (04/26/24 0400)  Body mass index is 28.48 kg/m².  Body surface area is 1.67 meters squared.    I/O last 3 completed shifts:  In: 510 [P.O.:510]  Out: 650 [Urine:650]     Physical Exam  Vitals and nursing note reviewed.   Constitutional:       General: She is not in acute distress.     Appearance: Normal appearance. She is not ill-appearing.   HENT:      Head: Normocephalic.   Eyes:      Extraocular Movements: Extraocular movements intact.   Cardiovascular:      Rate and Rhythm: Normal rate and regular rhythm.   Pulmonary:      Effort: Pulmonary effort is normal. No respiratory distress.       Breath sounds: No rales.   Musculoskeletal:      Right lower leg: No edema.      Left lower leg: No edema.   Skin:     General: Skin is warm and dry.   Neurological:      General: No focal deficit present.      Mental Status: She is alert and oriented to person, place, and time.       Significant Labs:  CMP:   Recent Labs   Lab 04/25/24  0419 04/26/24  0711   GLU 83 82   CALCIUM 7.8* 7.8*   ALBUMIN 2.6* 2.6*   PROT 6.1  --    * 126*   K 3.7 3.9   CO2 22* 21*   CL 98 95   BUN 13 24*   CREATININE 2.3* 3.2*   ALKPHOS 106  --    ALT 19  --    AST 28  --    BILITOT 1.0  --      All labs within the past 24 hours have been reviewed.     Significant Imaging:  Labs: Reviewed  Assessment/Plan:     Renal/  Acute kidney injury superimposed on chronic kidney disease  CKD IV (baseline creatinine ~3.1-3.3) admitted with hypervolemia and REGINALDO with creatinine 4.2  UA showed +1 protein with UPCR of ~500 mg  Urinary sediment with non dysmorphic RBCs and WBCs present although Jin catheter placed the day prior to UA  Retroperitoneal US without evidence of hydronephrosis although changes consistent with chronic kidney disease  Diuresis with lasix gtt @ 40mg/hr + Diuril 500mg IV BID attempted, and while Crt improved she remains unable to tolerate lying flat.  Tolerated 8h SLED w goal UF rate 200-500/hr successfully overnight 4/14-15, then 12h SLED on 4/15, 16, and 17.  4/19 - 4/24: tolerated iHD; no issues  4/22: TDC placed  4/26: Tolerating iHD, last session 4/24; pending outpatient HD chair    Plan/Recommendations:  Continue iHD for metabolic clearance and volume management. TDC placed 4/22. Suspect she may now be ESRD and require iHD moving forward but not yet declared ESRD  Will continue MWF iHD while inpatient  Hold Renvela given hypophosphatemia  Okay for discharge from nephrology standpoint once outpatient HD chair arranged; will need nephrology follow-up in clinic  Recommend continuing lasix 80 mg PO BID  Free water  restriction of 1.5L daily  Strict I/O's and daily weights  Renally all dose medications to eGFR   Avoid nephrotoxic agents wean feasible (i.e. NSAIDs, intra-arterial contrast, supra-therapeutic vancomycin levels, etc.)        Thank you for your consult. I will follow-up with patient. Please contact us if you have any additional questions.    Javier Morris MD  Nephrology  [facility] - [facility]

## 2024-04-26 NOTE — ASSESSMENT & PLAN NOTE
- Interval history and physical exam findings as described above  - Most recent TTE results:  Results for orders placed during the hospital encounter of 04/01/24    Echo    Interpretation Summary    Left Ventricle: The left ventricle is normal in size. Normal wall thickness. Normal wall motion. There is normal systolic function with a visually estimated ejection fraction of 60 - 65%. Grade II diastolic dysfunction.    Right Ventricle: Normal right ventricular cavity size. Wall thickness is normal. Right ventricle wall motion  is normal. Systolic function is reduced.    Left Atrium: Left atrium is very  severely dilated.    Right Atrium: Right atrium is mildly dilated.    Aortic Valve: There is mild aortic valve sclerosis. There is normal leaflet mobility. There is trace aortic regurgitation.    Mitral Valve: There is mild bileaflet sclerosis. There is posterior leaflet tethering and failure of complete coaptation. There is very severe regurgitation with a posterolateral eccentriccally directed jet.    Tricuspid Valve: The tricuspid valve is structurally normal. There is normal leaflet mobility. There is moderate to severe regurgitation.    IVC/SVC: IVC was not well visualized due to poor acoustic window. Intermediate venous pressure at 8 mmHg.    Pericardium: There is a trivial effusion posteriorly and small under the RA.    - Clinically volume overloaded on admission  - now w/iHD needs  - Will continue diuresis with lasix 80mg PO BID  - Continue home cardioprudent regimen  - Repeat echo ordered, pending   - Strict I/Os  - 1.5L fluid restriction   - Daily weights  - Will continue to monitor on tele

## 2024-04-26 NOTE — PROGRESS NOTES
[facility] - Cardiology [facility]  Progress Note    Patient Name: Xena Vera  MRN: [id number]  Patient Class: IP- Inpatient   Admission Date: 4/1/2024  Length of Stay: 22 days  Attending Physician: Ness Regan MD  Primary Care Provider: Tami Villeda FNP        Subjective:     Principal Problem:Severe mitral valve regurgitation        HPI:  Per MICU: 54 yo F with PMH of HFpEF, severe MR, HTN, HLD, and CKD4 who initially presented to [facility] on 4/1/2024 after a mechanical fall at home with concurrent SOB and swelling in her legs and abdomen. She reported adherence to home Lasix and low sodium diet, but had felt increasingly SOB prior to admission as she was sleeping upright and had multiple nighttime awakenings due to SOB. In the ED, BNP was 4639 and she was admitted to Hospital Medicine for further management of ADHF. She initially had good response to IV Lasix but hospital course was later complicated by urinary retention, worsening kidney function, and decreased UOP. Nephrology and Cardiology were consulted to assist. She was started on Diuril and Lasix gtt for diuresis and Isordil and hydralazine for afterload reduction. Patient transferred to CCU for further management.        Overview/Hospital Course:  MICU course  Patient initially had improved UOP with increasing Lasix, eventually maxed out on Lasix drip with addition of Diuril. Eventually started on CRRT/SLED for volume removal, initially needed intermittent levophed to tolerate. Has itchy rash that started during current hospitalization, Dermatology consulted for regimen. Graduated from CRRT to iHD, tolerating well. Stable to step back down to HM.      course  Patient slightly volume overloaded on exam. Reports making urine. Transition to lasix 40mg PO daily. More fluid removal via HD. Will need OP HD chair. SW consulted. Repeat TTE ordered for eval for MR. Discuss plans with IC once repeat TTE is obtained.  Will also need OP IC  follow up. Nephro rec consulting interventional nephro for TDC placement. She underwent TDC placement 04/22. Repeat TTE w/the following findings:      Left Ventricle: The left ventricle is moderately dilated. Normal wall thickness. There is eccentric hypertrophy. There is normal systolic function with a visually estimated ejection fraction of 55 - 60%. Diastolic function cannot be reliably determined in the presence of mitral valve disease.    Right Ventricle: Mild right ventricular enlargement. Wall thickness is normal. Right ventricle wall motion  is normal. Systolic function is mildly reduced.    Biatrial enlargement (L > R)    Mitral Valve: There is severe functional regurgitation with a centrally directed jet.    Tricuspid Valve: There is mild to moderate regurgitation.    Pulmonary Artery: The estimated pulmonary artery systolic pressure is 96 mmHg.    IVC/SVC: Intermediate venous pressure at 8 mmHg.    There is a small pericardial effusion and a right pleural effusion.    Interventional cardiology consulted- recommended further medical management optimization and continued fluid removal via iHD. Not a candidate for mitraclip at this time.       Interval History: NAEON. Remains HDS. Worked well with therapies yesterday but reports that she was unable to go up the stairs 2/2 LE/calf weakness. No pain, numbness/tingling, SOB, abdominal pain, N/V, chest pain, dizziness/lightheadedness, etc. Discussed tx plan with patient with assistance of video .     Review of Systems   Constitutional:  Negative for appetite change, chills, fatigue and fever.   HENT:  Negative for congestion and sore throat.    Respiratory:  Negative for cough and shortness of breath.    Cardiovascular:  Negative for chest pain and palpitations.   Gastrointestinal:  Negative for abdominal pain, diarrhea, nausea and vomiting.   Genitourinary:  Negative for dysuria and hematuria.   Musculoskeletal:  Negative for arthralgias, joint  swelling and neck pain.   Skin:  Positive for wound (leg(s)). Negative for pallor.   Neurological:  Positive for weakness (BLE). Negative for dizziness, light-headedness, numbness and headaches.   Psychiatric/Behavioral:  Negative for confusion and sleep disturbance.      Objective:     Vital Signs (Most Recent):  Temp: 98.7 °F (37.1 °C) (04/25/24 2023)  Pulse: 70 (04/25/24 2023)  Resp: 18 (04/25/24 2023)  BP: 104/63 (04/25/24 2023)  SpO2: 95 % (04/25/24 2023) Vital Signs (24h Range):  Temp:  [98.1 °F (36.7 °C)-99.2 °F (37.3 °C)] 98.7 °F (37.1 °C)  Pulse:  [63-86] 70  Resp:  [18-19] 18  SpO2:  [95 %-98 %] 95 %  BP: ()/(60-76) 104/63     Weight: 64.8 kg (142 lb 13.7 oz)  Body mass index is 28.05 kg/m².    Intake/Output Summary (Last 24 hours) at 4/25/2024 2112  Last data filed at 4/25/2024 1815  Gross per 24 hour   Intake 510 ml   Output 600 ml   Net -90 ml         Physical Exam  Constitutional:       General: She is not in acute distress.     Appearance: She is not ill-appearing or toxic-appearing.   HENT:      Head: Normocephalic and atraumatic.   Eyes:      Conjunctiva/sclera: Conjunctivae normal.   Cardiovascular:      Rate and Rhythm: Normal rate and regular rhythm.      Heart sounds: Normal heart sounds.   Pulmonary:      Effort: Pulmonary effort is normal. No respiratory distress.      Breath sounds: Normal breath sounds. No rales.   Abdominal:      General: Bowel sounds are normal.      Palpations: Abdomen is soft.      Tenderness: There is no abdominal tenderness.   Musculoskeletal:      Right lower leg: No edema.      Left lower leg: No edema.   Skin:     General: Skin is warm and dry.      Findings: Lesion (L lateral calf) present.   Neurological:      General: No focal deficit present.      Mental Status: She is alert and oriented to person, place, and time. Mental status is at baseline.   Psychiatric:         Mood and Affect: Mood normal.         Behavior: Behavior normal.           Significant  Labs: All pertinent labs within the past 24 hours have been reviewed.    Significant Imaging: I have reviewed all pertinent imaging results/findings within the past 24 hours.    Assessment/Plan:      * Severe mitral valve regurgitation    Acute on chronic heart failure with preserved ejection fraction (HFpEF)   Anasarca      54 yo F admitted for ADHF 2/2 severe MR. The MR appears to be secondary to posterior leaflet restriction and leaflet length of 1.1 cm. BNP on admission was 4300 compared to 350 eight months prior. Initially diagnosed with severe MR in June of 2023. Saw Dr. Stone in January of 2024 and was undergoing work-up for possible MitraClip; [facility] scheduled for 3/15 but not completed due to financial constraints, also needs POOJA. Will need optimization of kidney function prior to angiogram consideration, appreciate Nephrology assistance. APS serologies negative, Fabry workup negative     DDX: rheumatic heart disease vs Fabry vs antiphospholipid (serologies negative)     4/1/2024 TTE    Left Ventricle: The left ventricle is normal in size. Normal wall thickness. Normal wall motion. There is normal systolic function with a visually estimated ejection fraction of 60 - 65%. Grade II diastolic dysfunction.    Right Ventricle: Normal right ventricular cavity size. Wall thickness is normal. Right ventricle wall motion  is normal. Systolic function is reduced.    Left Atrium: Left atrium is very  severely dilated.    Right Atrium: Right atrium is mildly dilated.    Aortic Valve: There is mild aortic valve sclerosis. There is normal leaflet mobility. There is trace aortic regurgitation.    Mitral Valve: There is mild bileaflet sclerosis. There is posterior leaflet tethering and failure of complete coaptation. There is very severe regurgitation with a posterolateral eccentriccally directed jet.    Tricuspid Valve: The tricuspid valve is structurally normal. There is normal leaflet mobility. There is moderate to  "severe regurgitation.    IVC/SVC: IVC was not well visualized due to poor acoustic window. Intermediate venous pressure at 8 mmHg.    Pericardium: There is a trivial effusion posteriorly and small under the RA.     - Interventional Cardiology consulted - no intervention until patient's volume status is controlled  - Nephrology following, undergoing SLED/CRRT  - POOJA ordered - "Anesthesia evaluated the patient and feel that she needs better optimization prior to POOJA. They stated once she is euvolemic that we can move forward. Will need a new POOJA order at that time."  - Discontinued Diuril 500 mg IV q24h & Lasix gtt @ 40 mg/hr  - Discontinued hydralazine 25 mg po q8h & Isordil 20 mg po BID for afterload reduction due to pressor requirements  - Continue home ASA 81 mg po daily and Lipitor 40 mg po daily  - Genetics consulted, see "Hypertrophic cardiomyopathy"  - Strict I/O, Jin in place  - Transitioned to lasix 40mg PO daily. Reassess daily for diuretic use.   - Repeat TTE reviewed- IC consulted- not a candidate for mitraclip- recommended outpatient f/u- continued fluid removal/fluid status optimization via HD- up-titration of GDMT as tolerated  - Will also need OP IC follow up.   - Telemetry monitoring    Hyponatremia  Patient has hyponatremia which is uncontrolled,We will aim to correct the sodium by 4-6mEq in 24 hours. We will monitor sodium Daily. The hyponatremia is due to renal insufficiency. We will obtain the following studies: no new labs at this moment. We will treat the hyponatremia with Fluid restriction of:  1.5 liter per day and Hemodialysis. The patient's sodium results have been reviewed and are listed below.  Recent Labs   Lab 04/24/24  0349   *         Intertrigo  - improvement       Rash  - derm initially consulted- concern for resolving folliculitis- no significant improvement  - triamcinolone cream today-monitor for improvement- if no improvement, then will re-consult derm    Proteinuria  - " "h/o      Hypertrophic cardiomyopathy  Angiokeratoma   Proteinuria      - Genetics consulted, appreciate recs  - GLA genetic testing collected and sent to [facility]- neg for fabry's disease  - Consider cardiac MRI      Angiokeratoma        Prediabetes  - h/o    HLD (hyperlipidemia)  - continue statin    Acute kidney injury superimposed on chronic kidney disease  Patient with acute kidney injury/acute renal failure likely due to acute tubular necrosis caused by unknown.  REGINALDO is currently  improving on CRRT/SLED . Baseline creatinine  2.5  - Labs reviewed- Renal function/electrolytes with Estimated Creatinine Clearance: 80.9 mL/min (based on SCr of 0.7 mg/dL). according to latest data. Monitor urine output and serial BMP and adjust therapy as needed. Avoid nephrotoxins and renally dose meds for GFR listed above.           Recent Labs     04/17/24  1428 04/17/24  2203 04/18/24  0413   CREATININE 1.8*  1.8* 0.8  0.8 0.7  0.7  0.7         - Nephrology following, appreciate recs - started SLED on 4/14   - Genetics consulted, see "Hypertrophic cardiomyopathy"  - Holding home sevelamer   - Holding home sodium bicarb 650 mg po BID  - HD per nephrology. Will need OP HD chair setup.   - Nephro following- consulted interventional nephrology- TDC 04/22 - awaiting medicaid enrollment to set-up outpatient HD chair        Acute urinary retention  - now w/acute renal failure requiring iHD  - no catheter in place       Leg wound, left  - wound care following    Anasarca  - 2/2 acute renal failure in setting of CHF w/severe MR       Prolonged QT interval  - resolved    Acute on chronic heart failure with preserved ejection fraction (HFpEF)  - Interval history and physical exam findings as described above  - Most recent TTE results:  Results for orders placed during the hospital encounter of 04/01/24    Echo    Interpretation Summary    Left Ventricle: The left ventricle is normal in size. Normal wall thickness. Normal wall " motion. There is normal systolic function with a visually estimated ejection fraction of 60 - 65%. Grade II diastolic dysfunction.    Right Ventricle: Normal right ventricular cavity size. Wall thickness is normal. Right ventricle wall motion  is normal. Systolic function is reduced.    Left Atrium: Left atrium is very  severely dilated.    Right Atrium: Right atrium is mildly dilated.    Aortic Valve: There is mild aortic valve sclerosis. There is normal leaflet mobility. There is trace aortic regurgitation.    Mitral Valve: There is mild bileaflet sclerosis. There is posterior leaflet tethering and failure of complete coaptation. There is very severe regurgitation with a posterolateral eccentriccally directed jet.    Tricuspid Valve: The tricuspid valve is structurally normal. There is normal leaflet mobility. There is moderate to severe regurgitation.    IVC/SVC: IVC was not well visualized due to poor acoustic window. Intermediate venous pressure at 8 mmHg.    Pericardium: There is a trivial effusion posteriorly and small under the RA.    - Clinically volume overloaded on admission  - now w/iHD needs  - Will continue diuresis with lasix 80mg PO BID  - Continue home cardioprudent regimen  - Repeat echo ordered, pending   - Strict I/Os  - 1.5L fluid restriction   - Daily weights  - Will continue to monitor on tele      Anemia  Stable.   Recent Labs   Lab 04/22/24  0513 04/23/24  0416 04/24/24  0349   WBC 5.24 5.41 5.74   HGB 9.0* 9.4* 9.7*   HCT 31.8* 31.8* 34.1*    199 227         Class 2 obesity in adult  Body mass index is 28.31 kg/m². Morbid obesity complicates all aspects of disease management from diagnostic modalities to treatment. Weight loss encouraged and health benefits explained to patient.     Essential hypertension     Temp:  [98 °F (36.7 °C)-98.5 °F (36.9 °C)]   Pulse:  [55-62]   Resp:  [9-40]   BP: (94)/(53)   SpO2:  [94 %-100 %]   Arterial Line BP: ()/(44-66) .     While in the  hospital, will manage blood pressure as follows; Adjust home antihypertensive regimen as follows- holding Lasix and Coreg     Will utilize p.r.n. blood pressure medication only if patient's blood pressure greater than 180/110 and she develops symptoms such as worsening chest pain or shortness of breath.         VTE Risk Mitigation (From admission, onward)           Ordered     heparin (porcine) injection 5,000 Units  Every 8 hours         04/24/24 1546     heparin (porcine) injection 1,000 Units  As needed (PRN)         04/24/24 1038     heparin (porcine) injection 1,000 Units  As needed (PRN)         04/13/24 1428     Place sequential compression device  Until discontinued         04/03/24 1347     IP VTE HIGH RISK PATIENT  Once         04/01/24 1030     Place sequential compression device  Until discontinued         04/01/24 1030                    Discharge Planning   RILEY: 4/26/2024     Code Status: Full Code   Is the patient medically ready for discharge?: No    Reason for patient still in hospital (select all that apply): Pending disposition  Discharge Plan A: Home with family, Other (New HD chair)   Discharge Delays: None known at this time        Ness Regan MD  Department of Hospital Medicine   [facility] - Cardiology Stepdown

## 2024-04-26 NOTE — PLAN OF CARE
[facility] - Cardiology Stepdown  Discharge Reassessment    Primary Care Provider: Tami Villeda FNP    Expected Discharge Date: 4/29/2024    Reassessment (most recent)       Discharge Reassessment - 04/26/24 1422          Discharge Reassessment    Assessment Type Discharge Planning Reassessment     Did the patient's condition or plan change since previous assessment? No     Communicated RILEY with patient/caregiver Date not available/Unable to determine     Discharge Plan A Home with family;Other   new HD chair    Discharge Plan B Home     DME Needed Upon Discharge  none     Transition of Care Barriers Unisured     Why the patient remains in the hospital Insurance issues        Post-Acute Status    Discharge Delays Dialysis Set-up                   Per nephrology social worker Allie [facility] not willing to accept pt without insurance but [facility]'s JIM Paredes is attempting to contact a [facility] representative to discuss/provide direction on the pt's Medicaid status.  Per CM Manager Joceline she has involved her leadership about discharging pt with a plan for her to get dialysis at the ED when symptomatic if unable to resolve the Medicaid issue.  Will continue to follow.      Sophia Nunez, JAILYN  [facility]  [id number]

## 2024-04-26 NOTE — ASSESSMENT & PLAN NOTE
"Patient with acute kidney injury/acute renal failure likely due to acute tubular necrosis caused by unknown.  REGINALDO is currently  improving on CRRT/SLED . Baseline creatinine  2.5  - Labs reviewed- Renal function/electrolytes with Estimated Creatinine Clearance: 80.9 mL/min (based on SCr of 0.7 mg/dL). according to latest data. Monitor urine output and serial BMP and adjust therapy as needed. Avoid nephrotoxins and renally dose meds for GFR listed above.           Recent Labs     04/17/24  1428 04/17/24  2203 04/18/24  0413   CREATININE 1.8*  1.8* 0.8  0.8 0.7  0.7  0.7         - Nephrology following, appreciate recs - started SLED on 4/14   - Genetics consulted, see "Hypertrophic cardiomyopathy"  - Holding home sevelamer   - Holding home sodium bicarb 650 mg po BID  - HD per nephrology. Will need OP HD chair setup.   - Nephro following- consulted interventional nephrology- TD 04/22 - awaiting medicaid enrollment to set-up outpatient HD chair      "

## 2024-04-26 NOTE — SUBJECTIVE & OBJECTIVE
Interval History: No acute overnight events. Continued inpatient dialysis while awaiting outpatient HD chair.    Review of patient's allergies indicates:  No Known Allergies  Current Facility-Administered Medications   Medication Dose Route Frequency Provider Last Rate Last Admin    acetaminophen tablet 650 mg  650 mg Oral Q6H PRN Ness Regan MD        aspirin EC tablet 81 mg  81 mg Oral Daily David Borjas PA-C   81 mg at 04/26/24 0844    atorvastatin tablet 40 mg  40 mg Oral Daily David Borjas PA-C   40 mg at 04/26/24 0844    bisacodyL EC tablet 5 mg  5 mg Oral Daily PRN Johnny Cisneros MD        carvediloL tablet 6.25 mg  6.25 mg Oral BID Ness Regan MD   6.25 mg at 04/26/24 0844    clindamycin phosphate 1% gel   Topical (Top) BID Domonique [facility] MD   Given at 04/26/24 0846    dextrose 10% bolus 125 mL 125 mL  12.5 g Intravenous PRN David Borjas PA-DIMITRY        dextrose 10% bolus 250 mL 250 mL  25 g Intravenous PRN David Borjas PA-DIMITRY        diphenhydrAMINE-zinc acetate 2-0.1% cream   Topical (Top) TID PRN Domonique [facility] MD   Given at 04/22/24 1556    furosemide tablet 80 mg  80 mg Oral BID Keshia German MD   80 mg at 04/26/24 0844    glucagon (human recombinant) injection 1 mg  1 mg Intramuscular PRN David Borjas PA-C        glucose chewable tablet 16 g  16 g Oral PRN David Borjas PA-DIMITRY        glucose chewable tablet 24 g  24 g Oral PRN David Borjas PA-C        heparin (porcine) injection 1,000 Units  1,000 Units Intra-Catheter PRN Rosario Luciano MD   1,000 Units at 04/24/24 1041    heparin (porcine) injection 5,000 Units  5,000 Units Subcutaneous Q8H Rosario Luciano MD   5,000 Units at 04/26/24 0553    HYDROcodone-acetaminophen 5-325 mg per tablet 1 tablet  1 tablet Oral Q6H PRN Johnny Cisneros MD   1 tablet at 04/18/24 1010    hydrOXYzine HCL tablet 10 mg  10 mg Oral TID PRN Domonique [facility] MD   10 mg at 04/22/24 9155     melatonin tablet 6 mg  6 mg Oral Nightly PRN Pietro Herring MD   6 mg at 04/18/24 2047    miconazole 2 % cream   Topical (Top) BID Domonique [facility] MD   Given at 04/26/24 0847    naloxone 0.4 mg/mL injection 0.02 mg  0.02 mg Intravenous PRN David Borjas PA-C        ondansetron disintegrating tablet 4 mg  4 mg Oral Q8H PRN David Bojras PA-C        ondansetron injection 4 mg  4 mg Intravenous Q8H PRN David Borjas PA-C   4 mg at 04/13/24 1429    oxybutynin tablet 5 mg  5 mg Oral TID Minda Molina MD   5 mg at 04/26/24 0844    polyethylene glycol packet 17 g  17 g Oral BID Domonique [facility] MD   17 g at 04/26/24 0844    senna-docusate 8.6-50 mg per tablet 1 tablet  1 tablet Oral BID Domonique [facility] MD   1 tablet at 04/26/24 0844    sodium chloride 0.9% bolus 250 mL 250 mL  250 mL Intravenous PRN Italo Segovia MD        sodium chloride 0.9% flush 10 mL  10 mL Intravenous PRN Pietro Herring MD        sodium chloride 0.9% flush 10 mL  10 mL Intravenous PRN David Borjas PA-C        sodium chloride 0.9% flush 10 mL  10 mL Intravenous PRN Carole Rolon MD        triamcinolone acetonide 0.1% cream   Topical (Top) BID Rosario Luciano MD   Given at 04/26/24 0848    white petrolatum 41 % ointment   Topical (Top) BID Johnny Cisneros MD   Given at 04/26/24 0849    zinc oxide 20 % ointment   Topical (Top) PRN David Borjas PA-C   Given at 04/25/24 2209       Objective:     Vital Signs (Most Recent):  Temp: 97.4 °F (36.3 °C) (04/26/24 0725)  Pulse: 64 (04/26/24 0725)  Resp: 18 (04/26/24 0725)  BP: 119/83 (04/26/24 0725)  SpO2: 96 % (04/26/24 0725) Vital Signs (24h Range):  Temp:  [97.4 °F (36.3 °C)-99.2 °F (37.3 °C)] 97.4 °F (36.3 °C)  Pulse:  [61-74] 64  Resp:  [18-19] 18  SpO2:  [95 %-98 %] 96 %  BP: ()/(60-83) 119/83     Weight: 65.8 kg (145 lb 1 oz) (04/26/24 0400)  Body mass index is 28.48 kg/m².  Body surface area is 1.67 meters squared.    I/O last 3 completed shifts:  In:  510 [P.O.:510]  Out: 650 [Urine:650]     Physical Exam  Vitals and nursing note reviewed.   Constitutional:       General: She is not in acute distress.     Appearance: Normal appearance. She is not ill-appearing.   HENT:      Head: Normocephalic.   Eyes:      Extraocular Movements: Extraocular movements intact.   Cardiovascular:      Rate and Rhythm: Normal rate and regular rhythm.   Pulmonary:      Effort: Pulmonary effort is normal. No respiratory distress.      Breath sounds: No rales.   Musculoskeletal:      Right lower leg: No edema.      Left lower leg: No edema.   Skin:     General: Skin is warm and dry.   Neurological:      General: No focal deficit present.      Mental Status: She is alert and oriented to person, place, and time.       Significant Labs:  CMP:   Recent Labs   Lab 04/25/24  0419 04/26/24  0711   GLU 83 82   CALCIUM 7.8* 7.8*   ALBUMIN 2.6* 2.6*   PROT 6.1  --    * 126*   K 3.7 3.9   CO2 22* 21*   CL 98 95   BUN 13 24*   CREATININE 2.3* 3.2*   ALKPHOS 106  --    ALT 19  --    AST 28  --    BILITOT 1.0  --      All labs within the past 24 hours have been reviewed.     Significant Imaging:  Labs: Reviewed

## 2024-04-26 NOTE — PLAN OF CARE
Problem: Adult Inpatient Plan of Care  Goal: Plan of Care Review  Outcome: Progressing     Problem: Impaired Wound Healing  Goal: Optimal Wound Healing  Outcome: Progressing     Problem: Infection  Goal: Absence of Infection Signs and Symptoms  Outcome: Progressing     Problem: Fluid and Electrolyte Imbalance (Acute Kidney Injury/Impairment)  Goal: Fluid and Electrolyte Balance  Outcome: Progressing     Problem: Infection (Hemodialysis)  Goal: Absence of Infection Signs and Symptoms  Outcome: Progressing     Problem: Skin Injury Risk Increased  Goal: Skin Health and Integrity  Outcome: Progressing     Problem: Fall Injury Risk  Goal: Absence of Fall and Fall-Related Injury  Outcome: Progressing       Plan of care discussed with patient.  Patient ambulating independently, fall precautions in place. Patient has no complaints of pain. Discussed medications and care. Patient has no questions at this time. Will continue to monitor.

## 2024-04-27 LAB
ALBUMIN SERPL BCP-MCNC: 2.8 G/DL (ref 3.5–5.2)
ANION GAP SERPL CALC-SCNC: 9 MMOL/L (ref 8–16)
BUN SERPL-MCNC: 12 MG/DL (ref 6–20)
CALCIUM SERPL-MCNC: 7.8 MG/DL (ref 8.7–10.5)
CHLORIDE SERPL-SCNC: 99 MMOL/L (ref 95–110)
CO2 SERPL-SCNC: 22 MMOL/L (ref 23–29)
CREAT SERPL-MCNC: 2.3 MG/DL (ref 0.5–1.4)
ERYTHROCYTE [DISTWIDTH] IN BLOOD BY AUTOMATED COUNT: 24.4 % (ref 11.5–14.5)
EST. GFR  (NO RACE VARIABLE): 24.6 ML/MIN/1.73 M^2
GLUCOSE SERPL-MCNC: 87 MG/DL (ref 70–110)
HCT VFR BLD AUTO: 30.1 % (ref 37–48.5)
HGB BLD-MCNC: 8.4 G/DL (ref 12–16)
MAGNESIUM SERPL-MCNC: 1.8 MG/DL (ref 1.6–2.6)
MCH RBC QN AUTO: 24.2 PG (ref 27–31)
MCHC RBC AUTO-ENTMCNC: 27.9 G/DL (ref 32–36)
MCV RBC AUTO: 87 FL (ref 82–98)
PHOSPHATE SERPL-MCNC: 2.6 MG/DL (ref 2.7–4.5)
PLATELET # BLD AUTO: 221 K/UL (ref 150–450)
PMV BLD AUTO: 12 FL (ref 9.2–12.9)
POTASSIUM SERPL-SCNC: 3.7 MMOL/L (ref 3.5–5.1)
RBC # BLD AUTO: 3.47 M/UL (ref 4–5.4)
SODIUM SERPL-SCNC: 130 MMOL/L (ref 136–145)
WBC # BLD AUTO: 4.59 K/UL (ref 3.9–12.7)

## 2024-04-27 PROCEDURE — 25000003 PHARM REV CODE 250

## 2024-04-27 PROCEDURE — 25000003 PHARM REV CODE 250: Performed by: STUDENT IN AN ORGANIZED HEALTH CARE EDUCATION/TRAINING PROGRAM

## 2024-04-27 PROCEDURE — 85027 COMPLETE CBC AUTOMATED: CPT | Performed by: STUDENT IN AN ORGANIZED HEALTH CARE EDUCATION/TRAINING PROGRAM

## 2024-04-27 PROCEDURE — 97530 THERAPEUTIC ACTIVITIES: CPT | Mod: CQ

## 2024-04-27 PROCEDURE — 20600001 HC STEP DOWN PRIVATE ROOM

## 2024-04-27 PROCEDURE — 36415 COLL VENOUS BLD VENIPUNCTURE: CPT

## 2024-04-27 PROCEDURE — 99900035 HC TECH TIME PER 15 MIN (STAT)

## 2024-04-27 PROCEDURE — 25000003 PHARM REV CODE 250: Performed by: PHYSICIAN ASSISTANT

## 2024-04-27 PROCEDURE — 25000003 PHARM REV CODE 250: Performed by: HOSPITALIST

## 2024-04-27 PROCEDURE — 63600175 PHARM REV CODE 636 W HCPCS: Performed by: STUDENT IN AN ORGANIZED HEALTH CARE EDUCATION/TRAINING PROGRAM

## 2024-04-27 PROCEDURE — 94660 CPAP INITIATION&MGMT: CPT

## 2024-04-27 PROCEDURE — 83735 ASSAY OF MAGNESIUM: CPT

## 2024-04-27 PROCEDURE — 94761 N-INVAS EAR/PLS OXIMETRY MLT: CPT

## 2024-04-27 PROCEDURE — 80069 RENAL FUNCTION PANEL: CPT | Performed by: STUDENT IN AN ORGANIZED HEALTH CARE EDUCATION/TRAINING PROGRAM

## 2024-04-27 RX ADMIN — HEPARIN SODIUM 5000 UNITS: 5000 INJECTION INTRAVENOUS; SUBCUTANEOUS at 03:04

## 2024-04-27 RX ADMIN — ATORVASTATIN CALCIUM 40 MG: 40 TABLET, FILM COATED ORAL at 08:04

## 2024-04-27 RX ADMIN — OXYBUTYNIN CHLORIDE 5 MG: 5 TABLET ORAL at 03:04

## 2024-04-27 RX ADMIN — CARVEDILOL 6.25 MG: 6.25 TABLET, FILM COATED ORAL at 08:04

## 2024-04-27 RX ADMIN — SENNOSIDES AND DOCUSATE SODIUM 1 TABLET: 8.6; 5 TABLET ORAL at 08:04

## 2024-04-27 RX ADMIN — POLYETHYLENE GLYCOL 3350 17 G: 17 POWDER, FOR SOLUTION ORAL at 08:04

## 2024-04-27 RX ADMIN — OXYBUTYNIN CHLORIDE 5 MG: 5 TABLET ORAL at 08:04

## 2024-04-27 RX ADMIN — Medication: at 08:04

## 2024-04-27 RX ADMIN — SENNOSIDES AND DOCUSATE SODIUM 1 TABLET: 8.6; 5 TABLET ORAL at 09:04

## 2024-04-27 RX ADMIN — TRIAMCINOLONE ACETONIDE: 1 CREAM TOPICAL at 10:04

## 2024-04-27 RX ADMIN — CLINDAMYCIN PHOSPHATE: 10 GEL TOPICAL at 10:04

## 2024-04-27 RX ADMIN — MICONAZOLE NITRATE: 20 CREAM TOPICAL at 10:04

## 2024-04-27 RX ADMIN — Medication: at 10:04

## 2024-04-27 RX ADMIN — FUROSEMIDE 80 MG: 80 TABLET ORAL at 06:04

## 2024-04-27 RX ADMIN — HEPARIN SODIUM 5000 UNITS: 5000 INJECTION INTRAVENOUS; SUBCUTANEOUS at 06:04

## 2024-04-27 RX ADMIN — HYDROCODONE BITARTRATE AND ACETAMINOPHEN 1 TABLET: 5; 325 TABLET ORAL at 02:04

## 2024-04-27 RX ADMIN — CLINDAMYCIN PHOSPHATE: 10 GEL TOPICAL at 08:04

## 2024-04-27 RX ADMIN — MICONAZOLE NITRATE: 20 CREAM TOPICAL at 08:04

## 2024-04-27 RX ADMIN — ASPIRIN 81 MG: 81 TABLET, COATED ORAL at 08:04

## 2024-04-27 RX ADMIN — TRIAMCINOLONE ACETONIDE: 1 CREAM TOPICAL at 08:04

## 2024-04-27 RX ADMIN — HEPARIN SODIUM 5000 UNITS: 5000 INJECTION INTRAVENOUS; SUBCUTANEOUS at 09:04

## 2024-04-27 RX ADMIN — CARVEDILOL 6.25 MG: 6.25 TABLET, FILM COATED ORAL at 09:04

## 2024-04-27 RX ADMIN — FUROSEMIDE 80 MG: 80 TABLET ORAL at 08:04

## 2024-04-27 RX ADMIN — OXYBUTYNIN CHLORIDE 5 MG: 5 TABLET ORAL at 09:04

## 2024-04-27 NOTE — PLAN OF CARE
Problem: Adult Inpatient Plan of Care  Goal: Plan of Care Review  Outcome: Progressing  Goal: Patient-Specific Goal (Individualized)  Outcome: Progressing  Flowsheets (Taken 4/27/2024 4815)  Anxieties, Fears or Concerns: none mentioned  Individualized Care Needs: I&O monitoring  Goal: Readiness for Transition of Care  Outcome: Progressing     Problem: Infection  Goal: Absence of Infection Signs and Symptoms  Outcome: Progressing     Problem: Impaired Wound Healing  Goal: Optimal Wound Healing  Outcome: Progressing     VSS. Plan of care discussed with patient. Patient is free of fall/trauma/injury. Denies CP, SOB, or pain/discomfort. All questions addressed.    81

## 2024-04-27 NOTE — PT/OT/SLP PROGRESS
Physical Therapy Treatment    Patient Name:  Xena Vera   MRN:  [id number]    Recommendations:     Discharge Recommendations: Low Intensity Therapy  Discharge Equipment Recommendations: walker, rolling  Barriers to discharge: None    Assessment:     Xena Vera is a 53 y.o. female admitted with a medical diagnosis of Severe mitral valve regurgitation.  She presents with the following impairments/functional limitations: weakness, impaired endurance, impaired functional mobility, impaired self care skills, gait instability, impaired balance, decreased lower extremity function, decreased ROM, impaired joint extensibility, impaired cardiopulmonary response to activity.    Pt agreeable and motivated for session. Pt tolerates session well with emphasis on transfers, gait training, and stair training. Pt with c/o SOB after stair trials with SPO2 assessed and WNL. Pt will continue to benefit from skilled therapy services.    Rehab Prognosis: Good; patient would benefit from acute skilled PT services to address these deficits and reach maximum level of function.    Recent Surgery: Procedure(s) (LRB):  Insertion, Catheter, Central Venous, Hemodialysis (Right) 5 Days Post-Op    Plan:     During this hospitalization, patient to be seen 3 x/week to address the identified rehab impairments via gait training, therapeutic activities, therapeutic exercises, neuromuscular re-education and progress toward the following goals:    Plan of Care Expires:  05/18/24    Subjective     Chief Complaint: SOB after stair trials  Patient/Family Comments/goals: agreeable to practice stairs  Pain/Comfort:  Pain Rating 1: 0/10  Pain Rating Post-Intervention 1: 0/10      Objective:     Communicated with RN (Jessica) prior to session.  Patient found sitting edge of bed with telemetry upon PTA entry to room.     General Precautions: Standard, fall  Orthopedic Precautions: N/A  Braces: N/A  Respiratory Status: Room air     Functional  Mobility:  Transfers:     Sit to Stand: from EOB  supervision with no AD  Gait: Pt ambulates 144 feet SBA with no AD. Pt mildy unsteady with no noted LOB.  Stairs:  Pt ascended/descended 10 stairs with No Assistive Device with right handrail with Minimal Assistance ascending stairs and CGA descending stairs. Pt with increased BUE usage to ascend stairs.       AM-PAC 6 CLICK MOBILITY  Turning over in bed (including adjusting bedclothes, sheets and blankets)?: 4  Sitting down on and standing up from a chair with arms (e.g., wheelchair, bedside commode, etc.): 4  Moving from lying on back to sitting on the side of the bed?: 3  Moving to and from a bed to a chair (including a wheelchair)?: 3  Need to walk in hospital room?: 3  Climbing 3-5 steps with a railing?: 2  Basic Mobility Total Score: 19       Treatment & Education:  Patient provided with daily orientation and goals of this PT session.     Pt  educated to call for assistance and to transfer with hospital staff only.  Also, pt was educated on the effects of prolonged immobility and the importance of performing OOB activity and exercises to promote healing and reduce recovery time.    Patient left sitting edge of bed with all lines intact, call button in reach, and RN notified.    GOALS:   Multidisciplinary Problems       Physical Therapy Goals          Problem: Physical Therapy    Goal Priority Disciplines Outcome Goal Variances Interventions   Physical Therapy Goal     PT, PT/OT Progressing     Description: Goals to be met by: 24    Patient will increase functional independence with mobility by performin. Supine to sit with Modified [place]  2. Sit to supine with Modified [place]  3. Sit to stand transfer with Supervision  4. Bed to chair transfer with Supervision using Rolling Walker  5. Gait  x 100 feet with Supervision using Rolling Walker.   6. Ascend/descend 5 stair with bilateral Handrails Supervision using No Assistive Device.   7.  Lower extremity exercise program x15 reps per handout, with assistance as needed                         Time Tracking:     PT Received On: 04/27/24  PT Start Time: 1010     PT Stop Time: 1019  PT Total Time (min): 9 min     Billable Minutes: Therapeutic Activity 9    Treatment Type: Treatment  PT/PTA: PTA     Number of PTA visits since last PT visit: 2     04/27/2024

## 2024-04-28 PROCEDURE — 25000003 PHARM REV CODE 250: Performed by: STUDENT IN AN ORGANIZED HEALTH CARE EDUCATION/TRAINING PROGRAM

## 2024-04-28 PROCEDURE — 25000003 PHARM REV CODE 250: Performed by: PHYSICIAN ASSISTANT

## 2024-04-28 PROCEDURE — 63600175 PHARM REV CODE 636 W HCPCS: Performed by: STUDENT IN AN ORGANIZED HEALTH CARE EDUCATION/TRAINING PROGRAM

## 2024-04-28 PROCEDURE — 25000003 PHARM REV CODE 250: Performed by: HOSPITALIST

## 2024-04-28 PROCEDURE — 99900035 HC TECH TIME PER 15 MIN (STAT)

## 2024-04-28 PROCEDURE — 20600001 HC STEP DOWN PRIVATE ROOM

## 2024-04-28 PROCEDURE — 25000003 PHARM REV CODE 250

## 2024-04-28 RX ADMIN — CLINDAMYCIN PHOSPHATE: 10 GEL TOPICAL at 08:04

## 2024-04-28 RX ADMIN — POLYETHYLENE GLYCOL 3350 17 G: 17 POWDER, FOR SOLUTION ORAL at 08:04

## 2024-04-28 RX ADMIN — FUROSEMIDE 80 MG: 80 TABLET ORAL at 08:04

## 2024-04-28 RX ADMIN — OXYBUTYNIN CHLORIDE 5 MG: 5 TABLET ORAL at 09:04

## 2024-04-28 RX ADMIN — SENNOSIDES AND DOCUSATE SODIUM 1 TABLET: 8.6; 5 TABLET ORAL at 09:04

## 2024-04-28 RX ADMIN — CARVEDILOL 6.25 MG: 6.25 TABLET, FILM COATED ORAL at 09:04

## 2024-04-28 RX ADMIN — Medication: at 08:04

## 2024-04-28 RX ADMIN — POLYETHYLENE GLYCOL 3350 17 G: 17 POWDER, FOR SOLUTION ORAL at 09:04

## 2024-04-28 RX ADMIN — CLINDAMYCIN PHOSPHATE: 10 GEL TOPICAL at 09:04

## 2024-04-28 RX ADMIN — ATORVASTATIN CALCIUM 40 MG: 40 TABLET, FILM COATED ORAL at 08:04

## 2024-04-28 RX ADMIN — SENNOSIDES AND DOCUSATE SODIUM 1 TABLET: 8.6; 5 TABLET ORAL at 08:04

## 2024-04-28 RX ADMIN — OXYBUTYNIN CHLORIDE 5 MG: 5 TABLET ORAL at 02:04

## 2024-04-28 RX ADMIN — ASPIRIN 81 MG: 81 TABLET, COATED ORAL at 08:04

## 2024-04-28 RX ADMIN — TRIAMCINOLONE ACETONIDE: 1 CREAM TOPICAL at 09:04

## 2024-04-28 RX ADMIN — MICONAZOLE NITRATE: 20 CREAM TOPICAL at 08:04

## 2024-04-28 RX ADMIN — MICONAZOLE NITRATE: 20 CREAM TOPICAL at 09:04

## 2024-04-28 RX ADMIN — HEPARIN SODIUM 5000 UNITS: 5000 INJECTION INTRAVENOUS; SUBCUTANEOUS at 09:04

## 2024-04-28 RX ADMIN — HEPARIN SODIUM 5000 UNITS: 5000 INJECTION INTRAVENOUS; SUBCUTANEOUS at 02:04

## 2024-04-28 RX ADMIN — Medication: at 09:04

## 2024-04-28 RX ADMIN — CARVEDILOL 6.25 MG: 6.25 TABLET, FILM COATED ORAL at 08:04

## 2024-04-28 RX ADMIN — OXYBUTYNIN CHLORIDE 5 MG: 5 TABLET ORAL at 08:04

## 2024-04-28 RX ADMIN — HYDROCODONE BITARTRATE AND ACETAMINOPHEN 1 TABLET: 5; 325 TABLET ORAL at 09:04

## 2024-04-28 RX ADMIN — TRIAMCINOLONE ACETONIDE: 1 CREAM TOPICAL at 08:04

## 2024-04-28 NOTE — PLAN OF CARE
Problem: Adult Inpatient Plan of Care  Goal: Plan of Care Review  Outcome: Progressing  Goal: Patient-Specific Goal (Individualized)  Outcome: Progressing  Flowsheets (Taken 4/28/2024 1607)  Anxieties, Fears or Concerns: intermittent hypotension  Individualized Care Needs: I&O     Problem: Impaired Wound Healing  Goal: Optimal Wound Healing  Outcome: Progressing     Problem: Fluid and Electrolyte Imbalance (Acute Kidney Injury/Impairment)  Goal: Fluid and Electrolyte Balance  Outcome: Progressing     Pt had episode of hypotension (see previous note), BP now WNL, VSS, Pt AAOx4. Plan of care discussed with patient. Patient is free of fall/trauma/injury. Denies CP, SOB, or pain/discomfort. All questions addressed.

## 2024-04-28 NOTE — PROGRESS NOTES
[facility] - Cardiology [facility] Medicine  Progress Note    Patient Name: Xena Vera  MRN: [id number]  Patient Class: IP- Inpatient   Admission Date: 4/1/2024  Length of Stay: 25 days  Attending Physician: Ness Regan MD  Primary Care Provider: Tami Villeda FNP    Subjective:     Principal Problem:Severe mitral valve regurgitation    HPI:  Per MICU: 52 yo F with PMH of HFpEF, severe MR, HTN, HLD, and CKD4 who initially presented to [facility] [place] [facility] on 4/1/2024 after a mechanical fall at home with concurrent SOB and swelling in her legs and abdomen. She reported adherence to home Lasix and low sodium diet, but had felt increasingly SOB prior to admission as she was sleeping upright and had multiple nighttime awakenings due to SOB. In the ED, BNP was 4639 and she was admitted to Hospital Medicine for further management of ADHF. She initially had good response to IV Lasix but hospital course was later complicated by urinary retention, worsening kidney function, and decreased UOP. Nephrology and Cardiology were consulted to assist. She was started on Diuril and Lasix gtt for diuresis and Isordil and hydralazine for afterload reduction. Patient transferred to CCU for further management.        Overview/Hospital Course:  MICU course  Patient initially had improved UOP with increasing Lasix, eventually maxed out on Lasix drip with addition of Diuril. Eventually started on CRRT/SLED for volume removal, initially needed intermittent levophed to tolerate. Has itchy rash that started during current hospitalization, Dermatology consulted for regimen. Graduated from CRRT to iHD, tolerating well. Stable to step back down to HM.      course  Patient slightly volume overloaded on exam. Reports making urine. Transition to lasix 40mg PO daily. More fluid removal via HD. Will need OP HD chair. SW consulted. Repeat TTE ordered for eval for MR. Discuss plans with IC once repeat TTE is obtained.  Will also need OP IC follow  up. Nephro rec consulting interventional nephro for TDC placement. She underwent TDC placement 04/22. Repeat TTE w/the following findings:      Left Ventricle: The left ventricle is moderately dilated. Normal wall thickness. There is eccentric hypertrophy. There is normal systolic function with a visually estimated ejection fraction of 55 - 60%. Diastolic function cannot be reliably determined in the presence of mitral valve disease.    Right Ventricle: Mild right ventricular enlargement. Wall thickness is normal. Right ventricle wall motion  is normal. Systolic function is mildly reduced.    Biatrial enlargement (L > R)    Mitral Valve: There is severe functional regurgitation with a centrally directed jet.    Tricuspid Valve: There is mild to moderate regurgitation.    Pulmonary Artery: The estimated pulmonary artery systolic pressure is 96 mmHg.    IVC/SVC: Intermediate venous pressure at 8 mmHg.    There is a small pericardial effusion and a right pleural effusion.    Interventional cardiology consulted- recommended further medical management optimization and continued fluid removal via iHD. Not a candidate for mitraclip at this time.       Interval History: NAEON. This AM pt had transient hypotension during routine VS check. Initially 90/54 --> 71/47 --> 83/53 --> 105/60. Pt asymptomatic and otherwise HDS throughout. Episode lasted 20min and resolved w/o intervention. Pt last received HD 2 days ago. Had not recently ambulated. Will continue to monitor. Pt otherwise doing well w/o complaints. Dispo pending insurance enrollment & outpatient HD chair.     Review of Systems   Constitutional:  Negative for appetite change, chills, fatigue and fever.   HENT:  Negative for congestion and sore throat.    Respiratory:  Negative for cough and shortness of breath.    Cardiovascular:  Negative for chest pain and palpitations.   Gastrointestinal:  Negative for abdominal pain, diarrhea, nausea and vomiting.   Genitourinary:   Negative for dysuria and hematuria.   Musculoskeletal:  Negative for arthralgias, joint swelling and neck pain.   Skin:  Positive for wound (leg(s)). Negative for pallor.   Neurological:  Positive for weakness (BLE). Negative for dizziness, light-headedness, numbness and headaches.   Psychiatric/Behavioral:  Negative for confusion and sleep disturbance.      Objective:     Vital Signs (Most Recent):  Temp: 98.5 °F (36.9 °C) (04/28/24 1202)  Pulse: (!) 20 (04/28/24 1202)  Resp: 20 (04/28/24 1202)  BP: 101/61 (04/28/24 1433)  SpO2: 95 % (04/28/24 1202) Vital Signs (24h Range):  Temp:  [97.6 °F (36.4 °C)-98.5 °F (36.9 °C)] 98.5 °F (36.9 °C)  Pulse:  [20-63] 20  Resp:  [16-20] 20  SpO2:  [93 %-98 %] 95 %  BP: ()/(47-75) 101/61     Weight: 65.5 kg (144 lb 6.4 oz)  Body mass index is 28.35 kg/m².    Intake/Output Summary (Last 24 hours) at 4/28/2024 1530  Last data filed at 4/28/2024 1506  Gross per 24 hour   Intake 600 ml   Output 900 ml   Net -300 ml         Physical Exam  Constitutional:       General: She is not in acute distress.     Appearance: She is not ill-appearing or toxic-appearing.   HENT:      Head: Normocephalic and atraumatic.   Eyes:      Conjunctiva/sclera: Conjunctivae normal.   Cardiovascular:      Rate and Rhythm: Normal rate and regular rhythm.      Heart sounds: Normal heart sounds.   Pulmonary:      Effort: Pulmonary effort is normal. No respiratory distress.      Breath sounds: Normal breath sounds. No rales.   Abdominal:      General: Bowel sounds are normal.      Palpations: Abdomen is soft.      Tenderness: There is no abdominal tenderness.   Musculoskeletal:      Right lower leg: No edema.      Left lower leg: No edema.   Skin:     General: Skin is warm and dry.      Findings: Lesion (L lateral calf) present.   Neurological:      General: No focal deficit present.      Mental Status: She is alert and oriented to person, place, and time. Mental status is at baseline.   Psychiatric:          Mood and Affect: Mood normal.         Behavior: Behavior normal.           Significant Labs: All pertinent labs within the past 24 hours have been reviewed.    Significant Imaging: I have reviewed all pertinent imaging results/findings within the past 24 hours.    Assessment/Plan:      * Severe mitral valve regurgitation    Acute on chronic heart failure with preserved ejection fraction (HFpEF)   Eric      52 yo F admitted for ADHF 2/2 severe MR. The MR appears to be secondary to posterior leaflet restriction and leaflet length of 1.1 cm. BNP on admission was 4300 compared to 350 eight months prior. Initially diagnosed with severe MR in June of 2023. Saw Dr. Stone in January of 2024 and was undergoing work-up for possible MitraClip; [facility] scheduled for 3/15 but not completed due to financial constraints, also needs POOJA. Will need optimization of kidney function prior to angiogram consideration, appreciate Nephrology assistance. APS serologies negative, Fabry workup negative     DDX: rheumatic heart disease vs Fabry vs antiphospholipid (serologies negative)     4/1/2024 TTE    Left Ventricle: The left ventricle is normal in size. Normal wall thickness. Normal wall motion. There is normal systolic function with a visually estimated ejection fraction of 60 - 65%. Grade II diastolic dysfunction.    Right Ventricle: Normal right ventricular cavity size. Wall thickness is normal. Right ventricle wall motion  is normal. Systolic function is reduced.    Left Atrium: Left atrium is very  severely dilated.    Right Atrium: Right atrium is mildly dilated.    Aortic Valve: There is mild aortic valve sclerosis. There is normal leaflet mobility. There is trace aortic regurgitation.    Mitral Valve: There is mild bileaflet sclerosis. There is posterior leaflet tethering and failure of complete coaptation. There is very severe regurgitation with a posterolateral eccentriccally directed jet.    Tricuspid Valve: The tricuspid  "valve is structurally normal. There is normal leaflet mobility. There is moderate to severe regurgitation.    IVC/SVC: IVC was not well visualized due to poor acoustic window. Intermediate venous pressure at 8 mmHg.    Pericardium: There is a trivial effusion posteriorly and small under the RA.     - Interventional Cardiology consulted - no intervention until patient's volume status is controlled  - Nephrology following, undergoing SLED/CRRT  - POOJA ordered - "Anesthesia evaluated the patient and feel that she needs better optimization prior to POOJA. They stated once she is euvolemic that we can move forward. Will need a new POOJA order at that time."  - Discontinued Diuril 500 mg IV q24h & Lasix gtt @ 40 mg/hr  - Discontinued hydralazine 25 mg po q8h & Isordil 20 mg po BID for afterload reduction due to pressor requirements  - Continue home ASA 81 mg po daily and Lipitor 40 mg po daily  - Genetics consulted, see "Hypertrophic cardiomyopathy"  - Strict I/O, Jin in place  - Transitioned to lasix 40mg PO daily. Reassess daily for diuretic use.   - Repeat TTE reviewed- IC consulted- not a candidate for mitraclip- recommended outpatient f/u- continued fluid removal/fluid status optimization via HD- up-titration of GDMT as tolerated  - Will also need OP IC follow up.   - Telemetry monitoring    Hyponatremia  Patient has hyponatremia which is uncontrolled,We will aim to correct the sodium by 4-6mEq in 24 hours. We will monitor sodium Daily. The hyponatremia is due to renal insufficiency. We will obtain the following studies: no new labs at this moment. We will treat the hyponatremia with Fluid restriction of:  1.5 liter per day and Hemodialysis. The patient's sodium results have been reviewed and are listed below.  Recent Labs   Lab 04/24/24  0349   *         Intertrigo  - improvement       Rash  - derm initially consulted- concern for resolving folliculitis- no significant improvement  - triamcinolone cream " "today-monitor for improvement- if no improvement, then will re-consult derm    Proteinuria  - h/o      Hypertrophic cardiomyopathy  Angiokeratoma   Proteinuria      - Genetics consulted, appreciate josie  - GLA genetic testing collected and sent to [facility]- neg for fabry's disease  - Consider cardiac MRI      Angiokeratoma        Prediabetes  - h/o    HLD (hyperlipidemia)  - continue statin    Acute kidney injury superimposed on chronic kidney disease  Patient with acute kidney injury/acute renal failure likely due to acute tubular necrosis caused by unknown.  REGINALDO is currently  improving on CRRT/SLED . Baseline creatinine  2.5  - Labs reviewed- Renal function/electrolytes with Estimated Creatinine Clearance: 80.9 mL/min (based on SCr of 0.7 mg/dL). according to latest data. Monitor urine output and serial BMP and adjust therapy as needed. Avoid nephrotoxins and renally dose meds for GFR listed above.           Recent Labs     04/17/24  1428 04/17/24  2203 04/18/24  0413   CREATININE 1.8*  1.8* 0.8  0.8 0.7  0.7  0.7         - Nephrology following, appreciate josie - started SLED on 4/14   - Genetics consulted, see "Hypertrophic cardiomyopathy"  - Holding home sevelamer   - Holding home sodium bicarb 650 mg po BID  - HD per nephrology. Will need OP HD chair setup.   - Nephro following- consulted interventional nephrology- [facility] 04/22 - awaiting medicaid enrollment to set-up outpatient HD chair        Acute urinary retention  - now w/acute renal failure requiring iHD  - no catheter in place       Leg wound, left  - wound care following    Anasarca  - 2/2 acute renal failure in setting of CHF w/severe MR       Prolonged QT interval  - resolved    Acute on chronic heart failure with preserved ejection fraction (HFpEF)  - Interval history and physical exam findings as described above  - Most recent TTE results:  Results for orders placed during the hospital encounter of 04/01/24    Echo    Interpretation Summary    " Left Ventricle: The left ventricle is normal in size. Normal wall thickness. Normal wall motion. There is normal systolic function with a visually estimated ejection fraction of 60 - 65%. Grade II diastolic dysfunction.    Right Ventricle: Normal right ventricular cavity size. Wall thickness is normal. Right ventricle wall motion  is normal. Systolic function is reduced.    Left Atrium: Left atrium is very  severely dilated.    Right Atrium: Right atrium is mildly dilated.    Aortic Valve: There is mild aortic valve sclerosis. There is normal leaflet mobility. There is trace aortic regurgitation.    Mitral Valve: There is mild bileaflet sclerosis. There is posterior leaflet tethering and failure of complete coaptation. There is very severe regurgitation with a posterolateral eccentriccally directed jet.    Tricuspid Valve: The tricuspid valve is structurally normal. There is normal leaflet mobility. There is moderate to severe regurgitation.    IVC/SVC: IVC was not well visualized due to poor acoustic window. Intermediate venous pressure at 8 mmHg.    Pericardium: There is a trivial effusion posteriorly and small under the RA.    - Clinically volume overloaded on admission  - now w/iHD needs  - Will continue diuresis with lasix 80mg PO BID  - Continue home cardioprudent regimen  - Repeat echo ordered, pending   - Strict I/Os  - 1.5L fluid restriction   - Daily weights  - Will continue to monitor on tele      Anemia  Stable.   Recent Labs   Lab 04/22/24  0513 04/23/24  0416 04/24/24  0349   WBC 5.24 5.41 5.74   HGB 9.0* 9.4* 9.7*   HCT 31.8* 31.8* 34.1*    199 227         Class 2 obesity in adult  Body mass index is 28.31 kg/m². Morbid obesity complicates all aspects of disease management from diagnostic modalities to treatment. Weight loss encouraged and health benefits explained to patient.     Essential hypertension     Temp:  [98 °F (36.7 °C)-98.5 °F (36.9 °C)]   Pulse:  [55-62]   Resp:  [9-40]   BP:  (94)/(53)   SpO2:  [94 %-100 %]   Arterial Line BP: ()/(44-66) .     While in the hospital, will manage blood pressure as follows; Adjust home antihypertensive regimen as follows- holding Lasix and Coreg     Will utilize p.r.n. blood pressure medication only if patient's blood pressure greater than 180/110 and she develops symptoms such as worsening chest pain or shortness of breath.         VTE Risk Mitigation (From admission, onward)           Ordered     heparin (porcine) injection 5,000 Units  Every 8 hours         04/24/24 1546     heparin (porcine) injection 1,000 Units  As needed (PRN)         04/24/24 1038     heparin (porcine) injection 1,000 Units  As needed (PRN)         04/13/24 1428     IP VTE HIGH RISK PATIENT  Once         04/01/24 1030     Place sequential compression device  Until discontinued         04/01/24 1030                    Discharge Planning   RILEY: 4/29/2024     Code Status: Full Code   Is the patient medically ready for discharge?: No    Reason for patient still in hospital (select all that apply): Pending disposition  Discharge Plan A: Home with family, Other (new HD chair)   Discharge Delays: (!) Dialysis Set-up              Ness Regan MD  Department of Hospital Medicine   [facility] - Cardiology Stepdown

## 2024-04-28 NOTE — SUBJECTIVE & OBJECTIVE
Interval History: NAEON. Remains HDS. Dispo pending insurance enrollment & outpatient HD chair.     Review of Systems   Constitutional:  Negative for appetite change, chills, fatigue and fever.   HENT:  Negative for congestion and sore throat.    Respiratory:  Negative for cough and shortness of breath.    Cardiovascular:  Negative for chest pain and palpitations.   Gastrointestinal:  Negative for abdominal pain, diarrhea, nausea and vomiting.   Genitourinary:  Negative for dysuria and hematuria.   Musculoskeletal:  Negative for arthralgias, joint swelling and neck pain.   Skin:  Positive for wound (leg(s)). Negative for pallor.   Neurological:  Positive for weakness (BLE). Negative for dizziness, light-headedness, numbness and headaches.   Psychiatric/Behavioral:  Negative for confusion and sleep disturbance.      Objective:     Vital Signs (Most Recent):  Temp: 97.9 °F (36.6 °C) (04/27/24 2009)  Pulse: 63 (04/27/24 2009)  Resp: 18 (04/27/24 2009)  BP: (!) 99/59 (04/27/24 2009)  SpO2: 97 % (04/27/24 2009) Vital Signs (24h Range):  Temp:  [97.7 °F (36.5 °C)-98.4 °F (36.9 °C)] 97.9 °F (36.6 °C)  Pulse:  [61-67] 63  Resp:  [18-20] 18  SpO2:  [94 %-100 %] 97 %  BP: ()/(54-79) 99/59     Weight: 64.8 kg (142 lb 13.7 oz)  Body mass index is 28.05 kg/m².    Intake/Output Summary (Last 24 hours) at 4/27/2024 7519  Last data filed at 4/27/2024 1850  Gross per 24 hour   Intake 480 ml   Output 500 ml   Net -20 ml         Physical Exam  Constitutional:       General: She is not in acute distress.     Appearance: She is not ill-appearing or toxic-appearing.   HENT:      Head: Normocephalic and atraumatic.   Eyes:      Conjunctiva/sclera: Conjunctivae normal.   Cardiovascular:      Rate and Rhythm: Normal rate and regular rhythm.      Heart sounds: Normal heart sounds.   Pulmonary:      Effort: Pulmonary effort is normal. No respiratory distress.      Breath sounds: Normal breath sounds. No rales.   Abdominal:      General:  Bowel sounds are normal.      Palpations: Abdomen is soft.      Tenderness: There is no abdominal tenderness.   Musculoskeletal:      Right lower leg: No edema.      Left lower leg: No edema.   Skin:     General: Skin is warm and dry.      Findings: Lesion (L lateral calf) present.   Neurological:      General: No focal deficit present.      Mental Status: She is alert and oriented to person, place, and time. Mental status is at baseline.   Psychiatric:         Mood and Affect: Mood normal.         Behavior: Behavior normal.           Significant Labs: All pertinent labs within the past 24 hours have been reviewed.    Significant Imaging: I have reviewed all pertinent imaging results/findings within the past 24 hours.

## 2024-04-28 NOTE — SUBJECTIVE & OBJECTIVE
Interval History: NAEON. This AM pt had transient hypotension during routine VS check. Initially 90/54 --> 71/47 --> 83/53 --> 105/60. Pt asymptomatic and otherwise HDS throughout. Episode lasted 20min and resolved w/o intervention. Pt last received HD 2 days ago. Had not recently ambulated. Will continue to monitor. Pt otherwise doing well w/o complaints. Dispo pending insurance enrollment & outpatient HD chair.     Review of Systems   Constitutional:  Negative for appetite change, chills, fatigue and fever.   HENT:  Negative for congestion and sore throat.    Respiratory:  Negative for cough and shortness of breath.    Cardiovascular:  Negative for chest pain and palpitations.   Gastrointestinal:  Negative for abdominal pain, diarrhea, nausea and vomiting.   Genitourinary:  Negative for dysuria and hematuria.   Musculoskeletal:  Negative for arthralgias, joint swelling and neck pain.   Skin:  Positive for wound (leg(s)). Negative for pallor.   Neurological:  Positive for weakness (BLE). Negative for dizziness, light-headedness, numbness and headaches.   Psychiatric/Behavioral:  Negative for confusion and sleep disturbance.      Objective:     Vital Signs (Most Recent):  Temp: 98.5 °F (36.9 °C) (04/28/24 1202)  Pulse: (!) 20 (04/28/24 1202)  Resp: 20 (04/28/24 1202)  BP: 101/61 (04/28/24 1433)  SpO2: 95 % (04/28/24 1202) Vital Signs (24h Range):  Temp:  [97.6 °F (36.4 °C)-98.5 °F (36.9 °C)] 98.5 °F (36.9 °C)  Pulse:  [20-63] 20  Resp:  [16-20] 20  SpO2:  [93 %-98 %] 95 %  BP: ()/(47-75) 101/61     Weight: 65.5 kg (144 lb 6.4 oz)  Body mass index is 28.35 kg/m².    Intake/Output Summary (Last 24 hours) at 4/28/2024 1530  Last data filed at 4/28/2024 1506  Gross per 24 hour   Intake 600 ml   Output 900 ml   Net -300 ml         Physical Exam  Constitutional:       General: She is not in acute distress.     Appearance: She is not ill-appearing or toxic-appearing.   HENT:      Head: Normocephalic and atraumatic.    Eyes:      Conjunctiva/sclera: Conjunctivae normal.   Cardiovascular:      Rate and Rhythm: Normal rate and regular rhythm.      Heart sounds: Normal heart sounds.   Pulmonary:      Effort: Pulmonary effort is normal. No respiratory distress.      Breath sounds: Normal breath sounds. No rales.   Abdominal:      General: Bowel sounds are normal.      Palpations: Abdomen is soft.      Tenderness: There is no abdominal tenderness.   Musculoskeletal:      Right lower leg: No edema.      Left lower leg: No edema.   Skin:     General: Skin is warm and dry.      Findings: Lesion (L lateral calf) present.   Neurological:      General: No focal deficit present.      Mental Status: She is alert and oriented to person, place, and time. Mental status is at baseline.   Psychiatric:         Mood and Affect: Mood normal.         Behavior: Behavior normal.           Significant Labs: All pertinent labs within the past 24 hours have been reviewed.    Significant Imaging: I have reviewed all pertinent imaging results/findings within the past 24 hours.

## 2024-04-28 NOTE — PROGRESS NOTES
[facility] - Cardiology [facility] Medicine  Progress Note    Patient Name: Xena Vera  MRN: [id number]  Patient Class: IP- Inpatient   Admission Date: 4/1/2024  Length of Stay: 24 days  Attending Physician: Ness Regan MD  Primary Care Provider: Tami Villeda FNP        Subjective:     Principal Problem:Severe mitral valve regurgitation        HPI:  Per MICU: 54 yo F with PMH of HFpEF, severe MR, HTN, HLD, and CKD4 who initially presented to [facility] on 4/1/2024 after a mechanical fall at home with concurrent SOB and swelling in her legs and abdomen. She reported adherence to home Lasix and low sodium diet, but had felt increasingly SOB prior to admission as she was sleeping upright and had multiple nighttime awakenings due to SOB. In the ED, BNP was 4639 and she was admitted to Hospital Medicine for further management of ADHF. She initially had good response to IV Lasix but hospital course was later complicated by urinary retention, worsening kidney function, and decreased UOP. Nephrology and Cardiology were consulted to assist. She was started on Diuril and Lasix gtt for diuresis and Isordil and hydralazine for afterload reduction. Patient transferred to CCU for further management.        Overview/Hospital Course:  MICU course  Patient initially had improved UOP with increasing Lasix, eventually maxed out on Lasix drip with addition of Diuril. Eventually started on CRRT/SLED for volume removal, initially needed intermittent levophed to tolerate. Has itchy rash that started during current hospitalization, Dermatology consulted for regimen. Graduated from CRRT to iHD, tolerating well. Stable to step back down to HM.      course  Patient slightly volume overloaded on exam. Reports making urine. Transition to lasix 40mg PO daily. More fluid removal via HD. Will need OP HD chair. SW consulted. Repeat TTE ordered for eval for MR. Discuss plans with IC once repeat TTE is obtained.  Will also need OP IC  follow up. Nephro rec consulting interventional nephro for TDC placement. She underwent TDC placement 04/22. Repeat TTE w/the following findings:      Left Ventricle: The left ventricle is moderately dilated. Normal wall thickness. There is eccentric hypertrophy. There is normal systolic function with a visually estimated ejection fraction of 55 - 60%. Diastolic function cannot be reliably determined in the presence of mitral valve disease.    Right Ventricle: Mild right ventricular enlargement. Wall thickness is normal. Right ventricle wall motion  is normal. Systolic function is mildly reduced.    Biatrial enlargement (L > R)    Mitral Valve: There is severe functional regurgitation with a centrally directed jet.    Tricuspid Valve: There is mild to moderate regurgitation.    Pulmonary Artery: The estimated pulmonary artery systolic pressure is 96 mmHg.    IVC/SVC: Intermediate venous pressure at 8 mmHg.    There is a small pericardial effusion and a right pleural effusion.    Interventional cardiology consulted- recommended further medical management optimization and continued fluid removal via iHD. Not a candidate for mitraclip at this time.       Interval History: NAEON. Remains HDS. Dispo pending insurance enrollment & outpatient HD chair.     Review of Systems   Constitutional:  Negative for appetite change, chills, fatigue and fever.   HENT:  Negative for congestion and sore throat.    Respiratory:  Negative for cough and shortness of breath.    Cardiovascular:  Negative for chest pain and palpitations.   Gastrointestinal:  Negative for abdominal pain, diarrhea, nausea and vomiting.   Genitourinary:  Negative for dysuria and hematuria.   Musculoskeletal:  Negative for arthralgias, joint swelling and neck pain.   Skin:  Positive for wound (leg(s)). Negative for pallor.   Neurological:  Positive for weakness (BLE). Negative for dizziness, light-headedness, numbness and headaches.   Psychiatric/Behavioral:   Negative for confusion and sleep disturbance.      Objective:     Vital Signs (Most Recent):  Temp: 97.9 °F (36.6 °C) (04/27/24 2009)  Pulse: 63 (04/27/24 2009)  Resp: 18 (04/27/24 2009)  BP: (!) 99/59 (04/27/24 2009)  SpO2: 97 % (04/27/24 2009) Vital Signs (24h Range):  Temp:  [97.7 °F (36.5 °C)-98.4 °F (36.9 °C)] 97.9 °F (36.6 °C)  Pulse:  [61-67] 63  Resp:  [18-20] 18  SpO2:  [94 %-100 %] 97 %  BP: ()/(54-79) 99/59     Weight: 64.8 kg (142 lb 13.7 oz)  Body mass index is 28.05 kg/m².    Intake/Output Summary (Last 24 hours) at 4/27/2024 2239  Last data filed at 4/27/2024 1850  Gross per 24 hour   Intake 480 ml   Output 500 ml   Net -20 ml         Physical Exam  Constitutional:       General: She is not in acute distress.     Appearance: She is not ill-appearing or toxic-appearing.   HENT:      Head: Normocephalic and atraumatic.   Eyes:      Conjunctiva/sclera: Conjunctivae normal.   Cardiovascular:      Rate and Rhythm: Normal rate and regular rhythm.      Heart sounds: Normal heart sounds.   Pulmonary:      Effort: Pulmonary effort is normal. No respiratory distress.      Breath sounds: Normal breath sounds. No rales.   Abdominal:      General: Bowel sounds are normal.      Palpations: Abdomen is soft.      Tenderness: There is no abdominal tenderness.   Musculoskeletal:      Right lower leg: No edema.      Left lower leg: No edema.   Skin:     General: Skin is warm and dry.      Findings: Lesion (L lateral calf) present.   Neurological:      General: No focal deficit present.      Mental Status: She is alert and oriented to person, place, and time. Mental status is at baseline.   Psychiatric:         Mood and Affect: Mood normal.         Behavior: Behavior normal.           Significant Labs: All pertinent labs within the past 24 hours have been reviewed.    Significant Imaging: I have reviewed all pertinent imaging results/findings within the past 24 hours.    Assessment/Plan:      * Severe mitral valve  regurgitation    Acute on chronic heart failure with preserved ejection fraction (HFpEF)   Anasarca      52 yo F admitted for ADHF 2/2 severe MR. The MR appears to be secondary to posterior leaflet restriction and leaflet length of 1.1 cm. BNP on admission was 4300 compared to 350 eight months prior. Initially diagnosed with severe MR in June of 2023. Saw Dr. Stone in January of 2024 and was undergoing work-up for possible MitraClip; [facility] scheduled for 3/15 but not completed due to financial constraints, also needs POOJA. Will need optimization of kidney function prior to angiogram consideration, appreciate Nephrology assistance. APS serologies negative, Fabry workup negative     DDX: rheumatic heart disease vs Fabry vs antiphospholipid (serologies negative)     4/1/2024 TTE    Left Ventricle: The left ventricle is normal in size. Normal wall thickness. Normal wall motion. There is normal systolic function with a visually estimated ejection fraction of 60 - 65%. Grade II diastolic dysfunction.    Right Ventricle: Normal right ventricular cavity size. Wall thickness is normal. Right ventricle wall motion  is normal. Systolic function is reduced.    Left Atrium: Left atrium is very  severely dilated.    Right Atrium: Right atrium is mildly dilated.    Aortic Valve: There is mild aortic valve sclerosis. There is normal leaflet mobility. There is trace aortic regurgitation.    Mitral Valve: There is mild bileaflet sclerosis. There is posterior leaflet tethering and failure of complete coaptation. There is very severe regurgitation with a posterolateral eccentriccally directed jet.    Tricuspid Valve: The tricuspid valve is structurally normal. There is normal leaflet mobility. There is moderate to severe regurgitation.    IVC/SVC: IVC was not well visualized due to poor acoustic window. Intermediate venous pressure at 8 mmHg.    Pericardium: There is a trivial effusion posteriorly and small under the RA.     -  "Interventional Cardiology consulted - no intervention until patient's volume status is controlled  - Nephrology following, undergoing SLED/CRRT  - POOJA ordered - "Anesthesia evaluated the patient and feel that she needs better optimization prior to POOJA. They stated once she is euvolemic that we can move forward. Will need a new POOJA order at that time."  - Discontinued Diuril 500 mg IV q24h & Lasix gtt @ 40 mg/hr  - Discontinued hydralazine 25 mg po q8h & Isordil 20 mg po BID for afterload reduction due to pressor requirements  - Continue home ASA 81 mg po daily and Lipitor 40 mg po daily  - Genetics consulted, see "Hypertrophic cardiomyopathy"  - Strict I/O, Jin in place  - Transitioned to lasix 40mg PO daily. Reassess daily for diuretic use.   - Repeat TTE reviewed- IC consulted- not a candidate for mitraclip- recommended outpatient f/u- continued fluid removal/fluid status optimization via HD- up-titration of GDMT as tolerated  - Will also need OP IC follow up.   - Telemetry monitoring    Hyponatremia  Patient has hyponatremia which is uncontrolled,We will aim to correct the sodium by 4-6mEq in 24 hours. We will monitor sodium Daily. The hyponatremia is due to renal insufficiency. We will obtain the following studies: no new labs at this moment. We will treat the hyponatremia with Fluid restriction of:  1.5 liter per day and Hemodialysis. The patient's sodium results have been reviewed and are listed below.  Recent Labs   Lab 04/24/24  0349   *         Intertrigo  - improvement       Rash  - derm initially consulted- concern for resolving folliculitis- no significant improvement  - triamcinolone cream today-monitor for improvement- if no improvement, then will re-consult derm    Proteinuria  - h/o      Hypertrophic cardiomyopathy  Angiokeratoma   Proteinuria      - Genetics consulted, appreciate recs  - GLA genetic testing collected and sent to [facility]- neg for fabry's disease  - Consider cardiac " "MRI      Angiokeratoma        Prediabetes  - h/o    HLD (hyperlipidemia)  - continue statin    Acute kidney injury superimposed on chronic kidney disease  Patient with acute kidney injury/acute renal failure likely due to acute tubular necrosis caused by unknown.  REGINALDO is currently  improving on CRRT/SLED . Baseline creatinine  2.5  - Labs reviewed- Renal function/electrolytes with Estimated Creatinine Clearance: 80.9 mL/min (based on SCr of 0.7 mg/dL). according to latest data. Monitor urine output and serial BMP and adjust therapy as needed. Avoid nephrotoxins and renally dose meds for GFR listed above.           Recent Labs     04/17/24  1428 04/17/24  2203 04/18/24  0413   CREATININE 1.8*  1.8* 0.8  0.8 0.7  0.7  0.7         - Nephrology following, appreciate recs - started SLED on 4/14   - Genetics consulted, see "Hypertrophic cardiomyopathy"  - Holding home sevelamer   - Holding home sodium bicarb 650 mg po BID  - HD per nephrology. Will need OP HD chair setup.   - Nephro following- consulted interventional nephrology- TDC 04/22 - awaiting medicaid enrollment to set-up outpatient HD chair        Acute urinary retention  - now w/acute renal failure requiring iHD  - no catheter in place       Leg wound, left  - wound care following    Anasarca  - 2/2 acute renal failure in setting of CHF w/severe MR       Prolonged QT interval  - resolved    Acute on chronic heart failure with preserved ejection fraction (HFpEF)  - Interval history and physical exam findings as described above  - Most recent TTE results:  Results for orders placed during the hospital encounter of 04/01/24    Echo    Interpretation Summary    Left Ventricle: The left ventricle is normal in size. Normal wall thickness. Normal wall motion. There is normal systolic function with a visually estimated ejection fraction of 60 - 65%. Grade II diastolic dysfunction.    Right Ventricle: Normal right ventricular cavity size. Wall thickness is normal. " Right ventricle wall motion  is normal. Systolic function is reduced.    Left Atrium: Left atrium is very  severely dilated.    Right Atrium: Right atrium is mildly dilated.    Aortic Valve: There is mild aortic valve sclerosis. There is normal leaflet mobility. There is trace aortic regurgitation.    Mitral Valve: There is mild bileaflet sclerosis. There is posterior leaflet tethering and failure of complete coaptation. There is very severe regurgitation with a posterolateral eccentriccally directed jet.    Tricuspid Valve: The tricuspid valve is structurally normal. There is normal leaflet mobility. There is moderate to severe regurgitation.    IVC/SVC: IVC was not well visualized due to poor acoustic window. Intermediate venous pressure at 8 mmHg.    Pericardium: There is a trivial effusion posteriorly and small under the RA.    - Clinically volume overloaded on admission  - now w/iHD needs  - Will continue diuresis with lasix 80mg PO BID  - Continue home cardioprudent regimen  - Repeat echo ordered, pending   - Strict I/Os  - 1.5L fluid restriction   - Daily weights  - Will continue to monitor on tele      Anemia  Stable.   Recent Labs   Lab 04/22/24  0513 04/23/24  0416 04/24/24  0349   WBC 5.24 5.41 5.74   HGB 9.0* 9.4* 9.7*   HCT 31.8* 31.8* 34.1*    199 227         Class 2 obesity in adult  Body mass index is 28.31 kg/m². Morbid obesity complicates all aspects of disease management from diagnostic modalities to treatment. Weight loss encouraged and health benefits explained to patient.     Essential hypertension     Temp:  [98 °F (36.7 °C)-98.5 °F (36.9 °C)]   Pulse:  [55-62]   Resp:  [9-40]   BP: (94)/(53)   SpO2:  [94 %-100 %]   Arterial Line BP: ()/(44-66) .     While in the hospital, will manage blood pressure as follows; Adjust home antihypertensive regimen as follows- holding Lasix and Coreg     Will utilize p.r.n. blood pressure medication only if patient's blood pressure greater than  180/110 and she develops symptoms such as worsening chest pain or shortness of breath.         VTE Risk Mitigation (From admission, onward)           Ordered     heparin (porcine) injection 5,000 Units  Every 8 hours         04/24/24 1546     heparin (porcine) injection 1,000 Units  As needed (PRN)         04/24/24 1038     heparin (porcine) injection 1,000 Units  As needed (PRN)         04/13/24 1428     IP VTE HIGH RISK PATIENT  Once         04/01/24 1030     Place sequential compression device  Until discontinued         04/01/24 1030                    Discharge Planning   RILEY: 4/29/2024     Code Status: Full Code   Is the patient medically ready for discharge?: No    Reason for patient still in hospital (select all that apply): Pending disposition  Discharge Plan A: Home with family, Other (new HD chair)   Discharge Delays: (!) Dialysis Set-up              Ness Regan MD  Department of Hospital Medicine   [facility] - Cardiology Stepdown

## 2024-04-28 NOTE — PROGRESS NOTES
Pt noted to be hypotensive with BP lower than baseline during noon VS. BP rechecked, 71/47 MAP 55, pt asymptomatic, denies dizziness or lightheadedness. BP continued to improve while pt in lying position. MD Arzola notified and came to bedside to see pt. Stated to continue to recheck BP hourly and notify if systolic remains <100. Pt instructed to remain in bed and notify staff of lightheadedness or dizziness.     04/28/24 1202 04/28/24 1215 04/28/24 1220   Vital Signs   BP (!) 90/54 (!) 71/47 (!) 83/53   MAP (mmHg) 67 55 63   BP Location  --  Right arm Right arm   BP Method  --  Automatic Automatic   Patient Position  --  Lying Lying      04/28/24 1222   Vital Signs   /60   MAP (mmHg) 76   BP Location Right arm   BP Method Automatic   Patient Position Lying

## 2024-04-28 NOTE — PROGRESS NOTES
[facility] - Cardiology [facility] Medicine  Progress Note    Patient Name: Xena Vera  MRN: [id number]  Patient Class: IP- Inpatient   Admission Date: 4/1/2024  Length of Stay: 24 days  Attending Physician: Ness Regan MD  Primary Care Provider: Tami Villeda FNP    Subjective:     Principal Problem: Severe mitral valve regurgitation    HPI:  Per MICU: 52 yo F with PMH of HFpEF, severe MR, HTN, HLD, and CKD4 who [place] [facility] [place] a mechanical fall at home with concurrent SOB and swelling in her legs and abdomen. She reported adherence to home Lasix and low sodium diet, but had felt increasingly SOB prior to admission as she was sleeping upright and had multiple nighttime awakenings due to SOB. In the ED, BNP was 4639 and she was admitted to Hospital Medicine for further management of ADHF. She initially had good response to IV Lasix but hospital course was later complicated by urinary retention, worsening kidney function, and decreased UOP. Nephrology and Cardiology were consulted to assist. She was started on Diuril and Lasix gtt for diuresis and Isordil and hydralazine for afterload reduction. Patient transferred to CCU for further management.        Overview/Hospital Course:  MICU course  Patient initially had improved UOP with increasing Lasix, eventually maxed out on Lasix drip with addition of Diuril. Eventually started on CRRT/SLED for volume removal, initially needed intermittent levophed to tolerate. Has itchy rash that started during current hospitalization, Dermatology consulted for regimen. Graduated from CRRT to iHD, tolerating well. Stable to step back down to HM.      course  Patient slightly volume overloaded on exam. Reports making urine. Transition to lasix 40mg PO daily. More fluid removal via HD. Will need OP HD chair. SW consulted. Repeat TTE ordered for eval for MR. Discuss plans with IC once repeat TTE is obtained.  Will also need OP IC follow  up. Nephro rec consulting interventional nephro for TDC placement. She underwent TDC placement 04/22. Repeat TTE w/the following findings:      Left Ventricle: The left ventricle is moderately dilated. Normal wall thickness. There is eccentric hypertrophy. There is normal systolic function with a visually estimated ejection fraction of 55 - 60%. Diastolic function cannot be reliably determined in the presence of mitral valve disease.    Right Ventricle: Mild right ventricular enlargement. Wall thickness is normal. Right ventricle wall motion  is normal. Systolic function is mildly reduced.    Biatrial enlargement (L > R)    Mitral Valve: There is severe functional regurgitation with a centrally directed jet.    Tricuspid Valve: There is mild to moderate regurgitation.    Pulmonary Artery: The estimated pulmonary artery systolic pressure is 96 mmHg.    IVC/SVC: Intermediate venous pressure at 8 mmHg.    There is a small pericardial effusion and a right pleural effusion.    Interventional cardiology consulted- recommended further medical management optimization and continued fluid removal via iHD. Not a candidate for mitraclip at this time.       Interval History: NAEON. Remains HDS. Seen today while pt receiving HD. Doing well, denies pain. Medically ready for discharge. Dispo pending insurance enrollment & outpatient HD chair.     Review of Systems   Constitutional:  Negative for appetite change, chills, fatigue and fever.   HENT:  Negative for congestion and sore throat.    Respiratory:  Negative for cough and shortness of breath.    Cardiovascular:  Negative for chest pain and palpitations.   Gastrointestinal:  Negative for abdominal pain, diarrhea, nausea and vomiting.   Genitourinary:  Negative for dysuria and hematuria.   Musculoskeletal:  Negative for arthralgias, joint swelling and neck pain.   Skin:  Positive for wound (leg(s)). Negative for pallor.   Neurological:  Positive for weakness (BLE). Negative for  dizziness, light-headedness, numbness and headaches.   Psychiatric/Behavioral:  Negative for confusion and sleep disturbance.      Objective:     Vital Signs (Most Recent):  Temp: 97.9 °F (36.6 °C) (04/27/24 2009)  Pulse: 63 (04/27/24 2009)  Resp: 18 (04/27/24 2009)  BP: (!) 99/59 (04/27/24 2009)  SpO2: 97 % (04/27/24 2009) Vital Signs (24h Range):  Temp:  [97.7 °F (36.5 °C)-98.4 °F (36.9 °C)] 97.9 °F (36.6 °C)  Pulse:  [61-67] 63  Resp:  [18-20] 18  SpO2:  [94 %-100 %] 97 %  BP: ()/(54-79) 99/59     Weight: 64.8 kg (142 lb 13.7 oz)  Body mass index is 28.05 kg/m².    Intake/Output Summary (Last 24 hours) at 4/27/2024 2040  Last data filed at 4/27/2024 1850  Gross per 24 hour   Intake 480 ml   Output 500 ml   Net -20 ml         Physical Exam  Constitutional:       General: She is not in acute distress.     Appearance: She is not ill-appearing or toxic-appearing.   HENT:      Head: Normocephalic and atraumatic.   Eyes:      Conjunctiva/sclera: Conjunctivae normal.   Cardiovascular:      Rate and Rhythm: Normal rate and regular rhythm.      Heart sounds: Normal heart sounds.   Pulmonary:      Effort: Pulmonary effort is normal. No respiratory distress.      Breath sounds: Normal breath sounds. No rales.   Abdominal:      General: Bowel sounds are normal.      Palpations: Abdomen is soft.      Tenderness: There is no abdominal tenderness.   Musculoskeletal:      Right lower leg: No edema.      Left lower leg: No edema.   Skin:     General: Skin is warm and dry.      Findings: Lesion (L lateral calf) present.   Neurological:      General: No focal deficit present.      Mental Status: She is alert and oriented to person, place, and time. Mental status is at baseline.   Psychiatric:         Mood and Affect: Mood normal.         Behavior: Behavior normal.           Significant Labs: All pertinent labs within the past 24 hours have been reviewed.    Significant Imaging: I have reviewed all pertinent imaging  results/findings within the past 24 hours.    Assessment/Plan:      * Severe mitral valve regurgitation    Acute on chronic heart failure with preserved ejection fraction (HFpEF)   Anasarca      54 yo F admitted for ADHF 2/2 severe MR. The MR appears to be secondary to posterior leaflet restriction and leaflet length of 1.1 cm. BNP on admission was 4300 compared to 350 eight months prior. Initially diagnosed with severe MR in June of 2023. Saw Dr. Stone in January of 2024 and was undergoing work-up for possible MitraClip; [facility] scheduled for 3/15 but not completed due to financial constraints, also needs POOJA. Will need optimization of kidney function prior to angiogram consideration, appreciate Nephrology assistance. APS serologies negative, Fabry workup negative     DDX: rheumatic heart disease vs Fabry vs antiphospholipid (serologies negative)     4/1/2024 TTE    Left Ventricle: The left ventricle is normal in size. Normal wall thickness. Normal wall motion. There is normal systolic function with a visually estimated ejection fraction of 60 - 65%. Grade II diastolic dysfunction.    Right Ventricle: Normal right ventricular cavity size. Wall thickness is normal. Right ventricle wall motion  is normal. Systolic function is reduced.    Left Atrium: Left atrium is very  severely dilated.    Right Atrium: Right atrium is mildly dilated.    Aortic Valve: There is mild aortic valve sclerosis. There is normal leaflet mobility. There is trace aortic regurgitation.    Mitral Valve: There is mild bileaflet sclerosis. There is posterior leaflet tethering and failure of complete coaptation. There is very severe regurgitation with a posterolateral eccentriccally directed jet.    Tricuspid Valve: The tricuspid valve is structurally normal. There is normal leaflet mobility. There is moderate to severe regurgitation.    IVC/SVC: IVC was not well visualized due to poor acoustic window. Intermediate venous pressure at 8 mmHg.     "Pericardium: There is a trivial effusion posteriorly and small under the RA.     - Interventional Cardiology consulted - no intervention until patient's volume status is controlled  - Nephrology following, undergoing SLED/CRRT  - POOJA ordered - "Anesthesia evaluated the patient and feel that she needs better optimization prior to POOJA. They stated once she is euvolemic that we can move forward. Will need a new POOJA order at that time."  - Discontinued Diuril 500 mg IV q24h & Lasix gtt @ 40 mg/hr  - Discontinued hydralazine 25 mg po q8h & Isordil 20 mg po BID for afterload reduction due to pressor requirements  - Continue home ASA 81 mg po daily and Lipitor 40 mg po daily  - Genetics consulted, see "Hypertrophic cardiomyopathy"  - Strict I/O, Jin in place  - Transitioned to lasix 40mg PO daily. Reassess daily for diuretic use.   - Repeat TTE reviewed- IC consulted- not a candidate for mitraclip- recommended outpatient f/u- continued fluid removal/fluid status optimization via HD- up-titration of GDMT as tolerated  - Will also need OP IC follow up.   - Telemetry monitoring    Hyponatremia  Patient has hyponatremia which is uncontrolled,We will aim to correct the sodium by 4-6mEq in 24 hours. We will monitor sodium Daily. The hyponatremia is due to renal insufficiency. We will obtain the following studies: no new labs at this moment. We will treat the hyponatremia with Fluid restriction of:  1.5 liter per day and Hemodialysis. The patient's sodium results have been reviewed and are listed below.  Recent Labs   Lab 04/24/24  0349   *         Intertrigo  - improvement       Rash  - derm initially consulted- concern for resolving folliculitis- no significant improvement  - triamcinolone cream today-monitor for improvement- if no improvement, then will re-consult derm    Proteinuria  - h/o      Hypertrophic cardiomyopathy  Angiokeratoma   Proteinuria      - Genetics consulted, appreciate recs  - GLA genetic testing " "collected and sent to [facility]- neg for fabry's disease  - Consider cardiac MRI      Angiokeratoma        Prediabetes  - h/o    HLD (hyperlipidemia)  - continue statin    Acute kidney injury superimposed on chronic kidney disease  Patient with acute kidney injury/acute renal failure likely due to acute tubular necrosis caused by unknown.  REGINALDO is currently  improving on CRRT/SLED . Baseline creatinine  2.5  - Labs reviewed- Renal function/electrolytes with Estimated Creatinine Clearance: 80.9 mL/min (based on SCr of 0.7 mg/dL). according to latest data. Monitor urine output and serial BMP and adjust therapy as needed. Avoid nephrotoxins and renally dose meds for GFR listed above.           Recent Labs     04/17/24  1428 04/17/24  2203 04/18/24  0413   CREATININE 1.8*  1.8* 0.8  0.8 0.7  0.7  0.7         - Nephrology following, appreciate recs - started SLED on 4/14   - Genetics consulted, see "Hypertrophic cardiomyopathy"  - Holding home sevelamer   - Holding home sodium bicarb 650 mg po BID  - HD per nephrology. Will need OP HD chair setup.   - Nephro following- consulted interventional nephrology- TDC 04/22 - awaiting medicaid enrollment to set-up outpatient HD chair        Acute urinary retention  - now w/acute renal failure requiring iHD  - no catheter in place       Leg wound, left  - wound care following    Anasarca  - 2/2 acute renal failure in setting of CHF w/severe MR       Prolonged QT interval  - resolved    Acute on chronic heart failure with preserved ejection fraction (HFpEF)  - Interval history and physical exam findings as described above  - Most recent TTE results:  Results for orders placed during the hospital encounter of 04/01/24    Echo    Interpretation Summary    Left Ventricle: The left ventricle is normal in size. Normal wall thickness. Normal wall motion. There is normal systolic function with a visually estimated ejection fraction of 60 - 65%. Grade II diastolic dysfunction.    " Right Ventricle: Normal right ventricular cavity size. Wall thickness is normal. Right ventricle wall motion  is normal. Systolic function is reduced.    Left Atrium: Left atrium is very  severely dilated.    Right Atrium: Right atrium is mildly dilated.    Aortic Valve: There is mild aortic valve sclerosis. There is normal leaflet mobility. There is trace aortic regurgitation.    Mitral Valve: There is mild bileaflet sclerosis. There is posterior leaflet tethering and failure of complete coaptation. There is very severe regurgitation with a posterolateral eccentriccally directed jet.    Tricuspid Valve: The tricuspid valve is structurally normal. There is normal leaflet mobility. There is moderate to severe regurgitation.    IVC/SVC: IVC was not well visualized due to poor acoustic window. Intermediate venous pressure at 8 mmHg.    Pericardium: There is a trivial effusion posteriorly and small under the RA.    - Clinically volume overloaded on admission  - now w/iHD needs  - Will continue diuresis with lasix 80mg PO BID  - Continue home cardioprudent regimen  - Repeat echo ordered, pending   - Strict I/Os  - 1.5L fluid restriction   - Daily weights  - Will continue to monitor on tele      Anemia  Stable.   Recent Labs   Lab 04/22/24  0513 04/23/24  0416 04/24/24  0349   WBC 5.24 5.41 5.74   HGB 9.0* 9.4* 9.7*   HCT 31.8* 31.8* 34.1*    199 227         Class 2 obesity in adult  Body mass index is 28.31 kg/m². Morbid obesity complicates all aspects of disease management from diagnostic modalities to treatment. Weight loss encouraged and health benefits explained to patient.     Essential hypertension     Temp:  [98 °F (36.7 °C)-98.5 °F (36.9 °C)]   Pulse:  [55-62]   Resp:  [9-40]   BP: (94)/(53)   SpO2:  [94 %-100 %]   Arterial Line BP: ()/(44-66) .     While in the hospital, will manage blood pressure as follows; Adjust home antihypertensive regimen as follows- holding Lasix and Coreg     Will utilize  p.r.n. blood pressure medication only if patient's blood pressure greater than 180/110 and she develops symptoms such as worsening chest pain or shortness of breath.         VTE Risk Mitigation (From admission, onward)           Ordered     heparin (porcine) injection 5,000 Units  Every 8 hours         04/24/24 1546     heparin (porcine) injection 1,000 Units  As needed (PRN)         04/24/24 1038     heparin (porcine) injection 1,000 Units  As needed (PRN)         04/13/24 1428     IP VTE HIGH RISK PATIENT  Once         04/01/24 1030     Place sequential compression device  Until discontinued         04/01/24 1030                    Discharge Planning   RILEY: 4/29/2024     Code Status: Full Code   Is the patient medically ready for discharge?: No    Reason for patient still in hospital (select all that apply): Pending disposition  Discharge Plan A: Home with family, Other (new HD chair)   Discharge Delays: (!) Dialysis Set-up        Ness Regan MD  Department of Hospital Medicine   [facility] - Cardiology Stepdown

## 2024-04-28 NOTE — SUBJECTIVE & OBJECTIVE
Interval History: NAEON. Remains HDS. Seen today while pt receiving HD. Doing well, denies pain. Medically ready for discharge. Dispo pending insurance enrollment & outpatient HD chair.     Review of Systems   Constitutional:  Negative for appetite change, chills, fatigue and fever.   HENT:  Negative for congestion and sore throat.    Respiratory:  Negative for cough and shortness of breath.    Cardiovascular:  Negative for chest pain and palpitations.   Gastrointestinal:  Negative for abdominal pain, diarrhea, nausea and vomiting.   Genitourinary:  Negative for dysuria and hematuria.   Musculoskeletal:  Negative for arthralgias, joint swelling and neck pain.   Skin:  Positive for wound (leg(s)). Negative for pallor.   Neurological:  Positive for weakness (BLE). Negative for dizziness, light-headedness, numbness and headaches.   Psychiatric/Behavioral:  Negative for confusion and sleep disturbance.      Objective:     Vital Signs (Most Recent):  Temp: 97.9 °F (36.6 °C) (04/27/24 2009)  Pulse: 63 (04/27/24 2009)  Resp: 18 (04/27/24 2009)  BP: (!) 99/59 (04/27/24 2009)  SpO2: 97 % (04/27/24 2009) Vital Signs (24h Range):  Temp:  [97.7 °F (36.5 °C)-98.4 °F (36.9 °C)] 97.9 °F (36.6 °C)  Pulse:  [61-67] 63  Resp:  [18-20] 18  SpO2:  [94 %-100 %] 97 %  BP: ()/(54-79) 99/59     Weight: 64.8 kg (142 lb 13.7 oz)  Body mass index is 28.05 kg/m².    Intake/Output Summary (Last 24 hours) at 4/27/2024 2040  Last data filed at 4/27/2024 1850  Gross per 24 hour   Intake 480 ml   Output 500 ml   Net -20 ml         Physical Exam  Constitutional:       General: She is not in acute distress.     Appearance: She is not ill-appearing or toxic-appearing.   HENT:      Head: Normocephalic and atraumatic.   Eyes:      Conjunctiva/sclera: Conjunctivae normal.   Cardiovascular:      Rate and Rhythm: Normal rate and regular rhythm.      Heart sounds: Normal heart sounds.   Pulmonary:      Effort: Pulmonary effort is normal. No  respiratory distress.      Breath sounds: Normal breath sounds. No rales.   Abdominal:      General: Bowel sounds are normal.      Palpations: Abdomen is soft.      Tenderness: There is no abdominal tenderness.   Musculoskeletal:      Right lower leg: No edema.      Left lower leg: No edema.   Skin:     General: Skin is warm and dry.      Findings: Lesion (L lateral calf) present.   Neurological:      General: No focal deficit present.      Mental Status: She is alert and oriented to person, place, and time. Mental status is at baseline.   Psychiatric:         Mood and Affect: Mood normal.         Behavior: Behavior normal.           Significant Labs: All pertinent labs within the past 24 hours have been reviewed.    Significant Imaging: I have reviewed all pertinent imaging results/findings within the past 24 hours.

## 2024-04-29 PROBLEM — E83.39 HYPOPHOSPHATEMIA: Status: ACTIVE | Noted: 2024-04-29

## 2024-04-29 LAB
ALBUMIN SERPL BCP-MCNC: 2.8 G/DL (ref 3.5–5.2)
ANION GAP SERPL CALC-SCNC: 12 MMOL/L (ref 8–16)
BUN SERPL-MCNC: 22 MG/DL (ref 6–20)
CALCIUM SERPL-MCNC: 8.3 MG/DL (ref 8.7–10.5)
CHLORIDE SERPL-SCNC: 100 MMOL/L (ref 95–110)
CO2 SERPL-SCNC: 17 MMOL/L (ref 23–29)
CREAT SERPL-MCNC: 3.6 MG/DL (ref 0.5–1.4)
ERYTHROCYTE [DISTWIDTH] IN BLOOD BY AUTOMATED COUNT: 24.8 % (ref 11.5–14.5)
EST. GFR  (NO RACE VARIABLE): 14.4 ML/MIN/1.73 M^2
GLUCOSE SERPL-MCNC: 68 MG/DL (ref 70–110)
HCT VFR BLD AUTO: 29.7 % (ref 37–48.5)
HGB BLD-MCNC: 8.4 G/DL (ref 12–16)
MAGNESIUM SERPL-MCNC: 1.7 MG/DL (ref 1.6–2.6)
MCH RBC QN AUTO: 24.1 PG (ref 27–31)
MCHC RBC AUTO-ENTMCNC: 28.3 G/DL (ref 32–36)
MCV RBC AUTO: 85 FL (ref 82–98)
PHOSPHATE SERPL-MCNC: 3.9 MG/DL (ref 2.7–4.5)
PLATELET # BLD AUTO: 223 K/UL (ref 150–450)
PMV BLD AUTO: 10.7 FL (ref 9.2–12.9)
POTASSIUM SERPL-SCNC: 3.7 MMOL/L (ref 3.5–5.1)
RBC # BLD AUTO: 3.48 M/UL (ref 4–5.4)
SODIUM SERPL-SCNC: 129 MMOL/L (ref 136–145)
WBC # BLD AUTO: 4.04 K/UL (ref 3.9–12.7)

## 2024-04-29 PROCEDURE — 25000003 PHARM REV CODE 250: Performed by: STUDENT IN AN ORGANIZED HEALTH CARE EDUCATION/TRAINING PROGRAM

## 2024-04-29 PROCEDURE — 97116 GAIT TRAINING THERAPY: CPT

## 2024-04-29 PROCEDURE — 25000003 PHARM REV CODE 250: Performed by: PHYSICIAN ASSISTANT

## 2024-04-29 PROCEDURE — 36415 COLL VENOUS BLD VENIPUNCTURE: CPT | Performed by: STUDENT IN AN ORGANIZED HEALTH CARE EDUCATION/TRAINING PROGRAM

## 2024-04-29 PROCEDURE — 90935 HEMODIALYSIS ONE EVALUATION: CPT

## 2024-04-29 PROCEDURE — 83735 ASSAY OF MAGNESIUM: CPT | Performed by: STUDENT IN AN ORGANIZED HEALTH CARE EDUCATION/TRAINING PROGRAM

## 2024-04-29 PROCEDURE — 25000003 PHARM REV CODE 250

## 2024-04-29 PROCEDURE — 85027 COMPLETE CBC AUTOMATED: CPT | Performed by: STUDENT IN AN ORGANIZED HEALTH CARE EDUCATION/TRAINING PROGRAM

## 2024-04-29 PROCEDURE — 80069 RENAL FUNCTION PANEL: CPT | Performed by: STUDENT IN AN ORGANIZED HEALTH CARE EDUCATION/TRAINING PROGRAM

## 2024-04-29 PROCEDURE — 94761 N-INVAS EAR/PLS OXIMETRY MLT: CPT

## 2024-04-29 PROCEDURE — 20600001 HC STEP DOWN PRIVATE ROOM

## 2024-04-29 PROCEDURE — 99233 SBSQ HOSP IP/OBS HIGH 50: CPT | Mod: ,,, | Performed by: HOSPITALIST

## 2024-04-29 PROCEDURE — 63600175 PHARM REV CODE 636 W HCPCS: Performed by: STUDENT IN AN ORGANIZED HEALTH CARE EDUCATION/TRAINING PROGRAM

## 2024-04-29 RX ORDER — SODIUM CHLORIDE 9 MG/ML
INJECTION, SOLUTION INTRAVENOUS ONCE
Status: CANCELLED | OUTPATIENT
Start: 2024-04-29 | End: 2024-04-29

## 2024-04-29 RX ORDER — MIDODRINE HYDROCHLORIDE 2.5 MG/1
2.5 TABLET ORAL EVERY 12 HOURS
Status: DISCONTINUED | OUTPATIENT
Start: 2024-04-29 | End: 2024-05-09 | Stop reason: HOSPADM

## 2024-04-29 RX ADMIN — MICONAZOLE NITRATE: 20 CREAM TOPICAL at 10:04

## 2024-04-29 RX ADMIN — Medication: at 10:04

## 2024-04-29 RX ADMIN — ASPIRIN 81 MG: 81 TABLET, COATED ORAL at 09:04

## 2024-04-29 RX ADMIN — TRIAMCINOLONE ACETONIDE: 1 CREAM TOPICAL at 09:04

## 2024-04-29 RX ADMIN — ATORVASTATIN CALCIUM 40 MG: 40 TABLET, FILM COATED ORAL at 09:04

## 2024-04-29 RX ADMIN — HEPARIN SODIUM 5000 UNITS: 5000 INJECTION INTRAVENOUS; SUBCUTANEOUS at 05:04

## 2024-04-29 RX ADMIN — OXYBUTYNIN CHLORIDE 5 MG: 5 TABLET ORAL at 02:04

## 2024-04-29 RX ADMIN — Medication: at 09:04

## 2024-04-29 RX ADMIN — MIDODRINE HYDROCHLORIDE 2.5 MG: 2.5 TABLET ORAL at 09:04

## 2024-04-29 RX ADMIN — HEPARIN SODIUM 5000 UNITS: 5000 INJECTION INTRAVENOUS; SUBCUTANEOUS at 10:04

## 2024-04-29 RX ADMIN — CARVEDILOL 6.25 MG: 6.25 TABLET, FILM COATED ORAL at 10:04

## 2024-04-29 RX ADMIN — SENNOSIDES AND DOCUSATE SODIUM 1 TABLET: 8.6; 5 TABLET ORAL at 10:04

## 2024-04-29 RX ADMIN — HEPARIN SODIUM 1000 UNITS: 1000 INJECTION, SOLUTION INTRAVENOUS; SUBCUTANEOUS at 06:04

## 2024-04-29 RX ADMIN — CLINDAMYCIN PHOSPHATE: 10 GEL TOPICAL at 10:04

## 2024-04-29 RX ADMIN — OXYBUTYNIN CHLORIDE 5 MG: 5 TABLET ORAL at 10:04

## 2024-04-29 RX ADMIN — MICONAZOLE NITRATE: 20 CREAM TOPICAL at 09:04

## 2024-04-29 RX ADMIN — SENNOSIDES AND DOCUSATE SODIUM 1 TABLET: 8.6; 5 TABLET ORAL at 09:04

## 2024-04-29 RX ADMIN — POLYETHYLENE GLYCOL 3350 17 G: 17 POWDER, FOR SOLUTION ORAL at 09:04

## 2024-04-29 RX ADMIN — CLINDAMYCIN PHOSPHATE: 10 GEL TOPICAL at 09:04

## 2024-04-29 RX ADMIN — POLYETHYLENE GLYCOL 3350 17 G: 17 POWDER, FOR SOLUTION ORAL at 10:04

## 2024-04-29 RX ADMIN — MIDODRINE HYDROCHLORIDE 2.5 MG: 2.5 TABLET ORAL at 10:04

## 2024-04-29 RX ADMIN — OXYBUTYNIN CHLORIDE 5 MG: 5 TABLET ORAL at 09:04

## 2024-04-29 RX ADMIN — TRIAMCINOLONE ACETONIDE: 1 CREAM TOPICAL at 10:04

## 2024-04-29 RX ADMIN — HEPARIN SODIUM 5000 UNITS: 5000 INJECTION INTRAVENOUS; SUBCUTANEOUS at 02:04

## 2024-04-29 NOTE — PLAN OF CARE
[facility] - Cardiology Stepdown  Discharge Reassessment    Per MD team, patient is medically ready for d/c at this time. Plan is to d/c to home w/family and new HD chair once insurance issues are resolved.     Per  leadership, dispo still pending.    Primary Care Provider: Tami Villeda FNP    Expected Discharge Date: 4/30/2024    Reassessment (most recent)       Discharge Reassessment - 04/29/24 1652          Discharge Reassessment    Assessment Type Discharge Planning Reassessment     Did the patient's condition or plan change since previous assessment? No     Communicated RILEY with patient/caregiver Date not available/Unable to determine     Discharge Plan A Home with family     Discharge Plan B Home with family     DME Needed Upon Discharge  other (see comments)   TBD    Transition of Care Barriers Unisured     Why the patient remains in the hospital Insurance issues        Post-Acute Status    Discharge Delays Dialysis Set-up                   Discharge Plan A and Plan B have been determined by review of patient's clinical status, future medical and therapeutic needs, and coverage/benefits for post-acute care in coordination with multidisciplinary team members.    Doris Ta, JUANCARLOS, LMSW    Case Management Department  [facility]

## 2024-04-29 NOTE — NURSING
Am BP 99/65 map;78, HR:58 Ness Regan MD informed.6.25 mg Coreg and 60mg Lasix held per Ness Regan MD. No farther orders at this time, plan of care ongoing.

## 2024-04-29 NOTE — PT/OT/SLP PROGRESS
Occupational Therapy      Patient Name:  Xena Vera   MRN:  [id number]    Patient not seen today secondary to ambulating with PT in hallway on 1st attempt, Dialysis on 2nd attempt. Will follow-up as scheduled per OT POC.    4/29/2024

## 2024-04-29 NOTE — SUBJECTIVE & OBJECTIVE
Interval History: Occasional episodes of hypotension today BP 71/47, patient asymptomatic. Will continue w/ MWF HD. Will adjust diuretics to non-HD days only.    Review of patient's allergies indicates:  No Known Allergies  Current Facility-Administered Medications   Medication Dose Route Frequency Provider Last Rate Last Admin    acetaminophen tablet 650 mg  650 mg Oral Q6H PRN Ness Regan MD   650 mg at 04/26/24 2250    aspirin EC tablet 81 mg  81 mg Oral Daily David Borjas PA-C   81 mg at 04/29/24 0909    atorvastatin tablet 40 mg  40 mg Oral Daily David Borjas, PA-C   40 mg at 04/29/24 0908    bisacodyL EC tablet 5 mg  5 mg Oral Daily PRN Johnny Cisneros MD        carvediloL tablet 6.25 mg  6.25 mg Oral BID Ness Regan MD   6.25 mg at 04/28/24 2136    clindamycin phosphate 1% gel   Topical (Top) BID Domonique Walden MD   Given at 04/29/24 0914    dextrose 10% bolus 125 mL 125 mL  12.5 g Intravenous PRN David Borjas PA-DIMITRY        dextrose 10% bolus 250 mL 250 mL  25 g Intravenous PRN David Borjas PA-C        diphenhydrAMINE-zinc acetate 2-0.1% cream   Topical (Top) TID PRN Domonique Walden MD   Given at 04/22/24 1556    [START ON 4/30/2024] furosemide tablet 60 mg  60 mg Oral BID Ness Regan MD        glucagon (human recombinant) injection 1 mg  1 mg Intramuscular PRN David Borjas PA-C        glucose chewable tablet 16 g  16 g Oral PRN David Borjas PA-DIMITRY        glucose chewable tablet 24 g  24 g Oral PRN David Borjas PA-C        heparin (porcine) injection 1,000 Units  1,000 Units Intra-Catheter PRN Rosario Luciano MD   1,000 Units at 04/26/24 1555    heparin (porcine) injection 5,000 Units  5,000 Units Subcutaneous Q8H Rosario Luciano MD   5,000 Units at 04/29/24 0511    HYDROcodone-acetaminophen 5-325 mg per tablet 1 tablet  1 tablet Oral Q6H PRN Johnny Cisneros MD   1 tablet at 04/28/24 2136    hydrOXYzine HCL tablet 10  mg  10 mg Oral TID PRN Domonique Walden MD   10 mg at 04/22/24 2211    melatonin tablet 6 mg  6 mg Oral Nightly PRN Pietro Herring MD   6 mg at 04/18/24 2047    miconazole 2 % cream   Topical (Top) BID Domonique Walden MD   Given at 04/29/24 0921    midodrine tablet 2.5 mg  2.5 mg Oral Q12H Ness Regan MD   2.5 mg at 04/29/24 0909    naloxone 0.4 mg/mL injection 0.02 mg  0.02 mg Intravenous PRN David Borjas PA-C        ondansetron disintegrating tablet 4 mg  4 mg Oral Q8H PRN David Borjas PA-C        ondansetron injection 4 mg  4 mg Intravenous Q8H PRN David Borjas PA-C   4 mg at 04/13/24 1429    oxybutynin tablet 5 mg  5 mg Oral TID Minda Molina MD   5 mg at 04/29/24 0914    polyethylene glycol packet 17 g  17 g Oral BID Domonique Walden MD   17 g at 04/29/24 0914    senna-docusate 8.6-50 mg per tablet 1 tablet  1 tablet Oral BID Domonique Walden MD   1 tablet at 04/29/24 0908    sodium chloride 0.9% bolus 250 mL 250 mL  250 mL Intravenous PRN Italo Segovia MD        sodium chloride 0.9% flush 10 mL  10 mL Intravenous PRN Pietro Herring MD        sodium chloride 0.9% flush 10 mL  10 mL Intravenous PRN David Borjas PA-C        sodium chloride 0.9% flush 10 mL  10 mL Intravenous PRN Carole Rolon MD        triamcinolone acetonide 0.1% cream   Topical (Top) BID Rosario Luciano MD   Given at 04/29/24 0915    white petrolatum 41 % ointment   Topical (Top) BID Johnny Cisneros MD   Given at 04/29/24 0924    zinc oxide 20 % ointment   Topical (Top) PRN David Borjas PA-C   Given at 04/25/24 2209       Objective:     Vital Signs (Most Recent):  Temp: 97.9 °F (36.6 °C) (04/29/24 1112)  Pulse: 60 (04/29/24 1112)  Resp: 19 (04/29/24 1112)  BP: 102/64 (04/29/24 1112)  SpO2: 96 % (04/29/24 1112) Vital Signs (24h Range):  Temp:  [97.6 °F (36.4 °C)-99.2 °F (37.3 °C)] 97.9 °F (36.6 °C)  Pulse:  [56-64] 60  Resp:  [18-20] 19  SpO2:  [94 %-98 %] 96 %  BP: ()/(54-65) 102/64      Weight: 65.1 kg (143 lb 8.3 oz) (04/29/24 0620)  Body mass index is 28.18 kg/m².  Body surface area is 1.66 meters squared.    I/O last 3 completed shifts:  In: 600 [P.O.:600]  Out: 800 [Urine:800]     Physical Exam  Vitals and nursing note reviewed.   Constitutional:       General: She is not in acute distress.     Appearance: Normal appearance. She is not ill-appearing.   HENT:      Head: Normocephalic.   Eyes:      Extraocular Movements: Extraocular movements intact.   Cardiovascular:      Rate and Rhythm: Normal rate and regular rhythm.   Pulmonary:      Effort: Pulmonary effort is normal. No respiratory distress.      Breath sounds: No rales.   Musculoskeletal:      Right lower leg: No edema.      Left lower leg: No edema.   Skin:     General: Skin is warm and dry.   Neurological:      General: No focal deficit present.      Mental Status: She is alert and oriented to person, place, and time.       Significant Labs:  CMP:   Recent Labs   Lab 04/25/24  0419 04/26/24  0711 04/29/24  0508   GLU 83   < > 68*   CALCIUM 7.8*   < > 8.3*   ALBUMIN 2.6*   < > 2.8*   PROT 6.1  --   --    *   < > 129*   K 3.7   < > 3.7   CO2 22*   < > 17*   CL 98   < > 100   BUN 13   < > 22*   CREATININE 2.3*   < > 3.6*   ALKPHOS 106  --   --    ALT 19  --   --    AST 28  --   --    BILITOT 1.0  --   --     < > = values in this interval not displayed.     All labs within the past 24 hours have been reviewed.

## 2024-04-29 NOTE — PT/OT/SLP PROGRESS
Physical Therapy Treatment    Patient Name:  Xena Vera   MRN:  [id number]  Admitting Diagnosis:  Severe mitral valve regurgitation   Recent Surgery: Procedure(s) (LRB):  Insertion, Catheter, Central Venous, Hemodialysis (Right) 7 Days Post-Op  Admit Date: 4/1/2024  Length of Stay: 26 days    Recommendations:     Discharge Recommendations:  Low Intensity Therapy  Discharge Equipment Recommendations: walker, rolling   Justification for Equipment: The mobility limitation cannot be sufficiently resolved by the use of a cane. Patient's functional mobility deficit can be sufficiently resolved with the use of a Rolling Walker. Patient's mobility limitation significantly impairs their ability to participate in one of more activities of daily living. The use of a Rolling Walker will significantly improve the patient's ability to participate in MRADLS and the patient will use it on regular basis in the home.   Barriers to discharge: Inaccessible home environment and Evolving Clinical Presentation    Assessment:     Xena Vera is a 53 y.o. female admitted with a medical diagnosis of Severe mitral valve regurgitation.  She presents with the following impairments/functional limitations:  weakness, impaired functional mobility, gait instability, impaired endurance, impaired self care skills, decreased lower extremity function, decreased safety awareness.     Pt agreeable to therapy, ambulates in hallway with good stability and no A/D, single incidence of tripping over slipper. Pt with some difficulty ascending stairs but able to do so with increased UE support on R handrail, much better descending stairs.    Rehab Prognosis: Good; patient would benefit from acute skilled PT services to address these deficits and reach maximum level of function.    Recent Surgery: Procedure(s) (LRB):  Insertion, Catheter, Central Venous, Hemodialysis (Right) 7 Days Post-Op    Treatment Tolerated: Well    Highest level of mobility  "achieved this visit: ascends/descends 6 stairs with RHR and CGA    Activity with RN/PCT: ambulate with one person assist    Plan:     During this hospitalization, patient to be seen 3 x/week to address the identified rehab impairments via gait training, therapeutic activities, therapeutic exercises, neuromuscular re-education and progress toward the following goals:    Plan of Care Expires:  05/18/24    Subjective     JUMANA Cornelius notified prior to session. No family present upon PT entrance into room.    Chief Complaint: none  Patient/Family Comments/goals: "I want you to check my oxygen with the machine" (In [place])  Pain/Comfort:  Pain Rating 1: 0/10      Objective:     Additional staff present: none    Patient found HOB elevated with: telemetry   Cognition:   Alert and Cooperative  Command following: Follows two-step verbal commands  Fluency: clear/fluent  General Precautions: Standard, fall   Orthopedic Precautions:N/A   Braces: N/A   Body mass index is 28.18 kg/m².  Oxygen Device: Room Air    Vitals: /64 (BP Location: Right arm, Patient Position: Lying)   Pulse 60   Temp 97.9 °F (36.6 °C) (Oral)   Resp 19   Ht 4' 11.84" (1.52 m)   Wt 65.1 kg (143 lb 8.3 oz)   SpO2 96%   Breastfeeding No   BMI 28.18 kg/m²     Outcome Measures:  AM-PAC 6 CLICK MOBILITY  Turning over in bed (including adjusting bedclothes, sheets and blankets)?: 4  Sitting down on and standing up from a chair with arms (e.g., wheelchair, bedside commode, etc.): 4  Moving from lying on back to sitting on the side of the bed?: 4  Moving to and from a bed to a chair (including a wheelchair)?: 3  Need to walk in hospital room?: 3  Climbing 3-5 steps with a railing?: 3  Basic Mobility Total Score: 21     Functional Mobility:    Bed Mobility:   Supine to Sit: supervision; from R side of bed  Scooting anteriorly to EOB to have both feet planted on floor: supervision    Sitting Balance at Edge of Bed:  Assistance Level Required: " Supervision  Time: 3 min  Postural deviations noted: no deviations noted    Transfers:   Sit > Stand Transfer: stand by assistance with no assistive device   Stand > Sit Transfer: stand by assistance with no assistive device   x1 trials from EOB    Standing Balance:  Assistance Level Required: Stand-by Assistance  Patient used: no assistive device   Time: 10 min  Postural deviations noted: mildly slouched posture      Gait:  Patient ambulated: 225ft  Patient required: standy by assistance  Patient used:  no assistive device   Gait Pattern observed: swing-to gait  Gait Deviation(s): occasional unsteady gait, decreased step length, mildly flexed posture, and decreased abigail  Impairments due to: impaired balance and decreased endurance  Gait belt utilized  Comments: single LOB tripping over slipper  Able to self-correct    Stairs:  Pt ascended/descended 1 flight(s) with No Assistive Device with right handrail with Contact Guard Assistance.   Step to step  Significant assistance ascending requiring two hands on RHR    Education:  Time provided for education, counseling and discussion of health disposition in regards to patient's current status  All questions answered within PT scope of practice and to patient's satisfaction  PT role in POC to address current functional deficits  Pt educated on proper body mechanics, safety techniques, and energy conservation with PT facilitation and cueing throughout session    Patient left  on bedside couch  with call button in reach.    GOALS:   Multidisciplinary Problems       Physical Therapy Goals          Problem: Physical Therapy    Goal Priority Disciplines Outcome Goal Variances Interventions   Physical Therapy Goal     PT, PT/OT Progressing     Description: Goals to be met by: 24    Patient will increase functional independence with mobility by performin. Supine to sit with Modified [place]  2. Sit to supine with Modified [place]  3. Sit to stand  transfer with Supervision  4. Bed to chair transfer with Supervision using Rolling Walker  5. Gait  x 100 feet with Supervision using Rolling Walker.   6. Ascend/descend 5 stair with bilateral Handrails Supervision using No Assistive Device.   7. Lower extremity exercise program x15 reps per handout, with assistance as needed                       Time Tracking:     PT Received On: 04/29/24  PT Start Time: 1127     PT Stop Time: 1139  PT Total Time (min): 12 min     Billable Minutes:   Gait Training 12 min    Treatment Type: Treatment  PT/PTA: PT       Robert Cho, PT, DPT  4/29/2024

## 2024-04-29 NOTE — NURSING TRANSFER
Nursing Transfer Note      4/29/2024   2:34 PM  Reason patient is being transferred: Hemodialysis      Transfer From: [facility]    Transfer via wheelchair  Transported by -Patient transportation      Order for Tele Monitor? No    4eyes on Skin: yes  Patient belongings transferred with patient: No    Chart send with patient: Yes

## 2024-04-29 NOTE — ASSESSMENT & PLAN NOTE
CKD IV (baseline creatinine ~3.1-3.3) admitted with hypervolemia and REGINALDO with creatinine 4.2  UA showed +1 protein with UPCR of ~500 mg  Urinary sediment with non dysmorphic RBCs and WBCs present although Jin catheter placed the day prior to UA  Retroperitoneal US without evidence of hydronephrosis although changes consistent with chronic kidney disease  Diuresis with lasix gtt @ 40mg/hr + Diuril 500mg IV BID attempted, and while Crt improved she remains unable to tolerate lying flat.  Tolerated 8h SLED w goal UF rate 200-500/hr successfully overnight 4/14-15, then 12h SLED on 4/15, 16, and 17.  4/19 - 4/24: tolerated iHD; no issues  4/22: TDC placed  4/29: Tolerating iHD, last session 4/26; pending outpatient HD chair. Occasional episodes of asymptomatic hypotension, will decrease diuretic regimen to non-HD days only.    Plan/Recommendations:  Continue iHD for metabolic clearance and volume management. TDC placed 4/22. Suspect she may now be ESRD and require iHD moving forward but not yet declared ESRD  Will continue MWF iHD while inpatient  Hold Renvela given hypophosphatemia  Okay for discharge from nephrology standpoint once outpatient HD chair arranged; will need nephrology follow-up in clinic  Recommend continuing lasix 80 mg PO BID on Non-HD days  Free water restriction of 1.5L daily to help with hyponatremia  Strict I/O's and daily weights  Renally all dose medications to eGFR   Avoid nephrotoxic agents wean feasible (i.e. NSAIDs, intra-arterial contrast, supra-therapeutic vancomycin levels, etc.)

## 2024-04-29 NOTE — PROGRESS NOTES
[facility] - [facility] Stepdown  Nephrology  Progress Note    Patient Name: Xena Vera  MRN: [id number]  Admission Date: 4/1/2024  Hospital Length of Stay: 26 days  Attending Provider: Ness Regan MD   Primary Care Physician: Tami Villeda FNP  Principal Problem:Severe mitral valve regurgitation    Subjective:     HPI: Ms Vera is an obese (BMI ~31) 53-year-old with CKD IV (baseline creatinine ~3.1-3.3), prediabetes with most recent hemoglobin A1c 6%, HFpEF (most recent TTE with EF 60-65% with G2DD), mitral valve regurgitation, anemia, hypertension, HLD as well as several over co-morbid conditions who was admitted on 4/1 with heart failure exacerbation and hypervolemia after presenting with to ED following a mechanical fall but also had complaints of progressive dyspnea/ZACARIAS, orthopnea, lower extremity edema and abdominal distension with constipation. On presentation patient was noted to be hypotensive with systolic BP readings as low as 90s mmHg and bradycardiac with HR as low as 50s bpm. There was concern for some edema on chest x-ray and BNP at that time was ~4.6K and metabolic panel was notable for serum sodium 133, bicarbonate 20, , creatinine 4.2, albumin 3.1 and total bilirubin 1.5. UA showed +1 protein. Her admission was complicated by failure to adequately diuresis with escalating IV Lasix and even oral metolazone for which Nephrology was consulted on 4/10 for assistance. She explicitly denies NSAID use as outpatient.    Interval History: Occasional episodes of hypotension today BP 71/47, patient asymptomatic. Will continue w/ MWF HD. Will adjust diuretics to non-HD days only.    Review of patient's allergies indicates:  No Known Allergies  Current Facility-Administered Medications   Medication Dose Route Frequency Provider Last Rate Last Admin    acetaminophen tablet 650 mg  650 mg Oral Q6H PRN Ness Regan MD   650 mg at 04/26/24 2250    aspirin EC tablet 81 mg  81 mg Oral Daily  David Borjas PA-C   81 mg at 04/29/24 0909    atorvastatin tablet 40 mg  40 mg Oral Daily David Borjas PA-C   40 mg at 04/29/24 0908    bisacodyL EC tablet 5 mg  5 mg Oral Daily PRN Johnny Cisneros MD        carvediloL tablet 6.25 mg  6.25 mg Oral BID Ness Regan MD   6.25 mg at 04/28/24 2136    clindamycin phosphate 1% gel   Topical (Top) BID Domonique Walden MD   Given at 04/29/24 0914    dextrose 10% bolus 125 mL 125 mL  12.5 g Intravenous PRN David Borjas PA-DIMITRY        dextrose 10% bolus 250 mL 250 mL  25 g Intravenous PRN David Borjas PA-C        diphenhydrAMINE-zinc acetate 2-0.1% cream   Topical (Top) TID PRN Domonique Walden MD   Given at 04/22/24 1556    [START ON 4/30/2024] furosemide tablet 60 mg  60 mg Oral BID Ness Regan MD        glucagon (human recombinant) injection 1 mg  1 mg Intramuscular PRN David Borjas PA-C        glucose chewable tablet 16 g  16 g Oral PRN David Borjas PA-C        glucose chewable tablet 24 g  24 g Oral PRN David Borjas PA-C        heparin (porcine) injection 1,000 Units  1,000 Units Intra-Catheter PRN Rosario Luciano MD   1,000 Units at 04/26/24 1555    heparin (porcine) injection 5,000 Units  5,000 Units Subcutaneous Q8H Rosario Luciano MD   5,000 Units at 04/29/24 0511    HYDROcodone-acetaminophen 5-325 mg per tablet 1 tablet  1 tablet Oral Q6H PRN Johnny Cisneros MD   1 tablet at 04/28/24 2136    hydrOXYzine HCL tablet 10 mg  10 mg Oral TID PRN Domonique Walden MD   10 mg at 04/22/24 2211    melatonin tablet 6 mg  6 mg Oral Nightly PRN Pietro Herring MD   6 mg at 04/18/24 2047    miconazole 2 % cream   Topical (Top) BID Domonique Walden MD   Given at 04/29/24 0921    midodrine tablet 2.5 mg  2.5 mg Oral Q12H Ness Regan MD   2.5 mg at 04/29/24 0909    naloxone 0.4 mg/mL injection 0.02 mg  0.02 mg Intravenous PRN David Borjas, PAJakyC        ondansetron disintegrating tablet 4 mg  4 mg  Oral Q8H PRN David Borjas PA-C        ondansetron injection 4 mg  4 mg Intravenous Q8H PRN David Borjas PA-C   4 mg at 04/13/24 1429    oxybutynin tablet 5 mg  5 mg Oral TID Minda Molina MD   5 mg at 04/29/24 0914    polyethylene glycol packet 17 g  17 g Oral BID Domonique Walden MD   17 g at 04/29/24 0914    senna-docusate 8.6-50 mg per tablet 1 tablet  1 tablet Oral BID Domonique Walden MD   1 tablet at 04/29/24 0908    sodium chloride 0.9% bolus 250 mL 250 mL  250 mL Intravenous PRN Italo Segoiva MD        sodium chloride 0.9% flush 10 mL  10 mL Intravenous PRN Pietro Herring MD        sodium chloride 0.9% flush 10 mL  10 mL Intravenous PRN David Borjas PA-C        sodium chloride 0.9% flush 10 mL  10 mL Intravenous PRN Carole Rolon MD        triamcinolone acetonide 0.1% cream   Topical (Top) BID Rosario Luciano MD   Given at 04/29/24 0915    white petrolatum 41 % ointment   Topical (Top) BID Johnny Cisneros MD   Given at 04/29/24 0924    zinc oxide 20 % ointment   Topical (Top) PRN David Borjas PA-C   Given at 04/25/24 2209       Objective:     Vital Signs (Most Recent):  Temp: 97.9 °F (36.6 °C) (04/29/24 1112)  Pulse: 60 (04/29/24 1112)  Resp: 19 (04/29/24 1112)  BP: 102/64 (04/29/24 1112)  SpO2: 96 % (04/29/24 1112) Vital Signs (24h Range):  Temp:  [97.6 °F (36.4 °C)-99.2 °F (37.3 °C)] 97.9 °F (36.6 °C)  Pulse:  [56-64] 60  Resp:  [18-20] 19  SpO2:  [94 %-98 %] 96 %  BP: ()/(54-65) 102/64     Weight: 65.1 kg (143 lb 8.3 oz) (04/29/24 0620)  Body mass index is 28.18 kg/m².  Body surface area is 1.66 meters squared.    I/O last 3 completed shifts:  In: 600 [P.O.:600]  Out: 800 [Urine:800]     Physical Exam  Vitals and nursing note reviewed.   Constitutional:       General: She is not in acute distress.     Appearance: Normal appearance. She is not ill-appearing.   HENT:      Head: Normocephalic.   Eyes:      Extraocular Movements: Extraocular movements intact.    Cardiovascular:      Rate and Rhythm: Normal rate and regular rhythm.   Pulmonary:      Effort: Pulmonary effort is normal. No respiratory distress.      Breath sounds: No rales.   Musculoskeletal:      Right lower leg: No edema.      Left lower leg: No edema.   Skin:     General: Skin is warm and dry.   Neurological:      General: No focal deficit present.      Mental Status: She is alert and oriented to person, place, and time.       Significant Labs:  CMP:   Recent Labs   Lab 04/25/24  0419 04/26/24  0711 04/29/24  0508   GLU 83   < > 68*   CALCIUM 7.8*   < > 8.3*   ALBUMIN 2.6*   < > 2.8*   PROT 6.1  --   --    *   < > 129*   K 3.7   < > 3.7   CO2 22*   < > 17*   CL 98   < > 100   BUN 13   < > 22*   CREATININE 2.3*   < > 3.6*   ALKPHOS 106  --   --    ALT 19  --   --    AST 28  --   --    BILITOT 1.0  --   --     < > = values in this interval not displayed.     All labs within the past 24 hours have been reviewed.   Assessment/Plan:     Renal/  Acute kidney injury superimposed on chronic kidney disease  CKD IV (baseline creatinine ~3.1-3.3) admitted with hypervolemia and REGINALDO with creatinine 4.2  UA showed +1 protein with UPCR of ~500 mg  Urinary sediment with non dysmorphic RBCs and WBCs present although Jin catheter placed the day prior to UA  Retroperitoneal US without evidence of hydronephrosis although changes consistent with chronic kidney disease  Diuresis with lasix gtt @ 40mg/hr + Diuril 500mg IV BID attempted, and while Crt improved she remains unable to tolerate lying flat.  Tolerated 8h SLED w goal UF rate 200-500/hr successfully overnight 4/14-15, then 12h SLED on 4/15, 16, and 17.  4/19 - 4/24: tolerated iHD; no issues  4/22: TDC placed  4/29: Tolerating iHD, last session 4/26; pending outpatient HD chair. Occasional episodes of asymptomatic hypotension, will decrease diuretic regimen to non-HD days only.    Plan/Recommendations:  Continue iHD for metabolic clearance and volume  management. TDC placed 4/22. Suspect she may now be ESRD and require iHD moving forward but not yet declared ESRD  Will continue MWF iHD while inpatient  Hold Renvela given hypophosphatemia  Okay for discharge from nephrology standpoint once outpatient HD chair arranged; will need nephrology follow-up in clinic  Recommend continuing lasix 80 mg PO BID on Non-HD days  Free water restriction of 1.5L daily to help with hyponatremia  Strict I/O's and daily weights  Renally all dose medications to eGFR   Avoid nephrotoxic agents wean feasible (i.e. NSAIDs, intra-arterial contrast, supra-therapeutic vancomycin levels, etc.)        Thank you for your consult. I will follow-up with patient. Please contact us if you have any additional questions.    Javier Morris MD  Nephrology  [facility] - Cardiology Stepdown

## 2024-04-29 NOTE — PLAN OF CARE
AAOX4,VSS,O2 sats 95% on room air.Plan of care discussed with patient.Patient awaiting medicaid approval. Patient educated on fall risk and educated to call for assistance before ambulating.Patient has no complaints of pain/SOB, palpitations, lightheaded, dizziness.Patient dialyzed today. Discussed medications and care. Patient has no questions at this time.Pt visualised and stable. No acute distress noted.Call light within reach.Pt resting,bed at lowest position.No farther orders at this time, plan of care ongoing.       Problem: Adult Inpatient Plan of Care  Goal: Plan of Care Review  Outcome: Progressing  Goal: Patient-Specific Goal (Individualized)  Outcome: Progressing  Goal: Absence of Hospital-Acquired Illness or Injury  Outcome: Progressing  Goal: Optimal Comfort and Wellbeing  Outcome: Progressing  Goal: Readiness for Transition of Care  Outcome: Progressing     Problem: Infection  Goal: Absence of Infection Signs and Symptoms  Outcome: Progressing     Problem: Impaired Wound Healing  Goal: Optimal Wound Healing  Outcome: Progressing     Problem: Fluid and Electrolyte Imbalance (Acute Kidney Injury/Impairment)  Goal: Fluid and Electrolyte Balance  Outcome: Progressing     Problem: Oral Intake Inadequate (Acute Kidney Injury/Impairment)  Goal: Optimal Nutrition Intake  Outcome: Progressing     Problem: Renal Function Impairment (Acute Kidney Injury/Impairment)  Goal: Effective Renal Function  Outcome: Progressing     Problem: Device-Related Complication Risk (Hemodialysis)  Goal: Safe, Effective Therapy Delivery  Outcome: Progressing     Problem: Infection (Hemodialysis)  Goal: Absence of Infection Signs and Symptoms  Outcome: Progressing     Problem: Hemodynamic Instability (Hemodialysis)  Goal: Effective Tissue Perfusion  Outcome: Progressing     Problem: Skin Injury Risk Increased  Goal: Skin Health and Integrity  Outcome: Progressing     Problem: Device-Related Complication Risk (CRRT (Continuous Renal  Replacement Therapy))  Goal: Safe, Effective Therapy Delivery  Outcome: Progressing     Problem: Hypothermia (CRRT (Continuous Renal Replacement Therapy))  Goal: Body Temperature Maintained in Desired Range  Outcome: Progressing     Problem: Infection (CRRT (Continuous Renal Replacement Therapy))  Goal: Absence of Infection Signs and Symptoms  Outcome: Progressing     Problem: Fall Injury Risk  Goal: Absence of Fall and Fall-Related Injury  Outcome: Progressing     Problem: Chronic Kidney Disease  Goal: Electrolyte Balance  Outcome: Progressing  Goal: Fluid Balance  Outcome: Progressing

## 2024-04-30 PROCEDURE — 25000003 PHARM REV CODE 250: Performed by: PHYSICIAN ASSISTANT

## 2024-04-30 PROCEDURE — 25000003 PHARM REV CODE 250: Performed by: STUDENT IN AN ORGANIZED HEALTH CARE EDUCATION/TRAINING PROGRAM

## 2024-04-30 PROCEDURE — 25000003 PHARM REV CODE 250

## 2024-04-30 PROCEDURE — 20600001 HC STEP DOWN PRIVATE ROOM

## 2024-04-30 PROCEDURE — 97530 THERAPEUTIC ACTIVITIES: CPT

## 2024-04-30 PROCEDURE — 63600175 PHARM REV CODE 636 W HCPCS: Performed by: STUDENT IN AN ORGANIZED HEALTH CARE EDUCATION/TRAINING PROGRAM

## 2024-04-30 RX ORDER — FUROSEMIDE 80 MG/1
80 TABLET ORAL 2 TIMES DAILY
Status: DISCONTINUED | OUTPATIENT
Start: 2024-05-02 | End: 2024-05-09 | Stop reason: HOSPADM

## 2024-04-30 RX ORDER — FUROSEMIDE 80 MG/1
80 TABLET ORAL 2 TIMES DAILY
Status: DISCONTINUED | OUTPATIENT
Start: 2024-05-01 | End: 2024-04-30

## 2024-04-30 RX ORDER — SODIUM CHLORIDE 9 MG/ML
INJECTION, SOLUTION INTRAVENOUS ONCE
Status: CANCELLED | OUTPATIENT
Start: 2024-04-30 | End: 2024-04-30

## 2024-04-30 RX ORDER — FUROSEMIDE 80 MG/1
80 TABLET ORAL 2 TIMES DAILY
Status: DISCONTINUED | OUTPATIENT
Start: 2024-04-30 | End: 2024-04-30

## 2024-04-30 RX ORDER — FUROSEMIDE 20 MG/1
20 TABLET ORAL ONCE
Status: COMPLETED | OUTPATIENT
Start: 2024-04-30 | End: 2024-04-30

## 2024-04-30 RX ADMIN — OXYBUTYNIN CHLORIDE 5 MG: 5 TABLET ORAL at 09:04

## 2024-04-30 RX ADMIN — FUROSEMIDE 60 MG: 20 TABLET ORAL at 02:04

## 2024-04-30 RX ADMIN — TRIAMCINOLONE ACETONIDE: 1 CREAM TOPICAL at 09:04

## 2024-04-30 RX ADMIN — HEPARIN SODIUM 5000 UNITS: 5000 INJECTION INTRAVENOUS; SUBCUTANEOUS at 10:04

## 2024-04-30 RX ADMIN — MICONAZOLE NITRATE: 20 CREAM TOPICAL at 09:04

## 2024-04-30 RX ADMIN — OXYBUTYNIN CHLORIDE 5 MG: 5 TABLET ORAL at 02:04

## 2024-04-30 RX ADMIN — HEPARIN SODIUM 5000 UNITS: 5000 INJECTION INTRAVENOUS; SUBCUTANEOUS at 06:04

## 2024-04-30 RX ADMIN — SENNOSIDES AND DOCUSATE SODIUM 1 TABLET: 8.6; 5 TABLET ORAL at 09:04

## 2024-04-30 RX ADMIN — ASPIRIN 81 MG: 81 TABLET, COATED ORAL at 09:04

## 2024-04-30 RX ADMIN — MICONAZOLE NITRATE: 20 CREAM TOPICAL at 08:04

## 2024-04-30 RX ADMIN — POLYETHYLENE GLYCOL 3350 17 G: 17 POWDER, FOR SOLUTION ORAL at 09:04

## 2024-04-30 RX ADMIN — CLINDAMYCIN PHOSPHATE: 10 GEL TOPICAL at 09:04

## 2024-04-30 RX ADMIN — FUROSEMIDE 20 MG: 20 TABLET ORAL at 04:04

## 2024-04-30 RX ADMIN — CARVEDILOL 6.25 MG: 6.25 TABLET, FILM COATED ORAL at 08:04

## 2024-04-30 RX ADMIN — FUROSEMIDE 60 MG: 20 TABLET ORAL at 09:04

## 2024-04-30 RX ADMIN — ACETAMINOPHEN 650 MG: 325 TABLET ORAL at 03:04

## 2024-04-30 RX ADMIN — HEPARIN SODIUM 5000 UNITS: 5000 INJECTION INTRAVENOUS; SUBCUTANEOUS at 02:04

## 2024-04-30 RX ADMIN — ATORVASTATIN CALCIUM 40 MG: 40 TABLET, FILM COATED ORAL at 09:04

## 2024-04-30 RX ADMIN — Medication: at 08:04

## 2024-04-30 RX ADMIN — Medication: at 09:04

## 2024-04-30 RX ADMIN — OXYBUTYNIN CHLORIDE 5 MG: 5 TABLET ORAL at 08:04

## 2024-04-30 RX ADMIN — CARVEDILOL 6.25 MG: 6.25 TABLET, FILM COATED ORAL at 09:04

## 2024-04-30 NOTE — PT/OT/SLP PROGRESS
"Occupational Therapy   Treatment    Name: Xena Vera  MRN: [id number]  Admitting Diagnosis:  Severe mitral valve regurgitation  8 Days Post-Op    Recommendations:     Discharge Recommendations: Low Intensity Therapy  Discharge Equipment Recommendations:  walker, rolling, bedside commode  Barriers to discharge:  None    Assessment:     Xena Vera is a 53 y.o. female with a medical diagnosis of Severe mitral valve regurgitation.  She presents alert and motivated. Pt able to mobilize in room and complete ADL without A. Pt mobilized in hallway without A, but had mechanical fall due to "cream on feet" (pt wearing rubber sandals). Multiple RNs approached and pt assisted to w/c. VSS and pt denied  pain or dizziness.   Performance deficits affecting function are weakness, impaired endurance, impaired self care skills, impaired functional mobility, gait instability, impaired balance, impaired cardiopulmonary response to activity, decreased safety awareness.     Rehab Prognosis:  Good; patient would benefit from acute skilled OT services to address these deficits and reach maximum level of function.       Plan:     Patient to be seen 2 x/week to address the above listed problems via therapeutic activities, therapeutic exercises, self-care/home management  Plan of Care Expires: 05/18/24  Plan of Care Reviewed with: patient    Subjective     Chief Complaint: denies  Patient/Family Comments/goals: to go home  Pain/Comfort:  Pain Rating 1: 0/10  Pain Rating Post-Intervention 1: 0/10    Objective:     Communicated with:   RN prior to session.  Patient found  sitting EOB  with telemetry upon OT entry to room.    General Precautions: Standard, fall    Orthopedic Precautions:N/A  Braces: N/A  Respiratory Status: Room air     Occupational Performance:     Bed Mobility:    Independent for scooting to EOB and sit to supine     Functional Mobility/Transfers:  Patient completed Sit <> Stand Transfer with supervision  with  no " assistive device   Functional Mobility: Supervision around room during ADL; SBA for mobility in hallway to simulate HH distances    Activities of Daily Living:  Feeding:  independence    Grooming: independence    Upper Body Dressing: supervision for retrieval  Lower Body Dressing: supervision for retrieval      [facility] 6 Click ADL: 22    Treatment & Education:  Pt ed on OT POC  Pt ed on safety and future use of non-skid socks and assistance with OOB and mobility    Patient left supine with all lines intact, call button in reach, and RN notified    GOALS:   Multidisciplinary Problems       Occupational Therapy Goals          Problem: Occupational Therapy    Goal Priority Disciplines Outcome Interventions   Occupational Therapy Goal     OT, PT/OT Progressing    Description: Goals to be met by: 5/18/24     Patient will increase functional independence with ADLs by performing:    UE Dressing with Modified [place].  LE Dressing with Modified [place].  Grooming while standing at sink with Modified [place].  Toileting from toilet/bedside commode with Modified [place] for hygiene and clothing management.   Toilet transfer to toilet/bedside commode with Modified [place].                           Time Tracking:     OT Date of Treatment: 04/30/24  OT Start Time: 1020  OT Stop Time: 1040  OT Total Time (min): 20 min    Billable Minutes:Therapeutic Activity 20          Number of RA visits since last OT visit: 1    4/30/2024

## 2024-04-30 NOTE — PLAN OF CARE
Per CM leadership, there are ongoing discussions with [facility] and [facility] regarding pending Medicaid status. Per  leadership, if Medicaid issues cannot be resolved, then patient will go to ED when symptomatic. Will continue to follow.    JUANCARLOS Neal, LMSW    Case Management Department  [facility]

## 2024-04-30 NOTE — NURSING
"Patient fell while walking with therapy in hallway in flip flops. "Patient fell on R knee", stated therapist. Patient denied SOB, palpitations, dizziness, CP, or lightheaded. VSS bp: 116/62 map: 84 HR:65. No injujries noted and patient denies pain. Patient escorted back to her room via wheel chair and placed in bed, with instructions to call for assistance before getting out bed and ambulating.Nonskid socks placed on patients feet and bed alarm set. Room door left open.Patient sitting in bed with side rails x2, bed in lowest position and call bell in reach.Ness Regan MD notified. No farther orders at this time, plan of care ongoing.  "

## 2024-04-30 NOTE — PROGRESS NOTES
[facility] - Cardiology [facility] Hospital Medicine  Progress Note    Patient Name: Xena Vera  MRN: [id number]  Patient Class: IP- Inpatient   Admission Date: 4/1/2024  Length of Stay: 27 days  Attending Physician: Ness Regan MD  Primary Care Provider: Tami Villeda FNP        Subjective:     Principal Problem:Severe mitral valve regurgitation        HPI:  Per MICU: 52 yo F with PMH of HFpEF, severe MR, HTN, HLD, and CKD4 who initially presented to [facility] on 4/1/2024 after a mechanical fall at home with concurrent SOB and swelling in her legs and abdomen. She reported adherence to home Lasix and low sodium diet, but had felt increasingly SOB prior to admission as she was sleeping upright and had multiple nighttime awakenings due to SOB. In the ED, BNP was 4639 and she was admitted to Hospital Medicine for further management of ADHF. She initially had good response to IV Lasix but hospital course was later complicated by urinary retention, worsening kidney function, and decreased UOP. Nephrology and Cardiology were consulted to assist. She was started on Diuril and Lasix gtt for diuresis and Isordil and hydralazine for afterload reduction. Patient transferred to CCU for further management.        Overview/Hospital Course:  MICU course  Patient initially had improved UOP with increasing Lasix, eventually maxed out on Lasix drip with addition of Diuril. Eventually started on CRRT/SLED for volume removal, initially needed intermittent levophed to tolerate. Has itchy rash that started during current hospitalization, Dermatology consulted for regimen. Graduated from CRRT to iHD, tolerating well. Stable to step back down to HM.      course  Patient slightly volume overloaded on exam. Reports making urine. Transition to lasix 40mg PO daily. More fluid removal via HD. Will need OP HD chair. SW consulted. Repeat TTE ordered for eval for MR. Discuss plans with IC once repeat TTE is obtained.  Will also need OP IC  follow up. Nephro rec consulting interventional nephro for TDC placement. She underwent TDC placement 04/22. Repeat TTE w/the following findings:      Left Ventricle: The left ventricle is moderately dilated. Normal wall thickness. There is eccentric hypertrophy. There is normal systolic function with a visually estimated ejection fraction of 55 - 60%. Diastolic function cannot be reliably determined in the presence of mitral valve disease.    Right Ventricle: Mild right ventricular enlargement. Wall thickness is normal. Right ventricle wall motion  is normal. Systolic function is mildly reduced.    Biatrial enlargement (L > R)    Mitral Valve: There is severe functional regurgitation with a centrally directed jet.    Tricuspid Valve: There is mild to moderate regurgitation.    Pulmonary Artery: The estimated pulmonary artery systolic pressure is 96 mmHg.    IVC/SVC: Intermediate venous pressure at 8 mmHg.    There is a small pericardial effusion and a right pleural effusion.    Interventional cardiology consulted- recommended further medical management optimization and continued fluid removal via iHD. Not a candidate for mitraclip at this time.       Interval History: NAEON. BP soft- discussed w/ nephro and decreasing dose of lasix to 60mg BID on non-HD days only. Seen today while pt receiving HD. Doing well, denies pain. Medically ready for discharge. Dispo pending insurance enrollment & outpatient HD chair.     Review of Systems   Constitutional:  Negative for appetite change, chills, fatigue and fever.   HENT:  Negative for congestion and sore throat.    Respiratory:  Negative for cough and shortness of breath.    Cardiovascular:  Negative for chest pain and palpitations.   Gastrointestinal:  Negative for abdominal pain, diarrhea, nausea and vomiting.   Genitourinary:  Negative for dysuria and hematuria.   Musculoskeletal:  Negative for arthralgias, joint swelling and neck pain.   Skin:  Positive for wound  (leg(s)). Negative for pallor.   Neurological:  Negative for dizziness, light-headedness, numbness and headaches.   Psychiatric/Behavioral:  Negative for confusion and sleep disturbance.      Objective:     Vital Signs (Most Recent):  Temp: 98.7 °F (37.1 °C) (04/30/24 0357)  Pulse: 64 (04/30/24 0607)  Resp: 18 (04/30/24 0357)  BP: 98/62 (04/30/24 0607)  SpO2: 95 % (04/30/24 0357) Vital Signs (24h Range):  Temp:  [97.5 °F (36.4 °C)-98.7 °F (37.1 °C)] 98.7 °F (37.1 °C)  Pulse:  [56-69] 64  Resp:  [18-19] 18  SpO2:  [95 %-98 %] 95 %  BP: ()/(55-78) 98/62     Weight: 63.8 kg (140 lb 10.5 oz)  Body mass index is 27.61 kg/m².    Intake/Output Summary (Last 24 hours) at 4/30/2024 0700  Last data filed at 4/29/2024 2150  Gross per 24 hour   Intake 540 ml   Output 1700 ml   Net -1160 ml         Physical Exam  Constitutional:       General: She is not in acute distress.     Appearance: She is not ill-appearing or toxic-appearing.   HENT:      Head: Normocephalic and atraumatic.   Eyes:      Conjunctiva/sclera: Conjunctivae normal.   Cardiovascular:      Rate and Rhythm: Normal rate and regular rhythm.      Heart sounds: Normal heart sounds.   Pulmonary:      Effort: Pulmonary effort is normal. No respiratory distress.      Breath sounds: Normal breath sounds. No rales.   Abdominal:      General: Bowel sounds are normal.      Palpations: Abdomen is soft.      Tenderness: There is no abdominal tenderness.   Musculoskeletal:      Right lower leg: No edema.      Left lower leg: No edema.   Skin:     General: Skin is warm and dry.      Findings: Lesion (L lateral calf) present.   Neurological:      General: No focal deficit present.      Mental Status: She is alert and oriented to person, place, and time. Mental status is at baseline.   Psychiatric:         Mood and Affect: Mood normal.         Behavior: Behavior normal.           Significant Labs: All pertinent labs within the past 24 hours have been  reviewed.    Significant Imaging: I have reviewed all pertinent imaging results/findings within the past 24 hours.    Assessment/Plan:      * Severe mitral valve regurgitation    Acute on chronic heart failure with preserved ejection fraction (HFpEF)   Marilynnsarcsalma      54 yo F admitted for ADHF 2/2 severe MR. The MR appears to be secondary to posterior leaflet restriction and leaflet length of 1.1 cm. BNP on admission was 4300 compared to 350 eight months prior. Initially diagnosed with severe MR in June of 2023. Saw Dr. Stone in January of 2024 and was undergoing work-up for possible MitraClip; LHC scheduled for 3/15 but not completed due to financial constraints, also needs POOJA. Will need optimization of kidney function prior to angiogram consideration, appreciate Nephrology assistance. APS serologies negative, Fabry workup negative     DDX: rheumatic heart disease vs Fabry vs antiphospholipid (serologies negative)     4/1/2024 TTE    Left Ventricle: The left ventricle is normal in size. Normal wall thickness. Normal wall motion. There is normal systolic function with a visually estimated ejection fraction of 60 - 65%. Grade II diastolic dysfunction.    Right Ventricle: Normal right ventricular cavity size. Wall thickness is normal. Right ventricle wall motion  is normal. Systolic function is reduced.    Left Atrium: Left atrium is very  severely dilated.    Right Atrium: Right atrium is mildly dilated.    Aortic Valve: There is mild aortic valve sclerosis. There is normal leaflet mobility. There is trace aortic regurgitation.    Mitral Valve: There is mild bileaflet sclerosis. There is posterior leaflet tethering and failure of complete coaptation. There is very severe regurgitation with a posterolateral eccentriccally directed jet.    Tricuspid Valve: The tricuspid valve is structurally normal. There is normal leaflet mobility. There is moderate to severe regurgitation.    IVC/SVC: IVC was not well visualized due  "to poor acoustic window. Intermediate venous pressure at 8 mmHg.    Pericardium: There is a trivial effusion posteriorly and small under the RA.     - Interventional Cardiology consulted - no intervention until patient's volume status is controlled  - Nephrology following, undergoing SLED/CRRT  - POOJA ordered - "Anesthesia evaluated the patient and feel that she needs better optimization prior to POOJA. They stated once she is euvolemic that we can move forward. Will need a new POOJA order at that time."  - Discontinued Diuril 500 mg IV q24h & Lasix gtt @ 40 mg/hr  - Discontinued hydralazine 25 mg po q8h & Isordil 20 mg po BID for afterload reduction due to pressor requirements  - Continue home ASA 81 mg po daily and Lipitor 40 mg po daily  - Genetics consulted, see "Hypertrophic cardiomyopathy"  - Strict I/O, Jin in place  - Transitioned to lasix 40mg PO daily. Reassess daily for diuretic use.   - Repeat TTE reviewed- IC consulted- not a candidate for mitraclip- recommended outpatient f/u- continued fluid removal/fluid status optimization via HD- up-titration of GDMT as tolerated  - Will also need OP IC follow up.   - Telemetry monitoring    Hyponatremia  Patient has hyponatremia which is uncontrolled,We will aim to correct the sodium by 4-6mEq in 24 hours. We will monitor sodium Daily. The hyponatremia is due to renal insufficiency. We will obtain the following studies: no new labs at this moment. We will treat the hyponatremia with Fluid restriction of:  1.5 liter per day and Hemodialysis. The patient's sodium results have been reviewed and are listed below.  Recent Labs   Lab 04/24/24  0349   *         Intertrigo  - improvement       Rash  - derm initially consulted- concern for resolving folliculitis- no significant improvement  - triamcinolone cream today-monitor for improvement- if no improvement, then will re-consult derm    Proteinuria  - h/o      Hypertrophic cardiomyopathy  Angiokeratoma   Proteinuria " "     - Genetics consulted, appreciate recs  - GLA genetic testing collected and sent to [facility]- neg for fabry's disease  - Consider cardiac MRI      Angiokeratoma        Prediabetes  - h/o    HLD (hyperlipidemia)  - continue statin    Acute kidney injury superimposed on chronic kidney disease  Patient with acute kidney injury/acute renal failure likely due to acute tubular necrosis caused by unknown.  REGINALDO is currently  improving on CRRT/SLED . Baseline creatinine  2.5  - Labs reviewed- Renal function/electrolytes with Estimated Creatinine Clearance: 80.9 mL/min (based on SCr of 0.7 mg/dL). according to latest data. Monitor urine output and serial BMP and adjust therapy as needed. Avoid nephrotoxins and renally dose meds for GFR listed above.           Recent Labs     04/17/24  1428 04/17/24  2203 04/18/24  0413   CREATININE 1.8*  1.8* 0.8  0.8 0.7  0.7  0.7         - Nephrology following, appreciate josie - started SLED on 4/14   - Genetics consulted, see "Hypertrophic cardiomyopathy"  - Holding home sevelamer   - Holding home sodium bicarb 650 mg po BID  - HD per nephrology. Will need OP HD chair setup.   - Nephro following- consulted interventional nephrology- TDC 04/22 - awaiting medicaid enrollment to set-up outpatient HD chair        Acute urinary retention  - now w/acute renal failure requiring iHD  - no catheter in place       Leg wound, left  - wound care following    Anasarca  - 2/2 acute renal failure in setting of CHF w/severe MR       Prolonged QT interval  - resolved    Acute on chronic heart failure with preserved ejection fraction (HFpEF)  - Interval history and physical exam findings as described above  - Most recent TTE results:  Results for orders placed during the hospital encounter of 04/01/24    Echo    Interpretation Summary    Left Ventricle: The left ventricle is normal in size. Normal wall thickness. Normal wall motion. There is normal systolic function with a visually estimated " ejection fraction of 60 - 65%. Grade II diastolic dysfunction.    Right Ventricle: Normal right ventricular cavity size. Wall thickness is normal. Right ventricle wall motion  is normal. Systolic function is reduced.    Left Atrium: Left atrium is very  severely dilated.    Right Atrium: Right atrium is mildly dilated.    Aortic Valve: There is mild aortic valve sclerosis. There is normal leaflet mobility. There is trace aortic regurgitation.    Mitral Valve: There is mild bileaflet sclerosis. There is posterior leaflet tethering and failure of complete coaptation. There is very severe regurgitation with a posterolateral eccentriccally directed jet.    Tricuspid Valve: The tricuspid valve is structurally normal. There is normal leaflet mobility. There is moderate to severe regurgitation.    IVC/SVC: IVC was not well visualized due to poor acoustic window. Intermediate venous pressure at 8 mmHg.    Pericardium: There is a trivial effusion posteriorly and small under the RA.    - Clinically volume overloaded on admission  - now w/iHD needs  - Will continue diuresis with lasix 80mg PO BID  - Continue home cardioprudent regimen  - Repeat echo ordered, pending   - Strict I/Os  - 1.5L fluid restriction   - Daily weights  - Will continue to monitor on tele      Anemia  Stable.   Recent Labs   Lab 04/22/24  0513 04/23/24  0416 04/24/24  0349   WBC 5.24 5.41 5.74   HGB 9.0* 9.4* 9.7*   HCT 31.8* 31.8* 34.1*    199 227         Class 2 obesity in adult  Body mass index is 28.31 kg/m². Morbid obesity complicates all aspects of disease management from diagnostic modalities to treatment. Weight loss encouraged and health benefits explained to patient.     Essential hypertension     Temp:  [98 °F (36.7 °C)-98.5 °F (36.9 °C)]   Pulse:  [55-62]   Resp:  [9-40]   BP: (94)/(53)   SpO2:  [94 %-100 %]   Arterial Line BP: ()/(44-66) .     While in the hospital, will manage blood pressure as follows; Adjust home  antihypertensive regimen as follows- holding Lasix and Coreg     Will utilize p.r.n. blood pressure medication only if patient's blood pressure greater than 180/110 and she develops symptoms such as worsening chest pain or shortness of breath.         VTE Risk Mitigation (From admission, onward)           Ordered     heparin (porcine) injection 5,000 Units  Every 8 hours         04/24/24 1546     heparin (porcine) injection 1,000 Units  As needed (PRN)         04/24/24 1038     heparin (porcine) injection 1,000 Units  As needed (PRN)         04/13/24 1428     IP VTE HIGH RISK PATIENT  Once         04/01/24 1030     Place sequential compression device  Until discontinued         04/01/24 1030                    Discharge Planning   RILEY: 4/30/2024     Code Status: Full Code   Is the patient medically ready for discharge?: No    Reason for patient still in hospital (select all that apply): Pending disposition  Discharge Plan A: Home with family   Discharge Delays: (!) Dialysis Set-up              Ness Regan MD  Department of Hospital Medicine   [facility] - Cardiology Stepdown

## 2024-04-30 NOTE — NURSING
Dressing change to LUE midline completed using sterile technique. No redness,drainage, bleeding, or hematoma noted. HCG impregnated sponge applied along with central line dressing. Patient tolerated well.

## 2024-04-30 NOTE — SUBJECTIVE & OBJECTIVE
Interval History: NAEON. BP soft- discussed w/ nephro and decreasing dose of lasix to 60mg BID on non-HD days only. Seen today while pt receiving HD. Doing well, denies pain. Medically ready for discharge. Dispo pending insurance enrollment & outpatient HD chair.     Review of Systems   Constitutional:  Negative for appetite change, chills, fatigue and fever.   HENT:  Negative for congestion and sore throat.    Respiratory:  Negative for cough and shortness of breath.    Cardiovascular:  Negative for chest pain and palpitations.   Gastrointestinal:  Negative for abdominal pain, diarrhea, nausea and vomiting.   Genitourinary:  Negative for dysuria and hematuria.   Musculoskeletal:  Negative for arthralgias, joint swelling and neck pain.   Skin:  Positive for wound (leg(s)). Negative for pallor.   Neurological:  Negative for dizziness, light-headedness, numbness and headaches.   Psychiatric/Behavioral:  Negative for confusion and sleep disturbance.      Objective:     Vital Signs (Most Recent):  Temp: 98.7 °F (37.1 °C) (04/30/24 0357)  Pulse: 64 (04/30/24 0607)  Resp: 18 (04/30/24 0357)  BP: 98/62 (04/30/24 0607)  SpO2: 95 % (04/30/24 0357) Vital Signs (24h Range):  Temp:  [97.5 °F (36.4 °C)-98.7 °F (37.1 °C)] 98.7 °F (37.1 °C)  Pulse:  [56-69] 64  Resp:  [18-19] 18  SpO2:  [95 %-98 %] 95 %  BP: ()/(55-78) 98/62     Weight: 63.8 kg (140 lb 10.5 oz)  Body mass index is 27.61 kg/m².    Intake/Output Summary (Last 24 hours) at 4/30/2024 0700  Last data filed at 4/29/2024 2150  Gross per 24 hour   Intake 540 ml   Output 1700 ml   Net -1160 ml         Physical Exam  Constitutional:       General: She is not in acute distress.     Appearance: She is not ill-appearing or toxic-appearing.   HENT:      Head: Normocephalic and atraumatic.   Eyes:      Conjunctiva/sclera: Conjunctivae normal.   Cardiovascular:      Rate and Rhythm: Normal rate and regular rhythm.      Heart sounds: Normal heart sounds.   Pulmonary:       Effort: Pulmonary effort is normal. No respiratory distress.      Breath sounds: Normal breath sounds. No rales.   Abdominal:      General: Bowel sounds are normal.      Palpations: Abdomen is soft.      Tenderness: There is no abdominal tenderness.   Musculoskeletal:      Right lower leg: No edema.      Left lower leg: No edema.   Skin:     General: Skin is warm and dry.      Findings: Lesion (L lateral calf) present.   Neurological:      General: No focal deficit present.      Mental Status: She is alert and oriented to person, place, and time. Mental status is at baseline.   Psychiatric:         Mood and Affect: Mood normal.         Behavior: Behavior normal.           Significant Labs: All pertinent labs within the past 24 hours have been reviewed.    Significant Imaging: I have reviewed all pertinent imaging results/findings within the past 24 hours.

## 2024-04-30 NOTE — PLAN OF CARE
Brief Nephrology Update Note    Labs reviewed today. Na yesterday 129; please restrict to 1.5L fluid intake.  Will plan to continue MWF HD, next session tomorrow 5/1/24.    Javier Morris MD  Department of Internal Medicine PGY 2  12:42 PM   04/30/2024

## 2024-05-01 LAB
ALBUMIN SERPL BCP-MCNC: 2.7 G/DL (ref 3.5–5.2)
ANION GAP SERPL CALC-SCNC: 8 MMOL/L (ref 8–16)
BUN SERPL-MCNC: 20 MG/DL (ref 6–20)
CALCIUM SERPL-MCNC: 8.1 MG/DL (ref 8.7–10.5)
CHLORIDE SERPL-SCNC: 99 MMOL/L (ref 95–110)
CO2 SERPL-SCNC: 23 MMOL/L (ref 23–29)
CREAT SERPL-MCNC: 3.2 MG/DL (ref 0.5–1.4)
ERYTHROCYTE [DISTWIDTH] IN BLOOD BY AUTOMATED COUNT: 25.2 % (ref 11.5–14.5)
EST. GFR  (NO RACE VARIABLE): 16.6 ML/MIN/1.73 M^2
GLUCOSE SERPL-MCNC: 85 MG/DL (ref 70–110)
HCT VFR BLD AUTO: 27.7 % (ref 37–48.5)
HGB BLD-MCNC: 8.1 G/DL (ref 12–16)
MAGNESIUM SERPL-MCNC: 1.6 MG/DL (ref 1.6–2.6)
MCH RBC QN AUTO: 24.5 PG (ref 27–31)
MCHC RBC AUTO-ENTMCNC: 29.2 G/DL (ref 32–36)
MCV RBC AUTO: 84 FL (ref 82–98)
PHOSPHATE SERPL-MCNC: 2.5 MG/DL (ref 2.7–4.5)
PLATELET # BLD AUTO: 244 K/UL (ref 150–450)
PMV BLD AUTO: 10.8 FL (ref 9.2–12.9)
POTASSIUM SERPL-SCNC: 3.3 MMOL/L (ref 3.5–5.1)
RBC # BLD AUTO: 3.31 M/UL (ref 4–5.4)
SODIUM SERPL-SCNC: 130 MMOL/L (ref 136–145)
WBC # BLD AUTO: 4.25 K/UL (ref 3.9–12.7)

## 2024-05-01 PROCEDURE — 25000003 PHARM REV CODE 250: Performed by: STUDENT IN AN ORGANIZED HEALTH CARE EDUCATION/TRAINING PROGRAM

## 2024-05-01 PROCEDURE — 20600001 HC STEP DOWN PRIVATE ROOM

## 2024-05-01 PROCEDURE — 36415 COLL VENOUS BLD VENIPUNCTURE: CPT | Performed by: STUDENT IN AN ORGANIZED HEALTH CARE EDUCATION/TRAINING PROGRAM

## 2024-05-01 PROCEDURE — 97116 GAIT TRAINING THERAPY: CPT

## 2024-05-01 PROCEDURE — 83735 ASSAY OF MAGNESIUM: CPT | Performed by: STUDENT IN AN ORGANIZED HEALTH CARE EDUCATION/TRAINING PROGRAM

## 2024-05-01 PROCEDURE — 51798 US URINE CAPACITY MEASURE: CPT

## 2024-05-01 PROCEDURE — 25000003 PHARM REV CODE 250

## 2024-05-01 PROCEDURE — 25000003 PHARM REV CODE 250: Performed by: HOSPITALIST

## 2024-05-01 PROCEDURE — 25000003 PHARM REV CODE 250: Performed by: PHYSICIAN ASSISTANT

## 2024-05-01 PROCEDURE — 63600175 PHARM REV CODE 636 W HCPCS: Performed by: STUDENT IN AN ORGANIZED HEALTH CARE EDUCATION/TRAINING PROGRAM

## 2024-05-01 PROCEDURE — 80069 RENAL FUNCTION PANEL: CPT | Performed by: STUDENT IN AN ORGANIZED HEALTH CARE EDUCATION/TRAINING PROGRAM

## 2024-05-01 PROCEDURE — 99233 SBSQ HOSP IP/OBS HIGH 50: CPT | Mod: ,,, | Performed by: HOSPITALIST

## 2024-05-01 PROCEDURE — 85027 COMPLETE CBC AUTOMATED: CPT | Performed by: STUDENT IN AN ORGANIZED HEALTH CARE EDUCATION/TRAINING PROGRAM

## 2024-05-01 PROCEDURE — 90935 HEMODIALYSIS ONE EVALUATION: CPT

## 2024-05-01 RX ORDER — POTASSIUM CHLORIDE 750 MG/1
30 CAPSULE, EXTENDED RELEASE ORAL 2 TIMES DAILY
Status: COMPLETED | OUTPATIENT
Start: 2024-05-01 | End: 2024-05-01

## 2024-05-01 RX ORDER — MAGNESIUM SULFATE HEPTAHYDRATE 40 MG/ML
2 INJECTION, SOLUTION INTRAVENOUS ONCE
Status: COMPLETED | OUTPATIENT
Start: 2024-05-01 | End: 2024-05-01

## 2024-05-01 RX ORDER — MIDODRINE HYDROCHLORIDE 5 MG/1
5 TABLET ORAL DAILY PRN
Status: DISCONTINUED | OUTPATIENT
Start: 2024-05-01 | End: 2024-05-09 | Stop reason: HOSPADM

## 2024-05-01 RX ORDER — SODIUM,POTASSIUM PHOSPHATES 280-250MG
2 POWDER IN PACKET (EA) ORAL
Status: COMPLETED | OUTPATIENT
Start: 2024-05-01 | End: 2024-05-01

## 2024-05-01 RX ADMIN — POTASSIUM CHLORIDE 30 MEQ: 750 CAPSULE, EXTENDED RELEASE ORAL at 09:05

## 2024-05-01 RX ADMIN — HYDROXYZINE HYDROCHLORIDE 10 MG: 10 TABLET ORAL at 10:05

## 2024-05-01 RX ADMIN — CLINDAMYCIN PHOSPHATE: 10 GEL TOPICAL at 08:05

## 2024-05-01 RX ADMIN — MAGNESIUM SULFATE HEPTAHYDRATE 2 G: 40 INJECTION, SOLUTION INTRAVENOUS at 09:05

## 2024-05-01 RX ADMIN — POTASSIUM CHLORIDE 30 MEQ: 750 CAPSULE, EXTENDED RELEASE ORAL at 08:05

## 2024-05-01 RX ADMIN — POTASSIUM & SODIUM PHOSPHATES POWDER PACK 280-160-250 MG 2 PACKET: 280-160-250 PACK at 01:05

## 2024-05-01 RX ADMIN — HEPARIN SODIUM 5000 UNITS: 5000 INJECTION INTRAVENOUS; SUBCUTANEOUS at 08:05

## 2024-05-01 RX ADMIN — OXYBUTYNIN CHLORIDE 5 MG: 5 TABLET ORAL at 08:05

## 2024-05-01 RX ADMIN — MIDODRINE HYDROCHLORIDE 2.5 MG: 2.5 TABLET ORAL at 10:05

## 2024-05-01 RX ADMIN — ASPIRIN 81 MG: 81 TABLET, COATED ORAL at 08:05

## 2024-05-01 RX ADMIN — OXYBUTYNIN CHLORIDE 5 MG: 5 TABLET ORAL at 02:05

## 2024-05-01 RX ADMIN — POLYETHYLENE GLYCOL 3350 17 G: 17 POWDER, FOR SOLUTION ORAL at 08:05

## 2024-05-01 RX ADMIN — ATORVASTATIN CALCIUM 40 MG: 40 TABLET, FILM COATED ORAL at 08:05

## 2024-05-01 RX ADMIN — CARVEDILOL 6.25 MG: 6.25 TABLET, FILM COATED ORAL at 08:05

## 2024-05-01 RX ADMIN — MICONAZOLE NITRATE: 20 CREAM TOPICAL at 08:05

## 2024-05-01 RX ADMIN — HEPARIN SODIUM 5000 UNITS: 5000 INJECTION INTRAVENOUS; SUBCUTANEOUS at 05:05

## 2024-05-01 RX ADMIN — MIDODRINE HYDROCHLORIDE 2.5 MG: 2.5 TABLET ORAL at 08:05

## 2024-05-01 RX ADMIN — POTASSIUM & SODIUM PHOSPHATES POWDER PACK 280-160-250 MG 2 PACKET: 280-160-250 PACK at 05:05

## 2024-05-01 RX ADMIN — POTASSIUM & SODIUM PHOSPHATES POWDER PACK 280-160-250 MG 2 PACKET: 280-160-250 PACK at 08:05

## 2024-05-01 RX ADMIN — HEPARIN SODIUM 1000 UNITS: 1000 INJECTION, SOLUTION INTRAVENOUS; SUBCUTANEOUS at 12:05

## 2024-05-01 RX ADMIN — TRIAMCINOLONE ACETONIDE: 1 CREAM TOPICAL at 08:05

## 2024-05-01 RX ADMIN — Medication: at 01:05

## 2024-05-01 RX ADMIN — HEPARIN SODIUM 5000 UNITS: 5000 INJECTION INTRAVENOUS; SUBCUTANEOUS at 01:05

## 2024-05-01 RX ADMIN — Medication: at 09:05

## 2024-05-01 RX ADMIN — SENNOSIDES AND DOCUSATE SODIUM 1 TABLET: 8.6; 5 TABLET ORAL at 08:05

## 2024-05-01 NOTE — SUBJECTIVE & OBJECTIVE
Interval History: NAEON. BP soft but improved (SBP ). While no recurrence of notable hypotension, pt had fall while working with therapies today. No dizziness, lightheadedness or other assoc sx. Pt has been endorsing LE weakness. As BP improved, will increase lasix dose back to 80mg BID on non-HD days. Medically ready for discharge. Dispo pending insurance enrollment & outpatient HD chair.     Review of Systems   Constitutional:  Negative for appetite change, chills, fatigue and fever.   HENT:  Negative for congestion and sore throat.    Respiratory:  Negative for cough and shortness of breath.    Cardiovascular:  Negative for chest pain and palpitations.   Gastrointestinal:  Negative for abdominal pain, diarrhea, nausea and vomiting.   Genitourinary:  Negative for dysuria and hematuria.   Musculoskeletal:  Negative for arthralgias, joint swelling and neck pain.   Skin:  Positive for wound (leg(s)). Negative for pallor.   Neurological:  Negative for dizziness, light-headedness, numbness and headaches.   Psychiatric/Behavioral:  Negative for confusion and sleep disturbance.      Objective:     Vital Signs (Most Recent):  Temp: 97.8 °F (36.6 °C) (04/30/24 2019)  Pulse: 68 (04/30/24 2019)  Resp: 18 (04/30/24 2019)  BP: 125/75 (04/30/24 2019)  SpO2: 97 % (04/30/24 2019) Vital Signs (24h Range):  Temp:  [97.8 °F (36.6 °C)-98.7 °F (37.1 °C)] 97.8 °F (36.6 °C)  Pulse:  [59-68] 68  Resp:  [18-19] 18  SpO2:  [94 %-97 %] 97 %  BP: ()/(57-81) 125/75     Weight: 63.8 kg (140 lb 10.5 oz)  Body mass index is 27.61 kg/m².    Intake/Output Summary (Last 24 hours) at 4/30/2024 2224  Last data filed at 4/30/2024 2050  Gross per 24 hour   Intake 419 ml   Output 0 ml   Net 419 ml         Physical Exam  Constitutional:       General: She is not in acute distress.     Appearance: She is not ill-appearing or toxic-appearing.   HENT:      Head: Normocephalic and atraumatic.   Eyes:      Conjunctiva/sclera: Conjunctivae normal.    Cardiovascular:      Rate and Rhythm: Normal rate and regular rhythm.      Heart sounds: Normal heart sounds.   Pulmonary:      Effort: Pulmonary effort is normal. No respiratory distress.      Breath sounds: Normal breath sounds. No rales.   Abdominal:      General: Bowel sounds are normal.      Palpations: Abdomen is soft.      Tenderness: There is no abdominal tenderness.   Musculoskeletal:      Right lower leg: No edema.      Left lower leg: No edema.   Skin:     General: Skin is warm and dry.      Findings: Lesion (L lateral calf) present.   Neurological:      General: No focal deficit present.      Mental Status: She is alert and oriented to person, place, and time. Mental status is at baseline.   Psychiatric:         Mood and Affect: Mood normal.         Behavior: Behavior normal.           Significant Labs: All pertinent labs within the past 24 hours have been reviewed.    Significant Imaging: I have reviewed all pertinent imaging results/findings within the past 24 hours.

## 2024-05-01 NOTE — NURSING
3 hours HD tx completed. 1 L of fluid removed.    Patient tolerated well.    Blood returned. Lines flushed with NS. Catheter locked with Heparin. Clamped, capped and wrapped with sterile gauze.    Report given to ANDREA Stanley RN.

## 2024-05-01 NOTE — PT/OT/SLP PROGRESS
Physical Therapy Treatment    Patient Name:  Xena Vera   MRN:  [id number]  Admitting Diagnosis:  Severe mitral valve regurgitation   Recent Surgery: Procedure(s) (LRB):  Insertion, Catheter, Central Venous, Hemodialysis (Right) 9 Days Post-Op  Admit Date: 4/1/2024  Length of Stay: 28 days    Recommendations:     Discharge Recommendations:  Low Intensity Therapy     Discharge Equipment Recommendations: walker, rolling, bedside commode   Barriers to discharge: None    Appropriate transfer level with nursing staff: Ambulatory with SBA    Plan:     During this hospitalization, patient to be seen 3 x/week to address the identified rehab impairments via gait training, therapeutic activities, neuromuscular re-education, therapeutic exercises and progress towards the established goals.  Plan of Care Expires:  05/18/24  Plan of Care Reviewed with: patient    Assessment:     Xena Vera is a 53 y.o. female admitted with a medical diagnosis of Severe mitral valve regurgitation. Pt found supine agreeable to therapy session. Pt with no LOB during gait trial, but demonstrates muscular fatigue and SOB with stair navigation requiring CGA for safety. At this time, patient is not functioning at her baseline PLOF and is a fall risk. Patient would benefit from skilled therapy services to maximize safety and independence, increase activity tolerance, decrease fall risk, decrease caregiver burden, improve QOL, improve patient's functional mobility, and decrease risk of contractures and pressure sores.  Patient continues to demonstrate the need for low intensity therapy on a scheduled basis exhibited by decreased independence with functional mobility       Problem List: weakness, impaired endurance, impaired self care skills, impaired functional mobility, gait instability, impaired balance, decreased upper extremity function, decreased lower extremity function, decreased safety awareness, impaired cardiopulmonary response to  "activity.  Rehab Prognosis: Good; patient would benefit from acute skilled PT services to address these deficits and reach maximum level of function.      Goals:   Multidisciplinary Problems       Physical Therapy Goals          Problem: Physical Therapy    Goal Priority Disciplines Outcome Goal Variances Interventions   Physical Therapy Goal     PT, PT/OT Progressing     Description: Goals to be met by: 24    Patient will increase functional independence with mobility by performin. Supine to sit with Modified Chama  2. Sit to supine with Modified Chama  3. Sit to stand transfer with Supervision  4. Bed to chair transfer with Supervision using Rolling Walker  5. Gait  x 100 feet with Supervision using Rolling Walker.   6. Ascend/descend 5 stair with bilateral Handrails Supervision using No Assistive Device.   7. Lower extremity exercise program x15 reps per handout, with assistance as needed                         Subjective     RN notified prior to session. No one present upon PT entrance into room. Patient agreeable to PT treatment session.    Chief Complaint: "I can't go in hallway" -points to fall risk band   Patient/Family Comments/goals: none stated  Pain/Comfort:  Pain Rating 1: 0/10  Pain Rating Post-Intervention 1: 0/10      Objective:      Eliezer #[id number] used during session via iPad  Patient found supine with: Other (comments) (no active lines)   Cognition:   Alert and Cooperative  Patient is oriented to Person, Place, Time, Situation  General Precautions: Standard, Cardiac fall   Orthopedic Precautions:N/A   Braces: N/A   Body mass index is 27.74 kg/m².  Oxygen Device: Room Air  Vitals: BP (!) 98/56   Pulse 61   Temp 98.4 °F (36.9 °C) (Oral)   Resp 18   Ht 4' 11.84" (1.52 m)   Wt 64.1 kg (141 lb 5 oz)   SpO2 (!) 92%   Breastfeeding No   BMI 27.74 kg/m²     Outcome Measures:  AM-PAC 6 CLICK MOBILITY  Turning over in bed (including adjusting bedclothes, sheets and " blankets)?: 4  Sitting down on and standing up from a chair with arms (e.g., wheelchair, bedside commode, etc.): 4  Moving from lying on back to sitting on the side of the bed?: 4  Moving to and from a bed to a chair (including a wheelchair)?: 3  Need to walk in hospital room?: 3  Climbing 3-5 steps with a railing?: 3  Basic Mobility Total Score: 21     Functional Mobility:    Bed Mobility:   Supine > Sit with independence  Sit > Supine with independence    Transfers:   Sit <> Stand Transfer: independence with no assistive device     Balance:  Sitting Balance  Static: independence  Dynamic: independence  Standing:  Static: independence  Dynamic: supervision      Gait:  Patient ambulated: 50' x2 trials with stair navigation between trials   Patient required: standy by assistance  Patient used:  no assistive device   Gait Deviation(s): steady gait, decreased step length, flexed posture, and decreased abigail  all lines remained intact throughout ambulation trial  Comments: Patient steady with no LOB. Pt with SpO2 97% after trial once returned to room.     Stairs:  Pt ascended/descended 1 flight(s) with No Assistive Device with right handrail with Contact Guard Assistance.   Pt with BUE on R HR during trial  Pt with increasing fatigue and difficulty ascending stairs as trial progressed but was able to complete entire flight. Pt with heavy reliance on UE support on handrails. Pt with SOB after trial and required standing rest break between ascent and descent.    Therapeutic Exercises:   Patient performed 2 set(s) of 10 repetitions of  the following: sit to stand transfers on bedside couch. Patient required skilled PT for instruction of exercises and appropriate cues to perform exercises safely and appropriately.    Education:  Time provided for education, counseling and discussion of health disposition in regards to patient's current status  All questions answered within PT scope of practice and to patient's  satisfaction  PT role in POC to address current functional deficits  Educated to not walk in hallway without assistance    Patient left HOB elevated with all lines intact and call button in reach.    Time Tracking:     PT Received On: 05/01/24  PT Start Time: 1542     PT Stop Time: 1600  PT Total Time (min): 18 min     Billable Minutes:   Gait Training 15 minutes    Treatment Type: Treatment  PT/PTA: PT       5/1/2024

## 2024-05-01 NOTE — SUBJECTIVE & OBJECTIVE
Interval History: No acute events. Will continue MWF HD; pending outpatient HD chair.    Review of patient's allergies indicates:  No Known Allergies  Current Facility-Administered Medications   Medication Dose Route Frequency Provider Last Rate Last Admin    acetaminophen tablet 650 mg  650 mg Oral Q6H PRN Ness Regan MD   650 mg at 04/30/24 0344    aspirin EC tablet 81 mg  81 mg Oral Daily David Borjas, PA-C   81 mg at 05/01/24 0813    atorvastatin tablet 40 mg  40 mg Oral Daily David Borjas PA-C   40 mg at 05/01/24 0813    bisacodyL EC tablet 5 mg  5 mg Oral Daily PRN Johnny Cisneros MD        carvediloL tablet 6.25 mg  6.25 mg Oral BID Ness Regan MD   6.25 mg at 05/01/24 0813    clindamycin phosphate 1% gel   Topical (Top) BID Domonique Walden MD   Given at 05/01/24 0815    dextrose 10% bolus 125 mL 125 mL  12.5 g Intravenous PRN David Borjas PA-DIMITRY        dextrose 10% bolus 250 mL 250 mL  25 g Intravenous PRN David Borjas PA-DIMITRY        diphenhydrAMINE-zinc acetate 2-0.1% cream   Topical (Top) TID PRN Domonique Walden MD   Given at 04/22/24 1556    [START ON 5/2/2024] furosemide tablet 80 mg  80 mg Oral BID Ness Regan MD        glucagon (human recombinant) injection 1 mg  1 mg Intramuscular PRN David Borjas PA-C        glucose chewable tablet 16 g  16 g Oral PRN David Borjas, PA-C        glucose chewable tablet 24 g  24 g Oral PRN David Borjas PA-DIMITRY        heparin (porcine) injection 1,000 Units  1,000 Units Intra-Catheter PRN Rosario Luciano MD   1,000 Units at 04/29/24 1824    heparin (porcine) injection 5,000 Units  5,000 Units Subcutaneous Q8H Rosario Luciano MD   5,000 Units at 05/01/24 0544    HYDROcodone-acetaminophen 5-325 mg per tablet 1 tablet  1 tablet Oral Q6H PRN Johnny Cisneros MD   1 tablet at 04/28/24 2136    hydrOXYzine HCL tablet 10 mg  10 mg Oral TID PRN Domonique Walden MD   10 mg at 04/22/24 2211     magnesium sulfate 2g in water 50mL IVPB (premix)  2 g Intravenous Once Ness Regan MD 25 mL/hr at 05/01/24 0902 2 g at 05/01/24 0902    melatonin tablet 6 mg  6 mg Oral Nightly PRN Pietro Herring MD   6 mg at 04/18/24 2047    miconazole 2 % cream   Topical (Top) BID Domonique Walden MD   Given at 05/01/24 0816    midodrine tablet 2.5 mg  2.5 mg Oral Q12H Ness Regan MD   2.5 mg at 04/29/24 2205    naloxone 0.4 mg/mL injection 0.02 mg  0.02 mg Intravenous PRN David Borjas PA-C        ondansetron disintegrating tablet 4 mg  4 mg Oral Q8H PRN David Borjas PA-DIMITRY        ondansetron injection 4 mg  4 mg Intravenous Q8H PRN David Borjas PA-C   4 mg at 04/13/24 1429    oxybutynin tablet 5 mg  5 mg Oral TID Minda Molina MD   5 mg at 05/01/24 0814    polyethylene glycol packet 17 g  17 g Oral BID Domonique Walden MD   17 g at 05/01/24 0813    potassium chloride CR capsule 30 mEq  30 mEq Oral BID Ness Regan MD   30 mEq at 05/01/24 0901    potassium, sodium phosphates 280-160-250 mg packet 2 packet  2 packet Oral QID (AC & HS) Ness Regan MD        senna-docusate 8.6-50 mg per tablet 1 tablet  1 tablet Oral BID Domonique Walden MD   1 tablet at 05/01/24 0813    sodium chloride 0.9% bolus 250 mL 250 mL  250 mL Intravenous PRN Italo Segovia MD        sodium chloride 0.9% flush 10 mL  10 mL Intravenous PRN Pietro Herring MD        sodium chloride 0.9% flush 10 mL  10 mL Intravenous PRN David Borjas PA-DIMITRY        sodium chloride 0.9% flush 10 mL  10 mL Intravenous PRN Carole Rolon MD        triamcinolone acetonide 0.1% cream   Topical (Top) BID Rosario Luciano MD   Given at 05/01/24 0818    white petrolatum 41 % ointment   Topical (Top) BID Johnny Cisneros MD   Given at 04/30/24 2041    zinc oxide 20 % ointment   Topical (Top) OSWALDON David Borjas PA-C   Given at 04/25/24 2209       Objective:     Vital Signs (Most Recent):  Temp: 98.4 °F (36.9 °C) (05/01/24  0737)  Pulse: (!) 59 (05/01/24 0945)  Resp: 18 (05/01/24 0737)  BP: 99/64 (05/01/24 0945)  SpO2: (!) 92 % (05/01/24 0737) Vital Signs (24h Range):  Temp:  [97.8 °F (36.6 °C)-98.4 °F (36.9 °C)] 98.4 °F (36.9 °C)  Pulse:  [59-68] 59  Resp:  [18-19] 18  SpO2:  [92 %-97 %] 92 %  BP: ()/(57-89) 99/64     Weight: 64.1 kg (141 lb 5 oz) (05/01/24 0437)  Body mass index is 27.74 kg/m².  Body surface area is 1.65 meters squared.    I/O last 3 completed shifts:  In: 539 [P.O.:539]  Out: 300 [Urine:300]     Physical Exam  Vitals and nursing note reviewed.   Constitutional:       General: She is not in acute distress.     Appearance: Normal appearance. She is not ill-appearing.   HENT:      Head: Normocephalic.   Eyes:      Extraocular Movements: Extraocular movements intact.   Cardiovascular:      Rate and Rhythm: Normal rate and regular rhythm.   Pulmonary:      Effort: Pulmonary effort is normal. No respiratory distress.      Breath sounds: No rales.   Musculoskeletal:      Right lower leg: No edema.      Left lower leg: No edema.   Skin:     General: Skin is warm and dry.   Neurological:      General: No focal deficit present.      Mental Status: She is alert and oriented to person, place, and time.       Significant Labs:  CMP:   Recent Labs   Lab 04/25/24  0419 04/26/24  0711 05/01/24  0235   GLU 83   < > 85   CALCIUM 7.8*   < > 8.1*   ALBUMIN 2.6*   < > 2.7*   PROT 6.1  --   --    *   < > 130*   K 3.7   < > 3.3*   CO2 22*   < > 23   CL 98   < > 99   BUN 13   < > 20   CREATININE 2.3*   < > 3.2*   ALKPHOS 106  --   --    ALT 19  --   --    AST 28  --   --    BILITOT 1.0  --   --     < > = values in this interval not displayed.     All labs within the past 24 hours have been reviewed.

## 2024-05-01 NOTE — PROGRESS NOTES
[facility] - Cardiology [facility] Medicine  Progress Note    Patient Name: Xena Vera  MRN: [id number]  Patient Class: IP- Inpatient   Admission Date: 4/1/2024  Length of Stay: 27 days  Attending Physician: Ness Regan MD  Primary Care Provider: Tami Villeda FNP        Subjective:     Principal Problem:Severe mitral valve regurgitation        HPI:  Per MICU: 52 yo F with PMH of HFpEF, severe MR, HTN, HLD, and CKD4 who [place] [facility] [place] a mechanical fall at home with concurrent SOB and swelling in her legs and abdomen. She reported adherence to home Lasix and low sodium diet, but had felt increasingly SOB prior to admission as she was sleeping upright and had multiple nighttime awakenings due to SOB. In the ED, BNP was 4639 and she was admitted to Hospital Medicine for further management of ADHF. She initially had good response to IV Lasix but hospital course was later complicated by urinary retention, worsening kidney function, and decreased UOP. Nephrology and Cardiology were consulted to assist. She was started on Diuril and Lasix gtt for diuresis and Isordil and hydralazine for afterload reduction. Patient transferred to CCU for further management.        Overview/Hospital Course:  MICU course  Patient initially had improved UOP with increasing Lasix, eventually maxed out on Lasix drip with addition of Diuril. Eventually started on CRRT/SLED for volume removal, initially needed intermittent levophed to tolerate. Has itchy rash that started during current hospitalization, Dermatology consulted for regimen. Graduated from CRRT to iHD, tolerating well. Stable to step back down to HM.      course  Patient slightly volume overloaded on exam. Reports making urine. Transition to lasix 40mg PO daily. More fluid removal via HD. Will need OP HD chair. SW consulted. Repeat TTE ordered for eval for MR. Discuss plans with IC once repeat TTE is obtained.  Will also need OP IC  follow up. Nephro rec consulting interventional nephro for TDC placement. She underwent TDC placement 04/22. Repeat TTE w/the following findings:      Left Ventricle: The left ventricle is moderately dilated. Normal wall thickness. There is eccentric hypertrophy. There is normal systolic function with a visually estimated ejection fraction of 55 - 60%. Diastolic function cannot be reliably determined in the presence of mitral valve disease.    Right Ventricle: Mild right ventricular enlargement. Wall thickness is normal. Right ventricle wall motion  is normal. Systolic function is mildly reduced.    Biatrial enlargement (L > R)    Mitral Valve: There is severe functional regurgitation with a centrally directed jet.    Tricuspid Valve: There is mild to moderate regurgitation.    Pulmonary Artery: The estimated pulmonary artery systolic pressure is 96 mmHg.    IVC/SVC: Intermediate venous pressure at 8 mmHg.    There is a small pericardial effusion and a right pleural effusion.    Interventional cardiology consulted- recommended further medical management optimization and continued fluid removal via iHD. Not a candidate for mitraclip at this time.       Interval History: NAEON. BP soft but improved (SBP ). While no recurrence of notable hypotension, pt had fall while working with therapies today. No dizziness, lightheadedness or other assoc sx. Pt has been endorsing LE weakness. As BP improved, will increase lasix dose back to 80mg BID on non-HD days. Medically ready for discharge. Dispo pending insurance enrollment & outpatient HD chair.     Review of Systems   Constitutional:  Negative for appetite change, chills, fatigue and fever.   HENT:  Negative for congestion and sore throat.    Respiratory:  Negative for cough and shortness of breath.    Cardiovascular:  Negative for chest pain and palpitations.   Gastrointestinal:  Negative for abdominal pain, diarrhea, nausea and vomiting.   Genitourinary:  Negative  for dysuria and hematuria.   Musculoskeletal:  Negative for arthralgias, joint swelling and neck pain.   Skin:  Positive for wound (leg(s)). Negative for pallor.   Neurological:  Negative for dizziness, light-headedness, numbness and headaches.   Psychiatric/Behavioral:  Negative for confusion and sleep disturbance.      Objective:     Vital Signs (Most Recent):  Temp: 97.8 °F (36.6 °C) (04/30/24 2019)  Pulse: 68 (04/30/24 2019)  Resp: 18 (04/30/24 2019)  BP: 125/75 (04/30/24 2019)  SpO2: 97 % (04/30/24 2019) Vital Signs (24h Range):  Temp:  [97.8 °F (36.6 °C)-98.7 °F (37.1 °C)] 97.8 °F (36.6 °C)  Pulse:  [59-68] 68  Resp:  [18-19] 18  SpO2:  [94 %-97 %] 97 %  BP: ()/(57-81) 125/75     Weight: 63.8 kg (140 lb 10.5 oz)  Body mass index is 27.61 kg/m².    Intake/Output Summary (Last 24 hours) at 4/30/2024 2224  Last data filed at 4/30/2024 2050  Gross per 24 hour   Intake 419 ml   Output 0 ml   Net 419 ml         Physical Exam  Constitutional:       General: She is not in acute distress.     Appearance: She is not ill-appearing or toxic-appearing.   HENT:      Head: Normocephalic and atraumatic.   Eyes:      Conjunctiva/sclera: Conjunctivae normal.   Cardiovascular:      Rate and Rhythm: Normal rate and regular rhythm.      Heart sounds: Normal heart sounds.   Pulmonary:      Effort: Pulmonary effort is normal. No respiratory distress.      Breath sounds: Normal breath sounds. No rales.   Abdominal:      General: Bowel sounds are normal.      Palpations: Abdomen is soft.      Tenderness: There is no abdominal tenderness.   Musculoskeletal:      Right lower leg: No edema.      Left lower leg: No edema.   Skin:     General: Skin is warm and dry.      Findings: Lesion (L lateral calf) present.   Neurological:      General: No focal deficit present.      Mental Status: She is alert and oriented to person, place, and time. Mental status is at baseline.   Psychiatric:         Mood and Affect: Mood normal.          Behavior: Behavior normal.           Significant Labs: All pertinent labs within the past 24 hours have been reviewed.    Significant Imaging: I have reviewed all pertinent imaging results/findings within the past 24 hours.    Assessment/Plan:      * Severe mitral valve regurgitation    Acute on chronic heart failure with preserved ejection fraction (HFpEF)   Anasarca      52 yo F admitted for ADHF 2/2 severe MR. The MR appears to be secondary to posterior leaflet restriction and leaflet length of 1.1 cm. BNP on admission was 4300 compared to 350 eight months prior. Initially diagnosed with severe MR in June of 2023. Saw Dr. Stone in January of 2024 and was undergoing work-up for possible MitraClip; LHC scheduled for 3/15 but not completed due to financial constraints, also needs POOJA. Will need optimization of kidney function prior to angiogram consideration, appreciate Nephrology assistance. APS serologies negative, Fabry workup negative     DDX: rheumatic heart disease vs Fabry vs antiphospholipid (serologies negative)     4/1/2024 TTE    Left Ventricle: The left ventricle is normal in size. Normal wall thickness. Normal wall motion. There is normal systolic function with a visually estimated ejection fraction of 60 - 65%. Grade II diastolic dysfunction.    Right Ventricle: Normal right ventricular cavity size. Wall thickness is normal. Right ventricle wall motion  is normal. Systolic function is reduced.    Left Atrium: Left atrium is very  severely dilated.    Right Atrium: Right atrium is mildly dilated.    Aortic Valve: There is mild aortic valve sclerosis. There is normal leaflet mobility. There is trace aortic regurgitation.    Mitral Valve: There is mild bileaflet sclerosis. There is posterior leaflet tethering and failure of complete coaptation. There is very severe regurgitation with a posterolateral eccentriccally directed jet.    Tricuspid Valve: The tricuspid valve is structurally normal. There is  "normal leaflet mobility. There is moderate to severe regurgitation.    IVC/SVC: IVC was not well visualized due to poor acoustic window. Intermediate venous pressure at 8 mmHg.    Pericardium: There is a trivial effusion posteriorly and small under the RA.     - Interventional Cardiology consulted - no intervention until patient's volume status is controlled  - Nephrology following, undergoing SLED/CRRT  - POOJA ordered - "Anesthesia evaluated the patient and feel that she needs better optimization prior to POOJA. They stated once she is euvolemic that we can move forward. Will need a new POOJA order at that time."  - Discontinued Diuril 500 mg IV q24h & Lasix gtt @ 40 mg/hr  - Discontinued hydralazine 25 mg po q8h & Isordil 20 mg po BID for afterload reduction due to pressor requirements  - Continue home ASA 81 mg po daily and Lipitor 40 mg po daily  - Genetics consulted, see "Hypertrophic cardiomyopathy"  - Strict I/O, Jin in place  - Transitioned to lasix 40mg PO daily. Reassess daily for diuretic use.   - Repeat TTE reviewed- IC consulted- not a candidate for mitraclip- recommended outpatient f/u- continued fluid removal/fluid status optimization via HD- up-titration of GDMT as tolerated  - Will also need OP IC follow up.   - Telemetry monitoring    Hyponatremia  Patient has hyponatremia which is uncontrolled,We will aim to correct the sodium by 4-6mEq in 24 hours. We will monitor sodium Daily. The hyponatremia is due to renal insufficiency. We will obtain the following studies: no new labs at this moment. We will treat the hyponatremia with Fluid restriction of:  1.5 liter per day and Hemodialysis. The patient's sodium results have been reviewed and are listed below.  Recent Labs   Lab 04/24/24  0349   *         Intertrigo  - improvement       Rash  - derm initially consulted- concern for resolving folliculitis- no significant improvement  - triamcinolone cream today-monitor for improvement- if no " "improvement, then will re-consult derm    Proteinuria  - h/o      Hypertrophic cardiomyopathy  Angiokeratoma   Proteinuria      - Genetics consulted, appreciate recs  - GLA genetic testing collected and sent to [facility]- neg for fabry's disease  - Consider cardiac MRI      Angiokeratoma        Prediabetes  - h/o    HLD (hyperlipidemia)  - continue statin    Acute kidney injury superimposed on chronic kidney disease  Patient with acute kidney injury/acute renal failure likely due to acute tubular necrosis caused by unknown.  REGINALDO is currently  improving on CRRT/SLED . Baseline creatinine  2.5  - Labs reviewed- Renal function/electrolytes with Estimated Creatinine Clearance: 80.9 mL/min (based on SCr of 0.7 mg/dL). according to latest data. Monitor urine output and serial BMP and adjust therapy as needed. Avoid nephrotoxins and renally dose meds for GFR listed above.           Recent Labs     04/17/24  1428 04/17/24  2203 04/18/24  0413   CREATININE 1.8*  1.8* 0.8  0.8 0.7  0.7  0.7         - Nephrology following, appreciate recs - started SLED on 4/14   - Genetics consulted, see "Hypertrophic cardiomyopathy"  - Holding home sevelamer   - Holding home sodium bicarb 650 mg po BID  - HD per nephrology. Will need OP HD chair setup.   - Nephro following- consulted interventional nephrology- TDC 04/22 - awaiting medicaid enrollment to set-up outpatient HD chair        Acute urinary retention  - now w/acute renal failure requiring iHD  - no catheter in place       Leg wound, left  - wound care following    Anasarca  - 2/2 acute renal failure in setting of CHF w/severe MR       Prolonged QT interval  - resolved    Acute on chronic heart failure with preserved ejection fraction (HFpEF)  - Interval history and physical exam findings as described above  - Most recent TTE results:  Results for orders placed during the hospital encounter of 04/01/24    Echo    Interpretation Summary    Left Ventricle: The left ventricle is " normal in size. Normal wall thickness. Normal wall motion. There is normal systolic function with a visually estimated ejection fraction of 60 - 65%. Grade II diastolic dysfunction.    Right Ventricle: Normal right ventricular cavity size. Wall thickness is normal. Right ventricle wall motion  is normal. Systolic function is reduced.    Left Atrium: Left atrium is very  severely dilated.    Right Atrium: Right atrium is mildly dilated.    Aortic Valve: There is mild aortic valve sclerosis. There is normal leaflet mobility. There is trace aortic regurgitation.    Mitral Valve: There is mild bileaflet sclerosis. There is posterior leaflet tethering and failure of complete coaptation. There is very severe regurgitation with a posterolateral eccentriccally directed jet.    Tricuspid Valve: The tricuspid valve is structurally normal. There is normal leaflet mobility. There is moderate to severe regurgitation.    IVC/SVC: IVC was not well visualized due to poor acoustic window. Intermediate venous pressure at 8 mmHg.    Pericardium: There is a trivial effusion posteriorly and small under the RA.    - Clinically volume overloaded on admission  - now w/iHD needs  - Will continue diuresis with lasix 80mg PO BID on non-HD days  - Continue home cardioprudent regimen  - Strict I/Os  - 1.5L fluid restriction   - Daily weights  - Will continue to monitor on tele    Anemia  Stable.   Recent Labs   Lab 04/22/24  0513 04/23/24  0416 04/24/24  0349   WBC 5.24 5.41 5.74   HGB 9.0* 9.4* 9.7*   HCT 31.8* 31.8* 34.1*    199 227         Class 2 obesity in adult  Body mass index is 28.31 kg/m². Morbid obesity complicates all aspects of disease management from diagnostic modalities to treatment. Weight loss encouraged and health benefits explained to patient.     Essential hypertension     Temp:  [98 °F (36.7 °C)-98.5 °F (36.9 °C)]   Pulse:  [55-62]   Resp:  [9-40]   BP: (94)/(53)   SpO2:  [94 %-100 %]   Arterial Line BP:  ()/(44-66) .     While in the hospital, will manage blood pressure as follows; Adjust home antihypertensive regimen as follows- holding Lasix and Coreg     Will utilize p.r.n. blood pressure medication only if patient's blood pressure greater than 180/110 and she develops symptoms such as worsening chest pain or shortness of breath.         VTE Risk Mitigation (From admission, onward)           Ordered     heparin (porcine) injection 5,000 Units  Every 8 hours         04/24/24 1546     heparin (porcine) injection 1,000 Units  As needed (PRN)         04/24/24 1038     heparin (porcine) injection 1,000 Units  As needed (PRN)         04/13/24 1428     IP VTE HIGH RISK PATIENT  Once         04/01/24 1030     Place sequential compression device  Until discontinued         04/01/24 1030                    Discharge Planning   RILEY: 5/1/2024     Code Status: Full Code   Is the patient medically ready for discharge?: No    Reason for patient still in hospital (select all that apply): Pending disposition  Discharge Plan A: Home with family   Discharge Delays: (!) Dialysis Set-up              Ness Regan MD  Department of Hospital Medicine   [facility] - Cardiology Stepdown

## 2024-05-01 NOTE — NURSING
Patient arrived in a w/c to dialysis unit. AAOx4  Report received from ANDREA Stanley RN.      Hemodialysis initiated using the following:  Dialysis Access: Right IJ Tunneled Catheter     UF goal 1 L as tolerated

## 2024-05-01 NOTE — ASSESSMENT & PLAN NOTE
- Interval history and physical exam findings as described above  - Most recent TTE results:  Results for orders placed during the hospital encounter of 04/01/24    Echo    Interpretation Summary    Left Ventricle: The left ventricle is normal in size. Normal wall thickness. Normal wall motion. There is normal systolic function with a visually estimated ejection fraction of 60 - 65%. Grade II diastolic dysfunction.    Right Ventricle: Normal right ventricular cavity size. Wall thickness is normal. Right ventricle wall motion  is normal. Systolic function is reduced.    Left Atrium: Left atrium is very  severely dilated.    Right Atrium: Right atrium is mildly dilated.    Aortic Valve: There is mild aortic valve sclerosis. There is normal leaflet mobility. There is trace aortic regurgitation.    Mitral Valve: There is mild bileaflet sclerosis. There is posterior leaflet tethering and failure of complete coaptation. There is very severe regurgitation with a posterolateral eccentriccally directed jet.    Tricuspid Valve: The tricuspid valve is structurally normal. There is normal leaflet mobility. There is moderate to severe regurgitation.    IVC/SVC: IVC was not well visualized due to poor acoustic window. Intermediate venous pressure at 8 mmHg.    Pericardium: There is a trivial effusion posteriorly and small under the RA.    - Clinically volume overloaded on admission  - now w/iHD needs  - Will continue diuresis with lasix 80mg PO BID on non-HD days  - Continue home cardioprudent regimen  - Strict I/Os  - 1.5L fluid restriction   - Daily weights  - Will continue to monitor on tele

## 2024-05-01 NOTE — ASSESSMENT & PLAN NOTE
CKD IV (baseline creatinine ~3.1-3.3) admitted with hypervolemia and REGINALDO with creatinine 4.2  UA showed +1 protein with UPCR of ~500 mg  Urinary sediment with non dysmorphic RBCs and WBCs present although Jin catheter placed the day prior to UA  Retroperitoneal US without evidence of hydronephrosis although changes consistent with chronic kidney disease  Diuresis with lasix gtt @ 40mg/hr + Diuril 500mg IV BID attempted, and while Crt improved she remains unable to tolerate lying flat.  Tolerated 8h SLED w goal UF rate 200-500/hr successfully overnight 4/14-15, then 12h SLED on 4/15, 16, and 17.  4/19 - 4/24: tolerated iHD; no issues  4/22: TDC placed  5/1: Tolerating iHD, last session 4/29; pending outpatient HD chair.    Plan/Recommendations:  Continue iHD for metabolic clearance and volume management. TDC placed 4/22. Suspect she may now be ESRD and require iHD moving forward but not yet declared ESRD  Will continue MWF iHD while inpatient  Hold Renvela given hypophosphatemia  Okay for discharge from nephrology standpoint once outpatient HD chair arranged; will need nephrology follow-up in clinic  Recommend continuing lasix 80 mg PO BID on Non-HD days  Free water restriction of 1.5L daily to help with hyponatremia  Strict I/O's and daily weights  Renally all dose medications to eGFR   Avoid nephrotoxic agents wean feasible (i.e. NSAIDs, intra-arterial contrast, supra-therapeutic vancomycin levels, etc.)

## 2024-05-01 NOTE — PROGRESS NOTES
[facility] - [facility] Step[facility]  Nephrology  Progress Note    Patient Name: Xena Vera  MRN: [id number]  Admission Date: 4/1/2024  Hospital Length of Stay: 28 days  Attending Provider: Ness Regan MD   Primary Care Physician: Tami Villeda FNP  Principal Problem:Severe mitral valve regurgitation    Subjective:     HPI: Ms Vera is an obese (BMI ~31) 53-year-old with CKD IV (baseline creatinine ~3.1-3.3), prediabetes with most recent hemoglobin A1c 6%, HFpEF (most recent TTE with EF 60-65% with G2DD), mitral valve regurgitation, anemia, hypertension, HLD as well as several over co-morbid conditions who was admitted on 4/1 with heart failure exacerbation and hypervolemia after presenting with to ED following a mechanical fall but also had complaints of progressive dyspnea/ZACARIAS, orthopnea, lower extremity edema and abdominal distension with constipation. On presentation patient was noted to be hypotensive with systolic BP readings as low as 90s mmHg and bradycardiac with HR as low as 50s bpm. There was concern for some edema on chest x-ray and BNP at that time was ~4.6K and metabolic panel was notable for serum sodium 133, bicarbonate 20, , creatinine 4.2, albumin 3.1 and total bilirubin 1.5. UA showed +1 protein. Her admission was complicated by failure to adequately diuresis with escalating IV Lasix and even oral metolazone for which Nephrology was consulted on 4/10 for assistance. She explicitly denies NSAID use as outpatient.    Interval History: No acute events. Will continue MWF HD; pending outpatient HD chair.    Review of patient's allergies indicates:  No Known Allergies  Current Facility-Administered Medications   Medication Dose Route Frequency Provider Last Rate Last Admin    acetaminophen tablet 650 mg  650 mg Oral Q6H PRN Ness Regan MD   650 mg at 04/30/24 0344    aspirin EC tablet 81 mg  81 mg Oral Daily David Borjas PA-C   81 mg at 05/01/24 0813    atorvastatin tablet  40 mg  40 mg Oral Daily David Borjas PA-C   40 mg at 05/01/24 0813    bisacodyL EC tablet 5 mg  5 mg Oral Daily PRN Johnny Cisneros MD        carvediloL tablet 6.25 mg  6.25 mg Oral BID Ness Regan MD   6.25 mg at 05/01/24 0813    clindamycin phosphate 1% gel   Topical (Top) BID Domonique Walden MD   Given at 05/01/24 0815    dextrose 10% bolus 125 mL 125 mL  12.5 g Intravenous PRN David Borjas PA-C        dextrose 10% bolus 250 mL 250 mL  25 g Intravenous PRN David Borjas PA-C        diphenhydrAMINE-zinc acetate 2-0.1% cream   Topical (Top) TID PRN Domonique Walden MD   Given at 04/22/24 1556    [START ON 5/2/2024] furosemide tablet 80 mg  80 mg Oral BID Ness Regan MD        glucagon (human recombinant) injection 1 mg  1 mg Intramuscular PRN David Borjas PA-C        glucose chewable tablet 16 g  16 g Oral PRN David Borjas PA-C        glucose chewable tablet 24 g  24 g Oral PRN David Borjas PA-C        heparin (porcine) injection 1,000 Units  1,000 Units Intra-Catheter PRN Rosario Luciano MD   1,000 Units at 04/29/24 1824    heparin (porcine) injection 5,000 Units  5,000 Units Subcutaneous Q8H Rosario Luciano MD   5,000 Units at 05/01/24 0544    HYDROcodone-acetaminophen 5-325 mg per tablet 1 tablet  1 tablet Oral Q6H PRN Johnny Cisneros MD   1 tablet at 04/28/24 2136    hydrOXYzine HCL tablet 10 mg  10 mg Oral TID PRN Domonique Walden MD   10 mg at 04/22/24 2211    magnesium sulfate 2g in water 50mL IVPB (premix)  2 g Intravenous Once Ness Regan MD 25 mL/hr at 05/01/24 0902 2 g at 05/01/24 0902    melatonin tablet 6 mg  6 mg Oral Nightly PRN Pietro Herring MD   6 mg at 04/18/24 2047    miconazole 2 % cream   Topical (Top) BID Domonique Walden MD   Given at 05/01/24 0816    midodrine tablet 2.5 mg  2.5 mg Oral Q12H Ness Regan MD   2.5 mg at 04/29/24 2205    naloxone 0.4 mg/mL injection 0.02 mg  0.02 mg Intravenous PRN [place]  David JANE PA-C        ondansetron disintegrating tablet 4 mg  4 mg Oral Q8H PRN David Borjas PA-C        ondansetron injection 4 mg  4 mg Intravenous Q8H PRN David Borjas PA-C   4 mg at 04/13/24 1429    oxybutynin tablet 5 mg  5 mg Oral TID Minda Molina MD   5 mg at 05/01/24 0814    polyethylene glycol packet 17 g  17 g Oral BID Domonique Walden MD   17 g at 05/01/24 0813    potassium chloride CR capsule 30 mEq  30 mEq Oral BID Ness Regan MD   30 mEq at 05/01/24 0901    potassium, sodium phosphates 280-160-250 mg packet 2 packet  2 packet Oral QID (AC & HS) Ness Regan MD        senna-docusate 8.6-50 mg per tablet 1 tablet  1 tablet Oral BID Domonique Walden MD   1 tablet at 05/01/24 0813    sodium chloride 0.9% bolus 250 mL 250 mL  250 mL Intravenous PRN Italo Segovia MD        sodium chloride 0.9% flush 10 mL  10 mL Intravenous PRN Pietro Herring MD        sodium chloride 0.9% flush 10 mL  10 mL Intravenous PRN David Borjas PA-C        sodium chloride 0.9% flush 10 mL  10 mL Intravenous PRN Carole Rolon MD        triamcinolone acetonide 0.1% cream   Topical (Top) BID Rosario Luciano MD   Given at 05/01/24 0818    white petrolatum 41 % ointment   Topical (Top) BID Johnny Cisneros MD   Given at 04/30/24 2041    zinc oxide 20 % ointment   Topical (Top) PRN David Borjas PA-C   Given at 04/25/24 2209       Objective:     Vital Signs (Most Recent):  Temp: 98.4 °F (36.9 °C) (05/01/24 0737)  Pulse: (!) 59 (05/01/24 0945)  Resp: 18 (05/01/24 0737)  BP: 99/64 (05/01/24 0945)  SpO2: (!) 92 % (05/01/24 0737) Vital Signs (24h Range):  Temp:  [97.8 °F (36.6 °C)-98.4 °F (36.9 °C)] 98.4 °F (36.9 °C)  Pulse:  [59-68] 59  Resp:  [18-19] 18  SpO2:  [92 %-97 %] 92 %  BP: ()/(57-89) 99/64     Weight: 64.1 kg (141 lb 5 oz) (05/01/24 7417)  Body mass index is 27.74 kg/m².  Body surface area is 1.65 meters squared.    I/O last 3 completed shifts:  In: 539  [P.O.:539]  Out: 300 [Urine:300]     Physical Exam  Vitals and nursing note reviewed.   Constitutional:       General: She is not in acute distress.     Appearance: Normal appearance. She is not ill-appearing.   HENT:      Head: Normocephalic.   Eyes:      Extraocular Movements: Extraocular movements intact.   Cardiovascular:      Rate and Rhythm: Normal rate and regular rhythm.   Pulmonary:      Effort: Pulmonary effort is normal. No respiratory distress.      Breath sounds: No rales.   Musculoskeletal:      Right lower leg: No edema.      Left lower leg: No edema.   Skin:     General: Skin is warm and dry.   Neurological:      General: No focal deficit present.      Mental Status: She is alert and oriented to person, place, and time.       Significant Labs:  CMP:   Recent Labs   Lab 04/25/24  0419 04/26/24  0711 05/01/24  0235   GLU 83   < > 85   CALCIUM 7.8*   < > 8.1*   ALBUMIN 2.6*   < > 2.7*   PROT 6.1  --   --    *   < > 130*   K 3.7   < > 3.3*   CO2 22*   < > 23   CL 98   < > 99   BUN 13   < > 20   CREATININE 2.3*   < > 3.2*   ALKPHOS 106  --   --    ALT 19  --   --    AST 28  --   --    BILITOT 1.0  --   --     < > = values in this interval not displayed.     All labs within the past 24 hours have been reviewed.     Assessment/Plan:     Renal/  Acute kidney injury superimposed on chronic kidney disease  CKD IV (baseline creatinine ~3.1-3.3) admitted with hypervolemia and REGINALDO with creatinine 4.2  UA showed +1 protein with UPCR of ~500 mg  Urinary sediment with non dysmorphic RBCs and WBCs present although Jin catheter placed the day prior to UA  Retroperitoneal US without evidence of hydronephrosis although changes consistent with chronic kidney disease  Diuresis with lasix gtt @ 40mg/hr + Diuril 500mg IV BID attempted, and while Crt improved she remains unable to tolerate lying flat.  Tolerated 8h SLED w goal UF rate 200-500/hr successfully overnight 4/14-15, then 12h SLED on 4/15, 16, and  17.  4/19 - 4/24: tolerated iHD; no issues  4/22: TDC placed  5/1: Tolerating iHD, last session 4/29; pending outpatient HD chair.    Plan/Recommendations:  Continue iHD for metabolic clearance and volume management. TDC placed 4/22. Suspect she may now be ESRD and require iHD moving forward but not yet declared ESRD  Will continue MWF iHD while inpatient  Hold Renvela given hypophosphatemia  Okay for discharge from nephrology standpoint once outpatient HD chair arranged; will need nephrology follow-up in clinic  Recommend continuing lasix 80 mg PO BID on Non-HD days  Free water restriction of 1.5L daily to help with hyponatremia  Strict I/O's and daily weights  Renally all dose medications to eGFR   Avoid nephrotoxic agents wean feasible (i.e. NSAIDs, intra-arterial contrast, supra-therapeutic vancomycin levels, etc.)        Thank you for your consult. I will follow-up with patient. Please contact us if you have any additional questions.    Javier Morris MD  Nephrology  [facility] - [facility] Step[facility]

## 2024-05-01 NOTE — PLAN OF CARE
Plan of care discussed with patient. Patient is free of fall/trauma/injury. Denies CP, SOB, or pain/discomfort. KPhos and Mg given this shift.  HD completed this shift with 1L removed.  All questions addressed. Will continue to monitor    Problem: Adult Inpatient Plan of Care  Goal: Plan of Care Review  Outcome: Progressing  Goal: Patient-Specific Goal (Individualized)  Outcome: Progressing  Goal: Absence of Hospital-Acquired Illness or Injury  Outcome: Progressing  Goal: Optimal Comfort and Wellbeing  Outcome: Progressing  Goal: Readiness for Transition of Care  Outcome: Progressing     Problem: Infection  Goal: Absence of Infection Signs and Symptoms  Outcome: Progressing  Intervention: Prevent or Manage Infection  Flowsheets (Taken 5/1/2024 1352)  Infection Management: aseptic technique maintained     Problem: Fluid and Electrolyte Imbalance (Acute Kidney Injury/Impairment)  Goal: Fluid and Electrolyte Balance  Outcome: Progressing  Intervention: Monitor and Manage Fluid and Electrolyte Balance  Flowsheets (Taken 5/1/2024 1352)  Fluid/Electrolyte Management: electrolyte supplement initiated     Problem: Device-Related Complication Risk (Hemodialysis)  Goal: Safe, Effective Therapy Delivery  Outcome: Progressing  Intervention: Optimize Device Care and Function  Flowsheets (Taken 5/1/2024 1352)  Medication Review/Management:   medications reviewed   high-risk medications identified     Problem: Fall Injury Risk  Goal: Absence of Fall and Fall-Related Injury  Outcome: Progressing  Intervention: Identify and Manage Contributors  Flowsheets (Taken 5/1/2024 1352)  Self-Care Promotion: independence encouraged  Medication Review/Management:   medications reviewed   high-risk medications identified

## 2024-05-01 NOTE — PLAN OF CARE
VSS on RA.  utilized in care, all questions answered. Fall precautions in place, bed alarm on. Pt refused cream/ointment to the back. Midodrine held per order parameter     Problem: Adult Inpatient Plan of Care  Goal: Absence of Hospital-Acquired Illness or Injury  Outcome: Progressing     Problem: Impaired Wound Healing  Goal: Optimal Wound Healing  Outcome: Progressing     Problem: Fluid and Electrolyte Imbalance (Acute Kidney Injury/Impairment)  Goal: Fluid and Electrolyte Balance  Outcome: Progressing

## 2024-05-02 PROCEDURE — 25000003 PHARM REV CODE 250

## 2024-05-02 PROCEDURE — 25000003 PHARM REV CODE 250: Performed by: STUDENT IN AN ORGANIZED HEALTH CARE EDUCATION/TRAINING PROGRAM

## 2024-05-02 PROCEDURE — 63600175 PHARM REV CODE 636 W HCPCS: Performed by: STUDENT IN AN ORGANIZED HEALTH CARE EDUCATION/TRAINING PROGRAM

## 2024-05-02 PROCEDURE — 20600001 HC STEP DOWN PRIVATE ROOM

## 2024-05-02 PROCEDURE — 25000003 PHARM REV CODE 250: Performed by: PHYSICIAN ASSISTANT

## 2024-05-02 PROCEDURE — 97530 THERAPEUTIC ACTIVITIES: CPT

## 2024-05-02 RX ADMIN — CLINDAMYCIN PHOSPHATE: 10 GEL TOPICAL at 10:05

## 2024-05-02 RX ADMIN — CARVEDILOL 6.25 MG: 6.25 TABLET, FILM COATED ORAL at 08:05

## 2024-05-02 RX ADMIN — FUROSEMIDE 80 MG: 80 TABLET ORAL at 08:05

## 2024-05-02 RX ADMIN — SENNOSIDES AND DOCUSATE SODIUM 1 TABLET: 8.6; 5 TABLET ORAL at 08:05

## 2024-05-02 RX ADMIN — Medication: at 08:05

## 2024-05-02 RX ADMIN — OXYBUTYNIN CHLORIDE 5 MG: 5 TABLET ORAL at 09:05

## 2024-05-02 RX ADMIN — ATORVASTATIN CALCIUM 40 MG: 40 TABLET, FILM COATED ORAL at 08:05

## 2024-05-02 RX ADMIN — CARVEDILOL 6.25 MG: 6.25 TABLET, FILM COATED ORAL at 09:05

## 2024-05-02 RX ADMIN — FUROSEMIDE 80 MG: 80 TABLET ORAL at 02:05

## 2024-05-02 RX ADMIN — POLYETHYLENE GLYCOL 3350 17 G: 17 POWDER, FOR SOLUTION ORAL at 08:05

## 2024-05-02 RX ADMIN — TRIAMCINOLONE ACETONIDE: 1 CREAM TOPICAL at 08:05

## 2024-05-02 RX ADMIN — ASPIRIN 81 MG: 81 TABLET, COATED ORAL at 08:05

## 2024-05-02 RX ADMIN — HEPARIN SODIUM 5000 UNITS: 5000 INJECTION INTRAVENOUS; SUBCUTANEOUS at 02:05

## 2024-05-02 RX ADMIN — CLINDAMYCIN PHOSPHATE: 10 GEL TOPICAL at 08:05

## 2024-05-02 RX ADMIN — TRIAMCINOLONE ACETONIDE: 1 CREAM TOPICAL at 10:05

## 2024-05-02 RX ADMIN — Medication: at 10:05

## 2024-05-02 RX ADMIN — HYDROXYZINE HYDROCHLORIDE 10 MG: 10 TABLET ORAL at 09:05

## 2024-05-02 RX ADMIN — MICONAZOLE NITRATE: 20 CREAM TOPICAL at 08:05

## 2024-05-02 RX ADMIN — HEPARIN SODIUM 5000 UNITS: 5000 INJECTION INTRAVENOUS; SUBCUTANEOUS at 05:05

## 2024-05-02 RX ADMIN — OXYBUTYNIN CHLORIDE 5 MG: 5 TABLET ORAL at 02:05

## 2024-05-02 RX ADMIN — HEPARIN SODIUM 5000 UNITS: 5000 INJECTION INTRAVENOUS; SUBCUTANEOUS at 10:05

## 2024-05-02 RX ADMIN — OXYBUTYNIN CHLORIDE 5 MG: 5 TABLET ORAL at 08:05

## 2024-05-02 RX ADMIN — MICONAZOLE NITRATE: 20 CREAM TOPICAL at 10:05

## 2024-05-02 NOTE — SUBJECTIVE & OBJECTIVE
Interval History: NAEON. BP improved (-137). Scheduled AM midodrine held this AM (as BP > 110), but developed hypotension during HD (SBP down to high 80s). No dizziness, lightheadedness or other assoc sx. PRN midodrine added for HD sessions. Medically ready for discharge. Dispo pending insurance enrollment & outpatient HD chair.     Review of Systems   Constitutional:  Negative for appetite change, chills, fatigue and fever.   HENT:  Negative for congestion and sore throat.    Respiratory:  Negative for cough and shortness of breath.    Cardiovascular:  Negative for chest pain and palpitations.   Gastrointestinal:  Negative for abdominal pain, diarrhea, nausea and vomiting.   Genitourinary:  Negative for dysuria and hematuria.   Musculoskeletal:  Negative for arthralgias, joint swelling and neck pain.   Skin:  Positive for wound (leg(s)). Negative for pallor.   Neurological:  Positive for weakness (BLE). Negative for dizziness, light-headedness, numbness and headaches.   Psychiatric/Behavioral:  Negative for confusion and sleep disturbance.      Objective:     Vital Signs (Most Recent):  Temp: 98.1 °F (36.7 °C) (05/01/24 2329)  Pulse: 62 (05/01/24 2329)  Resp: 16 (05/01/24 2329)  BP: (!) 132/51 (05/01/24 2329)  SpO2: 96 % (05/01/24 2329) Vital Signs (24h Range):  Temp:  [97.9 °F (36.6 °C)-98.8 °F (37.1 °C)] 98.1 °F (36.7 °C)  Pulse:  [58-63] 62  Resp:  [16-18] 16  SpO2:  [92 %-97 %] 96 %  BP: ()/(51-89) 132/51     Weight: 64.1 kg (141 lb 5 oz)  Body mass index is 27.74 kg/m².    Intake/Output Summary (Last 24 hours) at 5/1/2024 6685  Last data filed at 5/1/2024 2210  Gross per 24 hour   Intake 1182 ml   Output 1800 ml   Net -618 ml         Physical Exam  Constitutional:       General: She is not in acute distress.     Appearance: She is not ill-appearing or toxic-appearing.   HENT:      Head: Normocephalic and atraumatic.   Eyes:      Conjunctiva/sclera: Conjunctivae normal.   Cardiovascular:      Rate  and Rhythm: Normal rate and regular rhythm.      Heart sounds: Normal heart sounds.   Pulmonary:      Effort: Pulmonary effort is normal. No respiratory distress.      Breath sounds: Normal breath sounds. No rales.   Abdominal:      General: Bowel sounds are normal.      Palpations: Abdomen is soft.      Tenderness: There is no abdominal tenderness.   Musculoskeletal:      Right lower leg: No edema.      Left lower leg: No edema.   Skin:     General: Skin is warm and dry.      Findings: Lesion (L lateral calf) present.   Neurological:      General: No focal deficit present.      Mental Status: She is alert and oriented to person, place, and time. Mental status is at baseline.   Psychiatric:         Mood and Affect: Mood normal.         Behavior: Behavior normal.           Significant Labs: All pertinent labs within the past 24 hours have been reviewed.    Significant Imaging: I have reviewed all pertinent imaging results/findings within the past 24 hours.

## 2024-05-02 NOTE — PROGRESS NOTES
"[facility] - Cardiology Stepdown  Adult Nutrition  Progress Note    SUMMARY       Recommendations    1. Continue Renal diet, fluid per MD. If PO intake declines <50% at meal recommend liberalizing to Regular diet.   2. Recommend ONS Boost Plus (flavor per patient) BID for additional calories/PRO.   3. Monitor I/O's, weight and labs.   4. Consider an appetite stimulant, if PO intake does not improve.   5. RD following.    Goals: Meet % EEN, EPN by RD f/u date  Nutrition Goal Status: progressing towards goal  Communication of RD Recs: other (comment)    Assessment and Plan    Nutrition Problem  Inadequate oral intake    Related to (etiology):   Decreased appetite    Signs and Symptoms (as evidenced by):   Pt consuming 50-75% at meals     Interventions/Recommendations (treatment strategy):  Collaboration of nutrition care with other providers  ONS    Nutrition Diagnosis Status:   Improving    Reason for Assessment    Reason For Assessment: RD follow-up  Diagnosis: other (see comments) (HF)  Relevant Medical History: CKD  Interdisciplinary Rounds: did not attend  General Information Comments: RD follow-up. Pt tolerating diet. Consuming %. Doing well. No c/o N/V/C/D; denies difficulty chewing/swallowing.  Nutrition Discharge Planning: Adequate PO intake    Nutrition Risk Screen    Nutrition Risk Screen: no indicators present    Nutrition/Diet History    Patient Reported Diet/Restrictions/Preferences: low salt  Spiritual, Cultural Beliefs, Synagogue Practices, Values that Affect Care: no  Factors Affecting Nutritional Intake: None identified at this time    Anthropometrics    Temp: 98.5 °F (36.9 °C)  Height Method: Stated  Height: 4' 11.84" (152 cm)  Height (inches): 59.84 in  Weight Method: Standard Scale  Weight: 64.1 kg (141 lb 5 oz)  Weight (lb): 141.32 lb  Ideal Body Weight (IBW), Female: 99.2 lb  % Ideal Body Weight, Female (lb): 144.46 %  BMI (Calculated): 27.7  BMI Grade: 30 - 34.9- obesity - grade I   "     Lab/Procedures/Meds    Pertinent Labs Reviewed: reviewed  Pertinent Labs Comments: H/H: 8.1/27.7, MCH 24.5, MCHC 29.2, Na 130, K 3.3, Cr 3.2, GFR 16.6, Ca 8.1, P 2.5  Pertinent Medications Reviewed: reviewed  Pertinent Medications Comments: carvedilol, lasix, heparin, atorvastatin      Estimated/Assessed Needs    Weight Used For Calorie Calculations: 73 kg (160 lb 15 oz)  Energy Calorie Requirements (kcal): 1502 kcal/d  Energy Need Method: Desmet-St Jeor (1.2 PAL)  Protein Requirements: 73 g/d (1 g/kg)  Weight Used For Protein Calculations: 73 kg (160 lb 15 oz)     Estimated Fluid Requirement Method: other (see comments) (Per MD)  RDA Method (mL): 1502         Nutrition Prescription Ordered    Current Diet Order: Renal  Nutrition Order Comments: 1500 mL FR    Evaluation of Received Nutrient/Fluid Intake    I/O: -838mL  Energy Calories Required: meeting needs  Protein Required: meeting needs  Comments: LBM: 5/1  Tolerance: tolerating  % Intake of Estimated Energy Needs: 75 - 100 %  % Meal Intake: 75 - 100 %    Nutrition Risk    Level of Risk/Frequency of Follow-up:  (1x/week)     Monitor and Evaluation    Food and Nutrient Intake: food and beverage intake, energy intake  Food and Nutrient Adminstration: diet order  Physical Activity and Function: nutrition-related ADLs and IADLs  Anthropometric Measurements: weight, weight change  Biochemical Data, Medical Tests and Procedures: inflammatory profile, lipid profile, glucose/endocrine profile, gastrointestinal profile, electrolyte and renal panel  Nutrition-Focused Physical Findings: overall appearance     Nutrition Follow-Up    RD Follow-up?: Yes    Dayan Cowan MS, RD, LDN

## 2024-05-02 NOTE — PT/OT/SLP PROGRESS
Occupational Therapy   Treatment    Name: Xena Vera  MRN: [id number]  Admitting Diagnosis:  Severe mitral valve regurgitation  10 Days Post-Op    Recommendations:     Discharge Recommendations: Low Intensity Therapy  Discharge Equipment Recommendations:  bedside commode, walker, rolling  Barriers to discharge:  None    Assessment:     Xena Vera is a 53 y.o. female with a medical diagnosis of Severe mitral valve regurgitation.  She presents with the following performance deficits affecting function are impaired self care skills, impaired functional mobility, gait instability, impaired balance, decreased coordination, decreased safety awareness, impaired cardiopulmonary response to activity. Pt met ambulating from bathroom and agreeable to therapy. Pt was able to engage in standing ADLs and functional mobility of household/community distances in hallway with CGA-SBA. Mild unsteadiness initially noted with gait however no LOB or SOB noted. Pt would continue to benefit from skilled OT services to maximize functional independence with ADLs and functional mobility, reduce caregiver burden, and facilitate safe discharge in the least restrictive environment.  Patient continues to demonstrate the need for low intensity therapy on a scheduled basis exhibited by decreased independence with self-care and functional mobility     Rehab Prognosis:  Good; patient would benefit from acute skilled OT services to address these deficits and reach maximum level of function.       Plan:     Patient to be seen 2 x/week to address the above listed problems via self-care/home management, therapeutic activities, therapeutic exercises  Plan of Care Expires: 05/18/24  Plan of Care Reviewed with: patient, family    Subjective     Chief Complaint: none stated  Patient/Family Comments/goals: to progress toward goals  Pain/Comfort:  Pain Rating 1: 0/10  Pain Rating Post-Intervention 1: 0/10    Objective:   Additional staff present:   LIZ Quintero    Communicated with: RN prior to session.  Patient found ambulatory in room/cesar with Other (comments) (no active lines) upon OT entry to room.    General Precautions: Standard, fall    Orthopedic Precautions:N/A  Braces: N/A  Respiratory Status: Room air     Occupational Performance:     Bed Mobility:    Not assessed- pt met ambulatory in bathroom/left seated on bedroom couch    Functional Mobility/Transfers:  Patient completed Sit <> Stand Transfer with independence  with  no assistive device   Functional Mobility: Pt engaged in functional mobility to simulate household/community distances 200 ft with CGA-SBA and no AD within order to maximize functional endurance and standing balance required for engagement in occupations of choice   No overt LOB, mild unsteadiness noted initially  No SOB    Activities of Daily Living:  Upper Body Dressing: set up (A) to don hospital gown over (B) UE and back in standing      [facility] 6 Click ADL: 22    Treatment & Education:  -Education on energy conservation and task modification to maximize safety and (I) during ADLs and mobility  -Education on importance of OOB activity to improve overall activity tolerance and promote recovery  -Pt educated to call for assistance and to transfer with hospital staff only  -Provided education regarding role of OT, POC, & discharge recommendations with pt and family verbalizing understanding.  Pt had no further questions & when asked whether there were any concerns pt reported none.     Patient left  sitting on bedroom couch  with call button in reach, RN notified, and family present    GOALS:   Multidisciplinary Problems       Occupational Therapy Goals          Problem: Occupational Therapy    Goal Priority Disciplines Outcome Interventions   Occupational Therapy Goal     OT, PT/OT Progressing    Description: Goals to be met by: 5/18/24     Patient will increase functional independence with ADLs by performing:    UE Dressing with  Modified [place].  LE Dressing with Modified [place].  Grooming while standing at sink with Modified [place].  Toileting from toilet/bedside commode with Modified [place] for hygiene and clothing management.   Toilet transfer to toilet/bedside commode with Modified [place].                           Time Tracking:     OT Date of Treatment: 05/02/24  OT Start Time: 1324  OT Stop Time: 1332  OT Total Time (min): 8 min    Billable Minutes:Therapeutic Activity 8    OT/RA: OT     Number of RA visits since last OT visit: 1 5/2/2024

## 2024-05-02 NOTE — ASSESSMENT & PLAN NOTE
"Patient with acute kidney injury/acute renal failure likely due to acute tubular necrosis caused by unknown.  REGINALDO is currently  improving on CRRT/SLED . Baseline creatinine  2.5  - Labs reviewed- Renal function/electrolytes with Estimated Creatinine Clearance: 80.9 mL/min (based on SCr of 0.7 mg/dL). according to latest data. Monitor urine output and serial BMP and adjust therapy as needed. Avoid nephrotoxins and renally dose meds for GFR listed above.           Recent Labs     04/17/24  1428 04/17/24  2203 04/18/24  0413   CREATININE 1.8*  1.8* 0.8  0.8 0.7  0.7  0.7         - Nephrology following, appreciate recs - started SLED on 4/14   - Genetics consulted, see "Hypertrophic cardiomyopathy"  - Holding home sevelamer   - Holding home sodium bicarb 650 mg po BID  - HD per nephrology. Will need OP HD chair setup.   - Nephro following- consulted interventional nephrology- TDC 04/22   - Midodrine 2.5mg BID (w/ holding parameters) + 5mg PRN w/ HD   - awaiting medicaid enrollment to set-up outpatient HD chair      "

## 2024-05-02 NOTE — PLAN OF CARE
Assessment completed, see flowsheets.  Currently denies pain, nausea, and/or SOB.  Rash to entire back noted.  C/o intermittent itching but denies need for additional medication/cream at this time.  Reviewed plan of care for this shift.  Pt is agreeable with no current questions or concerns voiced.  Call light within reach, will continue to monitor.     Problem: Adult Inpatient Plan of Care  Goal: Optimal Comfort and Wellbeing  Outcome: Progressing  Intervention: Monitor Pain and Promote Comfort  Flowsheets (Taken 5/1/2024 1956)  Pain Management Interventions:   care clustered   quiet environment facilitated   relaxation techniques promoted   pain management plan reviewed with patient/caregiver     Problem: Fluid and Electrolyte Imbalance (Acute Kidney Injury/Impairment)  Goal: Fluid and Electrolyte Balance  Outcome: Progressing  Intervention: Monitor and Manage Fluid and Electrolyte Balance  Flowsheets (Taken 5/1/2024 1956)  Fluid/Electrolyte Management: (1.5L/day fluid restriction. strict intake/output and daily weights monitored)   fluids restricted   other (see comments)     Problem: Oral Intake Inadequate (Acute Kidney Injury/Impairment)  Goal: Optimal Nutrition Intake  Intervention: Promote and Optimize Nutrition  Flowsheets (Taken 5/1/2024 1956)  Nutrition Interventions: food preferences provided

## 2024-05-02 NOTE — PROGRESS NOTES
[facility] - Cardiology [facility] Medicine  Progress Note    Patient Name: Xena Vera  MRN: [id number]  Patient Class: IP- Inpatient   Admission Date: 4/1/2024  Length of Stay: 28 days  Attending Physician: Ness Regan MD  Primary Care Provider: Tami Villeda FNP    Subjective:     Principal Problem: Severe mitral valve regurgitation    HPI:  Per MICU: 54 yo F with PMH of HFpEF, severe MR, HTN, HLD, and CKD4 who [place] [facility] [place] after a mechanical fall at home with concurrent SOB and swelling in her legs and abdomen. She reported adherence to home Lasix and low sodium diet, but had felt increasingly SOB prior to admission as she was sleeping upright and had multiple nighttime awakenings due to SOB. In the ED, BNP was 4639 and she was admitted to Hospital Medicine for further management of ADHF. She initially had good response to IV Lasix but hospital course was later complicated by urinary retention, worsening kidney function, and decreased UOP. Nephrology and Cardiology were consulted to assist. She was started on Diuril and Lasix gtt for diuresis and Isordil and hydralazine for afterload reduction. Patient transferred to CCU for further management.        Overview/Hospital Course:  MICU course  Patient initially had improved UOP with increasing Lasix, eventually maxed out on Lasix drip with addition of Diuril. Eventually started on CRRT/SLED for volume removal, initially needed intermittent levophed to tolerate. Has itchy rash that started during current hospitalization, Dermatology consulted for regimen. Graduated from CRRT to iHD, tolerating well. Stable to step back down to HM.      course  Patient slightly volume overloaded on exam. Reports making urine. Transition to lasix 40mg PO daily. More fluid removal via HD. Will need OP HD chair. SW consulted. Repeat TTE ordered for eval for MR. Discuss plans with IC once repeat TTE is obtained.  Will also need OP IC follow  up. Nephro rec consulting interventional nephro for TDC placement. She underwent TDC placement 04/22. Repeat TTE w/the following findings:      Left Ventricle: The left ventricle is moderately dilated. Normal wall thickness. There is eccentric hypertrophy. There is normal systolic function with a visually estimated ejection fraction of 55 - 60%. Diastolic function cannot be reliably determined in the presence of mitral valve disease.    Right Ventricle: Mild right ventricular enlargement. Wall thickness is normal. Right ventricle wall motion  is normal. Systolic function is mildly reduced.    Biatrial enlargement (L > R)    Mitral Valve: There is severe functional regurgitation with a centrally directed jet.    Tricuspid Valve: There is mild to moderate regurgitation.    Pulmonary Artery: The estimated pulmonary artery systolic pressure is 96 mmHg.    IVC/SVC: Intermediate venous pressure at 8 mmHg.    There is a small pericardial effusion and a right pleural effusion.    Interventional cardiology consulted- recommended further medical management optimization and continued fluid removal via iHD. Not a candidate for mitraclip at this time. Intermittent asymptomatic hypotension for which diuretic regimen titrated w/ initiation of midodrine.       Interval History: NAEON. BP improved (-137). Scheduled AM midodrine held this AM (as BP > 110), but developed hypotension during HD (SBP down to high 80s). No dizziness, lightheadedness or other assoc sx. PRN midodrine added for HD sessions. Medically ready for discharge. Dispo pending insurance enrollment & outpatient HD chair.     Review of Systems   Constitutional:  Negative for appetite change, chills, fatigue and fever.   HENT:  Negative for congestion and sore throat.    Respiratory:  Negative for cough and shortness of breath.    Cardiovascular:  Negative for chest pain and palpitations.   Gastrointestinal:  Negative for abdominal pain, diarrhea, nausea and  vomiting.   Genitourinary:  Negative for dysuria and hematuria.   Musculoskeletal:  Negative for arthralgias, joint swelling and neck pain.   Skin:  Positive for wound (leg(s)). Negative for pallor.   Neurological:  Positive for weakness (BLE). Negative for dizziness, light-headedness, numbness and headaches.   Psychiatric/Behavioral:  Negative for confusion and sleep disturbance.      Objective:     Vital Signs (Most Recent):  Temp: 98.1 °F (36.7 °C) (05/01/24 2329)  Pulse: 62 (05/01/24 2329)  Resp: 16 (05/01/24 2329)  BP: (!) 132/51 (05/01/24 2329)  SpO2: 96 % (05/01/24 2329) Vital Signs (24h Range):  Temp:  [97.9 °F (36.6 °C)-98.8 °F (37.1 °C)] 98.1 °F (36.7 °C)  Pulse:  [58-63] 62  Resp:  [16-18] 16  SpO2:  [92 %-97 %] 96 %  BP: ()/(51-89) 132/51     Weight: 64.1 kg (141 lb 5 oz)  Body mass index is 27.74 kg/m².    Intake/Output Summary (Last 24 hours) at 5/1/2024 2345  Last data filed at 5/1/2024 2210  Gross per 24 hour   Intake 1182 ml   Output 1800 ml   Net -618 ml         Physical Exam  Constitutional:       General: She is not in acute distress.     Appearance: She is not ill-appearing or toxic-appearing.   HENT:      Head: Normocephalic and atraumatic.   Eyes:      Conjunctiva/sclera: Conjunctivae normal.   Cardiovascular:      Rate and Rhythm: Normal rate and regular rhythm.      Heart sounds: Normal heart sounds.   Pulmonary:      Effort: Pulmonary effort is normal. No respiratory distress.      Breath sounds: Normal breath sounds. No rales.   Abdominal:      General: Bowel sounds are normal.      Palpations: Abdomen is soft.      Tenderness: There is no abdominal tenderness.   Musculoskeletal:      Right lower leg: No edema.      Left lower leg: No edema.   Skin:     General: Skin is warm and dry.      Findings: Lesion (L lateral calf) present.   Neurological:      General: No focal deficit present.      Mental Status: She is alert and oriented to person, place, and time. Mental status is at  baseline.   Psychiatric:         Mood and Affect: Mood normal.         Behavior: Behavior normal.           Significant Labs: All pertinent labs within the past 24 hours have been reviewed.    Significant Imaging: I have reviewed all pertinent imaging results/findings within the past 24 hours.    Assessment/Plan:      * Severe mitral valve regurgitation    Acute on chronic heart failure with preserved ejection fraction (HFpEF)   Anasarca      54 yo F admitted for ADHF 2/2 severe MR. The MR appears to be secondary to posterior leaflet restriction and leaflet length of 1.1 cm. BNP on admission was 4300 compared to 350 eight months prior. Initially diagnosed with severe MR in June of 2023. Saw Dr. Stone in January of 2024 and was undergoing work-up for possible MitraClip; [facility] scheduled for 3/15 but not completed due to financial constraints, also needs POOJA. Will need optimization of kidney function prior to angiogram consideration, appreciate Nephrology assistance. APS serologies negative, Fabry workup negative     DDX: rheumatic heart disease vs Fabry vs antiphospholipid (serologies negative)     4/1/2024 TTE    Left Ventricle: The left ventricle is normal in size. Normal wall thickness. Normal wall motion. There is normal systolic function with a visually estimated ejection fraction of 60 - 65%. Grade II diastolic dysfunction.    Right Ventricle: Normal right ventricular cavity size. Wall thickness is normal. Right ventricle wall motion  is normal. Systolic function is reduced.    Left Atrium: Left atrium is very  severely dilated.    Right Atrium: Right atrium is mildly dilated.    Aortic Valve: There is mild aortic valve sclerosis. There is normal leaflet mobility. There is trace aortic regurgitation.    Mitral Valve: There is mild bileaflet sclerosis. There is posterior leaflet tethering and failure of complete coaptation. There is very severe regurgitation with a posterolateral eccentriccally directed jet.     "Tricuspid Valve: The tricuspid valve is structurally normal. There is normal leaflet mobility. There is moderate to severe regurgitation.    IVC/SVC: IVC was not well visualized due to poor acoustic window. Intermediate venous pressure at 8 mmHg.    Pericardium: There is a trivial effusion posteriorly and small under the RA.     - Interventional Cardiology consulted - no intervention until patient's volume status is controlled  - Nephrology following, undergoing SLED/CRRT  - POOJA ordered - "Anesthesia evaluated the patient and feel that she needs better optimization prior to POOJA. They stated once she is euvolemic that we can move forward. Will need a new POOJA order at that time."  - Discontinued Diuril 500 mg IV q24h & Lasix gtt @ 40 mg/hr  - Discontinued hydralazine 25 mg po q8h & Isordil 20 mg po BID for afterload reduction due to pressor requirements  - Continue home ASA 81 mg po daily and Lipitor 40 mg po daily  - Genetics consulted, see "Hypertrophic cardiomyopathy"  - Strict I/O, Jin in place  - Transitioned to lasix 40mg PO daily. Reassess daily for diuretic use.   - Repeat TTE reviewed- IC consulted- not a candidate for mitraclip- recommended outpatient f/u- continued fluid removal/fluid status optimization via HD- up-titration of GDMT as tolerated  - Will also need OP IC follow up.   - Telemetry monitoring    Hyponatremia  Patient has hyponatremia which is uncontrolled,We will aim to correct the sodium by 4-6mEq in 24 hours. We will monitor sodium Daily. The hyponatremia is due to renal insufficiency. We will obtain the following studies: no new labs at this moment. We will treat the hyponatremia with Fluid restriction of:  1.5 liter per day and Hemodialysis. The patient's sodium results have been reviewed and are listed below.  Recent Labs   Lab 04/24/24  0349   *         Intertrigo  - improvement       Rash  - derm initially consulted- concern for resolving folliculitis- no significant " "improvement  - triamcinolone cream today-monitor for improvement- if no improvement, then will re-consult derm    Proteinuria  - h/o      Hypertrophic cardiomyopathy  Angiokeratoma   Proteinuria      - Genetics consulted, appreciate josie  - GLA genetic testing collected and sent to [facility]- neg for fabry's disease  - Consider cardiac MRI      Angiokeratoma        Prediabetes  - h/o    HLD (hyperlipidemia)  - continue statin    Acute kidney injury superimposed on chronic kidney disease  Patient with acute kidney injury/acute renal failure likely due to acute tubular necrosis caused by unknown.  REGINALDO is currently  improving on CRRT/SLED . Baseline creatinine  2.5  - Labs reviewed- Renal function/electrolytes with Estimated Creatinine Clearance: 80.9 mL/min (based on SCr of 0.7 mg/dL). according to latest data. Monitor urine output and serial BMP and adjust therapy as needed. Avoid nephrotoxins and renally dose meds for GFR listed above.           Recent Labs     04/17/24  1428 04/17/24  2203 04/18/24  0413   CREATININE 1.8*  1.8* 0.8  0.8 0.7  0.7  0.7         - Nephrology following, appreciate josie - started SLED on 4/14   - Genetics consulted, see "Hypertrophic cardiomyopathy"  - Holding home sevelamer   - Holding home sodium bicarb 650 mg po BID  - HD per nephrology. Will need OP HD chair setup.   - Nephro following- consulted interventional nephrology- TD 04/22   - Midodrine 2.5mg BID (w/ holding parameters) + 5mg PRN w/ HD   - awaiting medicaid enrollment to set-up outpatient HD chair        Acute urinary retention  - now w/acute renal failure requiring iHD  - no catheter in place       Leg wound, left  - wound care following    Anasarca  - 2/2 acute renal failure in setting of CHF w/severe MR       Prolonged QT interval  - resolved    Acute on chronic heart failure with preserved ejection fraction (HFpEF)  - Interval history and physical exam findings as described above  - Most recent TTE " results:  Results for orders placed during the hospital encounter of 04/01/24    Echo    Interpretation Summary    Left Ventricle: The left ventricle is normal in size. Normal wall thickness. Normal wall motion. There is normal systolic function with a visually estimated ejection fraction of 60 - 65%. Grade II diastolic dysfunction.    Right Ventricle: Normal right ventricular cavity size. Wall thickness is normal. Right ventricle wall motion  is normal. Systolic function is reduced.    Left Atrium: Left atrium is very  severely dilated.    Right Atrium: Right atrium is mildly dilated.    Aortic Valve: There is mild aortic valve sclerosis. There is normal leaflet mobility. There is trace aortic regurgitation.    Mitral Valve: There is mild bileaflet sclerosis. There is posterior leaflet tethering and failure of complete coaptation. There is very severe regurgitation with a posterolateral eccentriccally directed jet.    Tricuspid Valve: The tricuspid valve is structurally normal. There is normal leaflet mobility. There is moderate to severe regurgitation.    IVC/SVC: IVC was not well visualized due to poor acoustic window. Intermediate venous pressure at 8 mmHg.    Pericardium: There is a trivial effusion posteriorly and small under the RA.    - Clinically volume overloaded on admission  - now w/iHD needs  - Will continue diuresis with lasix 80mg PO BID on non-HD days  - Continue home cardioprudent regimen  - Strict I/Os  - 1.5L fluid restriction   - Daily weights  - Will continue to monitor on tele    Anemia  Stable.   Recent Labs   Lab 04/22/24  0513 04/23/24  0416 04/24/24  0349   WBC 5.24 5.41 5.74   HGB 9.0* 9.4* 9.7*   HCT 31.8* 31.8* 34.1*    199 227         Class 2 obesity in adult  Body mass index is 28.31 kg/m². Morbid obesity complicates all aspects of disease management from diagnostic modalities to treatment. Weight loss encouraged and health benefits explained to patient.     Essential  hypertension     Temp:  [98 °F (36.7 °C)-98.5 °F (36.9 °C)]   Pulse:  [55-62]   Resp:  [9-40]   BP: (94)/(53)   SpO2:  [94 %-100 %]   Arterial Line BP: ()/(44-66) .     While in the hospital, will manage blood pressure as follows; Adjust home antihypertensive regimen as follows- holding Lasix and Coreg     Will utilize p.r.n. blood pressure medication only if patient's blood pressure greater than 180/110 and she develops symptoms such as worsening chest pain or shortness of breath.         VTE Risk Mitigation (From admission, onward)           Ordered     heparin (porcine) injection 5,000 Units  Every 8 hours         04/24/24 1546     heparin (porcine) injection 1,000 Units  As needed (PRN)         04/24/24 1038     heparin (porcine) injection 1,000 Units  As needed (PRN)         04/13/24 1428     IP VTE HIGH RISK PATIENT  Once         04/01/24 1030     Place sequential compression device  Until discontinued         04/01/24 1030                    Discharge Planning   RILEY: 5/4/2024     Code Status: Full Code   Is the patient medically ready for discharge?: No    Reason for patient still in hospital (select all that apply): Pending disposition  Discharge Plan A: Home with family   Discharge Delays: (!) Dialysis Set-up        Ness Regan MD  Department of Hospital Medicine   [facility] - Cardiology Stepdown

## 2024-05-02 NOTE — PLAN OF CARE
Plan of care discussed with patient. Patient is free of fall/trauma/injury. Denies CP, SOB, or pain/discomfort. All questions addressed. Will continue to monitor    Problem: Adult Inpatient Plan of Care  Goal: Plan of Care Review  Outcome: Progressing  Goal: Patient-Specific Goal (Individualized)  Outcome: Progressing  Goal: Absence of Hospital-Acquired Illness or Injury  Outcome: Progressing  Goal: Optimal Comfort and Wellbeing  Outcome: Progressing  Goal: Readiness for Transition of Care  Outcome: Progressing     Problem: Infection  Goal: Absence of Infection Signs and Symptoms  Outcome: Progressing  Intervention: Prevent or Manage Infection  Flowsheets (Taken 5/2/2024 1514)  Infection Management: aseptic technique maintained     Problem: Fluid and Electrolyte Imbalance (Acute Kidney Injury/Impairment)  Goal: Fluid and Electrolyte Balance  Outcome: Progressing  Intervention: Monitor and Manage Fluid and Electrolyte Balance  Flowsheets (Taken 5/2/2024 1514)  Fluid/Electrolyte Management: fluids restricted     Problem: Skin Injury Risk Increased  Goal: Skin Health and Integrity  Outcome: Progressing  Intervention: Optimize Skin Protection  Flowsheets (Taken 5/2/2024 1514)  Pressure Reduction Techniques: frequent weight shift encouraged  Activity Management: Ambulated -L4  Head of Bed (HOB) Positioning: HOB elevated     Problem: Fall Injury Risk  Goal: Absence of Fall and Fall-Related Injury  Outcome: Progressing

## 2024-05-02 NOTE — NURSING
Uneventful shift.  Pt remains free from falls/injury.  Slept quietly on/off.  No change in previous assessment.  Report given and pt care endorsed to Rudolph at this time

## 2024-05-03 LAB
ALBUMIN SERPL BCP-MCNC: 2.8 G/DL (ref 3.5–5.2)
ANION GAP SERPL CALC-SCNC: 12 MMOL/L (ref 8–16)
BUN SERPL-MCNC: 22 MG/DL (ref 6–20)
CALCIUM SERPL-MCNC: 8.4 MG/DL (ref 8.7–10.5)
CHLORIDE SERPL-SCNC: 99 MMOL/L (ref 95–110)
CO2 SERPL-SCNC: 17 MMOL/L (ref 23–29)
CREAT SERPL-MCNC: 3 MG/DL (ref 0.5–1.4)
ERYTHROCYTE [DISTWIDTH] IN BLOOD BY AUTOMATED COUNT: 25 % (ref 11.5–14.5)
EST. GFR  (NO RACE VARIABLE): 17.9 ML/MIN/1.73 M^2
GLUCOSE SERPL-MCNC: 58 MG/DL (ref 70–110)
HCT VFR BLD AUTO: 29.4 % (ref 37–48.5)
HGB BLD-MCNC: 8.4 G/DL (ref 12–16)
MAGNESIUM SERPL-MCNC: 1.7 MG/DL (ref 1.6–2.6)
MCH RBC QN AUTO: 24.8 PG (ref 27–31)
MCHC RBC AUTO-ENTMCNC: 28.6 G/DL (ref 32–36)
MCV RBC AUTO: 87 FL (ref 82–98)
PHOSPHATE SERPL-MCNC: 3.6 MG/DL (ref 2.7–4.5)
PLATELET # BLD AUTO: 235 K/UL (ref 150–450)
PMV BLD AUTO: 10.6 FL (ref 9.2–12.9)
POCT GLUCOSE: 66 MG/DL (ref 70–110)
POCT GLUCOSE: 84 MG/DL (ref 70–110)
POTASSIUM SERPL-SCNC: 4.6 MMOL/L (ref 3.5–5.1)
RBC # BLD AUTO: 3.39 M/UL (ref 4–5.4)
SODIUM SERPL-SCNC: 128 MMOL/L (ref 136–145)
WBC # BLD AUTO: 3.7 K/UL (ref 3.9–12.7)

## 2024-05-03 PROCEDURE — 83735 ASSAY OF MAGNESIUM: CPT | Performed by: STUDENT IN AN ORGANIZED HEALTH CARE EDUCATION/TRAINING PROGRAM

## 2024-05-03 PROCEDURE — 99232 SBSQ HOSP IP/OBS MODERATE 35: CPT | Mod: ,,, | Performed by: HOSPITALIST

## 2024-05-03 PROCEDURE — 63600175 PHARM REV CODE 636 W HCPCS: Performed by: STUDENT IN AN ORGANIZED HEALTH CARE EDUCATION/TRAINING PROGRAM

## 2024-05-03 PROCEDURE — 90935 HEMODIALYSIS ONE EVALUATION: CPT

## 2024-05-03 PROCEDURE — 25000003 PHARM REV CODE 250: Performed by: STUDENT IN AN ORGANIZED HEALTH CARE EDUCATION/TRAINING PROGRAM

## 2024-05-03 PROCEDURE — 5A1D70Z PERFORMANCE OF URINARY FILTRATION, INTERMITTENT, LESS THAN 6 HOURS PER DAY: ICD-10-PCS | Performed by: STUDENT IN AN ORGANIZED HEALTH CARE EDUCATION/TRAINING PROGRAM

## 2024-05-03 PROCEDURE — 80069 RENAL FUNCTION PANEL: CPT | Performed by: STUDENT IN AN ORGANIZED HEALTH CARE EDUCATION/TRAINING PROGRAM

## 2024-05-03 PROCEDURE — 97116 GAIT TRAINING THERAPY: CPT

## 2024-05-03 PROCEDURE — 20600001 HC STEP DOWN PRIVATE ROOM

## 2024-05-03 PROCEDURE — 85027 COMPLETE CBC AUTOMATED: CPT | Performed by: STUDENT IN AN ORGANIZED HEALTH CARE EDUCATION/TRAINING PROGRAM

## 2024-05-03 PROCEDURE — 36415 COLL VENOUS BLD VENIPUNCTURE: CPT | Performed by: STUDENT IN AN ORGANIZED HEALTH CARE EDUCATION/TRAINING PROGRAM

## 2024-05-03 PROCEDURE — 25000003 PHARM REV CODE 250: Performed by: PHYSICIAN ASSISTANT

## 2024-05-03 PROCEDURE — 25000003 PHARM REV CODE 250

## 2024-05-03 RX ORDER — SODIUM CHLORIDE 9 MG/ML
INJECTION, SOLUTION INTRAVENOUS ONCE
Status: CANCELLED | OUTPATIENT
Start: 2024-05-03 | End: 2024-05-03

## 2024-05-03 RX ADMIN — OXYBUTYNIN CHLORIDE 5 MG: 5 TABLET ORAL at 05:05

## 2024-05-03 RX ADMIN — HEPARIN SODIUM 5000 UNITS: 5000 INJECTION INTRAVENOUS; SUBCUTANEOUS at 06:05

## 2024-05-03 RX ADMIN — MICONAZOLE NITRATE: 20 CREAM TOPICAL at 08:05

## 2024-05-03 RX ADMIN — SENNOSIDES AND DOCUSATE SODIUM 1 TABLET: 8.6; 5 TABLET ORAL at 08:05

## 2024-05-03 RX ADMIN — OXYBUTYNIN CHLORIDE 5 MG: 5 TABLET ORAL at 08:05

## 2024-05-03 RX ADMIN — MIDODRINE HYDROCHLORIDE 5 MG: 2.5 TABLET ORAL at 01:05

## 2024-05-03 RX ADMIN — Medication: at 08:05

## 2024-05-03 RX ADMIN — MIDODRINE HYDROCHLORIDE 2.5 MG: 2.5 TABLET ORAL at 08:05

## 2024-05-03 RX ADMIN — TRIAMCINOLONE ACETONIDE: 1 CREAM TOPICAL at 08:05

## 2024-05-03 RX ADMIN — HYDROXYZINE HYDROCHLORIDE 10 MG: 10 TABLET ORAL at 09:05

## 2024-05-03 RX ADMIN — CARVEDILOL 6.25 MG: 6.25 TABLET, FILM COATED ORAL at 09:05

## 2024-05-03 RX ADMIN — CLINDAMYCIN PHOSPHATE: 10 GEL TOPICAL at 09:05

## 2024-05-03 RX ADMIN — ATORVASTATIN CALCIUM 40 MG: 40 TABLET, FILM COATED ORAL at 08:05

## 2024-05-03 RX ADMIN — CARVEDILOL 6.25 MG: 6.25 TABLET, FILM COATED ORAL at 08:05

## 2024-05-03 RX ADMIN — MICONAZOLE NITRATE: 20 CREAM TOPICAL at 09:05

## 2024-05-03 RX ADMIN — POLYETHYLENE GLYCOL 3350 17 G: 17 POWDER, FOR SOLUTION ORAL at 08:05

## 2024-05-03 RX ADMIN — ASPIRIN 81 MG: 81 TABLET, COATED ORAL at 08:05

## 2024-05-03 RX ADMIN — TRIAMCINOLONE ACETONIDE: 1 CREAM TOPICAL at 09:05

## 2024-05-03 RX ADMIN — HEPARIN SODIUM 1000 UNITS: 1000 INJECTION, SOLUTION INTRAVENOUS; SUBCUTANEOUS at 04:05

## 2024-05-03 RX ADMIN — Medication: at 09:05

## 2024-05-03 RX ADMIN — CLINDAMYCIN PHOSPHATE: 10 GEL TOPICAL at 08:05

## 2024-05-03 NOTE — ASSESSMENT & PLAN NOTE
Acute on chronic heart failure with preserved ejection fraction (HFpEF)   Anasarca      54 yo F admitted for ADHF 2/2 severe MR. The MR appears to be secondary to posterior leaflet restriction and leaflet length of 1.1 cm. BNP on admission was 4300 compared to 350 eight months prior. Initially diagnosed with severe MR in June of 2023. Saw Dr. Stone in January of 2024 and was undergoing work-up for possible MitraClip; [facility] scheduled for 3/15 but not completed due to financial constraints, also needs POOJA. Will need optimization of kidney function prior to angiogram consideration, appreciate Nephrology assistance. APS serologies negative, Fabry workup negative     DDX: rheumatic heart disease vs Fabry vs antiphospholipid (serologies negative)     4/1/2024 TTE    Left Ventricle: The left ventricle is normal in size. Normal wall thickness. Normal wall motion. There is normal systolic function with a visually estimated ejection fraction of 60 - 65%. Grade II diastolic dysfunction.    Right Ventricle: Normal right ventricular cavity size. Wall thickness is normal. Right ventricle wall motion  is normal. Systolic function is reduced.    Left Atrium: Left atrium is very  severely dilated.    Right Atrium: Right atrium is mildly dilated.    Aortic Valve: There is mild aortic valve sclerosis. There is normal leaflet mobility. There is trace aortic regurgitation.    Mitral Valve: There is mild bileaflet sclerosis. There is posterior leaflet tethering and failure of complete coaptation. There is very severe regurgitation with a posterolateral eccentriccally directed jet.    Tricuspid Valve: The tricuspid valve is structurally normal. There is normal leaflet mobility. There is moderate to severe regurgitation.    IVC/SVC: IVC was not well visualized due to poor acoustic window. Intermediate venous pressure at 8 mmHg.    Pericardium: There is a trivial effusion posteriorly and small under the RA.     - Interventional  "Cardiology consulted - no intervention until patient's volume status is controlled  - Nephrology following, undergoing SLED/CRRT  - POOJA ordered - "Anesthesia evaluated the patient and feel that she needs better optimization prior to POOJA. They stated once she is euvolemic that we can move forward. Will need a new POOJA order at that time."  - Discontinued Diuril 500 mg IV q24h & Lasix gtt @ 40 mg/hr  - Discontinued hydralazine 25 mg po q8h & Isordil 20 mg po BID for afterload reduction due to pressor requirements  - Continue home ASA 81 mg po daily and Lipitor 40 mg po daily  - Genetics consulted, see "Hypertrophic cardiomyopathy"  - Strict I/O, Jin in place  - Transitioned to lasix 40mg PO daily. Reassess daily for diuretic use.   - Repeat TTE reviewed- IC consulted- not a candidate for mitraclip- recommended outpatient f/u- continued fluid removal/fluid status optimization via HD- up-titration of GDMT as tolerated  - Will also need OP IC follow up.   - Telemetry monitoring  "

## 2024-05-03 NOTE — PROGRESS NOTES
[facility] - Cardiology [facility]  Progress Note    Patient Name: Xena Vera  MRN: [id number]  Patient Class: IP- Inpatient   Admission Date: 4/1/2024  Length of Stay: 30 days  Attending Physician: Rosario Luciano MD  Primary Care Provider: Tami Villeda FNP        Subjective:     Principal Problem:Severe mitral valve regurgitation        HPI:  Per MICU: 52 yo F with PMH of HFpEF, severe MR, HTN, HLD, and CKD4 who initially presented to [facility] on 4/1/2024 after a mechanical fall at home with concurrent SOB and swelling in her legs and abdomen. She reported adherence to home Lasix and low sodium diet, but had felt increasingly SOB prior to admission as she was sleeping upright and had multiple nighttime awakenings due to SOB. In the ED, BNP was 4639 and she was admitted to Hospital Medicine for further management of ADHF. She initially had good response to IV Lasix but hospital course was later complicated by urinary retention, worsening kidney function, and decreased UOP. Nephrology and Cardiology were consulted to assist. She was started on Diuril and Lasix gtt for diuresis and Isordil and hydralazine for afterload reduction. Patient transferred to CCU for further management.        Overview/Hospital Course:  MICU course  Patient initially had improved UOP with increasing Lasix, eventually maxed out on Lasix drip with addition of Diuril. Eventually started on CRRT/SLED for volume removal, initially needed intermittent levophed to tolerate. Has itchy rash that started during current hospitalization, Dermatology consulted for regimen. Graduated from CRRT to iHD, tolerating well. Stable to step back down to HM.      course  Patient slightly volume overloaded on exam. Reports making urine. Transition to lasix 40mg PO daily. More fluid removal via HD. Will need OP HD chair. SW consulted. Repeat TTE ordered for eval for MR. Discuss plans with IC once repeat TTE is obtained.  Will also need OP IC  follow up. Nephro rec consulting interventional nephro for TDC placement. She underwent TDC placement 04/22. Repeat TTE w/the following findings:      Left Ventricle: The left ventricle is moderately dilated. Normal wall thickness. There is eccentric hypertrophy. There is normal systolic function with a visually estimated ejection fraction of 55 - 60%. Diastolic function cannot be reliably determined in the presence of mitral valve disease.    Right Ventricle: Mild right ventricular enlargement. Wall thickness is normal. Right ventricle wall motion  is normal. Systolic function is mildly reduced.    Biatrial enlargement (L > R)    Mitral Valve: There is severe functional regurgitation with a centrally directed jet.    Tricuspid Valve: There is mild to moderate regurgitation.    Pulmonary Artery: The estimated pulmonary artery systolic pressure is 96 mmHg.    IVC/SVC: Intermediate venous pressure at 8 mmHg.    There is a small pericardial effusion and a right pleural effusion.    Interventional cardiology consulted- recommended further medical management optimization and continued fluid removal via iHD. Not a candidate for mitraclip at this time. Intermittent asymptomatic hypotension for which diuretic regimen titrated w/ initiation of midodrine. Awaiting Medicaid approval for HD chair set-up.      Interval History: No issues overnight. Does report nodule of left anterior forearm- occurring after IV draws- no improvement, but no pain. No sob, chest pain, nausea, vomiting, fevers, chills, night sweats.  utilized- Shay #[id number]    Objective:     Vital Signs (Most Recent):  Temp: 98.2 °F (36.8 °C) (05/03/24 1111)  Pulse: (!) 59 (05/03/24 1515)  Resp: 18 (05/03/24 1330)  BP: (!) 100/56 (05/03/24 1515)  SpO2: 95 % (05/03/24 1111) Vital Signs (24h Range):  Temp:  [98 °F (36.7 °C)-98.7 °F (37.1 °C)] 98.2 °F (36.8 °C)  Pulse:  [59-62] 59  Resp:  [18] 18  SpO2:  [95 %-97 %] 95 %  BP: ()/(55-78) 100/56      Weight: 63.6 kg (140 lb 3.4 oz)  Body mass index is 27.53 kg/m².    Intake/Output Summary (Last 24 hours) at 5/3/2024 1530  Last data filed at 5/3/2024 0930  Gross per 24 hour   Intake 442 ml   Output 750 ml   Net -308 ml      Physical Exam  Gen: in NAD, appears stated age, appears acutely ill   Neuro: AAOx3, motor, sensory, and strength grossly intact BL  HEENT: NTNC, EOMI, PERRL, MMM  CVS: RRR, no m/r/g; S1/S2 auscultated with no S3 or S4; capillary refill < 2 sec  Chest: TDC of the right chest wall  Resp: lungs CTAB, no w/r/r; no belabored breathing or accessory muscle use appreciated   Abd: BS+ in all 4 quadrants; NTND, soft to palpation; no organomegaly appreciated   Extrem: pulses full, equal, and regular over all 4 extremities; trace swelling of BL LE, no swelling of dorsum of feet  Skin: healing abrasion of the left calf    Significant Labs: All pertinent labs within the past 24 hours have been reviewed.  CBC:   Recent Labs   Lab 05/03/24 0228   WBC 3.70*   HGB 8.4*   HCT 29.4*        CMP:   Recent Labs   Lab 05/03/24 0228   *   K 4.6   CL 99   CO2 17*   GLU 58*   BUN 22*   CREATININE 3.0*   CALCIUM 8.4*   ALBUMIN 2.8*   ANIONGAP 12       Significant Imaging: I have reviewed all pertinent imaging results/findings within the past 24 hours.    Assessment/Plan:      * Severe mitral valve regurgitation    Acute on chronic heart failure with preserved ejection fraction (HFpEF)   Anasarca      54 yo F admitted for ADHF 2/2 severe MR. The MR appears to be secondary to posterior leaflet restriction and leaflet length of 1.1 cm. BNP on admission was 4300 compared to 350 eight months prior. Initially diagnosed with severe MR in June of 2023. Saw Dr. Stone in January of 2024 and was undergoing work-up for possible MitraClip; C scheduled for 3/15 but not completed due to financial constraints, also needs POOJA. Will need optimization of kidney function prior to angiogram consideration, appreciate  "Nephrology assistance. APS serologies negative, Fabry workup negative     DDX: rheumatic heart disease vs Fabry vs antiphospholipid (serologies negative)     4/1/2024 TTE    Left Ventricle: The left ventricle is normal in size. Normal wall thickness. Normal wall motion. There is normal systolic function with a visually estimated ejection fraction of 60 - 65%. Grade II diastolic dysfunction.    Right Ventricle: Normal right ventricular cavity size. Wall thickness is normal. Right ventricle wall motion  is normal. Systolic function is reduced.    Left Atrium: Left atrium is very  severely dilated.    Right Atrium: Right atrium is mildly dilated.    Aortic Valve: There is mild aortic valve sclerosis. There is normal leaflet mobility. There is trace aortic regurgitation.    Mitral Valve: There is mild bileaflet sclerosis. There is posterior leaflet tethering and failure of complete coaptation. There is very severe regurgitation with a posterolateral eccentriccally directed jet.    Tricuspid Valve: The tricuspid valve is structurally normal. There is normal leaflet mobility. There is moderate to severe regurgitation.    IVC/SVC: IVC was not well visualized due to poor acoustic window. Intermediate venous pressure at 8 mmHg.    Pericardium: There is a trivial effusion posteriorly and small under the RA.     - Interventional Cardiology consulted - no intervention until patient's volume status is controlled  - Nephrology following, undergoing SLED/CRRT  - POOJA ordered - "Anesthesia evaluated the patient and feel that she needs better optimization prior to POOJA. They stated once she is euvolemic that we can move forward. Will need a new POOJA order at that time."  - Discontinued Diuril 500 mg IV q24h & Lasix gtt @ 40 mg/hr  - Discontinued hydralazine 25 mg po q8h & Isordil 20 mg po BID for afterload reduction due to pressor requirements  - Continue home ASA 81 mg po daily and Lipitor 40 mg po daily  - Genetics consulted, see " ""Hypertrophic cardiomyopathy"  - Strict I/O, Jin in place  - Transitioned to lasix 40mg PO daily. Reassess daily for diuretic use.   - Repeat TTE reviewed- IC consulted- not a candidate for mitraclip- recommended outpatient f/u- continued fluid removal/fluid status optimization via HD- up-titration of GDMT as tolerated  - Will also need OP IC follow up.   - Telemetry monitoring    Hypophosphatemia  - 2/2 renal dysfunction    Hyponatremia  Patient has hyponatremia which is uncontrolled,We will aim to correct the sodium by 4-6mEq in 24 hours. We will monitor sodium Daily. The hyponatremia is due to renal insufficiency. We will obtain the following studies: no new labs at this moment. We will treat the hyponatremia with Fluid restriction of:  1.5 liter per day and Hemodialysis. The patient's sodium results have been reviewed and are listed below.  Recent Labs   Lab 05/03/24  0228   *         Intertrigo  - improvement       Rash  - derm initially consulted- concern for resolving folliculitis- no significant improvement  - triamcinolone cream today-monitor for improvement- if no improvement, then will re-consult derm    Proteinuria  - h/o      Hypertrophic cardiomyopathy  Angiokeratoma   Proteinuria      - Genetics consulted, appreciate recs  - GLA genetic testing collected and sent to [facility]- neg for fabry's disease  - Consider cardiac MRI      Angiokeratoma        Prediabetes  - h/o    HLD (hyperlipidemia)  - continue statin    Acute kidney injury superimposed on chronic kidney disease  Patient with acute kidney injury/acute renal failure likely due to acute tubular necrosis caused by unknown.  REGINALDO is currently  improving on CRRT/SLED . Baseline creatinine  2.5  - Labs reviewed- Renal function/electrolytes with Estimated Creatinine Clearance: 80.9 mL/min (based on SCr of 0.7 mg/dL). according to latest data. Monitor urine output and serial BMP and adjust therapy as needed. Avoid nephrotoxins and renally " "dose meds for GFR listed above.           Recent Labs     04/17/24  1428 04/17/24  2203 04/18/24  0413   CREATININE 1.8*  1.8* 0.8  0.8 0.7  0.7  0.7         - Nephrology following, brendon galindo - started SLED on 4/14   - Genetics consulted, see "Hypertrophic cardiomyopathy"  - Holding home sevelamer   - Holding home sodium bicarb 650 mg po BID  - HD per nephrology. Will need OP HD chair setup.   - Nephro following- consulted interventional nephrology- TDC 04/22   - Midodrine 2.5mg BID (w/ holding parameters) + 5mg PRN w/ HD   - awaiting medicaid enrollment to set-up outpatient HD chair        Acute urinary retention  - now w/acute renal failure requiring iHD  - no catheter in place       Leg wound, left  - wound care following    Anasarca  - 2/2 acute renal failure in setting of CHF w/severe MR       Prolonged QT interval  - resolved    Acute on chronic heart failure with preserved ejection fraction (HFpEF)  - Interval history and physical exam findings as described above  - Most recent TTE results:  Results for orders placed during the hospital encounter of 04/01/24    Echo    Interpretation Summary    Left Ventricle: The left ventricle is normal in size. Normal wall thickness. Normal wall motion. There is normal systolic function with a visually estimated ejection fraction of 60 - 65%. Grade II diastolic dysfunction.    Right Ventricle: Normal right ventricular cavity size. Wall thickness is normal. Right ventricle wall motion  is normal. Systolic function is reduced.    Left Atrium: Left atrium is very  severely dilated.    Right Atrium: Right atrium is mildly dilated.    Aortic Valve: There is mild aortic valve sclerosis. There is normal leaflet mobility. There is trace aortic regurgitation.    Mitral Valve: There is mild bileaflet sclerosis. There is posterior leaflet tethering and failure of complete coaptation. There is very severe regurgitation with a posterolateral eccentriccally directed jet.    " Tricuspid Valve: The tricuspid valve is structurally normal. There is normal leaflet mobility. There is moderate to severe regurgitation.    IVC/SVC: IVC was not well visualized due to poor acoustic window. Intermediate venous pressure at 8 mmHg.    Pericardium: There is a trivial effusion posteriorly and small under the RA.    - Clinically volume overloaded on admission  - now w/iHD needs  - Will continue diuresis with lasix 80mg PO BID  - Continue home cardioprudent regimen  - Strict I/Os  - 1.5L fluid restriction   - Daily weights  - Will continue to monitor on tele    Anemia  Stable.   Recent Labs   Lab 04/29/24  0508 05/01/24  0235 05/03/24  0228   WBC 4.04 4.25 3.70*   HGB 8.4* 8.1* 8.4*   HCT 29.7* 27.7* 29.4*    244 235         Class 2 obesity in adult  Body mass index is 27.53 kg/m². Morbid obesity complicates all aspects of disease management from diagnostic modalities to treatment. Weight loss encouraged and health benefits explained to patient.     Essential hypertension     Temp:  [98 °F (36.7 °C)-98.5 °F (36.9 °C)]   Pulse:  [55-62]   Resp:  [9-40]   BP: (94)/(53)   SpO2:  [94 %-100 %]   Arterial Line BP: ()/(44-66) .     While in the hospital, will manage blood pressure as follows; Adjust home antihypertensive regimen as follows- holding Lasix and Coreg     Will utilize p.r.n. blood pressure medication only if patient's blood pressure greater than 180/110 and she develops symptoms such as worsening chest pain or shortness of breath.         VTE Risk Mitigation (From admission, onward)           Ordered     heparin (porcine) injection 5,000 Units  Every 8 hours         04/24/24 1546     heparin (porcine) injection 1,000 Units  As needed (PRN)         04/24/24 1038     heparin (porcine) injection 1,000 Units  As needed (PRN)         04/13/24 1428     IP VTE HIGH RISK PATIENT  Once         04/01/24 1030     Place sequential compression device  Until discontinued         04/01/24 1030                     Discharge Planning   RILEY: 5/4/2024     Code Status: Full Code   Is the patient medically ready for discharge?: No    Reason for patient still in hospital (select all that apply): Patient trending condition  Discharge Plan A: Home with family   Discharge Delays: (!) Dialysis Set-up                Rosario Luciano MD  Department of Hospital Medicine   [facility]

## 2024-05-03 NOTE — ASSESSMENT & PLAN NOTE
- Interval history and physical exam findings as described above  - Most recent TTE results:  Results for orders placed during the hospital encounter of 04/01/24    Echo    Interpretation Summary    Left Ventricle: The left ventricle is normal in size. Normal wall thickness. Normal wall motion. There is normal systolic function with a visually estimated ejection fraction of 60 - 65%. Grade II diastolic dysfunction.    Right Ventricle: Normal right ventricular cavity size. Wall thickness is normal. Right ventricle wall motion  is normal. Systolic function is reduced.    Left Atrium: Left atrium is very  severely dilated.    Right Atrium: Right atrium is mildly dilated.    Aortic Valve: There is mild aortic valve sclerosis. There is normal leaflet mobility. There is trace aortic regurgitation.    Mitral Valve: There is mild bileaflet sclerosis. There is posterior leaflet tethering and failure of complete coaptation. There is very severe regurgitation with a posterolateral eccentriccally directed jet.    Tricuspid Valve: The tricuspid valve is structurally normal. There is normal leaflet mobility. There is moderate to severe regurgitation.    IVC/SVC: IVC was not well visualized due to poor acoustic window. Intermediate venous pressure at 8 mmHg.    Pericardium: There is a trivial effusion posteriorly and small under the RA.    - Clinically volume overloaded on admission  - now w/iHD needs  - Will continue diuresis with lasix 80mg PO BID  - Continue home cardioprudent regimen  - Strict I/Os  - 1.5L fluid restriction   - Daily weights  - Will continue to monitor on tele

## 2024-05-03 NOTE — PLAN OF CARE
Reached out to [facility] leadership for assistance with patient's financial barrier to HD. Unaware of any programs to assist uninsured patients obtain charitable HD services.     Reached out to Millie Brennan with the [facility] assigned to [place]. She was unable to provide any information regarding Medicaid emergency plans; however, she did note that the patient will need to use ED services until she has been accepted through the Medicaid program. Millie Brennan suggested reach out to [facility] representative MADAI Blakely.     Jeniffer Blakely at [facility] reached out to the [place] Medicaid emergency services team. Stated that if the individual needs a disability determination with their emergency Medicaid application, it will take about 90 days for coverage. If they do not need a disability determination, the coverage usually occurs in and around 45 days. The time period is from the date of the application to the date the agency notifies the applicant of its decision.     Reached out to [facility] team and case still pending.     Notified patient's team of inability to expedite patient's Medicaid determination process even in the setting of ESRD needing HD and ESRD network's report that patient will need to use ED services for HD until approved for Medicaid.     Joceline David, MSW, LCSW  Manager - Case Management

## 2024-05-03 NOTE — ASSESSMENT & PLAN NOTE
Patient has hyponatremia which is uncontrolled,We will aim to correct the sodium by 4-6mEq in 24 hours. We will monitor sodium Daily. The hyponatremia is due to renal insufficiency. We will obtain the following studies: no new labs at this moment. We will treat the hyponatremia with Fluid restriction of:  1.5 liter per day and Hemodialysis. The patient's sodium results have been reviewed and are listed below.  Recent Labs   Lab 05/03/24  0228   *

## 2024-05-03 NOTE — ASSESSMENT & PLAN NOTE
Acute on chronic heart failure with preserved ejection fraction (HFpEF)   Anasarca      54 yo F admitted for ADHF 2/2 severe MR. The MR appears to be secondary to posterior leaflet restriction and leaflet length of 1.1 cm. BNP on admission was 4300 compared to 350 eight months prior. Initially diagnosed with severe MR in June of 2023. Saw Dr. Stone in January of 2024 and was undergoing work-up for possible MitraClip; [facility] scheduled for 3/15 but not completed due to financial constraints, also needs POOJA. Will need optimization of kidney function prior to angiogram consideration, appreciate Nephrology assistance. APS serologies negative, Fabry workup negative     DDX: rheumatic heart disease vs Fabry vs antiphospholipid (serologies negative)     4/1/2024 TTE    Left Ventricle: The left ventricle is normal in size. Normal wall thickness. Normal wall motion. There is normal systolic function with a visually estimated ejection fraction of 60 - 65%. Grade II diastolic dysfunction.    Right Ventricle: Normal right ventricular cavity size. Wall thickness is normal. Right ventricle wall motion  is normal. Systolic function is reduced.    Left Atrium: Left atrium is very  severely dilated.    Right Atrium: Right atrium is mildly dilated.    Aortic Valve: There is mild aortic valve sclerosis. There is normal leaflet mobility. There is trace aortic regurgitation.    Mitral Valve: There is mild bileaflet sclerosis. There is posterior leaflet tethering and failure of complete coaptation. There is very severe regurgitation with a posterolateral eccentriccally directed jet.    Tricuspid Valve: The tricuspid valve is structurally normal. There is normal leaflet mobility. There is moderate to severe regurgitation.    IVC/SVC: IVC was not well visualized due to poor acoustic window. Intermediate venous pressure at 8 mmHg.    Pericardium: There is a trivial effusion posteriorly and small under the RA.     - Interventional Cardiology  "consulted - no intervention until patient's volume status is controlled  - Nephrology following, undergoing SLED/CRRT  - POOJA ordered - "Anesthesia evaluated the patient and feel that she needs better optimization prior to POOJA. They stated once she is euvolemic that we can move forward. Will need a new POOJA order at that time."  - Discontinued Diuril 500 mg IV q24h & Lasix gtt @ 40 mg/hr  - Discontinued hydralazine 25 mg po q8h & Isordil 20 mg po BID for afterload reduction due to pressor requirements  - Continue home ASA 81 mg po daily and Lipitor 40 mg po daily  - Genetics consulted, see "Hypertrophic cardiomyopathy"  - Strict I/O, Jin in place  - Transitioned to lasix 40mg PO daily. Reassess daily for diuretic use.   - Repeat TTE reviewed- IC consulted- not a candidate for mitraclip- recommended outpatient f/u- continued fluid removal/fluid status optimization via HD- up-titration of GDMT as tolerated  - Will also need OP IC follow up.   - Telemetry monitoring  "

## 2024-05-03 NOTE — PLAN OF CARE
Plan of care discussed with patient. Patient is free of fall/trauma/injury. Denies CP, SOB, or pain/discomfort. HD completed this shift.  Pt pending outpatient HD chair setup.  All questions addressed. Will continue to monitor    Problem: Adult Inpatient Plan of Care  Goal: Plan of Care Review  Outcome: Progressing  Goal: Patient-Specific Goal (Individualized)  Outcome: Progressing  Goal: Absence of Hospital-Acquired Illness or Injury  Outcome: Progressing  Goal: Optimal Comfort and Wellbeing  Outcome: Progressing  Goal: Readiness for Transition of Care  Outcome: Progressing     Problem: Infection  Goal: Absence of Infection Signs and Symptoms  Outcome: Progressing  Intervention: Prevent or Manage Infection  Flowsheets (Taken 5/3/2024 9085)  Infection Management: aseptic technique maintained     Problem: Fall Injury Risk  Goal: Absence of Fall and Fall-Related Injury  Outcome: Progressing  Intervention: Promote Injury-Free Environment  Flowsheets (Taken 5/3/2024 1638)  Safety Promotion/Fall Prevention:   assistive device/personal item within reach   Fall Risk reviewed with patient/family   side rails raised x 2   medications reviewed   nonskid shoes/socks when out of bed

## 2024-05-03 NOTE — PROGRESS NOTES
[facility] - [facility] Step[facility]  Nephrology  Progress Note    Patient Name: Xena Vera  MRN: [id number]  Admission Date: 4/1/2024  Hospital Length of Stay: 30 days  Attending Provider: Rosario Luciano MD   Primary Care Physician: Tami Villeda FNP  Principal Problem:Severe mitral valve regurgitation    Subjective:     HPI: Ms Vera is an obese (BMI ~31) 53-year-old with CKD IV (baseline creatinine ~3.1-3.3), prediabetes with most recent hemoglobin A1c 6%, HFpEF (most recent TTE with EF 60-65% with G2DD), mitral valve regurgitation, anemia, hypertension, HLD as well as several over co-morbid conditions who was admitted on 4/1 with heart failure exacerbation and hypervolemia after presenting with to ED following a mechanical fall but also had complaints of progressive dyspnea/ZACARIAS, orthopnea, lower extremity edema and abdominal distension with constipation. On presentation patient was noted to be hypotensive with systolic BP readings as low as 90s mmHg and bradycardiac with HR as low as 50s bpm. There was concern for some edema on chest x-ray and BNP at that time was ~4.6K and metabolic panel was notable for serum sodium 133, bicarbonate 20, , creatinine 4.2, albumin 3.1 and total bilirubin 1.5. UA showed +1 protein. Her admission was complicated by failure to adequately diuresis with escalating IV Lasix and even oral metolazone for which Nephrology was consulted on 4/10 for assistance. She explicitly denies NSAID use as outpatient.    Interval History: Will continue w/ MWF HD for volume removal and electrolytes. Pending placement for outpatient HD chair. Case being escalated given complexity of social situation limiting outpatient chair.    Review of patient's allergies indicates:  No Known Allergies  Current Facility-Administered Medications   Medication Dose Route Frequency Provider Last Rate Last Admin    acetaminophen tablet 650 mg  650 mg Oral Q6H PRN Ness Regan MD   650 mg at 04/30/24  0344    aspirin EC tablet 81 mg  81 mg Oral Daily David Borjas, PA-C   81 mg at 05/03/24 0824    atorvastatin tablet 40 mg  40 mg Oral Daily David Borjas, PA-C   40 mg at 05/03/24 0824    bisacodyL EC tablet 5 mg  5 mg Oral Daily PRN Johnny Cisneros MD        carvediloL tablet 6.25 mg  6.25 mg Oral BID Ness Regan MD   6.25 mg at 05/03/24 0824    clindamycin phosphate 1% gel   Topical (Top) BID Domonique Walden MD   Given at 05/03/24 0828    dextrose 10% bolus 125 mL 125 mL  12.5 g Intravenous PRN David Borjas, PA-DIMITRY        dextrose 10% bolus 250 mL 250 mL  25 g Intravenous PRN David Borjas, PA-DIMITRY        diphenhydrAMINE-zinc acetate 2-0.1% cream   Topical (Top) TID PRN Domonique aWlden MD   Given at 04/22/24 1556    furosemide tablet 80 mg  80 mg Oral BID Ness Regan MD   80 mg at 05/02/24 1413    glucagon (human recombinant) injection 1 mg  1 mg Intramuscular PRN David Borjas PA-C        glucose chewable tablet 16 g  16 g Oral PRN David Borjas, PA-DIMITRY        glucose chewable tablet 24 g  24 g Oral PRN David Borjas, PA-DIMITRY        heparin (porcine) injection 1,000 Units  1,000 Units Intra-Catheter PRN Rosario Luciano MD   1,000 Units at 05/01/24 1202    heparin (porcine) injection 5,000 Units  5,000 Units Subcutaneous Q8H Rosario Luciano MD   5,000 Units at 05/03/24 0603    HYDROcodone-acetaminophen 5-325 mg per tablet 1 tablet  1 tablet Oral Q6H PRN Johnny Cisneros MD   1 tablet at 04/28/24 2136    hydrOXYzine HCL tablet 10 mg  10 mg Oral TID PRN Domonique Walden MD   10 mg at 05/02/24 2157    melatonin tablet 6 mg  6 mg Oral Nightly PRN Pietro Herring MD   6 mg at 04/18/24 2047    miconazole 2 % cream   Topical (Top) BID Domonique Walden MD   Given at 05/03/24 0830    midodrine tablet 2.5 mg  2.5 mg Oral Q12H Ness Regan MD   2.5 mg at 05/03/24 0825    midodrine tablet 5 mg  5 mg Oral Daily PRN Ness Regan MD        naloxone 0.4 mg/mL  injection 0.02 mg  0.02 mg Intravenous PRN David Borjas PA-C        ondansetron disintegrating tablet 4 mg  4 mg Oral Q8H PRN David Borjas PA-C        ondansetron injection 4 mg  4 mg Intravenous Q8H PRN David Borjas PA-C   4 mg at 04/13/24 1429    oxybutynin tablet 5 mg  5 mg Oral TID Minda Molina MD   5 mg at 05/03/24 0824    polyethylene glycol packet 17 g  17 g Oral BID Domonique Walden MD   17 g at 05/03/24 0825    senna-docusate 8.6-50 mg per tablet 1 tablet  1 tablet Oral BID Domonique Walden MD   1 tablet at 05/03/24 0824    sodium chloride 0.9% bolus 250 mL 250 mL  250 mL Intravenous PRN Italo Segovia MD        sodium chloride 0.9% flush 10 mL  10 mL Intravenous PRN Pietro Herring MD        sodium chloride 0.9% flush 10 mL  10 mL Intravenous PRN David Borjas PA-C        sodium chloride 0.9% flush 10 mL  10 mL Intravenous PRN Carole Rolon MD        triamcinolone acetonide 0.1% cream   Topical (Top) BID Rosario Luciano MD   Given at 05/03/24 0829    white petrolatum 41 % ointment   Topical (Top) BID Johnny Cisneros MD   Given at 05/03/24 0827    zinc oxide 20 % ointment   Topical (Top) PRN David Borjas PA-C   Given at 04/25/24 2209       Objective:     Vital Signs (Most Recent):  Temp: 98.2 °F (36.8 °C) (05/03/24 1111)  Pulse: 61 (05/03/24 1111)  Resp: 18 (05/03/24 1111)  BP: 116/76 (05/03/24 1111)  SpO2: 95 % (05/03/24 1111) Vital Signs (24h Range):  Temp:  [98 °F (36.7 °C)-98.7 °F (37.1 °C)] 98.2 °F (36.8 °C)  Pulse:  [60-62] 61  Resp:  [18] 18  SpO2:  [95 %-97 %] 95 %  BP: (101-123)/(67-78) 116/76     Weight: 63.6 kg (140 lb 3.4 oz) (05/03/24 0400)  Body mass index is 27.53 kg/m².  Body surface area is 1.64 meters squared.    I/O last 3 completed shifts:  In: 1082 [P.O.:1082]  Out: 950 [Urine:950]     Physical Exam  Vitals and nursing note reviewed.   Constitutional:       General: She is not in acute distress.     Appearance: Normal appearance. She  is not ill-appearing.   HENT:      Head: Normocephalic.   Eyes:      Extraocular Movements: Extraocular movements intact.   Cardiovascular:      Rate and Rhythm: Normal rate and regular rhythm.   Pulmonary:      Effort: Pulmonary effort is normal. No respiratory distress.      Breath sounds: No rales.   Musculoskeletal:      Right lower leg: No edema.      Left lower leg: No edema.   Skin:     General: Skin is warm and dry.   Neurological:      General: No focal deficit present.      Mental Status: She is alert and oriented to person, place, and time.        Significant Labs:  CMP:   Recent Labs   Lab 05/03/24  0228   GLU 58*   CALCIUM 8.4*   ALBUMIN 2.8*   *   K 4.6   CO2 17*   CL 99   BUN 22*   CREATININE 3.0*     All labs within the past 24 hours have been reviewed.     Assessment/Plan:     Renal/  Acute kidney injury superimposed on chronic kidney disease  CKD IV (baseline creatinine ~3.1-3.3) admitted with hypervolemia and REGINALDO with creatinine 4.2  UA showed +1 protein with UPCR of ~500 mg  Urinary sediment with non dysmorphic RBCs and WBCs present although Jin catheter placed the day prior to UA  Retroperitoneal US without evidence of hydronephrosis although changes consistent with chronic kidney disease  Diuresis with lasix gtt @ 40mg/hr + Diuril 500mg IV BID attempted, and while Crt improved she remains unable to tolerate lying flat.  Tolerated 8h SLED w goal UF rate 200-500/hr successfully overnight 4/14-15, then 12h SLED on 4/15, 16, and 17.  4/19 - 4/24: tolerated iHD; no issues  4/22: TDC placed  4/22 - 5/3: Tolerating iHD, last session 5/1; pending outpatient HD chair.  5/3: Case escalated given difficulty procuring outpatient HD chair.    Plan/Recommendations:  Continue iHD for metabolic clearance and volume management. TDC placed 4/22. Suspect she may now be ESRD and require iHD moving forward but not yet declared ESRD  Will continue MWF iHD while inpatient  Okay for discharge from nephrology  standpoint once outpatient HD chair arranged; will need nephrology follow-up in clinic  Recommend continuing lasix 80 mg PO BID  Free water restriction of 1.5L daily to help with hyponatremia  Strict I/O's and daily weights  Renally all dose medications to eGFR   Avoid nephrotoxic agents wean feasible (i.e. NSAIDs, intra-arterial contrast, supra-therapeutic vancomycin levels, etc.)        Thank you for your consult. I will follow-up with patient. Please contact us if you have any additional questions.    Javier Morris MD  Nephrology  [facility] - [facility] Step[facility]

## 2024-05-03 NOTE — PLAN OF CARE
Problem: Hemodynamic Instability (Hemodialysis)  Goal: Effective Tissue Perfusion  Outcome: Progressing

## 2024-05-03 NOTE — PT/OT/SLP PROGRESS
Physical Therapy Treatment    Patient Name:  Xena Vera   MRN:  [id number]    Recommendations:     Discharge Recommendations: Low Intensity Therapy  Discharge Equipment Recommendations: bedside commode, walker, rolling  Barriers to discharge: None    Assessment:     Xena Vera is a 53 y.o. female admitted with a medical diagnosis of Severe mitral valve regurgitation.  She presents with the following impairments/functional limitations: weakness, impaired cardiopulmonary response to activity, impaired endurance, impaired functional mobility, gait instability, impaired balance, decreased coordination  #[id number] utilized throughout session in order to effectively communicate. She politely declined attempting the stair negotiation this date, family explains someone will be present to assist with all mobility in home environment. Stair negotiation technique explained and demonstrated. Patient will continue to benefit from skilled PT during this admit to address BLE strength and endurance deficits, and maximize independence with functional mobility.    Rehab Prognosis: Good; patient would benefit from acute skilled PT services to address these deficits and reach maximum level of function.    Recent Surgery: Procedure(s) (LRB):  Insertion, Catheter, Central Venous, Hemodialysis (Right) 11 Days Post-Op    Plan:     During this hospitalization, patient to be seen 3 x/week to address the identified rehab impairments via therapeutic activities, gait training, therapeutic exercises, neuromuscular re-education and progress toward the following goals:    Plan of Care Expires:  05/18/24    Subjective     Chief Complaint: SOB toward end of ambulation trial  Patient/Family Comments/goals: return [facility] to [facility]  Pain/Comfort:  Pain Rating 1: 0/10  Pain Rating Post-Intervention 1: 0/10      Objective:     Communicated with RN prior to session.  Patient found HOB elevated with  (no active lines) upon PT entry  to room.     General Precautions: Standard, fall  Orthopedic Precautions: N/A  Braces: N/A  Respiratory Status: Room air     Functional Mobility:  Bed Mobility:     Scooting: independence  Supine to Sit: independence  Transfers:     Sit to Stand:  independence with no AD from EOB  Gait: 160 ft, 30 ft with standing rest break, SBA and no AD. Mild lateral sway noted with decreased abigail and step length, cueing for upright posture and forward gaze. No LOB.  Balance:   Sitting: independence  Standing: SBA with no AD      AM-PAC 6 CLICK MOBILITY  Turning over in bed (including adjusting bedclothes, sheets and blankets)?: 4  Sitting down on and standing up from a chair with arms (e.g., wheelchair, bedside commode, etc.): 4  Moving from lying on back to sitting on the side of the bed?: 4  Moving to and from a bed to a chair (including a wheelchair)?: 3  Need to walk in hospital room?: 3  Climbing 3-5 steps with a railing?: 3  Basic Mobility Total Score: 21       Treatment & Education:  Patient educated on calling for assistance for any needs to improve overall safety awareness.  Patient educated on importance of OOB activity to promote overall endurance.  Patient educated on current level of function and progression towards therapeutic goals.    Patient left up in chair with all lines intact, call button in reach, and family present..    GOALS:   Multidisciplinary Problems       Physical Therapy Goals          Problem: Physical Therapy    Goal Priority Disciplines Outcome Goal Variances Interventions   Physical Therapy Goal     PT, PT/OT Progressing     Description: Goals to be met by: 24    Patient will increase functional independence with mobility by performin. Supine to sit with Modified [place]  2. Sit to supine with Modified [place]  3. Sit to stand transfer with Supervision  4. Bed to chair transfer with Supervision using Rolling Walker  5. Gait  x 100 feet with Supervision using Rolling  Walker.   6. Ascend/descend 5 stair with bilateral Handrails Supervision using No Assistive Device.   7. Lower extremity exercise program x15 reps per handout, with assistance as needed                         Time Tracking:     PT Received On: 05/03/24  PT Start Time: 1102     PT Stop Time: 1122  PT Total Time (min): 20 min     Billable Minutes: Gait Training 20    Treatment Type: Treatment  PT/PTA: PT     Number of PTA visits since last PT visit: 0     05/03/2024

## 2024-05-03 NOTE — SUBJECTIVE & OBJECTIVE
Interval History: No issues overnight. Does report nodule of left anterior forearm- occurring after IV draws- no improvement, but no pain. No sob, chest pain, nausea, vomiting, fevers, chills, night sweats.  utilized- Shay #[id number]    Objective:     Vital Signs (Most Recent):  Temp: 98.2 °F (36.8 °C) (05/03/24 1111)  Pulse: (!) 59 (05/03/24 1515)  Resp: 18 (05/03/24 1330)  BP: (!) 100/56 (05/03/24 1515)  SpO2: 95 % (05/03/24 1111) Vital Signs (24h Range):  Temp:  [98 °F (36.7 °C)-98.7 °F (37.1 °C)] 98.2 °F (36.8 °C)  Pulse:  [59-62] 59  Resp:  [18] 18  SpO2:  [95 %-97 %] 95 %  BP: ()/(55-78) 100/56     Weight: 63.6 kg (140 lb 3.4 oz)  Body mass index is 27.53 kg/m².    Intake/Output Summary (Last 24 hours) at 5/3/2024 1530  Last data filed at 5/3/2024 0930  Gross per 24 hour   Intake 442 ml   Output 750 ml   Net -308 ml      Physical Exam  Gen: in NAD, appears stated age, appears acutely ill   Neuro: AAOx3, motor, sensory, and strength grossly intact BL  HEENT: NTNC, EOMI, PERRL, MMM  CVS: RRR, no m/r/g; S1/S2 auscultated with no S3 or S4; capillary refill < 2 sec  Chest: TDC of the right chest wall  Resp: lungs CTAB, no w/r/r; no belabored breathing or accessory muscle use appreciated   Abd: BS+ in all 4 quadrants; NTND, soft to palpation; no organomegaly appreciated   Extrem: pulses full, equal, and regular over all 4 extremities; trace swelling of BL LE, no swelling of dorsum of feet  Skin: healing abrasion of the left calf    Significant Labs: All pertinent labs within the past 24 hours have been reviewed.  CBC:   Recent Labs   Lab 05/03/24  0228   WBC 3.70*   HGB 8.4*   HCT 29.4*        CMP:   Recent Labs   Lab 05/03/24  0228   *   K 4.6   CL 99   CO2 17*   GLU 58*   BUN 22*   CREATININE 3.0*   CALCIUM 8.4*   ALBUMIN 2.8*   ANIONGAP 12       Significant Imaging: I have reviewed all pertinent imaging results/findings within the past 24 hours.

## 2024-05-03 NOTE — PROGRESS NOTES
[facility] - Cardiology [facility] Medicine  Progress Note    Patient Name: Xena Vera  MRN: [id number]  Patient Class: IP- Inpatient   Admission Date: 4/1/2024  Length of Stay: 30 days  Attending Physician: Rosario Luciano MD  Primary Care Provider: Tami Villeda FNP        Subjective:     Principal Problem:Severe mitral valve regurgitation        HPI:  Per MICU: 54 yo F with PMH of HFpEF, severe MR, HTN, HLD, and CKD4 who initially presented to [facility] on 4/1/2024 after a mechanical fall at home with concurrent SOB and swelling in her legs and abdomen. She reported adherence to home Lasix and low sodium diet, but had felt increasingly SOB prior to admission as she was sleeping upright and had multiple nighttime awakenings due to SOB. In the ED, BNP was 4639 and she was admitted to Hospital Medicine for further management of ADHF. She initially had good response to IV Lasix but hospital course was later complicated by urinary retention, worsening kidney function, and decreased UOP. Nephrology and Cardiology were consulted to assist. She was started on Diuril and Lasix gtt for diuresis and Isordil and hydralazine for afterload reduction. Patient transferred to CCU for further management.        Overview/Hospital Course:  MICU course  Patient initially had improved UOP with increasing Lasix, eventually maxed out on Lasix drip with addition of Diuril. Eventually started on CRRT/SLED for volume removal, initially needed intermittent levophed to tolerate. Has itchy rash that started during current hospitalization, Dermatology consulted for regimen. Graduated from CRRT to iHD, tolerating well. Stable to step back down to HM.      course  Patient slightly volume overloaded on exam. Reports making urine. Transition to lasix 40mg PO daily. More fluid removal via HD. Will need OP HD chair. SW consulted. Repeat TTE ordered for eval for MR. Discuss plans with IC once repeat TTE is obtained.  Will also need OP IC  follow up. Nephro rec consulting interventional nephro for TDC placement. She underwent TDC placement 04/22. Repeat TTE w/the following findings:      Left Ventricle: The left ventricle is moderately dilated. Normal wall thickness. There is eccentric hypertrophy. There is normal systolic function with a visually estimated ejection fraction of 55 - 60%. Diastolic function cannot be reliably determined in the presence of mitral valve disease.    Right Ventricle: Mild right ventricular enlargement. Wall thickness is normal. Right ventricle wall motion  is normal. Systolic function is mildly reduced.    Biatrial enlargement (L > R)    Mitral Valve: There is severe functional regurgitation with a centrally directed jet.    Tricuspid Valve: There is mild to moderate regurgitation.    Pulmonary Artery: The estimated pulmonary artery systolic pressure is 96 mmHg.    IVC/SVC: Intermediate venous pressure at 8 mmHg.    There is a small pericardial effusion and a right pleural effusion.    Interventional cardiology consulted- recommended further medical management optimization and continued fluid removal via iHD. Not a candidate for mitraclip at this time. Intermittent asymptomatic hypotension for which diuretic regimen titrated w/ initiation of midodrine.       Interval History: No issues overnight. Does report nodule of left anterior forearm- occurring after IV draws. No sob, chest pain, nausea, vomiting, fevers, chills, night sweats.  utilized- [facility] #[id number]    Objective:     Vital Signs (Most Recent):  Temp: 98.3 °F (36.8 °C) (05/02/24 2320)  Pulse: 60 (05/02/24 2320)  Resp: 18 (05/02/24 2320)  BP: 123/78 (05/02/24 2320)  SpO2: 95 % (05/02/24 2320) Vital Signs (24h Range):  Temp:  [98.2 °F (36.8 °C)-98.7 °F (37.1 °C)] 98.3 °F (36.8 °C)  Pulse:  [59-62] 60  Resp:  [18] 18  SpO2:  [94 %-97 %] 95 %  BP: (107-123)/(67-78) 123/78     Weight: 64.1 kg (141 lb 5 oz)  Body mass index is 27.74 kg/m².    Intake/Output  Summary (Last 24 hours) at 5/3/2024 0017  Last data filed at 5/3/2024 0011  Gross per 24 hour   Intake 842 ml   Output 650 ml   Net 192 ml      Physical Exam  Gen: in NAD, appears stated age, appears acutely ill   Neuro: AAOx3, motor, sensory, and strength grossly intact BL  HEENT: NTNC, EOMI, PERRL, MMM  CVS: RRR, no m/r/g; S1/S2 auscultated with no S3 or S4; capillary refill < 2 sec  Chest: TDC of the right chest wall  Resp: lungs CTAB, no w/r/r; no belabored breathing or accessory muscle use appreciated   Abd: BS+ in all 4 quadrants; NTND, soft to palpation; no organomegaly appreciated   Extrem: pulses full, equal, and regular over all 4 extremities; trace swelling of BL LE, no swelling of dorsum of feet  Skin: healing abrasion of the left calf    Significant Labs: All pertinent labs within the past 24 hours have been reviewed.  CBC:   Recent Labs   Lab 05/01/24  0235   WBC 4.25   HGB 8.1*   HCT 27.7*        CMP:   Recent Labs   Lab 05/01/24  0235   *   K 3.3*   CL 99   CO2 23   GLU 85   BUN 20   CREATININE 3.2*   CALCIUM 8.1*   ALBUMIN 2.7*   ANIONGAP 8       Significant Imaging: I have reviewed all pertinent imaging results/findings within the past 24 hours.    Assessment/Plan:      * Severe mitral valve regurgitation    Acute on chronic heart failure with preserved ejection fraction (HFpEF)   Anasarca      52 yo F admitted for ADHF 2/2 severe MR. The MR appears to be secondary to posterior leaflet restriction and leaflet length of 1.1 cm. BNP on admission was 4300 compared to 350 eight months prior. Initially diagnosed with severe MR in June of 2023. Saw Dr. Stone in January of 2024 and was undergoing work-up for possible MitraClip; C scheduled for 3/15 but not completed due to financial constraints, also needs POOJA. Will need optimization of kidney function prior to angiogram consideration, appreciate Nephrology assistance. APS serologies negative, Fabry workup negative     DDX: rheumatic  "heart disease vs Fabry vs antiphospholipid (serologies negative)     4/1/2024 TTE    Left Ventricle: The left ventricle is normal in size. Normal wall thickness. Normal wall motion. There is normal systolic function with a visually estimated ejection fraction of 60 - 65%. Grade II diastolic dysfunction.    Right Ventricle: Normal right ventricular cavity size. Wall thickness is normal. Right ventricle wall motion  is normal. Systolic function is reduced.    Left Atrium: Left atrium is very  severely dilated.    Right Atrium: Right atrium is mildly dilated.    Aortic Valve: There is mild aortic valve sclerosis. There is normal leaflet mobility. There is trace aortic regurgitation.    Mitral Valve: There is mild bileaflet sclerosis. There is posterior leaflet tethering and failure of complete coaptation. There is very severe regurgitation with a posterolateral eccentriccally directed jet.    Tricuspid Valve: The tricuspid valve is structurally normal. There is normal leaflet mobility. There is moderate to severe regurgitation.    IVC/SVC: IVC was not well visualized due to poor acoustic window. Intermediate venous pressure at 8 mmHg.    Pericardium: There is a trivial effusion posteriorly and small under the RA.     - Interventional Cardiology consulted - no intervention until patient's volume status is controlled  - Nephrology following, undergoing SLED/CRRT  - POOJA ordered - "Anesthesia evaluated the patient and feel that she needs better optimization prior to POOJA. They stated once she is euvolemic that we can move forward. Will need a new POOJA order at that time."  - Discontinued Diuril 500 mg IV q24h & Lasix gtt @ 40 mg/hr  - Discontinued hydralazine 25 mg po q8h & Isordil 20 mg po BID for afterload reduction due to pressor requirements  - Continue home ASA 81 mg po daily and Lipitor 40 mg po daily  - Genetics consulted, see "Hypertrophic cardiomyopathy"  - Strict I/O, Jin in place  - Transitioned to lasix 40mg PO " "daily. Reassess daily for diuretic use.   - Repeat TTE reviewed- IC consulted- not a candidate for mitraclip- recommended outpatient f/u- continued fluid removal/fluid status optimization via HD- up-titration of GDMT as tolerated  - Will also need OP IC follow up.   - Telemetry monitoring    Hypophosphatemia  - 2/2 renal dysfunction    Hyponatremia  Patient has hyponatremia which is uncontrolled,We will aim to correct the sodium by 4-6mEq in 24 hours. We will monitor sodium Daily. The hyponatremia is due to renal insufficiency. We will obtain the following studies: no new labs at this moment. We will treat the hyponatremia with Fluid restriction of:  1.5 liter per day and Hemodialysis. The patient's sodium results have been reviewed and are listed below.  No results for input(s): "NA" in the last 24 hours.      Intertrigo  - improvement       Rash  - derm initially consulted- concern for resolving folliculitis- no significant improvement  - triamcinolone cream today-monitor for improvement- if no improvement, then will re-consult derm    Proteinuria  - h/o      Hypertrophic cardiomyopathy  Angiokeratoma   Proteinuria      - Genetics consulted, appreciate recs  - GLA genetic testing collected and sent to [facility]- neg for fabry's disease  - Consider cardiac MRI      Angiokeratoma        Prediabetes  - h/o    HLD (hyperlipidemia)  - continue statin    Acute kidney injury superimposed on chronic kidney disease  Patient with acute kidney injury/acute renal failure likely due to acute tubular necrosis caused by unknown.  REGINALDO is currently  improving on CRRT/SLED . Baseline creatinine  2.5  - Labs reviewed- Renal function/electrolytes with Estimated Creatinine Clearance: 80.9 mL/min (based on SCr of 0.7 mg/dL). according to latest data. Monitor urine output and serial BMP and adjust therapy as needed. Avoid nephrotoxins and renally dose meds for GFR listed above.           Recent Labs     04/17/24  1428 04/17/24  2203 " "04/18/24  0413   CREATININE 1.8*  1.8* 0.8  0.8 0.7  0.7  0.7         - Nephrology following, appreciate recs - started SLED on 4/14   - Genetics consulted, see "Hypertrophic cardiomyopathy"  - Holding home sevelamer   - Holding home sodium bicarb 650 mg po BID  - HD per nephrology. Will need OP HD chair setup.   - Nephro following- consulted interventional nephrology- TDC 04/22   - Midodrine 2.5mg BID (w/ holding parameters) + 5mg PRN w/ HD   - awaiting medicaid enrollment to set-up outpatient HD chair        Acute urinary retention  - now w/acute renal failure requiring iHD  - no catheter in place       Leg wound, left  - wound care following    Anasarca  - 2/2 acute renal failure in setting of CHF w/severe MR       Prolonged QT interval  - resolved    Acute on chronic heart failure with preserved ejection fraction (HFpEF)  - Interval history and physical exam findings as described above  - Most recent TTE results:  Results for orders placed during the hospital encounter of 04/01/24    Echo    Interpretation Summary    Left Ventricle: The left ventricle is normal in size. Normal wall thickness. Normal wall motion. There is normal systolic function with a visually estimated ejection fraction of 60 - 65%. Grade II diastolic dysfunction.    Right Ventricle: Normal right ventricular cavity size. Wall thickness is normal. Right ventricle wall motion  is normal. Systolic function is reduced.    Left Atrium: Left atrium is very  severely dilated.    Right Atrium: Right atrium is mildly dilated.    Aortic Valve: There is mild aortic valve sclerosis. There is normal leaflet mobility. There is trace aortic regurgitation.    Mitral Valve: There is mild bileaflet sclerosis. There is posterior leaflet tethering and failure of complete coaptation. There is very severe regurgitation with a posterolateral eccentriccally directed jet.    Tricuspid Valve: The tricuspid valve is structurally normal. There is normal leaflet " mobility. There is moderate to severe regurgitation.    IVC/SVC: IVC was not well visualized due to poor acoustic window. Intermediate venous pressure at 8 mmHg.    Pericardium: There is a trivial effusion posteriorly and small under the RA.    - Clinically volume overloaded on admission  - now w/iHD needs  - Will continue diuresis with lasix 80mg PO BID  - Continue home cardioprudent regimen  - Strict I/Os  - 1.5L fluid restriction   - Daily weights  - Will continue to monitor on tele    Anemia  Stable.   Recent Labs   Lab 04/27/24  0235 04/29/24  0508 05/01/24  0235   WBC 4.59 4.04 4.25   HGB 8.4* 8.4* 8.1*   HCT 30.1* 29.7* 27.7*    223 244         Class 2 obesity in adult  Body mass index is 27.74 kg/m². Morbid obesity complicates all aspects of disease management from diagnostic modalities to treatment. Weight loss encouraged and health benefits explained to patient.     Essential hypertension     Temp:  [98 °F (36.7 °C)-98.5 °F (36.9 °C)]   Pulse:  [55-62]   Resp:  [9-40]   BP: (94)/(53)   SpO2:  [94 %-100 %]   Arterial Line BP: ()/(44-66) .     While in the hospital, will manage blood pressure as follows; Adjust home antihypertensive regimen as follows- holding Lasix and Coreg     Will utilize p.r.n. blood pressure medication only if patient's blood pressure greater than 180/110 and she develops symptoms such as worsening chest pain or shortness of breath.         VTE Risk Mitigation (From admission, onward)           Ordered     heparin (porcine) injection 5,000 Units  Every 8 hours         04/24/24 1546     heparin (porcine) injection 1,000 Units  As needed (PRN)         04/24/24 1038     heparin (porcine) injection 1,000 Units  As needed (PRN)         04/13/24 1428     IP VTE HIGH RISK PATIENT  Once         04/01/24 1030     Place sequential compression device  Until discontinued         04/01/24 1030                    Discharge Planning   RILEY: 5/4/2024     Code Status: Full Code   Is the  patient medically ready for discharge?: No    Reason for patient still in hospital (select all that apply): Patient trending condition  Discharge Plan A: Home with family   Discharge Delays: (!) Dialysis Set-up                Rosario Luciano MD  Department of Hospital Medicine   [facility] - Cardiology Stepdown

## 2024-05-03 NOTE — PROGRESS NOTES
05/03/24 1630   Post-Hemodialysis Assessment   Rinseback Volume (mL) 250 mL   Blood Volume Processed (Liters) 51.9 L   Dialyzer Clearance Lightly streaked   Duration of Treatment 180 minutes   Additional Fluid Intake (mL) 300 mL   Total UF (mL) 1550 mL   Net Fluid Removal 1000   Patient Response to Treatment tolerated   Post-Treatment Weight 63.1 kg (139 lb 1.8 oz)   Treatment Weight Change -1.1   Post-Hemodialysis Comments see notes     HD TX complete. Pt AAO, VSS, NAD. Net removal 1000 ml. Report given to primary nurse. Pt returned to room via wheelchair, escorted by transport staff.

## 2024-05-03 NOTE — PROGRESS NOTES
Patient arrived in a wheelchair  to dialysis unit. Pt B/P 83/55 MAP, HR 60. PRN midodrine given prior to HD start.   Report received from primary nurse  VS's per dialysis Flowsheet.     Hemodialysis initiated using the following:     Dialysis Access: RIJ, accessed using aseptic technique     Will Maintain telemetry and blood pressure monitoring throughout treatment.  Refer to dialysis flowsheet and MAR for details.

## 2024-05-03 NOTE — SUBJECTIVE & OBJECTIVE
Interval History: No issues overnight. Does report nodule of left anterior forearm- occurring after IV draws. No sob, chest pain, nausea, vomiting, fevers, chills, night sweats.  utilized- [facility] #[id number]    Objective:     Vital Signs (Most Recent):  Temp: 98.3 °F (36.8 °C) (05/02/24 2320)  Pulse: 60 (05/02/24 2320)  Resp: 18 (05/02/24 2320)  BP: 123/78 (05/02/24 2320)  SpO2: 95 % (05/02/24 2320) Vital Signs (24h Range):  Temp:  [98.2 °F (36.8 °C)-98.7 °F (37.1 °C)] 98.3 °F (36.8 °C)  Pulse:  [59-62] 60  Resp:  [18] 18  SpO2:  [94 %-97 %] 95 %  BP: (107-123)/(67-78) 123/78     Weight: 64.1 kg (141 lb 5 oz)  Body mass index is 27.74 kg/m².    Intake/Output Summary (Last 24 hours) at 5/3/2024 0017  Last data filed at 5/3/2024 0011  Gross per 24 hour   Intake 842 ml   Output 650 ml   Net 192 ml      Physical Exam  Gen: in NAD, appears stated age, appears acutely ill   Neuro: AAOx3, motor, sensory, and strength grossly intact BL  HEENT: NTNC, EOMI, PERRL, MMM  CVS: RRR, no m/r/g; S1/S2 auscultated with no S3 or S4; capillary refill < 2 sec  Chest: TDC of the right chest wall  Resp: lungs CTAB, no w/r/r; no belabored breathing or accessory muscle use appreciated   Abd: BS+ in all 4 quadrants; NTND, soft to palpation; no organomegaly appreciated   Extrem: pulses full, equal, and regular over all 4 extremities; trace swelling of BL LE, no swelling of dorsum of feet  Skin: healing abrasion of the left calf    Significant Labs: All pertinent labs within the past 24 hours have been reviewed.  CBC:   Recent Labs   Lab 05/01/24  0235   WBC 4.25   HGB 8.1*   HCT 27.7*        CMP:   Recent Labs   Lab 05/01/24  0235   *   K 3.3*   CL 99   CO2 23   GLU 85   BUN 20   CREATININE 3.2*   CALCIUM 8.1*   ALBUMIN 2.7*   ANIONGAP 8       Significant Imaging: I have reviewed all pertinent imaging results/findings within the past 24 hours.

## 2024-05-03 NOTE — PROGRESS NOTES
Patient arrived in a wheelchair to dialysis unit.   Report received from primary nurse  VS's per dialysis Flowsheet.     Hemodialysis initiated using the following:     Dialysis Access: RIJ, accessed using aseptic technique     Will Maintain telemetry and blood pressure monitoring throughout treatment.  Refer to dialysis flowsheet and MAR for details.

## 2024-05-03 NOTE — ASSESSMENT & PLAN NOTE
- Interval history and physical exam findings as described above  - Most recent TTE results:  Results for orders placed during the hospital encounter of 04/01/24    Echo    Interpretation Summary    Left Ventricle: The left ventricle is normal in size. Normal wall thickness. Normal wall motion. There is normal systolic function with a visually estimated ejection fraction of 60 - 65%. Grade II diastolic dysfunction.    Right Ventricle: Normal right ventricular cavity size. Wall thickness is normal. Right ventricle wall motion  is normal. Systolic function is reduced.    Left Atrium: Left atrium is very  severely dilated.    Right Atrium: Right atrium is mildly dilated.    Aortic Valve: There is mild aortic valve sclerosis. There is normal leaflet mobility. There is trace aortic regurgitation.    Mitral Valve: There is mild bileaflet sclerosis. There is posterior leaflet tethering and failure of complete coaptation. There is very severe regurgitation with a posterolateral eccentriccally directed jet.    Tricuspid Valve: The tricuspid valve is structurally normal. There is normal leaflet mobility. There is moderate to severe regurgitation.    IVC/SVC: IVC was not well visualized due to poor acoustic window. Intermediate venous pressure at 8 mmHg.    Pericardium: There is a trivial effusion posteriorly and small under the RA.    - Clinically volume overloaded on admission  - now w/iHD needs  - Will continue diuresis with lasix 80mg PO BID  - Continue home cardioprudent regimen  - Strict I/Os  - 1.5L fluid restriction   - Daily weights  - Will continue to monitor on tele

## 2024-05-03 NOTE — ASSESSMENT & PLAN NOTE
CKD IV (baseline creatinine ~3.1-3.3) admitted with hypervolemia and REGINALDO with creatinine 4.2  UA showed +1 protein with UPCR of ~500 mg  Urinary sediment with non dysmorphic RBCs and WBCs present although Jin catheter placed the day prior to UA  Retroperitoneal US without evidence of hydronephrosis although changes consistent with chronic kidney disease  Diuresis with lasix gtt @ 40mg/hr + Diuril 500mg IV BID attempted, and while Crt improved she remains unable to tolerate lying flat.  Tolerated 8h SLED w goal UF rate 200-500/hr successfully overnight 4/14-15, then 12h SLED on 4/15, 16, and 17.  4/19 - 4/24: tolerated iHD; no issues  4/22: TDC placed  4/22 - 5/3: Tolerating iHD, last session 5/1; pending outpatient HD chair.  5/3: Case escalated given difficulty procuring outpatient HD chair.    Plan/Recommendations:  Continue iHD for metabolic clearance and volume management. TDC placed 4/22. Suspect she may now be ESRD and require iHD moving forward but not yet declared ESRD  Will continue MWF iHD while inpatient  Okay for discharge from nephrology standpoint once outpatient HD chair arranged; will need nephrology follow-up in clinic  Recommend continuing lasix 80 mg PO BID  Free water restriction of 1.5L daily to help with hyponatremia  Strict I/O's and daily weights  Renally all dose medications to eGFR   Avoid nephrotoxic agents wean feasible (i.e. NSAIDs, intra-arterial contrast, supra-therapeutic vancomycin levels, etc.)

## 2024-05-03 NOTE — ASSESSMENT & PLAN NOTE
"Patient has hyponatremia which is uncontrolled,We will aim to correct the sodium by 4-6mEq in 24 hours. We will monitor sodium Daily. The hyponatremia is due to renal insufficiency. We will obtain the following studies: no new labs at this moment. We will treat the hyponatremia with Fluid restriction of:  1.5 liter per day and Hemodialysis. The patient's sodium results have been reviewed and are listed below.  No results for input(s): "NA" in the last 24 hours.    "

## 2024-05-03 NOTE — ASSESSMENT & PLAN NOTE
Stable.   Recent Labs   Lab 04/27/24  0235 04/29/24  0508 05/01/24  0235   WBC 4.59 4.04 4.25   HGB 8.4* 8.4* 8.1*   HCT 30.1* 29.7* 27.7*    223 244

## 2024-05-03 NOTE — ASSESSMENT & PLAN NOTE
Body mass index is 27.53 kg/m². Morbid obesity complicates all aspects of disease management from diagnostic modalities to treatment. Weight loss encouraged and health benefits explained to patient.

## 2024-05-03 NOTE — ASSESSMENT & PLAN NOTE
Angiokeratoma   Proteinuria      - Genetics consulted, appreciate recs  - GLA genetic testing collected and sent to [facility]- neg for fabry's disease  - Consider cardiac MRI

## 2024-05-03 NOTE — PLAN OF CARE
[facility] - Cardiology Stepdown  Discharge Reassessment    Primary Care Provider: Tami Villeda FNP    Expected Discharge Date: 5/6/2024    Reassessment (most recent)       Discharge Reassessment - 05/03/24 1604          Discharge Reassessment    Assessment Type Discharge Planning Reassessment     Did the patient's condition or plan change since previous assessment? No     Communicated RILEY with patient/caregiver Date not available/Unable to determine     Discharge Plan A Home with family     Discharge Plan B Home with family     DME Needed Upon Discharge  none     Transition of Care Barriers Unisured     Why the patient remains in the hospital Insurance issues        Post-Acute Status    Discharge Delays Dialysis Set-up                   Pt needs outpatient HD chair but is currently unfunded.  Discharge plan pending further input from hospital leadership and legal teams.  Discharge Plan A and Plan B have been determined by review of patient's clinical status, future medical and therapeutic needs, and coverage/benefits for post-acute care in coordination with multidisciplinary team members.  Will continue to follow.      Sophia Nunez LMSW  [facility]  [phone]

## 2024-05-03 NOTE — ASSESSMENT & PLAN NOTE
Angiokeratoma   Proteinuria      - Genetics consulted, appreciate recs  - GLA genetic testing collected and sent to [facility]- neg for fabry's disease  - Consider cardiac MRI

## 2024-05-03 NOTE — ASSESSMENT & PLAN NOTE
Stable.   Recent Labs   Lab 04/29/24  0508 05/01/24  0235 05/03/24  0228   WBC 4.04 4.25 3.70*   HGB 8.4* 8.1* 8.4*   HCT 29.7* 27.7* 29.4*    244 235

## 2024-05-03 NOTE — ASSESSMENT & PLAN NOTE
Body mass index is 27.74 kg/m². Morbid obesity complicates all aspects of disease management from diagnostic modalities to treatment. Weight loss encouraged and health benefits explained to patient.

## 2024-05-03 NOTE — SUBJECTIVE & OBJECTIVE
Interval History: Will continue w/ MWF HD for volume removal and electrolytes. Pending placement for outpatient HD chair. Case being escalated given complexity of social situation limiting outpatient chair.    Review of patient's allergies indicates:  No Known Allergies  Current Facility-Administered Medications   Medication Dose Route Frequency Provider Last Rate Last Admin    acetaminophen tablet 650 mg  650 mg Oral Q6H PRN Ness Regan MD   650 mg at 04/30/24 0344    aspirin EC tablet 81 mg  81 mg Oral Daily David Borjas PA-C   81 mg at 05/03/24 0824    atorvastatin tablet 40 mg  40 mg Oral Daily David Borjas PA-C   40 mg at 05/03/24 0824    bisacodyL EC tablet 5 mg  5 mg Oral Daily PRN Johnny Cisneros MD        carvediloL tablet 6.25 mg  6.25 mg Oral BID Ness Regan MD   6.25 mg at 05/03/24 0824    clindamycin phosphate 1% gel   Topical (Top) BID Domonique Walden MD   Given at 05/03/24 0828    dextrose 10% bolus 125 mL 125 mL  12.5 g Intravenous PRN David Borjas PA-C        dextrose 10% bolus 250 mL 250 mL  25 g Intravenous PRN David Borjas PA-C        diphenhydrAMINE-zinc acetate 2-0.1% cream   Topical (Top) TID PRN Domonique Walden MD   Given at 04/22/24 1556    furosemide tablet 80 mg  80 mg Oral BID Ness Regan MD   80 mg at 05/02/24 1413    glucagon (human recombinant) injection 1 mg  1 mg Intramuscular PRN David Borjas PA-C        glucose chewable tablet 16 g  16 g Oral PRN David Borjas PA-C        glucose chewable tablet 24 g  24 g Oral PRN David Borjas PA-C        heparin (porcine) injection 1,000 Units  1,000 Units Intra-Catheter PRN Rosario Luciano MD   1,000 Units at 05/01/24 1202    heparin (porcine) injection 5,000 Units  5,000 Units Subcutaneous Q8H Rosario Luciano MD   5,000 Units at 05/03/24 0603    HYDROcodone-acetaminophen 5-325 mg per tablet 1 tablet  1 tablet Oral Q6H PRN Johnny Cisneros MD   1 tablet  at 04/28/24 2136    hydrOXYzine HCL tablet 10 mg  10 mg Oral TID PRN Domonique Walden MD   10 mg at 05/02/24 2157    melatonin tablet 6 mg  6 mg Oral Nightly PRN Pietro Herring MD   6 mg at 04/18/24 2047    miconazole 2 % cream   Topical (Top) BID Domonique Walden MD   Given at 05/03/24 0830    midodrine tablet 2.5 mg  2.5 mg Oral Q12H Ness Regan MD   2.5 mg at 05/03/24 0825    midodrine tablet 5 mg  5 mg Oral Daily PRN Ness Regan MD        naloxone 0.4 mg/mL injection 0.02 mg  0.02 mg Intravenous PRN David Borjas PA-C        ondansetron disintegrating tablet 4 mg  4 mg Oral Q8H PRN David Borjas PA-DIMITRY        ondansetron injection 4 mg  4 mg Intravenous Q8H PRN David Borjas PA-C   4 mg at 04/13/24 1429    oxybutynin tablet 5 mg  5 mg Oral TID Minda Molina MD   5 mg at 05/03/24 0824    polyethylene glycol packet 17 g  17 g Oral BID Domonique Walden MD   17 g at 05/03/24 0825    senna-docusate 8.6-50 mg per tablet 1 tablet  1 tablet Oral BID Domonique Walden MD   1 tablet at 05/03/24 0824    sodium chloride 0.9% bolus 250 mL 250 mL  250 mL Intravenous PRN Italo Segovia MD        sodium chloride 0.9% flush 10 mL  10 mL Intravenous PRN Pietro Herring MD        sodium chloride 0.9% flush 10 mL  10 mL Intravenous PRN David Borjas PA-DIMITRY        sodium chloride 0.9% flush 10 mL  10 mL Intravenous PRN Carole Rolon MD        triamcinolone acetonide 0.1% cream   Topical (Top) BID Rosario Luciano MD   Given at 05/03/24 0829    white petrolatum 41 % ointment   Topical (Top) BID Johnny Cisneros MD   Given at 05/03/24 0827    zinc oxide 20 % ointment   Topical (Top) PRN David Borjas PA-C   Given at 04/25/24 2204       Objective:     Vital Signs (Most Recent):  Temp: 98.2 °F (36.8 °C) (05/03/24 1111)  Pulse: 61 (05/03/24 1111)  Resp: 18 (05/03/24 1111)  BP: 116/76 (05/03/24 1111)  SpO2: 95 % (05/03/24 1111) Vital Signs (24h Range):  Temp:  [98 °F (36.7 °C)-98.7 °F (37.1 °C)]  98.2 °F (36.8 °C)  Pulse:  [60-62] 61  Resp:  [18] 18  SpO2:  [95 %-97 %] 95 %  BP: (101-123)/(67-78) 116/76     Weight: 63.6 kg (140 lb 3.4 oz) (05/03/24 0400)  Body mass index is 27.53 kg/m².  Body surface area is 1.64 meters squared.    I/O last 3 completed shifts:  In: 1082 [P.O.:1082]  Out: 950 [Urine:950]     Physical Exam  Vitals and nursing note reviewed.   Constitutional:       General: She is not in acute distress.     Appearance: Normal appearance. She is not ill-appearing.   HENT:      Head: Normocephalic.   Eyes:      Extraocular Movements: Extraocular movements intact.   Cardiovascular:      Rate and Rhythm: Normal rate and regular rhythm.   Pulmonary:      Effort: Pulmonary effort is normal. No respiratory distress.      Breath sounds: No rales.   Musculoskeletal:      Right lower leg: No edema.      Left lower leg: No edema.   Skin:     General: Skin is warm and dry.   Neurological:      General: No focal deficit present.      Mental Status: She is alert and oriented to person, place, and time.        Significant Labs:  CMP:   Recent Labs   Lab 05/03/24  0228   GLU 58*   CALCIUM 8.4*   ALBUMIN 2.8*   *   K 4.6   CO2 17*   CL 99   BUN 22*   CREATININE 3.0*     All labs within the past 24 hours have been reviewed.

## 2024-05-04 PROCEDURE — 25000003 PHARM REV CODE 250: Performed by: STUDENT IN AN ORGANIZED HEALTH CARE EDUCATION/TRAINING PROGRAM

## 2024-05-04 PROCEDURE — 25000003 PHARM REV CODE 250

## 2024-05-04 PROCEDURE — 25000003 PHARM REV CODE 250: Performed by: PHYSICIAN ASSISTANT

## 2024-05-04 PROCEDURE — 63600175 PHARM REV CODE 636 W HCPCS: Performed by: STUDENT IN AN ORGANIZED HEALTH CARE EDUCATION/TRAINING PROGRAM

## 2024-05-04 PROCEDURE — 20600001 HC STEP DOWN PRIVATE ROOM

## 2024-05-04 RX ADMIN — TRIAMCINOLONE ACETONIDE: 1 CREAM TOPICAL at 08:05

## 2024-05-04 RX ADMIN — OXYBUTYNIN CHLORIDE 5 MG: 5 TABLET ORAL at 03:05

## 2024-05-04 RX ADMIN — MICONAZOLE NITRATE: 20 CREAM TOPICAL at 08:05

## 2024-05-04 RX ADMIN — Medication: at 08:05

## 2024-05-04 RX ADMIN — OXYBUTYNIN CHLORIDE 5 MG: 5 TABLET ORAL at 08:05

## 2024-05-04 RX ADMIN — CLINDAMYCIN PHOSPHATE: 10 GEL TOPICAL at 08:05

## 2024-05-04 RX ADMIN — SENNOSIDES AND DOCUSATE SODIUM 1 TABLET: 8.6; 5 TABLET ORAL at 08:05

## 2024-05-04 RX ADMIN — POLYETHYLENE GLYCOL 3350 17 G: 17 POWDER, FOR SOLUTION ORAL at 08:05

## 2024-05-04 RX ADMIN — FUROSEMIDE 80 MG: 80 TABLET ORAL at 03:05

## 2024-05-04 RX ADMIN — HEPARIN SODIUM 5000 UNITS: 5000 INJECTION INTRAVENOUS; SUBCUTANEOUS at 06:05

## 2024-05-04 RX ADMIN — HEPARIN SODIUM 5000 UNITS: 5000 INJECTION INTRAVENOUS; SUBCUTANEOUS at 03:05

## 2024-05-04 RX ADMIN — CARVEDILOL 6.25 MG: 6.25 TABLET, FILM COATED ORAL at 08:05

## 2024-05-04 RX ADMIN — FUROSEMIDE 80 MG: 80 TABLET ORAL at 08:05

## 2024-05-04 RX ADMIN — ATORVASTATIN CALCIUM 40 MG: 40 TABLET, FILM COATED ORAL at 08:05

## 2024-05-04 RX ADMIN — OXYBUTYNIN CHLORIDE 5 MG: 5 TABLET ORAL at 12:05

## 2024-05-04 RX ADMIN — ASPIRIN 81 MG: 81 TABLET, COATED ORAL at 08:05

## 2024-05-04 RX ADMIN — HEPARIN SODIUM 5000 UNITS: 5000 INJECTION INTRAVENOUS; SUBCUTANEOUS at 12:05

## 2024-05-04 RX ADMIN — MIDODRINE HYDROCHLORIDE 2.5 MG: 2.5 TABLET ORAL at 08:05

## 2024-05-04 RX ADMIN — HEPARIN SODIUM 5000 UNITS: 5000 INJECTION INTRAVENOUS; SUBCUTANEOUS at 11:05

## 2024-05-04 NOTE — PROGRESS NOTES
[facility] Medicine  Progress Note    Patient Name: Xena Vera  MRN: [id number]  Patient Class: IP- Inpatient   Admission Date: 4/1/2024  Length of Stay: 31 days  Attending Physician: Rosario Luciano MD  Primary Care Provider: Tami Villeda FNP        Subjective:     Principal Problem:Severe mitral valve regurgitation        HPI:  Per MICU: 54 yo F with PMH of HFpEF, severe MR, HTN, HLD, and CKD4 who [place] [facility] a mechanical fall at home with concurrent SOB and swelling in her legs and abdomen. She reported adherence to home Lasix and low sodium diet, but had felt increasingly SOB prior to admission as she was sleeping upright and had multiple nighttime awakenings due to SOB. In the ED, BNP was 4639 and she was admitted to Hospital Medicine for further management of ADHF. She initially had good response to IV Lasix but hospital course was later complicated by urinary retention, worsening kidney function, and decreased UOP. Nephrology and Cardiology were consulted to assist. She was started on Diuril and Lasix gtt for diuresis and Isordil and hydralazine for afterload reduction. Patient transferred to CCU for further management.        Overview/Hospital Course:  MICU course  Patient initially had improved UOP with increasing Lasix, eventually maxed out on Lasix drip with addition of Diuril. Eventually started on CRRT/SLED for volume removal, initially needed intermittent levophed to tolerate. Has itchy rash that started during current hospitalization, Dermatology consulted for regimen. Graduated from CRRT to iHD, tolerating well. Stable to step back down to HM.      course  Patient slightly volume overloaded on exam. Reports making urine. Transition to lasix 40mg PO daily. More fluid removal via HD. Will need OP HD chair. SW consulted. Repeat TTE ordered for eval for MR. Discuss plans with IC once repeat TTE is obtained.  Will also need OP IC  follow up. Nephro rec consulting interventional nephro for TDC placement. She underwent TDC placement 04/22. Repeat TTE w/the following findings:      Left Ventricle: The left ventricle is moderately dilated. Normal wall thickness. There is eccentric hypertrophy. There is normal systolic function with a visually estimated ejection fraction of 55 - 60%. Diastolic function cannot be reliably determined in the presence of mitral valve disease.    Right Ventricle: Mild right ventricular enlargement. Wall thickness is normal. Right ventricle wall motion  is normal. Systolic function is mildly reduced.    Biatrial enlargement (L > R)    Mitral Valve: There is severe functional regurgitation with a centrally directed jet.    Tricuspid Valve: There is mild to moderate regurgitation.    Pulmonary Artery: The estimated pulmonary artery systolic pressure is 96 mmHg.    IVC/SVC: Intermediate venous pressure at 8 mmHg.    There is a small pericardial effusion and a right pleural effusion.    Interventional cardiology consulted- recommended further medical management optimization and continued fluid removal via iHD. Not a candidate for mitraclip at this time. Intermittent asymptomatic hypotension for which diuretic regimen titrated w/ initiation of midodrine. Awaiting Medicaid approval for HD chair set-up.      Interval History: No issues overnight. No sob, chest pain, nausea, vomiting, fevers, chills, night sweats.  utilized- Harshal #[id number]    Objective:     Vital Signs (Most Recent):  Temp: 98.6 °F (37 °C) (05/04/24 1108)  Pulse: 62 (05/04/24 1108)  Resp: 18 (05/04/24 1108)  BP: 121/83 (05/04/24 1108)  SpO2: 96 % (05/04/24 1108) Vital Signs (24h Range):  Temp:  [98 °F (36.7 °C)-98.6 °F (37 °C)] 98.6 °F (37 °C)  Pulse:  [59-63] 62  Resp:  [18] 18  SpO2:  [94 %-97 %] 96 %  BP: ()/(55-83) 121/83     Weight: 61.9 kg (136 lb 6.4 oz)  Body mass index is 26.78 kg/m².    Intake/Output Summary (Last 24 hours) at 5/4/2024  1317  Last data filed at 5/4/2024 0453  Gross per 24 hour   Intake 600 ml   Output 1800 ml   Net -1200 ml      Physical Exam  Gen: in NAD, appears stated age, appears acutely ill   Neuro: AAOx3, motor, sensory, and strength grossly intact BL  HEENT: NTNC, EOMI, PERRL, MMM  CVS: RRR, no m/r/g; S1/S2 auscultated with no S3 or S4; capillary refill < 2 sec  Chest: TDC of the right chest wall  Resp: lungs CTAB, no w/r/r; no belabored breathing or accessory muscle use appreciated   Abd: BS+ in all 4 quadrants; NTND, soft to palpation; no organomegaly appreciated   Extrem: pulses full, equal, and regular over all 4 extremities; trace swelling of BL LE, no swelling of dorsum of feet  Skin: healing abrasion of the left calf    Significant Labs: All pertinent labs within the past 24 hours have been reviewed.  CBC:   Recent Labs   Lab 05/03/24 0228   WBC 3.70*   HGB 8.4*   HCT 29.4*        CMP:   Recent Labs   Lab 05/03/24 0228   *   K 4.6   CL 99   CO2 17*   GLU 58*   BUN 22*   CREATININE 3.0*   CALCIUM 8.4*   ALBUMIN 2.8*   ANIONGAP 12       Significant Imaging: I have reviewed all pertinent imaging results/findings within the past 24 hours.    Assessment/Plan:      * Severe mitral valve regurgitation    Acute on chronic heart failure with preserved ejection fraction (HFpEF)   Anasarca      52 yo F admitted for ADHF 2/2 severe MR. The MR appears to be secondary to posterior leaflet restriction and leaflet length of 1.1 cm. BNP on admission was 4300 compared to 350 eight months prior. Initially diagnosed with severe MR in June of 2023. Saw Dr. Stone in January of 2024 and was undergoing work-up for possible MitraClip; [facility] scheduled for 3/15 but not completed due to financial constraints, also needs POOJA. Will need optimization of kidney function prior to angiogram consideration, appreciate Nephrology assistance. APS serologies negative, Fabry workup negative     DDX: rheumatic heart disease vs Fabry vs  "antiphospholipid (serologies negative)     4/1/2024 TTE    Left Ventricle: The left ventricle is normal in size. Normal wall thickness. Normal wall motion. There is normal systolic function with a visually estimated ejection fraction of 60 - 65%. Grade II diastolic dysfunction.    Right Ventricle: Normal right ventricular cavity size. Wall thickness is normal. Right ventricle wall motion  is normal. Systolic function is reduced.    Left Atrium: Left atrium is very  severely dilated.    Right Atrium: Right atrium is mildly dilated.    Aortic Valve: There is mild aortic valve sclerosis. There is normal leaflet mobility. There is trace aortic regurgitation.    Mitral Valve: There is mild bileaflet sclerosis. There is posterior leaflet tethering and failure of complete coaptation. There is very severe regurgitation with a posterolateral eccentriccally directed jet.    Tricuspid Valve: The tricuspid valve is structurally normal. There is normal leaflet mobility. There is moderate to severe regurgitation.    IVC/SVC: IVC was not well visualized due to poor acoustic window. Intermediate venous pressure at 8 mmHg.    Pericardium: There is a trivial effusion posteriorly and small under the RA.     - Interventional Cardiology consulted - no intervention until patient's volume status is controlled  - Nephrology following, undergoing SLED/CRRT  - POOJA ordered - "Anesthesia evaluated the patient and feel that she needs better optimization prior to POOJA. They stated once she is euvolemic that we can move forward. Will need a new POOJA order at that time."  - Discontinued Diuril 500 mg IV q24h & Lasix gtt @ 40 mg/hr  - Discontinued hydralazine 25 mg po q8h & Isordil 20 mg po BID for afterload reduction due to pressor requirements  - Continue home ASA 81 mg po daily and Lipitor 40 mg po daily  - Genetics consulted, see "Hypertrophic cardiomyopathy"  - Strict I/O, Jin in place  - Transitioned to lasix 40mg PO daily. Reassess daily for " "diuretic use.   - Repeat TTE reviewed- IC consulted- not a candidate for mitraclip- recommended outpatient f/u- continued fluid removal/fluid status optimization via HD- up-titration of GDMT as tolerated  - Will also need OP IC follow up.   - Telemetry monitoring    Hypophosphatemia  - 2/2 renal dysfunction    Hyponatremia  Patient has hyponatremia which is uncontrolled,We will aim to correct the sodium by 4-6mEq in 24 hours. We will monitor sodium Daily. The hyponatremia is due to renal insufficiency. We will obtain the following studies: no new labs at this moment. We will treat the hyponatremia with Fluid restriction of:  1.5 liter per day and Hemodialysis. The patient's sodium results have been reviewed and are listed below.  No results for input(s): "NA" in the last 24 hours.      Intertrigo  - improvement       Rash  - derm initially consulted- concern for resolving folliculitis- no significant improvement  - triamcinolone cream today-monitor for improvement- if no improvement, then will re-consult derm    Proteinuria  - h/o      Hypertrophic cardiomyopathy  Angiokeratoma   Proteinuria      - Genetics consulted, appreciate recs  - GLA genetic testing collected and sent to [facility]- neg for fabry's disease  - Consider cardiac MRI      Angiokeratoma        Prediabetes  - h/o    HLD (hyperlipidemia)  - continue statin    Acute kidney injury superimposed on chronic kidney disease  Patient with acute kidney injury/acute renal failure likely due to acute tubular necrosis caused by unknown.  REGINALDO is currently  improving on CRRT/SLED . Baseline creatinine  2.5  - Labs reviewed- Renal function/electrolytes with Estimated Creatinine Clearance: 80.9 mL/min (based on SCr of 0.7 mg/dL). according to latest data. Monitor urine output and serial BMP and adjust therapy as needed. Avoid nephrotoxins and renally dose meds for GFR listed above.           Recent Labs     04/17/24  1428 04/17/24  2203 04/18/24  0413   CREATININE " "1.8*  1.8* 0.8  0.8 0.7  0.7  0.7         - Nephrology following, appreciate recs - started SLED on 4/14   - Genetics consulted, see "Hypertrophic cardiomyopathy"  - Holding home sevelamer   - Holding home sodium bicarb 650 mg po BID  - HD per nephrology. Will need OP HD chair setup.   - Nephro following- consulted interventional nephrology- TDC 04/22   - Midodrine 2.5mg BID (w/ holding parameters) + 5mg PRN w/ HD   - awaiting medicaid enrollment to set-up outpatient HD chair        Acute urinary retention  - now w/acute renal failure requiring iHD  - no catheter in place       Leg wound, left  - wound care following    Anasarca  - 2/2 acute renal failure in setting of CHF w/severe MR       Prolonged QT interval  - resolved    Acute on chronic heart failure with preserved ejection fraction (HFpEF)  - Interval history and physical exam findings as described above  - Most recent TTE results:  Results for orders placed during the hospital encounter of 04/01/24    Echo    Interpretation Summary    Left Ventricle: The left ventricle is normal in size. Normal wall thickness. Normal wall motion. There is normal systolic function with a visually estimated ejection fraction of 60 - 65%. Grade II diastolic dysfunction.    Right Ventricle: Normal right ventricular cavity size. Wall thickness is normal. Right ventricle wall motion  is normal. Systolic function is reduced.    Left Atrium: Left atrium is very  severely dilated.    Right Atrium: Right atrium is mildly dilated.    Aortic Valve: There is mild aortic valve sclerosis. There is normal leaflet mobility. There is trace aortic regurgitation.    Mitral Valve: There is mild bileaflet sclerosis. There is posterior leaflet tethering and failure of complete coaptation. There is very severe regurgitation with a posterolateral eccentriccally directed jet.    Tricuspid Valve: The tricuspid valve is structurally normal. There is normal leaflet mobility. There is moderate to " severe regurgitation.    IVC/SVC: IVC was not well visualized due to poor acoustic window. Intermediate venous pressure at 8 mmHg.    Pericardium: There is a trivial effusion posteriorly and small under the RA.    - Clinically volume overloaded on admission  - now w/iHD needs  - Will continue diuresis with lasix 80mg PO BID  - Continue home cardioprudent regimen  - Strict I/Os  - 1.5L fluid restriction   - Daily weights  - Will continue to monitor on tele    Anemia  Stable.   Recent Labs   Lab 04/29/24  0508 05/01/24  0235 05/03/24  0228   WBC 4.04 4.25 3.70*   HGB 8.4* 8.1* 8.4*   HCT 29.7* 27.7* 29.4*    244 235         Class 2 obesity in adult  Body mass index is 26.78 kg/m². Morbid obesity complicates all aspects of disease management from diagnostic modalities to treatment. Weight loss encouraged and health benefits explained to patient.     Essential hypertension     Temp:  [98 °F (36.7 °C)-98.5 °F (36.9 °C)]   Pulse:  [55-62]   Resp:  [9-40]   BP: (94)/(53)   SpO2:  [94 %-100 %]   Arterial Line BP: ()/(44-66) .     While in the hospital, will manage blood pressure as follows; Adjust home antihypertensive regimen as follows- holding Lasix and Coreg     Will utilize p.r.n. blood pressure medication only if patient's blood pressure greater than 180/110 and she develops symptoms such as worsening chest pain or shortness of breath.         VTE Risk Mitigation (From admission, onward)           Ordered     heparin (porcine) injection 5,000 Units  Every 8 hours         04/24/24 1546     heparin (porcine) injection 1,000 Units  As needed (PRN)         04/24/24 1038     heparin (porcine) injection 1,000 Units  As needed (PRN)         04/13/24 1428     IP VTE HIGH RISK PATIENT  Once         04/01/24 1030     Place sequential compression device  Until discontinued         04/01/24 1030                    Discharge Planning   RILEY: 5/6/2024     Code Status: Full Code   Is the patient medically ready for  discharge?: No    Reason for patient still in hospital (select all that apply): Patient trending condition  Discharge Plan A: Home with family   Discharge Delays: (!) Dialysis Set-up                Rosario Luciano MD  Department of Hospital Medicine   [facility] - Cardiology Stepdown

## 2024-05-04 NOTE — SUBJECTIVE & OBJECTIVE
Interval History: No issues overnight. No sob, chest pain, nausea, vomiting, fevers, chills, night sweats.  utilized- Harshal #[id number]    Objective:     Vital Signs (Most Recent):  Temp: 98.6 °F (37 °C) (05/04/24 1108)  Pulse: 62 (05/04/24 1108)  Resp: 18 (05/04/24 1108)  BP: 121/83 (05/04/24 1108)  SpO2: 96 % (05/04/24 1108) Vital Signs (24h Range):  Temp:  [98 °F (36.7 °C)-98.6 °F (37 °C)] 98.6 °F (37 °C)  Pulse:  [59-63] 62  Resp:  [18] 18  SpO2:  [94 %-97 %] 96 %  BP: ()/(55-83) 121/83     Weight: 61.9 kg (136 lb 6.4 oz)  Body mass index is 26.78 kg/m².    Intake/Output Summary (Last 24 hours) at 5/4/2024 1317  Last data filed at 5/4/2024 0453  Gross per 24 hour   Intake 600 ml   Output 1800 ml   Net -1200 ml      Physical Exam  Gen: in NAD, appears stated age, appears acutely ill   Neuro: AAOx3, motor, sensory, and strength grossly intact BL  HEENT: NTNC, EOMI, PERRL, MMM  CVS: RRR, no m/r/g; S1/S2 auscultated with no S3 or S4; capillary refill < 2 sec  Chest: TDC of the right chest wall  Resp: lungs CTAB, no w/r/r; no belabored breathing or accessory muscle use appreciated   Abd: BS+ in all 4 quadrants; NTND, soft to palpation; no organomegaly appreciated   Extrem: pulses full, equal, and regular over all 4 extremities; trace swelling of BL LE, no swelling of dorsum of feet  Skin: healing abrasion of the left calf    Significant Labs: All pertinent labs within the past 24 hours have been reviewed.  CBC:   Recent Labs   Lab 05/03/24 0228   WBC 3.70*   HGB 8.4*   HCT 29.4*        CMP:   Recent Labs   Lab 05/03/24 0228   *   K 4.6   CL 99   CO2 17*   GLU 58*   BUN 22*   CREATININE 3.0*   CALCIUM 8.4*   ALBUMIN 2.8*   ANIONGAP 12       Significant Imaging: I have reviewed all pertinent imaging results/findings within the past 24 hours.

## 2024-05-04 NOTE — PLAN OF CARE
Problem: Adult Inpatient Plan of Care  Goal: Plan of Care Review  Outcome: Progressing  Goal: Patient-Specific Goal (Individualized)  Outcome: Progressing  Goal: Absence of Hospital-Acquired Illness or Injury  Outcome: Progressing  Goal: Optimal Comfort and Wellbeing  Outcome: Progressing  Goal: Readiness for Transition of Care  Outcome: Progressing     Problem: Infection  Goal: Absence of Infection Signs and Symptoms  Outcome: Progressing     Problem: Impaired Wound Healing  Goal: Optimal Wound Healing  Outcome: Progressing     Problem: Fluid and Electrolyte Imbalance (Acute Kidney Injury/Impairment)  Goal: Fluid and Electrolyte Balance  Outcome: Progressing     Problem: Oral Intake Inadequate (Acute Kidney Injury/Impairment)  Goal: Optimal Nutrition Intake  Outcome: Progressing     Problem: Renal Function Impairment (Acute Kidney Injury/Impairment)  Goal: Effective Renal Function  Outcome: Progressing     Problem: Device-Related Complication Risk (Hemodialysis)  Goal: Safe, Effective Therapy Delivery  Outcome: Progressing     Problem: Hemodynamic Instability (Hemodialysis)  Goal: Effective Tissue Perfusion  Outcome: Progressing     Problem: Infection (Hemodialysis)  Goal: Absence of Infection Signs and Symptoms  Outcome: Progressing

## 2024-05-04 NOTE — ASSESSMENT & PLAN NOTE
Acute on chronic heart failure with preserved ejection fraction (HFpEF)   Anasarca      52 yo F admitted for ADHF 2/2 severe MR. The MR appears to be secondary to posterior leaflet restriction and leaflet length of 1.1 cm. BNP on admission was 4300 compared to 350 eight months prior. Initially diagnosed with severe MR in June of 2023. Saw Dr. Stone in January of 2024 and was undergoing work-up for possible MitraClip; [facility] scheduled for 3/15 but not completed due to financial constraints, also needs POOJA. Will need optimization of kidney function prior to angiogram consideration, appreciate Nephrology assistance. APS serologies negative, Fabry workup negative     DDX: rheumatic heart disease vs Fabry vs antiphospholipid (serologies negative)     4/1/2024 TTE    Left Ventricle: The left ventricle is normal in size. Normal wall thickness. Normal wall motion. There is normal systolic function with a visually estimated ejection fraction of 60 - 65%. Grade II diastolic dysfunction.    Right Ventricle: Normal right ventricular cavity size. Wall thickness is normal. Right ventricle wall motion  is normal. Systolic function is reduced.    Left Atrium: Left atrium is very  severely dilated.    Right Atrium: Right atrium is mildly dilated.    Aortic Valve: There is mild aortic valve sclerosis. There is normal leaflet mobility. There is trace aortic regurgitation.    Mitral Valve: There is mild bileaflet sclerosis. There is posterior leaflet tethering and failure of complete coaptation. There is very severe regurgitation with a posterolateral eccentriccally directed jet.    Tricuspid Valve: The tricuspid valve is structurally normal. There is normal leaflet mobility. There is moderate to severe regurgitation.    IVC/SVC: IVC was not well visualized due to poor acoustic window. Intermediate venous pressure at 8 mmHg.    Pericardium: There is a trivial effusion posteriorly and small under the RA.     - Interventional  "Cardiology consulted - no intervention until patient's volume status is controlled  - Nephrology following, undergoing SLED/CRRT  - POOJA ordered - "Anesthesia evaluated the patient and feel that she needs better optimization prior to POOJA. They stated once she is euvolemic that we can move forward. Will need a new POOJA order at that time."  - Discontinued Diuril 500 mg IV q24h & Lasix gtt @ 40 mg/hr  - Discontinued hydralazine 25 mg po q8h & Isordil 20 mg po BID for afterload reduction due to pressor requirements  - Continue home ASA 81 mg po daily and Lipitor 40 mg po daily  - Genetics consulted, see "Hypertrophic cardiomyopathy"  - Strict I/O, Jin in place  - Transitioned to lasix 40mg PO daily. Reassess daily for diuretic use.   - Repeat TTE reviewed- IC consulted- not a candidate for mitraclip- recommended outpatient f/u- continued fluid removal/fluid status optimization via HD- up-titration of GDMT as tolerated  - Will also need OP IC follow up.   - Telemetry monitoring  "

## 2024-05-04 NOTE — PLAN OF CARE
No acute events overnight.    Problem: Adult Inpatient Plan of Care  Goal: Absence of Hospital-Acquired Illness or Injury  Outcome: Progressing  Goal: Optimal Comfort and Wellbeing  Outcome: Progressing     Problem: Infection  Goal: Absence of Infection Signs and Symptoms  Outcome: Progressing     Problem: Hemodynamic Instability (Hemodialysis)  Goal: Effective Tissue Perfusion  Outcome: Progressing

## 2024-05-04 NOTE — ASSESSMENT & PLAN NOTE
Angiokeratoma   Proteinuria      - Genetics consulted, appreciate recs  - GLA genetic testing collected and sent to [facility]- neg for fabry's disease  - Consider cardiac MRI

## 2024-05-04 NOTE — ASSESSMENT & PLAN NOTE
Body mass index is 26.78 kg/m². Morbid obesity complicates all aspects of disease management from diagnostic modalities to treatment. Weight loss encouraged and health benefits explained to patient.

## 2024-05-05 PROCEDURE — 63600175 PHARM REV CODE 636 W HCPCS: Performed by: STUDENT IN AN ORGANIZED HEALTH CARE EDUCATION/TRAINING PROGRAM

## 2024-05-05 PROCEDURE — 20600001 HC STEP DOWN PRIVATE ROOM

## 2024-05-05 PROCEDURE — 25000003 PHARM REV CODE 250: Performed by: STUDENT IN AN ORGANIZED HEALTH CARE EDUCATION/TRAINING PROGRAM

## 2024-05-05 PROCEDURE — 25000003 PHARM REV CODE 250: Performed by: PHYSICIAN ASSISTANT

## 2024-05-05 PROCEDURE — 25000003 PHARM REV CODE 250

## 2024-05-05 RX ADMIN — POLYETHYLENE GLYCOL 3350 17 G: 17 POWDER, FOR SOLUTION ORAL at 08:05

## 2024-05-05 RX ADMIN — HEPARIN SODIUM 5000 UNITS: 5000 INJECTION INTRAVENOUS; SUBCUTANEOUS at 10:05

## 2024-05-05 RX ADMIN — CARVEDILOL 6.25 MG: 6.25 TABLET, FILM COATED ORAL at 08:05

## 2024-05-05 RX ADMIN — MIDODRINE HYDROCHLORIDE 2.5 MG: 2.5 TABLET ORAL at 08:05

## 2024-05-05 RX ADMIN — HEPARIN SODIUM 5000 UNITS: 5000 INJECTION INTRAVENOUS; SUBCUTANEOUS at 05:05

## 2024-05-05 RX ADMIN — HEPARIN SODIUM 5000 UNITS: 5000 INJECTION INTRAVENOUS; SUBCUTANEOUS at 03:05

## 2024-05-05 RX ADMIN — OXYBUTYNIN CHLORIDE 5 MG: 5 TABLET ORAL at 03:05

## 2024-05-05 RX ADMIN — OXYBUTYNIN CHLORIDE 5 MG: 5 TABLET ORAL at 08:05

## 2024-05-05 RX ADMIN — MICONAZOLE NITRATE: 20 CREAM TOPICAL at 09:05

## 2024-05-05 RX ADMIN — FUROSEMIDE 80 MG: 80 TABLET ORAL at 03:05

## 2024-05-05 RX ADMIN — TRIAMCINOLONE ACETONIDE: 1 CREAM TOPICAL at 08:05

## 2024-05-05 RX ADMIN — Medication: at 08:05

## 2024-05-05 RX ADMIN — FUROSEMIDE 80 MG: 80 TABLET ORAL at 08:05

## 2024-05-05 RX ADMIN — ZINC OXIDE: 200 OINTMENT TOPICAL at 08:05

## 2024-05-05 RX ADMIN — MICONAZOLE NITRATE: 20 CREAM TOPICAL at 08:05

## 2024-05-05 RX ADMIN — SENNOSIDES AND DOCUSATE SODIUM 1 TABLET: 8.6; 5 TABLET ORAL at 08:05

## 2024-05-05 RX ADMIN — ASPIRIN 81 MG: 81 TABLET, COATED ORAL at 08:05

## 2024-05-05 RX ADMIN — CLINDAMYCIN PHOSPHATE: 10 GEL TOPICAL at 08:05

## 2024-05-05 RX ADMIN — ATORVASTATIN CALCIUM 40 MG: 40 TABLET, FILM COATED ORAL at 08:05

## 2024-05-05 NOTE — PLAN OF CARE
Problem: Fall Injury Risk  Goal: Absence of Fall and Fall-Related Injury  Outcome: Progressing  Intervention: Identify and Manage Contributors  Flowsheets (Taken 5/5/2024 0046)  Self-Care Promotion:   independence encouraged   BADL personal objects within reach  Medication Review/Management: medications reviewed  Intervention: Promote Injury-Free Environment  Flowsheets (Taken 5/5/2024 0046)  Safety Promotion/Fall Prevention:   assistive device/personal item within reach   nonskid shoes/socks when out of bed   instructed to call staff for mobility   side rails raised x 2   room near unit station

## 2024-05-05 NOTE — ASSESSMENT & PLAN NOTE
Acute on chronic heart failure with preserved ejection fraction (HFpEF)   Anasarca      54 yo F admitted for ADHF 2/2 severe MR. The MR appears to be secondary to posterior leaflet restriction and leaflet length of 1.1 cm. BNP on admission was 4300 compared to 350 eight months prior. Initially diagnosed with severe MR in June of 2023. Saw Dr. Stone in January of 2024 and was undergoing work-up for possible MitraClip; [facility] scheduled for 3/15 but not completed due to financial constraints, also needs POOJA. Will need optimization of kidney function prior to angiogram consideration, appreciate Nephrology assistance. APS serologies negative, Fabry workup negative     DDX: rheumatic heart disease vs Fabry vs antiphospholipid (serologies negative)     4/1/2024 TTE    Left Ventricle: The left ventricle is normal in size. Normal wall thickness. Normal wall motion. There is normal systolic function with a visually estimated ejection fraction of 60 - 65%. Grade II diastolic dysfunction.    Right Ventricle: Normal right ventricular cavity size. Wall thickness is normal. Right ventricle wall motion  is normal. Systolic function is reduced.    Left Atrium: Left atrium is very  severely dilated.    Right Atrium: Right atrium is mildly dilated.    Aortic Valve: There is mild aortic valve sclerosis. There is normal leaflet mobility. There is trace aortic regurgitation.    Mitral Valve: There is mild bileaflet sclerosis. There is posterior leaflet tethering and failure of complete coaptation. There is very severe regurgitation with a posterolateral eccentriccally directed jet.    Tricuspid Valve: The tricuspid valve is structurally normal. There is normal leaflet mobility. There is moderate to severe regurgitation.    IVC/SVC: IVC was not well visualized due to poor acoustic window. Intermediate venous pressure at 8 mmHg.    Pericardium: There is a trivial effusion posteriorly and small under the RA.     - Interventional  "Cardiology consulted - no intervention until patient's volume status is controlled  - Nephrology following, undergoing SLED/CRRT  - POOJA ordered - "Anesthesia evaluated the patient and feel that she needs better optimization prior to POOJA. They stated once she is euvolemic that we can move forward. Will need a new POOJA order at that time."  - Discontinued Diuril 500 mg IV q24h & Lasix gtt @ 40 mg/hr  - Discontinued hydralazine 25 mg po q8h & Isordil 20 mg po BID for afterload reduction due to pressor requirements  - Continue home ASA 81 mg po daily and Lipitor 40 mg po daily  - Genetics consulted, see "Hypertrophic cardiomyopathy"  - Strict I/O, Jin in place  - Transitioned to lasix 40mg PO daily. Reassess daily for diuretic use.   - Repeat TTE reviewed- IC consulted- not a candidate for mitraclip- recommended outpatient f/u- continued fluid removal/fluid status optimization via HD- up-titration of GDMT as tolerated  - Will also need OP IC follow up.   - Telemetry monitoring  "

## 2024-05-05 NOTE — PROGRESS NOTES
[facility] - Cardiology [facility] Medicine  Progress Note    Patient Name: Xena Vera  MRN: [id number]  Patient Class: IP- Inpatient   Admission Date: 4/1/2024  Length of Stay: 32 days  Attending Physician: Rosario Luciano MD  Primary Care Provider: Tami Villeda FNP        Subjective:     Principal Problem:Severe mitral valve regurgitation        HPI:  Per MICU: 52 yo F with PMH of HFpEF, severe MR, HTN, HLD, and CKD4 who initially presented to [facility] [place] [place] on 4/1/2024 after a mechanical fall at home with concurrent SOB and swelling in her legs and abdomen. She reported adherence to home Lasix and low sodium diet, but had felt increasingly SOB prior to admission as she was sleeping upright and had multiple nighttime awakenings due to SOB. In the ED, BNP was 4639 and she was admitted to Hospital Medicine for further management of ADHF. She initially had good response to IV Lasix but hospital course was later complicated by urinary retention, worsening kidney function, and decreased UOP. Nephrology and Cardiology were consulted to assist. She was started on Diuril and Lasix gtt for diuresis and Isordil and hydralazine for afterload reduction. Patient transferred to CCU for further management.        Overview/Hospital Course:  MICU course  Patient initially had improved UOP with increasing Lasix, eventually maxed out on Lasix drip with addition of Diuril. Eventually started on CRRT/SLED for volume removal, initially needed intermittent levophed to tolerate. Has itchy rash that started during current hospitalization, Dermatology consulted for regimen. Graduated from CRRT to iHD, tolerating well. Stable to step back down to HM.      course  Patient slightly volume overloaded on exam. Reports making urine. Transition to lasix 40mg PO daily. More fluid removal via HD. Will need OP HD chair. SW consulted. Repeat TTE ordered for eval for MR. Discuss plans with IC once repeat TTE is obtained.  Will also need OP IC  follow up. Nephro rec consulting interventional nephro for TDC placement. She underwent TDC placement 04/22. Repeat TTE w/the following findings:      Left Ventricle: The left ventricle is moderately dilated. Normal wall thickness. There is eccentric hypertrophy. There is normal systolic function with a visually estimated ejection fraction of 55 - 60%. Diastolic function cannot be reliably determined in the presence of mitral valve disease.    Right Ventricle: Mild right ventricular enlargement. Wall thickness is normal. Right ventricle wall motion  is normal. Systolic function is mildly reduced.    Biatrial enlargement (L > R)    Mitral Valve: There is severe functional regurgitation with a centrally directed jet.    Tricuspid Valve: There is mild to moderate regurgitation.    Pulmonary Artery: The estimated pulmonary artery systolic pressure is 96 mmHg.    IVC/SVC: Intermediate venous pressure at 8 mmHg.    There is a small pericardial effusion and a right pleural effusion.    Interventional cardiology consulted- recommended further medical management optimization and continued fluid removal via iHD. Not a candidate for mitraclip at this time. Intermittent asymptomatic hypotension for which diuretic regimen titrated w/ initiation of midodrine. Awaiting Medicaid approval for HD chair set-up.      Interval History: No issues overnight. No sob, chest pain, nausea, vomiting, fevers, chills, night sweats.  utilized- [facility] #[id number]    Objective:     Vital Signs (Most Recent):  Temp: 97.9 °F (36.6 °C) (05/05/24 1148)  Pulse: 62 (05/05/24 1148)  Resp: 19 (05/05/24 1148)  BP: 118/88 (05/05/24 1148)  SpO2: 95 % (05/05/24 1148) Vital Signs (24h Range):  Temp:  [97.9 °F (36.6 °C)-98.3 °F (36.8 °C)] 97.9 °F (36.6 °C)  Pulse:  [61-64] 62  Resp:  [18-19] 19  SpO2:  [94 %-100 %] 95 %  BP: (106-125)/(64-88) 118/88     Weight: 63.2 kg (139 lb 5.3 oz)  Body mass index is 27.35 kg/m².    Intake/Output Summary (Last 24  "hours) at 5/5/2024 1255  Last data filed at 5/5/2024 0456  Gross per 24 hour   Intake 762 ml   Output 650 ml   Net 112 ml      Physical Exam  Gen: in NAD, appears stated age, appears acutely ill   Neuro: AAOx3, motor, sensory, and strength grossly intact BL  HEENT: NTNC, EOMI, PERRL, MMM  CVS: RRR, no m/r/g; S1/S2 auscultated with no S3 or S4; capillary refill < 2 sec  Chest: TDC of the right chest wall  Resp: lungs CTAB, no w/r/r; no belabored breathing or accessory muscle use appreciated   Abd: BS+ in all 4 quadrants; NTND, soft to palpation; no organomegaly appreciated   Extrem: pulses full, equal, and regular over all 4 extremities; trace swelling of BL LE, no swelling of dorsum of feet  Skin: healing abrasion of the left calf    Significant Labs: All pertinent labs within the past 24 hours have been reviewed.  CBC:   No results for input(s): "WBC", "HGB", "HCT", "PLT" in the last 48 hours.    CMP:   No results for input(s): "NA", "K", "CL", "CO2", "GLU", "BUN", "CREATININE", "CALCIUM", "PROT", "ALBUMIN", "BILITOT", "ALKPHOS", "AST", "ALT", "ANIONGAP", "EGFRNONAA" in the last 48 hours.    Invalid input(s): "ESTGFAFRICA"      Significant Imaging: I have reviewed all pertinent imaging results/findings within the past 24 hours.    Assessment/Plan:      * Severe mitral valve regurgitation    Acute on chronic heart failure with preserved ejection fraction (HFpEF)   Anasarca      52 yo F admitted for ADHF 2/2 severe MR. The MR appears to be secondary to posterior leaflet restriction and leaflet length of 1.1 cm. BNP on admission was 4300 compared to 350 eight months prior. Initially diagnosed with severe MR in June of 2023. Saw Dr. Stone in January of 2024 and was undergoing work-up for possible MitraClip; [facility] scheduled for 3/15 but not completed due to financial constraints, also needs POOJA. Will need optimization of kidney function prior to angiogram consideration, appreciate Nephrology assistance. APS serologies " "negative, Fabry workup negative     DDX: rheumatic heart disease vs Fabry vs antiphospholipid (serologies negative)     4/1/2024 TTE    Left Ventricle: The left ventricle is normal in size. Normal wall thickness. Normal wall motion. There is normal systolic function with a visually estimated ejection fraction of 60 - 65%. Grade II diastolic dysfunction.    Right Ventricle: Normal right ventricular cavity size. Wall thickness is normal. Right ventricle wall motion  is normal. Systolic function is reduced.    Left Atrium: Left atrium is very  severely dilated.    Right Atrium: Right atrium is mildly dilated.    Aortic Valve: There is mild aortic valve sclerosis. There is normal leaflet mobility. There is trace aortic regurgitation.    Mitral Valve: There is mild bileaflet sclerosis. There is posterior leaflet tethering and failure of complete coaptation. There is very severe regurgitation with a posterolateral eccentriccally directed jet.    Tricuspid Valve: The tricuspid valve is structurally normal. There is normal leaflet mobility. There is moderate to severe regurgitation.    IVC/SVC: IVC was not well visualized due to poor acoustic window. Intermediate venous pressure at 8 mmHg.    Pericardium: There is a trivial effusion posteriorly and small under the RA.     - Interventional Cardiology consulted - no intervention until patient's volume status is controlled  - Nephrology following, undergoing SLED/CRRT  - POOJA ordered - "Anesthesia evaluated the patient and feel that she needs better optimization prior to POOJA. They stated once she is euvolemic that we can move forward. Will need a new POOJA order at that time."  - Discontinued Diuril 500 mg IV q24h & Lasix gtt @ 40 mg/hr  - Discontinued hydralazine 25 mg po q8h & Isordil 20 mg po BID for afterload reduction due to pressor requirements  - Continue home ASA 81 mg po daily and Lipitor 40 mg po daily  - Genetics consulted, see "Hypertrophic cardiomyopathy"  - Strict " "I/O, Jni in place  - Transitioned to lasix 40mg PO daily. Reassess daily for diuretic use.   - Repeat TTE reviewed- IC consulted- not a candidate for mitraclip- recommended outpatient f/u- continued fluid removal/fluid status optimization via HD- up-titration of GDMT as tolerated  - Will also need OP IC follow up.   - Telemetry monitoring    Hypophosphatemia  - 2/2 renal dysfunction    Hyponatremia  Patient has hyponatremia which is uncontrolled,We will aim to correct the sodium by 4-6mEq in 24 hours. We will monitor sodium Daily. The hyponatremia is due to renal insufficiency. We will obtain the following studies: no new labs at this moment. We will treat the hyponatremia with Fluid restriction of:  1.5 liter per day and Hemodialysis. The patient's sodium results have been reviewed and are listed below.  No results for input(s): "NA" in the last 24 hours.      Intertrigo  - improvement       Rash  - derm initially consulted- concern for resolving folliculitis- no significant improvement  - triamcinolone cream today-monitor for improvement- if no improvement, then will re-consult derm    Proteinuria  - h/o      Hypertrophic cardiomyopathy  Angiokeratoma   Proteinuria      - Genetics consulted, appreciate recs  - GLA genetic testing collected and sent to [facility]- neg for fabry's disease  - Consider cardiac MRI      Angiokeratoma        Prediabetes  - h/o    HLD (hyperlipidemia)  - continue statin    Acute kidney injury superimposed on chronic kidney disease  Patient with acute kidney injury/acute renal failure likely due to acute tubular necrosis caused by unknown.  REGINALDO is currently  improving on CRRT/SLED . Baseline creatinine  2.5  - Labs reviewed- Renal function/electrolytes with Estimated Creatinine Clearance: 80.9 mL/min (based on SCr of 0.7 mg/dL). according to latest data. Monitor urine output and serial BMP and adjust therapy as needed. Avoid nephrotoxins and renally dose meds for GFR listed above.      " "     Recent Labs     04/17/24  1428 04/17/24  2203 04/18/24  0413   CREATININE 1.8*  1.8* 0.8  0.8 0.7  0.7  0.7         - Nephrology following, appreciate josie - started SLED on 4/14   - Genetics consulted, see "Hypertrophic cardiomyopathy"  - Holding home sevelamer   - Holding home sodium bicarb 650 mg po BID  - HD per nephrology. Will need OP HD chair setup.   - Nephro following- consulted interventional nephrology- TD 04/22   - Midodrine 2.5mg BID (w/ holding parameters) + 5mg PRN w/ HD   - awaiting medicaid enrollment to set-up outpatient HD chair        Acute urinary retention  - now w/acute renal failure requiring iHD  - no catheter in place       Leg wound, left  - wound care following    Anasarca  - 2/2 acute renal failure in setting of CHF w/severe MR       Prolonged QT interval  - resolved    Acute on chronic heart failure with preserved ejection fraction (HFpEF)  - Interval history and physical exam findings as described above  - Most recent TTE results:  Results for orders placed during the hospital encounter of 04/01/24    Echo    Interpretation Summary    Left Ventricle: The left ventricle is normal in size. Normal wall thickness. Normal wall motion. There is normal systolic function with a visually estimated ejection fraction of 60 - 65%. Grade II diastolic dysfunction.    Right Ventricle: Normal right ventricular cavity size. Wall thickness is normal. Right ventricle wall motion  is normal. Systolic function is reduced.    Left Atrium: Left atrium is very  severely dilated.    Right Atrium: Right atrium is mildly dilated.    Aortic Valve: There is mild aortic valve sclerosis. There is normal leaflet mobility. There is trace aortic regurgitation.    Mitral Valve: There is mild bileaflet sclerosis. There is posterior leaflet tethering and failure of complete coaptation. There is very severe regurgitation with a posterolateral eccentriccally directed jet.    Tricuspid Valve: The tricuspid valve is " structurally normal. There is normal leaflet mobility. There is moderate to severe regurgitation.    IVC/SVC: IVC was not well visualized due to poor acoustic window. Intermediate venous pressure at 8 mmHg.    Pericardium: There is a trivial effusion posteriorly and small under the RA.    - Clinically volume overloaded on admission  - now w/iHD needs  - Will continue diuresis with lasix 80mg PO BID  - Continue home cardioprudent regimen  - Strict I/Os  - 1.5L fluid restriction   - Daily weights  - Will continue to monitor on tele    Anemia  Stable.   Recent Labs   Lab 04/29/24  0508 05/01/24  0235 05/03/24  0228   WBC 4.04 4.25 3.70*   HGB 8.4* 8.1* 8.4*   HCT 29.7* 27.7* 29.4*    244 235         Class 2 obesity in adult  Body mass index is 27.35 kg/m². Morbid obesity complicates all aspects of disease management from diagnostic modalities to treatment. Weight loss encouraged and health benefits explained to patient.     Essential hypertension     Temp:  [98 °F (36.7 °C)-98.5 °F (36.9 °C)]   Pulse:  [55-62]   Resp:  [9-40]   BP: (94)/(53)   SpO2:  [94 %-100 %]   Arterial Line BP: ()/(44-66) .     While in the hospital, will manage blood pressure as follows; Adjust home antihypertensive regimen as follows- holding Lasix and Coreg     Will utilize PRN blood pressure medication only if patient's blood pressure greater than 180/110 and she develops symptoms such as worsening chest pain or shortness of breath.         VTE Risk Mitigation (From admission, onward)           Ordered     heparin (porcine) injection 5,000 Units  Every 8 hours         04/24/24 1546     heparin (porcine) injection 1,000 Units  As needed (PRN)         04/24/24 1038     heparin (porcine) injection 1,000 Units  As needed (PRN)         04/13/24 1428     IP VTE HIGH RISK PATIENT  Once         04/01/24 1030     Place sequential compression device  Until discontinued         04/01/24 1030                    Discharge Planning   RILEY:  5/6/2024     Code Status: Full Code   Is the patient medically ready for discharge?: No    Reason for patient still in hospital (select all that apply): Patient trending condition  Discharge Plan A: Home with family   Discharge Delays: (!) Dialysis Set-up                Rosario Luciano MD  Department of Hospital Medicine   [facility] - Cardiology Stepdown

## 2024-05-05 NOTE — PLAN OF CARE
Problem: Adult Inpatient Plan of Care  Goal: Plan of Care Review  Outcome: Progressing  Goal: Patient-Specific Goal (Individualized)  Outcome: Progressing  Goal: Absence of Hospital-Acquired Illness or Injury  Outcome: Progressing  Goal: Optimal Comfort and Wellbeing  Outcome: Progressing  Goal: Readiness for Transition of Care  Outcome: Progressing     Problem: Infection  Goal: Absence of Infection Signs and Symptoms  Outcome: Progressing     Problem: Impaired Wound Healing  Goal: Optimal Wound Healing  Outcome: Progressing     Problem: Fluid and Electrolyte Imbalance (Acute Kidney Injury/Impairment)  Goal: Fluid and Electrolyte Balance  Outcome: Progressing     Problem: Oral Intake Inadequate (Acute Kidney Injury/Impairment)  Goal: Optimal Nutrition Intake  Outcome: Progressing     Problem: Renal Function Impairment (Acute Kidney Injury/Impairment)  Goal: Effective Renal Function  Outcome: Progressing     Problem: Device-Related Complication Risk (Hemodialysis)  Goal: Safe, Effective Therapy Delivery  Outcome: Progressing     Problem: Hemodynamic Instability (Hemodialysis)  Goal: Effective Tissue Perfusion  Outcome: Progressing     Problem: Infection (Hemodialysis)  Goal: Absence of Infection Signs and Symptoms  Outcome: Progressing     Problem: Skin Injury Risk Increased  Goal: Skin Health and Integrity  Outcome: Progressing     Problem: Device-Related Complication Risk (CRRT (Continuous Renal Replacement Therapy))  Goal: Safe, Effective Therapy Delivery  Outcome: Progressing     Problem: Hypothermia (CRRT (Continuous Renal Replacement Therapy))  Goal: Body Temperature Maintained in Desired Range  Outcome: Progressing     Problem: Infection (CRRT (Continuous Renal Replacement Therapy))  Goal: Absence of Infection Signs and Symptoms  Outcome: Progressing     Problem: Fall Injury Risk  Goal: Absence of Fall and Fall-Related Injury  Outcome: Progressing     Problem: Chronic Kidney Disease  Goal: Electrolyte  Balance  Outcome: Progressing  Goal: Fluid Balance  Outcome: Progressing

## 2024-05-05 NOTE — ASSESSMENT & PLAN NOTE
Angiokeratoma   Proteinuria      - Genetics consulted, appreciate recs  - GLA genetic testing collected and sent to [facility]- neg for fabry's disease  - Consider cardiac MRI

## 2024-05-05 NOTE — ASSESSMENT & PLAN NOTE
Temp:  [98 °F (36.7 °C)-98.5 °F (36.9 °C)]   Pulse:  [55-62]   Resp:  [9-40]   BP: (94)/(53)   SpO2:  [94 %-100 %]   Arterial Line BP: ()/(44-66) .     While in the hospital, will manage blood pressure as follows; Adjust home antihypertensive regimen as follows- holding Lasix and Coreg     Will utilize PRN blood pressure medication only if patient's blood pressure greater than 180/110 and she develops symptoms such as worsening chest pain or shortness of breath.

## 2024-05-05 NOTE — SUBJECTIVE & OBJECTIVE
"Interval History: No issues overnight. No sob, chest pain, nausea, vomiting, fevers, chills, night sweats.  utilized- [facility] #[id number]    Objective:     Vital Signs (Most Recent):  Temp: 97.9 °F (36.6 °C) (05/05/24 1148)  Pulse: 62 (05/05/24 1148)  Resp: 19 (05/05/24 1148)  BP: 118/88 (05/05/24 1148)  SpO2: 95 % (05/05/24 1148) Vital Signs (24h Range):  Temp:  [97.9 °F (36.6 °C)-98.3 °F (36.8 °C)] 97.9 °F (36.6 °C)  Pulse:  [61-64] 62  Resp:  [18-19] 19  SpO2:  [94 %-100 %] 95 %  BP: (106-125)/(64-88) 118/88     Weight: 63.2 kg (139 lb 5.3 oz)  Body mass index is 27.35 kg/m².    Intake/Output Summary (Last 24 hours) at 5/5/2024 1255  Last data filed at 5/5/2024 0456  Gross per 24 hour   Intake 762 ml   Output 650 ml   Net 112 ml      Physical Exam  Gen: in NAD, appears stated age, appears acutely ill   Neuro: AAOx3, motor, sensory, and strength grossly intact BL  HEENT: NTNC, EOMI, PERRL, MMM  CVS: RRR, no m/r/g; S1/S2 auscultated with no S3 or S4; capillary refill < 2 sec  Chest: TDC of the right chest wall  Resp: lungs CTAB, no w/r/r; no belabored breathing or accessory muscle use appreciated   Abd: BS+ in all 4 quadrants; NTND, soft to palpation; no organomegaly appreciated   Extrem: pulses full, equal, and regular over all 4 extremities; trace swelling of BL LE, no swelling of dorsum of feet  Skin: healing abrasion of the left calf    Significant Labs: All pertinent labs within the past 24 hours have been reviewed.  CBC:   No results for input(s): "WBC", "HGB", "HCT", "PLT" in the last 48 hours.    CMP:   No results for input(s): "NA", "K", "CL", "CO2", "GLU", "BUN", "CREATININE", "CALCIUM", "PROT", "ALBUMIN", "BILITOT", "ALKPHOS", "AST", "ALT", "ANIONGAP", "EGFRNONAA" in the last 48 hours.    Invalid input(s): "ESTGFAFRICA"      Significant Imaging: I have reviewed all pertinent imaging results/findings within the past 24 hours.  "

## 2024-05-05 NOTE — ASSESSMENT & PLAN NOTE
Body mass index is 27.35 kg/m². Morbid obesity complicates all aspects of disease management from diagnostic modalities to treatment. Weight loss encouraged and health benefits explained to patient.

## 2024-05-06 LAB
ALBUMIN SERPL BCP-MCNC: 3.1 G/DL (ref 3.5–5.2)
ANION GAP SERPL CALC-SCNC: 12 MMOL/L (ref 8–16)
BUN SERPL-MCNC: 21 MG/DL (ref 6–20)
CALCIUM SERPL-MCNC: 8.7 MG/DL (ref 8.7–10.5)
CHLORIDE SERPL-SCNC: 93 MMOL/L (ref 95–110)
CO2 SERPL-SCNC: 22 MMOL/L (ref 23–29)
CREAT SERPL-MCNC: 3.5 MG/DL (ref 0.5–1.4)
ERYTHROCYTE [DISTWIDTH] IN BLOOD BY AUTOMATED COUNT: 24.1 % (ref 11.5–14.5)
EST. GFR  (NO RACE VARIABLE): 14.9 ML/MIN/1.73 M^2
GLUCOSE SERPL-MCNC: 80 MG/DL (ref 70–110)
HCT VFR BLD AUTO: 30.4 % (ref 37–48.5)
HGB BLD-MCNC: 8.7 G/DL (ref 12–16)
MAGNESIUM SERPL-MCNC: 1.6 MG/DL (ref 1.6–2.6)
MCH RBC QN AUTO: 24.5 PG (ref 27–31)
MCHC RBC AUTO-ENTMCNC: 28.6 G/DL (ref 32–36)
MCV RBC AUTO: 86 FL (ref 82–98)
PHOSPHATE SERPL-MCNC: 4.1 MG/DL (ref 2.7–4.5)
PLATELET # BLD AUTO: 237 K/UL (ref 150–450)
PMV BLD AUTO: 11.4 FL (ref 9.2–12.9)
POTASSIUM SERPL-SCNC: 3.7 MMOL/L (ref 3.5–5.1)
RBC # BLD AUTO: 3.55 M/UL (ref 4–5.4)
SODIUM SERPL-SCNC: 127 MMOL/L (ref 136–145)
WBC # BLD AUTO: 3.21 K/UL (ref 3.9–12.7)

## 2024-05-06 PROCEDURE — 63600175 PHARM REV CODE 636 W HCPCS: Performed by: STUDENT IN AN ORGANIZED HEALTH CARE EDUCATION/TRAINING PROGRAM

## 2024-05-06 PROCEDURE — 20600001 HC STEP DOWN PRIVATE ROOM

## 2024-05-06 PROCEDURE — 36415 COLL VENOUS BLD VENIPUNCTURE: CPT | Performed by: STUDENT IN AN ORGANIZED HEALTH CARE EDUCATION/TRAINING PROGRAM

## 2024-05-06 PROCEDURE — 85027 COMPLETE CBC AUTOMATED: CPT | Performed by: STUDENT IN AN ORGANIZED HEALTH CARE EDUCATION/TRAINING PROGRAM

## 2024-05-06 PROCEDURE — 25000003 PHARM REV CODE 250: Performed by: STUDENT IN AN ORGANIZED HEALTH CARE EDUCATION/TRAINING PROGRAM

## 2024-05-06 PROCEDURE — 90935 HEMODIALYSIS ONE EVALUATION: CPT

## 2024-05-06 PROCEDURE — 97110 THERAPEUTIC EXERCISES: CPT

## 2024-05-06 PROCEDURE — 80069 RENAL FUNCTION PANEL: CPT | Performed by: STUDENT IN AN ORGANIZED HEALTH CARE EDUCATION/TRAINING PROGRAM

## 2024-05-06 PROCEDURE — 83735 ASSAY OF MAGNESIUM: CPT | Performed by: STUDENT IN AN ORGANIZED HEALTH CARE EDUCATION/TRAINING PROGRAM

## 2024-05-06 PROCEDURE — 25000003 PHARM REV CODE 250

## 2024-05-06 PROCEDURE — 25000003 PHARM REV CODE 250: Performed by: PHYSICIAN ASSISTANT

## 2024-05-06 PROCEDURE — 99233 SBSQ HOSP IP/OBS HIGH 50: CPT | Mod: ,,, | Performed by: HOSPITALIST

## 2024-05-06 RX ORDER — HEPARIN SODIUM 1000 [USP'U]/ML
1000 INJECTION, SOLUTION INTRAVENOUS; SUBCUTANEOUS
Status: DISPENSED | OUTPATIENT
Start: 2024-05-06 | End: 2024-05-07

## 2024-05-06 RX ORDER — SODIUM CHLORIDE 9 MG/ML
INJECTION, SOLUTION INTRAVENOUS ONCE
Status: COMPLETED | OUTPATIENT
Start: 2024-05-06 | End: 2024-05-06

## 2024-05-06 RX ADMIN — HEPARIN SODIUM 5000 UNITS: 5000 INJECTION INTRAVENOUS; SUBCUTANEOUS at 05:05

## 2024-05-06 RX ADMIN — ASPIRIN 81 MG: 81 TABLET, COATED ORAL at 09:05

## 2024-05-06 RX ADMIN — ATORVASTATIN CALCIUM 40 MG: 40 TABLET, FILM COATED ORAL at 09:05

## 2024-05-06 RX ADMIN — CARVEDILOL 6.25 MG: 6.25 TABLET, FILM COATED ORAL at 09:05

## 2024-05-06 RX ADMIN — FUROSEMIDE 80 MG: 80 TABLET ORAL at 09:05

## 2024-05-06 RX ADMIN — CLINDAMYCIN PHOSPHATE: 10 GEL TOPICAL at 09:05

## 2024-05-06 RX ADMIN — ZINC OXIDE: 200 OINTMENT TOPICAL at 09:05

## 2024-05-06 RX ADMIN — MICONAZOLE NITRATE: 20 CREAM TOPICAL at 09:05

## 2024-05-06 RX ADMIN — SENNOSIDES AND DOCUSATE SODIUM 1 TABLET: 8.6; 5 TABLET ORAL at 09:05

## 2024-05-06 RX ADMIN — OXYBUTYNIN CHLORIDE 5 MG: 5 TABLET ORAL at 09:05

## 2024-05-06 RX ADMIN — POLYETHYLENE GLYCOL 3350 17 G: 17 POWDER, FOR SOLUTION ORAL at 09:05

## 2024-05-06 RX ADMIN — TRIAMCINOLONE ACETONIDE: 1 CREAM TOPICAL at 09:05

## 2024-05-06 RX ADMIN — DIPHENHYDRAMINE HYDROCHLORIDE, ZINC ACETATE: 2; .1 CREAM TOPICAL at 09:05

## 2024-05-06 RX ADMIN — HEPARIN SODIUM 5000 UNITS: 5000 INJECTION INTRAVENOUS; SUBCUTANEOUS at 09:05

## 2024-05-06 RX ADMIN — MIDODRINE HYDROCHLORIDE 2.5 MG: 2.5 TABLET ORAL at 09:05

## 2024-05-06 RX ADMIN — Medication: at 09:05

## 2024-05-06 RX ADMIN — SODIUM CHLORIDE: 0.9 INJECTION, SOLUTION INTRAVENOUS at 02:05

## 2024-05-06 RX ADMIN — HEPARIN SODIUM 1000 UNITS: 1000 INJECTION, SOLUTION INTRAVENOUS; SUBCUTANEOUS at 06:05

## 2024-05-06 NOTE — PLAN OF CARE
Problem: Fall Injury Risk  Goal: Absence of Fall and Fall-Related Injury  Outcome: Progressing  Intervention: Identify and Manage Contributors  Flowsheets (Taken 5/6/2024 0040)  Self-Care Promotion: independence encouraged  Medication Review/Management: medications reviewed  Intervention: Promote Injury-Free Environment  Flowsheets (Taken 5/6/2024 0040)  Safety Promotion/Fall Prevention:   assistive device/personal item within reach   instructed to call staff for mobility   side rails raised x 2   high risk medications identified   room near unit station

## 2024-05-06 NOTE — PROGRESS NOTES
3.5hr HD completed. 2L removed. RIJ TDC heparin locked. Report given to Hayley DENNEY. VSS. Pt transported back to room via wheelchair.

## 2024-05-06 NOTE — PT/OT/SLP PROGRESS
Physical Therapy Treatment    Patient Name:  Xena Vera   MRN:  [id number]  Admitting Diagnosis:  Severe mitral valve regurgitation   Recent Surgery: Procedure(s) (LRB):  Insertion, Catheter, Central Venous, Hemodialysis (Right) 14 Days Post-Op  Admit Date: 4/1/2024  Length of Stay: 33 days    Recommendations:     Discharge Recommendations:    No Therapy Indicated  Discharge Equipment Recommendations: none   Barriers to discharge: None    Appropriate transfer level with nursing staff: Ambulatory with SBA    Plan:     During this hospitalization, patient to be seen 2 x/week to address the identified rehab impairments via gait training, therapeutic activities, therapeutic exercises, neuromuscular re-education and progress towards the established goals.  Plan of Care Expires:  05/18/24  Plan of Care Reviewed with: patient    Assessment:     Xena Vera is a 53 y.o. female admitted with a medical diagnosis of Severe mitral valve regurgitation. Pt found sitting on couch agreeable to therapy session and stair navigation this session. Pt able to complete x1 flights of stairs but continues to require CGA-min A with stair navigation 2/2 decreased BLE muscular strength and endurance. Pt steady with no LOB during gait trial. Pt educated on LE therex to perform in room to increase BLE strength to assist with stair navigation. Patient would benefit from skilled therapy services to maximize safety and independence, increase activity tolerance, decrease fall risk, decrease caregiver burden, improve QOL, improve patient's functional mobility, and decrease risk of contractures and pressure sores.    Problem List: weakness, impaired endurance, impaired functional mobility, gait instability, impaired cardiopulmonary response to activity.  Rehab Prognosis: Good; patient would benefit from acute skilled PT services to address these deficits and reach maximum level of function.      Goals:   Multidisciplinary Problems        "Physical Therapy Goals          Problem: Physical Therapy    Goal Priority Disciplines Outcome Goal Variances Interventions   Physical Therapy Goal     PT, PT/OT Progressing     Description: Goals to be met by: 24    Patient will increase functional independence with mobility by performin. Supine to sit with Modified [place]  2. Sit to supine with Modified [place]  3. Sit to stand transfer with Supervision- goal met   4. Bed to chair transfer with Supervision using Rolling Walker  5. Gait  x 100 feet with Supervision using Rolling Walker- goal met   6. Ascend/descend 5 stair with bilateral Handrails Supervision using No Assistive Device.   7. Lower extremity exercise program x15 reps per handout, with assistance as needed                         Subjective     RN notified prior to session. No one present upon PT entrance into room. Patient agreeable to PT treatment session.    Chief Complaint: "Ill do whatever you want for me to get stronger"  Patient/Family Comments/goals: go home  Pain/Comfort:  Pain Rating 1: 0/10  Pain Rating Post-Intervention 1: 0/10      Objective:     Interpretor Tonia #[id number] used via iPad throughout session    Patient found sitting edge of bed with: Other (comments) (no active lines)   Cognition:   Alert and Cooperative  Patient is oriented to Person, Place, Time, Situation  General Precautions: Standard, Cardiac fall   Orthopedic Precautions:N/A   Braces: N/A   Body mass index is 27.27 kg/m².  Oxygen Device: Room Air  Vitals: /70   Pulse 60   Temp 97.3 °F (36.3 °C)   Resp 20   Ht 4' 11.84" (1.52 m)   Wt 63 kg (138 lb 14.2 oz)   SpO2 97%   Breastfeeding No   BMI 27.27 kg/m²     Outcome Measures:  AM-PAC 6 CLICK MOBILITY  Turning over in bed (including adjusting bedclothes, sheets and blankets)?: 4  Sitting down on and standing up from a chair with arms (e.g., wheelchair, bedside commode, etc.): 4  Moving from lying on back to sitting on the side of " the bed?: 4  Moving to and from a bed to a chair (including a wheelchair)?: 3  Need to walk in hospital room?: 3  Climbing 3-5 steps with a railing?: 3  Basic Mobility Total Score: 21     Functional Mobility:    Bed Mobility:   Pt found/returned to bedside chair    Transfers:   Sit <> Stand Transfer: independence with no assistive device     Balance:  Standing:  Static: independence  Dynamic: supervision      Gait:  Patient ambulated: 200'   Patient required: supervision  Patient used:  no assistive device   Gait Deviation(s): steady gait, flexed posture, and decreased abigail  all lines remained intact throughout ambulation trial  Gait belt utilized  Comments: Patient steady with no LOB. Pt with minor fatigue after gait and stair trials but recovered quickly with seated rest break.     Stairs:  Pt ascended/descended 1 flight(s) with No Assistive Device with right handrail with Contact Guard Assistance and Minimal Assistance.   Pt required min A during ~3 last steps of ascent 2/2 fatigue and muscular weakne; CGA required for rest of trial.  Pt with BUE on HR during ascent/descent    Therapeutic Exercise:  Pt completed:  1 set x10 reps sit to stand on couch with SBA  1 set x10 reps standing marches with B LE  1 set x10 reps seated: LAQs, AP, hip abduction  Pt require skilled cueing and intervention to complete exercises listed above. Seated rest break between exercises.     Education:  Time provided for education, counseling and discussion of health disposition in regards to patient's current status  All questions answered within PT scope of practice and to patient's satisfaction  PT role in POC to address current functional deficits    Patient left sitting edge of bed with all lines intact and call button in reach.    Time Tracking:     PT Received On: 05/06/24  PT Start Time: 1021     PT Stop Time: 1040  PT Total Time (min): 19 min     Billable Minutes:   Therapeutic Exercise 19 minutes    Treatment Type:  Treatment  PT/PTA: PT       5/6/2024

## 2024-05-06 NOTE — PT/OT/SLP PROGRESS
Occupational Therapy      Patient Name:  Xena Vera   MRN:  [id number]    Patient not seen today secondary to Dialysis. Will follow-up as scheduled per OT POC.    5/6/2024

## 2024-05-06 NOTE — PLAN OF CARE
Problem: Oral Intake Inadequate (Acute Kidney Injury/Impairment)  Goal: Optimal Nutrition Intake  Outcome: Progressing     Problem: Fluid and Electrolyte Imbalance (Acute Kidney Injury/Impairment)  Goal: Fluid and Electrolyte Balance  Outcome: Progressing

## 2024-05-06 NOTE — ASSESSMENT & PLAN NOTE
CKD IV (baseline creatinine ~3.1-3.3) admitted with hypervolemia and REGINALDO with creatinine 4.2  UA showed +1 protein with UPCR of ~500 mg  Urinary sediment with non dysmorphic RBCs and WBCs present although Jin catheter placed the day prior to UA  Retroperitoneal US without evidence of hydronephrosis although changes consistent with chronic kidney disease  Diuresis with lasix gtt @ 40mg/hr + Diuril 500mg IV BID attempted, and while Crt improved she remains unable to tolerate lying flat.  Tolerated 8h SLED w goal UF rate 200-500/hr successfully overnight 4/14-15, then 12h SLED on 4/15, 16, and 17.  4/19 - 4/24: tolerated iHD; no issues  4/22: TDC placed  4/22 - 5/3: Tolerating iHD, last session 5/1; pending outpatient HD chair.  5/3: Case escalated given difficulty procuring outpatient HD chair.    Plan/Recommendations:  - CKD IV (baseline creatinine ~3.1-3.3) admitted with hypervolemia and REGINALDO with creatinine 4.2  - UA showed +1 protein with UPCR of ~500 mg  - urinary sediment with non dysmorphic RBCs and WBCs present although Jin catheter just place yesterday   - plan for iHD today with UF of 1-2 liters as tolerated  - can continue Lasix 80 mg BID PO  - daily RFPs and magnesium levels   - please avoid hypotension/major fluctuations in BP (keep MAP > 65 mmHg)  - renal diet/tube feeds when not NPO with volume restriction per primary team  - strict I/O's and daily weights  - renally all dose medications to eGFR   - avoid nephrotoxic agents wean feasible (i.e. NSAIDs, intra-arterial contrast, supra-therapeutic vancomycin levels, etc.)  - no acute indications for RRT at this time however will continue to monitor closely

## 2024-05-06 NOTE — NURSING
Received report from RN. Patient alert and oriented. No  pain. No sign of distress. Iv line in place- saline locked. On room air. Call bell given on his reach. Instructed to call for any concerns or assistance. Bed on lowest position. Non skid socks on. Plan of care discussed with patient, question answered.

## 2024-05-06 NOTE — PROGRESS NOTES
Pt to [facility] via wheelchair for dialysis. Pt does not have HD chair set up yet. Tx started via RIJ tunnel CVC without issues. VSS. NAD

## 2024-05-06 NOTE — PLAN OF CARE
Patient progressing well towards goals, decreased POC to 2x/wk.    Problem: Physical Therapy  Goal: Physical Therapy Goal  Description: Goals to be met by: 24    Patient will increase functional independence with mobility by performin. Supine to sit with Modified [place]  2. Sit to supine with Modified [place]  3. Sit to stand transfer with Supervision- goal met   4. Bed to chair transfer with Supervision using Rolling Walker  5. Gait  x 100 feet with Supervision using Rolling Walker- goal met   6. Ascend/descend 5 stair with bilateral Handrails Supervision using No Assistive Device.   7. Lower extremity exercise program x15 reps per handout, with assistance as needed    Outcome: Progressing

## 2024-05-07 PROCEDURE — 25000003 PHARM REV CODE 250: Performed by: STUDENT IN AN ORGANIZED HEALTH CARE EDUCATION/TRAINING PROGRAM

## 2024-05-07 PROCEDURE — 25000003 PHARM REV CODE 250: Performed by: EMERGENCY MEDICINE

## 2024-05-07 PROCEDURE — 20600001 HC STEP DOWN PRIVATE ROOM

## 2024-05-07 PROCEDURE — 25000003 PHARM REV CODE 250

## 2024-05-07 PROCEDURE — 25000003 PHARM REV CODE 250: Performed by: PHYSICIAN ASSISTANT

## 2024-05-07 PROCEDURE — 99232 SBSQ HOSP IP/OBS MODERATE 35: CPT | Mod: ,,, | Performed by: HOSPITALIST

## 2024-05-07 PROCEDURE — 63600175 PHARM REV CODE 636 W HCPCS: Performed by: STUDENT IN AN ORGANIZED HEALTH CARE EDUCATION/TRAINING PROGRAM

## 2024-05-07 PROCEDURE — 97535 SELF CARE MNGMENT TRAINING: CPT

## 2024-05-07 RX ORDER — HEPARIN SODIUM 1000 [USP'U]/ML
1000 INJECTION, SOLUTION INTRAVENOUS; SUBCUTANEOUS
Status: DISCONTINUED | OUTPATIENT
Start: 2024-05-08 | End: 2024-05-08 | Stop reason: HOSPADM

## 2024-05-07 RX ORDER — SODIUM CHLORIDE 9 MG/ML
INJECTION, SOLUTION INTRAVENOUS ONCE
Status: COMPLETED | OUTPATIENT
Start: 2024-05-07 | End: 2024-05-08

## 2024-05-07 RX ADMIN — TRIAMCINOLONE ACETONIDE: 1 CREAM TOPICAL at 08:05

## 2024-05-07 RX ADMIN — FUROSEMIDE 80 MG: 80 TABLET ORAL at 09:05

## 2024-05-07 RX ADMIN — CARVEDILOL 6.25 MG: 6.25 TABLET, FILM COATED ORAL at 08:05

## 2024-05-07 RX ADMIN — DIPHENHYDRAMINE HYDROCHLORIDE, ZINC ACETATE: 2; .1 CREAM TOPICAL at 08:05

## 2024-05-07 RX ADMIN — HYDROXYZINE HYDROCHLORIDE 10 MG: 10 TABLET ORAL at 08:05

## 2024-05-07 RX ADMIN — HEPARIN SODIUM 5000 UNITS: 5000 INJECTION INTRAVENOUS; SUBCUTANEOUS at 09:05

## 2024-05-07 RX ADMIN — HEPARIN SODIUM 5000 UNITS: 5000 INJECTION INTRAVENOUS; SUBCUTANEOUS at 06:05

## 2024-05-07 RX ADMIN — SENNOSIDES AND DOCUSATE SODIUM 1 TABLET: 8.6; 5 TABLET ORAL at 08:05

## 2024-05-07 RX ADMIN — OXYBUTYNIN CHLORIDE 5 MG: 5 TABLET ORAL at 08:05

## 2024-05-07 RX ADMIN — MIDODRINE HYDROCHLORIDE 2.5 MG: 2.5 TABLET ORAL at 08:05

## 2024-05-07 RX ADMIN — Medication 6 MG: at 08:05

## 2024-05-07 RX ADMIN — MICONAZOLE NITRATE: 20 CREAM TOPICAL at 09:05

## 2024-05-07 RX ADMIN — CLINDAMYCIN PHOSPHATE: 10 GEL TOPICAL at 08:05

## 2024-05-07 RX ADMIN — TRIAMCINOLONE ACETONIDE: 1 CREAM TOPICAL at 09:05

## 2024-05-07 RX ADMIN — ASPIRIN 81 MG: 81 TABLET, COATED ORAL at 08:05

## 2024-05-07 RX ADMIN — OXYBUTYNIN CHLORIDE 5 MG: 5 TABLET ORAL at 04:05

## 2024-05-07 RX ADMIN — HEPARIN SODIUM 5000 UNITS: 5000 INJECTION INTRAVENOUS; SUBCUTANEOUS at 04:05

## 2024-05-07 RX ADMIN — ATORVASTATIN CALCIUM 40 MG: 40 TABLET, FILM COATED ORAL at 08:05

## 2024-05-07 RX ADMIN — FUROSEMIDE 80 MG: 80 TABLET ORAL at 04:05

## 2024-05-07 RX ADMIN — CLINDAMYCIN PHOSPHATE: 10 GEL TOPICAL at 09:05

## 2024-05-07 RX ADMIN — Medication: at 09:05

## 2024-05-07 RX ADMIN — POLYETHYLENE GLYCOL 3350 17 G: 17 POWDER, FOR SOLUTION ORAL at 08:05

## 2024-05-07 NOTE — PT/OT/SLP PROGRESS
Occupational Therapy   Treatment/Discharge    Name: Xena Vera  MRN: [id number]  Admitting Diagnosis:  Severe mitral valve regurgitation  15 Days Post-Op    Recommendations:     Discharge Recommendations: No Therapy Indicated  Discharge Equipment Recommendations:  none  Barriers to discharge:  None    Assessment:     Xena Vera is a 53 y.o. female with a medical diagnosis of Severe mitral valve regurgitation.  She presents performing BADL safely and without A. Performance deficits affecting function are impaired functional mobility.     Rehab Prognosis:  Good; patient is no longer in  need of skilled OT services to address these deficits and reach maximum level of function.       Plan:     Patient to be d/c'd from acute OT services  Plan of Care Expires: 05/18/24  Plan of Care Reviewed with: patient    Subjective     Chief Complaint: denies   Patient/Family Comments/goals: to go home  Pain/Comfort:  Pain Rating 1: 0/10  Pain Rating Post-Intervention 1: 0/10    Objective:     Communicated with: RN prior to session.  Patient found sitting edge of bed with  (no active lines) upon OT entry to room.    General Precautions: Standard, fall    Orthopedic Precautions:N/A  Braces: N/A  Respiratory Status: Room air     Occupational Performance:     Bed Mobility:    NT     Functional Mobility/Transfers:  Patient completed Sit <> Stand Transfer with independence  with  no assistive device   Functional Mobility: Supervision around room during ADL including retrieval of items    Activities of Daily Living:  Feeding:  independence    Grooming: independence    Upper Body Dressing: modified independence    Lower Body Dressing: modified independence    Toileting: modified independence        [facility] 6 Click ADL: 24    Treatment & Education:   Yisel #[id number] utilized  Pt ed on OT POC  Pt ed on safety    Patient left seated on couch with all lines intact, call button in reach, and RN notified    GOALS:    Multidisciplinary Problems       Occupational Therapy Goals       Not on file              Multidisciplinary Problems (Resolved)          Problem: Occupational Therapy    Goal Priority Disciplines Outcome Interventions   Occupational Therapy Goal   (Resolved)     OT, PT/OT Met    Description: Goals to be met by: 5/18/24     Patient will increase functional independence with ADLs by performing:    UE Dressing with Modified [place].  LE Dressing with Modified [place].  Grooming while standing at sink with Modified [place].  Toileting from toilet/bedside commode with Modified [place] for hygiene and clothing management.   Toilet transfer to toilet/bedside commode with Modified [place].                           Time Tracking:     OT Date of Treatment: 05/07/24  OT Start Time: 1400  OT Stop Time: 1418  OT Total Time (min): 18 min    Billable Minutes:Self Care/Home Management 18          Number of RA visits since last OT visit: 1 5/7/2024

## 2024-05-07 NOTE — ASSESSMENT & PLAN NOTE
CKD IV (baseline creatinine ~3.1-3.3) admitted with hypervolemia and REGINALDO with creatinine 4.2  UA showed +1 protein with UPCR of ~500 mg  Urinary sediment with non dysmorphic RBCs and WBCs present although Jin catheter placed the day prior to UA  Retroperitoneal US without evidence of hydronephrosis although changes consistent with chronic kidney disease  Diuresis with lasix gtt @ 40mg/hr + Diuril 500mg IV BID attempted, and while Crt improved she remains unable to tolerate lying flat.  Tolerated 8h SLED w goal UF rate 200-500/hr successfully overnight 4/14-15, then 12h SLED on 4/15, 16, and 17.  4/19 - 4/24: tolerated iHD; no issues  4/22: TDC placed  4/22 - 5/3: Tolerating iHD, last session 5/1; pending outpatient HD chair.  5/3: Case escalated given difficulty procuring outpatient HD chair.    Plan/Recommendations:  - CKD IV (baseline creatinine ~3.1-3.3) admitted with hypervolemia and REGINALDO with creatinine 4.2  - UA showed +1 protein with UPCR of ~500 mg  - urinary sediment with non dysmorphic RBCs and WBCs present although Jin catheter just place yesterday   - plan for iHD tomorrow with UF of 1-2 liters as tolerated  - can continue Lasix 80 mg BID PO  - daily RFPs and magnesium levels   - please avoid hypotension/major fluctuations in BP (keep MAP > 65 mmHg)  - renal diet/tube feeds when not NPO with volume restriction per primary team  - strict I/O's and daily weights  - renally all dose medications to eGFR   - avoid nephrotoxic agents wean feasible (i.e. NSAIDs, intra-arterial contrast, supra-therapeutic vancomycin levels, etc.)

## 2024-05-07 NOTE — PLAN OF CARE
Problem: Occupational Therapy  Goal: Occupational Therapy Goal  Description: Goals to be met by: 5/18/24     Patient will increase functional independence with ADLs by performing:    UE Dressing with Modified [place].  LE Dressing with Modified [place].  Grooming while standing at sink with Modified [place].  Toileting from toilet/bedside commode with Modified [place] for hygiene and clothing management.   Toilet transfer to toilet/bedside commode with Modified [place].      Outcome: Met

## 2024-05-07 NOTE — PLAN OF CARE
[facility] - Cardiology Stepdown  Discharge Reassessment    Primary Care Provider: Tami Villeda FNP    Expected Discharge Date: 5/8/2024    Reassessment (most recent)       Discharge Reassessment - 05/07/24 1543          Discharge Reassessment    Assessment Type Discharge Planning Reassessment     Did the patient's condition or plan change since previous assessment? No     Discharge Plan discussed with: Patient     Communicated RILEY with patient/caregiver Yes     Discharge Plan A Home with family     Discharge Plan B Home     DME Needed Upon Discharge  none     Transition of Care Barriers Unisured     Why the patient remains in the hospital Insurance issues        Post-Acute Status    Discharge Delays Dialysis Set-up                   Per Dr Real after further review with hospital leadership and legal, discharge plan will be for the patient to seek urgent HD in emergency rooms when she becomes symptomatic.  SW, Dr Luciano, and unit-based NP Pina relayed this to pt at bedside (via  Deric), explaining that her Medicaid enrollment process will continue, but since that can take several months and pt is otherwise medically cleared to discharge she will not be able continue staying in the hospital.  Pt voiced understanding.  Dr Luciano reviewed signs and symptoms of needing dialysis but also stated that nephrology will meet with pt to discuss this more in depth.  Pt acknowledged that she should be able to get transportation from her family when she needs to get HD.  When asked if pt has transportation home from the hospital pt stated that her family is working today and that she will not be able to get a key to her home until her family gets home around 9:00pm.  Discussed that pt's son should bring her a house key tonight and then pt can discharge tomorrow 5/8 after dialysis.  Pt stated that if her family is working then she will need a ride home.    Discharge Plan A and Plan B have been determined by  review of patient's clinical status, future medical and therapeutic needs, and coverage/benefits for post-acute care in coordination with multidisciplinary team members.      Sophia Nunez LMSW  [facility]  [phone]

## 2024-05-07 NOTE — PROGRESS NOTES
[facility] - [facility] Step[facility]  Nephrology  Progress Note    Patient Name: Xena Vera  MRN: [id number]  Admission Date: 4/1/2024  Hospital Length of Stay: 34 days  Attending Provider: Rosario Luciano MD   Primary Care Physician: Tami Villeda FNP  Principal Problem:Severe mitral valve regurgitation    Subjective:     HPI: Ms Vera is an obese (BMI ~31) 53-year-old with CKD IV (baseline creatinine ~3.1-3.3), prediabetes with most recent hemoglobin A1c 6%, HFpEF (most recent TTE with EF 60-65% with G2DD), mitral valve regurgitation, anemia, hypertension, HLD as well as several over co-morbid conditions who was admitted on 4/1 with heart failure exacerbation and hypervolemia after presenting with to ED following a mechanical fall but also had complaints of progressive dyspnea/ZACARIAS, orthopnea, lower extremity edema and abdominal distension with constipation. On presentation patient was noted to be hypotensive with systolic BP readings as low as 90s mmHg and bradycardiac with HR as low as 50s bpm. There was concern for some edema on chest x-ray and BNP at that time was ~4.6K and metabolic panel was notable for serum sodium 133, bicarbonate 20, , creatinine 4.2, albumin 3.1 and total bilirubin 1.5. UA showed +1 protein. Her admission was complicated by failure to adequately diuresis with escalating IV Lasix and even oral metolazone for which Nephrology was consulted on 4/10 for assistance. She explicitly denies NSAID use as outpatient.    Interval History: Patient seen and examined. No acute events overnight. Plans to discharge tomorrow per primary team. HD yesterday with 2 liters removed. Afebrile with pulse ranging from 70-60s bpm. Systolic blood pressures ranging from 130-110s mmHg. She is saturating +92% on room air with documented UOP of 700 mL in the last 24 hours. Plan for iHD again tomorrow prior to discharge.      Review of patient's allergies indicates:  No Known Allergies  Current  Facility-Administered Medications   Medication Frequency    acetaminophen tablet 650 mg Q6H PRN    aspirin EC tablet 81 mg Daily    atorvastatin tablet 40 mg Daily    bisacodyL EC tablet 5 mg Daily PRN    carvediloL tablet 6.25 mg BID    clindamycin phosphate 1% gel BID    dextrose 10% bolus 125 mL 125 mL PRN    dextrose 10% bolus 250 mL 250 mL PRN    diphenhydrAMINE-zinc acetate 2-0.1% cream TID PRN    furosemide tablet 80 mg BID    glucagon (human recombinant) injection 1 mg PRN    glucose chewable tablet 16 g PRN    glucose chewable tablet 24 g PRN    heparin (porcine) injection 1,000 Units PRN    heparin (porcine) injection 1,000 Units PRN    heparin (porcine) injection 5,000 Units Q8H    HYDROcodone-acetaminophen 5-325 mg per tablet 1 tablet Q6H PRN    hydrOXYzine HCL tablet 10 mg TID PRN    melatonin tablet 6 mg Nightly PRN    miconazole 2 % cream BID    midodrine tablet 2.5 mg Q12H    midodrine tablet 5 mg Daily PRN    naloxone 0.4 mg/mL injection 0.02 mg PRN    ondansetron disintegrating tablet 4 mg Q8H PRN    ondansetron injection 4 mg Q8H PRN    oxybutynin tablet 5 mg TID    polyethylene glycol packet 17 g BID    senna-docusate 8.6-50 mg per tablet 1 tablet BID    sodium chloride 0.9% bolus 250 mL 250 mL PRN    sodium chloride 0.9% bolus 250 mL 250 mL PRN    sodium chloride 0.9% flush 10 mL PRN    sodium chloride 0.9% flush 10 mL PRN    sodium chloride 0.9% flush 10 mL PRN    triamcinolone acetonide 0.1% cream BID    white petrolatum 41 % ointment BID    zinc oxide 20 % ointment PRN       Objective:     Vital Signs (Most Recent):  Temp: 98.1 °F (36.7 °C) (05/07/24 0725)  Pulse: 62 (05/07/24 0725)  Resp: 18 (05/07/24 0725)  BP: 111/72 (05/07/24 0725)  SpO2: (!) 92 % (05/07/24 0725) Vital Signs (24h Range):  Temp:  [97.3 °F (36.3 °C)-99.1 °F (37.3 °C)] 98.1 °F (36.7 °C)  Pulse:  [62-78] 62  Resp:  [16-18] 18  SpO2:  [92 %-97 %] 92 %  BP: (105-135)/(59-84) 111/72     Weight: 60.7 kg (133 lb 13.1 oz) (05/07/24  0358)  Body mass index is 26.27 kg/m².  Body surface area is 1.6 meters squared.    I/O last 3 completed shifts:  In: 837.5 [P.O.:200; I.V.:337.5; Other:300]  Out: 3950 [Urine:1300; Other:2650]     Physical Exam  Vitals and nursing note reviewed.   Constitutional:       General: She is not in acute distress.     Appearance: Normal appearance. She is well-developed. She is obese. She is not ill-appearing or diaphoretic.   HENT:      Head: Normocephalic and atraumatic.      Right Ear: External ear normal.      Left Ear: External ear normal.      Nose: Nose normal.      Mouth/Throat:      Mouth: Mucous membranes are moist.      Pharynx: Oropharynx is clear. No oropharyngeal exudate or posterior oropharyngeal erythema.   Eyes:      General: No scleral icterus.        Right eye: No discharge.         Left eye: No discharge.      Extraocular Movements: Extraocular movements intact.      Conjunctiva/sclera: Conjunctivae normal.   Cardiovascular:      Rate and Rhythm: Normal rate and regular rhythm.      Pulses: Normal pulses.      Heart sounds: Murmur heard.      No friction rub. No gallop.   Pulmonary:      Effort: Pulmonary effort is normal. No respiratory distress.      Breath sounds: Decreased breath sounds (throughout) present. No wheezing, rhonchi or rales.   Abdominal:      General: Bowel sounds are normal. There is no distension.      Palpations: Abdomen is soft.      Tenderness: There is no abdominal tenderness.   Musculoskeletal:      Cervical back: Neck supple.      Right lower leg: Edema present.      Left lower leg: Edema present.   Skin:     General: Skin is warm and dry.      Coloration: Skin is not jaundiced.      Comments: Abrasion to left lower extremity with granulation tissue noted.    Neurological:      General: No focal deficit present.      Mental Status: She is alert and oriented to person, place, and time. Mental status is at baseline.      Cranial Nerves: No cranial nerve deficit.      Motor: No  weakness.   Psychiatric:         Mood and Affect: Mood normal.         Behavior: Behavior normal.          Significant Labs:  BMP:   Recent Labs   Lab 05/06/24  0454   GLU 80   *   K 3.7   CL 93*   CO2 22*   BUN 21*   CREATININE 3.5*   CALCIUM 8.7   MG 1.6     CBC:   Recent Labs   Lab 05/06/24  0454   WBC 3.21*   RBC 3.55*   HGB 8.7*   HCT 30.4*      MCV 86   MCH 24.5*   MCHC 28.6*     CMP:   Recent Labs   Lab 05/06/24  0454   GLU 80   CALCIUM 8.7   ALBUMIN 3.1*   *   K 3.7   CO2 22*   CL 93*   BUN 21*   CREATININE 3.5*     LFTs:   Recent Labs   Lab 05/06/24  0454   ALBUMIN 3.1*     Microbiology Results (last 7 days)       ** No results found for the last 168 hours. **          Specimen (24h ago, onward)      None          Significant Imaging:  I have reviewed all imagining in the last 24 hours.  Assessment/Plan:     Cardiac/Vascular  Acute on chronic heart failure with preserved ejection fraction (HFpEF)  Patient is identified as having Diastolic (HFpEF) heart failure that is Acute on chronic. CHF is currently uncontrolled due to Continued edema of extremities, Hepatic congestion/ascites, and JVD, >3 pillow orthopnea, Rales/crackles on pulmonary exam, and Pulmonary edema/pleural effusion on CXR.     - diuresis as detailed under Acute kidney injury superimposed on chronic kidney disease    Essential hypertension  - management per primary team    Renal/  Acute kidney injury superimposed on chronic kidney disease  CKD IV (baseline creatinine ~3.1-3.3) admitted with hypervolemia and REGINALDO with creatinine 4.2  UA showed +1 protein with UPCR of ~500 mg  Urinary sediment with non dysmorphic RBCs and WBCs present although Jin catheter placed the day prior to UA  Retroperitoneal US without evidence of hydronephrosis although changes consistent with chronic kidney disease  Diuresis with lasix gtt @ 40mg/hr + Diuril 500mg IV BID attempted, and while Crt improved she remains unable to tolerate lying  flat.  Tolerated 8h SLED w goal UF rate 200-500/hr successfully overnight 4/14-15, then 12h SLED on 4/15, 16, and 17.  4/19 - 4/24: tolerated iHD; no issues  4/22: TDC placed  4/22 - 5/3: Tolerating iHD, last session 5/1; pending outpatient HD chair.  5/3: Case escalated given difficulty procuring outpatient HD chair.    Plan/Recommendations:  - CKD IV (baseline creatinine ~3.1-3.3) admitted with hypervolemia and REGINALDO with creatinine 4.2  - UA showed +1 protein with UPCR of ~500 mg  - urinary sediment with non dysmorphic RBCs and WBCs present although Ijn catheter just place yesterday   - plan for iHD tomorrow with UF of 1-2 liters as tolerated  - can continue Lasix 80 mg BID PO  - daily RFPs and magnesium levels   - please avoid hypotension/major fluctuations in BP (keep MAP > 65 mmHg)  - renal diet/tube feeds when not NPO with volume restriction per primary team  - strict I/O's and daily weights  - renally all dose medications to eGFR   - avoid nephrotoxic agents wean feasible (i.e. NSAIDs, intra-arterial contrast, supra-therapeutic vancomycin levels, etc.)      Thank you for your consult. I will follow-up with patient. Please contact us if you have any additional questions.    Italo Segovia MD  Nephrology  [facility] - [facility] Step[facility]

## 2024-05-07 NOTE — NURSING
Sterile dressing change to tunnel cath. Patient tolerated well. Instructions on care of site and dressing changes printed and given to her in Bengali.

## 2024-05-07 NOTE — PLAN OF CARE
Hospital f/u scheduled 05/13 at 9:30am. Information added to MARY Ortiz W  Case Management  [id number]

## 2024-05-07 NOTE — ASSESSMENT & PLAN NOTE
Acute on chronic heart failure with preserved ejection fraction (HFpEF)   Anasarca      54 yo F admitted for ADHF 2/2 severe MR. The MR appears to be secondary to posterior leaflet restriction and leaflet length of 1.1 cm. BNP on admission was 4300 compared to 350 eight months prior. Initially diagnosed with severe MR in June of 2023. Saw Dr. Stone in January of 2024 and was undergoing work-up for possible MitraClip; [facility] scheduled for 3/15 but not completed due to financial constraints, also needs POOJA. Will need optimization of kidney function prior to angiogram consideration, appreciate Nephrology assistance. APS serologies negative, Fabry workup negative     DDX: rheumatic heart disease vs Fabry vs antiphospholipid (serologies negative)     4/1/2024 TTE    Left Ventricle: The left ventricle is normal in size. Normal wall thickness. Normal wall motion. There is normal systolic function with a visually estimated ejection fraction of 60 - 65%. Grade II diastolic dysfunction.    Right Ventricle: Normal right ventricular cavity size. Wall thickness is normal. Right ventricle wall motion  is normal. Systolic function is reduced.    Left Atrium: Left atrium is very  severely dilated.    Right Atrium: Right atrium is mildly dilated.    Aortic Valve: There is mild aortic valve sclerosis. There is normal leaflet mobility. There is trace aortic regurgitation.    Mitral Valve: There is mild bileaflet sclerosis. There is posterior leaflet tethering and failure of complete coaptation. There is very severe regurgitation with a posterolateral eccentriccally directed jet.    Tricuspid Valve: The tricuspid valve is structurally normal. There is normal leaflet mobility. There is moderate to severe regurgitation.    IVC/SVC: IVC was not well visualized due to poor acoustic window. Intermediate venous pressure at 8 mmHg.    Pericardium: There is a trivial effusion posteriorly and small under the RA.     - Interventional  "Cardiology consulted - no intervention until patient's volume status is controlled  - Nephrology following, undergoing SLED/CRRT  - POOJA ordered - "Anesthesia evaluated the patient and feel that she needs better optimization prior to POOJA. They stated once she is euvolemic that we can move forward. Will need a new POOJA order at that time."  - Discontinued Diuril 500 mg IV q24h & Lasix gtt @ 40 mg/hr  - Discontinued hydralazine 25 mg po q8h & Isordil 20 mg po BID for afterload reduction due to pressor requirements  - Continue home ASA 81 mg po daily and Lipitor 40 mg po daily  - Genetics consulted, see "Hypertrophic cardiomyopathy"  - Strict I/O, Jin in place  - Transitioned to lasix 40mg PO daily. Reassess daily for diuretic use.   - Repeat TTE reviewed- IC consulted- not a candidate for mitraclip- recommended outpatient f/u- continued fluid removal/fluid status optimization via HD- up-titration of GDMT as tolerated  - Will also need OP IC follow up.   - Telemetry monitoring  "

## 2024-05-07 NOTE — SUBJECTIVE & OBJECTIVE
Interval History: Patient seen and examined. No acute events overnight. Plans to discharge tomorrow per primary team. HD yesterday with 2 liters removed. Afebrile with pulse ranging from 70-60s bpm. Systolic blood pressures ranging from 130-110s mmHg. She is saturating +92% on room air with documented UOP of 700 mL in the last 24 hours. Plan for iHD again tomorrow prior to discharge.      Review of patient's allergies indicates:  No Known Allergies  Current Facility-Administered Medications   Medication Frequency    acetaminophen tablet 650 mg Q6H PRN    aspirin EC tablet 81 mg Daily    atorvastatin tablet 40 mg Daily    bisacodyL EC tablet 5 mg Daily PRN    carvediloL tablet 6.25 mg BID    clindamycin phosphate 1% gel BID    dextrose 10% bolus 125 mL 125 mL PRN    dextrose 10% bolus 250 mL 250 mL PRN    diphenhydrAMINE-zinc acetate 2-0.1% cream TID PRN    furosemide tablet 80 mg BID    glucagon (human recombinant) injection 1 mg PRN    glucose chewable tablet 16 g PRN    glucose chewable tablet 24 g PRN    heparin (porcine) injection 1,000 Units PRN    heparin (porcine) injection 1,000 Units PRN    heparin (porcine) injection 5,000 Units Q8H    HYDROcodone-acetaminophen 5-325 mg per tablet 1 tablet Q6H PRN    hydrOXYzine HCL tablet 10 mg TID PRN    melatonin tablet 6 mg Nightly PRN    miconazole 2 % cream BID    midodrine tablet 2.5 mg Q12H    midodrine tablet 5 mg Daily PRN    naloxone 0.4 mg/mL injection 0.02 mg PRN    ondansetron disintegrating tablet 4 mg Q8H PRN    ondansetron injection 4 mg Q8H PRN    oxybutynin tablet 5 mg TID    polyethylene glycol packet 17 g BID    senna-docusate 8.6-50 mg per tablet 1 tablet BID    sodium chloride 0.9% bolus 250 mL 250 mL PRN    sodium chloride 0.9% bolus 250 mL 250 mL PRN    sodium chloride 0.9% flush 10 mL PRN    sodium chloride 0.9% flush 10 mL PRN    sodium chloride 0.9% flush 10 mL PRN    triamcinolone acetonide 0.1% cream BID    white petrolatum 41 % ointment BID     zinc oxide 20 % ointment PRN       Objective:     Vital Signs (Most Recent):  Temp: 98.1 °F (36.7 °C) (05/07/24 0725)  Pulse: 62 (05/07/24 0725)  Resp: 18 (05/07/24 0725)  BP: 111/72 (05/07/24 0725)  SpO2: (!) 92 % (05/07/24 0725) Vital Signs (24h Range):  Temp:  [97.3 °F (36.3 °C)-99.1 °F (37.3 °C)] 98.1 °F (36.7 °C)  Pulse:  [62-78] 62  Resp:  [16-18] 18  SpO2:  [92 %-97 %] 92 %  BP: (105-135)/(59-84) 111/72     Weight: 60.7 kg (133 lb 13.1 oz) (05/07/24 0358)  Body mass index is 26.27 kg/m².  Body surface area is 1.6 meters squared.    I/O last 3 completed shifts:  In: 837.5 [P.O.:200; I.V.:337.5; Other:300]  Out: 3950 [Urine:1300; Other:2650]     Physical Exam  Vitals and nursing note reviewed.   Constitutional:       General: She is not in acute distress.     Appearance: Normal appearance. She is well-developed. She is obese. She is not ill-appearing or diaphoretic.   HENT:      Head: Normocephalic and atraumatic.      Right Ear: External ear normal.      Left Ear: External ear normal.      Nose: Nose normal.      Mouth/Throat:      Mouth: Mucous membranes are moist.      Pharynx: Oropharynx is clear. No oropharyngeal exudate or posterior oropharyngeal erythema.   Eyes:      General: No scleral icterus.        Right eye: No discharge.         Left eye: No discharge.      Extraocular Movements: Extraocular movements intact.      Conjunctiva/sclera: Conjunctivae normal.   Cardiovascular:      Rate and Rhythm: Normal rate and regular rhythm.      Pulses: Normal pulses.      Heart sounds: Murmur heard.      No friction rub. No gallop.   Pulmonary:      Effort: Pulmonary effort is normal. No respiratory distress.      Breath sounds: Decreased breath sounds (throughout) present. No wheezing, rhonchi or rales.   Abdominal:      General: Bowel sounds are normal. There is no distension.      Palpations: Abdomen is soft.      Tenderness: There is no abdominal tenderness.   Musculoskeletal:      Cervical back: Neck  supple.      Right lower leg: Edema present.      Left lower leg: Edema present.   Skin:     General: Skin is warm and dry.      Coloration: Skin is not jaundiced.      Comments: Abrasion to left lower extremity with granulation tissue noted.    Neurological:      General: No focal deficit present.      Mental Status: She is alert and oriented to person, place, and time. Mental status is at baseline.      Cranial Nerves: No cranial nerve deficit.      Motor: No weakness.   Psychiatric:         Mood and Affect: Mood normal.         Behavior: Behavior normal.          Significant Labs:  BMP:   Recent Labs   Lab 05/06/24  0454   GLU 80   *   K 3.7   CL 93*   CO2 22*   BUN 21*   CREATININE 3.5*   CALCIUM 8.7   MG 1.6     CBC:   Recent Labs   Lab 05/06/24  0454   WBC 3.21*   RBC 3.55*   HGB 8.7*   HCT 30.4*      MCV 86   MCH 24.5*   MCHC 28.6*     CMP:   Recent Labs   Lab 05/06/24  0454   GLU 80   CALCIUM 8.7   ALBUMIN 3.1*   *   K 3.7   CO2 22*   CL 93*   BUN 21*   CREATININE 3.5*     LFTs:   Recent Labs   Lab 05/06/24  0454   ALBUMIN 3.1*     Microbiology Results (last 7 days)       ** No results found for the last 168 hours. **          Specimen (24h ago, onward)      None          Significant Imaging:  I have reviewed all imagining in the last 24 hours.

## 2024-05-07 NOTE — ASSESSMENT & PLAN NOTE
Stable.   Recent Labs   Lab 05/01/24  0235 05/03/24  0228 05/06/24  0454   WBC 4.25 3.70* 3.21*   HGB 8.1* 8.4* 8.7*   HCT 27.7* 29.4* 30.4*    235 237

## 2024-05-07 NOTE — ASSESSMENT & PLAN NOTE
Body mass index is 27.27 kg/m². Morbid obesity complicates all aspects of disease management from diagnostic modalities to treatment. Weight loss encouraged and health benefits explained to patient.

## 2024-05-07 NOTE — ASSESSMENT & PLAN NOTE
Patient has hyponatremia which is uncontrolled,We will aim to correct the sodium by 4-6mEq in 24 hours. We will monitor sodium Daily. The hyponatremia is due to renal insufficiency. We will obtain the following studies: no new labs at this moment. We will treat the hyponatremia with Fluid restriction of:  1.5 liter per day and Hemodialysis. The patient's sodium results have been reviewed and are listed below.  Recent Labs   Lab 05/06/24  0454   *

## 2024-05-07 NOTE — PROGRESS NOTES
[facility] - Cardiology [facility] Medicine  Progress Note    Patient Name: Xena Vera  MRN: [id number]  Patient Class: IP- Inpatient   Admission Date: 4/1/2024  Length of Stay: 33 days  Attending Physician: Rosario Luciano MD  Primary Care Provider: Tami Villeda FNP        Subjective:     Principal Problem:Severe mitral valve regurgitation        HPI:  Per MICU: 52 yo F with PMH of HFpEF, severe MR, HTN, HLD, and CKD4 who initially presented to [facility] on 4/1/2024 after a mechanical fall at home with concurrent SOB and swelling in her legs and abdomen. She reported adherence to home Lasix and low sodium diet, but had felt increasingly SOB prior to admission as she was sleeping upright and had multiple nighttime awakenings due to SOB. In the ED, BNP was 4639 and she was admitted to Hospital Medicine for further management of ADHF. She initially had good response to IV Lasix but hospital course was later complicated by urinary retention, worsening kidney function, and decreased UOP. Nephrology and Cardiology were consulted to assist. She was started on Diuril and Lasix gtt for diuresis and Isordil and hydralazine for afterload reduction. Patient transferred to CCU for further management.        Overview/Hospital Course:  MICU course  Patient initially had improved UOP with increasing Lasix, eventually maxed out on Lasix drip with addition of Diuril. Eventually started on CRRT/SLED for volume removal, initially needed intermittent levophed to tolerate. Has itchy rash that started during current hospitalization, Dermatology consulted for regimen. Graduated from CRRT to iHD, tolerating well. Stable to step back down to HM.      course  Patient slightly volume overloaded on exam. Reports making urine. Transition to lasix 40mg PO daily. More fluid removal via HD. Will need OP HD chair. SW consulted. Repeat TTE ordered for eval for MR. Discuss plans with IC once repeat TTE is obtained.  Will also need OP IC  follow up. Nephro rec consulting interventional nephro for TDC placement. She underwent TDC placement 04/22. Repeat TTE w/the following findings:      Left Ventricle: The left ventricle is moderately dilated. Normal wall thickness. There is eccentric hypertrophy. There is normal systolic function with a visually estimated ejection fraction of 55 - 60%. Diastolic function cannot be reliably determined in the presence of mitral valve disease.    Right Ventricle: Mild right ventricular enlargement. Wall thickness is normal. Right ventricle wall motion  is normal. Systolic function is mildly reduced.    Biatrial enlargement (L > R)    Mitral Valve: There is severe functional regurgitation with a centrally directed jet.    Tricuspid Valve: There is mild to moderate regurgitation.    Pulmonary Artery: The estimated pulmonary artery systolic pressure is 96 mmHg.    IVC/SVC: Intermediate venous pressure at 8 mmHg.    There is a small pericardial effusion and a right pleural effusion.    Interventional cardiology consulted- recommended further medical management optimization and continued fluid removal via iHD. Not a candidate for mitraclip at this time. Intermittent asymptomatic hypotension for which diuretic regimen titrated w/ initiation of midodrine. Awaiting Medicaid approval for HD chair set-up.      Interval History: No issues overnight. No sob, chest pain, nausea, vomiting, fevers, chills, night sweats.  utilized.    Objective:     Vital Signs (Most Recent):  Temp: 97.3 °F (36.3 °C) (05/06/24 1815)  Pulse: 78 (05/06/24 1815)  Resp: 16 (05/06/24 1428)  BP: 122/72 (05/06/24 1815)  SpO2: 97 % (05/06/24 0805) Vital Signs (24h Range):  Temp:  [97.3 °F (36.3 °C)-98.4 °F (36.9 °C)] 97.3 °F (36.3 °C)  Pulse:  [58-78] 78  Resp:  [16-20] 16  SpO2:  [96 %-98 %] 97 %  BP: (101-133)/(62-74) 122/72     Weight: 63 kg (138 lb 14.2 oz)  Body mass index is 27.27 kg/m².    Intake/Output Summary (Last 24 hours) at 5/6/2024  1932  Last data filed at 5/6/2024 1821  Gross per 24 hour   Intake 837.5 ml   Output 3150 ml   Net -2312.5 ml      Physical Exam  Gen: in NAD, appears stated age, appears acutely ill   Neuro: AAOx3, motor, sensory, and strength grossly intact BL  HEENT: NTNC, EOMI, PERRL, MMM  CVS: RRR, no m/r/g; S1/S2 auscultated with no S3 or S4; capillary refill < 2 sec  Chest: TDC of the right chest wall  Resp: lungs CTAB, no w/r/r; no belabored breathing or accessory muscle use appreciated   Abd: BS+ in all 4 quadrants; NTND, soft to palpation; no organomegaly appreciated   Extrem: pulses full, equal, and regular over all 4 extremities; trace swelling of BL LE, no swelling of dorsum of feet  Skin: healing abrasion of the left calf    Significant Labs: All pertinent labs within the past 24 hours have been reviewed.  CBC:   Recent Labs   Lab 05/06/24  0454   WBC 3.21*   HGB 8.7*   HCT 30.4*          CMP:   Recent Labs   Lab 05/06/24  0454   *   K 3.7   CL 93*   CO2 22*   GLU 80   BUN 21*   CREATININE 3.5*   CALCIUM 8.7   ALBUMIN 3.1*   ANIONGAP 12         Significant Imaging: I have reviewed all pertinent imaging results/findings within the past 24 hours.    Assessment/Plan:      * Severe mitral valve regurgitation    Acute on chronic heart failure with preserved ejection fraction (HFpEF)   Anasarca      54 yo F admitted for ADHF 2/2 severe MR. The MR appears to be secondary to posterior leaflet restriction and leaflet length of 1.1 cm. BNP on admission was 4300 compared to 350 eight months prior. Initially diagnosed with severe MR in June of 2023. Saw Dr. Stone in January of 2024 and was undergoing work-up for possible MitraClip; C scheduled for 3/15 but not completed due to financial constraints, also needs POOJA. Will need optimization of kidney function prior to angiogram consideration, appreciate Nephrology assistance. APS serologies negative, Fabry workup negative     DDX: rheumatic heart disease vs Fabry vs  "antiphospholipid (serologies negative)     4/1/2024 TTE    Left Ventricle: The left ventricle is normal in size. Normal wall thickness. Normal wall motion. There is normal systolic function with a visually estimated ejection fraction of 60 - 65%. Grade II diastolic dysfunction.    Right Ventricle: Normal right ventricular cavity size. Wall thickness is normal. Right ventricle wall motion  is normal. Systolic function is reduced.    Left Atrium: Left atrium is very  severely dilated.    Right Atrium: Right atrium is mildly dilated.    Aortic Valve: There is mild aortic valve sclerosis. There is normal leaflet mobility. There is trace aortic regurgitation.    Mitral Valve: There is mild bileaflet sclerosis. There is posterior leaflet tethering and failure of complete coaptation. There is very severe regurgitation with a posterolateral eccentriccally directed jet.    Tricuspid Valve: The tricuspid valve is structurally normal. There is normal leaflet mobility. There is moderate to severe regurgitation.    IVC/SVC: IVC was not well visualized due to poor acoustic window. Intermediate venous pressure at 8 mmHg.    Pericardium: There is a trivial effusion posteriorly and small under the RA.     - Interventional Cardiology consulted - no intervention until patient's volume status is controlled  - Nephrology following, undergoing SLED/CRRT  - POOJA ordered - "Anesthesia evaluated the patient and feel that she needs better optimization prior to POOJA. They stated once she is euvolemic that we can move forward. Will need a new POOJA order at that time."  - Discontinued Diuril 500 mg IV q24h & Lasix gtt @ 40 mg/hr  - Discontinued hydralazine 25 mg po q8h & Isordil 20 mg po BID for afterload reduction due to pressor requirements  - Continue home ASA 81 mg po daily and Lipitor 40 mg po daily  - Genetics consulted, see "Hypertrophic cardiomyopathy"  - Strict I/O, Jin in place  - Transitioned to lasix 40mg PO daily. Reassess daily for " diuretic use.   - Repeat TTE reviewed- IC consulted- not a candidate for mitraclip- recommended outpatient f/u- continued fluid removal/fluid status optimization via HD- up-titration of GDMT as tolerated  - Will also need OP IC follow up.   - Telemetry monitoring    Hypophosphatemia  - 2/2 renal dysfunction    Hyponatremia  Patient has hyponatremia which is uncontrolled,We will aim to correct the sodium by 4-6mEq in 24 hours. We will monitor sodium Daily. The hyponatremia is due to renal insufficiency. We will obtain the following studies: no new labs at this moment. We will treat the hyponatremia with Fluid restriction of:  1.5 liter per day and Hemodialysis. The patient's sodium results have been reviewed and are listed below.  Recent Labs   Lab 05/06/24  0454   *         Intertrigo  - improvement       Rash  - derm initially consulted- concern for resolving folliculitis- no significant improvement  - triamcinolone cream today-monitor for improvement- if no improvement, then will re-consult derm    Proteinuria  - h/o      Hypertrophic cardiomyopathy  Angiokeratoma   Proteinuria      - Genetics consulted, appreciate recs  - GLA genetic testing collected and sent to [facility]- neg for fabry's disease  - Consider cardiac MRI      Angiokeratoma        Prediabetes  - h/o    HLD (hyperlipidemia)  - continue statin    Acute kidney injury superimposed on chronic kidney disease  Patient with acute kidney injury/acute renal failure likely due to acute tubular necrosis caused by unknown.  REGINALDO is currently  improving on CRRT/SLED . Baseline creatinine  2.5  - Labs reviewed- Renal function/electrolytes with Estimated Creatinine Clearance: 80.9 mL/min (based on SCr of 0.7 mg/dL). according to latest data. Monitor urine output and serial BMP and adjust therapy as needed. Avoid nephrotoxins and renally dose meds for GFR listed above.           Recent Labs     04/17/24  1428 04/17/24  2203 04/18/24  0413   CREATININE 1.8*  " 1.8* 0.8  0.8 0.7  0.7  0.7         - Nephrology following, appreciate recs - started SLED on 4/14   - Genetics consulted, see "Hypertrophic cardiomyopathy"  - Holding home sevelamer   - Holding home sodium bicarb 650 mg po BID  - HD per nephrology. Will need OP HD chair setup.   - Nephro following- consulted interventional nephrology- TDC 04/22   - Midodrine 2.5mg BID (w/ holding parameters) + 5mg PRN w/ HD   - awaiting medicaid enrollment to set-up outpatient HD chair        Acute urinary retention  - now w/acute renal failure requiring iHD  - no catheter in place       Leg wound, left  - wound care following    Anasarca  - 2/2 acute renal failure in setting of CHF w/severe MR       Prolonged QT interval  - resolved    Acute on chronic heart failure with preserved ejection fraction (HFpEF)  - Interval history and physical exam findings as described above  - Most recent TTE results:  Results for orders placed during the hospital encounter of 04/01/24    Echo    Interpretation Summary    Left Ventricle: The left ventricle is normal in size. Normal wall thickness. Normal wall motion. There is normal systolic function with a visually estimated ejection fraction of 60 - 65%. Grade II diastolic dysfunction.    Right Ventricle: Normal right ventricular cavity size. Wall thickness is normal. Right ventricle wall motion  is normal. Systolic function is reduced.    Left Atrium: Left atrium is very  severely dilated.    Right Atrium: Right atrium is mildly dilated.    Aortic Valve: There is mild aortic valve sclerosis. There is normal leaflet mobility. There is trace aortic regurgitation.    Mitral Valve: There is mild bileaflet sclerosis. There is posterior leaflet tethering and failure of complete coaptation. There is very severe regurgitation with a posterolateral eccentriccally directed jet.    Tricuspid Valve: The tricuspid valve is structurally normal. There is normal leaflet mobility. There is moderate to severe " regurgitation.    IVC/SVC: IVC was not well visualized due to poor acoustic window. Intermediate venous pressure at 8 mmHg.    Pericardium: There is a trivial effusion posteriorly and small under the RA.    - Clinically volume overloaded on admission  - now w/iHD needs  - Will continue diuresis with lasix 80mg PO BID  - Continue home cardioprudent regimen  - Strict I/Os  - 1.5L fluid restriction   - Daily weights  - Will continue to monitor on tele    Anemia  Stable.   Recent Labs   Lab 05/01/24  0235 05/03/24  0228 05/06/24  0454   WBC 4.25 3.70* 3.21*   HGB 8.1* 8.4* 8.7*   HCT 27.7* 29.4* 30.4*    235 237         Class 2 obesity in adult  Body mass index is 27.27 kg/m². Morbid obesity complicates all aspects of disease management from diagnostic modalities to treatment. Weight loss encouraged and health benefits explained to patient.     Essential hypertension     Temp:  [98 °F (36.7 °C)-98.5 °F (36.9 °C)]   Pulse:  [55-62]   Resp:  [9-40]   BP: (94)/(53)   SpO2:  [94 %-100 %]   Arterial Line BP: ()/(44-66) .     While in the hospital, will manage blood pressure as follows; Adjust home antihypertensive regimen as follows- holding Lasix and Coreg     Will utilize PRN blood pressure medication only if patient's blood pressure greater than 180/110 and she develops symptoms such as worsening chest pain or shortness of breath.         VTE Risk Mitigation (From admission, onward)           Ordered     heparin (porcine) injection 1,000 Units  As needed (PRN)         05/06/24 1140     heparin (porcine) injection 5,000 Units  Every 8 hours         04/24/24 1546     heparin (porcine) injection 1,000 Units  As needed (PRN)         04/24/24 1038     heparin (porcine) injection 1,000 Units  As needed (PRN)         04/13/24 1428     IP VTE HIGH RISK PATIENT  Once         04/01/24 1030     Place sequential compression device  Until discontinued         04/01/24 1030                    Discharge Planning   RILEY:  5/7/2024     Code Status: Full Code   Is the patient medically ready for discharge?: No    Reason for patient still in hospital (select all that apply): Patient trending condition  Discharge Plan A: Home with family   Discharge Delays: (!) Dialysis Set-up                Rosario Luciano MD  Department of Hospital Medicine   [facility] - Cardiology Stepdown

## 2024-05-07 NOTE — ASSESSMENT & PLAN NOTE
Angiokeratoma   Proteinuria      - Genetics consulted, appreciate recs  - GLA genetic testing collected and sent to [facility]- neg for fabry's disease  - Consider cardiac MRI

## 2024-05-07 NOTE — SUBJECTIVE & OBJECTIVE
Interval History: No issues overnight. No sob, chest pain, nausea, vomiting, fevers, chills, night sweats.  utilized.    Objective:     Vital Signs (Most Recent):  Temp: 97.3 °F (36.3 °C) (05/06/24 1815)  Pulse: 78 (05/06/24 1815)  Resp: 16 (05/06/24 1428)  BP: 122/72 (05/06/24 1815)  SpO2: 97 % (05/06/24 0805) Vital Signs (24h Range):  Temp:  [97.3 °F (36.3 °C)-98.4 °F (36.9 °C)] 97.3 °F (36.3 °C)  Pulse:  [58-78] 78  Resp:  [16-20] 16  SpO2:  [96 %-98 %] 97 %  BP: (101-133)/(62-74) 122/72     Weight: 63 kg (138 lb 14.2 oz)  Body mass index is 27.27 kg/m².    Intake/Output Summary (Last 24 hours) at 5/6/2024 1932  Last data filed at 5/6/2024 1821  Gross per 24 hour   Intake 837.5 ml   Output 3150 ml   Net -2312.5 ml      Physical Exam  Gen: in NAD, appears stated age, appears acutely ill   Neuro: AAOx3, motor, sensory, and strength grossly intact BL  HEENT: NTNC, EOMI, PERRL, MMM  CVS: RRR, no m/r/g; S1/S2 auscultated with no S3 or S4; capillary refill < 2 sec  Chest: TDC of the right chest wall  Resp: lungs CTAB, no w/r/r; no belabored breathing or accessory muscle use appreciated   Abd: BS+ in all 4 quadrants; NTND, soft to palpation; no organomegaly appreciated   Extrem: pulses full, equal, and regular over all 4 extremities; trace swelling of BL LE, no swelling of dorsum of feet  Skin: healing abrasion of the left calf    Significant Labs: All pertinent labs within the past 24 hours have been reviewed.  CBC:   Recent Labs   Lab 05/06/24 0454   WBC 3.21*   HGB 8.7*   HCT 30.4*          CMP:   Recent Labs   Lab 05/06/24 0454   *   K 3.7   CL 93*   CO2 22*   GLU 80   BUN 21*   CREATININE 3.5*   CALCIUM 8.7   ALBUMIN 3.1*   ANIONGAP 12         Significant Imaging: I have reviewed all pertinent imaging results/findings within the past 24 hours.

## 2024-05-08 VITALS
HEIGHT: 60 IN | TEMPERATURE: 98 F | WEIGHT: 134.06 LBS | HEART RATE: 60 BPM | RESPIRATION RATE: 18 BRPM | BODY MASS INDEX: 26.32 KG/M2 | OXYGEN SATURATION: 95 % | SYSTOLIC BLOOD PRESSURE: 112 MMHG | DIASTOLIC BLOOD PRESSURE: 80 MMHG

## 2024-05-08 PROBLEM — Z99.2 ESRD (END STAGE RENAL DISEASE) ON DIALYSIS: Status: ACTIVE | Noted: 2024-04-13

## 2024-05-08 PROBLEM — N18.6 ESRD (END STAGE RENAL DISEASE) ON DIALYSIS: Status: ACTIVE | Noted: 2024-04-13

## 2024-05-08 LAB
ALBUMIN SERPL BCP-MCNC: 3 G/DL (ref 3.5–5.2)
ANION GAP SERPL CALC-SCNC: 11 MMOL/L (ref 8–16)
BUN SERPL-MCNC: 16 MG/DL (ref 6–20)
CALCIUM SERPL-MCNC: 8.6 MG/DL (ref 8.7–10.5)
CHLORIDE SERPL-SCNC: 98 MMOL/L (ref 95–110)
CO2 SERPL-SCNC: 21 MMOL/L (ref 23–29)
CREAT SERPL-MCNC: 2.9 MG/DL (ref 0.5–1.4)
ERYTHROCYTE [DISTWIDTH] IN BLOOD BY AUTOMATED COUNT: 23.9 % (ref 11.5–14.5)
EST. GFR  (NO RACE VARIABLE): 18.7 ML/MIN/1.73 M^2
GLUCOSE SERPL-MCNC: 74 MG/DL (ref 70–110)
HCT VFR BLD AUTO: 29.4 % (ref 37–48.5)
HGB BLD-MCNC: 8.7 G/DL (ref 12–16)
MAGNESIUM SERPL-MCNC: 1.6 MG/DL (ref 1.6–2.6)
MCH RBC QN AUTO: 24.5 PG (ref 27–31)
MCHC RBC AUTO-ENTMCNC: 29.6 G/DL (ref 32–36)
MCV RBC AUTO: 83 FL (ref 82–98)
PHOSPHATE SERPL-MCNC: 3 MG/DL (ref 2.7–4.5)
PLATELET # BLD AUTO: 223 K/UL (ref 150–450)
PMV BLD AUTO: 11.9 FL (ref 9.2–12.9)
POTASSIUM SERPL-SCNC: 3.7 MMOL/L (ref 3.5–5.1)
RBC # BLD AUTO: 3.55 M/UL (ref 4–5.4)
SODIUM SERPL-SCNC: 130 MMOL/L (ref 136–145)
WBC # BLD AUTO: 4.61 K/UL (ref 3.9–12.7)

## 2024-05-08 PROCEDURE — 99232 SBSQ HOSP IP/OBS MODERATE 35: CPT | Mod: ,,, | Performed by: INTERNAL MEDICINE

## 2024-05-08 PROCEDURE — 80069 RENAL FUNCTION PANEL: CPT | Performed by: STUDENT IN AN ORGANIZED HEALTH CARE EDUCATION/TRAINING PROGRAM

## 2024-05-08 PROCEDURE — 25000003 PHARM REV CODE 250: Performed by: STUDENT IN AN ORGANIZED HEALTH CARE EDUCATION/TRAINING PROGRAM

## 2024-05-08 PROCEDURE — 90935 HEMODIALYSIS ONE EVALUATION: CPT

## 2024-05-08 PROCEDURE — 85027 COMPLETE CBC AUTOMATED: CPT | Performed by: STUDENT IN AN ORGANIZED HEALTH CARE EDUCATION/TRAINING PROGRAM

## 2024-05-08 PROCEDURE — 20600001 HC STEP DOWN PRIVATE ROOM

## 2024-05-08 PROCEDURE — 97530 THERAPEUTIC ACTIVITIES: CPT | Mod: CQ

## 2024-05-08 PROCEDURE — 83735 ASSAY OF MAGNESIUM: CPT | Performed by: STUDENT IN AN ORGANIZED HEALTH CARE EDUCATION/TRAINING PROGRAM

## 2024-05-08 PROCEDURE — 36415 COLL VENOUS BLD VENIPUNCTURE: CPT | Performed by: STUDENT IN AN ORGANIZED HEALTH CARE EDUCATION/TRAINING PROGRAM

## 2024-05-08 PROCEDURE — 25000003 PHARM REV CODE 250: Performed by: PHYSICIAN ASSISTANT

## 2024-05-08 PROCEDURE — 25000003 PHARM REV CODE 250

## 2024-05-08 PROCEDURE — 63600175 PHARM REV CODE 636 W HCPCS: Performed by: STUDENT IN AN ORGANIZED HEALTH CARE EDUCATION/TRAINING PROGRAM

## 2024-05-08 RX ORDER — FUROSEMIDE 80 MG/1
80 TABLET ORAL 2 TIMES DAILY
Qty: 60 TABLET | Refills: 11 | Status: ON HOLD | OUTPATIENT
Start: 2024-05-08 | End: 2025-05-08

## 2024-05-08 RX ORDER — MIDODRINE HYDROCHLORIDE 2.5 MG/1
2.5 TABLET ORAL DAILY
Qty: 30 TABLET | Refills: 11 | Status: ON HOLD | OUTPATIENT
Start: 2024-05-08 | End: 2025-05-08

## 2024-05-08 RX ORDER — POLYETHYLENE GLYCOL 3350 17 G/17G
17 POWDER, FOR SOLUTION ORAL 2 TIMES DAILY
Qty: 510 G | Refills: 11 | Status: ON HOLD | OUTPATIENT
Start: 2024-05-08 | End: 2025-05-08

## 2024-05-08 RX ORDER — CLINDAMYCIN PHOSPHATE 10 MG/G
GEL TOPICAL 2 TIMES DAILY
Qty: 60 G | Refills: 0 | Status: ON HOLD | OUTPATIENT
Start: 2024-05-08 | End: 2024-06-19

## 2024-05-08 RX ORDER — CARVEDILOL 6.25 MG/1
6.25 TABLET ORAL 2 TIMES DAILY
Qty: 60 TABLET | Refills: 11 | Status: ON HOLD | OUTPATIENT
Start: 2024-05-08 | End: 2025-05-08

## 2024-05-08 RX ORDER — OXYBUTYNIN CHLORIDE 5 MG/1
5 TABLET ORAL 3 TIMES DAILY
Qty: 90 TABLET | Refills: 11 | Status: ON HOLD | OUTPATIENT
Start: 2024-05-08 | End: 2025-05-08

## 2024-05-08 RX ORDER — AMOXICILLIN 250 MG
1 CAPSULE ORAL 2 TIMES DAILY
Qty: 60 TABLET | Refills: 11 | Status: ON HOLD | OUTPATIENT
Start: 2024-05-08 | End: 2025-05-08

## 2024-05-08 RX ADMIN — MICONAZOLE NITRATE: 20 CREAM TOPICAL at 01:05

## 2024-05-08 RX ADMIN — TRIAMCINOLONE ACETONIDE: 1 CREAM TOPICAL at 01:05

## 2024-05-08 RX ADMIN — HEPARIN SODIUM 1000 UNITS: 1000 INJECTION, SOLUTION INTRAVENOUS; SUBCUTANEOUS at 11:05

## 2024-05-08 RX ADMIN — OXYBUTYNIN CHLORIDE 5 MG: 5 TABLET ORAL at 03:05

## 2024-05-08 RX ADMIN — DIPHENHYDRAMINE HYDROCHLORIDE, ZINC ACETATE: 2; .1 CREAM TOPICAL at 01:05

## 2024-05-08 RX ADMIN — POLYETHYLENE GLYCOL 3350 17 G: 17 POWDER, FOR SOLUTION ORAL at 01:05

## 2024-05-08 RX ADMIN — Medication: at 01:05

## 2024-05-08 RX ADMIN — FUROSEMIDE 80 MG: 80 TABLET ORAL at 01:05

## 2024-05-08 RX ADMIN — HEPARIN SODIUM 5000 UNITS: 5000 INJECTION INTRAVENOUS; SUBCUTANEOUS at 03:05

## 2024-05-08 RX ADMIN — SENNOSIDES AND DOCUSATE SODIUM 1 TABLET: 8.6; 5 TABLET ORAL at 01:05

## 2024-05-08 RX ADMIN — MIDODRINE HYDROCHLORIDE 2.5 MG: 2.5 TABLET ORAL at 01:05

## 2024-05-08 RX ADMIN — OXYBUTYNIN CHLORIDE 5 MG: 5 TABLET ORAL at 01:05

## 2024-05-08 RX ADMIN — CLINDAMYCIN PHOSPHATE: 10 GEL TOPICAL at 01:05

## 2024-05-08 RX ADMIN — MIDODRINE HYDROCHLORIDE 5 MG: 2.5 TABLET ORAL at 08:05

## 2024-05-08 RX ADMIN — CARVEDILOL 6.25 MG: 6.25 TABLET, FILM COATED ORAL at 01:05

## 2024-05-08 RX ADMIN — SODIUM CHLORIDE: 9 INJECTION, SOLUTION INTRAVENOUS at 09:05

## 2024-05-08 RX ADMIN — HEPARIN SODIUM 5000 UNITS: 5000 INJECTION INTRAVENOUS; SUBCUTANEOUS at 05:05

## 2024-05-08 RX ADMIN — ASPIRIN 81 MG: 81 TABLET, COATED ORAL at 01:05

## 2024-05-08 RX ADMIN — ATORVASTATIN CALCIUM 40 MG: 40 TABLET, FILM COATED ORAL at 01:05

## 2024-05-08 NOTE — DISCHARGE SUMMARY
Trung Mayorga - Cardiology [facility] Medicine  Discharge Summary      Patient Name: Xena Vera  MRN: [id number]  CORNEL: [id number]  Patient Class: IP- Inpatient  Admission Date: 4/1/2024  Hospital Length of Stay: 35 days  Discharge Date and Time:  05/08/2024 3:03 PM  Attending Physician: Rosario Luciano MD   Discharging Provider: Rosario Luciano MD  Primary Care Provider: aTmi Villeda FNP  [facility] Medicine Team: [facility] HOSP MED C Rosario Luciano MD  Primary Care Team: [facility] MED     HPI:   Per MICU: 54 yo F with PMH of HFpEF, severe MR, HTN, HLD, and CKD4 who initially presented to [facility] on 4/1/2024 after a mechanical fall at home with concurrent SOB and swelling in her legs and abdomen. She reported adherence to home Lasix and low sodium diet, but had felt increasingly SOB prior to admission as she was sleeping upright and had multiple nighttime awakenings due to SOB. In the ED, BNP was 4639 and she was admitted to Hospital Medicine for further management of ADHF. She initially had good response to IV Lasix but hospital course was later complicated by urinary retention, worsening kidney function, and decreased UOP. Nephrology and Cardiology were consulted to assist. She was started on Diuril and Lasix gtt for diuresis and Isordil and hydralazine for afterload reduction. Patient transferred to CCU for further management.        Procedure(s) (LRB):  Insertion, Catheter, Central Venous, Hemodialysis (Right)      Hospital Course:   MICU course  Patient initially had improved UOP with increasing Lasix, eventually maxed out on Lasix drip with addition of Diuril. Eventually started on CRRT/SLED for volume removal, initially needed intermittent levophed to tolerate. Has itchy rash that started during current hospitalization, Dermatology consulted for regimen. Graduated from CRRT to iHD, tolerating well. Stable to step back down to HM.     HM course  Patient slightly volume overloaded on exam. Reports  making urine. Transition to lasix 40mg PO daily. More fluid removal via HD. Will need OP HD chair. SW consulted. Repeat TTE ordered for eval for MR. Discuss plans with IC once repeat TTE is obtained.  Will also need OP IC follow up. Nephro rec consulting interventional nephro for TDC placement. She underwent TDC placement 04/22. Repeat TTE w/the following findings:      Left Ventricle: The left ventricle is moderately dilated. Normal wall thickness. There is eccentric hypertrophy. There is normal systolic function with a visually estimated ejection fraction of 55 - 60%. Diastolic function cannot be reliably determined in the presence of mitral valve disease.    Right Ventricle: Mild right ventricular enlargement. Wall thickness is normal. Right ventricle wall motion  is normal. Systolic function is mildly reduced.    Biatrial enlargement (L > R)    Mitral Valve: There is severe functional regurgitation with a centrally directed jet.    Tricuspid Valve: There is mild to moderate regurgitation.    Pulmonary Artery: The estimated pulmonary artery systolic pressure is 96 mmHg.    IVC/SVC: Intermediate venous pressure at 8 mmHg.    There is a small pericardial effusion and a right pleural effusion.    Interventional cardiology consulted- recommended further medical management optimization and continued fluid removal via iHD. Not a candidate for mitraclip at this time. Intermittent asymptomatic hypotension for which diuretic regimen titrated w/ initiation of midodrine. Still awaiting Medicaid enrollment- unfortunately- after weeks of patient in the hospital, no active medicaid at this time. Discussed w/hospital staff- plan for patient to discharge to home- discussed extensively with her the indications to return to local emergency department for HD needs- patient expressed understanding and was able to re-iterate when she would need to report to ED for HD. She had tunneled line in place- discussed line care. Medications  were delivered to bedside. Made f/u apt with PCP. Amb referral made for interventional cardiology and nephrology.     Pt deemed appropriate for discharge. Plan discussed with pt, who was agreeable and amenable; medications were discussed and reviewed, outpatient follow-up scheduled, ER precautions were given, all questions were answered to the pt's satisfaction, and Mrs. Vera was subsequently discharged.    Physical Exam  Gen: in NAD, appears stated age, appears acutely ill   Neuro: AAOx3, motor, sensory, and strength grossly intact BL  HEENT: NTNC, EOMI, PERRL, MMM  CVS: RRR, no m/r/g; S1/S2 auscultated with no S3 or S4; capillary refill < 2 sec  Chest: TDC of the right chest wall  Resp: lungs CTAB, no w/r/r; no belabored breathing or accessory muscle use appreciated   Abd: BS+ in all 4 quadrants; NTND, soft to palpation; no organomegaly appreciated   Extrem: pulses full, equal, and regular over all 4 extremities; trace swelling of BL LE, no swelling of dorsum of feet  Skin: healing abrasion of the left calf     Saul #[id number]     Goals of Care Treatment Preferences:  Code Status: Full Code      Consults:   Consults (From admission, onward)          Status Ordering Provider     Inpatient consult to Interventional Cardiology  Once        Provider:  (Not yet assigned)    Completed NICHO LAZO     IP consult to Interventional Nephrology  Once        Provider:  (Not yet assigned)    Acknowledged YUDELKA ROBERTSON     Inpatient consult to Social Work  Once        Provider:  (Not yet assigned)    Acknowledged MARCELO SEEMAL     Inpatient consult to Dermatology  Once        Provider:  (Not yet assigned)    Completed BEN CONROY     Inpatient consult to Medical Genetics  Once        Provider:  (Not yet assigned)    Acknowledged BEN CONROY     Inpatient consult to Social Work/Case Management  Once        Provider:  (Not yet assigned)    Acknowledged JOHN MCCLELLAN     Inpatient consult to  Interventional Cardiology  Once        Provider:  (Not yet assigned)    Completed BEN CONROY     Inpatient consult to Cardiology  Once        Provider:  (Not yet assigned)    Completed BAHEV, JOSE E     Inpatient consult to Registered Dietitian/Nutritionist  Once        Provider:  (Not yet assigned)    Completed BAHEV, JOSEE      Inpatient consult to Nephrology  Once        Provider:  (Not yet assigned)    Completed BAHEV, JOSE E     Inpatient consult to Social Work/Case Management  Once        Provider:  (Not yet assigned)    Completed WERNER SANTANA     Inpatient consult to Registered Dietitian/Nutritionist  Once        Provider:  (Not yet assigned)    Completed WERNER SANTANA            Final Active Diagnoses:    Diagnosis Date Noted POA    PRINCIPAL PROBLEM:  Severe mitral valve regurgitation [I34.0] 06/26/2023 Yes    Hypophosphatemia [E83.39] 04/29/2024 Yes    Hyponatremia [E87.1] 04/22/2024 No    Rash [R21] 04/16/2024 No    Intertrigo [L30.4] 04/16/2024 Yes    Angiokeratoma [D23.9] 04/15/2024 Yes    Hypertrophic cardiomyopathy [I42.2] 04/15/2024 Yes    Proteinuria [R80.9] 04/15/2024 Yes    ESRD (end stage renal disease) on dialysis [N18.6, Z99.2] 04/13/2024 Not Applicable    HLD (hyperlipidemia) [E78.5] 04/13/2024 Yes     Chronic    Prediabetes [R73.03] 04/13/2024 Yes     Chronic    Acute urinary retention [R33.8] 04/09/2024 No    Anasarca [R60.1] 04/01/2024 Yes    Leg wound, left [S81.802A] 04/01/2024 Yes    Acute on chronic heart failure with preserved ejection fraction (HFpEF) [I50.33] 12/22/2023 Yes    Prolonged QT interval [R94.31] 12/22/2023 Yes    Anemia [D64.9] 06/24/2023 Yes     Chronic    Class 2 obesity in adult [E66.9] 06/24/2023 Yes     Chronic    Essential hypertension [I10] 06/24/2023 Yes     Chronic      Problems Resolved During this Admission:       Discharged Condition: stable    Disposition: Home or Self Care    Follow Up:   Follow-up Information       Tami Villeda FNP.  "Go on 5/13/2024.    Specialty: Family Medicine  Why: HOSP f/u at 9:30  Contact information:  [address] [place] [address]  [phone]                           Patient Instructions:      Ambulatory referral/consult to Nephrology   Standing Status: Future   Referral Priority: Routine Referral Type: Consultation   Referral Reason: Specialty Services Required   Requested Specialty: Nephrology   Number of Visits Requested: 1     Ambulatory referral/consult to Interventional Cardiology   Standing Status: Future   Referral Priority: Routine Referral Type: Consultation   Referral Reason: Specialty Services Required   Requested Specialty: Interventional Cardiology   Number of Visits Requested: 1     Diet renal     Notify your health care provider if you experience any of the following:  persistent nausea and vomiting or diarrhea     Notify your health care provider if you experience any of the following:  redness, tenderness, or signs of infection (pain, swelling, redness, odor or green/yellow discharge around incision site)     Notify your health care provider if you experience any of the following:  difficulty breathing or increased cough     Notify your health care provider if you experience any of the following:  persistent dizziness, light-headedness, or visual disturbances     Activity as tolerated       Significant Diagnostic Studies: Labs: CMP   Recent Labs   Lab 05/08/24  0415   *   K 3.7   CL 98   CO2 21*   GLU 74   BUN 16   CREATININE 2.9*   CALCIUM 8.6*   ALBUMIN 3.0*   ANIONGAP 11   , CBC   Recent Labs   Lab 05/08/24  0415   WBC 4.61   HGB 8.7*   HCT 29.4*      , Troponin No results for input(s): "TROPONINI" in the last 168 hours., and All labs within the past 24 hours have been reviewed    Pending Diagnostic Studies:       Procedure Component Value Units Date/Time    Magnesium [[id number]] Collected: 04/16/24 1418    Order Status: Sent Lab Status: No result     Specimen: Blood     " Magnesium [[id number]] Collected: 04/16/24 1418    Order Status: Sent Lab Status: No result     Specimen: Blood     Magnesium [[id number]] Collected: 04/16/24 1418    Order Status: Sent Lab Status: No result     Specimen: Blood     Magnesium [[id number]] Collected: 04/16/24 1418    Order Status: Sent Lab Status: No result     Specimen: Blood     Renal function panel [[id number]] Collected: 04/16/24 1418    Order Status: Sent Lab Status: No result     Specimen: Blood     Renal function panel [[id number]] Collected: 04/16/24 1418    Order Status: Sent Lab Status: No result     Specimen: Blood     Renal function panel [[id number]] Collected: 04/16/24 1418    Order Status: Sent Lab Status: No result     Specimen: Blood     Renal function panel [[id number]] Collected: 04/16/24 1418    Order Status: Sent Lab Status: No result     Specimen: Blood            Medications:  Reconciled Home Medications:      Medication List        START taking these medications      clindamycin phosphate 1% 1 % gel  Commonly known as: CLINDAGEL  Apply topically 2 (two) times daily. for 14 days     diphenhydrAMINE-zinc acetate 2-0.1% cream  Commonly known as: Benadryl  Apply topically 3 (three) times daily as needed for Itching.     GAVILAX 17 gram/dose powder  Generic drug: polyethylene glycol  Use cap to measure 17 grams, mix in liquid and take by mouth 2 (two) times daily.     midodrine 2.5 MG Tab  Commonly known as: PROAMATINE  [place] cristal tableta (2.5 mg en total) por vía oral diariamente.  (Take 1 tablet (2.5 mg total) by mouth Daily.)     oxybutynin 5 MG Tab  Commonly known as: DITROPAN  Take 1 tablet (5 mg total) by mouth 3 (three) times daily.     STOOL SOFTENER-LAXATIVE 8.6-50 mg per tablet  Generic drug: senna-docusate 8.6-50 mg  [place] cristal tableta por vía oral 2 veces al día.  (Take 1 tablet by mouth 2 (two) times daily.)            CHANGE how you take these medications      carvediloL 6.25 MG tablet  Commonly known as: COREG  Take 1  tablet (6.25 mg total) by mouth 2 (two) times daily.  What changed:   how much to take  how to take this  when to take this     furosemide 80 MG tablet  Commonly known as: LASIX  [place] cristal tableta (80 mg en total) por vía oral 2 veces al día.  (Take 1 tablet (80 mg total) by mouth 2 (two) times daily.)  What changed:   medication strength  how much to take  how to take this  when to take this            CONTINUE taking these medications      atorvastatin 40 MG tablet  Commonly known as: LIPITOR  [place] cristal tableta por vía oral diariamente.  (Take 1 tablet by mouth every day)            STOP taking these medications      sevelamer carbonate 800 mg Tab  Commonly known as: RENVELA              Indwelling Lines/Drains at time of discharge:   Lines/Drains/Airways       Central Venous Catheter Line  Duration             Tunneled Central Line - Double Lumen  04/22/24 1317 Internal Jugular Right 16 days                    Time spent on the discharge of patient: 45 minutes           Rosario Luciano MD  Department of Hospital Medicine  [facility] - Cardiology Stepdown

## 2024-05-08 NOTE — DISCHARGE INSTRUCTIONS
Acuda al departamento de emergencias cuando sienta falta de aliento, note que la hinchazón de la parte inferior de la pierna empeora o sienta que ely abdomen se hincha con náuseas y vómitos asociados; probablemente necesitará hemodiálisis en el servicio de urgencias.    No tenemos un sillón de diálisis en un centro para usted en ana momento, abelino ely inscripción en Medicaid continuará incluso fuera del hospital. Mientras tanto, deberá regresar al departamento de emergencias por necesidades de hemodiálisis.    Seguimiento con ely médico de atención primaria. Intentaremos hacer un seguimiento con un cardiólogo y un nefrólogo, abelino esto puede posponerse hasta que se inscriba en el seguro.

## 2024-05-08 NOTE — PLAN OF CARE
Assessment completed using  (ID [id number]),  see flowsheets.  Denies pain, nausea, and/or SOB.  Still with itchy rash to back but improving per pt.  Discussed plan of care for this shift.  Pt is agreeable with no current questions or concerns voiced.  Call light within reach, will continue to monitor.     Problem: Adult Inpatient Plan of Care  Goal: Plan of Care Review  Outcome: Progressing  Flowsheets (Taken 5/7/2024 1905)  Plan of Care Reviewed With: patient  Goal: Optimal Comfort and Wellbeing  Outcome: Progressing     Problem: Fluid and Electrolyte Imbalance (Acute Kidney Injury/Impairment)  Goal: Fluid and Electrolyte Balance  Outcome: Progressing  Intervention: Monitor and Manage Fluid and Electrolyte Balance  Flowsheets (Taken 5/7/2024 1905)  Fluid/Electrolyte Management: (1.5L/day fluid restriction. strict intake/output and daily weights monitored)   fluids restricted   other (see comments)

## 2024-05-08 NOTE — NURSING
Uneventful shift.  Pt slept quietly on/off.  No change in previous assessment.  Report given to oncoming shift and pt care endorsed to Minda at this time

## 2024-05-08 NOTE — ASSESSMENT & PLAN NOTE
Angiokeratoma   Proteinuria      - Genetics consulted, appreciate recs  - GLA genetic testing collected and sent to [facility]- neg for fabry's disease  - Consider cardiac MRI

## 2024-05-08 NOTE — ASSESSMENT & PLAN NOTE
Acute on chronic heart failure with preserved ejection fraction (HFpEF)   Anasarca      52 yo F admitted for ADHF 2/2 severe MR. The MR appears to be secondary to posterior leaflet restriction and leaflet length of 1.1 cm. BNP on admission was 4300 compared to 350 eight months prior. Initially diagnosed with severe MR in June of 2023. Saw Dr. Stone in January of 2024 and was undergoing work-up for possible MitraClip; [facility] scheduled for 3/15 but not completed due to financial constraints, also needs POOJA. Will need optimization of kidney function prior to angiogram consideration, appreciate Nephrology assistance. APS serologies negative, Fabry workup negative     DDX: rheumatic heart disease vs Fabry vs antiphospholipid (serologies negative)     4/1/2024 TTE    Left Ventricle: The left ventricle is normal in size. Normal wall thickness. Normal wall motion. There is normal systolic function with a visually estimated ejection fraction of 60 - 65%. Grade II diastolic dysfunction.    Right Ventricle: Normal right ventricular cavity size. Wall thickness is normal. Right ventricle wall motion  is normal. Systolic function is reduced.    Left Atrium: Left atrium is very  severely dilated.    Right Atrium: Right atrium is mildly dilated.    Aortic Valve: There is mild aortic valve sclerosis. There is normal leaflet mobility. There is trace aortic regurgitation.    Mitral Valve: There is mild bileaflet sclerosis. There is posterior leaflet tethering and failure of complete coaptation. There is very severe regurgitation with a posterolateral eccentriccally directed jet.    Tricuspid Valve: The tricuspid valve is structurally normal. There is normal leaflet mobility. There is moderate to severe regurgitation.    IVC/SVC: IVC was not well visualized due to poor acoustic window. Intermediate venous pressure at 8 mmHg.    Pericardium: There is a trivial effusion posteriorly and small under the RA.     - Interventional  "Cardiology consulted - no intervention until patient's volume status is controlled  - Nephrology following, undergoing SLED/CRRT  - POOJA ordered - "Anesthesia evaluated the patient and feel that she needs better optimization prior to POOJA. They stated once she is euvolemic that we can move forward. Will need a new POOJA order at that time."  - Discontinued Diuril 500 mg IV q24h & Lasix gtt @ 40 mg/hr  - Discontinued hydralazine 25 mg po q8h & Isordil 20 mg po BID for afterload reduction due to pressor requirements  - Continue home ASA 81 mg po daily and Lipitor 40 mg po daily  - Genetics consulted, see "Hypertrophic cardiomyopathy"  - Strict I/O, Jin in place  - Transitioned to lasix 40mg PO daily. Reassess daily for diuretic use.   - Repeat TTE reviewed- IC consulted- not a candidate for mitraclip- recommended outpatient f/u- continued fluid removal/fluid status optimization via HD- up-titration of GDMT as tolerated  - Will also need OP IC follow up.   - Telemetry monitoring  "

## 2024-05-08 NOTE — SUBJECTIVE & OBJECTIVE
Interval History: Patient seen and examined. Tolerated HD this morning with ~1.9 liters removed. Afebrile with pulse in the 60s bpm. Systolic blood pressures ranging from 120-90s mmHg. She is saturating +95% on room air with documented UOP of 750 mL in the last 24 hours. Plan to discharge today per primary team.     Review of patient's allergies indicates:  No Known Allergies  Current Facility-Administered Medications   Medication Frequency    0.9%  NaCl infusion Once    acetaminophen tablet 650 mg Q6H PRN    aspirin EC tablet 81 mg Daily    atorvastatin tablet 40 mg Daily    bisacodyL EC tablet 5 mg Daily PRN    carvediloL tablet 6.25 mg BID    clindamycin phosphate 1% gel BID    dextrose 10% bolus 125 mL 125 mL PRN    dextrose 10% bolus 250 mL 250 mL PRN    diphenhydrAMINE-zinc acetate 2-0.1% cream TID PRN    furosemide tablet 80 mg BID    glucagon (human recombinant) injection 1 mg PRN    glucose chewable tablet 16 g PRN    glucose chewable tablet 24 g PRN    heparin (porcine) injection 1,000 Units PRN    heparin (porcine) injection 1,000 Units PRN    heparin (porcine) injection 5,000 Units Q8H    HYDROcodone-acetaminophen 5-325 mg per tablet 1 tablet Q6H PRN    hydrOXYzine HCL tablet 10 mg TID PRN    melatonin tablet 6 mg Nightly PRN    miconazole 2 % cream BID    midodrine tablet 2.5 mg Q12H    midodrine tablet 5 mg Daily PRN    naloxone 0.4 mg/mL injection 0.02 mg PRN    ondansetron disintegrating tablet 4 mg Q8H PRN    ondansetron injection 4 mg Q8H PRN    oxybutynin tablet 5 mg TID    polyethylene glycol packet 17 g BID    senna-docusate 8.6-50 mg per tablet 1 tablet BID    sodium chloride 0.9% bolus 250 mL 250 mL PRN    sodium chloride 0.9% bolus 250 mL 250 mL PRN    sodium chloride 0.9% bolus 250 mL 250 mL PRN    sodium chloride 0.9% flush 10 mL PRN    sodium chloride 0.9% flush 10 mL PRN    sodium chloride 0.9% flush 10 mL PRN    triamcinolone acetonide 0.1% cream BID    white petrolatum 41 % ointment BID     zinc oxide 20 % ointment PRN       Objective:     Vital Signs (Most Recent):  Temp: 97.5 °F (36.4 °C) (05/08/24 0755)  Pulse: 61 (05/08/24 0755)  Resp: 18 (05/08/24 0755)  BP: 98/67 (05/08/24 0755)  SpO2: 96 % (05/08/24 0755) Vital Signs (24h Range):  Temp:  [97.5 °F (36.4 °C)-98.4 °F (36.9 °C)] 97.5 °F (36.4 °C)  Pulse:  [61-69] 61  Resp:  [15-18] 18  SpO2:  [94 %-97 %] 96 %  BP: ()/(57-87) 98/67     Weight: 60.8 kg (134 lb 0.6 oz) (05/08/24 0438)  Body mass index is 26.32 kg/m².  Body surface area is 1.6 meters squared.    I/O last 3 completed shifts:  In: 838 [P.O.:838]  Out: 1350 [Urine:1350]     Physical Exam  Vitals and nursing note reviewed.   Constitutional:       General: She is not in acute distress.     Appearance: Normal appearance. She is well-developed. She is obese. She is not ill-appearing or diaphoretic.   HENT:      Head: Normocephalic and atraumatic.      Right Ear: External ear normal.      Left Ear: External ear normal.      Nose: Nose normal.      Mouth/Throat:      Mouth: Mucous membranes are moist.      Pharynx: Oropharynx is clear. No oropharyngeal exudate or posterior oropharyngeal erythema.   Eyes:      General: No scleral icterus.        Right eye: No discharge.         Left eye: No discharge.      Extraocular Movements: Extraocular movements intact.      Conjunctiva/sclera: Conjunctivae normal.   Cardiovascular:      Rate and Rhythm: Normal rate and regular rhythm.      Pulses: Normal pulses.      Heart sounds: Murmur heard.      No friction rub. No gallop.   Pulmonary:      Effort: Pulmonary effort is normal. No respiratory distress.      Breath sounds: Decreased breath sounds (throughout) present. No wheezing, rhonchi or rales.   Abdominal:      General: Bowel sounds are normal. There is no distension.      Palpations: Abdomen is soft.      Tenderness: There is no abdominal tenderness.   Musculoskeletal:      Cervical back: Neck supple.      Right lower leg: Edema present.       Left lower leg: Edema present.   Skin:     General: Skin is warm and dry.      Coloration: Skin is not jaundiced.      Comments: Abrasion to left lower extremity with granulation tissue noted.    Neurological:      General: No focal deficit present.      Mental Status: She is alert and oriented to person, place, and time. Mental status is at baseline.      Cranial Nerves: No cranial nerve deficit.      Motor: No weakness.   Psychiatric:         Mood and Affect: Mood normal.         Behavior: Behavior normal.          Significant Labs:  BMP:   Recent Labs   Lab 05/08/24  0415   GLU 74   *   K 3.7   CL 98   CO2 21*   BUN 16   CREATININE 2.9*   CALCIUM 8.6*   MG 1.6     CBC:   Recent Labs   Lab 05/08/24 0415   WBC 4.61   RBC 3.55*   HGB 8.7*   HCT 29.4*      MCV 83   MCH 24.5*   MCHC 29.6*     CMP:   Recent Labs   Lab 05/08/24 0415   GLU 74   CALCIUM 8.6*   ALBUMIN 3.0*   *   K 3.7   CO2 21*   CL 98   BUN 16   CREATININE 2.9*     LFTs:   Recent Labs   Lab 05/08/24  0415   ALBUMIN 3.0*     Microbiology Results (last 7 days)       ** No results found for the last 168 hours. **          Specimen (24h ago, onward)      None          Significant Imaging:  I have reviewed all imagining in the last 24 hours.

## 2024-05-08 NOTE — PROGRESS NOTES
[facility]  Nephrology  Progress Note    Patient Name: Xena Vera  MRN: [id number]  Admission Date: 4/1/2024  Hospital Length of Stay: 35 days  Attending Provider: Rosario Luciano MD   Primary Care Physician: Tami Villeda FNP  Principal Problem:Severe mitral valve regurgitation    Subjective:     HPI: Ms Vera is an obese (BMI ~31) 53-year-old with CKD IV (baseline creatinine ~3.1-3.3), prediabetes with most recent hemoglobin A1c 6%, HFpEF (most recent TTE with EF 60-65% with G2DD), mitral valve regurgitation, anemia, hypertension, HLD as well as several over co-morbid conditions who was admitted on 4/1 with heart failure exacerbation and hypervolemia after presenting with to ED following a mechanical fall but also had complaints of progressive dyspnea/ZACARIAS, orthopnea, lower extremity edema and abdominal distension with constipation. On presentation patient was noted to be hypotensive with systolic BP readings as low as 90s mmHg and bradycardiac with HR as low as 50s bpm. There was concern for some edema on chest x-ray and BNP at that time was ~4.6K and metabolic panel was notable for serum sodium 133, bicarbonate 20, , creatinine 4.2, albumin 3.1 and total bilirubin 1.5. UA showed +1 protein. Her admission was complicated by failure to adequately diuresis with escalating IV Lasix and even oral metolazone for which Nephrology was consulted on 4/10 for assistance. She explicitly denies NSAID use as outpatient.    Interval History: Patient seen and examined. Tolerated HD this morning with ~1.9 liters removed. Afebrile with pulse in the 60s bpm. Systolic blood pressures ranging from 120-90s mmHg. She is saturating +95% on room air with documented UOP of 750 mL in the last 24 hours. Plan to discharge today per primary team.     Review of patient's allergies indicates:  No Known Allergies  Current Facility-Administered Medications   Medication Frequency    0.9%  NaCl infusion Once     acetaminophen tablet 650 mg Q6H PRN    aspirin EC tablet 81 mg Daily    atorvastatin tablet 40 mg Daily    bisacodyL EC tablet 5 mg Daily PRN    carvediloL tablet 6.25 mg BID    clindamycin phosphate 1% gel BID    dextrose 10% bolus 125 mL 125 mL PRN    dextrose 10% bolus 250 mL 250 mL PRN    diphenhydrAMINE-zinc acetate 2-0.1% cream TID PRN    furosemide tablet 80 mg BID    glucagon (human recombinant) injection 1 mg PRN    glucose chewable tablet 16 g PRN    glucose chewable tablet 24 g PRN    heparin (porcine) injection 1,000 Units PRN    heparin (porcine) injection 1,000 Units PRN    heparin (porcine) injection 5,000 Units Q8H    HYDROcodone-acetaminophen 5-325 mg per tablet 1 tablet Q6H PRN    hydrOXYzine HCL tablet 10 mg TID PRN    melatonin tablet 6 mg Nightly PRN    miconazole 2 % cream BID    midodrine tablet 2.5 mg Q12H    midodrine tablet 5 mg Daily PRN    naloxone 0.4 mg/mL injection 0.02 mg PRN    ondansetron disintegrating tablet 4 mg Q8H PRN    ondansetron injection 4 mg Q8H PRN    oxybutynin tablet 5 mg TID    polyethylene glycol packet 17 g BID    senna-docusate 8.6-50 mg per tablet 1 tablet BID    sodium chloride 0.9% bolus 250 mL 250 mL PRN    sodium chloride 0.9% bolus 250 mL 250 mL PRN    sodium chloride 0.9% bolus 250 mL 250 mL PRN    sodium chloride 0.9% flush 10 mL PRN    sodium chloride 0.9% flush 10 mL PRN    sodium chloride 0.9% flush 10 mL PRN    triamcinolone acetonide 0.1% cream BID    white petrolatum 41 % ointment BID    zinc oxide 20 % ointment PRN       Objective:     Vital Signs (Most Recent):  Temp: 97.5 °F (36.4 °C) (05/08/24 0755)  Pulse: 61 (05/08/24 0755)  Resp: 18 (05/08/24 0755)  BP: 98/67 (05/08/24 0755)  SpO2: 96 % (05/08/24 0755) Vital Signs (24h Range):  Temp:  [97.5 °F (36.4 °C)-98.4 °F (36.9 °C)] 97.5 °F (36.4 °C)  Pulse:  [61-69] 61  Resp:  [15-18] 18  SpO2:  [94 %-97 %] 96 %  BP: ()/(57-87) 98/67     Weight: 60.8 kg (134 lb 0.6 oz) (05/08/24 5528)  Body mass  index is 26.32 kg/m².  Body surface area is 1.6 meters squared.    I/O last 3 completed shifts:  In: 838 [P.O.:838]  Out: 1350 [Urine:1350]     Physical Exam  Vitals and nursing note reviewed.   Constitutional:       General: She is not in acute distress.     Appearance: Normal appearance. She is well-developed. She is obese. She is not ill-appearing or diaphoretic.   HENT:      Head: Normocephalic and atraumatic.      Right Ear: External ear normal.      Left Ear: External ear normal.      Nose: Nose normal.      Mouth/Throat:      Mouth: Mucous membranes are moist.      Pharynx: Oropharynx is clear. No oropharyngeal exudate or posterior oropharyngeal erythema.   Eyes:      General: No scleral icterus.        Right eye: No discharge.         Left eye: No discharge.      Extraocular Movements: Extraocular movements intact.      Conjunctiva/sclera: Conjunctivae normal.   Cardiovascular:      Rate and Rhythm: Normal rate and regular rhythm.      Pulses: Normal pulses.      Heart sounds: Murmur heard.      No friction rub. No gallop.   Pulmonary:      Effort: Pulmonary effort is normal. No respiratory distress.      Breath sounds: Decreased breath sounds (throughout) present. No wheezing, rhonchi or rales.   Abdominal:      General: Bowel sounds are normal. There is no distension.      Palpations: Abdomen is soft.      Tenderness: There is no abdominal tenderness.   Musculoskeletal:      Cervical back: Neck supple.      Right lower leg: Edema present.      Left lower leg: Edema present.   Skin:     General: Skin is warm and dry.      Coloration: Skin is not jaundiced.      Comments: Abrasion to left lower extremity with granulation tissue noted.    Neurological:      General: No focal deficit present.      Mental Status: She is alert and oriented to person, place, and time. Mental status is at baseline.      Cranial Nerves: No cranial nerve deficit.      Motor: No weakness.   Psychiatric:         Mood and Affect: Mood  normal.         Behavior: Behavior normal.          Significant Labs:  BMP:   Recent Labs   Lab 05/08/24  0415   GLU 74   *   K 3.7   CL 98   CO2 21*   BUN 16   CREATININE 2.9*   CALCIUM 8.6*   MG 1.6     CBC:   Recent Labs   Lab 05/08/24 0415   WBC 4.61   RBC 3.55*   HGB 8.7*   HCT 29.4*      MCV 83   MCH 24.5*   MCHC 29.6*     CMP:   Recent Labs   Lab 05/08/24  0415   GLU 74   CALCIUM 8.6*   ALBUMIN 3.0*   *   K 3.7   CO2 21*   CL 98   BUN 16   CREATININE 2.9*     LFTs:   Recent Labs   Lab 05/08/24  0415   ALBUMIN 3.0*     Microbiology Results (last 7 days)       ** No results found for the last 168 hours. **          Specimen (24h ago, onward)      None          Significant Imaging:  I have reviewed all imagining in the last 24 hours.  Assessment/Plan:     Cardiac/Vascular  Acute on chronic heart failure with preserved ejection fraction (HFpEF)  Patient is identified as having Diastolic (HFpEF) heart failure that is Acute on chronic. CHF is currently uncontrolled due to Continued edema of extremities, Hepatic congestion/ascites, and JVD, >3 pillow orthopnea, Rales/crackles on pulmonary exam, and Pulmonary edema/pleural effusion on CXR.     - diuresis as detailed under Acute kidney injury superimposed on chronic kidney disease    Essential hypertension  - management per primary team    Renal/  ESRD (end stage renal disease) on dialysis  CKD IV (baseline creatinine ~3.1-3.3) admitted with hypervolemia and REGINALDO with creatinine 4.2  UA showed +1 protein with UPCR of ~500 mg  Urinary sediment with non dysmorphic RBCs and WBCs present although Jin catheter placed the day prior to UA  Retroperitoneal US without evidence of hydronephrosis although changes consistent with chronic kidney disease  Diuresis with lasix gtt @ 40mg/hr + Diuril 500mg IV BID attempted, and while Crt improved she remains unable to tolerate lying flat.  Tolerated 8h SLED w goal UF rate 200-500/hr successfully overnight  4/14-15, then 12h SLED on 4/15, 16, and 17.  4/19 - 4/24: tolerated iHD; no issues  4/22: TDC placed  4/22 - 5/3: Tolerating iHD, last session 5/1; pending outpatient HD chair.  5/3: Case escalated given difficulty procuring outpatient HD chair.    Plan/Recommendations:  - CKD IV (baseline creatinine ~3.1-3.3) admitted with hypervolemia and REGINALDO with creatinine 4.2 now progressed to ESRD requiring dialysis   - UA showed +1 protein with UPCR of ~500 mg  - urinary sediment with non dysmorphic RBCs and WBCs present although Jin catheter just place yesterday   - HD today for metabolic clearance and volume management   - can continue Lasix 80 mg BID PO  - daily RFPs and magnesium levels   - please avoid hypotension/major fluctuations in BP (keep MAP > 65 mmHg)  - renal diet/tube feeds when not NPO with volume restriction per primary team  - strict I/O's and daily weights  - renally all dose medications to eGFR   - avoid nephrotoxic agents wean feasible (i.e. NSAIDs, intra-arterial contrast, supra-therapeutic vancomycin levels, etc.)      Thank you for your consult. I will follow-up with patient. Please contact us if you have any additional questions.    Italo Segovia MD  Nephrology  [facility] - [facility]

## 2024-05-08 NOTE — PROGRESS NOTES
HD TX completed.  Net fluid removed 1.9 liters.  VSS. Tolerated TX.  To return to floor by wheelchair.

## 2024-05-08 NOTE — PROGRESS NOTES
[facility]  Progress Note    Patient Name: Xena Vera  MRN: [id number]  Patient Class: IP- Inpatient   Admission Date: 4/1/2024  Length of Stay: 34 days  Attending Physician: Rosario Luciano MD  Primary Care Provider: Tami Villeda FNP        Subjective:     Principal Problem:Severe mitral valve regurgitation        HPI:  Per MICU: 52 yo F with PMH of HFpEF, severe MR, HTN, HLD, and CKD4 who initially presented to [facility] on 4/1/2024 after a mechanical fall at home with concurrent SOB and swelling in her legs and abdomen. She reported adherence to home Lasix and low sodium diet, but had felt increasingly SOB prior to admission as she was sleeping upright and had multiple nighttime awakenings due to SOB. In the ED, BNP was 4639 and she was admitted to Hospital Medicine for further management of ADHF. She initially had good response to IV Lasix but hospital course was later complicated by urinary retention, worsening kidney function, and decreased UOP. Nephrology and Cardiology were consulted to assist. She was started on Diuril and Lasix gtt for diuresis and Isordil and hydralazine for afterload reduction. Patient transferred to CCU for further management.        Overview/Hospital Course:  MICU course  Patient initially had improved UOP with increasing Lasix, eventually maxed out on Lasix drip with addition of Diuril. Eventually started on CRRT/SLED for volume removal, initially needed intermittent levophed to tolerate. Has itchy rash that started during current hospitalization, Dermatology consulted for regimen. Graduated from CRRT to iHD, tolerating well. Stable to step back down to HM.      course  Patient slightly volume overloaded on exam. Reports making urine. Transition to lasix 40mg PO daily. More fluid removal via HD. Will need OP HD chair. SW consulted. Repeat TTE ordered for eval for MR. Discuss plans with IC once repeat TTE is obtained.  Will also need OP IC  follow up. Nephro rec consulting interventional nephro for TDC placement. She underwent TDC placement 04/22. Repeat TTE w/the following findings:      Left Ventricle: The left ventricle is moderately dilated. Normal wall thickness. There is eccentric hypertrophy. There is normal systolic function with a visually estimated ejection fraction of 55 - 60%. Diastolic function cannot be reliably determined in the presence of mitral valve disease.    Right Ventricle: Mild right ventricular enlargement. Wall thickness is normal. Right ventricle wall motion  is normal. Systolic function is mildly reduced.    Biatrial enlargement (L > R)    Mitral Valve: There is severe functional regurgitation with a centrally directed jet.    Tricuspid Valve: There is mild to moderate regurgitation.    Pulmonary Artery: The estimated pulmonary artery systolic pressure is 96 mmHg.    IVC/SVC: Intermediate venous pressure at 8 mmHg.    There is a small pericardial effusion and a right pleural effusion.    Interventional cardiology consulted- recommended further medical management optimization and continued fluid removal via iHD. Not a candidate for mitraclip at this time. Intermittent asymptomatic hypotension for which diuretic regimen titrated w/ initiation of midodrine. Awaiting Medicaid approval for HD chair set-up.      Interval History: No issues overnight. No sob, chest pain, nausea, vomiting, fevers, chills, night sweats.  utilized.    Objective:     Vital Signs (Most Recent):  Temp: 98.4 °F (36.9 °C) (05/07/24 1509)  Pulse: 68 (05/07/24 1509)  Resp: 18 (05/07/24 1509)  BP: 117/80 (05/07/24 1509)  SpO2: (!) 94 % (05/07/24 1509) Vital Signs (24h Range):  Temp:  [98.1 °F (36.7 °C)-99.1 °F (37.3 °C)] 98.4 °F (36.9 °C)  Pulse:  [62-71] 68  Resp:  [17-18] 18  SpO2:  [92 %-97 %] 94 %  BP: (100-135)/(59-84) 117/80     Weight: 60.7 kg (133 lb 13.1 oz)  Body mass index is 26.27 kg/m².    Intake/Output Summary (Last 24 hours) at  5/7/2024 1933  Last data filed at 5/7/2024 1730  Gross per 24 hour   Intake 720 ml   Output 900 ml   Net -180 ml      Physical Exam  Gen: in NAD, appears stated age, appears acutely ill   Neuro: AAOx3, motor, sensory, and strength grossly intact BL  HEENT: NTNC, EOMI, PERRL, MMM  CVS: RRR, no m/r/g; S1/S2 auscultated with no S3 or S4; capillary refill < 2 sec  Chest: TDC of the right chest wall  Resp: lungs CTAB, no w/r/r; no belabored breathing or accessory muscle use appreciated   Abd: BS+ in all 4 quadrants; NTND, soft to palpation; no organomegaly appreciated   Extrem: pulses full, equal, and regular over all 4 extremities; trace swelling of BL LE, no swelling of dorsum of feet  Skin: healing abrasion of the left calf    Significant Labs: All pertinent labs within the past 24 hours have been reviewed.  CBC:   Recent Labs   Lab 05/06/24  0454   WBC 3.21*   HGB 8.7*   HCT 30.4*          CMP:   Recent Labs   Lab 05/06/24  0454   *   K 3.7   CL 93*   CO2 22*   GLU 80   BUN 21*   CREATININE 3.5*   CALCIUM 8.7   ALBUMIN 3.1*   ANIONGAP 12         Significant Imaging: I have reviewed all pertinent imaging results/findings within the past 24 hours.    Assessment/Plan:      * Severe mitral valve regurgitation    Acute on chronic heart failure with preserved ejection fraction (HFpEF)   Anasarca      52 yo F admitted for ADHF 2/2 severe MR. The MR appears to be secondary to posterior leaflet restriction and leaflet length of 1.1 cm. BNP on admission was 4300 compared to 350 eight months prior. Initially diagnosed with severe MR in June of 2023. Saw Dr. Stone in January of 2024 and was undergoing work-up for possible MitraClip; LHC scheduled for 3/15 but not completed due to financial constraints, also needs POOJA. Will need optimization of kidney function prior to angiogram consideration, appreciate Nephrology assistance. APS serologies negative, Fabry workup negative     DDX: rheumatic heart disease vs Fabry  "vs antiphospholipid (serologies negative)     4/1/2024 TTE    Left Ventricle: The left ventricle is normal in size. Normal wall thickness. Normal wall motion. There is normal systolic function with a visually estimated ejection fraction of 60 - 65%. Grade II diastolic dysfunction.    Right Ventricle: Normal right ventricular cavity size. Wall thickness is normal. Right ventricle wall motion  is normal. Systolic function is reduced.    Left Atrium: Left atrium is very  severely dilated.    Right Atrium: Right atrium is mildly dilated.    Aortic Valve: There is mild aortic valve sclerosis. There is normal leaflet mobility. There is trace aortic regurgitation.    Mitral Valve: There is mild bileaflet sclerosis. There is posterior leaflet tethering and failure of complete coaptation. There is very severe regurgitation with a posterolateral eccentriccally directed jet.    Tricuspid Valve: The tricuspid valve is structurally normal. There is normal leaflet mobility. There is moderate to severe regurgitation.    IVC/SVC: IVC was not well visualized due to poor acoustic window. Intermediate venous pressure at 8 mmHg.    Pericardium: There is a trivial effusion posteriorly and small under the RA.     - Interventional Cardiology consulted - no intervention until patient's volume status is controlled  - Nephrology following, undergoing SLED/CRRT  - POOJA ordered - "Anesthesia evaluated the patient and feel that she needs better optimization prior to POOJA. They stated once she is euvolemic that we can move forward. Will need a new POOJA order at that time."  - Discontinued Diuril 500 mg IV q24h & Lasix gtt @ 40 mg/hr  - Discontinued hydralazine 25 mg po q8h & Isordil 20 mg po BID for afterload reduction due to pressor requirements  - Continue home ASA 81 mg po daily and Lipitor 40 mg po daily  - Genetics consulted, see "Hypertrophic cardiomyopathy"  - Strict I/O, Jin in place  - Transitioned to lasix 40mg PO daily. Reassess daily " "for diuretic use.   - Repeat TTE reviewed- IC consulted- not a candidate for mitraclip- recommended outpatient f/u- continued fluid removal/fluid status optimization via HD- up-titration of GDMT as tolerated  - Will also need OP IC follow up.   - Telemetry monitoring    Hypophosphatemia  - 2/2 renal dysfunction    Hyponatremia  Patient has hyponatremia which is uncontrolled,We will aim to correct the sodium by 4-6mEq in 24 hours. We will monitor sodium Daily. The hyponatremia is due to renal insufficiency. We will obtain the following studies: no new labs at this moment. We will treat the hyponatremia with Fluid restriction of:  1.5 liter per day and Hemodialysis. The patient's sodium results have been reviewed and are listed below.  No results for input(s): "NA" in the last 24 hours.      Intertrigo  - improvement       Rash  - derm initially consulted- concern for resolving folliculitis- no significant improvement  - triamcinolone cream today-monitor for improvement- if no improvement, then will re-consult derm    Proteinuria  - h/o      Hypertrophic cardiomyopathy  Angiokeratoma   Proteinuria      - Genetics consulted, appreciate recs  - GLA genetic testing collected and sent to [facility]- neg for fabry's disease  - Consider cardiac MRI      Angiokeratoma        Prediabetes  - h/o    HLD (hyperlipidemia)  - continue statin    Acute kidney injury superimposed on chronic kidney disease  Patient with acute kidney injury/acute renal failure likely due to acute tubular necrosis caused by unknown.  REGINALDO is currently  improving on CRRT/SLED . Baseline creatinine  2.5  - Labs reviewed- Renal function/electrolytes with Estimated Creatinine Clearance: 80.9 mL/min (based on SCr of 0.7 mg/dL). according to latest data. Monitor urine output and serial BMP and adjust therapy as needed. Avoid nephrotoxins and renally dose meds for GFR listed above.           Recent Labs     04/17/24  1428 04/17/24  2203 04/18/24  3795 " "  CREATININE 1.8*  1.8* 0.8  0.8 0.7  0.7  0.7         - Nephrology following, appreciate recs - started SLED on 4/14   - Genetics consulted, see "Hypertrophic cardiomyopathy"  - Holding home sevelamer   - Holding home sodium bicarb 650 mg po BID  - HD per nephrology. Will need OP HD chair setup.   - Nephro following- consulted interventional nephrology- TDC 04/22   - Midodrine 2.5mg BID (w/ holding parameters) + 5mg PRN w/ HD   - awaiting medicaid enrollment to set-up outpatient HD chair        Acute urinary retention  - now w/acute renal failure requiring iHD  - no catheter in place       Leg wound, left  - wound care following    Anasarca  - 2/2 acute renal failure in setting of CHF w/severe MR       Prolonged QT interval  - resolved    Acute on chronic heart failure with preserved ejection fraction (HFpEF)  - Interval history and physical exam findings as described above  - Most recent TTE results:  Results for orders placed during the hospital encounter of 04/01/24    Echo    Interpretation Summary    Left Ventricle: The left ventricle is normal in size. Normal wall thickness. Normal wall motion. There is normal systolic function with a visually estimated ejection fraction of 60 - 65%. Grade II diastolic dysfunction.    Right Ventricle: Normal right ventricular cavity size. Wall thickness is normal. Right ventricle wall motion  is normal. Systolic function is reduced.    Left Atrium: Left atrium is very  severely dilated.    Right Atrium: Right atrium is mildly dilated.    Aortic Valve: There is mild aortic valve sclerosis. There is normal leaflet mobility. There is trace aortic regurgitation.    Mitral Valve: There is mild bileaflet sclerosis. There is posterior leaflet tethering and failure of complete coaptation. There is very severe regurgitation with a posterolateral eccentriccally directed jet.    Tricuspid Valve: The tricuspid valve is structurally normal. There is normal leaflet mobility. There is " moderate to severe regurgitation.    IVC/SVC: IVC was not well visualized due to poor acoustic window. Intermediate venous pressure at 8 mmHg.    Pericardium: There is a trivial effusion posteriorly and small under the RA.    - Clinically volume overloaded on admission  - now w/iHD needs  - Will continue diuresis with lasix 80mg PO BID  - Continue home cardioprudent regimen  - Strict I/Os  - 1.5L fluid restriction   - Daily weights  - Will continue to monitor on tele    Anemia  Stable.   Recent Labs   Lab 05/01/24  0235 05/03/24  0228 05/06/24  0454   WBC 4.25 3.70* 3.21*   HGB 8.1* 8.4* 8.7*   HCT 27.7* 29.4* 30.4*    235 237         Class 2 obesity in adult  Body mass index is 26.27 kg/m². Morbid obesity complicates all aspects of disease management from diagnostic modalities to treatment. Weight loss encouraged and health benefits explained to patient.     Essential hypertension     Temp:  [98 °F (36.7 °C)-98.5 °F (36.9 °C)]   Pulse:  [55-62]   Resp:  [9-40]   BP: (94)/(53)   SpO2:  [94 %-100 %]   Arterial Line BP: ()/(44-66) .     While in the hospital, will manage blood pressure as follows; Adjust home antihypertensive regimen as follows- holding Lasix and Coreg     Will utilize PRN blood pressure medication only if patient's blood pressure greater than 180/110 and she develops symptoms such as worsening chest pain or shortness of breath.         VTE Risk Mitigation (From admission, onward)           Ordered     heparin (porcine) injection 1,000 Units  As needed (PRN)         05/07/24 1637     heparin (porcine) injection 1,000 Units  As needed (PRN)         05/06/24 1140     heparin (porcine) injection 5,000 Units  Every 8 hours         04/24/24 1546     heparin (porcine) injection 1,000 Units  As needed (PRN)         04/24/24 1038     heparin (porcine) injection 1,000 Units  As needed (PRN)         04/13/24 1428     IP VTE HIGH RISK PATIENT  Once         04/01/24 1030     Place sequential  compression device  Until discontinued         04/01/24 1030                    Discharge Planning   RILEY: 5/8/2024     Code Status: Full Code   Is the patient medically ready for discharge?: No    Reason for patient still in hospital (select all that apply): Pending disposition  Discharge Plan A: Home with family   Discharge Delays: (!) Dialysis Set-up        Rosario Luciano MD  Department of Hospital Medicine   [facility] - Cardiology Stepdown

## 2024-05-08 NOTE — PT/OT/SLP PROGRESS
Physical Therapy Treatment    Patient Name:  Xena Vera   MRN:  [id number]    Recommendations:     Discharge Recommendations: No Therapy Indicated  Discharge Equipment Recommendations: none  Barriers to discharge: None    Assessment:     Xena Vera is a 53 y.o. female admitted with a medical diagnosis of Severe mitral valve regurgitation.  She presents with the following impairments/functional limitations: weakness, impaired endurance.  Pt was agreeable and tolerated session well. Pt completed gait training and stair training with no issues. Pt reported able to completed stair training better today. Pt is progressing well towards goals and continues to benefit from therapy during hospitalization to achieve highest level of independence prior to discharge.     Rehab Prognosis: Good; patient would benefit from acute skilled PT services to address these deficits and reach maximum level of function.    Recent Surgery: Procedure(s) (LRB):  Insertion, Catheter, Central Venous, Hemodialysis (Right) 16 Days Post-Op    Plan:     During this hospitalization, patient to be seen 2 x/week to address the identified rehab impairments via gait training, therapeutic activities, therapeutic exercises, neuromuscular re-education and progress toward the following goals:    Plan of Care Expires:  05/18/24    Subjective     Chief Complaint: none stated   Patient/Family Comments/goals: pt reports her daughter is coming later today  Pain/Comfort:  Pain Rating 1: 0/10  Pain Rating Post-Intervention 1: 0/10      Objective:     Communicated with RN prior to session.  Patient found sitting edge of bed with  (no active lines) upon PT entry to room.    utilized via Ipad/Carmelita: Gabriella #[id number]    General Precautions: Standard, fall  Orthopedic Precautions: N/A  Braces: N/A  Respiratory Status: Room air     Functional Mobility:  Additional staff present: N/A  Bed Mobility:   Not assessed, pt was found and left out of  bed  Transfers:   Sit <> Stand Transfer: independence with no assistive device   Gait:  Pt ambulated ~200 ft with supervision and no assistive device.  no rest break and no LOB  All lines remained intact throughout ambulation trial  Denies feeling lightheaded, dizziness, chest pain, or SOB.  Gait Deviation(s): steady, reciprocal gait with decrease abigail and decrease step length    Verbal/tactile cues for pacing as needed.   Stairs:  Pt ascended/descended  10 stair(s) with right handrail with Contact Guard Assistance.   Step-to gait.   BUE on RHR to descend     AM-PAC 6 CLICK MOBILITY  Turning over in bed (including adjusting bedclothes, sheets and blankets)?: 4  Sitting down on and standing up from a chair with arms (e.g., wheelchair, bedside commode, etc.): 4  Moving from lying on back to sitting on the side of the bed?: 4  Moving to and from a bed to a chair (including a wheelchair)?: 4  Need to walk in hospital room?: 4  Climbing 3-5 steps with a railing?: 3  Basic Mobility Total Score: 23       Treatment & Education:  Patient educated on role of therapy, goals of session, and benefits of out of bed mobility.   Instructed on use of call button and importance of calling nursing staff for assistance with mobility   Questions/concerns addressed within PTA scope of practice  Pt verbalized understanding.    Patient left up in chair with all lines intact and call button in reach..    GOALS:   Multidisciplinary Problems       Physical Therapy Goals          Problem: Physical Therapy    Goal Priority Disciplines Outcome Goal Variances Interventions   Physical Therapy Goal     PT, PT/OT Progressing     Description: Goals to be met by: 24    Patient will increase functional independence with mobility by performin. Supine to sit with Modified [place]  2. Sit to supine with Modified [place]  3. Sit to stand transfer with Supervision- goal met   4. Bed to chair transfer with Supervision using Rolling  Walker  5. Gait  x 100 feet with Supervision using Rolling Walker- goal met 5/6  6. Ascend/descend 5 stair with bilateral Handrails Supervision using No Assistive Device.   7. Lower extremity exercise program x15 reps per handout, with assistance as needed                         Time Tracking:     PT Received On: 05/08/24  PT Start Time: 1616     PT Stop Time: 1627  PT Total Time (min): 11 min     Billable Minutes: Therapeutic Activity 11    Treatment Type: Treatment  PT/PTA: PTA     Number of PTA visits since last PT visit: 1 05/08/2024

## 2024-05-08 NOTE — SUBJECTIVE & OBJECTIVE
Interval History: No issues overnight. No sob, chest pain, nausea, vomiting, fevers, chills, night sweats.  utilized.    Objective:     Vital Signs (Most Recent):  Temp: 98.4 °F (36.9 °C) (05/07/24 1509)  Pulse: 68 (05/07/24 1509)  Resp: 18 (05/07/24 1509)  BP: 117/80 (05/07/24 1509)  SpO2: (!) 94 % (05/07/24 1509) Vital Signs (24h Range):  Temp:  [98.1 °F (36.7 °C)-99.1 °F (37.3 °C)] 98.4 °F (36.9 °C)  Pulse:  [62-71] 68  Resp:  [17-18] 18  SpO2:  [92 %-97 %] 94 %  BP: (100-135)/(59-84) 117/80     Weight: 60.7 kg (133 lb 13.1 oz)  Body mass index is 26.27 kg/m².    Intake/Output Summary (Last 24 hours) at 5/7/2024 1933  Last data filed at 5/7/2024 1730  Gross per 24 hour   Intake 720 ml   Output 900 ml   Net -180 ml      Physical Exam  Gen: in NAD, appears stated age, appears acutely ill   Neuro: AAOx3, motor, sensory, and strength grossly intact BL  HEENT: NTNC, EOMI, PERRL, MMM  CVS: RRR, no m/r/g; S1/S2 auscultated with no S3 or S4; capillary refill < 2 sec  Chest: TDC of the right chest wall  Resp: lungs CTAB, no w/r/r; no belabored breathing or accessory muscle use appreciated   Abd: BS+ in all 4 quadrants; NTND, soft to palpation; no organomegaly appreciated   Extrem: pulses full, equal, and regular over all 4 extremities; trace swelling of BL LE, no swelling of dorsum of feet  Skin: healing abrasion of the left calf    Significant Labs: All pertinent labs within the past 24 hours have been reviewed.  CBC:   Recent Labs   Lab 05/06/24 0454   WBC 3.21*   HGB 8.7*   HCT 30.4*          CMP:   Recent Labs   Lab 05/06/24 0454   *   K 3.7   CL 93*   CO2 22*   GLU 80   BUN 21*   CREATININE 3.5*   CALCIUM 8.7   ALBUMIN 3.1*   ANIONGAP 12         Significant Imaging: I have reviewed all pertinent imaging results/findings within the past 24 hours.

## 2024-05-08 NOTE — ASSESSMENT & PLAN NOTE
CKD IV (baseline creatinine ~3.1-3.3) admitted with hypervolemia and REGINALDO with creatinine 4.2  UA showed +1 protein with UPCR of ~500 mg  Urinary sediment with non dysmorphic RBCs and WBCs present although Jin catheter placed the day prior to UA  Retroperitoneal US without evidence of hydronephrosis although changes consistent with chronic kidney disease  Diuresis with lasix gtt @ 40mg/hr + Diuril 500mg IV BID attempted, and while Crt improved she remains unable to tolerate lying flat.  Tolerated 8h SLED w goal UF rate 200-500/hr successfully overnight 4/14-15, then 12h SLED on 4/15, 16, and 17.  4/19 - 4/24: tolerated iHD; no issues  4/22: TDC placed  4/22 - 5/3: Tolerating iHD, last session 5/1; pending outpatient HD chair.  5/3: Case escalated given difficulty procuring outpatient HD chair.    Plan/Recommendations:  - CKD IV (baseline creatinine ~3.1-3.3) admitted with hypervolemia and REGINALDO with creatinine 4.2 now progressed to ESRD requiring dialysis   - UA showed +1 protein with UPCR of ~500 mg  - urinary sediment with non dysmorphic RBCs and WBCs present although Jin catheter just place yesterday   - HD today for metabolic clearance and volume management   - can continue Lasix 80 mg BID PO  - daily RFPs and magnesium levels   - please avoid hypotension/major fluctuations in BP (keep MAP > 65 mmHg)  - renal diet/tube feeds when not NPO with volume restriction per primary team  - strict I/O's and daily weights  - renally all dose medications to eGFR   - avoid nephrotoxic agents wean feasible (i.e. NSAIDs, intra-arterial contrast, supra-therapeutic vancomycin levels, etc.)

## 2024-05-08 NOTE — ASSESSMENT & PLAN NOTE
Body mass index is 26.27 kg/m². Morbid obesity complicates all aspects of disease management from diagnostic modalities to treatment. Weight loss encouraged and health benefits explained to patient.

## 2024-05-08 NOTE — PLAN OF CARE
AAOX4,VSS,O2 sats>96% on RA.Plan of care discussed with patient.Patient has no complaints of pain/SOB. Discussed medications and care. Patient has no questions at this time.Pt visualised and stable.Call light within reach.Pt resting,bed at lowest position.

## 2024-05-09 NOTE — PROGRESS NOTES
[facility] - Telemetry [facility]  Progress Note     Patient Name: Xena Vera  MRN: [id number]  Patient Class: IP- Inpatient     Admission Date: 4/1/2024  Length of Stay: 1 days  Attending Physician: Leonardo Jorgensen  Primary Care Provider: Tami Villeda FNP           Subjective:      Principal Problem:Acute on chronic heart failure with preserved ejection fraction (HFpEF)           HPI:  Xena Vera is a 53 year old Japanese-speaking white  woman with former cigarette smoking (1988 to 1989), hypertension, heart failure with preserved ejection fraction, mitral regurgitation, chronic kidney disease stage 4, anemia, ovarian cyst, obesity. She lives in [place], [place].               She presented to [facility] Emergency Department on 4/1/2024 with complaint of mechanical fall, lower extremity swelling, and shortness of breath. Her son translated for her at bedside. She slipped and fell between grass and concrete earlier in the morning and had an abrasion on her left lower extremity. She was able to stand with assistance and ambulate as usual afterward. She had worsening of chronic bilateral lower extremity edema now in her abdomen. She was taking her prescribed furosemide without missed doses and was compliant with a low sodium diet. She was sleeping upright to feel like she can breath and had several nighttime awakenings due to shortness of breath. She had worsening dyspnea on exertion over the last several weeks. She has chronic constipation, unchanged.              In the emergency department, blood pressure was 97/54, heart rate was in the low 50s. Hemoglobin was 10.7 g/dL (baseline around 11.4), sodium was 133 mmol/L, total bilirubin was 1.5 mg/dL, BNP was 4639 pg/mL, troponin was 0.061 ng/mL. Chest X-ray showed upper normal pulmonary vascularity, chronic band of opacity in the left middle lung zone, faint edema in perihilar areas. She was given 40  mg of intravenous furosemide. She was admitted to Hospital Medicine Team R.     Overview/Hospital Course:  She was put on intravenous furosemide. It was increased to 80 mg twice daily. Urine output increased to 1.6 liters in 24 hours. She had bleeding and hematoma at the site of prophylactic heparin injections so heparin was stopped and she was given sequential compression devices instead.      Interval History: Has serous drainage from wound.     Review of Systems   Constitutional:  Negative for chills and fever.   Respiratory:  Positive for shortness of breath. Negative for cough.    Cardiovascular:  Positive for leg swelling. Negative for chest pain.   Skin:  Positive for wound.   Neurological:  Negative for seizures and syncope.      Objective:        Weight: 71.7 kg (158 lb)  Body mass index is 30.86 kg/m².     Intake/Output Summary (Last 24 hours) at 4/4/2024 1137  Last data filed at 4/4/2024 0612      Gross per 24 hour   Intake 200 ml   Output 500 ml   Net -300 ml         Physical Exam  Vitals and nursing note reviewed.   Constitutional:       General: She is not in acute distress.     Appearance: She is well-developed. She is obese. She is not diaphoretic.   Pulmonary:      Effort: Pulmonary effort is normal. No respiratory distress.   Musculoskeletal:         General: Signs of injury present. No deformity.      Right lower leg: Edema present.      Left lower leg: Edema present.   Skin:     General: Skin is warm and dry.      Coloration: Skin is not jaundiced or pale.      Comments: Left leg abrasions with serous drainage   Neurological:      Mental Status: She is alert and oriented to person, place, and time. Mental status is at baseline.      Motor: No seizure activity.   Psychiatric:         Attention and Perception: Attention normal.         Behavior: Behavior is not aggressive or combative. Behavior is cooperative.                Significant Labs: All pertinent labs within the past 24 hours have been  reviewed.     Significant Imaging: I have reviewed all pertinent imaging results/findings within the past 24 hours.  X-Ray Chest 1 View 4/01/24: FINDINGS:   Heart outline remains moderately enlarged indicating cardiomegaly and/or pericardial fluid.  The lungs are expanded with upper normal pulmonary vascularity.  Chronic band of opacity in left middle zone could represent plate atelectasis or strand of fibrosis.  Faint edema may be present in the perihilar areas.  Lungs are otherwise clear without significant airspace consolidation, pleural effusion, or pneumothorax.  Skeletal structures are intact.   Impression:  Chronic cardiomegaly.  No acute change.   Transthoracic echo complete 4/01/24:    Left Ventricle: The left ventricle is normal in size. Normal wall thickness. Normal wall motion. There is normal systolic function with a visually estimated ejection fraction of 60 - 65%. Grade II diastolic dysfunction.    Right Ventricle: Normal right ventricular cavity size. Wall thickness is normal. Right ventricle wall motion  is normal. Systolic function is reduced.    Left Atrium: Left atrium is very  severely dilated.    Right Atrium: Right atrium is mildly dilated.    Aortic Valve: There is mild aortic valve sclerosis. There is normal leaflet mobility. There is trace aortic regurgitation.    Mitral Valve: There is mild bileaflet sclerosis. There is posterior leaflet tethering and failure of complete coaptation. There is very severe regurgitation with a posterolateral eccentriccally directed jet.    Tricuspid Valve: The tricuspid valve is structurally normal. There is normal leaflet mobility. There is moderate to severe regurgitation.    IVC/SVC: IVC was not well visualized due to poor acoustic window. Intermediate venous pressure at 8 mmHg.    Pericardium: There is a trivial effusion posteriorly and small under the RA.     Assessment/Plan:      * Acute on chronic heart failure with preserved ejection fraction  (HFpEF)  She takes furosemide 40 mg twice daily. Giving IV furosemide 80 mg twice daily. Monitor intake/output. Treat hypokalemia as needed.     Leg wound, left  Consult Wound Care.        Fall  Accidental.     Anasarca           Constipation  Giving polyethylene glycol.     Anemia  Stable.        Recent Labs   Lab 04/02/24  0324 04/03/24  0409   WBC 4.99 5.25   HGB 9.3* 9.6*   HCT 32.1* 32.6*   * 142*         Class 2 obesity in adult  Body mass index is 30.86 kg/m². Morbid obesity complicates all aspects of disease management from diagnostic modalities to treatment. Weight loss encouraged and health benefits explained to patient.           Essential hypertension  Continue home carvedilol. Monitor BP.     Stage 4 chronic kidney disease  Continue home sevelamer.        VTE Risk Mitigation (From admission, onward)              Ordered       Place sequential compression device  Until discontinued         04/03/24 1347       heparin (porcine) injection 5,000 Units  Every 8 hours         04/01/24 1030       IP VTE HIGH RISK PATIENT  Once         04/01/24 1030       Place sequential compression device  Until discontinued         04/01/24 1030                          Discharge Planning   RILEY: 4/4/2024     Code Status: Full Code   Is the patient medically ready for discharge?:     Reason for patient still in hospital (select all that apply): Patient unstable, Patient trending condition, and Treatment  Discharge Plan A: Home with family                     Leonardo Jorgensen MD  Department of Hospital Medicine   [facility] - Telemetry Stepdown

## 2024-05-09 NOTE — PLAN OF CARE
[facility] - Cardiology Stepdown  Discharge Final Note    Primary Care Provider: Tami Villeda FNP    Expected Discharge Date: 5/8/2024    Final Discharge Note (most recent)       Final Note - 05/09/24 0844          Final Note    Assessment Type Final Discharge Note     Anticipated Discharge Disposition Home or Self Care     Hospital Resources/Appts/Education Provided Appointments scheduled and added to AVS                 Pt to go to ED for emergent dialysis when she becomes symptomatic.    Sophia Nunez LMSW  [facility]  [id number]      Contact Info       Tami Villeda FNP   Specialty: Family Medicine   Relationship: PCP - General    [address] [place] [address]   Phone: [phone]       Next Steps: Go on 5/13/2024    Instructions: HOSP f/u at 9:30

## 2024-05-09 NOTE — NURSING
Patient is ready for discharge. Patient stable alert and oriented. IVs removed. No complaints of pain. Discussed discharge plan. Reviewed medications and side effects, appointments, and answered questions with patient and family. RX given to patient.

## 2024-05-10 ENCOUNTER — DOCUMENTATION ONLY (OUTPATIENT)
Dept: CARDIOLOGY | Facility: CLINIC | Age: 54
End: 2024-05-10

## 2024-05-10 NOTE — PROGRESS NOTES
[facility] ([facility]) Team notified of pt referral via [facility] (automated inbasket notification) .    Patient screened today 5- by provider and RN for enrollment to program.      Pt was deemed not a candidate for enrollment at this time related to patient is currently on hemo-dialysis.    Pt will require additional follow up planning per primary team.     If pt status, diagnosis, or treatment plan changes , please place AMB referral to [facility] ([id number]) for HFT[facility] enrollment re-evalution.

## 2024-05-16 NOTE — ASSESSMENT & PLAN NOTE
- Interval history and physical exam findings as described above  - Most recent TTE results:  Results for orders placed during the hospital encounter of 04/01/24    Echo    Interpretation Summary    Left Ventricle: The left ventricle is normal in size. Normal wall thickness. Normal wall motion. There is normal systolic function with a visually estimated ejection fraction of 60 - 65%. Grade II diastolic dysfunction.    Right Ventricle: Normal right ventricular cavity size. Wall thickness is normal. Right ventricle wall motion  is normal. Systolic function is reduced.    Left Atrium: Left atrium is very  severely dilated.    Right Atrium: Right atrium is mildly dilated.    Aortic Valve: There is mild aortic valve sclerosis. There is normal leaflet mobility. There is trace aortic regurgitation.    Mitral Valve: There is mild bileaflet sclerosis. There is posterior leaflet tethering and failure of complete coaptation. There is very severe regurgitation with a posterolateral eccentriccally directed jet.    Tricuspid Valve: The tricuspid valve is structurally normal. There is normal leaflet mobility. There is moderate to severe regurgitation.    IVC/SVC: IVC was not well visualized due to poor acoustic window. Intermediate venous pressure at 8 mmHg.    Pericardium: There is a trivial effusion posteriorly and small under the RA.    - Clinically volume overloaded on admission  - now w/iHD needs  - Will continue diuresis with lasix 40mg PO qday  - Continue home cardioprudent regimen  - Repeat echo ordered, pending   - Strict I/Os  - 1.5L fluid restriction   - Daily weights  - Will continue to monitor on tele     PA team updated of appointment scheduled for 7/16

## 2024-05-28 NOTE — Clinical Note
A percutaneous stick to the right internal jugular vein was performed. Ultrasound guidance was used to obtain access. Patient awake, repositioned, assessment complete, right arm increasing edema noted, bruised at beginning of shift to right forearm and axillary area, patient stated was there after past several IV attempts to that arm prior to surgery. Line removed, pressure held 10 minutes, dressing applied. Discussed site with Dr. Hayden Teague, states okay to remove line. Patient has CVP of 8 from right CVC. Lung sounds clear, BS hypoactive still. Decreased output from FATOUMATA x 2, urinary output dark, clear, 425ml through the night, BP WNL. Abdomen, dressing intact, drainage noted main incision dressing and On Q dressing. Patient requesting pain medication. Morphine available. Call light in reach. Detail Level: Detailed

## 2024-06-18 ENCOUNTER — OFFICE VISIT (OUTPATIENT)
Dept: DIALYSIS | Facility: HOSPITAL | Age: 54
End: 2024-06-18
Attending: EMERGENCY MEDICINE
Payer: MEDICAID

## 2024-06-18 ENCOUNTER — HOSPITAL ENCOUNTER (INPATIENT)
Facility: HOSPITAL | Age: 54
LOS: 15 days | Discharge: HOME | DRG: 252 | End: 2024-07-03
Attending: EMERGENCY MEDICINE | Admitting: FAMILY MEDICINE
Payer: MEDICAID

## 2024-06-18 DIAGNOSIS — N19 UREMIA: Primary | ICD-10-CM

## 2024-06-18 DIAGNOSIS — I31.39 PERICARDIAL EFFUSION: ICD-10-CM

## 2024-06-18 DIAGNOSIS — N18.6 ESRD (END STAGE RENAL DISEASE) ON DIALYSIS: ICD-10-CM

## 2024-06-18 DIAGNOSIS — I77.0 AVF (ARTERIOVENOUS FISTULA): ICD-10-CM

## 2024-06-18 DIAGNOSIS — N18.6 ESRD (END STAGE RENAL DISEASE): ICD-10-CM

## 2024-06-18 DIAGNOSIS — R00.1 BRADYCARDIA: ICD-10-CM

## 2024-06-18 DIAGNOSIS — Z01.818 PRE-OP EXAM: ICD-10-CM

## 2024-06-18 DIAGNOSIS — R07.89 CHEST DISCOMFORT: ICD-10-CM

## 2024-06-18 DIAGNOSIS — I50.30 (HFPEF) HEART FAILURE WITH PRESERVED EJECTION FRACTION: ICD-10-CM

## 2024-06-18 DIAGNOSIS — R07.9 CHEST PAIN: ICD-10-CM

## 2024-06-18 DIAGNOSIS — Z99.2 ESRD (END STAGE RENAL DISEASE) ON DIALYSIS: ICD-10-CM

## 2024-06-18 PROBLEM — E87.29 HIGH ANION GAP METABOLIC ACIDOSIS: Status: RESOLVED | Noted: 2024-06-18 | Resolved: 2024-06-18

## 2024-06-18 PROBLEM — E80.6 HYPERBILIRUBINEMIA: Status: ACTIVE | Noted: 2024-06-18

## 2024-06-18 PROBLEM — E87.29 HIGH ANION GAP METABOLIC ACIDOSIS: Status: ACTIVE | Noted: 2024-06-18

## 2024-06-18 LAB
ABO + RH BLD: NORMAL
ALBUMIN SERPL BCP-MCNC: 3.3 G/DL (ref 3.5–5.2)
ALLENS TEST: ABNORMAL
ALP SERPL-CCNC: 178 U/L (ref 55–135)
ALT SERPL W/O P-5'-P-CCNC: 133 U/L (ref 10–44)
ANION GAP SERPL CALC-SCNC: 15 MMOL/L (ref 8–16)
ANION GAP SERPL CALC-SCNC: 18 MMOL/L (ref 8–16)
AST SERPL-CCNC: 122 U/L (ref 10–40)
BACTERIA #/AREA URNS AUTO: ABNORMAL /HPF
BASOPHILS # BLD AUTO: 0.01 K/UL (ref 0–0.2)
BASOPHILS NFR BLD: 0.2 % (ref 0–1.9)
BILIRUB DIRECT SERPL-MCNC: 3.1 MG/DL (ref 0.1–0.3)
BILIRUB SERPL-MCNC: 4.3 MG/DL (ref 0.1–1)
BILIRUB UR QL STRIP: NEGATIVE
BLD GP AB SCN CELLS X3 SERPL QL: NORMAL
BNP SERPL-MCNC: 4516 PG/ML (ref 0–99)
BUN SERPL-MCNC: 129 MG/DL (ref 6–20)
BUN SERPL-MCNC: 70 MG/DL (ref 6–20)
CALCIUM SERPL-MCNC: 8.5 MG/DL (ref 8.7–10.5)
CALCIUM SERPL-MCNC: 8.9 MG/DL (ref 8.7–10.5)
CHLORIDE SERPL-SCNC: 104 MMOL/L (ref 95–110)
CHLORIDE SERPL-SCNC: 104 MMOL/L (ref 95–110)
CLARITY UR REFRACT.AUTO: CLEAR
CO2 SERPL-SCNC: 18 MMOL/L (ref 23–29)
CO2 SERPL-SCNC: 22 MMOL/L (ref 23–29)
COLOR UR AUTO: YELLOW
CREAT SERPL-MCNC: 3.2 MG/DL (ref 0.5–1.4)
CREAT SERPL-MCNC: 5.4 MG/DL (ref 0.5–1.4)
DIFFERENTIAL METHOD BLD: ABNORMAL
EOSINOPHIL # BLD AUTO: 0 K/UL (ref 0–0.5)
EOSINOPHIL NFR BLD: 0.2 % (ref 0–8)
ERYTHROCYTE [DISTWIDTH] IN BLOOD BY AUTOMATED COUNT: 27.8 % (ref 11.5–14.5)
EST. GFR  (NO RACE VARIABLE): 16.6 ML/MIN/1.73 M^2
EST. GFR  (NO RACE VARIABLE): 8.8 ML/MIN/1.73 M^2
GLUCOSE SERPL-MCNC: 79 MG/DL (ref 70–110)
GLUCOSE SERPL-MCNC: 97 MG/DL (ref 70–110)
GLUCOSE UR QL STRIP: NEGATIVE
HAV IGM SERPL QL IA: NORMAL
HBV CORE IGM SERPL QL IA: NORMAL
HBV SURFACE AG SERPL QL IA: NORMAL
HCO3 UR-SCNC: 17.6 MMOL/L (ref 24–28)
HCT VFR BLD AUTO: 38.8 % (ref 37–48.5)
HCV AB SERPL QL IA: NORMAL
HGB BLD-MCNC: 11.5 G/DL (ref 12–16)
HGB UR QL STRIP: NEGATIVE
HYALINE CASTS UR QL AUTO: 0 /LPF
IMM GRANULOCYTES # BLD AUTO: 0.02 K/UL (ref 0–0.04)
IMM GRANULOCYTES NFR BLD AUTO: 0.4 % (ref 0–0.5)
KETONES UR QL STRIP: NEGATIVE
LACTATE SERPL-SCNC: 1.5 MMOL/L (ref 0.5–2.2)
LEUKOCYTE ESTERASE UR QL STRIP: ABNORMAL
LIPASE SERPL-CCNC: 230 U/L (ref 4–60)
LYMPHOCYTES # BLD AUTO: 0.5 K/UL (ref 1–4.8)
LYMPHOCYTES NFR BLD: 10.8 % (ref 18–48)
MCH RBC QN AUTO: 26.9 PG (ref 27–31)
MCHC RBC AUTO-ENTMCNC: 29.6 G/DL (ref 32–36)
MCV RBC AUTO: 91 FL (ref 82–98)
MICROSCOPIC COMMENT: ABNORMAL
MONOCYTES # BLD AUTO: 0.3 K/UL (ref 0.3–1)
MONOCYTES NFR BLD: 6.7 % (ref 4–15)
NEUTROPHILS # BLD AUTO: 3.8 K/UL (ref 1.8–7.7)
NEUTROPHILS NFR BLD: 81.7 % (ref 38–73)
NITRITE UR QL STRIP: NEGATIVE
NRBC BLD-RTO: 1 /100 WBC
OHS QRS DURATION: 112 MS
OHS QRS DURATION: 88 MS
OHS QRS DURATION: 92 MS
OHS QTC CALCULATION: 469 MS
OHS QTC CALCULATION: 573 MS
OHS QTC CALCULATION: 681 MS
PCO2 BLDA: 25.7 MMHG (ref 35–45)
PH SMN: 7.44 [PH] (ref 7.35–7.45)
PH UR STRIP: 5 [PH] (ref 5–8)
PLATELET # BLD AUTO: 90 K/UL (ref 150–450)
PMV BLD AUTO: ABNORMAL FL (ref 9.2–12.9)
PO2 BLDA: 62 MMHG (ref 40–60)
POC BE: -6 MMOL/L
POC SATURATED O2: 93 % (ref 95–100)
POC TCO2: 18 MMOL/L (ref 24–29)
POCT GLUCOSE: 87 MG/DL (ref 70–110)
POTASSIUM SERPL-SCNC: 2.8 MMOL/L (ref 3.5–5.1)
POTASSIUM SERPL-SCNC: 3.7 MMOL/L (ref 3.5–5.1)
PROT SERPL-MCNC: 7.2 G/DL (ref 6–8.4)
PROT UR QL STRIP: ABNORMAL
RBC # BLD AUTO: 4.27 M/UL (ref 4–5.4)
RBC #/AREA URNS AUTO: 2 /HPF (ref 0–4)
SAMPLE: ABNORMAL
SITE: ABNORMAL
SODIUM SERPL-SCNC: 140 MMOL/L (ref 136–145)
SODIUM SERPL-SCNC: 141 MMOL/L (ref 136–145)
SP GR UR STRIP: 1.01 (ref 1–1.03)
SPECIMEN OUTDATE: NORMAL
SQUAMOUS #/AREA URNS AUTO: 5 /HPF
TROPONIN I SERPL DL<=0.01 NG/ML-MCNC: 0.5 NG/ML (ref 0–0.03)
TROPONIN I SERPL DL<=0.01 NG/ML-MCNC: 0.5 NG/ML (ref 0–0.03)
TROPONIN I SERPL DL<=0.01 NG/ML-MCNC: 0.54 NG/ML (ref 0–0.03)
URN SPEC COLLECT METH UR: ABNORMAL
WBC # BLD AUTO: 4.63 K/UL (ref 3.9–12.7)
WBC #/AREA URNS AUTO: 3 /HPF (ref 0–5)

## 2024-06-18 PROCEDURE — 63600175 PHARM REV CODE 636 W HCPCS

## 2024-06-18 PROCEDURE — 99222 1ST HOSP IP/OBS MODERATE 55: CPT | Mod: ,,, | Performed by: INTERNAL MEDICINE

## 2024-06-18 PROCEDURE — 81001 URINALYSIS AUTO W/SCOPE: CPT

## 2024-06-18 PROCEDURE — 99900035 HC TECH TIME PER 15 MIN (STAT)

## 2024-06-18 PROCEDURE — 5A1D70Z PERFORMANCE OF URINARY FILTRATION, INTERMITTENT, LESS THAN 6 HOURS PER DAY: ICD-10-PCS | Performed by: HOSPITALIST

## 2024-06-18 PROCEDURE — 99285 EMERGENCY DEPT VISIT HI MDM: CPT | Mod: 25

## 2024-06-18 PROCEDURE — 82248 BILIRUBIN DIRECT: CPT

## 2024-06-18 PROCEDURE — 93010 ELECTROCARDIOGRAM REPORT: CPT | Mod: 59,,, | Performed by: INTERNAL MEDICINE

## 2024-06-18 PROCEDURE — 83605 ASSAY OF LACTIC ACID: CPT

## 2024-06-18 PROCEDURE — 83690 ASSAY OF LIPASE: CPT

## 2024-06-18 PROCEDURE — 93005 ELECTROCARDIOGRAM TRACING: CPT

## 2024-06-18 PROCEDURE — 86850 RBC ANTIBODY SCREEN: CPT

## 2024-06-18 PROCEDURE — 83880 ASSAY OF NATRIURETIC PEPTIDE: CPT

## 2024-06-18 PROCEDURE — 80053 COMPREHEN METABOLIC PANEL: CPT | Performed by: EMERGENCY MEDICINE

## 2024-06-18 PROCEDURE — 80074 ACUTE HEPATITIS PANEL: CPT

## 2024-06-18 PROCEDURE — 85025 COMPLETE CBC W/AUTO DIFF WBC: CPT

## 2024-06-18 PROCEDURE — 84484 ASSAY OF TROPONIN QUANT: CPT

## 2024-06-18 PROCEDURE — 82803 BLOOD GASES ANY COMBINATION: CPT

## 2024-06-18 PROCEDURE — 93010 ELECTROCARDIOGRAM REPORT: CPT | Mod: ,,, | Performed by: INTERNAL MEDICINE

## 2024-06-18 PROCEDURE — 96374 THER/PROPH/DIAG INJ IV PUSH: CPT

## 2024-06-18 PROCEDURE — 86901 BLOOD TYPING SEROLOGIC RH(D): CPT

## 2024-06-18 PROCEDURE — 86900 BLOOD TYPING SEROLOGIC ABO: CPT

## 2024-06-18 PROCEDURE — 3044F HG A1C LEVEL LT 7.0%: CPT | Mod: CPTII,,, | Performed by: INTERNAL MEDICINE

## 2024-06-18 PROCEDURE — 80047 BASIC METABLC PNL IONIZED CA: CPT

## 2024-06-18 PROCEDURE — 3066F NEPHROPATHY DOC TX: CPT | Mod: CPTII,,, | Performed by: INTERNAL MEDICINE

## 2024-06-18 PROCEDURE — 84484 ASSAY OF TROPONIN QUANT: CPT | Mod: 91

## 2024-06-18 PROCEDURE — 25000003 PHARM REV CODE 250

## 2024-06-18 PROCEDURE — 3062F POS MACROALBUMINURIA REV: CPT | Mod: CPTII,,, | Performed by: INTERNAL MEDICINE

## 2024-06-18 PROCEDURE — 11000001 HC ACUTE MED/SURG PRIVATE ROOM

## 2024-06-18 PROCEDURE — 25000003 PHARM REV CODE 250: Performed by: INTERNAL MEDICINE

## 2024-06-18 PROCEDURE — 90935 HEMODIALYSIS ONE EVALUATION: CPT

## 2024-06-18 PROCEDURE — 80048 BASIC METABOLIC PNL TOTAL CA: CPT | Mod: XB

## 2024-06-18 PROCEDURE — 96375 TX/PRO/DX INJ NEW DRUG ADDON: CPT

## 2024-06-18 RX ORDER — POTASSIUM CHLORIDE 20 MEQ/1
40 TABLET, EXTENDED RELEASE ORAL ONCE
Status: COMPLETED | OUTPATIENT
Start: 2024-06-18 | End: 2024-06-18

## 2024-06-18 RX ORDER — IBUPROFEN 200 MG
16 TABLET ORAL
Status: DISCONTINUED | OUTPATIENT
Start: 2024-06-18 | End: 2024-06-24 | Stop reason: HOSPADM

## 2024-06-18 RX ORDER — TALC
6 POWDER (GRAM) TOPICAL NIGHTLY PRN
Status: DISCONTINUED | OUTPATIENT
Start: 2024-06-18 | End: 2024-06-24 | Stop reason: HOSPADM

## 2024-06-18 RX ORDER — POLYETHYLENE GLYCOL 3350 17 G/17G
17 POWDER, FOR SOLUTION ORAL DAILY
Status: DISCONTINUED | OUTPATIENT
Start: 2024-06-18 | End: 2024-06-24 | Stop reason: HOSPADM

## 2024-06-18 RX ORDER — HEPARIN SODIUM 5000 [USP'U]/ML
5000 INJECTION, SOLUTION INTRAVENOUS; SUBCUTANEOUS EVERY 8 HOURS
Status: DISCONTINUED | OUTPATIENT
Start: 2024-06-18 | End: 2024-06-24 | Stop reason: HOSPADM

## 2024-06-18 RX ORDER — IBUPROFEN 200 MG
24 TABLET ORAL
Status: DISCONTINUED | OUTPATIENT
Start: 2024-06-18 | End: 2024-06-24 | Stop reason: HOSPADM

## 2024-06-18 RX ORDER — INSULIN ASPART 100 [IU]/ML
0-5 INJECTION, SOLUTION INTRAVENOUS; SUBCUTANEOUS
Status: DISCONTINUED | OUTPATIENT
Start: 2024-06-18 | End: 2024-06-24 | Stop reason: HOSPADM

## 2024-06-18 RX ORDER — GLUCAGON 1 MG
1 KIT INJECTION
Status: DISCONTINUED | OUTPATIENT
Start: 2024-06-18 | End: 2024-06-24 | Stop reason: HOSPADM

## 2024-06-18 RX ORDER — NITROGLYCERIN 0.4 MG/1
0.4 TABLET SUBLINGUAL EVERY 5 MIN PRN
Status: DISCONTINUED | OUTPATIENT
Start: 2024-06-18 | End: 2024-06-24 | Stop reason: HOSPADM

## 2024-06-18 RX ORDER — SODIUM CHLORIDE 9 MG/ML
INJECTION, SOLUTION INTRAVENOUS
Status: CANCELLED | OUTPATIENT
Start: 2024-06-18

## 2024-06-18 RX ORDER — NALOXONE HCL 0.4 MG/ML
0.02 VIAL (ML) INJECTION
Status: DISCONTINUED | OUTPATIENT
Start: 2024-06-18 | End: 2024-06-24 | Stop reason: HOSPADM

## 2024-06-18 RX ORDER — SODIUM CHLORIDE 9 MG/ML
INJECTION, SOLUTION INTRAVENOUS ONCE
Status: CANCELLED | OUTPATIENT
Start: 2024-06-18 | End: 2024-06-18

## 2024-06-18 RX ORDER — SIMETHICONE 80 MG
1 TABLET,CHEWABLE ORAL 4 TIMES DAILY PRN
Status: DISCONTINUED | OUTPATIENT
Start: 2024-06-18 | End: 2024-06-24 | Stop reason: HOSPADM

## 2024-06-18 RX ORDER — POTASSIUM CHLORIDE 20 MEQ/1
40 TABLET, EXTENDED RELEASE ORAL 3 TIMES DAILY
Status: DISCONTINUED | OUTPATIENT
Start: 2024-06-18 | End: 2024-06-18

## 2024-06-18 RX ORDER — MUPIROCIN 20 MG/G
OINTMENT TOPICAL 2 TIMES DAILY
Status: DISPENSED | OUTPATIENT
Start: 2024-06-18 | End: 2024-06-23

## 2024-06-18 RX ORDER — ALUMINUM HYDROXIDE, MAGNESIUM HYDROXIDE, AND SIMETHICONE 1200; 120; 1200 MG/30ML; MG/30ML; MG/30ML
30 SUSPENSION ORAL 4 TIMES DAILY PRN
Status: DISCONTINUED | OUTPATIENT
Start: 2024-06-18 | End: 2024-06-24 | Stop reason: HOSPADM

## 2024-06-18 RX ORDER — CALCIUM GLUCONATE 20 MG/ML
1 INJECTION, SOLUTION INTRAVENOUS
Status: DISCONTINUED | OUTPATIENT
Start: 2024-06-18 | End: 2024-06-18

## 2024-06-18 RX ORDER — SODIUM CHLORIDE 0.9 % (FLUSH) 0.9 %
10 SYRINGE (ML) INJECTION EVERY 12 HOURS PRN
Status: DISCONTINUED | OUTPATIENT
Start: 2024-06-18 | End: 2024-06-24 | Stop reason: HOSPADM

## 2024-06-18 RX ORDER — ATORVASTATIN CALCIUM 40 MG/1
40 TABLET, FILM COATED ORAL NIGHTLY
Status: DISCONTINUED | OUTPATIENT
Start: 2024-06-18 | End: 2024-06-19

## 2024-06-18 RX ORDER — FUROSEMIDE 10 MG/ML
40 INJECTION INTRAMUSCULAR; INTRAVENOUS
Status: COMPLETED | OUTPATIENT
Start: 2024-06-18 | End: 2024-06-18

## 2024-06-18 RX ORDER — LORAZEPAM 2 MG/ML
0.5 INJECTION INTRAMUSCULAR
Status: COMPLETED | OUTPATIENT
Start: 2024-06-18 | End: 2024-06-18

## 2024-06-18 RX ADMIN — HEPARIN SODIUM 5000 UNITS: 5000 INJECTION INTRAVENOUS; SUBCUTANEOUS at 10:06

## 2024-06-18 RX ADMIN — LORAZEPAM 0.5 MG: 2 INJECTION INTRAMUSCULAR; INTRAVENOUS at 11:06

## 2024-06-18 RX ADMIN — FUROSEMIDE 40 MG: 10 INJECTION, SOLUTION INTRAVENOUS at 10:06

## 2024-06-18 RX ADMIN — POTASSIUM CHLORIDE 40 MEQ: 1500 TABLET, EXTENDED RELEASE ORAL at 11:06

## 2024-06-18 RX ADMIN — HEPARIN SODIUM 5000 UNITS: 5000 INJECTION INTRAVENOUS; SUBCUTANEOUS at 02:06

## 2024-06-18 RX ADMIN — POTASSIUM CHLORIDE 40 MEQ: 1500 TABLET, EXTENDED RELEASE ORAL at 02:06

## 2024-06-18 RX ADMIN — POLYETHYLENE GLYCOL 3350 17 G: 17 POWDER, FOR SOLUTION ORAL at 02:06

## 2024-06-18 RX ADMIN — MUPIROCIN: 20 OINTMENT TOPICAL at 08:06

## 2024-06-18 RX ADMIN — ATORVASTATIN CALCIUM 40 MG: 40 TABLET, FILM COATED ORAL at 08:06

## 2024-06-18 NOTE — ED NOTES
I-STAT Chem-8+ Results:   Value Reference Range   Sodium 141 136-145 mmol/L   Potassium  3.6 3.5-5.1 mmol/L   Chloride 107  mmol/L   Ionized Calcium 1.02 1.06-1.42 mmol/L   CO2 (measured) 21 23-29 mmol/L   Glucose 106  mg/dL   BUN >140 6-30 mg/dL   Creatinine 5.7 0.5-1.4 mg/dL   Hematocrit 45 36-54%    Abnormal/critical values reported to MD Joseph

## 2024-06-18 NOTE — ASSESSMENT & PLAN NOTE
CP substernal, 2/10, but mostly with lying flat  Worse with exertion/better with rest?  Troponin 0.497-->0.540, BNP 4500 (chronic)  EKG is without changes concerning for ACS  HOWARD score 2, Alannah 109    -cardiac monitoring  -trending Troponin  -will refrain from ACS protocol at this time, as patient is no longer in CP at time of examination

## 2024-06-18 NOTE — SUBJECTIVE & OBJECTIVE
Past Medical History:   Diagnosis Date    (HFpEF) heart failure with preserved ejection fraction 06/24/2023    EF 63% with G2 DD  Continue lasix, appears euvolemic today  Continue good BP management with losartan, increase Coreg 6.25 mg BID  Fluid restriction, low sodium diet  Recommend Jardiance- patient had CKD 4 and this is a limiting factor- nephro eval pending    Anemia 06/24/2023    CKD (chronic kidney disease)     HTN (hypertension)     Mitral valve regurgitation 06/26/2023    Refer to structural for evaluation  May benefit from mitral clip    Ovarian cyst     Stage 4 chronic kidney disease 06/24/2023    Refer to nephrology at [facility] as unable to get established with [facility] and the phone number for the outside nephrologist provided to patient during her recent hospitalization goes unanswered when called.   Cr remains elevated  Would like to start SGLT-2 and appreciate nephrology expertise        Past Surgical History:   Procedure Laterality Date    INSERTION OF TUNNELED CENTRAL VENOUS HEMODIALYSIS CATHETER Right 4/22/2024    Procedure: Insertion, Catheter, Central Venous, Hemodialysis;  Surgeon: Carole Rolon MD;  Location: [facility] CATH LAB;  Service: Interventional Nephrology;  Laterality: Right;       Review of patient's allergies indicates:  No Known Allergies  Current Facility-Administered Medications   Medication Frequency    aluminum-magnesium hydroxide-simethicone 200-200-20 mg/5 mL suspension 30 mL QID PRN    atorvastatin tablet 40 mg QHS    dextrose 10% bolus 125 mL 125 mL PRN    dextrose 10% bolus 250 mL 250 mL PRN    glucagon (human recombinant) injection 1 mg PRN    glucose chewable tablet 16 g PRN    glucose chewable tablet 24 g PRN    heparin (porcine) injection 5,000 Units Q8H    insulin aspart U-100 pen 0-5 Units QID (AC + HS) PRN    melatonin tablet 6 mg Nightly PRN    mupirocin 2 % ointment BID    naloxone 0.4 mg/mL injection 0.02 mg PRN    nitroGLYCERIN SL tablet 0.4 mg Q5 Min PRN     polyethylene glycol packet 17 g Daily    simethicone chewable tablet 80 mg QID PRN    sodium chloride 0.9% flush 10 mL Q12H PRN     Current Outpatient Medications   Medication    atorvastatin (LIPITOR) 40 MG tablet    carvediloL (COREG) 6.25 MG tablet    clindamycin phosphate 1% (CLINDAGEL) 1 % gel    furosemide (LASIX) 80 MG tablet    midodrine (PROAMATINE) 2.5 MG Tab    oxybutynin (DITROPAN) 5 MG Tab    polyethylene glycol (GLYCOLAX) 17 gram/dose powder    senna-docusate 8.6-50 mg (PERICOLACE) 8.6-50 mg per tablet     Family History    None       Tobacco Use    Smoking status: Former     Current packs/day: 0.00     Types: Cigarettes     Start date:      Quit date:      Years since quittin.4    Smokeless tobacco: Never    Tobacco comments:     2-3 cigarettes a day   Substance and Sexual Activity    Alcohol use: Never    Drug use: Never    Sexual activity: Not on file     Review of Systems   Respiratory:  Negative for chest tightness and shortness of breath.    Gastrointestinal:  Positive for nausea and vomiting.     Objective:     Vital Signs (Most Recent):  Temp: 97.6 °F (36.4 °C) (24 1000)  Pulse: 60 (24 1445)  Resp: 20 (24 1445)  BP: (!) 142/74 (24 1445)  SpO2: 96 % (24 1445) Vital Signs (24h Range):  Temp:  [97.6 °F (36.4 °C)-97.8 °F (36.6 °C)] 97.6 °F (36.4 °C)  Pulse:  [35-71] 60  Resp:  [18-20] 20  SpO2:  [95 %-100 %] 96 %  BP: (121-164)/(68-97) 142/74     Weight: 67.9 kg (149 lb 11.1 oz) (24 0715)  Body mass index is 30.23 kg/m².  Body surface area is 1.68 meters squared.    No intake/output data recorded.     Physical Exam  HENT:      Head: Atraumatic.   Eyes:      Pupils: Pupils are equal, round, and reactive to light.   Cardiovascular:      Rate and Rhythm: Bradycardia present.      Pulses: Normal pulses.   Pulmonary:      Effort: Pulmonary effort is normal.   Abdominal:      Palpations: Abdomen is soft.   Neurological:      Mental Status: She is alert.  Mental status is at baseline.          Significant Labs:  All labs within the past 24 hours have been reviewed.    Significant Imaging:  Labs: Reviewed

## 2024-06-18 NOTE — ASSESSMENT & PLAN NOTE
Direct hyperbilirubinemia on admission  AST/ALT also elevated  Liver US not reporting dilated bile ducts  Afebrile, no abdominal pain, no jaundice/scleral icterus noted  No new medications identified for DILI  Concern for hepatic hepatopathy    -Acute hepatitis panel pending  -continue lasix  -neprhology consulted, pending HD reqs

## 2024-06-18 NOTE — ED TRIAGE NOTES
Pt arrives to ED via POV  after  nausea and vomiting starting yesterday. Pts reports vomit was clear.  Pt endorses a squeezing chest pain  starting this morning. Pt denies SOB.

## 2024-06-18 NOTE — ASSESSMENT & PLAN NOTE
Etiology of ESRD is long standing hypertension vs family history.  Patient has symptoms of Uremia and will need dialysis. Plan of care discussed with patient and her daughter. Consent obtained.    - 250/500, 2 hours through TDC.

## 2024-06-18 NOTE — NURSING
Dialysis tx started to the right chest perm cath per orders placed by Dr. Granados:    Order Questions    Question Answer   Antibiotics on HD? No   Duration of Treatment 2 hours   Dialyzer Revaclear 400   Dialysate Temperature (C) 36.5   Target  mL/min   If unable to maintain flow due to inadequate vascular access patency, patient intolerance (i.e. chest pain, access discomfort) or elevated venous pressure, adjust blood flow rate to a minimum of _____mL/min 100    mL/min   K+ Potassium per Protocol   Ca++ Calcium per Protocol   Na+ Sodium per Protocol   Bicarb Bicarbonate per Protocol   Access to be used CVC   Location Internal jugular   Laterality Right   Which catheter locking solution is being used at the end of treatment? Normal Saline   Target UF 0L   If unable to maintain this UFR due to patient intolerance (i.e. hypotension, chest pain, muscle cramping, nausea or vomiting), adjust UFR to achieve a minimum of _______ liters of UF 0   Fluid Removal Instructions maintain SBP > 90 mmHG

## 2024-06-18 NOTE — HPI
Ms Vera is an obese (BMI ~31) 53-year-old with CKD IV (baseline creatinine ~3.1-3.3), HFpEF (most recent TTE with EF 60-65% with G2DD), mitral valve regurgitation, anemia, hypertension, HLD as well as several over co-morbid conditions who was admitted to hospital in April 2024 for volume overload that needing initiation of dialysis. She was undocumented immigrant and could not be established in an outpatient dialysis unit and so was discharged with request to come to ER when symptomatic.0n 06/18 presents for chest pain r right shoulder, 3 x vomiting,  EKG with peaked T waves in II/III/AVF, widened QRS, Qtc 573. Uremic bun 129  Per daughter she has been sleeping a lot at home for the past week, unable to eat due to nausea/vomiting. In the ER the labs were worse and so decision made to admit patient for dialysis.  Patient has had Hypertension since 1997 and her mother needed dialysis at around age of 52. These could be the potential reason for her needing dialysis.

## 2024-06-18 NOTE — Clinical Note
Diagnosis: Chest discomfort [[id number]]   Future Attending Provider: NIA TEJEDA III [[id number]]   Is the patient being sent to ED Observation?: No

## 2024-06-18 NOTE — ASSESSMENT & PLAN NOTE
Creatine stable for now. BMP reviewed- noted Estimated Creatinine Clearance: 10.6 mL/min (A) (based on SCr of 5.4 mg/dL (H)). according to latest data. Based on current GFR, CKD stage is end stage.  Monitor UOP and serial BMP and adjust therapy as needed. Renally dose meds. Avoid nephrotoxic medications and procedures.    -Neprhology consulted, assisting with dialysis frequency  -RFP, Mg daily

## 2024-06-18 NOTE — ASSESSMENT & PLAN NOTE
Patient is identified as having Diastolic (HFpEF) heart failure that is Chronic. CHF is currently controlled. Latest ECHO performed and demonstrates- Results for orders placed during the hospital encounter of 04/01/24    Echo    Result Date: 6/19/2024    Left Ventricle: The left ventricle is severely dilated. LVIDd is 6.0   cm. Normal wall thickness. There is eccentric hypertrophy. There is normal   systolic function. Ejection fraction by visual approximation is 55%. Grade   II diastolic dysfunction.    Right Ventricle: Normal right ventricular cavity size. Wall thickness   is normal. Systolic function is normal.    Left Atrium: Left atrium is severely dilated.    Aortic Valve: The aortic valve is a trileaflet valve.    Mitral Valve: Severely restricted posterior leaflet motion.  The mean   pressure gradient across the mitral valve is 7 mmHg at a heart rate of 77   bpm. There is severe regurgitation with a posterolateral eccentrically   directed jet.    Tricuspid Valve: There is mild regurgitation.    Pulmonary Artery: There is severe pulmonary hypertension. The estimated   pulmonary artery systolic pressure is 76 mmHg.    IVC/SVC: Elevated venous pressure at 15 mmHg.    Pericardium: There is a small effusion. No indication of cardiac   tamponade. On direct comparison with images from 4/23/2024, there is   slightly more fluid around the right atrium.         . Continue Furosemide and monitor clinical status closely. Monitor on telemetry. Patient is off CHF pathway.  Monitor strict Is&Os and daily weights.  Place on fluid restriction of 1.5 L. Cardiology has not been consulted. Continue to stress to patient importance of self efficacy and  on diet for CHF. Last BNP reviewed- and noted below   Recent Labs   Lab 06/18/24  0823   BNP 4,516*     Does not appear grossly volume overloaded    -Volume reduction via HD, will determine dialysis needs as OP to help determine future diuretic dosing  -Lasix 80 IV qd  -May  benefit from torsemide 40 as OP for better gut absorption

## 2024-06-18 NOTE — CONSULTS
[facility] - Emergency Dept  Nephrology  Consult Note    Patient Name: Xena Vera  MRN: [id number]  Admission Date: 6/18/2024  Hospital Length of Stay: 0 days  Attending Provider: Favio Aaron III*   Primary Care Physician: Tami Villeda FNP  Principal Problem:Uremia    Inpatient consult to Nephrology  Consult performed by: Rodrigo Granados MD  Consult ordered by: Keyon Holloway MD  Reason for consult: ESRD        Subjective:     HPI:  Ms Vera is an obese (BMI ~31) 53-year-old with CKD IV (baseline creatinine ~3.1-3.3), HFpEF (most recent TTE with EF 60-65% with G2DD), mitral valve regurgitation, anemia, hypertension, HLD as well as several over co-morbid conditions who was admitted to hospital in April 2024 for volume overload that needing initiation of dialysis. She was undocumented immigrant and could not be established in an outpatient dialysis unit and so was discharged with request to come to ER when symptomatic.  Per daughter she has been sleeping a lot at home for the past week, unable to eat due to nausea/vomiting. In the ER the labs were worse and so decision made to admit patient for dialysis.  Patient has had Hypertension since 1997 and her mother needed dialysis at around age of 52. These could be the potential reason for her needing dialysis.    Past Medical History:   Diagnosis Date    (HFpEF) heart failure with preserved ejection fraction 06/24/2023    EF 63% with G2 DD  Continue lasix, appears euvolemic today  Continue good BP management with losartan, increase Coreg 6.25 mg BID  Fluid restriction, low sodium diet  Recommend Jardiance- patient had CKD 4 and this is a limiting factor- nephro eval pending    Anemia 06/24/2023    CKD (chronic kidney disease)     HTN (hypertension)     Mitral valve regurgitation 06/26/2023    Refer to structural for evaluation  May benefit from mitral clip    Ovarian cyst     Stage 4 chronic kidney disease 06/24/2023    Refer to nephrology at [facility] as unable  to get established with [facility] and the phone number for the outside nephrologist provided to patient during her recent hospitalization goes unanswered when called.   Cr remains elevated  Would like to start SGLT-2 and appreciate nephrology expertise        Past Surgical History:   Procedure Laterality Date    INSERTION OF TUNNELED CENTRAL VENOUS HEMODIALYSIS CATHETER Right 4/22/2024    Procedure: Insertion, Catheter, Central Venous, Hemodialysis;  Surgeon: Carole Rolon MD;  Location: [facility] CATH LAB;  Service: Interventional Nephrology;  Laterality: Right;       Review of patient's allergies indicates:  No Known Allergies  Current Facility-Administered Medications   Medication Frequency    aluminum-magnesium hydroxide-simethicone 200-200-20 mg/5 mL suspension 30 mL QID PRN    atorvastatin tablet 40 mg QHS    dextrose 10% bolus 125 mL 125 mL PRN    dextrose 10% bolus 250 mL 250 mL PRN    glucagon (human recombinant) injection 1 mg PRN    glucose chewable tablet 16 g PRN    glucose chewable tablet 24 g PRN    heparin (porcine) injection 5,000 Units Q8H    insulin aspart U-100 pen 0-5 Units QID (AC + HS) PRN    melatonin tablet 6 mg Nightly PRN    mupirocin 2 % ointment BID    naloxone 0.4 mg/mL injection 0.02 mg PRN    nitroGLYCERIN SL tablet 0.4 mg Q5 Min PRN    polyethylene glycol packet 17 g Daily    simethicone chewable tablet 80 mg QID PRN    sodium chloride 0.9% flush 10 mL Q12H PRN     Current Outpatient Medications   Medication    atorvastatin (LIPITOR) 40 MG tablet    carvediloL (COREG) 6.25 MG tablet    clindamycin phosphate 1% (CLINDAGEL) 1 % gel    furosemide (LASIX) 80 MG tablet    midodrine (PROAMATINE) 2.5 MG Tab    oxybutynin (DITROPAN) 5 MG Tab    polyethylene glycol (GLYCOLAX) 17 gram/dose powder    senna-docusate 8.6-50 mg (PERICOLACE) 8.6-50 mg per tablet     Family History    None       Tobacco Use    Smoking status: Former     Current packs/day: 0.00     Types: Cigarettes     Start  date:      Quit date:      Years since quittin.4    Smokeless tobacco: Never    Tobacco comments:     2-3 cigarettes a day   Substance and Sexual Activity    Alcohol use: Never    Drug use: Never    Sexual activity: Not on file     Review of Systems   Respiratory:  Negative for chest tightness and shortness of breath.    Gastrointestinal:  Positive for nausea and vomiting.     Objective:     Vital Signs (Most Recent):  Temp: 97.6 °F (36.4 °C) (24 1000)  Pulse: 60 (24 1445)  Resp: 20 (24 1445)  BP: (!) 142/74 (24 1445)  SpO2: 96 % (24 1445) Vital Signs (24h Range):  Temp:  [97.6 °F (36.4 °C)-97.8 °F (36.6 °C)] 97.6 °F (36.4 °C)  Pulse:  [35-71] 60  Resp:  [18-20] 20  SpO2:  [95 %-100 %] 96 %  BP: (121-164)/(68-97) 142/74     Weight: 67.9 kg (149 lb 11.1 oz) (24 0715)  Body mass index is 30.23 kg/m².  Body surface area is 1.68 meters squared.    No intake/output data recorded.     Physical Exam  HENT:      Head: Atraumatic.   Eyes:      Pupils: Pupils are equal, round, and reactive to light.   Cardiovascular:      Rate and Rhythm: Bradycardia present.      Pulses: Normal pulses.   Pulmonary:      Effort: Pulmonary effort is normal.   Abdominal:      Palpations: Abdomen is soft.   Neurological:      Mental Status: She is alert. Mental status is at baseline.          Significant Labs:  All labs within the past 24 hours have been reviewed.    Significant Imaging:  Labs: Reviewed    Assessment/Plan:     Renal/  ESRD (end stage renal disease) on dialysis  Etiology of ESRD is long standing hypertension vs family history.  Patient has symptoms of Uremia and will need dialysis. Plan of care discussed with patient and her daughter. Consent obtained.    - 250/500, 2 hours through TDC.        Thank you for your consult. I will follow-up with patient. Please contact us if you have any additional questions.    Rodrigo Granados MD  Nephrology  [facility] - Emergency Dept

## 2024-06-18 NOTE — SUBJECTIVE & OBJECTIVE
Past Medical History:   Diagnosis Date    (HFpEF) heart failure with preserved ejection fraction 06/24/2023    EF 63% with G2 DD  Continue lasix, appears euvolemic today  Continue good BP management with losartan, increase Coreg 6.25 mg BID  Fluid restriction, low sodium diet  Recommend Jardiance- patient had CKD 4 and this is a limiting factor- nephro eval pending    Anemia 06/24/2023    CKD (chronic kidney disease)     HTN (hypertension)     Mitral valve regurgitation 06/26/2023    Refer to structural for evaluation  May benefit from mitral clip    Ovarian cyst     Stage 4 chronic kidney disease 06/24/2023    Refer to nephrology at [facility] as unable to get established with [facility] and the phone number for the outside nephrologist provided to patient during her recent hospitalization goes unanswered when called.   Cr remains elevated  Would like to start SGLT-2 and appreciate nephrology expertise        Past Surgical History:   Procedure Laterality Date    INSERTION OF TUNNELED CENTRAL VENOUS HEMODIALYSIS CATHETER Right 4/22/2024    Procedure: Insertion, Catheter, Central Venous, Hemodialysis;  Surgeon: Carole Rolon MD;  Location: [facility] CATH LAB;  Service: Interventional Nephrology;  Laterality: Right;       Review of patient's allergies indicates:  No Known Allergies    No current facility-administered medications on file prior to encounter.     Current Outpatient Medications on File Prior to Encounter   Medication Sig    atorvastatin (LIPITOR) 40 MG tablet Take 1 tablet by mouth every day    carvediloL (COREG) 6.25 MG tablet Take 1 tablet (6.25 mg total) by mouth 2 (two) times daily.    clindamycin phosphate 1% (CLINDAGEL) 1 % gel Apply topically 2 (two) times daily. for 14 days    furosemide (LASIX) 80 MG tablet Take 1 tablet (80 mg total) by mouth 2 (two) times daily.    midodrine (PROAMATINE) 2.5 MG Tab Take 1 tablet (2.5 mg total) by mouth Daily.    oxybutynin (DITROPAN) 5 MG Tab Take 1 tablet (5 mg  total) by mouth 3 (three) times daily.    polyethylene glycol (GLYCOLAX) 17 gram/dose powder Use cap to measure 17 grams, mix in liquid and take by mouth 2 (two) times daily.    senna-docusate 8.6-50 mg (PERICOLACE) 8.6-50 mg per tablet Take 1 tablet by mouth 2 (two) times daily.     Family History    None       Tobacco Use    Smoking status: Former     Current packs/day: 0.00     Types: Cigarettes     Start date:      Quit date:      Years since quittin.4    Smokeless tobacco: Never    Tobacco comments:     2-3 cigarettes a day   Substance and Sexual Activity    Alcohol use: Never    Drug use: Never    Sexual activity: Not on file     Review of Systems   Constitutional:  Positive for activity change. Negative for chills and fever.   Respiratory:  Negative for shortness of breath.    Cardiovascular:  Positive for chest pain and leg swelling.   Gastrointestinal:  Positive for abdominal pain, nausea and vomiting. Negative for abdominal distention and constipation.   Musculoskeletal:  Negative for arthralgias.   Psychiatric/Behavioral:  Negative for agitation, behavioral problems and confusion.      Objective:     Vital Signs (Most Recent):  Temp: 97.6 °F (36.4 °C) (24 1000)  Pulse: 71 (24 1345)  Resp: 20 (24 1345)  BP: (!) 149/84 (24 1345)  SpO2: 100 % (24 1345) Vital Signs (24h Range):  Temp:  [97.6 °F (36.4 °C)-97.8 °F (36.6 °C)] 97.6 °F (36.4 °C)  Pulse:  [35-71] 71  Resp:  [18-20] 20  SpO2:  [95 %-100 %] 100 %  BP: (121-164)/(68-97) 149/84     Weight: 67.9 kg (149 lb 11.1 oz)  Body mass index is 30.23 kg/m².     Physical Exam  Constitutional:       General: She is not in acute distress.     Appearance: She is ill-appearing.   HENT:      Head: Normocephalic and atraumatic.      Right Ear: External ear normal.      Left Ear: External ear normal.      Nose: Nose normal.      Mouth/Throat:      Mouth: Mucous membranes are moist.      Pharynx: Oropharynx is clear.   Eyes:       General: Scleral icterus present.      Extraocular Movements: Extraocular movements intact.   Cardiovascular:      Rate and Rhythm: Regular rhythm. Bradycardia present.      Pulses: Normal pulses.      Heart sounds: Murmur heard.   Pulmonary:      Effort: Pulmonary effort is normal.      Breath sounds: Normal breath sounds. No wheezing, rhonchi or rales.   Abdominal:      General: Abdomen is flat.      Palpations: Abdomen is soft.      Tenderness: There is no abdominal tenderness.   Musculoskeletal:         General: No swelling. Normal range of motion.      Cervical back: Normal range of motion and neck supple.   Skin:     General: Skin is warm and dry.      Capillary Refill: Capillary refill takes less than 2 seconds.   Neurological:      General: No focal deficit present.      Mental Status: She is alert and oriented to person, place, and time.   Psychiatric:         Mood and Affect: Mood normal.         Behavior: Behavior normal.                Significant Labs: All pertinent labs within the past 24 hours have been reviewed.  Bilirubin:   Recent Labs   Lab 06/18/24  0927 06/18/24  1120   BILIDIR  --  3.1*   BILITOT 4.3*  --      CBC:   Recent Labs   Lab 06/18/24  0823   WBC 4.63   HGB 11.5*   HCT 38.8   PLT 90*     CMP:   Recent Labs   Lab 06/18/24  0927      K 2.8*      CO2 18*   GLU 97   *   CREATININE 5.4*   CALCIUM 8.9   PROT 7.2   ALBUMIN 3.3*   BILITOT 4.3*   ALKPHOS 178*   *   *   ANIONGAP 18*     Cardiac Markers:   Recent Labs   Lab 06/18/24  0823   BNP 4,516*     Lactic Acid:   Recent Labs   Lab 06/18/24  0823   LACTATE 1.5     Troponin:   Recent Labs   Lab 06/18/24  0823 06/18/24  1120   TROPONINI 0.497* 0.540*       Significant Imaging: I have reviewed all pertinent imaging results/findings within the past 24 hours.  RUQ US - No CBD dilation, absent GB  CXR: prominent cardiomegaly, patchy increased interstitial and parenchymal attenuation bilaterally

## 2024-06-18 NOTE — ASSESSMENT & PLAN NOTE
Has elevated troponin at baseline 2/2 to ESRD  Troponin 0.497-->0.540-->pending  Reports 2/10 CP worse with lying down  EKG is without changes concerning for ACS  Has markedly elevated BUN, uremic pericarditis on ddx    -trend troponin  -EKG for new/worsened CP  -cardiac telemetry  -Nephrology consulted for dialysis

## 2024-06-18 NOTE — ASSESSMENT & PLAN NOTE
2/2 to ESRD  AG 18  Patient AAOx3 on exam  +nausea, chest pain, labile HR  No steph bleeding noted    -VBG to quantify degree of acidosis  -Nephrology consulted for HD consideration

## 2024-06-18 NOTE — ED NOTES
Assumed care of this patient. Pt is gowned and resting in bed with side rails up x2. Family is at bedside

## 2024-06-18 NOTE — ASSESSMENT & PLAN NOTE
BP elevated and labile on admission  120s-160s systolic  Appears to have midodrine ordered, likely for dialysis     -will hold antihypertensives for now pending nephrology recommendations regarding dialysis

## 2024-06-18 NOTE — ASSESSMENT & PLAN NOTE
Body mass index is 30.23 kg/m². Morbid obesity complicates all aspects of disease management from diagnostic modalities to treatment. Weight loss encouraged and health benefits explained to patient.

## 2024-06-18 NOTE — H&P
"[facility] - Emergency Dept  Hospital Medicine  History & Physical    Patient Name: Xena Vera  MRN: [id number]  Patient Class: IP- Inpatient  Admission Date: 6/18/2024  Attending Physician: Favio Aaron III*   Primary Care Provider: Tami Villeda FNP         Patient information was obtained from patient, past medical records, and ER records.     Subjective:     Principal Problem:Chest pain    Chief Complaint:   Chief Complaint   Patient presents with    Chest Pain    Vomiting     Dr took off dialysis last month        HPI: Patient is a 53F PMHx HFpEF (Lasix 80 bid), severe pHTN, CKD5 (last HD in May 2024), HTN, MR, anemia presents for chest pain that began this morning. She reports 2/10 central sqeezing chest pain that radiates to her R shoulder when vomiting. The pain is worse with walking. She has never had chest pain before. She has had nausea/vomiting x1 day. Reports approx 3 episodes of clear vomitus, describes as "phlegm." She has associated sharp upper abdominal pain when vomiting. Additionally reports arthralgias/myalgias, leg swelling, generalized weakness, and fatigue. She had a brief episode of dizziness yesterday when walking but has not had that since. She denies Fever/chills, SOB, upper respiratory symptoms, cough, dysuria, decreased urine, or known sick contacts.     She reports adherence to her fluid restriction & Lasix. Admitted to [facility] from 4/1/24-5/8/24 where renal function declined while being diuresed. Required CRRT with intermittent levophed. Admitted to [facility] 5/13-5/17 after presenting to ED for HD. Not enrolled in medicaid at that time and had been told to present to ED for HD needs. No indications for HD on that admission per chart review. At that time nephrology recommended urgent evaluation with vascular surgery outpatient for AVF placement. Venous mapping obtained.    In ED, somewhat hypertensive (sBP 150s), intermittent bradycardia, afebrile, on RA. CMP with profound " uremia (), Cr 5.6, hypokalemia (K 2.8), no hyponatremia. Transaminitis AST//133 with T. Bili 4.3. Lipase elevated at 230. RUQ US w/o CBD dilation or evidence of scarring, surgically absent gallbladder. CBC with thrombocytopenia (PLT 90), no leukocytosis, stable anemia (Hb 11.5). BNP 4500, Troponin 0.497 which increased to 0.54. LA normal at 1.5. EKG with peaked T waves in II/III/AVF, widened QRS, Qtc 573. Nephrology consulted.     Interpretor was used for entire interview/examination:  number [id number]    Past Medical History:   Diagnosis Date    (HFpEF) heart failure with preserved ejection fraction 06/24/2023    EF 63% with G2 DD  Continue lasix, appears euvolemic today  Continue good BP management with losartan, increase Coreg 6.25 mg BID  Fluid restriction, low sodium diet  Recommend Jardiance- patient had CKD 4 and this is a limiting factor- nephro eval pending    Anemia 06/24/2023    CKD (chronic kidney disease)     HTN (hypertension)     Mitral valve regurgitation 06/26/2023    Refer to structural for evaluation  May benefit from mitral clip    Ovarian cyst     Stage 4 chronic kidney disease 06/24/2023    Refer to nephrology at [facility] as unable to get established with [facility] nephrology and the phone number for the outside nephrologist provided to patient during her recent hospitalization goes unanswered when called.   Cr remains elevated  Would like to start SGLT-2 and appreciate nephrology expertise        Past Surgical History:   Procedure Laterality Date    INSERTION OF TUNNELED CENTRAL VENOUS HEMODIALYSIS CATHETER Right 4/22/2024    Procedure: Insertion, Catheter, Central Venous, Hemodialysis;  Surgeon: Carole Rolon MD;  Location: [facility] CATH LAB;  Service: Interventional Nephrology;  Laterality: Right;       Review of patient's allergies indicates:  No Known Allergies    No current facility-administered medications on file prior to encounter.     Current Outpatient Medications on File  Prior to Encounter   Medication Sig    atorvastatin (LIPITOR) 40 MG tablet Take 1 tablet by mouth every day    carvediloL (COREG) 6.25 MG tablet Take 1 tablet (6.25 mg total) by mouth 2 (two) times daily.    clindamycin phosphate 1% (CLINDAGEL) 1 % gel Apply topically 2 (two) times daily. for 14 days    furosemide (LASIX) 80 MG tablet Take 1 tablet (80 mg total) by mouth 2 (two) times daily.    midodrine (PROAMATINE) 2.5 MG Tab Take 1 tablet (2.5 mg total) by mouth Daily.    oxybutynin (DITROPAN) 5 MG Tab Take 1 tablet (5 mg total) by mouth 3 (three) times daily.    polyethylene glycol (GLYCOLAX) 17 gram/dose powder Use cap to measure 17 grams, mix in liquid and take by mouth 2 (two) times daily.    senna-docusate 8.6-50 mg (PERICOLACE) 8.6-50 mg per tablet Take 1 tablet by mouth 2 (two) times daily.     Family History    None       Tobacco Use    Smoking status: Former     Current packs/day: 0.00     Types: Cigarettes     Start date:      Quit date:      Years since quittin.4    Smokeless tobacco: Never    Tobacco comments:     2-3 cigarettes a day   Substance and Sexual Activity    Alcohol use: Never    Drug use: Never    Sexual activity: Not on file     Review of Systems   Constitutional:  Positive for activity change. Negative for chills and fever.   Respiratory:  Negative for shortness of breath.    Cardiovascular:  Positive for chest pain and leg swelling.   Gastrointestinal:  Positive for abdominal pain, nausea and vomiting. Negative for abdominal distention and constipation.   Musculoskeletal:  Negative for arthralgias.   Psychiatric/Behavioral:  Negative for agitation, behavioral problems and confusion.      Objective:     Vital Signs (Most Recent):  Temp: 97.6 °F (36.4 °C) (24 1000)  Pulse: 71 (24 1345)  Resp: 20 (24 1345)  BP: (!) 149/84 (24 1345)  SpO2: 100 % (24 1345) Vital Signs (24h Range):  Temp:  [97.6 °F (36.4 °C)-97.8 °F (36.6 °C)] 97.6 °F (36.4  °C)  Pulse:  [35-71] 71  Resp:  [18-20] 20  SpO2:  [95 %-100 %] 100 %  BP: (121-164)/(68-97) 149/84     Weight: 67.9 kg (149 lb 11.1 oz)  Body mass index is 30.23 kg/m².     Physical Exam  Constitutional:       General: She is not in acute distress.     Appearance: She is ill-appearing.   HENT:      Head: Normocephalic and atraumatic.      Right Ear: External ear normal.      Left Ear: External ear normal.      Nose: Nose normal.      Mouth/Throat:      Mouth: Mucous membranes are moist.      Pharynx: Oropharynx is clear.   Eyes:      General: Scleral icterus present.      Extraocular Movements: Extraocular movements intact.   Cardiovascular:      Rate and Rhythm: Regular rhythm. Bradycardia present.      Pulses: Normal pulses.      Heart sounds: Murmur heard.   Pulmonary:      Effort: Pulmonary effort is normal.      Breath sounds: Normal breath sounds. No wheezing, rhonchi or rales.   Abdominal:      General: Abdomen is flat.      Palpations: Abdomen is soft.      Tenderness: There is no abdominal tenderness.   Musculoskeletal:         General: No swelling. Normal range of motion.      Cervical back: Normal range of motion and neck supple.   Skin:     General: Skin is warm and dry.      Capillary Refill: Capillary refill takes less than 2 seconds.   Neurological:      General: No focal deficit present.      Mental Status: She is alert and oriented to person, place, and time.   Psychiatric:         Mood and Affect: Mood normal.         Behavior: Behavior normal.                Significant Labs: All pertinent labs within the past 24 hours have been reviewed.  Bilirubin:   Recent Labs   Lab 06/18/24  0927 06/18/24  1120   BILIDIR  --  3.1*   BILITOT 4.3*  --      CBC:   Recent Labs   Lab 06/18/24  0823   WBC 4.63   HGB 11.5*   HCT 38.8   PLT 90*     CMP:   Recent Labs   Lab 06/18/24  0927      K 2.8*      CO2 18*   GLU 97   *   CREATININE 5.4*   CALCIUM 8.9   PROT 7.2   ALBUMIN 3.3*   BILITOT 4.3*    ALKPHOS 178*   *   *   ANIONGAP 18*     Cardiac Markers:   Recent Labs   Lab 06/18/24  0823   BNP 4,516*     Lactic Acid:   Recent Labs   Lab 06/18/24  0823   LACTATE 1.5     Troponin:   Recent Labs   Lab 06/18/24  0823 06/18/24  1120   TROPONINI 0.497* 0.540*       Significant Imaging: I have reviewed all pertinent imaging results/findings within the past 24 hours.  RUQ US - No CBD dilation, absent GB  CXR: prominent cardiomegaly, patchy increased interstitial and parenchymal attenuation bilaterally     Assessment/Plan:     * Chest pain  CP substernal, 2/10, but mostly with lying flat  Worse with exertion/better with rest?  Troponin 0.497-->0.540, BNP 4500 (chronic)  EKG is without changes concerning for ACS  HOWARD score 2, Alannah 109    -cardiac monitoring  -trending Troponin  -will refrain from ACS protocol at this time, as patient is no longer in CP at time of examination    Uremia  2/2 to ESRD  AG 18  Patient AAOx3 on exam  +nausea, chest pain, labile HR  No steph bleeding noted    -VBG to quantify degree of acidosis  -Nephrology consulted for HD consideration    ESRD (end stage renal disease) on dialysis  Creatine stable for now. BMP reviewed- noted Estimated Creatinine Clearance: 10.6 mL/min (A) (based on SCr of 5.4 mg/dL (H)). according to latest data. Based on current GFR, CKD stage is end stage.  Monitor UOP and serial BMP and adjust therapy as needed. Renally dose meds. Avoid nephrotoxic medications and procedures.    -Neprhology consulted, pending recommendations  -RFP, Mg daily  -VBG pending for degree of acidosis    Hyperbilirubinemia  Direct hyperbilirubinemia on admission  AST/ALT also elevated  Liver US not reporting dilated bile ducts  Afebrile, no abdominal pain, no jaundice/scleral icterus noted  No new medications identified for DILI  Concern for hepatic hepatopathy    -Acute hepatitis panel pending  -continue lasix  -neprhology consulted, pending HD reqs    Prediabetes  Last A1c  6.0 (2 months ago)   on admission    -POCT glucose AC/HS  -LDSSI  -goal -180    Prolonged QT interval  Qtc on admision 570    -cardiac telemetry  -caution with QT prolonging medications    Elevated troponin I level  Has elevated troponin at baseline 2/2 to ESRD  Troponin 0.497-->0.540-->pending  Reports 2/10 CP worse with lying down  EKG is without changes concerning for ACS  Has markedly elevated BUN, uremic pericarditis on ddx    -trend troponin  -EKG for new/worsened CP  -cardiac telemetry  -Nephrology consulted for dialysis    Acute on chronic heart failure with preserved ejection fraction (HFpEF)  Patient is identified as having Diastolic (HFpEF) heart failure that is Chronic. CHF is currently controlled. Latest ECHO performed and demonstrates- Results for orders placed during the hospital encounter of 04/01/24    Echo    Interpretation Summary    Left Ventricle: The left ventricle is moderately dilated. Normal wall thickness. There is eccentric hypertrophy. There is normal systolic function with a visually estimated ejection fraction of 55 - 60%. Diastolic function cannot be reliably determined in the presence of mitral valve disease.    Right Ventricle: Mild right ventricular enlargement. Wall thickness is normal. Right ventricle wall motion  is normal. Systolic function is mildly reduced.    Biatrial enlargement (L > R)    Mitral Valve: There is severe functional regurgitation with a centrally directed jet.    Tricuspid Valve: There is mild to moderate regurgitation.    Pulmonary Artery: The estimated pulmonary artery systolic pressure is 96 mmHg.    IVC/SVC: Intermediate venous pressure at 8 mmHg.    There is a small pericardial effusion and a right pleural effusion.  . Continue Furosemide and monitor clinical status closely. Monitor on telemetry. Patient is off CHF pathway.  Monitor strict Is&Os and daily weights.  Place on fluid restriction of 1.5 L. Cardiology has not been consulted.  Continue to stress to patient importance of self efficacy and  on diet for CHF. Last BNP reviewed- and noted below   Recent Labs   Lab 06/18/24  0823   BNP 4,516*     Does not appear grossly volume overloaded    -repeat echo pending  -strict ins and outs  -continue lasix    Class 2 obesity in adult  Body mass index is 30.23 kg/m². Morbid obesity complicates all aspects of disease management from diagnostic modalities to treatment. Weight loss encouraged and health benefits explained to patient.         Essential hypertension  BP elevated and labile on admission  120s-160s systolic  Appears to have midodrine ordered, likely for dialysis     -will hold antihypertensives for now pending nephrology recommendations regarding dialysis      VTE Risk Mitigation (From admission, onward)           Ordered     heparin (porcine) injection 5,000 Units  Every 8 hours         06/18/24 1427     IP VTE HIGH RISK PATIENT  Once         06/18/24 1427     Place sequential compression device  Until discontinued         06/18/24 1427                                    Cydney Ball MD  Department of Hospital Medicine  [facility] - Emergency Dept

## 2024-06-18 NOTE — ED PROVIDER NOTES
"Encounter Date: 6/18/2024       History     Chief Complaint   Patient presents with    Chest Pain    Vomiting     Dr took off dialysis last month     53F PMHx HFpEF, CKD (last HD in May 2024), HTN, mitral regurgitation, anemia presents for chest pain that began this morning. She reports 2/10 central sqeezing chest pain that radiates to her R shoulder when vomiting. The pain is worse with walking. She has never had chest pain before. She has had nausea/vomiting x1 day. Reports approx 3 episodes of clear vomitus, describes as "phlegm." She has associated sharp upper abdominal pain when vomiting. Additionally reports arthralgias/myalgias, leg swelling, generalized weakness, and fatigue. She had a brief episode of dizziness yesterday when walking but has not had that since. She denies Fever/chills, SOB, upper respiratory symptoms, cough, dysuria, decreased urine, or known sick contacts.    She reports adherence to her fluid restriction & Lasix. Admitted to [facility] from 4/1/24-5/8/24 where renal function declined while being diuresed. Required CRRT with intermittent levophed. Admitted to [facility] 5/13-5/17 after presenting to ED for HD. Not enrolled in medicaid at that time and had been told to present to ED for HD needs. No indications for HD on that admission per chart review. At that time nephrology recommended urgent evaluation with vascular surgery outpatient for AVF placement. Venous mapping obtained.      The history is provided by the patient and a relative.     Review of patient's allergies indicates:  No Known Allergies  Past Medical History:   Diagnosis Date    (HFpEF) heart failure with preserved ejection fraction 06/24/2023    EF 63% with G2 DD  Continue lasix, appears euvolemic today  Continue good BP management with losartan, increase Coreg 6.25 mg BID  Fluid restriction, low sodium diet  Recommend Jardiance- patient had CKD 4 and this is a limiting factor- nephro eval pending    Anemia 06/24/2023    CKD " (chronic kidney disease)     HTN (hypertension)     Mitral valve regurgitation 2023    Refer to structural for evaluation  May benefit from mitral clip    Ovarian cyst     Stage 4 chronic kidney disease 2023    Refer to nephrology at [facility] as unable to get established with [facility] nephrology and the phone number for the outside nephrologist provided to patient during her recent hospitalization goes unanswered when called.   Cr remains elevated  Would like to start SGLT-2 and appreciate nephrology expertise      Past Surgical History:   Procedure Laterality Date    INSERTION OF TUNNELED CENTRAL VENOUS HEMODIALYSIS CATHETER Right 2024    Procedure: Insertion, Catheter, Central Venous, Hemodialysis;  Surgeon: Carole Rolon MD;  Location: [facility] CATH LAB;  Service: Interventional Nephrology;  Laterality: Right;     No family history on file.  Social History     Tobacco Use    Smoking status: Former     Current packs/day: 0.00     Types: Cigarettes     Start date:      Quit date:      Years since quittin.4    Smokeless tobacco: Never    Tobacco comments:     2-3 cigarettes a day   Substance Use Topics    Alcohol use: Never    Drug use: Never     Review of Systems   Constitutional:  Positive for fatigue. Negative for chills and fever.   HENT:  Negative for congestion and rhinorrhea.    Eyes:  Negative for visual disturbance.   Respiratory:  Negative for cough and shortness of breath.    Cardiovascular:  Positive for chest pain, palpitations and leg swelling.   Gastrointestinal:  Positive for nausea and vomiting. Negative for abdominal pain.   Genitourinary:  Negative for decreased urine volume, difficulty urinating and dysuria.   Musculoskeletal:  Positive for arthralgias and myalgias.   Neurological:  Positive for dizziness and weakness. Negative for light-headedness and headaches.       Physical Exam     Initial Vitals [24 0715]   BP Pulse Resp Temp SpO2   131/89 68 18 97.8 °F (36.6 °C)  97 %      MAP       --         Physical Exam    Constitutional: She is not diaphoretic. No distress.   HENT:   Head: Normocephalic and atraumatic.   Eyes: EOM are normal. Pupils are equal, round, and reactive to light. Scleral icterus is present.   Cardiovascular:  Normal rate and regular rhythm.           Pulmonary/Chest: Breath sounds normal. No respiratory distress. She has no rales.   Abdominal: Abdomen is soft. She exhibits no distension. There is abdominal tenderness (RUQ).   Musculoskeletal:         General: Edema (bilateral lower extremities) present.     Neurological: She is alert and oriented to person, place, and time.         ED Course   Procedures  Labs Reviewed   CBC W/ AUTO DIFFERENTIAL - Abnormal; Notable for the following components:       Result Value    Hemoglobin 11.5 (*)     MCH 26.9 (*)     MCHC 29.6 (*)     RDW 27.8 (*)     Platelets 90 (*)     Lymph # 0.5 (*)     nRBC 1 (*)     Gran % 81.7 (*)     Lymph % 10.8 (*)     All other components within normal limits   TROPONIN I - Abnormal; Notable for the following components:    Troponin I 0.497 (*)     All other components within normal limits    Narrative:     add on lipas per  /order#[id number] @ 06/18/2024  09:06    B-TYPE NATRIURETIC PEPTIDE - Abnormal; Notable for the following components:    BNP 4,516 (*)     All other components within normal limits    Narrative:     add on lipas per  /order#[id number] @ 06/18/2024  09:06    URINALYSIS, REFLEX TO URINE CULTURE - Abnormal; Notable for the following components:    Protein, UA 1+ (*)     Leukocytes, UA Trace (*)     All other components within normal limits    Narrative:     Specimen Source->Urine   COMPREHENSIVE METABOLIC PANEL - Abnormal; Notable for the following components:    Potassium 2.8 (*)     CO2 18 (*)      (*)     Creatinine 5.4 (*)     Albumin 3.3 (*)     Total Bilirubin 4.3 (*)     Alkaline Phosphatase 178 (*)      (*)      (*)     eGFR 8.8 (*)      Anion Gap 18 (*)     All other components within normal limits   TROPONIN I - Abnormal; Notable for the following components:    Troponin I 0.540 (*)     All other components within normal limits    Narrative:     add on lipas and bilid per  /order#[id number] and [id number] @   06/18/2024  12:01    URINALYSIS MICROSCOPIC - Abnormal; Notable for the following components:    Bacteria Few (*)     All other components within normal limits    Narrative:     Specimen Source->Urine   BILIRUBIN, DIRECT - Abnormal; Notable for the following components:    Bilirubin, Direct 3.1 (*)     All other components within normal limits    Narrative:     add on lipas and bilid per  /order#[id number] and [id number] @   06/18/2024  12:01    LIPASE - Abnormal; Notable for the following components:    Lipase 230 (*)     All other components within normal limits    Narrative:     add on lipas and bilid per  /order#[id number] and [id number] @   06/18/2024  12:01    LACTIC ACID, PLASMA   LIPASE   BILIRUBIN, DIRECT   LIPASE   HEPATITIS PANEL, ACUTE   TROPONIN I   ISTAT CHEM8   POCT GLUCOSE MONITORING CONTINUOUS        ECG Results              EKG 12-lead (Final result)        Collection Time Result Time QRS Duration [facility] QTC Calculation    06/18/24 10:22:56 06/18/24 11:29:56 112 573                     Final result by Interface, Lab In [facility] (06/18/24 11:30:01)                   Narrative:    Test Reason : R07.9,    Vent. Rate : 061 BPM     Atrial Rate : 061 BPM     P-R Int : 118 ms          QRS Dur : 112 ms      QT Int : 570 ms       P-R-T Axes : 081 099 006 degrees     QTc Int : 573 ms    Sinus rhythm with frequent Premature ventricular complexes in a bigeminal  pattern  Rightward axis  T wave abnormality, consider inferolateral ischemia  Prolonged QT  Abnormal ECG  When compared with ECG of 18-JUN-2024 07:20,      Questionable change in QRS duration  Criteria for Septal infarct are no longer Present  Confirmed by  ANGLE REAVES MD ([id number]) on 6/18/2024 11:29:55 AM    Referred By: [email or web address]   SELF           Confirmed By:ANGLE REAVES MD                                     EKG 12-lead (Final result)        Collection Time Result Time QRS Duration [facility] QTC Calculation    06/18/24 07:20:21 06/18/24 08:54:54 88 681                     Final result by Interface, Lab In [facility] (06/18/24 08:54:59)                   Narrative:    Test Reason : R07.89,    Vent. Rate : 075 BPM     Atrial Rate : 075 BPM     P-R Int : 140 ms          QRS Dur : 088 ms      QT Int : 610 ms       P-R-T Axes : 066 008 000 degrees     QTc Int : 681 ms    Sinus rhythm with Premature atrial complexes with Aberrant conduction    Nonspecific T wave abnormality  Abnormal ECG  When compared with ECG of 18-APR-2024 11:50,  Aberrant conduction is now Present  T wave inversion now evident in Inferior leads  QT has lengthened  Confirmed by ANGLE REAVES MD ([id number]) on 6/18/2024 8:54:53 AM    Referred By:             Confirmed By:ANGLE REAVES MD                                  Imaging Results              US Abdomen Limited (Final result)  Result time 06/18/24 13:58:28      Final result by Abdulaziz Cerrato MD (06/18/24 13:58:28)                   Impression:      No significant sonographic abnormality.      Electronically signed by: Abdulaziz Cerrato  Date:    06/18/2024  Time:    13:58               Narrative:    EXAMINATION:  US ABDOMEN LIMITED    CLINICAL HISTORY:  elevated LFTs, TBili, RUQ tenderness;    TECHNIQUE:  Limited ultrasound of the right upper quadrant of the abdomen was performed.    COMPARISON:  CT abdomen pelvis 02/23/2024    FINDINGS:  Pancreas: Visualized portions appear normal.    Gallbladder: The gallbladder is surgically absent.    Biliary system: The common duct measures 4 mm.  No intrahepatic ductal dilatation.    IVC: Unremarkable.    Liver: Normal in size, measuring 15.1 cm. Homogeneous echotexture. No focal hepatic lesions.    Spleen: Normal in  size, measuring 10.9 cm.    Miscellaneous: No ascites.                                       X-Ray Chest AP Portable (Final result)  Result time 06/18/24 09:23:29      Final result by Angelito Pepper MD (06/18/24 09:23:29)                   Impression:      1. Pulmonary findings suggest congestive change/edema.  There is suspected small left pleural effusion.  No convincing large focal consolidation.      Electronically signed by: Angelito Pepper MD  Date:    06/18/2024  Time:    09:23               Narrative:    EXAMINATION:  XR CHEST AP PORTABLE    CLINICAL HISTORY:  chest pain;    TECHNIQUE:  Single frontal view of the chest was performed.    COMPARISON:  04/13/2024    FINDINGS:  Right central venous catheter tip projects over the distal SVC.  The cardiomediastinal silhouette is prominent, similar to the previous exam..  There is slight obscuration of the left costophrenic angle, may reflect small effusion..  The trachea is midline.  The lungs are symmetrically expanded bilaterally with patchy increased interstitial and parenchymal attenuation bilaterally.  No large focal consolidation seen.  There is no pneumothorax.  The osseous structures are remarkable for degenerative change..                                       Medications   atorvastatin tablet 40 mg (has no administration in time range)   sodium chloride 0.9% flush 10 mL (has no administration in time range)   naloxone 0.4 mg/mL injection 0.02 mg (has no administration in time range)   glucose chewable tablet 16 g (has no administration in time range)   glucose chewable tablet 24 g (has no administration in time range)   glucagon (human recombinant) injection 1 mg (has no administration in time range)   heparin (porcine) injection 5,000 Units (has no administration in time range)   polyethylene glycol packet 17 g (has no administration in time range)   insulin aspart U-100 pen 0-5 Units (has no administration in time range)   melatonin tablet 6 mg  (has no administration in time range)   simethicone chewable tablet 80 mg (has no administration in time range)   aluminum-magnesium hydroxide-simethicone 200-200-20 mg/5 mL suspension 30 mL (has no administration in time range)   potassium chloride SA CR tablet 40 mEq (has no administration in time range)   dextrose 10% bolus 125 mL 125 mL (has no administration in time range)   dextrose 10% bolus 250 mL 250 mL (has no administration in time range)   furosemide injection 40 mg (40 mg Intravenous Given 6/18/24 1033)   potassium chloride SA CR tablet 40 mEq (40 mEq Oral Given 6/18/24 1145)   LORazepam injection 0.5 mg (0.5 mg Intravenous Given 6/18/24 1145)     Medical Decision Making  53F presents for chest pain, N/V. Exam concerning for mild hypervolemia given lower extremity edema however with normal saturations and pulmonary exam, not concerned for acute HD needs based on volume status or electrolytes. While overloaded she is not hypoxic, still making urine, we will diurese. Differential at this time includes REGINALDO on CKD, CHF exacerbation. CXR concerning for pulm vasc congestion. BNP 4500. Pt took Lasix at home. Giving home dose Lasix as patient is not acutely hypoxic necessitating aggressive diuresis.    Qtc prolonged, giving low dose ativan for nausea.  Troponin elevated, likely demand in the setting of volume overload. EKG without signs of acute infarction. Trending. Mild increase on repeat.  LFTs/Tbili elevated. With scleral icterus and RUQ tenderness/nausea ordered RUQ US. Pt does not have her Gallbladder. RUQ US unrevealing. Possible congestion in the setting of volume overload.    Will admit to Hospital Medicine    Amount and/or Complexity of Data Reviewed  Labs: ordered. Decision-making details documented in ED Course.  Radiology: ordered and independent interpretation performed. Decision-making details documented in ED Course.  ECG/medicine tests: ordered and independent interpretation performed.  Decision-making details documented in ED Course.    Risk  Prescription drug management.  Decision regarding hospitalization.              Attending Attestation:   Physician Attestation Statement for Resident:  As the supervising MD   Physician Attestation Statement: I have personally seen and examined this patient.   I agree with the above history.  -:   As the supervising MD I agree with the above PE.     As the supervising MD I agree with the above treatment, course, plan, and disposition.    I have reviewed and agree with the residents interpretation of the following: lab data, x-rays and EKG.                                        Clinical Impression:  Final diagnoses:  [R07.89] Chest discomfort  [R07.9] Chest pain          ED Disposition Condition    Admit                 Roberto Joseph MD  Resident  06/18/24 0308       Shirley Oswald MD  06/20/24 0134

## 2024-06-18 NOTE — ASSESSMENT & PLAN NOTE
Patient is identified as having Diastolic (HFpEF) heart failure that is Chronic. CHF is currently controlled. Latest ECHO performed and demonstrates- Results for orders placed during the hospital encounter of 04/01/24    Echo    Interpretation Summary    Left Ventricle: The left ventricle is moderately dilated. Normal wall thickness. There is eccentric hypertrophy. There is normal systolic function with a visually estimated ejection fraction of 55 - 60%. Diastolic function cannot be reliably determined in the presence of mitral valve disease.    Right Ventricle: Mild right ventricular enlargement. Wall thickness is normal. Right ventricle wall motion  is normal. Systolic function is mildly reduced.    Biatrial enlargement (L > R)    Mitral Valve: There is severe functional regurgitation with a centrally directed jet.    Tricuspid Valve: There is mild to moderate regurgitation.    Pulmonary Artery: The estimated pulmonary artery systolic pressure is 96 mmHg.    IVC/SVC: Intermediate venous pressure at 8 mmHg.    There is a small pericardial effusion and a right pleural effusion.  . Continue Furosemide and monitor clinical status closely. Monitor on telemetry. Patient is off CHF pathway.  Monitor strict Is&Os and daily weights.  Place on fluid restriction of 1.5 L. Cardiology has not been consulted. Continue to stress to patient importance of self efficacy and  on diet for CHF. Last BNP reviewed- and noted below   Recent Labs   Lab 06/18/24  0823   BNP 4,516*     Does not appear grossly volume overloaded    -repeat echo pending  -strict ins and outs  -continue lasix

## 2024-06-18 NOTE — HPI
"Patient is a 53F PMHx HFpEF (Lasix 80 bid), severe pHTN, CKD5 (last HD in May 2024), HTN, MR, anemia presents for chest pain that began this morning. She reports 2/10 central sqeezing chest pain that radiates to her R shoulder when vomiting. The pain is worse with walking. She has never had chest pain before. She has had nausea/vomiting x1 day. Reports approx 3 episodes of clear vomitus, describes as "phlegm." She has associated sharp upper abdominal pain when vomiting. Additionally reports arthralgias/myalgias, leg swelling, generalized weakness, and fatigue. She had a brief episode of dizziness yesterday when walking but has not had that since. She denies Fever/chills, SOB, upper respiratory symptoms, cough, dysuria, decreased urine, or known sick contacts.     She reports adherence to her fluid restriction & Lasix. Admitted to [facility] from 4/1/24-5/8/24 where renal function declined while being diuresed. Required CRRT with intermittent levophed. Admitted to [facility] 5/13-5/17 after presenting to ED for HD. Not enrolled in medicaid at that time and had been told to present to ED for HD needs. No indications for HD on that admission per chart review. At that time nephrology recommended urgent evaluation with vascular surgery outpatient for AVF placement. Venous mapping obtained.    In ED, somewhat hypertensive (sBP 150s), intermittent bradycardia, afebrile, on RA. CMP with profound uremia (), Cr 5.6, hypokalemia (K 2.8), no hyponatremia. Transaminitis AST//133 with T. Bili 4.3. Lipase elevated at 230. RUQ US w/o CBD dilation or evidence of scarring, surgically absent gallbladder. CBC with thrombocytopenia (PLT 90), no leukocytosis, stable anemia (Hb 11.5). BNP 4500, Troponin 0.497 which increased to 0.54. LA normal at 1.5. EKG with peaked T waves in II/III/AVF, widened QRS, Qtc 573. Nephrology consulted.     Interpretor was used for entire interview/examination:  number [id number]  "

## 2024-06-18 NOTE — ASSESSMENT & PLAN NOTE
2/2 to ESRD. AG 18. Patient AAOx3 on exam  +nausea, chest pain, labile HR  No steph bleeding noted  VBG with pH 7.44.     -HD per nephro

## 2024-06-18 NOTE — ASSESSMENT & PLAN NOTE
Creatine stable for now. BMP reviewed- noted Estimated Creatinine Clearance: 10.6 mL/min (A) (based on SCr of 5.4 mg/dL (H)). according to latest data. Based on current GFR, CKD stage is end stage.  Monitor UOP and serial BMP and adjust therapy as needed. Renally dose meds. Avoid nephrotoxic medications and procedures.    -Neprhology consulted, pending recommendations  -RFP, Mg daily  -VBG pending for degree of acidosis

## 2024-06-18 NOTE — ASSESSMENT & PLAN NOTE
CP substernal, 2/10, but mostly with lying flat  Worse with exertion/better with rest?  Troponin 0.497-->0.540, BNP 4500 (chronic)  EKG is without changes concerning for ACS  HOWARD score 2, Alannah 109    -cardiac monitoring  -stop trending Troponin  -will refrain from ACS protocol at this time, as patient is no longer in CP at time of examination

## 2024-06-18 NOTE — NURSING
Pt arrived to [facility] without portable cardiac monitoring, will use bedside cardiac monitoring during tx

## 2024-06-19 ENCOUNTER — EPISODE CHANGES (OUTPATIENT)
Dept: CARDIOLOGY | Facility: CLINIC | Age: 54
End: 2024-06-19

## 2024-06-19 LAB
ALBUMIN SERPL BCP-MCNC: 3.2 G/DL (ref 3.5–5.2)
ALBUMIN SERPL BCP-MCNC: 3.2 G/DL (ref 3.5–5.2)
ALP SERPL-CCNC: 181 U/L (ref 55–135)
ALT SERPL W/O P-5'-P-CCNC: 107 U/L (ref 10–44)
ANION GAP SERPL CALC-SCNC: 12 MMOL/L (ref 8–16)
ANION GAP SERPL CALC-SCNC: 14 MMOL/L (ref 8–16)
ASCENDING AORTA: 3.55 CM
AST SERPL-CCNC: 87 U/L (ref 10–40)
AV INDEX (PROSTH): 0.77
AV MEAN GRADIENT: 3 MMHG
AV PEAK GRADIENT: 7 MMHG
AV VALVE AREA BY VELOCITY RATIO: 3.15 CM²
AV VALVE AREA: 2.76 CM²
AV VELOCITY RATIO: 0.88
BASOPHILS # BLD AUTO: 0.01 K/UL (ref 0–0.2)
BASOPHILS NFR BLD: 0.2 % (ref 0–1.9)
BILIRUB SERPL-MCNC: 4.2 MG/DL (ref 0.1–1)
BSA FOR ECHO PROCEDURE: 1.4 M2
BUN SERPL-MCNC: 78 MG/DL (ref 6–20)
BUN SERPL-MCNC: 78 MG/DL (ref 6–20)
BUN SERPL-MCNC: >140 MG/DL (ref 6–30)
CALCIUM SERPL-MCNC: 8.6 MG/DL (ref 8.7–10.5)
CALCIUM SERPL-MCNC: 8.8 MG/DL (ref 8.7–10.5)
CHLORIDE SERPL-SCNC: 104 MMOL/L (ref 95–110)
CHLORIDE SERPL-SCNC: 104 MMOL/L (ref 95–110)
CHLORIDE SERPL-SCNC: 107 MMOL/L (ref 95–110)
CO2 SERPL-SCNC: 22 MMOL/L (ref 23–29)
CO2 SERPL-SCNC: 23 MMOL/L (ref 23–29)
CREAT SERPL-MCNC: 3.5 MG/DL (ref 0.5–1.4)
CREAT SERPL-MCNC: 3.6 MG/DL (ref 0.5–1.4)
CREAT SERPL-MCNC: 5.7 MG/DL (ref 0.5–1.4)
CV ECHO LV RWT: 0.32 CM
DIFFERENTIAL METHOD BLD: ABNORMAL
DOP CALC AO PEAK VEL: 1.29 M/S
DOP CALC AO VTI: 25.97 CM
DOP CALC LVOT AREA: 3.6 CM2
DOP CALC LVOT DIAMETER: 2.13 CM
DOP CALC LVOT PEAK VEL: 1.14 M/S
DOP CALC LVOT STROKE VOLUME: 71.66 CM3
DOP CALC MV VTI: 54.15 CM
DOP CALCLVOT PEAK VEL VTI: 20.12 CM
E WAVE DECELERATION TIME: 280.04 MSEC
E/A RATIO: 1.8
E/E' RATIO: 23.71 M/S
ECHO LV POSTERIOR WALL: 0.96 CM (ref 0.6–1.1)
EJECTION FRACTION: 55 %
EOSINOPHIL # BLD AUTO: 0 K/UL (ref 0–0.5)
EOSINOPHIL NFR BLD: 0.5 % (ref 0–8)
ERYTHROCYTE [DISTWIDTH] IN BLOOD BY AUTOMATED COUNT: 27.5 % (ref 11.5–14.5)
EST. GFR  (NO RACE VARIABLE): 14.4 ML/MIN/1.73 M^2
EST. GFR  (NO RACE VARIABLE): 14.9 ML/MIN/1.73 M^2
FRACTIONAL SHORTENING: 31 % (ref 28–44)
GLUCOSE SERPL-MCNC: 106 MG/DL (ref 70–110)
GLUCOSE SERPL-MCNC: 162 MG/DL (ref 70–110)
GLUCOSE SERPL-MCNC: 88 MG/DL (ref 70–110)
HBV SURFACE AB SER-ACNC: <3 MIU/ML
HBV SURFACE AB SER-ACNC: NORMAL M[IU]/ML
HCT VFR BLD AUTO: 36.7 % (ref 37–48.5)
HCT VFR BLD CALC: 45 %PCV (ref 36–54)
HGB BLD-MCNC: 10.8 G/DL (ref 12–16)
HR MV ECHO: 77 BPM
IMM GRANULOCYTES # BLD AUTO: 0.02 K/UL (ref 0–0.04)
IMM GRANULOCYTES NFR BLD AUTO: 0.4 % (ref 0–0.5)
INTERVENTRICULAR SEPTUM: 1 CM (ref 0.6–1.1)
LA MAJOR: 7.85 CM
LA MINOR: 8.08 CM
LA WIDTH: 6.39 CM
LEFT ATRIUM SIZE: 6.12 CM
LEFT ATRIUM VOLUME INDEX MOD: 171.3 ML/M2
LEFT ATRIUM VOLUME INDEX: 190.4 ML/M2
LEFT ATRIUM VOLUME MOD: 238.08 CM3
LEFT ATRIUM VOLUME: 264.71 CM3
LEFT INTERNAL DIMENSION IN SYSTOLE: 4.15 CM (ref 2.1–4)
LEFT VENTRICLE DIASTOLIC VOLUME INDEX: 124.98 ML/M2
LEFT VENTRICLE DIASTOLIC VOLUME: 173.72 ML
LEFT VENTRICLE MASS INDEX: 173 G/M2
LEFT VENTRICLE SYSTOLIC VOLUME INDEX: 54.8 ML/M2
LEFT VENTRICLE SYSTOLIC VOLUME: 76.18 ML
LEFT VENTRICULAR INTERNAL DIMENSION IN DIASTOLE: 6 CM (ref 3.5–6)
LEFT VENTRICULAR MASS: 240.51 G
LV LATERAL E/E' RATIO: 27.67 M/S
LV SEPTAL E/E' RATIO: 20.75 M/S
LYMPHOCYTES # BLD AUTO: 0.6 K/UL (ref 1–4.8)
LYMPHOCYTES NFR BLD: 11 % (ref 18–48)
MAGNESIUM SERPL-MCNC: 2 MG/DL (ref 1.6–2.6)
MCH RBC QN AUTO: 26.6 PG (ref 27–31)
MCHC RBC AUTO-ENTMCNC: 29.4 G/DL (ref 32–36)
MCV RBC AUTO: 90 FL (ref 82–98)
MONOCYTES # BLD AUTO: 0.5 K/UL (ref 0.3–1)
MONOCYTES NFR BLD: 8.5 % (ref 4–15)
MV MEAN GRADIENT: 7 MMHG
MV PEAK A VEL: 0.92 M/S
MV PEAK E VEL: 1.66 M/S
MV PEAK GRADIENT: 25 MMHG
MV STENOSIS PRESSURE HALF TIME: 81.21 MS
MV VALVE AREA BY CONTINUITY EQUATION: 1.32 CM2
MV VALVE AREA P 1/2 METHOD: 2.71 CM2
NEUTROPHILS # BLD AUTO: 4.5 K/UL (ref 1.8–7.7)
NEUTROPHILS NFR BLD: 79.4 % (ref 38–73)
NRBC BLD-RTO: 0 /100 WBC
OHS CV RV/LV RATIO: 0.57 CM
PHOSPHATE SERPL-MCNC: 2.5 MG/DL (ref 2.7–4.5)
PHOSPHATE SERPL-MCNC: 2.5 MG/DL (ref 2.7–4.5)
PISA MRMAX VEL: 0.06 M/S
PISA TR MAX VEL: 3.9 M/S
PLATELET # BLD AUTO: 83 K/UL (ref 150–450)
PMV BLD AUTO: ABNORMAL FL (ref 9.2–12.9)
POC IONIZED CALCIUM: 1.02 MMOL/L (ref 1.06–1.42)
POC TCO2 (MEASURED): 21 MMOL/L (ref 23–29)
POCT GLUCOSE: 106 MG/DL (ref 70–110)
POCT GLUCOSE: 152 MG/DL (ref 70–110)
POTASSIUM BLD-SCNC: 3.6 MMOL/L (ref 3.5–5.1)
POTASSIUM SERPL-SCNC: 3.4 MMOL/L (ref 3.5–5.1)
POTASSIUM SERPL-SCNC: 3.7 MMOL/L (ref 3.5–5.1)
PROT SERPL-MCNC: 6.9 G/DL (ref 6–8.4)
RA MAJOR: 4.97 CM
RA PRESSURE ESTIMATED: 15 MMHG
RA WIDTH: 2.9 CM
RBC # BLD AUTO: 4.06 M/UL (ref 4–5.4)
RIGHT VENTRICULAR END-DIASTOLIC DIMENSION: 3.41 CM
RV TB RVSP: 19 MMHG
SAMPLE: ABNORMAL
SINUS: 2.55 CM
SODIUM BLD-SCNC: 141 MMOL/L (ref 136–145)
SODIUM SERPL-SCNC: 139 MMOL/L (ref 136–145)
SODIUM SERPL-SCNC: 140 MMOL/L (ref 136–145)
STJ: 2.38 CM
TDI LATERAL: 0.06 M/S
TDI SEPTAL: 0.08 M/S
TDI: 0.07 M/S
TR MAX PG: 61 MMHG
TRICUSPID ANNULAR PLANE SYSTOLIC EXCURSION: 1.68 CM
TV REST PULMONARY ARTERY PRESSURE: 76 MMHG
WBC # BLD AUTO: 5.64 K/UL (ref 3.9–12.7)
Z-SCORE OF LEFT VENTRICULAR DIMENSION IN END DIASTOLE: 3.24
Z-SCORE OF LEFT VENTRICULAR DIMENSION IN END SYSTOLE: 3.42

## 2024-06-19 PROCEDURE — 85025 COMPLETE CBC W/AUTO DIFF WBC: CPT

## 2024-06-19 PROCEDURE — 63600175 PHARM REV CODE 636 W HCPCS

## 2024-06-19 PROCEDURE — 36415 COLL VENOUS BLD VENIPUNCTURE: CPT

## 2024-06-19 PROCEDURE — 21400001 HC TELEMETRY ROOM

## 2024-06-19 PROCEDURE — 90935 HEMODIALYSIS ONE EVALUATION: CPT

## 2024-06-19 PROCEDURE — 80069 RENAL FUNCTION PANEL: CPT

## 2024-06-19 PROCEDURE — 90935 HEMODIALYSIS ONE EVALUATION: CPT | Mod: ,,, | Performed by: NURSE PRACTITIONER

## 2024-06-19 PROCEDURE — 86706 HEP B SURFACE ANTIBODY: CPT | Mod: 91

## 2024-06-19 PROCEDURE — 25000003 PHARM REV CODE 250

## 2024-06-19 PROCEDURE — 80053 COMPREHEN METABOLIC PANEL: CPT

## 2024-06-19 PROCEDURE — 83735 ASSAY OF MAGNESIUM: CPT

## 2024-06-19 PROCEDURE — 25000003 PHARM REV CODE 250: Performed by: INTERNAL MEDICINE

## 2024-06-19 RX ORDER — SODIUM CHLORIDE 9 MG/ML
INJECTION, SOLUTION INTRAVENOUS ONCE
Status: DISCONTINUED | OUTPATIENT
Start: 2024-06-20 | End: 2024-06-20

## 2024-06-19 RX ORDER — FAMOTIDINE 20 MG/1
20 TABLET, FILM COATED ORAL DAILY
Status: DISCONTINUED | OUTPATIENT
Start: 2024-06-19 | End: 2024-06-24 | Stop reason: HOSPADM

## 2024-06-19 RX ORDER — FUROSEMIDE 10 MG/ML
80 INJECTION INTRAMUSCULAR; INTRAVENOUS ONCE
Status: COMPLETED | OUTPATIENT
Start: 2024-06-19 | End: 2024-06-19

## 2024-06-19 RX ORDER — SODIUM CHLORIDE 9 MG/ML
INJECTION, SOLUTION INTRAVENOUS ONCE
Status: DISCONTINUED | OUTPATIENT
Start: 2024-06-19 | End: 2024-06-20

## 2024-06-19 RX ORDER — SEVELAMER CARBONATE 800 MG/1
800 TABLET, FILM COATED ORAL
Status: ON HOLD | COMMUNITY
End: 2024-06-19 | Stop reason: CLARIF

## 2024-06-19 RX ADMIN — POLYETHYLENE GLYCOL 3350 17 G: 17 POWDER, FOR SOLUTION ORAL at 08:06

## 2024-06-19 RX ADMIN — FUROSEMIDE 80 MG: 10 INJECTION, SOLUTION INTRAVENOUS at 08:06

## 2024-06-19 RX ADMIN — ALUMINUM HYDROXIDE, MAGNESIUM HYDROXIDE, AND SIMETHICONE 30 ML: 1200; 120; 1200 SUSPENSION ORAL at 03:06

## 2024-06-19 RX ADMIN — MUPIROCIN: 20 OINTMENT TOPICAL at 08:06

## 2024-06-19 RX ADMIN — HEPARIN SODIUM 5000 UNITS: 5000 INJECTION INTRAVENOUS; SUBCUTANEOUS at 09:06

## 2024-06-19 RX ADMIN — FAMOTIDINE 20 MG: 20 TABLET ORAL at 11:06

## 2024-06-19 RX ADMIN — HEPARIN SODIUM 5000 UNITS: 5000 INJECTION INTRAVENOUS; SUBCUTANEOUS at 05:06

## 2024-06-19 RX ADMIN — MUPIROCIN: 20 OINTMENT TOPICAL at 09:06

## 2024-06-19 NOTE — PROGRESS NOTES
"[facility] ([address] Medicine  Progress Note    Patient Name: Xena Vera  MRN: [id number]  Patient Class: IP- Inpatient   Admission Date: 6/18/2024  Length of Stay: 1 days  Attending Physician: Favio Aaron III*  Primary Care Provider: Tami Villeda FNP        Subjective:     Principal Problem:Uremia        HPI:  Patient is a 53F PMHx HFpEF (Lasix 80 bid), severe pHTN, CKD5 (last HD in May 2024), HTN, MR, anemia presents for chest pain that began this morning. She reports 2/10 central sqeezing chest pain that radiates to her R shoulder when vomiting. The pain is worse with walking. She has never had chest pain before. She has had nausea/vomiting x1 day. Reports approx 3 episodes of clear vomitus, describes as "phlegm." She has associated sharp upper abdominal pain when vomiting. Additionally reports arthralgias/myalgias, leg swelling, generalized weakness, and fatigue. She had a brief episode of dizziness yesterday when walking but has not had that since. She denies Fever/chills, SOB, upper respiratory symptoms, cough, dysuria, decreased urine, or known sick contacts.     She reports adherence to her fluid restriction & Lasix. Admitted to [facility] from 4/1/24-5/8/24 where renal function declined while being diuresed. Required CRRT with intermittent levophed. Admitted to [facility] 5/13-5/17 after presenting to ED for HD. Not enrolled in medicaid at that time and had been told to present to ED for HD needs. No indications for HD on that admission per chart review. At that time nephrology recommended urgent evaluation with vascular surgery outpatient for AVF placement. Venous mapping obtained.    In ED, somewhat hypertensive (sBP 150s), intermittent bradycardia, afebrile, on RA. CMP with profound uremia (), Cr 5.6, hypokalemia (K 2.8), no hyponatremia. Transaminitis AST//133 with T. Bili 4.3. Lipase elevated at 230. RUQ US w/o CBD dilation or evidence of scarring, " surgically absent gallbladder. CBC with thrombocytopenia (PLT 90), no leukocytosis, stable anemia (Hb 11.5). BNP 4500, Troponin 0.497 which increased to 0.54. LA normal at 1.5. EKG with peaked T waves in II/III/AVF, widened QRS, Qtc 573. Nephrology consulted.     Interpretor was used for entire interview/examination:  number [id number]    Overview/Hospital Course:  Admitted to [facility] 6/18/24 for chest pain, nausea, and vomiting. Found to have profound uremia and hypokalemia. Nephrology consulted and performed emergent HD. CP resolved following HD, residual epigastric pain w/o nausea/vomiting. TTE 6/19 with preserved EF, normal RV systolic function, severe MR, pHTN, and CVP 15. Diuresis with IV lasix in conjunction with HD.     Interval History: Underwent HD overnight with 380mL removed. CP resolved, tolerating small amounts of PO intake w/o N/V. Lasix 80mg given this AM. Planning for additional HD today and tomorrow.   US pancreas today and famotidine given elevated lipase in setting of epigastric pain.     Review of Systems   Constitutional:  Positive for activity change. Negative for chills and fever.   Respiratory:  Negative for shortness of breath.    Cardiovascular:  Positive for leg swelling. Negative for chest pain.   Gastrointestinal:  Positive for abdominal pain. Negative for abdominal distention, constipation, nausea and vomiting.   Musculoskeletal:  Negative for arthralgias.   Psychiatric/Behavioral:  Negative for agitation, behavioral problems and confusion.      Objective:     Vital Signs (Most Recent):  Temp: 98 °F (36.7 °C) (06/19/24 0715)  Pulse: 68 (06/19/24 0715)  Resp: 17 (06/19/24 0715)  BP: 117/77 (06/19/24 0715)  SpO2: (!) 92 % (06/19/24 0715) Vital Signs (24h Range):  Temp:  [96.8 °F (36 °C)-98.2 °F (36.8 °C)] 98 °F (36.7 °C)  Pulse:  [35-78] 68  Resp:  [17-20] 17  SpO2:  [92 %-100 %] 92 %  BP: (113-164)/(58-97) 117/77     Weight: 47 kg (103 lb 9.9 oz)  Body mass index is 20.93  kg/m².    Intake/Output Summary (Last 24 hours) at 6/19/2024 1025  Last data filed at 6/18/2024 1843  Gross per 24 hour   Intake 400 ml   Output 634 ml   Net -234 ml         Physical Exam  Constitutional:       General: She is not in acute distress.     Appearance: She is not ill-appearing.   HENT:      Head: Normocephalic and atraumatic.      Right Ear: External ear normal.      Left Ear: External ear normal.      Nose: Nose normal.      Mouth/Throat:      Mouth: Mucous membranes are moist.      Pharynx: Oropharynx is clear.   Eyes:      General: No scleral icterus.     Extraocular Movements: Extraocular movements intact.   Cardiovascular:      Rate and Rhythm: Regular rhythm. Bradycardia present.      Pulses: Normal pulses.      Heart sounds: Murmur heard.   Pulmonary:      Effort: Pulmonary effort is normal.      Breath sounds: Normal breath sounds. No wheezing, rhonchi or rales.   Abdominal:      General: Abdomen is flat.      Palpations: Abdomen is soft.      Tenderness: There is abdominal tenderness.      Comments: Mild TTP in epigastric region   Musculoskeletal:         General: No swelling. Normal range of motion.      Cervical back: Normal range of motion and neck supple.   Skin:     General: Skin is warm and dry.      Capillary Refill: Capillary refill takes less than 2 seconds.   Neurological:      General: No focal deficit present.      Mental Status: She is alert and oriented to person, place, and time.   Psychiatric:         Mood and Affect: Mood normal.         Behavior: Behavior normal.             Significant Labs: All pertinent labs within the past 24 hours have been reviewed.  BMP:   Recent Labs   Lab 06/19/24  0322 06/19/24  0945   GLU 88 162*    139   K 3.7 3.4*    104   CO2 22* 23   BUN 78* 78*   CREATININE 3.5* 3.6*   CALCIUM 8.8 8.6*   MG 2.0  --      CBC:   Recent Labs   Lab 06/18/24  0823 06/18/24  0841 06/19/24  0322   WBC 4.63  --  5.64   HGB 11.5*  --  10.8*   HCT 38.8 45  36.7*   PLT 90*  --  83*     CMP:   Recent Labs   Lab 06/18/24  0927 06/18/24  1900 06/19/24  0322 06/19/24  0945    141 140 139   K 2.8* 3.7 3.7 3.4*    104 104 104   CO2 18* 22* 22* 23   GLU 97 79 88 162*   * 70* 78* 78*   CREATININE 5.4* 3.2* 3.5* 3.6*   CALCIUM 8.9 8.5* 8.8 8.6*   PROT 7.2  --   --  6.9   ALBUMIN 3.3*  --  3.2* 3.2*   BILITOT 4.3*  --   --  4.2*   ALKPHOS 178*  --   --  181*   *  --   --  87*   *  --   --  107*   ANIONGAP 18* 15 14 12     Magnesium:   Recent Labs   Lab 06/19/24  0322   MG 2.0     Troponin:   Recent Labs   Lab 06/18/24  0823 06/18/24  1120 06/18/24  1602   TROPONINI 0.497* 0.540* 0.498*       Significant Imaging: I have reviewed all pertinent imaging results/findings within the past 24 hours.    Assessment/Plan:      * Uremia  2/2 to ESRD. AG 18. Patient AAOx3 on exam  +nausea, chest pain, labile HR  No steph bleeding noted  VBG with pH 7.44.     -HD per nephro    Chest pain  CP substernal, 2/10, but mostly with lying flat  Worse with exertion/better with rest?  Troponin 0.497-->0.540, BNP 4500 (chronic)  EKG is without changes concerning for ACS  HOWARD score 2, Alannah 109    -cardiac monitoring  -stop trending Troponin  -will refrain from ACS protocol at this time, as patient is no longer in CP at time of examination    ESRD (end stage renal disease) on dialysis  Creatine stable for now. BMP reviewed- noted Estimated Creatinine Clearance: 10.6 mL/min (A) (based on SCr of 5.4 mg/dL (H)). according to latest data. Based on current GFR, CKD stage is end stage.  Monitor UOP and serial BMP and adjust therapy as needed. Renally dose meds. Avoid nephrotoxic medications and procedures.    -Neprhology consulted, assisting with dialysis frequency  -RFP, Mg daily      Hyperbilirubinemia  Direct hyperbilirubinemia on admission w/ AST/ALT elevation. Liver US not reporting dilated bile ducts  No new medications identified for DILI  Concern for hepatic  hepatopathy due to volume overload from HFpEF  Hepatitis panel negative    -Hold statin  -continue lasix for diuresis  -neprhology consulted, pending HD reqs    Prediabetes  Last A1c 6.0 (2 months ago)   on admission    -POCT glucose AC/HS  -LDSSI  -goal -180    Prolonged QT interval  Qtc on admision 570    -cardiac telemetry  -caution with QT prolonging medications    Elevated troponin I level  Has elevated troponin at baseline 2/2 to ESRD  Troponin 0.497-->0.540-->0.48  Reports 2/10 CP worse with lying down  EKG is without changes concerning for ACS  Has markedly elevated BUN, uremic pericarditis on ddx    -Can stop trending troponin  -EKG for new/worsened CP  -cardiac telemetry  -Nephrology consulted for dialysis    Acute on chronic heart failure with preserved ejection fraction (HFpEF)  Patient is identified as having Diastolic (HFpEF) heart failure that is Chronic. CHF is currently controlled. Latest ECHO performed and demonstrates- Results for orders placed during the hospital encounter of 04/01/24    Echo    Result Date: 6/19/2024    Left Ventricle: The left ventricle is severely dilated. LVIDd is 6.0   cm. Normal wall thickness. There is eccentric hypertrophy. There is normal   systolic function. Ejection fraction by visual approximation is 55%. Grade   II diastolic dysfunction.    Right Ventricle: Normal right ventricular cavity size. Wall thickness   is normal. Systolic function is normal.    Left Atrium: Left atrium is severely dilated.    Aortic Valve: The aortic valve is a trileaflet valve.    Mitral Valve: Severely restricted posterior leaflet motion.  The mean   pressure gradient across the mitral valve is 7 mmHg at a heart rate of 77   bpm. There is severe regurgitation with a posterolateral eccentrically   directed jet.    Tricuspid Valve: There is mild regurgitation.    Pulmonary Artery: There is severe pulmonary hypertension. The estimated   pulmonary artery systolic pressure is 76  mmHg.    IVC/SVC: Elevated venous pressure at 15 mmHg.    Pericardium: There is a small effusion. No indication of cardiac   tamponade. On direct comparison with images from 4/23/2024, there is   slightly more fluid around the right atrium.         . Continue Furosemide and monitor clinical status closely. Monitor on telemetry. Patient is off CHF pathway.  Monitor strict Is&Os and daily weights.  Place on fluid restriction of 1.5 L. Cardiology has not been consulted. Continue to stress to patient importance of self efficacy and  on diet for CHF. Last BNP reviewed- and noted below   Recent Labs   Lab 06/18/24 0823   BNP 4,516*     Does not appear grossly volume overloaded    -Volume reduction via HD, will determine dialysis needs as OP to help determine future diuretic dosing  -Lasix 80 IV qd  -May benefit from torsemide 40 as OP for better gut absorption    Class 2 obesity in adult  Body mass index is 30.23 kg/m². Morbid obesity complicates all aspects of disease management from diagnostic modalities to treatment. Weight loss encouraged and health benefits explained to patient.         Essential hypertension  BP elevated and labile on admission  120s-160s systolic  Appears to have midodrine ordered, likely for dialysis     -will hold antihypertensives for now pending nephrology recommendations regarding dialysis      VTE Risk Mitigation (From admission, onward)           Ordered     heparin (porcine) injection 5,000 Units  Every 8 hours         06/18/24 1427     IP VTE HIGH RISK PATIENT  Once         06/18/24 1427     Place sequential compression device  Until discontinued         06/18/24 1427                    Discharge Planning   RILEY: 6/21/2024     Code Status: DNR   Is the patient medically ready for discharge?:     Reason for patient still in hospital (select all that apply): Treatment and Consult recommendations                     Cydney Ball MD  Department of Hospital Medicine   [facility]  Surg ([facility]-16)

## 2024-06-19 NOTE — NURSING
Pt arrived to unit accompany with 15 y.o. daughter. Pt AAOX4, to person, place ,time, situation . Pt V/S stable apical pulse 70, B/P 130/75 , spo 98%. Pt has no C/O of SOB, or pain. Pt C/O of having vertigo. Orientated pt to room , verbally explain how to use call light for assistants. Place bedside commode at pt bedside. Left pt bed in lowest position, call light and personal belonging within reach.

## 2024-06-19 NOTE — NURSING
Patient arrived to unit via wheelchair transport by ED tech. Patient AAOx4. Ambulating with stand-by assistance for generalized weakness. No acute distress noted at this time. Daughter at bedside assisting patient as needed.  in use for communication. Patient denies chest pain and nausea/vomiting at this time. Patient and daughter oriented to room and call light system. Patient educated on the importance of calling staff for assistance to ambulate to prevent falls; patient voiced understanding. Bedside commode placed in room. All needs/concerns addressed at this time.       Nurses Note -- 4 Eyes      6/19/2024   12:54 AM      Skin assessed during: Admit      [x] No Altered Skin Integrity Present    []Prevention Measures Documented      [] Yes- Altered Skin Integrity Present or Discovered   [] LDA Added if Not in Epic (Describe Wound)   [] New Altered Skin Integrity was Present on Admit and Documented in LDA   [] Wound Image Taken    Wound Care Consulted? No    Attending Nurse:  LIDIA Linton RN/Staff Member:  JUMANA Quigley

## 2024-06-19 NOTE — DIALYSIS ROUNDING
[facility] HEMODIALYSIS NOTE     Patient currently on hemodialysis for removal of uremic toxins .     Patient seen and evaluated on hemodialysis, tolerating treatment, see HD flowsheet for vitals and assessments.      No Hypotension, chest pain, shortness of breath, cramping, nausea or vomiting.     Target UF: 0.5 L as tolerated,keep MAP >65.  Hgb 10.8, near target, no EPO  Phos 2.5, no binders  Plans for treatment 3 tomorrow for clearance and volume management.  Consider renal diet restrictions; please limit daily intake to 32 oz per day.   No lab stick/BP intake on access site  Continue to monitor intake and output, daily weights   Please avoid gadolinium, fleets, phos-based laxatives, NSAIDs  Will follow closely and continue dialysis treatments while in-patient    MADAI Reaves DNP, APRN, FNP-C  Department of Nephrology  [facility]  Pager: [phone]

## 2024-06-19 NOTE — PROGRESS NOTES
Patient arrived in a wheelchair  to dialysis unit.   Report received from primary nurse   VS's per dialysis Flowsheet.     Hemodialysis initiated using the following:     Dialysis Access: right subclavian cath     Will Maintain telemetry and blood pressure monitoring throughout treatment.  Refer to dialysis flowsheet and MAR for details.

## 2024-06-19 NOTE — PLAN OF CARE
Problem: Hemodialysis  Goal: Safe, Effective Therapy Delivery  Outcome: Progressing  Goal: Effective Tissue Perfusion  Outcome: Progressing  Goal: Absence of Infection Signs and Symptoms  Outcome: Progressing       Dialysis tx ended to the right chest perm cath, saline locked and capped.    Net fluid removed: 0 ml    Report given to nurse Tia, pt awaiting transport from [facility] to EDOU2 by wheelchair

## 2024-06-19 NOTE — NURSING
Pt woke up by tech for v/s and found blood on gown in Abdomen area. Blood came from small needle incision from blood thinner shot. Applied pressure to site til bleeding stop, then clean and applied pressure dressing , and change pt gown. Pt has no c/o of pain, no sob, no signs of distress. Pt bed was left in the lowest position, call light and personal belonging left within reach.

## 2024-06-19 NOTE — PLAN OF CARE
[facility] Med Surg ([place] [address])  Initial Discharge Assessment       Primary Care Provider: Tami Villeda FNP    Admission Diagnosis: Chest discomfort [R07.89]  Chest pain [R07.9]  (HFpEF) heart failure with preserved ejection fraction [I50.30]    Admission Date: 6/18/2024  Expected Discharge Date: 6/21/2024    Transition of Care Barriers: (P) None    Payor: MEDICAID / Plan: MEDICAID OF [place] / Product Type: Government /     Extended Emergency Contact Information  Primary Emergency Contact: Alexandrea Vera  Address: [address] [place] [address] [facility]  Home Phone: [phone]  Mobile Phone: [phone]  Relation: Daughter  Preferred language: English   needed? No    Discharge Plan A: (P) Home Health  Discharge Plan B: (P) Home      [facility] [address] [place], [place] [address] - [address]  [address]  [place] [address]  Phone: [phone] Fax: [phone]      Initial Assessment (most recent)       Adult Discharge Assessment - 06/19/24 1428          Discharge Assessment    Assessment Type Discharge Planning Assessment (P)      Confirmed/corrected address, phone number and insurance Yes (P)      Confirmed Demographics Correct on Facesheet (P)      Source of Information family (P)      Communicated RILEY with patient/caregiver Date not available/Unable to determine (P)      Reason For Admission Uremia (P)      People in Home child(kingsley), adult;child(ikngsley), dependent (P)      Facility Arrived From: n/a (P)      Do you expect to return to your current living situation? Yes (P)      Do you have help at home or someone to help you manage your care at home? Yes (P)      Who are your caregiver(s) and their phone number(s)? Alexandrea Vera, daughter, [phone] (P)      Prior to hospitilization cognitive status: Unable to Assess (P)      Current cognitive status: Alert/Oriented (P)      Walking or Climbing Stairs Difficulty yes (P)      Walking or Climbing Stairs stair climbing  difficulty, assistance 1 person (P)      Mobility Management Daughter helps if needed. (P)      Dressing/Bathing Difficulty no (P)      Home Layout Able to live on 1st floor (P)      Equipment Currently Used at Home none (P)      Readmission within 30 days? No (P)      Do you currently have service(s) that help you manage your care at home? No (P)      Do you take prescription medications? Yes (P)      Do you have prescription coverage? Yes (P)      Coverage Medicaid (P)      Do you have any problems affording any of your prescribed medications? No (P)      Who is going to help you get home at discharge? Alexandrea Vera, daughter, [phone] (P)      How do you get to doctors appointments? family or friend will provide (P)      Are you on dialysis? No (P)      Do you take coumadin? No (P)      Discharge Plan A Home Health (P)      Discharge Plan B Home (P)      DME Needed Upon Discharge  wheelchair (P)      Discharge Plan discussed with: Adult children (P)      Transition of Care Barriers None (P)         Physical Activity    On average, how many days per week do you engage in moderate to strenuous exercise (like a brisk walk)? 0 days (P)      On average, how many minutes do you engage in exercise at this level? 0 min (P)         Financial Resource Strain    How hard is it for you to pay for the very basics like food, housing, medical care, and heating? Not hard at all (P)         Stress    Do you feel stress - tense, restless, nervous, or anxious, or unable to sleep at night because your mind is troubled all the time - these days? To some extent (P)         Social Isolation    How often do you feel lonely or isolated from those around you?  Sometimes (P)         Alcohol Use    Q1: How often do you have a drink containing alcohol? Never (P)      Q2: How many drinks containing alcohol do you have on a typical day when you are drinking? Patient does not drink (P)      Q3: How often do you have six or more drinks on one  occasion? Never (P)         Health Literacy    How often do you need to have someone help you when you read instructions, pamphlets, or other written material from your doctor or pharmacy? Sometimes (P)         OTHER    Name(s) of People in Home Alexandrea Vera, daughter, [phone], daughter Eun Ohara 15 y.o. (P)                  CM spoke with pt in room, she requested that I speak with daughter.  CM spoke with Eun Ohara, daughter, but Eun 15 y.o. CM called adult daughter Alexandrea Vera [phone] to complete assessment.  Pt lives downstairs in 2 story home with daughters, came from home.  Alexandrea will drive pt home, and family provides transport to MD appts.  No HH, coumadin.  Pt was getting HD, was told at [facility] that she didn't need HD now, but might need in the future, Alexandrea feels that pt needs HD.  No DME in home, CG feels that pt may need wheelchair d/t she gets SOB with short distances.  CG concerned about pt being socially isolated, would like Adult  resources.  CM requested [facility] to provide Adult Day Care resources, email to [email or web address].    CM requested PT/OT eval and w/c order from Dr. Aaron.    SHALINI messaged ilene Alexandra CM, that this pt may need HD setup upon d/c.    RANCHO TysonN, BS, RN, CCM

## 2024-06-19 NOTE — SUBJECTIVE & OBJECTIVE
Interval History: Underwent HD overnight with 380mL removed. CP resolved, tolerating small amounts of PO intake w/o N/V. Lasix 80mg given this AM. Planning for additional HD today and tomorrow.   US pancreas today and famotidine given elevated lipase in setting of epigastric pain.     Review of Systems   Constitutional:  Positive for activity change. Negative for chills and fever.   Respiratory:  Negative for shortness of breath.    Cardiovascular:  Positive for leg swelling. Negative for chest pain.   Gastrointestinal:  Positive for abdominal pain. Negative for abdominal distention, constipation, nausea and vomiting.   Musculoskeletal:  Negative for arthralgias.   Psychiatric/Behavioral:  Negative for agitation, behavioral problems and confusion.      Objective:     Vital Signs (Most Recent):  Temp: 98 °F (36.7 °C) (06/19/24 0715)  Pulse: 68 (06/19/24 0715)  Resp: 17 (06/19/24 0715)  BP: 117/77 (06/19/24 0715)  SpO2: (!) 92 % (06/19/24 0715) Vital Signs (24h Range):  Temp:  [96.8 °F (36 °C)-98.2 °F (36.8 °C)] 98 °F (36.7 °C)  Pulse:  [35-78] 68  Resp:  [17-20] 17  SpO2:  [92 %-100 %] 92 %  BP: (113-164)/(58-97) 117/77     Weight: 47 kg (103 lb 9.9 oz)  Body mass index is 20.93 kg/m².    Intake/Output Summary (Last 24 hours) at 6/19/2024 1025  Last data filed at 6/18/2024 1843  Gross per 24 hour   Intake 400 ml   Output 634 ml   Net -234 ml         Physical Exam  Constitutional:       General: She is not in acute distress.     Appearance: She is not ill-appearing.   HENT:      Head: Normocephalic and atraumatic.      Right Ear: External ear normal.      Left Ear: External ear normal.      Nose: Nose normal.      Mouth/Throat:      Mouth: Mucous membranes are moist.      Pharynx: Oropharynx is clear.   Eyes:      General: No scleral icterus.     Extraocular Movements: Extraocular movements intact.   Cardiovascular:      Rate and Rhythm: Regular rhythm. Bradycardia present.      Pulses: Normal pulses.      Heart  sounds: Murmur heard.   Pulmonary:      Effort: Pulmonary effort is normal.      Breath sounds: Normal breath sounds. No wheezing, rhonchi or rales.   Abdominal:      General: Abdomen is flat.      Palpations: Abdomen is soft.      Tenderness: There is abdominal tenderness.      Comments: Mild TTP in epigastric region   Musculoskeletal:         General: No swelling. Normal range of motion.      Cervical back: Normal range of motion and neck supple.   Skin:     General: Skin is warm and dry.      Capillary Refill: Capillary refill takes less than 2 seconds.   Neurological:      General: No focal deficit present.      Mental Status: She is alert and oriented to person, place, and time.   Psychiatric:         Mood and Affect: Mood normal.         Behavior: Behavior normal.             Significant Labs: All pertinent labs within the past 24 hours have been reviewed.  BMP:   Recent Labs   Lab 06/19/24  0322 06/19/24  0945   GLU 88 162*    139   K 3.7 3.4*    104   CO2 22* 23   BUN 78* 78*   CREATININE 3.5* 3.6*   CALCIUM 8.8 8.6*   MG 2.0  --      CBC:   Recent Labs   Lab 06/18/24  0823 06/18/24  0841 06/19/24  0322   WBC 4.63  --  5.64   HGB 11.5*  --  10.8*   HCT 38.8 45 36.7*   PLT 90*  --  83*     CMP:   Recent Labs   Lab 06/18/24  0927 06/18/24  1900 06/19/24  0322 06/19/24  0945    141 140 139   K 2.8* 3.7 3.7 3.4*    104 104 104   CO2 18* 22* 22* 23   GLU 97 79 88 162*   * 70* 78* 78*   CREATININE 5.4* 3.2* 3.5* 3.6*   CALCIUM 8.9 8.5* 8.8 8.6*   PROT 7.2  --   --  6.9   ALBUMIN 3.3*  --  3.2* 3.2*   BILITOT 4.3*  --   --  4.2*   ALKPHOS 178*  --   --  181*   *  --   --  87*   *  --   --  107*   ANIONGAP 18* 15 14 12     Magnesium:   Recent Labs   Lab 06/19/24  0322   MG 2.0     Troponin:   Recent Labs   Lab 06/18/24  0823 06/18/24  1120 06/18/24  1602   TROPONINI 0.497* 0.540* 0.498*       Significant Imaging: I have reviewed all pertinent imaging results/findings  within the past 24 hours.

## 2024-06-19 NOTE — HOSPITAL COURSE
[place] [facility] [place] for chest pain, nausea, and vomiting. Found to have profound uremia and hypokalemia. Nephrology consulted and performed emergent HD. CP resolved following HD, residual epigastric pain w/o nausea/vomiting. TTE 6/19 with preserved EF, normal RV systolic function, severe MR, pHTN, and CVP 15. Diuresis with IV lasix in conjunction with HD. LFTs downtrended with volume removal. Noted to have moderate pericardial effusion on 6/22, therefore Cardiology consulted and recommended continued diuresis. Repeat limited TTE performed 6/24 with interval improvement of what remains to be a moderately sized pericardial effusion. Cardiology to follow up with patient outpatient. Discharge delayed due to insurance barriers regarding securing an outpatient HD chair. Vascular surgery completed [facility] on 07/01/2024 without complication. Patient received hemodialysis from new [facility] 07/03/2024 without complication. Patient medically ready for D/C with followups arranged with PCP, cardiology, vascular sx, and Nephrology.

## 2024-06-19 NOTE — PLAN OF CARE
CEASARW emailed resources to [email or web address]    Adult Day Care Services by: Cinthya    Adult Day Health Care Waiver (ADHC) by: [facility] - Office of Aging and Adult Services    Care Management Services - Adult Day Health Care Program by: [employer] ([employer])

## 2024-06-19 NOTE — ASSESSMENT & PLAN NOTE
Has elevated troponin at baseline 2/2 to ESRD  Troponin 0.497-->0.540-->0.48  Reports 2/10 CP worse with lying down  EKG is without changes concerning for ACS  Has markedly elevated BUN, uremic pericarditis on ddx    -Can stop trending troponin  -EKG for new/worsened CP  -cardiac telemetry  -Nephrology consulted for dialysis

## 2024-06-19 NOTE — PROGRESS NOTES
06/19/24 1620   Post-Hemodialysis Assessment   Rinseback Volume (mL) 250 mL   Blood Volume Processed (Liters) 36 L   Dialyzer Clearance Lightly streaked   Duration of Treatment 150 minutes   Additional Fluid Intake (mL) 250 mL   Total UF (mL) 1050 mL   Net Fluid Removal 550   Patient Response to Treatment tolerated, complaints of abd pain   Post-Treatment Weight 56.1 kg (123 lb 10.9 oz)   Treatment Weight Change -0.4   Post-Hemodialysis Comments see notes     HD TX complete. Net removal 1050 ml. Pt awake, alert, VSS, NAD. Report given to primary nurse. Pt transported to US via wheelchair escorted by transport staff.

## 2024-06-19 NOTE — PLAN OF CARE
CM went to pt room to do initial assessment; pt requested that CM return when her daughter is available.    RANCHO TysonN, BS, RN, CCM

## 2024-06-19 NOTE — ASSESSMENT & PLAN NOTE
Direct hyperbilirubinemia on admission w/ AST/ALT elevation. Liver US not reporting dilated bile ducts  No new medications identified for DILI  Concern for hepatic hepatopathy due to volume overload from HFpEF  Hepatitis panel negative    -Hold statin  -continue lasix for diuresis  -neprhology consulted, pending HD reqs

## 2024-06-20 PROBLEM — R05.9 COUGH: Status: ACTIVE | Noted: 2024-06-20

## 2024-06-20 LAB
ALBUMIN SERPL BCP-MCNC: 3.3 G/DL (ref 3.5–5.2)
ALP SERPL-CCNC: 205 U/L (ref 55–135)
ALT SERPL W/O P-5'-P-CCNC: 108 U/L (ref 10–44)
ANION GAP SERPL CALC-SCNC: 15 MMOL/L (ref 8–16)
AST SERPL-CCNC: 91 U/L (ref 10–40)
BASOPHILS # BLD AUTO: 0.01 K/UL (ref 0–0.2)
BASOPHILS NFR BLD: 0.2 % (ref 0–1.9)
BILIRUB SERPL-MCNC: 5.2 MG/DL (ref 0.1–1)
BUN SERPL-MCNC: 44 MG/DL (ref 6–20)
CALCIUM SERPL-MCNC: 8.9 MG/DL (ref 8.7–10.5)
CHLORIDE SERPL-SCNC: 102 MMOL/L (ref 95–110)
CO2 SERPL-SCNC: 19 MMOL/L (ref 23–29)
CREAT SERPL-MCNC: 3.1 MG/DL (ref 0.5–1.4)
DIFFERENTIAL METHOD BLD: ABNORMAL
EOSINOPHIL # BLD AUTO: 0 K/UL (ref 0–0.5)
EOSINOPHIL NFR BLD: 0 % (ref 0–8)
ERYTHROCYTE [DISTWIDTH] IN BLOOD BY AUTOMATED COUNT: 27.4 % (ref 11.5–14.5)
EST. GFR  (NO RACE VARIABLE): 17.2 ML/MIN/1.73 M^2
GLUCOSE SERPL-MCNC: 101 MG/DL (ref 70–110)
HCT VFR BLD AUTO: 35.2 % (ref 37–48.5)
HGB BLD-MCNC: 10.1 G/DL (ref 12–16)
IMM GRANULOCYTES # BLD AUTO: 0.02 K/UL (ref 0–0.04)
IMM GRANULOCYTES NFR BLD AUTO: 0.4 % (ref 0–0.5)
LYMPHOCYTES # BLD AUTO: 0.7 K/UL (ref 1–4.8)
LYMPHOCYTES NFR BLD: 11.6 % (ref 18–48)
MAGNESIUM SERPL-MCNC: 2 MG/DL (ref 1.6–2.6)
MCH RBC QN AUTO: 27 PG (ref 27–31)
MCHC RBC AUTO-ENTMCNC: 28.7 G/DL (ref 32–36)
MCV RBC AUTO: 94 FL (ref 82–98)
MONOCYTES # BLD AUTO: 0.4 K/UL (ref 0.3–1)
MONOCYTES NFR BLD: 6.9 % (ref 4–15)
NEUTROPHILS # BLD AUTO: 4.6 K/UL (ref 1.8–7.7)
NEUTROPHILS NFR BLD: 80.9 % (ref 38–73)
NRBC BLD-RTO: 1 /100 WBC
OHS QRS DURATION: 86 MS
OHS QTC CALCULATION: 593 MS
PHOSPHATE SERPL-MCNC: 3.5 MG/DL (ref 2.7–4.5)
PLATELET # BLD AUTO: 99 K/UL (ref 150–450)
PMV BLD AUTO: ABNORMAL FL (ref 9.2–12.9)
POCT GLUCOSE: 129 MG/DL (ref 70–110)
POTASSIUM SERPL-SCNC: 4.2 MMOL/L (ref 3.5–5.1)
PROT SERPL-MCNC: 7.4 G/DL (ref 6–8.4)
RBC # BLD AUTO: 3.74 M/UL (ref 4–5.4)
SODIUM SERPL-SCNC: 136 MMOL/L (ref 136–145)
WBC # BLD AUTO: 5.62 K/UL (ref 3.9–12.7)

## 2024-06-20 PROCEDURE — 36415 COLL VENOUS BLD VENIPUNCTURE: CPT

## 2024-06-20 PROCEDURE — 25000003 PHARM REV CODE 250

## 2024-06-20 PROCEDURE — 63600175 PHARM REV CODE 636 W HCPCS

## 2024-06-20 PROCEDURE — 94761 N-INVAS EAR/PLS OXIMETRY MLT: CPT

## 2024-06-20 PROCEDURE — 83735 ASSAY OF MAGNESIUM: CPT

## 2024-06-20 PROCEDURE — 84100 ASSAY OF PHOSPHORUS: CPT

## 2024-06-20 PROCEDURE — 97166 OT EVAL MOD COMPLEX 45 MIN: CPT

## 2024-06-20 PROCEDURE — 97530 THERAPEUTIC ACTIVITIES: CPT

## 2024-06-20 PROCEDURE — 93010 ELECTROCARDIOGRAM REPORT: CPT | Mod: ,,, | Performed by: INTERNAL MEDICINE

## 2024-06-20 PROCEDURE — 97161 PT EVAL LOW COMPLEX 20 MIN: CPT

## 2024-06-20 PROCEDURE — 25000003 PHARM REV CODE 250: Performed by: INTERNAL MEDICINE

## 2024-06-20 PROCEDURE — 25000242 PHARM REV CODE 250 ALT 637 W/ HCPCS

## 2024-06-20 PROCEDURE — 93005 ELECTROCARDIOGRAM TRACING: CPT

## 2024-06-20 PROCEDURE — 21400001 HC TELEMETRY ROOM

## 2024-06-20 PROCEDURE — 85025 COMPLETE CBC W/AUTO DIFF WBC: CPT

## 2024-06-20 PROCEDURE — 80053 COMPREHEN METABOLIC PANEL: CPT

## 2024-06-20 RX ORDER — FLUTICASONE PROPIONATE 50 MCG
2 SPRAY, SUSPENSION (ML) NASAL DAILY
Status: DISCONTINUED | OUTPATIENT
Start: 2024-06-20 | End: 2024-06-24 | Stop reason: HOSPADM

## 2024-06-20 RX ORDER — BENZONATATE 100 MG/1
100 CAPSULE ORAL 3 TIMES DAILY PRN
Status: DISCONTINUED | OUTPATIENT
Start: 2024-06-20 | End: 2024-06-24 | Stop reason: HOSPADM

## 2024-06-20 RX ADMIN — HEPARIN SODIUM 5000 UNITS: 5000 INJECTION INTRAVENOUS; SUBCUTANEOUS at 03:06

## 2024-06-20 RX ADMIN — MUPIROCIN: 20 OINTMENT TOPICAL at 08:06

## 2024-06-20 RX ADMIN — HEPARIN SODIUM 5000 UNITS: 5000 INJECTION INTRAVENOUS; SUBCUTANEOUS at 05:06

## 2024-06-20 RX ADMIN — FLUTICASONE PROPIONATE 100 MCG: 50 SPRAY, METERED NASAL at 11:06

## 2024-06-20 RX ADMIN — BENZONATATE 100 MG: 100 CAPSULE ORAL at 11:06

## 2024-06-20 RX ADMIN — MUPIROCIN: 20 OINTMENT TOPICAL at 03:06

## 2024-06-20 RX ADMIN — FAMOTIDINE 20 MG: 20 TABLET ORAL at 11:06

## 2024-06-20 NOTE — PLAN OF CARE
PT evaluation complete.     Problem: Physical Therapy  Goal: Physical Therapy Goal  Description: Goals to be met by: 2024     Patient will increase functional independence with mobility by performin. Sit to stand transfer with modified independent   2. Gait x500 feet with Modified [place]   3. Ascend/descend 3 stairs with right handrail and supervision   4. Lower extremity exercise program x30 reps per handout, with supervision    Outcome: Progressing

## 2024-06-20 NOTE — SUBJECTIVE & OBJECTIVE
Interval History: NAEO, VSS Liver enzymes and total bilirubin remain elevated despite 3 days of dialysis, MRCP ordered. Plans for dialysis today. Complaining of cough with post nasal drip. Flonase and tessalon perrles ordered    Review of Systems   Constitutional:  Positive for activity change. Negative for chills and fever.   Respiratory:  Negative for shortness of breath.    Cardiovascular:  Positive for leg swelling. Negative for chest pain.   Gastrointestinal:  Positive for abdominal pain. Negative for abdominal distention, constipation, nausea and vomiting.   Musculoskeletal:  Negative for arthralgias.   Psychiatric/Behavioral:  Negative for agitation, behavioral problems and confusion.      Objective:     Vital Signs (Most Recent):  Temp: 97.7 °F (36.5 °C) (06/20/24 1043)  Pulse: 72 (06/20/24 1222)  Resp: 17 (06/20/24 1043)  BP: 139/73 (06/20/24 1043)  SpO2: 95 % (06/20/24 1043) Vital Signs (24h Range):  Temp:  [97.7 °F (36.5 °C)-98.6 °F (37 °C)] 97.7 °F (36.5 °C)  Pulse:  [60-91] 72  Resp:  [17-20] 17  SpO2:  [94 %-98 %] 95 %  BP: ()/(61-98) 139/73     Weight: 47 kg (103 lb 9.9 oz)  Body mass index is 20.93 kg/m².    Intake/Output Summary (Last 24 hours) at 6/20/2024 1240  Last data filed at 6/19/2024 1620  Gross per 24 hour   Intake 250 ml   Output 1050 ml   Net -800 ml         Physical Exam  Constitutional:       General: She is not in acute distress.     Appearance: She is not ill-appearing.   HENT:      Head: Normocephalic and atraumatic.      Right Ear: External ear normal.      Left Ear: External ear normal.      Nose: Nose normal.      Mouth/Throat:      Mouth: Mucous membranes are moist.      Pharynx: Oropharynx is clear.   Eyes:      General: No scleral icterus.     Extraocular Movements: Extraocular movements intact.   Cardiovascular:      Rate and Rhythm: Regular rhythm. Bradycardia present.      Pulses: Normal pulses.      Heart sounds: Murmur heard.   Pulmonary:      Effort: Pulmonary  effort is normal.      Breath sounds: Normal breath sounds. No wheezing, rhonchi or rales.   Abdominal:      General: Abdomen is flat.      Palpations: Abdomen is soft.      Tenderness: There is abdominal tenderness.      Comments: Mild TTP in epigastric region   Musculoskeletal:         General: No swelling. Normal range of motion.      Cervical back: Normal range of motion and neck supple.   Skin:     General: Skin is warm and dry.      Capillary Refill: Capillary refill takes less than 2 seconds.   Neurological:      General: No focal deficit present.      Mental Status: She is alert and oriented to person, place, and time.   Psychiatric:         Mood and Affect: Mood normal.         Behavior: Behavior normal.             Significant Labs: All pertinent labs within the past 24 hours have been reviewed.    Significant Imaging: I have reviewed all pertinent imaging results/findings within the past 24 hours.

## 2024-06-20 NOTE — PT/OT/SLP EVAL
Physical Therapy Evaluation and Treatment    Patient Name: Xena Vera   MRN: [id number]  Recent Surgery: * No surgery found *      Recommendations:     Discharge Recommendations: No Therapy Indicated   Discharge Equipment Recommendations: none   Barriers to discharge: None    Assessment:     Xena Vera is a 53 y.o. female admitted with a medical diagnosis of Uremia. She presents with the following impairments/functional limitations: impaired endurance, impaired functional mobility, impaired cardiopulmonary response to activity. Patient tolerated PT session well, but was limited due to transport present to pick patient up for dialysis. Patient and daughter stated that they had already ambulated in the hallway this AM. RN notified that patient's HR in the low 40's throughout PT evaluation including ambulation.     Rehab Prognosis: Good; patient would benefit from acute PT services to address these deficits and reach maximum level of function.    Plan:     During this hospitalization, patient to be seen 2 x/week to address the above listed problems via gait training, therapeutic activities, therapeutic exercises    Plan of Care Expires: 07/20/24    Subjective     Chief Complaint: Tired.   Patient Comments/Goals: Did not verbalize.   Pain/Comfort:  Pain Rating 1: 0/10    Social History:  Living Environment: Patient lives with her 2 daughters in a 2SH with 3 steps and bilateral handrails (too wide) to enter.   Prior Level of Function: Prior to admission, patient was independent with ambulation but gets tired quickly.   Equipment Used at Home: none  Assistance Upon Discharge:  daughters but patient is alone during the day     Objective:     Communicated with RN prior to session. Patient found up in chair with telemetry upon PT entry to room. Daughter present and patient agreeable with daughter translating during PT session.     General Precautions: Standard, fall   Orthopedic Precautions: N/A   Braces: N/A     Respiratory Status: Room air    Exams:  Cognition: Patient is oriented to Person, Place, Time, Situation  RLE ROM: WFL  RLE Strength: WFL  LLE ROM: WFL  LLE Strength: WFL    Functional Mobility:  Bed Mobility  Seated in chair at start of session and returned to chair  Transfers  Sit to Stand: supervision with no AD x2 from bedside chair   Gait  Patient ambulated 24ft and 14ft with no AD and supervision. RN notified that HR in the low 40's throughout ambulating.     Therapeutic Activities and Exercises:   Patient and daughter educated in:  -PT role and POC  -safety with transfers including hand placement  -OOB activity to maximize recovery    AM-PAC 6 CLICK MOBILITY  Total Score:22    Patient left  seated in wheelchair with transport with  RN and transport present.    GOALS:   Multidisciplinary Problems       Physical Therapy Goals          Problem: Physical Therapy    Goal Priority Disciplines Outcome Goal Variances Interventions   Physical Therapy Goal     PT, PT/OT Progressing     Description: Goals to be met by: 2024     Patient will increase functional independence with mobility by performin. Sit to stand transfer with modified independent   2. Gait x500 feet with Modified [place]   3. Ascend/descend 3 stairs with right handrail and supervision   4. Lower extremity exercise program x30 reps per handout, with supervision                         History:     Past Medical History:   Diagnosis Date    (HFpEF) heart failure with preserved ejection fraction 2023    EF 63% with G2 DD  Continue lasix, appears euvolemic today  Continue good BP management with losartan, increase Coreg 6.25 mg BID  Fluid restriction, low sodium diet  Recommend Jardiance- patient had CKD 4 and this is a limiting factor- nephro eval pending    Anemia 2023    CKD (chronic kidney disease)     HTN (hypertension)     Mitral valve regurgitation 2023    Refer to structural for evaluation  May benefit from  mitral clip    Ovarian cyst     Stage 4 chronic kidney disease 06/24/2023    Refer to nephrology at [facility] as unable to get established with [facility] nephrology and the phone number for the outside nephrologist provided to patient during her recent hospitalization goes unanswered when called.   Cr remains elevated  Would like to start SGLT-2 and appreciate nephrology expertise        Past Surgical History:   Procedure Laterality Date    INSERTION OF TUNNELED CENTRAL VENOUS HEMODIALYSIS CATHETER Right 4/22/2024    Procedure: Insertion, Catheter, Central Venous, Hemodialysis;  Surgeon: Carole Rolon MD;  Location[place] [facility];  Service: Interventional Nephrology;  Laterality: Right;       Time Tracking:     PT Received On: 06/20/24  PT Start Time: 1041  PT Stop Time: 1057  PT Total Time (min): 16 min     Billable Minutes: Evaluation 8 and Therapeutic Activity 8    Co-evaluation performed for this visit due to patient need for two skilled therapists to ensure patient and staff safety and to accommodate for patient activity tolerance.    6/20/2024

## 2024-06-20 NOTE — ASSESSMENT & PLAN NOTE
CP substernal, 2/10, but mostly with lying flat  Worse with exertion/better with rest?  Troponin 0.497-->0.540, BNP 4500 (chronic)  EKG is without changes concerning for ACS  HOWARD score 2, Alannah 109    -cardiac monitoring

## 2024-06-20 NOTE — PT/OT/SLP EVAL
Occupational Therapy  Co- Evaluation & Co-Treatment    Name: Xena Vera  MRN: [id number]  Admitting Diagnosis: Uremia  Recent Surgery: * No surgery found *      Recommendations:     Discharge Recommendations: No Therapy Indicated  Discharge Equipment Recommendations:  none  Barriers to discharge:  None    Assessment:     Xena Vera is a 53 y.o. female with a medical diagnosis of Uremia.  She presents with bradycardia in 40s sustaining throughout session (RN aware and cleared for evaluation). Patient's daughter reports that at baseline she is independent but with poor activity tolerance, likely due to medical conditions. Patient's daughter present in room and translating for patient (per choice between family vs ipad ). Performance deficits affecting function: weakness, impaired endurance, impaired functional mobility, impaired self care skills, impaired cardiopulmonary response to activity.      Rehab Prognosis: Good; patient would benefit from acute skilled OT services to address these deficits and reach maximum level of function.       Plan:     Patient to be seen 2 x/week to address the above listed problems via self-care/home management, therapeutic activities, therapeutic exercises, neuromuscular re-education  Plan of Care Expires: 07/04/24  Plan of Care Reviewed with: patient, daughter    Subjective     Chief Complaint: fatigue; poor sleep  Patient/Family Comments/goals: did not sleep well last night secondary to multiple interruptions    Occupational Profile:  Living Environment: lives with their daughters (teenager and 30s) in a 2 story home with 3 steps to enter with bilateral handrails that cannot be reached simultaneously. Patient's bedroom/bathroom on first floor. Bathroom set up: bathtub shower combo  with  no AD  Previous level of function: Patient reports they were independent with ADLs prior to hospitalization, but tiring easily  Roles and Routines: mother  Equipment Used at Home:  none  Assistance upon Discharge: daughters but is home alone during the day     Pain/Comfort:  Pain Rating 1: 0/10  Pain Rating Post-Intervention 1: 0/10    Patients cultural, spiritual, [employer]ology conflicts given the current situation: no    Objective:     Communicated with: nursing prior to session.  Patient found up in chair with telemetry upon OT entry to room.    General Precautions: Standard, fall  Orthopedic Precautions: N/A  Braces: N/A  Respiratory Status: Room air    Occupational Performance:    Functional Mobility/Transfers:  Patient completed Sit <> Stand Transfer with supervision  with  no assistive device x2 reps  Functional Mobility: patient ambulated around room with supervision and no AD    Activities of Daily Living: reported previous completion  Upper Body Dressing: contact guard assistance to don gown like jacket    Cognitive/Visual Perceptual:  Cognitive/Psychosocial Skills:     -       Oriented to: Person, Place, Time, and Situation   -       Follows Commands/attention:Follows one-step commands  -       Communication: clear/fluent  -       Memory: No Deficits noted  -       Safety awareness/insight to disability: intact   -       Mood/Affect/Coping skills/emotional control: Appropriate to situation    Physical Exam:  Dominant hand:    -       R  Upper Extremity Range of Motion:     -       Right Upper Extremity: WFL  -       Left Upper Extremity: WFL  Upper Extremity Strength:    -       Right Upper Extremity: WFL  -       Left Upper Extremity: WFL   Strength:    -       Right Upper Extremity: WFL  -       Left Upper Extremity: WFL    AMPAC 6 Click ADL:  AMPAC Total Score: 20    Treatment & Education:  Patient educated on:   -purpose of OT and OT POC  -facilitation and education on proper body mechanics, energy conservation, and safety  -importance of early mobility and out of bed activities with staff assist  -overall benefits of therapy     All questions answered within OT scope and to  patient's satisfaction    Patient left up in transport chair chair with all lines intact and nursing notified    GOALS:   Multidisciplinary Problems       Occupational Therapy Goals          Problem: Occupational Therapy    Goal Priority Disciplines Outcome Interventions   Occupational Therapy Goal     OT, PT/OT Progressing    Description: Goals to be met by: 7/4/24     Patient will increase functional independence with ADLs by performing:    LE Dressing with Supervision.  Grooming while standing at sink with Supervision.  Toileting from toilet with Supervision for hygiene and clothing management.   Supine to sit with Supervision.  Toilet transfer to toilet with Supervision.  [place] with BUE HEP to improve activity tolerance to complete ADLs and IADLs  Within home ambulation distances with  supervision and LRAD necessary to complete ADLs.  ;                       History:     Past Medical History:   Diagnosis Date    (HFpEF) heart failure with preserved ejection fraction 06/24/2023    EF 63% with G2 DD  Continue lasix, appears euvolemic today  Continue good BP management with losartan, increase Coreg 6.25 mg BID  Fluid restriction, low sodium diet  Recommend Jardiance- patient had CKD 4 and this is a limiting factor- nephro eval pending    Anemia 06/24/2023    CKD (chronic kidney disease)     HTN (hypertension)     Mitral valve regurgitation 06/26/2023    Refer to structural for evaluation  May benefit from mitral clip    Ovarian cyst     Stage 4 chronic kidney disease 06/24/2023    Refer to nephrology at [facility] as [facility] and the phone number for the outside nephrologist provided to patient during her recent hospitalization goes unanswered when called.   Cr remains elevated  Would like to start SGLT-2 and appreciate nephrology expertise          Past Surgical History:   Procedure Laterality Date    INSERTION OF TUNNELED CENTRAL VENOUS HEMODIALYSIS CATHETER Right  4/22/2024    Procedure: Insertion, Catheter, Central Venous, Hemodialysis;  Surgeon: Carole Rolon MD;  Location: [facility] CATH LAB;  Service: Interventional Nephrology;  Laterality: Right;       Time Tracking:     OT Date of Treatment: 06/20/24  OT Start Time: 1040  OT Stop Time: 1057  OT Total Time (min): 17 min    Billable Minutes:Evaluation 9  Therapeutic Activity 8    6/20/2024

## 2024-06-20 NOTE — ASSESSMENT & PLAN NOTE
Improving  2/2 to ESRD. AG 18. Patient AAOx3 on exam  +nausea, chest pain, labile HR  No steph bleeding noted  VBG with pH 7.44.     -HD per nephro  -daily CMP

## 2024-06-20 NOTE — PROGRESS NOTES
"[facility] Surg ([address] Medicine  Progress Note    Patient Name: Xena Vera  MRN: [id number]  Patient Class: IP- Inpatient   Admission Date: 6/18/2024  Length of Stay: 2 days  Attending Physician: Minda Molina MD  Primary Care Provider: Tami Villeda FNP        Subjective:     Principal Problem:Uremia        HPI:  Patient is a 53F PMHx HFpEF (Lasix 80 bid), severe pHTN, CKD5 (last HD in May 2024), HTN, MR, anemia presents for chest pain that began this morning. She reports 2/10 central sqeezing chest pain that radiates to her R shoulder when vomiting. The pain is worse with walking. She has never had chest pain before. She has had nausea/vomiting x1 day. Reports approx 3 episodes of clear vomitus, describes as "phlegm." She has associated sharp upper abdominal pain when vomiting. Additionally reports arthralgias/myalgias, leg swelling, generalized weakness, and fatigue. She had a brief episode of dizziness yesterday when walking but has not had that since. She denies Fever/chills, SOB, upper respiratory symptoms, cough, dysuria, decreased urine, or known sick contacts.     She reports adherence to her fluid restriction & Lasix. Admitted to [facility] from 4/1/24-5/8/24 where renal function declined while being diuresed. Required CRRT with intermittent levophed. Admitted to [facility] 5/13-5/17 after presenting to ED for HD. Not enrolled in medicaid at that time and had been told to present to ED for HD needs. No indications for HD on that admission per chart review. At that time nephrology recommended urgent evaluation with vascular surgery outpatient for AVF placement. Venous mapping obtained.    In ED, somewhat hypertensive (sBP 150s), intermittent bradycardia, afebrile, on RA. CMP with profound uremia (), Cr 5.6, hypokalemia (K 2.8), no hyponatremia. Transaminitis AST//133 with T. Bili 4.3. Lipase elevated at 230. RUQ US w/o CBD dilation or evidence of scarring, surgically " absent gallbladder. CBC with thrombocytopenia (PLT 90), no leukocytosis, stable anemia (Hb 11.5). BNP 4500, Troponin 0.497 which increased to 0.54. LA normal at 1.5. EKG with peaked T waves in II/III/AVF, widened QRS, Qtc 573. Nephrology consulted.     Interpretor was used for entire interview/examination:  number [id number]    Overview/Hospital Course:  Admitted to [facility] 6/18/24 for chest pain, nausea, and vomiting. Found to have profound uremia and hypokalemia. Nephrology consulted and performed emergent HD. CP resolved following HD, residual epigastric pain w/o nausea/vomiting. TTE 6/19 with preserved EF, normal RV systolic function, severe MR, pHTN, and CVP 15. Diuresis with IV lasix in conjunction with HD. Liver enzymes and total bilirubin remain elevated despite 3 days of dialysis, MRCP ordered.    Interval History: NAEO, VSS Liver enzymes and total bilirubin remain elevated despite 3 days of dialysis, MRCP ordered. Plans for dialysis today. Complaining of cough with post nasal drip. Flonase and tessalon perrles ordered    Review of Systems   Constitutional:  Positive for activity change. Negative for chills and fever.   Respiratory:  Negative for shortness of breath.    Cardiovascular:  Positive for leg swelling. Negative for chest pain.   Gastrointestinal:  Positive for abdominal pain. Negative for abdominal distention, constipation, nausea and vomiting.   Musculoskeletal:  Negative for arthralgias.   Psychiatric/Behavioral:  Negative for agitation, behavioral problems and confusion.      Objective:     Vital Signs (Most Recent):  Temp: 97.7 °F (36.5 °C) (06/20/24 1043)  Pulse: 72 (06/20/24 1222)  Resp: 17 (06/20/24 1043)  BP: 139/73 (06/20/24 1043)  SpO2: 95 % (06/20/24 1043) Vital Signs (24h Range):  Temp:  [97.7 °F (36.5 °C)-98.6 °F (37 °C)] 97.7 °F (36.5 °C)  Pulse:  [60-91] 72  Resp:  [17-20] 17  SpO2:  [94 %-98 %] 95 %  BP: ()/(61-98) 139/73     Weight: 47 kg (103 lb 9.9 oz)  Body mass index  is 20.93 kg/m².    Intake/Output Summary (Last 24 hours) at 6/20/2024 1240  Last data filed at 6/19/2024 1620  Gross per 24 hour   Intake 250 ml   Output 1050 ml   Net -800 ml         Physical Exam  Constitutional:       General: She is not in acute distress.     Appearance: She is not ill-appearing.   HENT:      Head: Normocephalic and atraumatic.      Right Ear: External ear normal.      Left Ear: External ear normal.      Nose: Nose normal.      Mouth/Throat:      Mouth: Mucous membranes are moist.      Pharynx: Oropharynx is clear.   Eyes:      General: No scleral icterus.     Extraocular Movements: Extraocular movements intact.   Cardiovascular:      Rate and Rhythm: Regular rhythm. Bradycardia present.      Pulses: Normal pulses.      Heart sounds: Murmur heard.   Pulmonary:      Effort: Pulmonary effort is normal.      Breath sounds: Normal breath sounds. No wheezing, rhonchi or rales.   Abdominal:      General: Abdomen is flat.      Palpations: Abdomen is soft.      Tenderness: There is abdominal tenderness.      Comments: Mild TTP in epigastric region   Musculoskeletal:         General: No swelling. Normal range of motion.      Cervical back: Normal range of motion and neck supple.   Skin:     General: Skin is warm and dry.      Capillary Refill: Capillary refill takes less than 2 seconds.   Neurological:      General: No focal deficit present.      Mental Status: She is alert and oriented to person, place, and time.   Psychiatric:         Mood and Affect: Mood normal.         Behavior: Behavior normal.             Significant Labs: All pertinent labs within the past 24 hours have been reviewed.    Significant Imaging: I have reviewed all pertinent imaging results/findings within the past 24 hours.    Assessment/Plan:      * Uremia  Improving  2/2 to ESRD. AG 18. Patient AAOx3 on exam  +nausea, chest pain, labile HR  No steph bleeding noted  VBG with pH 7.44.     -HD per nephro  -daily CMP    Cough  New,  dry, non productive  A/w post nasal drip  Satting well RA    -continue to monitor  -Flonase  -tessalon perles      Chest pain  CP substernal, 2/10, but mostly with lying flat  Worse with exertion/better with rest?  Troponin 0.497-->0.540, BNP 4500 (chronic)  EKG is without changes concerning for ACS  HOWARD score 2, Alannah 109    -cardiac monitoring    Hyperbilirubinemia  Direct hyperbilirubinemia on admission w/ AST/ALT elevation. Liver US not reporting dilated bile ducts  No new medications identified for DILI  Concern for hepatic hepatopathy due to volume overload from HFpEF  Hepatitis panel negative  Liver enzymes and total bilirubin remain elevated despite 3 days of dialysis  Also with pancreatitis    -MRCP ordered  -Hold statin  -neprhology consulted, pending HD reqs    Prediabetes  Last A1c 6.0 (2 months ago)   on admission    -POCT glucose AC/HS  -LDSSI  -goal -180    ESRD (end stage renal disease) on dialysis  Creatine stable for now. BMP reviewed- noted Estimated Creatinine Clearance: 15.4 mL/min (A) (based on SCr of 3.1 mg/dL (H)). according to latest data. Based on current GFR, CKD stage is end stage.  Monitor UOP and serial BMP and adjust therapy as needed. Renally dose meds. Avoid nephrotoxic medications and procedures.    -Neprhology consulted, assisting with dialysis frequency  -RFP, Mg daily      Prolonged QT interval  Qtc on admision 570    -cardiac telemetry  -caution with QT prolonging medications    Elevated troponin I level  Resolved  Has elevated troponin at baseline 2/2 to ESRD  Troponin 0.497-->0.540-->0.48  Reports 2/10 CP worse with lying down  EKG is without changes concerning for ACS  Has markedly elevated BUN, uremic pericarditis on ddx    -EKG for new/worsened CP  -cardiac telemetry    Acute on chronic heart failure with preserved ejection fraction (HFpEF)  Patient is identified as having Diastolic (HFpEF) heart failure that is Chronic. CHF is currently controlled. Latest  ECHO performed and demonstrates- Results for orders placed during the hospital encounter of 04/01/24    Echo    Result Date: 6/19/2024    Left Ventricle: The left ventricle is severely dilated. LVIDd is 6.0   cm. Normal wall thickness. There is eccentric hypertrophy. There is normal   systolic function. Ejection fraction by visual approximation is 55%. Grade   II diastolic dysfunction.    Right Ventricle: Normal right ventricular cavity size. Wall thickness   is normal. Systolic function is normal.    Left Atrium: Left atrium is severely dilated.    Aortic Valve: The aortic valve is a trileaflet valve.    Mitral Valve: Severely restricted posterior leaflet motion.  The mean   pressure gradient across the mitral valve is 7 mmHg at a heart rate of 77   bpm. There is severe regurgitation with a posterolateral eccentrically   directed jet.    Tricuspid Valve: There is mild regurgitation.    Pulmonary Artery: There is severe pulmonary hypertension. The estimated   pulmonary artery systolic pressure is 76 mmHg.    IVC/SVC: Elevated venous pressure at 15 mmHg.    Pericardium: There is a small effusion. No indication of cardiac   tamponade. On direct comparison with images from 4/23/2024, there is   slightly more fluid around the right atrium.         . Continue Furosemide and monitor clinical status closely. Monitor on telemetry. Patient is off CHF pathway.  Monitor strict Is&Os and daily weights.  Place on fluid restriction of 1.5 L. Cardiology has not been consulted. Continue to stress to patient importance of self efficacy and  on diet for CHF. Last BNP reviewed- and noted below   Recent Labs   Lab 06/18/24  0823   BNP 4,516*       Does not appear grossly volume overloaded    -Volume reduction via HD, will determine dialysis needs as OP to help determine future diuretic dosing    Class 2 obesity in adult  Body mass index is 20.93 kg/m². Morbid obesity complicates all aspects of disease management from diagnostic  modalities to treatment. Weight loss encouraged and health benefits explained to patient.         Essential hypertension  BP elevated and labile on admission  120s-160s systolic  Appears to have midodrine ordered, likely for dialysis     -will hold antihypertensives for now pending nephrology recommendations regarding dialysis      VTE Risk Mitigation (From admission, onward)           Ordered     heparin (porcine) injection 5,000 Units  Every 8 hours         06/18/24 1427     IP VTE HIGH RISK PATIENT  Once         06/18/24 1427     Place sequential compression device  Until discontinued         06/18/24 1427                    Discharge Planning   RILEY: 6/21/2024     Code Status: DNR   Is the patient medically ready for discharge?:     Reason for patient still in hospital (select all that apply): Patient trending condition  Discharge Plan A: Home Health            Keyon Holloway MD  Department of Hospital Medicine   [facility] S[facility] ([place]

## 2024-06-20 NOTE — ASSESSMENT & PLAN NOTE
Patient is identified as having Diastolic (HFpEF) heart failure that is Chronic. CHF is currently controlled. Latest ECHO performed and demonstrates- Results for orders placed during the hospital encounter of 04/01/24    Echo    Result Date: 6/19/2024    Left Ventricle: The left ventricle is severely dilated. LVIDd is 6.0   cm. Normal wall thickness. There is eccentric hypertrophy. There is normal   systolic function. Ejection fraction by visual approximation is 55%. Grade   II diastolic dysfunction.    Right Ventricle: Normal right ventricular cavity size. Wall thickness   is normal. Systolic function is normal.    Left Atrium: Left atrium is severely dilated.    Aortic Valve: The aortic valve is a trileaflet valve.    Mitral Valve: Severely restricted posterior leaflet motion.  The mean   pressure gradient across the mitral valve is 7 mmHg at a heart rate of 77   bpm. There is severe regurgitation with a posterolateral eccentrically   directed jet.    Tricuspid Valve: There is mild regurgitation.    Pulmonary Artery: There is severe pulmonary hypertension. The estimated   pulmonary artery systolic pressure is 76 mmHg.    IVC/SVC: Elevated venous pressure at 15 mmHg.    Pericardium: There is a small effusion. No indication of cardiac   tamponade. On direct comparison with images from 4/23/2024, there is   slightly more fluid around the right atrium.         . Continue Furosemide and monitor clinical status closely. Monitor on telemetry. Patient is off CHF pathway.  Monitor strict Is&Os and daily weights.  Place on fluid restriction of 1.5 L. Cardiology has not been consulted. Continue to stress to patient importance of self efficacy and  on diet for CHF. Last BNP reviewed- and noted below   Recent Labs   Lab 06/18/24  0823   BNP 4,516*       Does not appear grossly volume overloaded    -Volume reduction via HD, will determine dialysis needs as OP to help determine future diuretic dosing

## 2024-06-20 NOTE — PLAN OF CARE
Fall precaution maintained this shift call bell in reach. Bed in low position. Instructed pt to call for assistance. No acute distress noted over night. Vs stable. All concerns addressed and answered.     Problem: Adult Inpatient Plan of Care  Goal: Plan of Care Review  Outcome: Progressing  Goal: Patient-Specific Goal (Individualized)  Outcome: Progressing  Goal: Absence of Hospital-Acquired Illness or Injury  Outcome: Progressing  Goal: Optimal Comfort and Wellbeing  Outcome: Progressing  Goal: Readiness for Transition of Care  Outcome: Progressing     Problem: Hemodialysis  Goal: Safe, Effective Therapy Delivery  Outcome: Progressing  Goal: Effective Tissue Perfusion  Outcome: Progressing  Goal: Absence of Infection Signs and Symptoms  Outcome: Progressing     Problem: Infection  Goal: Absence of Infection Signs and Symptoms  Outcome: Progressing     Problem: Violence Risk or Actual  Goal: Anger and Impulse Control  Outcome: Progressing

## 2024-06-20 NOTE — NURSING
Patients HR mids 40s for a few secs here and there this am. Pt asymptomatice. About 1055 hr began sustaing low to mid 40s pt still asymptomatic. /73 Sat 96% on RA.  Hosp Med Team 4 Nofified of the above, via secure chat  pts hr is sustaining in low to mid 40s over past 15 min. asymptomatic. Asked if pt can still dialyze, also reached out to Carol Dialysis nurse, and informed Carol. Carol to check with Nephro per  call me back.     Also deferred Nephro , but Dialysis should be ok per Dr.Joshua Jewel MD  Informed   Team pts requesting cough med received orders for flonase nasal inh, tessalon perles.    While PT/OT performing eval pt sustaining in low to mid 40s, about 15 min per PT. Patient was still asymptomatic. Notified team and dialysis nurse carol. Called Dialysis back for update on plan. Per Reddy DENNEY per Nephrology  plan is to hold off an dialysis until this afternoon to see if HR improves.   Charge nurse Mariaelena added to group chat. Will continue to monitor patient closely.

## 2024-06-20 NOTE — ASSESSMENT & PLAN NOTE
New, dry, non productive  A/w post nasal drip  Satting well RA    -continue to monitor  -Flonase  -tessalon perles

## 2024-06-20 NOTE — ASSESSMENT & PLAN NOTE
Creatine stable for now. BMP reviewed- noted Estimated Creatinine Clearance: 15.4 mL/min (A) (based on SCr of 3.1 mg/dL (H)). according to latest data. Based on current GFR, CKD stage is end stage.  Monitor UOP and serial BMP and adjust therapy as needed. Renally dose meds. Avoid nephrotoxic medications and procedures.    -Neprhology consulted, assisting with dialysis frequency  -RFP, Mg daily

## 2024-06-20 NOTE — ASSESSMENT & PLAN NOTE
Direct hyperbilirubinemia on admission w/ AST/ALT elevation. Liver US not reporting dilated bile ducts  No new medications identified for DILI  Concern for hepatic hepatopathy due to volume overload from HFpEF  Hepatitis panel negative  Liver enzymes and total bilirubin remain elevated despite 3 days of dialysis  Also with pancreatitis    -MRCP ordered  -Hold statin  -neprhology consulted, pending HD reqs

## 2024-06-20 NOTE — ASSESSMENT & PLAN NOTE
Body mass index is 20.93 kg/m². Morbid obesity complicates all aspects of disease management from diagnostic modalities to treatment. Weight loss encouraged and health benefits explained to patient.

## 2024-06-20 NOTE — PROGRESS NOTES
Plans for CT / MRI today for evaluation of elevated bili and LFTs; patient also with asymptomatic bradycardia in the 40s this AM, slightly improved.   Will defer iHD until the morning given need for scheduled imaging and contrast. No emergent need for clearance and/or volume removal at this time.   Order adjusted.     MADAI Reaves DNP, APRN, FNP-C  Department of Nephrology  [facility]  Pager: [phone]

## 2024-06-20 NOTE — ASSESSMENT & PLAN NOTE
Resolved  Has elevated troponin at baseline 2/2 to ESRD  Troponin 0.497-->0.540-->0.48  Reports 2/10 CP worse with lying down  EKG is without changes concerning for ACS  Has markedly elevated BUN, uremic pericarditis on ddx    -EKG for new/worsened CP  -cardiac telemetry

## 2024-06-21 PROBLEM — I49.1 ATRIAL PREMATURE CONTRACTIONS: Status: ACTIVE | Noted: 2024-06-21

## 2024-06-21 PROBLEM — I31.39 PERICARDIAL EFFUSION: Status: ACTIVE | Noted: 2024-06-21

## 2024-06-21 LAB
ALBUMIN SERPL BCP-MCNC: 2.9 G/DL (ref 3.5–5.2)
ALP SERPL-CCNC: 170 U/L (ref 55–135)
ALT SERPL W/O P-5'-P-CCNC: 77 U/L (ref 10–44)
ANION GAP SERPL CALC-SCNC: 12 MMOL/L (ref 8–16)
AST SERPL-CCNC: 58 U/L (ref 10–40)
BASOPHILS # BLD AUTO: 0.01 K/UL (ref 0–0.2)
BASOPHILS NFR BLD: 0.1 % (ref 0–1.9)
BILIRUB SERPL-MCNC: 3.1 MG/DL (ref 0.1–1)
BSA FOR ECHO PROCEDURE: 1.4 M2
BUN SERPL-MCNC: 60 MG/DL (ref 6–20)
CALCIUM SERPL-MCNC: 8.4 MG/DL (ref 8.7–10.5)
CHLORIDE SERPL-SCNC: 101 MMOL/L (ref 95–110)
CO2 SERPL-SCNC: 20 MMOL/L (ref 23–29)
CREAT SERPL-MCNC: 3.8 MG/DL (ref 0.5–1.4)
CV ECHO LV RWT: 0.3 CM
DIFFERENTIAL METHOD BLD: ABNORMAL
DOP CALC LVOT PEAK VEL: 0.82 M/S
DOP CALCLVOT PEAK VEL VTI: 13.6 CM
E WAVE DECELERATION TIME: 302.94 MSEC
E/A RATIO: 1.28
ECHO LV POSTERIOR WALL: 0.84 CM (ref 0.6–1.1)
EOSINOPHIL # BLD AUTO: 0 K/UL (ref 0–0.5)
EOSINOPHIL NFR BLD: 0 % (ref 0–8)
ERYTHROCYTE [DISTWIDTH] IN BLOOD BY AUTOMATED COUNT: 26.5 % (ref 11.5–14.5)
EST. GFR  (NO RACE VARIABLE): 13.5 ML/MIN/1.73 M^2
FRACTIONAL SHORTENING: 26 % (ref 28–44)
GLUCOSE SERPL-MCNC: 90 MG/DL (ref 70–110)
HCT VFR BLD AUTO: 32.7 % (ref 37–48.5)
HGB BLD-MCNC: 9.4 G/DL (ref 12–16)
IMM GRANULOCYTES # BLD AUTO: 0.03 K/UL (ref 0–0.04)
IMM GRANULOCYTES NFR BLD AUTO: 0.4 % (ref 0–0.5)
INR PPP: 1.2 (ref 0.8–1.2)
INTERVENTRICULAR SEPTUM: 0.67 CM (ref 0.6–1.1)
LEFT ATRIUM SIZE: 5.85 CM
LEFT INTERNAL DIMENSION IN SYSTOLE: 4.2 CM (ref 2.1–4)
LEFT VENTRICLE DIASTOLIC VOLUME INDEX: 113.11 ML/M2
LEFT VENTRICLE DIASTOLIC VOLUME: 157.22 ML
LEFT VENTRICLE MASS INDEX: 113 G/M2
LEFT VENTRICLE SYSTOLIC VOLUME INDEX: 56.6 ML/M2
LEFT VENTRICLE SYSTOLIC VOLUME: 78.74 ML
LEFT VENTRICULAR INTERNAL DIMENSION IN DIASTOLE: 5.66 CM (ref 3.5–6)
LEFT VENTRICULAR MASS: 156.42 G
LYMPHOCYTES # BLD AUTO: 0.6 K/UL (ref 1–4.8)
LYMPHOCYTES NFR BLD: 8.5 % (ref 18–48)
MAGNESIUM SERPL-MCNC: 2 MG/DL (ref 1.6–2.6)
MCH RBC QN AUTO: 26.9 PG (ref 27–31)
MCHC RBC AUTO-ENTMCNC: 28.7 G/DL (ref 32–36)
MCV RBC AUTO: 94 FL (ref 82–98)
MONOCYTES # BLD AUTO: 0.7 K/UL (ref 0.3–1)
MONOCYTES NFR BLD: 9.2 % (ref 4–15)
MV PEAK A VEL: 1.32 M/S
MV PEAK E VEL: 1.69 M/S
NEUTROPHILS # BLD AUTO: 5.9 K/UL (ref 1.8–7.7)
NEUTROPHILS NFR BLD: 81.8 % (ref 38–73)
NRBC BLD-RTO: 0 /100 WBC
PHOSPHATE SERPL-MCNC: 3.7 MG/DL (ref 2.7–4.5)
PLATELET # BLD AUTO: 104 K/UL (ref 150–450)
PMV BLD AUTO: ABNORMAL FL (ref 9.2–12.9)
POCT GLUCOSE: 158 MG/DL (ref 70–110)
POCT GLUCOSE: 92 MG/DL (ref 70–110)
POTASSIUM SERPL-SCNC: 3.8 MMOL/L (ref 3.5–5.1)
PROT SERPL-MCNC: 6.5 G/DL (ref 6–8.4)
PROTHROMBIN TIME: 13.5 SEC (ref 9–12.5)
RA PRESSURE ESTIMATED: 8 MMHG
RBC # BLD AUTO: 3.49 M/UL (ref 4–5.4)
SODIUM SERPL-SCNC: 133 MMOL/L (ref 136–145)
WBC # BLD AUTO: 7.27 K/UL (ref 3.9–12.7)
Z-SCORE OF LEFT VENTRICULAR DIMENSION IN END DIASTOLE: 2.66
Z-SCORE OF LEFT VENTRICULAR DIMENSION IN END SYSTOLE: 3.51

## 2024-06-21 PROCEDURE — 90935 HEMODIALYSIS ONE EVALUATION: CPT

## 2024-06-21 PROCEDURE — 25000003 PHARM REV CODE 250

## 2024-06-21 PROCEDURE — 84100 ASSAY OF PHOSPHORUS: CPT

## 2024-06-21 PROCEDURE — 99223 1ST HOSP IP/OBS HIGH 75: CPT | Mod: ,,, | Performed by: INTERNAL MEDICINE

## 2024-06-21 PROCEDURE — 90935 HEMODIALYSIS ONE EVALUATION: CPT | Mod: ,,, | Performed by: NURSE PRACTITIONER

## 2024-06-21 PROCEDURE — 21400001 HC TELEMETRY ROOM

## 2024-06-21 PROCEDURE — 85610 PROTHROMBIN TIME: CPT | Performed by: STUDENT IN AN ORGANIZED HEALTH CARE EDUCATION/TRAINING PROGRAM

## 2024-06-21 PROCEDURE — 80053 COMPREHEN METABOLIC PANEL: CPT

## 2024-06-21 PROCEDURE — 63600175 PHARM REV CODE 636 W HCPCS: Performed by: NURSE PRACTITIONER

## 2024-06-21 PROCEDURE — 83735 ASSAY OF MAGNESIUM: CPT

## 2024-06-21 PROCEDURE — 85025 COMPLETE CBC W/AUTO DIFF WBC: CPT

## 2024-06-21 PROCEDURE — 25000003 PHARM REV CODE 250: Performed by: INTERNAL MEDICINE

## 2024-06-21 RX ORDER — HEPARIN SODIUM 1000 [USP'U]/ML
1000 INJECTION, SOLUTION INTRAVENOUS; SUBCUTANEOUS
Status: DISCONTINUED | OUTPATIENT
Start: 2024-06-21 | End: 2024-06-24 | Stop reason: HOSPADM

## 2024-06-21 RX ORDER — FUROSEMIDE 10 MG/ML
80 INJECTION INTRAMUSCULAR; INTRAVENOUS EVERY 12 HOURS
Status: DISCONTINUED | OUTPATIENT
Start: 2024-06-22 | End: 2024-06-24

## 2024-06-21 RX ORDER — ACETAMINOPHEN 325 MG/1
650 TABLET ORAL EVERY 6 HOURS PRN
Status: DISCONTINUED | OUTPATIENT
Start: 2024-06-21 | End: 2024-06-24 | Stop reason: HOSPADM

## 2024-06-21 RX ADMIN — BENZONATATE 100 MG: 100 CAPSULE ORAL at 12:06

## 2024-06-21 RX ADMIN — FAMOTIDINE 20 MG: 20 TABLET ORAL at 12:06

## 2024-06-21 RX ADMIN — MUPIROCIN: 20 OINTMENT TOPICAL at 09:06

## 2024-06-21 RX ADMIN — FLUTICASONE PROPIONATE 100 MCG: 50 SPRAY, METERED NASAL at 12:06

## 2024-06-21 RX ADMIN — HEPARIN SODIUM 1000 UNITS: 1000 INJECTION, SOLUTION INTRAVENOUS; SUBCUTANEOUS at 10:06

## 2024-06-21 RX ADMIN — ACETAMINOPHEN 650 MG: 325 TABLET ORAL at 09:06

## 2024-06-21 NOTE — SUBJECTIVE & OBJECTIVE
Past Medical History:   Diagnosis Date    (HFpEF) heart failure with preserved ejection fraction 06/24/2023    EF 63% with G2 DD  Continue lasix, appears euvolemic today  Continue good BP management with losartan, increase Coreg 6.25 mg BID  Fluid restriction, low sodium diet  Recommend Jardiance- patient had CKD 4 and this is a limiting factor- nephro eval pending    Anemia 06/24/2023    CKD (chronic kidney disease)     HTN (hypertension)     Mitral valve regurgitation 06/26/2023    Refer to structural for evaluation  May benefit from mitral clip    Ovarian cyst     Stage 4 chronic kidney disease 06/24/2023    Refer to nephrology at [facility] as unable to get established with [facility] nephrology and the phone number for the outside nephrologist provided to patient during her recent hospitalization goes unanswered when called.   Cr remains elevated  Would like to start SGLT-2 and appreciate nephrology expertise        Past Surgical History:   Procedure Laterality Date    INSERTION OF TUNNELED CENTRAL VENOUS HEMODIALYSIS CATHETER Right 4/22/2024    Procedure: Insertion, Catheter, Central Venous, Hemodialysis;  Surgeon: Carole Rolon MD;  Location: [facility] CATH LAB;  Service: Interventional Nephrology;  Laterality: Right;       Review of patient's allergies indicates:  No Known Allergies    No current facility-administered medications on file prior to encounter.     Current Outpatient Medications on File Prior to Encounter   Medication Sig    atorvastatin (LIPITOR) 40 MG tablet Take 1 tablet by mouth every day    carvediloL (COREG) 6.25 MG tablet Take 1 tablet (6.25 mg total) by mouth 2 (two) times daily.    furosemide (LASIX) 80 MG tablet Take 1 tablet (80 mg total) by mouth 2 (two) times daily.    midodrine (PROAMATINE) 2.5 MG Tab Take 1 tablet (2.5 mg total) by mouth Daily.    oxybutynin (DITROPAN) 5 MG Tab Take 1 tablet (5 mg total) by mouth 3 (three) times daily.    polyethylene glycol (GLYCOLAX) 17 gram/dose powder Use  cap to measure 17 grams, mix in liquid and take by mouth 2 (two) times daily.    senna-docusate 8.6-50 mg (PERICOLACE) 8.6-50 mg per tablet Take 1 tablet by mouth 2 (two) times daily.     Family History    None       Tobacco Use    Smoking status: Former     Current packs/day: 0.00     Types: Cigarettes     Start date:      Quit date:      Years since quittin.4    Smokeless tobacco: Never    Tobacco comments:     2-3 cigarettes a day   Substance and Sexual Activity    Alcohol use: Never    Drug use: Never    Sexual activity: Not on file     Review of Systems   Constitutional: Negative for chills, diaphoresis and night sweats.   HENT:  Positive for congestion.    Cardiovascular:  Positive for leg swelling. Negative for chest pain, dyspnea on exertion, irregular heartbeat, near-syncope and palpitations.   Respiratory:  Positive for cough. Negative for shortness of breath and wheezing.    Skin:  Negative for color change and dry skin.   Musculoskeletal:  Negative for joint pain and joint swelling.   Gastrointestinal:  Positive for abdominal pain. Negative for diarrhea, nausea and vomiting.   Genitourinary:  Negative for dysuria.   Neurological:  Positive for weakness. Negative for dizziness, headaches and light-headedness.   All other systems reviewed and are negative.    Objective:     Vital Signs (Most Recent):  Temp: 97.8 °F (36.6 °C) (24 1054)  Pulse: 87 (24 1222)  Resp: 16 (24 1054)  BP: 131/88 (24 1054)  SpO2: 96 % (24 0353) Vital Signs (24h Range):  Temp:  [97.3 °F (36.3 °C)-98.9 °F (37.2 °C)] 97.8 °F (36.6 °C)  Pulse:  [71-87] 87  Resp:  [16-18] 16  SpO2:  [93 %-98 %] 96 %  BP: (109-154)/(63-94) 131/88     Weight: 47 kg (103 lb 9.9 oz)  Body mass index is 20.93 kg/m².    SpO2: 96 %         Intake/Output Summary (Last 24 hours) at 2024 1256  Last data filed at 2024 1046  Gross per 24 hour   Intake 300 ml   Output 1550 ml   Net -1250 ml        Lines/Drains/Airways       Central Venous Catheter Line  Duration             Tunneled Central Line - Double Lumen  04/22/24 1317 Internal Jugular Right 59 days              Peripheral Intravenous Line  Duration                  Peripheral IV - Single Lumen 06/18/24 0837 22 G Right Antecubital 3 days         Peripheral IV - Single Lumen 06/18/24 1518 20 G Anterior;Left Hand 2 days                     Physical Exam  Vitals and nursing note reviewed.   Constitutional:       Appearance: She is ill-appearing.   Eyes:      Extraocular Movements: Extraocular movements intact.      Conjunctiva/sclera: Conjunctivae normal.   Cardiovascular:      Rate and Rhythm: Normal rate and regular rhythm.      Comments: No JVD  Pulmonary:      Effort: Pulmonary effort is normal.      Breath sounds: Normal breath sounds. No rales.   Abdominal:      General: Bowel sounds are normal.      Palpations: Abdomen is soft.      Tenderness: There is abdominal tenderness (epigastric).   Musculoskeletal:      Cervical back: Normal range of motion.      Right lower leg: Edema present.      Left lower leg: Edema present.   Skin:     General: Skin is warm and dry.   Neurological:      General: No focal deficit present.      Mental Status: She is alert and oriented to person, place, and time.          Significant Labs: All pertinent lab results from the last 24 hours have been reviewed.    Significant Imaging:  Reviewed

## 2024-06-21 NOTE — SUBJECTIVE & OBJECTIVE
Interval History: NAEON. VSS. Cardiology following for pericardial effusion. No bradycardia this AM. Got 1.5L removed in HD. Likely going to be MWF OP HD regimen.      Review of Systems   Constitutional:  Positive for activity change. Negative for chills and fever.   Respiratory:  Negative for shortness of breath.    Cardiovascular:  Positive for leg swelling. Negative for chest pain.   Gastrointestinal:  Positive for abdominal pain. Negative for abdominal distention, constipation, nausea and vomiting.   Musculoskeletal:  Negative for arthralgias.   Psychiatric/Behavioral:  Negative for agitation, behavioral problems and confusion.      Objective:     Vital Signs (Most Recent):  Temp: 97.8 °F (36.6 °C) (06/21/24 1054)  Pulse: 87 (06/21/24 1222)  Resp: 16 (06/21/24 1054)  BP: 131/88 (06/21/24 1054)  SpO2: 96 % (06/21/24 0353) Vital Signs (24h Range):  Temp:  [97.3 °F (36.3 °C)-98.9 °F (37.2 °C)] 97.8 °F (36.6 °C)  Pulse:  [71-87] 87  Resp:  [16-18] 16  SpO2:  [93 %-98 %] 96 %  BP: (109-154)/(63-94) 131/88     Weight: 47 kg (103 lb 9.9 oz)  Body mass index is 20.93 kg/m².    Intake/Output Summary (Last 24 hours) at 6/21/2024 1302  Last data filed at 6/21/2024 1046  Gross per 24 hour   Intake 300 ml   Output 1550 ml   Net -1250 ml         Physical Exam  Constitutional:       General: She is not in acute distress.     Appearance: She is not ill-appearing.   HENT:      Head: Normocephalic and atraumatic.      Right Ear: External ear normal.      Left Ear: External ear normal.      Nose: Nose normal.      Mouth/Throat:      Mouth: Mucous membranes are moist.      Pharynx: Oropharynx is clear.   Eyes:      General: No scleral icterus.     Extraocular Movements: Extraocular movements intact.   Cardiovascular:      Rate and Rhythm: Regular rhythm. Bradycardia present.      Pulses: Normal pulses.      Heart sounds: Murmur heard.   Pulmonary:      Effort: Pulmonary effort is normal.      Breath sounds: Normal breath sounds. No  wheezing, rhonchi or rales.   Abdominal:      General: Abdomen is flat.      Palpations: Abdomen is soft.      Tenderness: There is abdominal tenderness.      Comments: Mild TTP in epigastric region   Musculoskeletal:         General: No swelling. Normal range of motion.      Cervical back: Normal range of motion and neck supple.   Skin:     General: Skin is warm and dry.      Capillary Refill: Capillary refill takes less than 2 seconds.   Neurological:      General: No focal deficit present.      Mental Status: She is alert and oriented to person, place, and time.   Psychiatric:         Mood and Affect: Mood normal.         Behavior: Behavior normal.             Significant Labs: All pertinent labs within the past 24 hours have been reviewed.  CBC:   Recent Labs   Lab 06/20/24  0304 06/21/24  0309   WBC 5.62 7.27   HGB 10.1* 9.4*   HCT 35.2* 32.7*   PLT 99* 104*     CMP:   Recent Labs   Lab 06/20/24  0304 06/21/24  0309    133*   K 4.2 3.8    101   CO2 19* 20*    90   BUN 44* 60*   CREATININE 3.1* 3.8*   CALCIUM 8.9 8.4*   PROT 7.4 6.5   ALBUMIN 3.3* 2.9*   BILITOT 5.2* 3.1*   ALKPHOS 205* 170*   AST 91* 58*   * 77*   ANIONGAP 15 12       Significant Imaging: I have reviewed all pertinent imaging results/findings within the past 24 hours.

## 2024-06-21 NOTE — PROGRESS NOTES
[facility] NOTE     Patient currently on hemodialysis for removal of uremic toxins .     Patient seen and evaluated on hemodialysis, tolerating treatment, see HD flowsheet for vitals and assessments.      No Hypotension, chest pain, shortness of breath, cramping, nausea or vomiting.     Target UF: 1 L as tolerated, keep MAP >65.  3rd treatment today, sp imaging yesterday to eval bili and LFTs; LFTs slightly improved today.   Hgb 9.4 - no EPO  Phos 3.7 - no binders   Consider renal diet restrictions; please limit daily intake to 32 oz per day.   No lab stick/BP intake on access site  Continue to monitor intake and output, daily weights   Please avoid gadolinium, fleets, phos-based laxatives, NSAIDs  Will follow closely and continue dialysis treatments while in-patient    MADAI Reaves DNP, APRN, FNP-C  Department of Nephrology  [facility]  Pager: [phone]

## 2024-06-21 NOTE — ASSESSMENT & PLAN NOTE
Uremic pericardial effusion and effusion attributed to hypervolemia are on the differential. No concern for tamponade physiology and no symptomatic stigmata of pericarditis. Hemodynamically stable on initial evaluation. Effusion noted on prior Echo and imaging, but has slightly increased in size since that time.     - repeat limited Echo for effusion monitoring after several HD sessions and prior to DC.

## 2024-06-21 NOTE — ASSESSMENT & PLAN NOTE
Noted on EKG review with some possible blocked APCs that are contributing to asymptomatic bradycardia.    - Avoid AV garett blockers  - continue to monitor for any signs of symptomatic bradycardia, but at this time no EP intervention is indicated.

## 2024-06-21 NOTE — HPI
Ms. Vera HFpEF (EF 55%), severe pulmonary HTN, severe MR, volume overload refractory to diuresis, CKD 5 previously on dialysis (last HD 05/2024) presented to the ED for 2/10 central squeezing chest pain, nausea, vomiting, and cough with white sputum admitted to  for REGINALDO on CKD,  with uremia symptoms, anion gap metabolic acidosis, hypokalemia, elevated BNP and troponin. Her dialysis schedule has been limited by insurance inaccessibility. After HD her chest pain, nausea, vomiting improved.     During her admission Echo has demonstrated a small pericardial effusion re-demonstrated on CT AP. She also had asymptomatic runs of bradycardia to 40s with variable p wave morphology and long QTC to 593. Cardiology consulted for bradycardia and pericardial effusion.

## 2024-06-21 NOTE — ASSESSMENT & PLAN NOTE
Atrial premature contractions  Noted as small on POOJA 6/19, however on CT AP 6/20, noted to be moderate in size w/ some heterogeneity. In setting of intermittent asymptomatic bradycardia, concern that effusion may be producing tamponade physiology. Cardiology consulted. Bradycardia is suspected blocked atrial premature contraction, so no need for EP eval currently.     - Limited TTE to re-assess pericardial effusion size  - Volume removal via HD  - Avoid AV garett blockers   - Telemtry  - Reach out to cardiology is symptomatic bradycardia or chest pain noted

## 2024-06-21 NOTE — PLAN OF CARE
Problem: Hemodialysis  Goal: Safe, Effective Therapy Delivery  6/21/2024 1402 by Pat Dumont RN  Outcome: Progressing  6/21/2024 1205 by Pat Dumont RN  Outcome: Progressing  Goal: Effective Tissue Perfusion  6/21/2024 1402 by Pat Dumont RN  Outcome: Progressing  6/21/2024 1205 by Pat Dumont RN  Outcome: Progressing  Goal: Absence of Infection Signs and Symptoms  6/21/2024 1402 by Pat Dumont RN  Outcome: Progressing  6/21/2024 1205 by Pat Dumont RN  Outcome: Progressing        Dialysis tx ended to the right chest perm cath,heparin locked and capped.    Net fluid removed: 1L    Report given to nurse Viki pt awaiting transport by wheelchair from [facility] to room 87850.

## 2024-06-21 NOTE — PLAN OF CARE
Per Dr. Holloway, pt will need HD setup upon d/c,.  CM notified Allie Jha.    Doris Cronin, BSN, BS, RN, CCM

## 2024-06-21 NOTE — NURSING
Pt platelets 99, Bun 44, patient has bleeding from previous injections on call provider notified Dr. Faith said ok to hold heparin tonight

## 2024-06-21 NOTE — CONSULTS
[facility] ([place] [id number])  Cardiology  Consult Note    Patient Name: Xena Vera  MRN: [id number]  Admission Date: 6/18/2024  Hospital Length of Stay: 3 days  Code Status: DNR   Attending Provider: Minda Molina MD   Consulting Provider: Demetri Meza MD  Primary Care Physician: Tami Villeda FNP  Principal Problem:Uremia    Patient information was obtained from patient, past medical records, ER records, and primary team.     Inpatient consult to Cardiology  Consult performed by: Demetri Meza MD  Consult ordered by: Keyon Holloway MD        Subjective:     Chief Complaint:  Chest and abdominal pain    HPI:   Ms. Vera HFpEF (EF 55%), severe pulmonary HTN, severe MR, volume overload refractory to diuresis, CKD 5 previously on dialysis (last HD 05/2024) presented to the ED for 2/10 central squeezing chest pain, nausea, vomiting, and cough with white sputum admitted to  for REGINALDO on CKD,  with uremia symptoms, anion gap metabolic acidosis, hypokalemia, elevated BNP and troponin. Her dialysis schedule has been limited by insurance inaccessibility. After HD her chest pain, nausea, vomiting improved.     During her admission Echo has demonstrated a small pericardial effusion re-demonstrated on CT AP. She also had asymptomatic runs of bradycardia to 40s with variable p wave morphology and long QTC to 593. Cardiology consulted for bradycardia and pericardial effusion.     Past Medical History:   Diagnosis Date    (HFpEF) heart failure with preserved ejection fraction 06/24/2023    EF 63% with G2 DD  Continue lasix, appears euvolemic today  Continue good BP management with losartan, increase Coreg 6.25 mg BID  Fluid restriction, low sodium diet  Recommend Jardiance- patient had CKD 4 and this is a limiting factor- nephro eval pending    Anemia 06/24/2023    CKD (chronic kidney disease)     HTN (hypertension)     Mitral valve regurgitation 06/26/2023    Refer to structural for evaluation  May  benefit from mitral clip    Ovarian cyst     Stage 4 chronic kidney disease 2023    Refer to nephrology at [facility] as unable to get established with [facility] and the phone number for the outside nephrologist provided to patient during her recent hospitalization goes unanswered when called.   Cr remains elevated  Would like to start SGLT-2 and appreciate nephrology expertise        Past Surgical History:   Procedure Laterality Date    INSERTION OF TUNNELED CENTRAL VENOUS HEMODIALYSIS CATHETER Right 2024    Procedure: Insertion, Catheter, Central Venous, Hemodialysis;  Surgeon: Carole Rolon MD;  Location: [facility] CATH LAB;  Service: Interventional Nephrology;  Laterality: Right;       Review of patient's allergies indicates:  No Known Allergies    No current facility-administered medications on file prior to encounter.     Current Outpatient Medications on File Prior to Encounter   Medication Sig    atorvastatin (LIPITOR) 40 MG tablet Take 1 tablet by mouth every day    carvediloL (COREG) 6.25 MG tablet Take 1 tablet (6.25 mg total) by mouth 2 (two) times daily.    furosemide (LASIX) 80 MG tablet Take 1 tablet (80 mg total) by mouth 2 (two) times daily.    midodrine (PROAMATINE) 2.5 MG Tab Take 1 tablet (2.5 mg total) by mouth Daily.    oxybutynin (DITROPAN) 5 MG Tab Take 1 tablet (5 mg total) by mouth 3 (three) times daily.    polyethylene glycol (GLYCOLAX) 17 gram/dose powder Use cap to measure 17 grams, mix in liquid and take by mouth 2 (two) times daily.    senna-docusate 8.6-50 mg (PERICOLACE) 8.6-50 mg per tablet Take 1 tablet by mouth 2 (two) times daily.     Family History    None       Tobacco Use    Smoking status: Former     Current packs/day: 0.00     Types: Cigarettes     Start date:      Quit date:      Years since quittin.4    Smokeless tobacco: Never    Tobacco comments:     2-3 cigarettes a day   Substance and Sexual Activity    Alcohol use: Never    Drug use: Never     Sexual activity: Not on file     Review of Systems   Constitutional: Negative for chills, diaphoresis and night sweats.   HENT:  Positive for congestion.    Cardiovascular:  Positive for leg swelling. Negative for chest pain, dyspnea on exertion, irregular heartbeat, near-syncope and palpitations.   Respiratory:  Positive for cough. Negative for shortness of breath and wheezing.    Skin:  Negative for color change and dry skin.   Musculoskeletal:  Negative for joint pain and joint swelling.   Gastrointestinal:  Positive for abdominal pain. Negative for diarrhea, nausea and vomiting.   Genitourinary:  Negative for dysuria.   Neurological:  Positive for weakness. Negative for dizziness, headaches and light-headedness.   All other systems reviewed and are negative.    Objective:     Vital Signs (Most Recent):  Temp: 97.8 °F (36.6 °C) (06/21/24 1054)  Pulse: 87 (06/21/24 1222)  Resp: 16 (06/21/24 1054)  BP: 131/88 (06/21/24 1054)  SpO2: 96 % (06/21/24 0353) Vital Signs (24h Range):  Temp:  [97.3 °F (36.3 °C)-98.9 °F (37.2 °C)] 97.8 °F (36.6 °C)  Pulse:  [71-87] 87  Resp:  [16-18] 16  SpO2:  [93 %-98 %] 96 %  BP: (109-154)/(63-94) 131/88     Weight: 47 kg (103 lb 9.9 oz)  Body mass index is 20.93 kg/m².    SpO2: 96 %         Intake/Output Summary (Last 24 hours) at 6/21/2024 1256  Last data filed at 6/21/2024 1046  Gross per 24 hour   Intake 300 ml   Output 1550 ml   Net -1250 ml       Lines/Drains/Airways       Central Venous Catheter Line  Duration             Tunneled Central Line - Double Lumen  04/22/24 1317 Internal Jugular Right 59 days              Peripheral Intravenous Line  Duration                  Peripheral IV - Single Lumen 06/18/24 0837 22 G Right Antecubital 3 days         Peripheral IV - Single Lumen 06/18/24 1518 20 G Anterior;Left Hand 2 days                     Physical Exam  Vitals and nursing note reviewed.   Constitutional:       Appearance: She is ill-appearing.   Eyes:      Extraocular  Movements: Extraocular movements intact.      Conjunctiva/sclera: Conjunctivae normal.   Cardiovascular:      Rate and Rhythm: Normal rate and regular rhythm.      Comments: No JVD  Pulmonary:      Effort: Pulmonary effort is normal.      Breath sounds: Normal breath sounds. No rales.   Abdominal:      General: Bowel sounds are normal.      Palpations: Abdomen is soft.      Tenderness: There is abdominal tenderness (epigastric).   Musculoskeletal:      Cervical back: Normal range of motion.      Right lower leg: Edema present.      Left lower leg: Edema present.   Skin:     General: Skin is warm and dry.   Neurological:      General: No focal deficit present.      Mental Status: She is alert and oriented to person, place, and time.          Significant Labs: All pertinent lab results from the last 24 hours have been reviewed.    Significant Imaging:  Reviewed  Assessment and Plan:     Atrial premature contractions  Noted on EKG review with some possible blocked APCs that are contributing to asymptomatic bradycardia.    - Avoid AV garett blockers  - continue to monitor for any signs of symptomatic bradycardia, but at this time no EP intervention is indicated.     Pericardial effusion  Uremic pericardial effusion and effusion attributed to hypervolemia are on the differential. No concern for tamponade physiology and no symptomatic stigmata of pericarditis. Hemodynamically stable on initial evaluation. Effusion noted on prior Echo and imaging, but has slightly increased in size since that time.     - repeat limited Echo for effusion monitoring after several HD sessions and prior to DC.        VTE Risk Mitigation (From admission, onward)           Ordered     heparin (porcine) injection 1,000 Units  As needed (PRN)         06/21/24 0824     heparin (porcine) injection 5,000 Units  Every 8 hours         06/18/24 1427     IP VTE HIGH RISK PATIENT  Once         06/18/24 1427     Place sequential compression device  Until  discontinued         06/18/24 4931                    Thank you for your consult. I will follow-up with patient. Please contact us if you have any additional questions.    Demetri Meza MD  Cardiology   [facility] ([place])

## 2024-06-21 NOTE — PT/OT/SLP PROGRESS
Occupational Therapy      Patient Name:  Xena Vera   MRN:  [id number]    Patient not seen today secondary to Dialysis. Will follow-up later as time permits.    6/21/2024

## 2024-06-21 NOTE — PROGRESS NOTES
Referral for outpt dialysis successfully faxed to [facility]. [facility] Yanez and SUSAN Patten notified via secure chat. Pt pending medical and financial approval.    KAZ to follow with updates.    Allie Jha LMSW  [facility]  [phone]

## 2024-06-21 NOTE — NURSING
Dialysis tx started to the right chest perm cath per orders placed by RORO Reaves:    tamera Questions    Question Answer   Antibiotics on HD? No   Duration of Treatment 3 hours   Dialyzer F160   Dialysate Temperature (C) 36.5   Target  mL/min   If unable to maintain flow due to inadequate vascular access patency, patient intolerance (i.e. chest pain, access discomfort) or elevated venous pressure, adjust blood flow rate to a minimum of _____mL/min 100    mL/min   K+ Potassium per Protocol   Ca++ Calcium per Protocol   Na+ Sodium per Protocol   Bicarb Bicarbonate per Protocol   Access to be used Other (please specify)   Target UF 1L   If unable to maintain this UFR due to patient intolerance (i.e. hypotension, chest pain, muscle cramping, nausea or vomiting), adjust UFR to achieve a minimum of _______ liters of UF 0   Fluid Removal Instructions maintain SBP > 90 mmHG

## 2024-06-21 NOTE — ASSESSMENT & PLAN NOTE
RESOLVED. CP substernal, 2/10, but mostly with lying flat. Worse with exertion/better with rest  Troponin 0.497-->0.540, BNP 4500 (chronic)  EKG is without changes concerning for ACS  HOWARD score 2, Alannah 109    -cardiac monitoring

## 2024-06-21 NOTE — PROGRESS NOTES
"[facility] - [facility] Surg ([address] Medicine  Progress Note    Patient Name: Xena Vera  MRN: [id number]  Patient Class: IP- Inpatient   Admission Date: 6/18/2024  Length of Stay: 3 days  Attending Physician: Minda Molina MD  Primary Care Provider: Tami Villeda FNP        Subjective:     Principal Problem:Uremia        HPI:  Patient is a 53F PMHx HFpEF (Lasix 80 bid), severe pHTN, CKD5 (last HD in May 2024), HTN, MR, anemia presents for chest pain that began this morning. She reports 2/10 central sqeezing chest pain that radiates to her R shoulder when vomiting. The pain is worse with walking. She has never had chest pain before. She has had nausea/vomiting x1 day. Reports approx 3 episodes of clear vomitus, describes as "phlegm." She has associated sharp upper abdominal pain when vomiting. Additionally reports arthralgias/myalgias, leg swelling, generalized weakness, and fatigue. She had a brief episode of dizziness yesterday when walking but has not had that since. She denies Fever/chills, SOB, upper respiratory symptoms, cough, dysuria, decreased urine, or known sick contacts.     She reports adherence to her fluid restriction & Lasix. Admitted to [facility] from 4/1/24-5/8/24 where renal function declined while being diuresed. Required CRRT with intermittent levophed. Admitted to [facility] 5/13-5/17 after presenting to ED for HD. Not enrolled in medicaid at that time and had been told to present to ED for HD needs. No indications for HD on that admission per chart review. At that time nephrology recommended urgent evaluation with vascular surgery outpatient for AVF placement. Venous mapping obtained.    In ED, somewhat hypertensive (sBP 150s), intermittent bradycardia, afebrile, on RA. CMP with profound uremia (), Cr 5.6, hypokalemia (K 2.8), no hyponatremia. Transaminitis AST//133 with T. Bili 4.3. Lipase elevated at 230. RUQ US w/o CBD dilation or evidence of scarring, surgically " absent gallbladder. CBC with thrombocytopenia (PLT 90), no leukocytosis, stable anemia (Hb 11.5). BNP 4500, Troponin 0.497 which increased to 0.54. LA normal at 1.5. EKG with peaked T waves in II/III/AVF, widened QRS, Qtc 573. Nephrology consulted.     Interpretor was used for entire interview/examination:  number [id number]    Overview/Hospital Course:  Admitted to [facility] 6/18/24 for chest pain, nausea, and vomiting. Found to have profound uremia and hypokalemia. Nephrology consulted and performed emergent HD. CP resolved following HD, residual epigastric pain w/o nausea/vomiting. TTE 6/19 with preserved EF, normal RV systolic function, severe MR, pHTN, and CVP 15. Diuresis with IV lasix in conjunction with HD. LFTs downtrended with volume removal.     Interval History: NAEON. VSS. Cardiology following for pericardial effusion. No bradycardia this AM. Got 1.5L removed in HD. Likely going to be MWF OP HD regimen.      Review of Systems   Constitutional:  Positive for activity change. Negative for chills and fever.   Respiratory:  Negative for shortness of breath.    Cardiovascular:  Positive for leg swelling. Negative for chest pain.   Gastrointestinal:  Positive for abdominal pain. Negative for abdominal distention, constipation, nausea and vomiting.   Musculoskeletal:  Negative for arthralgias.   Psychiatric/Behavioral:  Negative for agitation, behavioral problems and confusion.      Objective:     Vital Signs (Most Recent):  Temp: 97.8 °F (36.6 °C) (06/21/24 1054)  Pulse: 87 (06/21/24 1222)  Resp: 16 (06/21/24 1054)  BP: 131/88 (06/21/24 1054)  SpO2: 96 % (06/21/24 0353) Vital Signs (24h Range):  Temp:  [97.3 °F (36.3 °C)-98.9 °F (37.2 °C)] 97.8 °F (36.6 °C)  Pulse:  [71-87] 87  Resp:  [16-18] 16  SpO2:  [93 %-98 %] 96 %  BP: (109-154)/(63-94) 131/88     Weight: 47 kg (103 lb 9.9 oz)  Body mass index is 20.93 kg/m².    Intake/Output Summary (Last 24 hours) at 6/21/2024 1302  Last data filed at 6/21/2024  1046  Gross per 24 hour   Intake 300 ml   Output 1550 ml   Net -1250 ml         Physical Exam  Constitutional:       General: She is not in acute distress.     Appearance: She is not ill-appearing.   HENT:      Head: Normocephalic and atraumatic.      Right Ear: External ear normal.      Left Ear: External ear normal.      Nose: Nose normal.      Mouth/Throat:      Mouth: Mucous membranes are moist.      Pharynx: Oropharynx is clear.   Eyes:      General: No scleral icterus.     Extraocular Movements: Extraocular movements intact.   Cardiovascular:      Rate and Rhythm: Regular rhythm. Bradycardia present.      Pulses: Normal pulses.      Heart sounds: Murmur heard.   Pulmonary:      Effort: Pulmonary effort is normal.      Breath sounds: Normal breath sounds. No wheezing, rhonchi or rales.   Abdominal:      General: Abdomen is flat.      Palpations: Abdomen is soft.      Tenderness: There is abdominal tenderness.      Comments: Mild TTP in epigastric region   Musculoskeletal:         General: No swelling. Normal range of motion.      Cervical back: Normal range of motion and neck supple.   Skin:     General: Skin is warm and dry.      Capillary Refill: Capillary refill takes less than 2 seconds.   Neurological:      General: No focal deficit present.      Mental Status: She is alert and oriented to person, place, and time.   Psychiatric:         Mood and Affect: Mood normal.         Behavior: Behavior normal.             Significant Labs: All pertinent labs within the past 24 hours have been reviewed.  CBC:   Recent Labs   Lab 06/20/24  0304 06/21/24  0309   WBC 5.62 7.27   HGB 10.1* 9.4*   HCT 35.2* 32.7*   PLT 99* 104*     CMP:   Recent Labs   Lab 06/20/24  0304 06/21/24  0309    133*   K 4.2 3.8    101   CO2 19* 20*    90   BUN 44* 60*   CREATININE 3.1* 3.8*   CALCIUM 8.9 8.4*   PROT 7.4 6.5   ALBUMIN 3.3* 2.9*   BILITOT 5.2* 3.1*   ALKPHOS 205* 170*   AST 91* 58*   * 77*   ANIONGAP 15  12       Significant Imaging: I have reviewed all pertinent imaging results/findings within the past 24 hours.    Assessment/Plan:      * Uremia  Improving  2/2 to ESRD. AG 18. Patient AAOx3 on exam  +nausea, chest pain, labile HR  No steph bleeding noted  VBG with pH 7.44.     -HD per nephro  -daily CMP    ESRD (end stage renal disease) on dialysis  Creatine stable for now. BMP reviewed- noted Estimated Creatinine Clearance: 15.4 mL/min (A) (based on SCr of 3.1 mg/dL (H)). according to latest data. Based on current GFR, CKD stage is end stage.  Monitor UOP and serial BMP and adjust therapy as needed. Renally dose meds. Avoid nephrotoxic medications and procedures.    -Neprhology consulted, assisting with dialysis frequency  -RFP, Mg daily      Hyperbilirubinemia  Direct hyperbilirubinemia on admission w/ AST/ALT elevation. Liver US not reporting dilated bile ducts  No new medications identified for DILI. Hepatitis panel negative.   Concern for congestive hepatopathy due to volume overload from HFpEF    Liver enzymes and total bilirubin remain elevated despite 3 days of dialysis  Also with pancreatitis    -Hold statin  -neprhology consulted, pending HD reqs    Pericardial effusion  Atrial premature contractions  Noted as small on POOJA 6/19, however on CT AP 6/20, noted to be moderate in size w/ some heterogeneity. In setting of intermittent asymptomatic bradycardia, concern that effusion may be producing tamponade physiology. Cardiology consulted. Bradycardia is suspected blocked atrial premature contraction, so no need for EP eval currently.     - Limited TTE to re-assess pericardial effusion size  - Volume removal via HD  - Avoid AV garett blockers   - Telemtry  - Reach out to cardiology is symptomatic bradycardia or chest pain noted      Chest pain  RESOLVED. CP substernal, 2/10, but mostly with lying flat. Worse with exertion/better with rest  Troponin 0.497-->0.540, BNP 4500 (chronic)  EKG is without changes  concerning for ACS  HOWARD score 2, Alannah 109    -cardiac monitoring    Atrial premature contractions  See Pericardial Effusion      Cough  New, dry, non productive  A/w post nasal drip  Satting well RA    -continue to monitor  -Flonase  -tessalon perles      Prediabetes  Last A1c 6.0 (2 months ago)   on admission    -POCT glucose AC/HS  -LDSSI  -goal -180    Prolonged QT interval  Qtc on admision 570    -cardiac telemetry  -caution with QT prolonging medications    Elevated troponin I level  Resolved  Has elevated troponin at baseline 2/2 to ESRD  Troponin 0.497-->0.540-->0.48  Reports 2/10 CP worse with lying down  EKG is without changes concerning for ACS  Has markedly elevated BUN, uremic pericarditis on ddx    -EKG for new/worsened CP  -cardiac telemetry    Acute on chronic heart failure with preserved ejection fraction (HFpEF)  Patient is identified as having Diastolic (HFpEF) heart failure that is Chronic. CHF is currently controlled. Latest ECHO performed and demonstrates- Results for orders placed during the hospital encounter of 04/01/24    Echo    Result Date: 6/19/2024    Left Ventricle: The left ventricle is severely dilated. LVIDd is 6.0   cm. Normal wall thickness. There is eccentric hypertrophy. There is normal   systolic function. Ejection fraction by visual approximation is 55%. Grade   II diastolic dysfunction.    Right Ventricle: Normal right ventricular cavity size. Wall thickness   is normal. Systolic function is normal.    Left Atrium: Left atrium is severely dilated.    Aortic Valve: The aortic valve is a trileaflet valve.    Mitral Valve: Severely restricted posterior leaflet motion.  The mean   pressure gradient across the mitral valve is 7 mmHg at a heart rate of 77   bpm. There is severe regurgitation with a posterolateral eccentrically   directed jet.    Tricuspid Valve: There is mild regurgitation.    Pulmonary Artery: There is severe pulmonary hypertension. The estimated    pulmonary artery systolic pressure is 76 mmHg.    IVC/SVC: Elevated venous pressure at 15 mmHg.    Pericardium: There is a small effusion. No indication of cardiac   tamponade. On direct comparison with images from 4/23/2024, there is   slightly more fluid around the right atrium.         . Continue Furosemide and monitor clinical status closely. Monitor on telemetry. Patient is off CHF pathway.  Monitor strict Is&Os and daily weights.  Place on fluid restriction of 1.5 L. Cardiology has not been consulted. Continue to stress to patient importance of self efficacy and  on diet for CHF. Last BNP reviewed- and noted below   Recent Labs   Lab 06/18/24 0823   BNP 4,516*       Does not appear grossly volume overloaded    -Volume reduction via HD, will determine dialysis needs as OP to help determine future diuretic dosing    Class 2 obesity in adult  Body mass index is 20.93 kg/m². Morbid obesity complicates all aspects of disease management from diagnostic modalities to treatment. Weight loss encouraged and health benefits explained to patient.         Essential hypertension  BP elevated and labile on admission  120s-160s systolic  Appears to have midodrine ordered, likely for dialysis     -will hold antihypertensives for now pending nephrology recommendations regarding dialysis      VTE Risk Mitigation (From admission, onward)           Ordered     heparin (porcine) injection 1,000 Units  As needed (PRN)         06/21/24 0824     heparin (porcine) injection 5,000 Units  Every 8 hours         06/18/24 1427     IP VTE HIGH RISK PATIENT  Once         06/18/24 1427     Place sequential compression device  Until discontinued         06/18/24 1427                    Discharge Planning   RILEY: 6/22/2024     Code Status: DNR   Is the patient medically ready for discharge?:     Reason for patient still in hospital (select all that apply): Patient new problem, Patient trending condition, Treatment, and Consult  recommendations  Discharge Plan A: Home Health                  Cydney Ball MD  Department of Hospital Medicine   [facility] ([place])

## 2024-06-22 LAB
ALBUMIN SERPL BCP-MCNC: 2.5 G/DL (ref 3.5–5.2)
ALP SERPL-CCNC: 167 U/L (ref 55–135)
ALT SERPL W/O P-5'-P-CCNC: 59 U/L (ref 10–44)
ANION GAP SERPL CALC-SCNC: 11 MMOL/L (ref 8–16)
AST SERPL-CCNC: 44 U/L (ref 10–40)
BASOPHILS # BLD AUTO: 0 K/UL (ref 0–0.2)
BASOPHILS NFR BLD: 0 % (ref 0–1.9)
BILIRUB SERPL-MCNC: 2.8 MG/DL (ref 0.1–1)
BUN SERPL-MCNC: 30 MG/DL (ref 6–20)
CALCIUM SERPL-MCNC: 7.8 MG/DL (ref 8.7–10.5)
CHLORIDE SERPL-SCNC: 100 MMOL/L (ref 95–110)
CO2 SERPL-SCNC: 20 MMOL/L (ref 23–29)
CREAT SERPL-MCNC: 2.6 MG/DL (ref 0.5–1.4)
DIFFERENTIAL METHOD BLD: ABNORMAL
EOSINOPHIL # BLD AUTO: 0 K/UL (ref 0–0.5)
EOSINOPHIL NFR BLD: 0.1 % (ref 0–8)
ERYTHROCYTE [DISTWIDTH] IN BLOOD BY AUTOMATED COUNT: 26 % (ref 11.5–14.5)
EST. GFR  (NO RACE VARIABLE): 21.3 ML/MIN/1.73 M^2
GLUCOSE SERPL-MCNC: 114 MG/DL (ref 70–110)
HCT VFR BLD AUTO: 30.9 % (ref 37–48.5)
HGB BLD-MCNC: 9.1 G/DL (ref 12–16)
IMM GRANULOCYTES # BLD AUTO: 0.03 K/UL (ref 0–0.04)
IMM GRANULOCYTES NFR BLD AUTO: 0.4 % (ref 0–0.5)
INFLUENZA A, MOLECULAR: NOT DETECTED
INFLUENZA B, MOLECULAR: NOT DETECTED
LYMPHOCYTES # BLD AUTO: 0.5 K/UL (ref 1–4.8)
LYMPHOCYTES NFR BLD: 6.5 % (ref 18–48)
MAGNESIUM SERPL-MCNC: 1.9 MG/DL (ref 1.6–2.6)
MCH RBC QN AUTO: 27.2 PG (ref 27–31)
MCHC RBC AUTO-ENTMCNC: 29.4 G/DL (ref 32–36)
MCV RBC AUTO: 92 FL (ref 82–98)
MONOCYTES # BLD AUTO: 0.8 K/UL (ref 0.3–1)
MONOCYTES NFR BLD: 9.5 % (ref 4–15)
NEUTROPHILS # BLD AUTO: 6.8 K/UL (ref 1.8–7.7)
NEUTROPHILS NFR BLD: 83.5 % (ref 38–73)
NRBC BLD-RTO: 0 /100 WBC
PHOSPHATE SERPL-MCNC: 2.2 MG/DL (ref 2.7–4.5)
PLATELET # BLD AUTO: 100 K/UL (ref 150–450)
PMV BLD AUTO: 12.3 FL (ref 9.2–12.9)
POCT GLUCOSE: 103 MG/DL (ref 70–110)
POCT GLUCOSE: 110 MG/DL (ref 70–110)
POCT GLUCOSE: 135 MG/DL (ref 70–110)
POCT GLUCOSE: 87 MG/DL (ref 70–110)
POTASSIUM SERPL-SCNC: 3.2 MMOL/L (ref 3.5–5.1)
PROT SERPL-MCNC: 6 G/DL (ref 6–8.4)
RBC # BLD AUTO: 3.35 M/UL (ref 4–5.4)
RSV AG BY MOLECULAR METHOD: NOT DETECTED
SARS-COV-2 RNA RESP QL NAA+PROBE: NOT DETECTED
SODIUM SERPL-SCNC: 131 MMOL/L (ref 136–145)
WBC # BLD AUTO: 8.1 K/UL (ref 3.9–12.7)

## 2024-06-22 PROCEDURE — 83735 ASSAY OF MAGNESIUM: CPT

## 2024-06-22 PROCEDURE — 99232 SBSQ HOSP IP/OBS MODERATE 35: CPT | Mod: ,,, | Performed by: INTERNAL MEDICINE

## 2024-06-22 PROCEDURE — 85025 COMPLETE CBC W/AUTO DIFF WBC: CPT

## 2024-06-22 PROCEDURE — 36415 COLL VENOUS BLD VENIPUNCTURE: CPT

## 2024-06-22 PROCEDURE — 25000003 PHARM REV CODE 250

## 2024-06-22 PROCEDURE — 87040 BLOOD CULTURE FOR BACTERIA: CPT | Mod: 59

## 2024-06-22 PROCEDURE — 0241U SARS-COV2 (COVID) WITH FLU/RSV BY PCR: CPT

## 2024-06-22 PROCEDURE — 63600175 PHARM REV CODE 636 W HCPCS

## 2024-06-22 PROCEDURE — 80053 COMPREHEN METABOLIC PANEL: CPT

## 2024-06-22 PROCEDURE — 21400001 HC TELEMETRY ROOM

## 2024-06-22 PROCEDURE — 84100 ASSAY OF PHOSPHORUS: CPT

## 2024-06-22 RX ORDER — POTASSIUM CHLORIDE 20 MEQ/1
40 TABLET, EXTENDED RELEASE ORAL ONCE
Status: COMPLETED | OUTPATIENT
Start: 2024-06-22 | End: 2024-06-22

## 2024-06-22 RX ORDER — SODIUM CHLORIDE 9 MG/ML
INJECTION, SOLUTION INTRAVENOUS ONCE
Status: DISCONTINUED | OUTPATIENT
Start: 2024-06-24 | End: 2024-06-24 | Stop reason: HOSPADM

## 2024-06-22 RX ADMIN — FLUTICASONE PROPIONATE 100 MCG: 50 SPRAY, METERED NASAL at 10:06

## 2024-06-22 RX ADMIN — MUPIROCIN: 20 OINTMENT TOPICAL at 08:06

## 2024-06-22 RX ADMIN — FUROSEMIDE 80 MG: 10 INJECTION, SOLUTION INTRAVENOUS at 08:06

## 2024-06-22 RX ADMIN — POTASSIUM CHLORIDE 40 MEQ: 1500 TABLET, EXTENDED RELEASE ORAL at 10:06

## 2024-06-22 RX ADMIN — FAMOTIDINE 20 MG: 20 TABLET ORAL at 10:06

## 2024-06-22 RX ADMIN — MUPIROCIN: 20 OINTMENT TOPICAL at 10:06

## 2024-06-22 RX ADMIN — FUROSEMIDE 80 MG: 10 INJECTION, SOLUTION INTRAVENOUS at 10:06

## 2024-06-22 RX ADMIN — POLYETHYLENE GLYCOL 3350 17 G: 17 POWDER, FOR SOLUTION ORAL at 10:06

## 2024-06-22 NOTE — SUBJECTIVE & OBJECTIVE
Interval History: NAEO. Patient is s/p hemodialysis with 1 L removed. She also urinated 3 times but this was not measured. She has no new complaints.    Objective:     Vital Signs (Most Recent):  Temp: 98.5 °F (36.9 °C) (06/22/24 0722)  Pulse: 73 (06/22/24 0722)  Resp: 18 (06/22/24 0722)  BP: 129/81 (06/22/24 0722)  SpO2: 96 % (06/22/24 0722) Vital Signs (24h Range):  Temp:  [97.8 °F (36.6 °C)-101.3 °F (38.5 °C)] 98.5 °F (36.9 °C)  Pulse:  [71-93] 73  Resp:  [16-18] 18  SpO2:  [94 %-97 %] 96 %  BP: (109-142)/(63-88) 129/81     Weight: 47 kg (103 lb 9.9 oz)  Body mass index is 20.93 kg/m².     SpO2: 96 %         Intake/Output Summary (Last 24 hours) at 6/22/2024 0910  Last data filed at 6/21/2024 1836  Gross per 24 hour   Intake 550 ml   Output 1553 ml   Net -1003 ml       Lines/Drains/Airways       Central Venous Catheter Line  Duration             Tunneled Central Line - Double Lumen  04/22/24 1317 Internal Jugular Right 60 days              Peripheral Intravenous Line  Duration                  Peripheral IV - Single Lumen 06/18/24 0837 22 G Right Antecubital 4 days         Peripheral IV - Single Lumen 06/18/24 1518 20 G Anterior;Left Hand 3 days                       Physical Exam  Vitals and nursing note reviewed.   Eyes:      Extraocular Movements: Extraocular movements intact.      Conjunctiva/sclera: Conjunctivae normal.   Cardiovascular:      Rate and Rhythm: Normal rate and regular rhythm.      Comments: JVP difficult to visualize  Pulmonary:      Effort: Pulmonary effort is normal.      Breath sounds: Normal breath sounds. No rales.   Abdominal:      General: Bowel sounds are normal.      Palpations: Abdomen is soft.   Musculoskeletal:      Cervical back: Normal range of motion.   Skin:     General: Skin is warm and dry.   Neurological:      General: No focal deficit present.      Mental Status: She is alert and oriented to person, place, and time.            Significant Labs: All pertinent lab results  from the last 24 hours have been reviewed.

## 2024-06-22 NOTE — SUBJECTIVE & OBJECTIVE
Interval History: Febrile overnight w/o worsening of cough or rhinorrhea, resolved with Tylenol. VSS otherwise. Obtained BCX and CXR. States improving cough. No CP. Diuresis with Lasix 80 IV bid.     Review of Systems   Constitutional:  Positive for activity change. Negative for chills and fever.   Respiratory:  Negative for shortness of breath.    Cardiovascular:  Positive for leg swelling. Negative for chest pain.   Gastrointestinal:  Positive for abdominal pain. Negative for abdominal distention, constipation, nausea and vomiting.   Musculoskeletal:  Negative for arthralgias.   Psychiatric/Behavioral:  Negative for agitation, behavioral problems and confusion.      Objective:     Vital Signs (Most Recent):  Temp: 98.3 °F (36.8 °C) (06/22/24 1125)  Pulse: 83 (06/22/24 1125)  Resp: 18 (06/22/24 1125)  BP: (!) 149/80 (06/22/24 1125)  SpO2: 96 % (06/22/24 1125) Vital Signs (24h Range):  Temp:  [97.8 °F (36.6 °C)-101.3 °F (38.5 °C)] 98.3 °F (36.8 °C)  Pulse:  [72-93] 83  Resp:  [18] 18  SpO2:  [94 %-97 %] 96 %  BP: (112-149)/(68-88) 149/80     Weight: 47 kg (103 lb 9.9 oz)  Body mass index is 20.93 kg/m².    Intake/Output Summary (Last 24 hours) at 6/22/2024 1156  Last data filed at 6/21/2024 1836  Gross per 24 hour   Intake --   Output 3 ml   Net -3 ml         Physical Exam  Constitutional:       General: She is not in acute distress.     Appearance: She is not ill-appearing.   HENT:      Head: Normocephalic and atraumatic.      Right Ear: External ear normal.      Left Ear: External ear normal.      Nose: Nose normal.      Mouth/Throat:      Mouth: Mucous membranes are moist.      Pharynx: Oropharynx is clear.   Eyes:      General: No scleral icterus.     Extraocular Movements: Extraocular movements intact.   Cardiovascular:      Rate and Rhythm: Regular rhythm. Bradycardia present.      Pulses: Normal pulses.      Heart sounds: Murmur heard.   Pulmonary:      Effort: Pulmonary effort is normal.      Breath sounds:  Normal breath sounds. No wheezing, rhonchi or rales.   Abdominal:      General: Abdomen is flat.      Palpations: Abdomen is soft.      Tenderness: There is abdominal tenderness.      Comments: Mild TTP in epigastric region   Musculoskeletal:         General: No swelling. Normal range of motion.      Cervical back: Normal range of motion and neck supple.   Skin:     General: Skin is warm and dry.      Capillary Refill: Capillary refill takes less than 2 seconds.   Neurological:      General: No focal deficit present.      Mental Status: She is alert and oriented to person, place, and time.   Psychiatric:         Mood and Affect: Mood normal.         Behavior: Behavior normal.             Significant Labs: All pertinent labs within the past 24 hours have been reviewed.  BMP:   Recent Labs   Lab 06/22/24  0406   *   *   K 3.2*      CO2 20*   BUN 30*   CREATININE 2.6*   CALCIUM 7.8*   MG 1.9     CBC:   Recent Labs   Lab 06/21/24  0309 06/22/24  0406   WBC 7.27 8.10   HGB 9.4* 9.1*   HCT 32.7* 30.9*   * 100*       Significant Imaging: I have reviewed all pertinent imaging results/findings within the past 24 hours.  CXR 6/22: stable, possible interstitial edema, no large consolidative process. Cardiomegaly.

## 2024-06-22 NOTE — ASSESSMENT & PLAN NOTE
Creatine stable for now. BMP reviewed- noted Estimated Creatinine Clearance: 18.4 mL/min (A) (based on SCr of 2.6 mg/dL (H)). according to latest data. Based on current GFR, CKD stage is end stage.  Monitor UOP and serial BMP and adjust therapy as needed. Renally dose meds. Avoid nephrotoxic medications and procedures.    -Neprhology consulted, assisting with dialysis frequency  -RFP, Mg daily

## 2024-06-22 NOTE — ASSESSMENT & PLAN NOTE
Atrial premature contractions  Noted as small on POOJA 6/19, however on CT AP 6/20, noted to be moderate in size w/ some heterogeneity. In setting of intermittent asymptomatic bradycardia, concern that effusion may be producing tamponade physiology. Cardiology consulted. Bradycardia is suspected blocked atrial premature contraction, so no need for EP eval currently.     - Limited TTE to re-assess pericardial effusion size - obtain 6/24  - Volume removal via HD and Lasix 80mg IV q12h  - Avoid AV garett blockers   - Telemtry  - Reach out to cardiology is symptomatic bradycardia or chest pain noted

## 2024-06-22 NOTE — ASSESSMENT & PLAN NOTE
Direct hyperbilirubinemia on admission w/ AST/ALT elevation. Liver US not reporting dilated bile ducts  No new medications identified for DILI. Hepatitis panel negative.   Concern for congestive hepatopathy due to volume overload from HFpEF    Liver enzymes and total bilirubin remain elevated despite 3 days of dialysis  Also with pancreatitis    -Hold statin  -neprhology consulted, pending HD reqs

## 2024-06-22 NOTE — PROGRESS NOTES
"[facility] ([address] Medicine  Progress Note    Patient Name: Xena Vera  MRN: [id number]  Patient Class: IP- Inpatient   Admission Date: 6/18/2024  Length of Stay: 4 days  Attending Physician: Minda Molina MD  Primary Care Provider: Tami Villeda FNP        Subjective:     Principal Problem:Uremia        HPI:  Patient is a 53F PMHx HFpEF (Lasix 80 bid), severe pHTN, CKD5 (last HD in May 2024), HTN, MR, anemia presents for chest pain that began this morning. She reports 2/10 central sqeezing chest pain that radiates to her R shoulder when vomiting. The pain is worse with walking. She has never had chest pain before. She has had nausea/vomiting x1 day. Reports approx 3 episodes of clear vomitus, describes as "phlegm." She has associated sharp upper abdominal pain when vomiting. Additionally reports arthralgias/myalgias, leg swelling, generalized weakness, and fatigue. She had a brief episode of dizziness yesterday when walking but has not had that since. She denies Fever/chills, SOB, upper respiratory symptoms, cough, dysuria, decreased urine, or known sick contacts.     She reports adherence to her fluid restriction & Lasix. Admitted to [facility] from 4/1/24-5/8/24 where renal function declined while being diuresed. Required CRRT with intermittent levophed. Admitted to [facility] 5/13-5/17 after presenting to ED for HD. Not enrolled in medicaid at that time and had been told to present to ED for HD needs. No indications for HD on that admission per chart review. At that time nephrology recommended urgent evaluation with vascular surgery outpatient for AVF placement. Venous mapping obtained.    In ED, somewhat hypertensive (sBP 150s), intermittent bradycardia, afebrile, on RA. CMP with profound uremia (), Cr 5.6, hypokalemia (K 2.8), no hyponatremia. Transaminitis AST//133 with T. Bili 4.3. Lipase elevated at 230. RUQ US w/o CBD dilation or evidence of scarring, surgically " absent gallbladder. CBC with thrombocytopenia (PLT 90), no leukocytosis, stable anemia (Hb 11.5). BNP 4500, Troponin 0.497 which increased to 0.54. LA normal at 1.5. EKG with peaked T waves in II/III/AVF, widened QRS, Qtc 573. Nephrology consulted.     Interpretor was used for entire interview/examination:  number [id number]    Overview/Hospital Course:  Admitted to [facility] 6/18/24 for chest pain, nausea, and vomiting. Found to have profound uremia and hypokalemia. Nephrology consulted and performed emergent HD. CP resolved following HD, residual epigastric pain w/o nausea/vomiting. TTE 6/19 with preserved EF, normal RV systolic function, severe MR, pHTN, and CVP 15. Diuresis with IV lasix in conjunction with HD. LFTs downtrended with volume removal.     Interval History: Febrile overnight w/o worsening of cough or rhinorrhea, resolved with Tylenol. VSS otherwise. Obtained BCX and CXR. States improving cough. No CP. Diuresis with Lasix 80 IV bid.     Review of Systems   Constitutional:  Positive for activity change. Negative for chills and fever.   Respiratory:  Negative for shortness of breath.    Cardiovascular:  Positive for leg swelling. Negative for chest pain.   Gastrointestinal:  Positive for abdominal pain. Negative for abdominal distention, constipation, nausea and vomiting.   Musculoskeletal:  Negative for arthralgias.   Psychiatric/Behavioral:  Negative for agitation, behavioral problems and confusion.      Objective:     Vital Signs (Most Recent):  Temp: 98.3 °F (36.8 °C) (06/22/24 1125)  Pulse: 83 (06/22/24 1125)  Resp: 18 (06/22/24 1125)  BP: (!) 149/80 (06/22/24 1125)  SpO2: 96 % (06/22/24 1125) Vital Signs (24h Range):  Temp:  [97.8 °F (36.6 °C)-101.3 °F (38.5 °C)] 98.3 °F (36.8 °C)  Pulse:  [72-93] 83  Resp:  [18] 18  SpO2:  [94 %-97 %] 96 %  BP: (112-149)/(68-88) 149/80     Weight: 47 kg (103 lb 9.9 oz)  Body mass index is 20.93 kg/m².    Intake/Output Summary (Last 24 hours) at 6/22/2024  1156  Last data filed at 6/21/2024 1836  Gross per 24 hour   Intake --   Output 3 ml   Net -3 ml         Physical Exam  Constitutional:       General: She is not in acute distress.     Appearance: She is not ill-appearing.   HENT:      Head: Normocephalic and atraumatic.      Right Ear: External ear normal.      Left Ear: External ear normal.      Nose: Nose normal.      Mouth/Throat:      Mouth: Mucous membranes are moist.      Pharynx: Oropharynx is clear.   Eyes:      General: No scleral icterus.     Extraocular Movements: Extraocular movements intact.   Cardiovascular:      Rate and Rhythm: Regular rhythm. Bradycardia present.      Pulses: Normal pulses.      Heart sounds: Murmur heard.   Pulmonary:      Effort: Pulmonary effort is normal.      Breath sounds: Normal breath sounds. No wheezing, rhonchi or rales.   Abdominal:      General: Abdomen is flat.      Palpations: Abdomen is soft.      Tenderness: There is abdominal tenderness.      Comments: Mild TTP in epigastric region   Musculoskeletal:         General: No swelling. Normal range of motion.      Cervical back: Normal range of motion and neck supple.   Skin:     General: Skin is warm and dry.      Capillary Refill: Capillary refill takes less than 2 seconds.   Neurological:      General: No focal deficit present.      Mental Status: She is alert and oriented to person, place, and time.   Psychiatric:         Mood and Affect: Mood normal.         Behavior: Behavior normal.             Significant Labs: All pertinent labs within the past 24 hours have been reviewed.  BMP:   Recent Labs   Lab 06/22/24  0406   *   *   K 3.2*      CO2 20*   BUN 30*   CREATININE 2.6*   CALCIUM 7.8*   MG 1.9     CBC:   Recent Labs   Lab 06/21/24  0309 06/22/24  0406   WBC 7.27 8.10   HGB 9.4* 9.1*   HCT 32.7* 30.9*   * 100*       Significant Imaging: I have reviewed all pertinent imaging results/findings within the past 24 hours.  CXR 6/22: stable,  possible interstitial edema, no large consolidative process. Cardiomegaly.     Assessment/Plan:      * Uremia  Improving  2/2 to ESRD. AG 18. Patient AAOx3 on exam  +nausea, chest pain, labile HR  No stpeh bleeding noted  VBG with pH 7.44.     -HD per nephro  -daily CMP    Pericardial effusion  Atrial premature contractions  Noted as small on POOJA 6/19, however on CT AP 6/20, noted to be moderate in size w/ some heterogeneity. In setting of intermittent asymptomatic bradycardia, concern that effusion may be producing tamponade physiology. Cardiology consulted. Bradycardia is suspected blocked atrial premature contraction, so no need for EP eval currently.     - Limited TTE to re-assess pericardial effusion size - obtain 6/24  - Volume removal via HD and Lasix 80mg IV q12h  - Avoid AV garett blockers   - Telemtry  - Reach out to cardiology is symptomatic bradycardia or chest pain noted      ESRD (end stage renal disease) on dialysis  Creatine stable for now. BMP reviewed- noted Estimated Creatinine Clearance: 18.4 mL/min (A) (based on SCr of 2.6 mg/dL (H)). according to latest data. Based on current GFR, CKD stage is end stage.  Monitor UOP and serial BMP and adjust therapy as needed. Renally dose meds. Avoid nephrotoxic medications and procedures.    -Neprhology consulted, assisting with dialysis frequency  -RFP, Mg daily      Hyperbilirubinemia  Direct hyperbilirubinemia on admission w/ AST/ALT elevation. Liver US not reporting dilated bile ducts  No new medications identified for DILI. Hepatitis panel negative.   Concern for congestive hepatopathy due to volume overload from HFpEF    Liver enzymes and total bilirubin remain elevated despite 3 days of dialysis  Also with pancreatitis    -Hold statin  -neprhology consulted, pending HD reqs    Chest pain  RESOLVED. CP substernal, 2/10, but mostly with lying flat. Worse with exertion/better with rest  Troponin 0.497-->0.540, BNP 4500 (chronic)  EKG is without changes  concerning for ACS  HOWARD score 2, Alannah 109    -cardiac monitoring    Atrial premature contractions  See Pericardial Effusion      Cough  New, dry, non productive  A/w post nasal drip  Satting well RA    -continue to monitor  -Flonase  -tessalon perles      Prediabetes  Last A1c 6.0 (2 months ago)   on admission    -POCT glucose AC/HS  -LDSSI  -goal -180    Prolonged QT interval  Qtc on admision 570    -cardiac telemetry  -caution with QT prolonging medications    Elevated troponin I level  Resolved  Has elevated troponin at baseline 2/2 to ESRD  Troponin 0.497-->0.540-->0.48  Reports 2/10 CP worse with lying down  EKG is without changes concerning for ACS  Has markedly elevated BUN, uremic pericarditis on ddx    -EKG for new/worsened CP  -cardiac telemetry    Acute on chronic heart failure with preserved ejection fraction (HFpEF)  Patient is identified as having Diastolic (HFpEF) heart failure that is Chronic. CHF is currently controlled. Latest ECHO performed and demonstrates- Results for orders placed during the hospital encounter of 04/01/24    Echo    Result Date: 6/19/2024    Left Ventricle: The left ventricle is severely dilated. LVIDd is 6.0   cm. Normal wall thickness. There is eccentric hypertrophy. There is normal   systolic function. Ejection fraction by visual approximation is 55%. Grade   II diastolic dysfunction.    Right Ventricle: Normal right ventricular cavity size. Wall thickness   is normal. Systolic function is normal.    Left Atrium: Left atrium is severely dilated.    Aortic Valve: The aortic valve is a trileaflet valve.    Mitral Valve: Severely restricted posterior leaflet motion.  The mean   pressure gradient across the mitral valve is 7 mmHg at a heart rate of 77   bpm. There is severe regurgitation with a posterolateral eccentrically   directed jet.    Tricuspid Valve: There is mild regurgitation.    Pulmonary Artery: There is severe pulmonary hypertension. The estimated    pulmonary artery systolic pressure is 76 mmHg.    IVC/SVC: Elevated venous pressure at 15 mmHg.    Pericardium: There is a small effusion. No indication of cardiac   tamponade. On direct comparison with images from 4/23/2024, there is   slightly more fluid around the right atrium.         . Continue Furosemide and monitor clinical status closely. Monitor on telemetry. Patient is off CHF pathway.  Monitor strict Is&Os and daily weights.  Place on fluid restriction of 1.5 L. Cardiology has not been consulted. Continue to stress to patient importance of self efficacy and  on diet for CHF. Last BNP reviewed- and noted below   Recent Labs   Lab 06/18/24 0823   BNP 4,516*       Does not appear grossly volume overloaded    -Volume reduction via HD, will determine dialysis needs as OP to help determine future diuretic dosing    Class 2 obesity in adult  Body mass index is 20.93 kg/m². Morbid obesity complicates all aspects of disease management from diagnostic modalities to treatment. Weight loss encouraged and health benefits explained to patient.         Essential hypertension  BP elevated and labile on admission  120s-160s systolic  Appears to have midodrine ordered, likely for dialysis     -will hold antihypertensives for now pending nephrology recommendations regarding dialysis      VTE Risk Mitigation (From admission, onward)           Ordered     heparin (porcine) injection 1,000 Units  As needed (PRN)         06/21/24 0824     heparin (porcine) injection 5,000 Units  Every 8 hours         06/18/24 1427     IP VTE HIGH RISK PATIENT  Once         06/18/24 1427     Place sequential compression device  Until discontinued         06/18/24 1427                    Discharge Planning   RILEY: 6/25/2024     Code Status: DNR   Is the patient medically ready for discharge?:     Reason for patient still in hospital (select all that apply): Patient trending condition, Treatment, and Consult recommendations  Discharge  Plan A: Home Health                  Cydney Ball MD  Department of Hospital Medicine   [facility] ([place])

## 2024-06-22 NOTE — PLAN OF CARE
Problem: Adult Inpatient Plan of Care  Goal: Plan of Care Review  Outcome: Progressing  Flowsheets (Taken 6/22/2024 2941)  Plan of Care Reviewed With:   patient   child  Goal: Patient-Specific Goal (Individualized)  Outcome: Progressing  Goal: Absence of Hospital-Acquired Illness or Injury  Outcome: Progressing  Goal: Optimal Comfort and Wellbeing  Outcome: Progressing  Goal: Readiness for Transition of Care  Outcome: Progressing     Problem: Infection  Goal: Absence of Infection Signs and Symptoms  Outcome: Progressing  Intervention: Prevent or Manage Infection  Flowsheets (Taken 6/22/2024 1009)  Infection Management: aseptic technique maintained

## 2024-06-22 NOTE — NURSING
Pt temp 101.3 on call  Vianney LOZA notified new orders for tylenol, urine culture and blood cultures, MD stated ok to hold heparin for tonight and to put juany hose on patient as VTE

## 2024-06-22 NOTE — PROGRESS NOTES
[facility] ([place] [address])  Cardiology  Progress Note    Patient Name: Xena Vera  MRN: [id number]  Admission Date: 6/18/2024  Hospital Length of Stay: 4 days  Code Status: DNR   Attending Physician: Minda Molina MD   Primary Care Physician: Tami Villeda FNP  Expected Discharge Date: 6/25/2024  Principal Problem:Uremia    Subjective:     Hospital Course:   No notes on file    Interval History: NAEO. Patient is s/p hemodialysis with 1 L removed. She also urinated 3 times but this was not measured. She has no new complaints.    Objective:     Vital Signs (Most Recent):  Temp: 98.5 °F (36.9 °C) (06/22/24 0722)  Pulse: 73 (06/22/24 0722)  Resp: 18 (06/22/24 0722)  BP: 129/81 (06/22/24 0722)  SpO2: 96 % (06/22/24 0722) Vital Signs (24h Range):  Temp:  [97.8 °F (36.6 °C)-101.3 °F (38.5 °C)] 98.5 °F (36.9 °C)  Pulse:  [71-93] 73  Resp:  [16-18] 18  SpO2:  [94 %-97 %] 96 %  BP: (109-142)/(63-88) 129/81     Weight: 47 kg (103 lb 9.9 oz)  Body mass index is 20.93 kg/m².     SpO2: 96 %         Intake/Output Summary (Last 24 hours) at 6/22/2024 0910  Last data filed at 6/21/2024 1836  Gross per 24 hour   Intake 550 ml   Output 1553 ml   Net -1003 ml       Lines/Drains/Airways       Central Venous Catheter Line  Duration             Tunneled Central Line - Double Lumen  04/22/24 1317 Internal Jugular Right 60 days              Peripheral Intravenous Line  Duration                  Peripheral IV - Single Lumen 06/18/24 0837 22 G Right Antecubital 4 days         Peripheral IV - Single Lumen 06/18/24 1518 20 G Anterior;Left Hand 3 days                       Physical Exam  Vitals and nursing note reviewed.   Eyes:      Extraocular Movements: Extraocular movements intact.      Conjunctiva/sclera: Conjunctivae normal.   Cardiovascular:      Rate and Rhythm: Normal rate and regular rhythm.      Comments: JVP difficult to visualize  Pulmonary:      Effort: Pulmonary effort is normal.      Breath sounds: Normal breath  sounds. No rales.   Abdominal:      General: Bowel sounds are normal.      Palpations: Abdomen is soft.   Musculoskeletal:      Cervical back: Normal range of motion.   Skin:     General: Skin is warm and dry.   Neurological:      General: No focal deficit present.      Mental Status: She is alert and oriented to person, place, and time.            Significant Labs: All pertinent lab results from the last 24 hours have been reviewed.      Assessment and Plan:       Atrial premature contractions  Likely 2/2 metabolic derangements  Resolved      Pericardial effusion  Uremic vs volume related  Continue volume removal and repeat limited echo on Monday          VTE Risk Mitigation (From admission, onward)           Ordered     heparin (porcine) injection 1,000 Units  As needed (PRN)         06/21/24 0824     heparin (porcine) injection 5,000 Units  Every 8 hours         06/18/24 1427     IP VTE HIGH RISK PATIENT  Once         06/18/24 1427     Place sequential compression device  Until discontinued         06/18/24 1427                    Annette Dominguez MD  Cardiology  [facility] ([place])

## 2024-06-23 LAB
ALBUMIN SERPL BCP-MCNC: 2.4 G/DL (ref 3.5–5.2)
ALP SERPL-CCNC: 168 U/L (ref 55–135)
ALT SERPL W/O P-5'-P-CCNC: 49 U/L (ref 10–44)
ANION GAP SERPL CALC-SCNC: 8 MMOL/L (ref 8–16)
AST SERPL-CCNC: 39 U/L (ref 10–40)
BASOPHILS # BLD AUTO: 0 K/UL (ref 0–0.2)
BASOPHILS NFR BLD: 0 % (ref 0–1.9)
BILIRUB SERPL-MCNC: 2.9 MG/DL (ref 0.1–1)
BUN SERPL-MCNC: 39 MG/DL (ref 6–20)
CALCIUM SERPL-MCNC: 7.7 MG/DL (ref 8.7–10.5)
CHLORIDE SERPL-SCNC: 97 MMOL/L (ref 95–110)
CO2 SERPL-SCNC: 26 MMOL/L (ref 23–29)
CREAT SERPL-MCNC: 3.3 MG/DL (ref 0.5–1.4)
DIFFERENTIAL METHOD BLD: ABNORMAL
EOSINOPHIL # BLD AUTO: 0 K/UL (ref 0–0.5)
EOSINOPHIL NFR BLD: 0.4 % (ref 0–8)
ERYTHROCYTE [DISTWIDTH] IN BLOOD BY AUTOMATED COUNT: 25.1 % (ref 11.5–14.5)
EST. GFR  (NO RACE VARIABLE): 16 ML/MIN/1.73 M^2
GLUCOSE SERPL-MCNC: 79 MG/DL (ref 70–110)
HCT VFR BLD AUTO: 31.1 % (ref 37–48.5)
HGB BLD-MCNC: 9.5 G/DL (ref 12–16)
IMM GRANULOCYTES # BLD AUTO: 0.02 K/UL (ref 0–0.04)
IMM GRANULOCYTES NFR BLD AUTO: 0.2 % (ref 0–0.5)
LYMPHOCYTES # BLD AUTO: 0.6 K/UL (ref 1–4.8)
LYMPHOCYTES NFR BLD: 7.6 % (ref 18–48)
MAGNESIUM SERPL-MCNC: 1.7 MG/DL (ref 1.6–2.6)
MCH RBC QN AUTO: 27.5 PG (ref 27–31)
MCHC RBC AUTO-ENTMCNC: 30.5 G/DL (ref 32–36)
MCV RBC AUTO: 90 FL (ref 82–98)
MONOCYTES # BLD AUTO: 0.8 K/UL (ref 0.3–1)
MONOCYTES NFR BLD: 9.1 % (ref 4–15)
NEUTROPHILS # BLD AUTO: 6.9 K/UL (ref 1.8–7.7)
NEUTROPHILS NFR BLD: 82.7 % (ref 38–73)
NRBC BLD-RTO: 0 /100 WBC
PHOSPHATE SERPL-MCNC: 2.9 MG/DL (ref 2.7–4.5)
PLATELET # BLD AUTO: 132 K/UL (ref 150–450)
PMV BLD AUTO: 10.9 FL (ref 9.2–12.9)
POCT GLUCOSE: 114 MG/DL (ref 70–110)
POCT GLUCOSE: 129 MG/DL (ref 70–110)
POCT GLUCOSE: 81 MG/DL (ref 70–110)
POTASSIUM SERPL-SCNC: 3.7 MMOL/L (ref 3.5–5.1)
PROT SERPL-MCNC: 6.2 G/DL (ref 6–8.4)
RBC # BLD AUTO: 3.46 M/UL (ref 4–5.4)
SODIUM SERPL-SCNC: 131 MMOL/L (ref 136–145)
WBC # BLD AUTO: 8.31 K/UL (ref 3.9–12.7)

## 2024-06-23 PROCEDURE — 36415 COLL VENOUS BLD VENIPUNCTURE: CPT

## 2024-06-23 PROCEDURE — 80053 COMPREHEN METABOLIC PANEL: CPT

## 2024-06-23 PROCEDURE — 84100 ASSAY OF PHOSPHORUS: CPT

## 2024-06-23 PROCEDURE — 83735 ASSAY OF MAGNESIUM: CPT

## 2024-06-23 PROCEDURE — 99232 SBSQ HOSP IP/OBS MODERATE 35: CPT | Mod: ,,, | Performed by: INTERNAL MEDICINE

## 2024-06-23 PROCEDURE — 85025 COMPLETE CBC W/AUTO DIFF WBC: CPT

## 2024-06-23 PROCEDURE — 63600175 PHARM REV CODE 636 W HCPCS

## 2024-06-23 PROCEDURE — 25000003 PHARM REV CODE 250

## 2024-06-23 PROCEDURE — 21400001 HC TELEMETRY ROOM

## 2024-06-23 RX ORDER — POTASSIUM CHLORIDE 20 MEQ/1
40 TABLET, EXTENDED RELEASE ORAL ONCE
Status: COMPLETED | OUTPATIENT
Start: 2024-06-23 | End: 2024-06-23

## 2024-06-23 RX ADMIN — FLUTICASONE PROPIONATE 100 MCG: 50 SPRAY, METERED NASAL at 09:06

## 2024-06-23 RX ADMIN — HEPARIN SODIUM 5000 UNITS: 5000 INJECTION INTRAVENOUS; SUBCUTANEOUS at 10:06

## 2024-06-23 RX ADMIN — POTASSIUM CHLORIDE 40 MEQ: 1500 TABLET, EXTENDED RELEASE ORAL at 10:06

## 2024-06-23 RX ADMIN — FUROSEMIDE 80 MG: 10 INJECTION, SOLUTION INTRAVENOUS at 09:06

## 2024-06-23 RX ADMIN — ACETAMINOPHEN 650 MG: 325 TABLET ORAL at 10:06

## 2024-06-23 RX ADMIN — FAMOTIDINE 20 MG: 20 TABLET ORAL at 09:06

## 2024-06-23 RX ADMIN — MUPIROCIN: 20 OINTMENT TOPICAL at 09:06

## 2024-06-23 RX ADMIN — POLYETHYLENE GLYCOL 3350 17 G: 17 POWDER, FOR SOLUTION ORAL at 09:06

## 2024-06-23 RX ADMIN — HEPARIN SODIUM 5000 UNITS: 5000 INJECTION INTRAVENOUS; SUBCUTANEOUS at 01:06

## 2024-06-23 NOTE — SUBJECTIVE & OBJECTIVE
Interval History: NAEO.  Patient denies any chest pain, shortness of breath or any swelling in her legs.No dialysis overnight.    Objective:     Vital Signs (Most Recent):  Temp: 98.1 °F (36.7 °C) (06/23/24 0751)  Pulse: 84 (06/23/24 0751)  Resp: 18 (06/23/24 0751)  BP: 131/70 (06/23/24 0751)  SpO2: 96 % (06/23/24 0751) Vital Signs (24h Range):  Temp:  [98.1 °F (36.7 °C)-98.6 °F (37 °C)] 98.1 °F (36.7 °C)  Pulse:  [73-89] 84  Resp:  [18] 18  SpO2:  [92 %-96 %] 96 %  BP: (118-149)/(68-80) 131/70     Weight: 47 kg (103 lb 9.9 oz)  Body mass index is 20.93 kg/m².     SpO2: 96 %       No intake or output data in the 24 hours ending 06/23/24 1030      Lines/Drains/Airways       Central Venous Catheter Line  Duration             Tunneled Central Line - Double Lumen  04/22/24 1317 Internal Jugular Right 61 days              Peripheral Intravenous Line  Duration                  Peripheral IV - Single Lumen 06/18/24 0837 22 G Right Antecubital 5 days                       Physical Exam  Vitals and nursing note reviewed.   Eyes:      Extraocular Movements: Extraocular movements intact.      Conjunctiva/sclera: Conjunctivae normal.   Cardiovascular:      Rate and Rhythm: Normal rate and regular rhythm.      Comments: JVP difficult to visualize  Pulmonary:      Effort: Pulmonary effort is normal.      Breath sounds: Normal breath sounds. No rales.   Abdominal:      General: Bowel sounds are normal.      Palpations: Abdomen is soft.   Musculoskeletal:      Cervical back: Normal range of motion.   Skin:     General: Skin is warm and dry.   Neurological:      General: No focal deficit present.      Mental Status: She is alert and oriented to person, place, and time.            Significant Labs: All pertinent lab results from the last 24 hours have been reviewed.

## 2024-06-23 NOTE — PROGRESS NOTES
[facility] ([place] [address])  Cardiology  Progress Note    Patient Name: Xena Vera  MRN: [id number]  Admission Date: 6/18/2024  Hospital Length of Stay: 5 days  Code Status: DNR   Attending Physician: Minda Molina MD   Primary Care Physician: Tami Villeda FNP  Expected Discharge Date: 6/25/2024  Principal Problem:Uremia    Subjective:     Hospital Course:   No notes on file    Interval History: NAEO.  Patient denies any chest pain, shortness of breath or any swelling in her legs.No dialysis overnight.    Objective:     Vital Signs (Most Recent):  Temp: 98.1 °F (36.7 °C) (06/23/24 0751)  Pulse: 84 (06/23/24 0751)  Resp: 18 (06/23/24 0751)  BP: 131/70 (06/23/24 0751)  SpO2: 96 % (06/23/24 0751) Vital Signs (24h Range):  Temp:  [98.1 °F (36.7 °C)-98.6 °F (37 °C)] 98.1 °F (36.7 °C)  Pulse:  [73-89] 84  Resp:  [18] 18  SpO2:  [92 %-96 %] 96 %  BP: (118-149)/(68-80) 131/70     Weight: 47 kg (103 lb 9.9 oz)  Body mass index is 20.93 kg/m².     SpO2: 96 %       No intake or output data in the 24 hours ending 06/23/24 1030      Lines/Drains/Airways       Central Venous Catheter Line  Duration             Tunneled Central Line - Double Lumen  04/22/24 1317 Internal Jugular Right 61 days              Peripheral Intravenous Line  Duration                  Peripheral IV - Single Lumen 06/18/24 0837 22 G Right Antecubital 5 days                       Physical Exam  Vitals and nursing note reviewed.   Eyes:      Extraocular Movements: Extraocular movements intact.      Conjunctiva/sclera: Conjunctivae normal.   Cardiovascular:      Rate and Rhythm: Normal rate and regular rhythm.      Comments: JVP difficult to visualize  Pulmonary:      Effort: Pulmonary effort is normal.      Breath sounds: Normal breath sounds. No rales.   Abdominal:      General: Bowel sounds are normal.      Palpations: Abdomen is soft.   Musculoskeletal:      Cervical back: Normal range of motion.   Skin:     General: Skin is warm and dry.    Neurological:      General: No focal deficit present.      Mental Status: She is alert and oriented to person, place, and time.            Significant Labs: All pertinent lab results from the last 24 hours have been reviewed.          Assessment and Plan:       Atrial premature contractions  Likely 2/2 metabolic derangements  Resolved      Pericardial effusion  Uremic vs volume related  Continue volume removal and repeat limited echo on Monday          VTE Risk Mitigation (From admission, onward)           Ordered     heparin (porcine) injection 1,000 Units  As needed (PRN)         06/21/24 0824     heparin (porcine) injection 5,000 Units  Every 8 hours         06/18/24 1427     IP VTE HIGH RISK PATIENT  Once         06/18/24 1427     Place sequential compression device  Until discontinued         06/18/24 1427                    Layla Zelaya DO  Cardiology  [facility] ([place])

## 2024-06-23 NOTE — ASSESSMENT & PLAN NOTE
Creatine stable for now. BMP reviewed- noted Estimated Creatinine Clearance: 14.5 mL/min (A) (based on SCr of 3.3 mg/dL (H)). according to latest data. Based on current GFR, CKD stage is end stage.  Monitor UOP and serial BMP and adjust therapy as needed. Renally dose meds. Avoid nephrotoxic medications and procedures.    -Neprhology consulted, assisting with dialysis frequency  -RFP, Mg daily

## 2024-06-23 NOTE — ASSESSMENT & PLAN NOTE
Atrial premature contractions  Noted as small on POOJA 6/19, however on CT AP 6/20, noted to be moderate in size w/ some heterogeneity. In setting of intermittent asymptomatic bradycardia, concern that effusion may be producing tamponade physiology. Cardiology consulted. Bradycardia is suspected blocked atrial premature contraction, so no need for EP eval currently.     - Limited TTE to re-assess pericardial effusion size - obtain 6/24  - Volume removal via HD and Lasix 80mg IV q12h  - Avoid AV garett blockers   - Telemetry  - Reach out to cardiology is symptomatic bradycardia or chest pain noted

## 2024-06-23 NOTE — SUBJECTIVE & OBJECTIVE
Interval History: NAEON, VSS. No I/O recorded, however states she has had robust UOP. No CP. Throat pain improving.     Review of Systems   Constitutional:  Negative for activity change, chills and fever.   Respiratory:  Negative for shortness of breath.    Cardiovascular:  Positive for leg swelling. Negative for chest pain.   Gastrointestinal:  Negative for abdominal distention, abdominal pain, constipation, nausea and vomiting.   Musculoskeletal:  Negative for arthralgias.   Psychiatric/Behavioral:  Negative for agitation, behavioral problems and confusion.      Objective:     Vital Signs (Most Recent):  Temp: 98.1 °F (36.7 °C) (06/23/24 0751)  Pulse: 84 (06/23/24 0751)  Resp: 18 (06/23/24 0751)  BP: 131/70 (06/23/24 0751)  SpO2: 96 % (06/23/24 0751) Vital Signs (24h Range):  Temp:  [98.1 °F (36.7 °C)-98.6 °F (37 °C)] 98.1 °F (36.7 °C)  Pulse:  [73-89] 84  Resp:  [18] 18  SpO2:  [92 %-96 %] 96 %  BP: (118-149)/(68-80) 131/70     Weight: 47 kg (103 lb 9.9 oz)  Body mass index is 20.93 kg/m².  No intake or output data in the 24 hours ending 06/23/24 1024      Physical Exam  Constitutional:       General: She is not in acute distress.     Appearance: She is not ill-appearing.   HENT:      Head: Normocephalic and atraumatic.      Right Ear: External ear normal.      Left Ear: External ear normal.      Nose: Nose normal.      Mouth/Throat:      Mouth: Mucous membranes are moist.      Pharynx: Oropharynx is clear.   Eyes:      General: No scleral icterus.     Extraocular Movements: Extraocular movements intact.   Cardiovascular:      Rate and Rhythm: Normal rate and regular rhythm.      Pulses: Normal pulses.      Heart sounds: Murmur heard.   Pulmonary:      Effort: Pulmonary effort is normal.      Breath sounds: Normal breath sounds. No wheezing, rhonchi or rales.   Abdominal:      General: Abdomen is flat.      Palpations: Abdomen is soft.      Tenderness: There is no abdominal tenderness.   Musculoskeletal:          General: No swelling. Normal range of motion.      Cervical back: Normal range of motion and neck supple.   Skin:     General: Skin is warm and dry.      Capillary Refill: Capillary refill takes less than 2 seconds.   Neurological:      General: No focal deficit present.      Mental Status: She is alert and oriented to person, place, and time.   Psychiatric:         Mood and Affect: Mood normal.         Behavior: Behavior normal.             Significant Labs: All pertinent labs within the past 24 hours have been reviewed.  CBC:   Recent Labs   Lab 06/22/24  0406 06/23/24  0527   WBC 8.10 8.31   HGB 9.1* 9.5*   HCT 30.9* 31.1*   * 132*     CMP:   Recent Labs   Lab 06/22/24  0406 06/23/24  0527   * 131*   K 3.2* 3.7    97   CO2 20* 26   * 79   BUN 30* 39*   CREATININE 2.6* 3.3*   CALCIUM 7.8* 7.7*   PROT 6.0 6.2   ALBUMIN 2.5* 2.4*   BILITOT 2.8* 2.9*   ALKPHOS 167* 168*   AST 44* 39   ALT 59* 49*   ANIONGAP 11 8       Significant Imaging: I have reviewed all pertinent imaging results/findings within the past 24 hours.

## 2024-06-23 NOTE — PROGRESS NOTES
"[facility] ([address] Medicine  Progress Note    Patient Name: Xena Vera  MRN: [id number]  Patient Class: IP- Inpatient   Admission Date: 6/18/2024  Length of Stay: 5 days  Attending Physician: Minda Molina MD  Primary Care Provider: Tami Villeda FNP        Subjective:     Principal Problem:Uremia        HPI:  Patient is a 53F PMHx HFpEF (Lasix 80 bid), severe pHTN, CKD5 (last HD in May 2024), HTN, MR, anemia presents for chest pain that began this morning. She reports 2/10 central sqeezing chest pain that radiates to her R shoulder when vomiting. The pain is worse with walking. She has never had chest pain before. She has had nausea/vomiting x1 day. Reports approx 3 episodes of clear vomitus, describes as "phlegm." She has associated sharp upper abdominal pain when vomiting. Additionally reports arthralgias/myalgias, leg swelling, generalized weakness, and fatigue. She had a brief episode of dizziness yesterday when walking but has not had that since. She denies Fever/chills, SOB, upper respiratory symptoms, cough, dysuria, decreased urine, or known sick contacts.     She reports adherence to her fluid restriction & Lasix. Admitted to [facility] from 4/1/24-5/8/24 where renal function declined while being diuresed. Required CRRT with intermittent levophed. Admitted to [facility] 5/13-5/17 after presenting to ED for HD. Not enrolled in medicaid at that time and had been told to present to ED for HD needs. No indications for HD on that admission per chart review. At that time nephrology recommended urgent evaluation with vascular surgery outpatient for AVF placement. Venous mapping obtained.    In ED, somewhat hypertensive (sBP 150s), intermittent bradycardia, afebrile, on RA. CMP with profound uremia (), Cr 5.6, hypokalemia (K 2.8), no hyponatremia. Transaminitis AST//133 with T. Bili 4.3. Lipase elevated at 230. RUQ US w/o CBD dilation or evidence of scarring, surgically " absent gallbladder. CBC with thrombocytopenia (PLT 90), no leukocytosis, stable anemia (Hb 11.5). BNP 4500, Troponin 0.497 which increased to 0.54. LA normal at 1.5. EKG with peaked T waves in II/III/AVF, widened QRS, Qtc 573. Nephrology consulted.     Interpretor was used for entire interview/examination:  number [id number]    Overview/Hospital Course:  Admitted to [facility] 6/18/[facility] for chest pain, nausea, and vomiting. Found to have profound uremia and hypokalemia. Nephrology consulted and performed emergent HD. CP resolved following HD, residual epigastric pain w/o nausea/vomiting. TTE 6/19 with preserved EF, normal RV systolic function, severe MR, pHTN, and CVP 15. Diuresis with IV lasix in conjunction with HD. LFTs downtrended with volume removal.     Interval History: NAEON, VSS. No I/O recorded, however states she has had robust UOP. No CP. Throat pain improving.     Review of Systems   Constitutional:  Negative for activity change, chills and fever.   Respiratory:  Negative for shortness of breath.    Cardiovascular:  Positive for leg swelling. Negative for chest pain.   Gastrointestinal:  Negative for abdominal distention, abdominal pain, constipation, nausea and vomiting.   Musculoskeletal:  Negative for arthralgias.   Psychiatric/Behavioral:  Negative for agitation, behavioral problems and confusion.      Objective:     Vital Signs (Most Recent):  Temp: 98.1 °F (36.7 °C) (06/23/24 0751)  Pulse: 84 (06/23/24 0751)  Resp: 18 (06/23/24 0751)  BP: 131/70 (06/23/24 0751)  SpO2: 96 % (06/23/24 0751) Vital Signs (24h Range):  Temp:  [98.1 °F (36.7 °C)-98.6 °F (37 °C)] 98.1 °F (36.7 °C)  Pulse:  [73-89] 84  Resp:  [18] 18  SpO2:  [92 %-96 %] 96 %  BP: (118-149)/(68-80) 131/70     Weight: 47 kg (103 lb 9.9 oz)  Body mass index is 20.93 kg/m².  No intake or output data in the 24 hours ending 06/23/24 1024      Physical Exam  Constitutional:       General: She is not in acute distress.     Appearance: She is not  ill-appearing.   HENT:      Head: Normocephalic and atraumatic.      Right Ear: External ear normal.      Left Ear: External ear normal.      Nose: Nose normal.      Mouth/Throat:      Mouth: Mucous membranes are moist.      Pharynx: Oropharynx is clear.   Eyes:      General: No scleral icterus.     Extraocular Movements: Extraocular movements intact.   Cardiovascular:      Rate and Rhythm: Normal rate and regular rhythm.      Pulses: Normal pulses.      Heart sounds: Murmur heard.   Pulmonary:      Effort: Pulmonary effort is normal.      Breath sounds: Normal breath sounds. No wheezing, rhonchi or rales.   Abdominal:      General: Abdomen is flat.      Palpations: Abdomen is soft.      Tenderness: There is no abdominal tenderness.   Musculoskeletal:         General: No swelling. Normal range of motion.      Cervical back: Normal range of motion and neck supple.   Skin:     General: Skin is warm and dry.      Capillary Refill: Capillary refill takes less than 2 seconds.   Neurological:      General: No focal deficit present.      Mental Status: She is alert and oriented to person, place, and time.   Psychiatric:         Mood and Affect: Mood normal.         Behavior: Behavior normal.             Significant Labs: All pertinent labs within the past 24 hours have been reviewed.  CBC:   Recent Labs   Lab 06/22/24  0406 06/23/24  0527   WBC 8.10 8.31   HGB 9.1* 9.5*   HCT 30.9* 31.1*   * 132*     CMP:   Recent Labs   Lab 06/22/24  0406 06/23/24  0527   * 131*   K 3.2* 3.7    97   CO2 20* 26   * 79   BUN 30* 39*   CREATININE 2.6* 3.3*   CALCIUM 7.8* 7.7*   PROT 6.0 6.2   ALBUMIN 2.5* 2.4*   BILITOT 2.8* 2.9*   ALKPHOS 167* 168*   AST 44* 39   ALT 59* 49*   ANIONGAP 11 8       Significant Imaging: I have reviewed all pertinent imaging results/findings within the past 24 hours.    Assessment/Plan:      * Uremia  Improving  2/2 to ESRD. AG 18. Patient AAOx3 on exam  +nausea, chest pain, labile  HR  No steph bleeding noted  VBG with pH 7.44.     -HD per nephro  -daily CMP    Pericardial effusion  Atrial premature contractions  Noted as small on POOJA 6/19, however on CT AP 6/20, noted to be moderate in size w/ some heterogeneity. In setting of intermittent asymptomatic bradycardia, concern that effusion may be producing tamponade physiology. Cardiology consulted. Bradycardia is suspected blocked atrial premature contraction, so no need for EP eval currently.     - Limited TTE to re-assess pericardial effusion size - obtain 6/24  - Volume removal via HD and Lasix 80mg IV q12h  - Avoid AV garett blockers   - Telemetry  - Reach out to cardiology is symptomatic bradycardia or chest pain noted      ESRD (end stage renal disease) on dialysis  Creatine stable for now. BMP reviewed- noted Estimated Creatinine Clearance: 14.5 mL/min (A) (based on SCr of 3.3 mg/dL (H)). according to latest data. Based on current GFR, CKD stage is end stage.  Monitor UOP and serial BMP and adjust therapy as needed. Renally dose meds. Avoid nephrotoxic medications and procedures.    -Neprhology consulted, assisting with dialysis frequency  -RFP, Mg daily      Hyperbilirubinemia  Direct hyperbilirubinemia on admission w/ AST/ALT elevation. Liver US not reporting dilated bile ducts  No new medications identified for DILI. Hepatitis panel negative.   Concern for congestive hepatopathy due to volume overload from HFpEF    Liver enzymes and total bilirubin remain elevated despite 3 days of dialysis  Also with pancreatitis    -Hold statin  -neprhology consulted, pending HD reqs    Chest pain  RESOLVED. CP substernal, 2/10, but mostly with lying flat. Worse with exertion/better with rest  Troponin 0.497-->0.540, BNP 4500 (chronic)  EKG is without changes concerning for ACS  HOWARD score 2, Alannah 109    -cardiac monitoring    Atrial premature contractions  See Pericardial Effusion      Cough  New, dry, non productive  A/w post nasal drip  Satting  well RA    -continue to monitor  -Flonase  -tessalon perles      Prediabetes  Last A1c 6.0 (2 months ago)   on admission    -POCT glucose AC/HS  -LDSSI  -goal -180    Prolonged QT interval  Qtc on admision 570    -cardiac telemetry  -caution with QT prolonging medications    Elevated troponin I level  Resolved  Has elevated troponin at baseline 2/2 to ESRD  Troponin 0.497-->0.540-->0.48  Reports 2/10 CP worse with lying down  EKG is without changes concerning for ACS  Has markedly elevated BUN, uremic pericarditis on ddx    -EKG for new/worsened CP  -cardiac telemetry    Acute on chronic heart failure with preserved ejection fraction (HFpEF)  Patient is identified as having Diastolic (HFpEF) heart failure that is Chronic. CHF is currently controlled. Latest ECHO performed and demonstrates- Results for orders placed during the hospital encounter of 04/01/24    Echo    Result Date: 6/19/2024    Left Ventricle: The left ventricle is severely dilated. LVIDd is 6.0   cm. Normal wall thickness. There is eccentric hypertrophy. There is normal   systolic function. Ejection fraction by visual approximation is 55%. Grade   II diastolic dysfunction.    Right Ventricle: Normal right ventricular cavity size. Wall thickness   is normal. Systolic function is normal.    Left Atrium: Left atrium is severely dilated.    Aortic Valve: The aortic valve is a trileaflet valve.    Mitral Valve: Severely restricted posterior leaflet motion.  The mean   pressure gradient across the mitral valve is 7 mmHg at a heart rate of 77   bpm. There is severe regurgitation with a posterolateral eccentrically   directed jet.    Tricuspid Valve: There is mild regurgitation.    Pulmonary Artery: There is severe pulmonary hypertension. The estimated   pulmonary artery systolic pressure is 76 mmHg.    IVC/SVC: Elevated venous pressure at 15 mmHg.    Pericardium: There is a small effusion. No indication of cardiac   tamponade. On direct  comparison with images from 4/23/2024, there is   slightly more fluid around the right atrium.         . Continue Furosemide and monitor clinical status closely. Monitor on telemetry. Patient is off CHF pathway.  Monitor strict Is&Os and daily weights.  Place on fluid restriction of 1.5 L. Cardiology has not been consulted. Continue to stress to patient importance of self efficacy and  on diet for CHF. Last BNP reviewed- and noted below   Recent Labs   Lab 06/18/24  0823   BNP 4,516*       Does not appear grossly volume overloaded    -Volume reduction via HD, will determine dialysis needs as OP to help determine future diuretic dosing    Class 2 obesity in adult  Body mass index is 20.93 kg/m². Morbid obesity complicates all aspects of disease management from diagnostic modalities to treatment. Weight loss encouraged and health benefits explained to patient.         Essential hypertension  BP elevated and labile on admission  120s-160s systolic  Appears to have midodrine ordered, likely for dialysis     -will hold antihypertensives for now pending nephrology recommendations regarding dialysis      VTE Risk Mitigation (From admission, onward)           Ordered     heparin (porcine) injection 1,000 Units  As needed (PRN)         06/21/24 0824     heparin (porcine) injection 5,000 Units  Every 8 hours         06/18/24 1427     IP VTE HIGH RISK PATIENT  Once         06/18/24 1427     Place sequential compression device  Until discontinued         06/18/24 1427                    Discharge Planning   RILEY: 6/25/2024     Code Status: DNR   Is the patient medically ready for discharge?:     Reason for patient still in hospital (select all that apply): Patient trending condition, Treatment, and Consult recommendations  Discharge Plan A: Home Health                  Cydney Ball MD  Department of Hospital Medicine   [facility] ([place]

## 2024-06-24 LAB
ALBUMIN SERPL BCP-MCNC: 2.5 G/DL (ref 3.5–5.2)
ALP SERPL-CCNC: 176 U/L (ref 55–135)
ALT SERPL W/O P-5'-P-CCNC: 43 U/L (ref 10–44)
ANION GAP SERPL CALC-SCNC: 14 MMOL/L (ref 8–16)
ASCENDING AORTA: 3.29 CM
AST SERPL-CCNC: 36 U/L (ref 10–40)
AV INDEX (PROSTH): 0.81
AV MEAN GRADIENT: 6 MMHG
AV PEAK GRADIENT: 7 MMHG
AV VALVE AREA BY VELOCITY RATIO: 1.88 CM²
AV VALVE AREA: 2.55 CM²
AV VELOCITY RATIO: 0.59
BASOPHILS # BLD AUTO: 0.01 K/UL (ref 0–0.2)
BASOPHILS NFR BLD: 0.1 % (ref 0–1.9)
BILIRUB SERPL-MCNC: 2.7 MG/DL (ref 0.1–1)
BSA FOR ECHO PROCEDURE: 1.39 M2
BUN SERPL-MCNC: 52 MG/DL (ref 6–20)
CALCIUM SERPL-MCNC: 8.1 MG/DL (ref 8.7–10.5)
CHLORIDE SERPL-SCNC: 96 MMOL/L (ref 95–110)
CO2 SERPL-SCNC: 22 MMOL/L (ref 23–29)
CREAT SERPL-MCNC: 3.5 MG/DL (ref 0.5–1.4)
CV ECHO LV RWT: 0.36 CM
DIFFERENTIAL METHOD BLD: ABNORMAL
DOP CALC AO PEAK VEL: 1.33 M/S
DOP CALC AO VTI: 20.1 CM
DOP CALC LVOT AREA: 3.2 CM2
DOP CALC LVOT DIAMETER: 2.01 CM
DOP CALC LVOT PEAK VEL: 0.79 M/S
DOP CALC LVOT STROKE VOLUME: 51.35 CM3
DOP CALCLVOT PEAK VEL VTI: 16.19 CM
E WAVE DECELERATION TIME: 288.8 MSEC
E/A RATIO: 1.18
E/E' RATIO: 30.8 M/S
ECHO LV POSTERIOR WALL: 0.94 CM (ref 0.6–1.1)
EOSINOPHIL # BLD AUTO: 0 K/UL (ref 0–0.5)
EOSINOPHIL NFR BLD: 0.4 % (ref 0–8)
ERYTHROCYTE [DISTWIDTH] IN BLOOD BY AUTOMATED COUNT: 24.7 % (ref 11.5–14.5)
EST. GFR  (NO RACE VARIABLE): 14.9 ML/MIN/1.73 M^2
FRACTIONAL SHORTENING: 23 % (ref 28–44)
GLUCOSE SERPL-MCNC: 97 MG/DL (ref 70–110)
HCT VFR BLD AUTO: 32.6 % (ref 37–48.5)
HGB BLD-MCNC: 9.5 G/DL (ref 12–16)
IMM GRANULOCYTES # BLD AUTO: 0.03 K/UL (ref 0–0.04)
IMM GRANULOCYTES NFR BLD AUTO: 0.4 % (ref 0–0.5)
INTERVENTRICULAR SEPTUM: 0.67 CM (ref 0.6–1.1)
LA MAJOR: 7.25 CM
LA MINOR: 6.53 CM
LA WIDTH: 4.53 CM
LEFT ATRIUM SIZE: 5.54 CM
LEFT ATRIUM VOLUME INDEX: 105.4 ML/M2
LEFT ATRIUM VOLUME: 146.57 CM3
LEFT INTERNAL DIMENSION IN SYSTOLE: 4.04 CM (ref 2.1–4)
LEFT VENTRICLE DIASTOLIC VOLUME INDEX: 95.27 ML/M2
LEFT VENTRICLE DIASTOLIC VOLUME: 132.43 ML
LEFT VENTRICLE MASS INDEX: 107 G/M2
LEFT VENTRICLE SYSTOLIC VOLUME INDEX: 51.6 ML/M2
LEFT VENTRICLE SYSTOLIC VOLUME: 71.68 ML
LEFT VENTRICULAR INTERNAL DIMENSION IN DIASTOLE: 5.25 CM (ref 3.5–6)
LEFT VENTRICULAR MASS: 148.8 G
LV LATERAL E/E' RATIO: 38.5 M/S
LV SEPTAL E/E' RATIO: 25.67 M/S
LYMPHOCYTES # BLD AUTO: 0.6 K/UL (ref 1–4.8)
LYMPHOCYTES NFR BLD: 8.1 % (ref 18–48)
MAGNESIUM SERPL-MCNC: 1.7 MG/DL (ref 1.6–2.6)
MCH RBC QN AUTO: 26.8 PG (ref 27–31)
MCHC RBC AUTO-ENTMCNC: 29.1 G/DL (ref 32–36)
MCV RBC AUTO: 92 FL (ref 82–98)
MONOCYTES # BLD AUTO: 0.7 K/UL (ref 0.3–1)
MONOCYTES NFR BLD: 8.8 % (ref 4–15)
MV PEAK A VEL: 1.3 M/S
MV PEAK E VEL: 1.54 M/S
MV STENOSIS PRESSURE HALF TIME: 83.75 MS
MV VALVE AREA P 1/2 METHOD: 2.63 CM2
NEUTROPHILS # BLD AUTO: 6.5 K/UL (ref 1.8–7.7)
NEUTROPHILS NFR BLD: 82.2 % (ref 38–73)
NRBC BLD-RTO: 0 /100 WBC
OHS LV EJECTION FRACTION SIMPSONS BIPLANE MOD: 60 %
PHOSPHATE SERPL-MCNC: 2.5 MG/DL (ref 2.7–4.5)
PISA TR MAX VEL: 3.46 M/S
PLATELET # BLD AUTO: 176 K/UL (ref 150–450)
PMV BLD AUTO: 11 FL (ref 9.2–12.9)
POCT GLUCOSE: 122 MG/DL (ref 70–110)
POCT GLUCOSE: 136 MG/DL (ref 70–110)
POCT GLUCOSE: 81 MG/DL (ref 70–110)
POTASSIUM SERPL-SCNC: 3.8 MMOL/L (ref 3.5–5.1)
PROT SERPL-MCNC: 6.7 G/DL (ref 6–8.4)
RA MAJOR: 4.13 CM
RA PRESSURE ESTIMATED: 8 MMHG
RA WIDTH: 2.57 CM
RBC # BLD AUTO: 3.55 M/UL (ref 4–5.4)
RIGHT VENTRICLE DIASTOLIC BASEL DIMENSION: 3.1 CM
RV TB RVSP: 11 MMHG
SINUS: 2.52 CM
SODIUM SERPL-SCNC: 132 MMOL/L (ref 136–145)
STJ: 2.13 CM
TDI LATERAL: 0.04 M/S
TDI SEPTAL: 0.06 M/S
TDI: 0.05 M/S
TR MAX PG: 48 MMHG
TRICUSPID ANNULAR PLANE SYSTOLIC EXCURSION: 1.98 CM
TV REST PULMONARY ARTERY PRESSURE: 56 MMHG
WBC # BLD AUTO: 7.88 K/UL (ref 3.9–12.7)
Z-SCORE OF LEFT VENTRICULAR DIMENSION IN END DIASTOLE: 1.91
Z-SCORE OF LEFT VENTRICULAR DIMENSION IN END SYSTOLE: 3.2

## 2024-06-24 PROCEDURE — 63600175 PHARM REV CODE 636 W HCPCS

## 2024-06-24 PROCEDURE — 21400001 HC TELEMETRY ROOM

## 2024-06-24 PROCEDURE — 83735 ASSAY OF MAGNESIUM: CPT

## 2024-06-24 PROCEDURE — 80053 COMPREHEN METABOLIC PANEL: CPT

## 2024-06-24 PROCEDURE — 25000003 PHARM REV CODE 250

## 2024-06-24 PROCEDURE — 36415 COLL VENOUS BLD VENIPUNCTURE: CPT

## 2024-06-24 PROCEDURE — 94761 N-INVAS EAR/PLS OXIMETRY MLT: CPT

## 2024-06-24 PROCEDURE — 90935 HEMODIALYSIS ONE EVALUATION: CPT | Mod: ,,, | Performed by: NURSE PRACTITIONER

## 2024-06-24 PROCEDURE — 63600175 PHARM REV CODE 636 W HCPCS: Performed by: NURSE PRACTITIONER

## 2024-06-24 PROCEDURE — 84100 ASSAY OF PHOSPHORUS: CPT

## 2024-06-24 PROCEDURE — 90935 HEMODIALYSIS ONE EVALUATION: CPT

## 2024-06-24 PROCEDURE — 85025 COMPLETE CBC W/AUTO DIFF WBC: CPT

## 2024-06-24 RX ORDER — ATORVASTATIN CALCIUM 40 MG/1
40 TABLET, FILM COATED ORAL NIGHTLY
Status: DISCONTINUED | OUTPATIENT
Start: 2024-06-24 | End: 2024-06-24 | Stop reason: HOSPADM

## 2024-06-24 RX ORDER — MIDODRINE HYDROCHLORIDE 2.5 MG/1
2.5 TABLET ORAL
Qty: 30 TABLET | Refills: 11 | Status: SHIPPED | OUTPATIENT
Start: 2024-06-24 | End: 2025-06-24

## 2024-06-24 RX ORDER — FUROSEMIDE 20 MG/1
80 TABLET ORAL DAILY
Status: DISCONTINUED | OUTPATIENT
Start: 2024-06-25 | End: 2024-06-25

## 2024-06-24 RX ORDER — LOPERAMIDE HYDROCHLORIDE 2 MG/1
2 CAPSULE ORAL 2 TIMES DAILY PRN
Status: DISCONTINUED | OUTPATIENT
Start: 2024-06-24 | End: 2024-07-03 | Stop reason: HOSPADM

## 2024-06-24 RX ADMIN — LOPERAMIDE HYDROCHLORIDE 2 MG: 2 CAPSULE ORAL at 08:06

## 2024-06-24 RX ADMIN — HEPARIN SODIUM 5000 UNITS: 5000 INJECTION INTRAVENOUS; SUBCUTANEOUS at 02:06

## 2024-06-24 RX ADMIN — HEPARIN SODIUM 1000 UNITS: 1000 INJECTION, SOLUTION INTRAVENOUS; SUBCUTANEOUS at 11:06

## 2024-06-24 RX ADMIN — FLUTICASONE PROPIONATE 100 MCG: 50 SPRAY, METERED NASAL at 02:06

## 2024-06-24 RX ADMIN — HEPARIN SODIUM 5000 UNITS: 5000 INJECTION INTRAVENOUS; SUBCUTANEOUS at 06:06

## 2024-06-24 NOTE — PROGRESS NOTES
"[facility]urg ([address] Medicine  Progress Note    Patient Name: Xena Vera  MRN: [id number]  Patient Class: IP- Inpatient   Admission Date: 6/18/2024  Length of Stay: 6 days  Attending Physician: Minda Molina MD  Primary Care Provider: Tami Villeda FNP        Subjective:     Principal Problem:Uremia        HPI:  Patient is a 53F PMHx HFpEF (Lasix 80 bid), severe pHTN, CKD5 (last HD in May 2024), HTN, MR, anemia presents for chest pain that began this morning. She reports 2/10 central sqeezing chest pain that radiates to her R shoulder when vomiting. The pain is worse with walking. She has never had chest pain before. She has had nausea/vomiting x1 day. Reports approx 3 episodes of clear vomitus, describes as "phlegm." She has associated sharp upper abdominal pain when vomiting. Additionally reports arthralgias/myalgias, leg swelling, generalized weakness, and fatigue. She had a brief episode of dizziness yesterday when walking but has not had that since. She denies Fever/chills, SOB, upper respiratory symptoms, cough, dysuria, decreased urine, or known sick contacts.     She reports adherence to her fluid restriction & Lasix. Admitted to [facility] from 4/1/24-5/8/24 where renal function declined while being diuresed. Required CRRT with intermittent levophed. Admitted to [facility] 5/13-5/17 after presenting to ED for HD. Not enrolled in medicaid at that time and had been told to present to ED for HD needs. No indications for HD on that admission per chart review. At that time nephrology recommended urgent evaluation with vascular surgery outpatient for AVF placement. Venous mapping obtained.    In ED, somewhat hypertensive (sBP 150s), intermittent bradycardia, afebrile, on RA. CMP with profound uremia (), Cr 5.6, hypokalemia (K 2.8), no hyponatremia. Transaminitis AST//133 with T. Bili 4.3. Lipase elevated at 230. RUQ US w/o CBD dilation or evidence of scarring, surgically " absent gallbladder. CBC with thrombocytopenia (PLT 90), no leukocytosis, stable anemia (Hb 11.5). BNP 4500, Troponin 0.497 which increased to 0.54. LA normal at 1.5. EKG with peaked T waves in II/III/AVF, widened QRS, Qtc 573. Nephrology consulted.     Interpretor was used for entire interview/examination:  number [id number]    Overview/Hospital Course:  Admitted to [facility] 6/18/24 for chest pain, nausea, and vomiting. Found to have profound uremia and hypokalemia. Nephrology consulted and performed emergent HD. CP resolved following HD, residual epigastric pain w/o nausea/vomiting. TTE 6/19 with preserved EF, normal RV systolic function, severe MR, pHTN, and CVP 15. Diuresis with IV lasix in conjunction with HD. LFTs downtrended with volume removal. Noted to have moderate pericardial effusion on 6/22, therefore Cardiology consulted and recommended continued diuresis. Repeat limited TTE performed 6/24.     Sent home with Lasix 40mg qd, HD regimen MWF. Outpatient PCP and Nephrology follow-up    Interval History: ZAIDA BLACKWELL. HD and TTE today. Resolved URI symptoms, PIV pain resolved.     Review of Systems   Constitutional:  Negative for activity change, chills and fever.   Respiratory:  Negative for shortness of breath.    Cardiovascular:  Positive for leg swelling. Negative for chest pain.   Gastrointestinal:  Negative for abdominal distention, abdominal pain, constipation, nausea and vomiting.   Musculoskeletal:  Negative for arthralgias.   Psychiatric/Behavioral:  Negative for agitation, behavioral problems and confusion.      Objective:     Vital Signs (Most Recent):  Temp: 97.9 °F (36.6 °C) (06/24/24 0749)  Pulse: 94 (06/24/24 0749)  Resp: 18 (06/24/24 0749)  BP: 123/78 (06/24/24 0749)  SpO2: 97 % (06/24/24 0749) Vital Signs (24h Range):  Temp:  [97.6 °F (36.4 °C)-99.3 °F (37.4 °C)] 97.9 °F (36.6 °C)  Pulse:  [82-94] 94  Resp:  [18] 18  SpO2:  [95 %-98 %] 97 %  BP: (111-132)/(76-83) 123/78     Weight: 47 kg  (103 lb 9.9 oz)  Body mass index is 20.93 kg/m².    Intake/Output Summary (Last 24 hours) at 6/24/2024 0888  Last data filed at 6/24/2024 0425  Gross per 24 hour   Intake --   Output 800 ml   Net -800 ml         Physical Exam  Constitutional:       General: She is not in acute distress.     Appearance: She is not ill-appearing.   HENT:      Head: Normocephalic and atraumatic.      Right Ear: External ear normal.      Left Ear: External ear normal.      Nose: Nose normal.      Mouth/Throat:      Mouth: Mucous membranes are moist.      Pharynx: Oropharynx is clear.   Eyes:      General: No scleral icterus.     Extraocular Movements: Extraocular movements intact.   Cardiovascular:      Rate and Rhythm: Normal rate and regular rhythm.      Pulses: Normal pulses.      Heart sounds: Murmur heard.   Pulmonary:      Effort: Pulmonary effort is normal.      Breath sounds: Normal breath sounds. No wheezing, rhonchi or rales.   Abdominal:      General: Abdomen is flat.      Palpations: Abdomen is soft.      Tenderness: There is no abdominal tenderness.   Musculoskeletal:         General: No swelling. Normal range of motion.      Cervical back: Normal range of motion and neck supple.   Skin:     General: Skin is warm and dry.      Capillary Refill: Capillary refill takes less than 2 seconds.   Neurological:      General: No focal deficit present.      Mental Status: She is alert and oriented to person, place, and time.   Psychiatric:         Mood and Affect: Mood normal.         Behavior: Behavior normal.             Significant Labs: All pertinent labs within the past 24 hours have been reviewed.  CBC:   Recent Labs   Lab 06/23/24  0527 06/24/24  0255   WBC 8.31 7.88   HGB 9.5* 9.5*   HCT 31.1* 32.6*   * 176     CMP:   Recent Labs   Lab 06/23/24  0527 06/24/24  0255   * 132*   K 3.7 3.8   CL 97 96   CO2 26 22*   GLU 79 97   BUN 39* 52*   CREATININE 3.3* 3.5*   CALCIUM 7.7* 8.1*   PROT 6.2 6.7   ALBUMIN 2.4* 2.5*    BILITOT 2.9* 2.7*   ALKPHOS 168* 176*   AST 39 36   ALT 49* 43   ANIONGAP 8 14       Significant Imaging: I have reviewed all pertinent imaging results/findings within the past 24 hours.    Assessment/Plan:      * Uremia  Improving  2/2 to ESRD. AG 18. Patient AAOx3 on exam  +nausea, chest pain, labile HR  No steph bleeding noted  VBG with pH 7.44.     -HD per nephro  -daily CMP    Pericardial effusion  Atrial premature contractions  Noted as small on POOJA 6/19, however on CT AP 6/20, noted to be moderate in size w/ some heterogeneity. In setting of intermittent asymptomatic bradycardia, concern that effusion may be producing tamponade physiology. Cardiology consulted. Bradycardia is suspected blocked atrial premature contraction, so no need for EP eval currently.     - Limited TTE to re-assess pericardial effusion size - obtain 6/24 (pending results)  - Volume removal via HD and Lasix 80mg IV q12h  - Avoid AV garett blockers   - Telemetry  - Reach out to cardiology is symptomatic bradycardia or chest pain noted      ESRD (end stage renal disease) on dialysis  Creatine stable for now. BMP reviewed- noted Estimated Creatinine Clearance: 14.5 mL/min (A) (based on SCr of 3.3 mg/dL (H)). according to latest data. Based on current GFR, CKD stage is end stage.  Monitor UOP and serial BMP and adjust therapy as needed. Renally dose meds. Avoid nephrotoxic medications and procedures.    -Neprhology consulted, assisting with dialysis frequency  -RFP, Mg daily      Hyperbilirubinemia  Direct hyperbilirubinemia on admission w/ AST/ALT elevation. Liver US not reporting dilated bile ducts  No new medications identified for DILI. Hepatitis panel negative.   Concern for congestive hepatopathy due to volume overload from HFpEF    Liver enzymes and total bilirubin remain elevated despite 3 days of dialysis  Also with pancreatitis    -Hold statin  -neprhology consulted, pending HD reqs    Chest pain  RESOLVED. CP substernal, 2/10, but  mostly with lying flat. Worse with exertion/better with rest  Troponin 0.497-->0.540, BNP 4500 (chronic)  EKG is without changes concerning for ACS  HOWARD score 2, Alannah 109    -cardiac monitoring    Atrial premature contractions  See Pericardial Effusion      Cough  New, dry, non productive  A/w post nasal drip  Satting well RA    -continue to monitor  -Flonase  -tessalon perles      Prediabetes  Last A1c 6.0 (2 months ago)   on admission    -POCT glucose AC/HS  -LDSSI  -goal -180    Prolonged QT interval  Qtc on admision 570    -cardiac telemetry  -caution with QT prolonging medications    Elevated troponin I level  Resolved  Has elevated troponin at baseline 2/2 to ESRD  Troponin 0.497-->0.540-->0.48  Reports 2/10 CP worse with lying down  EKG is without changes concerning for ACS  Has markedly elevated BUN, uremic pericarditis on ddx    -EKG for new/worsened CP  -cardiac telemetry    Acute on chronic heart failure with preserved ejection fraction (HFpEF)  Patient is identified as having Diastolic (HFpEF) heart failure that is Chronic. CHF is currently controlled. Latest ECHO performed and demonstrates- Results for orders placed during the hospital encounter of 04/01/24    Echo    Result Date: 6/19/2024    Left Ventricle: The left ventricle is severely dilated. LVIDd is 6.0   cm. Normal wall thickness. There is eccentric hypertrophy. There is normal   systolic function. Ejection fraction by visual approximation is 55%. Grade   II diastolic dysfunction.    Right Ventricle: Normal right ventricular cavity size. Wall thickness   is normal. Systolic function is normal.    Left Atrium: Left atrium is severely dilated.    Aortic Valve: The aortic valve is a trileaflet valve.    Mitral Valve: Severely restricted posterior leaflet motion.  The mean   pressure gradient across the mitral valve is 7 mmHg at a heart rate of 77   bpm. There is severe regurgitation with a posterolateral eccentrically   directed  jet.    Tricuspid Valve: There is mild regurgitation.    Pulmonary Artery: There is severe pulmonary hypertension. The estimated   pulmonary artery systolic pressure is 76 mmHg.    IVC/SVC: Elevated venous pressure at 15 mmHg.    Pericardium: There is a small effusion. No indication of cardiac   tamponade. On direct comparison with images from 4/23/2024, there is   slightly more fluid around the right atrium.         . Continue Furosemide and monitor clinical status closely. Monitor on telemetry. Patient is off CHF pathway.  Monitor strict Is&Os and daily weights.  Place on fluid restriction of 1.5 L. Cardiology has not been consulted. Continue to stress to patient importance of self efficacy and  on diet for CHF. Last BNP reviewed- and noted below   Recent Labs   Lab 06/18/24 0823   BNP 4,516*       Does not appear grossly volume overloaded    -Volume reduction via HD, will determine dialysis needs as OP to help determine future diuretic dosing    Class 2 obesity in adult  Body mass index is 20.93 kg/m². Morbid obesity complicates all aspects of disease management from diagnostic modalities to treatment. Weight loss encouraged and health benefits explained to patient.         Essential hypertension  BP elevated and labile on admission  120s-160s systolic  Appears to have midodrine ordered, likely for dialysis     -will hold antihypertensives for now pending nephrology recommendations regarding dialysis      VTE Risk Mitigation (From admission, onward)           Ordered     heparin (porcine) injection 1,000 Units  As needed (PRN)         06/21/24 0824     heparin (porcine) injection 5,000 Units  Every 8 hours         06/18/24 1427     IP VTE HIGH RISK PATIENT  Once         06/18/24 1427     Place sequential compression device  Until discontinued         06/18/24 1427                    Discharge Planning   RILEY: 6/24/2024     Code Status: DNR   Is the patient medically ready for discharge?:     Reason for  patient still in hospital (select all that apply): Patient trending condition and Pending disposition SECURING HD CHAIR  Discharge Plan A: Home Health                  Cydney Ball MD  Department of Hospital Medicine   [facility] ([place])

## 2024-06-24 NOTE — PLAN OF CARE
Per Allie Jha, nephrology CM, they will not have a confirmed HD chair for this pt by the end of the day.  Medical team notified.    RANCHO TysonN, BS, RN, CCM

## 2024-06-24 NOTE — PLAN OF CARE
[facility] scheduled pcp f/u   Realice un seguimiento con Tami negrete jun 26, 2024  @9:15 am [address] [place] [id number]  [phone]

## 2024-06-24 NOTE — NURSING
Patient arrived in a w/c to dialysis unit. AAOx4  Report received from AMEYA Reyes RN.     Hemodialysis initiated using the following:  Dialysis Access: Right IJ Tunneled Catheter.      Tolerated well, flows good.     UF goal 1.5 L as tolerated

## 2024-06-24 NOTE — PT/OT/SLP PROGRESS
Physical Therapy      Patient Name:  Xena Vera   MRN:  [id number]    Attempt at 9:59 am Pt BETH for dialysis. Attempt at 13:31 Pt eating lunch, requesting PTA to return at a later time. PTA unable to make third attempt.     Will follow-up 6/25/24.

## 2024-06-24 NOTE — PROGRESS NOTES
[facility] HEMODIALYSIS NOTE     Patient currently on hemodialysis for removal of uremic toxins and volume.     Patient seen and evaluated on hemodialysis, tolerating treatment, see HD flowsheet for vitals and assessments.    Labs have been reviewed and the dialysate bath has been adjusted.       Assessment/Plan:    -Pericardial effusion without signs of tamponade remains HDS. Repeat echo today   -pending outpatient HD chair   -Patient seen on HD, tolerating treatment well, w/o complaints   -UF goal of 1.5L  -Renal diet, if not NPO   -Strict I/O's and daily weights  -Daily renal function panels  -Keep MAP >65 while on HD   -Hgb goal 10-11, epo with HD  -no indication for phos binders at this time   -Will continue to follow while inpatient     Marcelle Toscano DNP-FNP, C  Nephrology  Pager: [phone]

## 2024-06-24 NOTE — NURSING
3 hours HD tx completed. 1.5 L of fluid removed.    Patient tolerated well.    Blood returned. Lines flushed with NS. Catheter locked with Heparin. Clamped, capped and wrapped with sterile gauze.    HD catheter dressing changed.    Report given to AMEYA Reyes RN.

## 2024-06-24 NOTE — ASSESSMENT & PLAN NOTE
Atrial premature contractions  Noted as small on POOJA 6/19, however on CT AP 6/20, noted to be moderate in size w/ some heterogeneity. In setting of intermittent asymptomatic bradycardia, concern that effusion may be producing tamponade physiology. Cardiology consulted. Bradycardia is suspected blocked atrial premature contraction, so no need for EP eval currently.     - Limited TTE to re-assess pericardial effusion size - obtain 6/24 (pending results)  - Volume removal via HD and Lasix 80mg IV q12h  - Avoid AV garett blockers   - Telemetry  - Reach out to cardiology is symptomatic bradycardia or chest pain noted

## 2024-06-24 NOTE — CONSULTS
NIAS consulted for PIV insertion in real time using ultrasound guidance.    Patient has adequate access at this time.      If access needed, re consult NIAS in the am.

## 2024-06-24 NOTE — SUBJECTIVE & OBJECTIVE
Interval History: NAEON, VSS. HD and TTE today. Resolved URI symptoms, PIV pain resolved.     Review of Systems   Constitutional:  Negative for activity change, chills and fever.   Respiratory:  Negative for shortness of breath.    Cardiovascular:  Positive for leg swelling. Negative for chest pain.   Gastrointestinal:  Negative for abdominal distention, abdominal pain, constipation, nausea and vomiting.   Musculoskeletal:  Negative for arthralgias.   Psychiatric/Behavioral:  Negative for agitation, behavioral problems and confusion.      Objective:     Vital Signs (Most Recent):  Temp: 97.9 °F (36.6 °C) (06/24/24 0749)  Pulse: 94 (06/24/24 0749)  Resp: 18 (06/24/24 0749)  BP: 123/78 (06/24/24 0749)  SpO2: 97 % (06/24/24 0749) Vital Signs (24h Range):  Temp:  [97.6 °F (36.4 °C)-99.3 °F (37.4 °C)] 97.9 °F (36.6 °C)  Pulse:  [82-94] 94  Resp:  [18] 18  SpO2:  [95 %-98 %] 97 %  BP: (111-132)/(76-83) 123/78     Weight: 47 kg (103 lb 9.9 oz)  Body mass index is 20.93 kg/m².    Intake/Output Summary (Last 24 hours) at 6/24/2024 0829  Last data filed at 6/24/2024 0425  Gross per 24 hour   Intake --   Output 800 ml   Net -800 ml         Physical Exam  Constitutional:       General: She is not in acute distress.     Appearance: She is not ill-appearing.   HENT:      Head: Normocephalic and atraumatic.      Right Ear: External ear normal.      Left Ear: External ear normal.      Nose: Nose normal.      Mouth/Throat:      Mouth: Mucous membranes are moist.      Pharynx: Oropharynx is clear.   Eyes:      General: No scleral icterus.     Extraocular Movements: Extraocular movements intact.   Cardiovascular:      Rate and Rhythm: Normal rate and regular rhythm.      Pulses: Normal pulses.      Heart sounds: Murmur heard.   Pulmonary:      Effort: Pulmonary effort is normal.      Breath sounds: Normal breath sounds. No wheezing, rhonchi or rales.   Abdominal:      General: Abdomen is flat.      Palpations: Abdomen is soft.       Tenderness: There is no abdominal tenderness.   Musculoskeletal:         General: No swelling. Normal range of motion.      Cervical back: Normal range of motion and neck supple.   Skin:     General: Skin is warm and dry.      Capillary Refill: Capillary refill takes less than 2 seconds.   Neurological:      General: No focal deficit present.      Mental Status: She is alert and oriented to person, place, and time.   Psychiatric:         Mood and Affect: Mood normal.         Behavior: Behavior normal.             Significant Labs: All pertinent labs within the past 24 hours have been reviewed.  CBC:   Recent Labs   Lab 06/23/24 0527 06/24/24  0255   WBC 8.31 7.88   HGB 9.5* 9.5*   HCT 31.1* 32.6*   * 176     CMP:   Recent Labs   Lab 06/23/24 0527 06/24/24  0255   * 132*   K 3.7 3.8   CL 97 96   CO2 26 22*   GLU 79 97   BUN 39* 52*   CREATININE 3.3* 3.5*   CALCIUM 7.7* 8.1*   PROT 6.2 6.7   ALBUMIN 2.4* 2.5*   BILITOT 2.9* 2.7*   ALKPHOS 168* 176*   AST 39 36   ALT 49* 43   ANIONGAP 8 14       Significant Imaging: I have reviewed all pertinent imaging results/findings within the past 24 hours.

## 2024-06-24 NOTE — PLAN OF CARE
Dialysis done, 1.5L fluid removed.  Echo completed today.   HD chair set-up prior to discharge   Problem: Adult Inpatient Plan of Care  Goal: Plan of Care Review  Outcome: Progressing  Goal: Patient-Specific Goal (Individualized)  Outcome: Progressing  Goal: Absence of Hospital-Acquired Illness or Injury  Outcome: Progressing  Goal: Optimal Comfort and Wellbeing  Outcome: Progressing  Goal: Readiness for Transition of Care  Outcome: Progressing     Problem: Hemodialysis  Goal: Safe, Effective Therapy Delivery  Outcome: Progressing  Goal: Effective Tissue Perfusion  Outcome: Progressing  Goal: Absence of Infection Signs and Symptoms  Outcome: Progressing

## 2024-06-24 NOTE — PROGRESS NOTES
Please see previous notes from this SW for continuity.    __________________________________________________  SW sent secure chat to [facility] Noy Patten and KAZ Yanez requesting an update regarding pt's dialysis referral. SW awaiting response.    SW to follow with updates.    UPDATE 9:38AM: KAZ called [facility] Noy and spoke with PHILL Darby. Per Mehnaz, she will contact SUSAN Patten and call SW back with an update.    UPDATE 11:00AM: Received response from KAZ Yanez, will touch base with SUSAN Patten.    UPDATE 1:30PM: KAZ sent SC to [facility] SUSAN and KAZ requesting an update. This SW awaiting response.    UPDATE 2:30PM: SW received response from [facility] KAZ Yanez, concern for potential of pt to lose medicaid. Per Renata, [facility] SUSAN Patten will discuss pt's insurance status with [facility] JIM Paredes.    SW to upload pt's referral to [facility] online portal as well.    SW to follow with updates.    Allie Jha, JAILYN  [facility]  [phone]

## 2024-06-25 LAB
ALBUMIN SERPL BCP-MCNC: 2.4 G/DL (ref 3.5–5.2)
ALP SERPL-CCNC: 201 U/L (ref 55–135)
ALT SERPL W/O P-5'-P-CCNC: 37 U/L (ref 10–44)
ANION GAP SERPL CALC-SCNC: 11 MMOL/L (ref 8–16)
AST SERPL-CCNC: 43 U/L (ref 10–40)
BASOPHILS # BLD AUTO: 0.01 K/UL (ref 0–0.2)
BASOPHILS NFR BLD: 0.2 % (ref 0–1.9)
BILIRUB SERPL-MCNC: 2.3 MG/DL (ref 0.1–1)
BUN SERPL-MCNC: 29 MG/DL (ref 6–20)
CALCIUM SERPL-MCNC: 8.1 MG/DL (ref 8.7–10.5)
CHLORIDE SERPL-SCNC: 104 MMOL/L (ref 95–110)
CO2 SERPL-SCNC: 22 MMOL/L (ref 23–29)
CREAT SERPL-MCNC: 2.3 MG/DL (ref 0.5–1.4)
DIFFERENTIAL METHOD BLD: ABNORMAL
EOSINOPHIL # BLD AUTO: 0.1 K/UL (ref 0–0.5)
EOSINOPHIL NFR BLD: 1.3 % (ref 0–8)
ERYTHROCYTE [DISTWIDTH] IN BLOOD BY AUTOMATED COUNT: 24.2 % (ref 11.5–14.5)
EST. GFR  (NO RACE VARIABLE): 24.6 ML/MIN/1.73 M^2
GLUCOSE SERPL-MCNC: 73 MG/DL (ref 70–110)
HCT VFR BLD AUTO: 31.6 % (ref 37–48.5)
HGB BLD-MCNC: 9.3 G/DL (ref 12–16)
IMM GRANULOCYTES # BLD AUTO: 0.02 K/UL (ref 0–0.04)
IMM GRANULOCYTES NFR BLD AUTO: 0.3 % (ref 0–0.5)
LYMPHOCYTES # BLD AUTO: 0.9 K/UL (ref 1–4.8)
LYMPHOCYTES NFR BLD: 14.1 % (ref 18–48)
MAGNESIUM SERPL-MCNC: 1.7 MG/DL (ref 1.6–2.6)
MCH RBC QN AUTO: 27 PG (ref 27–31)
MCHC RBC AUTO-ENTMCNC: 29.4 G/DL (ref 32–36)
MCV RBC AUTO: 92 FL (ref 82–98)
MONOCYTES # BLD AUTO: 0.8 K/UL (ref 0.3–1)
MONOCYTES NFR BLD: 12.1 % (ref 4–15)
NEUTROPHILS # BLD AUTO: 4.6 K/UL (ref 1.8–7.7)
NEUTROPHILS NFR BLD: 72 % (ref 38–73)
NRBC BLD-RTO: 0 /100 WBC
PHOSPHATE SERPL-MCNC: 2.4 MG/DL (ref 2.7–4.5)
PLATELET # BLD AUTO: 201 K/UL (ref 150–450)
PMV BLD AUTO: 11.4 FL (ref 9.2–12.9)
POCT GLUCOSE: 102 MG/DL (ref 70–110)
POCT GLUCOSE: 116 MG/DL (ref 70–110)
POTASSIUM SERPL-SCNC: 3.8 MMOL/L (ref 3.5–5.1)
PROT SERPL-MCNC: 6.6 G/DL (ref 6–8.4)
RBC # BLD AUTO: 3.45 M/UL (ref 4–5.4)
SODIUM SERPL-SCNC: 137 MMOL/L (ref 136–145)
WBC # BLD AUTO: 6.38 K/UL (ref 3.9–12.7)

## 2024-06-25 PROCEDURE — 85025 COMPLETE CBC W/AUTO DIFF WBC: CPT

## 2024-06-25 PROCEDURE — 36415 COLL VENOUS BLD VENIPUNCTURE: CPT

## 2024-06-25 PROCEDURE — 84100 ASSAY OF PHOSPHORUS: CPT

## 2024-06-25 PROCEDURE — 97110 THERAPEUTIC EXERCISES: CPT | Mod: CQ

## 2024-06-25 PROCEDURE — 63600175 PHARM REV CODE 636 W HCPCS

## 2024-06-25 PROCEDURE — 25000003 PHARM REV CODE 250

## 2024-06-25 PROCEDURE — 21400001 HC TELEMETRY ROOM

## 2024-06-25 PROCEDURE — 80053 COMPREHEN METABOLIC PANEL: CPT

## 2024-06-25 PROCEDURE — 97116 GAIT TRAINING THERAPY: CPT | Mod: CQ

## 2024-06-25 PROCEDURE — 97110 THERAPEUTIC EXERCISES: CPT

## 2024-06-25 PROCEDURE — 94761 N-INVAS EAR/PLS OXIMETRY MLT: CPT

## 2024-06-25 PROCEDURE — 83735 ASSAY OF MAGNESIUM: CPT

## 2024-06-25 PROCEDURE — 63600175 PHARM REV CODE 636 W HCPCS: Performed by: STUDENT IN AN ORGANIZED HEALTH CARE EDUCATION/TRAINING PROGRAM

## 2024-06-25 RX ORDER — NALOXONE HCL 0.4 MG/ML
0.02 VIAL (ML) INJECTION
Status: DISCONTINUED | OUTPATIENT
Start: 2024-06-25 | End: 2024-07-03 | Stop reason: HOSPADM

## 2024-06-25 RX ORDER — FAMOTIDINE 20 MG/1
20 TABLET, FILM COATED ORAL DAILY
Status: DISCONTINUED | OUTPATIENT
Start: 2024-06-25 | End: 2024-07-03 | Stop reason: HOSPADM

## 2024-06-25 RX ORDER — SODIUM CHLORIDE 9 MG/ML
INJECTION, SOLUTION INTRAVENOUS ONCE
Status: COMPLETED | OUTPATIENT
Start: 2024-06-26 | End: 2024-06-26

## 2024-06-25 RX ORDER — NITROGLYCERIN 0.4 MG/1
0.4 TABLET SUBLINGUAL EVERY 5 MIN PRN
Status: DISCONTINUED | OUTPATIENT
Start: 2024-06-25 | End: 2024-07-03 | Stop reason: HOSPADM

## 2024-06-25 RX ORDER — ACETAMINOPHEN 325 MG/1
650 TABLET ORAL EVERY 6 HOURS PRN
Status: DISCONTINUED | OUTPATIENT
Start: 2024-06-25 | End: 2024-07-01

## 2024-06-25 RX ORDER — HEPARIN SODIUM 5000 [USP'U]/ML
5000 INJECTION, SOLUTION INTRAVENOUS; SUBCUTANEOUS EVERY 8 HOURS
Status: DISCONTINUED | OUTPATIENT
Start: 2024-06-25 | End: 2024-07-03 | Stop reason: HOSPADM

## 2024-06-25 RX ORDER — BENZONATATE 100 MG/1
100 CAPSULE ORAL 3 TIMES DAILY PRN
Status: DISCONTINUED | OUTPATIENT
Start: 2024-06-25 | End: 2024-07-03 | Stop reason: HOSPADM

## 2024-06-25 RX ORDER — SIMETHICONE 80 MG
1 TABLET,CHEWABLE ORAL 4 TIMES DAILY PRN
Status: DISCONTINUED | OUTPATIENT
Start: 2024-06-25 | End: 2024-07-03 | Stop reason: HOSPADM

## 2024-06-25 RX ORDER — ATORVASTATIN CALCIUM 40 MG/1
40 TABLET, FILM COATED ORAL NIGHTLY
Status: DISCONTINUED | OUTPATIENT
Start: 2024-06-25 | End: 2024-07-03 | Stop reason: HOSPADM

## 2024-06-25 RX ORDER — IBUPROFEN 200 MG
16 TABLET ORAL
Status: DISCONTINUED | OUTPATIENT
Start: 2024-06-25 | End: 2024-07-03 | Stop reason: HOSPADM

## 2024-06-25 RX ORDER — INSULIN ASPART 100 [IU]/ML
0-5 INJECTION, SOLUTION INTRAVENOUS; SUBCUTANEOUS
Status: DISCONTINUED | OUTPATIENT
Start: 2024-06-25 | End: 2024-07-03 | Stop reason: HOSPADM

## 2024-06-25 RX ORDER — GLUCAGON 1 MG
1 KIT INJECTION
Status: DISCONTINUED | OUTPATIENT
Start: 2024-06-25 | End: 2024-07-03 | Stop reason: HOSPADM

## 2024-06-25 RX ORDER — FLUTICASONE PROPIONATE 50 MCG
2 SPRAY, SUSPENSION (ML) NASAL DAILY
Status: DISCONTINUED | OUTPATIENT
Start: 2024-06-25 | End: 2024-07-03 | Stop reason: HOSPADM

## 2024-06-25 RX ORDER — SODIUM CHLORIDE 0.9 % (FLUSH) 0.9 %
10 SYRINGE (ML) INJECTION EVERY 12 HOURS PRN
Status: DISCONTINUED | OUTPATIENT
Start: 2024-06-25 | End: 2024-07-03 | Stop reason: HOSPADM

## 2024-06-25 RX ORDER — TALC
6 POWDER (GRAM) TOPICAL NIGHTLY PRN
Status: DISCONTINUED | OUTPATIENT
Start: 2024-06-25 | End: 2024-06-25

## 2024-06-25 RX ORDER — IBUPROFEN 200 MG
24 TABLET ORAL
Status: DISCONTINUED | OUTPATIENT
Start: 2024-06-25 | End: 2024-07-03 | Stop reason: HOSPADM

## 2024-06-25 RX ORDER — ALUMINUM HYDROXIDE, MAGNESIUM HYDROXIDE, AND SIMETHICONE 1200; 120; 1200 MG/30ML; MG/30ML; MG/30ML
30 SUSPENSION ORAL 4 TIMES DAILY PRN
Status: DISCONTINUED | OUTPATIENT
Start: 2024-06-25 | End: 2024-07-03 | Stop reason: HOSPADM

## 2024-06-25 RX ORDER — POLYETHYLENE GLYCOL 3350 17 G/17G
17 POWDER, FOR SOLUTION ORAL DAILY
Status: DISCONTINUED | OUTPATIENT
Start: 2024-06-25 | End: 2024-07-03 | Stop reason: HOSPADM

## 2024-06-25 RX ORDER — FUROSEMIDE 10 MG/ML
80 INJECTION INTRAMUSCULAR; INTRAVENOUS 2 TIMES DAILY
Status: DISCONTINUED | OUTPATIENT
Start: 2024-06-25 | End: 2024-07-01

## 2024-06-25 RX ADMIN — HEPARIN SODIUM 5000 UNITS: 5000 INJECTION INTRAVENOUS; SUBCUTANEOUS at 10:06

## 2024-06-25 RX ADMIN — FUROSEMIDE 80 MG: 10 INJECTION, SOLUTION INTRAVENOUS at 08:06

## 2024-06-25 RX ADMIN — FAMOTIDINE 20 MG: 20 TABLET ORAL at 10:06

## 2024-06-25 RX ADMIN — FUROSEMIDE 80 MG: 10 INJECTION, SOLUTION INTRAVENOUS at 10:06

## 2024-06-25 RX ADMIN — FLUTICASONE PROPIONATE 100 MCG: 50 SPRAY, METERED NASAL at 10:06

## 2024-06-25 RX ADMIN — ATORVASTATIN CALCIUM 40 MG: 40 TABLET, FILM COATED ORAL at 10:06

## 2024-06-25 RX ADMIN — HEPARIN SODIUM 5000 UNITS: 5000 INJECTION INTRAVENOUS; SUBCUTANEOUS at 02:06

## 2024-06-25 NOTE — ASSESSMENT & PLAN NOTE
Direct hyperbilirubinemia on admission w/ AST/ALT elevation. Liver US not reporting dilated bile ducts  No new medications identified for DILI. Hepatitis panel negative.   Concern for congestive hepatopathy due to volume overload from HFpEF    Liver enzymes and total bilirubin remain elevated despite 3 days of dialysis  Also with pancreatitis    -Hold statin  -continue HD

## 2024-06-25 NOTE — ASSESSMENT & PLAN NOTE
Atrial premature contractions  Noted as small on POOJA 6/19, however on CT AP 6/20, noted to be moderate in size w/ some heterogeneity. In setting of intermittent asymptomatic bradycardia, concern that effusion may be producing tamponade physiology. Cardiology consulted. Bradycardia is suspected blocked atrial premature contraction, so no need for EP eval currently.     Interval improvement on repeat limited ECHO 6/25    - Volume removal via HD and Lasix 80mg IV q12h  - Avoid AV garett blockers   - Telemetry  - Cardiology to f/u outpatient

## 2024-06-25 NOTE — SUBJECTIVE & OBJECTIVE
Interval History: NAEO, VSS, limited echo with interval improvement of moderately sized pericardial effusion. Cardiology to follow up outpatient, Pending HD chair outpatient.    Review of Systems  Objective:     Vital Signs (Most Recent):  Temp: 98.3 °F (36.8 °C) (06/25/24 1109)  Pulse: 89 (06/25/24 1129)  Resp: 18 (06/25/24 1109)  BP: 135/74 (06/25/24 1109)  SpO2: 97 % (06/25/24 1109) Vital Signs (24h Range):  Temp:  [98.1 °F (36.7 °C)-99 °F (37.2 °C)] 98.3 °F (36.8 °C)  Pulse:  [65-94] 89  Resp:  [18] 18  SpO2:  [95 %-97 %] 97 %  BP: (109-135)/(62-82) 135/74     Weight: 47 kg (103 lb 9.9 oz)  Body mass index is 20.93 kg/m².  No intake or output data in the 24 hours ending 06/25/24 1207      Physical Exam  Constitutional:       General: She is not in acute distress.     Appearance: She is not ill-appearing.   HENT:      Head: Normocephalic and atraumatic.      Right Ear: External ear normal.      Left Ear: External ear normal.      Nose: Nose normal.      Mouth/Throat:      Mouth: Mucous membranes are moist.      Pharynx: Oropharynx is clear.   Eyes:      General: No scleral icterus.     Extraocular Movements: Extraocular movements intact.   Cardiovascular:      Rate and Rhythm: Normal rate and regular rhythm.      Pulses: Normal pulses.      Heart sounds: Murmur heard.   Pulmonary:      Effort: Pulmonary effort is normal.      Breath sounds: Normal breath sounds. No wheezing, rhonchi or rales.   Abdominal:      General: Abdomen is flat.      Palpations: Abdomen is soft.      Tenderness: There is no abdominal tenderness.   Musculoskeletal:         General: No swelling. Normal range of motion.      Cervical back: Normal range of motion and neck supple.   Skin:     General: Skin is warm and dry.      Capillary Refill: Capillary refill takes less than 2 seconds.   Neurological:      General: No focal deficit present.      Mental Status: She is alert and oriented to person, place, and time.   Psychiatric:         Mood  and Affect: Mood normal.         Behavior: Behavior normal.             Significant Labs: All pertinent labs within the past 24 hours have been reviewed.    Significant Imaging: I have reviewed all pertinent imaging results/findings within the past 24 hours.

## 2024-06-25 NOTE — PROGRESS NOTES
"[facility] Surg ([address] Medicine  Progress Note    Patient Name: Xena Vera  MRN: [id number]  Patient Class: IP- Inpatient   Admission Date: 6/18/2024  Length of Stay: 7 days  Attending Physician: Minda Molina MD  Primary Care Provider: Tami Villeda FNP        Subjective:     Principal Problem:Uremia        HPI:  Patient is a 53F PMHx HFpEF (Lasix 80 bid), severe pHTN, CKD5 (last HD in May 2024), HTN, MR, anemia presents for chest pain that began this morning. She reports 2/10 central sqeezing chest pain that radiates to her R shoulder when vomiting. The pain is worse with walking. She has never had chest pain before. She has had nausea/vomiting x1 day. Reports approx 3 episodes of clear vomitus, describes as "phlegm." She has associated sharp upper abdominal pain when vomiting. Additionally reports arthralgias/myalgias, leg swelling, generalized weakness, and fatigue. She had a brief episode of dizziness yesterday when walking but has not had that since. She denies Fever/chills, SOB, upper respiratory symptoms, cough, dysuria, decreased urine, or known sick contacts.     She reports adherence to her fluid restriction & Lasix. Admitted to [facility] from 4/1/24-5/8/24 where renal function declined while being diuresed. Required CRRT with intermittent levophed. Admitted to [facility] 5/13-5/17 after presenting to ED for HD. Not enrolled in medicaid at that time and had been told to present to ED for HD needs. No indications for HD on that admission per chart review. At that time nephrology recommended urgent evaluation with vascular surgery outpatient for AVF placement. Venous mapping obtained.    In ED, somewhat hypertensive (sBP 150s), intermittent bradycardia, afebrile, on RA. CMP with profound uremia (), Cr 5.6, hypokalemia (K 2.8), no hyponatremia. Transaminitis AST//133 with T. Bili 4.3. Lipase elevated at 230. RUQ US w/o CBD dilation or evidence of scarring, surgically " absent gallbladder. CBC with thrombocytopenia (PLT 90), no leukocytosis, stable anemia (Hb 11.5). BNP 4500, Troponin 0.497 which increased to 0.54. LA normal at 1.5. EKG with peaked T waves in II/III/AVF, widened QRS, Qtc 573. Nephrology consulted.     Interpretor was used for entire interview/examination:  number [id number]    Overview/Hospital Course:  Admitted to [facility] 6/18/24 for chest pain, nausea, and vomiting. Found to have profound uremia and hypokalemia. Nephrology consulted and performed emergent HD. CP resolved following HD, residual epigastric pain w/o nausea/vomiting. TTE 6/19 with preserved EF, normal RV systolic function, severe MR, pHTN, and CVP 15. Diuresis with IV lasix in conjunction with HD. LFTs downtrended with volume removal. Noted to have moderate pericardial effusion on 6/22, therefore Cardiology consulted and recommended continued diuresis. Repeat limited TTE performed 6/24 with interval improvement of what remains to be a moderately sized pericardial effusion. Cardiology to follow up with patient outpatient.    Interval History: NAEO, VSS, limited echo with interval improvement of moderately sized pericardial effusion. Cardiology to follow up outpatient, Pending HD chair outpatient.    Review of Systems  Objective:     Vital Signs (Most Recent):  Temp: 98.3 °F (36.8 °C) (06/25/24 1109)  Pulse: 89 (06/25/24 1129)  Resp: 18 (06/25/24 1109)  BP: 135/74 (06/25/24 1109)  SpO2: 97 % (06/25/24 1109) Vital Signs (24h Range):  Temp:  [98.1 °F (36.7 °C)-99 °F (37.2 °C)] 98.3 °F (36.8 °C)  Pulse:  [65-94] 89  Resp:  [18] 18  SpO2:  [95 %-97 %] 97 %  BP: (109-135)/(62-82) 135/74     Weight: 47 kg (103 lb 9.9 oz)  Body mass index is 20.93 kg/m².  No intake or output data in the 24 hours ending 06/25/24 1207      Physical Exam  Constitutional:       General: She is not in acute distress.     Appearance: She is not ill-appearing.   HENT:      Head: Normocephalic and atraumatic.      Right Ear:  External ear normal.      Left Ear: External ear normal.      Nose: Nose normal.      Mouth/Throat:      Mouth: Mucous membranes are moist.      Pharynx: Oropharynx is clear.   Eyes:      General: No scleral icterus.     Extraocular Movements: Extraocular movements intact.   Cardiovascular:      Rate and Rhythm: Normal rate and regular rhythm.      Pulses: Normal pulses.      Heart sounds: Murmur heard.   Pulmonary:      Effort: Pulmonary effort is normal.      Breath sounds: Normal breath sounds. No wheezing, rhonchi or rales.   Abdominal:      General: Abdomen is flat.      Palpations: Abdomen is soft.      Tenderness: There is no abdominal tenderness.   Musculoskeletal:         General: No swelling. Normal range of motion.      Cervical back: Normal range of motion and neck supple.   Skin:     General: Skin is warm and dry.      Capillary Refill: Capillary refill takes less than 2 seconds.   Neurological:      General: No focal deficit present.      Mental Status: She is alert and oriented to person, place, and time.   Psychiatric:         Mood and Affect: Mood normal.         Behavior: Behavior normal.             Significant Labs: All pertinent labs within the past 24 hours have been reviewed.    Significant Imaging: I have reviewed all pertinent imaging results/findings within the past 24 hours.    Assessment/Plan:      * Uremia  Improving  2/2 to ESRD. AG 18. Patient AAOx3 on exam  +nausea, chest pain, labile HR  No steph bleeding noted  VBG with pH 7.44.     -HD per nephro  -daily CMP    Atrial premature contractions  See Pericardial Effusion    Pericardial effusion  Atrial premature contractions  Noted as small on POOJA 6/19, however on CT AP 6/20, noted to be moderate in size w/ some heterogeneity. In setting of intermittent asymptomatic bradycardia, concern that effusion may be producing tamponade physiology. Cardiology consulted. Bradycardia is suspected blocked atrial premature contraction, so no need for  EP eval currently.     Interval improvement on repeat limited ECHO 6/25    - Volume removal via HD and Lasix 80mg IV q12h  - Avoid AV garett blockers   - Telemetry  - Cardiology to f/u outpatient      Cough  New, dry, non productive  A/w post nasal drip  Satting well RA    -continue to monitor  -Flonase  -tessalon perles    Chest pain  RESOLVED. CP substernal, 2/10, but mostly with lying flat. Worse with exertion/better with rest  Troponin 0.497-->0.540, BNP 4500 (chronic)  EKG is without changes concerning for ACS  HOWARD score 2, Alannah 109    -cardiac monitoring    Hyperbilirubinemia  Direct hyperbilirubinemia on admission w/ AST/ALT elevation. Liver US not reporting dilated bile ducts  No new medications identified for DILI. Hepatitis panel negative.   Concern for congestive hepatopathy due to volume overload from HFpEF    Liver enzymes and total bilirubin remain elevated despite 3 days of dialysis  Also with pancreatitis    -Hold statin  -continue HD    Prediabetes  Last A1c 6.0 (2 months ago)   on admission    -POCT glucose AC/HS  -LDSSI  -goal -180    ESRD (end stage renal disease) on dialysis  Creatine stable for now. BMP reviewed- noted Estimated Creatinine Clearance: 20.7 mL/min (A) (based on SCr of 2.3 mg/dL (H)). according to latest data. Based on current GFR, CKD stage is end stage.  Monitor UOP and serial BMP and adjust therapy as needed. Renally dose meds. Avoid nephrotoxic medications and procedures.    -Neprhology consulted, assisting with dialysis frequency  -SW working to find HD chair for patient for continued OP dialysis  -RFP, Mg daily    Prolonged QT interval  Qtc on admision 570    -cardiac telemetry  -caution with QT prolonging medications    Elevated troponin I level  Resolved  Has elevated troponin at baseline 2/2 to ESRD  Troponin 0.497-->0.540-->0.48  Reports 2/10 CP worse with lying down  EKG is without changes concerning for ACS  Has markedly elevated BUN, uremic pericarditis  on ddx    -EKG for new/worsened CP  -cardiac telemetry    Acute on chronic heart failure with preserved ejection fraction (HFpEF)  Patient is identified as having Diastolic (HFpEF) heart failure that is Chronic. CHF is currently controlled. Latest ECHO performed and demonstrates- Results for orders placed during the hospital encounter of 04/01/24    Echo    Result Date: 6/24/2024    Left Ventricle: The left ventricle is normal in size. Mildly increased   ventricular mass. Normal wall thickness. There is eccentric hypertrophy.   There is normal systolic function with a visually estimated ejection   fraction of 55 - 60%. Biplane (2D) method of discs ejection fraction is   60%. There is diastolic dysfunction. Elevated left ventricular filling   pressure.    Right Ventricle: Normal right ventricular cavity size. Systolic   function is borderline low.    Left Atrium: Left atrium is severely dilated.    Aortic Valve: There is mild aortic regurgitation.    Mitral Valve: There is moderate annular dilation present. There is   severe regurgitation with a posterolateral eccentriccally directed jet.    Tricuspid Valve: There is mild to moderate regurgitation.    Pulmonary Artery: The estimated pulmonary artery systolic pressure is   56 mmHg.    IVC/SVC: Intermediate venous pressure at 8 mmHg.    Pericardium: There is a moderate posterior effusion.    Overall effusion appears smaller than prior, however still moderate.    There is borderline 30% variation across mitral and aortic valves with   respiration.  No RV collapse.  Correlate clinically.        Echo    Addendum Date: 6/21/2024        Pericardium: There is a large circumferential effusion. No definitive   indication of cardiac tamponade. Evidence includes no septal bounce, no   respiratory chamber variation. CVP is intermediate, as the IVC is small   but does not collapse. Diastolic RV collapse is not clearly demonstrated.   Compared to the prior echo on 6/19/24, the  effusion appears increased in   size. Correlate with clinical exam.    Left Ventricle: There is mildly reduced systolic function with a   visually estimated ejection fraction of 40 - 45%.    Right Ventricle: Systolic function is mildly reduced.        Result Date: 6/21/2024    Pericardium: There is a large circumferential effusion. No indication   of cardiac tamponade. Evidence includes no septal bounce, no respiratory   chamber variation. CVP is intermediate, as the IVC is small but does not   collapse. Diastolic RV collapse is not clearly demonstrated. Compared to   the prior echo on 6/19/24, the effusion apperas increased in size.    Left Ventricle: There is mildly reduced systolic function with a   visually estimated ejection fraction of 40 - 45%.    Right Ventricle: Systolic function is mildly reduced.        Echo    Result Date: 6/19/2024    Left Ventricle: The left ventricle is severely dilated. LVIDd is 6.0   cm. Normal wall thickness. There is eccentric hypertrophy. There is normal   systolic function. Ejection fraction by visual approximation is 55%. Grade   II diastolic dysfunction.    Right Ventricle: Normal right ventricular cavity size. Wall thickness   is normal. Systolic function is normal.    Left Atrium: Left atrium is severely dilated.    Aortic Valve: The aortic valve is a trileaflet valve.    Mitral Valve: Severely restricted posterior leaflet motion.  The mean   pressure gradient across the mitral valve is 7 mmHg at a heart rate of 77   bpm. There is severe regurgitation with a posterolateral eccentrically   directed jet.    Tricuspid Valve: There is mild regurgitation.    Pulmonary Artery: There is severe pulmonary hypertension. The estimated   pulmonary artery systolic pressure is 76 mmHg.    IVC/SVC: Elevated venous pressure at 15 mmHg.    Pericardium: There is a small effusion. No indication of cardiac   tamponade. On direct comparison with images from 4/23/2024, there is   slightly more  "fluid around the right atrium.         . Continue Furosemide and monitor clinical status closely. Monitor on telemetry. Patient is off CHF pathway.  Monitor strict Is&Os and daily weights.  Place on fluid restriction of 1.5 L. Cardiology has not been consulted. Continue to stress to patient importance of self efficacy and  on diet for CHF. Last BNP reviewed- and noted below   No results for input(s): "BNP", "BNPTRIAGEBLO" in the last 168 hours.    Does not appear grossly volume overloaded    -Volume reduction via HD, will determine dialysis needs as OP to help determine future diuretic dosing    Class 2 obesity in adult  Body mass index is 20.93 kg/m². Morbid obesity complicates all aspects of disease management from diagnostic modalities to treatment. Weight loss encouraged and health benefits explained to patient.    Essential hypertension  BP elevated and labile on admission  120s-160s systolic  Appears to have midodrine ordered, likely for dialysis     -will hold antihypertensives for now pending nephrology recommendations regarding dialysis      VTE Risk Mitigation (From admission, onward)           Ordered     heparin (porcine) injection 5,000 Units  Every 8 hours         06/25/24 0916     IP VTE HIGH RISK PATIENT  Once         06/18/24 1427     Place sequential compression device  Until discontinued         06/18/24 1427                    Discharge Planning   RILEY: 6/25/2024     Code Status: DNR   Is the patient medically ready for discharge?:     Reason for patient still in hospital (select all that apply): Treatment and Pending disposition  Discharge Plan A: Home Health            Keyon Holloway MD  Department of Hospital Medicine   [facility] - [facility] ([place] [id number])    "

## 2024-06-25 NOTE — CARE UPDATE
Repeat echo reviewed; slight decrease in effusion size. Plan for repeat limited echo in 2 weeks with cardiology clinic follow up. Cardiology will sign off. Please reach out for any questions.

## 2024-06-25 NOTE — NURSING
Nurses Note -- 4 Eyes      6/25/2024   7:15 AM      Skin assessed during: Admit      [x] No Altered Skin Integrity Present    []Prevention Measures Documented      [] Yes- Altered Skin Integrity Present or Discovered   [] LDA Added if Not in Epic (Describe Wound)   [] New Altered Skin Integrity was Present on Admit and Documented in LDA   [] Wound Image Taken    Wound Care Consulted? No    Attending Nurse:  Kelley Arias RN/Staff Member:    Fab

## 2024-06-25 NOTE — PT/OT/SLP PROGRESS
Occupational Therapy   Treatment    Name: Xena Vera  MRN: [id number]  Admitting Diagnosis:  Uremia       Recommendations:     Discharge Recommendations: No Therapy Indicated  Discharge Equipment Recommendations:  none  Barriers to discharge:  None    Assessment:     Xena Vera is a 53 y.o. female with a medical diagnosis of Uremia.  She presents with generalized weakness. Focus of session on educating at home exercises that can be completed to improve carry over of improving functional strength. Performance deficits affecting function are impaired endurance, impaired self care skills, impaired functional mobility, impaired balance.     Rehab Prognosis:  Good; patient would benefit from acute skilled OT services to address these deficits and reach maximum level of function.       Plan:     Patient to be seen 2 x/week to address the above listed problems via therapeutic activities, therapeutic exercises, neuromuscular re-education, self-care/home management  Plan of Care Expires: 07/04/24  Plan of Care Reviewed with: patient, daughter    Subjective     Chief Complaint: none   Patient/Family Comments/goals: none  Pain/Comfort:  Pain Rating 1: 0/10  Pain Rating Post-Intervention 1: 0/10    Objective:   Patient offered ipad  services. She declined and opted for daughter present in room to interpret.    Communicated with: nursing prior to session.  Patient found HOB elevated with telemetry upon OT entry to room.    General Precautions: Standard, fall    Orthopedic Precautions:N/A  Braces: N/A  Respiratory Status: Room air     Occupational Performance:     Bed Mobility:    Patient completed Supine to Sit with independence     Functional Mobility/Transfers:  Patient completed Sit <> Stand Transfer with independence  with  no assistive device   Functional Mobility: patient ambulated community level distances (~300 ft) with supervision and no AD    Activities of Daily Living: politely declined  participation in additional ADLs due to previous completion  Upper Body Dressing: independence to don gown     Therapeutic Exercise:   1x10 standing marches  1x5 squats  Difficulty thus completion of sit>stand t/f x5     AMPAC 6 Click ADL: 21    Treatment & Education:  Patient educated on:   -purpose of OT and OT POC  -facilitation and education on proper body mechanics, energy conservation, and safety  -importance of early mobility and out of bed activities with staff assist  -overall benefits of therapy     All questions answered within OT scope and to patient's satisfaction    Patient left up in chair with all lines intact and call button in reach    GOALS:   Multidisciplinary Problems       Occupational Therapy Goals          Problem: Occupational Therapy    Goal Priority Disciplines Outcome Interventions   Occupational Therapy Goal     OT, PT/OT Progressing    Description: Goals to be met by: 7/4/24     Patient will increase functional independence with ADLs by performing:    LE Dressing with Supervision.  Grooming while standing at sink with Supervision.  Toileting from toilet with Supervision for hygiene and clothing management.   Supine to sit with Supervision.  Toilet transfer to toilet with Supervision.  [place] with BUE HEP to improve activity tolerance to complete ADLs and IADLs  Within home ambulation distances with  supervision and LRAD necessary to complete ADLs.  ;                       Time Tracking:     OT Date of Treatment: 06/25/24  OT Start Time: 1014  OT Stop Time: 1031  OT Total Time (min): 17 min    Billable Minutes:Therapeutic Exercise 17    OT/RA: OT          6/25/2024

## 2024-06-25 NOTE — PT/OT/SLP PROGRESS
Physical Therapy Treatment    Patient Name:  Xena Vera   MRN:  [id number]    Recommendations:     Discharge Recommendations: No Therapy Indicated  Discharge Equipment Recommendations: none  Barriers to discharge: None    Assessment:     Xena Vera is a 53 y.o. female admitted with a medical diagnosis of Uremia.  She presents with the following impairments/functional limitations: weakness, impaired endurance, gait instability, impaired cardiopulmonary response to activity . Patient ambulates with min gait instability, but has achieved significant progress in the last few days, as noted by walking range and heart rate closer to normal range, at 93 bpm after gait training.    Rehab Prognosis: Good; patient would benefit from acute skilled PT services to address these deficits and reach maximum level of function.    Recent Surgery: * No surgery found *      Plan:     During this hospitalization, patient to be seen 2 x/week to address the identified rehab impairments via gait training, therapeutic activities, therapeutic exercises and progress toward the following goals:    Plan of Care Expires:  07/20/24    Subjective     Chief Complaint: none stated  Patient/Family Comments/goals: to get stronger and to go home.  Pain/Comfort:  Pain Rating 1: 0/10  Pain Rating Post-Intervention 1: 0/10      Objective:     Communicated with NSG prior to session.  Patient found up in chair with telemetry upon PT entry to room.     General Precautions: Standard, fall  Orthopedic Precautions: N/A  Braces: N/A  Respiratory Status: Room air     Functional Mobility:  Transfers:     Sit to Stand:  supervision with no AD  Gait: 122 ft x 2 trials with RW and SBA, with a rest break sitting.  Stairs:  Pt ascended/descended 4 stair(s) with No Assistive Device with right handrail with Contact Guard Assistance.       AM-PAC 6 CLICK MOBILITY  Turning over in bed (including adjusting bedclothes, sheets and blankets)?: 4  Sitting down on and  standing up from a chair with arms (e.g., wheelchair, bedside commode, etc.): 4  Moving from lying on back to sitting on the side of the bed?: 4  Moving to and from a bed to a chair (including a wheelchair)?: 4  Need to walk in hospital room?: 3  Climbing 3-5 steps with a railing?: 3  Basic Mobility Total Score: 22       Treatment & Education:  Educated on safety on stairs and during Gait training. There ex in sitting: LAQ, HIP FLEX, HIP ABD AND HEEL/TOE RAISES 2X15 REPS B LE.    Patient left up in chair with call button in reach and DAUGHTER present..    GOALS:   Multidisciplinary Problems       Physical Therapy Goals          Problem: Physical Therapy    Goal Priority Disciplines Outcome Goal Variances Interventions   Physical Therapy Goal     PT, PT/OT Progressing     Description: Goals to be met by: 2024     Patient will increase functional independence with mobility by performin. Sit to stand transfer with modified independent   2. Gait x500 feet with Modified [place]   3. Ascend/descend 3 stairs with right handrail and supervision   4. Lower extremity exercise program x30 reps per handout, with supervision                         Time Tracking:     PT Received On: 24  PT Start Time: 1043     PT Stop Time: 1106  PT Total Time (min): 23 min     Billable Minutes: Gait Training 14 and Therapeutic Exercise 9    Treatment Type: Treatment  PT/PTA: PTA     Number of PTA visits since last PT visit: 2024

## 2024-06-25 NOTE — PLAN OF CARE
Problem: Adult Inpatient Plan of Care  Goal: Optimal Comfort and Wellbeing  Outcome: Progressing     Problem: Infection  Goal: Absence of Infection Signs and Symptoms  Outcome: Progressing

## 2024-06-25 NOTE — ASSESSMENT & PLAN NOTE
Patient is identified as having Diastolic (HFpEF) heart failure that is Chronic. CHF is currently controlled. Latest ECHO performed and demonstrates- Results for orders placed during the hospital encounter of 04/01/24    Echo    Result Date: 6/24/2024    Left Ventricle: The left ventricle is normal in size. Mildly increased   ventricular mass. Normal wall thickness. There is eccentric hypertrophy.   There is normal systolic function with a visually estimated ejection   fraction of 55 - 60%. Biplane (2D) method of discs ejection fraction is   60%. There is diastolic dysfunction. Elevated left ventricular filling   pressure.    Right Ventricle: Normal right ventricular cavity size. Systolic   function is borderline low.    Left Atrium: Left atrium is severely dilated.    Aortic Valve: There is mild aortic regurgitation.    Mitral Valve: There is moderate annular dilation present. There is   severe regurgitation with a posterolateral eccentriccally directed jet.    Tricuspid Valve: There is mild to moderate regurgitation.    Pulmonary Artery: The estimated pulmonary artery systolic pressure is   56 mmHg.    IVC/SVC: Intermediate venous pressure at 8 mmHg.    Pericardium: There is a moderate posterior effusion.    Overall effusion appears smaller than prior, however still moderate.    There is borderline 30% variation across mitral and aortic valves with   respiration.  No RV collapse.  Correlate clinically.        Echo    Addendum Date: 6/21/2024        Pericardium: There is a large circumferential effusion. No definitive   indication of cardiac tamponade. Evidence includes no septal bounce, no   respiratory chamber variation. CVP is intermediate, as the IVC is small   but does not collapse. Diastolic RV collapse is not clearly demonstrated.   Compared to the prior echo on 6/19/24, the effusion appears increased in   size. Correlate with clinical exam.    Left Ventricle: There is mildly reduced systolic  function with a   visually estimated ejection fraction of 40 - 45%.    Right Ventricle: Systolic function is mildly reduced.        Result Date: 6/21/2024    Pericardium: There is a large circumferential effusion. No indication   of cardiac tamponade. Evidence includes no septal bounce, no respiratory   chamber variation. CVP is intermediate, as the IVC is small but does not   collapse. Diastolic RV collapse is not clearly demonstrated. Compared to   the prior echo on 6/19/24, the effusion apperas increased in size.    Left Ventricle: There is mildly reduced systolic function with a   visually estimated ejection fraction of 40 - 45%.    Right Ventricle: Systolic function is mildly reduced.        Echo    Result Date: 6/19/2024    Left Ventricle: The left ventricle is severely dilated. LVIDd is 6.0   cm. Normal wall thickness. There is eccentric hypertrophy. There is normal   systolic function. Ejection fraction by visual approximation is 55%. Grade   II diastolic dysfunction.    Right Ventricle: Normal right ventricular cavity size. Wall thickness   is normal. Systolic function is normal.    Left Atrium: Left atrium is severely dilated.    Aortic Valve: The aortic valve is a trileaflet valve.    Mitral Valve: Severely restricted posterior leaflet motion.  The mean   pressure gradient across the mitral valve is 7 mmHg at a heart rate of 77   bpm. There is severe regurgitation with a posterolateral eccentrically   directed jet.    Tricuspid Valve: There is mild regurgitation.    Pulmonary Artery: There is severe pulmonary hypertension. The estimated   pulmonary artery systolic pressure is 76 mmHg.    IVC/SVC: Elevated venous pressure at 15 mmHg.    Pericardium: There is a small effusion. No indication of cardiac   tamponade. On direct comparison with images from 4/23/2024, there is   slightly more fluid around the right atrium.         . Continue Furosemide and monitor clinical status closely. Monitor on  "telemetry. Patient is off CHF pathway.  Monitor strict Is&Os and daily weights.  Place on fluid restriction of 1.5 L. Cardiology has not been consulted. Continue to stress to patient importance of self efficacy and  on diet for CHF. Last BNP reviewed- and noted below   No results for input(s): "BNP", "BNPTRIAGEBLO" in the last 168 hours.    Does not appear grossly volume overloaded    -Volume reduction via HD, will determine dialysis needs as OP to help determine future diuretic dosing  "

## 2024-06-25 NOTE — PROGRESS NOTES
Please see previous notes from this SW for continuity.    __________________________________________________  SW received notification via [facility] [employer] IDE portal. [facility]. Pt referral re-routed to [facility]. Pending medical and financial approval.    Pt referral also pending with [facility] online portal and [facility].    UPDATE 9:10AM: SW spoke with [facility] JIM Munoz regarding pt's referral. Per Lena, pt's referral is pending [facility] intake to confirm if pt is emergency medicaid only. If pt is emergency medicaid only, pt will likely not be able to get accepted at dialysis unit. Lena communicating with [facility] network [facility] regarding pt's referral. Lena to contact  once pt's insurance status has been confirmed.    UPDATE 9:40: SW contacted [facility] central intake pt coordinator Glendy. Per Glendy, pt's referral pending medical and financial approval at [facility]. SW to contact dialysis unit for an update.    Case discussed with SHALINI Cronin. Mehrdad to contact [facility] Regarding pt's insurance status.     UPDATE 9:56AM: SW received update from SHALINI Cronin. Per Mehrdad, [facility] called medicaid who informed the pt is only eligible for emergency services.    SW received call from [facility] unit manager Lisbet. Per Lisbet, reviewing pt's medical records. Per Lisbet, unsure if pt is financially approved as this is done via [facility] central intake and she cannot see that in her portal.    SW awaiting response from [facility] JIM Paredes who is in communication with [facility] regarding guidelines on if pt can be accepted with emergency medicaid only.    UPDATE 11:51AM: SW received notification pt received 3 denials from [facility] units. Pt's [facility] referral cancelled. Per [facility] policy, pt cannot be re-referred to [facility] for 30 days.    SW to follow with updates.    Allie Jha, JAILYN  [facility]  [phone]

## 2024-06-25 NOTE — PLAN OF CARE
"Per ilene Kelley CM, The pt was denied at [facility] so the referral was rerouted to [facility].  She has a meeting with [facility] at 9 AM.  Medical team notified.    9:34 AM  Per Allie, pt is pending at [facility] - they want to know if pt has emergency medicaid only.  SHALINI messaged Raissa Yulisa to see if she could check that.    9:56 AM  Per Raissa, "I called them and they stated member is only eligible for emergency services. She stated the member can also call department of health eligibility department if needs additional services [phone].....not sure but maybe [facility] could maybe provide further assistance." Allie notified.    Doris Cronin, RANCHON, BS, RN, CCM      "

## 2024-06-25 NOTE — ASSESSMENT & PLAN NOTE
Creatine stable for now. BMP reviewed- noted Estimated Creatinine Clearance: 20.7 mL/min (A) (based on SCr of 2.3 mg/dL (H)). according to latest data. Based on current GFR, CKD stage is end stage.  Monitor UOP and serial BMP and adjust therapy as needed. Renally dose meds. Avoid nephrotoxic medications and procedures.    -Neprhology consulted, assisting with dialysis frequency  -SW working to find HD chair for patient for continued OP dialysis  -RFP, Mg daily   no

## 2024-06-26 LAB
ALBUMIN SERPL BCP-MCNC: 2.4 G/DL (ref 3.5–5.2)
ALP SERPL-CCNC: 197 U/L (ref 55–135)
ALT SERPL W/O P-5'-P-CCNC: 33 U/L (ref 10–44)
ANION GAP SERPL CALC-SCNC: 13 MMOL/L (ref 8–16)
AST SERPL-CCNC: 38 U/L (ref 10–40)
BILIRUB SERPL-MCNC: 2.1 MG/DL (ref 0.1–1)
BUN SERPL-MCNC: 40 MG/DL (ref 6–20)
CALCIUM SERPL-MCNC: 8.1 MG/DL (ref 8.7–10.5)
CHLORIDE SERPL-SCNC: 102 MMOL/L (ref 95–110)
CO2 SERPL-SCNC: 19 MMOL/L (ref 23–29)
CREAT SERPL-MCNC: 2.8 MG/DL (ref 0.5–1.4)
EST. GFR  (NO RACE VARIABLE): 19.5 ML/MIN/1.73 M^2
GLUCOSE SERPL-MCNC: 89 MG/DL (ref 70–110)
MAGNESIUM SERPL-MCNC: 1.6 MG/DL (ref 1.6–2.6)
PHOSPHATE SERPL-MCNC: 2.6 MG/DL (ref 2.7–4.5)
POCT GLUCOSE: 124 MG/DL (ref 70–110)
POCT GLUCOSE: 134 MG/DL (ref 70–110)
POCT GLUCOSE: 82 MG/DL (ref 70–110)
POTASSIUM SERPL-SCNC: 3.7 MMOL/L (ref 3.5–5.1)
PROT SERPL-MCNC: 6.9 G/DL (ref 6–8.4)
SODIUM SERPL-SCNC: 134 MMOL/L (ref 136–145)

## 2024-06-26 PROCEDURE — 90935 HEMODIALYSIS ONE EVALUATION: CPT

## 2024-06-26 PROCEDURE — 97116 GAIT TRAINING THERAPY: CPT | Mod: CQ

## 2024-06-26 PROCEDURE — 63600175 PHARM REV CODE 636 W HCPCS

## 2024-06-26 PROCEDURE — 80053 COMPREHEN METABOLIC PANEL: CPT | Performed by: STUDENT IN AN ORGANIZED HEALTH CARE EDUCATION/TRAINING PROGRAM

## 2024-06-26 PROCEDURE — 90935 HEMODIALYSIS ONE EVALUATION: CPT | Mod: ,,, | Performed by: NURSE PRACTITIONER

## 2024-06-26 PROCEDURE — 63600175 PHARM REV CODE 636 W HCPCS: Mod: JZ,JG | Performed by: NURSE PRACTITIONER

## 2024-06-26 PROCEDURE — 36415 COLL VENOUS BLD VENIPUNCTURE: CPT | Performed by: STUDENT IN AN ORGANIZED HEALTH CARE EDUCATION/TRAINING PROGRAM

## 2024-06-26 PROCEDURE — 83735 ASSAY OF MAGNESIUM: CPT | Performed by: STUDENT IN AN ORGANIZED HEALTH CARE EDUCATION/TRAINING PROGRAM

## 2024-06-26 PROCEDURE — 84100 ASSAY OF PHOSPHORUS: CPT | Performed by: STUDENT IN AN ORGANIZED HEALTH CARE EDUCATION/TRAINING PROGRAM

## 2024-06-26 PROCEDURE — 99223 1ST HOSP IP/OBS HIGH 75: CPT | Mod: ,,, | Performed by: SURGERY

## 2024-06-26 PROCEDURE — 63600175 PHARM REV CODE 636 W HCPCS: Performed by: STUDENT IN AN ORGANIZED HEALTH CARE EDUCATION/TRAINING PROGRAM

## 2024-06-26 PROCEDURE — 25000003 PHARM REV CODE 250: Performed by: NURSE PRACTITIONER

## 2024-06-26 PROCEDURE — 21400001 HC TELEMETRY ROOM

## 2024-06-26 PROCEDURE — 25000003 PHARM REV CODE 250

## 2024-06-26 RX ADMIN — FAMOTIDINE 20 MG: 20 TABLET ORAL at 12:06

## 2024-06-26 RX ADMIN — HEPARIN SODIUM 5000 UNITS: 5000 INJECTION INTRAVENOUS; SUBCUTANEOUS at 10:06

## 2024-06-26 RX ADMIN — FUROSEMIDE 80 MG: 10 INJECTION, SOLUTION INTRAVENOUS at 08:06

## 2024-06-26 RX ADMIN — FERUMOXYTOL 510 MG: 510 INJECTION INTRAVENOUS at 10:06

## 2024-06-26 RX ADMIN — FUROSEMIDE 80 MG: 10 INJECTION, SOLUTION INTRAVENOUS at 12:06

## 2024-06-26 RX ADMIN — ATORVASTATIN CALCIUM 40 MG: 40 TABLET, FILM COATED ORAL at 08:06

## 2024-06-26 RX ADMIN — HEPARIN SODIUM 5000 UNITS: 5000 INJECTION INTRAVENOUS; SUBCUTANEOUS at 02:06

## 2024-06-26 RX ADMIN — FLUTICASONE PROPIONATE 100 MCG: 50 SPRAY, METERED NASAL at 12:06

## 2024-06-26 RX ADMIN — POLYETHYLENE GLYCOL 3350 17 G: 17 POWDER, FOR SOLUTION ORAL at 09:06

## 2024-06-26 RX ADMIN — SODIUM CHLORIDE: 9 INJECTION, SOLUTION INTRAVENOUS at 07:06

## 2024-06-26 RX ADMIN — HEPARIN SODIUM 5000 UNITS: 5000 INJECTION INTRAVENOUS; SUBCUTANEOUS at 06:06

## 2024-06-26 NOTE — PT/OT/SLP PROGRESS
DATE OF SERVICE:  02/23/2018

 

Ms. Calloway is hospital day 2 following placement for a right-sided external ventricular drain for obs
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
ological decline.  She is doing well, starting to open her eyes up spontaneously, although she has a 
mildly disconjugate gaze likely due to her thalamic hemorrhage.  She has had bloody CSF output approx
imately 10 mL per hour with intracranial pressures in the 5-8 mmHg range when her pressures have been
 assessed.  She certainly has improved since placement of the external ventricular drain.  On exam, s
he localizes in the left upper extremity and left lower extremity with we could draw in the right upp
er extremity and right lower extremity.

 

I think the plan this weekend will be simply to continue to drain her and attempt weaning of her drai
n early next week. Physical Therapy Treatment    Patient Name:  Xena Vera   MRN:  [id number]    Recommendations:     Discharge Recommendations: No Therapy Indicated  Discharge Equipment Recommendations: none  Barriers to discharge: None    Assessment:     Xena Vera is a 53 y.o. female admitted with a medical diagnosis of Uremia.  She presents with the following impairments/functional limitations: weakness, impaired endurance, gait instability, impaired cardiopulmonary response to activity.    Pt agreeable to session at this time. Pt tolerates session well with emphasis on transfers and gait training. Pt making progress towards therapy goals as indicated by increased total ambulation distance.Pt will continue to benefit from skilled therapy services.    Rehab Prognosis: Good; patient would benefit from acute skilled PT services to address these deficits and reach maximum level of function.    Recent Surgery: * No surgery found *      Plan:     During this hospitalization, patient to be seen 2 x/week to address the identified rehab impairments via therapeutic activities, therapeutic exercises and progress toward the following goals:    Plan of Care Expires:  07/20/24    Subjective     Chief Complaint: none stated  Patient/Family Comments/goals: Pt agreeable to walk x2 laps  Pain/Comfort:  Pain Rating 1: 0/10  Pain Rating Post-Intervention 1: 0/10      Objective:     Communicated with RN (Poonam) prior to session.  Patient found up in chair with telemetry upon PTA entry to room.     General Precautions: Standard, fall  Orthopedic Precautions: N/A  Braces: N/A  Respiratory Status: Room air     Functional Mobility:  Transfers:     Sit to Stand: from bedside chair supervision with no AD  Gait: Pt ambulates 400 feet SBA with no AD. Pt with decreased step length, decreased abigail, and decreased gait speed. Pt is mostly steady with periodic R lateral sway but no LOB noted.       AM-PAC 6 CLICK MOBILITY  Turning over in bed  (including adjusting bedclothes, sheets and blankets)?: 4  Sitting down on and standing up from a chair with arms (e.g., wheelchair, bedside commode, etc.): 4  Moving from lying on back to sitting on the side of the bed?: 4  Moving to and from a bed to a chair (including a wheelchair)?: 4  Need to walk in hospital room?: 3  Climbing 3-5 steps with a railing?: 3  Basic Mobility Total Score: 22       Treatment & Education:  Patient provided with daily orientation and goals of this PT session.     Pt  educated to call for assistance and to transfer with hospital staff only.  Also, pt was educated on the effects of prolonged immobility and the importance of performing OOB activity and exercises to promote healing and reduce recovery time.    Patient left up in chair with all lines intact, call button in reach, RN notified, and daughter present.    GOALS:   Multidisciplinary Problems       Physical Therapy Goals          Problem: Physical Therapy    Goal Priority Disciplines Outcome Goal Variances Interventions   Physical Therapy Goal     PT, PT/OT Progressing     Description: Goals to be met by: 2024     Patient will increase functional independence with mobility by performin. Sit to stand transfer with modified independent   2. Gait x500 feet with Modified [place]   3. Ascend/descend 3 stairs with right handrail and supervision   4. Lower extremity exercise program x30 reps per handout, with supervision                         Time Tracking:     PT Received On: 24  PT Start Time: 1333     PT Stop Time: 1342  PT Total Time (min): 9 min     Billable Minutes: Gait Training 9    Treatment Type: Treatment  PT/PTA: PTA     Number of PTA visits since last PT visit: 2     2024

## 2024-06-26 NOTE — ASSESSMENT & PLAN NOTE
Improving  2/2 to [facility]. AG 18. Patient AAOx3 on exam  +nausea, chest pain, labile HR on admission w/o steph bleeding.   VBG with pH 7.44.     -HD per nephro  -CMP MWF

## 2024-06-26 NOTE — HPI
Xena Vera is a 53 year old female with PMHx HFpEF, severe pHTN (PA pressure 56 mm Hg 6/24), CKD5 (last HD in May 2024), HTN, MR, anemia admitted for management of chest pain. She reports adherence to her fluid restriction & Lasix. On presentation, found to have profound uremia (), Cr 5.6, hypokalemia (K 2.8). Previously admitted to [facility] from 4/1/24-5/8/24 where renal function declined while being diuresed. Required CRRT with intermittent levophed. Admitted to [facility] 5/13-5/17 after presenting to ED for HD. Not enrolled in medicaid at that time and had been told to present to ED for HD needs.     Currently dialyzing via right tunneled dialysis catheter. Symptoms improved with diuresis and dialysis. Noted to have moderate sized pericardial effusion on TTE which improved with the above interventions along with transaminitis. Severe pulmonary HTN without home oxygen requirement. Is only able to walk a few steps without needing to stop due to SOB. Current code status DNR.  Previously evaluated by a vascular surgeon at [facility] with vein mapping that shows right cephalic 3.8 mm the elbow, thrombosed right basilic mid arm, left cephalic 3.1 mm at elbow, and left basilic 3.4mm at elbow.     Insurance is emergency Medicaid which is a barrier to follow up appointments. In addition, the dialysis centers incur a fee when there are patients dialyzing via catheters so primary team has consulted vascular surgery for evaluation for permanent dialysis access creation.  Repeat vein mapping ordered by primary team.    Right hand dominant, has no history of pace maker.    Tobacco: never  MI/Stroke: none    The entire interview was conducted with a

## 2024-06-26 NOTE — PLAN OF CARE
Problem: Adult Inpatient Plan of Care  Goal: Optimal Comfort and Wellbeing  Outcome: Progressing     Problem: Hemodialysis  Goal: Safe, Effective Therapy Delivery  Outcome: Progressing

## 2024-06-26 NOTE — CONSULTS
[facility] - [facility] ([place] [id number])  Vascular Surgery  Consult Note    Inpatient consult to Vascular Surgery  Consult performed by: Italo Valdez MD  Consult ordered by: Cydney Ball MD        Subjective:     Chief Complaint/Reason for Admission: chest pain    History of Present Illness: Xena Vera is a 53 year old female with PMHx HFpEF, severe pHTN (PA pressure 56 mm Hg 6/24), CKD5 (last HD in May 2024), HTN, MR, anemia admitted for management of chest pain. She reports adherence to her fluid restriction & Lasix. On presentation, found to have profound uremia (), Cr 5.6, hypokalemia (K 2.8). Previously admitted to [facility] from 4/1/24-5/8/24 where renal function declined while being diuresed. Required CRRT with intermittent levophed. Admitted to [facility] 5/13-5/17 after presenting to ED for HD. Not enrolled in medicaid at that time and had been told to present to ED for HD needs.     Currently dialyzing via right tunneled dialysis catheter. Symptoms improved with diuresis and dialysis. Noted to have moderate sized pericardial effusion on TTE which improved with the above interventions along with transaminitis. Severe pulmonary HTN without home oxygen requirement. Is only able to walk a few steps without needing to stop due to SOB. Current code status DNR.  Previously evaluated by a vascular surgeon at [facility] with vein mapping that shows right cephalic 3.8 mm the elbow, thrombosed right basilic mid arm, left cephalic 3.1 mm at elbow, and left basilic 3.4mm at elbow.     Insurance is emergency Medicaid which is a barrier to follow up appointments. In addition, the dialysis centers incur a fee when there are patients dialyzing via catheters so primary team has consulted vascular surgery for evaluation for permanent dialysis access creation.  Repeat vein mapping ordered by primary team.    Right hand dominant, has no history of pace maker.    Tobacco: never  MI/Stroke: none    The entire interview was  conducted with a     Medications Prior to Admission   Medication Sig Dispense Refill Last Dose    atorvastatin (LIPITOR) 40 MG tablet Take 1 tablet by mouth every day 90 tablet 1     carvediloL (COREG) 6.25 MG tablet Take 1 tablet (6.25 mg total) by mouth 2 (two) times daily. 60 tablet 11     furosemide (LASIX) 80 MG tablet Take 1 tablet (80 mg total) by mouth 2 (two) times daily. 60 tablet 11     oxybutynin (DITROPAN) 5 MG Tab Take 1 tablet (5 mg total) by mouth 3 (three) times daily. 90 tablet 11     polyethylene glycol (GLYCOLAX) 17 gram/dose powder Use cap to measure 17 grams, mix in liquid and take by mouth 2 (two) times daily. 510 g 11     senna-docusate 8.6-50 mg (PERICOLACE) 8.6-50 mg per tablet Take 1 tablet by mouth 2 (two) times daily. 60 tablet 11     [DISCONTINUED] midodrine (PROAMATINE) 2.5 MG Tab Take 1 tablet (2.5 mg total) by mouth Daily. 30 tablet 11        Review of patient's allergies indicates:  No Known Allergies    Past Medical History:   Diagnosis Date    (HFpEF) heart failure with preserved ejection fraction 06/24/2023    EF 63% with G2 DD  Continue lasix, appears euvolemic today  Continue good BP management with losartan, increase Coreg 6.25 mg BID  Fluid restriction, low sodium diet  Recommend Jardiance- patient had CKD 4 and this is a limiting factor- nephro eval pending    Anemia 06/24/2023    CKD (chronic kidney disease)     HTN (hypertension)     Mitral valve regurgitation 06/26/2023    Refer to structural for evaluation  May benefit from mitral clip    Ovarian cyst     Stage 4 chronic kidney disease 06/24/2023    Refer to nephrology at [facility] as unable to get established with [facility] nephrology and the phone number for the outside nephrologist provided to patient during her recent hospitalization goes unanswered when called.   Cr remains elevated  Would like to start SGLT-2 and appreciate nephrology expertise      Past Surgical History:   Procedure Laterality Date    INSERTION  OF TUNNELED CENTRAL VENOUS HEMODIALYSIS CATHETER Right 2024    Procedure: Insertion, Catheter, Central Venous, Hemodialysis;  Surgeon: Carole Rolon MD;  Location: [facility] CATH LAB;  Service: Interventional Nephrology;  Laterality: Right;     Family History    None       Tobacco Use    Smoking status: Former     Current packs/day: 0.00     Types: Cigarettes     Start date:      Quit date:      Years since quittin.5    Smokeless tobacco: Never    Tobacco comments:     2-3 cigarettes a day   Substance and Sexual Activity    Alcohol use: Never    Drug use: Never    Sexual activity: Not on file     ROS: a comprehensive review of systems was done that was negative unless otherwise stated in the HPI   Objective:     Vital Signs (Most Recent):  Temp: 98 °F (36.7 °C) (24 1115)  Pulse: 82 (24 1115)  Resp: 18 (24 1115)  BP: (!) 146/97 (24 1115)  SpO2: 97 % (24 1115) Vital Signs (24h Range):  Temp:  [97.9 °F (36.6 °C)-98.8 °F (37.1 °C)] 98 °F (36.7 °C)  Pulse:  [82-96] 82  Resp:  [17-18] 18  SpO2:  [95 %-98 %] 97 %  BP: (111-155)/() 146/97     Weight: 47 kg (103 lb 9.9 oz)  Body mass index is 20.93 kg/m².      Physical Exam  Constitutional:       General: She is not in acute distress.     Appearance: Normal appearance. She is ill-appearing.   Cardiovascular:      Rate and Rhythm: Normal rate and regular rhythm.      Comments: 1+ radial pulses  Pulmonary:      Effort: Pulmonary effort is normal.   Musculoskeletal:         General: Normal range of motion.      Right lower leg: Edema present.      Left lower leg: Edema present.   Skin:     Findings: Bruising present.   Neurological:      Mental Status: She is alert and oriented to person, place, and time.          Significant Labs:  All pertinent labs from the last 24 hours have been reviewed.    Significant Diagnostics:  I have reviewed all pertinent imaging results/findings within the past 24 hours.  Assessment/Plan:     ESRD  (end stage renal disease) on dialysis  53 year old female with PMHx HFpEF, severe pHTN (PA pressure 56 mm Hg 6/24), CKD5 (HD via RIJ TDC), HTN, MR, and anemia here for uremia and volume overload. Vascular surgery consulted for permanent HD access. The patient is very frail and has very poor functional status. Given her significant CHF and pulmonary HTN, there may be significant risk associated with AV fistula creation or graft insertion. Will need to discuss remaining catheter dependent for dialysis vs permanent access creation, likely graft. Will discuss with patient in clinic as an outpatient.    -Agree with repeat vein mapping  -Continue HD via TDC  -No surgical intervention indicated while inpatient  -Will see in clinic to discuss possible permanent HD access creation  -Will discuss with staff  -Rest per primary team        Thank you for your consult.     Italo Valdez MD  Vascular Surgery  [facility] ([facility]

## 2024-06-26 NOTE — ASSESSMENT & PLAN NOTE
IMPROVING. Direct hyperbilirubinemia on admission w/ AST/ALT elevation. Liver US not reporting dilated bile ducts  No new medications identified for DILI. Hepatitis panel negative.   Concern for congestive hepatopathy due to volume overload from HFpEF    Liver enzymes and total bilirubin remain elevated despite 3 days of dialysis  Also with pancreatitis    -Can resume statin  -continue HD

## 2024-06-26 NOTE — PROGRESS NOTES
[facility] ST[facility]FF HEMODIALYSIS NOTE     Patient currently on hemodialysis for removal of uremic toxins and volume.     Patient seen and evaluated on hemodialysis, tolerating treatment, see HD flowsheet for vitals and assessments.    Labs have been reviewed and the dialysate bath has been adjusted.       Assessment/Plan:    -Pending outpatient HD chair   -Repeat limited echo 6/24 w/ slight decrease in pericardial effusion size. Per cardiolgy, plan for repeat limited echo in 2 weeks with cardiology clinic follow up.   -Patient seen on HD, tolerating treatment well, w/o complaints   -UF goal of 1.5L   -Renal diet, if not NPO   -Strict I/O's and daily weights  -Daily renal function panels  -Keep MAP >65 while on HD   -Hgb goal 10-11  -Iron studies reviewed, tsat 13, ferritin 60. Will start ferreheme 510 mg IV x 1, then once again next week   -Will continue to follow while inpatient     Marcelle Toscano DNP-NILDAP, C  Nephrology  Pager: [phone]

## 2024-06-26 NOTE — SUBJECTIVE & OBJECTIVE
Interval History: ZAIDA BLACKWELL. Vascular consulted to assist with initiating process of AVF/AVG creation, vein mapping ordered.     Dispo pending HD chair setup.     Review of Systems   Constitutional:  Negative for chills and fever.   Respiratory:  Negative for shortness of breath.    Cardiovascular:  Positive for leg swelling. Negative for chest pain.   Psychiatric/Behavioral:  Negative for agitation and behavioral problems.      Objective:     Vital Signs (Most Recent):  Temp: 98 °F (36.7 °C) (06/26/24 0737)  Pulse: 96 (06/26/24 0930)  Resp: 18 (06/26/24 0737)  BP: (!) 147/96 (06/26/24 0930)  SpO2: 97 % (06/26/24 0737) Vital Signs (24h Range):  Temp:  [97.9 °F (36.6 °C)-98.8 °F (37.1 °C)] 98 °F (36.7 °C)  Pulse:  [65-96] 96  Resp:  [17-18] 18  SpO2:  [95 %-98 %] 97 %  BP: (111-155)/() 147/96     Weight: 47 kg (103 lb 9.9 oz)  Body mass index is 20.93 kg/m².  No intake or output data in the 24 hours ending 06/26/24 0958      Physical Exam  Constitutional:       General: She is not in acute distress.     Appearance: She is not ill-appearing.   HENT:      Head: Normocephalic and atraumatic.      Right Ear: External ear normal.      Left Ear: External ear normal.      Nose: Nose normal.      Mouth/Throat:      Mouth: Mucous membranes are moist.      Pharynx: Oropharynx is clear.   Eyes:      General: No scleral icterus.     Extraocular Movements: Extraocular movements intact.   Cardiovascular:      Rate and Rhythm: Normal rate and regular rhythm.      Pulses: Normal pulses.      Heart sounds: Murmur heard.   Pulmonary:      Effort: Pulmonary effort is normal.      Breath sounds: Normal breath sounds. No wheezing, rhonchi or rales.   Abdominal:      General: Abdomen is flat.      Palpations: Abdomen is soft.      Tenderness: There is no abdominal tenderness.   Musculoskeletal:         General: No swelling. Normal range of motion.      Cervical back: Normal range of motion and neck supple.   Skin:     General: Skin  is warm and dry.      Capillary Refill: Capillary refill takes less than 2 seconds.   Neurological:      General: No focal deficit present.      Mental Status: She is alert and oriented to person, place, and time.   Psychiatric:         Mood and Affect: Mood normal.         Behavior: Behavior normal.             Significant Labs: All pertinent labs within the past 24 hours have been reviewed.    Significant Imaging: I have reviewed all pertinent imaging results/findings within the past 24 hours.

## 2024-06-26 NOTE — PROGRESS NOTES
06/26/24 1115   Tunneled Central Line - Double Lumen  04/22/24 1317 Internal Jugular Right   Placement Date/Time: 04/22/24 1317   Inserted by: magdiel) MD  Hand Hygiene: Performed  Barrier Precautions: Performed  Skin Antisepsis: ChloraPrep  Location: Internal Jugular Right  : [employer] [employer]  Lot Number: [id number]  Guidewire Removed?: Yes  Guid...   Site Assessment No drainage   Line Securement Device Secured with sutureless device   Dressing Type CHG impregnated dressing/sponge   Distal Patency/Care flushed w/o difficulty;normal saline locked   Proximal 1 Patency/Care flushed w/o difficulty;normal saline locked   During Hemodialysis Assessment   Blood Flow Rate (mL/min) 400 mL/min   Dialysate Flow Rate (mL/min) 700 ml/min   Ultrafiltration Rate (mL/Hr) 610 mL/Hr   Arteriovenous Lines Secure Yes   Arterial Pressure (mmHg) -140 mmHg   Venous Pressure (mmHg) 120   Blood Volume Processed (Liters) 67.9 L   UF Removed (mL) 2150 mL   TMP 10   Venous Line in Air Detector Yes   Intake (mL) 250 mL   Intra-Hemodialysis Comments HD completed   Post-Hemodialysis Assessment   Rinseback Volume (mL) 250 mL   Blood Volume Processed (Liters) 67.9 L   Dialyzer Clearance Moderately streaked   Duration of Treatment 210 minutes   Total UF (mL) 2150 mL   Net Fluid Removal 1500   Patient Response to Treatment Malinda. well   Post-Treatment Weight 51.3 kg (113 lb 1.5 oz)   Treatment Weight Change -2.1     Patient left COBY by wheelchair NAD.   H Plasty Text: Given the location of the defect, shape of the defect and the proximity to free margins a H-plasty was deemed most appropriate for repair.  Using a sterile surgical marker, the appropriate advancement arms of the H-plasty were drawn incorporating the defect and placing the expected incisions within the relaxed skin tension lines where possible. The area thus outlined was incised deep to adipose tissue with a #15 scalpel blade. The skin margins were undermined to an appropriate distance in all directions utilizing iris scissors.  The opposing advancement arms were then advanced into place in opposite direction and anchored with interrupted buried subcutaneous sutures.

## 2024-06-26 NOTE — PROGRESS NOTES
06/26/24 0740 06/26/24 0745   Tunneled Central Line - Double Lumen  04/22/24 1317 Internal Jugular Right   Placement Date/Time: 04/22/24 1317   Inserted by: [email or web address]) MD  Hand Hygiene: Performed  Barrier Precautions: Performed  Skin Antisepsis: ChloraPrep  Location: Internal Jugular Right  : [employer]  Lot Number: [id number]  Guidewire Removed?: Yes  Guid...   Site Assessment No drainage  --    Line Securement Device Secured with sutureless device  --    Dressing Type CHG impregnated dressing/sponge  --    Dressing Status Clean;Dry;Intact  --    Dressing Intervention Integrity maintained  --    Distal Patency/Care blood return present;flushed w/o difficulty  --    Proximal 1 Patency/Care blood return present;flushed w/o difficulty  --    During Hemodialysis Assessment   Blood Flow Rate (mL/min)  --  400 mL/min   Dialysate Flow Rate (mL/min)  --  700 ml/min   Ultrafiltration Rate (mL/Hr)  --  610 mL/Hr   Arteriovenous Lines Secure  --  Yes   Arterial Pressure (mmHg)  --  -140 mmHg   Venous Pressure (mmHg)  --  120   Blood Volume Processed (Liters)  --  0 L   UF Removed (mL)  --  0 mL   TMP  --  10   Venous Line in Air Detector  --  Yes   Intake (mL)  --  250 mL   Intra-Hemodialysis Comments  --  HD started     Patient arrived COBY by wheelchair. HD started.

## 2024-06-26 NOTE — PROGRESS NOTES
Please see previous notes from this SW for continuity.    __________________________________________________  SW received email from [facility] JIM Paredes informing pt's insurance coverage is confirmed as [employer]gent [employer]. Lena informed she is reaching out to [place] [employer] to verify the specifics of pt's coverage.     Dialysis units incur a fee when an ESRD pt has a CVC, therefore a more permanent access is encouraged. There is concern pt's insurance will limit pt's follow-up appointments once discharged from the hospital. D/t this concern, request for vascular consult is requested while pt is still admitted in the hospital.    Case discussed with inpt team. Per Dr. Molina, vascular consult placed. Lena informed. KAZ awaiting response from Lena regarding specifics of pt's Emergent Medicaid coverage.     Pt's dialysis referral still pending with [facility].    SW to follow with updates.    Allie Jha, JAILYN  [facility]  [phone]     1.78

## 2024-06-26 NOTE — ASSESSMENT & PLAN NOTE
Creatine stable for now. BMP reviewed- noted Estimated Creatinine Clearance: 17 mL/min (A) (based on SCr of 2.8 mg/dL (H)). according to latest data. Based on current GFR, CKD stage is end stage.  Monitor UOP and serial BMP and adjust therapy as needed. Renally dose meds. Avoid nephrotoxic medications and procedures.    -Neprhology consulted, assisting with dialysis   -Vascular consulted to assist with initiating process of establishing AVF/AVG - vein mapping, rest of workup outpatient  -Vein mapping performed 6/27  -KAZ working to find HD chair for patient for continued OP dialysis - difficulty regarding insurance coverage  -RFP, Mg daily

## 2024-06-26 NOTE — PROGRESS NOTES
"[facility] ([address] Medicine  Progress Note    Patient Name: Xena Vera  MRN: [id number]  Patient Class: IP- Inpatient   Admission Date: 6/18/2024  Length of Stay: 8 days  Attending Physician: Minda Molina MD  Primary Care Provider: Tami Villeda FNP        Subjective:     Principal Problem:Uremia        HPI:  Patient is a 53F PMHx HFpEF (Lasix 80 bid), severe pHTN, CKD5 (last HD in May 2024), HTN, MR, anemia presents for chest pain that began this morning. She reports 2/10 central sqeezing chest pain that radiates to her R shoulder when vomiting. The pain is worse with walking. She has never had chest pain before. She has had nausea/vomiting x1 day. Reports approx 3 episodes of clear vomitus, describes as "phlegm." She has associated sharp upper abdominal pain when vomiting. Additionally reports arthralgias/myalgias, leg swelling, generalized weakness, and fatigue. She had a brief episode of dizziness yesterday when walking but has not had that since. She denies Fever/chills, SOB, upper respiratory symptoms, cough, dysuria, decreased urine, or known sick contacts.     She reports adherence to her fluid restriction & Lasix. Admitted to [facility] from 4/1/24-5/8/24 where renal function declined while being diuresed. Required CRRT with intermittent levophed. Admitted to [facility] 5/13-5/17 after presenting to ED for HD. Not enrolled in medicaid at that time and had been told to present to ED for HD needs. No indications for HD on that admission per chart review. At that time nephrology recommended urgent evaluation with vascular surgery outpatient for AVF placement. Venous mapping obtained.    In ED, somewhat hypertensive (sBP 150s), intermittent bradycardia, afebrile, on RA. CMP with profound uremia (), Cr 5.6, hypokalemia (K 2.8), no hyponatremia. Transaminitis AST//133 with T. Bili 4.3. Lipase elevated at 230. RUQ US w/o CBD dilation or evidence of scarring, surgically " absent gallbladder. CBC with thrombocytopenia (PLT 90), no leukocytosis, stable anemia (Hb 11.5). BNP 4500, Troponin 0.497 which increased to 0.54. LA normal at 1.5. EKG with peaked T waves in II/III/AVF, widened QRS, Qtc 573. Nephrology consulted.     Interpretor was used for entire interview/examination:  number [id number]    Overview/Hospital Course:  Admitted to [facility] 6/18/24 for chest pain, nausea, and vomiting. Found to have profound uremia and hypokalemia. Nephrology consulted and performed emergent HD. CP resolved following HD, residual epigastric pain w/o nausea/vomiting. TTE 6/19 with preserved EF, normal RV systolic function, severe MR, pHTN, and CVP 15. Diuresis with IV lasix in conjunction with HD. LFTs downtrended with volume removal. Noted to have moderate pericardial effusion on 6/22, therefore Cardiology consulted and recommended continued diuresis. Repeat limited TTE performed 6/24 with interval improvement of what remains to be a moderately sized pericardial effusion. Cardiology to follow up with patient outpatient.    Interval History: ZAIDA BLACKWELL. Vascular consulted to assist with initiating process of AVF/AVG creation, vein mapping ordered.     Dispo pending HD chair setup.     Review of Systems   Constitutional:  Negative for chills and fever.   Respiratory:  Negative for shortness of breath.    Cardiovascular:  Positive for leg swelling. Negative for chest pain.   Psychiatric/Behavioral:  Negative for agitation and behavioral problems.      Objective:     Vital Signs (Most Recent):  Temp: 98 °F (36.7 °C) (06/26/24 0737)  Pulse: 96 (06/26/24 0930)  Resp: 18 (06/26/24 0737)  BP: (!) 147/96 (06/26/24 0930)  SpO2: 97 % (06/26/24 0737) Vital Signs (24h Range):  Temp:  [97.9 °F (36.6 °C)-98.8 °F (37.1 °C)] 98 °F (36.7 °C)  Pulse:  [65-96] 96  Resp:  [17-18] 18  SpO2:  [95 %-98 %] 97 %  BP: (111-155)/() 147/96     Weight: 47 kg (103 lb 9.9 oz)  Body mass index is 20.93 kg/m².  No intake or  output data in the 24 hours ending 06/26/24 0958      Physical Exam  Constitutional:       General: She is not in acute distress.     Appearance: She is not ill-appearing.   HENT:      Head: Normocephalic and atraumatic.      Right Ear: External ear normal.      Left Ear: External ear normal.      Nose: Nose normal.      Mouth/Throat:      Mouth: Mucous membranes are moist.      Pharynx: Oropharynx is clear.   Eyes:      General: No scleral icterus.     Extraocular Movements: Extraocular movements intact.   Cardiovascular:      Rate and Rhythm: Normal rate and regular rhythm.      Pulses: Normal pulses.      Heart sounds: Murmur heard.   Pulmonary:      Effort: Pulmonary effort is normal.      Breath sounds: Normal breath sounds. No wheezing, rhonchi or rales.   Abdominal:      General: Abdomen is flat.      Palpations: Abdomen is soft.      Tenderness: There is no abdominal tenderness.   Musculoskeletal:         General: No swelling. Normal range of motion.      Cervical back: Normal range of motion and neck supple.   Skin:     General: Skin is warm and dry.      Capillary Refill: Capillary refill takes less than 2 seconds.   Neurological:      General: No focal deficit present.      Mental Status: She is alert and oriented to person, place, and time.   Psychiatric:         Mood and Affect: Mood normal.         Behavior: Behavior normal.             Significant Labs: All pertinent labs within the past 24 hours have been reviewed.    Significant Imaging: I have reviewed all pertinent imaging results/findings within the past 24 hours.    Assessment/Plan:      * Uremia  Improving  2/2 to ESRD. AG 18. Patient AAOx3 on exam  +nausea, chest pain, labile HR on admission w/o steph bleeding.   VBG with pH 7.44.     -HD per nephro  -CMP MWF    Pericardial effusion  Atrial premature contractions  Noted as small on POOJA 6/19, however on CT AP 6/20, noted to be moderate in size w/ some heterogeneity. In setting of intermittent  asymptomatic bradycardia, concern that effusion may be producing tamponade physiology. Cardiology consulted. Bradycardia is suspected blocked atrial premature contraction, so no need for EP eval currently.     Interval improvement on repeat limited ECHO 6/25    - Volume removal via HD and Lasix 80mg IV q12h  - Cardiology f/u with limited TTE in 2wk (ordered)  - Avoid AV garett blockers   - Telemetry        ESRD (end stage renal disease) on dialysis  Creatine stable for now. BMP reviewed- noted Estimated Creatinine Clearance: 17 mL/min (A) (based on SCr of 2.8 mg/dL (H)). according to latest data. Based on current GFR, CKD stage is end stage.  Monitor UOP and serial BMP and adjust therapy as needed. Renally dose meds. Avoid nephrotoxic medications and procedures.    -Neprhology consulted, assisting with dialysis   -Vascular consulted to assist with initiating process of establishing AVF/AVG  -Vein mapping ordered  -SW working to find HD chair for patient for continued OP dialysis  -RFP, Mg daily    Hyperbilirubinemia  IMPROVING. Direct hyperbilirubinemia on admission w/ AST/ALT elevation. Liver US not reporting dilated bile ducts  No new medications identified for DILI. Hepatitis panel negative.   Concern for congestive hepatopathy due to volume overload from HFpEF    Liver enzymes and total bilirubin remain elevated despite 3 days of dialysis  Also with pancreatitis    -Can resume statin  -continue HD    Chest pain  RESOLVED. CP substernal, 2/10, but mostly with lying flat. Worse with exertion/better with rest  Troponin 0.497-->0.540, BNP 4500 (chronic)  EKG is without changes concerning for ACS  HOWARD score 2, Alannah 109    -cardiac monitoring    Atrial premature contractions  See Pericardial Effusion    Cough  New, dry, non productive  A/w post nasal drip  Satting well RA    -continue to monitor  -Flonase  -tessalon perles    Prediabetes  Last A1c 6.0 (2 months ago)   on admission    -POCT glucose  AC/HS  -LDSSI  -goal -180    Prolonged QT interval  Qtc on admision 570    -cardiac telemetry  -caution with QT prolonging medications    Elevated troponin I level  Resolved  Has elevated troponin at baseline 2/2 to ESRD  Troponin 0.497-->0.540-->0.48  Reports 2/10 CP worse with lying down  EKG is without changes concerning for ACS  Has markedly elevated BUN, uremic pericarditis on ddx    -EKG for new/worsened CP  -cardiac telemetry    Acute on chronic heart failure with preserved ejection fraction (HFpEF)  Patient is identified as having Diastolic (HFpEF) heart failure that is Chronic. CHF is currently controlled. Latest ECHO performed and demonstrates- Results for orders placed during the hospital encounter of 04/01/24    Echo    Result Date: 6/24/2024    Left Ventricle: The left ventricle is normal in size. Mildly increased   ventricular mass. Normal wall thickness. There is eccentric hypertrophy.   There is normal systolic function with a visually estimated ejection   fraction of 55 - 60%. Biplane (2D) method of discs ejection fraction is   60%. There is diastolic dysfunction. Elevated left ventricular filling   pressure.    Right Ventricle: Normal right ventricular cavity size. Systolic   function is borderline low.    Left Atrium: Left atrium is severely dilated.    Aortic Valve: There is mild aortic regurgitation.    Mitral Valve: There is moderate annular dilation present. There is   severe regurgitation with a posterolateral eccentriccally directed jet.    Tricuspid Valve: There is mild to moderate regurgitation.    Pulmonary Artery: The estimated pulmonary artery systolic pressure is   56 mmHg.    IVC/SVC: Intermediate venous pressure at 8 mmHg.    Pericardium: There is a moderate posterior effusion.    Overall effusion appears smaller than prior, however still moderate.    There is borderline 30% variation across mitral and aortic valves with   respiration.  No RV collapse.  Correlate  clinically.        Echo    Addendum Date: 6/21/2024        Pericardium: There is a large circumferential effusion. No definitive   indication of cardiac tamponade. Evidence includes no septal bounce, no   respiratory chamber variation. CVP is intermediate, as the IVC is small   but does not collapse. Diastolic RV collapse is not clearly demonstrated.   Compared to the prior echo on 6/19/24, the effusion appears increased in   size. Correlate with clinical exam.    Left Ventricle: There is mildly reduced systolic function with a   visually estimated ejection fraction of 40 - 45%.    Right Ventricle: Systolic function is mildly reduced.        Result Date: 6/21/2024    Pericardium: There is a large circumferential effusion. No indication   of cardiac tamponade. Evidence includes no septal bounce, no respiratory   chamber variation. CVP is intermediate, as the IVC is small but does not   collapse. Diastolic RV collapse is not clearly demonstrated. Compared to   the prior echo on 6/19/24, the effusion apperas increased in size.    Left Ventricle: There is mildly reduced systolic function with a   visually estimated ejection fraction of 40 - 45%.    Right Ventricle: Systolic function is mildly reduced.        Echo    Result Date: 6/19/2024    Left Ventricle: The left ventricle is severely dilated. LVIDd is 6.0   cm. Normal wall thickness. There is eccentric hypertrophy. There is normal   systolic function. Ejection fraction by visual approximation is 55%. Grade   II diastolic dysfunction.    Right Ventricle: Normal right ventricular cavity size. Wall thickness   is normal. Systolic function is normal.    Left Atrium: Left atrium is severely dilated.    Aortic Valve: The aortic valve is a trileaflet valve.    Mitral Valve: Severely restricted posterior leaflet motion.  The mean   pressure gradient across the mitral valve is 7 mmHg at a heart rate of 77   bpm. There is severe regurgitation with a posterolateral  "eccentrically   directed jet.    Tricuspid Valve: There is mild regurgitation.    Pulmonary Artery: There is severe pulmonary hypertension. The estimated   pulmonary artery systolic pressure is 76 mmHg.    IVC/SVC: Elevated venous pressure at 15 mmHg.    Pericardium: There is a small effusion. No indication of cardiac   tamponade. On direct comparison with images from 4/23/2024, there is   slightly more fluid around the right atrium.         . Continue Furosemide and monitor clinical status closely. Monitor on telemetry. Patient is off CHF pathway.  Monitor strict Is&Os and daily weights.  Place on fluid restriction of 1.5 L. Cardiology has not been consulted. Continue to stress to patient importance of self efficacy and  on diet for CHF. Last BNP reviewed- and noted below   No results for input(s): "BNP", "BNPTRIAGEBLO" in the last 168 hours.    Does not appear grossly volume overloaded    -Volume reduction via HD, will determine dialysis needs as OP to help determine future diuretic dosing    Class 2 obesity in adult  Body mass index is 20.93 kg/m². Morbid obesity complicates all aspects of disease management from diagnostic modalities to treatment. Weight loss encouraged and health benefits explained to patient.    Essential hypertension  BP elevated and labile on admission  120s-160s systolic  Appears to have midodrine ordered, likely for dialysis     -will hold antihypertensives for now pending nephrology recommendations regarding dialysis      VTE Risk Mitigation (From admission, onward)           Ordered     heparin (porcine) injection 5,000 Units  Every 8 hours         06/25/24 0916     IP VTE HIGH RISK PATIENT  Once         06/18/24 1427     Place sequential compression device  Until discontinued         06/18/24 1427                    Discharge Planning   RILEY: 6/27/2024     Code Status: DNR   Is the patient medically ready for discharge?:     Reason for patient still in hospital (select all that " apply): Pending disposition  Discharge Plan A: Home Health                  Cydney Ball MD  Department of Hospital Medicine   [facility] ([place])

## 2024-06-26 NOTE — SUBJECTIVE & OBJECTIVE
Medications Prior to Admission   Medication Sig Dispense Refill Last Dose    atorvastatin (LIPITOR) 40 MG tablet Take 1 tablet by mouth every day 90 tablet 1     carvediloL (COREG) 6.25 MG tablet Take 1 tablet (6.25 mg total) by mouth 2 (two) times daily. 60 tablet 11     furosemide (LASIX) 80 MG tablet Take 1 tablet (80 mg total) by mouth 2 (two) times daily. 60 tablet 11     oxybutynin (DITROPAN) 5 MG Tab Take 1 tablet (5 mg total) by mouth 3 (three) times daily. 90 tablet 11     polyethylene glycol (GLYCOLAX) 17 gram/dose powder Use cap to measure 17 grams, mix in liquid and take by mouth 2 (two) times daily. 510 g 11     senna-docusate 8.6-50 mg (PERICOLACE) 8.6-50 mg per tablet Take 1 tablet by mouth 2 (two) times daily. 60 tablet 11     [DISCONTINUED] midodrine (PROAMATINE) 2.5 MG Tab Take 1 tablet (2.5 mg total) by mouth Daily. 30 tablet 11        Review of patient's allergies indicates:  No Known Allergies    Past Medical History:   Diagnosis Date    (HFpEF) heart failure with preserved ejection fraction 06/24/2023    EF 63% with G2 DD  Continue lasix, appears euvolemic today  Continue good BP management with losartan, increase Coreg 6.25 mg BID  Fluid restriction, low sodium diet  Recommend Jardiance- patient had CKD 4 and this is a limiting factor- nephro eval pending    Anemia 06/24/2023    CKD (chronic kidney disease)     HTN (hypertension)     Mitral valve regurgitation 06/26/2023    Refer to structural for evaluation  May benefit from mitral clip    Ovarian cyst     Stage 4 chronic kidney disease 06/24/2023    Refer to nephrology at [facility] as unable to get established with [facility] and the phone number for the outside nephrologist provided to patient during her recent hospitalization goes unanswered when called.   Cr remains elevated  Would like to start SGLT-2 and appreciate nephrology expertise      Past Surgical History:   Procedure Laterality Date    INSERTION OF TUNNELED CENTRAL VENOUS  HEMODIALYSIS CATHETER Right 2024    Procedure: Insertion, Catheter, Central Venous, Hemodialysis;  Surgeon: Carole Rolon MD;  Location: [facility] CATH LAB;  Service: Interventional Nephrology;  Laterality: Right;     Family History    None       Tobacco Use    Smoking status: Former     Current packs/day: 0.00     Types: Cigarettes     Start date:      Quit date:      Years since quittin.5    Smokeless tobacco: Never    Tobacco comments:     2-3 cigarettes a day   Substance and Sexual Activity    Alcohol use: Never    Drug use: Never    Sexual activity: Not on file     ROS: a comprehensive review of systems was done that was negative unless otherwise stated in the HPI   Objective:     Vital Signs (Most Recent):  Temp: 98 °F (36.7 °C) (24 1115)  Pulse: 82 (24 1115)  Resp: 18 (24 1115)  BP: (!) 146/97 (24 1115)  SpO2: 97 % (24 1115) Vital Signs (24h Range):  Temp:  [97.9 °F (36.6 °C)-98.8 °F (37.1 °C)] 98 °F (36.7 °C)  Pulse:  [82-96] 82  Resp:  [17-18] 18  SpO2:  [95 %-98 %] 97 %  BP: (111-155)/() 146/97     Weight: 47 kg (103 lb 9.9 oz)  Body mass index is 20.93 kg/m².      Physical Exam  Constitutional:       General: She is not in acute distress.     Appearance: Normal appearance. She is ill-appearing.   Cardiovascular:      Rate and Rhythm: Normal rate and regular rhythm.      Comments: 1+ radial pulses  Pulmonary:      Effort: Pulmonary effort is normal.   Musculoskeletal:         General: Normal range of motion.      Right lower leg: Edema present.      Left lower leg: Edema present.   Skin:     Findings: Bruising present.   Neurological:      Mental Status: She is alert and oriented to person, place, and time.          Significant Labs:  All pertinent labs from the last 24 hours have been reviewed.    Significant Diagnostics:  I have reviewed all pertinent imaging results/findings within the past 24 hours.

## 2024-06-26 NOTE — ASSESSMENT & PLAN NOTE
53 year old female with PMHx HFpEF, severe pHTN (PA pressure 56 mm Hg 6/24), CKD5 (HD via RIJ TDC), HTN, MR, and anemia here for uremia and volume overload. Vascular surgery consulted for permanent HD access. The patient is very frail and has very poor functional status. Given her significant CHF and pulmonary HTN, there may be significant risk associated with AV fistula creation or graft insertion. Will need to discuss remaining catheter dependent for dialysis vs permanent access creation, likely graft. Will discuss with patient in clinic as an outpatient.    -Agree with repeat vein mapping  -Continue HD via TDC  -No surgical intervention indicated while inpatient  -Will see in clinic to discuss possible permanent HD access creation  -Will discuss with staff  -Rest per primary team

## 2024-06-26 NOTE — ASSESSMENT & PLAN NOTE
Atrial premature contractions  Noted as small on POOJA 6/19, however on CT AP 6/20, noted to be moderate in size w/ some heterogeneity. In setting of intermittent asymptomatic bradycardia, concern that effusion may be producing tamponade physiology. Cardiology consulted. Bradycardia is suspected blocked atrial premature contraction, so no need for EP eval currently.     Interval improvement on repeat limited ECHO 6/25    - Volume removal via HD and Lasix 80mg IV q12h  - Cardiology f/u with limited TTE in 2wk (ordered)  - Avoid AV garett blockers   - Telemetry

## 2024-06-27 PROBLEM — Z01.818 PRE-OP EXAM: Status: ACTIVE | Noted: 2024-06-27

## 2024-06-27 LAB
BACTERIA BLD CULT: NORMAL
BACTERIA BLD CULT: NORMAL
POCT GLUCOSE: 105 MG/DL (ref 70–110)
POCT GLUCOSE: 118 MG/DL (ref 70–110)
POCT GLUCOSE: 141 MG/DL (ref 70–110)
POCT GLUCOSE: 145 MG/DL (ref 70–110)

## 2024-06-27 PROCEDURE — 21400001 HC TELEMETRY ROOM

## 2024-06-27 PROCEDURE — 25000003 PHARM REV CODE 250

## 2024-06-27 PROCEDURE — 97116 GAIT TRAINING THERAPY: CPT | Mod: CQ

## 2024-06-27 PROCEDURE — 63600175 PHARM REV CODE 636 W HCPCS

## 2024-06-27 PROCEDURE — 93985 DUP-SCAN HEMO COMPL BI STD: CPT | Mod: 26,,, | Performed by: SURGERY

## 2024-06-27 PROCEDURE — 63600175 PHARM REV CODE 636 W HCPCS: Performed by: STUDENT IN AN ORGANIZED HEALTH CARE EDUCATION/TRAINING PROGRAM

## 2024-06-27 RX ORDER — SODIUM CHLORIDE 9 MG/ML
INJECTION, SOLUTION INTRAVENOUS ONCE
Status: CANCELLED | OUTPATIENT
Start: 2024-06-28

## 2024-06-27 RX ADMIN — ATORVASTATIN CALCIUM 40 MG: 40 TABLET, FILM COATED ORAL at 09:06

## 2024-06-27 RX ADMIN — POLYETHYLENE GLYCOL 3350 17 G: 17 POWDER, FOR SOLUTION ORAL at 08:06

## 2024-06-27 RX ADMIN — HEPARIN SODIUM 5000 UNITS: 5000 INJECTION INTRAVENOUS; SUBCUTANEOUS at 05:06

## 2024-06-27 RX ADMIN — FUROSEMIDE 80 MG: 10 INJECTION, SOLUTION INTRAVENOUS at 06:06

## 2024-06-27 RX ADMIN — FUROSEMIDE 80 MG: 10 INJECTION, SOLUTION INTRAVENOUS at 08:06

## 2024-06-27 RX ADMIN — FAMOTIDINE 20 MG: 20 TABLET ORAL at 08:06

## 2024-06-27 RX ADMIN — HEPARIN SODIUM 5000 UNITS: 5000 INJECTION INTRAVENOUS; SUBCUTANEOUS at 02:06

## 2024-06-27 RX ADMIN — HEPARIN SODIUM 5000 UNITS: 5000 INJECTION INTRAVENOUS; SUBCUTANEOUS at 09:06

## 2024-06-27 NOTE — PLAN OF CARE
CM sent referral for SSI disability to [employer] via email.  No SS# listed for this pt.  CM called pt's daughter Alexandrea [phone] to obtain. LVM requesting call back.    RANCHO TysonN, BS, RN, CCM

## 2024-06-27 NOTE — PLAN OF CARE
Problem: Adult Inpatient Plan of Care  Goal: Optimal Comfort and Wellbeing  Outcome: Progressing     Problem: Hemodialysis  Goal: Safe, Effective Therapy Delivery  Outcome: Progressing     Problem: Adult Inpatient Plan of Care  Goal: Readiness for Transition of Care  Outcome: Met

## 2024-06-27 NOTE — SUBJECTIVE & OBJECTIVE
Interval History: NAEON, VSS. Slight sore throat w/ cough, not productive. Adjusted Lasix IV. Vein mapping today. Working with CM on OP HD setup.     Review of Systems   Constitutional:  Negative for chills and fever.   Respiratory:  Negative for shortness of breath.    Cardiovascular:  Positive for leg swelling. Negative for chest pain.   Psychiatric/Behavioral:  Negative for agitation and behavioral problems.      Objective:     Vital Signs (Most Recent):  Temp: 98.7 °F (37.1 °C) (06/27/24 0748)  Pulse: 88 (06/27/24 1038)  Resp: 17 (06/27/24 0748)  BP: 116/73 (06/27/24 0748)  SpO2: 98 % (06/27/24 0748) Vital Signs (24h Range):  Temp:  [97.3 °F (36.3 °C)-98.7 °F (37.1 °C)] 98.7 °F (37.1 °C)  Pulse:  [63-99] 88  Resp:  [17-18] 17  SpO2:  [93 %-98 %] 98 %  BP: (116-133)/(66-79) 116/73     Weight: 47 kg (103 lb 9.9 oz)  Body mass index is 20.93 kg/m².  No intake or output data in the 24 hours ending 06/27/24 1151      Physical Exam  Constitutional:       General: She is not in acute distress.     Appearance: She is not ill-appearing.   HENT:      Head: Normocephalic and atraumatic.      Right Ear: External ear normal.      Left Ear: External ear normal.      Nose: Nose normal.      Mouth/Throat:      Mouth: Mucous membranes are moist.      Pharynx: Oropharynx is clear.   Eyes:      General: No scleral icterus.     Extraocular Movements: Extraocular movements intact.   Cardiovascular:      Rate and Rhythm: Normal rate and regular rhythm.      Pulses: Normal pulses.      Heart sounds: Murmur heard.   Pulmonary:      Effort: Pulmonary effort is normal.      Breath sounds: Normal breath sounds. No wheezing, rhonchi or rales.   Abdominal:      General: Abdomen is flat.      Palpations: Abdomen is soft.      Tenderness: There is no abdominal tenderness.   Musculoskeletal:         General: No swelling. Normal range of motion.      Cervical back: Normal range of motion and neck supple.   Skin:     General: Skin is warm and  dry.      Capillary Refill: Capillary refill takes less than 2 seconds.   Neurological:      General: No focal deficit present.      Mental Status: She is alert and oriented to person, place, and time.   Psychiatric:         Mood and Affect: Mood normal.         Behavior: Behavior normal.             Significant Labs: All pertinent labs within the past 24 hours have been reviewed.    Significant Imaging: I have reviewed all pertinent imaging results/findings within the past 24 hours.

## 2024-06-27 NOTE — PLAN OF CARE
[facility] - Med S[facility] ([facility])  Discharge Reassessment    Primary Care Provider: Tami Villeda FNP    Expected Discharge Date: 6/27/2024    Reassessment (most recent)       Discharge Reassessment - 06/27/24 1547          Discharge Reassessment    Assessment Type Discharge Planning Reassessment (P)      Did the patient's condition or plan change since previous assessment? No (P)      Discharge Plan discussed with: Adult children (P)      Communicated RILEY with patient/caregiver Yes (P)      Discharge Plan A Home (P)      Discharge Plan B Home with family (P)      DME Needed Upon Discharge  none (P)      Transition of Care Barriers DIalysis placement issues (P)      Why the patient remains in the hospital Other (see comment) (P)    Still working on HD placement, waiting to hear from facilities whether or not they can accept her.       Post-Acute Status    Post-Acute Authorization Dialysis (P)      Diaylsis Status Referrals Sent (P)      Coverage Emergency Medicaid (P)      Discharge Delays Dialysis Set-up (P)                  CM spoke with pt's daughter Alexandrea Vera [phone] to discuss d/c plan.  Dispo home.  Daughter will give pt a ride  home.  DME needed none.  CM instructed CG that we are still seeking HD placement; still waiting to hear from facilities; if all facilities deny, pt will have to present to ED for HD.  CG v/u.  CM instructed that as soon as HD either set up or denied, plan is for pt to be d/c'd home.    RANCHO TysonN, BS, RN, CCM

## 2024-06-27 NOTE — PT/OT/SLP PROGRESS
Physical Therapy Treatment    Patient Name:  Xena Vera   MRN:  [id number]    Recommendations:     Discharge Recommendations: No Therapy Indicated  Discharge Equipment Recommendations: none  Barriers to discharge: None    Assessment:     Xena Vera is a 53 y.o. female admitted with a medical diagnosis of Uremia.  She presents with the following impairments/functional limitations: weakness, impaired endurance, gait instability, impaired cardiopulmonary response to activity.    Pt agreeable to session at this time. Pt tolerates session well with emphasis on gait training and stair training. Pt making progress towards therapy goals as indicated by decreased assistance required with ambulation. Pt also tolerates ascending/descending 1 flight of stairs with SBA. Pt will continue to benefit from skilled therapy services.    Rehab Prognosis: Good; patient would benefit from acute skilled PT services to address these deficits and reach maximum level of function.    Recent Surgery: * No surgery found *      Plan:     During this hospitalization, patient to be seen 2 x/week to address the identified rehab impairments via therapeutic activities, therapeutic exercises and progress toward the following goals:    Plan of Care Expires:  07/20/24    Subjective     Chief Complaint: none stated  Patient/Family Comments/goals: Pt agreeable to stair training  Pain/Comfort:  Pain Rating 1: 0/10  Pain Rating Post-Intervention 1: 0/10      Objective:     Communicated with RN (Poonam) prior to session.  Patient found ambulatory in room/cesar with telemetry upon PTA entry to room.     General Precautions: Standard, fall  Orthopedic Precautions: N/A  Braces: N/A  Respiratory Status: Room air     Functional Mobility:  Gait: Pt ambulates 300 feet Supervision with no AD. Pt with slow, steady gait and no noted LOB. Improvements in gait deviations noted compared to previous session.   Stairs:  Pt ascended/descended 1 flight(s) with No  Assistive Device with right handrail with Stand-by Assistance.       AM-PAC 6 CLICK MOBILITY  Turning over in bed (including adjusting bedclothes, sheets and blankets)?: 4  Sitting down on and standing up from a chair with arms (e.g., wheelchair, bedside commode, etc.): 4  Moving from lying on back to sitting on the side of the bed?: 4  Moving to and from a bed to a chair (including a wheelchair)?: 4  Need to walk in hospital room?: 4  Climbing 3-5 steps with a railing?: 3  Basic Mobility Total Score: 23       Treatment & Education:  Patient provided with daily orientation and goals of this PT session.     Pt educated to call for assistance and to transfer with hospital staff only.  Also, pt was educated on the effects of prolonged immobility and the importance of performing OOB activity and exercises to promote healing and reduce recovery time.    Patient left up in chair with all lines intact, call button in reach, RN notified, and daughter present.    GOALS:   Multidisciplinary Problems       Physical Therapy Goals          Problem: Physical Therapy    Goal Priority Disciplines Outcome Goal Variances Interventions   Physical Therapy Goal     PT, PT/OT Progressing     Description: Goals to be met by: 2024     Patient will increase functional independence with mobility by performin. Sit to stand transfer with modified independent   2. Gait x500 feet with Modified [place]   3. Ascend/descend 3 stairs with right handrail and supervision   4. Lower extremity exercise program x30 reps per handout, with supervision                         Time Tracking:     PT Received On: 24  PT Start Time: 908     PT Stop Time: 918  PT Total Time (min): 10 min     Billable Minutes: Gait Training 10    Treatment Type: Treatment  PT/PTA: PTA     Number of PTA visits since last PT visit: 3     2024

## 2024-06-27 NOTE — ASSESSMENT & PLAN NOTE
Improving  2/2 to ESRD. AG 18. Patient AAOx3 on exam  +nausea, chest pain, labile HR on admission w/o steph bleeding.   VBG with pH 7.44.     -HD per nephro  -Outpatient HD - difficulty setting up due to insurance barriers  -[facility] MWF

## 2024-06-27 NOTE — PROGRESS NOTES
"[facility] ([address] Medicine  Progress Note    Patient Name: Xena Vera  MRN: [id number]  Patient Class: IP- Inpatient   Admission Date: 6/18/2024  Length of Stay: 9 days  Attending Physician: Loreta Dimas MD  Primary Care Provider: Tami Villeda FNP        Subjective:     Principal Problem:Uremia        HPI:  Patient is a 53F PMHx HFpEF (Lasix 80 bid), severe pHTN, CKD5 (last HD in May 2024), HTN, MR, anemia presents for chest pain that began this morning. She reports 2/10 central sqeezing chest pain that radiates to her R shoulder when vomiting. The pain is worse with walking. She has never had chest pain before. She has had nausea/vomiting x1 day. Reports approx 3 episodes of clear vomitus, describes as "phlegm." She has associated sharp upper abdominal pain when vomiting. Additionally reports arthralgias/myalgias, leg swelling, generalized weakness, and fatigue. She had a brief episode of dizziness yesterday when walking but has not had that since. She denies Fever/chills, SOB, upper respiratory symptoms, cough, dysuria, decreased urine, or known sick contacts.     She reports adherence to her fluid restriction & Lasix. Admitted to [facility] from 4/1/24-5/8/24 where renal function declined while being diuresed. Required CRRT with intermittent levophed. Admitted to [facility] 5/13-5/17 after presenting to ED for HD. Not enrolled in medicaid at that time and had been told to present to ED for HD needs. No indications for HD on that admission per chart review. At that time nephrology recommended urgent evaluation with vascular surgery outpatient for AVF placement. Venous mapping obtained.    In ED, somewhat hypertensive (sBP 150s), intermittent bradycardia, afebrile, on RA. CMP with profound uremia (), Cr 5.6, hypokalemia (K 2.8), no hyponatremia. Transaminitis AST//133 with T. Bili 4.3. Lipase elevated at 230. RUQ US w/o CBD dilation or evidence of scarring, surgically " absent gallbladder. CBC with thrombocytopenia (PLT 90), no leukocytosis, stable anemia (Hb 11.5). BNP 4500, Troponin 0.497 which increased to 0.54. LA normal at 1.5. EKG with peaked T waves in II/III/AVF, widened QRS, Qtc 573. Nephrology consulted.     Interpretor was used for entire interview/examination:  number [id number]    Overview/Hospital Course:  Ad[place] [facility] [place] for chest pain, nausea, and vomiting. Found to have profound uremia and hypokalemia. Nephrology consulted and performed emergent HD. CP resolved following HD, residual epigastric pain w/o nausea/vomiting. TTE 6/19 with preserved EF, normal RV systolic function, severe MR, pHTN, and CVP 15. Diuresis with IV lasix in conjunction with HD. LFTs downtrended with volume removal. Noted to have moderate pericardial effusion on 6/22, therefore Cardiology consulted and recommended continued diuresis. Repeat limited TTE performed 6/24 with interval improvement of what remains to be a moderately sized pericardial effusion. Cardiology to follow up with patient outpatient.    Interval History: NAEON, VSS. Slight sore throat w/ cough, not productive. Adjusted Lasix IV. Vein mapping today. Working with CM on OP HD setup.     Review of Systems   Constitutional:  Negative for chills and fever.   Respiratory:  Negative for shortness of breath.    Cardiovascular:  Positive for leg swelling. Negative for chest pain.   Psychiatric/Behavioral:  Negative for agitation and behavioral problems.      Objective:     Vital Signs (Most Recent):  Temp: 98.7 °F (37.1 °C) (06/27/24 0748)  Pulse: 88 (06/27/24 1038)  Resp: 17 (06/27/24 0748)  BP: 116/73 (06/27/24 0748)  SpO2: 98 % (06/27/24 0748) Vital Signs (24h Range):  Temp:  [97.3 °F (36.3 °C)-98.7 °F (37.1 °C)] 98.7 °F (37.1 °C)  Pulse:  [63-99] 88  Resp:  [17-18] 17  SpO2:  [93 %-98 %] 98 %  BP: (116-133)/(66-79) 116/73     Weight: 47 kg (103 lb 9.9 oz)  Body mass index is 20.93 kg/m².  No intake or output data in  the 24 hours ending 06/27/24 1151      Physical Exam  Constitutional:       General: She is not in acute distress.     Appearance: She is not ill-appearing.   HENT:      Head: Normocephalic and atraumatic.      Right Ear: External ear normal.      Left Ear: External ear normal.      Nose: Nose normal.      Mouth/Throat:      Mouth: Mucous membranes are moist.      Pharynx: Oropharynx is clear.   Eyes:      General: No scleral icterus.     Extraocular Movements: Extraocular movements intact.   Cardiovascular:      Rate and Rhythm: Normal rate and regular rhythm.      Pulses: Normal pulses.      Heart sounds: Murmur heard.   Pulmonary:      Effort: Pulmonary effort is normal.      Breath sounds: Normal breath sounds. No wheezing, rhonchi or rales.   Abdominal:      General: Abdomen is flat.      Palpations: Abdomen is soft.      Tenderness: There is no abdominal tenderness.   Musculoskeletal:         General: No swelling. Normal range of motion.      Cervical back: Normal range of motion and neck supple.   Skin:     General: Skin is warm and dry.      Capillary Refill: Capillary refill takes less than 2 seconds.   Neurological:      General: No focal deficit present.      Mental Status: She is alert and oriented to person, place, and time.   Psychiatric:         Mood and Affect: Mood normal.         Behavior: Behavior normal.             Significant Labs: All pertinent labs within the past 24 hours have been reviewed.    Significant Imaging: I have reviewed all pertinent imaging results/findings within the past 24 hours.    Assessment/Plan:      * Uremia  Improving  2/2 to ESRD. AG 18. Patient AAOx3 on exam  +nausea, chest pain, labile HR on admission w/o steph bleeding.   VBG with pH 7.44.     -HD per nephro  -Outpatient HD - difficulty setting up due to insurance barriers  -CMP MWF    Pericardial effusion  Atrial premature contractions  Noted as small on POOJA 6/19, however on CT AP 6/20, noted to be moderate in size  w/ some heterogeneity. In setting of intermittent asymptomatic bradycardia, concern that effusion may be producing tamponade physiology. Cardiology consulted. Bradycardia is suspected blocked atrial premature contraction, so no need for EP eval currently.     Interval improvement on repeat limited ECHO 6/25    - Volume removal via HD and Lasix 80mg IV q12h  - Cardiology f/u with limited TTE in 2wk (ordered)  - Avoid AV garett blockers   - Telemetry        ESRD (end stage renal disease)  Creatine stable for now. BMP reviewed- noted Estimated Creatinine Clearance: 17 mL/min (A) (based on SCr of 2.8 mg/dL (H)). according to latest data. Based on current GFR, CKD stage is end stage.  Monitor UOP and serial BMP and adjust therapy as needed. Renally dose meds. Avoid nephrotoxic medications and procedures.    -Neprhology consulted, assisting with dialysis   -Vascular consulted to assist with initiating process of establishing AVF/AVG - vein mapping, rest of workup outpatient  -Vein mapping performed 6/27  -SW working to find HD chair for patient for continued OP dialysis - difficulty regarding insurance coverage  -RFP, Mg daily    Hyperbilirubinemia  IMPROVING. Direct hyperbilirubinemia on admission w/ AST/ALT elevation. Liver US not reporting dilated bile ducts  No new medications identified for DILI. Hepatitis panel negative.   Concern for congestive hepatopathy due to volume overload from HFpEF    Liver enzymes and total bilirubin remain elevated despite 3 days of dialysis  Also with pancreatitis    -Can resume statin  -continue HD    Chest pain  RESOLVED. CP substernal, 2/10, but mostly with lying flat. Worse with exertion/better with rest  Troponin 0.497-->0.540, BNP 4500 (chronic)  EKG is without changes concerning for ACS  HOWARD score 2, Alannah 109    -cardiac monitoring    Pre-op exam  Compensated HFpEF, no O2 requirement, stable on HD 3x/wk.       Atrial premature contractions  See Pericardial Effusion    Cough  New,  dry, non productive  A/w post nasal drip  Satting well RA    -continue to monitor  -Flonase  -tessalon perles    Prediabetes  Last A1c 6.0 (2 months ago)   on admission    -POCT glucose AC/HS  -LDSSI  -goal -180    Prolonged QT interval  Qtc on admision 570    -cardiac telemetry  -caution with QT prolonging medications    Elevated troponin I level  Resolved  Has elevated troponin at baseline 2/2 to ESRD  Troponin 0.497-->0.540-->0.48  Reports 2/10 CP worse with lying down  EKG is without changes concerning for ACS  Has markedly elevated BUN, uremic pericarditis on ddx    -EKG for new/worsened CP  -cardiac telemetry    Acute on chronic heart failure with preserved ejection fraction (HFpEF)  Patient is identified as having Diastolic (HFpEF) heart failure that is Chronic. CHF is currently controlled. Latest ECHO performed and demonstrates- Results for orders placed during the hospital encounter of 04/01/24    Echo    Result Date: 6/24/2024    Left Ventricle: The left ventricle is normal in size. Mildly increased   ventricular mass. Normal wall thickness. There is eccentric hypertrophy.   There is normal systolic function with a visually estimated ejection   fraction of 55 - 60%. Biplane (2D) method of discs ejection fraction is   60%. There is diastolic dysfunction. Elevated left ventricular filling   pressure.    Right Ventricle: Normal right ventricular cavity size. Systolic   function is borderline low.    Left Atrium: Left atrium is severely dilated.    Aortic Valve: There is mild aortic regurgitation.    Mitral Valve: There is moderate annular dilation present. There is   severe regurgitation with a posterolateral eccentriccally directed jet.    Tricuspid Valve: There is mild to moderate regurgitation.    Pulmonary Artery: The estimated pulmonary artery systolic pressure is   56 mmHg.    IVC/SVC: Intermediate venous pressure at 8 mmHg.    Pericardium: There is a moderate posterior effusion.     Overall effusion appears smaller than prior, however still moderate.    There is borderline 30% variation across mitral and aortic valves with   respiration.  No RV collapse.  Correlate clinically.        Echo    Addendum Date: 6/21/2024        Pericardium: There is a large circumferential effusion. No definitive   indication of cardiac tamponade. Evidence includes no septal bounce, no   respiratory chamber variation. CVP is intermediate, as the IVC is small   but does not collapse. Diastolic RV collapse is not clearly demonstrated.   Compared to the prior echo on 6/19/24, the effusion appears increased in   size. Correlate with clinical exam.    Left Ventricle: There is mildly reduced systolic function with a   visually estimated ejection fraction of 40 - 45%.    Right Ventricle: Systolic function is mildly reduced.        Result Date: 6/21/2024    Pericardium: There is a large circumferential effusion. No indication   of cardiac tamponade. Evidence includes no septal bounce, no respiratory   chamber variation. CVP is intermediate, as the IVC is small but does not   collapse. Diastolic RV collapse is not clearly demonstrated. Compared to   the prior echo on 6/19/24, the effusion apperas increased in size.    Left Ventricle: There is mildly reduced systolic function with a   visually estimated ejection fraction of 40 - 45%.    Right Ventricle: Systolic function is mildly reduced.        Echo    Result Date: 6/19/2024    Left Ventricle: The left ventricle is severely dilated. LVIDd is 6.0   cm. Normal wall thickness. There is eccentric hypertrophy. There is normal   systolic function. Ejection fraction by visual approximation is 55%. Grade   II diastolic dysfunction.    Right Ventricle: Normal right ventricular cavity size. Wall thickness   is normal. Systolic function is normal.    Left Atrium: Left atrium is severely dilated.    Aortic Valve: The aortic valve is a trileaflet valve.    Mitral Valve: Severely  restricted posterior leaflet motion.  The mean   pressure gradient across the mitral valve is 7 mmHg at a heart rate of 77   bpm. There is severe regurgitation with a posterolateral eccentrically   directed jet.    Tricuspid Valve: There is mild regurgitation.    Pulmonary Artery: There is severe pulmonary hypertension. The estimated   pulmonary artery systolic pressure is 76 mmHg.    IVC/SVC: Elevated venous pressure at 15 mmHg.    Pericardium: There is a small effusion. No indication of cardiac   tamponade. On direct comparison with images from 4/23/2024, there is   slightly more fluid around the right atrium.         . Continue Furosemide and monitor clinical status closely. Monitor on telemetry. Patient is off CHF pathway.  Monitor strict Is&Os and daily weights.  Place on fluid restriction of 1.5 L. Cardiology has not been consulted. Continue to stress to patient importance of self efficacy and  on diet for CHF.     Does not appear grossly volume overloaded - 1 pillow to prevent orthopnea, good mobilization, peripheral edema improved, no O2 requirement    -Volume reduction via HD, will determine dialysis needs as OP to help determine future diuretic dosing  -Lasix 80mg bid in hospital, can return to Lasix 80mg bid as should be sufficient when consistent HD sessions begin    Class 2 obesity in adult  Body mass index is 20.93 kg/m². Morbid obesity complicates all aspects of disease management from diagnostic modalities to treatment. Weight loss encouraged and health benefits explained to patient.    Essential hypertension  BP elevated and labile on admission  120s-160s systolic  Appears to have midodrine ordered, likely for dialysis     -will hold antihypertensives for now given aggressive diuresis      VTE Risk Mitigation (From admission, onward)           Ordered     heparin (porcine) injection 5,000 Units  Every 8 hours         06/25/24 0916     IP VTE HIGH RISK PATIENT  Once         06/18/24 7621      Place sequential compression device  Until discontinued         06/18/24 1427                    Discharge Planning   RILEY: 6/27/2024     Code Status: DNR   Is the patient medically ready for discharge?:     Reason for patient still in hospital (select all that apply): Pending disposition  Discharge Plan A: Home Health                  Cydney Ball MD  Department of Hospital Medicine   [facility] ([place]-[facility])

## 2024-06-27 NOTE — ASSESSMENT & PLAN NOTE
BP elevated and labile on admission  120s-160s systolic  Appears to have midodrine ordered, likely for dialysis     -will hold antihypertensives for now given aggressive diuresis

## 2024-06-27 NOTE — ASSESSMENT & PLAN NOTE
Patient is identified as having Diastolic (HFpEF) heart failure that is Chronic. CHF is currently controlled. Latest ECHO performed and demonstrates- Results for orders placed during the hospital encounter of 04/01/24    Echo    Result Date: 6/24/2024    Left Ventricle: The left ventricle is normal in size. Mildly increased   ventricular mass. Normal wall thickness. There is eccentric hypertrophy.   There is normal systolic function with a visually estimated ejection   fraction of 55 - 60%. Biplane (2D) method of discs ejection fraction is   60%. There is diastolic dysfunction. Elevated left ventricular filling   pressure.    Right Ventricle: Normal right ventricular cavity size. Systolic   function is borderline low.    Left Atrium: Left atrium is severely dilated.    Aortic Valve: There is mild aortic regurgitation.    Mitral Valve: There is moderate annular dilation present. There is   severe regurgitation with a posterolateral eccentriccally directed jet.    Tricuspid Valve: There is mild to moderate regurgitation.    Pulmonary Artery: The estimated pulmonary artery systolic pressure is   56 mmHg.    IVC/SVC: Intermediate venous pressure at 8 mmHg.    Pericardium: There is a moderate posterior effusion.    Overall effusion appears smaller than prior, however still moderate.    There is borderline 30% variation across mitral and aortic valves with   respiration.  No RV collapse.  Correlate clinically.        Echo    Addendum Date: 6/21/2024        Pericardium: There is a large circumferential effusion. No definitive   indication of cardiac tamponade. Evidence includes no septal bounce, no   respiratory chamber variation. CVP is intermediate, as the IVC is small   but does not collapse. Diastolic RV collapse is not clearly demonstrated.   Compared to the prior echo on 6/19/24, the effusion appears increased in   size. Correlate with clinical exam.    Left Ventricle: There is mildly reduced systolic function with a    visually estimated ejection fraction of 40 - 45%.    Right Ventricle: Systolic function is mildly reduced.        Result Date: 6/21/2024    Pericardium: There is a large circumferential effusion. No indication   of cardiac tamponade. Evidence includes no septal bounce, no respiratory   chamber variation. CVP is intermediate, as the IVC is small but does not   collapse. Diastolic RV collapse is not clearly demonstrated. Compared to   the prior echo on 6/19/24, the effusion apperas increased in size.    Left Ventricle: There is mildly reduced systolic function with a   visually estimated ejection fraction of 40 - 45%.    Right Ventricle: Systolic function is mildly reduced.        Echo    Result Date: 6/19/2024    Left Ventricle: The left ventricle is severely dilated. LVIDd is 6.0   cm. Normal wall thickness. There is eccentric hypertrophy. There is normal   systolic function. Ejection fraction by visual approximation is 55%. Grade   II diastolic dysfunction.    Right Ventricle: Normal right ventricular cavity size. Wall thickness   is normal. Systolic function is normal.    Left Atrium: Left atrium is severely dilated.    Aortic Valve: The aortic valve is a trileaflet valve.    Mitral Valve: Severely restricted posterior leaflet motion.  The mean   pressure gradient across the mitral valve is 7 mmHg at a heart rate of 77   bpm. There is severe regurgitation with a posterolateral eccentrically   directed jet.    Tricuspid Valve: There is mild regurgitation.    Pulmonary Artery: There is severe pulmonary hypertension. The estimated   pulmonary artery systolic pressure is 76 mmHg.    IVC/SVC: Elevated venous pressure at 15 mmHg.    Pericardium: There is a small effusion. No indication of cardiac   tamponade. On direct comparison with images from 4/23/2024, there is   slightly more fluid around the right atrium.         . Continue Furosemide and monitor clinical status closely. Monitor on telemetry. Patient is off CHF  pathway.  Monitor strict Is&Os and daily weights.  Place on fluid restriction of 1.5 L. Cardiology has not been consulted. Continue to stress to patient importance of self efficacy and  on diet for CHF.     Does not appear grossly volume overloaded - 1 pillow to prevent orthopnea, good mobilization, peripheral edema improved, no O2 requirement    -Volume reduction via HD, will determine dialysis needs as OP to help determine future diuretic dosing  -Lasix 80mg bid in hospital, can return to Lasix 80mg bid as should be sufficient when consistent HD sessions begin

## 2024-06-28 LAB
ALBUMIN SERPL BCP-MCNC: 2.4 G/DL (ref 3.5–5.2)
ALP SERPL-CCNC: 188 U/L (ref 55–135)
ALT SERPL W/O P-5'-P-CCNC: 22 U/L (ref 10–44)
ANION GAP SERPL CALC-SCNC: 12 MMOL/L (ref 8–16)
AST SERPL-CCNC: 28 U/L (ref 10–40)
BASOPHILS # BLD AUTO: 0.03 K/UL (ref 0–0.2)
BASOPHILS NFR BLD: 0.5 % (ref 0–1.9)
BILIRUB SERPL-MCNC: 1.5 MG/DL (ref 0.1–1)
BUN SERPL-MCNC: 32 MG/DL (ref 6–20)
CALCIUM SERPL-MCNC: 7.9 MG/DL (ref 8.7–10.5)
CHLORIDE SERPL-SCNC: 101 MMOL/L (ref 95–110)
CO2 SERPL-SCNC: 25 MMOL/L (ref 23–29)
CREAT SERPL-MCNC: 2.7 MG/DL (ref 0.5–1.4)
DIFFERENTIAL METHOD BLD: ABNORMAL
EOSINOPHIL # BLD AUTO: 0.1 K/UL (ref 0–0.5)
EOSINOPHIL NFR BLD: 1.7 % (ref 0–8)
ERYTHROCYTE [DISTWIDTH] IN BLOOD BY AUTOMATED COUNT: 23.5 % (ref 11.5–14.5)
EST. GFR  (NO RACE VARIABLE): 20.3 ML/MIN/1.73 M^2
GLUCOSE SERPL-MCNC: 94 MG/DL (ref 70–110)
HCT VFR BLD AUTO: 34.4 % (ref 37–48.5)
HGB BLD-MCNC: 9.8 G/DL (ref 12–16)
IMM GRANULOCYTES # BLD AUTO: 0.03 K/UL (ref 0–0.04)
IMM GRANULOCYTES NFR BLD AUTO: 0.5 % (ref 0–0.5)
LYMPHOCYTES # BLD AUTO: 0.9 K/UL (ref 1–4.8)
LYMPHOCYTES NFR BLD: 15.2 % (ref 18–48)
MAGNESIUM SERPL-MCNC: 1.5 MG/DL (ref 1.6–2.6)
MAGNESIUM SERPL-MCNC: 2.3 MG/DL (ref 1.6–2.6)
MCH RBC QN AUTO: 27.1 PG (ref 27–31)
MCHC RBC AUTO-ENTMCNC: 28.5 G/DL (ref 32–36)
MCV RBC AUTO: 95 FL (ref 82–98)
MONOCYTES # BLD AUTO: 0.5 K/UL (ref 0.3–1)
MONOCYTES NFR BLD: 8.7 % (ref 4–15)
NEUTROPHILS # BLD AUTO: 4.3 K/UL (ref 1.8–7.7)
NEUTROPHILS NFR BLD: 73.4 % (ref 38–73)
NRBC BLD-RTO: 0 /100 WBC
PHOSPHATE SERPL-MCNC: 1.3 MG/DL (ref 2.7–4.5)
PHOSPHATE SERPL-MCNC: <1 MG/DL (ref 2.7–4.5)
PLATELET # BLD AUTO: 310 K/UL (ref 150–450)
PMV BLD AUTO: 10.9 FL (ref 9.2–12.9)
POCT GLUCOSE: 101 MG/DL (ref 70–110)
POCT GLUCOSE: 92 MG/DL (ref 70–110)
POTASSIUM SERPL-SCNC: 3.7 MMOL/L (ref 3.5–5.1)
PROT SERPL-MCNC: 6.9 G/DL (ref 6–8.4)
RBC # BLD AUTO: 3.61 M/UL (ref 4–5.4)
SODIUM SERPL-SCNC: 138 MMOL/L (ref 136–145)
WBC # BLD AUTO: 5.84 K/UL (ref 3.9–12.7)

## 2024-06-28 PROCEDURE — 63600175 PHARM REV CODE 636 W HCPCS

## 2024-06-28 PROCEDURE — 84100 ASSAY OF PHOSPHORUS: CPT

## 2024-06-28 PROCEDURE — 83735 ASSAY OF MAGNESIUM: CPT | Mod: 91

## 2024-06-28 PROCEDURE — 83735 ASSAY OF MAGNESIUM: CPT

## 2024-06-28 PROCEDURE — 84100 ASSAY OF PHOSPHORUS: CPT | Mod: 91

## 2024-06-28 PROCEDURE — 63600175 PHARM REV CODE 636 W HCPCS: Performed by: STUDENT IN AN ORGANIZED HEALTH CARE EDUCATION/TRAINING PROGRAM

## 2024-06-28 PROCEDURE — 90935 HEMODIALYSIS ONE EVALUATION: CPT | Mod: ,,, | Performed by: NURSE PRACTITIONER

## 2024-06-28 PROCEDURE — 36415 COLL VENOUS BLD VENIPUNCTURE: CPT | Mod: XB

## 2024-06-28 PROCEDURE — 90935 HEMODIALYSIS ONE EVALUATION: CPT

## 2024-06-28 PROCEDURE — 21400001 HC TELEMETRY ROOM

## 2024-06-28 PROCEDURE — 25000003 PHARM REV CODE 250

## 2024-06-28 PROCEDURE — 85025 COMPLETE CBC W/AUTO DIFF WBC: CPT

## 2024-06-28 PROCEDURE — 36415 COLL VENOUS BLD VENIPUNCTURE: CPT

## 2024-06-28 PROCEDURE — 80053 COMPREHEN METABOLIC PANEL: CPT

## 2024-06-28 RX ORDER — MAGNESIUM SULFATE HEPTAHYDRATE 40 MG/ML
2 INJECTION, SOLUTION INTRAVENOUS ONCE
Status: COMPLETED | OUTPATIENT
Start: 2024-06-28 | End: 2024-06-28

## 2024-06-28 RX ORDER — SODIUM,POTASSIUM PHOSPHATES 280-250MG
1 POWDER IN PACKET (EA) ORAL
Status: COMPLETED | OUTPATIENT
Start: 2024-06-28 | End: 2024-06-28

## 2024-06-28 RX ORDER — METHOCARBAMOL 500 MG/1
1000 TABLET, FILM COATED ORAL ONCE
Status: DISCONTINUED | OUTPATIENT
Start: 2024-06-28 | End: 2024-07-01

## 2024-06-28 RX ADMIN — POTASSIUM & SODIUM PHOSPHATES POWDER PACK 280-160-250 MG 1 PACKET: 280-160-250 PACK at 09:06

## 2024-06-28 RX ADMIN — POTASSIUM & SODIUM PHOSPHATES POWDER PACK 280-160-250 MG 1 PACKET: 280-160-250 PACK at 03:06

## 2024-06-28 RX ADMIN — POTASSIUM & SODIUM PHOSPHATES POWDER PACK 280-160-250 MG 1 PACKET: 280-160-250 PACK at 08:06

## 2024-06-28 RX ADMIN — ATORVASTATIN CALCIUM 40 MG: 40 TABLET, FILM COATED ORAL at 09:06

## 2024-06-28 RX ADMIN — FUROSEMIDE 80 MG: 10 INJECTION, SOLUTION INTRAVENOUS at 05:06

## 2024-06-28 RX ADMIN — HEPARIN SODIUM 5000 UNITS: 5000 INJECTION INTRAVENOUS; SUBCUTANEOUS at 09:06

## 2024-06-28 RX ADMIN — POTASSIUM & SODIUM PHOSPHATES POWDER PACK 280-160-250 MG 1 PACKET: 280-160-250 PACK at 05:06

## 2024-06-28 RX ADMIN — HEPARIN SODIUM 5000 UNITS: 5000 INJECTION INTRAVENOUS; SUBCUTANEOUS at 05:06

## 2024-06-28 RX ADMIN — FAMOTIDINE 20 MG: 20 TABLET ORAL at 03:06

## 2024-06-28 RX ADMIN — MAGNESIUM SULFATE HEPTAHYDRATE 2 G: 40 INJECTION, SOLUTION INTRAVENOUS at 03:06

## 2024-06-28 NOTE — PT/OT/SLP PROGRESS
Occupational Therapy      Patient Name:  Xena Vera   MRN:  [id number]    Patient not seen today secondary to dialysis during 1st attempt in the am, and pt politely declined during 2nd attempt in the am due to BLE cramping and fatigue from dialysis as well as wanting to eat.  OT unable to follow up in the afternoon.  Pt said she had completed ADLs earlier.  Will follow-up as scheduled per OT POC.    6/28/2024

## 2024-06-28 NOTE — PLAN OF CARE
Problem: Adult Inpatient Plan of Care  Goal: Plan of Care Review  Outcome: Progressing  Goal: Patient-Specific Goal (Individualized)  Outcome: Progressing  Goal: Absence of Hospital-Acquired Illness or Injury  Outcome: Progressing  Goal: Optimal Comfort and Wellbeing  Outcome: Progressing     Problem: Hemodialysis  Goal: Safe, Effective Therapy Delivery  Outcome: Progressing     Problem: Infection  Goal: Absence of Infection Signs and Symptoms  Outcome: Progressing     Problem: Chronic Kidney Disease  Goal: Electrolyte Balance  Outcome: Progressing  Goal: Fluid Balance  Outcome: Progressing

## 2024-06-28 NOTE — SUBJECTIVE & OBJECTIVE
Interval History: NAEON, VSS. Some R sided pleuritic CP (pain w. Deep breathing, no resp distress) noted. CM actively working to attempt outpatient HD setup, however discussed with patient potential need to show up to ED for emergent HD if cannot secure HD chair.     Review of Systems   Constitutional:  Negative for chills and fever.   Respiratory:  Positive for cough. Negative for shortness of breath.    Cardiovascular:  Positive for leg swelling. Negative for chest pain.   Psychiatric/Behavioral:  Negative for agitation and behavioral problems.      Objective:     Vital Signs (Most Recent):  Temp: 98.1 °F (36.7 °C) (06/28/24 0446)  Pulse: 96 (06/28/24 0446)  Resp: 18 (06/28/24 0446)  BP: 119/73 (06/28/24 0446)  SpO2: 95 % (06/28/24 0446) Vital Signs (24h Range):  Temp:  [98.1 °F (36.7 °C)-98.7 °F (37.1 °C)] 98.1 °F (36.7 °C)  Pulse:  [85-96] 96  Resp:  [17-18] 18  SpO2:  [95 %-99 %] 95 %  BP: (114-127)/(67-76) 119/73     Weight: 47 kg (103 lb 9.9 oz)  Body mass index is 20.93 kg/m².    Intake/Output Summary (Last 24 hours) at 6/28/2024 0611  Last data filed at 6/27/2024 1525  Gross per 24 hour   Intake 360 ml   Output 100 ml   Net 260 ml         Physical Exam  Constitutional:       General: She is not in acute distress.     Appearance: She is not ill-appearing.   HENT:      Head: Normocephalic and atraumatic.      Right Ear: External ear normal.      Left Ear: External ear normal.      Nose: Nose normal.      Mouth/Throat:      Mouth: Mucous membranes are moist.      Pharynx: Oropharynx is clear.   Eyes:      General: No scleral icterus.     Extraocular Movements: Extraocular movements intact.   Cardiovascular:      Rate and Rhythm: Normal rate and regular rhythm.      Pulses: Normal pulses.      Heart sounds: Murmur heard.   Pulmonary:      Effort: Pulmonary effort is normal.      Breath sounds: Normal breath sounds. No wheezing, rhonchi or rales.   Abdominal:      General: Abdomen is flat.      Palpations:  Abdomen is soft.      Tenderness: There is no abdominal tenderness.   Musculoskeletal:         General: No swelling. Normal range of motion.      Cervical back: Normal range of motion and neck supple.   Skin:     General: Skin is warm and dry.      Capillary Refill: Capillary refill takes less than 2 seconds.   Neurological:      General: No focal deficit present.      Mental Status: She is alert and oriented to person, place, and time.   Psychiatric:         Mood and Affect: Mood normal.         Behavior: Behavior normal.             Significant Labs: All pertinent labs within the past 24 hours have been reviewed.    Significant Imaging: I have reviewed all pertinent imaging results/findings within the past 24 hours.

## 2024-06-28 NOTE — PROGRESS NOTES
"[facility] - [facility] ([address] Medicine  Progress Note    Patient Name: Xena Vera  MRN: [id number]  Patient Class: IP- Inpatient   Admission Date: 6/18/2024  Length of Stay: 10 days  Attending Physician: Loreta Dimas MD  Primary Care Provider: Tami Villeda FNP        Subjective:     Principal Problem:Uremia        HPI:  Patient is a 53F PMHx HFpEF (Lasix 80 bid), severe pHTN, CKD5 (last HD in May 2024), HTN, MR, anemia presents for chest pain that began this morning. She reports 2/10 central sqeezing chest pain that radiates to her R shoulder when vomiting. The pain is worse with walking. She has never had chest pain before. She has had nausea/vomiting x1 day. Reports approx 3 episodes of clear vomitus, describes as "phlegm." She has associated sharp upper abdominal pain when vomiting. Additionally reports arthralgias/myalgias, leg swelling, generalized weakness, and fatigue. She had a brief episode of dizziness yesterday when walking but has not had that since. She denies Fever/chills, SOB, upper respiratory symptoms, cough, dysuria, decreased urine, or known sick contacts.     She reports adherence to her fluid restriction & Lasix. Admitted to [facility] from 4/1/24-5/8/24 where renal function declined while being diuresed. Required CRRT with intermittent levophed. Admitted to [facility] 5/13-5/17 after presenting to ED for HD. Not enrolled in medicaid at that time and had been told to present to ED for HD needs. No indications for HD on that admission per chart review. At that time nephrology recommended urgent evaluation with vascular surgery outpatient for AVF placement. Venous mapping obtained.    In ED, somewhat hypertensive (sBP 150s), intermittent bradycardia, afebrile, on RA. CMP with profound uremia (), Cr 5.6, hypokalemia (K 2.8), no hyponatremia. Transaminitis AST//133 with T. Bili 4.3. Lipase elevated at 230. RUQ US w/o CBD dilation or evidence of scarring, surgically " absent gallbladder. CBC with thrombocytopenia (PLT 90), no leukocytosis, stable anemia (Hb 11.5). BNP 4500, Troponin 0.497 which increased to 0.54. LA normal at 1.5. EKG with peaked T waves in II/III/AVF, widened QRS, Qtc 573. Nephrology consulted.     Interpretor was used for entire interview/examination:  number [id number]    Overview/Hospital Course:  [place] [facility] [place] for chest pain, nausea, and vomiting. Found to have profound uremia and hypokalemia. Nephrology consulted and performed emergent HD. CP resolved following HD, residual epigastric pain w/o nausea/vomiting. TTE 6/19 with preserved EF, normal RV systolic function, severe MR, pHTN, and CVP 15. Diuresis with IV lasix in conjunction with HD. LFTs downtrended with volume removal. Noted to have moderate pericardial effusion on 6/22, therefore Cardiology consulted and recommended continued diuresis. Repeat limited TTE performed 6/24 with interval improvement of what remains to be a moderately sized pericardial effusion. Cardiology to follow up with patient outpatient.    Interval History: NAEON, VSS. Some R sided pleuritic CP (pain w. Deep breathing, no resp distress) noted. CM actively working to attempt outpatient HD setup, however discussed with patient potential need to show up to ED for emergent HD if cannot secure HD chair.     Review of Systems   Constitutional:  Negative for chills and fever.   Respiratory:  Positive for cough. Negative for shortness of breath.    Cardiovascular:  Positive for leg swelling. Negative for chest pain.   Psychiatric/Behavioral:  Negative for agitation and behavioral problems.      Objective:     Vital Signs (Most Recent):  Temp: 98.1 °F (36.7 °C) (06/28/24 0446)  Pulse: 96 (06/28/24 0446)  Resp: 18 (06/28/24 0446)  BP: 119/73 (06/28/24 0446)  SpO2: 95 % (06/28/24 0446) Vital Signs (24h Range):  Temp:  [98.1 °F (36.7 °C)-98.7 °F (37.1 °C)] 98.1 °F (36.7 °C)  Pulse:  [85-96] 96  Resp:  [17-18] 18  SpO2:  [95  %-99 %] 95 %  BP: (114-127)/(67-76) 119/73     Weight: 47 kg (103 lb 9.9 oz)  Body mass index is 20.93 kg/m².    Intake/Output Summary (Last 24 hours) at 6/28/2024 0611  Last data filed at 6/27/2024 1525  Gross per 24 hour   Intake 360 ml   Output 100 ml   Net 260 ml         Physical Exam  Constitutional:       General: She is not in acute distress.     Appearance: She is not ill-appearing.   HENT:      Head: Normocephalic and atraumatic.      Right Ear: External ear normal.      Left Ear: External ear normal.      Nose: Nose normal.      Mouth/Throat:      Mouth: Mucous membranes are moist.      Pharynx: Oropharynx is clear.   Eyes:      General: No scleral icterus.     Extraocular Movements: Extraocular movements intact.   Cardiovascular:      Rate and Rhythm: Normal rate and regular rhythm.      Pulses: Normal pulses.      Heart sounds: Murmur heard.   Pulmonary:      Effort: Pulmonary effort is normal.      Breath sounds: Normal breath sounds. No wheezing, rhonchi or rales.   Abdominal:      General: Abdomen is flat.      Palpations: Abdomen is soft.      Tenderness: There is no abdominal tenderness.   Musculoskeletal:         General: No swelling. Normal range of motion.      Cervical back: Normal range of motion and neck supple.   Skin:     General: Skin is warm and dry.      Capillary Refill: Capillary refill takes less than 2 seconds.   Neurological:      General: No focal deficit present.      Mental Status: She is alert and oriented to person, place, and time.   Psychiatric:         Mood and Affect: Mood normal.         Behavior: Behavior normal.             Significant Labs: All pertinent labs within the past 24 hours have been reviewed.    Significant Imaging: I have reviewed all pertinent imaging results/findings within the past 24 hours.    Assessment/Plan:      * Uremia  Improving  2/2 to ESRD. AG 18. Patient AAOx3 on exam  +nausea, chest pain, labile HR on admission w/o steph bleeding.   VBG with pH  7.44.     -HD per nephro  -Outpatient HD - difficulty setting up due to insurance barriers  -CMP MWF    Pericardial effusion  Atrial premature contractions  Noted as small on POOJA 6/19, however on CT AP 6/20, noted to be moderate in size w/ some heterogeneity. In setting of intermittent asymptomatic bradycardia, concern that effusion may be producing tamponade physiology. Cardiology consulted. Bradycardia is suspected blocked atrial premature contraction, so no need for EP eval currently.     Interval improvement on repeat limited ECHO 6/25    - Volume removal via HD and Lasix 80mg IV q12h  - Cardiology f/u with limited TTE in 2wk (ordered)  - Avoid AV garett blockers   - Telemetry        ESRD (end stage renal disease)  Creatine stable for now. BMP reviewed- noted Estimated Creatinine Clearance: 17 mL/min (A) (based on SCr of 2.8 mg/dL (H)). according to latest data. Based on current GFR, CKD stage is end stage.  Monitor UOP and serial BMP and adjust therapy as needed. Renally dose meds. Avoid nephrotoxic medications and procedures.    -Neprhology consulted, assisting with dialysis   -Vascular consulted to assist with initiating process of establishing AVF/AVG - vein mapping, rest of workup outpatient  -Vein mapping performed 6/27  -SW working to find HD chair for patient for continued OP dialysis - difficulty regarding insurance coverage  -RFP, Mg daily    Hyperbilirubinemia  IMPROVING. Direct hyperbilirubinemia on admission w/ AST/ALT elevation. Liver US not reporting dilated bile ducts  No new medications identified for DILI. Hepatitis panel negative.   Concern for congestive hepatopathy due to volume overload from HFpEF    Liver enzymes and total bilirubin remain elevated despite 3 days of dialysis  Also with pancreatitis    -Can resume statin  -continue HD    Chest pain  RESOLVED. CP substernal, 2/10, but mostly with lying flat. Worse with exertion/better with rest  Troponin 0.497-->0.540, BNP 4500 (chronic)  EKG  is without changes concerning for ACS  HOWARD score 2, Alannah 109    -cardiac monitoring    Pre-op exam  Compensated HFpEF, no O2 requirement, stable on HD 3x/wk.       Atrial premature contractions  See Pericardial Effusion    Cough  New, dry, non productive  A/w post nasal drip  Satting well RA    -continue to monitor  -Flonase  -tessalon perles    Prediabetes  Last A1c 6.0 (2 months ago)   on admission    -POCT glucose AC/HS  -LDSSI  -goal -180    Prolonged QT interval  Qtc on admision 570    -cardiac telemetry  -caution with QT prolonging medications    Elevated troponin I level  Resolved  Has elevated troponin at baseline 2/2 to ESRD  Troponin 0.497-->0.540-->0.48  Reports 2/10 CP worse with lying down  EKG is without changes concerning for ACS  Has markedly elevated BUN, uremic pericarditis on ddx    -EKG for new/worsened CP  -cardiac telemetry    Acute on chronic heart failure with preserved ejection fraction (HFpEF)  Patient is identified as having Diastolic (HFpEF) heart failure that is Chronic. CHF is currently controlled. Latest ECHO performed and demonstrates- Results for orders placed during the hospital encounter of 04/01/24    Echo    Result Date: 6/24/2024    Left Ventricle: The left ventricle is normal in size. Mildly increased   ventricular mass. Normal wall thickness. There is eccentric hypertrophy.   There is normal systolic function with a visually estimated ejection   fraction of 55 - 60%. Biplane (2D) method of discs ejection fraction is   60%. There is diastolic dysfunction. Elevated left ventricular filling   pressure.    Right Ventricle: Normal right ventricular cavity size. Systolic   function is borderline low.    Left Atrium: Left atrium is severely dilated.    Aortic Valve: There is mild aortic regurgitation.    Mitral Valve: There is moderate annular dilation present. There is   severe regurgitation with a posterolateral eccentriccally directed jet.    Tricuspid Valve: There  is mild to moderate regurgitation.    Pulmonary Artery: The estimated pulmonary artery systolic pressure is   56 mmHg.    IVC/SVC: Intermediate venous pressure at 8 mmHg.    Pericardium: There is a moderate posterior effusion.    Overall effusion appears smaller than prior, however still moderate.    There is borderline 30% variation across mitral and aortic valves with   respiration.  No RV collapse.  Correlate clinically.        Echo    Addendum Date: 6/21/2024        Pericardium: There is a large circumferential effusion. No definitive   indication of cardiac tamponade. Evidence includes no septal bounce, no   respiratory chamber variation. CVP is intermediate, as the IVC is small   but does not collapse. Diastolic RV collapse is not clearly demonstrated.   Compared to the prior echo on 6/19/24, the effusion appears increased in   size. Correlate with clinical exam.    Left Ventricle: There is mildly reduced systolic function with a   visually estimated ejection fraction of 40 - 45%.    Right Ventricle: Systolic function is mildly reduced.        Result Date: 6/21/2024    Pericardium: There is a large circumferential effusion. No indication   of cardiac tamponade. Evidence includes no septal bounce, no respiratory   chamber variation. CVP is intermediate, as the IVC is small but does not   collapse. Diastolic RV collapse is not clearly demonstrated. Compared to   the prior echo on 6/19/24, the effusion apperas increased in size.    Left Ventricle: There is mildly reduced systolic function with a   visually estimated ejection fraction of 40 - 45%.    Right Ventricle: Systolic function is mildly reduced.        Echo    Result Date: 6/19/2024    Left Ventricle: The left ventricle is severely dilated. LVIDd is 6.0   cm. Normal wall thickness. There is eccentric hypertrophy. There is normal   systolic function. Ejection fraction by visual approximation is 55%. Grade   II diastolic dysfunction.    Right Ventricle:  Normal right ventricular cavity size. Wall thickness   is normal. Systolic function is normal.    Left Atrium: Left atrium is severely dilated.    Aortic Valve: The aortic valve is a trileaflet valve.    Mitral Valve: Severely restricted posterior leaflet motion.  The mean   pressure gradient across the mitral valve is 7 mmHg at a heart rate of 77   bpm. There is severe regurgitation with a posterolateral eccentrically   directed jet.    Tricuspid Valve: There is mild regurgitation.    Pulmonary Artery: There is severe pulmonary hypertension. The estimated   pulmonary artery systolic pressure is 76 mmHg.    IVC/SVC: Elevated venous pressure at 15 mmHg.    Pericardium: There is a small effusion. No indication of cardiac   tamponade. On direct comparison with images from 4/23/2024, there is   slightly more fluid around the right atrium.         . Continue Furosemide and monitor clinical status closely. Monitor on telemetry. Patient is off CHF pathway.  Monitor strict Is&Os and daily weights.  Place on fluid restriction of 1.5 L. Cardiology has not been consulted. Continue to stress to patient importance of self efficacy and  on diet for CHF.     Does not appear grossly volume overloaded - 1 pillow to prevent orthopnea, good mobilization, peripheral edema improved, no O2 requirement    -Volume reduction via HD, will determine dialysis needs as OP to help determine future diuretic dosing  -Lasix 80mg bid in hospital, can return to Lasix 80mg bid as should be sufficient when consistent HD sessions begin    Class 2 obesity in adult  Body mass index is 20.93 kg/m². Morbid obesity complicates all aspects of disease management from diagnostic modalities to treatment. Weight loss encouraged and health benefits explained to patient.    Essential hypertension  BP elevated and labile on admission  120s-160s systolic  Appears to have midodrine ordered, likely for dialysis     -will hold antihypertensives for now given  aggressive diuresis      VTE Risk Mitigation (From admission, onward)           Ordered     heparin (porcine) injection 5,000 Units  Every 8 hours         06/25/24 0916     IP VTE HIGH RISK PATIENT  Once         06/18/24 1427     Place sequential compression device  Until discontinued         06/18/24 1427                    Discharge Planning   RILEY: 6/28/2024     Code Status: DNR   Is the patient medically ready for discharge?:     Reason for patient still in hospital (select all that apply): Pending disposition  Discharge Plan A: Home   Discharge Delays: (!) Dialysis Set-up              Cydney Ball MD  Department of Hospital Medicine   [facility] S[facility] ([place])

## 2024-06-28 NOTE — PROGRESS NOTES
[facility] NEPHROLOGY STAFF HEMODIALYSIS NOTE     Patient currently on hemodialysis for removal of uremic toxins and volume.     Patient seen and evaluated on hemodialysis, tolerating treatment, see HD flowsheet for vitals and assessments.    Labs have been reviewed and the dialysate bath has been adjusted.       Assessment/Plan:    -Pending outpatient HD chair  -Repeat limited echo 6/24 w/ slight decrease in pericardial effusion size. Per cardiolgy, plan for repeat limited echo in 2 weeks with cardiology clinic follow up.   -Patient seen on HD, tolerating treatment well, w/o complaints   -UF goal of 2-2.5L  -Renal diet, if not NPO   -Strict I/O's and daily weights  -Daily renal function panels  -Keep MAP >65 while on HD   -Hgb goal 10-11, ferrehme 510 IV given on 6/26. Due for second dose on 7/3  -Will continue to follow while inpatient     Marcelle Toscano DNP-NILDAP, C  Nephrology  Pager: [phone]

## 2024-06-28 NOTE — PROGRESS NOTES
06/28/24 1103 06/28/24 1105   Tunneled Central Line - Double Lumen  04/22/24 1317 Internal Jugular Right   Placement Date/Time: 04/22/24 1317   Inserted by: MD (c)  Hand Hygiene: Performed  Barrier Precautions: Performed  Skin Antisepsis: ChloraPrep  Location: Internal Jugular Right  : [place]  Lot Number: [id number]  Guidewire Removed?: Yes  Guid...   Site Assessment  --  No drainage   Line Securement Device  --  Secured with sutureless device   Dressing Type  --  CHG impregnated dressing/sponge   Dressing Status  --  Clean;Dry;Intact   Dressing Intervention  --  Sterile dressing change   Distal Patency/Care  --  flushed w/o difficulty;normal saline locked   Proximal 1 Patency/Care  --  flushed w/o difficulty;normal saline locked   During Hemodialysis Assessment   Blood Flow Rate (mL/min) 400 mL/min  --    Dialysate Flow Rate (mL/min) 700 ml/min  --    Ultrafiltration Rate (mL/Hr) 0 mL/Hr  --    Arteriovenous Lines Secure Yes  --    Arterial Pressure (mmHg) -180 mmHg  --    Venous Pressure (mmHg) 130  --    Blood Volume Processed (Liters) 69.4 L  --    UF Removed (mL) 2751 mL  --    TMP 20  --    Venous Line in Air Detector Yes  --    Intake (mL) 250 mL  --    Intra-Hemodialysis Comments Patient request Tx to end, c/o severe leg cramp  --    Post-Hemodialysis Assessment   Rinseback Volume (mL) 250 mL  --    Blood Volume Processed (Liters) 69.4 L  --    Dialyzer Clearance Moderately streaked  --    Duration of Treatment 195 minutes  --    Total UF (mL) 250 mL  --    Net Fluid Removal 2101  --    Patient Response to Treatment TX ended, c/o severe cramps, NP notified.  --    Post-Treatment Weight 50 kg (110 lb 3.7 oz)  --    Treatment Weight Change -2.1  --      Patient left COBY by wheelchair NAD.

## 2024-06-29 LAB
POCT GLUCOSE: 109 MG/DL (ref 70–110)
POCT GLUCOSE: 72 MG/DL (ref 70–110)
POCT GLUCOSE: 84 MG/DL (ref 70–110)
POCT GLUCOSE: 98 MG/DL (ref 70–110)

## 2024-06-29 PROCEDURE — 25000003 PHARM REV CODE 250

## 2024-06-29 PROCEDURE — 25000242 PHARM REV CODE 250 ALT 637 W/ HCPCS

## 2024-06-29 PROCEDURE — 21400001 HC TELEMETRY ROOM

## 2024-06-29 PROCEDURE — 63600175 PHARM REV CODE 636 W HCPCS

## 2024-06-29 PROCEDURE — 63600175 PHARM REV CODE 636 W HCPCS: Performed by: STUDENT IN AN ORGANIZED HEALTH CARE EDUCATION/TRAINING PROGRAM

## 2024-06-29 RX ORDER — SODIUM CHLORIDE 9 MG/ML
INJECTION, SOLUTION INTRAVENOUS ONCE
Status: CANCELLED | OUTPATIENT
Start: 2024-07-01

## 2024-06-29 RX ORDER — SODIUM,POTASSIUM PHOSPHATES 280-250MG
1 POWDER IN PACKET (EA) ORAL ONCE
Status: COMPLETED | OUTPATIENT
Start: 2024-06-29 | End: 2024-06-29

## 2024-06-29 RX ADMIN — HEPARIN SODIUM 5000 UNITS: 5000 INJECTION INTRAVENOUS; SUBCUTANEOUS at 09:06

## 2024-06-29 RX ADMIN — FLUTICASONE PROPIONATE 100 MCG: 50 SPRAY, METERED NASAL at 10:06

## 2024-06-29 RX ADMIN — ATORVASTATIN CALCIUM 40 MG: 40 TABLET, FILM COATED ORAL at 09:06

## 2024-06-29 RX ADMIN — FAMOTIDINE 20 MG: 20 TABLET ORAL at 10:06

## 2024-06-29 RX ADMIN — HEPARIN SODIUM 5000 UNITS: 5000 INJECTION INTRAVENOUS; SUBCUTANEOUS at 06:06

## 2024-06-29 RX ADMIN — POTASSIUM & SODIUM PHOSPHATES POWDER PACK 280-160-250 MG 1 PACKET: 280-160-250 PACK at 10:06

## 2024-06-29 RX ADMIN — FUROSEMIDE 80 MG: 10 INJECTION, SOLUTION INTRAVENOUS at 04:06

## 2024-06-29 RX ADMIN — HEPARIN SODIUM 5000 UNITS: 5000 INJECTION INTRAVENOUS; SUBCUTANEOUS at 03:06

## 2024-06-29 RX ADMIN — POLYETHYLENE GLYCOL 3350 17 G: 17 POWDER, FOR SOLUTION ORAL at 09:06

## 2024-06-29 RX ADMIN — FUROSEMIDE 80 MG: 10 INJECTION, SOLUTION INTRAVENOUS at 10:06

## 2024-06-29 NOTE — PROGRESS NOTES
"[facility] - [facility] Surg ([address] Medicine  Progress Note    Patient Name: Xena Vera  MRN: [id number]  Patient Class: IP- Inpatient   Admission Date: 6/18/2024  Length of Stay: 11 days  Attending Physician: Loreta Dimas MD  Primary Care Provider: Tami Villeda FNP        Subjective:     Principal Problem:Uremia        HPI:  Patient is a 53F PMHx HFpEF (Lasix 80 bid), severe pHTN, CKD5 (last HD in May 2024), HTN, MR, anemia presents for chest pain that began this morning. She reports 2/10 central sqeezing chest pain that radiates to her R shoulder when vomiting. The pain is worse with walking. She has never had chest pain before. She has had nausea/vomiting x1 day. Reports approx 3 episodes of clear vomitus, describes as "phlegm." She has associated sharp upper abdominal pain when vomiting. Additionally reports arthralgias/myalgias, leg swelling, generalized weakness, and fatigue. She had a brief episode of dizziness yesterday when walking but has not had that since. She denies Fever/chills, SOB, upper respiratory symptoms, cough, dysuria, decreased urine, or known sick contacts.     She reports adherence to her fluid restriction & Lasix. Admitted to [facility] from 4/1/24-5/8/24 where renal function declined while being diuresed. Required CRRT with intermittent levophed. Admitted to [facility] 5/13-5/17 after presenting to ED for HD. Not enrolled in medicaid at that time and had been told to present to ED for HD needs. No indications for HD on that admission per chart review. At that time nephrology recommended urgent evaluation with vascular surgery outpatient for AVF placement. Venous mapping obtained.    In ED, somewhat hypertensive (sBP 150s), intermittent bradycardia, afebrile, on RA. CMP with profound uremia (), Cr 5.6, hypokalemia (K 2.8), no hyponatremia. Transaminitis AST//133 with T. Bili 4.3. Lipase elevated at 230. RUQ US w/o CBD dilation or evidence of scarring, surgically " absent gallbladder. CBC with thrombocytopenia (PLT 90), no leukocytosis, stable anemia (Hb 11.5). BNP 4500, Troponin 0.497 which increased to 0.54. LA normal at 1.5. EKG with peaked T waves in II/III/AVF, widened QRS, Qtc 573. Nephrology consulted.     Interpretor was used for entire interview/examination:  number [id number]    Overview/Hospital Course:  [place] [facility] [place] for chest pain, nausea, and vomiting. Found to have profound uremia and hypokalemia. Nephrology consulted and performed emergent HD. CP resolved following HD, residual epigastric pain w/o nausea/vomiting. TTE 6/19 with preserved EF, normal RV systolic function, severe MR, pHTN, and CVP 15. Diuresis with IV lasix in conjunction with HD. LFTs downtrended with volume removal. Noted to have moderate pericardial effusion on 6/22, therefore Cardiology consulted and recommended continued diuresis. Repeat limited TTE performed 6/24 with interval improvement of what remains to be a moderately sized pericardial effusion. Cardiology to follow up with patient outpatient. Discharge delayed due to insurance barriers regarding securing an outpatient HD chair. Vascular surgery consulted to evaluate for inpatient graft/fistula placement.     Interval History: ROSALVA, VSS. Repleted phosphorus. Able to lie flat without dyspnea, can mobilize around room without Fofana. Some cough still present but improving.     Review of Systems   Constitutional:  Negative for chills and fever.   Respiratory:  Positive for cough. Negative for shortness of breath.    Cardiovascular:  Positive for leg swelling. Negative for chest pain.   Psychiatric/Behavioral:  Negative for agitation and behavioral problems.      Objective:     Vital Signs (Most Recent):  Temp: 98.3 °F (36.8 °C) (06/29/24 0811)  Pulse: 82 (06/29/24 0811)  Resp: 16 (06/29/24 0811)  BP: 118/61 (06/29/24 0811)  SpO2: 97 % (06/29/24 0811) Vital Signs (24h Range):  Temp:  [98 °F (36.7 °C)-98.8 °F (37.1 °C)] 98.3  °F (36.8 °C)  Pulse:  [82-93] 82  Resp:  [16-18] 16  SpO2:  [92 %-100 %] 97 %  BP: (105-135)/(54-83) 118/61     Weight: 47 kg (103 lb 9.9 oz)  Body mass index is 20.93 kg/m².    Intake/Output Summary (Last 24 hours) at 6/29/2024 0853  Last data filed at 6/28/2024 2000  Gross per 24 hour   Intake 320 ml   Output 1100 ml   Net -780 ml         Physical Exam  Constitutional:       General: She is not in acute distress.     Appearance: She is not ill-appearing.   HENT:      Head: Normocephalic and atraumatic.      Right Ear: External ear normal.      Left Ear: External ear normal.      Nose: Nose normal.      Mouth/Throat:      Mouth: Mucous membranes are moist.      Pharynx: Oropharynx is clear.   Eyes:      General: No scleral icterus.     Extraocular Movements: Extraocular movements intact.   Cardiovascular:      Rate and Rhythm: Normal rate and regular rhythm.      Pulses: Normal pulses.      Heart sounds: Murmur heard.      Comments: No orthopnea  Pulmonary:      Effort: Pulmonary effort is normal.      Breath sounds: Normal breath sounds. No wheezing, rhonchi or rales.   Abdominal:      General: Abdomen is flat.      Palpations: Abdomen is soft.      Tenderness: There is no abdominal tenderness.   Musculoskeletal:         General: No swelling. Normal range of motion.      Cervical back: Normal range of motion and neck supple.   Skin:     General: Skin is warm and dry.      Capillary Refill: Capillary refill takes less than 2 seconds.   Neurological:      General: No focal deficit present.      Mental Status: She is alert and oriented to person, place, and time.   Psychiatric:         Mood and Affect: Mood normal.         Behavior: Behavior normal.             Significant Labs: All pertinent labs within the past 24 hours have been reviewed.    Significant Imaging: I have reviewed all pertinent imaging results/findings within the past 24 hours.    Assessment/Plan:      * Uremia  Improving  2/2 to ESRD. AG 18. Patient  AAOx3 on exam  +nausea, chest pain, labile HR on admission w/o steph bleeding.   VBG with pH 7.44.     -HD per nephro  -Outpatient HD - difficulty setting up due to insurance barriers  -CMP MWF    Pericardial effusion  Atrial premature contractions  Noted as small on POOJA 6/19, however on CT AP 6/20, noted to be moderate in size w/ some heterogeneity. In setting of intermittent asymptomatic bradycardia, concern that effusion may be producing tamponade physiology. Cardiology consulted. Bradycardia is suspected blocked atrial premature contraction, so no need for EP eval currently.     Interval improvement on repeat limited ECHO 6/25    - Volume removal via HD and Lasix 80mg IV q12h  - Cardiology f/u with limited TTE in 2wk (ordered)  - Avoid AV garett blockers   - Telemetry        ESRD (end stage renal disease)  Creatine stable for now. BMP reviewed- noted Estimated Creatinine Clearance: 17 mL/min (A) (based on SCr of 2.8 mg/dL (H)). according to latest data. Based on current GFR, CKD stage is end stage.  Monitor UOP and serial BMP and adjust therapy as needed. Renally dose meds. Avoid nephrotoxic medications and procedures.    -Neprhology consulted, assisting with dialysis   -Vascular consulted to assist with initiating process of establishing AVF/AVG - vein mapping, rest of workup outpatient  -Vein mapping performed 6/27  -SW working to find HD chair for patient for continued OP dialysis - difficulty regarding insurance coverage  -RFP, Mg daily    Hyperbilirubinemia  IMPROVING. Direct hyperbilirubinemia on admission w/ AST/ALT elevation. Liver US not reporting dilated bile ducts  No new medications identified for DILI. Hepatitis panel negative.   Concern for congestive hepatopathy due to volume overload from HFpEF    Liver enzymes and total bilirubin remain elevated despite 3 days of dialysis  Also with pancreatitis    -Can resume statin  -continue HD    Chest pain  RESOLVED. CP substernal, 2/10, but mostly with  lying flat. Worse with exertion/better with rest  Troponin 0.497-->0.540, BNP 4500 (chronic)  EKG is without changes concerning for ACS  HOWARD score 2, Alannah 109    -cardiac monitoring    Pre-op exam  Compensated HFpEF, no O2 requirement, stable on HD 3x/wk.   Able to lie flat and ambulate w/o dyspnea.       Atrial premature contractions  See Pericardial Effusion    Cough  New, dry, non productive  A/w post nasal drip  Satting well RA    -continue to monitor  -Flonase  -tessalon perles    Prediabetes  Last A1c 6.0 (2 months ago)   on admission    -POCT glucose AC/HS  -LDSSI  -goal -180    Prolonged QT interval  Qtc on admision 570    -cardiac telemetry  -caution with QT prolonging medications    Elevated troponin I level  Resolved  Has elevated troponin at baseline 2/2 to ESRD  Troponin 0.497-->0.540-->0.48  Reports 2/10 CP worse with lying down  EKG is without changes concerning for ACS  Has markedly elevated BUN, uremic pericarditis on ddx    -EKG for new/worsened CP  -cardiac telemetry    Acute on chronic heart failure with preserved ejection fraction (HFpEF)  Patient is identified as having Diastolic (HFpEF) heart failure that is Chronic. CHF is currently controlled. Latest ECHO performed and demonstrates- Results for orders placed during the hospital encounter of 04/01/24    Echo    Result Date: 6/24/2024    Left Ventricle: The left ventricle is normal in size. Mildly increased   ventricular mass. Normal wall thickness. There is eccentric hypertrophy.   There is normal systolic function with a visually estimated ejection   fraction of 55 - 60%. Biplane (2D) method of discs ejection fraction is   60%. There is diastolic dysfunction. Elevated left ventricular filling   pressure.    Right Ventricle: Normal right ventricular cavity size. Systolic   function is borderline low.    Left Atrium: Left atrium is severely dilated.    Aortic Valve: There is mild aortic regurgitation.    Mitral Valve: There is  moderate annular dilation present. There is   severe regurgitation with a posterolateral eccentriccally directed jet.    Tricuspid Valve: There is mild to moderate regurgitation.    Pulmonary Artery: The estimated pulmonary artery systolic pressure is   56 mmHg.    IVC/SVC: Intermediate venous pressure at 8 mmHg.    Pericardium: There is a moderate posterior effusion.    Overall effusion appears smaller than prior, however still moderate.    There is borderline 30% variation across mitral and aortic valves with   respiration.  No RV collapse.  Correlate clinically.        Echo    Addendum Date: 6/21/2024        Pericardium: There is a large circumferential effusion. No definitive   indication of cardiac tamponade. Evidence includes no septal bounce, no   respiratory chamber variation. CVP is intermediate, as the IVC is small   but does not collapse. Diastolic RV collapse is not clearly demonstrated.   Compared to the prior echo on 6/19/24, the effusion appears increased in   size. Correlate with clinical exam.    Left Ventricle: There is mildly reduced systolic function with a   visually estimated ejection fraction of 40 - 45%.    Right Ventricle: Systolic function is mildly reduced.        Result Date: 6/21/2024    Pericardium: There is a large circumferential effusion. No indication   of cardiac tamponade. Evidence includes no septal bounce, no respiratory   chamber variation. CVP is intermediate, as the IVC is small but does not   collapse. Diastolic RV collapse is not clearly demonstrated. Compared to   the prior echo on 6/19/24, the effusion apperas increased in size.    Left Ventricle: There is mildly reduced systolic function with a   visually estimated ejection fraction of 40 - 45%.    Right Ventricle: Systolic function is mildly reduced.        Echo    Result Date: 6/19/2024    Left Ventricle: The left ventricle is severely dilated. LVIDd is 6.0   cm. Normal wall thickness. There is eccentric hypertrophy.  There is normal   systolic function. Ejection fraction by visual approximation is 55%. Grade   II diastolic dysfunction.    Right Ventricle: Normal right ventricular cavity size. Wall thickness   is normal. Systolic function is normal.    Left Atrium: Left atrium is severely dilated.    Aortic Valve: The aortic valve is a trileaflet valve.    Mitral Valve: Severely restricted posterior leaflet motion.  The mean   pressure gradient across the mitral valve is 7 mmHg at a heart rate of 77   bpm. There is severe regurgitation with a posterolateral eccentrically   directed jet.    Tricuspid Valve: There is mild regurgitation.    Pulmonary Artery: There is severe pulmonary hypertension. The estimated   pulmonary artery systolic pressure is 76 mmHg.    IVC/SVC: Elevated venous pressure at 15 mmHg.    Pericardium: There is a small effusion. No indication of cardiac   tamponade. On direct comparison with images from 4/23/2024, there is   slightly more fluid around the right atrium.         . Continue Furosemide and monitor clinical status closely. Monitor on telemetry. Patient is off CHF pathway.  Monitor strict Is&Os and daily weights.  Place on fluid restriction of 1.5 L. Cardiology has not been consulted. Continue to stress to patient importance of self efficacy and  on diet for CHF.     Does not appear grossly volume overloaded - 1 pillow to prevent orthopnea, good mobilization, peripheral edema improved, no O2 requirement    -Volume reduction via HD, will determine dialysis needs as OP to help determine future diuretic dosing  -Lasix 80mg bid in hospital, can return to Lasix 80mg bid as should be sufficient when consistent HD sessions begin    Class 2 obesity in adult  Body mass index is 20.93 kg/m². Morbid obesity complicates all aspects of disease management from diagnostic modalities to treatment. Weight loss encouraged and health benefits explained to patient.    Essential hypertension  BP elevated and labile  on admission  120s-160s systolic  Appears to have midodrine ordered, likely for dialysis     -will hold antihypertensives for now given aggressive diuresis      VTE Risk Mitigation (From admission, onward)           Ordered     heparin (porcine) injection 5,000 Units  Every 8 hours         06/25/24 0916     IP VTE HIGH RISK PATIENT  Once         06/18/24 1427     Place sequential compression device  Until discontinued         06/18/24 1427                    Discharge Planning   RILEY: 7/1/2024     Code Status: DNR   Is the patient medically ready for discharge?:     Reason for patient still in hospital (select all that apply): Consult recommendations and Pending disposition  Discharge Plan A: Home   Discharge Delays: (!) Dialysis Set-up              Cydney Ball MD  Department of Hospital Medicine   [facility] - [facility] Surg ([place]-[facility])

## 2024-06-29 NOTE — SUBJECTIVE & OBJECTIVE
Interval History: ROSALVA ROHINIS. Repleted phosphorus. Able to lie flat without dyspnea, can mobilize around room without Fofana. Some cough still present but improving.     Review of Systems   Constitutional:  Negative for chills and fever.   Respiratory:  Positive for cough. Negative for shortness of breath.    Cardiovascular:  Positive for leg swelling. Negative for chest pain.   Psychiatric/Behavioral:  Negative for agitation and behavioral problems.      Objective:     Vital Signs (Most Recent):  Temp: 98.3 °F (36.8 °C) (06/29/24 0811)  Pulse: 82 (06/29/24 0811)  Resp: 16 (06/29/24 0811)  BP: 118/61 (06/29/24 0811)  SpO2: 97 % (06/29/24 0811) Vital Signs (24h Range):  Temp:  [98 °F (36.7 °C)-98.8 °F (37.1 °C)] 98.3 °F (36.8 °C)  Pulse:  [82-93] 82  Resp:  [16-18] 16  SpO2:  [92 %-100 %] 97 %  BP: (105-135)/(54-83) 118/61     Weight: 47 kg (103 lb 9.9 oz)  Body mass index is 20.93 kg/m².    Intake/Output Summary (Last 24 hours) at 6/29/2024 0853  Last data filed at 6/28/2024 2000  Gross per 24 hour   Intake 320 ml   Output 1100 ml   Net -780 ml         Physical Exam  Constitutional:       General: She is not in acute distress.     Appearance: She is not ill-appearing.   HENT:      Head: Normocephalic and atraumatic.      Right Ear: External ear normal.      Left Ear: External ear normal.      Nose: Nose normal.      Mouth/Throat:      Mouth: Mucous membranes are moist.      Pharynx: Oropharynx is clear.   Eyes:      General: No scleral icterus.     Extraocular Movements: Extraocular movements intact.   Cardiovascular:      Rate and Rhythm: Normal rate and regular rhythm.      Pulses: Normal pulses.      Heart sounds: Murmur heard.      Comments: No orthopnea  Pulmonary:      Effort: Pulmonary effort is normal.      Breath sounds: Normal breath sounds. No wheezing, rhonchi or rales.   Abdominal:      General: Abdomen is flat.      Palpations: Abdomen is soft.      Tenderness: There is no abdominal tenderness.    Musculoskeletal:         General: No swelling. Normal range of motion.      Cervical back: Normal range of motion and neck supple.   Skin:     General: Skin is warm and dry.      Capillary Refill: Capillary refill takes less than 2 seconds.   Neurological:      General: No focal deficit present.      Mental Status: She is alert and oriented to person, place, and time.   Psychiatric:         Mood and Affect: Mood normal.         Behavior: Behavior normal.             Significant Labs: All pertinent labs within the past 24 hours have been reviewed.    Significant Imaging: I have reviewed all pertinent imaging results/findings within the past 24 hours.

## 2024-06-29 NOTE — ASSESSMENT & PLAN NOTE
Compensated HFpEF, no O2 requirement, stable on HD 3x/wk.   Able to lie flat and ambulate w/o dyspnea.

## 2024-06-29 NOTE — PLAN OF CARE
Problem: Adult Inpatient Plan of Care  Goal: Plan of Care Review  Outcome: Progressing  Goal: Patient-Specific Goal (Individualized)  Outcome: Progressing  Goal: Absence of Hospital-Acquired Illness or Injury  Outcome: Progressing  Goal: Optimal Comfort and Wellbeing  Outcome: Progressing     Problem: Hemodialysis  Goal: Safe, Effective Therapy Delivery  Outcome: Progressing  Goal: Effective Tissue Perfusion  Outcome: Progressing  Goal: Absence of Infection Signs and Symptoms  Outcome: Progressing     Problem: Infection  Goal: Absence of Infection Signs and Symptoms  Outcome: Progressing     Problem: Violence Risk or Actual  Goal: Anger and Impulse Control  Outcome: Progressing

## 2024-06-29 NOTE — PLAN OF CARE
Problem: Adult Inpatient Plan of Care  Goal: Plan of Care Review  Outcome: Not Progressing  Goal: Optimal Comfort and Wellbeing  Outcome: Not Progressing     Problem: Chronic Kidney Disease  Goal: Fluid Balance  Outcome: Not Progressing     AAOx4. Continues with iv diuretic. Output recorded. No complaints at present time.

## 2024-06-30 LAB
POCT GLUCOSE: 129 MG/DL (ref 70–110)
POCT GLUCOSE: 76 MG/DL (ref 70–110)
POCT GLUCOSE: 88 MG/DL (ref 70–110)
POCT GLUCOSE: 91 MG/DL (ref 70–110)

## 2024-06-30 PROCEDURE — 63600175 PHARM REV CODE 636 W HCPCS

## 2024-06-30 PROCEDURE — 21400001 HC TELEMETRY ROOM

## 2024-06-30 PROCEDURE — 25000003 PHARM REV CODE 250

## 2024-06-30 PROCEDURE — 63600175 PHARM REV CODE 636 W HCPCS: Performed by: STUDENT IN AN ORGANIZED HEALTH CARE EDUCATION/TRAINING PROGRAM

## 2024-06-30 RX ORDER — GUAIFENESIN 600 MG/1
600 TABLET, EXTENDED RELEASE ORAL 2 TIMES DAILY
Status: DISCONTINUED | OUTPATIENT
Start: 2024-06-30 | End: 2024-07-03 | Stop reason: HOSPADM

## 2024-06-30 RX ADMIN — FUROSEMIDE 80 MG: 10 INJECTION, SOLUTION INTRAVENOUS at 09:06

## 2024-06-30 RX ADMIN — GUAIFENESIN 600 MG: 600 TABLET, EXTENDED RELEASE ORAL at 09:06

## 2024-06-30 RX ADMIN — POLYETHYLENE GLYCOL 3350 17 G: 17 POWDER, FOR SOLUTION ORAL at 09:06

## 2024-06-30 RX ADMIN — HEPARIN SODIUM 5000 UNITS: 5000 INJECTION INTRAVENOUS; SUBCUTANEOUS at 05:06

## 2024-06-30 RX ADMIN — FAMOTIDINE 20 MG: 20 TABLET ORAL at 09:06

## 2024-06-30 RX ADMIN — FLUTICASONE PROPIONATE 100 MCG: 50 SPRAY, METERED NASAL at 09:06

## 2024-06-30 RX ADMIN — FUROSEMIDE 80 MG: 10 INJECTION, SOLUTION INTRAVENOUS at 06:06

## 2024-06-30 RX ADMIN — HEPARIN SODIUM 5000 UNITS: 5000 INJECTION INTRAVENOUS; SUBCUTANEOUS at 06:06

## 2024-06-30 RX ADMIN — GUAIFENESIN 600 MG: 600 TABLET, EXTENDED RELEASE ORAL at 12:06

## 2024-06-30 RX ADMIN — ATORVASTATIN CALCIUM 40 MG: 40 TABLET, FILM COATED ORAL at 09:06

## 2024-06-30 NOTE — SUBJECTIVE & OBJECTIVE
Interval History: NAEO, VSS, cough remains. Adding guaifenesin to assist in clearing mucus. Has flonase and Tessalon Perles. Will consult [facility] surgery tomorrow.    Review of Systems   Constitutional:  Negative for chills and fever.   Respiratory:  Positive for cough. Negative for shortness of breath.    Cardiovascular:  Negative for chest pain and leg swelling.   Psychiatric/Behavioral:  Negative for agitation and behavioral problems.      Objective:     Vital Signs (Most Recent):  Temp: 98 °F (36.7 °C) (06/30/24 1118)  Pulse: 80 (06/30/24 1118)  Resp: 16 (06/30/24 1118)  BP: 129/75 (06/30/24 1118)  SpO2: (!) 93 % (06/30/24 1118) Vital Signs (24h Range):  Temp:  [98 °F (36.7 °C)-98.4 °F (36.9 °C)] 98 °F (36.7 °C)  Pulse:  [79-86] 80  Resp:  [16-17] 16  SpO2:  [92 %-96 %] 93 %  BP: (106-129)/(69-78) 129/75     Weight: 47 kg (103 lb 9.9 oz)  Body mass index is 20.93 kg/m².    Intake/Output Summary (Last 24 hours) at 6/30/2024 1217  Last data filed at 6/30/2024 0944  Gross per 24 hour   Intake 50 ml   Output 550 ml   Net -500 ml         Physical Exam  Constitutional:       General: She is not in acute distress.     Appearance: She is not ill-appearing.   HENT:      Head: Normocephalic and atraumatic.      Right Ear: External ear normal.      Left Ear: External ear normal.      Nose: Nose normal.      Mouth/Throat:      Mouth: Mucous membranes are moist.      Pharynx: Oropharynx is clear.   Eyes:      General: No scleral icterus.     Extraocular Movements: Extraocular movements intact.   Cardiovascular:      Rate and Rhythm: Normal rate and regular rhythm.      Pulses: Normal pulses.      Heart sounds: Murmur heard.      Comments: No orthopnea  Pulmonary:      Effort: Pulmonary effort is normal.      Breath sounds: Normal breath sounds. No wheezing, rhonchi or rales.   Abdominal:      General: Abdomen is flat.      Palpations: Abdomen is soft.      Tenderness: There is no abdominal tenderness.   Musculoskeletal:          General: No swelling. Normal range of motion.      Cervical back: Normal range of motion and neck supple.   Skin:     General: Skin is warm and dry.      Capillary Refill: Capillary refill takes less than 2 seconds.   Neurological:      General: No focal deficit present.      Mental Status: She is alert and oriented to person, place, and time.   Psychiatric:         Mood and Affect: Mood normal.         Behavior: Behavior normal.             Significant Labs: All pertinent labs within the past 24 hours have been reviewed.    Significant Imaging: I have reviewed all pertinent imaging results/findings within the past 24 hours.

## 2024-06-30 NOTE — PLAN OF CARE
Problem: Adult Inpatient Plan of Care  Goal: Plan of Care Review  Outcome: Not Progressing  Goal: Optimal Comfort and Wellbeing  Outcome: Not Progressing     Problem: Chronic Kidney Disease  Goal: Electrolyte Balance  Outcome: Not Progressing     AAOx4. Continues with iv lasix. Awaiting vascular consult for graft eval. No complaints voiced.

## 2024-06-30 NOTE — ASSESSMENT & PLAN NOTE
New, dry, non productive  A/w post nasal drip  Satting well RA    -continue to monitor  -Flonase  -tessalon perles  -guaifenesin

## 2024-06-30 NOTE — PROGRESS NOTES
"[facility] Surg ([address] Medicine  Progress Note    Patient Name: Xena Vera  MRN: [id number]  Patient Class: IP- Inpatient   Admission Date: 6/18/2024  Length of Stay: 12 days  Attending Physician: Loreta Dimas MD  Primary Care Provider: Tami Villeda FNP        Subjective:     Principal Problem:Uremia        HPI:  Patient is a 53F PMHx HFpEF (Lasix 80 bid), severe pHTN, CKD5 (last HD in May 2024), HTN, MR, anemia presents for chest pain that began this morning. She reports 2/10 central sqeezing chest pain that radiates to her R shoulder when vomiting. The pain is worse with walking. She has never had chest pain before. She has had nausea/vomiting x1 day. Reports approx 3 episodes of clear vomitus, describes as "phlegm." She has associated sharp upper abdominal pain when vomiting. Additionally reports arthralgias/myalgias, leg swelling, generalized weakness, and fatigue. She had a brief episode of dizziness yesterday when walking but has not had that since. She denies Fever/chills, SOB, upper respiratory symptoms, cough, dysuria, decreased urine, or known sick contacts.     She reports adherence to her fluid restriction & Lasix. Admitted to [facility] from 4/1/24-5/8/24 where renal function declined while being diuresed. Required CRRT with intermittent levophed. Admitted to [facility] 5/13-5/17 after presenting to ED for HD. Not enrolled in medicaid at that time and had been told to present to ED for HD needs. No indications for HD on that admission per chart review. At that time nephrology recommended urgent evaluation with vascular surgery outpatient for AVF placement. Venous mapping obtained.    In ED, somewhat hypertensive (sBP 150s), intermittent bradycardia, afebrile, on RA. CMP with profound uremia (), Cr 5.6, hypokalemia (K 2.8), no hyponatremia. Transaminitis AST//133 with T. Bili 4.3. Lipase elevated at 230. RUQ US w/o CBD dilation or evidence of scarring, surgically " absent gallbladder. CBC with thrombocytopenia (PLT 90), no leukocytosis, stable anemia (Hb 11.5). BNP 4500, Troponin 0.497 which increased to 0.54. LA normal at 1.5. EKG with peaked T waves in II/III/AVF, widened QRS, Qtc 573. Nephrology consulted.     Interpretor was used for entire interview/examination:  number [id number]    Overview/Hospital Course:  [facility] for chest pain, nausea, and vomiting. Found to have profound uremia and hypokalemia. Nephrology consulted and performed emergent HD. CP resolved following HD, residual epigastric pain w/o nausea/vomiting. TTE 6/19 with preserved EF, normal RV systolic function, severe MR, pHTN, and CVP 15. Diuresis with IV lasix in conjunction with HD. LFTs downtrended with volume removal. Noted to have moderate pericardial effusion on 6/22, therefore Cardiology consulted and recommended continued diuresis. Repeat limited TTE performed 6/24 with interval improvement of what remains to be a moderately sized pericardial effusion. Cardiology to follow up with patient outpatient. Discharge delayed due to insurance barriers regarding securing an outpatient HD chair. Vascular surgery consulted to evaluate for inpatient graft/fistula placement.     Interval History: NAEO, VSS, cough remains. Adding guaifenesin to assist in clearing mucus. Has flonase and Tessalon Perles. Will consult [facility] surgery tomorrow.    Review of Systems   Constitutional:  Negative for chills and fever.   Respiratory:  Positive for cough. Negative for shortness of breath.    Cardiovascular:  Negative for chest pain and leg swelling.   Psychiatric/Behavioral:  Negative for agitation and behavioral problems.      Objective:     Vital Signs (Most Recent):  Temp: 98 °F (36.7 °C) (06/30/24 1118)  Pulse: 80 (06/30/24 1118)  Resp: 16 (06/30/24 1118)  BP: 129/75 (06/30/24 1118)  SpO2: (!) 93 % (06/30/24 1118) Vital Signs (24h Range):  Temp:  [98 °F (36.7 °C)-98.4 °F (36.9 °C)] 98 °F (36.7  °C)  Pulse:  [79-86] 80  Resp:  [16-17] 16  SpO2:  [92 %-96 %] 93 %  BP: (106-129)/(69-78) 129/75     Weight: 47 kg (103 lb 9.9 oz)  Body mass index is 20.93 kg/m².    Intake/Output Summary (Last 24 hours) at 6/30/2024 1217  Last data filed at 6/30/2024 0944  Gross per 24 hour   Intake 50 ml   Output 550 ml   Net -500 ml         Physical Exam  Constitutional:       General: She is not in acute distress.     Appearance: She is not ill-appearing.   HENT:      Head: Normocephalic and atraumatic.      Right Ear: External ear normal.      Left Ear: External ear normal.      Nose: Nose normal.      Mouth/Throat:      Mouth: Mucous membranes are moist.      Pharynx: Oropharynx is clear.   Eyes:      General: No scleral icterus.     Extraocular Movements: Extraocular movements intact.   Cardiovascular:      Rate and Rhythm: Normal rate and regular rhythm.      Pulses: Normal pulses.      Heart sounds: Murmur heard.      Comments: No orthopnea  Pulmonary:      Effort: Pulmonary effort is normal.      Breath sounds: Normal breath sounds. No wheezing, rhonchi or rales.   Abdominal:      General: Abdomen is flat.      Palpations: Abdomen is soft.      Tenderness: There is no abdominal tenderness.   Musculoskeletal:         General: No swelling. Normal range of motion.      Cervical back: Normal range of motion and neck supple.   Skin:     General: Skin is warm and dry.      Capillary Refill: Capillary refill takes less than 2 seconds.   Neurological:      General: No focal deficit present.      Mental Status: She is alert and oriented to person, place, and time.   Psychiatric:         Mood and Affect: Mood normal.         Behavior: Behavior normal.             Significant Labs: All pertinent labs within the past 24 hours have been reviewed.    Significant Imaging: I have reviewed all pertinent imaging results/findings within the past 24 hours.    Assessment/Plan:      * Uremia  Improving  2/2 to ESRD. AG 18. Patient AAOx3 on  exam  +nausea, chest pain, labile HR on admission w/o steph bleeding.   VBG with pH 7.44.     -HD per nephro  -Outpatient HD - difficulty setting up due to insurance barriers  -CMP MWF    Pre-op exam  Compensated HFpEF, no O2 requirement, stable on HD 3x/wk.   Able to lie flat and ambulate w/o dyspnea.       Atrial premature contractions  See Pericardial Effusion    Pericardial effusion  Atrial premature contractions  Noted as small on POOJA 6/19, however on CT AP 6/20, noted to be moderate in size w/ some heterogeneity. In setting of intermittent asymptomatic bradycardia, concern that effusion may be producing tamponade physiology. Cardiology consulted. Bradycardia is suspected blocked atrial premature contraction, so no need for EP eval currently.     Interval improvement on repeat limited ECHO 6/25    - Volume removal via HD and Lasix 80mg IV q12h  - Cardiology f/u with limited TTE in 2wk (ordered)  - Avoid AV garett blockers   - Telemetry        Cough  New, dry, non productive  A/w post nasal drip  Satting well RA    -continue to monitor  -Flonase  -tessalon perles  -guaifenesin     Chest pain  RESOLVED. CP substernal, 2/10, but mostly with lying flat. Worse with exertion/better with rest  Troponin 0.497-->0.540, BNP 4500 (chronic)  EKG is without changes concerning for ACS  HOWARD score 2, Alannah 109    -cardiac monitoring    Hyperbilirubinemia  IMPROVING. Direct hyperbilirubinemia on admission w/ AST/ALT elevation. Liver US not reporting dilated bile ducts  No new medications identified for DILI. Hepatitis panel negative.   Concern for congestive hepatopathy due to volume overload from HFpEF    Liver enzymes and total bilirubin remain elevated despite 3 days of dialysis  Also with pancreatitis    -Can resume statin  -continue HD    Prediabetes  Last A1c 6.0 (2 months ago)   on admission    -POCT glucose AC/HS  -LDSSI  -goal -180    ESRD (end stage renal disease)  Creatine stable for now. BMP reviewed-  noted Estimated Creatinine Clearance: 17 mL/min (A) (based on SCr of 2.8 mg/dL (H)). according to latest data. Based on current GFR, CKD stage is end stage.  Monitor UOP and serial BMP and adjust therapy as needed. Renally dose meds. Avoid nephrotoxic medications and procedures.    -Neprhology consulted, assisting with dialysis   -Vascular consulted to assist with initiating process of establishing AVF/AVG - vein mapping, rest of workup outpatient  -Vein mapping performed 6/27  -KAZ working to find HD chair for patient for continued OP dialysis - difficulty regarding insurance coverage  -RFP, Mg daily    Prolonged QT interval  Qtc on admision 570    -cardiac telemetry  -caution with QT prolonging medications    Elevated troponin I level  Resolved  Has elevated troponin at baseline 2/2 to ESRD  Troponin 0.497-->0.540-->0.48  Reports 2/10 CP worse with lying down  EKG is without changes concerning for ACS  Has markedly elevated BUN, uremic pericarditis on ddx    -EKG for new/worsened CP  -cardiac telemetry    Acute on chronic heart failure with preserved ejection fraction (HFpEF)  Patient is identified as having Diastolic (HFpEF) heart failure that is Chronic. CHF is currently controlled. Latest ECHO performed and demonstrates- Results for orders placed during the hospital encounter of 04/01/24    Echo    Result Date: 6/24/2024    Left Ventricle: The left ventricle is normal in size. Mildly increased   ventricular mass. Normal wall thickness. There is eccentric hypertrophy.   There is normal systolic function with a visually estimated ejection   fraction of 55 - 60%. Biplane (2D) method of discs ejection fraction is   60%. There is diastolic dysfunction. Elevated left ventricular filling   pressure.    Right Ventricle: Normal right ventricular cavity size. Systolic   function is borderline low.    Left Atrium: Left atrium is severely dilated.    Aortic Valve: There is mild aortic regurgitation.    Mitral Valve: There  is moderate annular dilation present. There is   severe regurgitation with a posterolateral eccentriccally directed jet.    Tricuspid Valve: There is mild to moderate regurgitation.    Pulmonary Artery: The estimated pulmonary artery systolic pressure is   56 mmHg.    IVC/SVC: Intermediate venous pressure at 8 mmHg.    Pericardium: There is a moderate posterior effusion.    Overall effusion appears smaller than prior, however still moderate.    There is borderline 30% variation across mitral and aortic valves with   respiration.  No RV collapse.  Correlate clinically.        Echo    Addendum Date: 6/21/2024        Pericardium: There is a large circumferential effusion. No definitive   indication of cardiac tamponade. Evidence includes no septal bounce, no   respiratory chamber variation. CVP is intermediate, as the IVC is small   but does not collapse. Diastolic RV collapse is not clearly demonstrated.   Compared to the prior echo on 6/19/24, the effusion appears increased in   size. Correlate with clinical exam.    Left Ventricle: There is mildly reduced systolic function with a   visually estimated ejection fraction of 40 - 45%.    Right Ventricle: Systolic function is mildly reduced.        Result Date: 6/21/2024    Pericardium: There is a large circumferential effusion. No indication   of cardiac tamponade. Evidence includes no septal bounce, no respiratory   chamber variation. CVP is intermediate, as the IVC is small but does not   collapse. Diastolic RV collapse is not clearly demonstrated. Compared to   the prior echo on 6/19/24, the effusion apperas increased in size.    Left Ventricle: There is mildly reduced systolic function with a   visually estimated ejection fraction of 40 - 45%.    Right Ventricle: Systolic function is mildly reduced.        Echo    Result Date: 6/19/2024    Left Ventricle: The left ventricle is severely dilated. LVIDd is 6.0   cm. Normal wall thickness. There is eccentric  hypertrophy. There is normal   systolic function. Ejection fraction by visual approximation is 55%. Grade   II diastolic dysfunction.    Right Ventricle: Normal right ventricular cavity size. Wall thickness   is normal. Systolic function is normal.    Left Atrium: Left atrium is severely dilated.    Aortic Valve: The aortic valve is a trileaflet valve.    Mitral Valve: Severely restricted posterior leaflet motion.  The mean   pressure gradient across the mitral valve is 7 mmHg at a heart rate of 77   bpm. There is severe regurgitation with a posterolateral eccentrically   directed jet.    Tricuspid Valve: There is mild regurgitation.    Pulmonary Artery: There is severe pulmonary hypertension. The estimated   pulmonary artery systolic pressure is 76 mmHg.    IVC/SVC: Elevated venous pressure at 15 mmHg.    Pericardium: There is a small effusion. No indication of cardiac   tamponade. On direct comparison with images from 4/23/2024, there is   slightly more fluid around the right atrium.         . Continue Furosemide and monitor clinical status closely. Monitor on telemetry. Patient is off CHF pathway.  Monitor strict Is&Os and daily weights.  Place on fluid restriction of 1.5 L. Cardiology has not been consulted. Continue to stress to patient importance of self efficacy and  on diet for CHF.     Does not appear grossly volume overloaded - 1 pillow to prevent orthopnea, good mobilization, peripheral edema improved, no O2 requirement    -Volume reduction via HD, will determine dialysis needs as OP to help determine future diuretic dosing  -Lasix 80mg bid in hospital, can return to Lasix 80mg bid as should be sufficient when consistent HD sessions begin    Class 2 obesity in adult  Body mass index is 20.93 kg/m². Morbid obesity complicates all aspects of disease management from diagnostic modalities to treatment. Weight loss encouraged and health benefits explained to patient.    Essential hypertension  BP  elevated and labile on admission  120s-160s systolic  Appears to have midodrine ordered, likely for dialysis     -will hold antihypertensives for now given aggressive diuresis      VTE Risk Mitigation (From admission, onward)           Ordered     heparin (porcine) injection 5,000 Units  Every 8 hours         06/25/24 0916     IP VTE HIGH RISK PATIENT  Once         06/18/24 1427     Place sequential compression device  Until discontinued         06/18/24 1427                    Discharge Planning   RILEY: 7/1/2024     Code Status: DNR   Is the patient medically ready for discharge?:     Reason for patient still in hospital (select all that apply): Patient trending condition and Consult recommendations  Discharge Plan A: Home   Discharge Delays: (!) Dialysis Set-up        Keyon Holloway MD  Department of Hospital Medicine   [facility] - Med Surg ([place]-16)

## 2024-06-30 NOTE — PLAN OF CARE
Problem: Adult Inpatient Plan of Care  Goal: Plan of Care Review  Outcome: Progressing  Goal: Patient-Specific Goal (Individualized)  Outcome: Progressing  Goal: Absence of Hospital-Acquired Illness or Injury  Outcome: Progressing  Goal: Optimal Comfort and Wellbeing  Outcome: Progressing     Problem: Hemodialysis  Goal: Absence of Infection Signs and Symptoms  Outcome: Progressing     Problem: Infection  Goal: Absence of Infection Signs and Symptoms  Outcome: Progressing

## 2024-07-01 ENCOUNTER — ANESTHESIA (OUTPATIENT)
Dept: SURGERY | Facility: HOSPITAL | Age: 54
End: 2024-07-01
Payer: MEDICAID

## 2024-07-01 ENCOUNTER — ANESTHESIA EVENT (OUTPATIENT)
Dept: SURGERY | Facility: HOSPITAL | Age: 54
End: 2024-07-01
Payer: MEDICAID

## 2024-07-01 LAB
ALBUMIN SERPL BCP-MCNC: 2.4 G/DL (ref 3.5–5.2)
ALP SERPL-CCNC: 193 U/L (ref 55–135)
ALT SERPL W/O P-5'-P-CCNC: 19 U/L (ref 10–44)
ANION GAP SERPL CALC-SCNC: 12 MMOL/L (ref 8–16)
AST SERPL-CCNC: 25 U/L (ref 10–40)
BASOPHILS # BLD AUTO: 0.04 K/UL (ref 0–0.2)
BASOPHILS NFR BLD: 0.6 % (ref 0–1.9)
BILIRUB SERPL-MCNC: 1.3 MG/DL (ref 0.1–1)
BUN SERPL-MCNC: 47 MG/DL (ref 6–20)
CALCIUM SERPL-MCNC: 8.4 MG/DL (ref 8.7–10.5)
CHLORIDE SERPL-SCNC: 98 MMOL/L (ref 95–110)
CO2 SERPL-SCNC: 22 MMOL/L (ref 23–29)
CREAT SERPL-MCNC: 3.2 MG/DL (ref 0.5–1.4)
DIFFERENTIAL METHOD BLD: ABNORMAL
EOSINOPHIL # BLD AUTO: 0.1 K/UL (ref 0–0.5)
EOSINOPHIL NFR BLD: 1 % (ref 0–8)
ERYTHROCYTE [DISTWIDTH] IN BLOOD BY AUTOMATED COUNT: 22.2 % (ref 11.5–14.5)
EST. GFR  (NO RACE VARIABLE): 16.6 ML/MIN/1.73 M^2
GLUCOSE SERPL-MCNC: 89 MG/DL (ref 70–110)
HCT VFR BLD AUTO: 36.2 % (ref 37–48.5)
HGB BLD-MCNC: 10.3 G/DL (ref 12–16)
IMM GRANULOCYTES # BLD AUTO: 0.04 K/UL (ref 0–0.04)
IMM GRANULOCYTES NFR BLD AUTO: 0.6 % (ref 0–0.5)
LYMPHOCYTES # BLD AUTO: 0.9 K/UL (ref 1–4.8)
LYMPHOCYTES NFR BLD: 14.7 % (ref 18–48)
MAGNESIUM SERPL-MCNC: 1.9 MG/DL (ref 1.6–2.6)
MCH RBC QN AUTO: 26.5 PG (ref 27–31)
MCHC RBC AUTO-ENTMCNC: 28.5 G/DL (ref 32–36)
MCV RBC AUTO: 93 FL (ref 82–98)
MONOCYTES # BLD AUTO: 0.4 K/UL (ref 0.3–1)
MONOCYTES NFR BLD: 7.1 % (ref 4–15)
NEUTROPHILS # BLD AUTO: 4.7 K/UL (ref 1.8–7.7)
NEUTROPHILS NFR BLD: 76 % (ref 38–73)
NRBC BLD-RTO: 0 /100 WBC
PHOSPHATE SERPL-MCNC: 3.9 MG/DL (ref 2.7–4.5)
PLATELET # BLD AUTO: 406 K/UL (ref 150–450)
PMV BLD AUTO: 10.3 FL (ref 9.2–12.9)
POCT GLUCOSE: 100 MG/DL (ref 70–110)
POTASSIUM SERPL-SCNC: 4.2 MMOL/L (ref 3.5–5.1)
PROT SERPL-MCNC: 7 G/DL (ref 6–8.4)
RBC # BLD AUTO: 3.88 M/UL (ref 4–5.4)
SODIUM SERPL-SCNC: 132 MMOL/L (ref 136–145)
WBC # BLD AUTO: 6.24 K/UL (ref 3.9–12.7)

## 2024-07-01 PROCEDURE — 63600175 PHARM REV CODE 636 W HCPCS

## 2024-07-01 PROCEDURE — 71000015 HC POSTOP RECOV 1ST HR: Performed by: SURGERY

## 2024-07-01 PROCEDURE — 63600175 PHARM REV CODE 636 W HCPCS: Performed by: ANESTHESIOLOGY

## 2024-07-01 PROCEDURE — 25000003 PHARM REV CODE 250: Performed by: STUDENT IN AN ORGANIZED HEALTH CARE EDUCATION/TRAINING PROGRAM

## 2024-07-01 PROCEDURE — 36415 COLL VENOUS BLD VENIPUNCTURE: CPT

## 2024-07-01 PROCEDURE — 25000003 PHARM REV CODE 250

## 2024-07-01 PROCEDURE — 90935 HEMODIALYSIS ONE EVALUATION: CPT | Mod: ,,, | Performed by: NURSE PRACTITIONER

## 2024-07-01 PROCEDURE — 83735 ASSAY OF MAGNESIUM: CPT

## 2024-07-01 PROCEDURE — 71000016 HC POSTOP RECOV ADDL HR: Performed by: SURGERY

## 2024-07-01 PROCEDURE — 03160JD BYPASS LEFT AXILLARY ARTERY TO UPPER ARM VEIN WITH SYNTHETIC SUBSTITUTE, OPEN APPROACH: ICD-10-PCS | Performed by: SURGERY

## 2024-07-01 PROCEDURE — 63600175 PHARM REV CODE 636 W HCPCS: Performed by: SURGERY

## 2024-07-01 PROCEDURE — C1768 GRAFT, VASCULAR: HCPCS | Performed by: SURGERY

## 2024-07-01 PROCEDURE — 71000033 HC RECOVERY, INTIAL HOUR: Performed by: SURGERY

## 2024-07-01 PROCEDURE — 94761 N-INVAS EAR/PLS OXIMETRY MLT: CPT

## 2024-07-01 PROCEDURE — 84100 ASSAY OF PHOSPHORUS: CPT

## 2024-07-01 PROCEDURE — 36000707: Performed by: SURGERY

## 2024-07-01 PROCEDURE — 99232 SBSQ HOSP IP/OBS MODERATE 35: CPT | Mod: 57,,, | Performed by: SURGERY

## 2024-07-01 PROCEDURE — 37000009 HC ANESTHESIA EA ADD 15 MINS: Performed by: SURGERY

## 2024-07-01 PROCEDURE — 82962 GLUCOSE BLOOD TEST: CPT | Performed by: SURGERY

## 2024-07-01 PROCEDURE — 85025 COMPLETE CBC W/AUTO DIFF WBC: CPT

## 2024-07-01 PROCEDURE — 21400001 HC TELEMETRY ROOM

## 2024-07-01 PROCEDURE — 36000706: Performed by: SURGERY

## 2024-07-01 PROCEDURE — 37000008 HC ANESTHESIA 1ST 15 MINUTES: Performed by: SURGERY

## 2024-07-01 PROCEDURE — 90935 HEMODIALYSIS ONE EVALUATION: CPT

## 2024-07-01 PROCEDURE — 80053 COMPREHEN METABOLIC PANEL: CPT

## 2024-07-01 DEVICE — GRAFT ACUSEAL CARDIO 45CM: Type: IMPLANTABLE DEVICE | Site: ARM | Status: FUNCTIONAL

## 2024-07-01 RX ORDER — ONDANSETRON HYDROCHLORIDE 2 MG/ML
INJECTION, SOLUTION INTRAVENOUS
Status: DISCONTINUED | OUTPATIENT
Start: 2024-07-01 | End: 2024-07-01

## 2024-07-01 RX ORDER — MIDAZOLAM HYDROCHLORIDE 1 MG/ML
INJECTION INTRAMUSCULAR; INTRAVENOUS
Status: DISCONTINUED | OUTPATIENT
Start: 2024-07-01 | End: 2024-07-01

## 2024-07-01 RX ORDER — HEPARIN SODIUM 1000 [USP'U]/ML
INJECTION, SOLUTION INTRAVENOUS; SUBCUTANEOUS
Status: DISCONTINUED | OUTPATIENT
Start: 2024-07-01 | End: 2024-07-01 | Stop reason: HOSPADM

## 2024-07-01 RX ORDER — CEFAZOLIN SODIUM 1 G/3ML
INJECTION, POWDER, FOR SOLUTION INTRAMUSCULAR; INTRAVENOUS
Status: DISCONTINUED | OUTPATIENT
Start: 2024-07-01 | End: 2024-07-01

## 2024-07-01 RX ORDER — ACETAMINOPHEN 325 MG/1
650 TABLET ORAL EVERY 6 HOURS
Status: DISCONTINUED | OUTPATIENT
Start: 2024-07-01 | End: 2024-07-03 | Stop reason: HOSPADM

## 2024-07-01 RX ORDER — FENTANYL CITRATE 50 UG/ML
INJECTION, SOLUTION INTRAMUSCULAR; INTRAVENOUS
Status: DISCONTINUED | OUTPATIENT
Start: 2024-07-01 | End: 2024-07-01

## 2024-07-01 RX ORDER — OXYCODONE HYDROCHLORIDE 5 MG/1
5 TABLET ORAL
Status: DISCONTINUED | OUTPATIENT
Start: 2024-07-01 | End: 2024-07-01

## 2024-07-01 RX ORDER — OXYCODONE HYDROCHLORIDE 5 MG/1
5 TABLET ORAL EVERY 4 HOURS PRN
Status: DISCONTINUED | OUTPATIENT
Start: 2024-07-01 | End: 2024-07-03 | Stop reason: HOSPADM

## 2024-07-01 RX ORDER — HALOPERIDOL 5 MG/ML
0.5 INJECTION INTRAMUSCULAR EVERY 10 MIN PRN
Status: DISCONTINUED | OUTPATIENT
Start: 2024-07-01 | End: 2024-07-01 | Stop reason: HOSPADM

## 2024-07-01 RX ORDER — OXYCODONE HYDROCHLORIDE 5 MG/1
5 TABLET ORAL EVERY 4 HOURS PRN
Status: DISCONTINUED | OUTPATIENT
Start: 2024-07-01 | End: 2024-07-01

## 2024-07-01 RX ORDER — HEPARIN SODIUM 1000 [USP'U]/ML
INJECTION, SOLUTION INTRAVENOUS; SUBCUTANEOUS
Status: DISCONTINUED | OUTPATIENT
Start: 2024-07-01 | End: 2024-07-01

## 2024-07-01 RX ORDER — OXYCODONE HYDROCHLORIDE 10 MG/1
10 TABLET ORAL EVERY 4 HOURS PRN
Status: DISCONTINUED | OUTPATIENT
Start: 2024-07-01 | End: 2024-07-03 | Stop reason: HOSPADM

## 2024-07-01 RX ORDER — SODIUM CHLORIDE 0.9 % (FLUSH) 0.9 %
10 SYRINGE (ML) INJECTION
Status: DISCONTINUED | OUTPATIENT
Start: 2024-07-01 | End: 2024-07-01 | Stop reason: HOSPADM

## 2024-07-01 RX ADMIN — FENTANYL CITRATE 12.5 MCG: 50 INJECTION, SOLUTION INTRAMUSCULAR; INTRAVENOUS at 12:07

## 2024-07-01 RX ADMIN — CEFAZOLIN 2 G: 330 INJECTION, POWDER, FOR SOLUTION INTRAMUSCULAR; INTRAVENOUS at 12:07

## 2024-07-01 RX ADMIN — HEPARIN SODIUM 5000 UNITS: 5000 INJECTION INTRAVENOUS; SUBCUTANEOUS at 03:07

## 2024-07-01 RX ADMIN — ACETAMINOPHEN 650 MG: 325 TABLET ORAL at 07:07

## 2024-07-01 RX ADMIN — HEPARIN SODIUM 2500 UNITS: 1000 INJECTION, SOLUTION INTRAVENOUS; SUBCUTANEOUS at 01:07

## 2024-07-01 RX ADMIN — MIDAZOLAM 1 MG: 1 INJECTION INTRAMUSCULAR; INTRAVENOUS at 12:07

## 2024-07-01 RX ADMIN — ONDANSETRON 4 MG: 2 INJECTION INTRAMUSCULAR; INTRAVENOUS at 01:07

## 2024-07-01 RX ADMIN — HEPARIN SODIUM 5000 UNITS: 5000 INJECTION INTRAVENOUS; SUBCUTANEOUS at 07:07

## 2024-07-01 RX ADMIN — ATORVASTATIN CALCIUM 40 MG: 40 TABLET, FILM COATED ORAL at 09:07

## 2024-07-01 RX ADMIN — MEPIVACAINE HYDROCHLORIDE 30 ML: 15 INJECTION, SOLUTION EPIDURAL; INFILTRATION at 12:07

## 2024-07-01 RX ADMIN — GUAIFENESIN 600 MG: 600 TABLET, EXTENDED RELEASE ORAL at 09:07

## 2024-07-01 RX ADMIN — OXYCODONE HYDROCHLORIDE 10 MG: 10 TABLET ORAL at 02:07

## 2024-07-01 RX ADMIN — OXYCODONE 5 MG: 5 TABLET ORAL at 04:07

## 2024-07-01 RX ADMIN — SODIUM CHLORIDE: 0.9 INJECTION, SOLUTION INTRAVENOUS at 11:07

## 2024-07-01 RX ADMIN — ACETAMINOPHEN 650 MG: 325 TABLET ORAL at 11:07

## 2024-07-01 NOTE — ANESTHESIA RELEASE NOTE
"Anesthesia Release from PACU Note    Patient: Xena Vera    Procedure(s) Performed: Procedure(s) (LRB):  CREATION, AV FISTULA (Left)    Anesthesia type: general and regional    Post pain: Adequate analgesia    Post assessment: no apparent anesthetic complications    Last Vitals: Visit Vitals  /68   Pulse 95   Temp 37 °C (98.6 °F) (Temporal)   Resp 16   Ht 4' 11" (1.499 m)   Wt 48.3 kg (106 lb 7.7 oz)   SpO2 (!) 94%   Breastfeeding No   BMI 21.51 kg/m²       Post vital signs: stable    Level of consciousness: awake, alert , and oriented    Nausea/Vomiting: no nausea/no vomiting    Complications: none    Airway Patency: patent    Respiratory: unassisted, spontaneous ventilation, room air    Cardiovascular: stable and blood pressure at baseline    Hydration: euvolemic    Nitin Og MD  Department of Anesthesiology  [facility]  07/01/2024 2:53 PM     "

## 2024-07-01 NOTE — NURSING TRANSFER
Nursing Transfer Note      7/1/2024   3:16 PM    Nurse giving handoff:melanie kurtz   Nurse receiving handoff:GISSU rn     Reason patient is being transferred: post op return to room     Transfer To: return to [facility]    Transfer via stretcher    Transported by transport     Order for Tele Monitor? No    Any special needs or follow-up needed: routine    Patient belongings transferred with patient: Yes    Chart send with patient: Yes  Patient reassessed at : 1510

## 2024-07-01 NOTE — PROGRESS NOTES
Please see previous notes from this SW for continuity.    __________________________________________________  SW sent email to [facility] JIM Paredes to inform of current plan for AVG placement. SW awaiting response for update on pt's dialysis referral status.      UPDATE 2:10PM: SW received missed call from [facility] JIM Praedes regarding pt. SW attempted to return call, no answer, LVM requesting call back.    SW to follow with updates.      Allie Jha, JAILYN  [facility]  [phone]

## 2024-07-01 NOTE — ASSESSMENT & PLAN NOTE
53 year old female with PMHx HFpEF, severe pHTN (PA pressure 56 mm Hg 6/24), CKD5 (HD via RIJ TDC), HTN, MR, and anemia here for uremia and volume overload. Vascular surgery consulted for permanent HD access. The patient is very frail and has very poor functional status. Given her significant CHF and pulmonary HTN, there may be significant risk associated with AV fistula creation or graft insertion. Will need to discuss remaining catheter dependent for dialysis vs permanent access creation, likely graft. Will discuss with patient in clinic as an outpatient.    -Continue HD via TDC  -LUE AVF creation today in OR, patient aware, will obtain formal consent with interpretor shortly  -NPO  -Rest per primary team

## 2024-07-01 NOTE — PROGRESS NOTES
Surgery safety check completed with assistance of . Consent for surgery and anesthesia completed. NPO status verified. Safety precautions maintained throughout. Floor RN notified of patient's personal belongings in pre-op and to retrieve patient belongings.

## 2024-07-01 NOTE — ASSESSMENT & PLAN NOTE
Improving  2/2 to ESRD. AG 18. Patient AAOx3 on exam  +nausea, chest pain, labile HR on admission w/o steph bleeding.   VBG with pH 7.44.     -HD per nephro  -Outpatient HD - difficulty setting up due to insurance barriers  -[facility] MWF

## 2024-07-01 NOTE — ASSESSMENT & PLAN NOTE
Creatine stable for now. BMP reviewed- noted Estimated Creatinine Clearance: 14.9 mL/min (A) (based on SCr of 3.2 mg/dL (H)). according to latest data. Based on current GFR, CKD stage is end stage.  Monitor UOP and serial BMP and adjust therapy as needed. Renally dose meds. Avoid nephrotoxic medications and procedures.    -Neprhology consulted, assisting with dialysis   -Patient to be taken by vascular for LUE AVG today  -Vein mapping performed 6/27  -SW working to find HD chair for patient for continued OP dialysis - difficulty regarding insurance coverage  -RFP, Mg daily

## 2024-07-01 NOTE — ANESTHESIA PROCEDURE NOTES
L Supraclavicular SS    Patient location during procedure: OR   Block not for primary anesthetic.  Reason for block: at surgeon's request and post-op pain management   Post-op Pain Location: Left Arm Pain   Start time: 7/1/2024 12:16 PM  Timeout: 7/1/2024 12:15 PM   End time: 7/1/2024 12:23 PM    Staffing  Authorizing Provider: Silvestre Neri MD  Performing Provider: Doris Castrejon MD    Staffing  Performed by: Doris Castrejon MD  Authorized by: Silvestre Neri MD    Preanesthetic Checklist  Completed: patient identified, IV checked, site marked, risks and benefits discussed, surgical consent, monitors and equipment checked, pre-op evaluation and timeout performed  Peripheral Block  Patient position: supine  Prep: ChloraPrep  Patient monitoring: heart rate, cardiac monitor, continuous pulse ox, continuous capnometry and frequent blood pressure checks  Block type: supraclavicular  Laterality: left  Injection technique: single shot  Needle  Needle type: Stimuplex   Needle gauge: 22 G  Needle length: 2 in  Needle localization: anatomical landmarks and ultrasound guidance   -ultrasound image captured on disc.  Assessment  Injection assessment: negative aspiration, negative parasthesia and local visualized surrounding nerve  Paresthesia pain: none  Heart rate change: no  Slow fractionated injection: yes  Pain Tolerance: comfortable throughout block and no complaints  Medications:    Medications: mepivacaine (CARBOCAINE) injection 15 mg/mL (1.5%) - Perineural   30 mL - 7/1/2024 12:23:00 PM    Additional Notes  VSS.  Resident monitoring vitals throughout procedure.  Patient tolerated procedure well.  10cc 1.5% mepi given subq for intercostobrachial nerve block 2/2 tourniquet pain

## 2024-07-01 NOTE — PROGRESS NOTES
"[facility] Surg ([address] Medicine  Progress Note    Patient Name: Xena Vera  MRN: [id number]  Patient Class: IP- Inpatient   Admission Date: 6/18/2024  Length of Stay: 13 days  Attending Physician: Loreta Dimas MD  Primary Care Provider: Tami Villeda FNP        Subjective:     Principal Problem:Uremia        HPI:  Patient is a 53F PMHx HFpEF (Lasix 80 bid), severe pHTN, CKD5 (last HD in May 2024), HTN, MR, anemia presents for chest pain that began this morning. She reports 2/10 central sqeezing chest pain that radiates to her R shoulder when vomiting. The pain is worse with walking. She has never had chest pain before. She has had nausea/vomiting x1 day. Reports approx 3 episodes of clear vomitus, describes as "phlegm." She has associated sharp upper abdominal pain when vomiting. Additionally reports arthralgias/myalgias, leg swelling, generalized weakness, and fatigue. She had a brief episode of dizziness yesterday when walking but has not had that since. She denies Fever/chills, SOB, upper respiratory symptoms, cough, dysuria, decreased urine, or known sick contacts.     She reports adherence to her fluid restriction & Lasix. Admitted to [facility] from 4/1/24-5/8/24 where renal function declined while being diuresed. Required CRRT with intermittent levophed. Admitted to [facility] 5/13-5/17 after presenting to ED for HD. Not enrolled in medicaid at that time and had been told to present to ED for HD needs. No indications for HD on that admission per chart review. At that time nephrology recommended urgent evaluation with vascular surgery outpatient for AVF placement. Venous mapping obtained.    In ED, somewhat hypertensive (sBP 150s), intermittent bradycardia, afebrile, on RA. CMP with profound uremia (), Cr 5.6, hypokalemia (K 2.8), no hyponatremia. Transaminitis AST//133 with T. Bili 4.3. Lipase elevated at 230. RUQ US w/o CBD dilation or evidence of scarring, surgically " absent gallbladder. CBC with thrombocytopenia (PLT 90), no leukocytosis, stable anemia (Hb 11.5). BNP 4500, Troponin 0.497 which increased to 0.54. LA normal at 1.5. EKG with peaked T waves in II/III/AVF, widened QRS, Qtc 573. Nephrology consulted.     Interpretor was used for entire interview/examination:  number [id number]    Overview/Hospital Course:  Admitted to [facility] 6/18/24 for chest pain, nausea, and vomiting. Found to have profound uremia and hypokalemia. Nephrology consulted and performed emergent HD. CP resolved following HD, residual epigastric pain w/o nausea/vomiting. TTE 6/19 with preserved EF, normal RV systolic function, severe MR, pHTN, and CVP 15. Diuresis with IV lasix in conjunction with HD. LFTs downtrended with volume removal. Noted to have moderate pericardial effusion on 6/22, therefore Cardiology consulted and recommended continued diuresis. Repeat limited TTE performed 6/24 with interval improvement of what remains to be a moderately sized pericardial effusion. Cardiology to follow up with patient outpatient. Discharge delayed due to insurance barriers regarding securing an outpatient HD chair. Vascular surgery consulted, will perform LEFT upper extremity AVS later today.    Interval History: NAEO , VSS, HDS, Patient to be taken to by vascular surgery for LUE AVG today    Review of Systems   Constitutional:  Negative for chills and fever.   Respiratory:  Negative for shortness of breath.    Cardiovascular:  Negative for chest pain and leg swelling.   Psychiatric/Behavioral:  Negative for agitation and behavioral problems.      Objective:     Vital Signs (Most Recent):  Temp: 98 °F (36.7 °C) (07/01/24 0744)  Pulse: 84 (07/01/24 0945)  Resp: 18 (07/01/24 0744)  BP: 118/81 (07/01/24 0945)  SpO2: 99 % (07/01/24 0744) Vital Signs (24h Range):  Temp:  [98 °F (36.7 °C)-98.6 °F (37 °C)] 98 °F (36.7 °C)  Pulse:  [75-86] 84  Resp:  [16-18] 18  SpO2:  [93 %-99 %] 99 %  BP: (112-138)/(70-83)  118/81     Weight: 47 kg (103 lb 9.9 oz)  Body mass index is 20.93 kg/m².    Intake/Output Summary (Last 24 hours) at 7/1/2024 0952  Last data filed at 7/1/2024 0804  Gross per 24 hour   Intake 720 ml   Output 1400 ml   Net -680 ml         Physical Exam  Constitutional:       General: She is not in acute distress.     Appearance: She is not ill-appearing.   HENT:      Head: Normocephalic and atraumatic.      Right Ear: External ear normal.      Left Ear: External ear normal.      Nose: Nose normal.      Mouth/Throat:      Mouth: Mucous membranes are moist.      Pharynx: Oropharynx is clear.   Eyes:      General: No scleral icterus.     Extraocular Movements: Extraocular movements intact.   Cardiovascular:      Rate and Rhythm: Normal rate and regular rhythm.      Pulses: Normal pulses.      Heart sounds: Murmur heard.      Comments: No orthopnea  Pulmonary:      Effort: Pulmonary effort is normal.      Breath sounds: Normal breath sounds. No wheezing, rhonchi or rales.   Abdominal:      General: Abdomen is flat.      Palpations: Abdomen is soft.      Tenderness: There is no abdominal tenderness.   Musculoskeletal:         General: No swelling. Normal range of motion.      Cervical back: Normal range of motion and neck supple.   Skin:     General: Skin is warm and dry.      Capillary Refill: Capillary refill takes less than 2 seconds.   Neurological:      General: No focal deficit present.      Mental Status: She is alert and oriented to person, place, and time.   Psychiatric:         Mood and Affect: Mood normal.         Behavior: Behavior normal.             Significant Labs: All pertinent labs within the past 24 hours have been reviewed.    Significant Imaging: I have reviewed all pertinent imaging results/findings within the past 24 hours.    Assessment/Plan:      * Uremia  Improving  2/2 to ESRD. AG 18. Patient AAOx3 on exam  +nausea, chest pain, labile HR on admission w/o steph bleeding.   VBG with pH 7.44.      -HD per nephro  -Outpatient HD - difficulty setting up due to insurance barriers  -CMP MWF    Pre-op exam  Compensated HFpEF, no O2 requirement, stable on HD 3x/wk.   Able to lie flat and ambulate w/o dyspnea.       Atrial premature contractions  See Pericardial Effusion    Pericardial effusion  Atrial premature contractions  Noted as small on POOJA 6/19, however on CT AP 6/20, noted to be moderate in size w/ some heterogeneity. In setting of intermittent asymptomatic bradycardia, concern that effusion may be producing tamponade physiology. Cardiology consulted. Bradycardia is suspected blocked atrial premature contraction, so no need for EP eval currently.     Interval improvement on repeat limited ECHO 6/25    - Volume removal via HD and Lasix 80mg IV q12h  - Cardiology f/u with limited TTE in 2wk (ordered)  - Avoid AV garett blockers   - Telemetry        Cough  New, dry, non productive  A/w post nasal drip  Satting well RA  Cough Improving    -continue to monitor  -Flonase  -tessalon perles  -guaifenesin     Chest pain  RESOLVED. CP substernal, 2/10, but mostly with lying flat. Worse with exertion/better with rest  Troponin 0.497-->0.540, BNP 4500 (chronic)  EKG is without changes concerning for ACS  HOWARD score 2, Alannah 109    -cardiac monitoring    Hyperbilirubinemia  IMPROVING. Direct hyperbilirubinemia on admission w/ AST/ALT elevation. Liver US not reporting dilated bile ducts  No new medications identified for DILI. Hepatitis panel negative.   Concern for congestive hepatopathy due to volume overload from HFpEF    Liver enzymes and total bilirubin remain elevated despite 3 days of dialysis  Also with pancreatitis    -Can resume statin  -continue HD    Prediabetes  Last A1c 6.0 (2 months ago)   on admission    -POCT glucose AC/HS  -LDSSI  -goal -180    ESRD (end stage renal disease) on dialysis  Creatine stable for now. BMP reviewed- noted Estimated Creatinine Clearance: 14.9 mL/min (A) (based  on SCr of 3.2 mg/dL (H)). according to latest data. Based on current GFR, CKD stage is end stage.  Monitor UOP and serial BMP and adjust therapy as needed. Renally dose meds. Avoid nephrotoxic medications and procedures.    -Neprhology consulted, assisting with dialysis   -Patient to be taken by vascular for LUE AVG today  -Vein mapping performed 6/27  -SW working to find HD chair for patient for continued OP dialysis - difficulty regarding insurance coverage  -RFP, Mg daily    Prolonged QT interval  Qtc on admision 570    -cardiac telemetry  -caution with QT prolonging medications    Elevated troponin I level  Resolved  Has elevated troponin at baseline 2/2 to ESRD  Troponin 0.497-->0.540-->0.48  Reports 2/10 CP worse with lying down  EKG is without changes concerning for ACS  Has markedly elevated BUN, uremic pericarditis on ddx    -EKG for new/worsened CP  -cardiac telemetry    Acute on chronic heart failure with preserved ejection fraction (HFpEF)  Patient is identified as having Diastolic (HFpEF) heart failure that is Chronic. CHF is currently controlled. Latest ECHO performed and demonstrates- Results for orders placed during the hospital encounter of 04/01/24    Echo    Result Date: 6/24/2024    Left Ventricle: The left ventricle is normal in size. Mildly increased   ventricular mass. Normal wall thickness. There is eccentric hypertrophy.   There is normal systolic function with a visually estimated ejection   fraction of 55 - 60%. Biplane (2D) method of discs ejection fraction is   60%. There is diastolic dysfunction. Elevated left ventricular filling   pressure.    Right Ventricle: Normal right ventricular cavity size. Systolic   function is borderline low.    Left Atrium: Left atrium is severely dilated.    Aortic Valve: There is mild aortic regurgitation.    Mitral Valve: There is moderate annular dilation present. There is   severe regurgitation with a posterolateral eccentriccally directed jet.     Tricuspid Valve: There is mild to moderate regurgitation.    Pulmonary Artery: The estimated pulmonary artery systolic pressure is   56 mmHg.    IVC/SVC: Intermediate venous pressure at 8 mmHg.    Pericardium: There is a moderate posterior effusion.    Overall effusion appears smaller than prior, however still moderate.    There is borderline 30% variation across mitral and aortic valves with   respiration.  No RV collapse.  Correlate clinically.        Echo    Addendum Date: 6/21/2024        Pericardium: There is a large circumferential effusion. No definitive   indication of cardiac tamponade. Evidence includes no septal bounce, no   respiratory chamber variation. CVP is intermediate, as the IVC is small   but does not collapse. Diastolic RV collapse is not clearly demonstrated.   Compared to the prior echo on 6/19/24, the effusion appears increased in   size. Correlate with clinical exam.    Left Ventricle: There is mildly reduced systolic function with a   visually estimated ejection fraction of 40 - 45%.    Right Ventricle: Systolic function is mildly reduced.        Result Date: 6/21/2024    Pericardium: There is a large circumferential effusion. No indication   of cardiac tamponade. Evidence includes no septal bounce, no respiratory   chamber variation. CVP is intermediate, as the IVC is small but does not   collapse. Diastolic RV collapse is not clearly demonstrated. Compared to   the prior echo on 6/19/24, the effusion apperas increased in size.    Left Ventricle: There is mildly reduced systolic function with a   visually estimated ejection fraction of 40 - 45%.    Right Ventricle: Systolic function is mildly reduced.        Echo    Result Date: 6/19/2024    Left Ventricle: The left ventricle is severely dilated. LVIDd is 6.0   cm. Normal wall thickness. There is eccentric hypertrophy. There is normal   systolic function. Ejection fraction by visual approximation is 55%. Grade   II diastolic dysfunction.     Right Ventricle: Normal right ventricular cavity size. Wall thickness   is normal. Systolic function is normal.    Left Atrium: Left atrium is severely dilated.    Aortic Valve: The aortic valve is a trileaflet valve.    Mitral Valve: Severely restricted posterior leaflet motion.  The mean   pressure gradient across the mitral valve is 7 mmHg at a heart rate of 77   bpm. There is severe regurgitation with a posterolateral eccentrically   directed jet.    Tricuspid Valve: There is mild regurgitation.    Pulmonary Artery: There is severe pulmonary hypertension. The estimated   pulmonary artery systolic pressure is 76 mmHg.    IVC/SVC: Elevated venous pressure at 15 mmHg.    Pericardium: There is a small effusion. No indication of cardiac   tamponade. On direct comparison with images from 4/23/2024, there is   slightly more fluid around the right atrium.         . Continue Furosemide and monitor clinical status closely. Monitor on telemetry. Patient is off CHF pathway.  Monitor strict Is&Os and daily weights.  Place on fluid restriction of 1.5 L. Cardiology has not been consulted. Continue to stress to patient importance of self efficacy and  on diet for CHF.     Does not appear grossly volume overloaded - 1 pillow to prevent orthopnea, good mobilization, peripheral edema improved, no O2 requirement    -Volume reduction via HD, will determine dialysis needs as OP to help determine future diuretic dosing  -Lasix 80mg bid in hospital, can return to Lasix 80mg bid as should be sufficient when consistent HD sessions begin    Class 2 obesity in adult  Body mass index is 20.93 kg/m². Morbid obesity complicates all aspects of disease management from diagnostic modalities to treatment. Weight loss encouraged and health benefits explained to patient.    Essential hypertension  BP elevated and labile on admission  120s-160s systolic  Appears to have midodrine ordered, likely for dialysis     -will hold antihypertensives  for now given aggressive diuresis      VTE Risk Mitigation (From admission, onward)           Ordered     heparin (porcine) injection 5,000 Units  Every 8 hours         06/25/24 0916     IP VTE HIGH RISK PATIENT  Once         06/18/24 1427     Place sequential compression device  Until discontinued         06/18/24 1427                    Discharge Planning   RILEY: 7/1/2024     Code Status: DNR   Is the patient medically ready for discharge?:     Reason for patient still in hospital (select all that apply): Treatment and Pending disposition  Discharge Plan A: Home   Discharge Delays: (!) Dialysis Set-up              Gisella Cruz MD  Department of Hospital Medicine   [facility] Surg ([place]-[facility])

## 2024-07-01 NOTE — TRANSFER OF CARE
"Anesthesia Transfer of Care Note    Patient: Xena Vera    Procedure(s) Performed: Procedure(s) (LRB):  CREATION, AV FISTULA (Left)    Patient location: PACU    Anesthesia Type: regional and MAC    Transport from OR: Transported from OR on 6-10 L/min O2 by face mask with adequate spontaneous ventilation    Post pain: adequate analgesia    Post assessment: no apparent anesthetic complications and tolerated procedure well    Post vital signs: stable    Level of consciousness: awake, alert and oriented    Nausea/Vomiting: no nausea/vomiting    Complications: none    Transfer of care protocol was followed      Last vitals: Visit Vitals  BP (!) 197/83 (BP Location: Right leg, Patient Position: Sitting)   Pulse 88   Temp 36.3 °C (97.3 °F) (Tympanic)   Resp 18   Ht 4' 11" (1.499 m)   Wt 48.3 kg (106 lb 7.7 oz)   SpO2 97%   Breastfeeding No   BMI 21.51 kg/m²     "

## 2024-07-01 NOTE — OP NOTE
Surgery Date: 7/1/2024     Surgeons and Role:     * DOE Arce III, MD - Primary     * Eliezer Infante MD - Fellow     Assisting Surgeon: None     Pre-op Diagnosis:  ESRD (end stage renal disease) on dialysis [N18.6, Z99.2]     Post-op Diagnosis:  Post-Op Diagnosis Codes:     * ESRD (end stage renal disease) on dialysis [N18.6, Z99.2]     PROCEDURE:  L upper arm reverse loop AVG (4-7 taper ACCUSEAL)     Anesthesia: Regional     Implants:  Implant Name Type Inv. Item Serial No.  Lot No. LRB No. Used Action   GRAFT ACUSEAL CARDIO 45CM - [id number]   GRAFT ACUSEAL CARDIO 45CM [id number] [employer]   Left 1 Implanted         Operative Findings: small brachial artery and vein, soft thrill; strong biphasic ulnar, reasonable L radial signals with mild augmentation with AVG occlusion     MAY begin cannulation with SMALL needles on Wed 7/3/2024     Estimated Blood Loss: 15cc       Indications:  53-year-old female with end-stage renal disease dialyzing via a PermCath in need of permanent access.  The risks, benefits, alternatives of the procedure were discussed with the patient using a  who wished to proceed with the procedure gave written consent.     Procedure:   The patient was brought to the operating room placed on the operating table in supine position.  She received a regional block provided by anesthesia.  The left arm was circumferentially prepped and draped in the usual sterile fashion.  A time-out was performed.    A preoperative ultrasound was performed, which demonstrated adequate axillary artery and vein for a looped AV graft, given the patient's small stature and concern for steal syndrome of a standard brachial artery graft.    A Longitudinal incision was made near the axilla on the medial aspect of her right arm.  This was down dissected down through the subcutaneous tissue down through the sheath.  The axillary artery was identified and circled with vascular loops  proximally and distally.  It was fairly small. The axillary vein was additionally identified next to the median nerve.  Small branches were tied off and ligated.  The vein was encircled with vessel loops for control.     A 4 7 tapered Accu Seal graft was then brought onto the field.  A counter incision was made near the elbow, a loop graft tunneled in the usual fashion with the 4 mm end towards the arterial side laterally and the 7 mm side towards the venous side immediately.  He following this, 2500 units of heparin were given.    The arterial anastomosis was performed end-to-side fashion after occlusion of the artery with silastic vessel loops and a longitudinal arteriotomy.  This was performed with a 6 0 [place]-Maxime suture.  The graft was clamped then flow was restored there was good hemostasis with no need for additional sutures.  Attention was then directed towards the venous anastomosis.   Careful longitudinal venotomy was made and a beveled end-to-side anastomosis made with the graft using a [place]-Maxime 6 0 suture.  Prior to completion of this all the air was blown out of the graft flow was then restored.  Good hemostasis was achieved with with topical agents and no further sutures were needed.  There is a soft thrill notable in the graft.     A doppler was brought on the field. Excellent radial and ulnar signals that minimally augment with graft compression.      After hemostasis was achieved the counter incision was closed with interrupted deep dermal 3-0 and a running 4-0 subcuticular stitch.  The axillary incision was closed with a deep 2-0 Vicryl, 3-0 deep dermal, running 4-0 subcutaneous Monocryl.  A sterile dressing was applied.     The end of the case the patient was liberated from anesthesia and taken to recovery room in stable condition.  Dr. Arce was present and scrubbed for the entirety of the procedure.    The instruments and sponge counts were correct at the end of the break seeker.

## 2024-07-01 NOTE — PROGRESS NOTES
07/01/24 1057   Tunneled Central Line - Double Lumen  04/22/24 1317 Internal Jugular Right   Placement Date/Time: 04/22/24 1317   Inserted by: [email or web address]) MD  Hand Hygiene: Performed  Barrier Precautions: Performed  Skin Antisepsis: ChloraPrep  Location: Internal Jugular Right  : apiOmat  Lot Number: [id number]  Guidewire Removed?: Yes  Guid...   Site Assessment No drainage   Line Securement Device Secured with sutureless device   Dressing Type CHG impregnated dressing/sponge   Dressing Status Clean;Dry;Intact   Dressing Intervention Integrity maintained   Date on Dressing 07/01/24   Dressing Due to be Changed 07/08/24   Distal Patency/Care flushed w/o difficulty;normal saline locked   Proximal 1 Patency/Care flushed w/o difficulty;normal saline locked   During Hemodialysis Assessment   Blood Flow Rate (mL/min) 400 mL/min   Dialysate Flow Rate (mL/min) 700 ml/min   Ultrafiltration Rate (mL/Hr) 880 mL/Hr   Arteriovenous Lines Secure Yes   Arterial Pressure (mmHg) -180 mmHg   Venous Pressure (mmHg) 130   Blood Volume Processed (Liters) 62.1 L   UF Removed (mL) 2650 mL   TMP 30   Venous Line in Air Detector Yes   Intake (mL) 250 mL   Intra-Hemodialysis Comments HD completed   Post-Hemodialysis Assessment   Rinseback Volume (mL) 250 mL   Blood Volume Processed (Liters) 62.1 L   Dialyzer Clearance Moderately streaked   Duration of Treatment 180 minutes   Total UF (mL) 2650 mL   Net Fluid Removal 2000   Patient Response to Treatment Malinda. well   Post-Treatment Weight 48.3 kg (106 lb 7.7 oz)   Treatment Weight Change -2.1     Patient left COBY by stretcher NAD.

## 2024-07-01 NOTE — PROGRESS NOTES
07/01/24 0750 07/01/24 0755   Tunneled Central Line - Double Lumen  04/22/24 1317 Internal Jugular Right   Placement Date/Time: 04/22/24 1317   Inserted by: [email or web address]) MD  Hand Hygiene: Performed  Barrier Precautions: Performed  Skin Antisepsis: ChloraPrep  Location: Internal Jugular Right  : [place]  Lot Number: [id number]  Guidewire Removed?: Yes  Guid...   Site Assessment No drainage  --    Line Securement Device Secured with sutureless device  --    Dressing Type CHG impregnated dressing/sponge  --    Dressing Status Clean;Dry;Intact  --    Dressing Intervention Sterile dressing change  --    Date on Dressing 07/01/24  --    Dressing Due to be Changed 07/08/24  --    Distal Patency/Care blood return present;flushed w/o difficulty  --    Proximal 1 Patency/Care blood return present;flushed w/o difficulty  --    During Hemodialysis Assessment   Blood Flow Rate (mL/min)  --  400 mL/min   Dialysate Flow Rate (mL/min)  --  700 ml/min   Ultrafiltration Rate (mL/Hr)  --  880 mL/Hr   Arteriovenous Lines Secure  --  Yes   Arterial Pressure (mmHg)  --  -180 mmHg   Venous Pressure (mmHg)  --  130   Blood Volume Processed (Liters)  --  0 L   UF Removed (mL)  --  0 mL   TMP  --  30   Venous Line in Air Detector  --  Yes   Intake (mL)  --  250 mL   Intra-Hemodialysis Comments  --  HD started     Patient arrived COBY by wheelchair. HD started.

## 2024-07-01 NOTE — PT/OT/SLP PROGRESS
Occupational Therapy      Patient Name:  Xena Vera   MRN:  [id number]    Patient not seen today secondary to off the floor all shift for dialysis then AV fistula creation. Will follow-up as appropriate.    7/1/2024

## 2024-07-01 NOTE — ASSESSMENT & PLAN NOTE
Patient is identified as having Diastolic (HFpEF) heart failure that is Chronic. CHF is currently controlled. Latest ECHO performed and demonstrates- Results for orders placed during the hospital encounter of 04/01/24    Echo    Result Date: 6/24/2024    Left Ventricle: The left ventricle is normal in size. Mildly increased   ventricular mass. Normal wall thickness. There is eccentric hypertrophy.   There is normal systolic function with a visually estimated ejection   fraction of 55 - 60%. Biplane (2D) method of discs ejection fraction is   60%. There is diastolic dysfunction. Elevated left ventricular filling   pressure.    Right Ventricle: Normal right ventricular cavity size. Systolic   function is borderline low.    Left Atrium: Left atrium is severely dilated.    Aortic Valve: There is mild aortic regurgitation.    Mitral Valve: There is moderate annular dilation present. There is   severe regurgitation with a posterolateral eccentriccally directed jet.    Tricuspid Valve: There is mild to moderate regurgitation.    Pulmonary Artery: The estimated pulmonary artery systolic pressure is   56 mmHg.    IVC/SVC: Intermediate venous pressure at 8 mmHg.    Pericardium: There is a moderate posterior effusion.    Overall effusion appears smaller than prior, however still moderate.    There is borderline 30% variation across mitral and aortic valves with   respiration.  No RV collapse.  Correlate clinically.        Echo    Addendum Date: 6/21/2024        Pericardium: There is a large circumferential effusion. No definitive   indication of cardiac tamponade. Evidence includes no septal bounce, no   respiratory chamber variation. CVP is intermediate, as the IVC is small   but does not collapse. Diastolic RV collapse is not clearly demonstrated.   Compared to the prior echo on 6/19/24, the effusion appears increased in   size. Correlate with clinical exam.    Left Ventricle: There is mildly reduced systolic  function with a   visually estimated ejection fraction of 40 - 45%.    Right Ventricle: Systolic function is mildly reduced.        Result Date: 6/21/2024    Pericardium: There is a large circumferential effusion. No indication   of cardiac tamponade. Evidence includes no septal bounce, no respiratory   chamber variation. CVP is intermediate, as the IVC is small but does not   collapse. Diastolic RV collapse is not clearly demonstrated. Compared to   the prior echo on 6/19/24, the effusion apperas increased in size.    Left Ventricle: There is mildly reduced systolic function with a   visually estimated ejection fraction of 40 - 45%.    Right Ventricle: Systolic function is mildly reduced.        Echo    Result Date: 6/19/2024    Left Ventricle: The left ventricle is severely dilated. LVIDd is 6.0   cm. Normal wall thickness. There is eccentric hypertrophy. There is normal   systolic function. Ejection fraction by visual approximation is 55%. Grade   II diastolic dysfunction.    Right Ventricle: Normal right ventricular cavity size. Wall thickness   is normal. Systolic function is normal.    Left Atrium: Left atrium is severely dilated.    Aortic Valve: The aortic valve is a trileaflet valve.    Mitral Valve: Severely restricted posterior leaflet motion.  The mean   pressure gradient across the mitral valve is 7 mmHg at a heart rate of 77   bpm. There is severe regurgitation with a posterolateral eccentrically   directed jet.    Tricuspid Valve: There is mild regurgitation.    Pulmonary Artery: There is severe pulmonary hypertension. The estimated   pulmonary artery systolic pressure is 76 mmHg.    IVC/SVC: Elevated venous pressure at 15 mmHg.    Pericardium: There is a small effusion. No indication of cardiac   tamponade. On direct comparison with images from 4/23/2024, there is   slightly more fluid around the right atrium.         . Continue Furosemide and monitor clinical status closely. Monitor on  telemetry. Patient is off CHF pathway.  Monitor strict Is&Os and daily weights.  Place on fluid restriction of 1.5 L. Cardiology has not been consulted. Continue to stress to patient importance of self efficacy and  on diet for CHF.     Does not appear grossly volume overloaded - 1 pillow to prevent orthopnea, good mobilization, peripheral edema improved, no O2 requirement    -Volume reduction via HD, will determine dialysis needs as OP to help determine future diuretic dosing  -Lasix 80mg bid in hospital, can return to Lasix 80mg bid as should be sufficient when consistent HD sessions begin

## 2024-07-01 NOTE — PLAN OF CARE
Problem: Adult Inpatient Plan of Care  Goal: Plan of Care Review  Outcome: Not Progressing  Goal: Optimal Comfort and Wellbeing  Outcome: Not Progressing     Problem: Chronic Kidney Disease  Goal: Electrolyte Balance  Outcome: Not Progressing     AAOx4. Completed dialysis during shift. HD graft to vidhi [facility] cdi. Continues with prn pain management as needed.

## 2024-07-01 NOTE — SUBJECTIVE & OBJECTIVE
Interval History: NAEO , VSS, HDS, Patient to be taken to by vascular surgery for LUE AVG today    Review of Systems   Constitutional:  Negative for chills and fever.   Respiratory:  Negative for shortness of breath.    Cardiovascular:  Negative for chest pain and leg swelling.   Psychiatric/Behavioral:  Negative for agitation and behavioral problems.      Objective:     Vital Signs (Most Recent):  Temp: 98 °F (36.7 °C) (07/01/24 0744)  Pulse: 84 (07/01/24 0945)  Resp: 18 (07/01/24 0744)  BP: 118/81 (07/01/24 0945)  SpO2: 99 % (07/01/24 0744) Vital Signs (24h Range):  Temp:  [98 °F (36.7 °C)-98.6 °F (37 °C)] 98 °F (36.7 °C)  Pulse:  [75-86] 84  Resp:  [16-18] 18  SpO2:  [93 %-99 %] 99 %  BP: (112-138)/(70-83) 118/81     Weight: 47 kg (103 lb 9.9 oz)  Body mass index is 20.93 kg/m².    Intake/Output Summary (Last 24 hours) at 7/1/2024 0952  Last data filed at 7/1/2024 0804  Gross per 24 hour   Intake 720 ml   Output 1400 ml   Net -680 ml         Physical Exam  Constitutional:       General: She is not in acute distress.     Appearance: She is not ill-appearing.   HENT:      Head: Normocephalic and atraumatic.      Right Ear: External ear normal.      Left Ear: External ear normal.      Nose: Nose normal.      Mouth/Throat:      Mouth: Mucous membranes are moist.      Pharynx: Oropharynx is clear.   Eyes:      General: No scleral icterus.     Extraocular Movements: Extraocular movements intact.   Cardiovascular:      Rate and Rhythm: Normal rate and regular rhythm.      Pulses: Normal pulses.      Heart sounds: Murmur heard.      Comments: No orthopnea  Pulmonary:      Effort: Pulmonary effort is normal.      Breath sounds: Normal breath sounds. No wheezing, rhonchi or rales.   Abdominal:      General: Abdomen is flat.      Palpations: Abdomen is soft.      Tenderness: There is no abdominal tenderness.   Musculoskeletal:         General: No swelling. Normal range of motion.      Cervical back: Normal range of motion  and neck supple.   Skin:     General: Skin is warm and dry.      Capillary Refill: Capillary refill takes less than 2 seconds.   Neurological:      General: No focal deficit present.      Mental Status: She is alert and oriented to person, place, and time.   Psychiatric:         Mood and Affect: Mood normal.         Behavior: Behavior normal.             Significant Labs: All pertinent labs within the past 24 hours have been reviewed.    Significant Imaging: I have reviewed all pertinent imaging results/findings within the past 24 hours.

## 2024-07-01 NOTE — SUBJECTIVE & OBJECTIVE
Medications:  Continuous Infusions:  Scheduled Meds:   atorvastatin  40 mg Oral QHS    famotidine  20 mg Oral Daily    fluticasone propionate  2 spray Each Nostril Daily    furosemide (LASIX) injection  80 mg Intravenous BID    guaiFENesin  600 mg Oral BID    heparin (porcine)  5,000 Units Subcutaneous Q8H    methocarbamoL  1,000 mg Oral Once    polyethylene glycol  17 g Oral Daily     PRN Meds:  Current Facility-Administered Medications:     acetaminophen, 650 mg, Oral, Q6H PRN    aluminum-magnesium hydroxide-simethicone, 30 mL, Oral, QID PRN    benzonatate, 100 mg, Oral, TID PRN    dextrose 10%, 12.5 g, Intravenous, PRN    dextrose 10%, 25 g, Intravenous, PRN    glucagon (human recombinant), 1 mg, Intramuscular, PRN    glucose, 16 g, Oral, PRN    glucose, 24 g, Oral, PRN    insulin aspart U-100, 0-5 Units, Subcutaneous, QID (AC + HS) PRN    loperamide, 2 mg, Oral, BID PRN    naloxone, 0.02 mg, Intravenous, PRN    nitroGLYCERIN, 0.4 mg, Sublingual, Q5 Min PRN    simethicone, 1 tablet, Oral, QID PRN    sodium chloride 0.9%, 10 mL, Intravenous, Q12H PRN     Objective:     Vital Signs (Most Recent):  Temp: 98.1 °F (36.7 °C) (07/01/24 0413)  Pulse: 75 (07/01/24 0413)  Resp: 18 (07/01/24 0413)  BP: 122/79 (07/01/24 0413)  SpO2: 97 % (07/01/24 0413) Vital Signs (24h Range):  Temp:  [98 °F (36.7 °C)-98.6 °F (37 °C)] 98.1 °F (36.7 °C)  Pulse:  [75-86] 75  Resp:  [16-18] 18  SpO2:  [93 %-97 %] 97 %  BP: (112-129)/(70-79) 122/79          Physical Exam  Constitutional:       General: She is not in acute distress.     Appearance: Normal appearance.   Cardiovascular:      Rate and Rhythm: Normal rate and regular rhythm.      Comments: 1+ radial pulses  Pulmonary:      Effort: Pulmonary effort is normal.   Musculoskeletal:         General: Normal range of motion.      Right lower leg: Edema present.      Left lower leg: Edema present.   Skin:     Findings: Bruising present.   Neurological:      Mental Status: She is alert and  oriented to person, place, and time.          Significant Labs:  CBC:   Recent Labs   Lab 07/01/24 0318   WBC 6.24   RBC 3.88*   HGB 10.3*   HCT 36.2*      MCV 93   MCH 26.5*   MCHC 28.5*     CMP:   Recent Labs   Lab 07/01/24 0318   GLU 89   CALCIUM 8.4*   ALBUMIN 2.4*   PROT 7.0   *   K 4.2   CO2 22*   CL 98   BUN 47*   CREATININE 3.2*   ALKPHOS 193*   ALT 19   AST 25   BILITOT 1.3*       Significant Diagnostics:  I have reviewed all pertinent imaging results/findings within the past 24 hours.

## 2024-07-01 NOTE — PROGRESS NOTES
[facility]FF HEMODIALYSIS NOTE     Patient currently on hemodialysis for removal of uremic toxins and volume.     Patient seen and evaluated on hemodialysis, tolerating treatment, see HD flowsheet for vitals and assessments.    Labs have been reviewed and the dialysate bath has been adjusted.       Assessment/Plan:    -Plan for AVF creation today   -Pending placement   -Patient seen on HD, tolerating treatment well, w/o complaints   -UF goal of 2L  -Renal diet, if not NPO   -Strict I/O's and daily weights  -Daily renal function panels  -Keep MAP >65 while on HD   -Hgb goal 10-11, at goal   -no indication for phos binders  -Will continue to follow while inpatient     Marcelle Toscano DNP-FNP, C  Nephrology  Pager: [phone]

## 2024-07-01 NOTE — ASSESSMENT & PLAN NOTE
New, dry, non productive  A/w post nasal drip  Satting well RA  Cough Improving    -continue to monitor  -Flonase  -tessalon perles  -guaifenesin

## 2024-07-01 NOTE — PROGRESS NOTES
[facility] ([place] [id number])  Vascular Surgery  Progress Note    Patient Name: Xena Vera  MRN: [id number]  Admission Date: 6/18/2024  Primary Care Provider: Tami Villeda FNP    Subjective:     Interval History: NAEON, patient sitting in bed on rounds, breathing easily.  Will plan for LUE AVF creation today, team is aware.  Patient is aware she is NPO.     Post-Op Info:  Procedure(s) (LRB):  CREATION, AV FISTULA (Left)         Medications:  Continuous Infusions:  Scheduled Meds:   atorvastatin  40 mg Oral QHS    famotidine  20 mg Oral Daily    fluticasone propionate  2 spray Each Nostril Daily    furosemide (LASIX) injection  80 mg Intravenous BID    guaiFENesin  600 mg Oral BID    heparin (porcine)  5,000 Units Subcutaneous Q8H    methocarbamoL  1,000 mg Oral Once    polyethylene glycol  17 g Oral Daily     PRN Meds:  Current Facility-Administered Medications:     acetaminophen, 650 mg, Oral, Q6H PRN    aluminum-magnesium hydroxide-simethicone, 30 mL, Oral, QID PRN    benzonatate, 100 mg, Oral, TID PRN    dextrose 10%, 12.5 g, Intravenous, PRN    dextrose 10%, 25 g, Intravenous, PRN    glucagon (human recombinant), 1 mg, Intramuscular, PRN    glucose, 16 g, Oral, PRN    glucose, 24 g, Oral, PRN    insulin aspart U-100, 0-5 Units, Subcutaneous, QID (AC + HS) PRN    loperamide, 2 mg, Oral, BID PRN    naloxone, 0.02 mg, Intravenous, PRN    nitroGLYCERIN, 0.4 mg, Sublingual, Q5 Min PRN    simethicone, 1 tablet, Oral, QID PRN    sodium chloride 0.9%, 10 mL, Intravenous, Q12H PRN     Objective:     Vital Signs (Most Recent):  Temp: 98.1 °F (36.7 °C) (07/01/24 0413)  Pulse: 75 (07/01/24 0413)  Resp: 18 (07/01/24 0413)  BP: 122/79 (07/01/24 0413)  SpO2: 97 % (07/01/24 0413) Vital Signs (24h Range):  Temp:  [98 °F (36.7 °C)-98.6 °F (37 °C)] 98.1 °F (36.7 °C)  Pulse:  [75-86] 75  Resp:  [16-18] 18  SpO2:  [93 %-97 %] 97 %  BP: (112-129)/(70-79) 122/79          Physical Exam  Constitutional:       General: She  is not in acute distress.     Appearance: Normal appearance.   Cardiovascular:      Rate and Rhythm: Normal rate and regular rhythm.      Comments: 1+ radial pulses  Pulmonary:      Effort: Pulmonary effort is normal.   Musculoskeletal:         General: Normal range of motion.      Right lower leg: Edema present.      Left lower leg: Edema present.   Skin:     Findings: Bruising present.   Neurological:      Mental Status: She is alert and oriented to person, place, and time.          Significant Labs:  CBC:   Recent Labs   Lab 07/01/24  0318   WBC 6.24   RBC 3.88*   HGB 10.3*   HCT 36.2*      MCV 93   MCH 26.5*   MCHC 28.5*     CMP:   Recent Labs   Lab 07/01/24  0318   GLU 89   CALCIUM 8.4*   ALBUMIN 2.4*   PROT 7.0   *   K 4.2   CO2 22*   CL 98   BUN 47*   CREATININE 3.2*   ALKPHOS 193*   ALT 19   AST 25   BILITOT 1.3*       Significant Diagnostics:  I have reviewed all pertinent imaging results/findings within the past 24 hours.  Assessment/Plan:     ESRD (end stage renal disease)  53 year old female with PMHx HFpEF, severe pHTN (PA pressure 56 mm Hg 6/24), CKD5 (HD via RIJ TDC), HTN, MR, and anemia here for uremia and volume overload. Vascular surgery consulted for permanent HD access. The patient is very frail and has very poor functional status. Given her significant CHF and pulmonary HTN, there may be significant risk associated with AV fistula creation or graft insertion. Will need to discuss remaining catheter dependent for dialysis vs permanent access creation, likely graft. Will discuss with patient in clinic as an outpatient.    -Continue HD via TDC  -LUE AVF creation today in OR, patient aware, will obtain formal consent with interpretor shortly  -NPO  -Rest per primary team        Tessie Ordonez NP  Vascular Surgery  [facility] ([facility]-)

## 2024-07-01 NOTE — ANESTHESIA PREPROCEDURE EVALUATION
[facility]  Anesthesia Pre-Operative Evaluation         Patient Name: Xena Vera  YOB: 1970  MRN: [id number]    SUBJECTIVE:     Pre-operative evaluation for Procedure(s) (LRB):  CREATION, AV FISTULA (Left)     07/01/2024    Xena Vera is a 53 y.o. female w/ a significant PMHx of HTN, HFpEF, severe MR, pulm HTN, ESRD/CKD5.    Patient now presents for the above procedure(s).    Results for orders placed during the hospital encounter of 06/18/24    Echo    Interpretation Summary    Left Ventricle: The left ventricle is normal in size. Mildly increased ventricular mass. Normal wall thickness. There is eccentric hypertrophy. There is normal systolic function with a visually estimated ejection fraction of 55 - 60%. Biplane (2D) method of discs ejection fraction is 60%. There is diastolic dysfunction. Elevated left ventricular filling pressure.    Right Ventricle: Normal right ventricular cavity size. Systolic function is borderline low.    Left Atrium: Left atrium is severely dilated.    Aortic Valve: There is mild aortic regurgitation.    Mitral Valve: There is moderate annular dilation present. There is severe regurgitation with a posterolateral eccentriccally directed jet.    Tricuspid Valve: There is mild to moderate regurgitation.    Pulmonary Artery: The estimated pulmonary artery systolic pressure is 56 mmHg.    IVC/SVC: Intermediate venous pressure at 8 mmHg.    Pericardium: There is a moderate posterior effusion.    Overall effusion appears smaller than prior, however still moderate.  There is borderline 30% variation across mitral and aortic valves with respiration.  No RV collapse.  Correlate clinically.      LDA:   Tunneled Central Line - Double Lumen  04/22/24 1317 Internal Jugular Right (Active)   Line Necessity Review CRRT/HD 06/30/24 2000   Site Assessment No drainage 07/01/24 1057   Line Securement Device Secured with sutureless device 07/01/24 1057   Dressing  Type CHG impregnated dressing/sponge 07/01/24 1057   Dressing Status Clean;Dry;Intact 07/01/24 1057   Dressing Intervention Integrity maintained 07/01/24 1057   Date on Dressing 07/01/24 07/01/24 1057   Dressing Due to be Changed 07/08/24 07/01/24 1057   Distal Patency/Care flushed w/o difficulty;normal saline locked 07/01/24 1057   Proximal 1 Patency/Care flushed w/o difficulty;normal saline locked 07/01/24 1057   Number of days: 69            Peripheral IV - Single Lumen 06/28/24 1545 22 G Anterior;Left Forearm (Active)   Site Assessment Clean;Dry;Intact 07/01/24 0400   Extremity Assessment Distal to IV No redness;No swelling;No warmth 06/30/24 0737   Line Status Saline locked 07/01/24 0400   Dressing Status Clean;Dry;Intact 07/01/24 0400   Dressing Intervention Integrity maintained 07/01/24 0400   Dressing Change Due 07/02/24 06/28/24 1610   Site Change Due 07/02/24 06/28/24 1610   Reason Not Rotated Not due 06/28/24 1610   Number of days: 2       Prev airway: None documented.    Drips: None documented.      Patient Active Problem List   Diagnosis    Essential hypertension    (HFpEF) heart failure with preserved ejection fraction    Class 2 obesity in adult    Ovarian cyst    Anemia    Severe mitral valve regurgitation    Acute on chronic heart failure with preserved ejection fraction (HFpEF)    Elevated troponin I level    Prolonged QT interval    Anasarca    Leg wound, left    Acute urinary retention    ESRD (end stage renal disease) on dialysis    HLD (hyperlipidemia)    Prediabetes    Angiokeratoma    Hypertrophic cardiomyopathy    Proteinuria    Rash    Intertrigo    Hyponatremia    Hypophosphatemia    Hyperbilirubinemia    Uremia    Chest pain    Cough    Pericardial effusion    Atrial premature contractions    Pre-op exam       Review of patient's allergies indicates:  No Known Allergies    Current Inpatient Medications:   atorvastatin  40 mg Oral QHS    famotidine  20 mg Oral Daily    fluticasone  propionate  2 spray Each Nostril Daily    furosemide (LASIX) injection  80 mg Intravenous BID    guaiFENesin  600 mg Oral BID    heparin (porcine)  5,000 Units Subcutaneous Q8H    polyethylene glycol  17 g Oral Daily       No current facility-administered medications on file prior to encounter.     Current Outpatient Medications on File Prior to Encounter   Medication Sig Dispense Refill    atorvastatin (LIPITOR) 40 MG tablet Take 1 tablet by mouth every day 90 tablet 1    carvediloL (COREG) 6.25 MG tablet Take 1 tablet (6.25 mg total) by mouth 2 (two) times daily. 60 tablet 11    furosemide (LASIX) 80 MG tablet Take 1 tablet (80 mg total) by mouth 2 (two) times daily. 60 tablet 11    oxybutynin (DITROPAN) 5 MG Tab Take 1 tablet (5 mg total) by mouth 3 (three) times daily. 90 tablet 11    polyethylene glycol (GLYCOLAX) 17 gram/dose powder Use cap to measure 17 grams, mix in liquid and take by mouth 2 (two) times daily. 510 g 11    senna-docusate 8.6-50 mg (PERICOLACE) 8.6-50 mg per tablet Take 1 tablet by mouth 2 (two) times daily. 60 tablet 11       Past Surgical History:   Procedure Laterality Date    INSERTION OF TUNNELED CENTRAL VENOUS HEMODIALYSIS CATHETER Right 2024    Procedure: Insertion, Catheter, Central Venous, Hemodialysis;  Surgeon: Carole Rolon MD;  Location: [facility] CATH LAB;  Service: Interventional Nephrology;  Laterality: Right;       Social History     Socioeconomic History    Marital status: Single   Tobacco Use    Smoking status: Former     Current packs/day: 0.00     Types: Cigarettes     Start date:      Quit date:      Years since quittin.5    Smokeless tobacco: Never    Tobacco comments:     2-3 cigarettes a day   Substance and Sexual Activity    Alcohol use: Never    Drug use: Never     Social Determinants of Health     Financial Resource Strain: Low Risk  (2024)    Overall Financial Resource Strain (CARDIA)     Difficulty of Paying Living Expenses: Not hard at all    Food Insecurity: No Food Insecurity (6/19/2024)    Hunger Vital Sign     Worried About Running Out of Food in the Last Year: Never true     Ran Out of Food in the Last Year: Never true   Transportation Needs: No Transportation Needs (6/19/2024)    TRANSPORTATION NEEDS     Transportation : No   Physical Activity: Inactive (6/19/2024)    Exercise Vital Sign     Days of Exercise per Week: 0 days     Minutes of Exercise per Session: 0 min   Stress: Stress Concern Present (6/19/2024)    [employer] - Occupational Stress Questionnaire     Feeling of Stress : To some extent   Housing Stability: Low Risk  (6/19/2024)    Housing Stability Vital Sign     Unable to Pay for Housing in the Last Year: No     Homeless in the Last Year: No       OBJECTIVE:     Vital Signs Range (Last 24H):  Temp:  [36.3 °C (97.3 °F)-37 °C (98.6 °F)]   Pulse:  [75-92]   Resp:  [16-18]   BP: (111-197)/(70-84)   SpO2:  [94 %-100 %]       Significant Labs:  Lab Results   Component Value Date    WBC 6.24 07/01/2024    HGB 10.3 (L) 07/01/2024    HCT 36.2 (L) 07/01/2024     07/01/2024    CHOL 77 (L) 04/14/2024    TRIG 80 04/14/2024    HDL 22 (L) 04/14/2024    ALT 19 07/01/2024    AST 25 07/01/2024     (L) 07/01/2024    K 4.2 07/01/2024    CL 98 07/01/2024    CREATININE 3.2 (H) 07/01/2024    BUN 47 (H) 07/01/2024    CO2 22 (L) 07/01/2024    TSH 1.427 04/14/2024    INR 1.2 06/21/2024    HGBA1C 6.0 (H) 04/02/2024       Diagnostic Studies: No relevant studies.    EKG:   Results for orders placed or performed during the hospital encounter of 06/18/24   EKG 12-lead    Collection Time: 06/20/24 12:57 PM   Result Value Ref Range    QRS Duration 86 ms    OHS QTC Calculation 593 ms    Narrative    Test Reason : R00.1,    Vent. Rate : 066 BPM     Atrial Rate : 066 BPM     P-R Int : 134 ms          QRS Dur : 086 ms      QT Int : 566 ms       P-R-T Axes : 043 041 029 degrees     QTc Int : 593 ms    Sinus rhythm with Blocked  Premature atrial complexes  Prolonged QT  Abnormal ECG  When compared with ECG of 18-JUN-2024 15:03,  Significant changes have occurred  Confirmed by Farhan Story MD ([id number]) on 6/20/2024 4:18:00 PM    Referred By: [email or web address]   SELF           Confirmed By:Farhan Story MD       2D ECHO:  TTE:  Results for orders placed or performed during the hospital encounter of 06/18/24   Echo   Result Value Ref Range    RA Width 2.57 cm    Left Atrium Major Axis 7.25 cm    Left Atrium Minor Axis 6.53 cm    RA Major Axis 4.13 cm    LV Diastolic Volume 132.43 mL    LV Systolic Volume 71.68 mL    MV Peak A Rebecca 1.30 m/s    MV stenosis pressure 1/2 time 83.75 ms    TR Max Rebecca 3.46 m/s    MV Peak E Rebecca 1.54 m/s    Ao VTI 20.10 cm    Ao peak rebecca 1.33 m/s    LVOT peak VTI 16.19 cm    LVOT peak rebecca 0.79 m/s    LVOT diameter 2.01 cm    E wave deceleration time 288.80 msec    AV mean gradient 6 mmHg    RV- hair basal diam 3.1 cm    TAPSE 1.98 cm    LA size 5.54 cm    Ascending aorta 3.29 cm    STJ 2.13 cm    Sinus 2.52 cm    LVIDs 4.04 (A) 2.1 - 4.0 cm    Posterior Wall 0.94 0.6 - 1.1 cm    IVS 0.67 0.6 - 1.1 cm    LVIDd 5.25 3.5 - 6.0 cm    TDI LATERAL 0.04 m/s    LA WIDTH 4.53 cm    TDI SEPTAL 0.06 m/s    LV LATERAL E/E' RATIO 38.50 m/s    LV SEPTAL E/E' RATIO 25.67 m/s    FS 23 (A) 28 - 44 %    LA volume 146.57 cm3    LV mass 148.80 g    ZLVIDD 1.91     ZLVIDS 3.20     Left Ventricle Relative Wall Thickness 0.36 cm    AV valve area 2.55 cm²    AV Velocity Ratio 0.59     AV index (prosthetic) 0.81     MV valve area p 1/2 method 2.63 cm2    E/A ratio 1.18     Mean e' 0.05 m/s    LVOT area 3.2 cm2    LVOT stroke volume 51.35 cm3    AV peak gradient 7 mmHg    E/E' ratio 30.80 m/s    LV Systolic Volume Index 51.6 mL/m2    LV Diastolic Volume Index 95.27 mL/m2    LA Volume Index 105.4 mL/m2    LV Mass Index 107 g/m2    Triscuspid Valve Regurgitation Peak Gradient 48 mmHg    HAYDEE by Velocity Ratio 1.88 cm²    BSA 1.39 m2    TV resting  pulmonary artery pressure 56 mmHg    RV TB RVSP 11 mmHg    Est. RA pres 8 mmHg    Conde's Biplane MOD Ejection Fraction 60 %    Narrative      Left Ventricle: The left ventricle is normal in size. Mildly increased   ventricular mass. Normal wall thickness. There is eccentric hypertrophy.   There is normal systolic function with a visually estimated ejection   fraction of 55 - 60%. Biplane (2D) method of discs ejection fraction is   60%. There is diastolic dysfunction. Elevated left ventricular filling   pressure.    Right Ventricle: Normal right ventricular cavity size. Systolic   function is borderline low.    Left Atrium: Left atrium is severely dilated.    Aortic Valve: There is mild aortic regurgitation.    Mitral Valve: There is moderate annular dilation present. There is   severe regurgitation with a posterolateral eccentriccally directed jet.    Tricuspid Valve: There is mild to moderate regurgitation.    Pulmonary Artery: The estimated pulmonary artery systolic pressure is   56 mmHg.    IVC/SVC: Intermediate venous pressure at 8 mmHg.    Pericardium: There is a moderate posterior effusion.    Overall effusion appears smaller than prior, however still moderate.    There is borderline 30% variation across mitral and aortic valves with   respiration.  No RV collapse.  Correlate clinically.         POOJA:  No results found for this or any previous visit.    ASSESSMENT/PLAN:           Pre-op Assessment    I have reviewed the Patient Summary Reports.     I have reviewed the Nursing Notes. I have reviewed the NPO Status.   I have reviewed the Medications.     Review of Systems  Anesthesia Hx:  No problems with previous Anesthesia             Denies Family Hx of Anesthesia complications.     Hematology/Oncology:  Hematology Normal                                     EENT/Dental:  EENT/Dental Normal           Cardiovascular:     Hypertension Valvular problems/Murmurs, MR      CHF                                  Pulmonary:  Pulmonary Normal                       Renal/:  Chronic Renal Disease, CKD, ESRD                Hepatic/GI:  Hepatic/GI Normal                 Musculoskeletal:  Musculoskeletal Normal                Neurological:  Neurology Normal                                          Physical Exam  General: Well nourished, Cooperative and Alert    Airway:  Mallampati: II   Mouth Opening: Normal  TM Distance: Normal  Tongue: Normal  Neck ROM: Normal ROM    Dental:  Intact        Anesthesia Plan  Type of Anesthesia, risks & benefits discussed:    Anesthesia Type: Gen ETT, Gen Supraglottic Airway, Gen Natural Airway, Regional  Intra-op Monitoring Plan: Standard ASA Monitors  Post Op Pain Control Plan: multimodal analgesia and IV/PO Opioids PRN  Induction:  IV  Airway Plan: Direct and Video, Post-Induction  Informed Consent: Informed consent signed with the Patient and all parties understand the risks and agree with anesthesia plan.  All questions answered.   ASA Score: 4  Day of Surgery Review of History & Physical: H&P Update referred to the surgeon/provider.    Ready For Surgery From Anesthesia Perspective.     .

## 2024-07-01 NOTE — PT/OT/SLP PROGRESS
Physical Therapy      Patient Name:  Xena Vera   MRN:  [id number]    Patient not seen today secondary to Off the floor for procedure/surgery all day (Dialysis followed by AVG placement). Will follow-up as appropriate within POC.

## 2024-07-01 NOTE — PLAN OF CARE
Problem: Adult Inpatient Plan of Care  Goal: Plan of Care Review  Outcome: Progressing  Goal: Patient-Specific Goal (Individualized)  Outcome: Progressing  Goal: Absence of Hospital-Acquired Illness or Injury  Outcome: Progressing  Goal: Optimal Comfort and Wellbeing  Outcome: Progressing     Problem: Hemodialysis  Goal: Safe, Effective Therapy Delivery  Outcome: Progressing  Goal: Absence of Infection Signs and Symptoms  Outcome: Progressing     Problem: Infection  Goal: Absence of Infection Signs and Symptoms  Outcome: Progressing     Problem: Violence Risk or Actual  Goal: Anger and Impulse Control  Outcome: Progressing     Problem: Chronic Kidney Disease  Goal: Electrolyte Balance  Outcome: Progressing  Goal: Fluid Balance  Outcome: Progressing

## 2024-07-02 PROBLEM — E44.0 MODERATE MALNUTRITION: Status: ACTIVE | Noted: 2024-07-02

## 2024-07-02 LAB
ALBUMIN SERPL BCP-MCNC: 2.6 G/DL (ref 3.5–5.2)
ALP SERPL-CCNC: 426 U/L (ref 55–135)
ALT SERPL W/O P-5'-P-CCNC: 12 U/L (ref 10–44)
ANION GAP SERPL CALC-SCNC: 16 MMOL/L (ref 8–16)
AST SERPL-CCNC: 75 U/L (ref 10–40)
BASOPHILS # BLD AUTO: 0.05 K/UL (ref 0–0.2)
BASOPHILS NFR BLD: 0.6 % (ref 0–1.9)
BILIRUB SERPL-MCNC: 1.4 MG/DL (ref 0.1–1)
BUN SERPL-MCNC: 25 MG/DL (ref 6–20)
CALCIUM SERPL-MCNC: 8.9 MG/DL (ref 8.7–10.5)
CHLORIDE SERPL-SCNC: 98 MMOL/L (ref 95–110)
CO2 SERPL-SCNC: 20 MMOL/L (ref 23–29)
CREAT SERPL-MCNC: 3 MG/DL (ref 0.5–1.4)
DIFFERENTIAL METHOD BLD: ABNORMAL
EOSINOPHIL # BLD AUTO: 0 K/UL (ref 0–0.5)
EOSINOPHIL NFR BLD: 0 % (ref 0–8)
ERYTHROCYTE [DISTWIDTH] IN BLOOD BY AUTOMATED COUNT: 22.4 % (ref 11.5–14.5)
EST. GFR  (NO RACE VARIABLE): 17.9 ML/MIN/1.73 M^2
GLUCOSE SERPL-MCNC: 74 MG/DL (ref 70–110)
HCT VFR BLD AUTO: 36.7 % (ref 37–48.5)
HGB BLD-MCNC: 10.7 G/DL (ref 12–16)
IMM GRANULOCYTES # BLD AUTO: 0.03 K/UL (ref 0–0.04)
IMM GRANULOCYTES NFR BLD AUTO: 0.4 % (ref 0–0.5)
LYMPHOCYTES # BLD AUTO: 0.5 K/UL (ref 1–4.8)
LYMPHOCYTES NFR BLD: 6.7 % (ref 18–48)
MAGNESIUM SERPL-MCNC: 2 MG/DL (ref 1.6–2.6)
MCH RBC QN AUTO: 27.4 PG (ref 27–31)
MCHC RBC AUTO-ENTMCNC: 29.2 G/DL (ref 32–36)
MCV RBC AUTO: 94 FL (ref 82–98)
MONOCYTES # BLD AUTO: 0.5 K/UL (ref 0.3–1)
MONOCYTES NFR BLD: 6.1 % (ref 4–15)
NEUTROPHILS # BLD AUTO: 6.8 K/UL (ref 1.8–7.7)
NEUTROPHILS NFR BLD: 86.2 % (ref 38–73)
NRBC BLD-RTO: 0 /100 WBC
PHOSPHATE SERPL-MCNC: 6.2 MG/DL (ref 2.7–4.5)
PLATELET # BLD AUTO: 438 K/UL (ref 150–450)
PMV BLD AUTO: 10.1 FL (ref 9.2–12.9)
POCT GLUCOSE: 105 MG/DL (ref 70–110)
POCT GLUCOSE: 112 MG/DL (ref 70–110)
POCT GLUCOSE: 130 MG/DL (ref 70–110)
POCT GLUCOSE: 83 MG/DL (ref 70–110)
POTASSIUM SERPL-SCNC: 4.9 MMOL/L (ref 3.5–5.1)
PROT SERPL-MCNC: 7.7 G/DL (ref 6–8.4)
RBC # BLD AUTO: 3.9 M/UL (ref 4–5.4)
SODIUM SERPL-SCNC: 134 MMOL/L (ref 136–145)
WBC # BLD AUTO: 7.89 K/UL (ref 3.9–12.7)

## 2024-07-02 PROCEDURE — 99232 SBSQ HOSP IP/OBS MODERATE 35: CPT | Mod: ,,, | Performed by: NURSE PRACTITIONER

## 2024-07-02 PROCEDURE — 21400001 HC TELEMETRY ROOM

## 2024-07-02 PROCEDURE — 84100 ASSAY OF PHOSPHORUS: CPT

## 2024-07-02 PROCEDURE — 85025 COMPLETE CBC W/AUTO DIFF WBC: CPT

## 2024-07-02 PROCEDURE — 36415 COLL VENOUS BLD VENIPUNCTURE: CPT

## 2024-07-02 PROCEDURE — 25000003 PHARM REV CODE 250: Performed by: STUDENT IN AN ORGANIZED HEALTH CARE EDUCATION/TRAINING PROGRAM

## 2024-07-02 PROCEDURE — 97535 SELF CARE MNGMENT TRAINING: CPT

## 2024-07-02 PROCEDURE — 63600175 PHARM REV CODE 636 W HCPCS

## 2024-07-02 PROCEDURE — 80053 COMPREHEN METABOLIC PANEL: CPT

## 2024-07-02 PROCEDURE — 25000003 PHARM REV CODE 250

## 2024-07-02 PROCEDURE — 83735 ASSAY OF MAGNESIUM: CPT

## 2024-07-02 RX ORDER — PROCHLORPERAZINE MALEATE 5 MG
5 TABLET ORAL 4 TIMES DAILY PRN
Status: DISCONTINUED | OUTPATIENT
Start: 2024-07-02 | End: 2024-07-03 | Stop reason: HOSPADM

## 2024-07-02 RX ORDER — ONDANSETRON 4 MG/1
4 TABLET, FILM COATED ORAL EVERY 8 HOURS PRN
Status: CANCELLED | OUTPATIENT
Start: 2024-07-02

## 2024-07-02 RX ORDER — PROCHLORPERAZINE MALEATE 5 MG
5 TABLET ORAL ONCE AS NEEDED
Status: DISCONTINUED | OUTPATIENT
Start: 2024-07-02 | End: 2024-07-02

## 2024-07-02 RX ORDER — SODIUM CHLORIDE 9 MG/ML
INJECTION, SOLUTION INTRAVENOUS ONCE
Status: CANCELLED | OUTPATIENT
Start: 2024-07-03

## 2024-07-02 RX ADMIN — HEPARIN SODIUM 5000 UNITS: 5000 INJECTION INTRAVENOUS; SUBCUTANEOUS at 03:07

## 2024-07-02 RX ADMIN — PROCHLORPERAZINE MALEATE 5 MG: 5 TABLET ORAL at 12:07

## 2024-07-02 RX ADMIN — ACETAMINOPHEN 650 MG: 325 TABLET ORAL at 12:07

## 2024-07-02 RX ADMIN — HEPARIN SODIUM 5000 UNITS: 5000 INJECTION INTRAVENOUS; SUBCUTANEOUS at 09:07

## 2024-07-02 RX ADMIN — ACETAMINOPHEN 650 MG: 325 TABLET ORAL at 06:07

## 2024-07-02 RX ADMIN — GUAIFENESIN 600 MG: 600 TABLET, EXTENDED RELEASE ORAL at 08:07

## 2024-07-02 RX ADMIN — FAMOTIDINE 20 MG: 20 TABLET ORAL at 09:07

## 2024-07-02 RX ADMIN — HEPARIN SODIUM 5000 UNITS: 5000 INJECTION INTRAVENOUS; SUBCUTANEOUS at 05:07

## 2024-07-02 RX ADMIN — ATORVASTATIN CALCIUM 40 MG: 40 TABLET, FILM COATED ORAL at 08:07

## 2024-07-02 RX ADMIN — GUAIFENESIN 600 MG: 600 TABLET, EXTENDED RELEASE ORAL at 09:07

## 2024-07-02 RX ADMIN — FLUTICASONE PROPIONATE 100 MCG: 50 SPRAY, METERED NASAL at 09:07

## 2024-07-02 NOTE — PLAN OF CARE
Recommendations    1. Continue Renal diet      2. Rec'd supplement with ONS Novasource renal BID      3. RD to monitor and follow    Goals: Meet % EEN, EPN by RD f/u  Nutrition Goal Status: new  Communication of RD Recs:  (POC)

## 2024-07-02 NOTE — ASSESSMENT & PLAN NOTE
Body mass index is 21.51 kg/m². Morbid obesity complicates all aspects of disease management from diagnostic modalities to treatment. Weight loss encouraged and health benefits explained to patient.

## 2024-07-02 NOTE — PROGRESS NOTES
[facility] - Med [facility] ([place] [id number])  Vascular Surgery  Progress Note    Patient Name: Xena Vera  MRN: [id number]  Admission Date: 6/18/2024  Primary Care Provider: Tami Villeda FNP    Subjective:     Interval History: NAEON, AF, HDS. Patient is doing well after AVG creation in Left arm 7/1. Dressing still intact. Patient has no concerns or complaints at this time.      Post-Op Info:  Procedure(s) (LRB):  CREATION, AV FISTULA (Left)   1 Day Post-Op     Medications Prior to Admission   Medication Sig Dispense Refill Last Dose    atorvastatin (LIPITOR) 40 MG tablet Take 1 tablet by mouth every day 90 tablet 1     carvediloL (COREG) 6.25 MG tablet Take 1 tablet (6.25 mg total) by mouth 2 (two) times daily. 60 tablet 11 Unknown    furosemide (LASIX) 80 MG tablet Take 1 tablet (80 mg total) by mouth 2 (two) times daily. 60 tablet 11     oxybutynin (DITROPAN) 5 MG Tab Take 1 tablet (5 mg total) by mouth 3 (three) times daily. 90 tablet 11     polyethylene glycol (GLYCOLAX) 17 gram/dose powder Use cap to measure 17 grams, mix in liquid and take by mouth 2 (two) times daily. 510 g 11     senna-docusate 8.6-50 mg (PERICOLACE) 8.6-50 mg per tablet Take 1 tablet by mouth 2 (two) times daily. 60 tablet 11     [DISCONTINUED] midodrine (PROAMATINE) 2.5 MG Tab Take 1 tablet (2.5 mg total) by mouth Daily. 30 tablet 11        Review of patient's allergies indicates:  No Known Allergies    Past Medical History:   Diagnosis Date    (HFpEF) heart failure with preserved ejection fraction 06/24/2023    EF 63% with G2 DD  Continue lasix, appears euvolemic today  Continue good BP management with losartan, increase Coreg 6.25 mg BID  Fluid restriction, low sodium diet  Recommend Jardiance- patient had CKD 4 and this is a limiting factor- nephro eval pending    Anemia 06/24/2023    CKD (chronic kidney disease)     HTN (hypertension)     Mitral valve regurgitation 06/26/2023    Refer to structural for evaluation  May benefit from  mitral clip    Ovarian cyst     Stage 4 chronic kidney disease 2023    Refer to nephrology at [facility] as unable to get established with [facility] and the phone number for the outside nephrologist provided to patient during her recent hospitalization goes unanswered when called.   Cr remains elevated  Would like to start SGLT-2 and appreciate nephrology expertise      Past Surgical History:   Procedure Laterality Date    AV FISTULA PLACEMENT Left 2024    Procedure: CREATION, AV FISTULA;  Surgeon: DOE Arce III, MD;  Location: [facility] OR [address] [place] [address];  Service: Vascular;  Laterality: Left;  LUE AVG creation    INSERTION OF TUNNELED CENTRAL VENOUS HEMODIALYSIS CATHETER Right 2024    Procedure: Insertion, Catheter, Central Venous, Hemodialysis;  Surgeon: Carole Rolon MD;  Location: [facility] CATH LAB;  Service: Interventional Nephrology;  Laterality: Right;     Family History    None       Tobacco Use    Smoking status: Former     Current packs/day: 0.00     Types: Cigarettes     Start date:      Quit date:      Years since quittin.5    Smokeless tobacco: Never    Tobacco comments:     2-3 cigarettes a day   Substance and Sexual Activity    Alcohol use: Never    Drug use: Never    Sexual activity: Not on file     Review of Systems   Constitutional:  Negative for fatigue and fever.   HENT:  Negative for congestion.    Eyes: Negative.    Respiratory:  Negative for cough and chest tightness.    Cardiovascular:  Negative for chest pain and leg swelling.   Gastrointestinal: Negative.    Endocrine: Negative.    Genitourinary:  Negative for difficulty urinating and dysuria.   Skin:  Negative for pallor.   Neurological:  Negative for dizziness and headaches.     Objective:     Vital Signs (Most Recent):  Temp: 97.7 °F (36.5 °C) (24)  Pulse: 91 (24)  Resp: 18 (24)  BP: 113/65 (24)  SpO2: 96 % (24) Vital Signs (24h Range):  Temp:  [97.3 °F (36.3  °C)-98.6 °F (37 °C)] 97.7 °F (36.5 °C)  Pulse:  [] 91  Resp:  [13-22] 18  SpO2:  [92 %-100 %] 96 %  BP: ()/(45-84) 113/65     Weight: 48.3 kg (106 lb 7.7 oz)  Body mass index is 21.51 kg/m².      Physical Exam  Constitutional:       Appearance: She is normal weight.   Eyes:      Conjunctiva/sclera: Conjunctivae normal.      Pupils: Pupils are equal, round, and reactive to light.   Cardiovascular:      Rate and Rhythm: Normal rate and regular rhythm.      Pulses: Normal pulses.           Radial pulses are 2+ on the right side and 2+ on the left side.      Heart sounds: Normal heart sounds.      Comments: AV fistula ULE. Good thrill in graft. 2+ radial pulses bilaterally  Musculoskeletal:  5/5 strength is both upper extremities  Pulmonary:      Effort: Pulmonary effort is normal.      Breath sounds: Normal breath sounds.   Skin:     General: Skin is warm and dry.      Capillary Refill: Capillary refill takes less than 2 seconds.   AV fistula dressing clean dry and intact  Neurological:      Mental Status: She is alert.   Psychiatric:         Mood and Affect: Mood normal.        Significant Labs:  All pertinent labs from the last 24 hours have been reviewed.    Significant Diagnostics:  I have reviewed all pertinent imaging results/findings within the past 24 hours.  Assessment/Plan:     ESRD (end stage renal disease) on dialysis  53 year old female with PMHx HFpEF, severe pHTN (PA pressure 56 mm Hg 6/24), CKD5 (HD via RIJ TDC), HTN, MR, and anemia here for uremia and volume overload. Vascular surgery consulted for permanent HD access. The patient is very frail and has very poor functional status. Given her significant CHF and pulmonary HTN, there may be significant risk associated with AV fistula creation or graft insertion. Will need to discuss remaining catheter dependent for dialysis vs permanent access creation, likely graft. Will discuss with patient in clinic as an outpatient.    -Continue HD via  TDC  -LUE AVG creation on 7/1 without complication  -HD ultrasound and wound check 2 week follow up  -Ok to access AV fistula in 2 days with small needle  -Rest of per primary team   -Vascular surgery signs off for care, please call with any questions or concern        Sina Holm MD  Vascular Surgery  [facility] Surg ([facility])

## 2024-07-02 NOTE — PT/OT/SLP PROGRESS
Occupational Therapy   Treatment and Discharge Note    Name: Xena Vera  MRN: [id number]  Admitting Diagnosis:  Uremia  1 Day Post-Op    Recommendations:     Discharge Recommendations: No Therapy Indicated  Discharge Equipment Recommendations:  none  Barriers to discharge:  None    Assessment:     Xena Vera is a 53 y.o. female with a medical diagnosis of Uremia.  Per nursing, patient is independent. Upon futher skilled observation, she presents with ability to complete ADLs at baseline. Patient's daughter available to assist patient as needed. Discharging from acute OT services at this time.    Rehab Prognosis:  Good; patient would benefit from acute skilled OT services to address these deficits and reach maximum level of function.       Plan:     Patient to be seen 2 x/week to address the above listed problems via therapeutic activities, therapeutic exercises, neuromuscular re-education, self-care/home management  Plan of Care Expires: 07/04/24  Plan of Care Reviewed with: patient, daughter    Subjective     Chief Complaint: nausea/ dizziness upon first OT attempt; /60 (79) HR 87 and nursing notified; OT returned for second treatment session  Patient/Family Comments/goals: none  Pain/Comfort:  Pain Rating 1: 0/10  Pain Rating Post-Intervention 1: 0/10    Objective:   Patient's daughter translating during session.     Communicated with: nursing prior to session.  Patient found ambulatory in room/cesar with  (no active lines) upon OT entry to room.    General Precautions: Standard, fall    Orthopedic Precautions:N/A  Braces: N/A  Respiratory Status: Room air     Occupational Performance:     Functional Mobility/Transfers:  Patient completed Sit <> Stand Transfer with independence  with  no assistive device   Functional Mobility: patient ambulated to/from bathroom and within room (~70 ft) with supervision and no AD    Activities of Daily Living:  Grooming: supervision to wash hands in stance  Lower  Body Dressing: supervision to don/doff disposable underwear  Toileting: supervision        [facility] 6 Click ADL: 21    Treatment & Education:  Patient educated on:   -purpose of OT and OT POC (discharge)  -facilitation and education on proper body mechanics, energy conservation, and safety  -importance of early mobility and out of bed activities with staff assist    All questions answered within OT scope and to patient's satisfaction    Patient left up in chair with all lines intact, call button in reach, and daughter present    GOALS:   Multidisciplinary Problems       Occupational Therapy Goals          Problem: Occupational Therapy    Goal Priority Disciplines Outcome Interventions   Occupational Therapy Goal     OT, PT/OT Progressing    Description: Goals to be met by: 7/4/24     Patient will increase functional independence with ADLs by performing:    LE Dressing with Supervision.  Grooming while standing at sink with Supervision.  Toileting from toilet with Supervision for hygiene and clothing management.   Supine to sit with Supervision.  Toilet transfer to toilet with Supervision.  Hemphill with BUE HEP to improve activity tolerance to complete ADLs and IADLs  Within home ambulation distances with  supervision and LRAD necessary to complete ADLs.  ;                       Time Tracking:     OT Date of Treatment: 07/02/24  OT Start Time: 1047  OT Stop Time: 1059  OT Total Time (min): 12 min    Billable Minutes:Self Care/Home Management 12    OT/RA: OT          7/2/2024

## 2024-07-02 NOTE — ASSESSMENT & PLAN NOTE
Improving  2/2 to ESRD. AG 18. Patient AAOx3 on exam  +nausea, chest pain, labile HR on admission w/o steph bleeding.   VBG with pH 7.44.     -HD per nephro  -Outpatient HD - difficulty setting up due to insurance barriers  -[facility] MWF

## 2024-07-02 NOTE — PROGRESS NOTES
"[facility] Surg ([address] Medicine  Progress Note    Patient Name: Xena Vera  MRN: [id number]  Patient Class: IP- Inpatient   Admission Date: 6/18/2024  Length of Stay: 14 days  Attending Physician: Loreta Dimas MD  Primary Care Provider: Tami Villeda FNP        Subjective:     Principal Problem:Uremia      HPI:  Patient is a 53F PMHx HFpEF (Lasix 80 bid), severe pHTN, CKD5 (last HD in May 2024), HTN, MR, anemia presents for chest pain that began this morning. She reports 2/10 central sqeezing chest pain that radiates to her R shoulder when vomiting. The pain is worse with walking. She has never had chest pain before. She has had nausea/vomiting x1 day. Reports approx 3 episodes of clear vomitus, describes as "phlegm." She has associated sharp upper abdominal pain when vomiting. Additionally reports arthralgias/myalgias, leg swelling, generalized weakness, and fatigue. She had a brief episode of dizziness yesterday when walking but has not had that since. She denies Fever/chills, SOB, upper respiratory symptoms, cough, dysuria, decreased urine, or known sick contacts.     She reports adherence to her fluid restriction & Lasix. Admitted to [facility] from 4/1/24-5/8/24 where renal function declined while being diuresed. Required CRRT with intermittent levophed. Admitted to [facility] 5/13-5/17 after presenting to ED for HD. Not enrolled in medicaid at that time and had been told to present to ED for HD needs. No indications for HD on that admission per chart review. At that time nephrology recommended urgent evaluation with vascular surgery outpatient for AVF placement. Venous mapping obtained.    In ED, somewhat hypertensive (sBP 150s), intermittent bradycardia, afebrile, on RA. CMP with profound uremia (), Cr 5.6, hypokalemia (K 2.8), no hyponatremia. Transaminitis AST//133 with T. Bili 4.3. Lipase elevated at 230. RUQ US w/o CBD dilation or evidence of scarring, surgically " absent gallbladder. CBC with thrombocytopenia (PLT 90), no leukocytosis, stable anemia (Hb 11.5). BNP 4500, Troponin 0.497 which increased to 0.54. LA normal at 1.5. EKG with peaked T waves in II/III/AVF, widened QRS, Qtc 573. Nephrology consulted.     Interpretor was used for entire interview/examination:  number [id number]    Overview/Hospital Course:  Ad[facility] for chest pain, nausea, and vomiting. Found to have profound uremia and hypokalemia. Nephrology consulted and performed emergent HD. CP resolved following HD, residual epigastric pain w/o nausea/vomiting. TTE 6/19 with preserved EF, normal RV systolic function, severe MR, pHTN, and CVP 15. Diuresis with IV lasix in conjunction with HD. LFTs downtrended with volume removal. Noted to have moderate pericardial effusion on 6/22, therefore Cardiology consulted and recommended continued diuresis. Repeat limited TTE performed 6/24 with interval improvement of what remains to be a moderately sized pericardial effusion. Cardiology to follow up with patient outpatient. Discharge delayed due to insurance barriers regarding securing an outpatient HD chair. Vascular surgery completed LUE AVG on 07/01/2024 without complication. Possible cannulation of AVG 07/02/2024.    Interval History: NAEON, VSS, HDS. Consultation for out patient dialysis ongoing.    Review of Systems   Constitutional:  Negative for chills and fever.   Respiratory:  Negative for shortness of breath.    Cardiovascular:  Negative for chest pain and leg swelling.   Psychiatric/Behavioral:  Negative for agitation and behavioral problems.      Objective:     Vital Signs (Most Recent):  Temp: 98.4 °F (36.9 °C) (07/02/24 1202)  Pulse: 82 (07/02/24 1202)  Resp: 18 (07/02/24 1202)  BP: (!) 103/56 (07/02/24 1202)  SpO2: 95 % (07/02/24 1202) Vital Signs (24h Range):  Temp:  [97.7 °F (36.5 °C)-98.4 °F (36.9 °C)] 98.4 °F (36.9 °C)  Pulse:  [] 82  Resp:  [16-18] 18  SpO2:  [94 %-98 %] 95  %  BP: ()/(45-70) 103/56     Weight: 48.3 kg (106 lb 7.7 oz)  Body mass index is 21.51 kg/m².    Intake/Output Summary (Last 24 hours) at 7/2/2024 1601  Last data filed at 7/2/2024 0937  Gross per 24 hour   Intake 240 ml   Output 100 ml   Net 140 ml         Physical Exam  Constitutional:       General: She is not in acute distress.     Appearance: She is normal weight. She is not ill-appearing.   HENT:      Head: Normocephalic and atraumatic.      Right Ear: External ear normal.      Left Ear: External ear normal.      Nose: Nose normal.      Mouth/Throat:      Mouth: Mucous membranes are moist.      Pharynx: Oropharynx is clear.   Eyes:      General: No scleral icterus.     Extraocular Movements: Extraocular movements intact.      Conjunctiva/sclera: Conjunctivae normal.      Pupils: Pupils are equal, round, and reactive to light.   Cardiovascular:      Rate and Rhythm: Normal rate and regular rhythm.      Pulses: Normal pulses.           Radial pulses are 2+ on the right side and 2+ on the left side.      Heart sounds: Normal heart sounds.      Comments: AV fistula ULE good thrills in graft.  Pulmonary:      Effort: Pulmonary effort is normal.      Breath sounds: Normal breath sounds. No wheezing, rhonchi or rales.   Abdominal:      General: Abdomen is flat.      Palpations: Abdomen is soft.      Tenderness: There is no abdominal tenderness.   Musculoskeletal:         General: No swelling. Normal range of motion.      Cervical back: Normal range of motion and neck supple.   Skin:     General: Skin is warm and dry.      Capillary Refill: Capillary refill takes less than 2 seconds.   Neurological:      General: No focal deficit present.      Mental Status: She is alert and oriented to person, place, and time.   Psychiatric:         Mood and Affect: Mood normal.         Behavior: Behavior normal.             Significant Labs: All pertinent labs within the past 24 hours have been reviewed.    Significant  Imaging: I have reviewed all pertinent imaging results/findings within the past 24 hours.    Assessment/Plan:      * Uremia  Improving  2/2 to ESRD. AG 18. Patient AAOx3 on exam  +nausea, chest pain, labile HR on admission w/o steph bleeding.   VBG with pH 7.44.     -HD per nephro  -Outpatient HD - difficulty setting up due to insurance barriers  -[facility] MWF    Moderate malnutrition  Nutrition consulted. Most recent weight and BMI monitored-     Measurements:  Wt Readings from Last 1 Encounters:   07/01/24 48.3 kg (106 lb 7.7 oz)   Body mass index is 21.51 kg/m².    Patient has been screened and assessed by RD.    Malnutrition Type:  Context: chronic illness  Level: moderate    Malnutrition Characteristic Summary:  Weight Loss (Malnutrition):  (34% x 4 months)  Subcutaneous Fat (Malnutrition): moderate depletion  Muscle Mass (Malnutrition): moderate depletion    Interventions/Recommendations (treatment strategy):  1. Continue Renal diet  2. Rec'd supplement with ONS Novasource renal BID  3. RD to monitor and follow      Pre-op exam  Compensated HFpEF, no O2 requirement, stable on HD 3x/wk.   Able to lie flat and ambulate w/o dyspnea.       Atrial premature contractions  See Pericardial Effusion    Pericardial effusion  Atrial premature contractions  Noted as small on POOJA 6/19, however on CT AP 6/20, noted to be moderate in size w/ some heterogeneity. In setting of intermittent asymptomatic bradycardia, concern that effusion may be producing tamponade physiology. Cardiology consulted. Bradycardia is suspected blocked atrial premature contraction, so no need for EP eval currently.     Interval improvement on repeat limited ECHO 6/25    - Volume removal via HD and Lasix 80mg IV q12h  - Cardiology f/u with limited TTE in 2wk (ordered)  - Avoid AV garett blockers   - Telemetry        Cough  New, dry, non productive  A/w post nasal drip  Satting well RA  Cough Improving    -continue to monitor  -Flonase  -tessalon  perles  -guaifenesin     Chest pain  RESOLVED. CP substernal, 2/10, but mostly with lying flat. Worse with exertion/better with rest  Troponin 0.497-->0.540, BNP 4500 (chronic)  EKG is without changes concerning for ACS  HOWARD score 2, Alannah 109    -cardiac monitoring    Hyperbilirubinemia  IMPROVING. Direct hyperbilirubinemia on admission w/ AST/ALT elevation. Liver US not reporting dilated bile ducts  No new medications identified for DILI. Hepatitis panel negative.   Concern for congestive hepatopathy due to volume overload from HFpEF    Liver enzymes and total bilirubin remain elevated despite 3 days of dialysis  Also with pancreatitis    -Can resume statin  -continue HD    Prediabetes  Last A1c 6.0 (2 months ago)   on admission    -POCT glucose AC/HS  -LDSSI  -goal -180    ESRD (end stage renal disease) on dialysis  Creatine stable for now. BMP reviewed- noted Estimated Creatinine Clearance: 16.3 mL/min (A) (based on SCr of 3 mg/dL (H)). according to latest data. Based on current GFR, CKD stage is end stage.  Monitor UOP and serial BMP and adjust therapy as needed. Renally dose meds. Avoid nephrotoxic medications and procedures.    -Neprhology consulted, assisting with dialysis   -Patient to be taken by vascular for LUE AVG today  -Vein mapping performed 6/27  -SW working to find HD chair for patient for continued OP dialysis - difficulty regarding insurance coverage  -RFP, Mg daily    Prolonged QT interval  Qtc on admision 570    -cardiac telemetry  -caution with QT prolonging medications    Elevated troponin I level  Resolved  Has elevated troponin at baseline 2/2 to ESRD  Troponin 0.497-->0.540-->0.48  Reports 2/10 CP worse with lying down  EKG is without changes concerning for ACS  Has markedly elevated BUN, uremic pericarditis on ddx    -EKG for new/worsened CP  -cardiac telemetry    Acute on chronic heart failure with preserved ejection fraction (HFpEF)  Patient is identified as having  Diastolic (HFpEF) heart failure that is Chronic. CHF is currently controlled. Latest ECHO performed and demonstrates- Results for orders placed during the hospital encounter of 04/01/24    Echo    Result Date: 6/24/2024    Left Ventricle: The left ventricle is normal in size. Mildly increased   ventricular mass. Normal wall thickness. There is eccentric hypertrophy.   There is normal systolic function with a visually estimated ejection   fraction of 55 - 60%. Biplane (2D) method of discs ejection fraction is   60%. There is diastolic dysfunction. Elevated left ventricular filling   pressure.    Right Ventricle: Normal right ventricular cavity size. Systolic   function is borderline low.    Left Atrium: Left atrium is severely dilated.    Aortic Valve: There is mild aortic regurgitation.    Mitral Valve: There is moderate annular dilation present. There is   severe regurgitation with a posterolateral eccentriccally directed jet.    Tricuspid Valve: There is mild to moderate regurgitation.    Pulmonary Artery: The estimated pulmonary artery systolic pressure is   56 mmHg.    IVC/SVC: Intermediate venous pressure at 8 mmHg.    Pericardium: There is a moderate posterior effusion.    Overall effusion appears smaller than prior, however still moderate.    There is borderline 30% variation across mitral and aortic valves with   respiration.  No RV collapse.  Correlate clinically.        Echo    Addendum Date: 6/21/2024        Pericardium: There is a large circumferential effusion. No definitive   indication of cardiac tamponade. Evidence includes no septal bounce, no   respiratory chamber variation. CVP is intermediate, as the IVC is small   but does not collapse. Diastolic RV collapse is not clearly demonstrated.   Compared to the prior echo on 6/19/24, the effusion appears increased in   size. Correlate with clinical exam.    Left Ventricle: There is mildly reduced systolic function with a   visually estimated ejection  fraction of 40 - 45%.    Right Ventricle: Systolic function is mildly reduced.        Result Date: 6/21/2024    Pericardium: There is a large circumferential effusion. No indication   of cardiac tamponade. Evidence includes no septal bounce, no respiratory   chamber variation. CVP is intermediate, as the IVC is small but does not   collapse. Diastolic RV collapse is not clearly demonstrated. Compared to   the prior echo on 6/19/24, the effusion apperas increased in size.    Left Ventricle: There is mildly reduced systolic function with a   visually estimated ejection fraction of 40 - 45%.    Right Ventricle: Systolic function is mildly reduced.        Echo    Result Date: 6/19/2024    Left Ventricle: The left ventricle is severely dilated. LVIDd is 6.0   cm. Normal wall thickness. There is eccentric hypertrophy. There is normal   systolic function. Ejection fraction by visual approximation is 55%. Grade   II diastolic dysfunction.    Right Ventricle: Normal right ventricular cavity size. Wall thickness   is normal. Systolic function is normal.    Left Atrium: Left atrium is severely dilated.    Aortic Valve: The aortic valve is a trileaflet valve.    Mitral Valve: Severely restricted posterior leaflet motion.  The mean   pressure gradient across the mitral valve is 7 mmHg at a heart rate of 77   bpm. There is severe regurgitation with a posterolateral eccentrically   directed jet.    Tricuspid Valve: There is mild regurgitation.    Pulmonary Artery: There is severe pulmonary hypertension. The estimated   pulmonary artery systolic pressure is 76 mmHg.    IVC/SVC: Elevated venous pressure at 15 mmHg.    Pericardium: There is a small effusion. No indication of cardiac   tamponade. On direct comparison with images from 4/23/2024, there is   slightly more fluid around the right atrium.         . Continue Furosemide and monitor clinical status closely. Monitor on telemetry. Patient is off CHF pathway.  Monitor strict  Is&Os and daily weights.  Place on fluid restriction of 1.5 L. Cardiology has not been consulted. Continue to stress to patient importance of self efficacy and  on diet for CHF.     Does not appear grossly volume overloaded - 1 pillow to prevent orthopnea, good mobilization, peripheral edema improved, no O2 requirement    -Volume reduction via HD, will determine dialysis needs as OP to help determine future diuretic dosing  -Lasix 80mg bid in hospital, can return to Lasix 80mg bid as should be sufficient when consistent HD sessions begin    Class 2 obesity in adult  Body mass index is 21.51 kg/m². Morbid obesity complicates all aspects of disease management from diagnostic modalities to treatment. Weight loss encouraged and health benefits explained to patient.    Essential hypertension  BP elevated and labile on admission  120s-160s systolic  Appears to have midodrine ordered, likely for dialysis     -will hold antihypertensives for now given aggressive diuresis      VTE Risk Mitigation (From admission, onward)           Ordered     heparin (porcine) injection 5,000 Units  Every 8 hours         06/25/24 0916     IP VTE HIGH RISK PATIENT  Once         06/18/24 1427     Place sequential compression device  Until discontinued         06/18/24 1427                    Discharge Planning   RILEY: 7/3/2024     Code Status: DNR   Is the patient medically ready for discharge?:     Reason for patient still in hospital (select all that apply): Treatment and Pending disposition  Discharge Plan A: Home   Discharge Delays: (!) Dialysis Set-up      Gisella Cruz MD  Department of Hospital Medicine   [facility] - Med Surg ([place]-16)

## 2024-07-02 NOTE — SUBJECTIVE & OBJECTIVE
Medications Prior to Admission   Medication Sig Dispense Refill Last Dose    atorvastatin (LIPITOR) 40 MG tablet Take 1 tablet by mouth every day 90 tablet 1     carvediloL (COREG) 6.25 MG tablet Take 1 tablet (6.25 mg total) by mouth 2 (two) times daily. 60 tablet 11 Unknown    furosemide (LASIX) 80 MG tablet Take 1 tablet (80 mg total) by mouth 2 (two) times daily. 60 tablet 11     oxybutynin (DITROPAN) 5 MG Tab Take 1 tablet (5 mg total) by mouth 3 (three) times daily. 90 tablet 11     polyethylene glycol (GLYCOLAX) 17 gram/dose powder Use cap to measure 17 grams, mix in liquid and take by mouth 2 (two) times daily. 510 g 11     senna-docusate 8.6-50 mg (PERICOLACE) 8.6-50 mg per tablet Take 1 tablet by mouth 2 (two) times daily. 60 tablet 11     [DISCONTINUED] midodrine (PROAMATINE) 2.5 MG Tab Take 1 tablet (2.5 mg total) by mouth Daily. 30 tablet 11        Review of patient's allergies indicates:  No Known Allergies    Past Medical History:   Diagnosis Date    (HFpEF) heart failure with preserved ejection fraction 06/24/2023    EF 63% with G2 DD  Continue lasix, appears euvolemic today  Continue good BP management with losartan, increase Coreg 6.25 mg BID  Fluid restriction, low sodium diet  Recommend Jardiance- patient had CKD 4 and this is a limiting factor- nephro eval pending    Anemia 06/24/2023    CKD (chronic kidney disease)     HTN (hypertension)     Mitral valve regurgitation 06/26/2023    Refer to structural for evaluation  May benefit from mitral clip    Ovarian cyst     Stage 4 chronic kidney disease 06/24/2023    Refer to nephrology at [facility] as unable to get established with [facility] nephrology and the phone number for the outside nephrologist provided to patient during her recent hospitalization goes unanswered when called.   Cr remains elevated  Would like to start SGLT-2 and appreciate nephrology expertise      Past Surgical History:   Procedure Laterality Date    AV FISTULA PLACEMENT Left 7/1/2024     Procedure: CREATION, AV FISTULA;  Surgeon: DOE Arce III, MD;  Location: [facility] OR [address] [place] [address];  Service: Vascular;  Laterality: Left;  LUE AVG creation    INSERTION OF TUNNELED CENTRAL VENOUS HEMODIALYSIS CATHETER Right 2024    Procedure: Insertion, Catheter, Central Venous, Hemodialysis;  Surgeon: Carole Rolon MD;  Location: [facility] CATH LAB;  Service: Interventional Nephrology;  Laterality: Right;     Family History    None       Tobacco Use    Smoking status: Former     Current packs/day: 0.00     Types: Cigarettes     Start date:      Quit date:      Years since quittin.5    Smokeless tobacco: Never    Tobacco comments:     2-3 cigarettes a day   Substance and Sexual Activity    Alcohol use: Never    Drug use: Never    Sexual activity: Not on file     Review of Systems   Constitutional:  Negative for fatigue and fever.   HENT:  Negative for congestion.    Eyes: Negative.    Respiratory:  Negative for cough and chest tightness.    Cardiovascular:  Negative for chest pain and leg swelling.   Gastrointestinal: Negative.    Endocrine: Negative.    Genitourinary:  Negative for difficulty urinating and dysuria.   Skin:  Negative for pallor.   Neurological:  Negative for dizziness and headaches.     Objective:     Vital Signs (Most Recent):  Temp: 97.7 °F (36.5 °C) (24 044)  Pulse: 91 (24 044)  Resp: 18 (24)  BP: 113/65 (24)  SpO2: 96 % (24) Vital Signs (24h Range):  Temp:  [97.3 °F (36.3 °C)-98.6 °F (37 °C)] 97.7 °F (36.5 °C)  Pulse:  [] 91  Resp:  [13-22] 18  SpO2:  [92 %-100 %] 96 %  BP: ()/(45-84) 113/65     Weight: 48.3 kg (106 lb 7.7 oz)  Body mass index is 21.51 kg/m².      Physical Exam  Constitutional:       Appearance: She is normal weight.   Eyes:      Conjunctiva/sclera: Conjunctivae normal.      Pupils: Pupils are equal, round, and reactive to light.   Cardiovascular:      Rate and Rhythm: Normal rate and regular rhythm.       Pulses: Normal pulses.           Radial pulses are 2+ on the right side and 2+ on the left side.      Heart sounds: Normal heart sounds.      Comments: AV fistula ULE  Pulmonary:      Effort: Pulmonary effort is normal.      Breath sounds: Normal breath sounds.   Skin:     General: Skin is warm and dry.      Capillary Refill: Capillary refill takes less than 2 seconds.   Neurological:      Mental Status: She is alert.   Psychiatric:         Mood and Affect: Mood normal.          Significant Labs:  All pertinent labs from the last 24 hours have been reviewed.    Significant Diagnostics:  I have reviewed all pertinent imaging results/findings within the past 24 hours.

## 2024-07-02 NOTE — ASSESSMENT & PLAN NOTE
Etiology of ESRD is long standing hypertension vs family history. Pericardial effusion. Initiated HD on 6/22. Repeat limited echo 6/24 w/ slight decrease in pericardial effusion size. Pt has TDC. AVG creation on 7/1/24.   Pending outpatient HD chair.     Plan/Recommendations:     -Continue MWF iHD schedule while IP   -Ok to access AVG per vascular. Will plan to access AVF using small needles on 7/3  -Renal diet, if not NPO   -1L fluid restrictions   -Hgb goal 10-11, at goal received ferreheme on 6/26  -Check PTH, vitamin D

## 2024-07-02 NOTE — PROGRESS NOTES
Please see previous notes from this SW for continuity.    __________________________________________________  KAZ received call from [facility] Lena. Per Lena, pt tentatively accepted at [facility] for dialysis. Lena inquiring about pt's medication expenses upon discharge from hospital. SW to follow up with SHALINI Turner regarding pt's medication expenses.    KAZ spoke with SHALINI Cronin  who spoke with pt's daughter. Per pt's daughter, pt's family will pay for pt's medication expenses upon discharge from hospital.    Lena updated. KAZ awaiting chair time for pt.    UPDATE 9:50AM: KAZ sent secure chat to [facility] SUSAN Patten and KAZ Yanez inquiring on pt's chair time.    UPDATE 1:16PM: KAZ sent a secure chat to [facility] SUSAN Patten and KAZ Yanez requesting an update.    KAZ awaiting response.    UPDATE 1:22PM: Bernabe Yanez, awaiting response from [facility] Lena.    UPDATE 2:49PM: KAZ received email from [facility] Lena, cc: SUSAN Patten and KAZ Yanez confirmed pt is accepted for outpt dialysis at [facility]. Lena asking if pt can be referred for OP vascular follow-up at [facility] prior to discharge as pt's vascular access will need to be confirmed it can be cannulated prior to use.     Case discussed with primary team. Per Dr. Dimas, note from vascular today stated the pt's AVG can be cannulated starting tomorrow (07/03/2024). KAZ emailed [facility] Sandor Paredes and Kelly updating. KAZ requesting chair time for pt. SW awaiting response.    SW to follow with updates.    Allie Jha, KAZ  [facility]  [phone]

## 2024-07-02 NOTE — PROGRESS NOTES
[facility] - [facility] ([place] [address])  Nephrology  Progress Note    Patient Name: Xena Vera  MRN: [id number]  Admission Date: 6/18/2024  Hospital Length of Stay: 14 days  Attending Provider: Loreta Dimas MD   Primary Care Physician: Tami Villeda FNP  Principal Problem:Uremia    Subjective:     Interval History: POD 1 AVG placement. Pending placement.     Review of patient's allergies indicates:  No Known Allergies  Current Facility-Administered Medications   Medication Frequency    acetaminophen tablet 650 mg Q6H    aluminum-magnesium hydroxide-simethicone 200-200-20 mg/5 mL suspension 30 mL QID PRN    atorvastatin tablet 40 mg QHS    benzonatate capsule 100 mg TID PRN    dextrose 10% bolus 125 mL 125 mL PRN    dextrose 10% bolus 250 mL 250 mL PRN    famotidine tablet 20 mg Daily    fluticasone propionate 50 mcg/actuation nasal spray 100 mcg Daily    glucagon (human recombinant) injection 1 mg PRN    glucose chewable tablet 16 g PRN    glucose chewable tablet 24 g PRN    guaiFENesin 12 hr tablet 600 mg BID    heparin (porcine) injection 5,000 Units Q8H    insulin aspart U-100 pen 0-5 Units QID (AC + HS) PRN    loperamide capsule 2 mg BID PRN    naloxone 0.4 mg/mL injection 0.02 mg PRN    nitroGLYCERIN SL tablet 0.4 mg Q5 Min PRN    oxyCODONE immediate release tablet 5 mg Q4H PRN    oxyCODONE immediate release tablet Tab 10 mg Q4H PRN    polyethylene glycol packet 17 g Daily    prochlorperazine tablet 5 mg QID PRN    simethicone chewable tablet 80 mg QID PRN    sodium chloride 0.9% flush 10 mL Q12H PRN       Objective:     Vital Signs (Most Recent):  Temp: 97.7 °F (36.5 °C) (07/02/24 0758)  Pulse: 83 (07/02/24 0758)  Resp: 18 (07/02/24 0758)  BP: (!) 110/55 (07/02/24 0758)  SpO2: 96 % (07/02/24 0442) Vital Signs (24h Range):  Temp:  [97.3 °F (36.3 °C)-98.6 °F (37 °C)] 97.7 °F (36.5 °C)  Pulse:  [] 83  Resp:  [13-22] 18  SpO2:  [92 %-100 %] 96 %  BP: ()/(45-84) 110/55     Weight: 48.3 kg (106  lb 7.7 oz) (07/01/24 1140)  Body mass index is 21.51 kg/m².  Body surface area is 1.42 meters squared.    I/O last 3 completed shifts:  In: 490 [P.O.:240; IV Piggyback:250]  Out: 3250 [Urine:600; Other:2650]     Physical Exam  Vitals and nursing note reviewed.   Eyes:      Conjunctiva/sclera: Conjunctivae normal.   Cardiovascular:      Rate and Rhythm: Normal rate.      Pulses: Normal pulses.   Pulmonary:      Effort: Pulmonary effort is normal.   Abdominal:      Palpations: Abdomen is soft.   Musculoskeletal:      Right lower leg: No edema.      Left lower leg: No edema.   Neurological:      Mental Status: She is alert and oriented to person, place, and time.   Psychiatric:         Mood and Affect: Mood normal.         Behavior: Behavior normal.        Significant Labs:  CBC:   Recent Labs   Lab 07/02/24  0714   WBC 7.89   RBC 3.90*   HGB 10.7*   HCT 36.7*      MCV 94   MCH 27.4   MCHC 29.2*     CMP:   Recent Labs   Lab 07/02/24  0714   GLU 74   CALCIUM 8.9   ALBUMIN 2.6*   PROT 7.7   *   K 4.9   CO2 20*   CL 98   BUN 25*   CREATININE 3.0*   ALKPHOS 426*   ALT 12   AST 75*   BILITOT 1.4*     All labs within the past 24 hours have been reviewed.         Assessment/Plan:     Cardiac/Vascular  Pericardial effusion  -    Renal/  * Uremia  -    ESRD (end stage renal disease) on dialysis  Etiology of ESRD is long standing hypertension vs family history. Pericardial effusion. Initiated HD on 6/22. Repeat limited echo 6/24 w/ slight decrease in pericardial effusion size. Pt has TDC. AVG creation on 7/1/24.   Pending outpatient HD chair.     Plan/Recommendations:     -Continue MWF iHD schedule while IP   -Ok to access AVG per vascular. Will plan to access AVF using small needles on 7/3  -Renal diet, if not NPO   -1L fluid restrictions   -Hgb goal 10-11, at goal received ferreheme on 6/26  -Check PTH, vitamin D             Thank you for your consult. I will follow-up with patient. Please contact us if you have  any additional questions.    Marcelle Toscano, RORO  Nephrology  [facility] - Med Surg ([facility]-16)

## 2024-07-02 NOTE — SUBJECTIVE & OBJECTIVE
Interval History: NAEON, VSS, HDS. Consultation for out patient dialysis ongoing.    Review of Systems   Constitutional:  Negative for chills and fever.   Respiratory:  Negative for shortness of breath.    Cardiovascular:  Negative for chest pain and leg swelling.   Psychiatric/Behavioral:  Negative for agitation and behavioral problems.      Objective:     Vital Signs (Most Recent):  Temp: 98.4 °F (36.9 °C) (07/02/24 1202)  Pulse: 82 (07/02/24 1202)  Resp: 18 (07/02/24 1202)  BP: (!) 103/56 (07/02/24 1202)  SpO2: 95 % (07/02/24 1202) Vital Signs (24h Range):  Temp:  [97.7 °F (36.5 °C)-98.4 °F (36.9 °C)] 98.4 °F (36.9 °C)  Pulse:  [] 82  Resp:  [16-18] 18  SpO2:  [94 %-98 %] 95 %  BP: ()/(45-70) 103/56     Weight: 48.3 kg (106 lb 7.7 oz)  Body mass index is 21.51 kg/m².    Intake/Output Summary (Last 24 hours) at 7/2/2024 1601  Last data filed at 7/2/2024 0937  Gross per 24 hour   Intake 240 ml   Output 100 ml   Net 140 ml         Physical Exam  Constitutional:       General: She is not in acute distress.     Appearance: She is normal weight. She is not ill-appearing.   HENT:      Head: Normocephalic and atraumatic.      Right Ear: External ear normal.      Left Ear: External ear normal.      Nose: Nose normal.      Mouth/Throat:      Mouth: Mucous membranes are moist.      Pharynx: Oropharynx is clear.   Eyes:      General: No scleral icterus.     Extraocular Movements: Extraocular movements intact.      Conjunctiva/sclera: Conjunctivae normal.      Pupils: Pupils are equal, round, and reactive to light.   Cardiovascular:      Rate and Rhythm: Normal rate and regular rhythm.      Pulses: Normal pulses.           Radial pulses are 2+ on the right side and 2+ on the left side.      Heart sounds: Normal heart sounds.      Comments: AV fistula ULE good thrills in graft.  Pulmonary:      Effort: Pulmonary effort is normal.      Breath sounds: Normal breath sounds. No wheezing, rhonchi or rales.   Abdominal:       General: Abdomen is flat.      Palpations: Abdomen is soft.      Tenderness: There is no abdominal tenderness.   Musculoskeletal:         General: No swelling. Normal range of motion.      Cervical back: Normal range of motion and neck supple.   Skin:     General: Skin is warm and dry.      Capillary Refill: Capillary refill takes less than 2 seconds.   Neurological:      General: No focal deficit present.      Mental Status: She is alert and oriented to person, place, and time.   Psychiatric:         Mood and Affect: Mood normal.         Behavior: Behavior normal.             Significant Labs: All pertinent labs within the past 24 hours have been reviewed.    Significant Imaging: I have reviewed all pertinent imaging results/findings within the past 24 hours.

## 2024-07-02 NOTE — PLAN OF CARE
Problem: Adult Inpatient Plan of Care  Goal: Plan of Care Review  Outcome: Not Progressing  Goal: Optimal Comfort and Wellbeing  Outcome: Not Progressing     Problem: Hemodialysis  Goal: Safe, Effective Therapy Delivery  Outcome: Progressing     AAOx4. No n/v throughout shift. AVF cdi. No complaints voiced.

## 2024-07-02 NOTE — ASSESSMENT & PLAN NOTE
Malnutrition Type:  Context: chronic illness  Level: moderate    Related to (etiology):   Inadequate energy intake    Signs and Symptoms (as evidenced by):   Malnutrition Characteristic Summary:  Weight Loss (Malnutrition):  (34% x 4 months)  Subcutaneous Fat (Malnutrition): moderate depletion  Muscle Mass (Malnutrition): moderate depletion    Interventions/Recommendations (treatment strategy):  1. Continue Renal diet  2. Rec'd supplement with ONS Novasource renal BID  3. RD to monitor and follow    Nutrition Diagnosis Status:   New

## 2024-07-02 NOTE — PLAN OF CARE
Per Allie Jha, she has a mtg with [facility] this morning to see if they can provide HD chair for this pt.    9:01 AM  Per Allie, pt has been tentatively accepted at [facility].  However, they have concerns about how she will pay for her medications after d/c.   called Alexandrea Vera [phone], [facility] requesting call back.    9:10 AM  Per Alexandrea, family will pay for her medications upon d/c.  Allie notified.    RANCHO TysonN, BS, RN, CCM

## 2024-07-02 NOTE — ASSESSMENT & PLAN NOTE
Creatine stable for now. BMP reviewed- noted Estimated Creatinine Clearance: 16.3 mL/min (A) (based on SCr of 3 mg/dL (H)). according to latest data. Based on current GFR, CKD stage is end stage.  Monitor UOP and serial BMP and adjust therapy as needed. Renally dose meds. Avoid nephrotoxic medications and procedures.    -Neprhology consulted, assisting with dialysis   -Patient to be taken by vascular for LUE AVG today  -Vein mapping performed 6/27  -SW working to find HD chair for patient for continued OP dialysis - difficulty regarding insurance coverage  -RFP, Mg daily

## 2024-07-02 NOTE — ASSESSMENT & PLAN NOTE
53 year old female with PMHx HFpEF, severe pHTN (PA pressure 56 mm Hg 6/24), CKD5 (HD via RIJ TDC), HTN, MR, and anemia here for uremia and volume overload. Vascular surgery consulted for permanent HD access. The patient is very frail and has very poor functional status. Given her significant CHF and pulmonary HTN, there may be significant risk associated with AV fistula creation or graft insertion. Will need to discuss remaining catheter dependent for dialysis vs permanent access creation, likely graft. Will discuss with patient in clinic as an outpatient.    -Continue HD via TDC  -LUE AVF creation on 7/1 without complication  -HD ultrasound and wound check 2 week follow up  -Rest of per primary team   -Vascular surgery signs off for care, please call with any questions or concern

## 2024-07-02 NOTE — PT/OT/SLP PROGRESS
"Physical Therapy      Patient Name:  Xena Vera   MRN:  [id number]    Patient not seen today secondary to Patient fatigue and stated via daughter translating "I already did OT this morning, but I will do PT tomorrow.". Will follow-up as appropriate per POC.    "

## 2024-07-02 NOTE — PROGRESS NOTES
[facility] - Med Surg ([facility]-16)  Adult Nutrition  Progress Note    SUMMARY       Recommendations    1. Continue Renal diet      2. Rec'd supplement with ONS Novasource renal BID      3. RD to monitor and follow    Goals: Meet % EEN, EPN by RD f/u  Nutrition Goal Status: new  Communication of RD Recs:  (POC)    Assessment and Plan    Endocrine  Moderate malnutrition  Malnutrition Type:  Context: chronic illness  Level: moderate    Related to (etiology):   Inadequate energy intake    Signs and Symptoms (as evidenced by):   Malnutrition Characteristic Summary:  Weight Loss (Malnutrition):  (34% x 4 months)  Subcutaneous Fat (Malnutrition): moderate depletion  Muscle Mass (Malnutrition): moderate depletion    Interventions/Recommendations (treatment strategy):  1. Continue Renal diet  2. Rec'd supplement with ONS Novasource renal BID  3. RD to monitor and follow    Nutrition Diagnosis Status:   New     Malnutrition Assessment  Malnutrition Context: chronic illness  Malnutrition Level: moderate          Weight Loss (Malnutrition):  (34% x 4 months)  Subcutaneous Fat (Malnutrition): moderate depletion  Muscle Mass (Malnutrition): moderate depletion   Orbital Region (Subcutaneous Fat Loss): moderate depletion  Upper Arm Region (Subcutaneous Fat Loss): moderate depletion   Sikh Region (Muscle Loss): moderate depletion  Clavicle Bone Region (Muscle Loss): moderate depletion  Clavicle and Acromion Bone Region (Muscle Loss): moderate depletion  Anterior Thigh Region (Muscle Loss): moderate depletion  Posterior Calf Region (Muscle Loss): moderate depletion       Reason for Assessment    Reason For Assessment: RD follow-up  Diagnosis:  (uremia)  Relevant Medical History: HTN, HF, ESRD  Interdisciplinary Rounds: did not attend    General Information Comments:   LOS 14 days. Pt with hx of ESRD on HD. Pt reports good appetite, tolerating 50-75% of meals per chart. C/o vomiting last night. Denies diarrhea, constipation,  "chewing/swallowing difficulties. Per wt hx, noted wt loss of 54 lb (34%) x 4 months. Pt reports used to weigh 190 lb, started losing wt since HD. NFPE completed today, noted moderate muscle depletion. Pt meet criteria for moderate malnutrition. Reviewed renal diet, protein supplements. Pt verbalized understanding.     Wt Readings from Last 10 Encounters:   07/01/24 48.3 kg (106 lb 7.7 oz)   06/24/24 46.7 kg (103 lb)   05/08/24 60.8 kg (134 lb 0.6 oz)   02/23/24 72.6 kg (160 lb)   01/30/24 73.2 kg (161 lb 6 oz)   12/24/23 72.4 kg (159 lb 9.8 oz)   10/31/23 74.6 kg (164 lb 7.4 oz)   07/21/23 77.1 kg (170 lb)   07/14/23 79.9 kg (176 lb 2.4 oz)   07/07/23 80.6 kg (177 lb 12.8 oz)     Nutrition Discharge Planning: Renal diet education provided on 7/2 with appropriate handout.    Nutrition Risk Screen    Nutrition Risk Screen: no indicators present    Nutrition/Diet History    Spiritual, Cultural Beliefs, Taoist Practices, Values that Affect Care: no    Anthropometrics    Temp: 98.4 °F (36.9 °C)  Height Method: Stated  Height: 4' 11" (149.9 cm)  Height (inches): 59 in  Weight Method: Stated  Weight: 48.3 kg (106 lb 7.7 oz)  Weight (lb): 106.48 lb  Ideal Body Weight (IBW), Female: 95 lb  % Ideal Body Weight, Female (lb): 112.08 %  BMI (Calculated): 21.5       Lab/Procedures/Meds    Pertinent Labs Reviewed: reviewed  Pertinent Labs Comments: phos 6.2, Na 134, BUN 25, Cr 3.0, albumin 2.6, Tbili 1.4, , AST 75, eGFR 17.9  Pertinent Medications Reviewed: reviewed  Pertinent Medications Comments:    acetaminophen  650 mg Oral Q6H    atorvastatin  40 mg Oral QHS    famotidine  20 mg Oral Daily    fluticasone propionate  2 spray Each Nostril Daily    guaiFENesin  600 mg Oral BID    heparin (porcine)  5,000 Units Subcutaneous Q8H    polyethylene glycol  17 g Oral Daily     Estimated/Assessed Needs    Weight Used For Calorie Calculations: 48.1 kg (106 lb)  Energy Calorie Requirements (kcal): 7421-3510 kcal  Energy Need " Method: Kcal/kg (30-35)  Protein Requirements: 58-73g (1.2-1.5g/kg)  Weight Used For Protein Calculations: 48.3 kg (106 lb 7.7 oz)  Fluid Requirements (mL): 1ml/kcal or per MD  Estimated Fluid Requirement Method: RDA Method  RDA Method (mL): 1442     Nutrition Prescription Ordered    Current Diet Order: Renal    Evaluation of Received Nutrient/Fluid Intake    I/O: -10 L since admit  Energy Calories Required: not meeting needs  Protein Required: not meeting needs  Comments: LBM 7/28  Tolerance: tolerating  % Intake of Estimated Energy Needs: 50 - 75 %  % Meal Intake: 50 - 75 %    Nutrition Risk    Level of Risk/Frequency of Follow-up:  (1x/week)     Monitor and Evaluation    Food and Nutrient Intake: energy intake, food and beverage intake  Food and Nutrient Adminstration: diet order  Knowledge/Beliefs/Attitudes: food and nutrition knowledge/skill  Physical Activity and Function: nutrition-related ADLs and IADLs  Anthropometric Measurements: height/length, weight, weight change, body mass index  Biochemical Data, Medical Tests and Procedures: electrolyte and renal panel, gastrointestinal profile, glucose/endocrine profile, lipid profile, inflammatory profile  Nutrition-Focused Physical Findings: overall appearance, head and eyes, extremities, muscles and bones, skin     Nutrition Follow-Up    RD Follow-up?: Yes

## 2024-07-02 NOTE — SUBJECTIVE & OBJECTIVE
Interval History: POD 1 AVG placement. Pending placement.     Review of patient's allergies indicates:  No Known Allergies  Current Facility-Administered Medications   Medication Frequency    acetaminophen tablet 650 mg Q6H    aluminum-magnesium hydroxide-simethicone 200-200-20 mg/5 mL suspension 30 mL QID PRN    atorvastatin tablet 40 mg QHS    benzonatate capsule 100 mg TID PRN    dextrose 10% bolus 125 mL 125 mL PRN    dextrose 10% bolus 250 mL 250 mL PRN    famotidine tablet 20 mg Daily    fluticasone propionate 50 mcg/actuation nasal spray 100 mcg Daily    glucagon (human recombinant) injection 1 mg PRN    glucose chewable tablet 16 g PRN    glucose chewable tablet 24 g PRN    guaiFENesin 12 hr tablet 600 mg BID    heparin (porcine) injection 5,000 Units Q8H    insulin aspart U-100 pen 0-5 Units QID (AC + HS) PRN    loperamide capsule 2 mg BID PRN    naloxone 0.4 mg/mL injection 0.02 mg PRN    nitroGLYCERIN SL tablet 0.4 mg Q5 Min PRN    oxyCODONE immediate release tablet 5 mg Q4H PRN    oxyCODONE immediate release tablet Tab 10 mg Q4H PRN    polyethylene glycol packet 17 g Daily    prochlorperazine tablet 5 mg QID PRN    simethicone chewable tablet 80 mg QID PRN    sodium chloride 0.9% flush 10 mL Q12H PRN       Objective:     Vital Signs (Most Recent):  Temp: 97.7 °F (36.5 °C) (07/02/24 0758)  Pulse: 83 (07/02/24 0758)  Resp: 18 (07/02/24 0758)  BP: (!) 110/55 (07/02/24 0758)  SpO2: 96 % (07/02/24 0442) Vital Signs (24h Range):  Temp:  [97.3 °F (36.3 °C)-98.6 °F (37 °C)] 97.7 °F (36.5 °C)  Pulse:  [] 83  Resp:  [13-22] 18  SpO2:  [92 %-100 %] 96 %  BP: ()/(45-84) 110/55     Weight: 48.3 kg (106 lb 7.7 oz) (07/01/24 1140)  Body mass index is 21.51 kg/m².  Body surface area is 1.42 meters squared.    I/O last 3 completed shifts:  In: 490 [P.O.:240; IV Piggyback:250]  Out: 3250 [Urine:600; Other:2650]     Physical Exam  Vitals and nursing note reviewed.   Eyes:      Conjunctiva/sclera: Conjunctivae  normal.   Cardiovascular:      Rate and Rhythm: Normal rate.      Pulses: Normal pulses.   Pulmonary:      Effort: Pulmonary effort is normal.   Abdominal:      Palpations: Abdomen is soft.   Musculoskeletal:      Right lower leg: No edema.      Left lower leg: No edema.   Neurological:      Mental Status: She is alert and oriented to person, place, and time.   Psychiatric:         Mood and Affect: Mood normal.         Behavior: Behavior normal.        Significant Labs:  CBC:   Recent Labs   Lab 07/02/24  0714   WBC 7.89   RBC 3.90*   HGB 10.7*   HCT 36.7*      MCV 94   MCH 27.4   MCHC 29.2*     CMP:   Recent Labs   Lab 07/02/24  0714   GLU 74   CALCIUM 8.9   ALBUMIN 2.6*   PROT 7.7   *   K 4.9   CO2 20*   CL 98   BUN 25*   CREATININE 3.0*   ALKPHOS 426*   ALT 12   AST 75*   BILITOT 1.4*     All labs within the past 24 hours have been reviewed.

## 2024-07-02 NOTE — ASSESSMENT & PLAN NOTE
Nutrition consulted. Most recent weight and BMI monitored-     Measurements:  Wt Readings from Last 1 Encounters:   07/01/24 48.3 kg (106 lb 7.7 oz)   Body mass index is 21.51 kg/m².    Patient has been screened and assessed by RD.    Malnutrition Type:  Context: chronic illness  Level: moderate    Malnutrition Characteristic Summary:  Weight Loss (Malnutrition):  (34% x 4 months)  Subcutaneous Fat (Malnutrition): moderate depletion  Muscle Mass (Malnutrition): moderate depletion    Interventions/Recommendations (treatment strategy):  1. Continue Renal diet  2. Rec'd supplement with ONS Novasource renal BID  3. RD to monitor and follow

## 2024-07-02 NOTE — ANESTHESIA POSTPROCEDURE EVALUATION
Anesthesia Post Evaluation    Patient: Xena Vera    Procedure(s) Performed: Procedure(s) (LRB):  INSERTION, GRAFT, ARTERIOVENOUS (Left)    Final Anesthesia Type: general      Patient location during evaluation: PACU  Patient participation: Yes- Able to Participate  Level of consciousness: awake and alert  Post-procedure vital signs: reviewed and stable  Pain management: adequate  Airway patency: patent    PONV status at discharge: No PONV  Anesthetic complications: no      Cardiovascular status: blood pressure returned to baseline  Respiratory status: unassisted  Follow-up not needed.              Vitals Value Taken Time   /55 07/02/24 0758   Temp 36.5 °C (97.7 °F) 07/02/24 0758   Pulse 83 07/02/24 0758   Resp 18 07/02/24 0758   SpO2 96 % 07/02/24 0442         Event Time   Out of Recovery 07/01/2024 14:50:00         Pain/Sylvester Score: Pain Rating Prior to Med Admin: 4 (7/2/2024  6:05 AM)  Pain Rating Post Med Admin: 1 (7/2/2024 12:55 AM)  Sylvester Score: 8 (7/1/2024  2:45 PM)

## 2024-07-03 VITALS
OXYGEN SATURATION: 99 % | RESPIRATION RATE: 18 BRPM | SYSTOLIC BLOOD PRESSURE: 108 MMHG | BODY MASS INDEX: 21.47 KG/M2 | WEIGHT: 106.5 LBS | TEMPERATURE: 98 F | DIASTOLIC BLOOD PRESSURE: 67 MMHG | HEART RATE: 77 BPM | HEIGHT: 59 IN

## 2024-07-03 LAB
ALBUMIN SERPL BCP-MCNC: 2.7 G/DL (ref 3.5–5.2)
ALP SERPL-CCNC: 352 U/L (ref 55–135)
ALT SERPL W/O P-5'-P-CCNC: <5 U/L (ref 10–44)
ANION GAP SERPL CALC-SCNC: 13 MMOL/L (ref 8–16)
ANISOCYTOSIS BLD QL SMEAR: SLIGHT
AST SERPL-CCNC: 45 U/L (ref 10–40)
BASOPHILS # BLD AUTO: ABNORMAL K/UL (ref 0–0.2)
BASOPHILS NFR BLD: 0 % (ref 0–1.9)
BILIRUB SERPL-MCNC: 1.2 MG/DL (ref 0.1–1)
BUN SERPL-MCNC: 41 MG/DL (ref 6–20)
CALCIUM SERPL-MCNC: 8.6 MG/DL (ref 8.7–10.5)
CHLORIDE SERPL-SCNC: 98 MMOL/L (ref 95–110)
CO2 SERPL-SCNC: 21 MMOL/L (ref 23–29)
CREAT SERPL-MCNC: 4.3 MG/DL (ref 0.5–1.4)
DACRYOCYTES BLD QL SMEAR: ABNORMAL
DIFFERENTIAL METHOD BLD: ABNORMAL
EOSINOPHIL # BLD AUTO: ABNORMAL K/UL (ref 0–0.5)
EOSINOPHIL NFR BLD: 0 % (ref 0–8)
ERYTHROCYTE [DISTWIDTH] IN BLOOD BY AUTOMATED COUNT: 21.6 % (ref 11.5–14.5)
EST. GFR  (NO RACE VARIABLE): 11.6 ML/MIN/1.73 M^2
GLUCOSE SERPL-MCNC: 80 MG/DL (ref 70–110)
HCT VFR BLD AUTO: 36.2 % (ref 37–48.5)
HGB BLD-MCNC: 10.3 G/DL (ref 12–16)
HYPOCHROMIA BLD QL SMEAR: ABNORMAL
IMM GRANULOCYTES # BLD AUTO: ABNORMAL K/UL (ref 0–0.04)
IMM GRANULOCYTES NFR BLD AUTO: ABNORMAL % (ref 0–0.5)
IRON SERPL-MCNC: 67 UG/DL (ref 30–160)
LYMPHOCYTES # BLD AUTO: ABNORMAL K/UL (ref 1–4.8)
LYMPHOCYTES NFR BLD: 6 % (ref 18–48)
MAGNESIUM SERPL-MCNC: 2.1 MG/DL (ref 1.6–2.6)
MCH RBC QN AUTO: 27.2 PG (ref 27–31)
MCHC RBC AUTO-ENTMCNC: 28.5 G/DL (ref 32–36)
MCV RBC AUTO: 96 FL (ref 82–98)
MONOCYTES # BLD AUTO: ABNORMAL K/UL (ref 0.3–1)
MONOCYTES NFR BLD: 2 % (ref 4–15)
NEUTROPHILS NFR BLD: 92 % (ref 38–73)
NRBC BLD-RTO: 0 /100 WBC
OVALOCYTES BLD QL SMEAR: ABNORMAL
PHOSPHATE SERPL-MCNC: 5.2 MG/DL (ref 2.7–4.5)
PLATELET # BLD AUTO: 464 K/UL (ref 150–450)
PMV BLD AUTO: 10.3 FL (ref 9.2–12.9)
POCT GLUCOSE: 81 MG/DL (ref 70–110)
POIKILOCYTOSIS BLD QL SMEAR: SLIGHT
POLYCHROMASIA BLD QL SMEAR: ABNORMAL
POTASSIUM SERPL-SCNC: 4.9 MMOL/L (ref 3.5–5.1)
PROT SERPL-MCNC: 7.8 G/DL (ref 6–8.4)
RBC # BLD AUTO: 3.79 M/UL (ref 4–5.4)
SATURATED IRON: 22 % (ref 20–50)
SODIUM SERPL-SCNC: 132 MMOL/L (ref 136–145)
SPHEROCYTES BLD QL SMEAR: ABNORMAL
TOTAL IRON BINDING CAPACITY: 303 UG/DL (ref 250–450)
TRANSFERRIN SERPL-MCNC: 205 MG/DL (ref 200–375)
WBC # BLD AUTO: 8.3 K/UL (ref 3.9–12.7)

## 2024-07-03 PROCEDURE — 25000003 PHARM REV CODE 250

## 2024-07-03 PROCEDURE — 85025 COMPLETE CBC W/AUTO DIFF WBC: CPT

## 2024-07-03 PROCEDURE — 82652 VIT D 1 25-DIHYDROXY: CPT

## 2024-07-03 PROCEDURE — 90935 HEMODIALYSIS ONE EVALUATION: CPT

## 2024-07-03 PROCEDURE — 36415 COLL VENOUS BLD VENIPUNCTURE: CPT

## 2024-07-03 PROCEDURE — 25000003 PHARM REV CODE 250: Performed by: STUDENT IN AN ORGANIZED HEALTH CARE EDUCATION/TRAINING PROGRAM

## 2024-07-03 PROCEDURE — 63600175 PHARM REV CODE 636 W HCPCS

## 2024-07-03 PROCEDURE — 83540 ASSAY OF IRON: CPT

## 2024-07-03 PROCEDURE — 84100 ASSAY OF PHOSPHORUS: CPT

## 2024-07-03 PROCEDURE — 90935 HEMODIALYSIS ONE EVALUATION: CPT | Mod: ,,, | Performed by: INTERNAL MEDICINE

## 2024-07-03 PROCEDURE — 83735 ASSAY OF MAGNESIUM: CPT

## 2024-07-03 PROCEDURE — 80053 COMPREHEN METABOLIC PANEL: CPT

## 2024-07-03 RX ORDER — SEVELAMER CARBONATE 800 MG/1
800 TABLET, FILM COATED ORAL
Status: DISCONTINUED | OUTPATIENT
Start: 2024-07-03 | End: 2024-07-03 | Stop reason: HOSPADM

## 2024-07-03 RX ORDER — LIDOCAINE 50 MG/G
1 PATCH TOPICAL
Status: DISCONTINUED | OUTPATIENT
Start: 2024-07-03 | End: 2024-07-03 | Stop reason: HOSPADM

## 2024-07-03 RX ADMIN — ACETAMINOPHEN 650 MG: 325 TABLET ORAL at 12:07

## 2024-07-03 RX ADMIN — ACETAMINOPHEN 650 MG: 325 TABLET ORAL at 02:07

## 2024-07-03 RX ADMIN — FLUTICASONE PROPIONATE 100 MCG: 50 SPRAY, METERED NASAL at 08:07

## 2024-07-03 RX ADMIN — HEPARIN SODIUM 5000 UNITS: 5000 INJECTION INTRAVENOUS; SUBCUTANEOUS at 02:07

## 2024-07-03 RX ADMIN — SEVELAMER CARBONATE 800 MG: 800 TABLET, FILM COATED ORAL at 04:07

## 2024-07-03 RX ADMIN — FAMOTIDINE 20 MG: 20 TABLET ORAL at 08:07

## 2024-07-03 RX ADMIN — ACETAMINOPHEN 650 MG: 325 TABLET ORAL at 05:07

## 2024-07-03 RX ADMIN — LIDOCAINE 5% 1 PATCH: 700 PATCH TOPICAL at 02:07

## 2024-07-03 RX ADMIN — GUAIFENESIN 600 MG: 600 TABLET, EXTENDED RELEASE ORAL at 08:07

## 2024-07-03 RX ADMIN — POLYETHYLENE GLYCOL 3350 17 G: 17 POWDER, FOR SOLUTION ORAL at 08:07

## 2024-07-03 RX ADMIN — HEPARIN SODIUM 5000 UNITS: 5000 INJECTION INTRAVENOUS; SUBCUTANEOUS at 03:07

## 2024-07-03 NOTE — SUBJECTIVE & OBJECTIVE
Interval History:   Seen in hd unit, dialysis with 17 gauge needle, blood pressure 93/58, with blood flow 250,     Review of patient's allergies indicates:  No Known Allergies  Current Facility-Administered Medications   Medication Frequency    acetaminophen tablet 650 mg Q6H    aluminum-magnesium hydroxide-simethicone 200-200-20 mg/5 mL suspension 30 mL QID PRN    atorvastatin tablet 40 mg QHS    benzonatate capsule 100 mg TID PRN    dextrose 10% bolus 125 mL 125 mL PRN    dextrose 10% bolus 250 mL 250 mL PRN    famotidine tablet 20 mg Daily    fluticasone propionate 50 mcg/actuation nasal spray 100 mcg Daily    glucagon (human recombinant) injection 1 mg PRN    glucose chewable tablet 16 g PRN    glucose chewable tablet 24 g PRN    guaiFENesin 12 hr tablet 600 mg BID    heparin (porcine) injection 5,000 Units Q8H    insulin aspart U-100 pen 0-5 Units QID (AC + HS) PRN    LIDOcaine 5 % patch 1 patch Q24H    loperamide capsule 2 mg BID PRN    naloxone 0.4 mg/mL injection 0.02 mg PRN    nitroGLYCERIN SL tablet 0.4 mg Q5 Min PRN    oxyCODONE immediate release tablet 5 mg Q4H PRN    oxyCODONE immediate release tablet Tab 10 mg Q4H PRN    polyethylene glycol packet 17 g Daily    prochlorperazine tablet 5 mg QID PRN    simethicone chewable tablet 80 mg QID PRN    sodium chloride 0.9% flush 10 mL Q12H PRN       Objective:     Vital Signs (Most Recent):  Temp: 98 °F (36.7 °C) (07/03/24 0838)  Pulse: 77 (07/03/24 1015)  Resp: 18 (07/03/24 0838)  BP: (!) 93/58 (07/03/24 1015)  SpO2: 98 % (07/03/24 1015) Vital Signs (24h Range):  Temp:  [97.6 °F (36.4 °C)-98.5 °F (36.9 °C)] 98 °F (36.7 °C)  Pulse:  [74-84] 77  Resp:  [18] 18  SpO2:  [94 %-99 %] 98 %  BP: ()/(55-71) 93/58     Weight: 48.3 kg (106 lb 7.7 oz) (07/01/24 1140)  Body mass index is 21.51 kg/m².  Body surface area is 1.42 meters squared.    I/O last 3 completed shifts:  In: 480 [P.O.:480]  Out: 100 [Urine:100]     Physical Exam  Vitals reviewed.  "  Constitutional:       Appearance: Normal appearance.   HENT:      Head: Normocephalic.      Mouth/Throat:      Mouth: Mucous membranes are moist.   Cardiovascular:      Rate and Rhythm: Normal rate and regular rhythm.      Pulses: Normal pulses.   Pulmonary:      Effort: Pulmonary effort is normal.      Breath sounds: Normal breath sounds.   Musculoskeletal:         General: Normal range of motion.      Cervical back: Normal range of motion.   Skin:     General: Skin is warm.   Neurological:      General: No focal deficit present.      Mental Status: She is alert and oriented to person, place, and time.   Psychiatric:         Mood and Affect: Mood normal.         Behavior: Behavior normal.          Significant Labs:  ABGs: No results for input(s): "PH", "PCO2", "HCO3", "POCSATURATED", "BE" in the last 168 hours.  BMP:   Recent Labs   Lab 07/03/24 0448   GLU 80   *   K 4.9   CL 98   CO2 21*   BUN 41*   CREATININE 4.3*   CALCIUM 8.6*   MG 2.1     Cardiac Markers: No results for input(s): "CKMB", "TROPONINT", "MYOGLOBIN" in the last 168 hours.  CBC:   Recent Labs   Lab 07/03/24 0448   WBC 8.30   RBC 3.79*   HGB 10.3*   HCT 36.2*   *   MCV 96   MCH 27.2   MCHC 28.5*     CMP:   Recent Labs   Lab 07/03/24 0448   GLU 80   CALCIUM 8.6*   ALBUMIN 2.7*   PROT 7.8   *   K 4.9   CO2 21*   CL 98   BUN 41*   CREATININE 4.3*   ALKPHOS 352*   ALT <5*   AST 45*   BILITOT 1.2*     Coagulation: No results for input(s): "PT", "INR", "APTT" in the last 168 hours.  LFTs:   Recent Labs   Lab 07/03/24 0448   ALT <5*   AST 45*   ALKPHOS 352*   BILITOT 1.2*   PROT 7.8   ALBUMIN 2.7*     Microbiology Results (last 7 days)       Procedure Component Value Units Date/Time    Blood culture [[id number]] Collected: 06/22/24 0843    Order Status: Completed Specimen: Blood Updated: 06/27/24 1012     Blood Culture, Routine No growth after 5 days.    Blood culture [[id number]] Collected: 06/22/24 0843    Order Status: Completed " "Specimen: Blood Updated: 06/27/24 1012     Blood Culture, Routine No growth after 5 days.          Specimen (24h ago, onward)      None          PTH: No results for input(s): "PTH" in the last 168 hours.  TSH: No results for input(s): "TSH" in the last 168 hours.  No results for input(s): "COLORU", "CLARITYU", "SPECGRAV", "PHUR", "PROTEINUA", "GLUCOSEU", "BILIRUBINCON", "BLOODU", "WBCU", "RBCU", "BACTERIA", "MUCUS", "NITRITE", "LEUKOCYTESUR", "UROBILINOGEN", "HYALINECASTS" in the last 168 hours.     "

## 2024-07-03 NOTE — SUBJECTIVE & OBJECTIVE
Interval History: NAEO, VSS,patient tolerated HD with new LUE graft. Medically ready for DC. Has OP followup with nephro, PCP, vascular, and cardiology.    Review of Systems   Constitutional:  Negative for chills and fever.   Respiratory:  Negative for cough and shortness of breath.    Cardiovascular:  Negative for chest pain and leg swelling.   Gastrointestinal:  Negative for abdominal pain, diarrhea and nausea.   Psychiatric/Behavioral:  Negative for agitation and behavioral problems.      Objective:     Vital Signs (Most Recent):  Temp: 98 °F (36.7 °C) (07/03/24 1230)  Pulse: 77 (07/03/24 1230)  Resp: 18 (07/03/24 1230)  BP: 108/67 (07/03/24 1230)  SpO2: 100 % (07/03/24 1230) Vital Signs (24h Range):  Temp:  [97.6 °F (36.4 °C)-98.5 °F (36.9 °C)] 98 °F (36.7 °C)  Pulse:  [74-84] 77  Resp:  [18] 18  SpO2:  [94 %-100 %] 100 %  BP: ()/(55-71) 108/67     Weight: 48.3 kg (106 lb 7.7 oz)  Body mass index is 21.51 kg/m².    Intake/Output Summary (Last 24 hours) at 7/3/2024 1325  Last data filed at 7/3/2024 1230  Gross per 24 hour   Intake --   Output 1650 ml   Net -1650 ml         Physical Exam  Constitutional:       General: She is not in acute distress.     Appearance: She is normal weight. She is not ill-appearing.   HENT:      Head: Normocephalic and atraumatic.      Right Ear: External ear normal.      Left Ear: External ear normal.      Nose: Nose normal.      Mouth/Throat:      Mouth: Mucous membranes are moist.      Pharynx: Oropharynx is clear.   Eyes:      General: No scleral icterus.     Extraocular Movements: Extraocular movements intact.      Conjunctiva/sclera: Conjunctivae normal.      Pupils: Pupils are equal, round, and reactive to light.   Cardiovascular:      Rate and Rhythm: Normal rate and regular rhythm.      Pulses: Normal pulses.           Radial pulses are 2+ on the right side and 2+ on the left side.      Heart sounds: Normal heart sounds.      Comments: AV fistula ULE good thrills in  graft.  Pulmonary:      Effort: Pulmonary effort is normal.      Breath sounds: Normal breath sounds. No wheezing, rhonchi or rales.   Abdominal:      General: Abdomen is flat.      Palpations: Abdomen is soft.      Tenderness: There is no abdominal tenderness.   Musculoskeletal:         General: No swelling. Normal range of motion.      Cervical back: Normal range of motion and neck supple.   Skin:     General: Skin is warm and dry.      Capillary Refill: Capillary refill takes less than 2 seconds.   Neurological:      General: No focal deficit present.      Mental Status: She is alert and oriented to person, place, and time.   Psychiatric:         Mood and Affect: Mood normal.         Behavior: Behavior normal.             Significant Labs: All pertinent labs within the past 24 hours have been reviewed.    Significant Imaging: I have reviewed all pertinent imaging results/findings within the past 24 hours.

## 2024-07-03 NOTE — PROGRESS NOTES
07/03/24 1230        Hemodialysis AV Fistula 07/01/24 Left upper arm   Placement Date: 07/01/24   Present Prior to Hospital Arrival?: No  Location: Left upper arm   Site Assessment Clean;Dry;Intact   Patency Present;Thrill;Bruit   Status Deaccessed   Site Condition No complications   During Hemodialysis Assessment   Blood Flow Rate (mL/min) 250 mL/min   Dialysate Flow Rate (mL/min) 500 ml/min   Ultrafiltration Rate (mL/Hr) 470 mL/Hr   Arteriovenous Lines Secure Yes   Arterial Pressure (mmHg) -190 mmHg   Venous Pressure (mmHg) 150   Blood Volume Processed (Liters) 48.2 L   UF Removed (mL) 1650 mL   TMP 40   Venous Line in Air Detector Yes   Intake (mL) 250 mL   Intra-Hemodialysis Comments HD completed   Post-Hemodialysis Assessment   Rinseback Volume (mL) 250 mL   Blood Volume Processed (Liters) 48.2 L   Dialyzer Clearance Moderately streaked   Duration of Treatment 210 minutes   Total UF (mL) 1650 mL   Net Fluid Removal 1000   Patient Response to Treatment Malinda. well   Post-Treatment Weight 48.4 kg (106 lb 11.2 oz)   Treatment Weight Change -1.2     Patient left COBY by wheelchair NAD.

## 2024-07-03 NOTE — PROGRESS NOTES
Please see previous notes from this SW for continuity.    __________________________________________________  KAZ sent email to update [facility] JIM Paredes, KAZ Yanez, and SUSAN Patten, pt's new access is an AVG. SW informed pt will dialyze today in hospital to confirm pt's access can be cannulated. SW inquired if pt can discharge if pt's access is able to be cannulated. SW requesting pt's chair schedule at [facility]. Awaiting response.    SW to follow with updates.    UPDATE 10:20AM: Spoke with Lena and informed pt is currently dialyzing via AVG. Lena informed prefer for pt to keep CVC upon discharge in case there are any issues cannulating the AVG at [facility]. eLna informed preference is for pt to have CVC removed post discharge by either [facility] or [facility]. Lena informed [facility] cannot remove CVC. Lena awaiting response from [facility] SUSAN Patten regarding pt's chair schedule.     Inpt treatment team updated via secure chat.     UPDATE 12:09PM: KAZ sent email to [facility] Sandor Paredes, and Renata to update. Per Dr. Bueno, pt will be seen in clinic in 2 weeks for AVG post-op follow up at which point he will remove the CVC if her AVG has not had any issues. KAZ requesting pt's chair schedule as pt is expected to discharge today. SW awaiting response.    SW to follow with updates.    Allie Jha, JAILYN  [facility]  X [phone]

## 2024-07-03 NOTE — PROGRESS NOTES
Please see previous notes from this SW for continuity.    __________________________________________________   notified of pt's chair schedule by [facility] KAZ Yanez via email.    Please see below for pt's outpt dialysis information:    -[facility]  -[facility], [place]  [phone]  -Schedule: MWF at 1:45PM  -Anticipated start date: Friday, 07/05/2024  **Pt must arrive 30 minutes early to initial appt to sign consents**    Inpt treatment team updated via secure chat. Schedule letter emailed to SHALINI Cronin.    KAZ to follow for pt's discharge.    Allie Jha, JAILYN  [facility]  [phone]

## 2024-07-03 NOTE — PLAN OF CARE
Per Allie Jha, pt's HD chair has been secured.  CM received schedule letter and provided to pt's daughter in room.  CM called pt's oldest daughter Alexandrea [phone], instructed that HD chair had been set up and pt to be d/c'd today.  Brother Allen will be picking pt up; Alexandrea will  call him and instruct that pt being d/c'd today.  CM instructed Alexandrea that pt's HD schedule is MW, starting July 5, [facility] [place] at 1:45.  CG v/u.    4:14 PM  CM called central scheduling to schedule Vascular Surgery f/u appt, spoke with Loreta.  She states she can't schedule that appt; she will message the dept and have them call pt to schedule.    Doris Cronin, RANCHON, BS, RN, [facility]

## 2024-07-03 NOTE — PROGRESS NOTES
[facility] - Med Surg ([place] [address])  Nephrology  Progress Note    Patient Name: Xena Vera  MRN: [id number]  Admission Date: 6/18/2024  Hospital Length of Stay: 15 days  Attending Provider: Loreta Dimas MD   Primary Care Physician: Tami Villeda FNP  Principal Problem:Uremia    Subjective:     HPI:  Ms Vera is an obese (BMI ~31) 53-year-old with CKD IV (baseline creatinine ~3.1-3.3), HFpEF (most recent TTE with EF 60-65% with G2DD), mitral valve regurgitation, anemia, hypertension, HLD as well as several over co-morbid conditions who was admitted to hospital in April 2024 for volume overload that needing initiation of dialysis. She was undocumented immigrant and could not be established in an outpatient dialysis unit and so was discharged with request to come to ER when symptomatic.0n 06/18 presents for chest pain r right shoulder, 3 x vomiting,  EKG with peaked T waves in II/III/AVF, widened QRS, Qtc 573. Uremic bun 129  Per daughter she has been sleeping a lot at home for the past week, unable to eat due to nausea/vomiting. In the ER the labs were worse and so decision made to admit patient for dialysis.  Patient has had Hypertension since 1997 and her mother needed dialysis at around age of 52. These could be the potential reason for her needing dialysis.    Interval History:   Seen in hd unit, dialysis with 17 gauge needle, blood pressure 93/58, with blood flow 250,     Review of patient's allergies indicates:  No Known Allergies  Current Facility-Administered Medications   Medication Frequency    acetaminophen tablet 650 mg Q6H    aluminum-magnesium hydroxide-simethicone 200-200-20 mg/5 mL suspension 30 mL QID PRN    atorvastatin tablet 40 mg QHS    benzonatate capsule 100 mg TID PRN    dextrose 10% bolus 125 mL 125 mL PRN    dextrose 10% bolus 250 mL 250 mL PRN    famotidine tablet 20 mg Daily    fluticasone propionate 50 mcg/actuation nasal spray 100 mcg Daily    glucagon (human  recombinant) injection 1 mg PRN    glucose chewable tablet 16 g PRN    glucose chewable tablet 24 g PRN    guaiFENesin 12 hr tablet 600 mg BID    heparin (porcine) injection 5,000 Units Q8H    insulin aspart U-100 pen 0-5 Units QID (AC + HS) PRN    LIDOcaine 5 % patch 1 patch Q24H    loperamide capsule 2 mg BID PRN    naloxone 0.4 mg/mL injection 0.02 mg PRN    nitroGLYCERIN SL tablet 0.4 mg Q5 Min PRN    oxyCODONE immediate release tablet 5 mg Q4H PRN    oxyCODONE immediate release tablet Tab 10 mg Q4H PRN    polyethylene glycol packet 17 g Daily    prochlorperazine tablet 5 mg QID PRN    simethicone chewable tablet 80 mg QID PRN    sodium chloride 0.9% flush 10 mL Q12H PRN       Objective:     Vital Signs (Most Recent):  Temp: 98 °F (36.7 °C) (07/03/24 0838)  Pulse: 77 (07/03/24 1015)  Resp: 18 (07/03/24 0838)  BP: (!) 93/58 (07/03/24 1015)  SpO2: 98 % (07/03/24 1015) Vital Signs (24h Range):  Temp:  [97.6 °F (36.4 °C)-98.5 °F (36.9 °C)] 98 °F (36.7 °C)  Pulse:  [74-84] 77  Resp:  [18] 18  SpO2:  [94 %-99 %] 98 %  BP: ()/(55-71) 93/58     Weight: 48.3 kg (106 lb 7.7 oz) (07/01/24 1140)  Body mass index is 21.51 kg/m².  Body surface area is 1.42 meters squared.    I/O last 3 completed shifts:  In: 480 [P.O.:480]  Out: 100 [Urine:100]     Physical Exam  Vitals reviewed.   Constitutional:       Appearance: Normal appearance.   HENT:      Head: Normocephalic.      Mouth/Throat:      Mouth: Mucous membranes are moist.   Cardiovascular:      Rate and Rhythm: Normal rate and regular rhythm.      Pulses: Normal pulses.   Pulmonary:      Effort: Pulmonary effort is normal.      Breath sounds: Normal breath sounds.   Musculoskeletal:         General: Normal range of motion.      Cervical back: Normal range of motion.   Skin:     General: Skin is warm.   Neurological:      General: No focal deficit present.      Mental Status: She is alert and oriented to person, place, and time.   Psychiatric:         Mood and Affect:  "Mood normal.         Behavior: Behavior normal.          Significant Labs:  ABGs: No results for input(s): "PH", "PCO2", "HCO3", "POCSATURATED", "BE" in the last 168 hours.  BMP:   Recent Labs   Lab 07/03/24  0448   GLU 80   *   K 4.9   CL 98   CO2 21*   BUN 41*   CREATININE 4.3*   CALCIUM 8.6*   MG 2.1     Cardiac Markers: No results for input(s): "CKMB", "TROPONINT", "MYOGLOBIN" in the last 168 hours.  CBC:   Recent Labs   Lab 07/03/24 0448   WBC 8.30   RBC 3.79*   HGB 10.3*   HCT 36.2*   *   MCV 96   MCH 27.2   MCHC 28.5*     CMP:   Recent Labs   Lab 07/03/24 0448   GLU 80   CALCIUM 8.6*   ALBUMIN 2.7*   PROT 7.8   *   K 4.9   CO2 21*   CL 98   BUN 41*   CREATININE 4.3*   ALKPHOS 352*   ALT <5*   AST 45*   BILITOT 1.2*     Coagulation: No results for input(s): "PT", "INR", "APTT" in the last 168 hours.  LFTs:   Recent Labs   Lab 07/03/24 0448   ALT <5*   AST 45*   ALKPHOS 352*   BILITOT 1.2*   PROT 7.8   ALBUMIN 2.7*     Microbiology Results (last 7 days)       Procedure Component Value Units Date/Time    Blood culture [[id number]] Collected: 06/22/24 0843    Order Status: Completed Specimen: Blood Updated: 06/27/24 1012     Blood Culture, Routine No growth after 5 days.    Blood culture [[id number]] Collected: 06/22/24 0843    Order Status: Completed Specimen: Blood Updated: 06/27/24 1012     Blood Culture, Routine No growth after 5 days.          Specimen (24h ago, onward)      None          PTH: No results for input(s): "PTH" in the last 168 hours.  TSH: No results for input(s): "TSH" in the last 168 hours.  No results for input(s): "COLORU", "CLARITYU", "SPECGRAV", "PHUR", "PROTEINUA", "GLUCOSEU", "BILIRUBINCON", "BLOODU", "WBCU", "RBCU", "BACTERIA", "MUCUS", "NITRITE", "LEUKOCYTESUR", "UROBILINOGEN", "HYALINECASTS" in the last 168 hours.     Assessment/Plan:     Cardiac/Vascular  Pericardial effusion  -    Renal/  * Uremia  -    ESRD (end stage renal disease) on dialysis  Etiology of " ESRD is long standing hypertension vs family history. Pericardial effusion. Initiated HD on 6/22. Repeat limited echo 6/24 w/ slight decrease in pericardial effusion size. Pt has TDC. AVG creation on 7/1/24.   Pending outpatient HD chair.     Plan/Recommendations:     -Continue MWF iHD schedule while IP   - dialysis for metabolic clearance and volume optimization will be provided today with av graft (as per vascular okay to use)  - with 17 gauge needle, blood pressure 93/58, with blood flow 250,   -Hgb goal 10-11, at goal received ferreheme on 6/26 (iron panel ordered am labs)  - pth level 823 in 2023 will repeat PTH and vitamin D    Anemia of ESRD   Recent Labs   Lab 07/01/24  0318 07/02/24  0714 07/03/24  0448   WBC 6.24 7.89 8.30   HGB 10.3* 10.7* 10.3*   HCT 36.2* 36.7* 36.2*    438 464*     Lab Results   Component Value Date    FESATURATED 13 (L) 06/25/2023    FERRITIN 60 06/25/2023       - Goal in ESRD is Hgb of 10-11.     Mineral Bone Disease in ESRD   Lab Results   Component Value Date    .1 (H) 12/23/2023    CALCIUM 8.6 (L) 07/03/2024    ALBUMIN 2.7 (L) 07/03/2024    PHOS 5.2 (H) 07/03/2024                 Thank you for your consult. I will follow-up with patient. Please contact us if you have any additional questions.    Jesus England MD  Nephrology  [facility] - Med Surg ([facility]-16)

## 2024-07-03 NOTE — PROGRESS NOTES
07/03/24 0850 07/03/24 0858        Hemodialysis AV Fistula 07/01/24 Left upper arm   Placement Date: 07/01/24   Present Prior to Hospital Arrival?: No  Location: Left upper arm   Needle Size 17ga  --    Site Assessment Clean;Dry;Intact  --    Patency Present;Thrill;Bruit  --    Status Accessed  --    Flows Good  --    Site Condition No complications  --    During Hemodialysis Assessment   Blood Flow Rate (mL/min)  --  250 mL/min   Dialysate Flow Rate (mL/min)  --  500 ml/min   Ultrafiltration Rate (mL/Hr)  --  470 mL/Hr   Arteriovenous Lines Secure  --  Yes   Arterial Pressure (mmHg)  --  -190 mmHg   Venous Pressure (mmHg)  --  150   Blood Volume Processed (Liters)  --  0 L   UF Removed (mL)  --  0 mL   TMP  --  40   Venous Line in Air Detector  --  Yes   Intake (mL)  --  250 mL   Intra-Hemodialysis Comments  --  HD started     Patient arrived COBY by wheelchair. HD started via left upper arm AVG.

## 2024-07-03 NOTE — DISCHARGE SUMMARY
"[facility] Surg ([address] Medicine  Discharge Summary      Patient Name: Xena Vera  MRN: [id number]  [facility]: [id number]  Patient Class: IP- Inpatient  Admission Date: 6/18/2024  Hospital Length of Stay: 15 days  Discharge Date and Time:  07/03/2024 1:29 PM  Attending Physician: Loreta Dimas MD   Discharging Provider: Keyon Holloway MD  Primary Care Provider: Tami Villeda FNP  [facility] Medicine Team: [facility] 4 Keyon Holloway MD  Primary Care Team: [facility] 4    HPI:   Patient is a 53F PMHx HFpEF (Lasix 80 bid), severe pHTN, CKD5 (last HD in May 2024), HTN, MR, anemia presents for chest pain that began this morning. She reports 2/10 central sqeezing chest pain that radiates to her R shoulder when vomiting. The pain is worse with walking. She has never had chest pain before. She has had nausea/vomiting x1 day. Reports approx 3 episodes of clear vomitus, describes as "phlegm." She has associated sharp upper abdominal pain when vomiting. Additionally reports arthralgias/myalgias, leg swelling, generalized weakness, and fatigue. She had a brief episode of dizziness yesterday when walking but has not had that since. She denies Fever/chills, SOB, upper respiratory symptoms, cough, dysuria, decreased urine, or known sick contacts.     She reports adherence to her fluid restriction & Lasix. Admitted to [facility] from 4/1/24-5/8/24 where renal function declined while being diuresed. Required CRRT with intermittent levophed. Admitted to [facility] 5/13-5/17 after presenting to ED for HD. Not enrolled in medicaid at that time and had been told to present to ED for HD needs. No indications for HD on that admission per chart review. At that time nephrology recommended urgent evaluation with vascular surgery outpatient for AVF placement. Venous mapping obtained.    In ED, somewhat hypertensive (sBP 150s), intermittent bradycardia, afebrile, on RA. CMP with profound uremia (), Cr 5.6, hypokalemia " (K 2.8), no hyponatremia. Transaminitis AST//133 with T. Bili 4.3. Lipase elevated at 230. RUQ US w/o CBD dilation or evidence of scarring, surgically absent gallbladder. CBC with thrombocytopenia (PLT 90), no leukocytosis, stable anemia (Hb 11.5). BNP 4500, Troponin 0.497 which increased to 0.54. LA normal at 1.5. EKG with peaked T waves in II/III/AVF, widened QRS, Qtc 573. Nephrology consulted.     Interpretor was used for entire interview/examination:  number [id number]    Procedure(s) (LRB):  INSERTION, GRAFT, ARTERIOVENOUS (Left)      Hospital Course:   Admitted to [facility] 6/18/24 for chest pain, nausea, and vomiting. Found to have profound uremia and hypokalemia. Nephrology consulted and performed emergent HD. CP resolved following HD, residual epigastric pain w/o nausea/vomiting. TTE 6/19 with preserved EF, normal RV systolic function, severe MR, pHTN, and CVP 15. Diuresis with IV lasix in conjunction with HD. LFTs downtrended with volume removal. Noted to have moderate pericardial effusion on 6/22, therefore Cardiology consulted and recommended continued diuresis. Repeat limited TTE performed 6/24 with interval improvement of what remains to be a moderately sized pericardial effusion. Cardiology to follow up with patient outpatient. Discharge delayed due to insurance barriers regarding securing an outpatient HD chair. Vascular surgery completed LUE AVG on 07/01/2024 without complication. Patient received hemodialysis from new E AVG 07/03/2024 without complication. Patient medically ready for D/C with followups arranged with PCP, cardiology, vascular sx, and Nephrology.     Goals of Care Treatment Preferences:  Code Status: DNR    Interval History: NAEO, VSS,patient tolerated HD with new LUE graft. Medically ready for DC. Has OP followup with nephro, PCP, vascular, and cardiology.    Review of Systems   Constitutional:  Negative for chills and fever.   Respiratory:  Negative for cough and  shortness of breath.    Cardiovascular:  Negative for chest pain and leg swelling.   Gastrointestinal:  Negative for abdominal pain, diarrhea and nausea.   Psychiatric/Behavioral:  Negative for agitation and behavioral problems.      Objective:     Vital Signs (Most Recent):  Temp: 98 °F (36.7 °C) (07/03/24 1230)  Pulse: 77 (07/03/24 1230)  Resp: 18 (07/03/24 1230)  BP: 108/67 (07/03/24 1230)  SpO2: 100 % (07/03/24 1230) Vital Signs (24h Range):  Temp:  [97.6 °F (36.4 °C)-98.5 °F (36.9 °C)] 98 °F (36.7 °C)  Pulse:  [74-84] 77  Resp:  [18] 18  SpO2:  [94 %-100 %] 100 %  BP: ()/(55-71) 108/67     Weight: 48.3 kg (106 lb 7.7 oz)  Body mass index is 21.51 kg/m².    Intake/Output Summary (Last 24 hours) at 7/3/2024 1325  Last data filed at 7/3/2024 1230  Gross per 24 hour   Intake --   Output 1650 ml   Net -1650 ml         Physical Exam  Constitutional:       General: She is not in acute distress.     Appearance: She is normal weight. She is not ill-appearing.   HENT:      Head: Normocephalic and atraumatic.      Right Ear: External ear normal.      Left Ear: External ear normal.      Nose: Nose normal.      Mouth/Throat:      Mouth: Mucous membranes are moist.      Pharynx: Oropharynx is clear.   Eyes:      General: No scleral icterus.     Extraocular Movements: Extraocular movements intact.      Conjunctiva/sclera: Conjunctivae normal.      Pupils: Pupils are equal, round, and reactive to light.   Cardiovascular:      Rate and Rhythm: Normal rate and regular rhythm.      Pulses: Normal pulses.           Radial pulses are 2+ on the right side and 2+ on the left side.      Heart sounds: Normal heart sounds.      Comments: AV fistula ULE good thrills in graft.  Pulmonary:      Effort: Pulmonary effort is normal.      Breath sounds: Normal breath sounds. No wheezing, rhonchi or rales.   Abdominal:      General: Abdomen is flat.      Palpations: Abdomen is soft.      Tenderness: There is no abdominal tenderness.    Musculoskeletal:         General: No swelling. Normal range of motion.      Cervical back: Normal range of motion and neck supple.   Skin:     General: Skin is warm and dry.      Capillary Refill: Capillary refill takes less than 2 seconds.   Neurological:      General: No focal deficit present.      Mental Status: She is alert and oriented to person, place, and time.   Psychiatric:         Mood and Affect: Mood normal.         Behavior: Behavior normal.             Significant Labs: All pertinent labs within the past 24 hours have been reviewed.    Significant Imaging: I have reviewed all pertinent imaging results/findings within the past 24 hours.    Consults:   Consults (From admission, onward)          Status Ordering Provider     Inpatient consult to Vascular Surgery  Once        Provider:  (Not yet assigned)    Completed HUY NICOLE     Inpatient consult to PICC team (NIAS)  Once        Provider:  (Not yet assigned)    Completed JOSE E GONSALES     Inpatient consult to Cardiology  Once        Provider:  (Not yet assigned)    Completed RONNELL ANDRE     Inpatient consult to Nephrology  Once        Provider:  (Not yet assigned)    Completed RONNELL ANDRE            No new Assessment & Plan notes have been filed under this hospital service since the last note was generated.  Service: Hospital Medicine    Final Active Diagnoses:    Diagnosis Date Noted POA    PRINCIPAL PROBLEM:  Uremia [N19] 06/18/2024 Yes    Moderate malnutrition [E44.0] 07/02/2024 Yes    Pre-op exam [Z01.818] 06/27/2024 Not Applicable    Pericardial effusion [I31.39] 06/21/2024 Yes    Atrial premature contractions [I49.1] 06/21/2024 Yes    Cough [R05.9] 06/20/2024 Yes    Hyperbilirubinemia [E80.6] 06/18/2024 Yes    Chest pain [R07.9] 06/18/2024 Yes    ESRD (end stage renal disease) on dialysis [N18.6, Z99.2] 04/13/2024 Not Applicable    Prediabetes [R73.03] 04/13/2024 Yes     Chronic    Elevated troponin I level [R79.89] 12/22/2023 Yes    Acute  on chronic heart failure with preserved ejection fraction (HFpEF) [I50.33] 12/22/2023 Yes    Prolonged QT interval [R94.31] 12/22/2023 Yes    Class 2 obesity in adult [E66.9] 06/24/2023 Yes     Chronic    Essential hypertension [I10] 06/24/2023 Yes     Chronic      Problems Resolved During this Admission:    Diagnosis Date Noted Date Resolved POA    High anion gap metabolic acidosis [E87.29] 06/18/2024 06/18/2024 Yes       Discharged Condition: stable    Disposition: Home or Self Care    Follow Up:   Follow-up Information       Tami Villeda FNP Follow up on 6/26/2024.    Specialty: Family Medicine  Why: @9:15 am  Contact information:  [address]  [place] [address]  [phone]                           Patient Instructions:      Ambulatory referral/consult to Cardiology   Standing Status: Future   Referral Priority: Routine Referral Type: Consultation   Referral Reason: Specialty Services Required   Requested Specialty: Cardiology   Number of Visits Requested: 1     Ambulatory referral/consult to Vascular Surgery   Standing Status: Future   Referral Priority: Routine Referral Type: Consultation   Referral Reason: Specialty Services Required   Referred to Provider: DOE PRADO III Requested Specialty: Vascular Surgery   Number of Visits Requested: 1     Echo     Order Specific Question Answer Comments   Limited Echo? Yes effusion   Release to patient Immediate      Echo Saline Bubble? No   Standing Status: Future Standing Exp. Date: 06/26/25     Order Specific Question Answer Comments   Limited Echo? Yes Pericardial effusion   Saline Bubble? No    Release to patient Immediate        Pending Diagnostic Studies:       Procedure Component Value Units Date/Time    Iron and TIBC [[id number]]     Order Status: Sent Lab Status: No result     Specimen: Blood            Medications:  Reconciled Home Medications:      Medication List        CHANGE how you take these medications      midodrine 2.5 MG  Tab  Commonly known as: PROAMATINE  Take 1 tablet (2.5 mg total) by mouth every 24 hours as needed (For low BP during HD sessions).  What changed:   when to take this  reasons to take this            CONTINUE taking these medications      atorvastatin 40 MG tablet  Commonly known as: LIPITOR  [place] cristal tableta por vía oral diariamente.  (Take 1 tablet by mouth every day)     GAVILAX 17 gram/dose powder  Generic drug: polyethylene glycol  Use cap to measure 17 grams, mix in liquid and take by mouth 2 (two) times daily.     STOOL SOFTENER-LAXATIVE 8.6-50 mg per tablet  Generic drug: senna-docusate 8.6-50 mg  [place] cristal tableta por vía oral 2 veces al día.  (Take 1 tablet by mouth 2 (two) times daily.)            STOP taking these medications      carvediloL 6.25 MG tablet  Commonly known as: COREG     furosemide 80 MG tablet  Commonly known as: LASIX     oxybutynin 5 MG Tab  Commonly known as: DITROPAN              Indwelling Lines/Drains at time of discharge:   Lines/Drains/Airways       Central Venous Catheter Line  Duration             Tunneled Central Line - Double Lumen  04/22/24 1317 Internal Jugular Right 72 days              Drain  Duration                  Hemodialysis AV Fistula 07/01/24 Left upper arm 2 days                    Time spent on the discharge of patient: 30 minutes       Keyon Holloway MD  Department of Hospital Medicine  [facility] - Med Surg ([place]-[facility])

## 2024-07-03 NOTE — PLAN OF CARE
Problem: Adult Inpatient Plan of Care  Goal: Plan of Care Review  Outcome: Met  Flowsheets (Taken 7/3/2024 1803)  Plan of Care Reviewed With:   patient   family  Goal: Patient-Specific Goal (Individualized)  Outcome: Met  Goal: Absence of Hospital-Acquired Illness or Injury  Outcome: Met  Goal: Optimal Comfort and Wellbeing  Outcome: Met  Pt is AAOX4, using interpretor Silverio  [id number] to educated pt on discharge meds and follow up appointments AVS given to pt in [place].  Pt/family voiced understanding. Pt transported via wheelchair accompanied by family.

## 2024-07-03 NOTE — PLAN OF CARE
Problem: Adult Inpatient Plan of Care  Goal: Plan of Care Review  Outcome: Progressing  Goal: Patient-Specific Goal (Individualized)  Outcome: Progressing  Goal: Absence of Hospital-Acquired Illness or Injury  Outcome: Progressing  Goal: Optimal Comfort and Wellbeing  Outcome: Progressing     Problem: Hemodialysis  Goal: Safe, Effective Therapy Delivery  Outcome: Progressing  Goal: Absence of Infection Signs and Symptoms  Outcome: Progressing     Problem: Infection  Goal: Absence of Infection Signs and Symptoms  Outcome: Progressing

## 2024-07-03 NOTE — ASSESSMENT & PLAN NOTE
Etiology of ESRD is long standing hypertension vs family history. Pericardial effusion. Initiated HD on 6/22. Repeat limited echo 6/24 w/ slight decrease in pericardial effusion size. Pt has TDC. AVG creation on 7/1/24.   Pending outpatient HD chair.     Plan/Recommendations:     -Continue MWF iHD schedule while IP   - dialysis for metabolic clearance and volume optimization will be provided today with av graft (as per vascular okay to use)  - with 17 gauge needle, blood pressure 93/58, with blood flow 250,   -Hgb goal 10-11, at goal received ferreheme on 6/26 (iron panel ordered am labs)  - pth level 823 in 2023 will repeat PTH and vitamin D    Anemia of ESRD   Recent Labs   Lab 07/01/24  0318 07/02/24  0714 07/03/24  0448   WBC 6.24 7.89 8.30   HGB 10.3* 10.7* 10.3*   HCT 36.2* 36.7* 36.2*    438 464*     Lab Results   Component Value Date    FESATURATED 13 (L) 06/25/2023    FERRITIN 60 06/25/2023       - Goal in ESRD is Hgb of 10-11.     Mineral Bone Disease in ESRD   Lab Results   Component Value Date    .1 (H) 12/23/2023    CALCIUM 8.6 (L) 07/03/2024    ALBUMIN 2.7 (L) 07/03/2024    PHOS 5.2 (H) 07/03/2024

## 2024-07-05 NOTE — PLAN OF CARE
[facility] Med [facility]urg ([facility])  Discharge Final Note    Primary Care Provider: Tami Villeda FNP    Expected Discharge Date: 7/3/2024    Final Discharge Note (most recent)       Final Note - 07/05/24 0810          Final Note    Assessment Type Final Discharge Note (P)      Anticipated Discharge Disposition Home or Self Care (P)         Post-Acute Status    Post-Acute Authorization Dialysis (P)      Diaylsis Status Set-up Complete/Auth obtained (P)      Coverage [employer] (P)      Discharge Delays None known at this time (P)                      Important Message from Medicare             Contact Info       Tami Villeda FNP   Specialty: Family Medicine   Relationship: PCP - General    [address] [place] [address]   Phone: [phone]       Next Steps: Follow up on 6/26/2024    Instructions: @9:15 am        Pt d/c'd to home with HD set up, ride from family.    RANCHO TysonN, BS, RN, CCM

## 2024-07-08 LAB — 1,25(OH)2D3 SERPL-MCNC: 13 PG/ML (ref 20–79)

## 2024-08-21 DIAGNOSIS — Z99.2 ESRD (END STAGE RENAL DISEASE) ON DIALYSIS: Primary | ICD-10-CM

## 2024-08-21 DIAGNOSIS — N18.6 ESRD (END STAGE RENAL DISEASE) ON DIALYSIS: Primary | ICD-10-CM

## 2024-08-27 ENCOUNTER — TELEPHONE (OUTPATIENT)
Dept: VASCULAR SURGERY | Facility: CLINIC | Age: 54
End: 2024-08-27
Payer: MEDICAID

## 2024-08-27 NOTE — TELEPHONE ENCOUNTER
Contacted pt's daughter Alexandrea to reschedule appts with vascular lab and with Dr. Arce to non-dialysis day. Appointments rescheduled, Alexandrea verified.   ----- Message from Lamar Holm sent at 8/27/2024  9:14 AM CDT -----  Regarding: Returning Missed Call  Contact: [phone]  Returning a Missed Call    Caller: Patient    Returning call to: JUMANA Kan

## 2024-08-30 ENCOUNTER — HOSPITAL ENCOUNTER (OUTPATIENT)
Dept: VASCULAR SURGERY | Facility: CLINIC | Age: 54
Discharge: HOME | End: 2024-08-30
Attending: SURGERY
Payer: MEDICAID

## 2024-08-30 ENCOUNTER — OFFICE VISIT (OUTPATIENT)
Dept: VASCULAR SURGERY | Facility: CLINIC | Age: 54
End: 2024-08-30
Attending: SURGERY
Payer: MEDICAID

## 2024-08-30 VITALS
HEIGHT: 59 IN | SYSTOLIC BLOOD PRESSURE: 111 MMHG | OXYGEN SATURATION: 99 % | WEIGHT: 115.31 LBS | HEART RATE: 77 BPM | DIASTOLIC BLOOD PRESSURE: 65 MMHG | BODY MASS INDEX: 23.24 KG/M2

## 2024-08-30 DIAGNOSIS — Z99.2 ESRD (END STAGE RENAL DISEASE) ON DIALYSIS: ICD-10-CM

## 2024-08-30 DIAGNOSIS — Z99.2 ESRD (END STAGE RENAL DISEASE) ON DIALYSIS: Primary | ICD-10-CM

## 2024-08-30 DIAGNOSIS — N18.6 ESRD (END STAGE RENAL DISEASE) ON DIALYSIS: ICD-10-CM

## 2024-08-30 DIAGNOSIS — N18.6 ESRD (END STAGE RENAL DISEASE) ON DIALYSIS: Primary | ICD-10-CM

## 2024-08-30 PROCEDURE — 93990 DOPPLER FLOW TESTING: CPT | Mod: PBBFAC | Performed by: SURGERY

## 2024-08-30 PROCEDURE — 99999 PR PBB SHADOW E&M-EST. PATIENT-LVL III: CPT | Mod: PBBFAC,,, | Performed by: SURGERY

## 2024-08-30 PROCEDURE — 99213 OFFICE O/P EST LOW 20 MIN: CPT | Mod: PBBFAC | Performed by: SURGERY

## 2024-08-30 RX ORDER — ERGOCALCIFEROL 1.25 MG/1
50000 CAPSULE ORAL
COMMUNITY
Start: 2024-07-26

## 2024-08-30 RX ORDER — FUROSEMIDE 80 MG/1
1 TABLET ORAL 2 TIMES DAILY
COMMUNITY
Start: 2024-05-17

## 2024-08-30 NOTE — PROGRESS NOTES
VASCULAR SURGERY SERVICE    CHIEF COMPLAINT:  Inpatient follow-up    HISTORY OF PRESENT ILLNESS: Xena Vera is a 54 y.o. female with end-stage renal disease who was seen as a inpatient and underwent a left upper arm reverse loop Accu Seal graft 07/01/2024.  This is her 1st follow up visit with me.  Through an  she states she has been using the graft exclusively for some time.  She still has a right IJ PermCath in place.  She is taking only aspirin for anticoagulation.      She denies any hand pain weakness or numbness    Past Medical History:   Diagnosis Date    (HFpEF) heart failure with preserved ejection fraction 06/24/2023    EF 63% with G2 DD  Continue lasix, appears euvolemic today  Continue good BP management with losartan, increase Coreg 6.25 mg BID  Fluid restriction, low sodium diet  Recommend Jardiance- patient had CKD 4 and this is a limiting factor- nephro eval pending    Anemia 06/24/2023    CKD (chronic kidney disease)     HTN (hypertension)     Mitral valve regurgitation 06/26/2023    Refer to structural for evaluation  May benefit from mitral clip    Ovarian cyst     Stage 4 chronic kidney disease 06/24/2023    Refer to nephrology at [facility] as unable to get established with [facility] nephrology and the phone number for the outside nephrologist provided to patient during her recent hospitalization goes unanswered when called.   Cr remains elevated  Would like to start SGLT-2 and appreciate nephrology expertise        Past Surgical History:   Procedure Laterality Date    INSERTION OF TUNNELED CENTRAL VENOUS HEMODIALYSIS CATHETER Right 4/22/2024    Procedure: Insertion, Catheter, Central Venous, Hemodialysis;  Surgeon: Carole Rolon MD;  Location: [facility] CATH LAB;  Service: Interventional Nephrology;  Laterality: Right;    PLACEMENT OF ARTERIOVENOUS GRAFT Left 7/1/2024    Procedure: INSERTION, GRAFT, ARTERIOVENOUS;  Surgeon: DOE Arce III, MD;  Location: [facility] OR [address] [place] [address];  Service:  Vascular;  Laterality: Left;  LUE AVG creation         Current Outpatient Medications:     aspirin (ECOTRIN) 81 MG EC tablet, Take 81 mg by mouth once daily., Disp: , Rfl:     atorvastatin (LIPITOR) 40 MG tablet, Take 1 tablet by mouth every day, Disp: 90 tablet, Rfl: 1    ergocalciferol (ERGOCALCIFEROL) 50,000 unit Cap, Take 50,000 Units by mouth every 7 days., Disp: , Rfl:     fluticasone propionate (FLONASE) 50 mcg/actuation nasal spray, 1 spray by Each Nostril route once daily., Disp: , Rfl:     furosemide (LASIX) 80 MG tablet, Take 1 tablet by mouth 2 (two) times daily., Disp: , Rfl:     midodrine (PROAMATINE) 2.5 MG Tab, Take 1 tablet (2.5 mg total) by mouth every 24 hours as needed (For low BP during HD sessions)., Disp: 30 tablet, Rfl: 11    polyethylene glycol (GLYCOLAX) 17 gram/dose powder, Use cap to measure 17 grams, mix in liquid and take by mouth 2 (two) times daily., Disp: 510 g, Rfl: 11    senna-docusate 8.6-50 mg (PERICOLACE) 8.6-50 mg per tablet, Take 1 tablet by mouth 2 (two) times daily., Disp: 60 tablet, Rfl: 11    sevelamer carbonate (RENVELA) 800 mg Tab, Take 800 mg by mouth 3 (three) times daily with meals., Disp: , Rfl:     sodium bicarbonate 650 MG tablet, Take 650 mg by mouth 2 (two) times daily., Disp: , Rfl:     vit b cmplx 3-fa-vit c-biotin 1- mg-mg-mcg (NEPHRO-FLAQUITA RX OR EQUIV) 1- mg-mg-mcg Tab, Take 1 tablet by mouth once daily., Disp: , Rfl:     Review of patient's allergies indicates:  No Known Allergies    No family history on file.    Social History     Tobacco Use    Smoking status: Former     Current packs/day: 0.00     Types: Cigarettes     Start date:      Quit date:      Years since quittin.6    Smokeless tobacco: Never    Tobacco comments:     2-3 cigarettes a day   Substance Use Topics    Alcohol use: Never    Drug use: Never     PHYSICAL EXAM:   /65 (BP Location: Right arm, Patient Position: Sitting, BP Method: Medium (Automatic))   Pulse 77  "  Ht 4' 11" (1.499 m)   Wt 52.3 kg (115 lb 4.8 oz)   SpO2 99%   BMI 23.29 kg/m²   Constitutional:  Alert,   Well-appearing  In no distress.   Neurological: Normal speech  no focal findings  CN II - XII grossly intact.    Psychiatric: Mood and affect appropriate and symmetric.   HEENT: Normocephalic / atraumatic  PERRLA  Midline trachea  No scars across the neck right IJ PermCath in place   Cardiac: Regular rate and rhythm.   Pulmonary: Normal pulmonary effort.   Abdomen: Soft, not distended.     Skin: Warm and well perfused.    Vascular:  Left radial pulse nonpalpable good Doppler signals   Extremities/  Musculoskeletal: No edema.   Left arm demonstrates well-healed upper arm incisions.  There is a soft thrill without any pulsatility in the graft.     IMAGING:  Duplex of the graft shows to be patent with no significant stenosis with a flow volume of 1.77 L    IMPRESSION:  2 months status post right upper arm AV graft placement.  Graft is working well and being used exclusively    PLAN:  Remove PermCath today  Follow up in 6 months with AV graft    PROCEDURE NOTE:   After sterile prep and drape and infiltration with lidocaine, the permacath cuff was freed up using blunt and sharp dissection, the permacath removed, and manual compression used for hemostasis.   No complications     CRUZ Arce III, MD, FACS  Professor and Chief, Vascular and Endovascular Surgery      "

## 2024-09-16 DIAGNOSIS — Z99.2 ESRD (END STAGE RENAL DISEASE) ON DIALYSIS: Primary | ICD-10-CM

## 2024-09-16 DIAGNOSIS — N18.6 ESRD (END STAGE RENAL DISEASE) ON DIALYSIS: Primary | ICD-10-CM

## 2024-10-21 DIAGNOSIS — Z99.2 ESRD (END STAGE RENAL DISEASE) ON DIALYSIS: Primary | ICD-10-CM

## 2024-10-21 DIAGNOSIS — N18.6 ESRD (END STAGE RENAL DISEASE) ON DIALYSIS: Primary | ICD-10-CM

## 2024-10-22 ENCOUNTER — HOSPITAL ENCOUNTER (OUTPATIENT)
Dept: VASCULAR SURGERY | Facility: CLINIC | Age: 54
Discharge: HOME | End: 2024-10-22
Attending: SURGERY
Payer: MEDICAID

## 2024-10-22 ENCOUNTER — OFFICE VISIT (OUTPATIENT)
Dept: VASCULAR SURGERY | Facility: CLINIC | Age: 54
End: 2024-10-22
Payer: MEDICAID

## 2024-10-22 VITALS
BODY MASS INDEX: 23.6 KG/M2 | TEMPERATURE: 99 F | SYSTOLIC BLOOD PRESSURE: 121 MMHG | HEIGHT: 58 IN | DIASTOLIC BLOOD PRESSURE: 73 MMHG | HEART RATE: 75 BPM | WEIGHT: 112.44 LBS

## 2024-10-22 DIAGNOSIS — Z99.2 ESRD (END STAGE RENAL DISEASE) ON DIALYSIS: ICD-10-CM

## 2024-10-22 DIAGNOSIS — T82.858A STENOSIS OF ARTERIOVENOUS DIALYSIS FISTULA, INITIAL ENCOUNTER: Primary | ICD-10-CM

## 2024-10-22 DIAGNOSIS — Z01.818 PRE-OP EVALUATION: ICD-10-CM

## 2024-10-22 DIAGNOSIS — N18.6 ESRD (END STAGE RENAL DISEASE) ON DIALYSIS: ICD-10-CM

## 2024-10-22 DIAGNOSIS — T82.858D ARTERIOVENOUS FISTULA STENOSIS, SUBSEQUENT ENCOUNTER: Primary | ICD-10-CM

## 2024-10-22 PROCEDURE — 99215 OFFICE O/P EST HI 40 MIN: CPT | Mod: S$PBB,,, | Performed by: SURGERY

## 2024-10-22 PROCEDURE — 93990 DOPPLER FLOW TESTING: CPT | Mod: PBBFAC | Performed by: SURGERY

## 2024-10-22 PROCEDURE — 3062F POS MACROALBUMINURIA REV: CPT | Mod: CPTII,,, | Performed by: SURGERY

## 2024-10-22 PROCEDURE — 3074F SYST BP LT 130 MM HG: CPT | Mod: CPTII,,, | Performed by: SURGERY

## 2024-10-22 PROCEDURE — 3008F BODY MASS INDEX DOCD: CPT | Mod: CPTII,,, | Performed by: SURGERY

## 2024-10-22 PROCEDURE — 1159F MED LIST DOCD IN RCRD: CPT | Mod: CPTII,,, | Performed by: SURGERY

## 2024-10-22 PROCEDURE — 3044F HG A1C LEVEL LT 7.0%: CPT | Mod: CPTII,,, | Performed by: SURGERY

## 2024-10-22 PROCEDURE — 1160F RVW MEDS BY RX/DR IN RCRD: CPT | Mod: CPTII,,, | Performed by: SURGERY

## 2024-10-22 PROCEDURE — 93990 DOPPLER FLOW TESTING: CPT | Mod: 26,S$PBB,, | Performed by: SURGERY

## 2024-10-22 PROCEDURE — 99213 OFFICE O/P EST LOW 20 MIN: CPT | Mod: PBBFAC,25 | Performed by: SURGERY

## 2024-10-22 PROCEDURE — 3066F NEPHROPATHY DOC TX: CPT | Mod: CPTII,,, | Performed by: SURGERY

## 2024-10-22 PROCEDURE — 99999 PR PBB SHADOW E&M-EST. PATIENT-LVL III: CPT | Mod: PBBFAC,,, | Performed by: SURGERY

## 2024-10-22 PROCEDURE — 3078F DIAST BP <80 MM HG: CPT | Mod: CPTII,,, | Performed by: SURGERY

## 2024-10-22 NOTE — PROGRESS NOTES
VASCULAR SURGERY SERVICE    CHIEF COMPLAINT:  Inpatient follow-up    HISTORY OF PRESENT ILLNESS: Xena Vera is a 54 y.o. female with end-stage renal disease who was seen as a inpatient and underwent a left upper arm reverse loop Accu Seal graft 07/01/2024.  This is her 1st follow up visit with me.  Through an  she states she has been using the graft exclusively for some time.  She still has a right IJ PermCath in place.  She is taking only aspirin for anticoagulation.      She denies any hand pain weakness or numbness    10/22/2024:  Showed turns on request from her dialysis center.  She is not quite sure why she is here.   Talking through the translating service, she was unaware that there was issues with the dialysis runs    Past Medical History:   Diagnosis Date    (HFpEF) heart failure with preserved ejection fraction 06/24/2023    EF 63% with G2 DD  Continue lasix, appears euvolemic today  Continue good BP management with losartan, increase Coreg 6.25 mg BID  Fluid restriction, low sodium diet  Recommend Jardiance- patient had CKD 4 and this is a limiting factor- nephro eval pending    Anemia 06/24/2023    CKD (chronic kidney disease)     HTN (hypertension)     Mitral valve regurgitation 06/26/2023    Refer to structural for evaluation  May benefit from mitral clip    Ovarian cyst     Stage 4 chronic kidney disease 06/24/2023    Refer to nephrology at [facility] as unable to get established with [facility] and the phone number for the outside nephrologist provided to patient during her recent hospitalization goes unanswered when called.   Cr remains elevated  Would like to start SGLT-2 and appreciate nephrology expertise        Past Surgical History:   Procedure Laterality Date    INSERTION OF TUNNELED CENTRAL VENOUS HEMODIALYSIS CATHETER Right 4/22/2024    Procedure: Insertion, Catheter, Central Venous, Hemodialysis;  Surgeon: Carole Rolon MD;  Location: [facility] CATH LAB;  Service:  Interventional Nephrology;  Laterality: Right;    PLACEMENT OF ARTERIOVENOUS GRAFT Left 7/1/2024    Procedure: INSERTION, GRAFT, ARTERIOVENOUS;  Surgeon: DOE Arce III, MD;  Location: [facility] OR [address] [place] [address];  Service: Vascular;  Laterality: Left;  LUE AVG creation         Current Outpatient Medications:     aspirin (ECOTRIN) 81 MG EC tablet, Take 81 mg by mouth once daily., Disp: , Rfl:     atorvastatin (LIPITOR) 40 MG tablet, Take 1 tablet by mouth every day, Disp: 90 tablet, Rfl: 1    ergocalciferol (ERGOCALCIFEROL) 50,000 unit Cap, Take 50,000 Units by mouth every 7 days., Disp: , Rfl:     fluticasone propionate (FLONASE) 50 mcg/actuation nasal spray, 1 spray by Each Nostril route once daily., Disp: , Rfl:     furosemide (LASIX) 80 MG tablet, Take 1 tablet by mouth 2 (two) times daily., Disp: , Rfl:     midodrine (PROAMATINE) 2.5 MG Tab, Take 1 tablet (2.5 mg total) by mouth every 24 hours as needed (For low BP during HD sessions)., Disp: 30 tablet, Rfl: 11    polyethylene glycol (GLYCOLAX) 17 gram/dose powder, Use cap to measure 17 grams, mix in liquid and take by mouth 2 (two) times daily., Disp: 510 g, Rfl: 11    senna-docusate 8.6-50 mg (PERICOLACE) 8.6-50 mg per tablet, Take 1 tablet by mouth 2 (two) times daily., Disp: 60 tablet, Rfl: 11    sevelamer carbonate (RENVELA) 800 mg Tab, Take 1,600 mg by mouth 3 (three) times daily with meals., Disp: , Rfl:     sevelamer carbonate (RENVELA) 800 mg Tab, Take 1 tablet by mouth 3 (three) times daily with meals, Disp: 90 tablet, Rfl: 11    sodium bicarbonate 650 MG tablet, Take 650 mg by mouth 2 (two) times daily., Disp: , Rfl:     vit b cmplx 3-fa-vit c-biotin 1- mg-mg-mcg (NEPHRO-FLAQUITA RX OR EQUIV) 1- mg-mg-mcg Tab, Take 1 tablet by mouth once daily., Disp: , Rfl:   No current facility-administered medications for this visit.    Facility-Administered Medications Ordered in Other Visits:     heparin (porcine) injection 1,000 Units, 1,000 Units,  "Intravenous, 1 time in Clinic/HOD, Fabienne Kaplan MD    heparin (porcine) injection 1,000 Units, 1,000 Units, Intravenous, 1 time in Clinic/HOD, Fabienne Kaplan MD    iron sucrose injection 100 mg, 100 mg, Intravenous, 1 time in Clinic/HOD, Erica Nicholson MD    Review of patient's allergies indicates:  No Known Allergies    No family history on file.    Social History     Tobacco Use    Smoking status: Former     Current packs/day: 0.00     Types: Cigarettes     Start date:      Quit date:      Years since quittin.8    Smokeless tobacco: Never    Tobacco comments:     2-3 cigarettes a day   Substance Use Topics    Alcohol use: Never    Drug use: Never     PHYSICAL EXAM:   /73 (BP Location: Right arm, Patient Position: Sitting)   Pulse 75   Temp 98.8 °F (37.1 °C) (Oral)   Ht 4' 9.5" (1.461 m)   Wt 51 kg (112 lb 7 oz)   BMI 23.91 kg/m²   Constitutional:  Alert,   Well-appearing  In no distress.   Neurological: Normal speech  no focal findings  CN II - XII grossly intact.    Psychiatric: Mood and affect appropriate and symmetric.   HEENT: Normocephalic / atraumatic  PERRLA  Midline trachea  No scars across the neck right IJ PermCath in place   Cardiac: Regular rate and rhythm.   Pulmonary: Normal pulmonary effort.   Abdomen: Soft, not distended.     Skin: Warm and well perfused.    Vascular:  Left radial pulse nonpalpable good Doppler signals   Extremities/  Musculoskeletal: No edema.   Left arm demonstrates an upper arm loop AV graft that now has some pulsatility which is new with the underlying thrill     IMAGING:  Duplex of the graft shows to be patent with a new outflow stenosis with a velocity of 626, flow volume decreased to 1.37 L, prior with a flow volume of 1.77 L    IMPRESSION:  New outflow venous stenosis, left AV graft with decreased flow rates.  Intervention is warranted    PLAN:  Left fistulogram and intervention 2024.  Patient dialyzes Tuesday " Thursday Saturday  Cath lab case    I have explained the risks, benefits and alternatives for this procedure in detail.  The patient voices understanding and all questions have be answered, and agrees to proceed with the procedure.     CRUZ Arce III, MD, FACS  Professor and Chief, Vascular and Endovascular Surgery

## 2024-10-25 ENCOUNTER — TELEPHONE (OUTPATIENT)
Dept: VASCULAR SURGERY | Facility: CLINIC | Age: 54
End: 2024-10-25
Payer: MEDICAID

## 2024-10-25 NOTE — TELEPHONE ENCOUNTER
Attempted to contact pt's daughter Alexandrea to confirm time of arrival for procedure in cath lab with Dr. Arce on Monday 10/28/24. Voice message left for daughter with time of arrival, pre-op instructions, and requesting return call to confirm procedure.

## 2024-10-28 ENCOUNTER — HOSPITAL ENCOUNTER (OUTPATIENT)
Facility: HOSPITAL | Age: 54
Discharge: HOME | End: 2024-10-28
Attending: SURGERY | Admitting: SURGERY
Payer: MEDICAID

## 2024-10-28 VITALS
BODY MASS INDEX: 24.98 KG/M2 | RESPIRATION RATE: 16 BRPM | HEART RATE: 73 BPM | TEMPERATURE: 97 F | HEIGHT: 58 IN | WEIGHT: 119 LBS | SYSTOLIC BLOOD PRESSURE: 99 MMHG | OXYGEN SATURATION: 100 % | DIASTOLIC BLOOD PRESSURE: 61 MMHG

## 2024-10-28 DIAGNOSIS — T82.858D ARTERIOVENOUS FISTULA STENOSIS, SUBSEQUENT ENCOUNTER: ICD-10-CM

## 2024-10-28 DIAGNOSIS — T82.858A STENOSIS OF ARTERIOVENOUS DIALYSIS FISTULA, INITIAL ENCOUNTER: Primary | ICD-10-CM

## 2024-10-28 PROCEDURE — C1769 GUIDE WIRE: HCPCS | Performed by: SURGERY

## 2024-10-28 PROCEDURE — 27201423 OPTIME MED/SURG SUP & DEVICES STERILE SUPPLY: Performed by: SURGERY

## 2024-10-28 PROCEDURE — C1725 CATH, TRANSLUMIN NON-LASER: HCPCS | Performed by: SURGERY

## 2024-10-28 PROCEDURE — 99152 MOD SED SAME PHYS/QHP 5/>YRS: CPT | Performed by: SURGERY

## 2024-10-28 PROCEDURE — 25500020 PHARM REV CODE 255: Performed by: SURGERY

## 2024-10-28 PROCEDURE — C1894 INTRO/SHEATH, NON-LASER: HCPCS | Performed by: SURGERY

## 2024-10-28 PROCEDURE — 36902 INTRO CATH DIALYSIS CIRCUIT: CPT | Performed by: SURGERY

## 2024-10-28 PROCEDURE — 63600175 PHARM REV CODE 636 W HCPCS: Performed by: SURGERY

## 2024-10-28 PROCEDURE — 99152 MOD SED SAME PHYS/QHP 5/>YRS: CPT | Mod: ,,, | Performed by: SURGERY

## 2024-10-28 PROCEDURE — 36902 INTRO CATH DIALYSIS CIRCUIT: CPT | Mod: ,,, | Performed by: SURGERY

## 2024-10-28 RX ORDER — IODIXANOL 320 MG/ML
INJECTION, SOLUTION INTRAVASCULAR
Status: DISCONTINUED | OUTPATIENT
Start: 2024-10-28 | End: 2024-10-28 | Stop reason: HOSPADM

## 2024-10-28 RX ORDER — LIDOCAINE HYDROCHLORIDE 20 MG/ML
INJECTION, SOLUTION EPIDURAL; INFILTRATION; INTRACAUDAL; PERINEURAL
Status: DISCONTINUED | OUTPATIENT
Start: 2024-10-28 | End: 2024-10-28 | Stop reason: HOSPADM

## 2024-10-28 RX ORDER — MUPIROCIN 20 MG/G
OINTMENT TOPICAL
Status: DISCONTINUED | OUTPATIENT
Start: 2024-10-28 | End: 2024-10-28 | Stop reason: HOSPADM

## 2024-10-28 RX ORDER — HEPARIN SOD,PORCINE/0.9 % NACL 1000/500ML
INTRAVENOUS SOLUTION INTRAVENOUS
Status: DISCONTINUED | OUTPATIENT
Start: 2024-10-28 | End: 2024-10-28 | Stop reason: HOSPADM

## 2024-10-28 RX ORDER — FENTANYL CITRATE 50 UG/ML
INJECTION, SOLUTION INTRAMUSCULAR; INTRAVENOUS
Status: DISCONTINUED | OUTPATIENT
Start: 2024-10-28 | End: 2024-10-28 | Stop reason: HOSPADM

## 2024-10-28 RX ORDER — CEFAZOLIN 2 G/1
2 INJECTION, POWDER, FOR SOLUTION INTRAMUSCULAR; INTRAVENOUS
Status: DISCONTINUED | OUTPATIENT
Start: 2024-10-28 | End: 2024-10-28 | Stop reason: HOSPADM

## 2024-10-28 RX ORDER — SODIUM CHLORIDE 0.9 % (FLUSH) 0.9 %
10 SYRINGE (ML) INJECTION
Status: DISCONTINUED | OUTPATIENT
Start: 2024-10-28 | End: 2024-10-28 | Stop reason: HOSPADM

## 2024-10-28 RX ORDER — MIDAZOLAM HYDROCHLORIDE 2 MG/2ML
INJECTION, SOLUTION INTRAMUSCULAR; INTRAVENOUS
Status: DISCONTINUED | OUTPATIENT
Start: 2024-10-28 | End: 2024-10-28 | Stop reason: HOSPADM

## 2024-10-28 RX ORDER — LIDOCAINE HYDROCHLORIDE 10 MG/ML
1 INJECTION, SOLUTION EPIDURAL; INFILTRATION; INTRACAUDAL; PERINEURAL ONCE
Status: DISCONTINUED | OUTPATIENT
Start: 2024-10-28 | End: 2024-10-28 | Stop reason: HOSPADM

## 2024-11-08 ENCOUNTER — HOSPITAL ENCOUNTER (OUTPATIENT)
Dept: VASCULAR SURGERY | Facility: CLINIC | Age: 54
Discharge: HOME | End: 2024-11-08
Attending: SURGERY
Payer: MEDICAID

## 2024-11-08 ENCOUNTER — OFFICE VISIT (OUTPATIENT)
Dept: VASCULAR SURGERY | Facility: CLINIC | Age: 54
End: 2024-11-08
Attending: SURGERY
Payer: MEDICAID

## 2024-11-08 VITALS
HEIGHT: 57 IN | TEMPERATURE: 99 F | DIASTOLIC BLOOD PRESSURE: 61 MMHG | SYSTOLIC BLOOD PRESSURE: 99 MMHG | WEIGHT: 116.88 LBS | HEART RATE: 77 BPM | BODY MASS INDEX: 25.22 KG/M2

## 2024-11-08 DIAGNOSIS — N18.6 ESRD (END STAGE RENAL DISEASE) ON DIALYSIS: Primary | ICD-10-CM

## 2024-11-08 DIAGNOSIS — Z99.2 ESRD (END STAGE RENAL DISEASE) ON DIALYSIS: Primary | ICD-10-CM

## 2024-11-08 DIAGNOSIS — Z99.2 ESRD (END STAGE RENAL DISEASE) ON DIALYSIS: ICD-10-CM

## 2024-11-08 DIAGNOSIS — N18.6 ESRD (END STAGE RENAL DISEASE) ON DIALYSIS: ICD-10-CM

## 2024-11-08 DIAGNOSIS — T82.858D STENOSIS OF ARTERIOVENOUS DIALYSIS FISTULA, SUBSEQUENT ENCOUNTER: Primary | ICD-10-CM

## 2024-11-08 PROCEDURE — 99999 PR PBB SHADOW E&M-EST. PATIENT-LVL III: CPT | Mod: PBBFAC,,, | Performed by: SURGERY

## 2024-11-08 PROCEDURE — 93990 DOPPLER FLOW TESTING: CPT | Mod: PBBFAC | Performed by: SURGERY

## 2024-11-08 PROCEDURE — 99213 OFFICE O/P EST LOW 20 MIN: CPT | Mod: PBBFAC | Performed by: SURGERY

## 2024-11-08 PROCEDURE — 93990 DOPPLER FLOW TESTING: CPT | Mod: 26,S$PBB,, | Performed by: SURGERY

## 2024-11-08 NOTE — PROGRESS NOTES
VASCULAR SURGERY SERVICE    CHIEF COMPLAINT:  Inpatient follow-up    HISTORY OF PRESENT ILLNESS: Xena Vera is a 54 y.o. female with end-stage renal disease who was seen as a inpatient and underwent a left upper arm reverse loop Accu Seal graft 07/01/2024.  This is her 1st follow up visit with me.  Through an  she states she has been using the graft exclusively for some time.  She still has a right IJ PermCath in place.  She is taking only aspirin for anticoagulation.      She denies any hand pain weakness or numbness    Status post  PTA, venous anastomosis 10/28/2024  Left upper arm reverse loop Accu Seal graft 07/01/2024    10/22/2024:  Showed turns on request from her dialysis center.  She is not quite sure why she is here.   Talking through the translating service, she was unaware that there was issues with the dialysis runs    11/08/2024:  She now returns after recent intervention.  Her family member is helping to translate for her.  She reports no troubles using her graft    Past Medical History:   Diagnosis Date    (HFpEF) heart failure with preserved ejection fraction 06/24/2023    EF 63% with G2 DD  Continue lasix, appears euvolemic today  Continue good BP management with losartan, increase Coreg 6.25 mg BID  Fluid restriction, low sodium diet  Recommend Jardiance- patient had CKD 4 and this is a limiting factor- nephro eval pending    Anemia 06/24/2023    CKD (chronic kidney disease)     HTN (hypertension)     Mitral valve regurgitation 06/26/2023    Refer to structural for evaluation  May benefit from mitral clip    Ovarian cyst     Stage 4 chronic kidney disease 06/24/2023    Refer to nephrology at [facility] as unable to get established with [facility] and the phone number for the outside nephrologist provided to patient during her recent hospitalization goes unanswered when called.   Cr remains elevated  Would like to start SGLT-2 and appreciate nephrology expertise        Past  Surgical History:   Procedure Laterality Date    FISTULOGRAM N/A 10/28/2024    Procedure: Fistulogram;  Surgeon: DOE Arce III, MD;  Location: [facility] CATH LAB;  Service: Peripheral Vascular;  Laterality: N/A;    INSERTION OF TUNNELED CENTRAL VENOUS HEMODIALYSIS CATHETER Right 4/22/2024    Procedure: Insertion, Catheter, Central Venous, Hemodialysis;  Surgeon: Carole Rolon MD;  Location: [facility] CATH LAB;  Service: Interventional Nephrology;  Laterality: Right;    PERCUTANEOUS TRANSLUMINAL ANGIOPLASTY OF ARTERIOVENOUS FISTULA Left 10/28/2024    Procedure: PTA, AV FISTULA;  Surgeon: DOE Arce III, MD;  Location: [facility] CATH LAB;  Service: Peripheral Vascular;  Laterality: Left;    PLACEMENT OF ARTERIOVENOUS GRAFT Left 7/1/2024    Procedure: INSERTION, GRAFT, ARTERIOVENOUS;  Surgeon: DOE Arce III, MD;  Location: [facility] OR [facility] FLR;  Service: Vascular;  Laterality: Left;  LUE AVG creation         Current Outpatient Medications:     aspirin (ECOTRIN) 81 MG EC tablet, Take 81 mg by mouth once daily., Disp: , Rfl:     atorvastatin (LIPITOR) 40 MG tablet, Take 1 tablet by mouth every day, Disp: 90 tablet, Rfl: 1    ergocalciferol (ERGOCALCIFEROL) 50,000 unit Cap, Take 50,000 Units by mouth every 7 days., Disp: , Rfl:     fluticasone propionate (FLONASE) 50 mcg/actuation nasal spray, 1 spray by Each Nostril route once daily., Disp: , Rfl:     furosemide (LASIX) 80 MG tablet, Take 1 tablet by mouth 2 (two) times daily., Disp: , Rfl:     midodrine (PROAMATINE) 2.5 MG Tab, Take 1 tablet (2.5 mg total) by mouth every 24 hours as needed (For low BP during HD sessions)., Disp: 30 tablet, Rfl: 11    polyethylene glycol (GLYCOLAX) 17 gram/dose powder, Use cap to measure 17 grams, mix in liquid and take by mouth 2 (two) times daily., Disp: 510 g, Rfl: 11    senna-docusate 8.6-50 mg (PERICOLACE) 8.6-50 mg per tablet, Take 1 tablet by mouth 2 (two) times daily., Disp: 60 tablet, Rfl: 11    sevelamer carbonate (RENVELA)  "800 mg Tab, Take 1,600 mg by mouth 3 (three) times daily with meals., Disp: , Rfl:     sevelamer carbonate (RENVELA) 800 mg Tab, Take 1 tablet by mouth 3 (three) times daily with meals, Disp: 90 tablet, Rfl: 11    sodium bicarbonate 650 MG tablet, Take 650 mg by mouth 2 (two) times daily., Disp: , Rfl:     vit b cmplx 3-fa-vit c-biotin 1- mg-mg-mcg (NEPHRO-FLAQUITA RX OR EQUIV) 1- mg-mg-mcg Tab, Take 1 tablet by mouth once daily., Disp: , Rfl:     Review of patient's allergies indicates:  No Known Allergies    No family history on file.    Social History     Tobacco Use    Smoking status: Former     Current packs/day: 0.00     Types: Cigarettes     Start date:      Quit date:      Years since quittin.8    Smokeless tobacco: Never    Tobacco comments:     2-3 cigarettes a day   Substance Use Topics    Alcohol use: Never    Drug use: Never     PHYSICAL EXAM:   BP 99/61 (BP Location: Right arm, Patient Position: Sitting)   Pulse 77   Temp 99 °F (37.2 °C) (Oral)   Ht 4' 9" (1.448 m)   Wt 53 kg (116 lb 13.5 oz)   BMI 25.28 kg/m²   Constitutional:  Alert,   Well-appearing  In no distress.   Neurological: Normal speech  no focal findings  CN II - XII grossly intact.    Psychiatric: Mood and affect appropriate and symmetric.   HEENT: Normocephalic / atraumatic  PERRLA  Midline trachea  No scars across the neck right IJ PermCath in place   Cardiac: Regular rate and rhythm.   Pulmonary: Normal pulmonary effort.   Abdomen: Soft, not distended.     Skin: Warm and well perfused.    Vascular:  Left radial pulse nonpalpable good Doppler signals   Extremities/  Musculoskeletal: No edema.   Left arm demonstrates an upper arm loop AV graft now with a appear thrill no pulsatility.  Clinically this is improved from before     IMAGING:  Duplex of the graft shows to be patent with resolved prior outflow stenosis, flow volume increased to 1.54 L (prior 1.37 L,     IMPRESSION:  Successful intervention, AV graft " venous outflow stenosis    PLAN:  Follow up in 6 months with AV graft duplex sooner if clinically indicated    CRUZ Arce III, MD, FACS  Professor and Chief, Vascular and Endovascular Surgery

## 2025-02-28 NOTE — ASSESSMENT & PLAN NOTE
Takes losartan 100 mg daily at home. BP elevated to 184/104 on admission, improved with diuresis. Will hold home ARB in the setting of possible REGINALDO (though unclear Cr baseline). Will start GDMT w/ beta-blocker. PRN hydralazine as needed.    -- Start metoprolol succinate 25 mg daily  -- PRN PO hydralazine for SBP > 170   no

## 2025-04-26 NOTE — HPI
Xena Vera is a 52 yo F with PMH of HFpEF, severe MR, HTN, HLD, and CKD4 who [place] [facility] [place] after a mechanical fall at home with concurrent SOB and swelling in her legs and abdomen. She reported adherence to home Lasix and low sodium diet, but had felt increasingly SOB prior to admission as she was sleeping upright and had multiple nighttime awakenings due to SOB. In the ED, BNP was 4639 and she was admitted to Hospital Medicine for further management of ADHF. She initially had good response to IV Lasix but hospital course was later complicated by urinary retention, worsening kidney function, and decreased UOP. Nephrology and Cardiology were consulted to assist. She was started on Diuril and Lasix gtt for diuresis and Isordil and hydralazine for afterload reduction. At this time, she will be transferred to CCU for further management.    [[email or web address]_ALL_OB_FT:[id number]],[NS_DeliveryAttending2_OBGYN_ALL_OB_FT:[id number]],[NS_DeliveryRN_OBGYN_ALL_OB_FT:[id number]]

## (undated) DEVICE — GUIDEWIRE STF .035X180CM ANG

## (undated) DEVICE — GUIDEWIRE EMERALD 150CM PTFE

## (undated) DEVICE — VISIPAQUE CONTRAST 320MG/100ML

## (undated) DEVICE — SOL 9P NACL IRR PIC IL

## (undated) DEVICE — SUT MONOCRYL 3-0 SH U/D

## (undated) DEVICE — TRAY CATH LAB OMC

## (undated) DEVICE — KIT MICROINTRO 4F .018X40X7CM

## (undated) DEVICE — PACK AV VASCULAR ACCESS OMC

## (undated) DEVICE — DRESSING TRANS 4X4 TEGADERM

## (undated) DEVICE — SUT PROLENE 6-0 24 BV-1

## (undated) DEVICE — GOWN SMART IMP BREATHABLE XXLG

## (undated) DEVICE — VISE RADIFOCUS MULTI TORQUE

## (undated) DEVICE — SUT VICRYL + 2-0 36IN CP-1

## (undated) DEVICE — CATH DORADO BLLN DIL 6F 8X4X80

## (undated) DEVICE — PACK PEDIATRIC ANGIOGRAPHY OMC

## (undated) DEVICE — ELECTRODE REM PLYHSV RETURN 9

## (undated) DEVICE — KIT PROBE COVER WITH GEL

## (undated) DEVICE — DRAPE PED LAP SURG 108X77IN

## (undated) DEVICE — CLIP MED TICALL

## (undated) DEVICE — LOOP VESSEL BLUE MINI 2/CARD

## (undated) DEVICE — INFLATOR ENCORE 26 BLLN INFL

## (undated) DEVICE — LOOP VESSEL BLUE MAXI

## (undated) DEVICE — SOL NS 1000CC

## (undated) DEVICE — SUT MCRYL PLUS 4-0 PS2 27IN

## (undated) DEVICE — SUT VICRYL 3-0 27 SH

## (undated) DEVICE — SUT 3-0 12-18IN SILK

## (undated) DEVICE — SHEATH PINNACLE 6FR HIFLO

## (undated) DEVICE — SUT SILK 2-0 SH 18IN BLACK

## (undated) DEVICE — SUT 2-0 VICRYL / SH (J417)

## (undated) DEVICE — SUT LIGACLIP SMALL XTRA

## (undated) DEVICE — Device